# Patient Record
Sex: FEMALE | Race: WHITE | NOT HISPANIC OR LATINO | Employment: OTHER | URBAN - METROPOLITAN AREA
[De-identification: names, ages, dates, MRNs, and addresses within clinical notes are randomized per-mention and may not be internally consistent; named-entity substitution may affect disease eponyms.]

---

## 2017-04-03 ENCOUNTER — TRANSFERRED RECORDS (OUTPATIENT)
Dept: HEALTH INFORMATION MANAGEMENT | Facility: CLINIC | Age: 57
End: 2017-04-03

## 2017-04-04 ENCOUNTER — TRANSFERRED RECORDS (OUTPATIENT)
Dept: HEALTH INFORMATION MANAGEMENT | Facility: CLINIC | Age: 57
End: 2017-04-04

## 2017-04-21 ENCOUNTER — TRANSFERRED RECORDS (OUTPATIENT)
Dept: HEALTH INFORMATION MANAGEMENT | Facility: CLINIC | Age: 57
End: 2017-04-21

## 2017-04-24 ENCOUNTER — TRANSFERRED RECORDS (OUTPATIENT)
Dept: HEALTH INFORMATION MANAGEMENT | Facility: CLINIC | Age: 57
End: 2017-04-24

## 2017-05-02 ENCOUNTER — TRANSFERRED RECORDS (OUTPATIENT)
Dept: HEALTH INFORMATION MANAGEMENT | Facility: CLINIC | Age: 57
End: 2017-05-02

## 2017-06-28 ENCOUNTER — ALLIED HEALTH/NURSE VISIT (OUTPATIENT)
Dept: CARDIOLOGY | Facility: CLINIC | Age: 57
End: 2017-06-28
Attending: INTERNAL MEDICINE
Payer: COMMERCIAL

## 2017-06-28 ENCOUNTER — HOSPITAL ENCOUNTER (OUTPATIENT)
Facility: CLINIC | Age: 57
Setting detail: OBSERVATION
Discharge: HOME | End: 2017-06-29
Attending: EMERGENCY MEDICINE | Admitting: INTERNAL MEDICINE
Payer: COMMERCIAL

## 2017-06-28 ENCOUNTER — APPOINTMENT (OUTPATIENT)
Dept: CT IMAGING | Facility: CLINIC | Age: 57
End: 2017-06-28
Attending: EMERGENCY MEDICINE
Payer: COMMERCIAL

## 2017-06-28 ENCOUNTER — APPOINTMENT (OUTPATIENT)
Dept: GENERAL RADIOLOGY | Facility: CLINIC | Age: 57
End: 2017-06-28
Attending: EMERGENCY MEDICINE
Payer: COMMERCIAL

## 2017-06-28 DIAGNOSIS — Z95.0 PACEMAKER: ICD-10-CM

## 2017-06-28 DIAGNOSIS — R07.9 CHEST PAIN, UNSPECIFIED TYPE: ICD-10-CM

## 2017-06-28 DIAGNOSIS — Z95.0 CARDIAC PACEMAKER IN SITU: ICD-10-CM

## 2017-06-28 DIAGNOSIS — I44.30 INCOMPLETE ATRIOVENTRICULAR BLOCK: ICD-10-CM

## 2017-06-28 DIAGNOSIS — I44.2 COMPLETE ATRIOVENTRICULAR BLOCK (H): Primary | ICD-10-CM

## 2017-06-28 LAB
ANION GAP SERPL CALCULATED.3IONS-SCNC: 5 MMOL/L (ref 3–14)
BASOPHILS # BLD AUTO: 0 10E9/L (ref 0–0.2)
BASOPHILS NFR BLD AUTO: 0.5 %
BUN SERPL-MCNC: 13 MG/DL (ref 7–30)
CALCIUM SERPL-MCNC: 9.9 MG/DL (ref 8.5–10.1)
CHLORIDE SERPL-SCNC: 109 MMOL/L (ref 94–109)
CO2 SERPL-SCNC: 24 MMOL/L (ref 20–32)
CREAT SERPL-MCNC: 0.67 MG/DL (ref 0.52–1.04)
D DIMER PPP FEU-MCNC: 0.6 UG/ML FEU (ref 0–0.5)
DIFFERENTIAL METHOD BLD: NORMAL
EOSINOPHIL # BLD AUTO: 0.1 10E9/L (ref 0–0.7)
EOSINOPHIL NFR BLD AUTO: 1.7 %
ERYTHROCYTE [DISTWIDTH] IN BLOOD BY AUTOMATED COUNT: 12.6 % (ref 10–15)
GFR SERPL CREATININE-BSD FRML MDRD: NORMAL ML/MIN/1.7M2
GLUCOSE BLDC GLUCOMTR-MCNC: 101 MG/DL (ref 70–99)
GLUCOSE SERPL-MCNC: 82 MG/DL (ref 70–99)
HCT VFR BLD AUTO: 42.8 % (ref 35–47)
HGB BLD-MCNC: 14.2 G/DL (ref 11.7–15.7)
IMM GRANULOCYTES # BLD: 0 10E9/L (ref 0–0.4)
IMM GRANULOCYTES NFR BLD: 0.2 %
INR PPP: 0.95 (ref 0.86–1.14)
LIPASE SERPL-CCNC: 133 U/L (ref 73–393)
LYMPHOCYTES # BLD AUTO: 2.3 10E9/L (ref 0.8–5.3)
LYMPHOCYTES NFR BLD AUTO: 37.5 %
MCH RBC QN AUTO: 31.7 PG (ref 26.5–33)
MCHC RBC AUTO-ENTMCNC: 33.2 G/DL (ref 31.5–36.5)
MCV RBC AUTO: 96 FL (ref 78–100)
MONOCYTES # BLD AUTO: 0.5 10E9/L (ref 0–1.3)
MONOCYTES NFR BLD AUTO: 8.3 %
NEUTROPHILS # BLD AUTO: 3.1 10E9/L (ref 1.6–8.3)
NEUTROPHILS NFR BLD AUTO: 51.8 %
NRBC # BLD AUTO: 0 10*3/UL
NRBC BLD AUTO-RTO: 0 /100
PLATELET # BLD AUTO: 195 10E9/L (ref 150–450)
POTASSIUM SERPL-SCNC: 4.2 MMOL/L (ref 3.4–5.3)
RBC # BLD AUTO: 4.48 10E12/L (ref 3.8–5.2)
SODIUM SERPL-SCNC: 138 MMOL/L (ref 133–144)
TROPONIN I SERPL-MCNC: NORMAL UG/L (ref 0–0.04)
WBC # BLD AUTO: 6 10E9/L (ref 4–11)

## 2017-06-28 PROCEDURE — 25000132 ZZH RX MED GY IP 250 OP 250 PS 637: Performed by: EMERGENCY MEDICINE

## 2017-06-28 PROCEDURE — 93296 REM INTERROG EVL PM/IDS: CPT | Mod: ZF

## 2017-06-28 PROCEDURE — 85610 PROTHROMBIN TIME: CPT | Performed by: EMERGENCY MEDICINE

## 2017-06-28 PROCEDURE — 25000128 H RX IP 250 OP 636: Performed by: RADIOLOGY

## 2017-06-28 PROCEDURE — 85379 FIBRIN DEGRADATION QUANT: CPT | Performed by: EMERGENCY MEDICINE

## 2017-06-28 PROCEDURE — 93294 REM INTERROG EVL PM/LDLS PM: CPT | Performed by: INTERNAL MEDICINE

## 2017-06-28 PROCEDURE — 93010 ELECTROCARDIOGRAM REPORT: CPT | Mod: Z6 | Performed by: EMERGENCY MEDICINE

## 2017-06-28 PROCEDURE — G0378 HOSPITAL OBSERVATION PER HR: HCPCS

## 2017-06-28 PROCEDURE — 80048 BASIC METABOLIC PNL TOTAL CA: CPT | Performed by: EMERGENCY MEDICINE

## 2017-06-28 PROCEDURE — 85025 COMPLETE CBC W/AUTO DIFF WBC: CPT | Performed by: EMERGENCY MEDICINE

## 2017-06-28 PROCEDURE — 84484 ASSAY OF TROPONIN QUANT: CPT | Performed by: EMERGENCY MEDICINE

## 2017-06-28 PROCEDURE — 83690 ASSAY OF LIPASE: CPT | Performed by: EMERGENCY MEDICINE

## 2017-06-28 PROCEDURE — 96361 HYDRATE IV INFUSION ADD-ON: CPT | Performed by: EMERGENCY MEDICINE

## 2017-06-28 PROCEDURE — 25000128 H RX IP 250 OP 636: Performed by: EMERGENCY MEDICINE

## 2017-06-28 PROCEDURE — 99291 CRITICAL CARE FIRST HOUR: CPT | Mod: 25 | Performed by: EMERGENCY MEDICINE

## 2017-06-28 PROCEDURE — 71020 XR CHEST 2 VW: CPT

## 2017-06-28 PROCEDURE — 99285 EMERGENCY DEPT VISIT HI MDM: CPT | Mod: 25 | Performed by: EMERGENCY MEDICINE

## 2017-06-28 PROCEDURE — 25000125 ZZHC RX 250: Performed by: RADIOLOGY

## 2017-06-28 PROCEDURE — 96374 THER/PROPH/DIAG INJ IV PUSH: CPT | Mod: 59 | Performed by: EMERGENCY MEDICINE

## 2017-06-28 PROCEDURE — 93005 ELECTROCARDIOGRAM TRACING: CPT | Performed by: EMERGENCY MEDICINE

## 2017-06-28 PROCEDURE — 71260 CT THORAX DX C+: CPT

## 2017-06-28 PROCEDURE — 99218 ZZC INITIAL OBSERVATION CARE,LEVL I: CPT | Mod: GC | Performed by: INTERNAL MEDICINE

## 2017-06-28 PROCEDURE — 40000065 ZZH STATISTIC EKG NON-CHARGEABLE: Performed by: EMERGENCY MEDICINE

## 2017-06-28 PROCEDURE — 00000146 ZZHCL STATISTIC GLUCOSE BY METER IP

## 2017-06-28 PROCEDURE — 99292 CRITICAL CARE ADDL 30 MIN: CPT | Mod: 25 | Performed by: EMERGENCY MEDICINE

## 2017-06-28 RX ORDER — DIPHENHYDRAMINE HCL 25 MG
25-50 CAPSULE ORAL
Status: DISCONTINUED | OUTPATIENT
Start: 2017-06-28 | End: 2017-06-29 | Stop reason: HOSPADM

## 2017-06-28 RX ORDER — FERROUS SULFATE 325(65) MG
325 TABLET ORAL DAILY
Status: DISCONTINUED | OUTPATIENT
Start: 2017-06-29 | End: 2017-06-29 | Stop reason: HOSPADM

## 2017-06-28 RX ORDER — ALBUTEROL SULFATE 90 UG/1
2 AEROSOL, METERED RESPIRATORY (INHALATION) PRN
COMMUNITY

## 2017-06-28 RX ORDER — IOPAMIDOL 755 MG/ML
58 INJECTION, SOLUTION INTRAVASCULAR ONCE
Status: COMPLETED | OUTPATIENT
Start: 2017-06-28 | End: 2017-06-28

## 2017-06-28 RX ORDER — ACETAMINOPHEN 325 MG/1
650 TABLET ORAL EVERY 4 HOURS PRN
Status: DISCONTINUED | OUTPATIENT
Start: 2017-06-28 | End: 2017-06-29 | Stop reason: HOSPADM

## 2017-06-28 RX ORDER — ALUMINA, MAGNESIA, AND SIMETHICONE 2400; 2400; 240 MG/30ML; MG/30ML; MG/30ML
15-30 SUSPENSION ORAL EVERY 4 HOURS PRN
Status: DISCONTINUED | OUTPATIENT
Start: 2017-06-28 | End: 2017-06-29 | Stop reason: HOSPADM

## 2017-06-28 RX ORDER — FLUTICASONE PROPIONATE 110 UG/1
2 AEROSOL, METERED RESPIRATORY (INHALATION) 2 TIMES DAILY
COMMUNITY
End: 2018-08-30

## 2017-06-28 RX ORDER — PROMETHAZINE HYDROCHLORIDE 50 MG/ML
12.5 INJECTION, SOLUTION INTRAMUSCULAR; INTRAVENOUS ONCE
Status: COMPLETED | OUTPATIENT
Start: 2017-06-28 | End: 2017-06-28

## 2017-06-28 RX ORDER — FLUTICASONE PROPIONATE 110 UG/1
2 AEROSOL, METERED RESPIRATORY (INHALATION) 2 TIMES DAILY
Status: DISCONTINUED | OUTPATIENT
Start: 2017-06-28 | End: 2017-06-28 | Stop reason: CLARIF

## 2017-06-28 RX ORDER — LIDOCAINE 40 MG/G
CREAM TOPICAL
Status: DISCONTINUED | OUTPATIENT
Start: 2017-06-28 | End: 2017-06-29 | Stop reason: HOSPADM

## 2017-06-28 RX ORDER — IBUPROFEN 800 MG/1
800 TABLET, FILM COATED ORAL 4 TIMES DAILY PRN
Status: DISCONTINUED | OUTPATIENT
Start: 2017-06-28 | End: 2017-06-28

## 2017-06-28 RX ORDER — ACETAMINOPHEN 325 MG/1
650 TABLET ORAL ONCE
Status: COMPLETED | OUTPATIENT
Start: 2017-06-28 | End: 2017-06-28

## 2017-06-28 RX ORDER — ALBUTEROL SULFATE 90 UG/1
2 AEROSOL, METERED RESPIRATORY (INHALATION) EVERY 4 HOURS PRN
Status: DISCONTINUED | OUTPATIENT
Start: 2017-06-28 | End: 2017-06-29 | Stop reason: HOSPADM

## 2017-06-28 RX ORDER — LIDOCAINE HYDROCHLORIDE 10 MG/ML
INJECTION, SOLUTION EPIDURAL; INFILTRATION; INTRACAUDAL; PERINEURAL
Status: DISCONTINUED
Start: 2017-06-28 | End: 2017-06-28 | Stop reason: HOSPADM

## 2017-06-28 RX ADMIN — SODIUM CHLORIDE, PRESERVATIVE FREE 88 ML: 5 INJECTION INTRAVENOUS at 19:43

## 2017-06-28 RX ADMIN — PROMETHAZINE HYDROCHLORIDE 12.5 MG: 50 INJECTION INTRAMUSCULAR at 18:52

## 2017-06-28 RX ADMIN — ACETAMINOPHEN 650 MG: 325 TABLET, FILM COATED ORAL at 19:12

## 2017-06-28 RX ADMIN — IOPAMIDOL 58 ML: 755 INJECTION, SOLUTION INTRAVENOUS at 19:43

## 2017-06-28 RX ADMIN — SODIUM CHLORIDE 1000 ML: 9 INJECTION, SOLUTION INTRAVENOUS at 19:12

## 2017-06-28 ASSESSMENT — ENCOUNTER SYMPTOMS
DIZZINESS: 1
NAUSEA: 1
DIAPHORESIS: 0
SHORTNESS OF BREATH: 1

## 2017-06-28 NOTE — IP AVS SNAPSHOT
MRN:[id number]                      After Visit Summary   6/28/2017    Joyce Marroquin    MRN: [id number]           Thank you!     Thank you for choosing [facility] for your care. Our goal is always to provide you with excellent care. Hearing back from our patients is one way we can continue to improve our services. Please take a few minutes to complete the written survey that you may receive in the mail after you visit with us. Thank you!        Patient Information     Date Of Birth          1960        Designated Caregiver       Most Recent Value    Caregiver    Will someone help with your care after discharge? no      About your hospital stay     You were admitted on:  June 28, 2017 You last received care in the:  [address] [facility]    You were discharged on:  June 29, 2017        Reason for your hospital stay       Atypical chest pain.                  Who to Call     For medical emergencies, please call 911.  For non-urgent questions about your medical care, please call your primary care provider or clinic, [phone]          Attending Provider     Provider Specialty    Julio C Neff MD Internal Medicine    Guerrier, Annia Welsh MD Emergency Medicine       Primary Care Provider Office Phone # Fax #    Hai Renteria [phone]      After Care Instructions     Activity       Your activity upon discharge: activity as tolerated            Diet       Follow this diet upon discharge: Orders Placed This Encounter      Regular Diet Adult            Discharge Instructions       1. Use tylenol as needed for localized chest pain.  2. Stay well hydrated, drink at least 40-50oz of water, you may supplement with pedialyte.  3. Call for new or changed chest pain that is worse with activity and improves with rest or nitro.  4. Continue with daily activities as tolerated daily.  4. Follow up as instructed above.                  Follow-up Appointments     Adult [facility] Follow-up  "and recommended labs and tests       1. Follow up with primary care provider, Hai Renteria, within 7 days for hospital follow- up.  No follow up labs or test are needed.      2. Follow up with Dr. Funes as previously scheduled.    Appointments on [place] and/or [facility] (with [facility] [[email or web address]] or [facility] provider or service). Call [phone] if you haven't heard regarding these appointments within 7 days of discharge.                  Pending Results     Date and Time Order Name Status Description    2017 1812 EKG 12 lead Preliminary             Statement of Approval     Ordered          17 1142  I have reviewed and agree with all the recommendations and orders detailed in this document.  EFFECTIVE NOW     Approved and electronically signed by:  Tessie Julien MD             Admission Information     Date & Time Department Dept. Phone    2017 [address] [facility] [place] [phone]      Your Vitals Were     Blood Pressure Pulse Temperature Respirations Height Weight    116/73 (BP Location: Right arm) 77 98.2  F (36.8  C) (Oral) 20 1.651 m (5' 5\") 72.6 kg (160 lb)    Last Period Pulse Oximetry BMI (Body Mass Index)             2007 98% 26.63 kg/m2         MyChart Information     [employer] lets you send messages to your doctor, view your test results, renew your prescriptions, schedule appointments and more. To sign up, go to [email or web address] . Click on \"Log in\" on the left side of the screen, which will take you to the Welcome page. Then click on \"Sign up Now\" on the right side of the page.     You will be asked to enter the access code listed below, as well as some personal information. Please follow the directions to create your username and password.     Your access code is: [email or web address]  Expires: 2017 12:14 PM     Your access code will  in 90 days. If you need help or a new code, please call your [place] clinic or [phone].        Care EveryWhere ID     This is your " Care EveryWhere ID. This could be used by other organizations to access your [place] medical records  [id number]        Equal Access to Services     PAVITHRA SALCEDO : Jcarlos Morgan, eugene silva, katherine carrascomaunique juan, micah martinoleonbibiana delcid. So [facility] [phone].    ATENCIÓN: Si habla español, tiene a hankins disposición servicios gratuitos de asistencia lingüística. Llame al [phone].    We comply with applicable federal civil rights laws and [place] laws. We do not discriminate on the basis of race, color, national origin, age, disability sex, sexual orientation or gender identity.               Review of your medicines      CONTINUE these medicines which have NOT CHANGED        Dose / Directions    albuterol 108 (90 BASE) MCG/ACT Inhaler   Commonly known as:  PROAIR HFA/PROVENTIL HFA/VENTOLIN HFA        Dose:  2 puff   Inhale 2 puffs into the lungs every 4 hours as needed for shortness of breath / dyspnea or wheezing   Refills:  0       BENADRYL PO        Dose:  25-50 mg   Take 25-50 mg by mouth nightly as needed   Refills:  0       DAILY MULTIVITAMIN PO        Dose:  1 tablet   Take 1 tablet by mouth daily   Refills:  0       fluticasone 110 MCG/ACT Inhaler   Commonly known as:  FLOVENT HFA        Dose:  2 puff   Inhale 2 puffs into the lungs 2 times daily   Refills:  0       IRON SUPPLEMENT PO        Dose:  325 mg   Take 325 mg by mouth daily   Refills:  0       LEVOTHYROXINE SODIUM PO        Dose:  137 mcg   Take 137 mcg by mouth daily   Refills:  0       MELATONIN PO        Dose:  1 mg   Take 1 mg by mouth nightly as needed   Refills:  0       PROBIOTIC DAILY PO        Dose:  1 capsule   Take 1 capsule by mouth daily   Refills:  0       TYLENOL PO        Dose:  500-1000 mg   Take 500-1,000 mg by mouth daily as needed   Refills:  0       VITAMIN D3 PO        Dose:  5000 Units   Take 5,000 Units by mouth daily   Refills:  0                Protect others around you:  Learn how to safely use, store and throw away your medicines at www.disposemymeds.org.             Medication List: This is a list of all your medications and when to take them. Check marks below indicate your daily home schedule. Keep this list as a reference.      Medications           Morning Afternoon Evening Bedtime As Needed    albuterol 108 (90 BASE) MCG/ACT Inhaler   Commonly known as:  PROAIR HFA/PROVENTIL HFA/VENTOLIN HFA   Inhale 2 puffs into the lungs every 4 hours as needed for shortness of breath / dyspnea or wheezing                                BENADRYL PO   Take 25-50 mg by mouth nightly as needed                                DAILY MULTIVITAMIN PO   Take 1 tablet by mouth daily                                fluticasone 110 MCG/ACT Inhaler   Commonly known as:  FLOVENT HFA   Inhale 2 puffs into the lungs 2 times daily                                IRON SUPPLEMENT PO   Take 325 mg by mouth daily   Last time this was given:  325 mg on 6/29/2017 10:49 AM                                LEVOTHYROXINE SODIUM PO   Take 137 mcg by mouth daily   Last time this was given:  137 mcg on 6/29/2017  8:14 AM                                MELATONIN PO   Take 1 mg by mouth nightly as needed                                PROBIOTIC DAILY PO   Take 1 capsule by mouth daily                                TYLENOL PO   Take 500-1,000 mg by mouth daily as needed   Last time this was given:  650 mg on 6/29/2017 10:49 AM                                VITAMIN D3 PO   Take 5,000 Units by mouth daily   Last time this was given:  5,000 Units on 6/29/2017  8:14 AM

## 2017-06-28 NOTE — MR AVS SNAPSHOT
"              After Visit Summary   2017    Joyce Marroquin    MRN: [id number]           Patient Information     Date Of Birth          1960        Visit Information        Provider Department      2017 6:00 AM  ICD REMOTE [facility]        Today's Diagnoses     Complete atrioventricular block (H)    -  1       Follow-ups after your visit        Future tests that were ordered for you today     Open Standing Orders        Priority Remaining Interval Expires Ordered    EKG 12-lead, tracing only STAT  CONDITIONAL X 1  2017            Who to contact     If you have questions or need follow up information about today's clinic visit or your schedule please contact [facility] directly at [phone].  Normal or non-critical lab and imaging results will be communicated to you by MyChart, letter or phone within 4 business days after the clinic has received the results. If you do not hear from us within 7 days, please contact the clinic through [employer] or phone. If you have a critical or abnormal lab result, we will notify you by phone as soon as possible.  Submit refill requests through [employer] or call your pharmacy and they will forward the refill request to us. Please allow 3 business days for your refill to be completed.          Additional Information About Your Visit        MyChart Information     [employer] lets you send messages to your doctor, view your test results, renew your prescriptions, schedule appointments and more. To sign up, go to [email or web address] . Click on \"Log in\" on the left side of the screen, which will take you to the Welcome page. Then click on \"Sign up Now\" on the right side of the page.     You will be asked to enter the access code listed below, as well as some personal information. Please follow the directions to create your username and password.     Your access code is: [email or web address]  Expires: 2017 12:14 PM     Your access code will  in " 90 days. If you need help or a new code, please call your [place] clinic or [phone].        Care EveryWhere ID     This is your Care EveryWhere ID. This could be used by other organizations to access your [place] medical records  [id number]        Your Vitals Were     Last Period                   08/21/2007            Blood Pressure from Last 3 Encounters:   06/29/17 116/73   08/27/15 122/74   09/07/07 98/68    Weight from Last 3 Encounters:   06/28/17 72.6 kg (160 lb)   08/27/15 61.8 kg (136 lb 4.8 oz)   09/07/07 63.5 kg (140 lb)              We Performed the Following     INTERROGATION DEVICE EVAL REMOTE, PACER/ICD        Primary Care Provider Office Phone # Fax #    Hai Renteria [phone]       [facility] [address]   [place] [address]        Equal Access to Services     JELANI SALCEDO : Hadii aad ku hadasho Soomaali, waaxda luqadaha, qaybta kaalmada adeegyada, waxay idiin hayaan ellen kharabibiana cho . So [facility] [phone].    ATENCIÓN: Si masoudla español, tiene a hankins disposición servicios gratuitos de asistencia lingüística. Llame al [phone].    We comply with applicable federal civil rights laws and [place] laws. We do not discriminate on the basis of race, color, national origin, age, disability sex, sexual orientation or gender identity.            Thank you!     Thank you for choosing [facility]  for your care. Our goal is always to provide you with excellent care. Hearing back from our patients is one way we can continue to improve our services. Please take a few minutes to complete the written survey that you may receive in the mail after your visit with us. Thank you!             Your Updated Medication List - Protect others around you: Learn how to safely use, store and throw away your medicines at www.disposemymeds.org.          This list is accurate as of: 6/28/17  4:42 PM.  Always use your most recent med list.                   Brand Name  Dispense Instructions for use Diagnosis    albuterol 108 (90 BASE) MCG/ACT Inhaler    PROAIR HFA/PROVENTIL HFA/VENTOLIN HFA     Inhale 2 puffs into the lungs every 4 hours as needed for shortness of breath / dyspnea or wheezing        BENADRYL PO      Take 25-50 mg by mouth nightly as needed        DAILY MULTIVITAMIN PO      Take 1 tablet by mouth daily        fluticasone 110 MCG/ACT Inhaler    FLOVENT HFA     Inhale 2 puffs into the lungs 2 times daily        IRON SUPPLEMENT PO      Take 325 mg by mouth daily        LEVOTHYROXINE SODIUM PO      Take 137 mcg by mouth daily        MELATONIN PO      Take 1 mg by mouth nightly as needed        PROBIOTIC DAILY PO      Take 1 capsule by mouth daily        TYLENOL PO      Take 500-1,000 mg by mouth daily as needed        VITAMIN D3 PO      Take 5,000 Units by mouth daily

## 2017-06-28 NOTE — ED PROVIDER NOTES
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 Patient reports she had an episode of mid-sternal, non-radiating, intermittent, \"gripping\" chest pain that woke her from sleep at 4 AM and persisted until around 9 AM. She reports associated shortness of breath and nausea with her chest pain this morning, but denies any vomiting or diaphoresis. Her chest pain was significantly worse while straining to pass a bowel movement. She notes she attempted to drive herself to the [facility] ED this morning, however, became too dizzy, so went home and called an ambulance.      She did present to the [facility] Emergency Department this morning around 8 AM by EMS for further evaluation of her chest pain. She reports she received nitroglycerin as well as a full-strength aspirin while in the ED at [facility] with improvement in her chest pain. Patient states she left [facility] this morning after reportedly having a poor interaction with a \"dariane Cardiologist\" and felt as though \"they were missing me\" and \"not taking care of me\". She states she called the Cardiology Clinic here at the [facility], and set up future care with Dr. Funes, but then presented here to the Emergency Department this afternoon after having another episode of chest pain prior to arrival. Of note, patient admits some of her chest pain may be stress-induced. Currently, patient states her chest pain has resolved. She denies leg pain, but has mild bilateral lower extremity swelling. She notes she did take a six hour long car trip last week, returned Friday. History of smoking, " "quit years ago. No history of hypertension. She does have a family history of cardiac disease in both parents. No history of DVT or PE. She had been prescribed colchicine following her pericarditis, however, discontinued this a couple of days ago. Her last cardiac stress test was in April prior to her ablation procedure at [facility] which was normal.      Per Care Everywhere, patient's EKG and troponin were unremarkable. Cardiologist at [facility] recommended CTA to rule out coronary disease and to look at the aorta, which patient initially refused multiple times but ultimately agreed to. However, prior to starting this imaging patient became upset with her treatment in the [place] ED and ultimately ended up removing her cardiac monitoring as well as pulling out her own IV, and subsequently eloped.     On multiple occasions she has told me that she is \"being missed\" and that her care providers at [facility] \"are not taking care of me\".  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    I have reviewed the Medications, Allergies, Past Medical and Surgical History, and Social History in the [employer] system.  Past Medical History:   Diagnosis Date     Chronic depressive personality disorder      Esophageal reflux        Past Surgical History:   Procedure Laterality Date     HC CORRECT BUNION,SIMPLE         No family history on file.    Social History   Substance Use Topics     Smoking status: Current Every Day Smoker     Smokeless tobacco: Not on file     Alcohol use Yes      Comment: rarely     Current Facility-Administered Medications   Medication     lidocaine 1 % 1 mL     lidocaine (LMX4) kit     sodium chloride (PF) 0.9% PF flush 3 mL     sodium chloride (PF) 0.9% PF flush 3 mL     medication instruction     alum & mag hydroxide-simethicone (MYLANTA ES/MAALOX  ES) suspension 15-30 mL     acetaminophen (TYLENOL) tablet 650 mg     sodium chloride 0.9 % for CT scan flush dose 88 mL     albuterol (PROAIR " "HFA/PROVENTIL HFA/VENTOLIN HFA) Inhaler 2 puff     cholecalciferol (vitamin D3) capsule CAPS 5,000 Units     diphenhydrAMINE (BENADRYL) capsule 25-50 mg     ferrous sulfate (IRON) tablet 325 mg     levothyroxine (SYNTHROID/LEVOTHROID) tablet 137 mcg     melatonin tablet 1 mg        Allergies   Allergen Reactions     Bactrim [Sulfamethoxazole W/Trimethoprim]      Flagyl [Metronidazole]      Levaquin [Levofloxacin]      Tetracycline      Zofran [Ondansetron]      Prolonged QT at baseline per patient       Review of Systems   Constitutional: Negative for diaphoresis.   Respiratory: Positive for shortness of breath (resolved).    Cardiovascular: Positive for chest pain (resolved) and leg swelling (mild, bilateral).   Gastrointestinal: Positive for nausea.   Neurological: Positive for dizziness (intermittent).   All other systems reviewed and are negative.      Physical Exam   BP: 151/72  Pulse: (!) 46  Heart Rate: 99  Temp: 97.9  F (36.6  C)  Resp: (!) 98  Height: 165.1 cm (5' 5\")  Weight: 72.6 kg (160 lb)  SpO2: 99 %  Physical Exam   Constitutional: No distress.   Alert, highly anxious, cooperative. Quite somatically preoccupied   HENT:   Head: Atraumatic.   Mouth/Throat: Oropharynx is clear and moist. No oropharyngeal exudate.   Eyes: Pupils are equal, round, and reactive to light. No scleral icterus.   Cardiovascular: Normal heart sounds and intact distal pulses.    No murmur heard.  Irregular rhythm, no murmurs   Pulmonary/Chest: Effort normal and breath sounds normal. No respiratory distress. She has no wheezes. She has no rales.   Abdominal: Soft. Bowel sounds are normal. There is no tenderness.   Musculoskeletal: She exhibits no edema or tenderness.   Skin: Skin is warm. No rash noted. She is not diaphoretic.   Psychiatric:   anxious   Nursing note and vitals reviewed.      ED Course     ED Course     Procedures       4:56 PM  The patient was seen and examined by Dr. Guerrier in Room 2.          EKG " Interpretation:      Interpreted by Annia Guerrier  Time reviewed: 1700  Symptoms at time of EKG: palpitations   Rhythm: paced  Rate: Normal  Axis: Normal  Ectopy: none  Conduction: nonspecific interventricular conduction block  ST Segments/ T Waves: No acute ischemic changes  Q Waves: none  Comparison to prior: No EKGs since 2006, pacer is new    Clinical Impression: paced rhythm                Critical Care time:  was 90 minutes for this patient excluding procedures.               Results for orders placed or performed during the hospital encounter of 06/28/17 (from the past 24 hour(s))   CBC with platelets differential   Result Value Ref Range    WBC 6.0 4.0 - 11.0 10e9/L    RBC Count 4.48 3.8 - 5.2 10e12/L    Hemoglobin 14.2 11.7 - 15.7 g/dL    Hematocrit 42.8 35.0 - 47.0 %    MCV 96 78 - 100 fl    MCH 31.7 26.5 - 33.0 pg    MCHC 33.2 31.5 - 36.5 g/dL    RDW 12.6 10.0 - 15.0 %    Platelet Count 195 150 - 450 10e9/L    Diff Method Automated Method     % Neutrophils 51.8 %    % Lymphocytes 37.5 %    % Monocytes 8.3 %    % Eosinophils 1.7 %    % Basophils 0.5 %    % Immature Granulocytes 0.2 %    Nucleated RBCs 0 0 /100    Absolute Neutrophil 3.1 1.6 - 8.3 10e9/L    Absolute Lymphocytes 2.3 0.8 - 5.3 10e9/L    Absolute Monocytes 0.5 0.0 - 1.3 10e9/L    Absolute Eosinophils 0.1 0.0 - 0.7 10e9/L    Absolute Basophils 0.0 0.0 - 0.2 10e9/L    Abs Immature Granulocytes 0.0 0 - 0.4 10e9/L    Absolute Nucleated RBC 0.0    Troponin I   Result Value Ref Range    Troponin I ES  0.000 - 0.045 ug/L     <0.015  The 99th percentile for upper reference range is 0.045 ug/L.  Troponin values in   the range of 0.045 - 0.120 ug/L may be associated with risks of adverse   clinical events.     Basic metabolic panel   Result Value Ref Range    Sodium 138 133 - 144 mmol/L    Potassium 4.2 3.4 - 5.3 mmol/L    Chloride 109 94 - 109 mmol/L    Carbon Dioxide 24 20 - 32 mmol/L    Anion Gap 5 3 - 14 mmol/L    Glucose 82 70 - 99 mg/dL     Urea Nitrogen 13 7 - 30 mg/dL    Creatinine 0.67 0.52 - 1.04 mg/dL    GFR Estimate >90  Non  GFR Calc   >60 mL/min/1.7m2    GFR Estimate If Black >90   GFR Calc   >60 mL/min/1.7m2    Calcium 9.9 8.5 - 10.1 mg/dL   INR   Result Value Ref Range    INR 0.95 0.86 - 1.14   D dimer quantitative   Result Value Ref Range    D Dimer 0.6 (H) 0.0 - 0.50 ug/ml FEU   Lipase   Result Value Ref Range    Lipase 133 73 - 393 U/L   Chest XR,  PA & LAT    Narrative    EXAM: XR CHEST 2 VW  6/28/2017 5:43 PM     HISTORY:  CP       COMPARISON:  CT 11/5/2009    FINDINGS: PA and lateral radiographs of the chest. Left chest wall  implantable cardiac defibrillator generator and leads are intact. The  mediastinal border, cardiac silhouette, and pulmonary vasculature are  within normal limits. No focal airspace opacities. No pneumothorax. No  pleural effusions.      Impression    IMPRESSION: No focal airspace disease.     I have personally reviewed the examination and initial interpretation  and I agree with the findings.    FERNANDEZ KITCHEN MD   EKG 12 lead   Result Value Ref Range    Interpretation ECG Click View Image link to view waveform and result    Glucose by meter   Result Value Ref Range    Glucose 101 (H) 70 - 99 mg/dL   Chest CT, IV contrast only - PE protocol    Narrative    EXAMINATION: CT CHEST PULMONARY EMBOLISM W CONTRAST, 6/28/2017 8:05 PM    CLINICAL HISTORY: elevated d dimer, chest pain, SOB, evaluate for PE    COMPARISON: Radiograph 6/28/2017. CT 11/5/2009    TECHNIQUE: CT imaging obtained through the chest with contrast.  Coronal and axial MIP reformatted images obtained. Three-dimensional  (3D) post-processed angiographic images were reconstructed, archived  to PACS and used in interpretation of this study.     CONTRAST: 58 ml isovue 370 IV    FINDINGS:    Lines and tubes: Left chest wall pacemaker generator and leads are  present.    Vessels: No central filling defect consistent with a  pulmonary  embolism.  No aortic aneurysm.     Mediastinum: No thyroid nodules. Central tracheobronchial tree is  patent. Heart size is normal. No pericardial effusion.  Normal  thoracic vasculature. No thoracic lymphadenopathy.     Lungs: There are new regions of nodularity with surrounding  groundglass opacities in the right middle lobe (series 5, image 57)  and lingula (series 5, image 53). Improving tree-in-bud opacities in  the lateral aspect of the right upper lobe. Stable sub-6 mm pulmonary  nodules compared with the exam dated 2009. This includes a 5 mm  subpleural nodule in the right lower lobe (series 5, image 37), and a  4 mm subpleural nodule in the posterior left lower lobe (series 5,  image 32).    Bones and soft tissues: No suspicious bone findings.     Upper abdomen: Limited.      Impression    IMPRESSION:   1. No central filling defect consistent with a pulmonary embolism.   2. New regions of consolidative nodules surrounded by groundglass  opacities concerning for infection.  3. Improving tree-in-bud opacities along the lateral aspect of the  right lobe since 2009, favoring a chronic inflammatory/infectious  process.    I have personally reviewed the examination and initial interpretation  and I agree with the findings.    FERNANDEZ KITCHEN MD              Assessments & Plan (with Medical Decision Making)   This is a 56-year-old female who presents to the Emergency Department today for evaluation of chest pain.  Of note, patient has a relatively recent complex cardiac history, she has been seen at multiple other facilities including [facility] and [facility].  She was in the ED at [facility] this morning for her chest pain and felt that she was not being managed appropriately and repeatedly told me that she was  missed  and so left there and came here.  The patient has significant medical knowledge as she works as a CRNA.  The patient reports that she woke up at 4:00 this morning with chest  pain, this persisted for 5 hours and she is now here because she does not think that [facility] is appropriately caring for her.  She did receive nitro at [facility] and this did alleviate her symptoms.  She also received a full strength aspirin there.    Differential diagnosis could include ACS, it appears that she has had a nuclear medicine stress test back in March through [facility] which was negative.  She did have issues of pericarditis after what sounds like a complicated ablation which required permanent pacemaker placement.  ACS may potentially be responsible for her symptoms, though I think it may be a little bit less likely, she evidently saw a Cardiologist at [facility] this morning who recommended a cardiac CT scan.  She did not stay there for this, because she felt that she was not being appropriately.    PE is at least a possibility, as the patient does report that she had a long car ride recently.  She s never had any history of thromboembolic disease in the past.  CBC was done and this shows a normal white count of 6.0, hemoglobin of 14.2 , platelet count is normal. INR was normal and troponin was negative. Lipase was normal and the BMP was normal. D-dimer was minimally elevated at 0.6.  Chest x-ray was WNL.  EKG today shows an atrial-sensed ventricular paced rhythm, I do not see any obvious evidence of ischemia, the most recent EKG we have available in our system is 11 years old so this is much different.  Anxiety is definitely a possibility, the patient seems very frustrated with her care thus far through other facilities and has repeatedly told me  they re missing me .  Evidently she did call the Cardiology clinic today and was attempting to get scheduled possibly with Dr. Funes.    The device nurse did come to interrogate the patient s device and there was concern that the device may be malfunctioning. It appears to potentially be oversensing and overpacing. This may explain some of her symptoms.    The  patient has already received a full strength aspirin at [facility] prior to arrival. She continued to have episodes of nausea for which we did give her Phenergan. She also received IV fluids. Repeat EKG again shows atrial sensed ventricular paced rhythm with some occasional supraventricular complexes with rates of 78.    She continues to be really very symptomatic with dizziness and palpitations, despite nonischemic EKG and negative trop.  I discussed this with Dr. Neff who is graciously willing to admit the patient for further evaluation of chest pain and possible angiogram in the morning.  They can also formally consult EP if there is concern about the pacer function.      Please note that we spent quite a long time gathering info from other hospitals via Care Everywhere, as well as formal faxed records from [facility] as there was an issue obtaining records thru Care Everywhere from [facility] where her ablation was done and pacer was placed.  I spoke to the device nurse, two different cardiology fellows, and Dr. Neff about this patient.  In excess of 90 minutes were spend in this patient's care.    I have reviewed the nursing notes.    I have reviewed the findings, diagnosis, plan and need for follow up with the patient.    Current Discharge Medication List          Final diagnoses:   Chest pain, unspecified type   Pacemaker   I, Janie Cortes, am serving as a trained medical scribe to document services personally performed by Annia Guerrier MD, based on the provider's statements to me.   I, Annia Guerrier MD, was physically present and have reviewed and verified the accuracy of this note documented by Janie Cortes.      6/28/2017   [facility], [place], EMERGENCY DEPARTMENT     Annia Guerrier MD  06/29/17 0058

## 2017-06-28 NOTE — IP AVS SNAPSHOT
[address] [address]    Phone:  [phone]                                       After Visit Summary   6/28/2017    Joyce Marroquin    MRN: [id number]           After Visit Summary Signature Page     I have received my discharge instructions, and my questions have been answered. I have discussed any challenges I see with this plan with the nurse or doctor.    ..........................................................................................................................................  Patient/Patient Representative Signature      ..........................................................................................................................................  Patient Representative Print Name and Relationship to Patient    ..................................................               ................................................  Date                                            Time    ..........................................................................................................................................  Reviewed by Signature/Title    ...................................................              ..............................................  Date                                                            Time

## 2017-06-28 NOTE — ED NOTES
Alert orientated ambulatory patient presents to ER triage with c/o:    1.) Chest Pain - onset 4 am.    Hx:  Left [facility] ED this afternoon.  Patient concerned about treatment at [facility]. Patient given nitro before leaving ED.  Patient endorses pacemaker, ablation on 4/21/17.        Airway WDLs  Breathing WDLs  Circulation - Chest pain

## 2017-06-29 ENCOUNTER — CARE COORDINATION (OUTPATIENT)
Dept: CARE COORDINATION | Facility: CLINIC | Age: 57
End: 2017-06-29

## 2017-06-29 VITALS
RESPIRATION RATE: 20 BRPM | HEIGHT: 65 IN | SYSTOLIC BLOOD PRESSURE: 116 MMHG | HEART RATE: 77 BPM | BODY MASS INDEX: 26.66 KG/M2 | OXYGEN SATURATION: 98 % | WEIGHT: 160 LBS | DIASTOLIC BLOOD PRESSURE: 73 MMHG | TEMPERATURE: 98.2 F

## 2017-06-29 PROBLEM — I44.2 COMPLETE ATRIOVENTRICULAR BLOCK (H): Status: ACTIVE | Noted: 2017-06-29

## 2017-06-29 LAB — INTERPRETATION ECG - MUSE: NORMAL

## 2017-06-29 PROCEDURE — 99217 ZZC OBSERVATION CARE DISCHARGE: CPT | Mod: GC | Performed by: INTERNAL MEDICINE

## 2017-06-29 PROCEDURE — 25000132 ZZH RX MED GY IP 250 OP 250 PS 637: Performed by: STUDENT IN AN ORGANIZED HEALTH CARE EDUCATION/TRAINING PROGRAM

## 2017-06-29 PROCEDURE — G0378 HOSPITAL OBSERVATION PER HR: HCPCS

## 2017-06-29 RX ADMIN — LEVOTHYROXINE SODIUM 137 MCG: 25 TABLET ORAL at 08:14

## 2017-06-29 RX ADMIN — CHOLECALCIFEROL CAP 125 MCG (5000 UNIT) 5000 UNITS: 125 CAP at 08:14

## 2017-06-29 RX ADMIN — FERROUS SULFATE TAB 325 MG (65 MG ELEMENTAL FE) 325 MG: 325 (65 FE) TAB at 10:49

## 2017-06-29 RX ADMIN — ACETAMINOPHEN 650 MG: 325 TABLET, FILM COATED ORAL at 10:49

## 2017-06-29 NOTE — PHARMACY-ADMISSION MEDICATION HISTORY
"Admission medication history interview status for the 6/28/2017 admission is complete. See Epic admission navigator for allergy information, pharmacy, prior to admission medications and immunization status.     Medication history interview sources:  Patient, [facility] ([phone])    Changes made to PTA medication list (reason)  Added:   - Albuterol inhaler: Added per patient. Patient stated only takes when has cold. Last took in Jan 2017.  - Benadryl: Added per patient. Patient states she takes OTC benadryl 1-2 tablets at night as needed. She states for the past few weeks she has been taking every night.  - Fluticasone inhaler: Added per patient. [facility] confirms recent fill on 3/18.  Deleted: None  Changed:   - Acetaminophen: Changed \"take 325 mg by mouth\" to \" take 500-1000 mg by mouth daily as needed\" per patient. Patient explained she has been taking 500 mg and occasionally 1000 mg during the day and then another 1000 mg at night for the past few weeks. Patient states she never goes over 2,000 mg per day.   - Ferrous sulfate: Changed four times daily three times a week to once daily per patient.  - Levothyroxine: Changed 150 mcg daily to 137 mcg daily per patient. [facility] confirmed dose.  - Melatonin: Changed 2.5 mg (no frequency or route) to 1 mg by mouth at bedtime as needed per patient. Patient stated she has needed to take every night for the past few weeks.   - Multivitamin: Added dose, route, and frequency per patient. The patient states she is taking the multivitamin Catalyn produced by [employer].  - Probiotic: Added dose, route, and frequency per patient.     Additional medication history information (including reliability of information, actions taken by pharmacist):  Patient was an excellent historian. She knew all her medications and doses. [facility] confirmed dosing of levothyroxine, Flovent, and Ventolin. Patient denied taking any other herbal or OTC products. Patient " mentioned she received 2 sublingual nitrogen tablets at [facility] ED this morning.     Prior to Admission medications    Medication Sig Last Dose Taking? Auth Provider   Ferrous Sulfate (IRON SUPPLEMENT PO) Take 325 mg by mouth daily 6/27/2017 at Unknown time Yes Unknown, Entered By History   LEVOTHYROXINE SODIUM PO Take 137 mcg by mouth daily 6/28/2017 at Unknown time Yes Unknown, Entered By History   MELATONIN PO Take 1 mg by mouth nightly as needed 6/27/2017 at Unknown time Yes Unknown, Entered By History   fluticasone (FLOVENT HFA) 110 MCG/ACT Inhaler Inhale 2 puffs into the lungs 2 times daily 6/28/2017 at AM Yes Unknown, Entered By History   DiphenhydrAMINE HCl (BENADRYL PO) Take 25-50 mg by mouth nightly as needed 6/27/2017 at PM Yes Unknown, Entered By History   Cholecalciferol (VITAMIN D3 PO) Take 5,000 Units by mouth daily 6/27/2017 at Unknown time Yes Reported, Patient   Probiotic Product (PROBIOTIC DAILY PO) Take 1 capsule by mouth daily  6/27/2017 at Unknown time Yes Reported, Patient   Acetaminophen (TYLENOL PO) Take 500-1,000 mg by mouth daily as needed  6/28/2017 at unknown Yes Reported, Patient   Multiple Vitamin (DAILY MULTIVITAMIN PO) Take 1 tablet by mouth daily  6/27/2017 at Unknown time Yes Reported, Patient   albuterol (PROAIR HFA/PROVENTIL HFA/VENTOLIN HFA) 108 (90 BASE) MCG/ACT Inhaler Inhale 2 puffs into the lungs every 4 hours as needed for shortness of breath / dyspnea or wheezing More than a month at Unknown time  Unknown, Entered By History         Medication history completed by: Sharon Zimmer

## 2017-06-29 NOTE — PROGRESS NOTES
-Pt seen by the [facility] one team and orders were recieved and carried out to discharge her to home.  -Pt reveiwd and understood her discharge instructions, orders, follow-up aopointments and prescriptions.  -Pt will be transported home by her ,

## 2017-06-29 NOTE — PROGRESS NOTES
"A carelink express was done in the ED for evaluation of the patients pacemaker per MD order.  She presented to ED with chest pain.  Pt is very tearful and she came over here today after leaving [facility] AMA.  She states that she did not feel as though she was being taken care of there.  She states that she had an ablation that \"went bad\" and ended up in complete heart block and getting a dual chamber pacemaker.  She states that her ablation was for PVC's but she states that she also has a lot of PAC's.  Upon reviewing her pacemaker check, it was difficult to tell for sure whether she was having trigeminal conducted PAC's or whether her atrial lead was picking up her T wave and RV pacing her due to oversensing.  Her heart rate histograms are interesting and look like a \"double camel hump\" Not a bell curve like they should.  I interrogated the device with the [facility]  after reviewing the transmission to see if decreasing the sensitivity or changing the blanking and or PVARP would \"fix\" the potential oversensing problem.  Her P waves are 3.6 mV and her sensitivity was programmed to 0.3 mV.  I went to 2.10 mV on the sensitivity and this did not cover the T wave, also when when she is programmed VVI 50 bpm her PAC's march through.  She states that she called and got an appt with Dr Funes.  Clearly this is problematic for her, CARDS 1 will come review the transmission.  Her pacemaker check otherwise looks fine, sensing and thresholds are good, and her battery estimates 8 years left.  Implant date 4/21/17 at [facility].    Remote pacemaker  "

## 2017-06-29 NOTE — PLAN OF CARE
Problem: Goal Outcome Summary  Goal: Goal Outcome Summary  Outcome: No Change  Received pt from ED, VSS, No CO pain chest or otherwise. Pt states she is very tired and would like to go straight to sleep. Informed pt we will monitor via telemetry machine and frequent rounding. Will continue to monitor and address as needed.

## 2017-06-29 NOTE — PLAN OF CARE
Problem: Goal Outcome Summary  Goal: Goal Outcome Summary  Outcome: No Change  Pt sleeping well overnight. No chest pain noted, no CO pain. Will continue to monitor and address as needed.

## 2017-06-29 NOTE — PROGRESS NOTES
"[facility]  \"Hello, my name is Rosetta Casas , and I am calling from the [facility].  I want to check in and see how you are doing, after leaving the hospital.  You may also receive a call from your Care Coordinator (care team), but I want to make sure you don t have any urgent needs.  I have a couple questions to review with you:     Post-Discharge Outreach                                                    Joyce Marroquin is a 56 year old female     Follow-up Appointments           Adult [facility] Follow-up and recommended labs and tests         1. Follow up with primary care provider, Hai Renteria, within 7 days for hospital follow- up.  No follow up labs or test are needed.       2. Follow up with Dr. Funes as previously scheduled.                Care Team:    Patient Care Team       Relationship Specialty Notifications Start End    Hai Renteria PCP - General Family Practice  6/28/17     Phone: [phone] Fax: [phone]         [facility] [address]  [place] [address]    Yuliet Mclean MD   5/11/11     Phone: [phone] Fax: [phone]         [facility] [address] [facility] [place] [address]            Transition of Care Review                                                      Did you have a surgery or procedure during your hospital visit? No   If yes, do you have any of the following:     Signs of infection: No    Pain:  Yes     Pain Scale (0-10) 1/10     Location: chest pain    Wound/incision concerns? NO    Do you have all of your medications/refills?  Yes    Are you having any side effects or questions about your medication(s)? No    Do you have any new or worsening symptoms?  No    Do you have any future appointments scheduled?   No             Plan                                                      Thanks for your time.  Your Care Coordinator may follow-up within the next couple days.  In the meantime if you have " questions, concerns or problems call your care team.        Rosetta Casas

## 2017-06-29 NOTE — DISCHARGE SUMMARY
"Medicine Discharge Summary  Joyce Marroquin MRN: [id number]  1960  Primary care provider: Hai Renteria  ___________________________________          Date of Admission:  6/28/2017  Date of Discharge:  6/29/2017   Admitting Physician:  Julio C Neff MD  Discharge Physician:  Dr. Funes  Discharging Service:  Cards I          Reason for Admission:   \" Ms. 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    # Dizziness:  Ms. Marroquin has had reports of dizziness which she describes as the feeling that she may pass out over the past several months. States she has had exensive evaluation for this in the past including inner ear workup. She does not have any current symptoms during hospitalization. Unclear etiology, it is possible that this may be again symptomatic PACs vs. Other cause. She already has follow up with Dr. Funes in clinic for further evaluation, given she is very stable she was felt okay to discharge with close follow up in clinic.  - Follow up with Dr. Funes as previously scheduled.    # History of RVOT RFCA c/w CHB s/p PPM:  # Atypical chest pain:  Patient had recent negative robi-scan in the past couple of months. Additionally, she had negative troponins x3 (two at OSH, one at [facility]), EKG without ischemic changes. Suspect her chest pain is " "residual irritation from recent pericarditis versus symptomatic PACs (noted on her admission EKG). No concern for ACS at this juncture. CTA negative for PE. She was also concerned with her pacemaker which was interrogated by device RN and cardiology fellow this admission with no indication of device malfunction. Patient was given reassurance and instructions to follow up with her primary care provider.  - Follow up with Dr. Ashley through [facility]     Physical Exam on day of Discharge:  Blood pressure 116/73, pulse 77, temperature 98.2  F (36.8  C), temperature source Oral, resp. rate 20, height 1.651 m (5' 5\"), weight 72.6 kg (160 lb), last menstrual period 08/21/2007, SpO2 98 %.  CONSTITUTIONAL: Ms. Marroquin is sitting up in bed in no apparent distress on exam.  HEENT:  NC/AT.  OP Clear.  MMM. PERRLA.  EOMI.   NECK: Supple, no cervical LAD, no JVD.   LUNGS: CTAB w/ no wheezes or crackles appreciated.   CARDIOVASCULAR: RRR w/ no M/R/G.   ABDOMEN: Soft, ND, NT, BS +ve.   MUSCULOSKELETAL:  Moves all four extremities without difficulty.  DP pulses intact.  No lower extremity edema.   NEURO: A&Ox3, CN II-XII grossly intact, non-focal.   PSYCHIATRIC:  Normal affect.  SKIN: Warm, Dry, No rashes.     Lines/Tubes:  None         Pending Results:     Unresulted Labs Ordered in the Past 30 Days of this Admission     No orders found for last 61 day(s).               Discharge Medications:     Discharge Medication List as of 6/29/2017 12:14 PM      CONTINUE these medications which have NOT CHANGED    Details   Ferrous Sulfate (IRON SUPPLEMENT PO) Take 325 mg by mouth daily, Historical      LEVOTHYROXINE SODIUM PO Take 137 mcg by mouth daily, Historical      MELATONIN PO Take 1 mg by mouth nightly as needed, Historical      fluticasone (FLOVENT HFA) 110 MCG/ACT Inhaler Inhale 2 puffs into the lungs 2 times daily, Historical      DiphenhydrAMINE HCl (BENADRYL PO) Take 25-50 mg by mouth nightly as needed, Historical    "   Cholecalciferol (VITAMIN D3 PO) Take 5,000 Units by mouth daily, Historical      Probiotic Product (PROBIOTIC DAILY PO) Take 1 capsule by mouth daily , Historical      Acetaminophen (TYLENOL PO) Take 500-1,000 mg by mouth daily as needed , Historical      Multiple Vitamin (DAILY MULTIVITAMIN PO) Take 1 tablet by mouth daily , Historical      albuterol (PROAIR HFA/PROVENTIL HFA/VENTOLIN HFA) 108 (90 BASE) MCG/ACT Inhaler Inhale 2 puffs into the lungs every 4 hours as needed for shortness of breath / dyspnea or wheezing, Historical                  Discharge Instructions and Follow-Up:     Discharge Procedure Orders  Reason for your hospital stay   Order Comments: Atypical chest pain.     Adult [facility] Follow-up and recommended labs and tests   Order Comments: 1. Follow up with primary care provider, Hai Renteria, within 7 days for hospital follow- up.  No follow up labs or test are needed.      2. Follow up with Dr. Funes as previously scheduled.    Appointments on [place] and/or [facility] (with [facility] or [facility] provider or service). Call [phone] if you haven't heard regarding these appointments within 7 days of discharge.     Activity   Order Comments: Your activity upon discharge: activity as tolerated   Order Specific Question Answer Comments   Is discharge order? Yes      Discharge Instructions   Order Comments: 1. Use tylenol as needed for localized chest pain.  2. Stay well hydrated, drink at least 40-50oz of water, you may supplement with pedialyte.  3. Call for new or changed chest pain that is worse with activity and improves with rest or nitro.  4. Continue with daily activities as tolerated daily.  4. Follow up as instructed above.     Full Code     Diet   Order Comments: Follow this diet upon discharge: Orders Placed This Encounter     Regular Diet Adult   Order Specific Question Answer Comments   Is discharge order? Yes              Discharge Disposition:   Home         Condition on  Discharge:   Discharge condition: Stable   Code status on discharge: Full Code      Date of service: 6/29/2017    The patient was discussed with Dr. Funes.    Tessie Julien  Internal Medicine PGY2  Pager [phone]    I very much appreciated the opportunity to see and assess Mrs Joyce Marroquin in the hospital with CV Fellow Dr Falcon  and resident Dr Julien. Mrs Marroquin presents with a complex recent history of RFA , pacemaker implanrt and pericarditis at [facility] and [facility], with sharp CP today. She also has frequent PACS as well as dizziness. I spent approx 25 min reviewing her history and discussing treatment strategies    I agree with the note above which summarizes my findings and current recommendations.  Please do not hesitate to contact my office if you have any questions or concerns.      Lino Funes MD  Cardiac Arrhythmia Service  [facility]  [phone]

## 2017-06-29 NOTE — H&P
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    She presents with long standing chest pain and dizziness that happen without a clear culprit. Chest pain is mostly sharp and central but varies in quality, severity, and location. No GERD. No recent trauma. No clear aggravating or alleviating factors. Seems to always be dizzy to varying degrees, non vertigo, both with laying down as well as standing up.     ROS without fevers, rigors, dyspnea, CP currently, n/v/c/d. Has faint increased LE edema, has been drinking more fluid since pacemaker placement. Also some increased vaginal discharge but not to a concerning degree from her standpoint as it is not fowl smelling.     Presented with this earlier at [facility] with negative trop's and EKGs. Cardiology suggested CTA but she declined and presented to [facility].    Past Medical History:   Diagnosis Date     Chronic depressive personality disorder      Esophageal reflux      Past Surgical History:   Procedure Laterality Date     HC CORRECT BUNION,SIMPLE       FH unrelated to current illness     Allergies   Allergen Reactions     Bactrim [Sulfamethoxazole W/Trimethoprim]      Flagyl [Metronidazole]      Levaquin [Levofloxacin]      Tetracycline      Zofran [Ondansetron]      Prolonged QT at baseline per patient     Current Facility-Administered Medications   Medication     lidocaine 1 % 1 mL     lidocaine (LMX4) kit     sodium chloride (PF) 0.9% PF flush 3 mL     sodium chloride (PF) 0.9% PF flush 3 mL     medication instruction     alum & mag hydroxide-simethicone (MYLANTA ES/MAALOX  ES) suspension 15-30 mL     acetaminophen (TYLENOL) tablet 650 mg     ibuprofen (ADVIL/MOTRIN) tablet 800 mg     sodium chloride 0.9 % for CT scan flush dose 88 mL     Current Outpatient Prescriptions  "  Medication     levothyroxine (SYNTHROID) 150 MCG tablet     IRON CR PO     Cholecalciferol (VITAMIN D3 PO)     Probiotic Product (PROBIOTIC DAILY PO)     Acetaminophen (TYLENOL PO)     Melatonin 2.5 MG CAPS     Multiple Vitamin (DAILY MULTIVITAMIN PO)     Social History     Social History     Marital status:      Spouse name: N/A     Number of children: N/A     Years of education: N/A     Occupational History     Not on file.     Social History Main Topics     Smoking status: Current Every Day Smoker     Smokeless tobacco: Not on file     Alcohol use Yes      Comment: rarely     Drug use: No     Sexual activity: Yes     Partners: Male      Comment: vasectomy     Other Topics Concern     Not on file     Social History Narrative     ==========================    Blood pressure (!) 127/98, pulse 80, temperature 97.9  F (36.6  C), temperature source Oral, resp. rate 12, height 1.651 m (5' 5\"), weight 72.6 kg (160 lb), last menstrual period 08/21/2007, SpO2 98 %.  gen- elderly woman, appears tired   neuro- tired affect, aox3, cn's intact, strength 5/5 in arms and legs  pulm- cta, no wheezing or crackles  cv- rrr, no m/r/g  abdmn- soft, nt, nd  extrm- warm, no edema     Labs reviewed, notable for   Normal BMP  Trop negative  Normal CBC, INR 0.95    Imaging reviewed  CXR negative  CT PE- 1. No central filling defect consistent with a pulmonary embolism. 2. New irregular nodules with surrounding groundglass opacities in the right middle lobe; appearance favors infection. 3. Improving tree-in-bud opacities in the lateral aspect of the right lung, likely resolving infection or inflammation.    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 "     #Dizziness- etiology unclear, EKG seems to show oversensing. Non focal.  -orthostatics given Hx of POTS  -EP consult in AM    #CP- non cardiac. EKG and trop without ischemic changes.     #Mild Obstructive Lung Disease- cont home albuterol    #Hypothyroidism- cont home synthroid    Diet- regular  DVT Ppx- SCDs  Code- Full  Dispo- home pending above workup     To be staffed in AM  Rob Varma PGY2      I very much appreciated the opportunity to see and assess Mrs Joyce Marroquin in the hospital with CV Fellow Dr Falcon and Cards 1  Resident Dr Julien. Today I spent approx 25 min reviewing her complex recent history. We discussed that her sharp localized CP is most likely residua from her Pericarditis. Her dizziness seems fluid responsive although may in part be due to frequent PACs.  She had many concerns that i Endeavored to address. She has follow-up appt already. Should be able to go home today.    I agree with the note above which summarizes my findings and current recommendations.  Please do not hesitate to contact my office if you have any questions or concerns.      Lino Funes MD  Cardiac Arrhythmia Service  [facility]  [phone]

## 2017-06-29 NOTE — ED NOTES
"ED to Floor Handoff      S:  Joyce Marroquin is a 56 year old female who speaks English and lives with a spouse,  in a home  They arrived in the ED by car from emergency room with a complaint of Chest Pain    Initial vitals were:   BP: 151/72  Pulse: 46  Heart Rate: 99  Temp: 97.9  F (36.6  C)  Resp: 18  Height: 165.1 cm (5' 5\")  Weight: 72.6 kg (160 lb)  SpO2: 99 %  Allergies:   Allergies   Allergen Reactions     Bactrim [Sulfamethoxazole W/Trimethoprim]      Flagyl [Metronidazole]      Levaquin [Levofloxacin]      Tetracycline      Zofran [Ondansetron]      Prolonged QT at baseline per patient   .  The meds given in the ED and their home medications are:   Current Facility-Administered Medications   Medication     lidocaine 1 % 1 mL     lidocaine (LMX4) kit     sodium chloride (PF) 0.9% PF flush 3 mL     sodium chloride (PF) 0.9% PF flush 3 mL     medication instruction     alum & mag hydroxide-simethicone (MYLANTA ES/MAALOX  ES) suspension 15-30 mL     acetaminophen (TYLENOL) tablet 650 mg     ibuprofen (ADVIL/MOTRIN) tablet 800 mg     sodium chloride 0.9 % for CT scan flush dose 88 mL     Current Outpatient Prescriptions   Medication     levothyroxine (SYNTHROID) 150 MCG tablet     IRON CR PO     Cholecalciferol (VITAMIN D3 PO)     Probiotic Product (PROBIOTIC DAILY PO)     Acetaminophen (TYLENOL PO)     Melatonin 2.5 MG CAPS     Multiple Vitamin (DAILY MULTIVITAMIN PO)     Social demographics are   Social History     Social History     Marital status:      Spouse name: N/A     Number of children: N/A     Years of education: N/A     Social History Main Topics     Smoking status: Current Every Day Smoker     Smokeless tobacco: Not on file     Alcohol use Yes      Comment: rarely     Drug use: No     Sexual activity: Yes     Partners: Male      Comment: vasectomy     Other Topics Concern     Not on file     Social History Narrative       B:   The patient has been ill for 1 day(s) and during this time the " symptoms have increased.  In the ED was diagnosed with   Final diagnoses:   None    Infection/sepsis suspected:No Isolation type; No active isolations   A:   In the ED these meds were given:   Medications   lidocaine 1 % 1 mL (not administered)   lidocaine (LMX4) kit (not administered)   sodium chloride (PF) 0.9% PF flush 3 mL (not administered)   sodium chloride (PF) 0.9% PF flush 3 mL ( Intracatheter Canceled Entry 6/28/17 1912)   medication instruction (not administered)   alum & mag hydroxide-simethicone (MYLANTA ES/MAALOX  ES) suspension 15-30 mL (not administered)   acetaminophen (TYLENOL) tablet 650 mg (not administered)   ibuprofen (ADVIL/MOTRIN) tablet 800 mg (not administered)   sodium chloride 0.9 % for CT scan flush dose 88 mL (88 mLs As instructed Given 6/28/17 1943)   promethazine (PHENERGAN) IV injection 12.5 mg (12.5 mg Intravenous Given 6/28/17 1852)   0.9% sodium chloride BOLUS (1,000 mLs Intravenous New Bag 6/28/17 1912)   acetaminophen (TYLENOL) tablet 650 mg (650 mg Oral Given 6/28/17 1912)   iopamidol (ISOVUE-370) solution 58 mL (58 mLs Intravenous Given 6/28/17 1943)     Drips running?  Yes  Labs results   Labs Ordered and Resulted from Time of ED Arrival Up to the Time of Departure from the ED   D DIMER QUANTITATIVE - Abnormal; Notable for the following:        Result Value    D Dimer 0.6 (*)     All other components within normal limits   GLUCOSE BY METER - Abnormal; Notable for the following:     Glucose 101 (*)     All other components within normal limits   CBC WITH PLATELETS DIFFERENTIAL   TROPONIN I   BASIC METABOLIC PANEL   INR   LIPASE   GLUCOSE MONITOR NURSING POCT   DAILY WEIGHTS   INTAKE AND OUTPUT   OBTAIN MEDICAL RECORDS   DOCUMENT   PATIENT EDUCATION   TELEMETRY MONITORING CARDIOLOGY ADULT   VITAL SIGNS AND PAIN ASSESSMENT   PULSE OXIMETRY NURSING   PERIPHERAL IV CATHETER   ACTIVITY   NOTIFY PROVIDER   NONE OF THE ABOVE CORE MEASURE DIAGNOSES   APPLY PNEUMATIC COMPRESSION DEVICE  "(PCD)   IP ASSIGN PROVIDER TEAM TO TREATMENT TEAM     Imaging Studies:   Recent Results (from the past 24 hour(s))   Chest XR,  PA & LAT    Narrative    Preliminary     Impression    Impression:  Clear chest.    Full report to follow.   Chest CT, IV contrast only - PE protocol    Narrative    Preliminary     Impression    Impression:  1. No central filling defect consistent with a pulmonary embolism.   2. New irregular nodules with surrounding groundglass opacities in the  right middle lobe; appearance favors infection.  3. Improving tree-in-bud opacities in the lateral aspect of the right  lung, likely resolving infection or inflammation..     Recent vital signs BP (!) 127/98  Pulse 80  Temp 97.9  F (36.6  C) (Oral)  Resp 12  Ht 1.651 m (5' 5\")  Wt 72.6 kg (160 lb)  LMP 08/21/2007  SpO2 98%  BMI 26.63 kg/m2  Cardiac Rhythm: ,      Abnormal labs/tests/findings requiring intervention:---  Pain control: good  Nausea control: fair  R:   Transfer assistance needed: Stand with Assist  Family present during ED course? Yes   Family currently present? Yes  Pt needs tele? Yes  Code Status: Full Code  Tasks needing to be completed:---    Karlene fleming-- [phone] [address] ED  [phone] [address] ED        "

## 2017-06-30 ENCOUNTER — CARE COORDINATION (OUTPATIENT)
Dept: CARDIOLOGY | Facility: CLINIC | Age: 57
End: 2017-06-30

## 2017-06-30 NOTE — PROGRESS NOTES
Phone Number: [phone]                       Previous Messages       ----- Message -----      From: Gege Diego      Sent: 6/30/2017   2:50 PM        To: [facility]   Subject: Appt confusion- Dr Funes                       Type of call: Other     Other:   Notes: Pt called to confirm her appt with Dr Funes in July but when I look at her chart I do not see that any appts have been made. I see a few encounters in the pts chart from yesterday but pt is upset that there is no appt booked.   Patient call back number: Please call back pt at [phone] to discuss. Thanks     MB     Please DO NOT send this message and/or reply back to sender.  Call Center Representatives DO NOT respond to messages.                Rec'd this message.     Dr Funes did consult while pt was in ED 6/28/2017    No appts had been made to see Dr Funes.   Next available appt with Dr Funes is in September.   Will offer pt a sooner appt with EP NP if she prefers    msg routed to [facility] to contact pt

## 2017-07-10 ENCOUNTER — PRE VISIT (OUTPATIENT)
Dept: CARDIOLOGY | Facility: CLINIC | Age: 57
End: 2017-07-10

## 2017-07-10 ENCOUNTER — OFFICE VISIT (OUTPATIENT)
Dept: CARDIOLOGY | Facility: CLINIC | Age: 57
End: 2017-07-10
Attending: NURSE PRACTITIONER
Payer: COMMERCIAL

## 2017-07-10 ENCOUNTER — ALLIED HEALTH/NURSE VISIT (OUTPATIENT)
Dept: CARDIOLOGY | Facility: CLINIC | Age: 57
End: 2017-07-10
Attending: INTERNAL MEDICINE
Payer: COMMERCIAL

## 2017-07-10 VITALS — HEIGHT: 65 IN | BODY MASS INDEX: 26.29 KG/M2 | WEIGHT: 157.8 LBS | OXYGEN SATURATION: 98 %

## 2017-07-10 DIAGNOSIS — R42 DIZZINESS: ICD-10-CM

## 2017-07-10 DIAGNOSIS — I44.2 ATRIOVENTRICULAR BLOCK, COMPLETE (H): ICD-10-CM

## 2017-07-10 DIAGNOSIS — I44.2 COMPLETE ATRIOVENTRICULAR BLOCK (H): Primary | ICD-10-CM

## 2017-07-10 DIAGNOSIS — Z95.0 CARDIAC PACEMAKER IN SITU: Primary | ICD-10-CM

## 2017-07-10 PROCEDURE — 93280 PM DEVICE PROGR EVAL DUAL: CPT | Mod: 26 | Performed by: INTERNAL MEDICINE

## 2017-07-10 PROCEDURE — 93280 PM DEVICE PROGR EVAL DUAL: CPT | Mod: ZF

## 2017-07-10 PROCEDURE — 99212 OFFICE O/P EST SF 10 MIN: CPT

## 2017-07-10 PROCEDURE — 99214 OFFICE O/P EST MOD 30 MIN: CPT | Mod: 25 | Performed by: NURSE PRACTITIONER

## 2017-07-10 ASSESSMENT — PAIN SCALES - GENERAL: PAINLEVEL: NO PAIN (0)

## 2017-07-10 NOTE — PROGRESS NOTES
"Patient seen in clinic for evaluation and iterative programming of her Medtronic dual lead pacemaker per MD orders.  Patient is scheduled to see Carmen Fowler NP today.  Normal pacemaker function.  No arrythmias recorded.  Intrinsic rhythm = NSR with CHB.  AP = 0.6%.   = 99.9%.  Estimated battery longevity to KONRAD = 7.5 years.  Rate response programmed on.  Patient reports \"that she is not feeling the best\".  Patient reports \"that she is not able to do much activity without feeling fatigued, winded and dizzy\".  Plan for patient to return to clinic in September for her next pacemaker check and appt with Dr. Funes.      Dual lead pacemaker        "

## 2017-07-10 NOTE — PATIENT INSTRUCTIONS
It was a pleasure to see you in clinic today.  Please do not hesitate to call with any questions or concerns.  We look forward to seeing you in clinic at your next device check in 3 months.    Jeanna Nixon, RN, MS, CCRN  Electrophysiology Nurse Clinician  [facility]    During Business Hours Please Call:  [phone]  After Hours Please Call:  [phone] - select option #4 and ask for job code [phone]

## 2017-07-10 NOTE — PATIENT INSTRUCTIONS
Cardiology Provider you saw in clinic today: SHAYNA Ware, CNP    Medication Changes:    Drink 48-64 oz of fluids daily.  Please consume 1/2 of this fluid as pedialyte or propel.   Try drinking before rehab.    Consume low carbohydrate meals  Please purchase bike shorts/spanx.   Recombinate bike please go for time and not resistance.    Standing exercise form given    Please call Fiorella Wright RN ([phone])  by Friday if you don't hear from her regarding work release and transferring care    Labs/Tests needed:       Follow-up Visit:  Sept 7 2017.      Further Instructions:      You will receive all normal lab and testing results via [employer] or mail if not reviewed in clinic today. Please contact our office if you need assistance with setting up MyChart.    If you need a medication refill please contact your pharmacy. Please allow 3 business days for your refill to be completed.     As always, thank you for trusting us with your health care needs!    If you have any questions regarding your visit please contact your care team:   Cardiology  Telephone Number    EP RN  Electrophysiology Nurse Coordinator [phone]     Call for EP procedure scheduling concerns  ALEJANDRA Lopez  [phone]           Device Clinic (Pacemakers, ICDs, Loop)   RN's : Adelaida Yuen Connie, Dawn During business hours: [phone]    After business hours:   [phone]- select option 4 and ask for job code [phone].

## 2017-07-10 NOTE — LETTER
"7/10/2017      RE: Joyce Marroquin  [id number] [facility] [id number]       Dear Colleague,    Thank you for the opportunity to participate in the care of your patient, Joyce Marroquin, at the [facility] at [facility]. Please see a copy of my visit note below.    Clinical Cardiac Electrophysiology    Chief Complaint:  Follow up from hospitalization    HPI: Joyce Marroquin is a 56 year old female who presents for follow up. She was recently seen in the hospital by Dr. Funes.   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  According to OS note \"CHB due to probable injury of the AV stew artery during ablation and subsequent ECHO LVEF decreased from 60% to 45-50% after the ablation\".   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      7/10/2017 She presents today stating she continues to have chest pain severe times a day unsure of how long the episodes last; describe the pain as burning which she considers her pericarditis.   Does not take NSAIDS due to colitis and asthma/cough.  The chest pain as all occurred since PPM placement.  Her history also includes a lifetime of irregular heart beat thought to be PACs related to caffeine.  She also states she has had dizziness and near syncope episodes throughout her life.  Denies syncope.   Her last event occurred March 17 2017 of which a holter monitor revealed a 10% PVCs burden and 12% SVT burden.  Saw Dr. Ashley at [facility] but couldn't get an ablation until a much later date which promoted her to go to [facility] where she received her ablation on 4/21/2017.      Today she states her biggest problem is fatigue and dizziness.  She states her dizziness has improved since hospitalization.  Her dizziness was worse prior to ablation and now is occurs with activity.  She is currently going to cardiac rehab for 30 " "minutes 3 times per week and is able to walk her dog 2 miles 3-4x/week and do household activities.  At discharge she was told to consume 40+ oz of fluid which has been improved her dizziness.  She consumes a high salt diet.  She also complains of abnormal breathing with rest.      Today's device check per device RN \"Normal pacemaker function.  No arrythmias recorded.  Intrinsic rhythm = NSR with CHB.  AP = 0.6%.   = 99.9%.  Estimated battery longevity to KONRAD = 7.5 years.  Rate response programmed on.  Patient reports \"that she is not feeling the best\".  Patient reports \"that she is not able to do much activity without feeling fatigued, winded and dizzy\".      Current Outpatient Prescriptions   Medication Sig Dispense Refill     Ferrous Sulfate (IRON SUPPLEMENT PO) Take 325 mg by mouth daily       LEVOTHYROXINE SODIUM PO Take 137 mcg by mouth daily       MELATONIN PO Take 1 mg by mouth nightly as needed       albuterol (PROAIR HFA/PROVENTIL HFA/VENTOLIN HFA) 108 (90 BASE) MCG/ACT Inhaler Inhale 2 puffs into the lungs every 4 hours as needed for shortness of breath / dyspnea or wheezing       fluticasone (FLOVENT HFA) 110 MCG/ACT Inhaler Inhale 2 puffs into the lungs 2 times daily       DiphenhydrAMINE HCl (BENADRYL PO) Take 25-50 mg by mouth nightly as needed       Cholecalciferol (VITAMIN D3 PO) Take 5,000 Units by mouth daily       Probiotic Product (PROBIOTIC DAILY PO) Take 1 capsule by mouth daily        Acetaminophen (TYLENOL PO) Take 500-1,000 mg by mouth daily as needed        Multiple Vitamin (DAILY MULTIVITAMIN PO) Take 1 tablet by mouth daily          Past Medical History:   Diagnosis Date     Chronic depressive personality disorder      Esophageal reflux        Past Surgical History:   Procedure Laterality Date     EP ABLATION / EP STUDIES  04/21/2017     HC CORRECT BUNION,SIMPLE       PPM INSERT OF NEW OR REPL W/VENT LEAD  04/21/2017       No family history on file.    Social History   Substance Use " Topics     Smoking status: Current Every Day Smoker     Smokeless tobacco: Not on file     Alcohol use Yes      Comment: rarely       Allergies   Allergen Reactions     Bactrim [Sulfamethoxazole W/Trimethoprim]      Flagyl [Metronidazole]      Levaquin [Levofloxacin]      Tetracycline      Zofran [Ondansetron]      Prolonged QT at baseline per patient         ROS:   CONSTITUTIONAL:No report of fever, chills,  or change in weight  RESPIRATORY: No cough, wheezing, SOB, or hemoptysis  CARDIOVASCULAR: see HPI  MUSCULO-SKELETAL: No joint pain/swelling, no muslce pain  NEURO: No paresthesias, syncope, pre-syncope, light headness, dizziness or vertigo  ENDOCRINE: No temperature intolerance, no skin/hair changes  PSYCHIATRIC: No change in mood, feeling down/anxious, no change in sleep or appetite  GI: no melena or hematochezia, no change in bowel habits  : no hematuria or dysurea, no hesitancy, dribbling or incontinence  HEME: no easy bruising or bleeding, no history of anemia, no history of blood clots  SKIN: no rashes or sores, no unusual itching        Physical Examination:  Vitals: [facility] 08/21/2007  BMI= There is no height or weight on file to calculate BMI.    Orthostatic Vitals   BP Pulse Position Site Cuff Size Time Date   129/86 57 Standing Left Arm Regular 11:25 AM 7/10/2017   142/84 46 Sitting Left Arm Regular 11:20 AM 7/10/2017   143/84 57 Supine Left Arm Regular 11:20 AM 7/10/2017     No repeat blood pressure data filed.  No peak flow data filed.  No pain information filed.    GENERAL APPEARANCE: healthy, alert and no distress  HEENT: no icterus, no xanthelasmas, normal pupil size and reaction, normal palate, mucosa moist, no cyanosis.  NECK: no adenopathy, no asymmetry, masses, or scars, thyroid normal to palpation, carotid upstrokes are normal bilaterally, no cervical bruits, JVP is not visible  CHEST: lungs clear to auscultation - no rales, rhonchi or wheezes, no use of accessory muscles, no retractions,  respirations are unlabored, normal respiratory rate, no kyphosis, no scoliosis  CARDIOVASCULAR: regular rhythm, normal S1 with physiologic split S2, no S3 or S4 and no murmur, click or rub, precordium quiet with normal PMI.  ABDOMEN: soft, non tender, without hepatosplenomegaly, no masses palpable, bowel sounds normal, aorta not enlarged by palpation, no abdominal bruits  EXTREMITIES: no clubbing, cyanosis or edema  NEURO: alert and oriented to person/place/time, normal speech, gait and affect  VASC: Radial, femoral, dorsalis pedis and posterior tibialis pulses are normal in volumes and symmetric bilaterally. No vascular bruits heard.  SKIN: no ecchymoses, no rashes      Laboratory:  Component      Latest Ref Rng & Units 8/27/2015 6/28/2017 6/28/2017            4:53 PM  7:00 PM   WBC      4.0 - 11.0 10e9/L  6.0    RBC Count      3.8 - 5.2 10e12/L  4.48    Hemoglobin      11.7 - 15.7 g/dL  14.2    Hematocrit      35.0 - 47.0 %  42.8    MCV      78 - 100 fl  96    MCH      26.5 - 33.0 pg  31.7    MCHC      31.5 - 36.5 g/dL  33.2    RDW      10.0 - 15.0 %  12.6    Platelet Count      150 - 450 10e9/L  195    Diff Method        Automated Method    % Neutrophils      %  51.8    % Lymphocytes      %  37.5    % Monocytes      %  8.3    % Eosinophils      %  1.7    % Basophils      %  0.5    % Immature Granulocytes      %  0.2    Nucleated RBCs      0 /100  0    Absolute Neutrophil      1.6 - 8.3 10e9/L  3.1    Absolute Lymphocytes      0.8 - 5.3 10e9/L  2.3    Absolute Monocytes      0.0 - 1.3 10e9/L  0.5    Absolute Eosinophils      0.0 - 0.7 10e9/L  0.1    Absolute Basophils      0.0 - 0.2 10e9/L  0.0    Abs Immature Granulocytes      0 - 0.4 10e9/L  0.0    Absolute Nucleated RBC        0.0    Sodium      133 - 144 mmol/L  138    Potassium      3.4 - 5.3 mmol/L  4.2    Chloride      94 - 109 mmol/L  109    Carbon Dioxide      20 - 32 mmol/L  24    Anion Gap      3 - 14 mmol/L  5    Glucose      70 - 99 mg/dL  82 101 (H)    Urea Nitrogen      7 - 30 mg/dL  13    Creatinine      0.52 - 1.04 mg/dL  0.67    GFR Estimate      >60 mL/min/1.7m2  >90 . . .    GFR Estimate If Black      >60 mL/min/1.7m2  >90 . . .    Calcium      8.5 - 10.1 mg/dL 9.7 9.9    Parathyroid Hormone Intact      12 - 72 pg/mL 94 (H)       HOLTER MONITOR 3/20/2017  IMPRESSION:  Twenty-four hours of recordings were produced.    Rhythm was sinus throughout the recording with average heart rate 78 beats per minute.  Minimum heart rate was 50 and maximal heart rate was 121 beats per minute.    Ventricular ectopic activity consisted out of 10,064 beats (10.3% of the total).  Twenty beats were in couplets, the rest were single VEs.  There was no ventricular tachycardia.    Supraventricular ectopic activity consisted out of 12,500 beats (12.8% of total), of which three were in one run (three beats at 111 beats per minute), 38 were in couplets and the other were all single PACs.  There was no other significant arrhythmia.    During symptoms of lightheadedness, hot flash, sweating and headache, the rhythm was sinus with single VEs and PACs.  During patient events, the rhythm was sinus with single VEs and PACs.      ECHO 5/17/2017  Summary:   1. LV function is low normal. The visually estimated ejection fraction is 50%. LVEF difficult to assess due to conduction delay and trigeminal rhythm.   2. Septal motion consistent with conduction delay/paced rhythm.   3. No evidence of pulmonary hypertension.   4. No hemodynamically significant valvular disease.   5. No pericardial effusion.   6. Compared to prior study (3/28/2017); there's significant change: pacer is new; conduction delay vs paced rhythm with septal wall motion abnormality due to this, also new.    _____________________________________________________________________  PHYSICIAN INTERPRETATION: Left Ventricle: The left ventricular size is normal. LV function is low normal. The visually estimated ejection fraction is  50%. LV septal wall thickness is normal. LV posterior wall thickness is normal. Paradoxical septal motion, consistent with conduction delay. LV diastolic function is indeterminant due to rhythm.  Left Atrium: The left atrium is normal in size.  Right Ventricle: The right ventricular size is normal. Global RV systolic function is normal.  Right Atrium: The right atrium is normal in size.  Mitral Valve: Nonspecifically thickened and calcified mitral valve leaflets. Mitral leaflet mobility is normal. Trace mitral valve regurgitation.  Aortic Valve: The aortic valve was not well visualized. There is no evidence of significant aortic stenosis. The peak and mean gradients are 6.0 mmHg and 3.8 mmHg, respectively. No evidence of significant aortic valve regurgitation.  Tricuspid Valve: The tricuspid valve structure is nonspecifically thickened. Trace tricuspid regurgitation. There is no evidence of pulmonary hypertension with an estimated right ventricular systolic pressure (RVSP) of 18.68.  Pulmonic Valve: The pulmonic valve was not well visualized. No significant pulmonic valve regurgitation.  Pericardium: No pericardial effusion.  Aorta: The aorta was not well visualized.  The ascending aorta was not well visualized.  Venous: The inferior vena cava measures 1.70 cm and collapses normally with respiration.  Additional Comments: Cardiac rhythm is indeterminant. Image quality for this study is adequate. A pacer wire is visualized in the right atrium and right ventricle.  In comparison to the previous echocardiogram(s): Prior examinations are available and were reviewed for comparison purposes. A prior study was performed on 3/28/2017. Compared to prior study (3/28/2017); there's significant change: pacer is new; conduction delay vs paced rhythm with septal wall motion abnormality due to this, also new.  2D AND M-MODE MEASUREMENTS:  Left Ventricle:         Normal    Left Atrium:                Normal  IVSd (2D):      0.78 cm  (0.7-1.1) LA Major diam,s, A2c 3.9 cm  LVPWd (2D):     0.94 cm (0.7-1.1)  LVIDd (2D):     4.37 cm (3.4-5.7) LA Volume A/L:                 LA Major:  LVIDs (2D):     3.47 cm           LA Vol A4C A/L      21.1 ml    A4C,s,:  LV FS (2D):     20.6 %  (>25%)    LA Vol A2C A/L      28.1 ml    A2C,s,:3.9 cm                                    LA Vol BP A/L       27.4 ml                                    LA Vol BP A/L index 15.7 ml/m                                       Right Ventricle:                                    Right Atrium:                                    RA,s major, A4C  2.9 cm                                    RA,s minor, A4C  3.1 cm    LV SYSTOLIC FUNCTION BY 2D PLANIMETRY (MOD):  EF-A4C View: 49.8 % EF-A2C View: 50.1 % EF-Biplane: 52.1 %    Aortic Valve: AoV Max Adithya: 1.23 m/s AoV Peak P.0 mmHg AoV Mean PG: 3.8 mmHg    LVOT Vmax:   1.15 m/s LVOT VTI:       0.169 m  LVOT Diam: 2.10 cm  LVOT PK grad 5 mmHg   LVOT mean grad: 3.0 mmHg    AoV Area, Vmax: 3.22 cm  AoV Area, VTI: 2.99 cm  AoV Area, Vmn: 2.97 cm     Tricuspid Valve and PA/RV Systolic Pressure: TR Max Velocity: 1.98 m/s  RA Pressure:         RVSP/PASP:  TR PK Grad       15.7 mmHg IVC diameter 1.70 cm RVOT diam, s    Assessment and recommendations:    CHB with PPM  - normal device function  - rate response turned on today    Dizziness  - Drink 48-64 oz of fluids daily.  Please consume 1/2 of this fluid as pedialyte or propel.   Try drinking before rehab.    - Consume low carbohydrate meals  - Please purchase bike shorts/spanx.   - Recombinate bike please go for time and not resistance.    - Standing exercise form given      Return to clinic in Sept with Dr. Funes.      I appreciate the chance to help with Ms. Lopez fuentes. Please let me know if you have any questions or concerns.      Carmen Fowler, APRN, CNP

## 2017-07-10 NOTE — PROGRESS NOTES
"      Clinical Cardiac Electrophysiology    Chief Complaint:  Follow up from hospitalization    HPI: Joyce Marroquin is a 56 year old female who presents for follow up. She was recently seen in the hospital by Dr. Funes.   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  According to OSH note \"CHB due to probable injury of the AV stew artery during ablation and subsequent ECHO LVEF decreased from 60% to 45-50% after the ablation\".   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      7/10/2017 She presents today stating she continues to have chest pain severe times a day unsure of how long the episodes last; describe the pain as burning which she considers her pericarditis.   Does not take NSAIDS due to colitis and asthma/cough.  The chest pain as all occurred since PPM placement.  Her history also includes a lifetime of irregular heart beat thought to be PACs related to caffeine.  She also states she has had dizziness and near syncope episodes throughout her life.  Denies syncope.   Her last event occurred March 17 2017 of which a holter monitor revealed a 10% PVCs burden and 12% SVT burden.  Saw Dr. Ashley at [facility] but couldn't get an ablation until a much later date which promoted her to go to [facility] where she received her ablation on 4/21/2017.      Today she states her biggest problem is fatigue and dizziness.  She states her dizziness has improved since hospitalization.  Her dizziness was worse prior to ablation and now is occurs with activity.  She is currently going to cardiac rehab for 30 minutes 3 times per week and is able to walk her dog 2 miles 3-4x/week and do household activities.  At discharge she was told to consume 40+ oz of fluid which has been improved her dizziness.  She consumes a high salt diet.  She also complains of abnormal breathing with rest.      Today's device check " "per device RN \"Normal pacemaker function.  No arrythmias recorded.  Intrinsic rhythm = NSR with CHB.  AP = 0.6%.   = 99.9%.  Estimated battery longevity to KONRAD = 7.5 years.  Rate response programmed on.  Patient reports \"that she is not feeling the best\".  Patient reports \"that she is not able to do much activity without feeling fatigued, winded and dizzy\".      Current Outpatient Prescriptions   Medication Sig Dispense Refill     Ferrous Sulfate (IRON SUPPLEMENT PO) Take 325 mg by mouth daily       LEVOTHYROXINE SODIUM PO Take 137 mcg by mouth daily       MELATONIN PO Take 1 mg by mouth nightly as needed       albuterol (PROAIR HFA/PROVENTIL HFA/VENTOLIN HFA) 108 (90 BASE) MCG/ACT Inhaler Inhale 2 puffs into the lungs every 4 hours as needed for shortness of breath / dyspnea or wheezing       fluticasone (FLOVENT HFA) 110 MCG/ACT Inhaler Inhale 2 puffs into the lungs 2 times daily       DiphenhydrAMINE HCl (BENADRYL PO) Take 25-50 mg by mouth nightly as needed       Cholecalciferol (VITAMIN D3 PO) Take 5,000 Units by mouth daily       Probiotic Product (PROBIOTIC DAILY PO) Take 1 capsule by mouth daily        Acetaminophen (TYLENOL PO) Take 500-1,000 mg by mouth daily as needed        Multiple Vitamin (DAILY MULTIVITAMIN PO) Take 1 tablet by mouth daily          Past Medical History:   Diagnosis Date     Chronic depressive personality disorder      Esophageal reflux        Past Surgical History:   Procedure Laterality Date     EP ABLATION / EP STUDIES  04/21/2017     HC CORRECT BUNION,SIMPLE       PPM INSERT OF NEW OR REPL W/VENT LEAD  04/21/2017       No family history on file.    Social History   Substance Use Topics     Smoking status: Current Every Day Smoker     Smokeless tobacco: Not on file     Alcohol use Yes      Comment: rarely       Allergies   Allergen Reactions     Bactrim [Sulfamethoxazole W/Trimethoprim]      Flagyl [Metronidazole]      Levaquin [Levofloxacin]      Tetracycline      Zofran " [Ondansetron]      Prolonged QT at baseline per patient         ROS:   CONSTITUTIONAL:No report of fever, chills,  or change in weight  RESPIRATORY: No cough, wheezing, SOB, or hemoptysis  CARDIOVASCULAR: see HPI  MUSCULO-SKELETAL: No joint pain/swelling, no muslce pain  NEURO: No paresthesias, syncope, pre-syncope, light headness, dizziness or vertigo  ENDOCRINE: No temperature intolerance, no skin/hair changes  PSYCHIATRIC: No change in mood, feeling down/anxious, no change in sleep or appetite  GI: no melena or hematochezia, no change in bowel habits  : no hematuria or dysurea, no hesitancy, dribbling or incontinence  HEME: no easy bruising or bleeding, no history of anemia, no history of blood clots  SKIN: no rashes or sores, no unusual itching        Physical Examination:  Vitals: LMP 08/21/2007  BMI= There is no height or weight on file to calculate BMI.    Orthostatic Vitals   BP Pulse Position Site Cuff Size Time Date   129/86 57 Standing Left Arm Regular 11:25 AM 7/10/2017   142/84 46 Sitting Left Arm Regular 11:20 AM 7/10/2017   143/84 57 Supine Left Arm Regular 11:20 AM 7/10/2017     No repeat blood pressure data filed.  No peak flow data filed.  No pain information filed.    GENERAL APPEARANCE: healthy, alert and no distress  HEENT: no icterus, no xanthelasmas, normal pupil size and reaction, normal palate, mucosa moist, no cyanosis.  NECK: no adenopathy, no asymmetry, masses, or scars, thyroid normal to palpation, carotid upstrokes are normal bilaterally, no cervical bruits, JVP is not visible  CHEST: lungs clear to auscultation - no rales, rhonchi or wheezes, no use of accessory muscles, no retractions, respirations are unlabored, normal respiratory rate, no kyphosis, no scoliosis  CARDIOVASCULAR: regular rhythm, normal S1 with physiologic split S2, no S3 or S4 and no murmur, click or rub, precordium quiet with normal PMI.  ABDOMEN: soft, non tender, without hepatosplenomegaly, no masses palpable,  bowel sounds normal, aorta not enlarged by palpation, no abdominal bruits  EXTREMITIES: no clubbing, cyanosis or edema  NEURO: alert and oriented to person/place/time, normal speech, gait and affect  VASC: Radial, femoral, dorsalis pedis and posterior tibialis pulses are normal in volumes and symmetric bilaterally. No vascular bruits heard.  SKIN: no ecchymoses, no rashes      Laboratory:  Component      Latest Ref Rng & Units 8/27/2015 6/28/2017 6/28/2017            4:53 PM  7:00 PM   WBC      4.0 - 11.0 10e9/L  6.0    RBC Count      3.8 - 5.2 10e12/L  4.48    Hemoglobin      11.7 - 15.7 g/dL  14.2    Hematocrit      35.0 - 47.0 %  42.8    MCV      78 - 100 fl  96    MCH      26.5 - 33.0 pg  31.7    MCHC      31.5 - 36.5 g/dL  33.2    RDW      10.0 - 15.0 %  12.6    Platelet Count      150 - 450 10e9/L  195    Diff Method        Automated Method    % Neutrophils      %  51.8    % Lymphocytes      %  37.5    % Monocytes      %  8.3    % Eosinophils      %  1.7    % Basophils      %  0.5    % Immature Granulocytes      %  0.2    Nucleated RBCs      0 /100  0    Absolute Neutrophil      1.6 - 8.3 10e9/L  3.1    Absolute Lymphocytes      0.8 - 5.3 10e9/L  2.3    Absolute Monocytes      0.0 - 1.3 10e9/L  0.5    Absolute Eosinophils      0.0 - 0.7 10e9/L  0.1    Absolute Basophils      0.0 - 0.2 10e9/L  0.0    Abs Immature Granulocytes      0 - 0.4 10e9/L  0.0    Absolute Nucleated RBC        0.0    Sodium      133 - 144 mmol/L  138    Potassium      3.4 - 5.3 mmol/L  4.2    Chloride      94 - 109 mmol/L  109    Carbon Dioxide      20 - 32 mmol/L  24    Anion Gap      3 - 14 mmol/L  5    Glucose      70 - 99 mg/dL  82 101 (H)   Urea Nitrogen      7 - 30 mg/dL  13    Creatinine      0.52 - 1.04 mg/dL  0.67    GFR Estimate      >60 mL/min/1.7m2  >90 . . .    GFR Estimate If Black      >60 mL/min/1.7m2  >90 . . .    Calcium      8.5 - 10.1 mg/dL 9.7 9.9    Parathyroid Hormone Intact      12 - 72 pg/mL 94 (H)       HOLTER  MONITOR 3/20/2017  IMPRESSION:  Twenty-four hours of recordings were produced.    Rhythm was sinus throughout the recording with average heart rate 78 beats per minute.  Minimum heart rate was 50 and maximal heart rate was 121 beats per minute.    Ventricular ectopic activity consisted out of 10,064 beats (10.3% of the total).  Twenty beats were in couplets, the rest were single VEs.  There was no ventricular tachycardia.    Supraventricular ectopic activity consisted out of 12,500 beats (12.8% of total), of which three were in one run (three beats at 111 beats per minute), 38 were in couplets and the other were all single PACs.  There was no other significant arrhythmia.    During symptoms of lightheadedness, hot flash, sweating and headache, the rhythm was sinus with single VEs and PACs.  During patient events, the rhythm was sinus with single VEs and PACs.      ECHO 5/17/2017  Summary:   1. LV function is low normal. The visually estimated ejection fraction is 50%. LVEF difficult to assess due to conduction delay and trigeminal rhythm.   2. Septal motion consistent with conduction delay/paced rhythm.   3. No evidence of pulmonary hypertension.   4. No hemodynamically significant valvular disease.   5. No pericardial effusion.   6. Compared to prior study (3/28/2017); there's significant change: pacer is new; conduction delay vs paced rhythm with septal wall motion abnormality due to this, also new.    _____________________________________________________________________  PHYSICIAN INTERPRETATION: Left Ventricle: The left ventricular size is normal. LV function is low normal. The visually estimated ejection fraction is 50%. LV septal wall thickness is normal. LV posterior wall thickness is normal. Paradoxical septal motion, consistent with conduction delay. LV diastolic function is indeterminant due to rhythm.  Left Atrium: The left atrium is normal in size.  Right Ventricle: The right ventricular size is normal.  Global RV systolic function is normal.  Right Atrium: The right atrium is normal in size.  Mitral Valve: Nonspecifically thickened and calcified mitral valve leaflets. Mitral leaflet mobility is normal. Trace mitral valve regurgitation.  Aortic Valve: The aortic valve was not well visualized. There is no evidence of significant aortic stenosis. The peak and mean gradients are 6.0 mmHg and 3.8 mmHg, respectively. No evidence of significant aortic valve regurgitation.  Tricuspid Valve: The tricuspid valve structure is nonspecifically thickened. Trace tricuspid regurgitation. There is no evidence of pulmonary hypertension with an estimated right ventricular systolic pressure (RVSP) of 18.68.  Pulmonic Valve: The pulmonic valve was not well visualized. No significant pulmonic valve regurgitation.  Pericardium: No pericardial effusion.  Aorta: The aorta was not well visualized.  The ascending aorta was not well visualized.  Venous: The inferior vena cava measures 1.70 cm and collapses normally with respiration.  Additional Comments: Cardiac rhythm is indeterminant. Image quality for this study is adequate. A pacer wire is visualized in the right atrium and right ventricle.  In comparison to the previous echocardiogram(s): Prior examinations are available and were reviewed for comparison purposes. A prior study was performed on 3/28/2017. Compared to prior study (3/28/2017); there's significant change: pacer is new; conduction delay vs paced rhythm with septal wall motion abnormality due to this, also new.  2D AND M-MODE MEASUREMENTS:  Left Ventricle:         Normal    Left Atrium:                Normal  IVSd (2D):      0.78 cm (0.7-1.1) LA Major diam,s, A2c 3.9 cm  LVPWd (2D):     0.94 cm (0.7-1.1)  LVIDd (2D):     4.37 cm (3.4-5.7) LA Volume A/L:                 LA Major:  LVIDs (2D):     3.47 cm           LA Vol A4C A/L      21.1 ml    A4C,s,:  LV FS (2D):     20.6 %  (>25%)    LA Vol A2C A/L      28.1 ml    A2C,s,:3.9  cm                                    LA Vol BP A/L       27.4 ml                                    LA Vol BP A/L index 15.7 ml/m                                       Right Ventricle:                                    Right Atrium:                                    RA,s major, A4C  2.9 cm                                    RA,s minor, A4C  3.1 cm    LV SYSTOLIC FUNCTION BY 2D PLANIMETRY (MOD):  EF-A4C View: 49.8 % EF-A2C View: 50.1 % EF-Biplane: 52.1 %    Aortic Valve: AoV Max Adithya: 1.23 m/s AoV Peak P.0 mmHg AoV Mean PG: 3.8 mmHg    LVOT Vmax:   1.15 m/s LVOT VTI:       0.169 m  LVOT Diam: 2.10 cm  LVOT PK grad 5 mmHg   LVOT mean grad: 3.0 mmHg    AoV Area, Vmax: 3.22 cm  AoV Area, VTI: 2.99 cm  AoV Area, Vmn: 2.97 cm     Tricuspid Valve and PA/RV Systolic Pressure: TR Max Velocity: 1.98 m/s  RA Pressure:         RVSP/PASP:  TR PK Grad       15.7 mmHg IVC diameter 1.70 cm RVOT diam, s    Assessment and recommendations:    CHB with PPM  - normal device function  - rate response turned on today    Dizziness  - Drink 48-64 oz of fluids daily.  Please consume 1/2 of this fluid as pedialyte or propel.   Try drinking before rehab.    - Consume low carbohydrate meals  - Please purchase bike shorts/spanx.   - Recombinate bike please go for time and not resistance.    - Standing exercise form given      Return to clinic in Sept with Dr. Funes.      I appreciate the chance to help with Ms. Lopez fuentes. Please let me know if you have any questions or concerns.      Carmen Fowler, APRN, CNP

## 2017-07-10 NOTE — MR AVS SNAPSHOT
After Visit Summary   7/10/2017    Joyce Marroquin    MRN: [id number]           Patient Information     Date Of Birth          1960        Visit Information        Provider Department      7/10/2017 11:30 AM Carmen Fowler APRN CNP [facility]        Care Instructions    Cardiology Provider you saw in clinic today: SHAYNA Ware, CNP    Medication Changes:    Drink 48-64 oz of fluids daily.  Please consume 1/2 of this fluid as pedialyte or propel.   Try drinking before rehab.    Consume low carbohydrate meals  Please purchase bike shorts/spanx.   Recombinate bike please go for time and not resistance.    Standing exercise form given    Please call Fiorella Wright RN ([phone])  by Friday if you don't hear from her regarding work release and transferring care    Labs/Tests needed:       Follow-up Visit:  Sept 7 2017.      Further Instructions:      You will receive all normal lab and testing results via [employer] or mail if not reviewed in clinic today. Please contact our office if you need assistance with setting up [employer].    If you need a medication refill please contact your pharmacy. Please allow 3 business days for your refill to be completed.     As always, thank you for trusting us with your health care needs!    If you have any questions regarding your visit please contact your care team:   Cardiology  Telephone Number    EP RN  Electrophysiology Nurse Coordinator [phone]     Call for EP procedure scheduling concerns  ALEJANDRA Lopez  [phone]           Device Clinic (Pacemakers, ICDs, Loop)   RN's : Adelaida Yuen Connie, Dawn During business hours: [phone]    After business hours:   [phone]- select option 4 and ask for job code [id number].                  Follow-ups after your visit        Your next 10 appointments already scheduled     Sep 07, 2017  2:30 PM CDT   (Arrive by 2:15 PM)   Pacemaker Check with Uc Cv Device 1   [facility] ([facility])    [address]  [address] [place] [id number] [phone]            Sep 07, 2017  3:00 PM CDT   (Arrive by 2:45 PM)   RETURN ARRHYTHMIA with Lino Funes MD   [facility] ([facility])    [address] [id number] [phone]              Who to contact     If you have questions or need follow up information about today's clinic visit or your schedule please contact [facility] directly at [phone].  Normal or non-critical lab and imaging results will be communicated to you by [facility] [employer], letter or phone within 4 business days after the clinic has received the results. If you do not hear from us within 7 days, please contact the clinic through [facility] or phone. If you have a critical or abnormal lab result, we will notify you by phone as soon as possible.  Submit refill requests through [employer] or call your pharmacy and they will forward the refill request to us. Please allow 3 business days for your refill to be completed.          Additional Information About Your Visit        [facility] Information     [employer] lets you send messages to your doctor, view your test results, renew your prescriptions, schedule appointments and more. To sign up, go to [email or web address] . Click on \"Log in\" on the left side of the screen, which will take you to the Welcome page. Then click on \"Sign up Now\" on the right side of the page.     You will be asked to enter the access code listed below, as well as some personal information. Please follow the directions to create your username and password.     Your access code is: [email or web address]  Expires: 2017 12:14 PM     Your access code will  in 90 days. If you need help or a new code, please call your [facility] clinic or [phone].        Care EveryWhere ID     This is your Care EveryWhere ID. This could be used by other " "organizations to access your [place] medical records  [id number]        Your Vitals Were     Height Last Period Pulse Oximetry BMI (Body Mass Index)          1.651 m (5' 5\") 08/21/2007 98% 26.26 kg/m2         Blood Pressure from Last 3 Encounters:   06/29/17 116/73   08/27/15 122/74   09/07/07 98/68    Weight from Last 3 Encounters:   07/10/17 71.6 kg (157 lb 12.8 oz)   06/28/17 72.6 kg (160 lb)   08/27/15 61.8 kg (136 lb 4.8 oz)              Today, you had the following     No orders found for display       Primary Care Provider Office Phone # Fax #    Hai Lancasterdremerly [phone]       [facility] [address]   [place] [address]        Equal Access to Services     [facility]: Hadii aad ku hadasho Soomaali, waaxda luqadaha, qaybta kaalmada adeegyada, micah pagein haysukh cho . So [facility] [phone].    ATENCIÓN: Si habla español, tiene a hankins disposición servicios gratuitos de asistencia lingüística. Jp al [phone].    We comply with applicable federal civil rights laws and [place] laws. We do not discriminate on the basis of race, color, national origin, age, disability sex, sexual orientation or gender identity.            Thank you!     Thank you for choosing [facility]  for your care. Our goal is always to provide you with excellent care. Hearing back from our patients is one way we can continue to improve our services. Please take a few minutes to complete the written survey that you may receive in the mail after your visit with us. Thank you!             Your Updated Medication List - Protect others around you: Learn how to safely use, store and throw away your medicines at www.disposemymeds.org.          This list is accurate as of: 7/10/17 12:24 PM.  Always use your most recent med list.                   Brand Name Dispense Instructions for use Diagnosis    albuterol 108 (90 BASE) MCG/ACT Inhaler    PROAIR HFA/PROVENTIL HFA/VENTOLIN HFA    "  Inhale 2 puffs into the lungs every 4 hours as needed for shortness of breath / dyspnea or wheezing        BENADRYL PO      Take 25-50 mg by mouth nightly as needed        DAILY MULTIVITAMIN PO      Take 1 tablet by mouth daily        fluticasone 110 MCG/ACT Inhaler    FLOVENT HFA     Inhale 2 puffs into the lungs 2 times daily        IRON SUPPLEMENT PO      Take 325 mg by mouth daily        LEVOTHYROXINE SODIUM PO      Take 137 mcg by mouth daily        MELATONIN PO      Take 1 mg by mouth nightly as needed        phytonadione suspension    MEPHYTON/ VIT K          PROBIOTIC DAILY PO      Take 1 capsule by mouth daily        TYLENOL PO      Take 500-1,000 mg by mouth daily as needed        VITAMIN D3 PO      Take 5,000 Units by mouth daily

## 2017-07-10 NOTE — MR AVS SNAPSHOT
After Visit Summary   7/10/2017    Joyce Marroquin    MRN: [id number]           Patient Information     Date Of Birth          1960        Visit Information        Provider Department      7/10/2017 11:00 AM 1, Uc Cv Device [facility]        Today's Diagnoses     Complete atrioventricular block (H)    -  1      Care Instructions    It was a pleasure to see you in clinic today.  Please do not hesitate to call with any questions or concerns.  We look forward to seeing you in clinic at your next device check in 3 months.    Jeanna Nixon, RN, MS, CCRN  Electrophysiology Nurse Clinician  [facility]    During Business Hours Please Call:  [phone]  After Hours Please Call:  [phone] - select option #4 and ask for job code [phone]                            Follow-ups after your visit        Follow-up notes from your care team     Return in about 3 months (around 10/10/2017) for Pacemaker Check.      Your next 10 appointments already scheduled     Jul 10, 2017 11:30 AM CDT   (Arrive by 11:15 AM)   RETURN ARRHYTHMIA with SHAYNA Alvares CNP   [facility])    [address] [place] [id number] [phone]            Sep 07, 2017  2:30 PM CDT   (Arrive by 2:15 PM)   Pacemaker Check with Uc Cv Device 1   [facility])    [address] [place] [address] [phone]            Sep 07, 2017  3:00 PM CDT   (Arrive by 2:45 PM)   RETURN ARRHYTHMIA with Lino Funes MD   [facility])    [address] [place] [address] [phone]              Who to contact     If you have questions or need follow up information about today's clinic visit or your schedule please contact [facility] directly at [phone].  Normal or non-critical  "lab and imaging results will be communicated to you by MyChart, letter or phone within 4 business days after the clinic has received the results. If you do not hear from us within 7 days, please contact the clinic through [employer] or phone. If you have a critical or abnormal lab result, we will notify you by phone as soon as possible.  Submit refill requests through Linkua or call your pharmacy and they will forward the refill request to us. Please allow 3 business days for your refill to be completed.          Additional Information About Your Visit        [employer] Information     Linkua lets you send messages to your doctor, view your test results, renew your prescriptions, schedule appointments and more. To sign up, go to [email or web address] . Click on \"Log in\" on the left side of the screen, which will take you to the Welcome page. Then click on \"Sign up Now\" on the right side of the page.     You will be asked to enter the access code listed below, as well as some personal information. Please follow the directions to create your username and password.     Your access code is: [email or web address]  Expires: 2017 12:14 PM     Your access code will  in 90 days. If you need help or a new code, please call your [facility] clinic or [phone].        Care EveryWhere ID     This is your Care EveryWhere ID. This could be used by other organizations to access your [facility] medical records  [id number]        Your Vitals Were     Last Period                   2007            Blood Pressure from Last 3 Encounters:   17 116/73   08/27/15 122/74   07 98/68    Weight from Last 3 Encounters:   17 72.6 kg (160 lb)   08/27/15 61.8 kg (136 lb 4.8 oz)   07 63.5 kg (140 lb)              We Performed the Following     PM DEVICE PROGRAMMING EVAL, DUAL LEAD PACER        Primary Care Provider Office Phone # Fax #    Hai PARR Myra [phone]       [facility] [address]   [facility] [address]        Equal Access to Services     PAVITHRA SALCEDO : Hadii freda zuniga hadana Segoviaali, waaxda luqgenevieveha, qaybta joannehemiahunique hughesjodieunique, micah martinoleonbibiana cho . So [facility] [phone].    ATENCIÓN: Si habla español, tiene a hankins disposición servicios gratuitos de asistencia lingüística. Llame al [phone].    We comply with applicable federal civil rights laws and [place] laws. We do not discriminate on the basis of race, color, national origin, age, disability sex, sexual orientation or gender identity.            Thank you!     Thank you for choosing [facility]  for your care. Our goal is always to provide you with excellent care. Hearing back from our patients is one way we can continue to improve our services. Please take a few minutes to complete the written survey that you may receive in the mail after your visit with us. Thank you!             Your Updated Medication List - Protect others around you: Learn how to safely use, store and throw away your medicines at www.disposemymeds.org.          This list is accurate as of: 7/10/17 11:07 AM.  Always use your most recent med list.                   Brand Name Dispense Instructions for use Diagnosis    albuterol 108 (90 BASE) MCG/ACT Inhaler    PROAIR HFA/PROVENTIL HFA/VENTOLIN HFA     Inhale 2 puffs into the lungs every 4 hours as needed for shortness of breath / dyspnea or wheezing        BENADRYL PO      Take 25-50 mg by mouth nightly as needed        DAILY MULTIVITAMIN PO      Take 1 tablet by mouth daily        fluticasone 110 MCG/ACT Inhaler    FLOVENT HFA     Inhale 2 puffs into the lungs 2 times daily        IRON SUPPLEMENT PO      Take 325 mg by mouth daily        LEVOTHYROXINE SODIUM PO      Take 137 mcg by mouth daily        MELATONIN PO      Take 1 mg by mouth nightly as needed        PROBIOTIC DAILY PO      Take 1 capsule by mouth daily        TYLENOL PO      Take 500-1,000 mg by mouth daily as needed         VITAMIN D3 PO      Take 5,000 Units by mouth daily

## 2017-07-10 NOTE — NURSING NOTE
Chief Complaint   Patient presents with     Follow Up For     device prior; orthostatic BP      Vitals were taken and medications were reconciled.     Wolf Tripp MA  11:29 AM

## 2017-07-15 ENCOUNTER — HEALTH MAINTENANCE LETTER (OUTPATIENT)
Age: 57
End: 2017-07-15

## 2017-08-03 ENCOUNTER — CARE COORDINATION (OUTPATIENT)
Dept: CARDIOLOGY | Facility: CLINIC | Age: 57
End: 2017-08-03

## 2017-08-03 NOTE — PROGRESS NOTES
Follow up discussed with pt on Tuesday 8/1/17)  Pt stated she will folllow up once more with Dr Ashley the end of august to complete her disability. 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  We discussed transferring remote device checks prior to follow up schd with Dr Funes in September- but ultimately agreed to wait until pt completed last visit with Dr Ashley.   Contact information given. Pt verbalized understanding

## 2017-08-04 ENCOUNTER — TRANSFERRED RECORDS (OUTPATIENT)
Dept: HEALTH INFORMATION MANAGEMENT | Facility: CLINIC | Age: 57
End: 2017-08-04

## 2017-08-04 LAB — PAP-ABSTRACT: NORMAL

## 2017-09-07 ENCOUNTER — ALLIED HEALTH/NURSE VISIT (OUTPATIENT)
Dept: CARDIOLOGY | Facility: CLINIC | Age: 57
End: 2017-09-07
Attending: INTERNAL MEDICINE
Payer: COMMERCIAL

## 2017-09-07 ENCOUNTER — PRE VISIT (OUTPATIENT)
Dept: CARDIOLOGY | Facility: CLINIC | Age: 57
End: 2017-09-07

## 2017-09-07 VITALS
WEIGHT: 162.2 LBS | OXYGEN SATURATION: 97 % | DIASTOLIC BLOOD PRESSURE: 73 MMHG | HEIGHT: 65 IN | SYSTOLIC BLOOD PRESSURE: 120 MMHG | HEART RATE: 53 BPM | BODY MASS INDEX: 27.02 KG/M2

## 2017-09-07 DIAGNOSIS — Z86.79 H/O PERICARDITIS: Primary | ICD-10-CM

## 2017-09-07 DIAGNOSIS — I44.2 COMPLETE ATRIOVENTRICULAR BLOCK (H): Primary | ICD-10-CM

## 2017-09-07 DIAGNOSIS — Z86.79 H/O PERICARDITIS: ICD-10-CM

## 2017-09-07 LAB — ERYTHROCYTE [SEDIMENTATION RATE] IN BLOOD BY WESTERGREN METHOD: 19 MM/H (ref 0–30)

## 2017-09-07 PROCEDURE — 93280 PM DEVICE PROGR EVAL DUAL: CPT | Mod: 26 | Performed by: INTERNAL MEDICINE

## 2017-09-07 PROCEDURE — 99213 OFFICE O/P EST LOW 20 MIN: CPT | Mod: ZP | Performed by: INTERNAL MEDICINE

## 2017-09-07 PROCEDURE — 85652 RBC SED RATE AUTOMATED: CPT | Performed by: INTERNAL MEDICINE

## 2017-09-07 PROCEDURE — 36415 COLL VENOUS BLD VENIPUNCTURE: CPT | Performed by: INTERNAL MEDICINE

## 2017-09-07 PROCEDURE — 86141 C-REACTIVE PROTEIN HS: CPT | Performed by: INTERNAL MEDICINE

## 2017-09-07 PROCEDURE — 93280 PM DEVICE PROGR EVAL DUAL: CPT | Mod: ZF

## 2017-09-07 PROCEDURE — 99212 OFFICE O/P EST SF 10 MIN: CPT | Mod: 25,ZF

## 2017-09-07 ASSESSMENT — PAIN SCALES - GENERAL: PAINLEVEL: NO PAIN (0)

## 2017-09-07 NOTE — LETTER
"9/7/2017      RE: Joyce Marroquin  [address] [facility] [address]       Dear Colleague,    Thank you for the opportunity to participate in the care of your patient, Joyce Marroquin, at the [facility]. Please see a copy of my visit note below.    HPI:   Joyce Marroquin is a 56 year old female who presents for follow up. 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  According to OS note \"CHB due to probable injury of the AV stew artery during ablation and subsequent ECHO LVEF decreased from 60% to 45-50% after the ablation\".   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      Since last seen she has returned work. Still occas CP but infrequent. Does C/O of low energy that varties from daty to day    No fever chills. NO HF symptoms, but some Lower extrem edema.    Has gained wt.    Many concerns re past EP care at [facility] and considering legal action re CHB and Pericarditis and being unhappy with PPM position in Left shoulder.      PAST MEDICAL HISTORY:  Past Medical History:   Diagnosis Date     Chronic depressive personality disorder      Esophageal reflux        CURRENT MEDICATIONS:  Current Outpatient Prescriptions   Medication Sig Dispense Refill     Menaquinone-7 (VITAMIN K2 PO)        nebulizer nebulization as needed       Ferrous Sulfate (IRON SUPPLEMENT PO) Take 325 mg by mouth daily       LEVOTHYROXINE SODIUM PO Take 137 mcg by mouth daily       MELATONIN PO Take 1 mg by mouth nightly as needed       albuterol (PROAIR HFA/PROVENTIL HFA/VENTOLIN HFA) 108 (90 BASE) MCG/ACT Inhaler Inhale 2 puffs into the lungs every 4 hours as needed for shortness of breath / dyspnea or wheezing       DiphenhydrAMINE HCl (BENADRYL PO) Take 25-50 mg by mouth nightly as needed       Cholecalciferol (VITAMIN D3 PO) " Take 5,000 Units by mouth daily       Probiotic Product (PROBIOTIC DAILY PO) Take 1 capsule by mouth daily        Acetaminophen (TYLENOL PO) Take 500-1,000 mg by mouth daily as needed        Multiple Vitamin (DAILY MULTIVITAMIN PO) Take 1 tablet by mouth daily        phytonadione (MEPHYTON/ VIT K) suspension        fluticasone (FLOVENT HFA) 110 MCG/ACT Inhaler Inhale 2 puffs into the lungs 2 times daily         PAST SURGICAL HISTORY:  Past Surgical History:   Procedure Laterality Date     EP ABLATION / EP STUDIES  04/21/2017     HC CORRECT BUNION,SIMPLE       PPM INSERT OF NEW OR REPL W/VENT LEAD  04/21/2017       ALLERGIES:     Allergies   Allergen Reactions     Flagyl [Metronidazole] Rash     Atenolol Other (See Comments)     Had extreme fatigue, dizziness, and overall felt unwell.      Bactrim [Sulfamethoxazole W/Trimethoprim]      Corn Oil      Lorazepam      Oxytetracycline      Sulfamethoxazole-Trimethoprim      Other reaction(s): Other  Passed out     Tetracycline      Zofran [Ondansetron]      Prolonged QT  Prolonged QT at baseline per patient     Benzodiazepines Other (See Comments)     Other reaction(s): Other  Extreme heat intolerance  Extreme heat intolerance     Cyclobenzaprine Other (See Comments)     Extreme heat intolerance     Levaquin [Levofloxacin]      Other reaction(s): Other  Tendon rupture     Nsaids Other (See Comments)     Exacerbated asthma       FAMILY HISTORY:          SOCIAL HISTORY:  Social History   Substance Use Topics     Smoking status: Former Smoker     Smokeless tobacco: Not on file     Alcohol use Yes      Comment: rarely       ROS:   Constitutional: No fever, chills, or sweats. Weight stable.   ENT: No visual disturbance, ear ache, epistaxis, sore throat.   Cardiovascular: As per HPI.   Respiratory: No cough, hemoptysis.    GI: No nausea, vomiting, hematemesis, melena, or hematochezia.   : No hematuria.   Integument: Negative.   Psychiatric: Negative.   Hematologic:  Easy  "bruising, no easy bleeding.  Neuro: Negative.   Endocrinology: No significant heat or cold intolerance   Musculoskeletal: No myalgia.    Exam:  /73 (BP Location: Left arm, Patient Position: Chair, Cuff Size: Adult Regular)  Pulse 53  Ht 1.651 m (5' 5\")  Wt 73.6 kg (162 lb 3.2 oz)  LMP 08/21/2007  SpO2 97%  BMI 26.99 kg/m2  GENERAL APPEARANCE: healthy, alert and no distress  HEENT: no icterus, no xanthelasmas, normal pupil size and reaction, normal palate, mucosa moist, no central cyanosis  NECK: no adenopathy, no asymmetry, masses, or scars, thyroid normal to palpation and no bruits, JVP not elevated  RESPIRATORY: lungs clear to auscultation - no rales, rhonchi or wheezes, no use of accessory muscles, no retractions, respirations are unlabored, normal respiratory rate  CARDIOVASCULAR: regular rhythm, normal S1 with physiologic split S2, no S3 or S4 and no murmur, click or rub, precordium quiet with normal PMI.  ABDOMEN: soft, non tender, without hepatosplenomegaly, no masses palpable, bowel sounds normal, aorta not enlarged by palpation, no abdominal bruits  EXTREMITIES: peripheral pulses normal, no edema, no bruits  NEURO: alert and oriented to person/place/time, normal speech, gait and affect  VASC: Radial, femoral, dorsalis pedis and posterior tibialis pulses are normal in volumes and symmetric bilaterally. No bruits are heard.  SKIN: no ecchymoses, no rashes    Labs:  CBC RESULTS:   Lab Results   Component Value Date    WBC 6.0 06/28/2017    RBC 4.48 06/28/2017    HGB 14.2 06/28/2017    HCT 42.8 06/28/2017    MCV 96 06/28/2017    MCH 31.7 06/28/2017    MCHC 33.2 06/28/2017    RDW 12.6 06/28/2017     06/28/2017       BMP RESULTS:  Lab Results   Component Value Date     06/28/2017    POTASSIUM 4.2 06/28/2017    CHLORIDE 109 06/28/2017    CO2 24 06/28/2017    ANIONGAP 5 06/28/2017    GLC 82 06/28/2017    BUN 13 06/28/2017    CR 0.67 06/28/2017    GFRESTIMATED >90  Non  GFR " Calc   06/28/2017    GFRESTBLACK >90   GFR Calc   06/28/2017    MANJU 9.9 06/28/2017        INR RESULTS:  Lab Results   Component Value Date    INR 0.95 06/28/2017       Procedures:  HOLTER MONITOR 3/20/2017  IMPRESSION:  Twenty-four hours of recordings were produced.    Rhythm was sinus throughout the recording with average heart rate 78 beats per minute.  Minimum heart rate was 50 and maximal heart rate was 121 beats per minute.    Ventricular ectopic activity consisted out of 10,064 beats (10.3% of the total).  Twenty beats were in couplets, the rest were single VEs.  There was no ventricular tachycardia.    Supraventricular ectopic activity consisted out of 12,500 beats (12.8% of total), of which three were in one run (three beats at 111 beats per minute), 38 were in couplets and the other were all single PACs.  There was no other significant arrhythmia.    During symptoms of lightheadedness, hot flash, sweating and headache, the rhythm was sinus with single VEs and PACs.  During patient events, the rhythm was sinus with single VEs and PACs.      ECHO 5/17/2017  Summary:   1. LV function is low normal. The visually estimated ejection fraction is 50%. LVEF difficult to assess due to conduction delay and trigeminal rhythm.   2. Septal motion consistent with conduction delay/paced rhythm.   3. No evidence of pulmonary hypertension.   4. No hemodynamically significant valvular disease.   5. No pericardial effusion.   6. Compared to prior study (3/28/2017); there's significant change: pacer is new; conduction delay vs paced rhythm with septal wall motion abnormality due to this, also new.    _____________________________________________________________________  PHYSICIAN INTERPRETATION: Left Ventricle: The left ventricular size is normal. LV function is low normal. The visually estimated ejection fraction is 50%. LV septal wall thickness is normal. LV posterior wall thickness is normal. Paradoxical  septal motion, consistent with conduction delay. LV diastolic function is indeterminant due to rhythm.  Left Atrium: The left atrium is normal in size.  Right Ventricle: The right ventricular size is normal. Global RV systolic function is normal.  Right Atrium: The right atrium is normal in size.  Mitral Valve: Nonspecifically thickened and calcified mitral valve leaflets. Mitral leaflet mobility is normal. Trace mitral valve regurgitation.  Aortic Valve: The aortic valve was not well visualized. There is no evidence of significant aortic stenosis. The peak and mean gradients are 6.0 mmHg and 3.8 mmHg, respectively. No evidence of significant aortic valve regurgitation.  Tricuspid Valve: The tricuspid valve structure is nonspecifically thickened. Trace tricuspid regurgitation. There is no evidence of pulmonary hypertension with an estimated right ventricular systolic pressure (RVSP) of 18.68.  Pulmonic Valve: The pulmonic valve was not well visualized. No significant pulmonic valve regurgitation.  Pericardium: No pericardial effusion.  Aorta: The aorta was not well visualized.  The ascending aorta was not well visualized.  Venous: The inferior vena cava measures 1.70 cm and collapses normally with respiration.  Additional Comments: Cardiac rhythm is indeterminant. Image quality for this study is adequate. A pacer wire is visualized in the right atrium and right ventricle.  In comparison to the previous echocardiogram(s): Prior examinations are available and were reviewed for comparison purposes. A prior study was performed on 3/28/2017. Compared to prior study (3/28/2017); there's significant change: pacer is new; conduction delay vs paced rhythm with septal wall motion abnormality due to this, also new.  2D AND M-MODE MEASUREMENTS:  Left Ventricle:         Normal    Left Atrium:                Normal  IVSd (2D):      0.78 cm (0.7-1.1) LA Major diam,s, A2c 3.9 cm  LVPWd (2D):     0.94 cm (0.7-1.1)  LVIDd (2D):      4.37 cm (3.4-5.7) LA Volume A/L:                 LA Major:  LVIDs (2D):     3.47 cm           LA Vol A4C A/L      21.1 ml    A4C,s,:  LV FS (2D):     20.6 %  (>25%)    LA Vol A2C A/L      28.1 ml    A2C,s,:3.9 cm                                    LA Vol BP A/L       27.4 ml                                    LA Vol BP A/L index 15.7 ml/m                                       Right Ventricle:                                    Right Atrium:                                    RA,s major, A4C  2.9 cm                                    RA,s minor, A4C  3.1 cm    LV SYSTOLIC FUNCTION BY 2D PLANIMETRY (MOD):  EF-A4C View: 49.8 % EF-A2C View: 50.1 % EF-Biplane: 52.1 %    Aortic Valve: AoV Max Adithya: 1.23 m/s AoV Peak P.0 mmHg AoV Mean PG: 3.8 mmHg    LVOT Vmax:   1.15 m/s LVOT VTI:       0.169 m  LVOT Diam: 2.10 cm  LVOT PK grad 5 mmHg   LVOT mean grad: 3.0 mmHg    AoV Area, Vmax: 3.22 cm  AoV Area, VTI: 2.99 cm  AoV Area, Vmn: 2.97 cm     Tricuspid Valve and PA/RV Systolic Pressure: TR Max Velocity: 1.98 m/s  RA Pressure:         RVSP/PASP:  TR PK Grad       15.7 mmHg IVC diameter 1.70 cm RVOT diam, s        Assessment and Plan:   1. Normal PPM fubnction. Essentially 100% V paced    2. Fatigue / low energy of unclear etiol    3,  Resolving Pericarditis    PLAN    1. Remote PM follow-up'  2. Check ESR , CRP  3. RTC 1 year ior earlier as needed    I very much appreciated the opportunity to see and assess Mrs Joyce Marroquin in the clinic today . Please do not hesitate to contact my office if you have any questions or concerns.      Lino Funes MD  Cardiac Arrhythmia Service  [facility]  [phone]

## 2017-09-07 NOTE — PATIENT INSTRUCTIONS
It was a pleasure to see you in clinic today. Please do not hesitate to call with any questions or concerns. You are scheduled for a remote pacemaker transmission on 12/11/17. We look forward to seeing you in clinic at your next device check in 6 months.    Alpa Danielson, RN  Electrophysiology Nurse Clinician  [facility]  During business hours call:  [phone]  After business hours please call: [phone]- select option #4 and ask for job code [phone].

## 2017-09-07 NOTE — TELEPHONE ENCOUNTER
"HPI:   Joyce Marroquin is a 56 year old female who presents for follow up. 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  According to OSH note \"CHB due to probable injury of the AV stew artery during ablation and subsequent ECHO LVEF decreased from 60% to 45-50% after the ablation\".   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                PAST MEDICAL HISTORY:  Past Medical History:   Diagnosis Date     Chronic depressive personality disorder      Esophageal reflux        CURRENT MEDICATIONS:  Current Outpatient Prescriptions   Medication Sig Dispense Refill     phytonadione (MEPHYTON/ VIT K) suspension        Ferrous Sulfate (IRON SUPPLEMENT PO) Take 325 mg by mouth daily       LEVOTHYROXINE SODIUM PO Take 137 mcg by mouth daily       MELATONIN PO Take 1 mg by mouth nightly as needed       albuterol (PROAIR HFA/PROVENTIL HFA/VENTOLIN HFA) 108 (90 BASE) MCG/ACT Inhaler Inhale 2 puffs into the lungs every 4 hours as needed for shortness of breath / dyspnea or wheezing       fluticasone (FLOVENT HFA) 110 MCG/ACT Inhaler Inhale 2 puffs into the lungs 2 times daily       DiphenhydrAMINE HCl (BENADRYL PO) Take 25-50 mg by mouth nightly as needed       Cholecalciferol (VITAMIN D3 PO) Take 5,000 Units by mouth daily       Probiotic Product (PROBIOTIC DAILY PO) Take 1 capsule by mouth daily        Acetaminophen (TYLENOL PO) Take 500-1,000 mg by mouth daily as needed        Multiple Vitamin (DAILY MULTIVITAMIN PO) Take 1 tablet by mouth daily          PAST SURGICAL HISTORY:  Past Surgical History:   Procedure Laterality Date     EP ABLATION / EP STUDIES  04/21/2017     HC CORRECT BUNION,SIMPLE       PPM INSERT OF NEW OR REPL W/VENT LEAD  04/21/2017       ALLERGIES:     Allergies   Allergen Reactions     Flagyl [Metronidazole] Rash     Atenolol Other (See Comments)     " Had extreme fatigue, dizziness, and overall felt unwell.      Bactrim [Sulfamethoxazole W/Trimethoprim]      Corn Oil      Lorazepam      Oxytetracycline      Sulfamethoxazole-Trimethoprim      Other reaction(s): Other  Passed out     Tetracycline      Zofran [Ondansetron]      Prolonged QT  Prolonged QT at baseline per patient     Benzodiazepines Other (See Comments)     Other reaction(s): Other  Extreme heat intolerance  Extreme heat intolerance     Cyclobenzaprine Other (See Comments)     Extreme heat intolerance     Levaquin [Levofloxacin]      Other reaction(s): Other  Tendon rupture     Nsaids Other (See Comments)     Exacerbated asthma       FAMILY HISTORY:          SOCIAL HISTORY:  Social History   Substance Use Topics     Smoking status: Former Smoker     Smokeless tobacco: Not on file     Alcohol use Yes      Comment: rarely       ROS:   Constitutional: No fever, chills, or sweats. Weight stable.   ENT: No visual disturbance, ear ache, epistaxis, sore throat.   Cardiovascular: As per HPI.   Respiratory: No cough, hemoptysis.    GI: No nausea, vomiting, hematemesis, melena, or hematochezia.   : No hematuria.   Integument: Negative.   Psychiatric: Negative.   Hematologic:  Easy bruising, no easy bleeding.  Neuro: Negative.   Endocrinology: No significant heat or cold intolerance   Musculoskeletal: No myalgia.    Exam:  [facility] 08/21/2007  GENERAL APPEARANCE: healthy, alert and no distress  HEENT: no icterus, no xanthelasmas, normal pupil size and reaction, normal palate, mucosa moist, no central cyanosis  NECK: no adenopathy, no asymmetry, masses, or scars, thyroid normal to palpation and no bruits, JVP not elevated  RESPIRATORY: lungs clear to auscultation - no rales, rhonchi or wheezes, no use of accessory muscles, no retractions, respirations are unlabored, normal respiratory rate  CARDIOVASCULAR: regular rhythm, normal S1 with physiologic split S2, no S3 or S4 and no murmur, click or rub, precordium quiet  with normal PMI.  ABDOMEN: soft, non tender, without hepatosplenomegaly, no masses palpable, bowel sounds normal, aorta not enlarged by palpation, no abdominal bruits  EXTREMITIES: peripheral pulses normal, no edema, no bruits  NEURO: alert and oriented to person/place/time, normal speech, gait and affect  VASC: Radial, femoral, dorsalis pedis and posterior tibialis pulses are normal in volumes and symmetric bilaterally. No bruits are heard.  SKIN: no ecchymoses, no rashes    Labs:  CBC RESULTS:   Lab Results   Component Value Date    WBC 6.0 06/28/2017    RBC 4.48 06/28/2017    HGB 14.2 06/28/2017    HCT 42.8 06/28/2017    MCV 96 06/28/2017    MCH 31.7 06/28/2017    MCHC 33.2 06/28/2017    RDW 12.6 06/28/2017     06/28/2017       BMP RESULTS:  Lab Results   Component Value Date     06/28/2017    POTASSIUM 4.2 06/28/2017    CHLORIDE 109 06/28/2017    CO2 24 06/28/2017    ANIONGAP 5 06/28/2017    GLC 82 06/28/2017    BUN 13 06/28/2017    CR 0.67 06/28/2017    GFRESTIMATED >90  Non  GFR Calc   06/28/2017    GFRESTBLACK >90   GFR Calc   06/28/2017    MANJU 9.9 06/28/2017        INR RESULTS:  Lab Results   Component Value Date    INR 0.95 06/28/2017       Procedures:  HOLTER MONITOR 3/20/2017  IMPRESSION:  Twenty-four hours of recordings were produced.    Rhythm was sinus throughout the recording with average heart rate 78 beats per minute.  Minimum heart rate was 50 and maximal heart rate was 121 beats per minute.    Ventricular ectopic activity consisted out of 10,064 beats (10.3% of the total).  Twenty beats were in couplets, the rest were single VEs.  There was no ventricular tachycardia.    Supraventricular ectopic activity consisted out of 12,500 beats (12.8% of total), of which three were in one run (three beats at 111 beats per minute), 38 were in couplets and the other were all single PACs.  There was no other significant arrhythmia.    During symptoms of  lightheadedness, hot flash, sweating and headache, the rhythm was sinus with single VEs and PACs.  During patient events, the rhythm was sinus with single VEs and PACs.      ECHO 5/17/2017  Summary:   1. LV function is low normal. The visually estimated ejection fraction is 50%. LVEF difficult to assess due to conduction delay and trigeminal rhythm.   2. Septal motion consistent with conduction delay/paced rhythm.   3. No evidence of pulmonary hypertension.   4. No hemodynamically significant valvular disease.   5. No pericardial effusion.   6. Compared to prior study (3/28/2017); there's significant change: pacer is new; conduction delay vs paced rhythm with septal wall motion abnormality due to this, also new.    _____________________________________________________________________  PHYSICIAN INTERPRETATION: Left Ventricle: The left ventricular size is normal. LV function is low normal. The visually estimated ejection fraction is 50%. LV septal wall thickness is normal. LV posterior wall thickness is normal. Paradoxical septal motion, consistent with conduction delay. LV diastolic function is indeterminant due to rhythm.  Left Atrium: The left atrium is normal in size.  Right Ventricle: The right ventricular size is normal. Global RV systolic function is normal.  Right Atrium: The right atrium is normal in size.  Mitral Valve: Nonspecifically thickened and calcified mitral valve leaflets. Mitral leaflet mobility is normal. Trace mitral valve regurgitation.  Aortic Valve: The aortic valve was not well visualized. There is no evidence of significant aortic stenosis. The peak and mean gradients are 6.0 mmHg and 3.8 mmHg, respectively. No evidence of significant aortic valve regurgitation.  Tricuspid Valve: The tricuspid valve structure is nonspecifically thickened. Trace tricuspid regurgitation. There is no evidence of pulmonary hypertension with an estimated right ventricular systolic pressure (RVSP) of  18.68.  Pulmonic Valve: The pulmonic valve was not well visualized. No significant pulmonic valve regurgitation.  Pericardium: No pericardial effusion.  Aorta: The aorta was not well visualized.  The ascending aorta was not well visualized.  Venous: The inferior vena cava measures 1.70 cm and collapses normally with respiration.  Additional Comments: Cardiac rhythm is indeterminant. Image quality for this study is adequate. A pacer wire is visualized in the right atrium and right ventricle.  In comparison to the previous echocardiogram(s): Prior examinations are available and were reviewed for comparison purposes. A prior study was performed on 3/28/2017. Compared to prior study (3/28/2017); there's significant change: pacer is new; conduction delay vs paced rhythm with septal wall motion abnormality due to this, also new.  2D AND M-MODE MEASUREMENTS:  Left Ventricle:         Normal    Left Atrium:                Normal  IVSd (2D):      0.78 cm (0.7-1.1) LA Major diam,s, A2c 3.9 cm  LVPWd (2D):     0.94 cm (0.7-1.1)  LVIDd (2D):     4.37 cm (3.4-5.7) LA Volume A/L:                 LA Major:  LVIDs (2D):     3.47 cm           LA Vol A4C A/L      21.1 ml    A4C,s,:  LV FS (2D):     20.6 %  (>25%)    LA Vol A2C A/L      28.1 ml    A2C,s,:3.9 cm                                    LA Vol BP A/L       27.4 ml                                    LA Vol BP A/L index 15.7 ml/m                                       Right Ventricle:                                    Right Atrium:                                    RA,s major, A4C  2.9 cm                                    RA,s minor, A4C  3.1 cm    LV SYSTOLIC FUNCTION BY 2D PLANIMETRY (MOD):  EF-A4C View: 49.8 % EF-A2C View: 50.1 % EF-Biplane: 52.1 %    Aortic Valve: AoV Max Adithya: 1.23 m/s AoV Peak P.0 mmHg AoV Mean PG: 3.8 mmHg    LVOT Vmax:   1.15 m/s LVOT VTI:       0.169 m  LVOT Diam: 2.10 cm  LVOT PK grad 5 mmHg   LVOT mean grad: 3.0 mmHg    AoV Area, Vmax: 3.22 cm   AoV Area, VTI: 2.99 cm  AoV Area, Vmn: 2.97 cm     Tricuspid Valve and PA/RV Systolic Pressure: TR Max Velocity: 1.98 m/s  RA Pressure:         RVSP/PASP:  TR PK Grad       15.7 mmHg IVC diameter 1.70 cm RVOT diam, s        Assessment and Plan:       CC

## 2017-09-07 NOTE — PROGRESS NOTES
Pt seen in the [facility] for evaluation and iterative programming of a Medtronic, dual lead pacemaker, per MD orders. She also has an appointment with Dr. Funes. Today her intrinsic rhythm is Sinus 75 with CHB- ventricular rate @ 30 bpm. She also has frequent PACs. Normal pacemaker function. No arrhythmias have been recorded. No short v-v intervals recorded. Lead trends appear stable. AP= 5% and = 100%. Battery estimates 8.5 years to KONRAD. Plan for pt to send a remote transmission on 12/11/17 and RTC in 6 months.  Dual lead pacemaker

## 2017-09-07 NOTE — PATIENT INSTRUCTIONS
You will be scheduled for a follow up visit: 1 yr with Dr Funes and device    Medication changes: none    New Medications: none    Testing Scheduled: lab: CRP, Sed rate    We encourage you to use My Chart as your primary form of communication if possible. If you need assistance in setting this up, please contact our office or ask at your follow up visit.     If you need a medication refill please contact your pharmacy. Please allow at least 3 business days for your refill to be completed.       Cardiology  Telephone Number    Fiorella Wright [phone]   Or send a message to your provider via my chart.   For scheduling procedures:    [facility]    Clinic appointments       [phone]   For the Device Clinic (Pacemakers and ICD's)   RN's :   Alpa Porras  During business hours: [phone]    After business hours:   [phone]- select option 4 and ask for job code [id number].          As always, Thank you for trusting us with your health care needs!

## 2017-09-07 NOTE — MR AVS SNAPSHOT
After Visit Summary   9/7/2017    Joyce Marroquin    MRN: [id number]           Patient Information     Date Of Birth          1960        Visit Information        Provider Department      9/7/2017 2:30 PM 1,  Cv Device [facility]        Today's Diagnoses     Complete atrioventricular block (H)    -  1      Care Instructions    It was a pleasure to see you in clinic today. Please do not hesitate to call with any questions or concerns. You are scheduled for a remote pacemaker transmission on 12/11/17. We look forward to seeing you in clinic at your next device check in 6 months.    Alpa Danielson, RN  Electrophysiology Nurse Clinician  [facility]  During business hours call:  [phone]  After business hours please call: [phone]- select option #4 and ask for job code [id number].            Follow-ups after your visit        Follow-up notes from your care team     Return in about 6 months (around 3/7/2018) for Medtronic, Pacemaker check.      Who to contact     If you have questions or need follow up information about today's clinic visit or your schedule please contact [facility] directly at [phone].  Normal or non-critical lab and imaging results will be communicated to you by [employer] [place], letter or phone within 4 business days after the clinic has received the results. If you do not hear from us within 7 days, please contact the clinic through Detectentt or phone. If you have a critical or abnormal lab result, we will notify you by phone as soon as possible.  Submit refill requests through [employer]Spot or call your pharmacy and they will forward the refill request to us. Please allow 3 business days for your refill to be completed.          Additional Information About Your Visit        [employer] [place] Information     [employer] gives you secure access to your electronic health record. If you see a primary care provider, you can also send messages to your care team  and make appointments. If you have questions, please call your primary care clinic.  If you do not have a primary care provider, please call [phone] and they will assist you.        Care EveryWhere ID     This is your Care EveryWhere ID. This could be used by other organizations to access your [place] medical records  [id number]        Your Vitals Were     Last Period                   08/21/2007            Blood Pressure from Last 3 Encounters:   09/07/17 120/73   06/29/17 116/73   08/27/15 122/74    Weight from Last 3 Encounters:   09/07/17 73.6 kg (162 lb 3.2 oz)   07/10/17 71.6 kg (157 lb 12.8 oz)   06/28/17 72.6 kg (160 lb)              We Performed the Following     PM DEVICE PROGRAMMING EVAL, DUAL LEAD PACER        Primary Care Provider Office Phone # Fax #    Hai Renteria [phone]       [facility] [address]   [place] [address]        Equal Access to Services     [facility] : Hadii aad ku hadasho Soomaali, waaxda luqadaha, qaybta kaalmada adeegyada, waxay idiin hayaan adeeg kharash la'sukh . So [facility] [phone].    ATENCIÓN: Si habla español, tiene a hankins disposición servicios gratuitos de asistencia lingüística. [facility] [phone].    We comply with applicable federal civil rights laws and [place] laws. We do not discriminate on the basis of race, color, national origin, age, disability sex, sexual orientation or gender identity.            Thank you!     Thank you for choosing [facility]  for your care. Our goal is always to provide you with excellent care. Hearing back from our patients is one way we can continue to improve our services. Please take a few minutes to complete the written survey that you may receive in the mail after your visit with us. Thank you!             Your Updated Medication List - Protect others around you: Learn how to safely use, store and throw away your medicines at www.disposemymeds.org.          This list is  accurate as of: 9/7/17  5:28 PM.  Always use your most recent med list.                   Brand Name Dispense Instructions for use Diagnosis    albuterol 108 (90 BASE) MCG/ACT Inhaler    PROAIR HFA/PROVENTIL HFA/VENTOLIN HFA     Inhale 2 puffs into the lungs every 4 hours as needed for shortness of breath / dyspnea or wheezing        BENADRYL PO      Take 25-50 mg by mouth nightly as needed        DAILY MULTIVITAMIN PO      Take 1 tablet by mouth daily        fluticasone 110 MCG/ACT Inhaler    FLOVENT HFA     Inhale 2 puffs into the lungs 2 times daily        IRON SUPPLEMENT PO      Take 325 mg by mouth daily        LEVOTHYROXINE SODIUM PO      Take 137 mcg by mouth daily        MELATONIN PO      Take 1 mg by mouth nightly as needed        nebulizer nebulization      as needed    H/O pericarditis       phytonadione suspension    MEPHYTON/ VIT K          PROBIOTIC DAILY PO      Take 1 capsule by mouth daily        TYLENOL PO      Take 500-1,000 mg by mouth daily as needed        VITAMIN D3 PO      Take 5,000 Units by mouth daily        VITAMIN K2 PO       H/O pericarditis

## 2017-09-07 NOTE — MR AVS SNAPSHOT
After Visit Summary   9/7/2017    Joyce Marroquin    MRN: [id number]           Patient Information     Date Of Birth          1960        Visit Information        Provider Department      9/7/2017 3:00 PM Lino Funes MD [facility]        Today's Diagnoses     H/O pericarditis    -  1      Care Instructions    You will be scheduled for a follow up visit: 1 yr with Dr Funes and device    Medication changes: none    New Medications: none    Testing Scheduled: lab: CRP, Sed rate    We encourage you to use My Chart as your primary form of communication if possible. If you need assistance in setting this up, please contact our office or ask at your follow up visit.     If you need a medication refill please contact your pharmacy. Please allow at least 3 business days for your refill to be completed.       Cardiology  Telephone Number    Fiorella Wright [phone]   Or send a message to your provider via my chart.   For scheduling procedures:    [facility]    Clinic appointments       [phone]   For the Device Clinic (Pacemakers and ICD's)   RN's :   Alpa Porras  During business hours: [phone]    After business hours:   [phone]- select option 4 and ask for job code [id number].          As always, Thank you for trusting us with your health care needs!          Follow-ups after your visit        Follow-up notes from your care team     Return in about 1 year (around 9/7/2018) for PPM and benditt, Lab today.      Your next 10 appointments already scheduled     Sep 07, 2017  4:30 PM CDT   Lab with  LAB    Health Lab ([facility])    [address] [place] [id number] [phone]              Future tests that were ordered for you today     Open Future Orders        Priority Expected Expires Ordered    Erythrocyte sedimentation rate auto Routine 9/7/2017 9/29/2017 9/7/2017    CRP cardiac risk  "Routine 9/7/2017 9/29/2017 9/7/2017            Who to contact     If you have questions or need follow up information about today's clinic visit or your schedule please contact [facility] directly at [phone].  Normal or non-critical lab and imaging results will be communicated to you by MyChart, letter or phone within 4 business days after the clinic has received the results. If you do not hear from us within 7 days, please contact the clinic through Unomyhart or phone. If you have a critical or abnormal lab result, we will notify you by phone as soon as possible.  Submit refill requests through [employer] or call your pharmacy and they will forward the refill request to us. Please allow 3 business days for your refill to be completed.          Additional Information About Your Visit        [employer] Information     [employer] gives you secure access to your electronic health record. If you see a primary care provider, you can also send messages to your care team and make appointments. If you have questions, please call your primary care clinic.  If you do not have a primary care provider, please call [phone] and they will assist you.        Care EveryWhere ID     This is your Care EveryWhere ID. This could be used by other organizations to access your [place] medical records  [id number]        Your Vitals Were     Pulse Height Last Period Pulse Oximetry BMI (Body Mass Index)       53 1.651 m (5' 5\") 08/21/2007 97% 26.99 kg/m2        Blood Pressure from Last 3 Encounters:   09/07/17 120/73   06/29/17 116/73   08/27/15 122/74    Weight from Last 3 Encounters:   09/07/17 73.6 kg (162 lb 3.2 oz)   07/10/17 71.6 kg (157 lb 12.8 oz)   06/28/17 72.6 kg (160 lb)               Primary Care Provider Office Phone # Fax #    Hai Renteria [phone]       [facility] [address]   [place] [address]        Equal Access to Services     PAVITHRA SALCEDO AH: Jcarlos Morgan, " wamakennada osmanygenevieveha, maryanybta kamarci juan, micah carvajalaamerly ah. So [facility] [phone].    ATENCIÓN: Si shayy appiah, tiene a hankins disposición servicios gratuitos de asistencia lingüística. Jp al [phone].    We comply with applicable federal civil rights laws and [place] laws. We do not discriminate on the basis of race, color, national origin, age, disability sex, sexual orientation or gender identity.            Thank you!     Thank you for choosing [facility]  for your care. Our goal is always to provide you with excellent care. Hearing back from our patients is one way we can continue to improve our services. Please take a few minutes to complete the written survey that you may receive in the mail after your visit with us. Thank you!             Your Updated Medication List - Protect others around you: Learn how to safely use, store and throw away your medicines at www.disposemymeds.org.          This list is accurate as of: 9/7/17  4:14 PM.  Always use your most recent med list.                   Brand Name Dispense Instructions for use Diagnosis    albuterol 108 (90 BASE) MCG/ACT Inhaler    PROAIR HFA/PROVENTIL HFA/VENTOLIN HFA     Inhale 2 puffs into the lungs every 4 hours as needed for shortness of breath / dyspnea or wheezing        BENADRYL PO      Take 25-50 mg by mouth nightly as needed        DAILY MULTIVITAMIN PO      Take 1 tablet by mouth daily        fluticasone 110 MCG/ACT Inhaler    FLOVENT HFA     Inhale 2 puffs into the lungs 2 times daily        IRON SUPPLEMENT PO      Take 325 mg by mouth daily        LEVOTHYROXINE SODIUM PO      Take 137 mcg by mouth daily        MELATONIN PO      Take 1 mg by mouth nightly as needed        nebulizer nebulization      as needed    H/O pericarditis       phytonadione suspension    MEPHYTON/ VIT K          PROBIOTIC DAILY PO      Take 1 capsule by mouth daily        TYLENOL PO      Take 500-1,000 mg by mouth daily as  needed        VITAMIN D3 PO      Take 5,000 Units by mouth daily        VITAMIN K2 PO       H/O pericarditis

## 2017-09-07 NOTE — PROGRESS NOTES
"HPI:   Joyce Marroquin is a 56 year old female who presents for follow up. 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  According to OSH note \"CHB due to probable injury of the AV stew artery during ablation and subsequent ECHO LVEF decreased from 60% to 45-50% after the ablation\".   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      Since last seen she has returned work. Still occas CP but infrequent. Does C/O of low energy that varties from daty to day    No fever chills. NO HF symptoms, but some Lower extrem edema.    Has gained wt.    Many concerns re past EP care at [facility] and considering legal action re CHB and Pericarditis and being unhappy with PPM position in Left shoulder.      PAST MEDICAL HISTORY:  Past Medical History:   Diagnosis Date     Chronic depressive personality disorder      Esophageal reflux        CURRENT MEDICATIONS:  Current Outpatient Prescriptions   Medication Sig Dispense Refill     Menaquinone-7 (VITAMIN K2 PO)        nebulizer nebulization as needed       Ferrous Sulfate (IRON SUPPLEMENT PO) Take 325 mg by mouth daily       LEVOTHYROXINE SODIUM PO Take 137 mcg by mouth daily       MELATONIN PO Take 1 mg by mouth nightly as needed       albuterol (PROAIR HFA/PROVENTIL HFA/VENTOLIN HFA) 108 (90 BASE) MCG/ACT Inhaler Inhale 2 puffs into the lungs every 4 hours as needed for shortness of breath / dyspnea or wheezing       DiphenhydrAMINE HCl (BENADRYL PO) Take 25-50 mg by mouth nightly as needed       Cholecalciferol (VITAMIN D3 PO) Take 5,000 Units by mouth daily       Probiotic Product (PROBIOTIC DAILY PO) Take 1 capsule by mouth daily        Acetaminophen (TYLENOL PO) Take 500-1,000 mg by mouth daily as needed        Multiple Vitamin (DAILY MULTIVITAMIN PO) Take 1 tablet by mouth daily        phytonadione (MEPHYTON/ VIT K) suspension    " "    fluticasone (FLOVENT HFA) 110 MCG/ACT Inhaler Inhale 2 puffs into the lungs 2 times daily         PAST SURGICAL HISTORY:  Past Surgical History:   Procedure Laterality Date     EP ABLATION / EP STUDIES  04/21/2017     HC CORRECT BUNION,SIMPLE       PPM INSERT OF NEW OR REPL W/VENT LEAD  04/21/2017       ALLERGIES:     Allergies   Allergen Reactions     Flagyl [Metronidazole] Rash     Atenolol Other (See Comments)     Had extreme fatigue, dizziness, and overall felt unwell.      Bactrim [Sulfamethoxazole W/Trimethoprim]      Corn Oil      Lorazepam      Oxytetracycline      Sulfamethoxazole-Trimethoprim      Other reaction(s): Other  Passed out     Tetracycline      Zofran [Ondansetron]      Prolonged QT  Prolonged QT at baseline per patient     Benzodiazepines Other (See Comments)     Other reaction(s): Other  Extreme heat intolerance  Extreme heat intolerance     Cyclobenzaprine Other (See Comments)     Extreme heat intolerance     Levaquin [Levofloxacin]      Other reaction(s): Other  Tendon rupture     Nsaids Other (See Comments)     Exacerbated asthma       FAMILY HISTORY:          SOCIAL HISTORY:  Social History   Substance Use Topics     Smoking status: Former Smoker     Smokeless tobacco: Not on file     Alcohol use Yes      Comment: rarely       ROS:   Constitutional: No fever, chills, or sweats. Weight stable.   ENT: No visual disturbance, ear ache, epistaxis, sore throat.   Cardiovascular: As per HPI.   Respiratory: No cough, hemoptysis.    GI: No nausea, vomiting, hematemesis, melena, or hematochezia.   : No hematuria.   Integument: Negative.   Psychiatric: Negative.   Hematologic:  Easy bruising, no easy bleeding.  Neuro: Negative.   Endocrinology: No significant heat or cold intolerance   Musculoskeletal: No myalgia.    Exam:  /73 (BP Location: Left arm, Patient Position: Chair, Cuff Size: Adult Regular)  Pulse 53  Ht 1.651 m (5' 5\")  Wt 73.6 kg (162 lb 3.2 oz)  LMP 08/21/2007  SpO2 97% "  BMI 26.99 kg/m2  GENERAL APPEARANCE: healthy, alert and no distress  HEENT: no icterus, no xanthelasmas, normal pupil size and reaction, normal palate, mucosa moist, no central cyanosis  NECK: no adenopathy, no asymmetry, masses, or scars, thyroid normal to palpation and no bruits, JVP not elevated  RESPIRATORY: lungs clear to auscultation - no rales, rhonchi or wheezes, no use of accessory muscles, no retractions, respirations are unlabored, normal respiratory rate  CARDIOVASCULAR: regular rhythm, normal S1 with physiologic split S2, no S3 or S4 and no murmur, click or rub, precordium quiet with normal PMI.  ABDOMEN: soft, non tender, without hepatosplenomegaly, no masses palpable, bowel sounds normal, aorta not enlarged by palpation, no abdominal bruits  EXTREMITIES: peripheral pulses normal, no edema, no bruits  NEURO: alert and oriented to person/place/time, normal speech, gait and affect  VASC: Radial, femoral, dorsalis pedis and posterior tibialis pulses are normal in volumes and symmetric bilaterally. No bruits are heard.  SKIN: no ecchymoses, no rashes    Labs:  CBC RESULTS:   Lab Results   Component Value Date    WBC 6.0 06/28/2017    RBC 4.48 06/28/2017    HGB 14.2 06/28/2017    HCT 42.8 06/28/2017    MCV 96 06/28/2017    MCH 31.7 06/28/2017    MCHC 33.2 06/28/2017    RDW 12.6 06/28/2017     06/28/2017       BMP RESULTS:  Lab Results   Component Value Date     06/28/2017    POTASSIUM 4.2 06/28/2017    CHLORIDE 109 06/28/2017    CO2 24 06/28/2017    ANIONGAP 5 06/28/2017    GLC 82 06/28/2017    BUN 13 06/28/2017    CR 0.67 06/28/2017    GFRESTIMATED >90  Non  GFR Calc   06/28/2017    GFRESTBLACK >90   GFR Calc   06/28/2017    MANJU 9.9 06/28/2017        INR RESULTS:  Lab Results   Component Value Date    INR 0.95 06/28/2017       Procedures:  HOLTER MONITOR 3/20/2017  IMPRESSION:  Twenty-four hours of recordings were produced.    Rhythm was sinus throughout the  recording with average heart rate 78 beats per minute.  Minimum heart rate was 50 and maximal heart rate was 121 beats per minute.    Ventricular ectopic activity consisted out of 10,064 beats (10.3% of the total).  Twenty beats were in couplets, the rest were single VEs.  There was no ventricular tachycardia.    Supraventricular ectopic activity consisted out of 12,500 beats (12.8% of total), of which three were in one run (three beats at 111 beats per minute), 38 were in couplets and the other were all single PACs.  There was no other significant arrhythmia.    During symptoms of lightheadedness, hot flash, sweating and headache, the rhythm was sinus with single VEs and PACs.  During patient events, the rhythm was sinus with single VEs and PACs.      ECHO 5/17/2017  Summary:   1. LV function is low normal. The visually estimated ejection fraction is 50%. LVEF difficult to assess due to conduction delay and trigeminal rhythm.   2. Septal motion consistent with conduction delay/paced rhythm.   3. No evidence of pulmonary hypertension.   4. No hemodynamically significant valvular disease.   5. No pericardial effusion.   6. Compared to prior study (3/28/2017); there's significant change: pacer is new; conduction delay vs paced rhythm with septal wall motion abnormality due to this, also new.    _____________________________________________________________________  PHYSICIAN INTERPRETATION: Left Ventricle: The left ventricular size is normal. LV function is low normal. The visually estimated ejection fraction is 50%. LV septal wall thickness is normal. LV posterior wall thickness is normal. Paradoxical septal motion, consistent with conduction delay. LV diastolic function is indeterminant due to rhythm.  Left Atrium: The left atrium is normal in size.  Right Ventricle: The right ventricular size is normal. Global RV systolic function is normal.  Right Atrium: The right atrium is normal in size.  Mitral Valve:  Nonspecifically thickened and calcified mitral valve leaflets. Mitral leaflet mobility is normal. Trace mitral valve regurgitation.  Aortic Valve: The aortic valve was not well visualized. There is no evidence of significant aortic stenosis. The peak and mean gradients are 6.0 mmHg and 3.8 mmHg, respectively. No evidence of significant aortic valve regurgitation.  Tricuspid Valve: The tricuspid valve structure is nonspecifically thickened. Trace tricuspid regurgitation. There is no evidence of pulmonary hypertension with an estimated right ventricular systolic pressure (RVSP) of 18.68.  Pulmonic Valve: The pulmonic valve was not well visualized. No significant pulmonic valve regurgitation.  Pericardium: No pericardial effusion.  Aorta: The aorta was not well visualized.  The ascending aorta was not well visualized.  Venous: The inferior vena cava measures 1.70 cm and collapses normally with respiration.  Additional Comments: Cardiac rhythm is indeterminant. Image quality for this study is adequate. A pacer wire is visualized in the right atrium and right ventricle.  In comparison to the previous echocardiogram(s): Prior examinations are available and were reviewed for comparison purposes. A prior study was performed on 3/28/2017. Compared to prior study (3/28/2017); there's significant change: pacer is new; conduction delay vs paced rhythm with septal wall motion abnormality due to this, also new.  2D AND M-MODE MEASUREMENTS:  Left Ventricle:         Normal    Left Atrium:                Normal  IVSd (2D):      0.78 cm (0.7-1.1) LA Major diam,s, A2c 3.9 cm  LVPWd (2D):     0.94 cm (0.7-1.1)  LVIDd (2D):     4.37 cm (3.4-5.7) LA Volume A/L:                 LA Major:  LVIDs (2D):     3.47 cm           LA Vol A4C A/L      21.1 ml    A4C,s,:  LV FS (2D):     20.6 %  (>25%)    LA Vol A2C A/L      28.1 ml    A2C,s,:3.9 cm                                    LA Vol BP A/L       27.4 ml                                    LA  Vol BP A/L index 15.7 ml/m                                       Right Ventricle:                                    Right Atrium:                                    RA,s major, A4C  2.9 cm                                    RA,s minor, A4C  3.1 cm    LV SYSTOLIC FUNCTION BY 2D PLANIMETRY (MOD):  EF-A4C View: 49.8 % EF-A2C View: 50.1 % EF-Biplane: 52.1 %    Aortic Valve: AoV Max Adithya: 1.23 m/s AoV Peak P.0 mmHg AoV Mean PG: 3.8 mmHg    LVOT Vmax:   1.15 m/s LVOT VTI:       0.169 m  LVOT Diam: 2.10 cm  LVOT PK grad 5 mmHg   LVOT mean grad: 3.0 mmHg    AoV Area, Vmax: 3.22 cm  AoV Area, VTI: 2.99 cm  AoV Area, Vmn: 2.97 cm     Tricuspid Valve and PA/RV Systolic Pressure: TR Max Velocity: 1.98 m/s  RA Pressure:         RVSP/PASP:  TR PK Grad       15.7 mmHg IVC diameter 1.70 cm RVOT diam, s        Assessment and Plan:   1. Normal PPM fubnction. Essentially 100% V paced    2. Fatigue / low energy of unclear etiol    3,  Resolving Pericarditis    PLAN    1. Remote PM follow-up'  2. Check ESR , CRP  3. RTC 1 year ior earlier as needed    I very much appreciated the opportunity to see and assess Mrs Joyce Marroquin in the clinic today . Please do not hesitate to contact my office if you have any questions or concerns.      Lino Funes MD  Cardiac Arrhythmia Service  [facility]  [phone]        CC

## 2017-09-08 LAB — CRP SERPL HS-MCNC: 2.9 MG/L

## 2017-09-18 ENCOUNTER — TELEPHONE (OUTPATIENT)
Dept: CARDIOLOGY | Facility: CLINIC | Age: 57
End: 2017-09-18

## 2017-09-18 NOTE — TELEPHONE ENCOUNTER
Pt to ask a few questions about her pacemaker.  She did see Dr Funes on 9/7/17 and she states that she forgot to ask Dr Funes about the position of her pacemaker.  She states that the pacemaker is sitting very close to her collar bone and it does limit her range of motion, especially at work.  She will send pictures of her site to me and I will review them with Dr Funes and call her back with his recommendation.

## 2017-10-05 ENCOUNTER — TELEPHONE (OUTPATIENT)
Dept: CARDIOLOGY | Facility: CLINIC | Age: 57
End: 2017-10-05

## 2017-10-05 NOTE — TELEPHONE ENCOUNTER
Patient calling reporting that since her ablation she has no motivation and she used to have a lot of motivation.  She is back to work 2 days a week.  She also has had a regular heart beat today and also her BP is 127/68.  She is not tolerating exercise like she used to either.  About 15 minutes into walking on her treadmill she starts to sweat.  She also has had her teeth cleaned twice since her procedure and is now wondering if she should have been pre medicated.  Please call to discuss.  [phone].

## 2017-10-06 ENCOUNTER — CARE COORDINATION (OUTPATIENT)
Dept: CARDIOLOGY | Facility: CLINIC | Age: 57
End: 2017-10-06

## 2017-10-06 DIAGNOSIS — I44.2 ATRIOVENTRICULAR BLOCK, COMPLETE (H): Primary | ICD-10-CM

## 2017-10-06 DIAGNOSIS — R06.02 SOB (SHORTNESS OF BREATH): ICD-10-CM

## 2017-10-06 NOTE — PROGRESS NOTES
msg received from triage:   Patient calling reporting that since her ablation she has no motivation and she used to have a lot of motivation.  She is back to work 2 days a week.  She also has had a regular heart beat today and also her BP is 127/68.  She is not tolerating exercise like she used to either.  About 15 minutes into walking on her treadmill she starts to sweat.  She also has had her teeth cleaned twice since her procedure and is now wondering if she should have been pre medicated.  Please call to discuss.  [phone].                 My Chart msg sent to pt.    Discussed settings with device nurse:     As of visit on 9/7/17: Pts atrial rate is between  approx 40% of the time.     VPaced 99.7%, pt c/o SOB    Date: 10/10/2017    Time of Call: 11:04 AM     Diagnosis:  Complete heart block, sob     [ TORB ] Ordering provider: Dr Funes  Order: echocardiogram     Order received by: mikaela marlow rn      Follow-up/additional notes: order placed, will contact pt to andria

## 2017-10-11 ENCOUNTER — TRANSFERRED RECORDS (OUTPATIENT)
Dept: HEALTH INFORMATION MANAGEMENT | Facility: CLINIC | Age: 57
End: 2017-10-11

## 2017-10-20 ENCOUNTER — RADIANT APPOINTMENT (OUTPATIENT)
Dept: CARDIOLOGY | Facility: CLINIC | Age: 57
End: 2017-10-20

## 2017-10-20 DIAGNOSIS — I44.2 ATRIOVENTRICULAR BLOCK, COMPLETE (H): ICD-10-CM

## 2017-10-20 DIAGNOSIS — R06.02 SOB (SHORTNESS OF BREATH): ICD-10-CM

## 2017-10-20 RX ADMIN — Medication 3 ML: at 09:45

## 2017-10-23 ENCOUNTER — TELEPHONE (OUTPATIENT)
Dept: CARDIOLOGY | Facility: CLINIC | Age: 57
End: 2017-10-23

## 2017-10-23 NOTE — TELEPHONE ENCOUNTER
Patient called for results of her echo but results no in epic.  Please vilma her when results are in and also mail her a copy of the entire report.  Do not mychart it to her.  She states she never gets all the details.

## 2017-10-25 ENCOUNTER — CARE COORDINATION (OUTPATIENT)
Dept: CARDIOLOGY | Facility: CLINIC | Age: 57
End: 2017-10-25

## 2017-10-25 NOTE — PROGRESS NOTES
Echo results released to pt. My chart message sent.   Call to cell: left vm with brief results. Would like pt to have device check and see Dr Funes 11/9 if possible.   Will schd 230 PPM, 3:00 Dr Funes.   Contact information given.     Letter sent with complete report.

## 2017-10-25 NOTE — LETTER
2017      TO: Joyce Marroquin  [address] [address] [id number]         Dear Joyce,    [facility]  [facility]-3rd Floor  [address] [place] [address]     Name: JOYCE MARROQUIN  MRN: [id number]  : 1960  Study Date: 10/20/2017 09:02 AM  Age: 56 yrs  Gender: Female  [place] [facility] [place]  Reason For Study: Atrioventricular block, complete, Shortness of breath  Referring Physician: STUART COLMENARES  Performed By: Luis Felipe Blackburn RDCS     BSA: 1.8 m2  Height: 65 in  Weight: 162 lb  _____________________________________________________________________________  __        Procedure  Echocardiogram with two-dimensional, color and spectral Doppler performed.  Contrast Optison. Optison (NDC #6259-9606-19) given intravenously. Patient was  given 3 ml mixture of 3 ml Optison and 6 ml saline. 6 ml wasted.  _____________________________________________________________________________  __        Interpretation Summary  Mildly (EF 40-45%) reduced left ventricular function is present.  Global right ventricular function is normal.  Right ventricular systolic pressure is 25mmHg above the right atrial pressure.  A pacemaker lead is noted in the right ventricle.  No pericardial effusion is present.  The inferior vena cava was normal in size with preserved respiratory  variability.  Estimated mean right atrial pressure is 3 mmHg.  Previous study not available for comparison.  _____________________________________________________________________________  __        Left Ventricle  Left ventricular wall thickness is normal. Left ventricular size is normal.  The Ejection Fraction was calculated using Bi-plane contrast. Mildly (EF 40-  45%) reduced left ventricular function is present. Normal left ventricular  filling for age. Biplane traced at 42%. Mild diffuse hypokinesis is present.  Abnormal septal motion consistent with pacemaker is  present.     Right Ventricle  The right ventricle is normal size. Global right ventricular function is  normal. A pacemaker lead is noted in the right ventricle.     Atria  Both atria appear normal. The atrial septum is intact as assessed by color  Doppler .        Mitral Valve  Trace mitral insufficiency is present.     Aortic Valve  Aortic valve is normal in structure and function. The aortic valve is  tricuspid.     Tricuspid Valve  The tricuspid valve is normal. Trace tricuspid insufficiency is present. Right  ventricular systolic pressure is 25mmHg above the right atrial pressure.     Pulmonic Valve  The pulmonic valve is normal.     Vessels  The aorta root is normal. The pulmonary artery is normal. The inferior vena  cava was normal in size with preserved respiratory variability. Estimated mean  right atrial pressure is 3 mmHg.        Pericardium  No pericardial effusion is present.     Compared to Previous Study  Previous study not available for comparison.     Attestation  I have personally viewed the imaging and agree with the interpretation and  report as documented by the fellow, Reuben Holley, and/or edited by me.  _____________________________________________________________________________  __     MMode/2D Measurements & Calculations  IVSd: 0.98 cm  LVIDd: 4.8 cm  LVIDs: 3.7 cm  LVPWd: 0.69 cm  FS: 23.1 %  EDV(Teich): 105.7 ml  ESV(Teich): 56.8 ml  LV mass(C)d: 131.8 grams  LV mass(C)dI: 72.9 grams/m2  Ao root diam: 2.8 cm  asc Aorta Diam: 2.9 cm  LVOT diam: 2.1 cm  LVOT area: 3.6 cm2  LA Volume (BP): 49.6 ml     LA Volume Index (BP): 27.4 ml/m2        Doppler Measurements & Calculations  MV E max veronique: 67.1 cm/sec  MV A max veronique: 97.8 cm/sec  MV E/A: 0.69  MV dec time: 0.14 sec  TR max veronique: 249.8 cm/sec  TR max P.0 mmHg  Lateral E/e': 13.0  Med E to E': 10.5  Medial E/e': 10.5

## 2017-11-08 ENCOUNTER — PRE VISIT (OUTPATIENT)
Dept: CARDIOLOGY | Facility: CLINIC | Age: 57
End: 2017-11-08

## 2017-11-08 NOTE — TELEPHONE ENCOUNTER
"HPI:   Joyce Marroquin is a 57 year old female who presents for follow up. 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  According to OSH note \"CHB due to probable injury of the AV stew artery during ablation and subsequent ECHO LVEF decreased from 60% to 45-50% after the ablation\".   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      Since last seen she has returned work. Still occas CP but infrequent. Does C/O of low energy that varties from daty to day    No fever chills. NO HF symptoms, but some Lower extrem edema.    Has gained wt.    Many concerns re past EP care at [facility] and considering legal action re CHB and Pericarditis and being unhappy with PPM position in Left shoulder.      PAST MEDICAL HISTORY:  Past Medical History:   Diagnosis Date     Chronic depressive personality disorder      Esophageal reflux        CURRENT MEDICATIONS:  Current Outpatient Prescriptions   Medication Sig Dispense Refill     Menaquinone-7 (VITAMIN K2 PO)        nebulizer nebulization as needed       phytonadione (MEPHYTON/ VIT K) suspension        Ferrous Sulfate (IRON SUPPLEMENT PO) Take 325 mg by mouth daily       LEVOTHYROXINE SODIUM PO Take 137 mcg by mouth daily       MELATONIN PO Take 1 mg by mouth nightly as needed       albuterol (PROAIR HFA/PROVENTIL HFA/VENTOLIN HFA) 108 (90 BASE) MCG/ACT Inhaler Inhale 2 puffs into the lungs every 4 hours as needed for shortness of breath / dyspnea or wheezing       fluticasone (FLOVENT HFA) 110 MCG/ACT Inhaler Inhale 2 puffs into the lungs 2 times daily       DiphenhydrAMINE HCl (BENADRYL PO) Take 25-50 mg by mouth nightly as needed       Cholecalciferol (VITAMIN D3 PO) Take 5,000 Units by mouth daily       Probiotic Product (PROBIOTIC DAILY PO) Take 1 capsule by mouth daily        Acetaminophen (TYLENOL PO) Take 500-1,000 mg by mouth " daily as needed        Multiple Vitamin (DAILY MULTIVITAMIN PO) Take 1 tablet by mouth daily          PAST SURGICAL HISTORY:  Past Surgical History:   Procedure Laterality Date     EP ABLATION / EP STUDIES  04/21/2017     HC CORRECT BUNION,SIMPLE       PPM INSERT OF NEW OR REPL W/VENT LEAD  04/21/2017       ALLERGIES:     Allergies   Allergen Reactions     Flagyl [Metronidazole] Rash     Atenolol Other (See Comments)     Had extreme fatigue, dizziness, and overall felt unwell.      Bactrim [Sulfamethoxazole W/Trimethoprim]      Corn Oil      Lorazepam      Oxytetracycline      Sulfamethoxazole-Trimethoprim      Other reaction(s): Other  Passed out     Tetracycline      Zofran [Ondansetron]      Prolonged QT  Prolonged QT at baseline per patient     Benzodiazepines Other (See Comments)     Other reaction(s): Other  Extreme heat intolerance  Extreme heat intolerance     Cyclobenzaprine Other (See Comments)     Extreme heat intolerance     Levaquin [Levofloxacin]      Other reaction(s): Other  Tendon rupture     Nsaids Other (See Comments)     Exacerbated asthma       FAMILY HISTORY:          SOCIAL HISTORY:  Social History   Substance Use Topics     Smoking status: Former Smoker     Smokeless tobacco: Not on file     Alcohol use Yes      Comment: rarely       ROS:   Constitutional: No fever, chills, or sweats. Weight stable.   ENT: No visual disturbance, ear ache, epistaxis, sore throat.   Cardiovascular: As per HPI.   Respiratory: No cough, hemoptysis.    GI: No nausea, vomiting, hematemesis, melena, or hematochezia.   : No hematuria.   Integument: Negative.   Psychiatric: Negative.   Hematologic:  Easy bruising, no easy bleeding.  Neuro: Negative.   Endocrinology: No significant heat or cold intolerance   Musculoskeletal: No myalgia.    Exam:  [facility] 08/21/2007  GENERAL APPEARANCE: healthy, alert and no distress  HEENT: no icterus, no xanthelasmas, normal pupil size and reaction, normal palate, mucosa moist, no  central cyanosis  NECK: no adenopathy, no asymmetry, masses, or scars, thyroid normal to palpation and no bruits, JVP not elevated  RESPIRATORY: lungs clear to auscultation - no rales, rhonchi or wheezes, no use of accessory muscles, no retractions, respirations are unlabored, normal respiratory rate  CARDIOVASCULAR: regular rhythm, normal S1 with physiologic split S2, no S3 or S4 and no murmur, click or rub, precordium quiet with normal PMI.  ABDOMEN: soft, non tender, without hepatosplenomegaly, no masses palpable, bowel sounds normal, aorta not enlarged by palpation, no abdominal bruits  EXTREMITIES: peripheral pulses normal, no edema, no bruits  NEURO: alert and oriented to person/place/time, normal speech, gait and affect  VASC: Radial, femoral, dorsalis pedis and posterior tibialis pulses are normal in volumes and symmetric bilaterally. No bruits are heard.  SKIN: no ecchymoses, no rashes    Labs:  CBC RESULTS:   Lab Results   Component Value Date    WBC 6.0 06/28/2017    RBC 4.48 06/28/2017    HGB 14.2 06/28/2017    HCT 42.8 06/28/2017    MCV 96 06/28/2017    MCH 31.7 06/28/2017    MCHC 33.2 06/28/2017    RDW 12.6 06/28/2017     06/28/2017       BMP RESULTS:  Lab Results   Component Value Date     06/28/2017    POTASSIUM 4.2 06/28/2017    CHLORIDE 109 06/28/2017    CO2 24 06/28/2017    ANIONGAP 5 06/28/2017    GLC 82 06/28/2017    BUN 13 06/28/2017    CR 0.67 06/28/2017    GFRESTIMATED >90  Non  GFR Calc   06/28/2017    GFRESTBLACK >90   GFR Calc   06/28/2017    MANJU 9.9 06/28/2017        INR RESULTS:  Lab Results   Component Value Date    INR 0.95 06/28/2017       Procedures:    Echocardiogram: 10/20/2017:     Interpretation Summary  Mildly (EF 40-45%) reduced left ventricular function is present.  Global right ventricular function is normal.  Right ventricular systolic pressure is 25mmHg above the right atrial pressure.  A pacemaker lead is noted in the right  ventricle.  No pericardial effusion is present.  The inferior vena cava was normal in size with preserved respiratory  variability.  Estimated mean right atrial pressure is 3 mmHg.  Previous study not available for comparison.  _____________________________________________________________________________  __        Left Ventricle  Left ventricular wall thickness is normal. Left ventricular size is normal.  The Ejection Fraction was calculated using Bi-plane contrast. Mildly (EF 40-  45%) reduced left ventricular function is present. Normal left ventricular  filling for age. Biplane traced at 42%. Mild diffuse hypokinesis is present.  Abnormal septal motion consistent with pacemaker is present.     Right Ventricle  The right ventricle is normal size. Global right ventricular function is  normal. A pacemaker lead is noted in the right ventricle.     Atria  Both atria appear normal. The atrial septum is intact as assessed by color  Doppler .        Mitral Valve  Trace mitral insufficiency is present.     Aortic Valve  Aortic valve is normal in structure and function. The aortic valve is  tricuspid.     Tricuspid Valve  The tricuspid valve is normal. Trace tricuspid insufficiency is present. Right  ventricular systolic pressure is 25mmHg above the right atrial pressure.     Pulmonic Valve  The pulmonic valve is normal.     Vessels  The aorta root is normal. The pulmonary artery is normal. The inferior vena  cava was normal in size with preserved respiratory variability. Estimated mean  right atrial pressure is 3 mmHg.        Pericardium  No pericardial effusion is present.     Compared to Previous Study  Previous study not available for comparison.     Attestation  I have personally viewed the imaging and agree with the interpretation and  report as documented by the fellow, Reuben Holley, and/or edited by me.  _____________________________________________________________________________  __     MMode/2D Measurements  & Calculations  IVSd: 0.98 cm  LVIDd: 4.8 cm  LVIDs: 3.7 cm  LVPWd: 0.69 cm  FS: 23.1 %  EDV(Teich): 105.7 ml  ESV(Teich): 56.8 ml  LV mass(C)d: 131.8 grams  LV mass(C)dI: 72.9 grams/m2  Ao root diam: 2.8 cm  asc Aorta Diam: 2.9 cm  LVOT diam: 2.1 cm  LVOT area: 3.6 cm2  LA Volume (BP): 49.6 ml     LA Volume Index (BP): 27.4 ml/m2        Doppler Measurements & Calculations  MV E max veronique: 67.1 cm/sec  MV A max veronique: 97.8 cm/sec  MV E/A: 0.69  MV dec time: 0.14 sec  TR max veronique: 249.8 cm/sec  TR max P.0 mmHg  Lateral E/e': 13.0  Med E to E': 10.5  Medial E/e': 10.5      ECHO 2017  Summary:   1. LV function is low normal. The visually estimated ejection fraction is 50%. LVEF difficult to assess due to conduction delay and trigeminal rhythm.   2. Septal motion consistent with conduction delay/paced rhythm.   3. No evidence of pulmonary hypertension.   4. No hemodynamically significant valvular disease.   5. No pericardial effusion.   6. Compared to prior study (3/28/2017); there's significant change: pacer is new; conduction delay vs paced rhythm with septal wall motion abnormality due to this, also new.    _____________________________________________________________________  PHYSICIAN INTERPRETATION: Left Ventricle: The left ventricular size is normal. LV function is low normal. The visually estimated ejection fraction is 50%. LV septal wall thickness is normal. LV posterior wall thickness is normal. Paradoxical septal motion, consistent with conduction delay. LV diastolic function is indeterminant due to rhythm.  Left Atrium: The left atrium is normal in size.  Right Ventricle: The right ventricular size is normal. Global RV systolic function is normal.  Right Atrium: The right atrium is normal in size.  Mitral Valve: Nonspecifically thickened and calcified mitral valve leaflets. Mitral leaflet mobility is normal. Trace mitral valve regurgitation.  Aortic Valve: The aortic valve was not well visualized. There  is no evidence of significant aortic stenosis. The peak and mean gradients are 6.0 mmHg and 3.8 mmHg, respectively. No evidence of significant aortic valve regurgitation.  Tricuspid Valve: The tricuspid valve structure is nonspecifically thickened. Trace tricuspid regurgitation. There is no evidence of pulmonary hypertension with an estimated right ventricular systolic pressure (RVSP) of 18.68.  Pulmonic Valve: The pulmonic valve was not well visualized. No significant pulmonic valve regurgitation.  Pericardium: No pericardial effusion.  Aorta: The aorta was not well visualized.  The ascending aorta was not well visualized.  Venous: The inferior vena cava measures 1.70 cm and collapses normally with respiration.  Additional Comments: Cardiac rhythm is indeterminant. Image quality for this study is adequate. A pacer wire is visualized in the right atrium and right ventricle.  In comparison to the previous echocardiogram(s): Prior examinations are available and were reviewed for comparison purposes. A prior study was performed on 3/28/2017. Compared to prior study (3/28/2017); there's significant change: pacer is new; conduction delay vs paced rhythm with septal wall motion abnormality due to this, also new.  2D AND M-MODE MEASUREMENTS:  Left Ventricle:         Normal    Left Atrium:                Normal  IVSd (2D):      0.78 cm (0.7-1.1) LA Major diam,s, A2c 3.9 cm  LVPWd (2D):     0.94 cm (0.7-1.1)  LVIDd (2D):     4.37 cm (3.4-5.7) LA Volume A/L:                 LA Major:  LVIDs (2D):     3.47 cm           LA Vol A4C A/L      21.1 ml    A4C,s,:  LV FS (2D):     20.6 %  (>25%)    LA Vol A2C A/L      28.1 ml    A2C,s,:3.9 cm                                    LA Vol BP A/L       27.4 ml                                    LA Vol BP A/L index 15.7 ml/m                                       Right Ventricle:                                    Right Atrium:                                    RA,s major, A4C  2.9  cm                                    RA,s minor, A4C  3.1 cm    LV SYSTOLIC FUNCTION BY 2D PLANIMETRY (MOD):  EF-A4C View: 49.8 % EF-A2C View: 50.1 % EF-Biplane: 52.1 %    Aortic Valve: AoV Max Adithya: 1.23 m/s AoV Peak P.0 mmHg AoV Mean PG: 3.8 mmHg    LVOT Vmax:   1.15 m/s LVOT VTI:       0.169 m  LVOT Diam: 2.10 cm  LVOT PK grad 5 mmHg   LVOT mean grad: 3.0 mmHg    AoV Area, Vmax: 3.22 cm  AoV Area, VTI: 2.99 cm  AoV Area, Vmn: 2.97 cm     Tricuspid Valve and PA/RV Systolic Pressure: TR Max Velocity: 1.98 m/s  RA Pressure:         RVSP/PASP:  TR PK Grad       15.7 mmHg IVC diameter 1.70 cm RVOT diam, s        HOLTER MONITOR 3/20/2017  IMPRESSION:  Twenty-four hours of recordings were produced.    Rhythm was sinus throughout the recording with average heart rate 78 beats per minute.  Minimum heart rate was 50 and maximal heart rate was 121 beats per minute.    Ventricular ectopic activity consisted out of 10,064 beats (10.3% of the total).  Twenty beats were in couplets, the rest were single VEs.  There was no ventricular tachycardia.    Supraventricular ectopic activity consisted out of 12,500 beats (12.8% of total), of which three were in one run (three beats at 111 beats per minute), 38 were in couplets and the other were all single PACs.  There was no other significant arrhythmia.    During symptoms of lightheadedness, hot flash, sweating and headache, the rhythm was sinus with single VEs and PACs.  During patient events, the rhythm was sinus with single VEs and PACs.          Assessment and Plan:   1. Normal PPM function. Essentially 100% V paced    2. Fatigue / low energy of unclear etiol    3,  Resolving Pericarditis    PLAN          I very much appreciated the opportunity to see and assess Mrs Joyce Marroquin in the clinic today . Please do not hesitate to contact my office if you have any questions or concerns.      Lino Funes MD  Cardiac Arrhythmia Service  [facility]  [phone]        CC

## 2017-11-09 ENCOUNTER — OFFICE VISIT (OUTPATIENT)
Dept: CARDIOLOGY | Facility: CLINIC | Age: 57
End: 2017-11-09
Attending: INTERNAL MEDICINE
Payer: COMMERCIAL

## 2017-11-09 VITALS
OXYGEN SATURATION: 95 % | WEIGHT: 164.4 LBS | BODY MASS INDEX: 27.39 KG/M2 | HEIGHT: 65 IN | HEART RATE: 64 BPM | DIASTOLIC BLOOD PRESSURE: 77 MMHG | SYSTOLIC BLOOD PRESSURE: 128 MMHG

## 2017-11-09 DIAGNOSIS — I44.2 ATRIOVENTRICULAR BLOCK, COMPLETE (H): ICD-10-CM

## 2017-11-09 DIAGNOSIS — R53.83 FATIGUE: ICD-10-CM

## 2017-11-09 DIAGNOSIS — I44.2 COMPLETE ATRIOVENTRICULAR BLOCK (H): Primary | ICD-10-CM

## 2017-11-09 DIAGNOSIS — R53.83 FATIGUE, UNSPECIFIED TYPE: Primary | ICD-10-CM

## 2017-11-09 DIAGNOSIS — Z95.0 CARDIAC PACEMAKER IN SITU: ICD-10-CM

## 2017-11-09 LAB
ALBUMIN SERPL-MCNC: 3.7 G/DL (ref 3.4–5)
ALP SERPL-CCNC: 101 U/L (ref 40–150)
ALT SERPL W P-5'-P-CCNC: 19 U/L (ref 0–50)
ANION GAP SERPL CALCULATED.3IONS-SCNC: 5 MMOL/L (ref 3–14)
AST SERPL W P-5'-P-CCNC: 12 U/L (ref 0–45)
BASOPHILS # BLD AUTO: 0 10E9/L (ref 0–0.2)
BASOPHILS NFR BLD AUTO: 0.7 %
BILIRUB SERPL-MCNC: 0.6 MG/DL (ref 0.2–1.3)
BUN SERPL-MCNC: 14 MG/DL (ref 7–30)
CALCIUM SERPL-MCNC: 9.5 MG/DL (ref 8.5–10.1)
CHLORIDE SERPL-SCNC: 106 MMOL/L (ref 94–109)
CO2 SERPL-SCNC: 25 MMOL/L (ref 20–32)
CREAT SERPL-MCNC: 0.68 MG/DL (ref 0.52–1.04)
CRP SERPL-MCNC: 7 MG/L (ref 0–8)
DIFFERENTIAL METHOD BLD: NORMAL
EOSINOPHIL # BLD AUTO: 0.1 10E9/L (ref 0–0.7)
EOSINOPHIL NFR BLD AUTO: 1.5 %
ERYTHROCYTE [DISTWIDTH] IN BLOOD BY AUTOMATED COUNT: 12.4 % (ref 10–15)
ERYTHROCYTE [SEDIMENTATION RATE] IN BLOOD BY WESTERGREN METHOD: 22 MM/H (ref 0–30)
GFR SERPL CREATININE-BSD FRML MDRD: 88 ML/MIN/1.7M2
GLUCOSE SERPL-MCNC: 81 MG/DL (ref 70–99)
HCT VFR BLD AUTO: 40.4 % (ref 35–47)
HGB BLD-MCNC: 13.4 G/DL (ref 11.7–15.7)
IMM GRANULOCYTES # BLD: 0 10E9/L (ref 0–0.4)
IMM GRANULOCYTES NFR BLD: 0.2 %
LYMPHOCYTES # BLD AUTO: 1.8 10E9/L (ref 0.8–5.3)
LYMPHOCYTES NFR BLD AUTO: 30.5 %
MCH RBC QN AUTO: 31.5 PG (ref 26.5–33)
MCHC RBC AUTO-ENTMCNC: 33.2 G/DL (ref 31.5–36.5)
MCV RBC AUTO: 95 FL (ref 78–100)
MONOCYTES # BLD AUTO: 0.5 10E9/L (ref 0–1.3)
MONOCYTES NFR BLD AUTO: 8.6 %
NEUTROPHILS # BLD AUTO: 3.4 10E9/L (ref 1.6–8.3)
NEUTROPHILS NFR BLD AUTO: 58.5 %
NRBC # BLD AUTO: 0 10*3/UL
NRBC BLD AUTO-RTO: 0 /100
PLATELET # BLD AUTO: 185 10E9/L (ref 150–450)
POTASSIUM SERPL-SCNC: 4.1 MMOL/L (ref 3.4–5.3)
PROT SERPL-MCNC: 7 G/DL (ref 6.8–8.8)
PTH-INTACT SERPL-MCNC: 131 PG/ML (ref 12–72)
RBC # BLD AUTO: 4.26 10E12/L (ref 3.8–5.2)
SODIUM SERPL-SCNC: 136 MMOL/L (ref 133–144)
T3FREE SERPL-MCNC: 2.7 PG/ML (ref 2.3–4.2)
T4 FREE SERPL-MCNC: 1.24 NG/DL (ref 0.76–1.46)
TSH SERPL DL<=0.005 MIU/L-ACNC: 1.89 MU/L (ref 0.4–4)
WBC # BLD AUTO: 5.8 10E9/L (ref 4–11)

## 2017-11-09 PROCEDURE — 99212 OFFICE O/P EST SF 10 MIN: CPT | Mod: 25,ZF

## 2017-11-09 PROCEDURE — 36415 COLL VENOUS BLD VENIPUNCTURE: CPT | Performed by: INTERNAL MEDICINE

## 2017-11-09 PROCEDURE — 99214 OFFICE O/P EST MOD 30 MIN: CPT | Mod: 25 | Performed by: INTERNAL MEDICINE

## 2017-11-09 PROCEDURE — 84439 ASSAY OF FREE THYROXINE: CPT | Performed by: INTERNAL MEDICINE

## 2017-11-09 PROCEDURE — 86140 C-REACTIVE PROTEIN: CPT | Performed by: INTERNAL MEDICINE

## 2017-11-09 PROCEDURE — 84443 ASSAY THYROID STIM HORMONE: CPT | Performed by: INTERNAL MEDICINE

## 2017-11-09 PROCEDURE — 84481 FREE ASSAY (FT-3): CPT | Performed by: INTERNAL MEDICINE

## 2017-11-09 PROCEDURE — 85652 RBC SED RATE AUTOMATED: CPT | Performed by: INTERNAL MEDICINE

## 2017-11-09 PROCEDURE — 83970 ASSAY OF PARATHORMONE: CPT | Performed by: INTERNAL MEDICINE

## 2017-11-09 PROCEDURE — 80053 COMPREHEN METABOLIC PANEL: CPT | Performed by: INTERNAL MEDICINE

## 2017-11-09 PROCEDURE — 93280 PM DEVICE PROGR EVAL DUAL: CPT | Mod: ZF

## 2017-11-09 PROCEDURE — 85025 COMPLETE CBC W/AUTO DIFF WBC: CPT | Performed by: INTERNAL MEDICINE

## 2017-11-09 PROCEDURE — 93280 PM DEVICE PROGR EVAL DUAL: CPT | Mod: 26 | Performed by: INTERNAL MEDICINE

## 2017-11-09 ASSESSMENT — PAIN SCALES - GENERAL: PAINLEVEL: MODERATE PAIN (4)

## 2017-11-09 NOTE — MR AVS SNAPSHOT
After Visit Summary   11/9/2017    Joyce Marroquin    MRN: [id number]           Patient Information     Date Of Birth          1960        Visit Information        Provider Department      11/9/2017 3:00 PM Lino Funes MD [facility]        Today's Diagnoses     Fatigue    -  1      Care Instructions    You will be scheduled for a follow up visit: 6 months    We encourage you to use My Chart as your primary form of communication if possible. If you need assistance in setting this up, please contact our office or ask at your follow up visit.     If you need a medication refill please contact your pharmacy. Please allow at least 3 business days for your refill to be completed.       Cardiology  Telephone Number    Fiorella Wright [phone]   Or send a message to your provider via my chart.   For scheduling procedures:    [facility]    Clinic appointments       [phone]   For the Device Clinic (Pacemakers and ICD's)   RN's :   Alpa Porras  During business hours: [phone]    After business hours:   [phone]- select option 4 and ask for job code [id number].          As always, Thank you for trusting us with your health care needs!          Follow-ups after your visit        Follow-up notes from your care team     Return in about 6 months (around 5/9/2018) for lab today. alicia and PPM or Shantel and PPM .      Your next 10 appointments already scheduled     Nov 09, 2017  4:30 PM CST   Lab with  LAB    Health Lab ([facility])    [address]  1st Floor  [place] [address] [phone]            May 10, 2018 11:30 AM CDT   (Arrive by 11:15 AM)   Pacemaker Check with  Cv Device 1   [facility] ([facility])    [address]  [address] [place] [address] [phone]            May 10, 2018 12:00 PM CDT   (Arrive by 11:45 AM)   RETURN ARRHYTHMIA  with Lino Funes MD   [facility] ([facility])    [address]  3rd Floor  [facility] [address] [phone]              Future tests that were ordered for you today     Open Future Orders        Priority Expected Expires Ordered    Parathyroid Hormone Intact Routine 11/9/2017 11/9/2018 11/9/2017    T4 free Routine 11/9/2017 11/9/2018 11/9/2017    TSH with free T4 reflex Routine 11/9/2017 12/29/2017 11/9/2017    Comprehensive metabolic panel Routine 11/9/2017 12/29/2017 11/9/2017    CRP inflammation Routine 11/9/2017 12/29/2017 11/9/2017    Erythrocyte sedimentation rate auto Routine 11/9/2017 12/29/2017 11/9/2017    T3 Free Routine 11/9/2017 12/29/2017 11/9/2017    CBC with platelets differential Routine 11/9/2017 12/29/2017 11/9/2017            Who to contact     If you have questions or need follow up information about today's clinic visit or your schedule please contact [facility] directly at [phone].  Normal or non-critical lab and imaging results will be communicated to you by [employer], letter or phone within 4 business days after the clinic has received the results. If you do not hear from us within 7 days, please contact the clinic through [employer] or phone. If you have a critical or abnormal lab result, we will notify you by phone as soon as possible.  Submit refill requests through [employer] or call your pharmacy and they will forward the refill request to us. Please allow 3 business days for your refill to be completed.          Additional Information About Your Visit        [employer] Information     [employer] gives you secure access to your electronic health record. If you see a primary care provider, you can also send messages to your care team and make appointments. If you have questions, please call your primary care clinic.  If you do not have a primary care provider, please call [phone] and they will assist you.        Care  "EveryWhere ID     This is your Care EveryWhere ID. This could be used by other organizations to access your [place] medical records  [id number]        Your Vitals Were     Pulse Height Last Period Pulse Oximetry BMI (Body Mass Index)       64 1.651 m (5' 5\") 08/21/2007 95% 27.36 kg/m2        Blood Pressure from Last 3 Encounters:   11/09/17 128/77   09/07/17 120/73   06/29/17 116/73    Weight from Last 3 Encounters:   11/09/17 74.6 kg (164 lb 6.4 oz)   09/07/17 73.6 kg (162 lb 3.2 oz)   07/10/17 71.6 kg (157 lb 12.8 oz)               Primary Care Provider Office Phone # Fax #    Hai Renteria [phone]       [facility] [address]   [place] [address]        Equal Access to Services     [facility] : Hadii aad ku hadasho Soomaali, waaxda luqadaha, qaybta kaalmada adeegyada, micah cho . So [facility] [phone].    ATENCIÓN: Si habla español, tiene a hankins disposición servicios gratuitos de asistencia lingüística. [facility] [phone].    We comply with applicable federal civil rights laws and [place] laws. We do not discriminate on the basis of race, color, national origin, age, disability, sex, sexual orientation, or gender identity.            Thank you!     Thank you for choosing [facility]  for your care. Our goal is always to provide you with excellent care. Hearing back from our patients is one way we can continue to improve our services. Please take a few minutes to complete the written survey that you may receive in the mail after your visit with us. Thank you!             Your Updated Medication List - Protect others around you: Learn how to safely use, store and throw away your medicines at www.disposemymeds.org.          This list is accurate as of: 11/9/17  4:16 PM.  Always use your most recent med list.                   Brand Name Dispense Instructions for use Diagnosis    albuterol 108 (90 BASE) MCG/ACT Inhaler    PROAIR " HFA/PROVENTIL HFA/VENTOLIN HFA     Inhale 2 puffs into the lungs every 4 hours as needed for shortness of breath / dyspnea or wheezing        BENADRYL PO      Take 25-50 mg by mouth nightly as needed        DAILY MULTIVITAMIN PO      Take 1 tablet by mouth daily        fluticasone 110 MCG/ACT Inhaler    FLOVENT HFA     Inhale 2 puffs into the lungs 2 times daily        IRON SUPPLEMENT PO      Take 325 mg by mouth daily        LEVOTHYROXINE SODIUM PO      Take 137 mcg by mouth daily        MAGNESIUM LACTATE PO      Take 180 mg by mouth daily    Fatigue       MELATONIN PO      Take 1 mg by mouth nightly as needed        nebulizer nebulization      as needed    H/O pericarditis       phytonadione 0.5 MG/ML suspension    MEPHYTON/ VIT K          PROBIOTIC DAILY PO      Take 1 capsule by mouth 2 times daily        TURMERIC PO      Take by mouth daily    Fatigue       TYLENOL PO      Take 500-1,000 mg by mouth daily as needed        VITAMIN D3 PO      Take 5,000 Units by mouth daily        VITAMIN K2 PO       H/O pericarditis

## 2017-11-09 NOTE — PATIENT INSTRUCTIONS
You will be scheduled for a follow up visit: 6 months    We encourage you to use My Chart as your primary form of communication if possible. If you need assistance in setting this up, please contact our office or ask at your follow up visit.     If you need a medication refill please contact your pharmacy. Please allow at least 3 business days for your refill to be completed.       Cardiology  Telephone Number    Fiorella Wright -[phone]   Or send a message to your provider via my chart.   For scheduling procedures:    [facility]    Clinic appointments       [phone]   For the Device Clinic (Pacemakers and ICD's)   RN's :   Alpa Porras  During business hours: [phone]    After business hours:   [phone]- select option 4 and ask for job code [id number].          As always, Thank you for trusting us with your health care needs!

## 2017-11-09 NOTE — NURSING NOTE
Chief Complaint   Patient presents with     Follow Up For     PPM check, FU decrease in EF, c/o EASTON, SOB, decreased exercise tolerance. Pt VPaced 99%     Vitals were taken and medications were reconciled.   Shonna Noguera MA    2:52 PM

## 2017-11-09 NOTE — LETTER
11/9/2017      RE: Joyce Marroquin  [address] [facility] [id number]       Dear Colleague,    Thank you for the opportunity to participate in the care of your patient, Joyce Marroquin, at the [facility] HEART [facility]. Please see a copy of my visit note below.    HPI:   Ms. Marroquin is a 57 year old female with a PMHx of POTS, GERD, RVOT RFA c/b CHB s/p PPM, pericarditis who presents for follow up.     Patient started noticing fatigue over the past few months. Patient states this is different than POTS or PVC symptoms. These symptoms can occur with rest or exertion. She has noted that her exercise tolerance has gone down (30 minutes on the treadmill, compared to 40 minutes before). Patient denies chest pain when she works out but notices that she is dizzy. Denies orthopnea or PND. Patient does note lower extremity swelling to the ankle.     Even though she does not have chest pain with exertion, she does note intermittent chest pain throughout the day. The chest pain is atypical in nature and not worse with inspiration or coughing. She states she is have chest pain and rates it at a 5. However, she does not appear in distress.     Patient did report one episode of LH/dizziness when shopping. However, she did not have a passing out episode.     Because of these symptoms, she had an ECHO which showed EF of 40-45%. She had an ECHO in 05/2017 which showed EF around 50%. Another ECHO (right after PPM implantation in 04/2017) showed EF around 40-45%. However, MRI in early 04/2017 showed normal EF 60-65%. There was an area of basal inferior hypokinesis with possible LGE. However, stress test in 03/2017 showed no areas on inducible ischemia.      Patient had sleep study and started on CPAP machine. Her symptoms have possibly gotten better since starting; however, it is difficult to tell.     She has some other issues with her pacemaker device. She first reports that is too close  to her collarbone. Second, she is worried about stretching and performing yoga maneuvers with the pacemaker in place.   _____________________________________________________________________________  PAST MEDICAL HISTORY:  Past Medical History:   Diagnosis Date     Chronic depressive personality disorder      Esophageal reflux        CURRENT MEDICATIONS:  Current Outpatient Prescriptions   Medication Sig Dispense Refill     MAGNESIUM LACTATE PO Take 180 mg by mouth daily       TURMERIC PO Take by mouth daily       Menaquinone-7 (VITAMIN K2 PO)        nebulizer nebulization as needed       Ferrous Sulfate (IRON SUPPLEMENT PO) Take 325 mg by mouth daily       LEVOTHYROXINE SODIUM PO Take 137 mcg by mouth daily       MELATONIN PO Take 1 mg by mouth nightly as needed       albuterol (PROAIR HFA/PROVENTIL HFA/VENTOLIN HFA) 108 (90 BASE) MCG/ACT Inhaler Inhale 2 puffs into the lungs every 4 hours as needed for shortness of breath / dyspnea or wheezing       DiphenhydrAMINE HCl (BENADRYL PO) Take 25-50 mg by mouth nightly as needed       Cholecalciferol (VITAMIN D3 PO) Take 5,000 Units by mouth daily       Acetaminophen (TYLENOL PO) Take 500-1,000 mg by mouth daily as needed        Multiple Vitamin (DAILY MULTIVITAMIN PO) Take 1 tablet by mouth daily        phytonadione (MEPHYTON/ VIT K) suspension        fluticasone (FLOVENT HFA) 110 MCG/ACT Inhaler Inhale 2 puffs into the lungs 2 times daily       Probiotic Product (PROBIOTIC DAILY PO) Take 1 capsule by mouth 2 times daily          PAST SURGICAL HISTORY:  Past Surgical History:   Procedure Laterality Date     EP ABLATION / EP STUDIES  04/21/2017     HC CORRECT BUNION,SIMPLE       PPM INSERT OF NEW OR REPL W/VENT LEAD  04/21/2017       ALLERGIES:     Allergies   Allergen Reactions     Flagyl [Metronidazole] Rash     Atenolol Other (See Comments)     Had extreme fatigue, dizziness, and overall felt unwell.      Bactrim [Sulfamethoxazole W/Trimethoprim]      Corn Oil       "Lorazepam      Oxytetracycline      Sulfamethoxazole-Trimethoprim      Other reaction(s): Other  Passed out     Tetracycline      Zofran [Ondansetron]      Prolonged QT  Prolonged QT at baseline per patient     Benzodiazepines Other (See Comments)     Other reaction(s): Other  Extreme heat intolerance  Extreme heat intolerance     Cyclobenzaprine Other (See Comments)     Extreme heat intolerance     Levaquin [Levofloxacin]      Other reaction(s): Other  Tendon rupture     Nsaids Other (See Comments)     Exacerbated asthma       FAMILY HISTORY:          SOCIAL HISTORY:  Social History   Substance Use Topics     Smoking status: Former Smoker     Smokeless tobacco: Never Used     Alcohol use Yes      Comment: rarely       ROS:   Constitutional: No fever, chills, or sweats. Weight stable.   ENT: No visual disturbance, ear ache, epistaxis, sore throat.   Cardiovascular: As per HPI.   Respiratory: No cough, hemoptysis.    GI: No nausea, vomiting, hematemesis, melena, or hematochezia.   : No hematuria.   Integument: Negative.   Psychiatric: Negative.   Hematologic:  Easy bruising, no easy bleeding.  Neuro: Negative.   Endocrinology: No significant heat or cold intolerance   Musculoskeletal: No myalgia.    Exam:  /77 (BP Location: Right arm, Patient Position: Chair, Cuff Size: Adult Regular)  Pulse 64  Ht 1.651 m (5' 5\")  Wt 74.6 kg (164 lb 6.4 oz)  LMP 08/21/2007  SpO2 95%  BMI 27.36 kg/m2  General: No acute distress   HEENT: NC/AT   CV: RRR, +S1/S2, No murmurs, rubs, gallops. JVP is not elevated.    Pulm: Unlabored breathing, CTAB   GI: s/nt/nd   Ext: No edema   Neuro: No focal defects   Psych: Normal Affect    Labs:  CBC RESULTS:   Lab Results   Component Value Date    WBC 6.0 06/28/2017    RBC 4.48 06/28/2017    HGB 14.2 06/28/2017    HCT 42.8 06/28/2017    MCV 96 06/28/2017    MCH 31.7 06/28/2017    MCHC 33.2 06/28/2017    RDW 12.6 06/28/2017     06/28/2017       BMP RESULTS:  Lab Results "   Component Value Date     06/28/2017    POTASSIUM 4.2 06/28/2017    CHLORIDE 109 06/28/2017    CO2 24 06/28/2017    ANIONGAP 5 06/28/2017    GLC 82 06/28/2017    BUN 13 06/28/2017    CR 0.67 06/28/2017    GFRESTIMATED >90  Non  GFR Calc   06/28/2017    GFRESTBLACK >90   GFR Calc   06/28/2017    MANJU 9.9 06/28/2017        INR RESULTS:  Lab Results   Component Value Date    INR 0.95 06/28/2017       Procedures:    Echocardiogram: 10/20/2017:     Interpretation Summary  Mildly (EF 40-45%) reduced left ventricular function is present.  Global right ventricular function is normal.  Right ventricular systolic pressure is 25mmHg above the right atrial pressure.  A pacemaker lead is noted in the right ventricle.  No pericardial effusion is present.  The inferior vena cava was normal in size with preserved respiratory  variability.  Estimated mean right atrial pressure is 3 mmHg.  Previous study not available for comparison.  _____________________________________________________________________________  __        Left Ventricle  Left ventricular wall thickness is normal. Left ventricular size is normal.  The Ejection Fraction was calculated using Bi-plane contrast. Mildly (EF 40-  45%) reduced left ventricular function is present. Normal left ventricular  filling for age. Biplane traced at 42%. Mild diffuse hypokinesis is present.  Abnormal septal motion consistent with pacemaker is present.     Right Ventricle  The right ventricle is normal size. Global right ventricular function is  normal. A pacemaker lead is noted in the right ventricle.     Atria  Both atria appear normal. The atrial septum is intact as assessed by color  Doppler .        Mitral Valve  Trace mitral insufficiency is present.     Aortic Valve  Aortic valve is normal in structure and function. The aortic valve is  tricuspid.     Tricuspid Valve  The tricuspid valve is normal. Trace tricuspid insufficiency is present.  Right  ventricular systolic pressure is 25mmHg above the right atrial pressure.     Pulmonic Valve  The pulmonic valve is normal.     Vessels  The aorta root is normal. The pulmonary artery is normal. The inferior vena  cava was normal in size with preserved respiratory variability. Estimated mean  right atrial pressure is 3 mmHg.        Pericardium  No pericardial effusion is present.     Compared to Previous Study  Previous study not available for comparison.     Attestation  I have personally viewed the imaging and agree with the interpretation and  report as documented by the fellow, Reuben Holley, and/or edited by me.  _____________________________________________________________________________  __     MMode/2D Measurements & Calculations  IVSd: 0.98 cm  LVIDd: 4.8 cm  LVIDs: 3.7 cm  LVPWd: 0.69 cm  FS: 23.1 %  EDV(Teich): 105.7 ml  ESV(Teich): 56.8 ml  LV mass(C)d: 131.8 grams  LV mass(C)dI: 72.9 grams/m2  Ao root diam: 2.8 cm  asc Aorta Diam: 2.9 cm  LVOT diam: 2.1 cm  LVOT area: 3.6 cm2  LA Volume (BP): 49.6 ml     LA Volume Index (BP): 27.4 ml/m2        Doppler Measurements & Calculations  MV E max veronique: 67.1 cm/sec  MV A max veronique: 97.8 cm/sec  MV E/A: 0.69  MV dec time: 0.14 sec  TR max veronique: 249.8 cm/sec  TR max P.0 mmHg  Lateral E/e': 13.0  Med E to E': 10.5  Medial E/e': 10.5      ECHO 2017  Summary:   1. LV function is low normal. The visually estimated ejection fraction is 50%. LVEF difficult to assess due to conduction delay and trigeminal rhythm.   2. Septal motion consistent with conduction delay/paced rhythm.   3. No evidence of pulmonary hypertension.   4. No hemodynamically significant valvular disease.   5. No pericardial effusion.   6. Compared to prior study (3/28/2017); there's significant change: pacer is new; conduction delay vs paced rhythm with septal wall motion abnormality due to this, also  new.    _____________________________________________________________________  PHYSICIAN INTERPRETATION: Left Ventricle: The left ventricular size is normal. LV function is low normal. The visually estimated ejection fraction is 50%. LV septal wall thickness is normal. LV posterior wall thickness is normal. Paradoxical septal motion, consistent with conduction delay. LV diastolic function is indeterminant due to rhythm.  Left Atrium: The left atrium is normal in size.  Right Ventricle: The right ventricular size is normal. Global RV systolic function is normal.  Right Atrium: The right atrium is normal in size.  Mitral Valve: Nonspecifically thickened and calcified mitral valve leaflets. Mitral leaflet mobility is normal. Trace mitral valve regurgitation.  Aortic Valve: The aortic valve was not well visualized. There is no evidence of significant aortic stenosis. The peak and mean gradients are 6.0 mmHg and 3.8 mmHg, respectively. No evidence of significant aortic valve regurgitation.  Tricuspid Valve: The tricuspid valve structure is nonspecifically thickened. Trace tricuspid regurgitation. There is no evidence of pulmonary hypertension with an estimated right ventricular systolic pressure (RVSP) of 18.68.  Pulmonic Valve: The pulmonic valve was not well visualized. No significant pulmonic valve regurgitation.  Pericardium: No pericardial effusion.  Aorta: The aorta was not well visualized.  The ascending aorta was not well visualized.  Venous: The inferior vena cava measures 1.70 cm and collapses normally with respiration.  Additional Comments: Cardiac rhythm is indeterminant. Image quality for this study is adequate. A pacer wire is visualized in the right atrium and right ventricle.  In comparison to the previous echocardiogram(s): Prior examinations are available and were reviewed for comparison purposes. A prior study was performed on 3/28/2017. Compared to prior study (3/28/2017); there's significant change:  pacer is new; conduction delay vs paced rhythm with septal wall motion abnormality due to this, also new.  2D AND M-MODE MEASUREMENTS:  Left Ventricle:         Normal    Left Atrium:                Normal  IVSd (2D):      0.78 cm (0.7-1.1) LA Major diam,s, A2c 3.9 cm  LVPWd (2D):     0.94 cm (0.7-1.1)  LVIDd (2D):     4.37 cm (3.4-5.7) LA Volume A/L:                 LA Major:  LVIDs (2D):     3.47 cm           LA Vol A4C A/L      21.1 ml    A4C,s,:  LV FS (2D):     20.6 %  (>25%)    LA Vol A2C A/L      28.1 ml    A2C,s,:3.9 cm                                    LA Vol BP A/L       27.4 ml                                    LA Vol BP A/L index 15.7 ml/m                                       Right Ventricle:                                    Right Atrium:                                    RA,s major, A4C  2.9 cm                                    RA,s minor, A4C  3.1 cm    LV SYSTOLIC FUNCTION BY 2D PLANIMETRY (MOD):  EF-A4C View: 49.8 % EF-A2C View: 50.1 % EF-Biplane: 52.1 %    Aortic Valve: AoV Max Adithya: 1.23 m/s AoV Peak P.0 mmHg AoV Mean PG: 3.8 mmHg    LVOT Vmax:   1.15 m/s LVOT VTI:       0.169 m  LVOT Diam: 2.10 cm  LVOT PK grad 5 mmHg   LVOT mean grad: 3.0 mmHg    AoV Area, Vmax: 3.22 cm  AoV Area, VTI: 2.99 cm  AoV Area, Vmn: 2.97 cm     Tricuspid Valve and PA/RV Systolic Pressure: TR Max Velocity: 1.98 m/s  RA Pressure:         RVSP/PASP:  TR PK Grad       15.7 mmHg IVC diameter 1.70 cm RVOT diam, s        HOLTER MONITOR 3/20/2017  IMPRESSION:  Twenty-four hours of recordings were produced.    Rhythm was sinus throughout the recording with average heart rate 78 beats per minute.  Minimum heart rate was 50 and maximal heart rate was 121 beats per minute.    Ventricular ectopic activity consisted out of 10,064 beats (10.3% of the total).  Twenty beats were in couplets, the rest were single VEs.  There was no ventricular tachycardia.    Supraventricular ectopic activity consisted out of 12,500 beats (12.8%  of total), of which three were in one run (three beats at 111 beats per minute), 38 were in couplets and the other were all single PACs.  There was no other significant arrhythmia.    During symptoms of lightheadedness, hot flash, sweating and headache, the rhythm was sinus with single VEs and PACs.  During patient events, the rhythm was sinus with single VEs and PACs.          Assessment and Plan:   Ms. Marroquin is a 57 year old female with a PMHx of POTS, GERD, RVOT RFA c/b CHB s/p PPM, pericarditis who presents for follow up.     Patient has been having symptoms of fatigue over the past few months with reduced exercise tolerance. Her ECHO showed EF around 45-50%. However, looking at previous ECHO's dating back from April 2017, this appears to be relatively unchanged since her PPM implantation. Pacemaker was interrogated and showed no abnormalities with normal heart rates. Therefore, it is less likely that her symptoms are related to a cardiac etiology. However, given the symptoms, will work up medical causes for her symptoms of fatigue and reduced exercise tolerance.     Plan:  1) Will order CBC, CMP, TSH, T3, PTH (patient reports a previous parathyroid problem), ESR/CRP (history of pericarditis).   2) Will repeat ECHO in 1 year or sooner if symptoms are not improving.  3) Continue CPAP as patient has newly diagnosed sleep apnea   4) Regarding exercise limitations with pacemaker, she should be able to do any maneuver that does not put excessive pressure on her left shoulder.       Ki Chopra PGY-4   Cardiology Fellow   Pager: [phone]    I very much appreciated the opportunity to see and assess Mrs Joyce Marroquin in the clinic with CV Fellow Dr Chopra . I spent 30 min with Ms Marroquin of which more than 50% dealt with diagnosis,  treatment and management issues I agree with the note above which summarizes my findings and current recs,    Please do not hesitate to contact my office if you have any questions or concerns.       Lino Funes MD  Cardiac Arrhythmia Service  [facility]  [phone]

## 2017-11-09 NOTE — PROGRESS NOTES
HPI:   Ms. Marroquin is a 57 year old female with a PMHx of POTS, GERD, RVOT RFA c/b CHB s/p PPM, pericarditis who presents for follow up.     Patient started noticing fatigue over the past few months. Patient states this is different than POTS or PVC symptoms. These symptoms can occur with rest or exertion. She has noted that her exercise tolerance has gone down (30 minutes on the treadmill, compared to 40 minutes before). Patient denies chest pain when she works out but notices that she is dizzy. Denies orthopnea or PND. Patient does note lower extremity swelling to the ankle.     Even though she does not have chest pain with exertion, she does note intermittent chest pain throughout the day. The chest pain is atypical in nature and not worse with inspiration or coughing. She states she is have chest pain and rates it at a 5. However, she does not appear in distress.     Patient did report one episode of LH/dizziness when shopping. However, she did not have a passing out episode.     Because of these symptoms, she had an ECHO which showed EF of 40-45%. She had an ECHO in 05/2017 which showed EF around 50%. Another ECHO (right after PPM implantation in 04/2017) showed EF around 40-45%. However, MRI in early 04/2017 showed normal EF 60-65%. There was an area of basal inferior hypokinesis with possible LGE. However, stress test in 03/2017 showed no areas on inducible ischemia.      Patient had sleep study and started on CPAP machine. Her symptoms have possibly gotten better since starting; however, it is difficult to tell.     She has some other issues with her pacemaker device. She first reports that is too close to her collarbone. Second, she is worried about stretching and performing yoga maneuvers with the pacemaker in place.   _____________________________________________________________________________  PAST MEDICAL HISTORY:  Past Medical History:   Diagnosis Date     Chronic depressive personality disorder       Esophageal reflux        CURRENT MEDICATIONS:  Current Outpatient Prescriptions   Medication Sig Dispense Refill     MAGNESIUM LACTATE PO Take 180 mg by mouth daily       TURMERIC PO Take by mouth daily       Menaquinone-7 (VITAMIN K2 PO)        nebulizer nebulization as needed       Ferrous Sulfate (IRON SUPPLEMENT PO) Take 325 mg by mouth daily       LEVOTHYROXINE SODIUM PO Take 137 mcg by mouth daily       MELATONIN PO Take 1 mg by mouth nightly as needed       albuterol (PROAIR HFA/PROVENTIL HFA/VENTOLIN HFA) 108 (90 BASE) MCG/ACT Inhaler Inhale 2 puffs into the lungs every 4 hours as needed for shortness of breath / dyspnea or wheezing       DiphenhydrAMINE HCl (BENADRYL PO) Take 25-50 mg by mouth nightly as needed       Cholecalciferol (VITAMIN D3 PO) Take 5,000 Units by mouth daily       Acetaminophen (TYLENOL PO) Take 500-1,000 mg by mouth daily as needed        Multiple Vitamin (DAILY MULTIVITAMIN PO) Take 1 tablet by mouth daily        phytonadione (MEPHYTON/ VIT K) suspension        fluticasone (FLOVENT HFA) 110 MCG/ACT Inhaler Inhale 2 puffs into the lungs 2 times daily       Probiotic Product (PROBIOTIC DAILY PO) Take 1 capsule by mouth 2 times daily          PAST SURGICAL HISTORY:  Past Surgical History:   Procedure Laterality Date     EP ABLATION / EP STUDIES  04/21/2017     HC CORRECT BUNION,SIMPLE       PPM INSERT OF NEW OR REPL W/VENT LEAD  04/21/2017       ALLERGIES:     Allergies   Allergen Reactions     Flagyl [Metronidazole] Rash     Atenolol Other (See Comments)     Had extreme fatigue, dizziness, and overall felt unwell.      Bactrim [Sulfamethoxazole W/Trimethoprim]      Corn Oil      Lorazepam      Oxytetracycline      Sulfamethoxazole-Trimethoprim      Other reaction(s): Other  Passed out     Tetracycline      Zofran [Ondansetron]      Prolonged QT  Prolonged QT at baseline per patient     Benzodiazepines Other (See Comments)     Other reaction(s): Other  Extreme heat intolerance  Extreme  "heat intolerance     Cyclobenzaprine Other (See Comments)     Extreme heat intolerance     Levaquin [Levofloxacin]      Other reaction(s): Other  Tendon rupture     Nsaids Other (See Comments)     Exacerbated asthma       FAMILY HISTORY:          SOCIAL HISTORY:  Social History   Substance Use Topics     Smoking status: Former Smoker     Smokeless tobacco: Never Used     Alcohol use Yes      Comment: rarely       ROS:   Constitutional: No fever, chills, or sweats. Weight stable.   ENT: No visual disturbance, ear ache, epistaxis, sore throat.   Cardiovascular: As per HPI.   Respiratory: No cough, hemoptysis.    GI: No nausea, vomiting, hematemesis, melena, or hematochezia.   : No hematuria.   Integument: Negative.   Psychiatric: Negative.   Hematologic:  Easy bruising, no easy bleeding.  Neuro: Negative.   Endocrinology: No significant heat or cold intolerance   Musculoskeletal: No myalgia.    Exam:  /77 (BP Location: Right arm, Patient Position: Chair, Cuff Size: Adult Regular)  Pulse 64  Ht 1.651 m (5' 5\")  Wt 74.6 kg (164 lb 6.4 oz)  LMP 08/21/2007  SpO2 95%  BMI 27.36 kg/m2  General: No acute distress   HEENT: NC/AT   CV: RRR, +S1/S2, No murmurs, rubs, gallops. JVP is not elevated.    Pulm: Unlabored breathing, CTAB   GI: s/nt/nd   Ext: No edema   Neuro: No focal defects   Psych: Normal Affect    Labs:  CBC RESULTS:   Lab Results   Component Value Date    WBC 6.0 06/28/2017    RBC 4.48 06/28/2017    HGB 14.2 06/28/2017    HCT 42.8 06/28/2017    MCV 96 06/28/2017    MCH 31.7 06/28/2017    MCHC 33.2 06/28/2017    RDW 12.6 06/28/2017     06/28/2017       BMP RESULTS:  Lab Results   Component Value Date     06/28/2017    POTASSIUM 4.2 06/28/2017    CHLORIDE 109 06/28/2017    CO2 24 06/28/2017    ANIONGAP 5 06/28/2017    GLC 82 06/28/2017    BUN 13 06/28/2017    CR 0.67 06/28/2017    GFRESTIMATED >90  Non  GFR Calc   06/28/2017    GFRESTBLACK >90   GFR Calc   " 06/28/2017    MANJU 9.9 06/28/2017        INR RESULTS:  Lab Results   Component Value Date    INR 0.95 06/28/2017       Procedures:    Echocardiogram: 10/20/2017:     Interpretation Summary  Mildly (EF 40-45%) reduced left ventricular function is present.  Global right ventricular function is normal.  Right ventricular systolic pressure is 25mmHg above the right atrial pressure.  A pacemaker lead is noted in the right ventricle.  No pericardial effusion is present.  The inferior vena cava was normal in size with preserved respiratory  variability.  Estimated mean right atrial pressure is 3 mmHg.  Previous study not available for comparison.  _____________________________________________________________________________  __        Left Ventricle  Left ventricular wall thickness is normal. Left ventricular size is normal.  The Ejection Fraction was calculated using Bi-plane contrast. Mildly (EF 40-  45%) reduced left ventricular function is present. Normal left ventricular  filling for age. Biplane traced at 42%. Mild diffuse hypokinesis is present.  Abnormal septal motion consistent with pacemaker is present.     Right Ventricle  The right ventricle is normal size. Global right ventricular function is  normal. A pacemaker lead is noted in the right ventricle.     Atria  Both atria appear normal. The atrial septum is intact as assessed by color  Doppler .        Mitral Valve  Trace mitral insufficiency is present.     Aortic Valve  Aortic valve is normal in structure and function. The aortic valve is  tricuspid.     Tricuspid Valve  The tricuspid valve is normal. Trace tricuspid insufficiency is present. Right  ventricular systolic pressure is 25mmHg above the right atrial pressure.     Pulmonic Valve  The pulmonic valve is normal.     Vessels  The aorta root is normal. The pulmonary artery is normal. The inferior vena  cava was normal in size with preserved respiratory variability. Estimated mean  right atrial pressure  is 3 mmHg.        Pericardium  No pericardial effusion is present.     Compared to Previous Study  Previous study not available for comparison.     Attestation  I have personally viewed the imaging and agree with the interpretation and  report as documented by the fellow, Reuben Holley, and/or edited by me.  _____________________________________________________________________________  __     MMode/2D Measurements & Calculations  IVSd: 0.98 cm  LVIDd: 4.8 cm  LVIDs: 3.7 cm  LVPWd: 0.69 cm  FS: 23.1 %  EDV(Teich): 105.7 ml  ESV(Teich): 56.8 ml  LV mass(C)d: 131.8 grams  LV mass(C)dI: 72.9 grams/m2  Ao root diam: 2.8 cm  asc Aorta Diam: 2.9 cm  LVOT diam: 2.1 cm  LVOT area: 3.6 cm2  LA Volume (BP): 49.6 ml     LA Volume Index (BP): 27.4 ml/m2        Doppler Measurements & Calculations  MV E max veronique: 67.1 cm/sec  MV A max veronique: 97.8 cm/sec  MV E/A: 0.69  MV dec time: 0.14 sec  TR max veronique: 249.8 cm/sec  TR max P.0 mmHg  Lateral E/e': 13.0  Med E to E': 10.5  Medial E/e': 10.5      ECHO 2017  Summary:   1. LV function is low normal. The visually estimated ejection fraction is 50%. LVEF difficult to assess due to conduction delay and trigeminal rhythm.   2. Septal motion consistent with conduction delay/paced rhythm.   3. No evidence of pulmonary hypertension.   4. No hemodynamically significant valvular disease.   5. No pericardial effusion.   6. Compared to prior study (3/28/2017); there's significant change: pacer is new; conduction delay vs paced rhythm with septal wall motion abnormality due to this, also new.    _____________________________________________________________________  PHYSICIAN INTERPRETATION: Left Ventricle: The left ventricular size is normal. LV function is low normal. The visually estimated ejection fraction is 50%. LV septal wall thickness is normal. LV posterior wall thickness is normal. Paradoxical septal motion, consistent with conduction delay. LV diastolic function is indeterminant  due to rhythm.  Left Atrium: The left atrium is normal in size.  Right Ventricle: The right ventricular size is normal. Global RV systolic function is normal.  Right Atrium: The right atrium is normal in size.  Mitral Valve: Nonspecifically thickened and calcified mitral valve leaflets. Mitral leaflet mobility is normal. Trace mitral valve regurgitation.  Aortic Valve: The aortic valve was not well visualized. There is no evidence of significant aortic stenosis. The peak and mean gradients are 6.0 mmHg and 3.8 mmHg, respectively. No evidence of significant aortic valve regurgitation.  Tricuspid Valve: The tricuspid valve structure is nonspecifically thickened. Trace tricuspid regurgitation. There is no evidence of pulmonary hypertension with an estimated right ventricular systolic pressure (RVSP) of 18.68.  Pulmonic Valve: The pulmonic valve was not well visualized. No significant pulmonic valve regurgitation.  Pericardium: No pericardial effusion.  Aorta: The aorta was not well visualized.  The ascending aorta was not well visualized.  Venous: The inferior vena cava measures 1.70 cm and collapses normally with respiration.  Additional Comments: Cardiac rhythm is indeterminant. Image quality for this study is adequate. A pacer wire is visualized in the right atrium and right ventricle.  In comparison to the previous echocardiogram(s): Prior examinations are available and were reviewed for comparison purposes. A prior study was performed on 3/28/2017. Compared to prior study (3/28/2017); there's significant change: pacer is new; conduction delay vs paced rhythm with septal wall motion abnormality due to this, also new.  2D AND M-MODE MEASUREMENTS:  Left Ventricle:         Normal    Left Atrium:                Normal  IVSd (2D):      0.78 cm (0.7-1.1) LA Major diam,s, A2c 3.9 cm  LVPWd (2D):     0.94 cm (0.7-1.1)  LVIDd (2D):     4.37 cm (3.4-5.7) LA Volume A/L:                 LA Major:  LVIDs (2D):     3.47  cm           LA Vol A4C A/L      21.1 ml    A4C,s,:  LV FS (2D):     20.6 %  (>25%)    LA Vol A2C A/L      28.1 ml    A2C,s,:3.9 cm                                    LA Vol BP A/L       27.4 ml                                    LA Vol BP A/L index 15.7 ml/m                                       Right Ventricle:                                    Right Atrium:                                    RA,s major, A4C  2.9 cm                                    RA,s minor, A4C  3.1 cm    LV SYSTOLIC FUNCTION BY 2D PLANIMETRY (MOD):  EF-A4C View: 49.8 % EF-A2C View: 50.1 % EF-Biplane: 52.1 %    Aortic Valve: AoV Max Adithya: 1.23 m/s AoV Peak P.0 mmHg AoV Mean PG: 3.8 mmHg    LVOT Vmax:   1.15 m/s LVOT VTI:       0.169 m  LVOT Diam: 2.10 cm  LVOT PK grad 5 mmHg   LVOT mean grad: 3.0 mmHg    AoV Area, Vmax: 3.22 cm  AoV Area, VTI: 2.99 cm  AoV Area, Vmn: 2.97 cm     Tricuspid Valve and PA/RV Systolic Pressure: TR Max Velocity: 1.98 m/s  RA Pressure:         RVSP/PASP:  TR PK Grad       15.7 mmHg IVC diameter 1.70 cm RVOT diam, s        HOLTER MONITOR 3/20/2017  IMPRESSION:  Twenty-four hours of recordings were produced.    Rhythm was sinus throughout the recording with average heart rate 78 beats per minute.  Minimum heart rate was 50 and maximal heart rate was 121 beats per minute.    Ventricular ectopic activity consisted out of 10,064 beats (10.3% of the total).  Twenty beats were in couplets, the rest were single VEs.  There was no ventricular tachycardia.    Supraventricular ectopic activity consisted out of 12,500 beats (12.8% of total), of which three were in one run (three beats at 111 beats per minute), 38 were in couplets and the other were all single PACs.  There was no other significant arrhythmia.    During symptoms of lightheadedness, hot flash, sweating and headache, the rhythm was sinus with single VEs and PACs.  During patient events, the rhythm was sinus with single VEs and PACs.          Assessment and  Plan:   Ms. Marroquin is a 57 year old female with a PMHx of POTS, GERD, RVOT RFA c/b CHB s/p PPM, pericarditis who presents for follow up.     Patient has been having symptoms of fatigue over the past few months with reduced exercise tolerance. Her ECHO showed EF around 45-50%. However, looking at previous ECHO's dating back from April 2017, this appears to be relatively unchanged since her PPM implantation. Pacemaker was interrogated and showed no abnormalities with normal heart rates. Therefore, it is less likely that her symptoms are related to a cardiac etiology. However, given the symptoms, will work up medical causes for her symptoms of fatigue and reduced exercise tolerance.     Plan:  1) Will order CBC, CMP, TSH, T3, PTH (patient reports a previous parathyroid problem), ESR/CRP (history of pericarditis).   2) Will repeat ECHO in 1 year or sooner if symptoms are not improving.  3) Continue CPAP as patient has newly diagnosed sleep apnea   4) Regarding exercise limitations with pacemaker, she should be able to do any maneuver that does not put excessive pressure on her left shoulder.       Ki Chopra PGY-4   Cardiology Fellow   Pager: [phone]    I very much appreciated the opportunity to see and assess Mrs Joyce Marroquin in the clinic with CV Fellow Dr Chopra . I spent 30 min with Ms Marroquin of which more than 50% dealt with diagnosis,  treatment and management issues I agree with the note above which summarizes my findings and current recs,    Please do not hesitate to contact my office if you have any questions or concerns.      Lino Funes MD  Cardiac Arrhythmia Service  [facility]  [phone]        CC

## 2017-11-09 NOTE — MR AVS SNAPSHOT
After Visit Summary   11/9/2017    Joyce Marroquin    MRN: [id number]           Patient Information     Date Of Birth          1960        Visit Information        Provider Department      11/9/2017 2:30 PM 1, Uc Cv Device [facility]        Today's Diagnoses     Complete atrioventricular block (H)    -  1       Follow-ups after your visit        Your next 10 appointments already scheduled     Nov 09, 2017  4:30 PM CST   Lab with UC LAB   [facility] ([facility])    [address] [place] [address]  [address] [facility] [id number] [phone]            May 10, 2018 11:30 AM CDT   (Arrive by 11:15 AM)   Pacemaker Check with Uc Cv Device 1   [facility] ([facility])    [address] [place] [address]  [address] [facility] [id number] [phone]            May 10, 2018 12:00 PM CDT   (Arrive by 11:45 AM)   RETURN ARRHYTHMIA with Lino Funes MD   [facility] ([facility])    [address] [place] [address]  [address] [facility] [id number] [phone]              Future tests that were ordered for you today     Open Future Orders        Priority Expected Expires Ordered    Parathyroid Hormone Intact Routine 11/9/2017 11/9/2018 11/9/2017    T4 free Routine 11/9/2017 11/9/2018 11/9/2017    TSH with free T4 reflex Routine 11/9/2017 12/29/2017 11/9/2017    Comprehensive metabolic panel Routine 11/9/2017 12/29/2017 11/9/2017    CRP inflammation Routine 11/9/2017 12/29/2017 11/9/2017    Erythrocyte sedimentation rate auto Routine 11/9/2017 12/29/2017 11/9/2017    T3 Free Routine 11/9/2017 12/29/2017 11/9/2017    CBC with platelets differential Routine 11/9/2017 12/29/2017 11/9/2017            Who to contact     If you have questions or need follow up information about today's clinic visit or your schedule please contact [facility] directly at [phone].  Normal or non-critical lab and imaging results  will be communicated to you by [employer], letter or phone within 4 business days after the clinic has received the results. If you do not hear from us within 7 days, please contact the clinic through [employer] or phone. If you have a critical or abnormal lab result, we will notify you by phone as soon as possible.  Submit refill requests through [employer] or call your pharmacy and they will forward the refill request to us. Please allow 3 business days for your refill to be completed.          Additional Information About Your Visit        [employer] Information     [employer] gives you secure access to your electronic health record. If you see a primary care provider, you can also send messages to your care team and make appointments. If you have questions, please call your primary care clinic.  If you do not have a primary care provider, please call [phone] and they will assist you.        Care EveryWhere ID     This is your Care EveryWhere ID. This could be used by other organizations to access your [facility] medical records  [id number]        Your Vitals Were     Last Period                   08/21/2007            Blood Pressure from Last 3 Encounters:   11/09/17 128/77   09/07/17 120/73   06/29/17 116/73    Weight from Last 3 Encounters:   11/09/17 74.6 kg (164 lb 6.4 oz)   09/07/17 73.6 kg (162 lb 3.2 oz)   07/10/17 71.6 kg (157 lb 12.8 oz)              Today, you had the following     No orders found for display       Primary Care Provider Office Phone # Fax #    Hai Santosmerly [phone]       [facility] [address]   [place] [address]        Equal Access to Services     [facility]: Hadii aad ku hadasho Soomaali, waaxda luqadaha, qaybta kaalmada adeegyada, micah delcid. So [facility] [phone].    ATENCIÓN: Si habla español, tiene a hankins disposición servicios gratuitos de asistencia lingüística. Llame al [phone].    We comply with applicable  federal civil rights laws and [place] laws. We do not discriminate on the basis of race, color, national origin, age, disability, sex, sexual orientation, or gender identity.            Thank you!     Thank you for choosing [facility]  for your care. Our goal is always to provide you with excellent care. Hearing back from our patients is one way we can continue to improve our services. Please take a few minutes to complete the written survey that you may receive in the mail after your visit with us. Thank you!             Your Updated Medication List - Protect others around you: Learn how to safely use, store and throw away your medicines at www.disposemymeds.org.          This list is accurate as of: 11/9/17  4:16 PM.  Always use your most recent med list.                   Brand Name Dispense Instructions for use Diagnosis    albuterol 108 (90 BASE) MCG/ACT Inhaler    PROAIR HFA/PROVENTIL HFA/VENTOLIN HFA     Inhale 2 puffs into the lungs every 4 hours as needed for shortness of breath / dyspnea or wheezing        BENADRYL PO      Take 25-50 mg by mouth nightly as needed        DAILY MULTIVITAMIN PO      Take 1 tablet by mouth daily        fluticasone 110 MCG/ACT Inhaler    FLOVENT HFA     Inhale 2 puffs into the lungs 2 times daily        IRON SUPPLEMENT PO      Take 325 mg by mouth daily        LEVOTHYROXINE SODIUM PO      Take 137 mcg by mouth daily        MAGNESIUM LACTATE PO      Take 180 mg by mouth daily    Fatigue       MELATONIN PO      Take 1 mg by mouth nightly as needed        nebulizer nebulization      as needed    H/O pericarditis       phytonadione 0.5 MG/ML suspension    MEPHYTON/ VIT K          PROBIOTIC DAILY PO      Take 1 capsule by mouth 2 times daily        TURMERIC PO      Take by mouth daily    Fatigue       TYLENOL PO      Take 500-1,000 mg by mouth daily as needed        VITAMIN D3 PO      Take 5,000 Units by mouth daily        VITAMIN K2 PO       H/O pericarditis

## 2017-11-11 NOTE — PROGRESS NOTES
Patient seen in clinic for evaluation and iterative programming of her Medtronic dual lead pacemaker per MD orders.  Patient is scheduled to see Dr. Funes today.  Normal pacemaker function.  No episodes recorded.  Intrinsic rhythm = NSR with CHB.  AP = 5.1%.   = 99.9%.  Estimated battery longevity to KONRAD = 8 years.  Patient reports that she is feeling not feeling the best today.  Patient reports that she has some mild chest pressure, SOB, dizzy and nausea which started today.  Patient also c/o increase in fatigue and decrease in exercise tolerance.  Dr. Funes aware of patient symptoms.  LRL increased from 50 to 60 bpm.  Plan for patient to send a remote transmission in 3 months and return to clinic in 6 months.    Dual lead pacemaker

## 2017-11-11 NOTE — PATIENT INSTRUCTIONS
It was a pleasure to see you in clinic today.  Please do not hesitate to call with any questions or concerns.  You are scheduled for a remote transmission on 2/13/18.  We look forward to seeing you in clinic at your next device check in 6 months.    Jeanna Nixon, RN, MS, CCRN  Electrophysiology Nurse Clinician  [facility]    During Business Hours Please Call:  [phone]  After Hours Please Call:  [phone] - select option #4 and ask for job code [phone]

## 2017-11-14 ENCOUNTER — TELEPHONE (OUTPATIENT)
Dept: ENDOCRINOLOGY | Facility: CLINIC | Age: 57
End: 2017-11-14

## 2017-11-14 NOTE — TELEPHONE ENCOUNTER
----- Message from Pallavi Duarte MD sent at 11/14/2017  9:56 AM CST -----  Regarding: RE: abnormal PTHI   Her calcium is normal, so I do not think this PTH is a problem.  May have vitamin D deficiency. She can see her PCP for this  ----- Message -----     From: Jeanna Parker RN     Sent: 11/13/2017   2:19 PM       To: Pallavi Duarte MD  Subject: abnormal PTHI                                    Last seen 2 years ago calling with  Elevated PTH . Labs done  11/9  Emotional, wanting ASAP response  . My chart message was sent to you today. She was instructed of this  but has called back several times  now attempting to schedule  with first available provider this week.

## 2017-11-14 NOTE — TELEPHONE ENCOUNTER
I left a message for Joyce  that Dr Duarte viewed her lab results.  The PTH  is high but her Calcium is not so it  can be managed by her PCP for possible Vitamin D deficiency . She should schedule f/u wit her PCP  to discuss  .

## 2017-11-16 ENCOUNTER — CARE COORDINATION (OUTPATIENT)
Dept: CARDIOLOGY | Facility: CLINIC | Age: 57
End: 2017-11-16

## 2017-11-16 NOTE — PROGRESS NOTES
See my chart messages, reviewed with Dr Funes.     Date: 11/16/2017    Time of Call: 3:45 PM     Diagnosis:  Pain, discomfort in pacemaker pocket     [ TORB ] Ordering provider: Dr Funes   Order:   PPM pocket revision     Order received by: Fiorella Wright rn      Follow-up/additional notes:  My chart message sent to pt.   Will await response, orders placed.

## 2017-11-17 ENCOUNTER — OFFICE VISIT (OUTPATIENT)
Dept: ENDOCRINOLOGY | Facility: CLINIC | Age: 57
End: 2017-11-17

## 2017-11-17 VITALS
DIASTOLIC BLOOD PRESSURE: 71 MMHG | HEART RATE: 96 BPM | SYSTOLIC BLOOD PRESSURE: 108 MMHG | BODY MASS INDEX: 27.71 KG/M2 | WEIGHT: 166.3 LBS | HEIGHT: 65 IN

## 2017-11-17 DIAGNOSIS — E21.3 HYPERPARATHYROIDISM (H): Primary | ICD-10-CM

## 2017-11-17 ASSESSMENT — PAIN SCALES - GENERAL: PAINLEVEL: NO PAIN (0)

## 2017-11-17 NOTE — PROGRESS NOTES
Endocrinology Note         Joyce is a 57 year old female presents today for fatigue    HPI  Joyce Marroquin is 57 years old with hx of depression, hypothyroidism secondary to Graves's disease, POTS, GERD, RVOT RFA, pericarditis   Past Medical History  Past Medical History:   Diagnosis Date     Chronic depressive personality disorder      Esophageal reflux        Allergies  Allergies   Allergen Reactions     Flagyl [Metronidazole] Rash     Atenolol Other (See Comments)     Had extreme fatigue, dizziness, and overall felt unwell.      Bactrim [Sulfamethoxazole W/Trimethoprim]      Corn Oil      Lorazepam      Oxytetracycline      Sulfamethoxazole-Trimethoprim      Other reaction(s): Other  Passed out     Tetracycline      Zofran [Ondansetron]      Prolonged QT  Prolonged QT at baseline per patient     Benzodiazepines Other (See Comments)     Other reaction(s): Other  Extreme heat intolerance  Extreme heat intolerance     Cyclobenzaprine Other (See Comments)     Extreme heat intolerance     Levaquin [Levofloxacin]      Other reaction(s): Other  Tendon rupture     Nsaids Other (See Comments)     Exacerbated asthma     Medications  Current Outpatient Prescriptions   Medication Sig Dispense Refill     MAGNESIUM LACTATE PO Take 180 mg by mouth daily       TURMERIC PO Take by mouth daily       Menaquinone-7 (VITAMIN K2 PO)        nebulizer nebulization as needed       phytonadione (MEPHYTON/ VIT K) suspension        Ferrous Sulfate (IRON SUPPLEMENT PO) Take 325 mg by mouth daily       LEVOTHYROXINE SODIUM PO Take 137 mcg by mouth daily       MELATONIN PO Take 1 mg by mouth nightly as needed       albuterol (PROAIR HFA/PROVENTIL HFA/VENTOLIN HFA) 108 (90 BASE) MCG/ACT Inhaler Inhale 2 puffs into the lungs every 4 hours as needed for shortness of breath / dyspnea or wheezing       fluticasone (FLOVENT HFA) 110 MCG/ACT Inhaler Inhale 2 puffs into the lungs 2 times daily       DiphenhydrAMINE HCl (BENADRYL PO) Take 25-50 mg by  "mouth nightly as needed       Cholecalciferol (VITAMIN D3 PO) Take 5,000 Units by mouth daily       Probiotic Product (PROBIOTIC DAILY PO) Take 1 capsule by mouth 2 times daily        Acetaminophen (TYLENOL PO) Take 500-1,000 mg by mouth daily as needed        Multiple Vitamin (DAILY MULTIVITAMIN PO) Take 1 tablet by mouth daily        Family History  family history is not on file.  Social History  Social History   Substance Use Topics     Smoking status: Former Smoker     Smokeless tobacco: Never Used     Alcohol use Yes      Comment: rarely       ROS  Constitutional: no weight change, good energy  Eyes: no vision change, diplopia or red eyes   Neck: no difficulty swallowing, no choking, no neck pain, no neck swelling  Cardiovascular: no chest pain, palpitations  Respiratory: no dyspnea, cough, shortness of breath or wheezing   GI: no nausea, vomiting, diarrhea or constipation, no abdominal pain   : no change in urine, no dysuria or hematuria  Musculoskeletal: no joint or muscle pain or swelling   Integumentary: no concerning lesions or moles   Neuro: no loss of strength or sensation, no numbness or tingling, no tremor, no dizziness, no headache   Endo: no polyuria or polydipsia, no temperature intolerance   Heme/Lymph: no concerning bumps, no bleeding problems   Allergy: no environmental allergies   Psych: no depression or anxiety, no sleep problems.    Physical Exam  /71  Pulse 96  Ht 1.651 m (5' 5\")  Wt 75.4 kg (166 lb 4.8 oz)  LMP 08/21/2007  BMI 27.67 kg/m2  Body mass index is 27.67 kg/(m^2).  Constitutional: no distress, comfortable, pleasant   Eyes: anicteric, normal extra-ocular movements, no lid lag or retraction  Neck: no thyromegaly, no discrete nodule  Cardiovascular: regular rate and rhythm, normal S1 and S2, no murmurs  Respiratory: clear to auscultation, no wheezes or crackles, normal breath sounds   Gastrointestinal:  nontender, no hepatosplenomegaly, no masses   Musculoskeletal: no " edema   Skin: no concerning lesions, no jaundice   Neurological: cranial nerves intact, normal strength and sensation, reflexes at patella and biceps normal, normal gait, no tremor on outstretched hands bilaterally  Psychological: appropriate mood   Lymphatic: no cervical  lymphadenopathy.      RESULTS     ENDO CALCIUM LABS-UMP Latest Ref Rng & Units 11/9/2017   CALCIUM 8.5 - 10.1 mg/dL 9.5   PHOSPHOROUS 2.5 - 4.5 mg/dL    MAGNESIUM 1.6 - 2.3 mg/dL    ALBUMIN 3.4 - 5.0 g/dL 3.7   BUN 7 - 30 mg/dL 14   CREATININE 0.52 - 1.04 mg/dL 0.68   PARATHYROID HORMONE INTACT 12 - 72 pg/mL 131 (H)   ALKPHOS 40 - 150 U/L 101     ENDO CALCIUM LABS-UMP Latest Ref Rng & Units 8/27/2015   25 OH VIT D2 ug/L <5   25 OH VIT D3 ug/L 69   25 OH VIT D TOTAL 20 - 75 ug/L <74 . . .     ASSESSMENT:    ***    PLAN:   ***    Garland Patel MD     Division of Diabetes and Endocrinology  Department of Medicine  [phone]

## 2017-11-17 NOTE — PATIENT INSTRUCTIONS
- lab in 3 months   - bone density today or at your convenience  - keep yourself hydrated    If you have any questions, please do not hesitate to call [phone] and ask for Endocrinology clinic.      After clinic hours, please contact [phone] and ask for Endocrinologist-on call    Sincerely,    Garland Patel MD  Endocrinology

## 2017-11-17 NOTE — MR AVS SNAPSHOT
After Visit Summary   11/17/2017    Joyce Marroquin    MRN: [id number]           Patient Information     Date Of Birth          1960        Visit Information        Provider Department      11/17/2017 3:15 PM Garland Patel MD [facility] Endocrinology        Today's Diagnoses     Hyperparathyroidism (H)    -  1      Care Instructions    - lab in 3 months   - bone density today or at your convenience  - keep yourself hydrated    If you have any questions, please do not hesitate to call [phone] and ask for Endocrinology clinic.      After clinic hours, please contact [phone] and ask for Endocrinologist-on call    Sincerely,    Garland Patel MD  Endocrinology            Follow-ups after your visit        Your next 10 appointments already scheduled     May 10, 2018 11:30 AM CDT   (Arrive by 11:15 AM)   Pacemaker Check with Uc Cv Device 1   [facility] ([facility])    [address] [place] [id number] [phone]            May 10, 2018 12:00 PM CDT   (Arrive by 11:45 AM)   RETURN ARRHYTHMIA with Lino Funes MD   [facility] ([facility])    [address] [place] [address] [phone]              Who to contact     Please call your clinic at [phone] to:    Ask questions about your health    Make or cancel appointments    Discuss your medicines    Learn about your test results    Speak to your doctor   If you have compliments or concerns about an experience at your clinic, or if you wish to file a complaint, please contact [facility] Patient Relations at [phone] or email us at [email or web address]         Additional Information About Your Visit        MyChart Information     [employer] gives you secure access to your electronic health record. If you see a primary care provider, you can also send messages  "to your care team and make appointments. If you have questions, please call your primary care clinic.  If you do not have a primary care provider, please call [phone] and they will assist you.      [employer] is an electronic gateway that provides easy, online access to your medical records. With [employer], you can request a clinic appointment, read your test results, renew a prescription or communicate with your care team.     To access your existing account, please contact your [facility] or call [phone] for assistance.        Care EveryWhere ID     This is your Care EveryWhere ID. This could be used by other organizations to access your [facility] medical records  [id number]        Your Vitals Were     Pulse Height Last Period BMI (Body Mass Index)          96 1.651 m (5' 5\") 08/21/2007 27.67 kg/m2         Blood Pressure from Last 3 Encounters:   11/17/17 108/71   11/09/17 128/77   09/07/17 120/73    Weight from Last 3 Encounters:   11/17/17 75.4 kg (166 lb 4.8 oz)   11/09/17 74.6 kg (164 lb 6.4 oz)   09/07/17 73.6 kg (162 lb 3.2 oz)               Primary Care Provider Office Phone # Fax #    Hai Renteria [phone]       [facility] [place] [address]   [place] [address]        Equal Access to Services     [facility] AH: Hadii aad ku hadasho Soomaali, waaxda luqadaha, qaybta kaalmada adeegyada, micah idiin haysukh cho . So [facility] [phone].    ATENCIÓN: Si habla español, tiene a hankins disposición servicios gratuitos de asistencia lingüística. Llame al [phone].    We comply with applicable federal civil rights laws and [place] laws. We do not discriminate on the basis of race, color, national origin, age, disability, sex, sexual orientation, or gender identity.            Thank you!     Thank you for choosing [facility]  for your care. Our goal is always to provide you with excellent care. Hearing back from our " patients is one way we can continue to improve our services. Please take a few minutes to complete the written survey that you may receive in the mail after your visit with us. Thank you!             Your Updated Medication List - Protect others around you: Learn how to safely use, store and throw away your medicines at www.disposemymeds.org.          This list is accurate as of: 11/17/17 11:59 PM.  Always use your most recent med list.                   Brand Name Dispense Instructions for use Diagnosis    albuterol 108 (90 BASE) MCG/ACT Inhaler    PROAIR HFA/PROVENTIL HFA/VENTOLIN HFA     Inhale 2 puffs into the lungs every 4 hours as needed for shortness of breath / dyspnea or wheezing        BENADRYL PO      Take 25-50 mg by mouth nightly as needed        DAILY MULTIVITAMIN PO      Take 1 tablet by mouth daily        fluticasone 110 MCG/ACT Inhaler    FLOVENT HFA     Inhale 2 puffs into the lungs 2 times daily        IRON SUPPLEMENT PO      Take 325 mg by mouth daily        LEVOTHYROXINE SODIUM PO      Take 137 mcg by mouth daily        MAGNESIUM LACTATE PO      Take 180 mg by mouth daily    Fatigue, unspecified type       MELATONIN PO      Take 1 mg by mouth nightly as needed        nebulizer nebulization      as needed    H/O pericarditis       phytonadione 0.5 MG/ML suspension    MEPHYTON/ VIT K          PROBIOTIC DAILY PO      Take 1 capsule by mouth 2 times daily        TURMERIC PO      Take by mouth daily    Fatigue, unspecified type       TYLENOL PO      Take 500-1,000 mg by mouth daily as needed        VITAMIN D3 PO      Take 5,000 Units by mouth daily        VITAMIN K2 PO       H/O pericarditis

## 2017-11-17 NOTE — LETTER
11/17/2017       RE: Joyce Marroquin  [id number] [facility] [address]     Dear Colleague,    Thank you for referring your patient, Joyce Marroquin, to the [facility] ENDOCRINOLOGY at [facility]. Please see a copy of my visit note below.         Endocrinology Note         Joyce is a 57 year old female presents today for fatigue    HPI  Joyce Marroquin is 57 years old with hx of depression, hypothyroidism secondary to I131 treatment for Graves's disease, POTS, GERD, RVOT RFA, pericarditis, collageneous colitis who is here for multiple symptoms and concerning about parathyroid. She was seen by  in 2015.     She complaints of multiple symptoms for 2 months of increasing fatigue, intermittent muscle weakness. She stated that she found if difficulty to climb the stair. She also reports diffuse lower abdominal pain. She feels depressed, has mood swing and crying easily for unclear reason. She stated that she does not feel good. She notices fullness in her neck. She does not drink milk or take calcium but takes 5000 IU daily of vitamin D. She has some yogurt and cheese. Her calcium was 10.0 and vitamin D was 38 on 11/13/2017.  PTH was 131 on 11/9/2017.     She walks about 30 minutes per day. She did have stress fracture in her leg when she was 20s. She has not had DXA scan. She reports her sister has had parathyroid problem.     She stated that her PCP has already ordered for parathyroid scan.     Past Medical History  Past Medical History:   Diagnosis Date     Chronic depressive personality disorder      Esophageal reflux        Allergies  Allergies   Allergen Reactions     Flagyl [Metronidazole] Rash     Atenolol Other (See Comments)     Had extreme fatigue, dizziness, and overall felt unwell.      Bactrim [Sulfamethoxazole W/Trimethoprim]      Corn Oil      Lorazepam      Oxytetracycline      Sulfamethoxazole-Trimethoprim      Other reaction(s): Other  Passed out      Tetracycline      Zofran [Ondansetron]      Prolonged QT  Prolonged QT at baseline per patient     Benzodiazepines Other (See Comments)     Other reaction(s): Other  Extreme heat intolerance  Extreme heat intolerance     Cyclobenzaprine Other (See Comments)     Extreme heat intolerance     Levaquin [Levofloxacin]      Other reaction(s): Other  Tendon rupture     Nsaids Other (See Comments)     Exacerbated asthma     Medications  Current Outpatient Prescriptions   Medication Sig Dispense Refill     MAGNESIUM LACTATE PO Take 180 mg by mouth daily       TURMERIC PO Take by mouth daily       Menaquinone-7 (VITAMIN K2 PO)        nebulizer nebulization as needed       phytonadione (MEPHYTON/ VIT K) suspension        Ferrous Sulfate (IRON SUPPLEMENT PO) Take 325 mg by mouth daily       LEVOTHYROXINE SODIUM PO Take 137 mcg by mouth daily       MELATONIN PO Take 1 mg by mouth nightly as needed       albuterol (PROAIR HFA/PROVENTIL HFA/VENTOLIN HFA) 108 (90 BASE) MCG/ACT Inhaler Inhale 2 puffs into the lungs every 4 hours as needed for shortness of breath / dyspnea or wheezing       fluticasone (FLOVENT HFA) 110 MCG/ACT Inhaler Inhale 2 puffs into the lungs 2 times daily       DiphenhydrAMINE HCl (BENADRYL PO) Take 25-50 mg by mouth nightly as needed       Cholecalciferol (VITAMIN D3 PO) Take 5,000 Units by mouth daily       Probiotic Product (PROBIOTIC DAILY PO) Take 1 capsule by mouth 2 times daily        Acetaminophen (TYLENOL PO) Take 500-1,000 mg by mouth daily as needed        Multiple Vitamin (DAILY MULTIVITAMIN PO) Take 1 tablet by mouth daily        Family History  family history is not on file.  Social History  Social History   Substance Use Topics     Smoking status: Former Smoker     Smokeless tobacco: Never Used     Alcohol use Yes      Comment: rarely       ROS  Constitutional: reports low energy  Eyes: no vision change, diplopia or red eyes   Neck: no difficulty swallowing, no choking, no neck pain, +fullness  "in her neck  Cardiovascular: no chest pain, palpitations  Respiratory: no dyspnea, cough, shortness of breath or wheezing   GI: no nausea, vomiting, diarrhea or constipation, +diffuse lower abdominal pain   : no change in urine, no dysuria or hematuria  Musculoskeletal: no joint or muscle pain or swelling. +muscle weakness  Integumentary: no concerning lesions   Neuro: no loss of strength or sensation, no numbness or tingling, no tremor, no dizziness, no headache   Endo: no polyuria or polydipsia, no temperature intolerance   Heme/Lymph: no concerning bumps, no bleeding problems   Allergy: no environmental allergies   Psych: +depression and anxiety, +mood swing no sleep problems.    Physical Exam  /71  Pulse 96  Ht 1.651 m (5' 5\")  Wt 75.4 kg (166 lb 4.8 oz)  LMP 08/21/2007  BMI 27.67 kg/m2  Body mass index is 27.67 kg/(m^2).  Constitutional: no distress, comfortable, pleasant   Eyes: anicteric, normal extra-ocular movements, no lid lag or retraction  Neck: no thyromegaly, no discrete nodule  Cardiovascular: regular rate and rhythm, normal S1 and S2, no murmurs  Respiratory: clear to auscultation, no wheezes or crackles, normal breath sounds   Gastrointestinal:  nontender, no hepatomegaly, no masses   Musculoskeletal: no edema   Skin: no concerning lesions, no jaundice   Neurological: cranial nerves intact, 2+ reflexes at patella, normal gait, no tremor on outstretched hands bilaterally  Psychological: appropriate mood   Lymphatic: no cervical  lymphadenopathy.      RESULTS     ENDO CALCIUM LABS-UMP Latest Ref Rng & Units 11/9/2017   CALCIUM 8.5 - 10.1 mg/dL 9.5   PHOSPHOROUS 2.5 - 4.5 mg/dL    MAGNESIUM 1.6 - 2.3 mg/dL    ALBUMIN 3.4 - 5.0 g/dL 3.7   BUN 7 - 30 mg/dL 14   CREATININE 0.52 - 1.04 mg/dL 0.68   PARATHYROID HORMONE INTACT 12 - 72 pg/mL 131 (H)   ALKPHOS 40 - 150 U/L 101     ENDO CALCIUM LABS-UMP Latest Ref Rng & Units 8/27/2015   25 OH VIT D2 ug/L <5   25 OH VIT D3 ug/L 69   25 OH VIT D " TOTAL 20 - 75 ug/L <74 . . .     ASSESSMENT:    Joyce Marroquin is 57 years old with hx of depression, hypothyroidism secondary to I131 treatment for Graves's disease, POTS, GERD, RVOT RFA, pericarditis, collageneous colitis who is here for multiple symptoms and concerning about parathyroid    1) mild hyperparathyroidism with normal calcium: she could have primary hyperparathyroidism but it is unlikely to explain her overall symptoms. Her calcium has never been high. Vitamin D is in acceptable range. Discussed diagnosis and criteria for surgery. I don't think she is a candidate for surgery at this time. She stated that she has already made appointment for parathyroid scan and ENT consultation. I told her that we can hold off for that.   - will check DXA scan to rule out osteoporosis.    PLAN:   - check vitamin D today  - DXA today  - will contact patient with result    Garland Patel MD     Division of Diabetes and Endocrinology  Department of Medicine  [phone]

## 2017-11-18 NOTE — PROGRESS NOTES
Endocrinology Note         Joyce is a 57 year old female presents today for fatigue    HPI  Joyce Marroquin is 57 years old with hx of depression, hypothyroidism secondary to I131 treatment for Graves's disease, POTS, GERD, RVOT RFA, pericarditis, collageneous colitis who is here for multiple symptoms and concerning about parathyroid. She was seen by  in 2015.     She complaints of multiple symptoms for 2 months of increasing fatigue, intermittent muscle weakness. She stated that she found if difficulty to climb the stair. She also reports diffuse lower abdominal pain. She feels depressed, has mood swing and crying easily for unclear reason. She stated that she does not feel good. She notices fullness in her neck. She does not drink milk or take calcium but takes 5000 IU daily of vitamin D. She has some yogurt and cheese. Her calcium was 10.0 and vitamin D was 38 on 11/13/2017.  PTH was 131 on 11/9/2017.     She walks about 30 minutes per day. She did have stress fracture in her leg when she was 20s. She has not had DXA scan. She reports her sister has had parathyroid problem.     She stated that her PCP has already ordered for parathyroid scan.     Past Medical History  Past Medical History:   Diagnosis Date     Chronic depressive personality disorder      Esophageal reflux        Allergies  Allergies   Allergen Reactions     Flagyl [Metronidazole] Rash     Atenolol Other (See Comments)     Had extreme fatigue, dizziness, and overall felt unwell.      Bactrim [Sulfamethoxazole W/Trimethoprim]      Corn Oil      Lorazepam      Oxytetracycline      Sulfamethoxazole-Trimethoprim      Other reaction(s): Other  Passed out     Tetracycline      Zofran [Ondansetron]      Prolonged QT  Prolonged QT at baseline per patient     Benzodiazepines Other (See Comments)     Other reaction(s): Other  Extreme heat intolerance  Extreme heat intolerance     Cyclobenzaprine Other (See Comments)     Extreme heat intolerance      Levaquin [Levofloxacin]      Other reaction(s): Other  Tendon rupture     Nsaids Other (See Comments)     Exacerbated asthma     Medications  Current Outpatient Prescriptions   Medication Sig Dispense Refill     MAGNESIUM LACTATE PO Take 180 mg by mouth daily       TURMERIC PO Take by mouth daily       Menaquinone-7 (VITAMIN K2 PO)        nebulizer nebulization as needed       phytonadione (MEPHYTON/ VIT K) suspension        Ferrous Sulfate (IRON SUPPLEMENT PO) Take 325 mg by mouth daily       LEVOTHYROXINE SODIUM PO Take 137 mcg by mouth daily       MELATONIN PO Take 1 mg by mouth nightly as needed       albuterol (PROAIR HFA/PROVENTIL HFA/VENTOLIN HFA) 108 (90 BASE) MCG/ACT Inhaler Inhale 2 puffs into the lungs every 4 hours as needed for shortness of breath / dyspnea or wheezing       fluticasone (FLOVENT HFA) 110 MCG/ACT Inhaler Inhale 2 puffs into the lungs 2 times daily       DiphenhydrAMINE HCl (BENADRYL PO) Take 25-50 mg by mouth nightly as needed       Cholecalciferol (VITAMIN D3 PO) Take 5,000 Units by mouth daily       Probiotic Product (PROBIOTIC DAILY PO) Take 1 capsule by mouth 2 times daily        Acetaminophen (TYLENOL PO) Take 500-1,000 mg by mouth daily as needed        Multiple Vitamin (DAILY MULTIVITAMIN PO) Take 1 tablet by mouth daily        Family History  family history is not on file.  Social History  Social History   Substance Use Topics     Smoking status: Former Smoker     Smokeless tobacco: Never Used     Alcohol use Yes      Comment: rarely       ROS  Constitutional: reports low energy  Eyes: no vision change, diplopia or red eyes   Neck: no difficulty swallowing, no choking, no neck pain, +fullness in her neck  Cardiovascular: no chest pain, palpitations  Respiratory: no dyspnea, cough, shortness of breath or wheezing   GI: no nausea, vomiting, diarrhea or constipation, +diffuse lower abdominal pain   : no change in urine, no dysuria or hematuria  Musculoskeletal: no joint or  "muscle pain or swelling. +muscle weakness  Integumentary: no concerning lesions   Neuro: no loss of strength or sensation, no numbness or tingling, no tremor, no dizziness, no headache   Endo: no polyuria or polydipsia, no temperature intolerance   Heme/Lymph: no concerning bumps, no bleeding problems   Allergy: no environmental allergies   Psych: +depression and anxiety, +mood swing no sleep problems.    Physical Exam  /71  Pulse 96  Ht 1.651 m (5' 5\")  Wt 75.4 kg (166 lb 4.8 oz)  LMP 08/21/2007  BMI 27.67 kg/m2  Body mass index is 27.67 kg/(m^2).  Constitutional: no distress, comfortable, pleasant   Eyes: anicteric, normal extra-ocular movements, no lid lag or retraction  Neck: no thyromegaly, no discrete nodule  Cardiovascular: regular rate and rhythm, normal S1 and S2, no murmurs  Respiratory: clear to auscultation, no wheezes or crackles, normal breath sounds   Gastrointestinal:  nontender, no hepatomegaly, no masses   Musculoskeletal: no edema   Skin: no concerning lesions, no jaundice   Neurological: cranial nerves intact, 2+ reflexes at patella, normal gait, no tremor on outstretched hands bilaterally  Psychological: appropriate mood   Lymphatic: no cervical  lymphadenopathy.      RESULTS     ENDO CALCIUM LABS-UMP Latest Ref Rng & Units 11/9/2017   CALCIUM 8.5 - 10.1 mg/dL 9.5   PHOSPHOROUS 2.5 - 4.5 mg/dL    MAGNESIUM 1.6 - 2.3 mg/dL    ALBUMIN 3.4 - 5.0 g/dL 3.7   BUN 7 - 30 mg/dL 14   CREATININE 0.52 - 1.04 mg/dL 0.68   PARATHYROID HORMONE INTACT 12 - 72 pg/mL 131 (H)   ALKPHOS 40 - 150 U/L 101     ENDO CALCIUM LABS-UMP Latest Ref Rng & Units 8/27/2015   25 OH VIT D2 ug/L <5   25 OH VIT D3 ug/L 69   25 OH VIT D TOTAL 20 - 75 ug/L <74 . . .     ASSESSMENT:    Joyce Marroquin is 57 years old with hx of depression, hypothyroidism secondary to I131 treatment for Graves's disease, POTS, GERD, RVOT RFA, pericarditis, collageneous colitis who is here for multiple symptoms and concerning about " parathyroid    1) mild hyperparathyroidism with normal calcium: she could have primary hyperparathyroidism but it is unlikely to explain her overall symptoms. Her calcium has never been high. Vitamin D is in acceptable range. Discussed diagnosis and criteria for surgery. I don't think she is a candidate for surgery at this time. She stated that she has already made appointment for parathyroid scan and ENT consultation. I told her that we can hold off for that.   - will check DXA scan to rule out osteoporosis.    PLAN:   - check vitamin D today  - DXA today  - will contact patient with result    Garland Patel MD     Division of Diabetes and Endocrinology  Department of Medicine  [phone]

## 2017-11-22 ENCOUNTER — HOSPITAL ENCOUNTER (EMERGENCY)
Facility: CLINIC | Age: 57
Discharge: HOME | End: 2017-11-22
Attending: EMERGENCY MEDICINE | Admitting: EMERGENCY MEDICINE
Payer: COMMERCIAL

## 2017-11-22 ENCOUNTER — ALLIED HEALTH/NURSE VISIT (OUTPATIENT)
Dept: CARDIOLOGY | Facility: CLINIC | Age: 57
End: 2017-11-22
Attending: INTERNAL MEDICINE
Payer: COMMERCIAL

## 2017-11-22 ENCOUNTER — CARE COORDINATION (OUTPATIENT)
Dept: CARDIOLOGY | Facility: CLINIC | Age: 57
End: 2017-11-22

## 2017-11-22 VITALS
DIASTOLIC BLOOD PRESSURE: 72 MMHG | HEART RATE: 66 BPM | OXYGEN SATURATION: 97 % | TEMPERATURE: 97.7 F | BODY MASS INDEX: 27.66 KG/M2 | RESPIRATION RATE: 18 BRPM | SYSTOLIC BLOOD PRESSURE: 131 MMHG | WEIGHT: 166 LBS | HEIGHT: 65 IN

## 2017-11-22 DIAGNOSIS — I44.2 COMPLETE ATRIOVENTRICULAR BLOCK (H): Primary | ICD-10-CM

## 2017-11-22 DIAGNOSIS — R07.89 ATYPICAL CHEST PAIN: ICD-10-CM

## 2017-11-22 LAB
ANION GAP SERPL CALCULATED.3IONS-SCNC: 8 MMOL/L (ref 3–14)
BASOPHILS # BLD AUTO: 0 10E9/L (ref 0–0.2)
BASOPHILS NFR BLD AUTO: 0.5 %
BUN SERPL-MCNC: 8 MG/DL (ref 7–30)
CALCIUM SERPL-MCNC: 9.5 MG/DL (ref 8.5–10.1)
CHLORIDE SERPL-SCNC: 105 MMOL/L (ref 94–109)
CO2 SERPL-SCNC: 26 MMOL/L (ref 20–32)
CREAT SERPL-MCNC: 0.64 MG/DL (ref 0.52–1.04)
D DIMER PPP FEU-MCNC: 0.7 UG/ML FEU (ref 0–0.5)
DIFFERENTIAL METHOD BLD: NORMAL
EOSINOPHIL # BLD AUTO: 0.1 10E9/L (ref 0–0.7)
EOSINOPHIL NFR BLD AUTO: 0.9 %
ERYTHROCYTE [DISTWIDTH] IN BLOOD BY AUTOMATED COUNT: 12.9 % (ref 10–15)
GFR SERPL CREATININE-BSD FRML MDRD: >90 ML/MIN/1.7M2
GLUCOSE SERPL-MCNC: 77 MG/DL (ref 70–99)
HCT VFR BLD AUTO: 40.6 % (ref 35–47)
HGB BLD-MCNC: 13.2 G/DL (ref 11.7–15.7)
IMM GRANULOCYTES # BLD: 0 10E9/L (ref 0–0.4)
IMM GRANULOCYTES NFR BLD: 0.2 %
INTERPRETATION ECG - MUSE: NORMAL
LYMPHOCYTES # BLD AUTO: 2 10E9/L (ref 0.8–5.3)
LYMPHOCYTES NFR BLD AUTO: 31.2 %
MCH RBC QN AUTO: 31.6 PG (ref 26.5–33)
MCHC RBC AUTO-ENTMCNC: 32.5 G/DL (ref 31.5–36.5)
MCV RBC AUTO: 97 FL (ref 78–100)
MONOCYTES # BLD AUTO: 0.6 10E9/L (ref 0–1.3)
MONOCYTES NFR BLD AUTO: 8.8 %
NEUTROPHILS # BLD AUTO: 3.8 10E9/L (ref 1.6–8.3)
NEUTROPHILS NFR BLD AUTO: 58.4 %
NRBC # BLD AUTO: 0 10*3/UL
NRBC BLD AUTO-RTO: 0 /100
NT-PROBNP SERPL-MCNC: 315 PG/ML (ref 0–900)
PLATELET # BLD AUTO: 179 10E9/L (ref 150–450)
POTASSIUM SERPL-SCNC: 4 MMOL/L (ref 3.4–5.3)
RBC # BLD AUTO: 4.18 10E12/L (ref 3.8–5.2)
SODIUM SERPL-SCNC: 138 MMOL/L (ref 133–144)
TROPONIN I SERPL-MCNC: <0.015 UG/L (ref 0–0.04)
WBC # BLD AUTO: 6.5 10E9/L (ref 4–11)

## 2017-11-22 PROCEDURE — 80048 BASIC METABOLIC PNL TOTAL CA: CPT | Performed by: EMERGENCY MEDICINE

## 2017-11-22 PROCEDURE — 84484 ASSAY OF TROPONIN QUANT: CPT | Performed by: EMERGENCY MEDICINE

## 2017-11-22 PROCEDURE — 93010 ELECTROCARDIOGRAM REPORT: CPT | Mod: Z6 | Performed by: EMERGENCY MEDICINE

## 2017-11-22 PROCEDURE — 83880 ASSAY OF NATRIURETIC PEPTIDE: CPT | Performed by: EMERGENCY MEDICINE

## 2017-11-22 PROCEDURE — 93294 REM INTERROG EVL PM/LDLS PM: CPT | Performed by: INTERNAL MEDICINE

## 2017-11-22 PROCEDURE — 99284 EMERGENCY DEPT VISIT MOD MDM: CPT | Mod: 25 | Performed by: EMERGENCY MEDICINE

## 2017-11-22 PROCEDURE — 85025 COMPLETE CBC W/AUTO DIFF WBC: CPT | Performed by: EMERGENCY MEDICINE

## 2017-11-22 PROCEDURE — 85379 FIBRIN DEGRADATION QUANT: CPT | Performed by: EMERGENCY MEDICINE

## 2017-11-22 PROCEDURE — 99284 EMERGENCY DEPT VISIT MOD MDM: CPT | Performed by: EMERGENCY MEDICINE

## 2017-11-22 PROCEDURE — 93005 ELECTROCARDIOGRAM TRACING: CPT | Performed by: EMERGENCY MEDICINE

## 2017-11-22 PROCEDURE — 93296 REM INTERROG EVL PM/IDS: CPT | Mod: ZF

## 2017-11-22 RX ORDER — IOPAMIDOL 755 MG/ML
59 INJECTION, SOLUTION INTRAVASCULAR ONCE
Status: DISCONTINUED | OUTPATIENT
Start: 2017-11-22 | End: 2017-11-22

## 2017-11-22 ASSESSMENT — ENCOUNTER SYMPTOMS
COLOR CHANGE: 0
DIFFICULTY URINATING: 0
NECK STIFFNESS: 0
ARTHRALGIAS: 0
FEVER: 0
CHILLS: 0
HEADACHES: 0
FATIGUE: 1
EYE REDNESS: 0
ABDOMINAL PAIN: 0
CONFUSION: 0
SHORTNESS OF BREATH: 1
LIGHT-HEADEDNESS: 1
DIAPHORESIS: 0

## 2017-11-22 NOTE — PROGRESS NOTES
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 [place] she was unable to work, c/o chest pressure, increase in SOB and \"something is not right\" .     Pt stated she considered going to ED in [facility], but thought it would go away.   Pt sent remote transmission to device clinic this am.   Reviewed briefly- did not show any obvious arrythmias- pt is paced 99%- CHB- s/p ablation end of April 2017.     Pt stated she has not had stress test since prior to ablation. Reviewed cardiac MRI 4/4/17- scanned in EMR.     Reviewed recent labs, pt is being treated by endocrine for parathyroid- scan schd 12/1- pt felt she will probably have surgery scheduled.     Pt stated symptoms this am are different from c/o pocket pain. (Dr Funes did recommend pocket revision- to reposition device)  And has a \"feeling of doom\"    Instructed pt to go to ED. Pt agreed verbalized understanding    Call to Carmen Fowler NP-EP. Updated on plan       "

## 2017-11-22 NOTE — ED AVS SNAPSHOT
[facility], [place], Emergency Department    [address] [place] [id number]    Phone:  [phone]                                       Jocye Marroquin   MRN: [id number]    Department:  [facility], Emergency Department   Date of Visit:  11/22/2017           After Visit Summary Signature Page     I have received my discharge instructions, and my questions have been answered. I have discussed any challenges I see with this plan with the nurse or doctor.    ..........................................................................................................................................  Patient/Patient Representative Signature      ..........................................................................................................................................  Patient Representative Print Name and Relationship to Patient    ..................................................               ................................................  Date                                            Time    ..........................................................................................................................................  Reviewed by Signature/Title    ...................................................              ..............................................  Date                                                            Time

## 2017-11-22 NOTE — ED PROVIDER NOTES
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 The patient reports she woke up this morning around 3:00am (7.5 hours ago) with substernal chest pressure with radiates to her neck and shortness of breath with associated nausea. She says she was in [place] at the time and considered going to an ED there, but decided to return to the [facility] to be seen at the . She says she contacted cardiology and interrogated her pacemaker, which is functioning normally. In the ED, she states her chest pressure is currently less heavy than this morning and her shortness of breath is intermittent. She states she is still nauseous and lightheaded. Of note, the patient reports she has had similar symptoms since the placement of her pacemaker in April. She states she experienced chest pain nearly every other day along with episodes of dizziness that is non-positional. She says, however, that she thinks today something is different. She also states she has been fatigued and her physical activity has been limited. She was seen by cardiology two weeks ago for these ongoing symptoms and told that her ejection fraction had decreased to 40-45%, but that she should not be concerned about her activity levels. She denies any acute fevers, chills, or sweats.    Past Medical History:   Diagnosis Date     Chronic depressive personality disorder      Esophageal reflux        Past Surgical History:   Procedure Laterality Date     EP ABLATION / EP STUDIES  04/21/2017     HC CORRECT BUNION,SIMPLE       PPM INSERT OF NEW OR REPL W/VENT LEAD  04/21/2017       No family history on file.    Social History   Substance Use Topics      Smoking status: Former Smoker     Smokeless tobacco: Never Used     Alcohol use Yes      Comment: rarely       No current facility-administered medications for this encounter.      Current Outpatient Prescriptions   Medication     MAGNESIUM LACTATE PO     TURMERIC PO     Menaquinone-7 (VITAMIN K2 PO)     nebulizer nebulization     phytonadione (MEPHYTON/ VIT K) suspension     Ferrous Sulfate (IRON SUPPLEMENT PO)     LEVOTHYROXINE SODIUM PO     MELATONIN PO     albuterol (PROAIR HFA/PROVENTIL HFA/VENTOLIN HFA) 108 (90 BASE) MCG/ACT Inhaler     fluticasone (FLOVENT HFA) 110 MCG/ACT Inhaler     DiphenhydrAMINE HCl (BENADRYL PO)     Cholecalciferol (VITAMIN D3 PO)     Probiotic Product (PROBIOTIC DAILY PO)     Acetaminophen (TYLENOL PO)     Multiple Vitamin (DAILY MULTIVITAMIN PO)        Allergies   Allergen Reactions     Flagyl [Metronidazole] Rash     Atenolol Other (See Comments)     Had extreme fatigue, dizziness, and overall felt unwell.      Bactrim [Sulfamethoxazole W/Trimethoprim]      Corn Oil      Lorazepam      Oxytetracycline      Sulfamethoxazole-Trimethoprim      Other reaction(s): Other  Passed out     Tetracycline      Zofran [Ondansetron]      Prolonged QT  Prolonged QT at baseline per patient     Benzodiazepines Other (See Comments)     Other reaction(s): Other  Extreme heat intolerance  Extreme heat intolerance     Cyclobenzaprine Other (See Comments)     Extreme heat intolerance     Levaquin [Levofloxacin]      Other reaction(s): Other  Tendon rupture     Nsaids Other (See Comments)     Exacerbated asthma         I have reviewed the Medications, Allergies, Past Medical and Surgical History, and Social History in the Epic system.    Review of Systems   Constitutional: Positive for fatigue. Negative for chills, diaphoresis and fever.   HENT: Negative for congestion.    Eyes: Negative for redness.   Respiratory: Positive for shortness of breath.    Cardiovascular: Positive for chest pain (pressure).  "  Gastrointestinal: Negative for abdominal pain.   Genitourinary: Negative for difficulty urinating.   Musculoskeletal: Negative for arthralgias and neck stiffness.   Skin: Negative for color change.   Neurological: Positive for light-headedness. Negative for headaches.   Psychiatric/Behavioral: Negative for confusion.   All other systems reviewed and are negative.      Physical Exam   BP: 145/84  Pulse: 66  Heart Rate: 65  Temp: 97.7  F (36.5  C)  Resp: 16  Height: 165.1 cm (5' 5\")  Weight: 75.3 kg (166 lb)  SpO2: 100 %      Physical Exam   Constitutional: No distress.   HENT:   Head: Atraumatic.   Mouth/Throat: Oropharynx is clear and moist. No oropharyngeal exudate.   Eyes: Pupils are equal, round, and reactive to light. No scleral icterus.   Cardiovascular: Normal heart sounds and intact distal pulses.    Pulmonary/Chest: Breath sounds normal. No respiratory distress.   Abdominal: Soft. Bowel sounds are normal. There is no tenderness.   Musculoskeletal: She exhibits no edema or tenderness.   Skin: Skin is warm. No rash noted. She is not diaphoretic.       ED Course     ED Course     Procedures             EKG Interpretation:      Interpreted by Luis Felipe Wheeler  Time reviewed: 9:40 AM  Symptoms at time of EKG: Chest tightness   Rhythm: paced  Rate: Normal  Axis: Left Axis Deviation  Ectopy: none  Conduction: Paced  ST Segments/ T Waves: No ST-T wave changes  Q Waves: none  Comparison to prior: Unchanged from 06/28/2017    Clinical Impression: AV dual paced rhythm    Results for orders placed or performed during the hospital encounter of 11/22/17 (from the past 24 hour(s))   EKG 12 lead   Result Value Ref Range    Interpretation ECG Click View Image link to view waveform and result    Basic metabolic panel   Result Value Ref Range    Sodium 138 133 - 144 mmol/L    Potassium 4.0 3.4 - 5.3 mmol/L    Chloride 105 94 - 109 mmol/L    Carbon Dioxide 26 20 - 32 mmol/L    Anion Gap 8 3 - 14 mmol/L    Glucose 77 70 - 99 " mg/dL    Urea Nitrogen 8 7 - 30 mg/dL    Creatinine 0.64 0.52 - 1.04 mg/dL    GFR Estimate >90 >60 mL/min/1.7m2    GFR Estimate If Black >90 >60 mL/min/1.7m2    Calcium 9.5 8.5 - 10.1 mg/dL   CBC with platelets differential   Result Value Ref Range    WBC 6.5 4.0 - 11.0 10e9/L    RBC Count 4.18 3.8 - 5.2 10e12/L    Hemoglobin 13.2 11.7 - 15.7 g/dL    Hematocrit 40.6 35.0 - 47.0 %    MCV 97 78 - 100 fl    MCH 31.6 26.5 - 33.0 pg    MCHC 32.5 31.5 - 36.5 g/dL    RDW 12.9 10.0 - 15.0 %    Platelet Count 179 150 - 450 10e9/L    Diff Method Automated Method     % Neutrophils 58.4 %    % Lymphocytes 31.2 %    % Monocytes 8.8 %    % Eosinophils 0.9 %    % Basophils 0.5 %    % Immature Granulocytes 0.2 %    Nucleated RBCs 0 0 /100    Absolute Neutrophil 3.8 1.6 - 8.3 10e9/L    Absolute Lymphocytes 2.0 0.8 - 5.3 10e9/L    Absolute Monocytes 0.6 0.0 - 1.3 10e9/L    Absolute Eosinophils 0.1 0.0 - 0.7 10e9/L    Absolute Basophils 0.0 0.0 - 0.2 10e9/L    Abs Immature Granulocytes 0.0 0 - 0.4 10e9/L    Absolute Nucleated RBC 0.0    Troponin I   Result Value Ref Range    Troponin I ES <0.015 0.000 - 0.045 ug/L   D dimer quantitative   Result Value Ref Range    D Dimer 0.7 (H) 0.0 - 0.50 ug/ml FEU   Nt probnp inpatient   Result Value Ref Range    N-Terminal Pro BNP Inpatient 315 0 - 900 pg/mL     Medications - No data to display                Critical Care time:  none           Labs Ordered and Resulted from Time of ED Arrival Up to the Time of Departure from the ED   D DIMER QUANTITATIVE - Abnormal; Notable for the following:        Result Value    D Dimer 0.7 (*)     All other components within normal limits   BASIC METABOLIC PANEL   CBC WITH PLATELETS DIFFERENTIAL   TROPONIN I   NT PROBNP INPATIENT            Assessments & Plan (with Medical Decision Making)   57-year-old female with history of pacemaker placement in April 2017, multiple previous episodes of atypical chest pain/pressure, shortness breath and dizziness presents  with recurrence of chest pressure with shortness breath.  Differential included pulmonary embolus, acute coronary syndrome, congestive heart failure, anxiety, other.  Exam revealed patient to have normal vital signs with exception of minimally elevated blood pressure 145/84.  EKG was unchanged revealing AV pacing.  Laboratories were obtained revealing normal CBC, basic metabolic panel and troponin as well as BNP.  D-dimer was slightly elevated.  Patient initially agreed to CT of the chest but then declined stating that she has had type II many times and had similar symptoms in June of this year with elevated d-dimer and negative CT of the chest.  Patient apparently has had functional tests of hard including Ilana scan and echocardiogram which were unrevealing.  Patient has been seen recently by cardiology and felt to have atypical chest pain.  Although d-dimer was slightly elevated, I think the likelihood of pulmonary embolus is less likely than other etiologies given multiple previous visits for similar symptoms.  Patient ultimately declined to stay for repeat troponin and stated that she would follow up with her cardiologist.  I have reviewed the nursing notes.    I have reviewed the findings, diagnosis, plan and need for follow up with the patient.    Discharge Medication List as of 11/22/2017 12:41 PM          Final diagnoses:   Atypical chest pain   IHerbert, am serving as a trained medical scribe to document services personally performed by Vineet Wheeler MD, based on the provider's statements to me.      Vineet MORALES MD, was physically present and have reviewed and verified the accuracy of this note documented by Herbert Patel.       11/22/2017   [facility], [place], EMERGENCY DEPARTMENT     Luis Felipe Wheeler MD  11/22/17 [id number]

## 2017-11-22 NOTE — DISCHARGE INSTRUCTIONS
*CHEST PAIN, UNCERTAIN CAUSE    Based on your exam today, the exact cause of your chest pain is not certain. Your condition does not seem serious at this time, and your pain does not appear to be coming from your heart. However, sometimes the signs of a serious problem take more time to appear. Therefore, watch for the warning signs listed below.  HOME CARE:  1. Rest today and avoid strenuous activity.  2. Take any prescribed medicine as directed.  FOLLOW UP with your doctor in 1-3 days.   GET PROMPT MEDICAL ATTENTION if any of the following occur:    A change in the type of pain: if it feels different, becomes more severe, lasts longer, or begins to spread into your shoulder, arm, neck, jaw or back    Shortness of breath or increased pain with breathing    Weakness, dizziness, or fainting    Cough with blood or dark colored sputum (phlegm)    Fever over 101  F (38.3  C)    Swelling, pain or redness in one leg    3368-4203 [employer]. [address] [place] [address]. All rights reserved. This information is not intended as a substitute for professional medical care. Always follow your healthcare professional's instructions.  This information has been modified by your health care provider with permission from the publisher.

## 2017-11-22 NOTE — ED NOTES
Pt states she wants her IV removed and a glass of water. Pt states she will wait up to 30 minutes for cards to see her or she will leave. Pt states she discussed this with Dr. Wheeler.

## 2017-11-22 NOTE — ED NOTES
"Joyce comes to the ED today after speaking with a cardiology clinic nurse for evaluation of chest \"heaviness\" and SOB since 0300.  Prior to arrival she interrogated her pacemaker and was told \"its fine\" and recent cardiac MRI done at [facility].  She also reports nausea.  "

## 2017-11-22 NOTE — MR AVS SNAPSHOT
After Visit Summary   11/22/2017    Joyce Marroquin    MRN: [id number]           Patient Information     Date Of Birth          1960        Visit Information        Provider Department      11/22/2017 6:00 AM UC ICD REMOTE [facility]        Today's Diagnoses     Complete atrioventricular block (H)    -  1       Follow-ups after your visit        Your next 10 appointments already scheduled     May 10, 2018 11:30 AM CDT   (Arrive by 11:15 AM)   Pacemaker Check with Uc Cv Device 1   [facility] ([facility])    [address] [place] [address] [phone]            May 10, 2018 12:00 PM CDT   (Arrive by 11:45 AM)   RETURN ARRHYTHMIA with Lino Funes MD   [facility] ([facility])    [address] [place] [address] [phone]              Who to contact     If you have questions or need follow up information about today's clinic visit or your schedule please contact [facility] directly at [phone].  Normal or non-critical lab and imaging results will be communicated to you by [employer], letter or phone within 4 business days after the clinic has received the results. If you do not hear from us within 7 days, please contact the clinic through Safer[employer] or phone. If you have a critical or abnormal lab result, we will notify you by phone as soon as possible.  Submit refill requests through [employer] or call your pharmacy and they will forward the refill request to us. Please allow 3 business days for your refill to be completed.          Additional Information About Your Visit        [employer] Information     [employer] gives you secure access to your electronic health record. If you see a primary care provider, you can also send messages to your care team and make appointments. If you have questions, please call your primary care clinic.  If you do not have a  primary care provider, please call [phone] and they will assist you.        Care EveryWhere ID     This is your Care EveryWhere ID. This could be used by other organizations to access your [facility] medical records  [id number]        Your Vitals Were     Last Period                   08/21/2007            Blood Pressure from Last 3 Encounters:   11/22/17 137/84   11/17/17 108/71   11/09/17 128/77    Weight from Last 3 Encounters:   11/22/17 75.3 kg (166 lb)   11/17/17 75.4 kg (166 lb 4.8 oz)   11/09/17 74.6 kg (164 lb 6.4 oz)              We Performed the Following     INTERROGATION DEVICE EVAL REMOTE, PACER/ICD        Primary Care Provider Office Phone # Fax #    Hai Renteria [phone]       [facility] [address]   [place] [address]        Equal Access to Services     [facility] : Hadii aad ku hadasho Soomaali, waaxda luqadaha, qaybta kaalmada adeegyada, waxay idiin hayaan adeeg kharash la'aan . So [facility] [phone].    ATENCIÓN: Si habla español, tiene a hankins disposición servicios gratuitos de asistencia lingüística. Jp al [phone].    We comply with applicable federal civil rights laws and [place] laws. We do not discriminate on the basis of race, color, national origin, age, disability, sex, sexual orientation, or gender identity.            Thank you!     Thank you for choosing [facility]  for your care. Our goal is always to provide you with excellent care. Hearing back from our patients is one way we can continue to improve our services. Please take a few minutes to complete the written survey that you may receive in the mail after your visit with us. Thank you!             Your Updated Medication List - Protect others around you: Learn how to safely use, store and throw away your medicines at www.disposemymeds.org.          This list is accurate as of: 11/22/17 10:05 AM.  Always use your most recent med list.                   Brand Name  Dispense Instructions for use Diagnosis    albuterol 108 (90 BASE) MCG/ACT Inhaler    PROAIR HFA/PROVENTIL HFA/VENTOLIN HFA     Inhale 2 puffs into the lungs every 4 hours as needed for shortness of breath / dyspnea or wheezing        BENADRYL PO      Take 25-50 mg by mouth nightly as needed        DAILY MULTIVITAMIN PO      Take 1 tablet by mouth daily        fluticasone 110 MCG/ACT Inhaler    FLOVENT HFA     Inhale 2 puffs into the lungs 2 times daily        IRON SUPPLEMENT PO      Take 325 mg by mouth daily        LEVOTHYROXINE SODIUM PO      Take 137 mcg by mouth daily        MAGNESIUM LACTATE PO      Take 180 mg by mouth daily    Fatigue, unspecified type       MELATONIN PO      Take 1 mg by mouth nightly as needed        nebulizer nebulization      as needed    H/O pericarditis       phytonadione 0.5 MG/ML suspension    MEPHYTON/ VIT K          PROBIOTIC DAILY PO      Take 1 capsule by mouth 2 times daily        TURMERIC PO      Take by mouth daily    Fatigue, unspecified type       TYLENOL PO      Take 500-1,000 mg by mouth daily as needed        VITAMIN D3 PO      Take 5,000 Units by mouth daily        VITAMIN K2 PO       H/O pericarditis

## 2017-11-22 NOTE — ED AVS SNAPSHOT
[facility], Emergency Department    [address], [place] [address]    Phone:  [phone]                                       Joyce Marroquin   MRN: [id number]    Department:  [facility], Emergency Department   Date of Visit:  11/22/2017           Patient Information     Date Of Birth          1960        Your diagnoses for this visit were:     Atypical chest pain        You were seen by Luis Felipe Wheeler MD.      Follow-up Information     Schedule an appointment as soon as possible for a visit with Lino Funes MD.    Specialty:  Cardiology    Contact information:    [address]  [facility] [address] [phone]          Discharge Instructions          *CHEST PAIN, UNCERTAIN CAUSE    Based on your exam today, the exact cause of your chest pain is not certain. Your condition does not seem serious at this time, and your pain does not appear to be coming from your heart. However, sometimes the signs of a serious problem take more time to appear. Therefore, watch for the warning signs listed below.  HOME CARE:  1. Rest today and avoid strenuous activity.  2. Take any prescribed medicine as directed.  FOLLOW UP with your doctor in 1-3 days.   GET PROMPT MEDICAL ATTENTION if any of the following occur:    A change in the type of pain: if it feels different, becomes more severe, lasts longer, or begins to spread into your shoulder, arm, neck, jaw or back    Shortness of breath or increased pain with breathing    Weakness, dizziness, or fainting    Cough with blood or dark colored sputum (phlegm)    Fever over 101  F (38.3  C)    Swelling, pain or redness in one leg    1849-7830 [employer]. [address] [place] [address]. All rights reserved. This information is not intended as a substitute for professional medical care. Always follow your healthcare professional's instructions.  This information has been modified by your health care provider with  permission from the publisher.      Future Appointments        Provider Department Dept Phone Center    5/10/2018 11:30 AM UC CV DEVICE 1              se [facility] [phone] [facility]    5/10/2018 12:00 PM Lino Funes MD [facility] [phone] [facility]      24 Hour Appointment Hotline       To make an appointment at any [facility], call [phone] ([phone]). If you don't have a family doctor or clinic, we will help you find one. [facility] are conveniently located to serve the needs of you and your family.             Review of your medicines      Our records show that you are taking the medicines listed below. If these are incorrect, please call your family doctor or clinic.        Dose / Directions Last dose taken    albuterol 108 (90 BASE) MCG/ACT Inhaler   Commonly known as:  PROAIR HFA/PROVENTIL HFA/VENTOLIN HFA   Dose:  2 puff        Inhale 2 puffs into the lungs every 4 hours as needed for shortness of breath / dyspnea or wheezing   Refills:  0        BENADRYL PO   Dose:  25-50 mg        Take 25-50 mg by mouth nightly as needed   Refills:  0        DAILY MULTIVITAMIN PO   Dose:  1 tablet        Take 1 tablet by mouth daily   Refills:  0        fluticasone 110 MCG/ACT Inhaler   Commonly known as:  FLOVENT HFA   Dose:  2 puff        Inhale 2 puffs into the lungs 2 times daily   Refills:  0        IRON SUPPLEMENT PO   Dose:  325 mg        Take 325 mg by mouth daily   Refills:  0        LEVOTHYROXINE SODIUM PO   Dose:  137 mcg        Take 137 mcg by mouth daily   Refills:  0        MAGNESIUM LACTATE PO   Dose:  180 mg        Take 180 mg by mouth daily   Refills:  0        MELATONIN PO   Dose:  1 mg        Take 1 mg by mouth nightly as needed   Refills:  0        nebulizer nebulization        as needed   Refills:  0        phytonadione 0.5 MG/ML suspension   Commonly known as:  MEPHYTON/ VIT K        Refills:  0        PROBIOTIC DAILY PO   Dose:  1 capsule         Take 1 capsule by mouth 2 times daily   Refills:  0        TURMERIC PO        Take by mouth daily   Refills:  0        TYLENOL PO   Dose:  500-1000 mg        Take 500-1,000 mg by mouth daily as needed   Refills:  0        VITAMIN D3 PO   Dose:  5000 Units        Take 5,000 Units by mouth daily   Refills:  0        VITAMIN K2 PO        Refills:  0                Procedures and tests performed during your visit     Basic metabolic panel    CBC with platelets differential    D dimer quantitative    EKG 12 lead    Nt probnp inpatient    Troponin I      Orders Needing Specimen Collection     None      Pending Results     Date and Time Order Name Status Description    11/22/2017 0935 EKG 12 lead Preliminary             Pending Culture Results     No orders found from 11/20/2017 to 11/23/2017.            Pending Results Instructions     If you had any lab results that were not finalized at the time of your Discharge, you can call the ED Lab Result RN at [phone]. You will be contacted by this team for any positive Lab results or changes in treatment. The nurses are available 7 days a week from 10A to 6:30P.  You can leave a message 24 hours per day and they will return your call.        Thank you for choosing [facility]       Thank you for choosing [facility] for your care. Our goal is always to provide you with excellent care. Hearing back from our patients is one way we can continue to improve our services. Please take a few minutes to complete the written survey that you may receive in the mail after you visit with us. Thank you!        [employer] Information     "[employer] gives you secure access to your electronic health record. If you see a primary care provider, you can also send messages to your care team and make appointments. If you have questions, please call your primary care clinic.  If you do not have a primary care provider, please call [phone] and they will assist you.        Care EveryWhere ID     This is  your Care EveryWhere ID. This could be used by other organizations to access your [facility] medical records  [id number]        Equal Access to Services     PAVITHRA SALCEDO : Jcarlos Morgan, eugene silva, katherine juan, micah delcid. So [facility] [phone].    ATENCIÓN: Si habla español, tiene a hankins disposición servicios gratuitos de asistencia lingüística. Llame al [phone].    We comply with applicable federal civil rights laws and [place] laws. We do not discriminate on the basis of race, color, national origin, age, disability, sex, sexual orientation, or gender identity.            After Visit Summary       This is your record. Keep this with you and show to your community pharmacist(s) and doctor(s) at your next visit.

## 2017-11-22 NOTE — PROGRESS NOTES
Pt called Fiorella Wright RN for Dr Funes this morning to report shortness of breath and chest pressure.  this is not new for her, she was complaining of the same symptoms at her last visit on 11/9/17.  Her activity sensor was adjusted at that visit, she was very vague with her symptoms when Fiorella asked her if her shortness of breath was worse and she was all upset and crying.  Her pacemaker check does not show any episodes of atrial or ventricular arrhythmias, she is RV pacing 99.7% of the time, her heart rate histograms show significant improvement since her last visit, however, that does not mean that she is tolerating the change to her sensor and we cannot rule out the fact that it may be have worsened her Shortness of breath.  Her pacemaker battery estimates 8 years left and her pacemaker is functioning normally.  Fiorella is going to recommend that she go to the ED for her shortness of breath.      dual pacemaker

## 2017-11-28 ENCOUNTER — CARE COORDINATION (OUTPATIENT)
Dept: CARDIOLOGY | Facility: CLINIC | Age: 57
End: 2017-11-28

## 2017-11-28 ENCOUNTER — TELEPHONE (OUTPATIENT)
Dept: CARDIOLOGY | Facility: CLINIC | Age: 57
End: 2017-11-28

## 2017-11-28 DIAGNOSIS — T82.847A PAIN IN PACEMAKER POCKET DUE TO DEVICE: ICD-10-CM

## 2017-11-28 DIAGNOSIS — I44.2 ATRIOVENTRICULAR BLOCK, COMPLETE (H): Primary | ICD-10-CM

## 2017-11-28 RX ORDER — LIDOCAINE 40 MG/G
CREAM TOPICAL
Status: CANCELLED | OUTPATIENT
Start: 2017-11-28

## 2017-11-28 NOTE — PROGRESS NOTES
Date: 11/28/2017    Time of Call: 8:50 AM    Pt in ED for chest pain, feeling okay- would like to proceed with pocket revision     Diagnosis:  CHB, continued pain in pacemaker pocket.   Pt c/o rubbing on colar bone     [ TORB ] Ordering provider: Dr Funes  Order:   Schedule pocket revision     Order received by: mikaela marlow rn      Follow-up/additional notes: discussed with pt, verbalized understanding. Orders placed, schding to contact pt

## 2017-12-01 ENCOUNTER — TRANSFERRED RECORDS (OUTPATIENT)
Dept: HEALTH INFORMATION MANAGEMENT | Facility: CLINIC | Age: 57
End: 2017-12-01

## 2017-12-04 ENCOUNTER — APPOINTMENT (OUTPATIENT)
Dept: MEDSURG UNIT | Facility: CLINIC | Age: 57
End: 2017-12-04
Attending: INTERNAL MEDICINE
Payer: COMMERCIAL

## 2017-12-04 ENCOUNTER — APPOINTMENT (OUTPATIENT)
Dept: CARDIOLOGY | Facility: CLINIC | Age: 57
End: 2017-12-04
Attending: INTERNAL MEDICINE
Payer: COMMERCIAL

## 2017-12-04 ENCOUNTER — HOSPITAL ENCOUNTER (OUTPATIENT)
Facility: CLINIC | Age: 57
Discharge: HOME | End: 2017-12-04
Attending: INTERNAL MEDICINE | Admitting: INTERNAL MEDICINE
Payer: COMMERCIAL

## 2017-12-04 VITALS
HEIGHT: 65 IN | OXYGEN SATURATION: 97 % | SYSTOLIC BLOOD PRESSURE: 109 MMHG | TEMPERATURE: 97.6 F | DIASTOLIC BLOOD PRESSURE: 70 MMHG | WEIGHT: 165 LBS | BODY MASS INDEX: 27.49 KG/M2 | RESPIRATION RATE: 16 BRPM | HEART RATE: 57 BPM

## 2017-12-04 DIAGNOSIS — I44.2 ATRIOVENTRICULAR BLOCK, COMPLETE (H): ICD-10-CM

## 2017-12-04 DIAGNOSIS — T82.847A PAIN IN PACEMAKER POCKET DUE TO DEVICE: ICD-10-CM

## 2017-12-04 PROBLEM — Z95.0 S/P CARDIAC PACEMAKER PROCEDURE: Status: ACTIVE | Noted: 2017-12-04

## 2017-12-04 LAB
ANION GAP SERPL CALCULATED.3IONS-SCNC: 6 MMOL/L (ref 3–14)
BUN SERPL-MCNC: 12 MG/DL (ref 7–30)
CALCIUM SERPL-MCNC: 9.6 MG/DL (ref 8.5–10.1)
CHLORIDE SERPL-SCNC: 109 MMOL/L (ref 94–109)
CO2 SERPL-SCNC: 26 MMOL/L (ref 20–32)
CREAT SERPL-MCNC: 0.64 MG/DL (ref 0.52–1.04)
ERYTHROCYTE [DISTWIDTH] IN BLOOD BY AUTOMATED COUNT: 13.1 % (ref 10–15)
GFR SERPL CREATININE-BSD FRML MDRD: >90 ML/MIN/1.7M2
GLUCOSE SERPL-MCNC: 83 MG/DL (ref 70–99)
HCT VFR BLD AUTO: 42.2 % (ref 35–47)
HGB BLD-MCNC: 13.6 G/DL (ref 11.7–15.7)
MCH RBC QN AUTO: 31.3 PG (ref 26.5–33)
MCHC RBC AUTO-ENTMCNC: 32.2 G/DL (ref 31.5–36.5)
MCV RBC AUTO: 97 FL (ref 78–100)
PLATELET # BLD AUTO: 163 10E9/L (ref 150–450)
POTASSIUM SERPL-SCNC: 4.2 MMOL/L (ref 3.4–5.3)
RBC # BLD AUTO: 4.35 10E12/L (ref 3.8–5.2)
SODIUM SERPL-SCNC: 141 MMOL/L (ref 133–144)
WBC # BLD AUTO: 5.9 10E9/L (ref 4–11)

## 2017-12-04 PROCEDURE — 27210784 ZZH KIT PACEMAKER CR8

## 2017-12-04 PROCEDURE — 40000172 ZZH STATISTIC PROCEDURE PREP ONLY

## 2017-12-04 PROCEDURE — 99153 MOD SED SAME PHYS/QHP EA: CPT

## 2017-12-04 PROCEDURE — 93010 ELECTROCARDIOGRAM REPORT: CPT | Mod: 76 | Performed by: INTERNAL MEDICINE

## 2017-12-04 PROCEDURE — 33999 UNLISTED PX CARDIAC SURGERY: CPT | Performed by: INTERNAL MEDICINE

## 2017-12-04 PROCEDURE — 99152 MOD SED SAME PHYS/QHP 5/>YRS: CPT | Performed by: INTERNAL MEDICINE

## 2017-12-04 PROCEDURE — 99152 MOD SED SAME PHYS/QHP 5/>YRS: CPT

## 2017-12-04 PROCEDURE — 93005 ELECTROCARDIOGRAM TRACING: CPT

## 2017-12-04 PROCEDURE — 25000128 H RX IP 250 OP 636: Performed by: INTERNAL MEDICINE

## 2017-12-04 PROCEDURE — 27210957 ZZH ACCESS EP PROC CR5

## 2017-12-04 PROCEDURE — 33222 RELOCATION POCKET PACEMAKER: CPT

## 2017-12-04 PROCEDURE — C1781 MESH (IMPLANTABLE): HCPCS

## 2017-12-04 PROCEDURE — 85027 COMPLETE CBC AUTOMATED: CPT | Performed by: INTERNAL MEDICINE

## 2017-12-04 PROCEDURE — 40000065 ZZH STATISTIC EKG NON-CHARGEABLE

## 2017-12-04 PROCEDURE — 27210795 ZZH PAD DEFIB QUICK CR4

## 2017-12-04 PROCEDURE — 40000852 ZZH STATISTIC HEART CATH LAB OR EP LAB

## 2017-12-04 PROCEDURE — 80048 BASIC METABOLIC PNL TOTAL CA: CPT | Performed by: INTERNAL MEDICINE

## 2017-12-04 RX ORDER — FUROSEMIDE 10 MG/ML
20-100 INJECTION INTRAMUSCULAR; INTRAVENOUS
Status: DISCONTINUED | OUTPATIENT
Start: 2017-12-04 | End: 2017-12-04 | Stop reason: HOSPADM

## 2017-12-04 RX ORDER — DOBUTAMINE HYDROCHLORIDE 200 MG/100ML
5-40 INJECTION INTRAVENOUS CONTINUOUS PRN
Status: DISCONTINUED | OUTPATIENT
Start: 2017-12-04 | End: 2017-12-04 | Stop reason: HOSPADM

## 2017-12-04 RX ORDER — FENTANYL CITRATE 50 UG/ML
25-50 INJECTION, SOLUTION INTRAMUSCULAR; INTRAVENOUS
Status: DISCONTINUED | OUTPATIENT
Start: 2017-12-04 | End: 2017-12-04 | Stop reason: HOSPADM

## 2017-12-04 RX ORDER — NALOXONE HYDROCHLORIDE 0.4 MG/ML
.1-.4 INJECTION, SOLUTION INTRAMUSCULAR; INTRAVENOUS; SUBCUTANEOUS
Status: DISCONTINUED | OUTPATIENT
Start: 2017-12-04 | End: 2017-12-04 | Stop reason: HOSPADM

## 2017-12-04 RX ORDER — LIDOCAINE 40 MG/G
CREAM TOPICAL
Status: DISCONTINUED | OUTPATIENT
Start: 2017-12-04 | End: 2017-12-04 | Stop reason: HOSPADM

## 2017-12-04 RX ORDER — ADENOSINE 3 MG/ML
6-12 INJECTION, SOLUTION INTRAVENOUS EVERY 5 MIN PRN
Status: DISCONTINUED | OUTPATIENT
Start: 2017-12-04 | End: 2017-12-04 | Stop reason: HOSPADM

## 2017-12-04 RX ORDER — LORAZEPAM 2 MG/ML
.5-2 INJECTION INTRAMUSCULAR EVERY 10 MIN PRN
Status: DISCONTINUED | OUTPATIENT
Start: 2017-12-04 | End: 2017-12-04 | Stop reason: HOSPADM

## 2017-12-04 RX ORDER — ACETAMINOPHEN AND CODEINE PHOSPHATE 300; 30 MG/1; MG/1
1-2 TABLET ORAL EVERY 4 HOURS PRN
Qty: 30 TABLET | Refills: 0 | Status: SHIPPED | OUTPATIENT
Start: 2017-12-04 | End: 2017-12-04

## 2017-12-04 RX ORDER — HEPARIN SODIUM 1000 [USP'U]/ML
1000-10000 INJECTION, SOLUTION INTRAVENOUS; SUBCUTANEOUS EVERY 5 MIN PRN
Status: DISCONTINUED | OUTPATIENT
Start: 2017-12-04 | End: 2017-12-04 | Stop reason: HOSPADM

## 2017-12-04 RX ORDER — FLUMAZENIL 0.1 MG/ML
0.2 INJECTION, SOLUTION INTRAVENOUS
Status: DISCONTINUED | OUTPATIENT
Start: 2017-12-04 | End: 2017-12-04 | Stop reason: HOSPADM

## 2017-12-04 RX ORDER — ATROPINE SULFATE 0.1 MG/ML
.5-1 INJECTION INTRAVENOUS
Status: DISCONTINUED | OUTPATIENT
Start: 2017-12-04 | End: 2017-12-04 | Stop reason: HOSPADM

## 2017-12-04 RX ORDER — KETOROLAC TROMETHAMINE 30 MG/ML
15-30 INJECTION, SOLUTION INTRAMUSCULAR; INTRAVENOUS
Status: DISCONTINUED | OUTPATIENT
Start: 2017-12-04 | End: 2017-12-04 | Stop reason: HOSPADM

## 2017-12-04 RX ORDER — CEPHALEXIN 500 MG/1
500 TABLET ORAL 3 TIMES DAILY
Qty: 15 TABLET | Refills: 0 | Status: SHIPPED | OUTPATIENT
Start: 2017-12-04 | End: 2018-05-03

## 2017-12-04 RX ORDER — CEFAZOLIN SODIUM 2 G/100ML
2 INJECTION, SOLUTION INTRAVENOUS
Status: COMPLETED | OUTPATIENT
Start: 2017-12-04 | End: 2017-12-04

## 2017-12-04 RX ORDER — PROMETHAZINE HYDROCHLORIDE 25 MG/ML
6.25-25 INJECTION, SOLUTION INTRAMUSCULAR; INTRAVENOUS EVERY 4 HOURS PRN
Status: DISCONTINUED | OUTPATIENT
Start: 2017-12-04 | End: 2017-12-04 | Stop reason: HOSPADM

## 2017-12-04 RX ORDER — PROTAMINE SULFATE 10 MG/ML
1-5 INJECTION, SOLUTION INTRAVENOUS
Status: DISCONTINUED | OUTPATIENT
Start: 2017-12-04 | End: 2017-12-04 | Stop reason: HOSPADM

## 2017-12-04 RX ORDER — PROTAMINE SULFATE 10 MG/ML
5-40 INJECTION, SOLUTION INTRAVENOUS EVERY 10 MIN PRN
Status: DISCONTINUED | OUTPATIENT
Start: 2017-12-04 | End: 2017-12-04 | Stop reason: HOSPADM

## 2017-12-04 RX ORDER — DIPHENHYDRAMINE HYDROCHLORIDE 50 MG/ML
25-50 INJECTION INTRAMUSCULAR; INTRAVENOUS
Status: DISCONTINUED | OUTPATIENT
Start: 2017-12-04 | End: 2017-12-04 | Stop reason: HOSPADM

## 2017-12-04 RX ORDER — ONDANSETRON 2 MG/ML
4 INJECTION INTRAMUSCULAR; INTRAVENOUS EVERY 4 HOURS PRN
Status: DISCONTINUED | OUTPATIENT
Start: 2017-12-04 | End: 2017-12-04 | Stop reason: HOSPADM

## 2017-12-04 RX ORDER — CEPHALEXIN 500 MG/1
500 TABLET ORAL 3 TIMES DAILY
Qty: 15 TABLET | Refills: 0 | Status: SHIPPED | OUTPATIENT
Start: 2017-12-04 | End: 2017-12-04

## 2017-12-04 RX ORDER — ACETAMINOPHEN AND CODEINE PHOSPHATE 300; 30 MG/1; MG/1
1-2 TABLET ORAL EVERY 4 HOURS PRN
Qty: 30 TABLET | Refills: 0 | Status: SHIPPED | OUTPATIENT
Start: 2017-12-04 | End: 2018-05-03

## 2017-12-04 RX ORDER — NALOXONE HYDROCHLORIDE 0.4 MG/ML
0.4 INJECTION, SOLUTION INTRAMUSCULAR; INTRAVENOUS; SUBCUTANEOUS EVERY 5 MIN PRN
Status: DISCONTINUED | OUTPATIENT
Start: 2017-12-04 | End: 2017-12-04 | Stop reason: HOSPADM

## 2017-12-04 RX ADMIN — FENTANYL CITRATE 25 MCG: 50 INJECTION, SOLUTION INTRAMUSCULAR; INTRAVENOUS at 12:14

## 2017-12-04 RX ADMIN — CEFAZOLIN SODIUM 2 G: 2 INJECTION, SOLUTION INTRAVENOUS at 11:53

## 2017-12-04 RX ADMIN — FENTANYL CITRATE 25 MCG: 50 INJECTION, SOLUTION INTRAMUSCULAR; INTRAVENOUS at 12:06

## 2017-12-04 RX ADMIN — MIDAZOLAM HYDROCHLORIDE 0.5 MG: 1 INJECTION, SOLUTION INTRAMUSCULAR; INTRAVENOUS at 12:30

## 2017-12-04 RX ADMIN — MIDAZOLAM HYDROCHLORIDE 0.5 MG: 1 INJECTION, SOLUTION INTRAMUSCULAR; INTRAVENOUS at 12:14

## 2017-12-04 RX ADMIN — FENTANYL CITRATE 25 MCG: 50 INJECTION, SOLUTION INTRAMUSCULAR; INTRAVENOUS at 12:30

## 2017-12-04 RX ADMIN — MIDAZOLAM HYDROCHLORIDE 0.5 MG: 1 INJECTION, SOLUTION INTRAMUSCULAR; INTRAVENOUS at 12:42

## 2017-12-04 RX ADMIN — FENTANYL CITRATE 25 MCG: 50 INJECTION, SOLUTION INTRAMUSCULAR; INTRAVENOUS at 12:42

## 2017-12-04 RX ADMIN — MIDAZOLAM HYDROCHLORIDE 0.5 MG: 1 INJECTION, SOLUTION INTRAMUSCULAR; INTRAVENOUS at 12:06

## 2017-12-04 NOTE — IP AVS SNAPSHOT
[address] Observation [address] [address]    Phone:  [phone]    Fax:  [phone]                                       After Visit Summary   12/4/2017    Joyce Marroquin    MRN: [id number]           After Visit Summary Signature Page     I have received my discharge instructions, and my questions have been answered. I have discussed any challenges I see with this plan with the nurse or doctor.    ..........................................................................................................................................  Patient/Patient Representative Signature      ..........................................................................................................................................  Patient Representative Print Name and Relationship to Patient    ..................................................               ................................................  Date                                            Time    ..........................................................................................................................................  Reviewed by Signature/Title    ...................................................              ..............................................  Date                                                            Time

## 2017-12-04 NOTE — DISCHARGE INSTRUCTIONS
Home Care after a Pacemaker Implant    Wound care:  Keep your incision (surgery wound) dry for 3 days.  After 3 days, you may remove the outer bandage.  Keep the strips of tape on.  They will be removed at your clinic visit.  Check for signs of infection each day.  These include increased redness, swelling, drainage, bleeding or a fever over 101 F (38.3 C).  Call us immediately if you see any of these signs.  If there are no signs of infection, you may shower in 3 days.  Do not submerge the incision (in a bath tub, hot tub, or swimming pool) until fully healed.    Pain:   You may have mild to moderate pain for 3 to 5 days.  Take acetaminophen (Tylenol) or ibuprofen (Advil) for the pain.  Call us if the pain is severe or lasts more than 5 days.    Activity:  After 24 hours, slowly return to your normal activities.   Healing will take 4 to 6 weeks.  No driving for 3 days  Avoid climbing a ladder alone.  It is best to stay within 4 feet of the ground.  Avoid anything that may cause rough contact or a hard hit to your chest.  This includes football, hockey, and other contact sports.  For at least 4 weeks:  Do not raise your affected arm above your shoulder.  Do not use your affected arm to push, pull, or lift anything over 10 pounds.  Avoid repetitive upper body activities for 6 weeks (ie: golf, swimming, and weight lifting)    Follow Up Visits:  Return to the clinic in 7 to 10 days to have your device and wound checked.      Telling others about your device:  Before you have any medical tests or treatments, tell the doctors, dentists, and other care providers about your device.  There are a few tests and treatments that may interfere with your device.  (These include MRI, radiation therapy, electrocautery, and others.)  Your care team may need to take special steps to keep you safe.  Before you leave the hospital, you will receive a temporary ID card.  A permanent card will be mailed to you about 6 to 8 weeks later.   Always carry the ID card with you.  It has important details about your device.  You should also get a MedicAlert ID.  Please ask us for a MedicAlert brochure, or go to [email or web address].    Safety near electrical equipment:  All of these are safe to use when in good repair:    Microwaves    Radios    Cordless phone    Remote controls    Small electrical tools  Cell phones: Keep cell phones at least 6 inches from your device.  Do not carry the phone in a pocket near your device.  Security winters: It is okay to walk through security winters at the airports and department stores.  Tell airport security that you have a pacemaker.  They should keep the screening wand at least 6 inches from your device.  Full-body scanners are safe.  Avoid the following:    MRI tests in the hospital unless you have a MRI safe pacemaker.           Arc welding, chain saws and high-powered industrial or commercial tools.    Power lines, power plants and large power generators.    Electric body fat scales.    Magnetic mattress pads or pillow.    Questions?  Please call [facility].    Device Nurse:          Business Hours:  [phone]                       After Hours:  [phone]   Choose option 4, then ask for the                                                  on-call device nurse at job code [id number].    Your next device clinic appointment is scheduled on:    Tuesday, December 12th, 2017 at 11:00 AM.            [facility]  [facility] [address]  [address] [place] [address]  [id number]

## 2017-12-04 NOTE — IP AVS SNAPSHOT
MRN:[id number]                      After Visit Summary   12/4/2017    Joyce Marroquin    MRN: [id number]           Thank you!     Thank you for choosing [facility] for your care. Our goal is always to provide you with excellent care. Hearing back from our patients is one way we can continue to improve our services. Please take a few minutes to complete the written survey that you may receive in the mail after you visit with us. Thank you!        Patient Information     Date Of Birth          1960        About your hospital stay     You were admitted on:  December 4, 2017 You last received care in the:  Unit 6D Observation [facility]    You were discharged on:  December 4, 2017       Who to Call     For medical emergencies, please call 911.  For non-urgent questions about your medical care, please call your primary care provider or clinic, [phone]          Attending Provider     Provider Lino Young MD Cardiology       Primary Care Provider Office Phone # Fax #    Hai Renteria [phone]      After Care Instructions     Discharge Instructions - Follow up with Device Check RN        Follow up with Device Check RN in 7-10 days.            Discharge Instructions - Keep incision dry for 72 hours       Keep incision dry for 72 hours (unless Derma Turner was applied)                  Your next 10 appointments already scheduled     May 10, 2018 11:30 AM CDT   (Arrive by 11:15 AM)   Pacemaker Check with Uc Cv Device 1   [facility])    [address] [place] [address] [id number] [phone]            May 10, 2018 12:00 PM CDT   (Arrive by 11:45 AM)   RETURN ARRHYTHMIA with Lino Funes MD   [facility]    [address] [place] [address] [id number] [phone]              Further instructions from your care team         Home  Care after a Pacemaker Implant    Wound care:  Keep your incision (surgery wound) dry for 3 days.  After 3 days, you may remove the outer bandage.  Keep the strips of tape on.  They will be removed at your clinic visit.  Check for signs of infection each day.  These include increased redness, swelling, drainage, bleeding or a fever over 101 F (38.3 C).  Call us immediately if you see any of these signs.  If there are no signs of infection, you may shower in 3 days.  Do not submerge the incision (in a bath tub, hot tub, or swimming pool) until fully healed.    Pain:   You may have mild to moderate pain for 3 to 5 days.  Take acetaminophen (Tylenol) or ibuprofen (Advil) for the pain.  Call us if the pain is severe or lasts more than 5 days.    Activity:  After 24 hours, slowly return to your normal activities.   Healing will take 4 to 6 weeks.  No driving for 3 days  Avoid climbing a ladder alone.  It is best to stay within 4 feet of the ground.  Avoid anything that may cause rough contact or a hard hit to your chest.  This includes football, hockey, and other contact sports.  For at least 4 weeks:  Do not raise your affected arm above your shoulder.  Do not use your affected arm to push, pull, or lift anything over 10 pounds.  Avoid repetitive upper body activities for 6 weeks (ie: golf, swimming, and weight lifting)    Follow Up Visits:  Return to the clinic in 7 to 10 days to have your device and wound checked.      Telling others about your device:  Before you have any medical tests or treatments, tell the doctors, dentists, and other care providers about your device.  There are a few tests and treatments that may interfere with your device.  (These include MRI, radiation therapy, electrocautery, and others.)  Your care team may need to take special steps to keep you safe.  Before you leave the hospital, you will receive a temporary ID card.  A permanent card will be mailed to you about 6 to 8 weeks later.  Always  carry the ID card with you.  It has important details about your device.  You should also get a MedicAlert ID.  Please ask us for a MedicAlert brochure, or go to [email or web address].    Safety near electrical equipment:  All of these are safe to use when in good repair:    Microwaves    Radios    Cordless phone    Remote controls    Small electrical tools  Cell phones: Keep cell phones at least 6 inches from your device.  Do not carry the phone in a pocket near your device.  Security winters: It is okay to walk through security winters at the airports and department stores.  Tell airport security that you have a pacemaker.  They should keep the screening wand at least 6 inches from your device.  Full-body scanners are safe.  Avoid the following:    MRI tests in the hospital unless you have a MRI safe pacemaker.           Arc welding, chain saws and high-powered industrial or commercial tools.    Power lines, power plants and large power generators.    Electric body fat scales.    Magnetic mattress pads or pillow.    Questions?  Please call [facility].    Device Nurse:          Business Hours:  [phone]                       After Hours:  [phone]   Choose option 4, then ask for the                                                  on-call device nurse at job code [id number].    Your next device clinic appointment is scheduled on:    Tuesday, December 12th, 2017 at 11:00 AM.            [facility]  [facility] - Clinic [address]  [address]      Pending Results     Date and Time Order Name Status Description    12/4/2017 1305 EKG 12-lead, tracing only Preliminary     12/4/2017 0911 EKG 12-lead, tracing only Preliminary             Admission Information     Date & Time Provider Department Dept. Phone    12/4/2017 Lino Funes MD Unit 6D Observation [facility] [place] [phone]      Your Vitals Were     Blood Pressure Pulse  "Temperature Respirations Height Weight    124/79 57 97.6  F (36.4  C) (Oral) 16 1.651 m (5' 5\") 74.8 kg (165 lb)    Last Period Pulse Oximetry BMI (Body Mass Index)             08/21/2007 99% 27.46 kg/m2         [employer] Information     [employer] gives you secure access to your electronic health record. If you see a primary care provider, you can also send messages to your care team and make appointments. If you have questions, please call your primary care clinic.  If you do not have a primary care provider, please call [phone] and they will assist you.        Care EveryWhere ID     This is your Care EveryWhere ID. This could be used by other organizations to access your [facility] medical records  [id number]        Equal Access to Services     PAVITHRA SALCEDO : Jcarlos Morgan, eugene silva, katherine juan, micah delcid. So [facility] [phone].    ATENCIÓN: Si habla español, tiene a hankins disposición servicios gratuitos de asistencia lingüística. Llame al [phone].    We comply with applicable federal civil rights laws and [place] laws. We do not discriminate on the basis of race, color, national origin, age, disability, sex, sexual orientation, or gender identity.               Review of your medicines      START taking        Dose / Directions    acetaminophen-codeine 300-30 MG per tablet   Commonly known as:  TYLENOL w/CODEINE No. 3   Used for:  Pain in pacemaker pocket due to device        Dose:  1-2 tablet   Take 1-2 tablets by mouth every 4 hours as needed for mild pain (mild or moderate pain)   Quantity:  30 tablet   Refills:  0       cephalexin 500 MG tablet   Used for:  Pain in pacemaker pocket due to device        Dose:  500 mg   Take 500 mg by mouth 3 times daily   Quantity:  15 tablet   Refills:  0         CONTINUE these medicines which have NOT CHANGED        Dose / Directions    albuterol 108 (90 BASE) MCG/ACT Inhaler   Commonly known as:  " PROAIR HFA/PROVENTIL HFA/VENTOLIN HFA        Dose:  2 puff   Inhale 2 puffs into the lungs every 4 hours as needed for shortness of breath / dyspnea or wheezing   Refills:  0       BENADRYL PO        Dose:  25-50 mg   Take 25-50 mg by mouth nightly as needed   Refills:  0       DAILY MULTIVITAMIN PO        Dose:  1 tablet   Take 1 tablet by mouth daily   Refills:  0       fluticasone 110 MCG/ACT Inhaler   Commonly known as:  FLOVENT HFA        Dose:  2 puff   Inhale 2 puffs into the lungs 2 times daily   Refills:  0       IRON SUPPLEMENT PO        Dose:  325 mg   Take 325 mg by mouth daily   Refills:  0       LEVOTHYROXINE SODIUM PO        Dose:  137 mcg   Take 137 mcg by mouth daily   Refills:  0       MAGNESIUM LACTATE PO   Used for:  Fatigue, unspecified type        Dose:  180 mg   Take 180 mg by mouth daily   Refills:  0       MELATONIN PO        Dose:  1 mg   Take 1 mg by mouth nightly as needed   Refills:  0       nebulizer nebulization   Used for:  H/O pericarditis        as needed   Refills:  0       phytonadione 0.5 MG/ML suspension   Commonly known as:  MEPHYTON/ VIT K        Refills:  0       PROBIOTIC DAILY PO        Dose:  1 capsule   Take 1 capsule by mouth 2 times daily   Refills:  0       TURMERIC PO   Used for:  Fatigue, unspecified type        Take by mouth daily   Refills:  0       TYLENOL PO        Dose:  500-1000 mg   Take 500-1,000 mg by mouth daily as needed   Refills:  0       VITAMIN D3 PO        Dose:  5000 Units   Take 5,000 Units by mouth daily   Refills:  0       VITAMIN K2 PO   Used for:  H/O pericarditis        Refills:  0            Where to get your medicines      Some of these will need a paper prescription and others can be bought over the counter. Ask your nurse if you have questions.     Bring a paper prescription for each of these medications     acetaminophen-codeine 300-30 MG per tablet    cephalexin 500 MG tablet               ANTIBIOTIC INSTRUCTION     You've Been Prescribed  an Antibiotic - Now What?  Your healthcare team thinks that you or your loved one might have an infection. Some infections can be treated with antibiotics, which are powerful, life-saving drugs. Like all medications, antibiotics have side effects and should only be used when necessary. There are some important things you should know about your antibiotic treatment.      Your healthcare team may run tests before you start taking an antibiotic.    Your team may take samples (e.g., from your blood, urine or other areas) to run tests to look for bacteria. These test can be important to determine if you need an antibiotic at all and, if you do, which antibiotic will work best.      Within a few days, your healthcare team might change or even stop your antibiotic.    Your team may start you on an antibiotic while they are working to find out what is making you sick.    Your team might change your antibiotic because test results show that a different antibiotic would be better to treat your infection.    In some cases, once your team has more information, they learn that you do not need an antibiotic at all. They may find out that you don't have an infection, or that the antibiotic you're taking won't work against your infection. For example, an infection caused by a virus can't be treated with antibiotics. Staying on an antibiotic when you don't need it is more likely to be harmful than helpful.      You may experience side effects from your antibiotic.    Like all medications, antibiotics have side effects. Some of these can be serious.    Let you healthcare team know if you have any known allergies when you are admitted to the hospital.    One significant side effect of nearly all antibiotics is the risk of severe and sometimes deadly diarrhea caused by Clostridium difficile (C. Difficile). This occurs when a person takes antibiotics because some good germs are destroyed. Antibiotic use allows C. diificile to take over,  putting patients at high risk for this serious infection.    As a patient or caregiver, it is important to understand your or your loved one's antibiotic treatment. It is especially important for caregivers to speak up when patients can't speak for themselves. Here are some important questions to ask your healthcare team.    What infection is this antibiotic treating and how do you know I have that infection?    What side effects might occur from this antibiotic?    How long will I need to take this antibiotic?    Is it safe to take this antibiotic with other medications or supplements (e.g., vitamins) that I am taking?     Are there any special directions I need to know about taking this antibiotic? For example, should I take it with food?    How will I be monitored to know whether my infection is responding to the antibiotic?    What tests may help to make sure the right antibiotic is prescribed for me?      Information provided by:  www.cdc.gov/getsmart  U.S. Department of Health and Human Services  Centers for disease Control and Prevention  National Center for Emerging and Zoonotic Infectious Diseases  Division of Healthcare Quality Promotion         Protect others around you: Learn how to safely use, store and throw away your medicines at www.disposemymeds.org.             Medication List: This is a list of all your medications and when to take them. Check marks below indicate your daily home schedule. Keep this list as a reference.      Medications           Morning Afternoon Evening Bedtime As Needed    acetaminophen-codeine 300-30 MG per tablet   Commonly known as:  TYLENOL w/CODEINE No. 3   Take 1-2 tablets by mouth every 4 hours as needed for mild pain (mild or moderate pain)                                albuterol 108 (90 BASE) MCG/ACT Inhaler   Commonly known as:  PROAIR HFA/PROVENTIL HFA/VENTOLIN HFA   Inhale 2 puffs into the lungs every 4 hours as needed for shortness of breath / dyspnea or wheezing                                 BENADRYL PO   Take 25-50 mg by mouth nightly as needed                                cephalexin 500 MG tablet   Take 500 mg by mouth 3 times daily                                DAILY MULTIVITAMIN PO   Take 1 tablet by mouth daily                                fluticasone 110 MCG/ACT Inhaler   Commonly known as:  FLOVENT HFA   Inhale 2 puffs into the lungs 2 times daily                                IRON SUPPLEMENT PO   Take 325 mg by mouth daily                                LEVOTHYROXINE SODIUM PO   Take 137 mcg by mouth daily                                MAGNESIUM LACTATE PO   Take 180 mg by mouth daily                                MELATONIN PO   Take 1 mg by mouth nightly as needed                                nebulizer nebulization   as needed                                phytonadione 0.5 MG/ML suspension   Commonly known as:  MEPHYTON/ VIT K                                PROBIOTIC DAILY PO   Take 1 capsule by mouth 2 times daily                                TURMERIC PO   Take by mouth daily                                TYLENOL PO   Take 500-1,000 mg by mouth daily as needed                                VITAMIN D3 PO   Take 5,000 Units by mouth daily                                VITAMIN K2 PO

## 2017-12-04 NOTE — PROGRESS NOTES
"/79  Pulse 57  Temp 97.6  F (36.4  C) (Oral)  Resp 16  Ht 1.651 m (5' 5\")  Wt 74.8 kg (165 lb)  LMP 08/21/2007  SpO2 99%  BMI 27.46 kg/m2  Patient a/o x 4, VSS. Tolerating regular diet. Patient complains of some discomfort in her left chest/shoulder from device reposition. Dressing is CDI. CMS intact. Patient due to void, ambulate and then discharge later today. Will continue monitoring.   "

## 2017-12-04 NOTE — PROGRESS NOTES
PIV removed.  Pt eating walking and voiding at baseline.  Dressing to L chest cdi.  No bleeding no hematoma.  Cms intact.  No ectopy on tele.  DC instructions including meds, f/u care, activity restrictions and incision care reviewed with pt and pt SO.  Pt left with all belongings.

## 2017-12-04 NOTE — OP NOTE
EP PROCEDURE NOTE    Procedures:  1. PPM generator change.    Attending: Lino Funes MD  Procedure Date: 12/4/2017    Pre-operative Diagnosis:  Pacemaker pocket pain.  Post-operative diagnosis:   Successful PPM pocket revision.  Complications: None.     Fluoroscopy time/dose: none.      Clinical Profile:  Ms. Marroquin is a 57 year old female who has a past medical history significant for of POTS, GERD, RVOT RFA c/b CHB s/p PPM, and pericarditis. She recently saw Dr. Funes in EP clinic reporting pain at pacemaker site. Plan was made to perform PPM pocket revision for which she presents today.     PROCEDURE  The risks and benefits of the procedure were explained to the patient in full.  The risks of the procedure include, but are not limited to: pain, bleeding, blood transfusion reactions, infection, pneumothorax, damage to existing pacemaker leads and device, possible lead revision, perforation of vessels or heart, pericardial effusion, and death. Informed Consent was obtained. The patient was brought to the EP lab in a fasting and hemodynamically stable condition. The patient was prepped and draped in a sterile fashion. The chest wall was anesthetized with 2% Lidocaine.   Sedation: This procedure was performed under moderate sedation under the supervision of the the Staff Physician. The patient received 2 mg Versed and 100 mcg Fentanyl for a total procedural sedation time of 40 minutes. Heart rate, blood pressure, oxygen saturation, and patient responses were monitored throughout the procedure with the assistance of the RN.   An approximately 5-cm incision was made over the old incision line. Electrocautery was used to access the pocket, with adequate hemostasis being provided throughout. After reaching the capsule, the previous suture material was ligated and removed. The pocket was then revised by opening the apex of the fibrous capsule and using blunt dissection expanding the pocket medially and caudally.      The generator hdd not been disconnected. It was placed back in the expanded pocket covered by a Tyrex pouch and secured by 2-0 Ethibond thru the suture port at the apex of the prior pocket to prevent cranial migration. In addition, the apex of the prior pocket capsule was then closed in order to secure device from moving within pocket. The original and expanded pocket had also been flushed with antibiotic solution. A Jet envelope was used due this being a revision procedure.    The pocket was then closed in 3 layers using 2-0 Vicryl for the deep layers and  Dermabond was applied. The patient was then transported to a 6D monitored bed.      I discussed findings with patient and spouse.      EQUIPMENT  1. The generator is  a Medtronic A2DR01, Serial [id number], implanted 4/21/17.   3. The chronic RA Lead is a Medtronic 5076, Serial [id number], implanted 4/21/17.   4. The chronic RV Lead is a Medtronic 5076, Serial [id number], implanted 4/21/17.       PROGRAMMING  Tony Settings:  -Pacing mode: DDDR.  -Lower Rate Limit: 60 ppm.  -Upper Rate Limit: 140 ppm.    PLAN  1. Wound care.  2. Antibiotics: Keflex X 5 days.      I appreciated the opportunity to see and assess Mrs Marroquin and direct the procedure described above . The above note summarizes my findings and current recommendations. Please do not hesitate to contact me if you have any questions or concerns.    Lino Funes MD [facility]   Pager [phone]  Office [phone]

## 2017-12-05 LAB
INTERPRETATION ECG - MUSE: NORMAL

## 2017-12-06 ENCOUNTER — TELEPHONE (OUTPATIENT)
Dept: CARDIOLOGY | Facility: CLINIC | Age: 57
End: 2017-12-06

## 2017-12-06 NOTE — TELEPHONE ENCOUNTER
While scheduling the procedure the patient hung up on the writer and called in a message that she had to work and would finish scheduling at a later time. Multiple calls went out to patient to finish scheduling the follow ups after her procedure and messages were left with the reason for the calls and the phone number to call and finish scheduling the follow ups.     Merced Heller  [facility]   [phone]

## 2017-12-06 NOTE — TELEPHONE ENCOUNTER
Per Dr. Maldonado's NP Adelaida Slater, the patient wants to call in and schedule her follow up appointments and nothing was scheduled at discharge.     Merced Heller  [facility] Electrophys[facility]   [phone]

## 2017-12-11 ENCOUNTER — ALLIED HEALTH/NURSE VISIT (OUTPATIENT)
Dept: CARDIOLOGY | Facility: CLINIC | Age: 57
End: 2017-12-11
Attending: INTERNAL MEDICINE
Payer: COMMERCIAL

## 2017-12-11 DIAGNOSIS — I44.2 COMPLETE ATRIOVENTRICULAR BLOCK (H): Primary | ICD-10-CM

## 2017-12-11 PROCEDURE — 93280 PM DEVICE PROGR EVAL DUAL: CPT | Mod: 26 | Performed by: INTERNAL MEDICINE

## 2017-12-11 NOTE — PATIENT INSTRUCTIONS
It was a pleasure to see you in clinic today.  Please do not hesitate to call us if you have any questions or concerns.  Please return to clinic in 5 mos for a pacemaker check.    Next remote 3/12/17    Adelaida Soto R.N.  Electrophysiology nurse specialist  [facility]  [address] [place] [id number]     Ce Gupta  [phone] business hours    After business hours please call [phone], option #4, and ask for Job code [id number].

## 2017-12-11 NOTE — MR AVS SNAPSHOT
After Visit Summary   12/11/2017    Joyce Marroquin    MRN: [id number]           Patient Information     Date Of Birth          1960        Visit Information        Provider Department      12/11/2017 10:00 AM 1, Uc Cv Device [facility]        Today's Diagnoses     Complete atrioventricular block (H)    -  1      Care Instructions    It was a pleasure to see you in clinic today.  Please do not hesitate to call us if you have any questions or concerns.  Please return to clinic in 5 mos for a pacemaker check.    Next remote 3/12/17    Adelaida Soto R.N.  Electrophysiology nurse specialist  [facility]  [address] [place] [id number]     Ce Gupta  [phone] business hours    After business hours please call [phone], option #4, and ask for Job code [id number].                  Follow-ups after your visit        Follow-up notes from your care team     Discussed this visit Return in about 5 months (around 5/10/2018) for Pacemaker check.      Your next 10 appointments already scheduled     May 10, 2018 11:30 AM CDT   (Arrive by 11:15 AM)   Pacemaker Check with Uc Cv Device 1   [facility] ([facility])    [address] [id number] [phone]            May 10, 2018 12:00 PM CDT   (Arrive by 11:45 AM)   RETURN ARRHYTHMIA with Lino Funes MD   [facility] ([facility])    [address] [place] [id number] [phone]              Future tests that were ordered for you today     Open Future Orders        Priority Expected Expires Ordered    PM DEVICE PROGRAMMING EVAL, DUAL LEAD PACER Routine 12/18/2017 3/18/2018 12/11/2017            Who to contact     If you have questions or need follow up information about today's clinic visit or your schedule please contact [facility] directly at  [phone].  Normal or non-critical lab and imaging results will be communicated to you by MyChart, letter or phone within 4 business days after the clinic has received the results. If you do not hear from us within 7 days, please contact the clinic through docTrackrhart or phone. If you have a critical or abnormal lab result, we will notify you by phone as soon as possible.  Submit refill requests through [employer] or call your pharmacy and they will forward the refill request to us. Please allow 3 business days for your refill to be completed.          Additional Information About Your Visit        docTrackrhart Information     [employer] gives you secure access to your electronic health record. If you see a primary care provider, you can also send messages to your care team and make appointments. If you have questions, please call your primary care clinic.  If you do not have a primary care provider, please call [phone] and they will assist you.        Care EveryWhere ID     This is your Care EveryWhere ID. This could be used by other organizations to access your [place] medical records  [id number]        Your Vitals Were     Last Period                   08/21/2007            Blood Pressure from Last 3 Encounters:   12/04/17 109/70   11/22/17 131/72   11/17/17 108/71    Weight from Last 3 Encounters:   12/04/17 74.8 kg (165 lb)   11/22/17 75.3 kg (166 lb)   11/17/17 75.4 kg (166 lb 4.8 oz)               Primary Care Provider Office Phone # Fax #    Hai PARR Myra [phone]       [facility] [address]   [place] [address]        Equal Access to Services     [facility]: Hadii aad ku hadasho Soomaali, waaxda luqadaha, qaybta kaalmada adeegyada, micah delcid. So [facility] [phone].    ATENCIÓN: Si habla español, tiene a hankins disposición servicios gratuitos de asistencia lingüística. Llame al [phone].    We comply with applicable federal civil rights laws  and [place] laws. We do not discriminate on the basis of race, color, national origin, age, disability, sex, sexual orientation, or gender identity.            Thank you!     Thank you for choosing [facility]  for your care. Our goal is always to provide you with excellent care. Hearing back from our patients is one way we can continue to improve our services. Please take a few minutes to complete the written survey that you may receive in the mail after your visit with us. Thank you!             Your Updated Medication List - Protect others around you: Learn how to safely use, store and throw away your medicines at www.disposemymeds.org.          This list is accurate as of: 12/11/17 11:00 AM.  Always use your most recent med list.                   Brand Name Dispense Instructions for use Diagnosis    acetaminophen-codeine 300-30 MG per tablet    TYLENOL w/CODEINE No. 3    30 tablet    Take 1-2 tablets by mouth every 4 hours as needed for mild pain (mild or moderate pain)    Pain in pacemaker pocket due to device       albuterol 108 (90 BASE) MCG/ACT Inhaler    PROAIR HFA/PROVENTIL HFA/VENTOLIN HFA     Inhale 2 puffs into the lungs every 4 hours as needed for shortness of breath / dyspnea or wheezing        BENADRYL PO      Take 25-50 mg by mouth nightly as needed        cephalexin 500 MG tablet     15 tablet    Take 500 mg by mouth 3 times daily    Pain in pacemaker pocket due to device       DAILY MULTIVITAMIN PO      Take 1 tablet by mouth daily        fluticasone 110 MCG/ACT Inhaler    FLOVENT HFA     Inhale 2 puffs into the lungs 2 times daily        IRON SUPPLEMENT PO      Take 325 mg by mouth daily        LEVOTHYROXINE SODIUM PO      Take 137 mcg by mouth daily        MAGNESIUM LACTATE PO      Take 180 mg by mouth daily    Fatigue, unspecified type       MELATONIN PO      Take 1 mg by mouth nightly as needed        nebulizer nebulization      as needed    H/O pericarditis       phytonadione 0.5  MG/ML suspension    MEPHYTON/ VIT K          PROBIOTIC DAILY PO      Take 1 capsule by mouth 2 times daily        TURMERIC PO      Take by mouth daily    Fatigue, unspecified type       TYLENOL PO      Take 500-1,000 mg by mouth daily as needed        VITAMIN D3 PO      Take 5,000 Units by mouth daily        VITAMIN K2 PO       H/O pericarditis

## 2017-12-20 ENCOUNTER — TELEPHONE (OUTPATIENT)
Dept: ENDOCRINOLOGY | Facility: CLINIC | Age: 57
End: 2017-12-20

## 2017-12-20 NOTE — TELEPHONE ENCOUNTER
Called pt to discuss about parathyroid scan result.    Will also send [employer] message.    Gar[place] Patel MD    Division of Diabetes and Endocrinology  Department of Medicine  [phone]

## 2017-12-21 ENCOUNTER — TELEPHONE (OUTPATIENT)
Dept: ENDOCRINOLOGY | Facility: CLINIC | Age: 57
End: 2017-12-21

## 2017-12-21 NOTE — TELEPHONE ENCOUNTER
Left message for pt to call back. REQQI message sent.    Garland Patel MD    Division of Diabetes and Endocrinology  Department of Medicine  [phone]

## 2017-12-22 ENCOUNTER — MYC MEDICAL ADVICE (OUTPATIENT)
Dept: ENDOCRINOLOGY | Facility: CLINIC | Age: 57
End: 2017-12-22

## 2017-12-22 ENCOUNTER — TELEPHONE (OUTPATIENT)
Dept: ENDOCRINOLOGY | Facility: CLINIC | Age: 57
End: 2017-12-22

## 2017-12-22 DIAGNOSIS — E21.3 HYPERPARATHYROIDISM (H): Primary | ICD-10-CM

## 2017-12-22 NOTE — TELEPHONE ENCOUNTER
Called and discussed with patient regarding parathyroid scan result.  Plan for ultrasound parathyroid    Pt agrees with plan    Garland Patel MD    Division of Diabetes and Endocrinology  Department of Medicine  [phone]

## 2018-01-04 ENCOUNTER — RADIANT APPOINTMENT (OUTPATIENT)
Dept: ULTRASOUND IMAGING | Facility: CLINIC | Age: 58
End: 2018-01-04
Attending: INTERNAL MEDICINE
Payer: COMMERCIAL

## 2018-01-04 ENCOUNTER — TELEPHONE (OUTPATIENT)
Dept: ENDOCRINOLOGY | Facility: CLINIC | Age: 58
End: 2018-01-04

## 2018-01-04 DIAGNOSIS — E21.3 HYPERPARATHYROIDISM (H): ICD-10-CM

## 2018-01-04 PROCEDURE — 76536 US EXAM OF HEAD AND NECK: CPT

## 2018-01-04 NOTE — TELEPHONE ENCOUNTER
I spoke with Megan on the imaging order that clearly states US parathyroid. Apparently whomever  Joyce spoke to in scheduling  the U/S  changed it to soft neck tissue . She will do the Parathyroid U/ S as ordered.

## 2018-01-04 NOTE — TELEPHONE ENCOUNTER
"Megan with [facility], called to request clarification of US. Megan states she received \"generic US' with notes indicating  neck soft tissue. Megan is questioning if this is US of Thyroid/paratyroid.    Pt is scheduled for 1pm appt at [facility].    Please callback Megan at [phone].  "

## 2018-02-03 ENCOUNTER — ALLIED HEALTH/NURSE VISIT (OUTPATIENT)
Dept: CARDIOLOGY | Facility: CLINIC | Age: 58
End: 2018-02-03
Attending: INTERNAL MEDICINE
Payer: COMMERCIAL

## 2018-02-03 ENCOUNTER — TELEPHONE (OUTPATIENT)
Dept: CARDIOLOGY | Facility: CLINIC | Age: 58
End: 2018-02-03

## 2018-02-03 DIAGNOSIS — I44.2 COMPLETE ATRIOVENTRICULAR BLOCK (H): Primary | ICD-10-CM

## 2018-02-03 PROCEDURE — 93294 REM INTERROG EVL PM/LDLS PM: CPT | Performed by: INTERNAL MEDICINE

## 2018-02-03 PROCEDURE — 93296 REM INTERROG EVL PM/IDS: CPT | Mod: ZF

## 2018-02-03 NOTE — TELEPHONE ENCOUNTER
"[employer] remote pacemaker transmission received and reviewed. Device transmission sent per MD orders. Pt called stating she was reaching into the recycling bin and fell into it landing on her pacemaker. She states she is having an anxiety attack, but her HR and BP \"are good\". Her presenting rhythm is AS/ @ 96 bpm. 1 AT/AF episode recorded lasting <1 minute. Normal device function. AP= 17% and = 100%. No short V-V intervals recorded. Lead trends appear stable. Battery estimates 8 years to KONRAD. Pt notified of transmission results. Relaxation techniques reviewed with pt. She states she will rest and relax.  Remote pacemaker transmission      "

## 2018-02-03 NOTE — MR AVS SNAPSHOT
After Visit Summary   2/3/2018    Joyce Marroquin    MRN: [id number]           Patient Information     Date Of Birth          1960        Visit Information        Provider Department      2/3/2018 6:00 AM UC ICD REMOTE [facility]        Today's Diagnoses     Complete atrioventricular block (H)    -  1       Follow-ups after your visit        Your next 10 appointments already scheduled     May 10, 2018 11:30 AM CDT   (Arrive by 11:15 AM)   Pacemaker Check with Uc Cv Device 1   [facility] ([facility])    [address] [place] [address]  [address] [place] [address] [phone]            May 10, 2018 12:00 PM CDT   (Arrive by 11:45 AM)   RETURN ARRHYTHMIA with Lino Funes MD   [facility] ([facility])    [address] [place] [address]  [address] [place] [address] [phone]              Who to contact     If you have questions or need follow up information about today's clinic visit or your schedule please contact [facility] directly at [phone].  Normal or non-critical lab and imaging results will be communicated to you by Vid[employer]hart, letter or phone within 4 business days after the clinic has received the results. If you do not hear from us within 7 days, please contact the clinic through [employer] or phone. If you have a critical or abnormal lab result, we will notify you by phone as soon as possible.  Submit refill requests through [employer] or call your pharmacy and they will forward the refill request to us. Please allow 3 business days for your refill to be completed.          Additional Information About Your Visit        Vidcasterhart Information     [employer] gives you secure access to your electronic health record. If you see a primary care provider, you can also send messages to your care team and make appointments. If you have questions, please call your primary care clinic.  If you do not have a primary  care provider, please call [phone] and they will assist you.        Care EveryWhere ID     This is your Care EveryWhere ID. This could be used by other organizations to access your [place] medical records  [id number]        Your Vitals Were     Last Period                   08/21/2007            Blood Pressure from Last 3 Encounters:   12/04/17 109/70   11/22/17 131/72   11/17/17 108/71    Weight from Last 3 Encounters:   12/04/17 74.8 kg (165 lb)   11/22/17 75.3 kg (166 lb)   11/17/17 75.4 kg (166 lb 4.8 oz)              We Performed the Following     (06173) INTERROGATION DEVICE EVAL REMOTE, PACER/ICD        Primary Care Provider Office Phone # Fax #    Hai Renteria [phone]       [facility] [address]   [place] [address]        Equal Access to Services     [facility] : Hadii aad ku hadasho Soomaali, waaxda luqadaha, qaybta kaalmada adeegyada, waxay idiin hayaan adeeg kharash la'aan . So [facility] [phone].    ATENCIÓN: Si habla español, tiene a hankins disposición servicios gratuitos de asistencia lingüística. Jp al [phone].    We comply with applicable federal civil rights laws and [place] laws. We do not discriminate on the basis of race, color, national origin, age, disability, sex, sexual orientation, or gender identity.            Thank you!     Thank you for choosing [facility]  for your care. Our goal is always to provide you with excellent care. Hearing back from our patients is one way we can continue to improve our services. Please take a few minutes to complete the written survey that you may receive in the mail after your visit with us. Thank you!             Your Updated Medication List - Protect others around you: Learn how to safely use, store and throw away your medicines at www.disposemymeds.org.          This list is accurate as of 2/3/18 11:59 PM.  Always use your most recent med list.                   Brand Name Dispense  Instructions for use Diagnosis    acetaminophen-codeine 300-30 MG per tablet    TYLENOL w/CODEINE No. 3    30 tablet    Take 1-2 tablets by mouth every 4 hours as needed for mild pain (mild or moderate pain)    Pain in pacemaker pocket due to device       albuterol 108 (90 BASE) MCG/ACT Inhaler    PROAIR HFA/PROVENTIL HFA/VENTOLIN HFA     Inhale 2 puffs into the lungs every 4 hours as needed for shortness of breath / dyspnea or wheezing        BENADRYL PO      Take 25-50 mg by mouth nightly as needed        cephalexin 500 MG tablet     15 tablet    Take 500 mg by mouth 3 times daily    Pain in pacemaker pocket due to device       DAILY MULTIVITAMIN PO      Take 1 tablet by mouth daily        fluticasone 110 MCG/ACT Inhaler    FLOVENT HFA     Inhale 2 puffs into the lungs 2 times daily        IRON SUPPLEMENT PO      Take 325 mg by mouth daily        LEVOTHYROXINE SODIUM PO      Take 137 mcg by mouth daily        MAGNESIUM LACTATE PO      Take 180 mg by mouth daily    Fatigue, unspecified type       MELATONIN PO      Take 1 mg by mouth nightly as needed        nebulizer nebulization      as needed    H/O pericarditis       phytonadione 0.5 MG/ML suspension    MEPHYTON/ VIT K          PROBIOTIC DAILY PO      Take 1 capsule by mouth 2 times daily        TURMERIC PO      Take by mouth daily    Fatigue, unspecified type       TYLENOL PO      Take 500-1,000 mg by mouth daily as needed        VITAMIN D3 PO      Take 5,000 Units by mouth daily        VITAMIN K2 PO       H/O pericarditis

## 2018-04-09 ENCOUNTER — CARE COORDINATION (OUTPATIENT)
Dept: CARDIOLOGY | Facility: CLINIC | Age: 58
End: 2018-04-09

## 2018-04-09 NOTE — PROGRESS NOTES
Spoke to patient and discussed indications for antibiotic use before dental procedures. Patient does not have a history of these per chart (and she confirms verbally she does not) and therefore does not require antibiotics from cardiology standpoint. She states dentist is requiring antibiotics. Antibiotic recommendation regarding bone graft should come from performing surgeon.  She just wanted to ensure that having a pacemaker didn't require specific antibiotics before this type of procedure.  Patient verbalized understanding and is in agreement to the plan.         Reviewed patient chart and indications for SBE prophylaxis surrounding dental procedures per AHA guidelines below.           PAtient Question  Received: Today       Anna Huddleston Maria, RN                   Patient is having an oral procedure she is having a tooth removed and a bone graft.  She would like to know what antibiotic she needs.  She was very upset to when she called triage and was informed I was not a nurse.  I told her I could get her a nurse to talk to you but requested you.  She said it is time sensitive and she would like a call back by 10 am.

## 2018-04-18 ENCOUNTER — CARE COORDINATION (OUTPATIENT)
Dept: CARDIOLOGY | Facility: CLINIC | Age: 58
End: 2018-04-18

## 2018-04-18 NOTE — PROGRESS NOTES
"Pt called triage. Stated she was concerned over recent ED visit. Stated she was at work, became lightheaded, some chest pain and went to ED.  Stated \"everything was normal\"   Normal interrogation PPM, labs normal, d dimer slightly elevated, so CT was done and r/o PE. Neg troponin.    Pt saw PCP today, felt it was vagal response.   Pt stated she is also struggling with dental infection, is on abx and is \"very sensitive to medication\"  Symptoms maybe related to this situation.     Discussed appt andria 5/3- pt stated she felt comfortable going back to work and will follow up as scheduled.    Device clinic updated  "

## 2018-04-23 ENCOUNTER — EXTERNAL ORDER RESULTS (OUTPATIENT)
Dept: CARE COORDINATION | Facility: CLINIC | Age: 58
End: 2018-04-23

## 2018-04-25 ENCOUNTER — PRE VISIT (OUTPATIENT)
Dept: CARDIOLOGY | Facility: CLINIC | Age: 58
End: 2018-04-25

## 2018-04-25 NOTE — TELEPHONE ENCOUNTER
HPI:   Ms. Marroquin is a 57 year old female with a PMHx of POTS, GERD, RVOT RFA c/b CHB s/p PPM, pericarditis who presents for follow up.     Patient started noticing fatigue over the past few months. Patient states this is different than POTS or PVC symptoms. These symptoms can occur with rest or exertion. She has noted that her exercise tolerance has gone down (30 minutes on the treadmill, compared to 40 minutes before). Patient denies chest pain when she works out but notices that she is dizzy. Denies orthopnea or PND. Patient does note lower extremity swelling to the ankle.     Even though she does not have chest pain with exertion, she does note intermittent chest pain throughout the day. The chest pain is atypical in nature and not worse with inspiration or coughing. She states she is have chest pain and rates it at a 5. However, she does not appear in distress.     Patient did report one episode of LH/dizziness when shopping. However, she did not have a passing out episode.     Because of these symptoms, she had an ECHO which showed EF of 40-45%. She had an ECHO in 05/2017 which showed EF around 50%. Another ECHO (right after PPM implantation in 04/2017) showed EF around 40-45%. However, MRI in early 04/2017 showed normal EF 60-65%. There was an area of basal inferior hypokinesis with possible LGE. However, stress test in 03/2017 showed no areas on inducible ischemia.      Patient had sleep study and started on CPAP machine. Her symptoms have possibly gotten better since starting; however, it is difficult to tell.     She has some other issues with her pacemaker device. She first reports that is too close to her collarbone. Second, she is worried about stretching and performing yoga maneuvers with the pacemaker in place.   _____________________________________________________________________________  PAST MEDICAL HISTORY:  Past Medical History:   Diagnosis Date     Chronic depressive personality disorder       Esophageal reflux        CURRENT MEDICATIONS:  Current Outpatient Prescriptions   Medication Sig Dispense Refill     Acetaminophen (TYLENOL PO) Take 500-1,000 mg by mouth daily as needed        acetaminophen-codeine (TYLENOL W/CODEINE NO. 3) 300-30 MG per tablet Take 1-2 tablets by mouth every 4 hours as needed for mild pain (mild or moderate pain) 30 tablet 0     albuterol (PROAIR HFA/PROVENTIL HFA/VENTOLIN HFA) 108 (90 BASE) MCG/ACT Inhaler Inhale 2 puffs into the lungs every 4 hours as needed for shortness of breath / dyspnea or wheezing       cephalexin 500 MG tablet Take 500 mg by mouth 3 times daily 15 tablet 0     Cholecalciferol (VITAMIN D3 PO) Take 5,000 Units by mouth daily       DiphenhydrAMINE HCl (BENADRYL PO) Take 25-50 mg by mouth nightly as needed       Ferrous Sulfate (IRON SUPPLEMENT PO) Take 325 mg by mouth daily       fluticasone (FLOVENT HFA) 110 MCG/ACT Inhaler Inhale 2 puffs into the lungs 2 times daily       LEVOTHYROXINE SODIUM PO Take 137 mcg by mouth daily       MAGNESIUM LACTATE PO Take 180 mg by mouth daily       MELATONIN PO Take 1 mg by mouth nightly as needed       Menaquinone-7 (VITAMIN K2 PO)        Multiple Vitamin (DAILY MULTIVITAMIN PO) Take 1 tablet by mouth daily        nebulizer nebulization as needed       phytonadione (MEPHYTON/ VIT K) suspension        Probiotic Product (PROBIOTIC DAILY PO) Take 1 capsule by mouth 2 times daily        TURMERIC PO Take by mouth daily         PAST SURGICAL HISTORY:  Past Surgical History:   Procedure Laterality Date     EP ABLATION / EP STUDIES  04/21/2017     HC CORRECT BUNION,SIMPLE       PPM INSERT OF NEW OR REPL W/VENT LEAD  04/21/2017       ALLERGIES:     Allergies   Allergen Reactions     Flagyl [Metronidazole] Rash     Atenolol Other (See Comments)     Had extreme fatigue, dizziness, and overall felt unwell.      Bactrim [Sulfamethoxazole W/Trimethoprim]      Corn Oil      Lorazepam      Oxytetracycline       Sulfamethoxazole-Trimethoprim      Other reaction(s): Other  Passed out     Tetracycline      Zofran [Ondansetron]      Prolonged QT  Prolonged QT at baseline per patient     Benzodiazepines Other (See Comments)     Other reaction(s): Other  Extreme heat intolerance  Extreme heat intolerance     Cyclobenzaprine Other (See Comments)     Extreme heat intolerance     Levaquin [Levofloxacin]      Other reaction(s): Other  Tendon rupture     Nsaids Other (See Comments)     Exacerbated asthma       FAMILY HISTORY:          SOCIAL HISTORY:  Social History   Substance Use Topics     Smoking status: Former Smoker     Smokeless tobacco: Never Used     Alcohol use Yes      Comment: rarely       ROS:   Constitutional: No fever, chills, or sweats. Weight stable.   ENT: No visual disturbance, ear ache, epistaxis, sore throat.   Cardiovascular: As per HPI.   Respiratory: No cough, hemoptysis.    GI: No nausea, vomiting, hematemesis, melena, or hematochezia.   : No hematuria.   Integument: Negative.   Psychiatric: Negative.   Hematologic:  Easy bruising, no easy bleeding.  Neuro: Negative.   Endocrinology: No significant heat or cold intolerance   Musculoskeletal: No myalgia.    Exam:  LMP 08/21/2007  General: No acute distress   HEENT: NC/AT   CV: RRR, +S1/S2, No murmurs, rubs, gallops. JVP is not elevated.    Pulm: Unlabored breathing, CTAB   GI: s/nt/nd   Ext: No edema   Neuro: No focal defects   Psych: Normal Affect    Labs:  CBC RESULTS:   Lab Results   Component Value Date    WBC 5.9 12/04/2017    RBC 4.35 12/04/2017    HGB 13.6 12/04/2017    HCT 42.2 12/04/2017    MCV 97 12/04/2017    MCH 31.3 12/04/2017    MCHC 32.2 12/04/2017    RDW 13.1 12/04/2017     12/04/2017       BMP RESULTS:  Lab Results   Component Value Date     12/04/2017    POTASSIUM 4.2 12/04/2017    CHLORIDE 109 12/04/2017    CO2 26 12/04/2017    ANIONGAP 6 12/04/2017    GLC 83 12/04/2017    BUN 12 12/04/2017    CR 0.64 12/04/2017     GFRESTIMATED >90 12/04/2017    GFRESTBLACK >90 12/04/2017    MANJU 9.6 12/04/2017        INR RESULTS:  Lab Results   Component Value Date    INR 0.95 06/28/2017       Procedures:    Echocardiogram: 10/20/2017:     Interpretation Summary  Mildly (EF 40-45%) reduced left ventricular function is present.  Global right ventricular function is normal.  Right ventricular systolic pressure is 25mmHg above the right atrial pressure.  A pacemaker lead is noted in the right ventricle.  No pericardial effusion is present.  The inferior vena cava was normal in size with preserved respiratory  variability.  Estimated mean right atrial pressure is 3 mmHg.  Previous study not available for comparison.  _____________________________________________________________________________  __        Left Ventricle  Left ventricular wall thickness is normal. Left ventricular size is normal.  The Ejection Fraction was calculated using Bi-plane contrast. Mildly (EF 40-  45%) reduced left ventricular function is present. Normal left ventricular  filling for age. Biplane traced at 42%. Mild diffuse hypokinesis is present.  Abnormal septal motion consistent with pacemaker is present.     Right Ventricle  The right ventricle is normal size. Global right ventricular function is  normal. A pacemaker lead is noted in the right ventricle.     Atria  Both atria appear normal. The atrial septum is intact as assessed by color  Doppler .        Mitral Valve  Trace mitral insufficiency is present.     Aortic Valve  Aortic valve is normal in structure and function. The aortic valve is  tricuspid.     Tricuspid Valve  The tricuspid valve is normal. Trace tricuspid insufficiency is present. Right  ventricular systolic pressure is 25mmHg above the right atrial pressure.     Pulmonic Valve  The pulmonic valve is normal.     Vessels  The aorta root is normal. The pulmonary artery is normal. The inferior vena  cava was normal in size with preserved respiratory  variability. Estimated mean  right atrial pressure is 3 mmHg.        Pericardium  No pericardial effusion is present.     Compared to Previous Study  Previous study not available for comparison.     Attestation  I have personally viewed the imaging and agree with the interpretation and  report as documented by the fellow, Reuben Holley, and/or edited by me.  _____________________________________________________________________________  __     MMode/2D Measurements & Calculations  IVSd: 0.98 cm  LVIDd: 4.8 cm  LVIDs: 3.7 cm  LVPWd: 0.69 cm  FS: 23.1 %  EDV(Teich): 105.7 ml  ESV(Teich): 56.8 ml  LV mass(C)d: 131.8 grams  LV mass(C)dI: 72.9 grams/m2  Ao root diam: 2.8 cm  asc Aorta Diam: 2.9 cm  LVOT diam: 2.1 cm  LVOT area: 3.6 cm2  LA Volume (BP): 49.6 ml     LA Volume Index (BP): 27.4 ml/m2        Doppler Measurements & Calculations  MV E max veronique: 67.1 cm/sec  MV A max veronique: 97.8 cm/sec  MV E/A: 0.69  MV dec time: 0.14 sec  TR max veronique: 249.8 cm/sec  TR max P.0 mmHg  Lateral E/e': 13.0  Med E to E': 10.5  Medial E/e': 10.5      ECHO 2017  Summary:   1. LV function is low normal. The visually estimated ejection fraction is 50%. LVEF difficult to assess due to conduction delay and trigeminal rhythm.   2. Septal motion consistent with conduction delay/paced rhythm.   3. No evidence of pulmonary hypertension.   4. No hemodynamically significant valvular disease.   5. No pericardial effusion.   6. Compared to prior study (3/28/2017); there's significant change: pacer is new; conduction delay vs paced rhythm with septal wall motion abnormality due to this, also new.    _____________________________________________________________________  PHYSICIAN INTERPRETATION: Left Ventricle: The left ventricular size is normal. LV function is low normal. The visually estimated ejection fraction is 50%. LV septal wall thickness is normal. LV posterior wall thickness is normal. Paradoxical septal motion, consistent with  conduction delay. LV diastolic function is indeterminant due to rhythm.  Left Atrium: The left atrium is normal in size.  Right Ventricle: The right ventricular size is normal. Global RV systolic function is normal.  Right Atrium: The right atrium is normal in size.  Mitral Valve: Nonspecifically thickened and calcified mitral valve leaflets. Mitral leaflet mobility is normal. Trace mitral valve regurgitation.  Aortic Valve: The aortic valve was not well visualized. There is no evidence of significant aortic stenosis. The peak and mean gradients are 6.0 mmHg and 3.8 mmHg, respectively. No evidence of significant aortic valve regurgitation.  Tricuspid Valve: The tricuspid valve structure is nonspecifically thickened. Trace tricuspid regurgitation. There is no evidence of pulmonary hypertension with an estimated right ventricular systolic pressure (RVSP) of 18.68.  Pulmonic Valve: The pulmonic valve was not well visualized. No significant pulmonic valve regurgitation.  Pericardium: No pericardial effusion.  Aorta: The aorta was not well visualized.  The ascending aorta was not well visualized.  Venous: The inferior vena cava measures 1.70 cm and collapses normally with respiration.  Additional Comments: Cardiac rhythm is indeterminant. Image quality for this study is adequate. A pacer wire is visualized in the right atrium and right ventricle.  In comparison to the previous echocardiogram(s): Prior examinations are available and were reviewed for comparison purposes. A prior study was performed on 3/28/2017. Compared to prior study (3/28/2017); there's significant change: pacer is new; conduction delay vs paced rhythm with septal wall motion abnormality due to this, also new.  2D AND M-MODE MEASUREMENTS:  Left Ventricle:         Normal    Left Atrium:                Normal  IVSd (2D):      0.78 cm (0.7-1.1) LA Major diam,s, A2c 3.9 cm  LVPWd (2D):     0.94 cm (0.7-1.1)  LVIDd (2D):     4.37 cm (3.4-5.7) LA Volume  A/L:                 LA Major:  LVIDs (2D):     3.47 cm           LA Vol A4C A/L      21.1 ml    A4C,s,:  LV FS (2D):     20.6 %  (>25%)    LA Vol A2C A/L      28.1 ml    A2C,s,:3.9 cm                                    LA Vol BP A/L       27.4 ml                                    LA Vol BP A/L index 15.7 ml/m                                       Right Ventricle:                                    Right Atrium:                                    RA,s major, A4C  2.9 cm                                    RA,s minor, A4C  3.1 cm    LV SYSTOLIC FUNCTION BY 2D PLANIMETRY (MOD):  EF-A4C View: 49.8 % EF-A2C View: 50.1 % EF-Biplane: 52.1 %    Aortic Valve: AoV Max Adithya: 1.23 m/s AoV Peak P.0 mmHg AoV Mean PG: 3.8 mmHg    LVOT Vmax:   1.15 m/s LVOT VTI:       0.169 m  LVOT Diam: 2.10 cm  LVOT PK grad 5 mmHg   LVOT mean grad: 3.0 mmHg    AoV Area, Vmax: 3.22 cm  AoV Area, VTI: 2.99 cm  AoV Area, Vmn: 2.97 cm     Tricuspid Valve and PA/RV Systolic Pressure: TR Max Velocity: 1.98 m/s  RA Pressure:         RVSP/PASP:  TR PK Grad       15.7 mmHg IVC diameter 1.70 cm RVOT diam, s        HOLTER MONITOR 3/20/2017  IMPRESSION:  Twenty-four hours of recordings were produced.    Rhythm was sinus throughout the recording with average heart rate 78 beats per minute.  Minimum heart rate was 50 and maximal heart rate was 121 beats per minute.    Ventricular ectopic activity consisted out of 10,064 beats (10.3% of the total).  Twenty beats were in couplets, the rest were single VEs.  There was no ventricular tachycardia.    Supraventricular ectopic activity consisted out of 12,500 beats (12.8% of total), of which three were in one run (three beats at 111 beats per minute), 38 were in couplets and the other were all single PACs.  There was no other significant arrhythmia.    During symptoms of lightheadedness, hot flash, sweating and headache, the rhythm was sinus with single VEs and PACs.  During patient events, the rhythm was sinus  with single VEs and PACs.          Assessment and Plan:   Ms. Marroquin is a 57 year old female with a PMHx of POTS, GERD, RVOT RFA c/b CHB s/p PPM, pericarditis who presents for follow up.     Patient has been having symptoms of fatigue over the past few months with reduced exercise tolerance. Her ECHO showed EF around 45-50%. However, looking at previous ECHO's dating back from April 2017, this appears to be relatively unchanged since her PPM implantation. Pacemaker was interrogated and showed no abnormalities with normal heart rates. Therefore, it is less likely that her symptoms are related to a cardiac etiology. However, given the symptoms, will work up medical causes for her symptoms of fatigue and reduced exercise tolerance.     Plan:  1) Will order CBC, CMP, TSH, T3, PTH (patient reports a previous parathyroid problem), ESR/CRP (history of pericarditis).   2) Will repeat ECHO in 1 year or sooner if symptoms are not improving.  3) Continue CPAP as patient has newly diagnosed sleep apnea   4) Regarding exercise limitations with pacemaker, she should be able to do any maneuver that does not put excessive pressure on her left shoulder.         I very much appreciated the opportunity to see and assess Mrs Joyce Marroquin in the clinic with CV Fellow.       I spent 30 min with Ms Marroquin of which more than 50% dealt with diagnosis,  treatment and management issues I agree with the note above which summarizes my findings and current recs,    Please do not hesitate to contact my office if you have any questions or concerns.      Lino Funes MD  Cardiac Arrhythmia Service  [facility]  [phone]        CC

## 2018-05-03 ENCOUNTER — ALLIED HEALTH/NURSE VISIT (OUTPATIENT)
Dept: CARDIOLOGY | Facility: CLINIC | Age: 58
End: 2018-05-03
Attending: INTERNAL MEDICINE
Payer: COMMERCIAL

## 2018-05-03 VITALS
SYSTOLIC BLOOD PRESSURE: 121 MMHG | BODY MASS INDEX: 26.64 KG/M2 | OXYGEN SATURATION: 98 % | HEIGHT: 65 IN | WEIGHT: 159.9 LBS | DIASTOLIC BLOOD PRESSURE: 84 MMHG | HEART RATE: 83 BPM

## 2018-05-03 DIAGNOSIS — I44.2 COMPLETE ATRIOVENTRICULAR BLOCK (H): Primary | ICD-10-CM

## 2018-05-03 PROBLEM — Z95.0 S/P CARDIAC PACEMAKER PROCEDURE: Status: RESOLVED | Noted: 2017-12-04 | Resolved: 2018-05-03

## 2018-05-03 PROBLEM — Z95.0 CARDIAC PACEMAKER IN SITU: Status: ACTIVE | Noted: 2018-05-03

## 2018-05-03 PROCEDURE — 99214 OFFICE O/P EST MOD 30 MIN: CPT | Mod: ZP | Performed by: INTERNAL MEDICINE

## 2018-05-03 PROCEDURE — G0463 HOSPITAL OUTPT CLINIC VISIT: HCPCS | Mod: 25,ZF

## 2018-05-03 PROCEDURE — 93280 PM DEVICE PROGR EVAL DUAL: CPT | Mod: 26 | Performed by: INTERNAL MEDICINE

## 2018-05-03 PROCEDURE — 93280 PM DEVICE PROGR EVAL DUAL: CPT | Mod: ZF

## 2018-05-03 ASSESSMENT — PAIN SCALES - GENERAL: PAINLEVEL: NO PAIN (0)

## 2018-05-03 NOTE — LETTER
5/3/2018      RE: Joyce Marroquin  [address] [facility] [address]       Dear Colleague,    Thank you for the opportunity to participate in the care of your patient, Joyce Marroquin, at the [facility] at [facility]. Please see a copy of my visit note below.    HPI:   Ms. Marroquin is a 57 year old female with a PMHx of POTS, GERD, RVOT RFA c/b CHB s/p PPM, pericarditis who presents for follow up.     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 She has CHB and  had PPM placed at [facility] but was unhappy with discomfort.     She has had some other issues with her pacemaker device. She first reports that is too close to her collarbone. Second, she is worried about stretching and performing yoga maneuvers with the pacemaker in place. Consequently we repositioned device and she is happier.    Now mnain concern is a sense of feeling heart beat when lying on left side. The stimulus is relatively low amplitude so it unlikely to be stim effect.  Could be Phrenic, but does not seem typical    She also notes decreased ex tolerance when pushing beds in hospital..  May benefit from ADL adjustment    We discussed options and will adjust ppm programming. Plan follow2-up in 3 mos    _____________________________________________________________________________  PAST MEDICAL HISTORY:  Past Medical History:   Diagnosis Date     Chronic depressive personality disorder      Esophageal reflux        CURRENT MEDICATIONS:  Current Outpatient Prescriptions   Medication Sig Dispense Refill     Acetaminophen (TYLENOL PO) Take 500-1,000 mg by mouth daily as needed        albuterol (PROAIR HFA/PROVENTIL HFA/VENTOLIN HFA) 108 (90 BASE) MCG/ACT Inhaler Inhale 2 puffs into the lungs every 4 hours as needed for shortness of breath / dyspnea or wheezing       Cholecalciferol (VITAMIN D3 PO) Take 7,000 Units by mouth daily        DiphenhydrAMINE HCl (BENADRYL PO) Take 25-50 mg by mouth nightly  as needed       Ferrous Sulfate (IRON SUPPLEMENT PO) Take 325 mg by mouth daily       LEVOTHYROXINE SODIUM PO Take 137 mcg by mouth daily       MAGNESIUM LACTATE PO Take 180 mg by mouth daily       MELATONIN PO Take 1 mg by mouth nightly as needed       Menaquinone-7 (VITAMIN K2 PO)        Multiple Vitamin (DAILY MULTIVITAMIN PO) Take 1 tablet by mouth daily        nebulizer nebulization as needed       Probiotic Product (PROBIOTIC DAILY PO) Take 1 capsule by mouth 2 times daily        TURMERIC PO Take by mouth daily       acetaminophen-codeine (TYLENOL W/CODEINE NO. 3) 300-30 MG per tablet Take 1-2 tablets by mouth every 4 hours as needed for mild pain (mild or moderate pain) (Patient not taking: Reported on 5/3/2018) 30 tablet 0     cephalexin 500 MG tablet Take 500 mg by mouth 3 times daily (Patient not taking: Reported on 5/3/2018) 15 tablet 0     fluticasone (FLOVENT HFA) 110 MCG/ACT Inhaler Inhale 2 puffs into the lungs 2 times daily       phytonadione (MEPHYTON/ VIT K) suspension          PAST SURGICAL HISTORY:  Past Surgical History:   Procedure Laterality Date     EP ABLATION / EP STUDIES  04/21/2017     HC CORRECT BUNION,SIMPLE       PPM INSERT OF NEW OR REPL W/VENT LEAD  04/21/2017       ALLERGIES:     Allergies   Allergen Reactions     Flagyl [Metronidazole] Rash     Atenolol Other (See Comments)     Had extreme fatigue, dizziness, and overall felt unwell.      Bactrim [Sulfamethoxazole W/Trimethoprim]      Corn Oil      Lorazepam      Oxytetracycline      Sulfamethoxazole-Trimethoprim      Other reaction(s): Other  Passed out     Tetracycline      Zofran [Ondansetron]      Prolonged QT  Prolonged QT at baseline per patient     Benzodiazepines Other (See Comments)     Other reaction(s): Other  Extreme heat intolerance  Extreme heat intolerance     Cyclobenzaprine Other (See Comments)     Extreme heat intolerance     Levaquin [Levofloxacin]      Other reaction(s): Other  Tendon rupture     Nsaids Other  "(See Comments)     Exacerbated asthma       FAMILY HISTORY:          SOCIAL HISTORY:  Social History   Substance Use Topics     Smoking status: Former Smoker     Smokeless tobacco: Never Used     Alcohol use Yes      Comment: rarely       ROS:   Constitutional: No fever, chills, or sweats. Weight stable.   ENT: No visual disturbance, ear ache, epistaxis, sore throat.   Cardiovascular: As per HPI.   Respiratory: No cough, hemoptysis.    GI: No nausea, vomiting, hematemesis, melena, or hematochezia.   : No hematuria.   Integument: Negative.   Psychiatric: Negative.   Hematologic:  Easy bruising, no easy bleeding.  Neuro: Negative.   Endocrinology: No significant heat or cold intolerance   Musculoskeletal: No myalgia.    Exam:  /84 (BP Location: Left arm, Patient Position: Chair, Cuff Size: Adult Regular)  Pulse 83  Ht 1.651 m (5' 5\")  Wt 72.5 kg (159 lb 14.4 oz)  LMP 08/21/2007  SpO2 98%  BMI 26.61 kg/m2  General: No acute distress   HEENT: NC/AT   CV: RRR, +S1/S2, No murmurs, rubs, gallops. JVP is not elevated.    Pulm: Unlabored breathing, CTAB   GI: s/nt/nd   Ext: No edema   Neuro: No focal defects   Psych: Normal Affect  SKIN: PPM site well healed. One suture sleeve palpable but in safe position    Labs:  CBC RESULTS:   Lab Results   Component Value Date    WBC 5.9 12/04/2017    RBC 4.35 12/04/2017    HGB 13.6 12/04/2017    HCT 42.2 12/04/2017    MCV 97 12/04/2017    MCH 31.3 12/04/2017    MCHC 32.2 12/04/2017    RDW 13.1 12/04/2017     12/04/2017       BMP RESULTS:  Lab Results   Component Value Date     12/04/2017    POTASSIUM 4.2 12/04/2017    CHLORIDE 109 12/04/2017    CO2 26 12/04/2017    ANIONGAP 6 12/04/2017    GLC 83 12/04/2017    BUN 12 12/04/2017    CR 0.64 12/04/2017    GFRESTIMATED >90 12/04/2017    GFRESTBLACK >90 12/04/2017    MANJU 9.6 12/04/2017        INR RESULTS:  Lab Results   Component Value Date    INR 0.95 06/28/2017       Procedures:    Echocardiogram: " 10/20/2017:     Interpretation Summary  Mildly (EF 40-45%) reduced left ventricular function is present.  Global right ventricular function is normal.  Right ventricular systolic pressure is 25mmHg above the right atrial pressure.  A pacemaker lead is noted in the right ventricle.  No pericardial effusion is present.  The inferior vena cava was normal in size with preserved respiratory  variability.  Estimated mean right atrial pressure is 3 mmHg.  Previous study not available for comparison.  _____________________________________________________________________________  __        Left Ventricle  Left ventricular wall thickness is normal. Left ventricular size is normal.  The Ejection Fraction was calculated using Bi-plane contrast. Mildly (EF 40-  45%) reduced left ventricular function is present. Normal left ventricular  filling for age. Biplane traced at 42%. Mild diffuse hypokinesis is present.  Abnormal septal motion consistent with pacemaker is present.     Right Ventricle  The right ventricle is normal size. Global right ventricular function is  normal. A pacemaker lead is noted in the right ventricle.     Atria  Both atria appear normal. The atrial septum is intact as assessed by color  Doppler .        Mitral Valve  Trace mitral insufficiency is present.     Aortic Valve  Aortic valve is normal in structure and function. The aortic valve is  tricuspid.     Tricuspid Valve  The tricuspid valve is normal. Trace tricuspid insufficiency is present. Right  ventricular systolic pressure is 25mmHg above the right atrial pressure.     Pulmonic Valve  The pulmonic valve is normal.     Vessels  The aorta root is normal. The pulmonary artery is normal. The inferior vena  cava was normal in size with preserved respiratory variability. Estimated mean  right atrial pressure is 3 mmHg.        Pericardium  No pericardial effusion is present.     Compared to Previous Study  Previous study not available for  comparison.     Attestation  I have personally viewed the imaging and agree with the interpretation and  report as documented by the fellow, Reuben Holley, and/or edited by me.  _____________________________________________________________________________  __     MMode/2D Measurements & Calculations  IVSd: 0.98 cm  LVIDd: 4.8 cm  LVIDs: 3.7 cm  LVPWd: 0.69 cm  FS: 23.1 %  EDV(Teich): 105.7 ml  ESV(Teich): 56.8 ml  LV mass(C)d: 131.8 grams  LV mass(C)dI: 72.9 grams/m2  Ao root diam: 2.8 cm  asc Aorta Diam: 2.9 cm  LVOT diam: 2.1 cm  LVOT area: 3.6 cm2  LA Volume (BP): 49.6 ml     LA Volume Index (BP): 27.4 ml/m2        Doppler Measurements & Calculations  MV E max veronique: 67.1 cm/sec  MV A max veronique: 97.8 cm/sec  MV E/A: 0.69  MV dec time: 0.14 sec  TR max veronique: 249.8 cm/sec  TR max P.0 mmHg  Lateral E/e': 13.0  Med E to E': 10.5  Medial E/e': 10.5      ECHO 2017  Summary:   1. LV function is low normal. The visually estimated ejection fraction is 50%. LVEF difficult to assess due to conduction delay and trigeminal rhythm.   2. Septal motion consistent with conduction delay/paced rhythm.   3. No evidence of pulmonary hypertension.   4. No hemodynamically significant valvular disease.   5. No pericardial effusion.   6. Compared to prior study (3/28/2017); there's significant change: pacer is new; conduction delay vs paced rhythm with septal wall motion abnormality due to this, also new.    _____________________________________________________________________  PHYSICIAN INTERPRETATION: Left Ventricle: The left ventricular size is normal. LV function is low normal. The visually estimated ejection fraction is 50%. LV septal wall thickness is normal. LV posterior wall thickness is normal. Paradoxical septal motion, consistent with conduction delay. LV diastolic function is indeterminant due to rhythm.  Left Atrium: The left atrium is normal in size.  Right Ventricle: The right ventricular size is normal. Global RV  systolic function is normal.  Right Atrium: The right atrium is normal in size.  Mitral Valve: Nonspecifically thickened and calcified mitral valve leaflets. Mitral leaflet mobility is normal. Trace mitral valve regurgitation.  Aortic Valve: The aortic valve was not well visualized. There is no evidence of significant aortic stenosis. The peak and mean gradients are 6.0 mmHg and 3.8 mmHg, respectively. No evidence of significant aortic valve regurgitation.  Tricuspid Valve: The tricuspid valve structure is nonspecifically thickened. Trace tricuspid regurgitation. There is no evidence of pulmonary hypertension with an estimated right ventricular systolic pressure (RVSP) of 18.68.  Pulmonic Valve: The pulmonic valve was not well visualized. No significant pulmonic valve regurgitation.  Pericardium: No pericardial effusion.  Aorta: The aorta was not well visualized.  The ascending aorta was not well visualized.  Venous: The inferior vena cava measures 1.70 cm and collapses normally with respiration.  Additional Comments: Cardiac rhythm is indeterminant. Image quality for this study is adequate. A pacer wire is visualized in the right atrium and right ventricle.  In comparison to the previous echocardiogram(s): Prior examinations are available and were reviewed for comparison purposes. A prior study was performed on 3/28/2017. Compared to prior study (3/28/2017); there's significant change: pacer is new; conduction delay vs paced rhythm with septal wall motion abnormality due to this, also new.  2D AND M-MODE MEASUREMENTS:  Left Ventricle:         Normal    Left Atrium:                Normal  IVSd (2D):      0.78 cm (0.7-1.1) LA Major diam,s, A2c 3.9 cm  LVPWd (2D):     0.94 cm (0.7-1.1)  LVIDd (2D):     4.37 cm (3.4-5.7) LA Volume A/L:                 LA Major:  LVIDs (2D):     3.47 cm           LA Vol A4C A/L      21.1 ml    A4C,s,:  LV FS (2D):     20.6 %  (>25%)    LA Vol A2C A/L      28.1 ml    A2C,s,:3.9  cm                                    LA Vol BP A/L       27.4 ml                                    LA Vol BP A/L index 15.7 ml/m                                       Right Ventricle:                                    Right Atrium:                                    RA,s major, A4C  2.9 cm                                    RA,s minor, A4C  3.1 cm    LV SYSTOLIC FUNCTION BY 2D PLANIMETRY (MOD):  EF-A4C View: 49.8 % EF-A2C View: 50.1 % EF-Biplane: 52.1 %    Aortic Valve: AoV Max Adithya: 1.23 m/s AoV Peak P.0 mmHg AoV Mean PG: 3.8 mmHg    LVOT Vmax:   1.15 m/s LVOT VTI:       0.169 m  LVOT Diam: 2.10 cm  LVOT PK grad 5 mmHg   LVOT mean grad: 3.0 mmHg    AoV Area, Vmax: 3.22 cm  AoV Area, VTI: 2.99 cm  AoV Area, Vmn: 2.97 cm     Tricuspid Valve and PA/RV Systolic Pressure: TR Max Velocity: 1.98 m/s  RA Pressure:         RVSP/PASP:  TR PK Grad       15.7 mmHg IVC diameter 1.70 cm RVOT diam, s        HOLTER MONITOR 3/20/2017  IMPRESSION:  Twenty-four hours of recordings were produced.    Rhythm was sinus throughout the recording with average heart rate 78 beats per minute.  Minimum heart rate was 50 and maximal heart rate was 121 beats per minute.    Ventricular ectopic activity consisted out of 10,064 beats (10.3% of the total).  Twenty beats were in couplets, the rest were single VEs.  There was no ventricular tachycardia.    Supraventricular ectopic activity consisted out of 12,500 beats (12.8% of total), of which three were in one run (three beats at 111 beats per minute), 38 were in couplets and the other were all single PACs.  There was no other significant arrhythmia.    During symptoms of lightheadedness, hot flash, sweating and headache, the rhythm was sinus with single VEs and PACs.  During patient events, the rhythm was sinus with single VEs and PACs.          Assessment and Plan:   Ms. Marroquin is a 57 year old female with CHB and PPM    Patient has been having symptoms of exert intolerance  Her ECHO showed  EF around 45-50%.     Plan    1, Repeat ECHO  2. Adjust PPM program    RTC 3 mos    I very much appreciated the opportunity to see and assess Mrs Joyce Marroquin in the clinic today     I spent 30 min with Ms Marroquin and her spouse of which more than 50% dealt with diagnosis,  treatment and management issues I agree with the note above which summarizes my findings and current recs,    Please do not hesitate to contact my office if you have any questions or concerns.      Lino Funes MD  Cardiac Arrhythmia Service  [facility]  [phone]

## 2018-05-03 NOTE — PATIENT INSTRUCTIONS
You were seen today in the Cardiovascular Clinic at the [facility].     Cardiology Providers you saw during your visit: Dr Lino Funes    Diagnosis: History of complete AV block    Results: Dr Funes reviewed the results of your device check in clinic today.     Recommendations:   1.  We will schedule you for an echo    Follow-up: Follow up with Dr Funes in 3 months with device check.      For emergencies call 911.    For any scheduling needs or nursing related questions, please call [phone].    Thank you for your visit today! If you have questions or concerns about today's visit, please call me.      Magdiel Valadez, RN  RN Care Coordinator  [facility]  [phone]    Press option 1 for the [place] and then 3 for nursing related questions

## 2018-05-03 NOTE — PROGRESS NOTES
"Preliminary Device Interrogation Results.  Final physician signed paceart report to be scanned and attached.    Patient seen in clinic for evaluation and iterative programming of her Medtronic dual lead pacemaker per MD orders.  Patient is scheduled to see Dr. Funes today.  Normal pacemaker function.  No arrythmias recorded.  Intrinsic rhythm = NSR with  @ 30 bpm.  AP = 29%.   = 99.9%.  Estimated battery longevity to KONRAD = 8 years.  Patient reports that she has been intermittently feeling \"electrical currents at her pacemaker site when she lays on her left side with her arm under her\".  Patient states that the \"electrical current\" wakes her up during the night.  Unable to reproduce sensation with RV pacing while seated in the chair.  Dr. Funes notified of interrogation results.  Rate response activity threshold programmed from medium low to low.  ADL rate programmed from 95 to 105 bpm.  Programming changes made per Dr. Funes's orders.  Plan for patient to send a remote transmission in 3 months and return to clinic in 6 months.    Dual lead pacemaker        "

## 2018-05-03 NOTE — MR AVS SNAPSHOT
After Visit Summary   5/3/2018    Joyce Marroquin    MRN: [id number]           Patient Information     Date Of Birth          1960        Visit Information        Provider Department      5/3/2018 4:00 PM Lino Funes MD [facility]        Today's Diagnoses     Complete atrioventricular block (H)    -  1      Care Instructions    You were seen today in the Cardiovascular Clinic at the [facility].     Cardiology Providers you saw during your visit: Dr Lino Funes    Diagnosis: History of complete AV block    Results: Dr Funes reviewed the results of your device check in clinic today.     Recommendations:   1.  We will schedule you for an echo    Follow-up: Follow up with Dr Funes in 3 months with device check.      For emergencies call 911.    For any scheduling needs or nursing related questions, please call [phone].    Thank you for your visit today! If you have questions or concerns about today's visit, please call me.      Magdiel Valadez RN  RN Care Coordinator  [facility]  [phone]    Press option 1 for the [employer] and then 3 for nursing related questions                  Follow-ups after your visit        Additional Services     Follow-Up with Electrophysiologist - 3 Months       With device check                  Your next 10 appointments already scheduled     May 17, 2018 10:30 AM CDT   Ech Complete with [id number]   [facility] ([facility])    [address] [place] [address] [phone]           1. Please bring or wear a comfortable two-piece outfit. 2. You may eat, drink and take your normal medicines. 3. For any questions that cannot be answered, please contact the ordering physician            Aug 23, 2018  1:30 PM CDT   (Arrive by 1:15 PM)   Pacemaker Check with  Cv Device 1   [facility] ([facility])    [address] [place] [address]  [address]  [address]  [facility] [address] [phone]            Aug 23, 2018  2:00 PM CDT   (Arrive by 1:45 PM)   RETURN ARRHYTHMIA with Lino Funes MD   [facility] ([facility])    [address]  [address]  [facility] [address] [phone]              Future tests that were ordered for you today     Open Future Orders        Priority Expected Expires Ordered    Echocardiogram Routine 5/3/2018 5/2/2020 5/3/2018    Follow-Up with Electrophysiologist - 3 Months Routine 8/1/2018 10/30/2018 5/3/2018    Follow-Up with Device Clinic-6 months Routine 10/30/2018 1/28/2019 5/3/2018            Who to contact     If you have questions or need follow up information about today's clinic visit or your schedule please contact [facility] directly at [phone].  Normal or non-critical lab and imaging results will be communicated to you by MyChart, letter or phone within 4 business days after the clinic has received the results. If you do not hear from us within 7 days, please contact the clinic through [employer] or phone. If you have a critical or abnormal lab result, we will notify you by phone as soon as possible.  Submit refill requests through [employer] or call your pharmacy and they will forward the refill request to us. Please allow 3 business days for your refill to be completed.          Additional Information About Your Visit        [employer] Information     [employer] gives you secure access to your electronic health record. If you see a primary care provider, you can also send messages to your care team and make appointments. If you have questions, please call your primary care clinic.  If you do not have a primary care provider, please call [phone] and they will assist you.        Care EveryWhere ID     This is your Care EveryWhere ID. This could be used by other organizations to access your [facility] medical records  [id number]        Your Vitals Were   "   Pulse Height Last Period Pulse Oximetry BMI (Body Mass Index)       83 1.651 m (5' 5\") 08/21/2007 98% 26.61 kg/m2        Blood Pressure from Last 3 Encounters:   05/03/18 121/84   12/04/17 109/70   11/22/17 131/72    Weight from Last 3 Encounters:   05/03/18 72.5 kg (159 lb 14.4 oz)   12/04/17 74.8 kg (165 lb)   11/22/17 75.3 kg (166 lb)               Primary Care Provider Office Phone # Fax #    Hai Renteria [phone]       [facility] [address]   [place] [address]        Equal Access to Services     JELANI SALCEDO : Hadii freda masono Soyolanda, waaxda luqadaha, qaybta kaalmada adeegyada, waxeileen pagein william cho . [facility] [phone].    ATENCIÓN: Si habla español, tiene a hankins disposición servicios gratuitos de asistencia lingüística. [facility] [phone].    We comply with applicable federal civil rights laws and [place] laws. We do not discriminate on the basis of race, color, national origin, age, disability, sex, sexual orientation, or gender identity.            Thank you!     Thank you for choosing [facility]  for your care. Our goal is always to provide you with excellent care. Hearing back from our patients is one way we can continue to improve our services. Please take a few minutes to complete the written survey that you may receive in the mail after your visit with us. Thank you!             Your Updated Medication List - Protect others around you: Learn how to safely use, store and throw away your medicines at www.disposemymeds.org.          This list is accurate as of 5/3/18  5:14 PM.  Always use your most recent med list.                   Brand Name Dispense Instructions for use Diagnosis    albuterol 108 (90 Base) MCG/ACT Inhaler    PROAIR HFA/PROVENTIL HFA/VENTOLIN HFA     Inhale 2 puffs into the lungs every 4 hours as needed for shortness of breath / dyspnea or wheezing        BENADRYL PO      Take 25-50 mg by mouth nightly as " needed        DAILY MULTIVITAMIN PO      Take 1 tablet by mouth daily        fluticasone 110 MCG/ACT Inhaler    FLOVENT HFA     Inhale 2 puffs into the lungs 2 times daily        IRON SUPPLEMENT PO      Take 325 mg by mouth daily        LEVOTHYROXINE SODIUM PO      Take 137 mcg by mouth daily        MAGNESIUM LACTATE PO      Take 180 mg by mouth daily    Fatigue, unspecified type       MELATONIN PO      Take 1 mg by mouth nightly as needed        nebulizer nebulization      as needed    H/O pericarditis       phytonadione 0.5 MG/ML suspension    MEPHYTON/ VIT K          PROBIOTIC DAILY PO      Take 1 capsule by mouth 2 times daily        TURMERIC PO      Take by mouth daily    Fatigue, unspecified type       TYLENOL PO      Take 500-1,000 mg by mouth daily as needed        VITAMIN D3 PO      Take 7,000 Units by mouth daily        VITAMIN K2 PO       H/O pericarditis

## 2018-05-03 NOTE — MR AVS SNAPSHOT
After Visit Summary   5/3/2018    Joyce Marroquin    MRN: [id number]           Patient Information     Date Of Birth          1960        Visit Information        Provider Department      5/3/2018 3:30 PM 1, Uc Cv Device [facility]        Today's Diagnoses     Complete atrioventricular block (H)    -  1      Care Instructions    It was a pleasure to see you in clinic today. Please do not hesitate to call with any questions or concerns. You are scheduled for a remote pacemaker transmission on 8/6/18. We will call in 1-2 business days to discuss the results with you. We look forward to seeing you in clinic at your next device check in 6 months.    Alpa Danielson, RN  Electrophysiology Nurse Clinician  [facility]  During business hours call:  [phone]  After business hours please call: [phone]- select option #4 and ask for job code [phone].            Follow-ups after your visit        Additional Services     Follow-Up with Device Clinic-6 months                 Your next 10 appointments already scheduled     May 17, 2018 10:30 AM CDT   Ech Complete with [email or web address]   Formerly [facility] [place])    [address] [place] [id number] [phone]           1. Please bring or wear a comfortable two-piece outfit. 2. You may eat, drink and take your normal medicines. 3. For any questions that cannot be answered, please contact the ordering physician            Aug 23, 2018  1:30 PM CDT   (Arrive by 1:15 PM)   Pacemaker Check with Uc Cv Device 1   [facility] ([facility])    [address] [place] [address]  [address] [place] [address] [phone]            Aug 23, 2018  2:00 PM CDT   (Arrive by 1:45 PM)   RETURN ARRHYTHMIA with Lino Funes MD   [facility])    [address] [place] [address]  [address] [place] [id number]    [phone]              Future tests that were ordered for you today     Open Future Orders        Priority Expected Expires Ordered    Echocardiogram Routine 5/3/2018 5/2/2020 5/3/2018    Follow-Up with Electrophysiologist - 3 Months Routine 8/1/2018 10/30/2018 5/3/2018    Follow-Up with Device Clinic-6 months Routine 10/30/2018 1/28/2019 5/3/2018            Who to contact     If you have questions or need follow up information about today's clinic visit or your schedule please contact [facility] directly at [phone].  Normal or non-critical lab and imaging results will be communicated to you by Capicalhart, letter or phone within 4 business days after the clinic has received the results. If you do not hear from us within 7 days, please contact the clinic through Joostt or phone. If you have a critical or abnormal lab result, we will notify you by phone as soon as possible.  Submit refill requests through [employer] or call your pharmacy and they will forward the refill request to us. Please allow 3 business days for your refill to be completed.          Additional Information About Your Visit        MyChart Information     [employer] gives you secure access to your electronic health record. If you see a primary care provider, you can also send messages to your care team and make appointments. If you have questions, please call your primary care clinic.  If you do not have a primary care provider, please call [phone] and they will assist you.        Care EveryWhere ID     This is your Care EveryWhere ID. This could be used by other organizations to access your [place] medical records  [id number]        Your Vitals Were     Last Period                   08/21/2007            Blood Pressure from Last 3 Encounters:   05/03/18 121/84   12/04/17 109/70   11/22/17 131/72    Weight from Last 3 Encounters:   05/03/18 72.5 kg (159 lb 14.4 oz)   12/04/17 74.8 kg (165 lb)   11/22/17 75.3 kg (166 lb)               We Performed the Following     (18723) PM DEVICE PROGRAMMING EVAL, DUAL LEAD PACER        Primary Care Provider Office Phone # Fax #    Hai Renteria [phone]       [facility] [place], operated by [facility] [address]   [place] [address]        Equal Access to Services     [place] campus[facility]Y : Hadii aad ku hadasho Soomaali, waaxda luqadaha, qaybta kaalmada adeegyada, waxay idiin hayaan adeeg kharash la'aan . So [facility] [phone].    ATENCIÓN: Si habla español, tiene a hankins disposición servicios gratuitos de asistencia lingüística. Ronnydemarcus al [phone].    We comply with applicable federal civil rights laws and [place] laws. We do not discriminate on the basis of race, color, national origin, age, disability, sex, sexual orientation, or gender identity.            Thank you!     Thank you for choosing [facility]  for your care. Our goal is always to provide you with excellent care. Hearing back from our patients is one way we can continue to improve our services. Please take a few minutes to complete the written survey that you may receive in the mail after your visit with us. Thank you!             Your Updated Medication List - Protect others around you: Learn how to safely use, store and throw away your medicines at www.disposemymeds.org.          This list is accurate as of 5/3/18  5:14 PM.  Always use your most recent med list.                   Brand Name Dispense Instructions for use Diagnosis    albuterol 108 (90 Base) MCG/ACT Inhaler    PROAIR HFA/PROVENTIL HFA/VENTOLIN HFA     Inhale 2 puffs into the lungs every 4 hours as needed for shortness of breath / dyspnea or wheezing        BENADRYL PO      Take 25-50 mg by mouth nightly as needed        DAILY MULTIVITAMIN PO      Take 1 tablet by mouth daily        fluticasone 110 MCG/ACT Inhaler    FLOVENT HFA     Inhale 2 puffs into the lungs 2 times daily        IRON SUPPLEMENT PO      Take 325 mg by mouth daily        LEVOTHYROXINE SODIUM PO       Take 137 mcg by mouth daily        MAGNESIUM LACTATE PO      Take 180 mg by mouth daily    Fatigue, unspecified type       MELATONIN PO      Take 1 mg by mouth nightly as needed        nebulizer nebulization      as needed    H/O pericarditis       phytonadione 0.5 MG/ML suspension    MEPHYTON/ VIT K          PROBIOTIC DAILY PO      Take 1 capsule by mouth 2 times daily        TURMERIC PO      Take by mouth daily    Fatigue, unspecified type       TYLENOL PO      Take 500-1,000 mg by mouth daily as needed        VITAMIN D3 PO      Take 7,000 Units by mouth daily        VITAMIN K2 PO       H/O pericarditis

## 2018-05-03 NOTE — NURSING NOTE
Cardiac Testing: Patient given instructions regarding  echocardiogram . Discussed purpose, preparation, procedure and when to expect results reported back to the patient. Patient demonstrated understanding of this information and agreed to call with further questions or concerns.    Med Reconcile: Reviewed and verified all current medications with the patient. The updated medication list was printed and given to the patient.    Return Appointment: Follow up with Dr Funes in 3 months with an echo.  Patient given instructions regarding scheduling next clinic visit. Patient demonstrated understanding of this information and agreed to call with further questions or concerns.    Patient stated she understood all health information given and agreed to call with further questions or concerns.    Magdiel Valadez, RN  RN Care Coordinator  [facility]  [phone]

## 2018-05-03 NOTE — PATIENT INSTRUCTIONS
It was a pleasure to see you in clinic today. Please do not hesitate to call with any questions or concerns. You are scheduled for a remote pacemaker transmission on 8/6/18. We will call in 1-2 business days to discuss the results with you. We look forward to seeing you in clinic at your next device check in 6 months.    Alpa Danielson, RN  Electrophysiology Nurse Clinician  [facility]  During business hours call:  [phone]  After business hours please call: [phone]- select option #4 and ask for job code [phone].

## 2018-05-03 NOTE — NURSING NOTE
Chief Complaint   Patient presents with     Follow Up For     PPM check- CHB- s/p RVOT ablation at outside facilility.  6 MFU (11.9.17)     Vitals were taken and medications were reconciled.     Shonna Noguera MA    4:12 PM

## 2018-05-03 NOTE — PROGRESS NOTES
HPI:   Ms. Marroquin is a 57 year old female with a PMHx of POTS, GERD, RVOT RFA c/b CHB s/p PPM, pericarditis who presents for follow up.     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 She has CHB and  had PPM placed at [facility] but was unhappy with discomfort.     She has had some other issues with her pacemaker device. She first reports that is too close to her collarbone. Second, she is worried about stretching and performing yoga maneuvers with the pacemaker in place. Consequently we repositioned device and she is happier.    Now mnain concern is a sense of feeling heart beat when lying on left side. The stimulus is relatively low amplitude so it unlikely to be stim effect.  Could be Phrenic, but does not seem typical    She also notes decreased ex tolerance when pushing beds in hospital..  May benefit from ADL adjustment    We discussed options and will adjust ppm programming. Plan follow2-up in 3 mos    _____________________________________________________________________________  PAST MEDICAL HISTORY:  Past Medical History:   Diagnosis Date     Chronic depressive personality disorder      Esophageal reflux        CURRENT MEDICATIONS:  Current Outpatient Prescriptions   Medication Sig Dispense Refill     Acetaminophen (TYLENOL PO) Take 500-1,000 mg by mouth daily as needed        albuterol (PROAIR HFA/PROVENTIL HFA/VENTOLIN HFA) 108 (90 BASE) MCG/ACT Inhaler Inhale 2 puffs into the lungs every 4 hours as needed for shortness of breath / dyspnea or wheezing       Cholecalciferol (VITAMIN D3 PO) Take 7,000 Units by mouth daily        DiphenhydrAMINE HCl (BENADRYL PO) Take 25-50 mg by mouth nightly as needed       Ferrous Sulfate (IRON SUPPLEMENT PO) Take 325 mg by mouth daily       LEVOTHYROXINE SODIUM PO Take 137 mcg by mouth daily       MAGNESIUM LACTATE PO Take 180 mg by mouth daily       MELATONIN PO Take 1 mg by mouth nightly as needed       Menaquinone-7 (VITAMIN K2 PO)        Multiple Vitamin  (DAILY MULTIVITAMIN PO) Take 1 tablet by mouth daily        nebulizer nebulization as needed       Probiotic Product (PROBIOTIC DAILY PO) Take 1 capsule by mouth 2 times daily        TURMERIC PO Take by mouth daily       acetaminophen-codeine (TYLENOL W/CODEINE NO. 3) 300-30 MG per tablet Take 1-2 tablets by mouth every 4 hours as needed for mild pain (mild or moderate pain) (Patient not taking: Reported on 5/3/2018) 30 tablet 0     cephalexin 500 MG tablet Take 500 mg by mouth 3 times daily (Patient not taking: Reported on 5/3/2018) 15 tablet 0     fluticasone (FLOVENT HFA) 110 MCG/ACT Inhaler Inhale 2 puffs into the lungs 2 times daily       phytonadione (MEPHYTON/ VIT K) suspension          PAST SURGICAL HISTORY:  Past Surgical History:   Procedure Laterality Date     EP ABLATION / EP STUDIES  04/21/2017     HC CORRECT BUNION,SIMPLE       PPM INSERT OF NEW OR REPL W/VENT LEAD  04/21/2017       ALLERGIES:     Allergies   Allergen Reactions     Flagyl [Metronidazole] Rash     Atenolol Other (See Comments)     Had extreme fatigue, dizziness, and overall felt unwell.      Bactrim [Sulfamethoxazole W/Trimethoprim]      Corn Oil      Lorazepam      Oxytetracycline      Sulfamethoxazole-Trimethoprim      Other reaction(s): Other  Passed out     Tetracycline      Zofran [Ondansetron]      Prolonged QT  Prolonged QT at baseline per patient     Benzodiazepines Other (See Comments)     Other reaction(s): Other  Extreme heat intolerance  Extreme heat intolerance     Cyclobenzaprine Other (See Comments)     Extreme heat intolerance     Levaquin [Levofloxacin]      Other reaction(s): Other  Tendon rupture     Nsaids Other (See Comments)     Exacerbated asthma       FAMILY HISTORY:          SOCIAL HISTORY:  Social History   Substance Use Topics     Smoking status: Former Smoker     Smokeless tobacco: Never Used     Alcohol use Yes      Comment: rarely       ROS:   Constitutional: No fever, chills, or sweats. Weight stable.  "  ENT: No visual disturbance, ear ache, epistaxis, sore throat.   Cardiovascular: As per HPI.   Respiratory: No cough, hemoptysis.    GI: No nausea, vomiting, hematemesis, melena, or hematochezia.   : No hematuria.   Integument: Negative.   Psychiatric: Negative.   Hematologic:  Easy bruising, no easy bleeding.  Neuro: Negative.   Endocrinology: No significant heat or cold intolerance   Musculoskeletal: No myalgia.    Exam:  /84 (BP Location: Left arm, Patient Position: Chair, Cuff Size: Adult Regular)  Pulse 83  Ht 1.651 m (5' 5\")  Wt 72.5 kg (159 lb 14.4 oz)  LMP 08/21/2007  SpO2 98%  BMI 26.61 kg/m2  General: No acute distress   HEENT: NC/AT   CV: RRR, +S1/S2, No murmurs, rubs, gallops. JVP is not elevated.    Pulm: Unlabored breathing, CTAB   GI: s/nt/nd   Ext: No edema   Neuro: No focal defects   Psych: Normal Affect  SKIN: PPM site well healed. One suture sleeve palpable but in safe position    Labs:  CBC RESULTS:   Lab Results   Component Value Date    WBC 5.9 12/04/2017    RBC 4.35 12/04/2017    HGB 13.6 12/04/2017    HCT 42.2 12/04/2017    MCV 97 12/04/2017    MCH 31.3 12/04/2017    MCHC 32.2 12/04/2017    RDW 13.1 12/04/2017     12/04/2017       BMP RESULTS:  Lab Results   Component Value Date     12/04/2017    POTASSIUM 4.2 12/04/2017    CHLORIDE 109 12/04/2017    CO2 26 12/04/2017    ANIONGAP 6 12/04/2017    GLC 83 12/04/2017    BUN 12 12/04/2017    CR 0.64 12/04/2017    GFRESTIMATED >90 12/04/2017    GFRESTBLACK >90 12/04/2017    MANJU 9.6 12/04/2017        INR RESULTS:  Lab Results   Component Value Date    INR 0.95 06/28/2017       Procedures:    Echocardiogram: 10/20/2017:     Interpretation Summary  Mildly (EF 40-45%) reduced left ventricular function is present.  Global right ventricular function is normal.  Right ventricular systolic pressure is 25mmHg above the right atrial pressure.  A pacemaker lead is noted in the right ventricle.  No pericardial effusion is " present.  The inferior vena cava was normal in size with preserved respiratory  variability.  Estimated mean right atrial pressure is 3 mmHg.  Previous study not available for comparison.  _____________________________________________________________________________  __        Left Ventricle  Left ventricular wall thickness is normal. Left ventricular size is normal.  The Ejection Fraction was calculated using Bi-plane contrast. Mildly (EF 40-  45%) reduced left ventricular function is present. Normal left ventricular  filling for age. Biplane traced at 42%. Mild diffuse hypokinesis is present.  Abnormal septal motion consistent with pacemaker is present.     Right Ventricle  The right ventricle is normal size. Global right ventricular function is  normal. A pacemaker lead is noted in the right ventricle.     Atria  Both atria appear normal. The atrial septum is intact as assessed by color  Doppler .        Mitral Valve  Trace mitral insufficiency is present.     Aortic Valve  Aortic valve is normal in structure and function. The aortic valve is  tricuspid.     Tricuspid Valve  The tricuspid valve is normal. Trace tricuspid insufficiency is present. Right  ventricular systolic pressure is 25mmHg above the right atrial pressure.     Pulmonic Valve  The pulmonic valve is normal.     Vessels  The aorta root is normal. The pulmonary artery is normal. The inferior vena  cava was normal in size with preserved respiratory variability. Estimated mean  right atrial pressure is 3 mmHg.        Pericardium  No pericardial effusion is present.     Compared to Previous Study  Previous study not available for comparison.     Attestation  I have personally viewed the imaging and agree with the interpretation and  report as documented by the fellow, Reuben Holley, and/or edited by me.  _____________________________________________________________________________  __     MMode/2D Measurements & Calculations  IVSd: 0.98 cm  LVIDd:  4.8 cm  LVIDs: 3.7 cm  LVPWd: 0.69 cm  FS: 23.1 %  EDV(Teich): 105.7 ml  ESV(Teich): 56.8 ml  LV mass(C)d: 131.8 grams  LV mass(C)dI: 72.9 grams/m2  Ao root diam: 2.8 cm  asc Aorta Diam: 2.9 cm  LVOT diam: 2.1 cm  LVOT area: 3.6 cm2  LA Volume (BP): 49.6 ml     LA Volume Index (BP): 27.4 ml/m2        Doppler Measurements & Calculations  MV E max veronique: 67.1 cm/sec  MV A max veronique: 97.8 cm/sec  MV E/A: 0.69  MV dec time: 0.14 sec  TR max veronique: 249.8 cm/sec  TR max P.0 mmHg  Lateral E/e': 13.0  Med E to E': 10.5  Medial E/e': 10.5      ECHO 2017  Summary:   1. LV function is low normal. The visually estimated ejection fraction is 50%. LVEF difficult to assess due to conduction delay and trigeminal rhythm.   2. Septal motion consistent with conduction delay/paced rhythm.   3. No evidence of pulmonary hypertension.   4. No hemodynamically significant valvular disease.   5. No pericardial effusion.   6. Compared to prior study (3/28/2017); there's significant change: pacer is new; conduction delay vs paced rhythm with septal wall motion abnormality due to this, also new.    _____________________________________________________________________  PHYSICIAN INTERPRETATION: Left Ventricle: The left ventricular size is normal. LV function is low normal. The visually estimated ejection fraction is 50%. LV septal wall thickness is normal. LV posterior wall thickness is normal. Paradoxical septal motion, consistent with conduction delay. LV diastolic function is indeterminant due to rhythm.  Left Atrium: The left atrium is normal in size.  Right Ventricle: The right ventricular size is normal. Global RV systolic function is normal.  Right Atrium: The right atrium is normal in size.  Mitral Valve: Nonspecifically thickened and calcified mitral valve leaflets. Mitral leaflet mobility is normal. Trace mitral valve regurgitation.  Aortic Valve: The aortic valve was not well visualized. There is no evidence of significant aortic  stenosis. The peak and mean gradients are 6.0 mmHg and 3.8 mmHg, respectively. No evidence of significant aortic valve regurgitation.  Tricuspid Valve: The tricuspid valve structure is nonspecifically thickened. Trace tricuspid regurgitation. There is no evidence of pulmonary hypertension with an estimated right ventricular systolic pressure (RVSP) of 18.68.  Pulmonic Valve: The pulmonic valve was not well visualized. No significant pulmonic valve regurgitation.  Pericardium: No pericardial effusion.  Aorta: The aorta was not well visualized.  The ascending aorta was not well visualized.  Venous: The inferior vena cava measures 1.70 cm and collapses normally with respiration.  Additional Comments: Cardiac rhythm is indeterminant. Image quality for this study is adequate. A pacer wire is visualized in the right atrium and right ventricle.  In comparison to the previous echocardiogram(s): Prior examinations are available and were reviewed for comparison purposes. A prior study was performed on 3/28/2017. Compared to prior study (3/28/2017); there's significant change: pacer is new; conduction delay vs paced rhythm with septal wall motion abnormality due to this, also new.  2D AND M-MODE MEASUREMENTS:  Left Ventricle:         Normal    Left Atrium:                Normal  IVSd (2D):      0.78 cm (0.7-1.1) LA Major diam,s, A2c 3.9 cm  LVPWd (2D):     0.94 cm (0.7-1.1)  LVIDd (2D):     4.37 cm (3.4-5.7) LA Volume A/L:                 LA Major:  LVIDs (2D):     3.47 cm           LA Vol A4C A/L      21.1 ml    A4C,s,:  LV FS (2D):     20.6 %  (>25%)    LA Vol A2C A/L      28.1 ml    A2C,s,:3.9 cm                                    LA Vol BP A/L       27.4 ml                                    LA Vol BP A/L index 15.7 ml/m                                       Right Ventricle:                                    Right Atrium:                                    RA,s major, A4C  2.9 cm                                    RA,s  minor, A4C  3.1 cm    LV SYSTOLIC FUNCTION BY 2D PLANIMETRY (MOD):  EF-A4C View: 49.8 % EF-A2C View: 50.1 % EF-Biplane: 52.1 %    Aortic Valve: AoV Max Adithya: 1.23 m/s AoV Peak P.0 mmHg AoV Mean PG: 3.8 mmHg    LVOT Vmax:   1.15 m/s LVOT VTI:       0.169 m  LVOT Diam: 2.10 cm  LVOT PK grad 5 mmHg   LVOT mean grad: 3.0 mmHg    AoV Area, Vmax: 3.22 cm  AoV Area, VTI: 2.99 cm  AoV Area, Vmn: 2.97 cm     Tricuspid Valve and PA/RV Systolic Pressure: TR Max Velocity: 1.98 m/s  RA Pressure:         RVSP/PASP:  TR PK Grad       15.7 mmHg IVC diameter 1.70 cm RVOT diam, s        HOLTER MONITOR 3/20/2017  IMPRESSION:  Twenty-four hours of recordings were produced.    Rhythm was sinus throughout the recording with average heart rate 78 beats per minute.  Minimum heart rate was 50 and maximal heart rate was 121 beats per minute.    Ventricular ectopic activity consisted out of 10,064 beats (10.3% of the total).  Twenty beats were in couplets, the rest were single VEs.  There was no ventricular tachycardia.    Supraventricular ectopic activity consisted out of 12,500 beats (12.8% of total), of which three were in one run (three beats at 111 beats per minute), 38 were in couplets and the other were all single PACs.  There was no other significant arrhythmia.    During symptoms of lightheadedness, hot flash, sweating and headache, the rhythm was sinus with single VEs and PACs.  During patient events, the rhythm was sinus with single VEs and PACs.          Assessment and Plan:   Ms. Marroquin is a 57 year old female with CHB and PPM    Patient has been having symptoms of exert intolerance  Her ECHO showed EF around 45-50%.     Plan    1, Repeat ECHO  2. Adjust PPM program    RTC 3 mos    I very much appreciated the opportunity to see and assess Mrs Joyce Marroquin in the clinic today     I spent 30 min with Ms Marroquin and her spouse of which more than 50% dealt with diagnosis,  treatment and management issues I agree with the note above  which summarizes my findings and current recs,    Please do not hesitate to contact my office if you have any questions or concerns.      Lino Funes MD  Cardiac Arrhythmia Service  [facility]  [phone]        CC

## 2018-05-17 ENCOUNTER — RADIANT APPOINTMENT (OUTPATIENT)
Dept: CARDIOLOGY | Facility: CLINIC | Age: 58
End: 2018-05-17
Payer: COMMERCIAL

## 2018-05-17 DIAGNOSIS — I44.2 COMPLETE ATRIOVENTRICULAR BLOCK (H): ICD-10-CM

## 2018-05-17 RX ADMIN — Medication 6 ML: at 11:30

## 2018-06-29 ENCOUNTER — OFFICE VISIT (OUTPATIENT)
Dept: ENDOCRINOLOGY | Facility: CLINIC | Age: 58
End: 2018-06-29
Payer: COMMERCIAL

## 2018-06-29 VITALS
HEIGHT: 65 IN | WEIGHT: 163 LBS | DIASTOLIC BLOOD PRESSURE: 76 MMHG | SYSTOLIC BLOOD PRESSURE: 120 MMHG | HEART RATE: 92 BPM | BODY MASS INDEX: 27.16 KG/M2

## 2018-06-29 DIAGNOSIS — E89.0 POSTABLATIVE HYPOTHYROIDISM: Primary | ICD-10-CM

## 2018-06-29 DIAGNOSIS — E21.3 HYPERPARATHYROIDISM (H): ICD-10-CM

## 2018-06-29 RX ORDER — LEVOTHYROXINE SODIUM 150 MCG
150 TABLET ORAL DAILY
Qty: 60 TABLET | Refills: 3 | Status: SHIPPED | OUTPATIENT
Start: 2018-06-29 | End: 2018-12-10

## 2018-06-29 ASSESSMENT — PAIN SCALES - GENERAL: PAINLEVEL: NO PAIN (0)

## 2018-06-29 NOTE — PROGRESS NOTES
Endocrinology Note         Joyce is a 57 year old female presents today for hypothyroidism and continued fatigue    HPI  Joyce Marroquin is 57 years old with hx of depression, hypothyroidism secondary to I131 treatment for Graves's disease, POTS, GERD, RVOT RFA, pericarditis, collageneous colitis who is here today for postablative hypothyroidism.    I saw her in November 2017 for fatigue and concerning for hyperparathyroidism.    1) Postablative hypothyroidism: she did have Graves'disease and received I131 for treatment. She stated that she was previously on Synthroid 150 mcg daily for long time but the dose was reduced in 2016 to 137 mcg tablet. Recent lab showed TSH 3.27, FT4 1.4 and total T3 108. She reports increased fatigue and sluggish. She would like to have TSH close to 2.     2) Hyperparathyroidism with intermittent mild hypercalcemia: she did previously have work-up for hyperparathyroidism around Nov-Dec 2017 due to ongoing fatigue and depressed mood. Parathyroid scan showed asymmetric activity near the upper left thyroid lobe, raising suspicion for parathyroid adenoma. Ultrasound neck did NOT show parathyroid adenoma. DXA 11/2017 showed osteoporosis with T-score -2.7 at radius and spine. Calcium has always been normal to mildly elevated.  The decision at that time was to continue monitoring blood calcium level. She does not want to be treatment for osteoporosis either.    She takes 5000 IU daily of vitamin D. She has some yogurt and cheese. She walks about 30 minutes per day. She did have stress fracture in her leg when she was 20s. She reports her sister has had parathyroid problem.     Past Medical History  Past Medical History:   Diagnosis Date     Chronic depressive personality disorder      Esophageal reflux        Allergies  Allergies   Allergen Reactions     Flagyl [Metronidazole] Rash     Atenolol Other (See Comments)     Had extreme fatigue, dizziness, and overall felt unwell.      Bactrim  [Sulfamethoxazole W/Trimethoprim]      Corn Oil      Lorazepam      Oxytetracycline      Sulfamethoxazole-Trimethoprim      Other reaction(s): Other  Passed out     Tetracycline      Zofran [Ondansetron]      Prolonged QT  Prolonged QT at baseline per patient     Benzodiazepines Other (See Comments)     Other reaction(s): Other  Extreme heat intolerance  Extreme heat intolerance     Cyclobenzaprine Other (See Comments)     Extreme heat intolerance     Levaquin [Levofloxacin]      Other reaction(s): Other  Tendon rupture     Nsaids Other (See Comments)     Exacerbated asthma     Medications  Current Outpatient Prescriptions   Medication Sig Dispense Refill     Acetaminophen (TYLENOL PO) Take 500-1,000 mg by mouth daily as needed        albuterol (PROAIR HFA/PROVENTIL HFA/VENTOLIN HFA) 108 (90 BASE) MCG/ACT Inhaler Inhale 2 puffs into the lungs every 4 hours as needed for shortness of breath / dyspnea or wheezing       Cholecalciferol (VITAMIN D3 PO) Take 7,000 Units by mouth daily        DiphenhydrAMINE HCl (BENADRYL PO) Take 25-50 mg by mouth nightly as needed       Ferrous Sulfate (IRON SUPPLEMENT PO) Take 325 mg by mouth daily       fluticasone (FLOVENT HFA) 110 MCG/ACT Inhaler Inhale 2 puffs into the lungs 2 times daily       LEVOTHYROXINE SODIUM PO Take 137 mcg by mouth daily       MAGNESIUM LACTATE PO Take 180 mg by mouth daily       MELATONIN PO Take 1 mg by mouth nightly as needed       Menaquinone-7 (VITAMIN K2 PO)        Multiple Vitamin (DAILY MULTIVITAMIN PO) Take 1 tablet by mouth daily        nebulizer nebulization as needed       phytonadione (MEPHYTON/ VIT K) suspension        Probiotic Product (PROBIOTIC DAILY PO) Take 1 capsule by mouth 2 times daily        TURMERIC PO Take by mouth daily       Family History  family history is not on file.  Social History  Social History   Substance Use Topics     Smoking status: Former Smoker     Smokeless tobacco: Never Used     Alcohol use Yes      Comment:  "rarely       ROS  Constitutional: reports low energy, +fatigue  Eyes: no vision change, diplopia or red eyes   Neck: no difficulty swallowing, no choking, no neck pain, +fullness in her neck  Cardiovascular: no chest pain, palpitations  Respiratory: no dyspnea, cough, shortness of breath or wheezing   GI: no nausea, vomiting, diarrhea or constipation  : no change in urine, no dysuria or hematuria  Musculoskeletal: no joint or muscle pain or swelling. No muscle weakness  Integumentary: no concerning lesions   Neuro: no loss of strength or sensation, no numbness or tingling, no tremor, no dizziness, no headache   Endo: no polyuria or polydipsia, no temperature intolerance   Heme/Lymph: no concerning bumps, no bleeding problems   Allergy: no environmental allergies   Psych: +depression and anxiety, +mood swing, no sleep problems.    Physical Exam  /76  Pulse 92  Ht 1.651 m (5' 5\")  Wt 73.9 kg (163 lb)  LMP 08/21/2007  BMI 27.12 kg/m2  Body mass index is 27.12 kg/(m^2).  Constitutional: no distress, comfortable, pleasant   Eyes: anicteric, normal extra-ocular movements, no lid lag or retraction  Musculoskeletal: no edema   Skin: no jaundice   Neurological: cranial nerves intact, normal gait  Psychological: appropriate mood       RESULTS        Sestamibi scan 12/1/2017      US neck 1/4/18  COMPARISON:  12/1/2017 - Nuclear medicine parathyroid scan.  6/28/2017 - CT chest.     FINDINGS: A normal thyroid gland is not visualized. Per report, the patient has a history of prior radioiodine thyroid ablation. No  enlarged or abnormal-appearing lymph nodes in the neck. No other abnormal masses in the neck.     IMPRESSION: No visualized enlarged parathyroid gland.    DXA 11/17/17    ASSESSMENT:    Joyce Marroquin is 57 years old with hx of depression, hypothyroidism secondary to I131 treatment for Graves's disease, POTS, GERD, RVOT RFA, pericarditis, collageneous colitis who is here today for postablative " hypothyroidism.    1) Postablative hypothyroidism: secondary to Graves' disease treatment. Currently on Synthroid 137 mcg but was on 150 mcg for long time. Recent TSH 3.27 but patient reports fatigue and would like to increase thyroid hormone replacement. Will slightly increase Synthroid to 150 mcg daily. Will repeat lab in 4-6 weeks.    2) mild hyperparathyroidism with normal to mild elevated calcium: she could have primary hyperparathyroidism but it is unlikely to explain her overall symptoms. Vitamin D is in acceptable range. At the last visit, she did have extensive work-up. Parathyroid scan showed asymmetric activity near the upper left thyroid lobe, raising suspicion for parathyroid adenoma. Ultrasound neck did NOT show parathyroid adenoma. DXA 11/2017 showed osteoporosis with T-score -2.7 at radius and spine. Calcium has always been normal to mildly elevated.  The decision at that time was to continue monitoring blood calcium level. She does not want to be treatment for osteoporosis either.    3) Osteoporosis: DXA 11/2017 showed osteoporosis with T-score -2.7 at radius and spine. +stress fracture in the right tibia in her 20s. She does not want to be treatment for osteoporosis at this time.   Continue vitamin D and diary calcium.    PLAN:   - increase Synthroid to 150 mcg daily  - repeat TSH, FT4, calcium 25OH vitamin D, PTH in the next 4-6 week  - will contact patient with result    Garland Patel MD     Division of Diabetes and Endocrinology  Department of Medicine  [phone]

## 2018-06-29 NOTE — PATIENT INSTRUCTIONS
- get lab test done in [facility] about 4-6 weeks    If you have any questions, please do not hesitate to call [phone] and ask for Endocrinology clinic.      After clinic hours, please contact [phone] and ask for Endocrinologist-on call    Sincerely,    Garland Patel MD  Endocrinology

## 2018-06-29 NOTE — MR AVS SNAPSHOT
After Visit Summary   6/29/2018    Joyce Marroquin    MRN: [id number]           Patient Information     Date Of Birth          1960        Visit Information        Provider Department      6/29/2018 11:20 AM Garland Patel MD [facility] Endocrinology        Care Instructions    - get lab test done in  [place] about 4-6 weeks    If you have any questions, please do not hesitate to call [phone] and ask for Endocrinology clinic.      After clinic hours, please contact [phone] and ask for Endocrinologist-on call    Sincerely,    Garland Patel MD  Endocrinology            Follow-ups after your visit        Your next 10 appointments already scheduled     Aug 23, 2018  1:30 PM CDT   (Arrive by 1:15 PM)   Pacemaker Check with Uc Cv Device 1   [facility] ([facility])    [address] [place] [address]  [address] [place] [id number] [phone]            Aug 23, 2018  2:00 PM CDT   (Arrive by 1:45 PM)   RETURN ARRHYTHMIA with Lino Funes MD   [facility] ([facility])    [address] [place] [address]  [address] [place] [address] [phone]              Who to contact     Please call your clinic at [phone] to:    Ask questions about your health    Make or cancel appointments    Discuss your medicines    Learn about your test results    Speak to your doctor            Additional Information About Your Visit        [employer] Information     [employer] gives you secure access to your electronic health record. If you see a primary care provider, you can also send messages to your care team and make appointments. If you have questions, please call your primary care clinic.  If you do not have a primary care provider, please call [phone] and they will assist you.      [employer] is an electronic gateway that provides easy, online access to your medical records. With [employer], you can request a clinic  "appointment, read your test results, renew a prescription or communicate with your care team.     To access your existing account, please contact your [facility] or call [phone] for assistance.        Care EveryWhere ID     This is your Care EveryWhere ID. This could be used by other organizations to access your [facility] medical records  [id number]        Your Vitals Were     Pulse Height Last Period BMI (Body Mass Index)          92 1.651 m (5' 5\") 08/21/2007 27.12 kg/m2         Blood Pressure from Last 3 Encounters:   06/29/18 120/76   05/03/18 121/84   12/04/17 109/70    Weight from Last 3 Encounters:   06/29/18 73.9 kg (163 lb)   05/03/18 72.5 kg (159 lb 14.4 oz)   12/04/17 74.8 kg (165 lb)              Today, you had the following     No orders found for display         Today's Medication Changes          These changes are accurate as of 6/29/18 11:31 AM.  If you have any questions, ask your nurse or doctor.               Stop taking these medicines if you haven't already. Please contact your care team if you have questions.     LEVOTHYROXINE SODIUM PO                    Primary Care Provider Office Phone # Fax #    Hai KAROLYN Renteria [phone]       [facility] [address]   [place] [address]        Equal Access to Services     PAVITHRA SALCEDO AH: Hadii freda masono Soyolanda, waaxda luqadaha, qaybta kaalmamicah alvarez. So [facility] [phone].    ATENCIÓN: Si habla español, tiene a hankins disposición servicios gratuitos de asistencia lingüística. Jp al [phone].    We comply with applicable federal civil rights laws and [place] laws. We do not discriminate on the basis of race, color, national origin, age, disability, sex, sexual orientation, or gender identity.            Thank you!     Thank you for choosing [facility]  for your care. Our goal is always to provide you with excellent " care. Hearing back from our patients is one way we can continue to improve our services. Please take a few minutes to complete the written survey that you may receive in the mail after your visit with us. Thank you!             Your Updated Medication List - Protect others around you: Learn how to safely use, store and throw away your medicines at www.disposemymeds.org.          This list is accurate as of 6/29/18 11:31 AM.  Always use your most recent med list.                   Brand Name Dispense Instructions for use Diagnosis    albuterol 108 (90 Base) MCG/ACT Inhaler    PROAIR HFA/PROVENTIL HFA/VENTOLIN HFA     Inhale 2 puffs into the lungs every 4 hours as needed for shortness of breath / dyspnea or wheezing        BENADRYL PO      Take 25-50 mg by mouth nightly as needed        DAILY MULTIVITAMIN PO      Take 1 tablet by mouth daily        fluticasone 110 MCG/ACT Inhaler    FLOVENT HFA     Inhale 2 puffs into the lungs 2 times daily        IRON SUPPLEMENT PO      Take 325 mg by mouth daily        MAGNESIUM LACTATE PO      Take 180 mg by mouth daily    Fatigue, unspecified type       MELATONIN PO      Take 1 mg by mouth nightly as needed        nebulizer nebulization      as needed    H/O pericarditis       phytonadione 0.5 MG/ML suspension    MEPHYTON/ VIT K          PROBIOTIC DAILY PO      Take 1 capsule by mouth 2 times daily        TURMERIC PO      Take by mouth daily    Fatigue, unspecified type       TYLENOL PO      Take 500-1,000 mg by mouth daily as needed        VITAMIN D3 PO      Take 7,000 Units by mouth daily        VITAMIN K2 PO       H/O pericarditis

## 2018-06-29 NOTE — LETTER
6/29/2018       RE: Joyce Marroquin  [address] [address] [facility] [facility] [address]     Dear Colleague,    Thank you for referring your patient, Joyce Marroquin, to the [facility] ENDOCRINOLOGY at [facility]. Please see a copy of my visit note below.         Endocrinology Note         Joyce is a 57 year old female presents today for hypothyroidism and continued fatigue    HPI  Joyce Marroquin is 57 years old with hx of depression, hypothyroidism secondary to I131 treatment for Graves's disease, POTS, GERD, RVOT RFA, pericarditis, collageneous colitis who is here today for postablative hypothyroidism.    I saw her in November 2017 for fatigue and concerning for hyperparathyroidism.    1) Postablative hypothyroidism: she did have Graves'disease and received I131 for treatment. She stated that she was previously on Synthroid 150 mcg daily for long time but the dose was reduced in 2016 to 137 mcg tablet. Recent lab showed TSH 3.27, FT4 1.4 and total T3 108. She reports increased fatigue and sluggish. She would like to have TSH close to 2.     2) Hyperparathyroidism with intermittent mild hypercalcemia: she did previously have work-up for hyperparathyroidism around Nov-Dec 2017 due to ongoing fatigue and depressed mood. Parathyroid scan showed asymmetric activity near the upper left thyroid lobe, raising suspicion for parathyroid adenoma. Ultrasound neck did NOT show parathyroid adenoma. DXA 11/2017 showed osteoporosis with T-score -2.7 at radius and spine. Calcium has always been normal to mildly elevated.  The decision at that time was to continue monitoring blood calcium level. She does not want to be treatment for osteoporosis either.    She takes 5000 IU daily of vitamin D. She has some yogurt and cheese. She walks about 30 minutes per day. She did have stress fracture in her leg when she was 20s. She reports her sister has had parathyroid problem.     Past Medical History  Past Medical History:    Diagnosis Date     Chronic depressive personality disorder      Esophageal reflux        Allergies  Allergies   Allergen Reactions     Flagyl [Metronidazole] Rash     Atenolol Other (See Comments)     Had extreme fatigue, dizziness, and overall felt unwell.      Bactrim [Sulfamethoxazole W/Trimethoprim]      Corn Oil      Lorazepam      Oxytetracycline      Sulfamethoxazole-Trimethoprim      Other reaction(s): Other  Passed out     Tetracycline      Zofran [Ondansetron]      Prolonged QT  Prolonged QT at baseline per patient     Benzodiazepines Other (See Comments)     Other reaction(s): Other  Extreme heat intolerance  Extreme heat intolerance     Cyclobenzaprine Other (See Comments)     Extreme heat intolerance     Levaquin [Levofloxacin]      Other reaction(s): Other  Tendon rupture     Nsaids Other (See Comments)     Exacerbated asthma     Medications  Current Outpatient Prescriptions   Medication Sig Dispense Refill     Acetaminophen (TYLENOL PO) Take 500-1,000 mg by mouth daily as needed        albuterol (PROAIR HFA/PROVENTIL HFA/VENTOLIN HFA) 108 (90 BASE) MCG/ACT Inhaler Inhale 2 puffs into the lungs every 4 hours as needed for shortness of breath / dyspnea or wheezing       Cholecalciferol (VITAMIN D3 PO) Take 7,000 Units by mouth daily        DiphenhydrAMINE HCl (BENADRYL PO) Take 25-50 mg by mouth nightly as needed       Ferrous Sulfate (IRON SUPPLEMENT PO) Take 325 mg by mouth daily       fluticasone (FLOVENT HFA) 110 MCG/ACT Inhaler Inhale 2 puffs into the lungs 2 times daily       LEVOTHYROXINE SODIUM PO Take 137 mcg by mouth daily       MAGNESIUM LACTATE PO Take 180 mg by mouth daily       MELATONIN PO Take 1 mg by mouth nightly as needed       Menaquinone-7 (VITAMIN K2 PO)        Multiple Vitamin (DAILY MULTIVITAMIN PO) Take 1 tablet by mouth daily        nebulizer nebulization as needed       phytonadione (MEPHYTON/ VIT K) suspension        Probiotic Product (PROBIOTIC DAILY PO) Take 1 capsule by  "mouth 2 times daily        TURMERIC PO Take by mouth daily       Family History  family history is not on file.  Social History  Social History   Substance Use Topics     Smoking status: Former Smoker     Smokeless tobacco: Never Used     Alcohol use Yes      Comment: rarely       ROS  Constitutional: reports low energy, +fatigue  Eyes: no vision change, diplopia or red eyes   Neck: no difficulty swallowing, no choking, no neck pain, +fullness in her neck  Cardiovascular: no chest pain, palpitations  Respiratory: no dyspnea, cough, shortness of breath or wheezing   GI: no nausea, vomiting, diarrhea or constipation  : no change in urine, no dysuria or hematuria  Musculoskeletal: no joint or muscle pain or swelling. No muscle weakness  Integumentary: no concerning lesions   Neuro: no loss of strength or sensation, no numbness or tingling, no tremor, no dizziness, no headache   Endo: no polyuria or polydipsia, no temperature intolerance   Heme/Lymph: no concerning bumps, no bleeding problems   Allergy: no environmental allergies   Psych: +depression and anxiety, +mood swing, no sleep problems.    Physical Exam  /76  Pulse 92  Ht 1.651 m (5' 5\")  Wt 73.9 kg (163 lb)  LMP 08/21/2007  BMI 27.12 kg/m2  Body mass index is 27.12 kg/(m^2).  Constitutional: no distress, comfortable, pleasant   Eyes: anicteric, normal extra-ocular movements, no lid lag or retraction  Musculoskeletal: no edema   Skin: no jaundice   Neurological: cranial nerves intact, normal gait  Psychological: appropriate mood       RESULTS        Sestamibi scan 12/1/2017      US neck 1/4/18  COMPARISON:  12/1/2017 - Nuclear medicine parathyroid scan.  6/28/2017 - CT chest.     FINDINGS: A normal thyroid gland is not visualized. Per report, the patient has a history of prior radioiodine thyroid ablation. No  enlarged or abnormal-appearing lymph nodes in the neck. No other abnormal masses in the neck.     IMPRESSION: No visualized enlarged " parathyroid gland.    DXA 11/17/17    ASSESSMENT:    Joyce Marroquin is 57 years old with hx of depression, hypothyroidism secondary to I131 treatment for Graves's disease, POTS, GERD, RVOT RFA, pericarditis, collageneous colitis who is here today for postablative hypothyroidism.    1) Postablative hypothyroidism: secondary to Graves' disease treatment. Currently on Synthroid 137 mcg but was on 150 mcg for long time. Recent TSH 3.27 but patient reports fatigue and would like to increase thyroid hormone replacement. Will slightly increase Synthroid to 150 mcg daily. Will repeat lab in 4-6 weeks.    2) mild hyperparathyroidism with normal to mild elevated calcium: she could have primary hyperparathyroidism but it is unlikely to explain her overall symptoms. Vitamin D is in acceptable range. At the last visit, she did have extensive work-up. Parathyroid scan showed asymmetric activity near the upper left thyroid lobe, raising suspicion for parathyroid adenoma. Ultrasound neck did NOT show parathyroid adenoma. DXA 11/2017 showed osteoporosis with T-score -2.7 at radius and spine. Calcium has always been normal to mildly elevated.  The decision at that time was to continue monitoring blood calcium level. She does not want to be treatment for osteoporosis either.    3) Osteoporosis: DXA 11/2017 showed osteoporosis with T-score -2.7 at radius and spine. +stress fracture in the right tibia in her 20s. She does not want to be treatment for osteoporosis at this time.   Continue vitamin D and diary calcium.    PLAN:   - increase Synthroid to 150 mcg daily  - repeat TSH, FT4, calcium 25OH vitamin D, PTH in the next 4-6 week  - will contact patient with result    Garland Patel MD     Division of Diabetes and Endocrinology  Department of Medicine  [phone]

## 2018-07-15 ENCOUNTER — HOSPITAL ENCOUNTER (EMERGENCY)
Facility: CLINIC | Age: 58
Discharge: HOME | End: 2018-07-15
Attending: EMERGENCY MEDICINE | Admitting: EMERGENCY MEDICINE
Payer: COMMERCIAL

## 2018-07-15 ENCOUNTER — ALLIED HEALTH/NURSE VISIT (OUTPATIENT)
Dept: CARDIOLOGY | Facility: CLINIC | Age: 58
End: 2018-07-15
Attending: INTERNAL MEDICINE
Payer: COMMERCIAL

## 2018-07-15 ENCOUNTER — APPOINTMENT (OUTPATIENT)
Dept: GENERAL RADIOLOGY | Facility: CLINIC | Age: 58
End: 2018-07-15
Attending: EMERGENCY MEDICINE
Payer: COMMERCIAL

## 2018-07-15 ENCOUNTER — TELEPHONE (OUTPATIENT)
Dept: CARDIOLOGY | Facility: CLINIC | Age: 58
End: 2018-07-15

## 2018-07-15 VITALS
WEIGHT: 162 LBS | HEIGHT: 65 IN | HEART RATE: 82 BPM | BODY MASS INDEX: 26.99 KG/M2 | SYSTOLIC BLOOD PRESSURE: 122 MMHG | DIASTOLIC BLOOD PRESSURE: 69 MMHG | TEMPERATURE: 98 F | RESPIRATION RATE: 16 BRPM | OXYGEN SATURATION: 99 %

## 2018-07-15 DIAGNOSIS — R42 LIGHTHEADEDNESS: ICD-10-CM

## 2018-07-15 DIAGNOSIS — E86.0 DEHYDRATION: ICD-10-CM

## 2018-07-15 DIAGNOSIS — I44.2 COMPLETE ATRIOVENTRICULAR BLOCK (H): Primary | ICD-10-CM

## 2018-07-15 LAB
ALBUMIN UR-MCNC: NEGATIVE MG/DL
ANION GAP SERPL CALCULATED.3IONS-SCNC: 7 MMOL/L (ref 3–14)
APPEARANCE UR: CLEAR
BASOPHILS # BLD AUTO: 0 10E9/L (ref 0–0.2)
BASOPHILS NFR BLD AUTO: 0.4 %
BILIRUB UR QL STRIP: NEGATIVE
BUN SERPL-MCNC: 10 MG/DL (ref 7–30)
CALCIUM SERPL-MCNC: 9.7 MG/DL (ref 8.5–10.1)
CHLORIDE SERPL-SCNC: 104 MMOL/L (ref 94–109)
CO2 SERPL-SCNC: 25 MMOL/L (ref 20–32)
COLOR UR AUTO: NORMAL
CREAT SERPL-MCNC: 0.68 MG/DL (ref 0.52–1.04)
DIFFERENTIAL METHOD BLD: NORMAL
EOSINOPHIL # BLD AUTO: 0 10E9/L (ref 0–0.7)
EOSINOPHIL NFR BLD AUTO: 0.6 %
ERYTHROCYTE [DISTWIDTH] IN BLOOD BY AUTOMATED COUNT: 13 % (ref 10–15)
GFR SERPL CREATININE-BSD FRML MDRD: 88 ML/MIN/1.7M2
GLUCOSE SERPL-MCNC: 84 MG/DL (ref 70–99)
GLUCOSE UR STRIP-MCNC: NEGATIVE MG/DL
HCT VFR BLD AUTO: 40.7 % (ref 35–47)
HGB BLD-MCNC: 13.8 G/DL (ref 11.7–15.7)
HGB UR QL STRIP: NEGATIVE
IMM GRANULOCYTES # BLD: 0 10E9/L (ref 0–0.4)
IMM GRANULOCYTES NFR BLD: 0.3 %
KETONES UR STRIP-MCNC: NEGATIVE MG/DL
LEUKOCYTE ESTERASE UR QL STRIP: NEGATIVE
LYMPHOCYTES # BLD AUTO: 2.1 10E9/L (ref 0.8–5.3)
LYMPHOCYTES NFR BLD AUTO: 29.6 %
MCH RBC QN AUTO: 32.3 PG (ref 26.5–33)
MCHC RBC AUTO-ENTMCNC: 33.9 G/DL (ref 31.5–36.5)
MCV RBC AUTO: 95 FL (ref 78–100)
MONOCYTES # BLD AUTO: 0.5 10E9/L (ref 0–1.3)
MONOCYTES NFR BLD AUTO: 7.4 %
NEUTROPHILS # BLD AUTO: 4.4 10E9/L (ref 1.6–8.3)
NEUTROPHILS NFR BLD AUTO: 61.7 %
NITRATE UR QL: NEGATIVE
NRBC # BLD AUTO: 0 10*3/UL
NRBC BLD AUTO-RTO: 0 /100
PH UR STRIP: 6 PH (ref 5–7)
PLATELET # BLD AUTO: 181 10E9/L (ref 150–450)
POTASSIUM SERPL-SCNC: 3.7 MMOL/L (ref 3.4–5.3)
RBC # BLD AUTO: 4.27 10E12/L (ref 3.8–5.2)
SODIUM SERPL-SCNC: 136 MMOL/L (ref 133–144)
SOURCE: NORMAL
SP GR UR STRIP: 1 (ref 1–1.03)
TROPONIN I SERPL-MCNC: <0.015 UG/L (ref 0–0.04)
TSH SERPL DL<=0.005 MIU/L-ACNC: 1.76 MU/L (ref 0.4–4)
UROBILINOGEN UR STRIP-MCNC: NORMAL MG/DL (ref 0–2)
WBC # BLD AUTO: 7.1 10E9/L (ref 4–11)

## 2018-07-15 PROCEDURE — 96360 HYDRATION IV INFUSION INIT: CPT | Performed by: EMERGENCY MEDICINE

## 2018-07-15 PROCEDURE — 84484 ASSAY OF TROPONIN QUANT: CPT | Performed by: EMERGENCY MEDICINE

## 2018-07-15 PROCEDURE — 84443 ASSAY THYROID STIM HORMONE: CPT | Performed by: EMERGENCY MEDICINE

## 2018-07-15 PROCEDURE — 93296 REM INTERROG EVL PM/IDS: CPT | Mod: ZF

## 2018-07-15 PROCEDURE — 81003 URINALYSIS AUTO W/O SCOPE: CPT | Performed by: EMERGENCY MEDICINE

## 2018-07-15 PROCEDURE — 25000128 H RX IP 250 OP 636: Performed by: EMERGENCY MEDICINE

## 2018-07-15 PROCEDURE — 85025 COMPLETE CBC W/AUTO DIFF WBC: CPT | Performed by: EMERGENCY MEDICINE

## 2018-07-15 PROCEDURE — 93010 ELECTROCARDIOGRAM REPORT: CPT | Mod: Z6 | Performed by: EMERGENCY MEDICINE

## 2018-07-15 PROCEDURE — 93005 ELECTROCARDIOGRAM TRACING: CPT | Performed by: EMERGENCY MEDICINE

## 2018-07-15 PROCEDURE — 99284 EMERGENCY DEPT VISIT MOD MDM: CPT | Mod: 25 | Performed by: EMERGENCY MEDICINE

## 2018-07-15 PROCEDURE — 99285 EMERGENCY DEPT VISIT HI MDM: CPT | Mod: 25 | Performed by: EMERGENCY MEDICINE

## 2018-07-15 PROCEDURE — 80048 BASIC METABOLIC PNL TOTAL CA: CPT | Performed by: EMERGENCY MEDICINE

## 2018-07-15 PROCEDURE — 71046 X-RAY EXAM CHEST 2 VIEWS: CPT

## 2018-07-15 PROCEDURE — 93294 REM INTERROG EVL PM/LDLS PM: CPT | Performed by: INTERNAL MEDICINE

## 2018-07-15 RX ORDER — SODIUM CHLORIDE 9 MG/ML
INJECTION, SOLUTION INTRAVENOUS CONTINUOUS
Status: DISCONTINUED | OUTPATIENT
Start: 2018-07-15 | End: 2018-07-15 | Stop reason: HOSPADM

## 2018-07-15 RX ADMIN — SODIUM CHLORIDE 1000 ML: 9 INJECTION, SOLUTION INTRAVENOUS at 12:32

## 2018-07-15 RX ADMIN — SODIUM CHLORIDE 1000 ML: 9 INJECTION, SOLUTION INTRAVENOUS at 12:31

## 2018-07-15 ASSESSMENT — ENCOUNTER SYMPTOMS
COUGH: 0
BLOOD IN STOOL: 0
NAUSEA: 1
SHORTNESS OF BREATH: 0
LIGHT-HEADEDNESS: 1
VOMITING: 0
DIZZINESS: 1
DIARRHEA: 1

## 2018-07-15 NOTE — ED PROVIDER NOTES
History     Chief Complaint   Patient presents with     Dizziness     Loss of Consciousness     HPI  Joyce Marroquin is a 57 year old female with history notable for pacemaker, pericarditis, hyperparathyroidism, and collagenous colitis who presents to the ED for near syncopal episodes. Patients states since this morning she has had 5 near syncopal episodes with associated nausea after the episode. She states she currently feels dizzy.  She states yesterday she felt fine. She does reports she finished antibiotics 1 month ago for acute bronchitis and has been having watery stools for the past week. She states she feels dehydrated, and yesterday, she had 3 episodes of watery diarrhea despite taking imodium. She also states she has been keeping up with her fluid intake. She also reports 1 week ago she had a 6 hour long car ride, but had a dog in the vehicle and had rest stops every 2 hours. She reports she also increased Synthroid from 137 mg to 150 mg.  She otherwise currently denies any vomiting, shortness of breath, chest pain, cough, previous history of PE/DVT, melena, difficulty urinating, previous known GI bleed, or taking blood thinners.    PAST MEDICAL HISTORY  Past Medical History:   Diagnosis Date     Chronic depressive personality disorder      Esophageal reflux      PAST SURGICAL HISTORY  Past Surgical History:   Procedure Laterality Date     EP ABLATION / EP STUDIES  04/21/2017     HC CORRECT BUNION,SIMPLE       PPM INSERT OF NEW OR REPL W/VENT LEAD  04/21/2017     FAMILY HISTORY  No family history on file.  SOCIAL HISTORY  Social History   Substance Use Topics     Smoking status: Former Smoker     Smokeless tobacco: Never Used     Alcohol use Yes      Comment: rarely     MEDICATIONS  Current Facility-Administered Medications   Medication     sodium chloride 0.9% infusion     Current Outpatient Prescriptions   Medication     Acetaminophen (TYLENOL PO)     albuterol (PROAIR HFA/PROVENTIL HFA/VENTOLIN HFA)  "108 (90 BASE) MCG/ACT Inhaler     Cholecalciferol (VITAMIN D3 PO)     DiphenhydrAMINE HCl (BENADRYL PO)     Ferrous Sulfate (IRON SUPPLEMENT PO)     fluticasone (FLOVENT HFA) 110 MCG/ACT Inhaler     MAGNESIUM LACTATE PO     MELATONIN PO     Menaquinone-7 (VITAMIN K2 PO)     Multiple Vitamin (DAILY MULTIVITAMIN PO)     nebulizer nebulization     phytonadione (MEPHYTON/ VIT K) suspension     Probiotic Product (PROBIOTIC DAILY PO)     SYNTHROID 150 MCG tablet     TURMERIC PO     ALLERGIES  Allergies   Allergen Reactions     Flagyl [Metronidazole] Rash     Atenolol Other (See Comments)     Had extreme fatigue, dizziness, and overall felt unwell.      Bactrim [Sulfamethoxazole W/Trimethoprim]      Corn Oil      Lorazepam      Oxytetracycline      Sulfamethoxazole-Trimethoprim      Other reaction(s): Other  Passed out     Tetracycline      Zofran [Ondansetron]      Prolonged QT  Prolonged QT at baseline per patient     Benzodiazepines Other (See Comments)     Other reaction(s): Other  Extreme heat intolerance  Extreme heat intolerance     Cyclobenzaprine Other (See Comments)     Extreme heat intolerance     Levaquin [Levofloxacin]      Other reaction(s): Other  Tendon rupture     Nsaids Other (See Comments)     Exacerbated asthma       I have reviewed the Medications, Allergies, Past Medical and Surgical History, and Social History in the Epic system.    Review of Systems   Respiratory: Negative for cough and shortness of breath.    Cardiovascular: Negative for chest pain.   Gastrointestinal: Positive for diarrhea and nausea. Negative for blood in stool and vomiting.   Neurological: Positive for dizziness and light-headedness.   All other systems reviewed and are negative.      Physical Exam   BP: (!) 129/95  Pulse: 82  Heart Rate: 69  Temp: 98  F (36.7  C)  Resp: 16  Height: 165.1 cm (5' 5\")  Weight: 73.5 kg (162 lb)  SpO2: 100 %      Physical Exam   Constitutional: No distress.   HENT:   Head: Atraumatic. "   Mouth/Throat: Oropharynx is clear and moist. No oropharyngeal exudate.   Eyes: EOM are normal. No scleral icterus.   Neck: Neck supple.   Cardiovascular: Normal rate, regular rhythm and normal heart sounds.    Pulmonary/Chest: Breath sounds normal. No respiratory distress.   Abdominal: Soft. She exhibits no distension. There is no tenderness.   Musculoskeletal: She exhibits no edema or deformity.   Neurological: She is alert.   Skin: Skin is warm and dry. She is not diaphoretic.   Psychiatric: She has a normal mood and affect. Her behavior is normal.       ED Course     ED Course     Procedures   11:33 AM  The patient was seen and examined by Dr. Arguello in Room 21.                EKG Interpretation:      Interpreted by Lencho Arguello  Time reviewed: 1130  Symptoms at time of EKG: Presyncope  Rhythm: Ventricular paced rhythm  Rate: normal  Axis: Left  Ectopy: none  Conduction: normal  ST Segments/ T Waves: No ST-T wave changes  Q Waves: none  Comparison to prior: Unchanged    Clinical Impression: Paced rhythm    Results for orders placed or performed during the hospital encounter of 07/15/18   XR Chest 2 Views    Narrative    Exam: Chest x-ray, 2 views, 7/15/2018.    COMPARISON: Chest CT 6/28/2017.    HISTORY: Presyncope.    FINDINGS: PA and lateral views of the chest were obtained. Left-sided  automatic implantable cardiac defibrillator and its leads are in  place. Hyperinflated lungs. No acute airspace opacities. The pulmonary  vasculature is distinct. Cardiomediastinal silhouette within normal  limits. No pleural effusions. No pneumothorax.      Impression    IMPRESSION: Hyperinflated lungs. No acute airspace opacities.    VIDAL MARTINEZ MD   CBC with platelets differential   Result Value Ref Range    WBC 7.1 4.0 - 11.0 10e9/L    RBC Count 4.27 3.8 - 5.2 10e12/L    Hemoglobin 13.8 11.7 - 15.7 g/dL    Hematocrit 40.7 35.0 - 47.0 %    MCV 95 78 - 100 fl    MCH 32.3 26.5 - 33.0 pg    MCHC 33.9 31.5 -  36.5 g/dL    RDW 13.0 10.0 - 15.0 %    Platelet Count 181 150 - 450 10e9/L    Diff Method Automated Method     % Neutrophils 61.7 %    % Lymphocytes 29.6 %    % Monocytes 7.4 %    % Eosinophils 0.6 %    % Basophils 0.4 %    % Immature Granulocytes 0.3 %    Nucleated RBCs 0 0 /100    Absolute Neutrophil 4.4 1.6 - 8.3 10e9/L    Absolute Lymphocytes 2.1 0.8 - 5.3 10e9/L    Absolute Monocytes 0.5 0.0 - 1.3 10e9/L    Absolute Eosinophils 0.0 0.0 - 0.7 10e9/L    Absolute Basophils 0.0 0.0 - 0.2 10e9/L    Abs Immature Granulocytes 0.0 0 - 0.4 10e9/L    Absolute Nucleated RBC 0.0    Basic metabolic panel   Result Value Ref Range    Sodium 136 133 - 144 mmol/L    Potassium 3.7 3.4 - 5.3 mmol/L    Chloride 104 94 - 109 mmol/L    Carbon Dioxide 25 20 - 32 mmol/L    Anion Gap 7 3 - 14 mmol/L    Glucose 84 70 - 99 mg/dL    Urea Nitrogen 10 7 - 30 mg/dL    Creatinine 0.68 0.52 - 1.04 mg/dL    GFR Estimate 88 >60 mL/min/1.7m2    GFR Estimate If Black >90 >60 mL/min/1.7m2    Calcium 9.7 8.5 - 10.1 mg/dL   Troponin I   Result Value Ref Range    Troponin I ES <0.015 0.000 - 0.045 ug/L   TSH with free T4 reflex   Result Value Ref Range    TSH 1.76 0.40 - 4.00 mU/L   UA reflex to Microscopic and Culture   Result Value Ref Range    Color Urine Light Yellow     Appearance Urine Clear     Glucose Urine Negative NEG^Negative mg/dL    Bilirubin Urine Negative NEG^Negative    Ketones Urine Negative NEG^Negative mg/dL    Specific Gravity Urine 1.005 1.003 - 1.035    Blood Urine Negative NEG^Negative    pH Urine 6.0 5.0 - 7.0 pH    Protein Albumin Urine Negative NEG^Negative mg/dL    Urobilinogen mg/dL Normal 0.0 - 2.0 mg/dL    Nitrite Urine Negative NEG^Negative    Leukocyte Esterase Urine Negative NEG^Negative    Source Midstream Urine    EKG 12 lead   Result Value Ref Range    Interpretation ECG Click View Image link to view waveform and result             Labs Ordered and Resulted from Time of ED Arrival Up to the Time of Departure from  the ED   CBC WITH PLATELETS DIFFERENTIAL   BASIC METABOLIC PANEL   TROPONIN I   TSH WITH FREE T4 REFLEX   UA MACROSCOPIC WITH REFLEX TO MICRO AND CULTURE   PERIPHERAL IV CATHETER            Assessments & Plan (with Medical Decision Making)   57-year-old female presents with a chief complaint of dizziness.  She had several presyncopal episodes today which prompted her to come in.  She reports that she did spend quite a bit of time in the heat yesterday.  She states she felt like she got enough to drink in terms of water.  She has also had issues with multiple bouts of diarrhea daily for the last week or so.  Patient's EKG, troponin, hemoglobin, kidney function, EKG, chest x-ray, interrogation of her pacemaker were all unremarkable.  Patient's symptoms significantly improved with IV fluids.  She now feels better and is requesting discharge home.  I suspect that the accommodation of her diarrhea as well as her heat exposure yesterday had a greater effect on her than she had initially believed.  She will follow up with her regular doctor and return the emergency department as needed.  I have reviewed the nursing notes.    I have reviewed the findings, diagnosis, plan and need for follow up with the patient.    Discharge Medication List as of 7/15/2018  2:25 PM          Final diagnoses:   Lightheadedness   Dehydration     IRyan, am serving as a trained medical scribe to document services personally performed by  Lencho Arguello MD, based on the provider's statements to me.      Lencho MORALES MD, was physically present and have reviewed and verified the accuracy of this note documented by Ryan Winters.     7/15/2018   [facility], [place], EMERGENCY DEPARTMENT     Lencho Arguello,   07/15/18 2203

## 2018-07-15 NOTE — ED TRIAGE NOTES
Pt presents to ED after having 5 episodes of syncope (without losing consciousness) followed by nausea/vomiting. Pt states she starts to feel like she's going to pass out, gets very dizzy and her vision gets blurry. Pt states eventually then the sensation passes and then she feels extremely nauseated and begins to dry heave. Pt had a pacemaker placed in April of 2017 for complete heart block after things went wrong during a routine ablation.

## 2018-07-15 NOTE — ED AVS SNAPSHOT
[facility], Emergency Department    [address] [facility] [address] [facility] [address]    Phone:  [phone]                                       Joyce Marroquin   MRN: [id number]    Department:  [facility], Emergency Department   Date of Visit:  7/15/2018           Patient Information     Date Of Birth          1960        Your diagnoses for this visit were:     Lightheadedness     Dehydration        You were seen by Lencho Arguello DO.        Discharge Instructions         Dehydration    The human body is comprised largely of water. If you lose more fluids than you take in, you can become dehydrated. This means there are not enough fluids in your body for it to function right. Mild dehydration can cause weakness, confusion, or muscle cramps. In extreme cases, it can lead to brain damage and even death. That's why prompt treatment is crucial.  Risk factors  Anyone can become dehydrated. But infants, children, and older adults are at greatest risk. You are most likely to lose fluids with severe vomiting, diarrhea, or a fever. Exercising or working hard--especially in hot weather--can also cause excess fluid loss.  What to do  Drinking liquids is the best way to prevent dehydration. Water is best, but juice or frozen pops can also help. For adults, don't use liquids that contain caffeine or alcohol to rehydrate. Your doctor may suggest electrolyte solutions for sick infants and young children.  When to go to the emergency room (ER)  Go to an ER right away for these symptoms:  Adults    Very dark urine and little urine output    Dizziness, weakness, confusion, fainting  Children    Sunken eyes    Little or no urine output (for infants, no wet diaper in 8 hours)    Very dark urine    Skin that doesn't bounce back quickly when pinched    Crying without tears    Lethargy, decreased activity, or increased sleepiness  What to expect in the emergency room  Your blood pressure, temperature, and heart rate will be  checked. You may have blood or urine tests. The main treatment for dehydration is fluids. You may be given these to drink. Or, you may receive them through a vein in your arm. You also may be treated for diarrhea, vomiting, or a high fever.   Date Last Reviewed: 7/1/2017 2000-2017 [employer]. [address] [place] [address]. All rights reserved. This information is not intended as a substitute for professional medical care. Always follow your healthcare professional's instructions.    Follow-up with your regular doctor.  Return to the emergency department with any new or worsening symptoms.      Your next 10 appointments already scheduled     Aug 23, 2018  1:30 PM CDT   (Arrive by 1:15 PM)   Pacemaker Check with Uc Cv Device 1   [facility] ([facility])    [address] [place] [address]  [address] [place] [address] [phone]            Aug 23, 2018  2:00 PM CDT   (Arrive by 1:45 PM)   RETURN ARRHYTHMIA with iLno Funes MD   [facility])    [address] [place] [address]  [address] [place] [address] [phone]              24 Hour Appointment Hotline       To make an appointment at any [facility] (formerly [facility]), call [phone] ([phone]). If you don't have a family doctor or clinic, we will help you find one. [facility] clinics are conveniently located to serve the needs of you and your family.             Review of your medicines      Our records show that you are taking the medicines listed below. If these are incorrect, please call your family doctor or clinic.        Dose / Directions Last dose taken    albuterol 108 (90 Base) MCG/ACT Inhaler   Commonly known as:  PROAIR HFA/PROVENTIL HFA/VENTOLIN HFA   Dose:  2 puff        Inhale 2 puffs into the lungs every 4 hours as needed for shortness of breath / dyspnea or wheezing   Refills:  0        BENADRYL PO   Dose:  25-50 mg        Take 25-50 mg by  mouth nightly as needed   Refills:  0        DAILY MULTIVITAMIN PO   Dose:  1 tablet        Take 1 tablet by mouth daily   Refills:  0        fluticasone 110 MCG/ACT Inhaler   Commonly known as:  FLOVENT HFA   Dose:  2 puff        Inhale 2 puffs into the lungs 2 times daily   Refills:  0        IRON SUPPLEMENT PO   Dose:  325 mg        Take 325 mg by mouth daily   Refills:  0        MAGNESIUM LACTATE PO   Dose:  180 mg        Take 180 mg by mouth daily   Refills:  0        MELATONIN PO   Dose:  1 mg        Take 1 mg by mouth nightly as needed   Refills:  0        nebulizer nebulization        as needed   Refills:  0        phytonadione 0.5 MG/ML suspension   Commonly known as:  MEPHYTON/ VIT K        Refills:  0        PROBIOTIC DAILY PO   Dose:  1 capsule        Take 1 capsule by mouth 2 times daily   Refills:  0        SYNTHROID 150 MCG tablet   Dose:  150 mcg   Quantity:  60 tablet   Generic drug:  levothyroxine        Take 1 tablet (150 mcg) by mouth daily   Refills:  3        TURMERIC PO        Take by mouth daily   Refills:  0        TYLENOL PO   Dose:  500-1000 mg        Take 500-1,000 mg by mouth daily as needed   Refills:  0        VITAMIN D3 PO   Dose:  7000 Units        Take 7,000 Units by mouth daily   Refills:  0        VITAMIN K2 PO        Refills:  0                Procedures and tests performed during your visit     Basic metabolic panel    CBC with platelets differential    EKG 12 lead    Peripheral IV catheter    TSH with free T4 reflex    Troponin I    UA reflex to Microscopic and Culture    XR Chest 2 Views      Orders Needing Specimen Collection     None      Pending Results     Date and Time Order Name Status Description    7/15/2018 1119 EKG 12 lead Preliminary             Pending Culture Results     No orders found from 7/13/2018 to 7/16/2018.            Pending Results Instructions     If you had any lab results that were not finalized at the time of your Discharge, you can call the ED Lab  Result RN at [phone]. You will be contacted by this team for any positive Lab results or changes in treatment. The nurses are available 7 days a week from 10A to 6:30P.  You can leave a message 24 hours per day and they will return your call.        Thank you for choosing [facility]       Thank you for choosing [facility] for your care. Our goal is always to provide you with excellent care. Hearing back from our patients is one way we can continue to improve our services. Please take a few minutes to complete the written survey that you may receive in the mail after you visit with us. Thank you!        [employer] Inchart Information     [employer] gives you secure access to your electronic health record. If you see a primary care provider, you can also send messages to your care team and make appointments. If you have questions, please call your primary care clinic.  If you do not have a primary care provider, please call [phone] and they will assist you.        Care EveryWhere ID     This is your Care EveryWhere ID. This could be used by other organizations to access your [facility] medical records  [id number]        Equal Access to Services     PAVITHRA SALCEDO : Hadii freda Morgan, eugene silva, qabella juan, micah cho . So [facility] [phone].    ATENCIÓN: Si habla español, tiene a hankins disposición servicios gratuitos de asistencia lingüística. Llame al [phone].    We comply with applicable federal civil rights laws and [place] laws. We do not discriminate on the basis of race, color, national origin, age, disability, sex, sexual orientation, or gender identity.            After Visit Summary       This is your record. Keep this with you and show to your community pharmacist(s) and doctor(s) at your next visit.

## 2018-07-15 NOTE — MR AVS SNAPSHOT
After Visit Summary   7/15/2018    Joyce Marroquin    MRN: [id number]           Patient Information     Date Of Birth          1960        Visit Information        Provider Department      7/15/2018 6:00 AM UC ICD REMOTE [facility]        Today's Diagnoses     Complete atrioventricular block (H)    -  1       Follow-ups after your visit        Your next 10 appointments already scheduled     Aug 23, 2018  1:30 PM CDT   (Arrive by 1:15 PM)   Pacemaker Check with Uc Cv Device 1   [facility] ([facility])    [address] [place] [address]  [address] [place] [address] [phone]            Aug 23, 2018  2:00 PM CDT   (Arrive by 1:45 PM)   RETURN ARRHYTHMIA with Lino Funes MD   [facility] ([facility])    [address] [place] [address]  [address] [place] [address] [phone]              Who to contact     If you have questions or need follow up information about today's clinic visit or your schedule please contact [facility] directly at [phone].  Normal or non-critical lab and imaging results will be communicated to you by [email or web address], letter or phone within 4 business days after the clinic has received the results. If you do not hear from us within 7 days, please contact the clinic through [employer] or phone. If you have a critical or abnormal lab result, we will notify you by phone as soon as possible.  Submit refill requests through [employer] or call your pharmacy and they will forward the refill request to us. Please allow 3 business days for your refill to be completed.          Additional Information About Your Visit        [email or web address] Information     [employer] gives you secure access to your electronic health record. If you see a primary care provider, you can also send messages to your care team and make appointments. If you have questions, please call your primary care clinic.  If you do not have a primary  care provider, please call [phone] and they will assist you.        Care EveryWhere ID     This is your Care EveryWhere ID. This could be used by other organizations to access your [facility] medical records  [id number]        Your Vitals Were     Last Period                   08/21/2007            Blood Pressure from Last 3 Encounters:   07/15/18 122/69   06/29/18 120/76   05/03/18 121/84    Weight from Last 3 Encounters:   07/15/18 73.5 kg (162 lb)   06/29/18 73.9 kg (163 lb)   05/03/18 72.5 kg (159 lb 14.4 oz)              We Performed the Following     INTERROGATION DEVICE EVAL REMOTE, PACER/ICD        Primary Care Provider Office Phone # Fax #    Rafy Jiang [phone]       [facility] [address] [place] [address] [address]        Equal Access to Services     [facility]: Hadii aad ku hadasho Soomaali, waaxda luqadaha, qaybta kaalmada adeegyada, waxay idiin hayaan adeeg kharash laDavidaan . So [facility] [phone].    ATENCIÓN: Si habla español, tiene a hankins disposición servicios gratuitos de asistencia lingüística. Jp al [phone].    We comply with applicable federal civil rights laws and [place] laws. We do not discriminate on the basis of race, color, national origin, age, disability, sex, sexual orientation, or gender identity.            Thank you!     Thank you for choosing [facility]  for your care. Our goal is always to provide you with excellent care. Hearing back from our patients is one way we can continue to improve our services. Please take a few minutes to complete the written survey that you may receive in the mail after your visit with us. Thank you!             Your Updated Medication List - Protect others around you: Learn how to safely use, store and throw away your medicines at www.disposemymeds.org.          This list is accurate as of 7/15/18 11:59 PM.  Always use your most recent med list.                   Brand Name  Dispense Instructions for use Diagnosis    albuterol 108 (90 Base) MCG/ACT Inhaler    PROAIR HFA/PROVENTIL HFA/VENTOLIN HFA     Inhale 2 puffs into the lungs every 4 hours as needed for shortness of breath / dyspnea or wheezing        BENADRYL PO      Take 25-50 mg by mouth nightly as needed        DAILY MULTIVITAMIN PO      Take 1 tablet by mouth daily        fluticasone 110 MCG/ACT Inhaler    FLOVENT HFA     Inhale 2 puffs into the lungs 2 times daily        IRON SUPPLEMENT PO      Take 325 mg by mouth daily        MAGNESIUM LACTATE PO      Take 180 mg by mouth daily    Fatigue, unspecified type       MELATONIN PO      Take 1 mg by mouth nightly as needed        nebulizer nebulization      as needed    H/O pericarditis       phytonadione 0.5 MG/ML suspension    MEPHYTON/ VIT K          PROBIOTIC DAILY PO      Take 1 capsule by mouth 2 times daily        SYNTHROID 150 MCG tablet   Generic drug:  levothyroxine     60 tablet    Take 1 tablet (150 mcg) by mouth daily    Postablative hypothyroidism       TURMERIC PO      Take by mouth daily    Fatigue, unspecified type       TYLENOL PO      Take 500-1,000 mg by mouth daily as needed        VITAMIN D3 PO      Take 7,000 Units by mouth daily        VITAMIN K2 PO       H/O pericarditis

## 2018-07-15 NOTE — DISCHARGE INSTRUCTIONS
Dehydration    The human body is comprised largely of water. If you lose more fluids than you take in, you can become dehydrated. This means there are not enough fluids in your body for it to function right. Mild dehydration can cause weakness, confusion, or muscle cramps. In extreme cases, it can lead to brain damage and even death. That's why prompt treatment is crucial.  Risk factors  Anyone can become dehydrated. But infants, children, and older adults are at greatest risk. You are most likely to lose fluids with severe vomiting, diarrhea, or a fever. Exercising or working hard--especially in hot weather--can also cause excess fluid loss.  What to do  Drinking liquids is the best way to prevent dehydration. Water is best, but juice or frozen pops can also help. For adults, don't use liquids that contain caffeine or alcohol to rehydrate. Your doctor may suggest electrolyte solutions for sick infants and young children.  When to go to the emergency room (ER)  Go to an ER right away for these symptoms:  Adults    Very dark urine and little urine output    Dizziness, weakness, confusion, fainting  Children    Sunken eyes    Little or no urine output (for infants, no wet diaper in 8 hours)    Very dark urine    Skin that doesn't bounce back quickly when pinched    Crying without tears    Lethargy, decreased activity, or increased sleepiness  What to expect in the emergency room  Your blood pressure, temperature, and heart rate will be checked. You may have blood or urine tests. The main treatment for dehydration is fluids. You may be given these to drink. Or, you may receive them through a vein in your arm. You also may be treated for diarrhea, vomiting, or a high fever.   Date Last Reviewed: 7/1/2017 2000-2017 [employer]. [address] [place] [address]. All rights reserved. This information is not intended as a substitute for professional medical care. Always follow your healthcare  professional's instructions.    Follow-up with your regular doctor.  Return to the emergency department with any new or worsening symptoms.

## 2018-07-15 NOTE — ED AVS SNAPSHOT
[facility], [place], Emergency Department    [address] [facility] [address]    Phone:  [phone]                                       Joyce Marroquin   MRN: [id number]    Department:  [facility], Emergency Department   Date of Visit:  7/15/2018           After Visit Summary Signature Page     I have received my discharge instructions, and my questions have been answered. I have discussed any challenges I see with this plan with the nurse or doctor.    ..........................................................................................................................................  Patient/Patient Representative Signature      ..........................................................................................................................................  Patient Representative Print Name and Relationship to Patient    ..................................................               ................................................  Date                                            Time    ..........................................................................................................................................  Reviewed by Signature/Title    ...................................................              ..............................................  Date                                                            Time

## 2018-07-16 LAB — INTERPRETATION ECG - MUSE: NORMAL

## 2018-07-16 NOTE — PROGRESS NOTES
Preliminary Device Interrogation Results.  Final physician signed paceart report to be scanned and attached.     Carelink Express remote pacemaker transmission received from the ER and reviewed.  Device transmission sent per MD orders.  Patient has a Medtronic dual lead pacemaker.  Normal pacemaker function.  No ventricular arrythmias recorded.  4 AT/AF episodes recorded - 5 seconds in duration.  Presenting EGM = AS- @ 66 bpm.  AP = 23.1%.   = 99.9%.  Estimated battery longevity to KONRAD = 8 years.  ER RN notified of interrogation results.       Remote pacemaker transmission

## 2018-07-23 ENCOUNTER — ALLIED HEALTH/NURSE VISIT (OUTPATIENT)
Dept: CARDIOLOGY | Facility: CLINIC | Age: 58
End: 2018-07-23
Attending: INTERNAL MEDICINE
Payer: COMMERCIAL

## 2018-07-23 DIAGNOSIS — I44.2 COMPLETE ATRIOVENTRICULAR BLOCK (H): Primary | ICD-10-CM

## 2018-07-23 PROCEDURE — 93280 PM DEVICE PROGR EVAL DUAL: CPT | Mod: ZF

## 2018-07-23 NOTE — PROGRESS NOTES
"Preliminary Device Interrogation Results.  Final physician signed paceart report to be scanned and attached.    Patient seen in clinic for evaluation and iterative programming of her Medtronic dual lead pacemaker per MD orders.  Normal pacemaker function.  No arrythmias recorded.  Intrinsic rhythm = NSR with vent rate @ ~ 30 bpm.  AP = 24.7%.   = 99.9%.  Estimated battery longevity to KONRAD = 8 years.  Patient reports that she \"has not been feeling well due to diarrhea, dehydration and dizziness\".   In addition, patient reports that she feels fatigued and cannot do all the activities she would like to do each day.  ADL response rate programmed from 3 to 4 today.  Plan for patient to return to clinic in 1 month as scheduled.    Dual lead pacemaker        "

## 2018-07-23 NOTE — MR AVS SNAPSHOT
After Visit Summary   7/23/2018    Joyce Marroquin    MRN: [id number]           Patient Information     Date Of Birth          1960        Visit Information        Provider Department      7/23/2018 8:00 AM 1, Uc Cv Device [facility]        Today's Diagnoses     Complete atrioventricular block (H)    -  1      Care Instructions    It was a pleasure to see you in clinic today.  Please do not hesitate to call with any questions or concerns.  We look forward to seeing you in clinic at your next device check in 1 month.    Jeanna Nixon, RN, MS, CCRN  Electrophysiology Nurse Clinician  [facility]    During Business Hours Please Call:  [phone]  After Hours Please Call:  [phone] - select option #4 and ask for job code [phone]                    Follow-ups after your visit        Your next 10 appointments already scheduled     Aug 23, 2018  1:30 PM CDT   (Arrive by 1:15 PM)   Pacemaker Check with Uc Cv Device 1   [facility] ([facility])    [address] [place] [address]  [address] [place] [address] [phone]            Aug 23, 2018  2:00 PM CDT   (Arrive by 1:45 PM)   RETURN ARRHYTHMIA with Lino Funes MD   [facility] ([facility])    [address] [place] [address]  [address] [place] [address] [phone]              Who to contact     If you have questions or need follow up information about today's clinic visit or your schedule please contact [facility] directly at [phone].  Normal or non-critical lab and imaging results will be communicated to you by MyChart, letter or phone within 4 business days after the clinic has received the results. If you do not hear from us within 7 days, please contact the clinic through [employer] or phone. If you have a critical or abnormal lab result, we will notify you by phone as soon as possible.  Submit refill requests through [employer] or  call your pharmacy and they will forward the refill request to us. Please allow 3 business days for your refill to be completed.          Additional Information About Your Visit        MyChart Information     [employer] gives you secure access to your electronic health record. If you see a primary care provider, you can also send messages to your care team and make appointments. If you have questions, please call your primary care clinic.  If you do not have a primary care provider, please call [phone] and they will assist you.        Care EveryWhere ID     This is your Care EveryWhere ID. This could be used by other organizations to access your [facility] medical records  [id number]        Your Vitals Were     Last Period                   08/21/2007            Blood Pressure from Last 3 Encounters:   07/15/18 122/69   06/29/18 120/76   05/03/18 121/84    Weight from Last 3 Encounters:   07/15/18 73.5 kg (162 lb)   06/29/18 73.9 kg (163 lb)   05/03/18 72.5 kg (159 lb 14.4 oz)              We Performed the Following     (37559)PM DEVICE PROGRAMMING EVAL, DUAL LEAD PACER        Primary Care Provider Office Phone # Fax #    Rafy Jiang [phone]       [address] [place] [address] [address]        Equal Access to Services     PAVITHRA SALCEDO : Hadii aad ku hadasho Soomaali, waaxda luqadaha, qaybta kaalmada adeegyada, waxay idiin hayaan ellen khdavid cho . So [facility] [phone].    ATENCIÓN: Si habla español, tiene a hankins disposición servicios gratuitos de asistencia lingüística. Ronnyame al [phone].    We comply with applicable federal civil rights laws and [place] laws. We do not discriminate on the basis of race, color, national origin, age, disability, sex, sexual orientation, or gender identity.            Thank you!     Thank you for choosing [facility]  for your care. Our goal is always to provide you with excellent care. Hearing back from  our patients is one way we can continue to improve our services. Please take a few minutes to complete the written survey that you may receive in the mail after your visit with us. Thank you!             Your Updated Medication List - Protect others around you: Learn how to safely use, store and throw away your medicines at www.disposemymeds.org.          This list is accurate as of 7/23/18 11:59 PM.  Always use your most recent med list.                   Brand Name Dispense Instructions for use Diagnosis    albuterol 108 (90 Base) MCG/ACT Inhaler    PROAIR HFA/PROVENTIL HFA/VENTOLIN HFA     Inhale 2 puffs into the lungs every 4 hours as needed for shortness of breath / dyspnea or wheezing        BENADRYL PO      Take 25-50 mg by mouth nightly as needed        DAILY MULTIVITAMIN PO      Take 1 tablet by mouth daily        fluticasone 110 MCG/ACT Inhaler    FLOVENT HFA     Inhale 2 puffs into the lungs 2 times daily        IRON SUPPLEMENT PO      Take 325 mg by mouth daily        MAGNESIUM LACTATE PO      Take 180 mg by mouth daily    Fatigue, unspecified type       MELATONIN PO      Take 1 mg by mouth nightly as needed        nebulizer nebulization      as needed    H/O pericarditis       phytonadione 0.5 MG/ML suspension    MEPHYTON/ VIT K          PROBIOTIC DAILY PO      Take 1 capsule by mouth 2 times daily        SYNTHROID 150 MCG tablet   Generic drug:  levothyroxine     60 tablet    Take 1 tablet (150 mcg) by mouth daily    Postablative hypothyroidism       TURMERIC PO      Take by mouth daily    Fatigue, unspecified type       TYLENOL PO      Take 500-1,000 mg by mouth daily as needed        VITAMIN D3 PO      Take 7,000 Units by mouth daily        VITAMIN K2 PO       H/O pericarditis

## 2018-07-23 NOTE — PATIENT INSTRUCTIONS
It was a pleasure to see you in clinic today.  Please do not hesitate to call with any questions or concerns.  We look forward to seeing you in clinic at your next device check in 1 month.    Jeanna Nixon, RN, MS, CCRN  Electrophysiology Nurse Clinician  [facility]    During Business Hours Please Call:  [phone]  After Hours Please Call:  [phone] - select option #4 and ask for job code [phone]

## 2018-07-26 ENCOUNTER — ALLIED HEALTH/NURSE VISIT (OUTPATIENT)
Dept: CARDIOLOGY | Facility: CLINIC | Age: 58
End: 2018-07-26
Attending: INTERNAL MEDICINE
Payer: COMMERCIAL

## 2018-07-26 DIAGNOSIS — I44.2 COMPLETE ATRIOVENTRICULAR BLOCK (H): Primary | ICD-10-CM

## 2018-07-26 PROCEDURE — 93280 PM DEVICE PROGR EVAL DUAL: CPT | Mod: ZF

## 2018-07-26 NOTE — PROGRESS NOTES
Preliminary Device Interrogation Results.  Final physician signed paceart report to be scanned and attached.    Patient seen in clinic for evaluation and iterative programming of her Medtronic dual lead pacemaker per MD orders.  Patient was seen in clinic on 7/23/18 and the ADL response was programmed from 3 to 4.  Patient reports that she has been feeling her heart pound and is having difficulty sleeping since the programming change.  ADL response programmed back to 3. Normal pacemaker function.  No arrythmias recorded.  Presenting rhythm = AP- with PAC's @ 75 bpm.  AP = 36.6%.   = 100%.  Estimated battery longevity to KONRAD = 8 years.  Plan for patient to return to clinic in 1 month as scheduled for next device check and Dr. Funes appts.    Dual lead pacemaker

## 2018-07-26 NOTE — PATIENT INSTRUCTIONS
It was a pleasure to see you in clinic today.  Please do not hesitate to call with any questions or concerns.  We look forward to seeing you in clinic at your next device check in 1 month.    Jeanna Nixon, RN, MS, CCRN  Electrophysiology Nurse Clinician  [facility]    During Business Hours Please Call:  [phone]  After Hours Please Call:  [phone] - select option #4 and ask for job code [phone]

## 2018-07-26 NOTE — MR AVS SNAPSHOT
After Visit Summary   7/26/2018    Joyce Marroquin    MRN: [id number]           Patient Information     Date Of Birth          1960        Visit Information        Provider Department      7/26/2018 7:00 AM 1, Uc Cv Device [facility]        Today's Diagnoses     Complete atrioventricular block (H)    -  1      Care Instructions    It was a pleasure to see you in clinic today.  Please do not hesitate to call with any questions or concerns.  We look forward to seeing you in clinic at your next device check in 1 month.    Jeanna Nixon, RN, MS, CCRN  Electrophysiology Nurse Clinician  [facility]    During Business Hours Please Call:  [phone]  After Hours Please Call:  [phone] - select option #4 and ask for job code [phone]                    Follow-ups after your visit        Your next 10 appointments already scheduled     Aug 23, 2018  1:30 PM CDT   (Arrive by 1:15 PM)   Pacemaker Check with Uc Cv Device 1   [facility] ([facility])    [address] [place] [address]  [address] [place] [address] [phone]            Aug 23, 2018  2:00 PM CDT   (Arrive by 1:45 PM)   RETURN ARRHYTHMIA with Lino Funes MD   [facility] ([facility])    [address] [place] [address]  [address] [place] [address] [phone]              Who to contact     If you have questions or need follow up information about today's clinic visit or your schedule please contact [facility] directly at [phone].  Normal or non-critical lab and imaging results will be communicated to you by MyChart, letter or phone within 4 business days after the clinic has received the results. If you do not hear from us within 7 days, please contact the clinic through [employer] or phone. If you have a critical or abnormal lab result, we will notify you by phone as soon as possible.  Submit refill requests through [employer] or  call your pharmacy and they will forward the refill request to us. Please allow 3 business days for your refill to be completed.          Additional Information About Your Visit        [employer] Information     [employer] gives you secure access to your electronic health record. If you see a primary care provider, you can also send messages to your care team and make appointments. If you have questions, please call your primary care clinic.  If you do not have a primary care provider, please call [phone] and they will assist you.        Care EveryWhere ID     This is your Care EveryWhere ID. This could be used by other organizations to access your [facility] medical records  [id number]        Your Vitals Were     Last Period                   08/21/2007            Blood Pressure from Last 3 Encounters:   07/15/18 122/69   06/29/18 120/76   05/03/18 121/84    Weight from Last 3 Encounters:   07/15/18 73.5 kg (162 lb)   06/29/18 73.9 kg (163 lb)   05/03/18 72.5 kg (159 lb 14.4 oz)              We Performed the Following     PM DEVICE PROGRAMMING EVAL, DUAL LEAD PACER        Primary Care Provider Office Phone # Fax #    Rafy Jiang [phone]       [address] CORIE LIU [address] [place] [address]        Equal Access to Services     [facility] : Hadii aad ku hadasho Soomaali, waaxda luqadaha, qaybta kaalmada adeegyada, waxay idiin haymonetn ellen cho . So [facility] [phone].    ATENCIÓN: Si habla español, tiene a hankins disposición servicios gratuitos de asistencia lingüística. ame al [phone].    We comply with applicable federal civil rights laws and [place] laws. We do not discriminate on the basis of race, color, national origin, age, disability, sex, sexual orientation, or gender identity.            Thank you!     Thank you for choosing [facility]  for your care. Our goal is always to provide you with excellent care. Hearing back from our  patients is one way we can continue to improve our services. Please take a few minutes to complete the written survey that you may receive in the mail after your visit with us. Thank you!             Your Updated Medication List - Protect others around you: Learn how to safely use, store and throw away your medicines at www.disposemymeds.org.          This list is accurate as of 7/26/18  7:29 AM.  Always use your most recent med list.                   Brand Name Dispense Instructions for use Diagnosis    albuterol 108 (90 Base) MCG/ACT Inhaler    PROAIR HFA/PROVENTIL HFA/VENTOLIN HFA     Inhale 2 puffs into the lungs every 4 hours as needed for shortness of breath / dyspnea or wheezing        BENADRYL PO      Take 25-50 mg by mouth nightly as needed        DAILY MULTIVITAMIN PO      Take 1 tablet by mouth daily        fluticasone 110 MCG/ACT Inhaler    FLOVENT HFA     Inhale 2 puffs into the lungs 2 times daily        IRON SUPPLEMENT PO      Take 325 mg by mouth daily        MAGNESIUM LACTATE PO      Take 180 mg by mouth daily    Fatigue, unspecified type       MELATONIN PO      Take 1 mg by mouth nightly as needed        nebulizer nebulization      as needed    H/O pericarditis       phytonadione 0.5 MG/ML suspension    MEPHYTON/ VIT K          PROBIOTIC DAILY PO      Take 1 capsule by mouth 2 times daily        SYNTHROID 150 MCG tablet   Generic drug:  levothyroxine     60 tablet    Take 1 tablet (150 mcg) by mouth daily    Postablative hypothyroidism       TURMERIC PO      Take by mouth daily    Fatigue, unspecified type       TYLENOL PO      Take 500-1,000 mg by mouth daily as needed        VITAMIN D3 PO      Take 7,000 Units by mouth daily        VITAMIN K2 PO       H/O pericarditis

## 2018-08-03 ENCOUNTER — TRANSFERRED RECORDS (OUTPATIENT)
Dept: HEALTH INFORMATION MANAGEMENT | Facility: CLINIC | Age: 58
End: 2018-08-03

## 2018-08-03 LAB — MAMMOGRAM: NORMAL

## 2018-08-09 DIAGNOSIS — E89.0 POSTABLATIVE HYPOTHYROIDISM: ICD-10-CM

## 2018-08-09 DIAGNOSIS — E21.3 HYPERPARATHYROIDISM (H): ICD-10-CM

## 2018-08-09 LAB
ALBUMIN SERPL-MCNC: 3.2 G/DL (ref 3.4–5)
CALCIUM SERPL-MCNC: 9 MG/DL (ref 8.5–10.1)
PHOSPHATE SERPL-MCNC: 2.8 MG/DL (ref 2.5–4.5)
PTH-INTACT SERPL-MCNC: 145 PG/ML (ref 18–80)
T4 FREE SERPL-MCNC: 1.29 NG/DL (ref 0.76–1.46)
TSH SERPL DL<=0.005 MIU/L-ACNC: 0.82 MU/L (ref 0.4–4)

## 2018-08-09 PROCEDURE — 36415 COLL VENOUS BLD VENIPUNCTURE: CPT | Performed by: INTERNAL MEDICINE

## 2018-08-09 PROCEDURE — 84100 ASSAY OF PHOSPHORUS: CPT | Performed by: INTERNAL MEDICINE

## 2018-08-09 PROCEDURE — 82310 ASSAY OF CALCIUM: CPT | Performed by: INTERNAL MEDICINE

## 2018-08-09 PROCEDURE — 84443 ASSAY THYROID STIM HORMONE: CPT | Performed by: INTERNAL MEDICINE

## 2018-08-09 PROCEDURE — 82040 ASSAY OF SERUM ALBUMIN: CPT | Performed by: INTERNAL MEDICINE

## 2018-08-09 PROCEDURE — 84439 ASSAY OF FREE THYROXINE: CPT | Performed by: INTERNAL MEDICINE

## 2018-08-09 PROCEDURE — 83970 ASSAY OF PARATHORMONE: CPT | Performed by: INTERNAL MEDICINE

## 2018-08-09 PROCEDURE — 82306 VITAMIN D 25 HYDROXY: CPT | Performed by: INTERNAL MEDICINE

## 2018-08-10 LAB — DEPRECATED CALCIDIOL+CALCIFEROL SERPL-MC: 65 UG/L (ref 20–75)

## 2018-08-13 ENCOUNTER — TELEPHONE (OUTPATIENT)
Dept: ENDOCRINOLOGY | Facility: CLINIC | Age: 58
End: 2018-08-13

## 2018-08-13 DIAGNOSIS — E21.0 PRIMARY HYPERPARATHYROIDISM (H): Primary | ICD-10-CM

## 2018-08-13 NOTE — TELEPHONE ENCOUNTER
[facility] Call Center    Phone Message    May a detailed message be left on voicemail: yes    Reason for Call: Other: Patient states she is having hyperparathyroidism symptoms and needs to talk to  or a nurse ASAP. Patient won't say what is going on with her other than she is having symptoms and wants a nurse or a provider. Please follow up with patient.      Action Taken: Message routed to:  [facility] ([facility] Jp

## 2018-08-13 NOTE — TELEPHONE ENCOUNTER
Joyce wants a referral to [facility] for surgery for hyperparathyroid . She vented that she is  tired of watching it and wants to  explore options with surgeon. She is inches away from not being able to work. Symptoms are  muscle fatigue , bone pain, navas ( crying)  Cannot do the 24 hour urine due to diarrhea and insists  she needs the parathyroid's removed. Not happy with waiting and watching anymore.

## 2018-08-23 ENCOUNTER — OFFICE VISIT (OUTPATIENT)
Dept: CARDIOLOGY | Facility: CLINIC | Age: 58
End: 2018-08-23
Attending: INTERNAL MEDICINE
Payer: COMMERCIAL

## 2018-08-23 VITALS
OXYGEN SATURATION: 95 % | SYSTOLIC BLOOD PRESSURE: 121 MMHG | HEIGHT: 65 IN | DIASTOLIC BLOOD PRESSURE: 71 MMHG | HEART RATE: 55 BPM

## 2018-08-23 DIAGNOSIS — M62.81 MUSCLE WEAKNESS: Primary | ICD-10-CM

## 2018-08-23 DIAGNOSIS — I44.2 COMPLETE ATRIOVENTRICULAR BLOCK (H): ICD-10-CM

## 2018-08-23 DIAGNOSIS — R68.89 EXERCISE INTOLERANCE: ICD-10-CM

## 2018-08-23 PROCEDURE — 93283 PRGRMG EVAL IMPLANTABLE DFB: CPT | Mod: 26 | Performed by: INTERNAL MEDICINE

## 2018-08-23 PROCEDURE — G0463 HOSPITAL OUTPT CLINIC VISIT: HCPCS | Mod: 25,ZF

## 2018-08-23 PROCEDURE — 99214 OFFICE O/P EST MOD 30 MIN: CPT | Mod: GC | Performed by: INTERNAL MEDICINE

## 2018-08-23 PROCEDURE — 93283 PRGRMG EVAL IMPLANTABLE DFB: CPT | Mod: ZF

## 2018-08-23 RX ORDER — AZELASTINE 1 MG/ML
2 SPRAY, METERED NASAL AT BEDTIME
Refills: 11 | COMMUNITY
Start: 2018-06-22 | End: 2018-11-09

## 2018-08-23 ASSESSMENT — PAIN SCALES - GENERAL: PAINLEVEL: NO PAIN (0)

## 2018-08-23 NOTE — PROGRESS NOTES
"Preliminary Device Interrogation Results.  Final physician signed paceart report to be scanned and attached.    Pt seen in the [facility] for evaluation and iterative programming of a Medtronic, dual lead pacemaker, per MD orders. She also has an appointment with Dr. Funes. Today her intrinsic rhythm is AS/ @ 75 bpm with frequent PACs. Normal pacemaker function. No arrhythmias have been recorded. No short v-v intervals recorded. Lead trends appear stable. AP= 28% and = 100%. Pt reports she continues to \"not feel right\". Battery estimates 8 years to OKNRAD. Plan for pt to send a remote transmission on 11/26/18 and RTC in 6 months.  Dual lead pacemaker          "

## 2018-08-23 NOTE — PATIENT INSTRUCTIONS
You will be scheduled for a follow up visit: 6 months with Dr Funes and pacemaker check.    Referral to Dr Duarte.    We encourage you to use My Chart as your primary form of communication if possible. If you need assistance in setting this up, please contact our office or ask at your follow up visit.     If you need a medication refill please contact your pharmacy. Please allow at least 3 business days for your refill to be completed.       Cardiology  Telephone Number    Fiorella Wright [phone]   Or send a message to your provider via my chart.   For scheduling procedures:    [facility]    Clinic appointments       [phone]   For the Device Clinic (Pacemakers and ICD's)   RN's :   Alpa Porras  During business hours: [phone]    After business hours:   [phone]- select option 4 and ask for job code [phone].          As always, Thank you for trusting us with your health care needs!

## 2018-08-23 NOTE — PATIENT INSTRUCTIONS
It was a pleasure to see you in clinic today. Please do not hesitate to call with any questions or concerns. You are scheduled for a remote pacemaker transmission on 11/26/18. We will call in 1-2 business days to discuss the results with you. We look forward to seeing you in clinic at your next device check in 6 months.    Alpa Danielson, RN  Electrophysiology Nurse Clinician  [facility]  During business hours call:  [phone]  After business hours please call: [phone]- select option #4 and ask for job code [phone].

## 2018-08-23 NOTE — NURSING NOTE
Chief Complaint   Patient presents with     Follow Up For      Shantel 3 U (5/3/18)     Vitals were taken and medications were reconciled.  Anthony Reyes, JARED  1:59 PM

## 2018-08-23 NOTE — MR AVS SNAPSHOT
After Visit Summary   8/23/2018    Joyce Marroquin    MRN: [id number]           Patient Information     Date Of Birth          1960        Visit Information        Provider Department      8/23/2018 2:00 PM Lino Funes MD [facility]        Today's Diagnoses     Muscle weakness    -  1    Complete atrioventricular block (H)        Exercise intolerance          Care Instructions    You will be scheduled for a follow up visit: 6 months with Dr Funes and pacemaker check.    Referral to Dr Duarte.    We encourage you to use My Chart as your primary form of communication if possible. If you need assistance in setting this up, please contact our office or ask at your follow up visit.     If you need a medication refill please contact your pharmacy. Please allow at least 3 business days for your refill to be completed.       Cardiology  Telephone Number    Fiorella Wright [phone]   Or send a message to your provider via my chart.   For scheduling procedures:    [facility]    Clinic appointments       [phone]   For the Device Clinic (Pacemakers and ICD's)   RN's :   Alpa Porras  During business hours: [phone]    After business hours:   [phone]- select option 4 and ask for job code [id number].          As always, Thank you for trusting us with your health care needs!          Follow-ups after your visit        Additional Services     Endocrinology Referral       Your provider has referred you to: [facility]: Endocrinology and Diabetes Clinic - [place] [phone]   [email or web address]  [facility]: Endocrinology Clinic [facility] [phone]   [email or web address]      Please be aware that coverage of these services is subject to the terms and limitations of your health insurance plan.  Call member services at your health plan with any benefit or coverage questions.       Please bring the following to your appointment:    >>   Any x-rays, CTs or MRIs which have been performed.  Contact the facility where they were done to arrange for  prior to your scheduled appointment.    >>   List of current medications   >>   This referral request   >>   Any documents/labs given to you for this referral            Follow-Up with Electrophysiologist - 6 Months                 Your next 10 appointments already scheduled     Aug 30, 2018  1:30 PM CDT   New Visit with Kristine Venegas MD   [facility] ([facility])    [address] [place] [address]   [phone]            Feb 21, 2019  9:30 AM CST   (Arrive by 9:15 AM)   Pacemaker Check with Uc Cv Device 1   [facility] ([facility])    [address] [place] [address]  [address] [place] [address] [phone]            Feb 21, 2019 10:00 AM CST   (Arrive by 9:45 AM)   RETURN ARRHYTHMIA with Lino Funes MD   [facility])    [address] [place] [address]  [address] [place] [address] [phone]              Future tests that were ordered for you today     Open Future Orders        Priority Expected Expires Ordered    Endocrinology Referral Routine 8/24/2018 11/28/2018 8/23/2018    Follow-Up with Electrophysiologist - 6 Months Routine 2/19/2019 5/20/2019 8/23/2018    Follow-Up with Device Clinic-6 months Routine 2/19/2019 5/20/2019 8/23/2018            Who to contact     If you have questions or need follow up information about today's clinic visit or your schedule please contact [facility] directly at [phone].  Normal or non-critical lab and imaging results will be communicated to you by MyChart, letter or phone within 4 business days after the clinic has received the results. If you do not hear from us within 7 days, please contact the clinic through MyChart or phone. If you have a  "critical or abnormal lab result, we will notify you by phone as soon as possible.  Submit refill requests through Bday or call your pharmacy and they will forward the refill request to us. Please allow 3 business days for your refill to be completed.          Additional Information About Your Visit        Metaforichart Information     Bday gives you secure access to your electronic health record. If you see a primary care provider, you can also send messages to your care team and make appointments. If you have questions, please call your primary care clinic.  If you do not have a primary care provider, please call [phone] and they will assist you.        Care EveryWhere ID     This is your Care EveryWhere ID. This could be used by other organizations to access your [facility] medical records  [id number]        Your Vitals Were     Pulse Height Last Period Pulse Oximetry          55 1.651 m (5' 5\") 08/21/2007 95%         Blood Pressure from Last 3 Encounters:   08/23/18 121/71   07/15/18 122/69   06/29/18 120/76    Weight from Last 3 Encounters:   07/15/18 73.5 kg (162 lb)   06/29/18 73.9 kg (163 lb)   05/03/18 72.5 kg (159 lb 14.4 oz)              We Performed the Following     Follow-Up with Electrophysiologist - 3 Months        Primary Care Provider Office Phone # Fax #    Rafy Jiang [phone]       [facility] [id number] [place] [address] DR FONTENOT [address] [address]        Equal Access to Services     [facility]: Hadii aad ku hadasho Soomaali, waaxda luqadaha, qaybta kaalmada adeegyada, micah cho . So [facility] [phone].    ATENCIÓN: Si habla bijal, tiene a hankins disposición servicios gratuitos de asistencia lingüística. Llame al [phone].    We comply with applicable federal civil rights laws and [place] laws. We do not discriminate on the basis of race, color, national origin, age, disability, sex, sexual orientation, or gender " identity.            Thank you!     Thank you for choosing [facility]  for your care. Our goal is always to provide you with excellent care. Hearing back from our patients is one way we can continue to improve our services. Please take a few minutes to complete the written survey that you may receive in the mail after your visit with us. Thank you!             Your Updated Medication List - Protect others around you: Learn how to safely use, store and throw away your medicines at www.disposemymeds.org.          This list is accurate as of 8/23/18  4:09 PM.  Always use your most recent med list.                   Brand Name Dispense Instructions for use Diagnosis    albuterol 108 (90 Base) MCG/ACT inhaler    PROAIR HFA/PROVENTIL HFA/VENTOLIN HFA     Inhale 2 puffs into the lungs every 4 hours as needed for shortness of breath / dyspnea or wheezing        azelastine 0.1 % nasal spray    ASTELIN     Spray 2 sprays in nostril 2 times daily        BENADRYL PO      Take 25-50 mg by mouth nightly as needed        DAILY MULTIVITAMIN PO      Take 1 tablet by mouth daily        fluticasone 110 MCG/ACT Inhaler    FLOVENT HFA     Inhale 2 puffs into the lungs 2 times daily        IRON SUPPLEMENT PO      Take 325 mg by mouth daily        MAGNESIUM LACTATE PO      Take 180 mg by mouth daily    Fatigue, unspecified type       MELATONIN PO      Take 1 mg by mouth nightly as needed        nebulizer nebulization      as needed    H/O pericarditis       phytonadione 0.5 MG/ML suspension    MEPHYTON/ VIT K          PROBIOTIC DAILY PO      Take 1 capsule by mouth 2 times daily        SYNTHROID 150 MCG tablet   Generic drug:  levothyroxine     60 tablet    Take 1 tablet (150 mcg) by mouth daily    Postablative hypothyroidism       TURMERIC PO      Take 1 tablet by mouth daily    Fatigue, unspecified type       TYLENOL PO      Take 500-1,000 mg by mouth daily as needed        VITAMIN D3 PO      Take 7,000 Units by mouth daily         VITAMIN K2 PO      Take 1 tablet by mouth daily    H/O pericarditis

## 2018-08-23 NOTE — MR AVS SNAPSHOT
After Visit Summary   8/23/2018    Joyce Marroquin    MRN: [id number]           Patient Information     Date Of Birth          1960        Visit Information        Provider Department      8/23/2018 1:30 PM 1, Uc Cv Device [facility]        Today's Diagnoses     Complete atrioventricular block (H)          Care Instructions    It was a pleasure to see you in clinic today. Please do not hesitate to call with any questions or concerns. You are scheduled for a remote pacemaker transmission on 11/26/18. We will call in 1-2 business days to discuss the results with you. We look forward to seeing you in clinic at your next device check in 6 months.    Alpa Danielson, RN  Electrophysiology Nurse Clinician  [facility]  During business hours call:  [phone]  After business hours please call: [phone]- select option #4 and ask for job code [id number].            Follow-ups after your visit        Additional Services     Follow-Up with Device Clinic-6 months                 Your next 10 appointments already scheduled     Aug 30, 2018  1:30 PM CDT   New Visit with Kristine Venegas MD   [facility] ([facility])    [address] [place] [address]   [phone]            Feb 21, 2019  9:30 AM CST   (Arrive by 9:15 AM)   Pacemaker Check with Uc Cv Device 1   [facility])    [address] [place] [address]  [address] [place] [id number] [phone]            Feb 21, 2019 10:00 AM CST   (Arrive by 9:45 AM)   RETURN ARRHYTHMIA with Lino Funes MD   [facility])    [address] [place] [address]  [address] [place] [id number] [phone]              Future tests that were ordered for you today     Open Future Orders        Priority Expected Expires Ordered    Endocrinology Referral Routine 8/24/2018 11/28/2018 8/23/2018     Follow-Up with Electrophysiologist - 6 Months Routine 2/19/2019 5/20/2019 8/23/2018    Follow-Up with Device Clinic-6 months Routine 2/19/2019 5/20/2019 8/23/2018            Who to contact     If you have questions or need follow up information about today's clinic visit or your schedule please contact [facility] directly at [phone].  Normal or non-critical lab and imaging results will be communicated to you by ItsMyURLshart, letter or phone within 4 business days after the clinic has received the results. If you do not hear from us within 7 days, please contact the clinic through [email or web address] or phone. If you have a critical or abnormal lab result, we will notify you by phone as soon as possible.  Submit refill requests through PureLiFi or call your pharmacy and they will forward the refill request to us. Please allow 3 business days for your refill to be completed.          Additional Information About Your Visit        ItsMyURLshart Information     PureLiFi gives you secure access to your electronic health record. If you see a primary care provider, you can also send messages to your care team and make appointments. If you have questions, please call your primary care clinic.  If you do not have a primary care provider, please call [phone] and they will assist you.        Care EveryWhere ID     This is your Care EveryWhere ID. This could be used by other organizations to access your [place] [facility] medical records  [id number]        Your Vitals Were     Last Period                   08/21/2007            Blood Pressure from Last 3 Encounters:   08/23/18 121/71   07/15/18 122/69   06/29/18 120/76    Weight from Last 3 Encounters:   07/15/18 73.5 kg (162 lb)   06/29/18 73.9 kg (163 lb)   05/03/18 72.5 kg (159 lb 14.4 oz)              We Performed the Following     (07578) ICD DEVICE PROGRAMMING EVAL, DUAL LEAD ICD     Follow-Up with Device Clinic-6 months        Primary Care Provider Office Phone # Fax #    Rafy  KAROLYN Jiang [phone]       [facility] [place] [address] [facility] [address] FONTENOT [address] [address]        Equal Access to Services     PAVITHRA SALCEDO : Jcarlos freda zuniga flako Morgan, wamakennada luqiris, qasidneyta kanehemiahda drake, micah thomas rogerodilia marino lakenmerly delcid. So [facility] [phone].    ATENCIÓN: Si habla español, tiene a hankins disposición servicios gratuitos de asistencia lingüística. Llame al [phone].    We comply with applicable federal civil rights laws and [place] laws. We do not discriminate on the basis of race, color, national origin, age, disability, sex, sexual orientation, or gender identity.            Thank you!     Thank you for choosing [facility]  for your care. Our goal is always to provide you with excellent care. Hearing back from our patients is one way we can continue to improve our services. Please take a few minutes to complete the written survey that you may receive in the mail after your visit with us. Thank you!             Your Updated Medication List - Protect others around you: Learn how to safely use, store and throw away your medicines at www.disposemymeds.org.          This list is accurate as of 8/23/18 11:59 PM.  Always use your most recent med list.                   Brand Name Dispense Instructions for use Diagnosis    albuterol 108 (90 Base) MCG/ACT inhaler    PROAIR HFA/PROVENTIL HFA/VENTOLIN HFA     Inhale 2 puffs into the lungs every 4 hours as needed for shortness of breath / dyspnea or wheezing        azelastine 0.1 % nasal spray    ASTELIN     Spray 2 sprays in nostril 2 times daily        BENADRYL PO      Take 25-50 mg by mouth nightly as needed        DAILY MULTIVITAMIN PO      Take 1 tablet by mouth daily        fluticasone 110 MCG/ACT Inhaler    FLOVENT HFA     Inhale 2 puffs into the lungs 2 times daily        IRON SUPPLEMENT PO      Take 325 mg by mouth daily        MAGNESIUM LACTATE PO      Take 180 mg by mouth daily    Fatigue,  unspecified type       MELATONIN PO      Take 1 mg by mouth nightly as needed        nebulizer nebulization      as needed    H/O pericarditis       phytonadione 0.5 MG/ML suspension    MEPHYTON/ VIT K          PROBIOTIC DAILY PO      Take 1 capsule by mouth 2 times daily        SYNTHROID 150 MCG tablet   Generic drug:  levothyroxine     60 tablet    Take 1 tablet (150 mcg) by mouth daily    Postablative hypothyroidism       TURMERIC PO      Take 1 tablet by mouth daily    Fatigue, unspecified type       TYLENOL PO      Take 500-1,000 mg by mouth daily as needed        VITAMIN D3 PO      Take 7,000 Units by mouth daily        VITAMIN K2 PO      Take 1 tablet by mouth daily    H/O pericarditis

## 2018-08-23 NOTE — LETTER
"8/23/2018    RE: Joyce Marroquin  [address] [facility] [id number]       Dear Colleague,    Thank you for the opportunity to participate in the care of your patient, Joyce Marroquin, at the [facility]. Please see a copy of my visit note below.    HPI: 57-year-old female with a past medical history significant for GERD, RVOT RFA complicated by complete heart block status post pacemaker placement in 2017, pericarditis, and hyperthyroidism/hyperparathyroidism who presents today for follow-up.  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  Her pacemaker was implanted at [facility].  The last time the patient was seen was in May 2018. At that time at that time she was complaining of exercise intolerance when pushing beds in the hospital.  Her device was interrogated.  Rate response activity threshold was reprogrammed from medium low to low and ADL rate was reprogrammed from 95-10 5 bpm.  She states that after these changes were made, initially that weekend she actually felt much better and was relieved about the way she was feeling.  However this newfound feeling did not last and her dizziness and lightheadedness recurred soon after.  She states that she has had dizziness and lightheadedness for several years even preceding the pacemaker implantation, however she states that this is somewhat worse now compared to what it was before.  She is unsure if this is truly cardiac or not and inquires about whether her frequent PACs may relate to this.  She wonders whether her hyperparathyroidism potentially could explain the symptoms given that she feels differently hour to hour; for example right now, she feels fatigued and \"not right\".  However 2 hours ago she felt normal.    Of note she does take CPAP nightly for her known diagnosis of obstructive sleep apnea.  She is on no medications besides her levothyroxine and her iron for restless " leg syndrome.  She has normal vital signs today on examination.    PAST MEDICAL HISTORY:  Past Medical History:   Diagnosis Date     Chronic depressive personality disorder      Esophageal reflux        CURRENT MEDICATIONS:  Current Outpatient Prescriptions   Medication Sig Dispense Refill     Acetaminophen (TYLENOL PO) Take 500-1,000 mg by mouth daily as needed        albuterol (PROAIR HFA/PROVENTIL HFA/VENTOLIN HFA) 108 (90 BASE) MCG/ACT Inhaler Inhale 2 puffs into the lungs every 4 hours as needed for shortness of breath / dyspnea or wheezing       azelastine (ASTELIN) 0.1 % nasal spray Spray 2 sprays in nostril 2 times daily  11     Cholecalciferol (VITAMIN D3 PO) Take 7,000 Units by mouth daily        DiphenhydrAMINE HCl (BENADRYL PO) Take 25-50 mg by mouth nightly as needed       Ferrous Sulfate (IRON SUPPLEMENT PO) Take 325 mg by mouth daily       MAGNESIUM LACTATE PO Take 180 mg by mouth daily       MELATONIN PO Take 1 mg by mouth nightly as needed       Menaquinone-7 (VITAMIN K2 PO) Take 1 tablet by mouth daily        Multiple Vitamin (DAILY MULTIVITAMIN PO) Take 1 tablet by mouth daily        nebulizer nebulization as needed       Probiotic Product (PROBIOTIC DAILY PO) Take 1 capsule by mouth 2 times daily        SYNTHROID 150 MCG tablet Take 1 tablet (150 mcg) by mouth daily 60 tablet 3     TURMERIC PO Take 1 tablet by mouth daily        fluticasone (FLOVENT HFA) 110 MCG/ACT Inhaler Inhale 2 puffs into the lungs 2 times daily       phytonadione (MEPHYTON/ VIT K) suspension          PAST SURGICAL HISTORY:  Past Surgical History:   Procedure Laterality Date     EP ABLATION / EP STUDIES  04/21/2017     HC CORRECT BUNION,SIMPLE       PPM INSERT OF NEW OR REPL W/VENT LEAD  04/21/2017       ALLERGIES:     Allergies   Allergen Reactions     Flagyl [Metronidazole] Rash     Atenolol Other (See Comments)     Had extreme fatigue, dizziness, and overall felt unwell.      Bactrim [Sulfamethoxazole W/Trimethoprim]       "Corn Oil      Lorazepam      Oxytetracycline      Sulfamethoxazole-Trimethoprim      Other reaction(s): Other  Passed out     Tetracycline      Zofran [Ondansetron]      Prolonged QT  Prolonged QT at baseline per patient     Benzodiazepines Other (See Comments)     Other reaction(s): Other  Extreme heat intolerance  Extreme heat intolerance     Cyclobenzaprine Other (See Comments)     Extreme heat intolerance     Levaquin [Levofloxacin]      Other reaction(s): Other  Tendon rupture     Nsaids Other (See Comments)     Exacerbated asthma       FAMILY HISTORY:  No family history on file.  - Premature coronary artery disease  - Atrial fibrillation  - Sudden cardiac death     SOCIAL HISTORY:  Social History   Substance Use Topics     Smoking status: Former Smoker     Smokeless tobacco: Never Used     Alcohol use Yes      Comment: rarely       ROS:   Constitutional: No fever, chills, or sweats. Weight stable.   ENT: No visual disturbance, ear ache, epistaxis, sore throat.   Cardiovascular: As per HPI.   Respiratory: No cough, hemoptysis.    GI: No nausea, vomiting, hematemesis, melena, or hematochezia.   : No hematuria.   Integument: Negative.   Psychiatric: Negative.   Hematologic:  Easy bruising, no easy bleeding.  Neuro: Negative.   Endocrinology: No significant heat or cold intolerance   Musculoskeletal: No myalgia.    Exam:  /71 (BP Location: Right arm, Patient Position: Chair, Cuff Size: Adult Regular)  Pulse 55  Ht 1.651 m (5' 5\")  LMP 08/21/2007  SpO2 95%  GENERAL APPEARANCE: healthy, alert and no distress  HEENT: no icterus, no xanthelasmas, normal pupil size and reaction, normal palate, mucosa moist, no central cyanosis  NECK: no adenopathy, no asymmetry, masses, or scars, thyroid normal to palpation and no bruits, JVP not elevated  RESPIRATORY: lungs clear to auscultation - no rales, rhonchi or wheezes, no use of accessory muscles, no retractions, respirations are unlabored, normal respiratory " rate  CARDIOVASCULAR: regular rhythm, normal S1 with physiologic split S2, no S3 or S4 and no murmur, click or rub, precordium quiet with normal PMI.  ABDOMEN: soft, non tender, without hepatosplenomegaly, no masses palpable, bowel sounds normal, aorta not enlarged by palpation, no abdominal bruits  EXTREMITIES: peripheral pulses normal, no edema, no bruits  NEURO: alert and oriented to person/place/time, normal speech, gait and affect  VASC: Radial, femoral, dorsalis pedis and posterior tibialis pulses are normal in volumes and symmetric bilaterally. No bruits are heard.  SKIN: no ecchymoses, no rashes    Labs:  CBC RESULTS:   Lab Results   Component Value Date    WBC 7.1 07/15/2018    RBC 4.27 07/15/2018    HGB 13.8 07/15/2018    HCT 40.7 07/15/2018    MCV 95 07/15/2018    MCH 32.3 07/15/2018    MCHC 33.9 07/15/2018    RDW 13.0 07/15/2018     07/15/2018       BMP RESULTS:  Lab Results   Component Value Date     07/15/2018    POTASSIUM 3.7 07/15/2018    CHLORIDE 104 07/15/2018    CO2 25 07/15/2018    ANIONGAP 7 07/15/2018    GLC 84 07/15/2018    BUN 10 07/15/2018    CR 0.68 07/15/2018    GFRESTIMATED 88 07/15/2018    GFRESTBLACK >90 07/15/2018    MANJU 9.0 2018        INR RESULTS:  Lab Results   Component Value Date    INR 0.95 2017       Procedures:  PULMONARY FUNCTION TESTS:   No flowsheet data found.      ECHOCARDIOGRAM:   Recent Results (from the past 8760 hour(s))   ECHO COMPLETE WITH OPTISON    Narrative    [id number]  [id number]  [id number]  [id number]^DEDRA^STUART^IRINEO           [facility]  Diagnostic and Treamtent-3rd Floor  [address] [place] [address]     Name: MARCELO DAVIS  MRN: [id number]  : 1960  Study Date: 2018 10:30 AM  Age: 57 yrs  Gender: Female  Patient Location: [facility]  Reason For Study: Complete atrioventricular block  History: Complete atrioventricular block  Ordering Physician: STUART COLMENARES  Referring  Physician: STUART COLMENARES  Performed By: Annia Alcala RDCS     BSA: 1.8 m2  Height: 65 in  Weight: 159 lb  BP: 121/84 mmHg  _____________________________________________________________________________  __        Procedure  Echocardiogram with two-dimensional, color and spectral Doppler performed.  Contrast Optison. Optison (NDC #4857-6078-44) given intravenously. Patient was  given 6 ml mixture of 3 ml Optison and 6 ml saline. 3 ml wasted. IV start  location R Upper arm .  _____________________________________________________________________________  __        Interpretation Summary  Borderline (EF 50-55%) reduced left ventricular function is present. Traced at  54%. Mild diffuse hypokinesis is present.  Right ventricular function, chamber size, wall motion, and thickness are  normal.  The inferior vena cava was normal in size with preserved respiratory  variability.  No pericardial effusion is present.  Compared to prior study, LV fxn is improved.  _____________________________________________________________________________  __        Left Ventricle  Left ventricular wall thickness is normal. Left ventricular size is normal.  Left ventricular diastolic function is indeterminate. Borderline (EF 50-55%)  reduced left ventricular function is present. Abnormal septal motion  consistent with pacemaker is present. Mild diffuse hypokinesis is present.     Right Ventricle  Right ventricular function, chamber size, wall motion, and thickness are  normal. A pacemaker lead is noted in the right ventricle.     Atria  Both atria appear normal.     Mitral Valve  Trace mitral insufficiency is present.        Aortic Valve  Aortic valve is normal in structure and function.     Tricuspid Valve  The tricuspid valve is normal. The right ventricular systolic pressure is  approximated at 18.1 mmHg plus the right atrial pressure.     Pulmonic Valve  The pulmonic valve is normal.     Vessels  The thoracic aorta is normal. The  inferior vena cava was normal in size with  preserved respiratory variability.     Pericardium  No pericardial effusion is present.     _____________________________________________________________________________  __  MMode/2D Measurements & Calculations  IVSd: 0.93 cm  LVIDd: 3.6 cm  LVIDs: 2.7 cm  LVPWd: 0.99 cm  FS: 25.6 %     LV mass(C)d: 103.8 grams  LV mass(C)dI: 57.8 grams/m2  Ao root diam: 3.0 cm  LA dimension: 3.7 cm  asc Aorta Diam: 2.8 cm  LA/Ao: 1.2  LVOT diam: 1.8 cm  LVOT area: 2.7 cm2  LA Volume (BP): 34.6 ml  LA Volume Index (BP): 19.3 ml/m2  RWT: 0.54        Doppler Measurements & Calculations  MV E max adithya: 52.8 cm/sec  MV A max adithya: 60.7 cm/sec  MV E/A: 0.87     MV dec time: 0.20 sec  Ao V2 max: 122.6 cm/sec  Ao max P.0 mmHg  Ao V2 mean: 86.4 cm/sec  Ao mean PG: 3.4 mmHg  Ao V2 VTI: 21.3 cm  TOYIN(I,D): 2.2 cm2  TOYIN(V,D): 2.1 cm2  LV V1 max PG: 3.9 mmHg  LV V1 max: 98.2 cm/sec  LV V1 VTI: 17.5 cm  SV(LVOT): 46.8 ml  SI(LVOT): 26.1 ml/m2  PA acc time: 0.09 sec  TR max adithya: 212.1 cm/sec  TR max P.1 mmHg  AV Adithya Ratio (DI): 0.80  TOYIN Index (cm2/m2): 1.2  E/E' av.1  Lateral E/e': 6.5  Medial E/e': 9.7        _____________________________________________________________________________  __        Report approved by: Aki Rashid 2018 03:24 PM      ECHO COMPLETE WITH OPTISON    Narrative    [id number]  [id number]  [id number]  [id number]^DEDRA^STUART^IRINEO           [facility]  Diagnostic and Treamtent-3rd Floor  [address] [place] [id number]     Name: MARCELO DAVIS  MRN: [id number]  : 1960  Study Date: 10/20/2017 09:02 AM  Age: 56 yrs  Gender: Female  Patient Location: [facility] [place] [facility]  Reason For Study: Atrioventricular block, complete, Shortness of breath  Referring Physician: STUART COLMENARES  Performed By: Luis Felipe Blackburn RDCS     BSA: 1.8 m2  Height: 65 in  Weight: 162  lb  _____________________________________________________________________________  __        Procedure  Echocardiogram with two-dimensional, color and spectral Doppler performed.  Contrast Optison. Optison (NDC #3964-8846-68) given intravenously. Patient was  given 3 ml mixture of 3 ml Optison and 6 ml saline. 6 ml wasted.  _____________________________________________________________________________  __        Interpretation Summary  Mildly (EF 40-45%) reduced left ventricular function is present.  Global right ventricular function is normal.  Right ventricular systolic pressure is 25mmHg above the right atrial pressure.  A pacemaker lead is noted in the right ventricle.  No pericardial effusion is present.  The inferior vena cava was normal in size with preserved respiratory  variability.  Estimated mean right atrial pressure is 3 mmHg.  Previous study not available for comparison.  _____________________________________________________________________________  __        Left Ventricle  Left ventricular wall thickness is normal. Left ventricular size is normal.  The Ejection Fraction was calculated using Bi-plane contrast. Mildly (EF 40-  45%) reduced left ventricular function is present. Normal left ventricular  filling for age. Biplane traced at 42%. Mild diffuse hypokinesis is present.  Abnormal septal motion consistent with pacemaker is present.     Right Ventricle  The right ventricle is normal size. Global right ventricular function is  normal. A pacemaker lead is noted in the right ventricle.     Atria  Both atria appear normal. The atrial septum is intact as assessed by color  Doppler .        Mitral Valve  Trace mitral insufficiency is present.     Aortic Valve  Aortic valve is normal in structure and function. The aortic valve is  tricuspid.     Tricuspid Valve  The tricuspid valve is normal. Trace tricuspid insufficiency is present. Right  ventricular systolic pressure is 25mmHg above the right atrial  pressure.     Pulmonic Valve  The pulmonic valve is normal.     Vessels  The aorta root is normal. The pulmonary artery is normal. The inferior vena  cava was normal in size with preserved respiratory variability. Estimated mean  right atrial pressure is 3 mmHg.        Pericardium  No pericardial effusion is present.     Compared to Previous Study  Previous study not available for comparison.     Attestation  I have personally viewed the imaging and agree with the interpretation and  report as documented by the fellow, Reuben Holley, and/or edited by me.  _____________________________________________________________________________  __     MMode/2D Measurements & Calculations  IVSd: 0.98 cm  LVIDd: 4.8 cm  LVIDs: 3.7 cm  LVPWd: 0.69 cm  FS: 23.1 %  EDV(Teich): 105.7 ml  ESV(Teich): 56.8 ml  LV mass(C)d: 131.8 grams  LV mass(C)dI: 72.9 grams/m2  Ao root diam: 2.8 cm  asc Aorta Diam: 2.9 cm  LVOT diam: 2.1 cm  LVOT area: 3.6 cm2  LA Volume (BP): 49.6 ml     LA Volume Index (BP): 27.4 ml/m2        Doppler Measurements & Calculations  MV E max veronique: 67.1 cm/sec  MV A max veronique: 97.8 cm/sec  MV E/A: 0.69  MV dec time: 0.14 sec  TR max veronique: 249.8 cm/sec  TR max P.0 mmHg  Lateral E/e': 13.0  Med E to E': 10.5  Medial E/e': 10.5        _____________________________________________________________________________  __        Report approved by: Arcenio GOLDSTEIN 10/20/2017 10:44 AM            Assessment and Plan:     57-year-old female with a past medical history significant for GERD, RVOT RFA complicated by complete heart block status post pacemaker placement in 2017, pericarditis, and hyperthyroidism/hyperparathyroidism who presents today for follow-up.  At this point it is unlikely that the patient's symptoms can be explained by her pacemaker or heart.  Patient's symptoms likely are related to her endocrinology medical problems (her parathyroidism and hypothyroidism treated with levothyroxine).  Her PACs do not explain  the waxing and waning nature of her symptoms that are unrelated to exertion.  Although patient does have some exercise dyspnea, her last echocardiogram was essentially completely normal and recovered.  Her pacemaker interrogation has shown no evidence of any arrhythmias.  There are no abnormalities noted on her laboratory exams including her CBC and BMP.  On exam today she exhibited no signs of any overt signs of heart failure or signs of cardiovascular disease.  Her pacemaker interrogation histogram for heart rate variability was also normal.  Given all this, plan is to have her follow-up with endocrinology and endocrine surgery later this month for assessment of her hyperparathyroidism and to see if she could benefit from removal of her parathyroid glands.  There are no obvious cardiovascular interventions to undertake right now given the patient only takes levothyroxine and iron as medications, had a normal pacemaker interrogation, and had an essentially normal and recovered echocardiogram.  We answered all of her questions and concerns.    Thank you for allowing me to care for this patient. This patient was seen and discussed with Dr. Lino Funes, who agrees with the plan above. If you have any questions, please do not hesitate to contact me.    Thank you,    Lew Clemens  Cardiology Fellow, PGY-5     I very much appreciated the opportunity to see and assess Joyce Marroquin in the clinic with CV Fellow  Dr Clemens. I spent approx 20 min with patient reviewing symptoms and discussing need for additional endocrine opinion.    Please do not hesitate to contact my office if you have any questions or concerns.      Lino Funes MD  Cardiac Arrhythmia Service  [facility]  [phone]    LINO PECK

## 2018-08-23 NOTE — PROGRESS NOTES
"HPI: 57-year-old female with a past medical history significant for GERD, RVOT RFA complicated by complete heart block status post pacemaker placement in 2017, pericarditis, and hyperthyroidism/hyperparathyroidism who presents today for follow-up.  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  Her pacemaker was implanted at [facility].  The last time the patient was seen was in May 2018. At that time at that time she was complaining of exercise intolerance when pushing beds in the hospital.  Her device was interrogated.  Rate response activity threshold was reprogrammed from medium low to low and ADL rate was reprogrammed from 95-10 5 bpm.  She states that after these changes were made, initially that weekend she actually felt much better and was relieved about the way she was feeling.  However this newfound feeling did not last and her dizziness and lightheadedness recurred soon after.  She states that she has had dizziness and lightheadedness for several years even preceding the pacemaker implantation, however she states that this is somewhat worse now compared to what it was before.  She is unsure if this is truly cardiac or not and inquires about whether her frequent PACs may relate to this.  She wonders whether her hyperparathyroidism potentially could explain the symptoms given that she feels differently hour to hour; for example right now, she feels fatigued and \"not right\".  However 2 hours ago she felt normal.    Of note she does take CPAP nightly for her known diagnosis of obstructive sleep apnea.  She is on no medications besides her levothyroxine and her iron for restless leg syndrome.  She has normal vital signs today on examination.    PAST MEDICAL HISTORY:  Past Medical History:   Diagnosis Date     Chronic depressive personality disorder      Esophageal reflux        CURRENT MEDICATIONS:  Current Outpatient Prescriptions   Medication Sig Dispense Refill     Acetaminophen " (TYLENOL PO) Take 500-1,000 mg by mouth daily as needed        albuterol (PROAIR HFA/PROVENTIL HFA/VENTOLIN HFA) 108 (90 BASE) MCG/ACT Inhaler Inhale 2 puffs into the lungs every 4 hours as needed for shortness of breath / dyspnea or wheezing       azelastine (ASTELIN) 0.1 % nasal spray Spray 2 sprays in nostril 2 times daily  11     Cholecalciferol (VITAMIN D3 PO) Take 7,000 Units by mouth daily        DiphenhydrAMINE HCl (BENADRYL PO) Take 25-50 mg by mouth nightly as needed       Ferrous Sulfate (IRON SUPPLEMENT PO) Take 325 mg by mouth daily       MAGNESIUM LACTATE PO Take 180 mg by mouth daily       MELATONIN PO Take 1 mg by mouth nightly as needed       Menaquinone-7 (VITAMIN K2 PO) Take 1 tablet by mouth daily        Multiple Vitamin (DAILY MULTIVITAMIN PO) Take 1 tablet by mouth daily        nebulizer nebulization as needed       Probiotic Product (PROBIOTIC DAILY PO) Take 1 capsule by mouth 2 times daily        SYNTHROID 150 MCG tablet Take 1 tablet (150 mcg) by mouth daily 60 tablet 3     TURMERIC PO Take 1 tablet by mouth daily        fluticasone (FLOVENT HFA) 110 MCG/ACT Inhaler Inhale 2 puffs into the lungs 2 times daily       phytonadione (MEPHYTON/ VIT K) suspension          PAST SURGICAL HISTORY:  Past Surgical History:   Procedure Laterality Date     EP ABLATION / EP STUDIES  04/21/2017     HC CORRECT BUNION,SIMPLE       PPM INSERT OF NEW OR REPL W/VENT LEAD  04/21/2017       ALLERGIES:     Allergies   Allergen Reactions     Flagyl [Metronidazole] Rash     Atenolol Other (See Comments)     Had extreme fatigue, dizziness, and overall felt unwell.      Bactrim [Sulfamethoxazole W/Trimethoprim]      Corn Oil      Lorazepam      Oxytetracycline      Sulfamethoxazole-Trimethoprim      Other reaction(s): Other  Passed out     Tetracycline      Zofran [Ondansetron]      Prolonged QT  Prolonged QT at baseline per patient     Benzodiazepines Other (See Comments)     Other reaction(s): Other  Extreme heat  "intolerance  Extreme heat intolerance     Cyclobenzaprine Other (See Comments)     Extreme heat intolerance     Levaquin [Levofloxacin]      Other reaction(s): Other  Tendon rupture     Nsaids Other (See Comments)     Exacerbated asthma       FAMILY HISTORY:  No family history on file.  - Premature coronary artery disease  - Atrial fibrillation  - Sudden cardiac death     SOCIAL HISTORY:  Social History   Substance Use Topics     Smoking status: Former Smoker     Smokeless tobacco: Never Used     Alcohol use Yes      Comment: rarely       ROS:   Constitutional: No fever, chills, or sweats. Weight stable.   ENT: No visual disturbance, ear ache, epistaxis, sore throat.   Cardiovascular: As per HPI.   Respiratory: No cough, hemoptysis.    GI: No nausea, vomiting, hematemesis, melena, or hematochezia.   : No hematuria.   Integument: Negative.   Psychiatric: Negative.   Hematologic:  Easy bruising, no easy bleeding.  Neuro: Negative.   Endocrinology: No significant heat or cold intolerance   Musculoskeletal: No myalgia.    Exam:  /71 (BP Location: Right arm, Patient Position: Chair, Cuff Size: Adult Regular)  Pulse 55  Ht 1.651 m (5' 5\")  LMP 08/21/2007  SpO2 95%  GENERAL APPEARANCE: healthy, alert and no distress  HEENT: no icterus, no xanthelasmas, normal pupil size and reaction, normal palate, mucosa moist, no central cyanosis  NECK: no adenopathy, no asymmetry, masses, or scars, thyroid normal to palpation and no bruits, JVP not elevated  RESPIRATORY: lungs clear to auscultation - no rales, rhonchi or wheezes, no use of accessory muscles, no retractions, respirations are unlabored, normal respiratory rate  CARDIOVASCULAR: regular rhythm, normal S1 with physiologic split S2, no S3 or S4 and no murmur, click or rub, precordium quiet with normal PMI.  ABDOMEN: soft, non tender, without hepatosplenomegaly, no masses palpable, bowel sounds normal, aorta not enlarged by palpation, no abdominal " bruits  EXTREMITIES: peripheral pulses normal, no edema, no bruits  NEURO: alert and oriented to person/place/time, normal speech, gait and affect  VASC: Radial, femoral, dorsalis pedis and posterior tibialis pulses are normal in volumes and symmetric bilaterally. No bruits are heard.  SKIN: no ecchymoses, no rashes    Labs:  CBC RESULTS:   Lab Results   Component Value Date    WBC 7.1 07/15/2018    RBC 4.27 07/15/2018    HGB 13.8 07/15/2018    HCT 40.7 07/15/2018    MCV 95 07/15/2018    MCH 32.3 07/15/2018    MCHC 33.9 07/15/2018    RDW 13.0 07/15/2018     07/15/2018       BMP RESULTS:  Lab Results   Component Value Date     07/15/2018    POTASSIUM 3.7 07/15/2018    CHLORIDE 104 07/15/2018    CO2 25 07/15/2018    ANIONGAP 7 07/15/2018    GLC 84 07/15/2018    BUN 10 07/15/2018    CR 0.68 07/15/2018    GFRESTIMATED 88 07/15/2018    GFRESTBLACK >90 07/15/2018    MANJU 9.0 2018        INR RESULTS:  Lab Results   Component Value Date    INR 0.95 2017       Procedures:  PULMONARY FUNCTION TESTS:   No flowsheet data found.      ECHOCARDIOGRAM:   Recent Results (from the past 8760 hour(s))   ECHO COMPLETE WITH OPTISON    Narrative    [id number]  ECH73  [id number]  [id number]^DEDRA^STUART^IRINEO           [facility]  Diagnostic and Treamtent-3rd Floor  [address] [place] [id number]     Name: MARCELO DAVIS  MRN: [id number]  : 1960  Study Date: 2018 10:30 AM  Age: 57 yrs  Gender: Female  [place] [facility] [place]  Reason For Study: Complete atrioventricular block  History: Complete atrioventricular block  Ordering Physician: STUART COLMENARES  Referring Physician: STUART COLMENARES  Performed By: Annia Alcala RDCS     BSA: 1.8 m2  Height: 65 in  Weight: 159 lb  BP: 121/84 mmHg  _____________________________________________________________________________  __        Procedure  Echocardiogram with two-dimensional, color and spectral Doppler  performed.  Contrast Optison. Optison (NDC #6340-3140-99) given intravenously. Patient was  given 6 ml mixture of 3 ml Optison and 6 ml saline. 3 ml wasted. IV start  location R Upper arm .  _____________________________________________________________________________  __        Interpretation Summary  Borderline (EF 50-55%) reduced left ventricular function is present. Traced at  54%. Mild diffuse hypokinesis is present.  Right ventricular function, chamber size, wall motion, and thickness are  normal.  The inferior vena cava was normal in size with preserved respiratory  variability.  No pericardial effusion is present.  Compared to prior study, LV fxn is improved.  _____________________________________________________________________________  __        Left Ventricle  Left ventricular wall thickness is normal. Left ventricular size is normal.  Left ventricular diastolic function is indeterminate. Borderline (EF 50-55%)  reduced left ventricular function is present. Abnormal septal motion  consistent with pacemaker is present. Mild diffuse hypokinesis is present.     Right Ventricle  Right ventricular function, chamber size, wall motion, and thickness are  normal. A pacemaker lead is noted in the right ventricle.     Atria  Both atria appear normal.     Mitral Valve  Trace mitral insufficiency is present.        Aortic Valve  Aortic valve is normal in structure and function.     Tricuspid Valve  The tricuspid valve is normal. The right ventricular systolic pressure is  approximated at 18.1 mmHg plus the right atrial pressure.     Pulmonic Valve  The pulmonic valve is normal.     Vessels  The thoracic aorta is normal. The inferior vena cava was normal in size with  preserved respiratory variability.     Pericardium  No pericardial effusion is present.     _____________________________________________________________________________  __  MMode/2D Measurements & Calculations  IVSd: 0.93 cm  LVIDd: 3.6 cm  LVIDs: 2.7  cm  LVPWd: 0.99 cm  FS: 25.6 %     LV mass(C)d: 103.8 grams  LV mass(C)dI: 57.8 grams/m2  Ao root diam: 3.0 cm  LA dimension: 3.7 cm  asc Aorta Diam: 2.8 cm  LA/Ao: 1.2  LVOT diam: 1.8 cm  LVOT area: 2.7 cm2  LA Volume (BP): 34.6 ml  LA Volume Index (BP): 19.3 ml/m2  RWT: 0.54        Doppler Measurements & Calculations  MV E max adithya: 52.8 cm/sec  MV A max adithya: 60.7 cm/sec  MV E/A: 0.87     MV dec time: 0.20 sec  Ao V2 max: 122.6 cm/sec  Ao max P.0 mmHg  Ao V2 mean: 86.4 cm/sec  Ao mean PG: 3.4 mmHg  Ao V2 VTI: 21.3 cm  TOYIN(I,D): 2.2 cm2  TOYIN(V,D): 2.1 cm2  LV V1 max PG: 3.9 mmHg  LV V1 max: 98.2 cm/sec  LV V1 VTI: 17.5 cm  SV(LVOT): 46.8 ml  SI(LVOT): 26.1 ml/m2  PA acc time: 0.09 sec  TR max adithya: 212.1 cm/sec  TR max P.1 mmHg  AV Adithya Ratio (DI): 0.80  TOYIN Index (cm2/m2): 1.2  E/E' av.1  Lateral E/e': 6.5  Medial E/e': 9.7        _____________________________________________________________________________  __        Report approved by: Aki Rashid 2018 03:24 PM      ECHO COMPLETE WITH OPTISON    Narrative    [id number]  ECH73  [id number]  [id number]^DEDRA^STUART^IRINEO           [facility]  Diagnostic and Treamtent-3rd Floor  [address] [place] [address]     Name: MARCELO DAVIS  MRN: [id number]  : 1960  Study Date: 10/20/2017 09:02 AM  Age: 56 yrs  Gender: Female  [place] [facility] [place] For Study: Atrioventricular block, complete, Shortness of breath  Referring Physician: STUART CLOMENARES  Performed By: Luis Felipe Blackburn RDCS     BSA: 1.8 m2  Height: 65 in  Weight: 162 lb  _____________________________________________________________________________  __        Procedure  Echocardiogram with two-dimensional, color and spectral Doppler performed.  Contrast Optison. Optison (NDC #9342-4914-28) given intravenously. Patient was  given 3 ml mixture of 3 ml Optison and 6 ml saline. 6 ml  wasted.  _____________________________________________________________________________  __        Interpretation Summary  Mildly (EF 40-45%) reduced left ventricular function is present.  Global right ventricular function is normal.  Right ventricular systolic pressure is 25mmHg above the right atrial pressure.  A pacemaker lead is noted in the right ventricle.  No pericardial effusion is present.  The inferior vena cava was normal in size with preserved respiratory  variability.  Estimated mean right atrial pressure is 3 mmHg.  Previous study not available for comparison.  _____________________________________________________________________________  __        Left Ventricle  Left ventricular wall thickness is normal. Left ventricular size is normal.  The Ejection Fraction was calculated using Bi-plane contrast. Mildly (EF 40-  45%) reduced left ventricular function is present. Normal left ventricular  filling for age. Biplane traced at 42%. Mild diffuse hypokinesis is present.  Abnormal septal motion consistent with pacemaker is present.     Right Ventricle  The right ventricle is normal size. Global right ventricular function is  normal. A pacemaker lead is noted in the right ventricle.     Atria  Both atria appear normal. The atrial septum is intact as assessed by color  Doppler .        Mitral Valve  Trace mitral insufficiency is present.     Aortic Valve  Aortic valve is normal in structure and function. The aortic valve is  tricuspid.     Tricuspid Valve  The tricuspid valve is normal. Trace tricuspid insufficiency is present. Right  ventricular systolic pressure is 25mmHg above the right atrial pressure.     Pulmonic Valve  The pulmonic valve is normal.     Vessels  The aorta root is normal. The pulmonary artery is normal. The inferior vena  cava was normal in size with preserved respiratory variability. Estimated mean  right atrial pressure is 3 mmHg.        Pericardium  No pericardial effusion is  present.     Compared to Previous Study  Previous study not available for comparison.     Attestation  I have personally viewed the imaging and agree with the interpretation and  report as documented by the fellow, Reuben Holley, and/or edited by me.  _____________________________________________________________________________  __     MMode/2D Measurements & Calculations  IVSd: 0.98 cm  LVIDd: 4.8 cm  LVIDs: 3.7 cm  LVPWd: 0.69 cm  FS: 23.1 %  EDV(Teich): 105.7 ml  ESV(Teich): 56.8 ml  LV mass(C)d: 131.8 grams  LV mass(C)dI: 72.9 grams/m2  Ao root diam: 2.8 cm  asc Aorta Diam: 2.9 cm  LVOT diam: 2.1 cm  LVOT area: 3.6 cm2  LA Volume (BP): 49.6 ml     LA Volume Index (BP): 27.4 ml/m2        Doppler Measurements & Calculations  MV E max veronique: 67.1 cm/sec  MV A max veronique: 97.8 cm/sec  MV E/A: 0.69  MV dec time: 0.14 sec  TR max veronique: 249.8 cm/sec  TR max P.0 mmHg  Lateral E/e': 13.0  Med E to E': 10.5  Medial E/e': 10.5        _____________________________________________________________________________  __        Report approved by: Arcenio GOLDSTEIN 10/20/2017 10:44 AM            Assessment and Plan:     57-year-old female with a past medical history significant for GERD, RVOT RFA complicated by complete heart block status post pacemaker placement in 2017, pericarditis, and hyperthyroidism/hyperparathyroidism who presents today for follow-up.  At this point it is unlikely that the patient's symptoms can be explained by her pacemaker or heart.  Patient's symptoms likely are related to her endocrinology medical problems (her parathyroidism and hypothyroidism treated with levothyroxine).  Her PACs do not explain the waxing and waning nature of her symptoms that are unrelated to exertion.  Although patient does have some exercise dyspnea, her last echocardiogram was essentially completely normal and recovered.  Her pacemaker interrogation has shown no evidence of any arrhythmias.  There are no abnormalities noted on  her laboratory exams including her CBC and BMP.  On exam today she exhibited no signs of any overt signs of heart failure or signs of cardiovascular disease.  Her pacemaker interrogation histogram for heart rate variability was also normal.  Given all this, plan is to have her follow-up with endocrinology and endocrine surgery later this month for assessment of her hyperparathyroidism and to see if she could benefit from removal of her parathyroid glands.  There are no obvious cardiovascular interventions to undertake right now given the patient only takes levothyroxine and iron as medications, had a normal pacemaker interrogation, and had an essentially normal and recovered echocardiogram.  We answered all of her questions and concerns.    Thank you for allowing me to care for this patient. This patient was seen and discussed with Dr. Lino Funes, who agrees with the plan above. If you have any questions, please do not hesitate to contact me.    Thank you,    Lew Clemens  Cardiology Fellow, PGY-5     I very much appreciated the opportunity to see and assess Joyce Marroquin in the clinic with CV Fellow  Dr Clemens. I spent approx 20 min with patient reviewing symptoms and discussing need for additional endocrine opinion.    Please do not hesitate to contact my office if you have any questions or concerns.      Lino Funes MD  Cardiac Arrhythmia Service  [facility]  [phone]    LINO PECK

## 2018-08-24 ENCOUNTER — TELEPHONE (OUTPATIENT)
Dept: SURGERY | Facility: CLINIC | Age: 58
End: 2018-08-24

## 2018-08-24 NOTE — TELEPHONE ENCOUNTER
PREVISIT INFORMATION                                                    Joyce Marroquin scheduled for future visit at [facility].    Patient is scheduled to see Dr. Venegas on 8/30/18  Reason for visit: hyperparathryroidism  Referring provider Dr. Patel  Has patient seen previous specialist? Endocrinology  Medical Records:  Available in chart.  Patient was previously seen at a [facility] or [facility] facility.     REVIEW                                                      New patient packet mailed to patient: Yes  Medication reconciliation complete: No      Current Outpatient Prescriptions   Medication Sig Dispense Refill     Acetaminophen (TYLENOL PO) Take 500-1,000 mg by mouth daily as needed        albuterol (PROAIR HFA/PROVENTIL HFA/VENTOLIN HFA) 108 (90 BASE) MCG/ACT Inhaler Inhale 2 puffs into the lungs every 4 hours as needed for shortness of breath / dyspnea or wheezing       azelastine (ASTELIN) 0.1 % nasal spray Spray 2 sprays in nostril 2 times daily  11     Cholecalciferol (VITAMIN D3 PO) Take 7,000 Units by mouth daily        DiphenhydrAMINE HCl (BENADRYL PO) Take 25-50 mg by mouth nightly as needed       Ferrous Sulfate (IRON SUPPLEMENT PO) Take 325 mg by mouth daily       fluticasone (FLOVENT HFA) 110 MCG/ACT Inhaler Inhale 2 puffs into the lungs 2 times daily       MAGNESIUM LACTATE PO Take 180 mg by mouth daily       MELATONIN PO Take 1 mg by mouth nightly as needed       Menaquinone-7 (VITAMIN K2 PO) Take 1 tablet by mouth daily        Multiple Vitamin (DAILY MULTIVITAMIN PO) Take 1 tablet by mouth daily        nebulizer nebulization as needed       phytonadione (MEPHYTON/ VIT K) suspension        Probiotic Product (PROBIOTIC DAILY PO) Take 1 capsule by mouth 2 times daily        SYNTHROID 150 MCG tablet Take 1 tablet (150 mcg) by mouth daily 60 tablet 3     TURMERIC PO Take 1 tablet by mouth daily          Allergies: Flagyl  [metronidazole]; Atenolol; Bactrim [sulfamethoxazole w/trimethoprim]; Corn oil; Lorazepam; Oxytetracycline; Sulfamethoxazole-trimethoprim; Tetracycline; Zofran [ondansetron]; Benzodiazepines; Cyclobenzaprine; Levaquin [levofloxacin]; and Nsaids        PLAN/FOLLOW-UP NEEDED                                                      Previsit review complete.  Patient will see provider at future scheduled appointment.     Patient Reminders Given:  Please, make sure you bring an updated list of your medications.   If you are having a procedure, please, present 15 minutes early.  If you need to cancel or reschedule,please call [phone].    Clarisa Bell

## 2018-08-30 ENCOUNTER — OFFICE VISIT (OUTPATIENT)
Dept: SURGERY | Facility: CLINIC | Age: 58
End: 2018-08-30
Payer: COMMERCIAL

## 2018-08-30 VITALS
HEIGHT: 65 IN | DIASTOLIC BLOOD PRESSURE: 68 MMHG | BODY MASS INDEX: 26.76 KG/M2 | SYSTOLIC BLOOD PRESSURE: 132 MMHG | HEART RATE: 49 BPM | WEIGHT: 160.6 LBS

## 2018-08-30 DIAGNOSIS — E21.3 HYPERPARATHYROIDISM (H): Primary | ICD-10-CM

## 2018-08-30 PROCEDURE — 99203 OFFICE O/P NEW LOW 30 MIN: CPT | Performed by: SURGERY

## 2018-08-30 ASSESSMENT — PAIN SCALES - GENERAL: PAINLEVEL: NO PAIN (0)

## 2018-08-30 NOTE — PATIENT INSTRUCTIONS
Surgery Instructions    Always follow your surgeon s instructions. If you don t, your surgery could be cancelled. Please use the following checklist.  Your surgery is on: The surgery scheduler will contact you within 1 week of your consult with the surgeon. If you do not hear from them, please call the clinic or RN Care Coordinator for your provider.    Time: Prearrival times can vary depending on location/type of surgery.  [place] - 2 hour pre-arrival  [facility] - 2 hour pre-arrival  [place] - 1 hour pre-arrival    Note:  These times may change. A nurse will call you before surgery to confirm. If you have not received a call or if you have more questions, please call us on the working day before your surgery:  ? [place]: [phone] or [phone] (9am to 5:30pm)  ? [facility]: [phone] (8am to 6pm)  ? [place]: [phone] (9am to 5pm)  ? [facility] [phone] (7am to 4pm)  Prior to surgery  ? Have a pre-op physical exam with your Primary Doctor within 30 days of surgery  - Ask your doctor to send all of your results to the surgery center/hospital before surgery. Your doctor also may ask you to bring the results with you on the day of surgery.  - Tell your doctor if:  - You are allergic to latex or rubber (latex and rubber gloves are often used in medical care).  - You are taking any medicines (including aspirin), vitamins, or herbal products. You may need to stop taking some medicines before surgery.  - You have any medical problems (allergies, diabetes, or heart disease, for example).  - You have a pacemaker or an AICD (automatic implanted cardiac defibrillator). If you do, please bring the ID card with you on the day of surgery.  - People who smoke have a higher risk of infection after surgery. Ask your doctor how you can quit smoking.  - If you Primary Doctor is not within the [facility] system, you will need to have your pre-op physical faxed to us to be scanned into your  chart.  - [place]: [phone] or [phone]  - [facility] [phone]  - [facility] [phone]  ? Call your insurance company. Ask if you need pre-approval for your surgery. If you do not have insurance, please let us know. If you wish to speak to the , please alert the clinic staff so this can be arranged.  ? Arrange for someone to drive you home after surgery.  will need to be a responsible adult (18 years or older) that will provide transportation to and from surgery and stay in the waiting room during your surgery. You may not drive yourself or take public transportation to and from surgery.  ? Arrange for someone to stay with you for 24 hours after you go home. This person must be a responsible adult (18 years or older).  ? Call your surgeon or their nurse if there is any change in your health (cold, flu, infections, hospitalizations).  ? Do not smoke, drink alcohol, or take over-the-counter medicine for 24 hours before and after surgery.  ? If you take prescribed drugs, you may need to stop them until after the surgery.  Discuss what medications to take or not take prior to surgery with your Primary Doctor at your pre-op physical. Avoid over-the-counter blood-thinning medications such as Aspirin, Ibuprofen, vitamin E, or fish oil 7 days prior to surgery (unless otherwise directed by your Primary Doctor). Tylenol is a good alternative for mild pain relief prior to surgery.  ? Eating and drinking guidelines prior to surgery:  - Stop all solid food consumption 8 hours prior to surgery  - You may drink clear liquids up to 2 hours prior to surgery (water, fruit juices without pulp, jello, tea/coffee without creamer, sports drinks, clear-fat free broth (bouillon or consomme), popsicles (without milk, bits of fruit, or seeds/nuts)  ? Follow instructions given for showering or bathing before surgery.    - Use 8 ounces of antiseptic surgical soap,  like:  - Hibiclens, Scrub Care, or Exidine  - You can find it at your local pharmacy, clinic, or retail store. If you have trouble, ask your pharmacist to help you find the right substitute.  - Please wash with one of the above soaps twice before coming to the hospital for your surgery. This will decrease bacteria (germs) on your skin. It will also help reduce your chance of infection after surgery.  - Items you will need for showering:  - 4 newly washed washcloths  - 2 newly washed towels  - 8 ounces of one of the above soaps  - Following these instructions:  - The evening before surgery: Shower or bathe as you normally would, using your regular soap and a clean washcloth. Give special attention to places where your incision (surgical cut) or catheters will be. This includes your groin area. Rinse well. You may wash your hair with your regular shampoo. Next, wash your body with 4 ounces of the antiseptic soap. Use a clean, damp washcloth and gently clean your body (from the chin down). If your surgery involves your head, use the special soap on your head and scalp. Rinse well and dry off using a newly washed towel.  - The morning of surgery: Repeat the same process as the evening shower.  - Other suggestions:    Do not shave within 12 inches of your incision (surgical cut) area for at least 3 days before surgery. Shaving can make small cuts in the skin. This puts you at higher risk of infection.    Wear freshly washed pajamas or clothing after your evening shower.    Wear freshly washed clothes the day of surgery.    Wash and change your bed sheets the day before surgery to have clean bed sheets after your shower and when you get home from surgery.    If you have trouble washing all areas, make sure someone helps you.    Don't use any deodorant, lotion or powder after your shower.    Women who are menstruating should wear a fresh sanitary pad to the hospital.  ? Do not wear or add deodorant, cologne, lotion,  makeup, nail polish or jewelry to surgery. If you wear fake nails, please remove at least one nail before coming to surgery (an oxygen monitor needs to be placed on your finger during surgery).  ? Bring these items to the surgery center/hospital:  - Insurance card  - Money for parking and co-pays, if needed  - A list of all the medicines you take. Include vitamins, minerals, herbs, and over-the-counter drugs.  Note any drug allergies.  - A copy of your advance health care directive, if you have one. This tells us what treatment you would want--and who would make health care decisions--if you could no longer speak for yourself. You may request this form in advance or download it from [email or web address].  - A case for glasses, contact lenses, hearing aids, or dentures.  - Your inhaler or CPAP machine, if you use these at home.  ? Leave extra cash, jewelry, and other valuables at home.  When you arrive  When you get to the surgery center/hospital, you will:  ? Check in. If you are under age 18, you must be with a parent or legal guardian.  ? Sign consent forms, if you haven t already. These forms state that you know the risks and benefits of surgery. When you sign the forms, you give us permission to do the surgery. Do not sign them unless you understand what will happen during and after your surgery. If you have any questions about your surgery, ask to speak with your doctor before you sign the forms. If you don t understand the answers, ask again.  ? Receive a copy of the Patient s Bill of Rights. If you do not receive a copy, please ask for one.  ? Change into hospital clothes. Your belongings will be placed in a bag. We will return them to you after surgery.  ? Meet with the anesthesia provider. He or she will tell you what kind of anesthesia (medicine) will be used to keep you comfortable during surgery.  Remember: it s okay to remind doctors and nurses to wash their hands before touching you.  In most  cases, your surgeon will use a marker to write his or her initials on the surgery site. This ensures that the exact site is operated on.  For safety reasons, we will ask you the same questions many times. For example, we may ask your name and birth date over and over again.  Friends and family can stay with you until it s time for surgery. While you re in surgery, they will be in the waiting area. Please note that cell phones are not allowed in some patient care areas.  If you have questions about what will happen in the operating room, talk to your care team.  After surgery  We will move you to a recovery room, where we will watch you closely. If you have any pain or discomfort, tell your nurse. He or she will try to make you comfortable.  If you are staying overnight, we will move you to your hospital room after you are awake.  If you are going home, we will move you to another room. Friends and family may be able to join you. The length of time you spend in recovery depend on the type of medicine you received, your medical condition, the type of surgery you had, or your response to the anesthesia given during your procedure.  When you are discharged from the recovery room, the nurses will review instructions with you and your caregiver.  ? Please wash your hands every time you touch the wound or change bandages or dressings.  ? Do not submerge the wound in water.  You may not use a bathtub or hot tub until the wound is closed. The wait time frame is generally 2-3 weeks, but any open area can be a source of incoming bacteria, so it is better to be on the safe side and avoid water submersion until your wound is fully healed.  ? You may take a shower 24 hours after surgery. Double check with your surgeon if it is OK for water to run over the wound, whether it has been sutured, stapled, glued, or is open. You may gently wash the wound using the antiseptic soap provided for your pre-surgery showering (do not use a  washcloth). Any mild soap will work as well.  ? Many surgical wounds will have small white strips of tape on them called steri-strips.  Do not remove these. The edges will curl and fall off within 7-10 days with normal showering.  ? If you are going home with sutures (stitches) or staples, you must return to the clinic to have them taken out, usually within 1-2 weeks. Some stitches are dissolvable and do not require removal. Make sure to clarify with your surgeon or surgery nurse reviewing discharge paperwork what kind of sutures you have.  ? Signs and symptoms of infection include:  - Fever, temperature over 101.5   F  - Redness  - Swelling  - Increased pain  - Green or yellow drainage which may or may not have a foul odor  Dealing with pain  A nurse will check your comfort level often during your stay. He or she will work with you to manage your pain.  Remember:  ? All pain is real. There are many ways to control pain. We can help you decide what works best for you.  ? Ask for pain medicine when you need it. Don t try to  tough it out --this can make you feel worse. Always take your medicine as ordered.  ? Medicine doesn t work the same for everyone. If your medicine isn t working, tell your nurse. There may be other medicines or treatments we can try.  Going home  We will let you know when you re ready to leave the surgery center or hospital. Before you leave, we will tell you how to care for yourself at home and prevent infections. If you do not understand something, please say so. We will answer any questions you have. We will then help you get ready to leave.  Remember, you must have a responsible adult (18 years or older) to stay with you 24 hours after you leave the hospital.  Take it easy when you get home. You will need some time to recover--you may be more tired than you realize at first. Rest and relax for at least the first 24 hours at home. You ll feel better and heal faster if you take good care of  yourself.  Follow the discharge instructions that are given to you when you leave the surgery center or hospital  Please call the clinic if you experience any problems during regular clinic hours (Monday-Friday 8:00am-5:00pm).  If you experience problems during non-clinic hours, please call the [facility] on-call line at [phone] and ask the  to page the on-call Provider for your specialty. The on-call Provider will call you back and can triage your symptoms and further advise. If you are having an emergency, always call 911 or seek immediate evaluation at the Emergency Room.  Locations  [facility] ([facility]  [address] [place] [id number]  [phone]   [email or web address]

## 2018-08-30 NOTE — LETTER
8/30/2018         RE: Joyce Marroquin  [address] [facility]  [place] [address]        Dear Colleague,    Thank you for referring your patient, Joyce Marroquin, to the [facility]. Please see a copy of my visit note below.    Visit Date:   08/30/2018      I spent thirty minutes in the care and consultation of this patient for surgical options for hyperparathyroidism.      HISTORY OF PRESENT ILLNESS:  This is a 57-year-old female who has had a longstanding history of primary hyperparathyroidism.  Most recent parathyroid hormone level was 145, corresponding calcium 9.0, phosphorus 2.8, albumin 3.2, total vitamin D 65, all consistent with normocalcemic hyperparathyroidism.      Regarding symptoms of hyperparathyroidism includes depression, fatigue, muscle aches, DEXA consistent with osteoporosis at the radius and spine.      The patient has no previous history of thyroid carcinoma.  She does have a history of Graves' disease treated with radioactive iodine.  Currently on levothyroxine 150 mcg daily.  No head, neck radiation exposure.  There is a family history of thyroid disease in that her sister had Graves' disease and underwent surgery per her report.      PHYSICAL EXAMINATION:  Her neck is soft.  The thyroid gland is very atrophic.  There are no palpable masses in the neck.      LABORATORY DATA:  Discussed above.  Single phase sestamibi SPECT CT scan from 12/01/2017 identified asymmetric activity near the upper left lobe thyroid lobe, raising suspicion for parathyroid adenoma.      ASSESSMENT:  Hyperparathyroidism.      PLAN:  Based on the above, I do not think the patient needs further testing because she has had a previous sestamibi scan.  I would recommend a neck exploration, resection of a parathyroid adenoma or adenomas.  The surgical procedure was discussed with the patient, including but not limited to the risks of bleeding, infection, injury to the recurrent laryngeal nerve or nerves,  potential loss of airway, potential permanent hypocalcemia or missed parathyroid adenoma.  The patient is aware of this and has agreed to proceed with surgery and will schedule her accordingly.         KRISTINE JEFFREY MD             D: 2018   T: 2018   MT: SHIRLEY      Name:     MARCELO DAVIS   MRN:      [id number]        Account:      [id number]   :      1960           Visit Date:   2018      Document: [id number]       Again, thank you for allowing me to participate in the care of your patient.        Sincerely,        Kristine Jeffrey MD

## 2018-08-30 NOTE — MR AVS SNAPSHOT
After Visit Summary   8/30/2018    Joyce Marroquin    MRN: [id number]           Patient Information     Date Of Birth          1960        Visit Information        Provider Department      8/30/2018 1:30 PM Kristine Venegas MD [facility]        Care Instructions    Surgery Instructions    Always follow your surgeon s instructions. If you don t, your surgery could be cancelled. Please use the following checklist.  Your surgery is on: The surgery scheduler will contact you within 1 week of your consult with the surgeon. If you do not hear from them, please call the clinic or RN Care Coordinator for your provider.    Time: Prearrival times can vary depending on location/type of surgery.  [place] - 2 hour pre-arrival  [facility] - 2 hour pre-arrival  [place] - 1 hour pre-arrival    Note:  These times may change. A nurse will call you before surgery to confirm. If you have not received a call or if you have more questions, please call us on the working day before your surgery:  ? [place]: [phone] or [phone] (9am to 5:30pm)  ? [facility]: [phone] (8am to 6pm)  ? [place]: [phone] (9am to 5pm)  ? [facility] [phone] (7am to 4pm)  Prior to surgery  ? Have a pre-op physical exam with your Primary Doctor within 30 days of surgery  - Ask your doctor to send all of your results to the surgery center/hospital before surgery. Your doctor also may ask you to bring the results with you on the day of surgery.  - Tell your doctor if:  - You are allergic to latex or rubber (latex and rubber gloves are often used in medical care).  - You are taking any medicines (including aspirin), vitamins, or herbal products. You may need to stop taking some medicines before surgery.  - You have any medical problems (allergies, diabetes, or heart disease, for example).  - You have a pacemaker or an AICD (automatic implanted cardiac defibrillator). If you do, please  bring the ID card with you on the day of surgery.  - People who smoke have a higher risk of infection after surgery. Ask your doctor how you can quit smoking.  - If you Primary Doctor is not within the [employer] system, you will need to have your pre-op physical faxed to us to be scanned into your chart.  - [place]: [phone] or [phone]  - [facility]): [phone]  - [facility] ([facility] [place]): [phone]  ? Call your insurance company. Ask if you need pre-approval for your surgery. If you do not have insurance, please let us know. If you wish to speak to the , please alert the clinic staff so this can be arranged.  ? Arrange for someone to drive you home after surgery.  will need to be a responsible adult (18 years or older) that will provide transportation to and from surgery and stay in the waiting room during your surgery. You may not drive yourself or take public transportation to and from surgery.  ? Arrange for someone to stay with you for 24 hours after you go home. This person must be a responsible adult (18 years or older).  ? Call your surgeon or their nurse if there is any change in your health (cold, flu, infections, hospitalizations).  ? Do not smoke, drink alcohol, or take over-the-counter medicine for 24 hours before and after surgery.  ? If you take prescribed drugs, you may need to stop them until after the surgery.  Discuss what medications to take or not take prior to surgery with your Primary Doctor at your pre-op physical. Avoid over-the-counter blood-thinning medications such as Aspirin, Ibuprofen, vitamin E, or fish oil 7 days prior to surgery (unless otherwise directed by your Primary Doctor). Tylenol is a good alternative for mild pain relief prior to surgery.  ? Eating and drinking guidelines prior to surgery:  - Stop all solid food consumption 8 hours prior to surgery  - You may drink clear liquids up to 2 hours prior to surgery  (water, fruit juices without pulp, jello, tea/coffee without creamer, sports drinks, clear-fat free broth (bouillon or consomme), popsicles (without milk, bits of fruit, or seeds/nuts)  ? Follow instructions given for showering or bathing before surgery.    - Use 8 ounces of antiseptic surgical soap, like:  - Hibiclens, Scrub Care, or Exidine  - You can find it at your local pharmacy, clinic, or retail store. If you have trouble, ask your pharmacist to help you find the right substitute.  - Please wash with one of the above soaps twice before coming to the hospital for your surgery. This will decrease bacteria (germs) on your skin. It will also help reduce your chance of infection after surgery.  - Items you will need for showering:  - 4 newly washed washcloths  - 2 newly washed towels  - 8 ounces of one of the above soaps  - Following these instructions:  - The evening before surgery: Shower or bathe as you normally would, using your regular soap and a clean washcloth. Give special attention to places where your incision (surgical cut) or catheters will be. This includes your groin area. Rinse well. You may wash your hair with your regular shampoo. Next, wash your body with 4 ounces of the antiseptic soap. Use a clean, damp washcloth and gently clean your body (from the chin down). If your surgery involves your head, use the special soap on your head and scalp. Rinse well and dry off using a newly washed towel.  - The morning of surgery: Repeat the same process as the evening shower.  - Other suggestions:    Do not shave within 12 inches of your incision (surgical cut) area for at least 3 days before surgery. Shaving can make small cuts in the skin. This puts you at higher risk of infection.    Wear freshly washed pajamas or clothing after your evening shower.    Wear freshly washed clothes the day of surgery.    Wash and change your bed sheets the day before surgery to have clean bed sheets after your shower and  when you get home from surgery.    If you have trouble washing all areas, make sure someone helps you.    Don't use any deodorant, lotion or powder after your shower.    Women who are menstruating should wear a fresh sanitary pad to the hospital.  ? Do not wear or add deodorant, cologne, lotion, makeup, nail polish or jewelry to surgery. If you wear fake nails, please remove at least one nail before coming to surgery (an oxygen monitor needs to be placed on your finger during surgery).  ? Bring these items to the surgery center/hospital:  - Insurance card  - Money for parking and co-pays, if needed  - A list of all the medicines you take. Include vitamins, minerals, herbs, and over-the-counter drugs.  Note any drug allergies.  - A copy of your advance health care directive, if you have one. This tells us what treatment you would want--and who would make health care decisions--if you could no longer speak for yourself. You may request this form in advance or download it from [email or web address].  - A case for glasses, contact lenses, hearing aids, or dentures.  - Your inhaler or CPAP machine, if you use these at home.  ? Leave extra cash, jewelry, and other valuables at home.  When you arrive  When you get to the surgery center/hospital, you will:  ? Check in. If you are under age 18, you must be with a parent or legal guardian.  ? Sign consent forms, if you haven t already. These forms state that you know the risks and benefits of surgery. When you sign the forms, you give us permission to do the surgery. Do not sign them unless you understand what will happen during and after your surgery. If you have any questions about your surgery, ask to speak with your doctor before you sign the forms. If you don t understand the answers, ask again.  ? Receive a copy of the Patient s Bill of Rights. If you do not receive a copy, please ask for one.  ? Change into hospital clothes. Your belongings will be placed in a bag.  We will return them to you after surgery.  ? Meet with the anesthesia provider. He or she will tell you what kind of anesthesia (medicine) will be used to keep you comfortable during surgery.  Remember: it s okay to remind doctors and nurses to wash their hands before touching you.  In most cases, your surgeon will use a marker to write his or her initials on the surgery site. This ensures that the exact site is operated on.  For safety reasons, we will ask you the same questions many times. For example, we may ask your name and birth date over and over again.  Friends and family can stay with you until it s time for surgery. While you re in surgery, they will be in the waiting area. Please note that cell phones are not allowed in some patient care areas.  If you have questions about what will happen in the operating room, talk to your care team.  After surgery  We will move you to a recovery room, where we will watch you closely. If you have any pain or discomfort, tell your nurse. He or she will try to make you comfortable.  If you are staying overnight, we will move you to your hospital room after you are awake.  If you are going home, we will move you to another room. Friends and family may be able to join you. The length of time you spend in recovery depend on the type of medicine you received, your medical condition, the type of surgery you had, or your response to the anesthesia given during your procedure.  When you are discharged from the recovery room, the nurses will review instructions with you and your caregiver.  ? Please wash your hands every time you touch the wound or change bandages or dressings.  ? Do not submerge the wound in water.  You may not use a bathtub or hot tub until the wound is closed. The wait time frame is generally 2-3 weeks, but any open area can be a source of incoming bacteria, so it is better to be on the safe side and avoid water submersion until your wound is fully  healed.  ? You may take a shower 24 hours after surgery. Double check with your surgeon if it is OK for water to run over the wound, whether it has been sutured, stapled, glued, or is open. You may gently wash the wound using the antiseptic soap provided for your pre-surgery showering (do not use a washcloth). Any mild soap will work as well.  ? Many surgical wounds will have small white strips of tape on them called steri-strips.  Do not remove these. The edges will curl and fall off within 7-10 days with normal showering.  ? If you are going home with sutures (stitches) or staples, you must return to the clinic to have them taken out, usually within 1-2 weeks. Some stitches are dissolvable and do not require removal. Make sure to clarify with your surgeon or surgery nurse reviewing discharge paperwork what kind of sutures you have.  ? Signs and symptoms of infection include:  - Fever, temperature over 101.5   F  - Redness  - Swelling  - Increased pain  - Green or yellow drainage which may or may not have a foul odor  Dealing with pain  A nurse will check your comfort level often during your stay. He or she will work with you to manage your pain.  Remember:  ? All pain is real. There are many ways to control pain. We can help you decide what works best for you.  ? Ask for pain medicine when you need it. Don t try to  tough it out --this can make you feel worse. Always take your medicine as ordered.  ? Medicine doesn t work the same for everyone. If your medicine isn t working, tell your nurse. There may be other medicines or treatments we can try.  Going home  We will let you know when you re ready to leave the surgery center or hospital. Before you leave, we will tell you how to care for yourself at home and prevent infections. If you do not understand something, please say so. We will answer any questions you have. We will then help you get ready to leave.  Remember, you must have a responsible adult (18 years or  older) to stay with you 24 hours after you leave the hospital.  Take it easy when you get home. You will need some time to recover--you may be more tired than you realize at first. Rest and relax for at least the first 24 hours at home. You ll feel better and heal faster if you take good care of yourself.  Follow the discharge instructions that are given to you when you leave the surgery center or hospital  Please call the clinic if you experience any problems during regular clinic hours (Monday-Friday 8:00am-5:00pm).  If you experience problems during non-clinic hours, please call the [facility] on-call line at [phone] and ask the  to page the on-call Provider for your specialty. The on-call Provider will call you back and can triage your symptoms and further advise. If you are having an emergency, always call 911 or seek immediate evaluation at the Emergency Room.  Locations  [facility] ([facility])  [address] [place] [id number]  [phone]   [email or web address]                  Follow-ups after your visit        Your next 10 appointments already scheduled     Sep 25, 2018  3:00 PM CDT   (Arrive by 2:45 PM)   RETURN ENDOCRINE with Pallavi Duarte MD   [facility] ([facility])    [address] [place] [id number] [phone]            Feb 21, 2019  9:30 AM CST   (Arrive by 9:15 AM)   Pacemaker Check with Uc Cv Device 1   [facility] ([facility])    [address] [place] [address]  [address] [place] [address] [phone]            Feb 21, 2019 10:00 AM CST   (Arrive by 9:45 AM)   RETURN ARRHYTHMIA with Lino Funes MD   [facility])    [address] [place] [address]  [address] [place] [address] [phone]              Who  "to contact     If you have questions or need follow up information about today's clinic visit or your schedule please contact [facility] directly at [phone].  Normal or non-critical lab and imaging results will be communicated to you by [employer], letter or phone within 4 business days after the clinic has received the results. If you do not hear from us within 7 days, please contact the clinic through [employer] or phone. If you have a critical or abnormal lab result, we will notify you by phone as soon as possible.  Submit refill requests through [employer] or call your pharmacy and they will forward the refill request to us. Please allow 3 business days for your refill to be completed.          Additional Information About Your Visit        [employer] Information     [employer] gives you secure access to your electronic health record. If you see a primary care provider, you can also send messages to your care team and make appointments. If you have questions, please call your primary care clinic.  If you do not have a primary care provider, please call [phone] and they will assist you.      [employer] is an electronic gateway that provides easy, online access to your medical records. With [employer], you can request a clinic appointment, read your test results, renew a prescription or communicate with your care team.     To access your existing account, please contact your [facility] or call [phone] for assistance.        Care EveryWhere ID     This is your Care EveryWhere ID. This could be used by other organizations to access your [facility] medical records  [id number]        Your Vitals Were     Pulse Height Last Period BMI (Body Mass Index)          49 1.651 m (5' 5\") 08/21/2007 26.73 kg/m2         Blood Pressure from Last 3 Encounters:   08/30/18 132/68   08/23/18 121/71   07/15/18 122/69    Weight from Last 3 Encounters:   08/30/18 72.8 kg (160 lb 9.6 oz) "   07/15/18 73.5 kg (162 lb)   06/29/18 73.9 kg (163 lb)              Today, you had the following     No orders found for display       Primary Care Provider Office Phone # Fax #    Rafy Jiang [phone]       [facility] [address]   [facility] [address]        Equal Access to Services     [facility]: Hadii aad ku hadasho Soomaali, waaxda luqadaha, qaybta kaalmada adeegyada, waxay idiin hayaan adeeg kharash la'aan . So [facility] [phone].    ATENCIÓN: Si habla español, tiene a hankins disposición servicios gratuitos de asistencia lingüística. Jp al [phone].    We comply with applicable federal civil rights laws and [place] laws. We do not discriminate on the basis of race, color, national origin, age, disability, sex, sexual orientation, or gender identity.            Thank you!     Thank you for choosing [facility]  for your care. Our goal is always to provide you with excellent care. Hearing back from our patients is one way we can continue to improve our services. Please take a few minutes to complete the written survey that you may receive in the mail after your visit with us. Thank you!             Your Updated Medication List - Protect others around you: Learn how to safely use, store and throw away your medicines at www.disposemymeds.org.          This list is accurate as of 8/30/18  2:13 PM.  Always use your most recent med list.                   Brand Name Dispense Instructions for use Diagnosis    albuterol 108 (90 Base) MCG/ACT inhaler    PROAIR HFA/PROVENTIL HFA/VENTOLIN HFA     Inhale 2 puffs into the lungs every 4 hours as needed for shortness of breath / dyspnea or wheezing        azelastine 0.1 % nasal spray    ASTELIN     Spray 2 sprays in nostril 2 times daily        BENADRYL PO      Take 25-50 mg by mouth nightly as needed        DAILY MULTIVITAMIN PO      Take 1 tablet by mouth daily        IRON SUPPLEMENT PO       Take 65 mg by mouth daily        MAGNESIUM LACTATE PO      Take 180 mg by mouth daily    Fatigue, unspecified type       MELATONIN PO      Take 1 mg by mouth nightly as needed        nebulizer nebulization      as needed    H/O pericarditis       PROBIOTIC DAILY PO      Take 1 capsule by mouth 2 times daily        SYNTHROID 150 MCG tablet   Generic drug:  levothyroxine     60 tablet    Take 1 tablet (150 mcg) by mouth daily    Postablative hypothyroidism       TURMERIC PO      Take 1 tablet by mouth daily    Fatigue, unspecified type       TYLENOL PO      Take 500-1,000 mg by mouth daily as needed        VITAMIN D3 PO      Take 7,000 Units by mouth daily        VITAMIN K2 PO      Take 1 tablet by mouth daily    H/O pericarditis

## 2018-09-04 ENCOUNTER — TELEPHONE (OUTPATIENT)
Dept: OTOLARYNGOLOGY | Facility: CLINIC | Age: 58
End: 2018-09-04

## 2018-09-04 NOTE — TELEPHONE ENCOUNTER
Left msg for patient to call Leslie in surgery scheduling for Dr Venegas.    Leslie Sommer   ENT Tran-Op Coordinator  [phone]

## 2018-09-05 NOTE — PROGRESS NOTES
Visit Date:   08/30/2018      I spent thirty minutes in the care and consultation of this patient for surgical options for hyperparathyroidism.      HISTORY OF PRESENT ILLNESS:  This is a 57-year-old female who has had a longstanding history of primary hyperparathyroidism.  Most recent parathyroid hormone level was 145, corresponding calcium 9.0, phosphorus 2.8, albumin 3.2, total vitamin D 65, all consistent with normocalcemic hyperparathyroidism.      Regarding symptoms of hyperparathyroidism includes depression, fatigue, muscle aches, DEXA consistent with osteoporosis at the radius and spine.      The patient has no previous history of thyroid carcinoma.  She does have a history of Graves' disease treated with radioactive iodine.  Currently on levothyroxine 150 mcg daily.  No head, neck radiation exposure.  There is a family history of thyroid disease in that her sister had Graves' disease and underwent surgery per her report.      PHYSICAL EXAMINATION:  Her neck is soft.  The thyroid gland is very atrophic.  There are no palpable masses in the neck.      LABORATORY DATA:  Discussed above.  Single phase sestamibi SPECT CT scan from 12/01/2017 identified asymmetric activity near the upper left lobe thyroid lobe, raising suspicion for parathyroid adenoma.      ASSESSMENT:  Hyperparathyroidism.      PLAN:  Based on the above, I do not think the patient needs further testing because she has had a previous sestamibi scan.  I would recommend a neck exploration, resection of a parathyroid adenoma or adenomas.  The surgical procedure was discussed with the patient, including but not limited to the risks of bleeding, infection, injury to the recurrent laryngeal nerve or nerves, potential loss of airway, potential permanent hypocalcemia or missed parathyroid adenoma.  The patient is aware of this and has agreed to proceed with surgery and will schedule her accordingly.         JOHN JEFFREY MD             D:  2018   T: 2018   MT: SHIRLEY      Name:     MARCELO DAVIS   MRN:      [id number]        Account:      [id number]   :      1960           Visit Date:   2018      Document: [id number]

## 2018-09-06 ENCOUNTER — TRANSFERRED RECORDS (OUTPATIENT)
Dept: HEALTH INFORMATION MANAGEMENT | Facility: CLINIC | Age: 58
End: 2018-09-06

## 2018-09-06 DIAGNOSIS — E21.3 HYPERPARATHYROIDISM (H): Primary | ICD-10-CM

## 2018-09-10 ENCOUNTER — MYC MEDICAL ADVICE (OUTPATIENT)
Dept: SURGERY | Facility: CLINIC | Age: 58
End: 2018-09-10

## 2018-09-10 DIAGNOSIS — E21.3 HYPERPARATHYROIDISM (H): Primary | ICD-10-CM

## 2018-09-11 ENCOUNTER — OFFICE VISIT (OUTPATIENT)
Dept: SURGERY | Facility: CLINIC | Age: 58
End: 2018-09-11
Payer: COMMERCIAL

## 2018-09-11 ENCOUNTER — MYC MEDICAL ADVICE (OUTPATIENT)
Dept: SURGERY | Facility: CLINIC | Age: 58
End: 2018-09-11

## 2018-09-11 ENCOUNTER — ANESTHESIA EVENT (OUTPATIENT)
Dept: SURGERY | Facility: CLINIC | Age: 58
End: 2018-09-11
Payer: COMMERCIAL

## 2018-09-11 VITALS
HEIGHT: 65 IN | SYSTOLIC BLOOD PRESSURE: 116 MMHG | OXYGEN SATURATION: 97 % | WEIGHT: 161.7 LBS | TEMPERATURE: 98 F | BODY MASS INDEX: 26.94 KG/M2 | HEART RATE: 89 BPM | RESPIRATION RATE: 16 BRPM | DIASTOLIC BLOOD PRESSURE: 80 MMHG

## 2018-09-11 DIAGNOSIS — E21.3 HYPERPARATHYROIDISM (H): ICD-10-CM

## 2018-09-11 DIAGNOSIS — D35.1 PARATHYROID ADENOMA: Primary | ICD-10-CM

## 2018-09-11 LAB — PTH-INTACT SERPL-MCNC: 89 PG/ML (ref 18–80)

## 2018-09-11 ASSESSMENT — COPD QUESTIONNAIRES
CAT_SEVERITY: MILD
COPD: 1

## 2018-09-11 ASSESSMENT — LIFESTYLE VARIABLES: TOBACCO_USE: 1

## 2018-09-11 ASSESSMENT — ENCOUNTER SYMPTOMS: DYSRHYTHMIAS: 1

## 2018-09-11 NOTE — PATIENT INSTRUCTIONS
Preparing for Your Surgery      Name:  Joyce Marroquin   MRN:  [id number]   :  1960   Today's Date:  2018     Arriving for surgery: Parathyroidectomy  Surgery date:  2018  Arrival time:  11:15 am  Please come to:       [facility] [address]  [address] [place]  [address]    -   parking is available in front of the hospital from 5:15 am to 8:00 pm    -  Stop at the Information Desk in the lobby    -   Inform the information person that you are here for surgery. An escort to 3c will be provided. If you would not like an escort, please proceed to 3C on the 3rd floor. [phone]     What can I eat or drink?  -  You may have solid food or milk products until 8 hours prior to your surgery. ( 5 am)  -  You may have water, apple juice or 7up/Sprite until 2 hours prior to your surgery.( until 11:15 am arrival time)    Which medicines can I take?        Stop Aspirin, vitamins and supplements one week prior to surgery.      Hold Ibuprofen and Naproxen for 24 hours prior to surgery.     -  Please take these medications the day of surgery:      Synthroid       Inhalers as usual and bring to hospital       How do I prepare myself?  -  Take two showers: one the night before surgery; and one the morning of surgery.         Use Scrubcare or Hibiclens to wash from neck down.  You may use your own shampoo and conditioner. No other hair products.   -  Do NOT use lotion, powder, deodorant, or antiperspirant the day of your surgery.  -  Do NOT wear any makeup, fingernail polish or jewelry.  - Do not bring your own medications to the hospital, except for inhalers and eye drops.  -  Bring your ID and insurance card.    Questions or Concerns:  -If you have questions or concerns regarding the day of surgery, please call [phone].     -For questions after surgery please call your surgeons office.

## 2018-09-11 NOTE — ANESTHESIA PREPROCEDURE EVALUATION
Anesthesia Evaluation     . Pt has had prior anesthetic.     History of anesthetic complications    severe reactive airway: no experience with GA      ROS/MED HX    ENT/Pulmonary:     (+)sleep apnea, allergic rhinitis, tobacco use, Current use quit years ago packs/day  Intermittent asthma Last exacerbation: 5/30/18 requiring neb every 2 hours and steroid burst and antibiotics,Treatment: Inhaler prn,  mild COPD, uses CPAP , recent URI resolved bronchitis possiblly related to reflux: . .    Neurologic:  - neg neurologic ROS     Cardiovascular: Comment: Symptomatic PACs and PVCs with couplets S/P ablation  During ablation, she had an MI with destruction of AV node    (+) ----. : . . . :ICD Reason placed:complete AV node related to cardiac ablation  type;implanted 4/2017 (no AV node) Settings  - Patient is dependent on ICD . dysrhythmias PVCs, . Previous cardiac testing Echodate:5/17/18results:Stress Testdate: results: date: results: date: results:          METS/Exercise Tolerance: Comment: Significant decrease in activity since ablation >4 METS   Hematologic:     (+) Anemia, -      Musculoskeletal:  - neg musculoskeletal ROS       GI/Hepatic:     (+) GERD Symptomatic,       Renal/Genitourinary:  - ROS Renal section negative       Endo:     (+) thyroid problem  hyperthyroidism Thyroid disease - Other 2004 radioactive  ablation, .      Psychiatric:     (+) psychiatric history Psychiatric Hx List: PTSD.      Infectious Disease:  - neg infectious disease ROS       Malignancy:      - no malignancy   Other:    (+) No chance of pregnancy H/O Chronic Pain,no other significant disability                    Physical Exam      Airway   Mallampati: I  TM distance: >3 FB  Neck ROM: full    Dental     Cardiovascular   Rhythm and rate: irregular and normal    PE comment: Distant heart sounds with intermittent premature beats    Pulmonary    breath sounds clear to auscultation    Other findings: Left:ABS:  Lab Results       Component                Value               Date                      WBC                      7.1                 07/15/2018            Lab Results      Component                Value               Date                      RBC                      4.27                07/15/2018            Lab Results      Component                Value               Date                      HGB                      13.8                07/15/2018            Lab Results      Component                Value               Date                      HCT                      40.7                07/15/2018            Lab Results      Component                Value               Date                      MCV                      95                  07/15/2018            Lab Results      Component                Value               Date                      MCH                      32.3                07/15/2018            Lab Results      Component                Value               Date                      MCHC                     33.9                07/15/2018            Lab Results      Component                Value               Date                      RDW                      13.0                07/15/2018            Lab Results      Component                Value               Date                      PLT                      181                 07/15/2018            Last Comprehensive Metabolic Panel:  Sodium       Date                     Value               Ref Range           Status                07/15/2018               136                 133 - 144 mmol*     Final            ----------  Potassium       Date                     Value               Ref Range           Status                07/15/2018               3.7                 3.4 - 5.3 mmol*     Final            ----------  Chloride       Date                     Value               Ref Range           Status                07/15/2018               104                  94 - 109 mmol/L     Final            ----------  Carbon Dioxide       Date                     Value               Ref Range           Status                07/15/2018               25                  20 - 32 mmol/L      Final            ----------  Anion Gap       Date                     Value               Ref Range           Status                07/15/2018               7                   3 - 14 mmol/L       Final            ----------  Glucose       Date                     Value               Ref Range           Status                07/15/2018               84                  70 - 99 mg/dL       Final            ----------  Urea Nitrogen       Date                     Value               Ref Range           Status                07/15/2018               10                  7 - 30 mg/dL        Final            ----------  Creatinine       Date                     Value               Ref Range           Status                07/15/2018               0.68                0.52 - 1.04 mg*     Final            ----------  GFR Estimate       Date                     Value               Ref Range           Status                07/15/2018               88                  >60 mL/min/1.7*     Final              Comment:    Non  GFR Calc  ----------  Calcium       Date                     Value               Ref Range           Status                08/09/2018               9.0                 8.5 - 10.1 mg/*     Final            ----------           PAC Discussion and Assessment    ASA Classification: 2  Case is suitable for:   Anesthetic techniques and relevant risks discussed: GA  Invasive monitoring and risk discussed: No  Types:   Possibility and Risk of blood transfusion discussed: No  NPO instructions given:   Additional anesthetic preparation and risks discussed:   Needs early admission to pre-op area:   Other:     PAC Resident/NP Anesthesia Assessment:  57 year old female for neck  exploration, resection parathyroid adenoma, parathyroidectomy, with Dr. Venegas, on 9/19/18. She had radioactive thyroid ablation for hyperthyroidism and is on synthroid supplementation. PAC referral for risk assessment and optimization.  Pre-operative considerations include:  1.) CV: Functional status independent/exercise tolerance > 4 METS. S/P radiofrequency ablation for PVCs and complicated by 'MI', (EF down to 42%), compete heart block requiring pacemaker in 2017. Also complicated by pericarditis. Pacemaker is DDDR  Reprogrammed in May due to dizziness, which persists along with fatigue, for which she was evaluated (Dr. Funes 8/23/18 note in EMR)-she had a normal pacemaker interrogation and essentiqally normal and recovered echo (EF 54%, mild diffuse hypokinesis with normal RV function.  Notify pacemaker nurse-use magnet as directed.   2.) Pulmonary: [id number]-kekpov to tell me amount or quit date. LARRY on CPAP. Emphysema.  Reactive airway ds- intermittent albuterol inhaler and nebs prn. Last exacerbation 5/2018 for bronchitis with prolonged urgent visit requiring nebs every 2 hours, prednisone and antibiotics- no admission.Recovered and back to baseline.   -Bring CPAP mask to DOS  3.) Heme: Chronic iron deficiency anemia -etiology unclear on oral iron supplement-last HGB 13.8 in July. No hx for clots or bleeds.  4.) GI: Severe GERD untreated as PPI and H2 blockers cause SE of diarrhea. She elevates HOB and diet control.  PONV score 1 ( 2 or > antiemetic prophylaxis indicated).    Anesthesia: Never had general anesthesia. Multiple allergies, LARRY, significant episode of reactive airway in May, PTSD from above heart block experience. Exam reveals clear lungs, MALL IV / TMD 3 FB, FROM neck and good translocation.  Pt seen by Dr. Pruett. Please see her recommendations below.    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 Chart reviewed, patient seen and evaluated; agree with above assessment. Patient has cardiac history, with prior ablation for frequent PVCs, had AV node dysfunction, now with permanent pacemaker, followed here with Dr. Funes. Echo shows EF of 54%.     Patient has what she describes as severe reactive airway disease, not particularly asthma or emphysema. Recent exacerbation in May this year requiring frequent nebs to manage her wheezing. Her reactive airway issues are exacerbated by GERD and sinus drainage. Patient is very anxious about having severe bronchospasm with intubation - we discussed nebs pre-induction, she will be on short burst of steroids, higher dose of propofol, lidocaine spray perhaps smaller tube. I checked - one paper that states that lidocaine endotracheal does NOT interfere with RLN monitoring.     Her constellation of symptoms (GI distress, GERD, POTs, numerous allergies, perhaps even reactive airwars) bring to mind MCAS. She has not been tested, but does say that she feels so much better when she has taken benadryl for allergies.    For this GA, I would terat her as though she does have MCAS, and use H1 and H2 blockers, typically 50 of benadryl prior to induction, and then 25 mg q 2 hours. H2 may be less helpful. She does frequently use short burst steroids for her reactive airways, and I agree that might be a good choice here. Team can decide DOS whether there would be any benefit from steroids - she has mild PONV, so could use decadron for that which would also cover any steroid need.     Her cardiac status is stable at this point, major issue is the reactive airway issue. If she does develop any sort of allergic reaction,even significant wheezing, would draw a tryptase  level.    Patient is appropriate for the planned procedure without further workup or medical management change. The final anesthesia plan will be determined by the physician anesthesiologist caring for the patient on the day of surgery.      Reviewed and Signed by PAC Anesthesiologist  Anesthesiologist: iris  Date: 9/11/2018  Time:   Pass/Fail: Pass  Disposition:     PAC Pharmacist Assessment:        Pharmacist:   Date:   Time:      Anesthesia Plan      History & Physical Review  History and physical reviewed and following examination; no interval change.    ASA Status:  2 .    NPO Status:  > 8 hours    Plan for General and ETT with Intravenous induction. Maintenance will be Balanced.    PONV prophylaxis:  Dexamethasone or Solumedrol  Additional equipment: Videolaryngoscope and 2nd IV -Will give preop neb treatment  -Preop benadryl  -Patient ok with preop benzodiazepines given level of anxiety      Postoperative Care  Postoperative pain management:  IV analgesics.      Consents  Anesthetic plan, risks, benefits and alternatives discussed with:  Patient..          I have seen and evaluated the patient myself and agree with the above assessment and plan.  I have explained the risks/benefits of anesthesia to the patient who understands and agrees to proceed.    Anna Costa MD  Staff Anesthesiologist  Pager [phone]                  .

## 2018-09-11 NOTE — MR AVS SNAPSHOT
After Visit Summary   2018    Joyce Marroquin    MRN: [id number]           Patient Information     Date Of Birth          1960        Visit Information        Provider Department      2018 7:30 AM Idania Fishman PA M [facility]        Care Instructions    Preparing for Your Surgery      Name:  Joyce Marroquin   MRN:  [id number]   :  1960   Today's Date:  2018     Arriving for surgery: Parathyroidectomy  Surgery date:  2018  Arrival time:  11:15 am  Please come to:       [facility] [address]  [address] [id number]    -   parking is available in front of the hospital from 5:15 am to 8:00 pm    -  Stop at the Information Desk in the lobby    -   Inform the information person that you are here for surgery. An escort to 3c will be provided. If you would not like an escort, please proceed to 3C on the 3rd floor. [phone]     What can I eat or drink?  -  You may have solid food or milk products until 8 hours prior to your surgery. ( 5 am)  -  You may have water, apple juice or 7up/Sprite until 2 hours prior to your surgery.( until 11:15 am arrival time)    Which medicines can I take?        Stop Aspirin, vitamins and supplements one week prior to surgery.      Hold Ibuprofen and Naproxen for 24 hours prior to surgery.     -  Please take these medications the day of surgery:      Synthroid       Inhalers as usual and bring to hospital       How do I prepare myself?  -  Take two showers: one the night before surgery; and one the morning of surgery.         Use Scrubcare or Hibiclens to wash from neck down.  You may use your own shampoo and conditioner. No other hair products.   -  Do NOT use lotion, powder, deodorant, or antiperspirant the day of your surgery.  -  Do NOT wear any makeup, fingernail polish or jewelry.  - Do not bring your own medications to the hospital, except  for inhalers and eye drops.  -  Bring your ID and insurance card.    Questions or Concerns:  -If you have questions or concerns regarding the day of surgery, please call [phone].     -For questions after surgery please call your surgeons office.                     Follow-ups after your visit        Your next 10 appointments already scheduled     Sep 19, 2018   Procedure with Kristine Venegas MD   [facility], [place], Same Day Surgery (--)    [address] [id number] [phone]            Sep 25, 2018  3:00 PM CDT   (Arrive by 2:45 PM)   RETURN ENDOCRINE with Pallavi Duarte MD   [facility] End[facility]ology ([facility])    [address]  [address] [facility] [address] [phone]            Oct 05, 2018  8:30 AM CDT   Return Visit with Kristine Venegas MD   [facility]    [address] [place] [id number] [phone]            Feb 21, 2019  9:30 AM CST   (Arrive by 9:15 AM)   Pacemaker Check with Uc Cv Device 1   [facility] ([facility])    [address]  [address] [place] [address] [phone]            Feb 21, 2019 10:00 AM CST   (Arrive by 9:45 AM)   RETURN ARRHYTHMIA with Lino Funes MD   [facility] ([facility])    [address] [place] [address]  [address] [place] [address] [phone]              Who to contact     Please call your clinic at [phone] to:    Ask questions about your health    Make or cancel appointments    Discuss your medicines    Learn about your test results    Speak to your doctor            Additional Information About Your Visit        [employer] Information     [employer] gives you secure access to your electronic health record. If you see a primary care provider, you can also send messages to your care team and make appointments. If you have questions, please call  "your primary care clinic.  If you do not have a primary care provider, please call [phone] and they will assist you.      [employer] is an electronic gateway that provides easy, online access to your medical records. With [employer], you can request a clinic appointment, read your test results, renew a prescription or communicate with your care team.     To access your existing account, please contact your [facility] or call [phone] for assistance.        Care EveryWhere ID     This is your Care EveryWhere ID. This could be used by other organizations to access your [place] medical records  [id number]        Your Vitals Were     Pulse Temperature Respirations Height Last Period Pulse Oximetry    89 98  F (36.7  C) (Oral) 16 1.651 m (5' 5\") 08/21/2007 97%    BMI (Body Mass Index)                   26.91 kg/m2            Blood Pressure from Last 3 Encounters:   09/11/18 116/80   08/30/18 132/68   08/23/18 121/71    Weight from Last 3 Encounters:   09/11/18 73.3 kg (161 lb 11.2 oz)   08/30/18 72.8 kg (160 lb 9.6 oz)   07/15/18 73.5 kg (162 lb)              Today, you had the following     No orders found for display         Today's Medication Changes          These changes are accurate as of 9/11/18  8:01 AM.  If you have any questions, ask your nurse or doctor.               These medicines have changed or have updated prescriptions.        Dose/Directions    SYNTHROID 150 MCG tablet   This may have changed:  when to take this   Used for:  Postablative hypothyroidism   Generic drug:  levothyroxine        Dose:  150 mcg   Take 1 tablet (150 mcg) by mouth daily   Quantity:  60 tablet   Refills:  3                Primary Care Provider Office Phone # Fax #    Rafy Jiang [phone]       [facility] [place] [address] COR[address] LUI [address] [place] [address]        Equal Access to Services     PAVITHRA SALCEDO AH: eugene Cavazos, " katherine anderson rogermicah fuchs camilosheila carvajalaamerly ah. So [facility] [phone].    ATENCIÓN: Si shayy appiah, tiene a hankins disposición servicios gratuitos de asistencia lingüística. Jp al [phone].    We comply with applicable federal civil rights laws and [place] laws. We do not discriminate on the basis of race, color, national origin, age, disability, sex, sexual orientation, or gender identity.            Thank you!     Thank you for choosing [facility]  for your care. Our goal is always to provide you with excellent care. Hearing back from our patients is one way we can continue to improve our services. Please take a few minutes to complete the written survey that you may receive in the mail after your visit with us. Thank you!             Your Updated Medication List - Protect others around you: Learn how to safely use, store and throw away your medicines at www.disposemymeds.org.          This list is accurate as of 9/11/18  8:01 AM.  Always use your most recent med list.                   Brand Name Dispense Instructions for use Diagnosis    albuterol 108 (90 Base) MCG/ACT inhaler    PROAIR HFA/PROVENTIL HFA/VENTOLIN HFA     Inhale 2 puffs into the lungs as needed for shortness of breath / dyspnea or wheezing        azelastine 0.1 % nasal spray    ASTELIN     Spray 2 sprays in nostril At Bedtime        BENADRYL PO      Take 25-50 mg by mouth nightly as needed        DAILY MULTIVITAMIN PO      Take 1 tablet by mouth every morning        IRON SUPPLEMENT PO      Take 65 mg by mouth daily        MAGNESIUM LACTATE PO      Take 180 mg by mouth At Bedtime    Fatigue, unspecified type       MELATONIN PO      Take 3 mg by mouth At Bedtime        nebulizer nebulization      as needed    H/O pericarditis       PROBIOTIC DAILY PO      Take 1 capsule by mouth 2 times daily        SYNTHROID 150 MCG tablet   Generic drug:  levothyroxine     60 tablet    Take 1 tablet (150  mcg) by mouth daily    Postablative hypothyroidism       TURMERIC PO      Take 1 tablet by mouth every morning    Fatigue, unspecified type       TYLENOL PO      Take 500-1,000 mg by mouth daily as needed        VITAMIN D3 PO      Take 7,000 Units by mouth every morning        VITAMIN K2 PO      Take 1 tablet by mouth every morning    H/O pericarditis

## 2018-09-11 NOTE — TELEPHONE ENCOUNTER
"Received voicemail from patient who was requesting a return call to [phone]. Per patient from voicemail, patient is scheduled for a parathyroidectomy on 9/19/18 and is thinking that this can be cancelled as her PTH level was 89.     Called and spoke to patient who is aware that we received the message. Patient stated, \"I am assuming the surgery will be cancelled because the numbers aren't high enough and I would like to get back to work.\" Informed patient that a message will be sent to Dr. Venegas to review and give recommendations and that the patient will be contacted with recommendations. Patient verbalized understanding and was comfortable with plan.    Ce Winston RN, BSN        "

## 2018-09-12 ENCOUNTER — TELEPHONE (OUTPATIENT)
Dept: ENDOCRINOLOGY | Facility: CLINIC | Age: 58
End: 2018-09-12

## 2018-09-12 NOTE — TELEPHONE ENCOUNTER
Called patient and discussed recent lab result. She is concerning that her PTH is dropping. Calcium level continues to be normal. She stated that the reason she wants to get it out is her overall symptoms. I think it should be fine to have parathyroidectomy given symptoms, and osteoporosis, +parathyroid scan. Discussed risk of postoperative hypocalcemia and the need to increase dose.    Patient verbalized understanding.    Garland Patel MD    Division of Diabetes and Endocrinology  Department of Medicine  [phone]

## 2018-09-12 NOTE — TELEPHONE ENCOUNTER
"Nurse updated patient with Dr. Venegas's response. Pt would like to know how she will determine with glands are \"hot\" in surgery, if her PTH is normal? Nurse will ask Dr. Venegas to respond to question.    Clarisa Bell RN, BSN, PHN  M [facility] [[email or web address]]  GI/Gen Surg/Hepatology Care Coordinator    "

## 2018-09-14 ENCOUNTER — TELEPHONE (OUTPATIENT)
Dept: SURGERY | Facility: CLINIC | Age: 58
End: 2018-09-14

## 2018-09-14 NOTE — TELEPHONE ENCOUNTER
Call to patient to reschedule 10/5/18 post op appointment with Dr. Venegas.  Patient is not availble 9/27/18 or 10/5/18 and next available after that is not until 10/25/18.  Please call patient to advise and/or give her the number to call the [place] to schedule there.      Carol Westholter

## 2018-09-14 NOTE — TELEPHONE ENCOUNTER
M [facility] Call Center    Phone Message    May a detailed message be left on voicemail: yes    Reason for Call: Other: Pt called the [facility] call center to schedule post op-however, call center reps do not schedule these. Another representative attempted to explain this to pt, but this made pt very upset and she requested a supervisor. Writer reached out to pt, pt explained she did not want to speak, and wanted a nurse to call her. Please reach out to pt.     Action Taken: Message routed to:  Adult Clinics: Surgery, General p [phone]

## 2018-09-17 NOTE — TELEPHONE ENCOUNTER
[facility] and was able to get her scheduled for Monday 9/24 at 1:30 pm.    Clarisa Bell RN, BSN, PHN  [facility]  GI/Gen Surg/Hepatology Care Coordinator

## 2018-09-19 ENCOUNTER — HOSPITAL ENCOUNTER (OUTPATIENT)
Facility: CLINIC | Age: 58
Discharge: HOME | End: 2018-09-19
Attending: SURGERY | Admitting: SURGERY
Payer: COMMERCIAL

## 2018-09-19 ENCOUNTER — ANESTHESIA (OUTPATIENT)
Dept: SURGERY | Facility: CLINIC | Age: 58
End: 2018-09-19
Payer: COMMERCIAL

## 2018-09-19 VITALS
OXYGEN SATURATION: 100 % | DIASTOLIC BLOOD PRESSURE: 81 MMHG | TEMPERATURE: 97.7 F | BODY MASS INDEX: 26.89 KG/M2 | HEART RATE: 85 BPM | WEIGHT: 161.38 LBS | HEIGHT: 65 IN | SYSTOLIC BLOOD PRESSURE: 144 MMHG | RESPIRATION RATE: 16 BRPM

## 2018-09-19 DIAGNOSIS — G89.18 POSTOPERATIVE PAIN: Primary | ICD-10-CM

## 2018-09-19 LAB
GLUCOSE BLDC GLUCOMTR-MCNC: 96 MG/DL (ref 70–99)
PTH-INTACT SERPL-MCNC: 166 PG/ML (ref 18–80)
PTH-INTACT SERPL-MCNC: 17 PG/ML (ref 18–80)
PTH-INTACT SERPL-MCNC: 60 PG/ML (ref 18–80)

## 2018-09-19 PROCEDURE — 71000014 ZZH RECOVERY PHASE 1 LEVEL 2 FIRST HR: Performed by: SURGERY

## 2018-09-19 PROCEDURE — 37000008 ZZH ANESTHESIA TECHNICAL FEE, 1ST 30 MIN: Performed by: SURGERY

## 2018-09-19 PROCEDURE — 25000128 H RX IP 250 OP 636: Performed by: ANESTHESIOLOGY

## 2018-09-19 PROCEDURE — 71000027 ZZH RECOVERY PHASE 2 EACH 15 MINS: Performed by: SURGERY

## 2018-09-19 PROCEDURE — 82962 GLUCOSE BLOOD TEST: CPT

## 2018-09-19 PROCEDURE — 88331 PATH CONSLTJ SURG 1 BLK 1SPC: CPT | Performed by: SURGERY

## 2018-09-19 PROCEDURE — 25000128 H RX IP 250 OP 636: Performed by: NURSE ANESTHETIST, CERTIFIED REGISTERED

## 2018-09-19 PROCEDURE — 25000125 ZZHC RX 250: Performed by: NURSE ANESTHETIST, CERTIFIED REGISTERED

## 2018-09-19 PROCEDURE — 83970 ASSAY OF PARATHORMONE: CPT | Performed by: SURGERY

## 2018-09-19 PROCEDURE — 40000170 ZZH STATISTIC PRE-PROCEDURE ASSESSMENT II: Performed by: SURGERY

## 2018-09-19 PROCEDURE — 37000009 ZZH ANESTHESIA TECHNICAL FEE, EACH ADDTL 15 MIN: Performed by: SURGERY

## 2018-09-19 PROCEDURE — 27210794 ZZH OR GENERAL SUPPLY STERILE: Performed by: SURGERY

## 2018-09-19 PROCEDURE — 25000132 ZZH RX MED GY IP 250 OP 250 PS 637: Performed by: NURSE ANESTHETIST, CERTIFIED REGISTERED

## 2018-09-19 PROCEDURE — 71000015 ZZH RECOVERY PHASE 1 LEVEL 2 EA ADDTL HR: Performed by: SURGERY

## 2018-09-19 PROCEDURE — 88305 TISSUE EXAM BY PATHOLOGIST: CPT | Performed by: SURGERY

## 2018-09-19 PROCEDURE — 86850 RBC ANTIBODY SCREEN: CPT | Performed by: ANESTHESIOLOGY

## 2018-09-19 PROCEDURE — 25000566 ZZH SEVOFLURANE, EA 15 MIN: Performed by: SURGERY

## 2018-09-19 PROCEDURE — 25000125 ZZHC RX 250: Performed by: ANESTHESIOLOGY

## 2018-09-19 PROCEDURE — 36000064 ZZH SURGERY LEVEL 4 EA 15 ADDTL MIN - UMMC: Performed by: SURGERY

## 2018-09-19 PROCEDURE — 36000062 ZZH SURGERY LEVEL 4 1ST 30 MIN - UMMC: Performed by: SURGERY

## 2018-09-19 PROCEDURE — 25000125 ZZHC RX 250: Performed by: SURGERY

## 2018-09-19 RX ORDER — EPHEDRINE SULFATE 50 MG/ML
INJECTION, SOLUTION INTRAMUSCULAR; INTRAVENOUS; SUBCUTANEOUS PRN
Status: DISCONTINUED | OUTPATIENT
Start: 2018-09-19 | End: 2018-09-19

## 2018-09-19 RX ORDER — FENTANYL CITRATE 50 UG/ML
25-50 INJECTION, SOLUTION INTRAMUSCULAR; INTRAVENOUS
Status: DISCONTINUED | OUTPATIENT
Start: 2018-09-19 | End: 2018-09-19 | Stop reason: HOSPADM

## 2018-09-19 RX ORDER — PROPOFOL 10 MG/ML
INJECTION, EMULSION INTRAVENOUS CONTINUOUS PRN
Status: DISCONTINUED | OUTPATIENT
Start: 2018-09-19 | End: 2018-09-19

## 2018-09-19 RX ORDER — PROPOFOL 10 MG/ML
INJECTION, EMULSION INTRAVENOUS PRN
Status: DISCONTINUED | OUTPATIENT
Start: 2018-09-19 | End: 2018-09-19

## 2018-09-19 RX ORDER — MAGNESIUM HYDROXIDE 1200 MG/15ML
LIQUID ORAL PRN
Status: DISCONTINUED | OUTPATIENT
Start: 2018-09-19 | End: 2018-09-19 | Stop reason: HOSPADM

## 2018-09-19 RX ORDER — LIDOCAINE HYDROCHLORIDE 40 MG/ML
SOLUTION TOPICAL PRN
Status: DISCONTINUED | OUTPATIENT
Start: 2018-09-19 | End: 2018-09-19

## 2018-09-19 RX ORDER — HYDROMORPHONE HYDROCHLORIDE 1 MG/ML
.3-.5 INJECTION, SOLUTION INTRAMUSCULAR; INTRAVENOUS; SUBCUTANEOUS EVERY 5 MIN PRN
Status: DISCONTINUED | OUTPATIENT
Start: 2018-09-19 | End: 2018-09-19 | Stop reason: HOSPADM

## 2018-09-19 RX ORDER — OXYCODONE HYDROCHLORIDE 5 MG/1
5-10 TABLET ORAL EVERY 4 HOURS PRN
Qty: 10 TABLET | Refills: 0 | Status: SHIPPED | OUTPATIENT
Start: 2018-09-19 | End: 2018-10-04

## 2018-09-19 RX ORDER — IPRATROPIUM BROMIDE 42 UG/1
2 SPRAY, METERED NASAL 2 TIMES DAILY
COMMUNITY
End: 2019-01-25

## 2018-09-19 RX ORDER — DEXAMETHASONE SODIUM PHOSPHATE 4 MG/ML
INJECTION, SOLUTION INTRA-ARTICULAR; INTRALESIONAL; INTRAMUSCULAR; INTRAVENOUS; SOFT TISSUE PRN
Status: DISCONTINUED | OUTPATIENT
Start: 2018-09-19 | End: 2018-09-19

## 2018-09-19 RX ORDER — DIPHENHYDRAMINE HYDROCHLORIDE 50 MG/ML
INJECTION INTRAMUSCULAR; INTRAVENOUS PRN
Status: DISCONTINUED | OUTPATIENT
Start: 2018-09-19 | End: 2018-09-19

## 2018-09-19 RX ORDER — NALOXONE HYDROCHLORIDE 0.4 MG/ML
.1-.4 INJECTION, SOLUTION INTRAMUSCULAR; INTRAVENOUS; SUBCUTANEOUS
Status: DISCONTINUED | OUTPATIENT
Start: 2018-09-19 | End: 2018-09-19 | Stop reason: HOSPADM

## 2018-09-19 RX ORDER — SODIUM CHLORIDE, SODIUM LACTATE, POTASSIUM CHLORIDE, CALCIUM CHLORIDE 600; 310; 30; 20 MG/100ML; MG/100ML; MG/100ML; MG/100ML
INJECTION, SOLUTION INTRAVENOUS CONTINUOUS
Status: DISCONTINUED | OUTPATIENT
Start: 2018-09-19 | End: 2018-09-19 | Stop reason: HOSPADM

## 2018-09-19 RX ORDER — LABETALOL HYDROCHLORIDE 5 MG/ML
10 INJECTION, SOLUTION INTRAVENOUS
Status: DISCONTINUED | OUTPATIENT
Start: 2018-09-19 | End: 2018-09-19 | Stop reason: HOSPADM

## 2018-09-19 RX ORDER — LIDOCAINE 40 MG/G
CREAM TOPICAL
Status: DISCONTINUED | OUTPATIENT
Start: 2018-09-19 | End: 2018-09-19 | Stop reason: HOSPADM

## 2018-09-19 RX ORDER — LIDOCAINE HYDROCHLORIDE 20 MG/ML
INJECTION, SOLUTION INFILTRATION; PERINEURAL PRN
Status: DISCONTINUED | OUTPATIENT
Start: 2018-09-19 | End: 2018-09-19

## 2018-09-19 RX ORDER — IPRATROPIUM BROMIDE AND ALBUTEROL SULFATE 2.5; .5 MG/3ML; MG/3ML
3 SOLUTION RESPIRATORY (INHALATION) ONCE
Status: COMPLETED | OUTPATIENT
Start: 2018-09-19 | End: 2018-09-19

## 2018-09-19 RX ORDER — FENTANYL CITRATE 50 UG/ML
INJECTION, SOLUTION INTRAMUSCULAR; INTRAVENOUS PRN
Status: DISCONTINUED | OUTPATIENT
Start: 2018-09-19 | End: 2018-09-19

## 2018-09-19 RX ADMIN — IPRATROPIUM BROMIDE AND ALBUTEROL SULFATE 3 ML: .5; 3 SOLUTION RESPIRATORY (INHALATION) at 10:32

## 2018-09-19 RX ADMIN — LIDOCAINE HYDROCHLORIDE 4 ML: 40 SOLUTION TOPICAL at 11:39

## 2018-09-19 RX ADMIN — FENTANYL CITRATE 50 MCG: 50 INJECTION, SOLUTION INTRAMUSCULAR; INTRAVENOUS at 12:27

## 2018-09-19 RX ADMIN — Medication 80 MG: at 11:38

## 2018-09-19 RX ADMIN — DIPHENHYDRAMINE HYDROCHLORIDE 50 MG: 50 INJECTION, SOLUTION INTRAMUSCULAR; INTRAVENOUS at 11:34

## 2018-09-19 RX ADMIN — PROPOFOL 50 MCG/KG/MIN: 10 INJECTION, EMULSION INTRAVENOUS at 11:47

## 2018-09-19 RX ADMIN — MIDAZOLAM 2 MG: 1 INJECTION INTRAMUSCULAR; INTRAVENOUS at 11:21

## 2018-09-19 RX ADMIN — FENTANYL CITRATE 50 MCG: 50 INJECTION, SOLUTION INTRAMUSCULAR; INTRAVENOUS at 11:36

## 2018-09-19 RX ADMIN — POLYETHYLENE GLYCOL 400 AND PROPYLENE GLYCOL 2 DROP: 4; 3 SOLUTION/ DROPS OPHTHALMIC at 11:43

## 2018-09-19 RX ADMIN — FAMOTIDINE 20 MG: 10 INJECTION, SOLUTION INTRAVENOUS at 11:38

## 2018-09-19 RX ADMIN — Medication 5 MG: at 12:06

## 2018-09-19 RX ADMIN — DEXAMETHASONE SODIUM PHOSPHATE 10 MG: 4 INJECTION, SOLUTION INTRA-ARTICULAR; INTRALESIONAL; INTRAMUSCULAR; INTRAVENOUS; SOFT TISSUE at 11:49

## 2018-09-19 RX ADMIN — SODIUM CHLORIDE, POTASSIUM CHLORIDE, SODIUM LACTATE AND CALCIUM CHLORIDE: 600; 310; 30; 20 INJECTION, SOLUTION INTRAVENOUS at 11:21

## 2018-09-19 RX ADMIN — MIDAZOLAM 1 MG: 1 INJECTION INTRAMUSCULAR; INTRAVENOUS at 11:27

## 2018-09-19 RX ADMIN — MIDAZOLAM 1 MG: 1 INJECTION INTRAMUSCULAR; INTRAVENOUS at 11:24

## 2018-09-19 RX ADMIN — LIDOCAINE HYDROCHLORIDE 60 MG: 20 INJECTION, SOLUTION INFILTRATION; PERINEURAL at 11:36

## 2018-09-19 RX ADMIN — FENTANYL CITRATE 25 MCG: 50 INJECTION, SOLUTION INTRAMUSCULAR; INTRAVENOUS at 13:00

## 2018-09-19 RX ADMIN — FENTANYL CITRATE 25 MCG: 50 INJECTION, SOLUTION INTRAMUSCULAR; INTRAVENOUS at 12:51

## 2018-09-19 RX ADMIN — MIDAZOLAM HYDROCHLORIDE 2 MG: 1 INJECTION, SOLUTION INTRAMUSCULAR; INTRAVENOUS at 13:18

## 2018-09-19 RX ADMIN — Medication 5 MG: at 11:55

## 2018-09-19 RX ADMIN — PROPOFOL 120 MG: 10 INJECTION, EMULSION INTRAVENOUS at 11:36

## 2018-09-19 RX ADMIN — FENTANYL CITRATE 25 MCG: 50 INJECTION, SOLUTION INTRAMUSCULAR; INTRAVENOUS at 13:13

## 2018-09-19 RX ADMIN — PHENYLEPHRINE HYDROCHLORIDE 100 MCG: 10 INJECTION, SOLUTION INTRAMUSCULAR; INTRAVENOUS; SUBCUTANEOUS at 11:57

## 2018-09-19 RX ADMIN — PHENYLEPHRINE HYDROCHLORIDE 100 MCG: 10 INJECTION, SOLUTION INTRAMUSCULAR; INTRAVENOUS; SUBCUTANEOUS at 12:03

## 2018-09-19 RX ADMIN — REMIFENTANIL HYDROCHLORIDE 0.1 MCG/KG/MIN: 1 INJECTION, POWDER, LYOPHILIZED, FOR SOLUTION INTRAVENOUS at 11:40

## 2018-09-19 ASSESSMENT — PAIN DESCRIPTION - DESCRIPTORS
DESCRIPTORS: SORE

## 2018-09-19 NOTE — OR NURSING
"1310: Pt complaining of \"feel like I have a lump in my throat and can't breath.  Last time I had this everything went to shit.  All my surgeries have gone wrong.  I feel like I'm having PTSD from my past surgeries\"  Lungs clear to ascultation, sats % on 2L-NC.  Dr. Costa notified.   1315: MDA at bedside to assess pt.  Order for 2mg of IV versed, see MAR.  "

## 2018-09-19 NOTE — OR NURSING
Okay for pt to have ice to incision per Dr. Venegas.      Spoke with Dr. Costa and MIKE to enter signout.  Also, no interventions with pacemaker post-op necessary.  Okay for pt to start sips and chips.  Monitor closely for signs of aspiration d/t pt have LTA kit.

## 2018-09-19 NOTE — ANESTHESIA POSTPROCEDURE EVALUATION
Patient: Joyce Marroquin    Procedure(s):  Neck Exploration Resection of Left superior Parathyroid gland - Wound Class: I-Clean   - Wound Class: I-Clean    Diagnosis:Hyperparathyroidism  Diagnosis Additional Information: No value filed.    Anesthesia Type:  General, ETT    Note:  Anesthesia Post Evaluation    Patient location during evaluation: PACU  Patient participation: Able to fully participate in evaluation  Level of consciousness: awake  Pain management: adequate  Airway patency: patent  Cardiovascular status: acceptable  Respiratory status: acceptable  Hydration status: acceptable  PONV: none     Anesthetic complications: None          Last vitals:  Vitals:    09/19/18 1345 09/19/18 1400 09/19/18 1410   BP: 130/73 124/73 138/73   Resp: 12 16 16   Temp:   36.9  C (98.5  F)   SpO2: 95% 95% 96%         Electronically Signed By: Anna Costa MD  September 19, 2018  2:16 PM

## 2018-09-19 NOTE — OR NURSING
Dr. Venegas at bedside to speak with pt regarding procedure.  Pt doesn't need to wait for PTH to be resulted before discharge.

## 2018-09-19 NOTE — IP AVS SNAPSHOT
Post Anesthesia Care Unit [address] [address]    Phone:  [phone]                                       After Visit Summary   9/19/2018    Joyce Marroquin    MRN: [id number]           After Visit Summary Signature Page     I have received my discharge instructions, and my questions have been answered. I have discussed any challenges I see with this plan with the nurse or doctor.    ..........................................................................................................................................  Patient/Patient Representative Signature      ..........................................................................................................................................  Patient Representative Print Name and Relationship to Patient    ..................................................               ................................................  Date                                   Time    ..........................................................................................................................................  Reviewed by Signature/Title    ...................................................              ..............................................  Date                                               Time          22EPIC Rev 08/18

## 2018-09-19 NOTE — BRIEF OP NOTE
[facility], [place]    Brief Operative Note    Pre-operative diagnosis: Hyperparathyroidism  Post-operative diagnosis same  Procedure: Procedure(s):  Neck Exploration Resection of Left superior Parathyroid gland - Wound Class: I-Clean   - Wound Class: I-Clean  Surgeon: Surgeon(s) and Role:     * Kristine Venegas MD - Primary     * Elba Wilson MD - Resident - Assisting  Anesthesia: General   Estimated blood loss: minimal  Drains: none  Specimens:   ID Type Source Tests Collected by Time Destination   A : Left superior parathyroid  Tissue Parathyroid SURGICAL PATHOLOGY EXAM Kristine Venegas MD 9/19/2018 12:02 PM      Findings: parathyroid adenoma identified    Complications: none  Implants: none

## 2018-09-19 NOTE — DISCHARGE INSTRUCTIONS
[facility]  Same-Day Surgery   Adult Discharge Orders & Instructions     For 24 hours after surgery    1. Get plenty of rest.  A responsible adult must stay with you for at least 24 hours after you leave the hospital.   2. Do not drive or use heavy equipment.  If you have weakness or tingling, don't drive or use heavy equipment until this feeling goes away.  3. Do not drink alcohol.  4. Avoid strenuous or risky activities.  Ask for help when climbing stairs.   5. You may feel lightheaded.  IF so, sit for a few minutes before standing.  Have someone help you get up.   6. If you have nausea (feel sick to your stomach): Drink only clear liquids such as apple juice, ginger ale, broth or 7-Up.  Rest may also help.  Be sure to drink enough fluids.  Move to a regular diet as you feel able.  7. You may have a slight fever. Call the doctor if your fever is over 100 F (37.7 C) (taken under the tongue) or lasts longer than 24 hours.  8. You may have a dry mouth, a sore throat, muscle aches or trouble sleeping.  These should go away after 24 hours.  9. Do not make important or legal decisions.   Call your doctor for any of the followin.  Signs of infection (fever, growing tenderness at the surgery site, a large amount of drainage or bleeding, severe pain, foul-smelling drainage, redness, swelling).    2. It has been over 8 to 10 hours since surgery and you are still not able to urinate (pass water).    3.  Headache for over 24 hours.    To contact a doctor, call Dr Venegas's office at [phone] or:        [phone] and ask for the resident on call for   General Surgery (answered 24 hours a day)      Emergency Department:    [facility]: [phone]       (TTY for hearing impaired: [phone])

## 2018-09-19 NOTE — ANESTHESIA CARE TRANSFER NOTE
Patient: Joyce Marroquin    Procedure(s):  Neck Exploration Resection of Left superior Parathyroid gland - Wound Class: I-Clean   - Wound Class: I-Clean    Diagnosis: Hyperparathyroidism  Diagnosis Additional Information: No value filed.    Anesthesia Type:   General, ETT     Note:  Airway :Face Mask  Patient transferred to:PACU  Handoff Report: Identifed the Patient, Identified the Reponsible Provider, Reviewed the pertinent medical history, Discussed the surgical course, Reviewed Intra-OP anesthesia mangement and issues during anesthesia, Set expectations for post-procedure period and Allowed opportunity for questions and acknowledgement of understanding      Vitals: (Last set prior to Anesthesia Care Transfer)    CRNA VITALS  9/19/2018 1157 - 9/19/2018 1241      9/19/2018             Resp Rate (observed): (!)  1                Electronically Signed By: Charity Valadez CRNA, SHAYNA WAKEFIELD  September 19, 2018  12:41 PM

## 2018-09-19 NOTE — PROGRESS NOTES
"SPIRITUAL HEALTH SERVICES  [facility] ([place]) 3C   PRE-SURGERY VISIT    Had pre-surgery visit with pt.  Joyce was joined by her , Vineet.  Vineet identified himself as having a somewhat flat exterior because he is a  \"and you can't show people when you're scared.\"  She said that her  from [facility] is praying for her as well.  Provided spiritual support, prayer. Ongoing  support desired.    Mike Sevilla  Staff   Pager [phone]      "

## 2018-09-19 NOTE — IP AVS SNAPSHOT
MRN:[id number]                      After Visit Summary   9/19/2018    Joyce Marroquin    MRN: [id number]           Thank you!     Thank you for choosing [facility] for your care. Our goal is always to provide you with excellent care. Hearing back from our patients is one way we can continue to improve our services. Please take a few minutes to complete the written survey that you may receive in the mail after you visit with us. Thank you!        Patient Information     Date Of Birth          1960        About your hospital stay     You were admitted on:  September 19, 2018 You last received care in the:  Post Anesthesia Care Unit [facility]    You were discharged on:  September 19, 2018       Who to Call     For medical emergencies, please call [phone].  For non-urgent questions about your medical care, please call your primary care provider or clinic, [phone]  For questions related to your surgery, please call your surgery clinic        Attending Provider     Provider Specialty    Kristine Venegas MD General Surgery       Primary Care Provider Office Phone # Fax #    Rafy Jiang [phone]      After Care Instructions     Discharge Instructions       May start regular diet immediately.    No lifting >10 pounds for 6 weeks.  No rigorous physical activity.    May shower now. No bathing for two weeks.    Call [phone] to schedule or with routine questions during the work week.      Call [phone] and ask to speak with surgery resident if you are having troubles in the evenings, at night, or on weekends. Please call if you experience increasing abdominal pain, nausea, vomiting, increasing drainage from your wounds, chills, or fever >101.5    Take stool softener while taking narcotic pain medication.    No driving for at least 12 hours after taking narcotic pain medication.                  Your next 10 appointments already scheduled     Sep 24, 2018   1:30 PM CDT   (Arrive by 1:15 PM)   Return Visit with Kristine Venegas MD   [facility] ([facility])    [address]  4th Floor  [place] [address]   [phone]            2019  9:30 AM CST   (Arrive by 9:15 AM)   Pacemaker Check with Uc Cv Device 1   [facility] ([facility])    [address]  3rd Floor  [address]               2019 10:00 AM CST   (Arrive by 9:45 AM)   RETURN ARRHYTHMIA with Lino Funes MD   [facility] ([facility])    [address]  [address]  [place] [address] [phone]              Further instructions from your care team       [facility]  Same-Day Surgery   Adult Discharge Orders & Instructions     For 24 hours after surgery    1. Get plenty of rest.  A responsible adult must stay with you for at least 24 hours after you leave the hospital.   2. Do not drive or use heavy equipment.  If you have weakness or tingling, don't drive or use heavy equipment until this feeling goes away.  3. Do not drink alcohol.  4. Avoid strenuous or risky activities.  Ask for help when climbing stairs.   5. You may feel lightheaded.  IF so, sit for a few minutes before standing.  Have someone help you get up.   6. If you have nausea (feel sick to your stomach): Drink only clear liquids such as apple juice, ginger ale, broth or 7-Up.  Rest may also help.  Be sure to drink enough fluids.  Move to a regular diet as you feel able.  7. You may have a slight fever. Call the doctor if your fever is over 100 F (37.7 C) (taken under the tongue) or lasts longer than 24 hours.  8. You may have a dry mouth, a sore throat, muscle aches or trouble sleeping.  These should go away after 24 hours.  9. Do not make important or legal decisions.   Call your doctor for any of the followin.  Signs of infection  "(fever, growing tenderness at the surgery site, a large amount of drainage or bleeding, severe pain, foul-smelling drainage, redness, swelling).    2. It has been over 8 to 10 hours since surgery and you are still not able to urinate (pass water).    3.  Headache for over 24 hours.    To contact a doctor, call Dr Venegas's office at [phone] or:        [phone] and ask for the resident on call for   General Surgery (answered 24 hours a day)      Emergency Department:    [facility]: [phone]       (TTY for hearing impaired: [phone])              Pending Results     Date and Time Order Name Status Description    9/19/2018 1203 Surgical pathology exam Preliminary             Admission Information     Date & Time Provider Department Dept. Phone    9/19/2018 Kristine Venegas MD Post Anesthesia Care Unit [employer] [phone]      Your Vitals Were     Blood Pressure Temperature Respirations Height Weight Last Period    140/73 98.4  F (36.9  C) (Oral) 12 1.651 m (5' 5\") 73.2 kg (161 lb 6 oz) 08/21/2007    Pulse Oximetry BMI (Body Mass Index)                100% 26.85 kg/m2          [facility] Information     [facility] gives you secure access to your electronic health record. If you see a primary care provider, you can also send messages to your care team and make appointments. If you have questions, please call your primary care clinic.  If you do not have a primary care provider, please call [phone] and they will assist you.        Care EveryWhere ID     This is your Care EveryWhere ID. This could be used by other organizations to access your [facility] medical records  [id number]        Equal Access to Services     PAVITHRA SALCEDO : Jcarlos Morgan, eugene silva, micah waggoner. So [facility] [phone].    ATENCIÓN: Si habla español, tiene a hankins disposición servicios gratuitos de asistencia lingüística. Llame al " [phone].    We comply with applicable federal civil rights laws and [place] laws. We do not discriminate on the basis of race, color, national origin, age, disability, sex, sexual orientation, or gender identity.               Review of your medicines      START taking        Dose / Directions    oxyCODONE IR 5 MG tablet   Commonly known as:  ROXICODONE   Used for:  Postoperative pain        Dose:  5-10 mg   Take 1-2 tablets (5-10 mg) by mouth every 4 hours as needed for pain (Moderate to Severe)   Quantity:  10 tablet   Refills:  0         CONTINUE these medicines which may have CHANGED, or have new prescriptions. If we are uncertain of the size of tablets/capsules you have at home, strength may be listed as something that might have changed.        Dose / Directions    SYNTHROID 150 MCG tablet   This may have changed:  when to take this   Used for:  Postablative hypothyroidism   Generic drug:  levothyroxine        Dose:  150 mcg   Take 1 tablet (150 mcg) by mouth daily   Quantity:  60 tablet   Refills:  3         CONTINUE these medicines which have NOT CHANGED        Dose / Directions    albuterol 108 (90 Base) MCG/ACT inhaler   Commonly known as:  PROAIR HFA/PROVENTIL HFA/VENTOLIN HFA        Dose:  2 puff   Inhale 2 puffs into the lungs as needed for shortness of breath / dyspnea or wheezing   Refills:  0       azelastine 0.1 % nasal spray   Commonly known as:  ASTELIN        Dose:  2 spray   Spray 2 sprays in nostril At Bedtime   Refills:  11       BENADRYL PO        Dose:  25-50 mg   Take 25-50 mg by mouth nightly as needed   Refills:  0       DAILY MULTIVITAMIN PO        Dose:  1 tablet   Take 1 tablet by mouth every morning   Refills:  0       ipratropium 0.06 % spray   Commonly known as:  ATROVENT        Dose:  2 spray   Spray 2 sprays into both nostrils 2 times daily   Refills:  0       IRON SUPPLEMENT PO        Dose:  65 mg   Take 65 mg by mouth daily   Refills:  0       MAGNESIUM LACTATE PO   Used  for:  Fatigue, unspecified type        Dose:  180 mg   Take 180 mg by mouth At Bedtime   Refills:  0       MELATONIN PO        Dose:  3 mg   Take 3 mg by mouth At Bedtime   Refills:  0       METHYLPREDNISOLONE PO   Indication:  Acute Asthmatic Bronchitis        Dose:  40 mg   Take 40 mg by mouth daily   Refills:  0       nebulizer nebulization   Used for:  H/O pericarditis        as needed   Refills:  0       PROBIOTIC DAILY PO        Dose:  1 capsule   Take 1 capsule by mouth 2 times daily   Refills:  0       TURMERIC PO   Used for:  Fatigue, unspecified type        Dose:  1 tablet   Take 1 tablet by mouth every morning   Refills:  0       TYLENOL PO        Dose:  500-1000 mg   Take 500-1,000 mg by mouth daily as needed   Refills:  0       VITAMIN D3 PO        Dose:  7000 Units   Take 7,000 Units by mouth every morning   Refills:  0       VITAMIN K2 PO   Used for:  H/O pericarditis        Dose:  1 tablet   Take 1 tablet by mouth every morning   Refills:  0            Where to get your medicines      Some of these will need a paper prescription and others can be bought over the counter. Ask your nurse if you have questions.     Bring a paper prescription for each of these medications     oxyCODONE IR 5 MG tablet                Protect others around you: Learn how to safely use, store and throw away your medicines at www.disposemymeds.org.        Information about OPIOIDS     PRESCRIPTION OPIOIDS: WHAT YOU NEED TO KNOW   We gave you an opioid (narcotic) pain medicine. It is important to manage your pain, but opioids are not always the best choice. You should first try all the other options your care team gave you. Take this medicine for as short a time (and as few doses) as possible.    Some activities can increase your pain, such as bandage changes or therapy sessions. It may help to take your pain medicine 30 to 60 minutes before these activities. Reduce your stress by getting enough sleep, working on hobbies you  enjoy and practicing relaxation or meditation. Talk to your care team about ways to manage your pain beyond prescription opioids.    These medicines have risks:    DO NOT drive when on new or higher doses of pain medicine. These medicines can affect your alertness and reaction times, and you could be arrested for driving under the influence (DUI). If you need to use opioids long-term, talk to your care team about driving.    DO NOT operate heavy machinery    DO NOT do any other dangerous activities while taking these medicines.    DO NOT drink any alcohol while taking these medicines.     If the opioid prescribed includes acetaminophen, DO NOT take with any other medicines that contain acetaminophen. Read all labels carefully. Look for the word  acetaminophen  or  Tylenol.  Ask your pharmacist if you have questions or are unsure.    You can get addicted to pain medicines, especially if you have a history of addiction (chemical, alcohol or substance dependence). Talk to your care team about ways to reduce this risk.    All opioids tend to cause constipation. Drink plenty of water and eat foods that have a lot of fiber, such as fruits, vegetables, prune juice, apple juice and high-fiber cereal. Take a laxative (Miralax, milk of magnesia, Colace, Senna) if you don t move your bowels at least every other day. Other side effects include upset stomach, sleepiness, dizziness, throwing up, tolerance (needing more of the medicine to have the same effect), physical dependence and slowed breathing.    Store your pills in a secure place, locked if possible. We will not replace any lost or stolen medicine. If you don t finish your medicine, please throw away (dispose) as directed by your pharmacist. The [place] Pollution Control Agency has more information about safe disposal: https://www.pca.state.[place]/living-green/managing-unwanted-medications             Medication List: This is a list of all your medications and when to  take them. Check marks below indicate your daily home schedule. Keep this list as a reference.      Medications           Morning Afternoon Evening Bedtime As Needed    albuterol 108 (90 Base) MCG/ACT inhaler   Commonly known as:  PROAIR HFA/PROVENTIL HFA/VENTOLIN HFA   Inhale 2 puffs into the lungs as needed for shortness of breath / dyspnea or wheezing                                azelastine 0.1 % nasal spray   Commonly known as:  ASTELIN   Spray 2 sprays in nostril At Bedtime                                BENADRYL PO   Take 25-50 mg by mouth nightly as needed                                DAILY MULTIVITAMIN PO   Take 1 tablet by mouth every morning                                ipratropium 0.06 % spray   Commonly known as:  ATROVENT   Spray 2 sprays into both nostrils 2 times daily                                IRON SUPPLEMENT PO   Take 65 mg by mouth daily                                MAGNESIUM LACTATE PO   Take 180 mg by mouth At Bedtime                                MELATONIN PO   Take 3 mg by mouth At Bedtime                                METHYLPREDNISOLONE PO   Take 40 mg by mouth daily                                nebulizer nebulization   as needed                                oxyCODONE IR 5 MG tablet   Commonly known as:  ROXICODONE   Take 1-2 tablets (5-10 mg) by mouth every 4 hours as needed for pain (Moderate to Severe)                                PROBIOTIC DAILY PO   Take 1 capsule by mouth 2 times daily                                SYNTHROID 150 MCG tablet   Take 1 tablet (150 mcg) by mouth daily   Generic drug:  levothyroxine                                TURMERIC PO   Take 1 tablet by mouth every morning                                TYLENOL PO   Take 500-1,000 mg by mouth daily as needed                                VITAMIN D3 PO   Take 7,000 Units by mouth every morning                                VITAMIN K2 PO   Take 1 tablet by mouth every morning

## 2018-09-20 ENCOUNTER — PATIENT OUTREACH (OUTPATIENT)
Dept: CARE COORDINATION | Facility: CLINIC | Age: 58
End: 2018-09-20

## 2018-09-21 ENCOUNTER — TELEPHONE (OUTPATIENT)
Dept: SURGERY | Facility: CLINIC | Age: 58
End: 2018-09-21

## 2018-09-21 LAB — COPATH REPORT: NORMAL

## 2018-09-21 NOTE — TELEPHONE ENCOUNTER
"Received message on clinic voicemail from patient who reports not feeling well, dizziness, and tingly. Patient had a parathyroidectomy on 9/19/18 with Dr. Venegas and is wondering what to do. Patient requested a return call to [phone].    Called and spoke to patient who reports dizziness, \"feeling warm\", and tingly hands, feet, and lips. Per patient, the tingling is slightly better and stated, \"My lips just feel funny now.\" patient reports headache and mild nausea, is eating and drinking okay. Per patient, she is drinking water and Pedialyte. Patient reports temperature of 98, BP of 102/69, and pulse of 77. Per patient, pain from surgery is okay and she has been able to go without a pain pill today. Patient stated, \"I started out feeling well and then it came on all of a sudden. I do have some anxiety, too so I have been sniffing lavender. I have a pacemaker and I have a call out to the device nurse to see if they can interrogate it.\" Per patient, she has taken 8 tabsl of tums today and is unsure of the amount of tums recommended. Informed patient that Dr. Venegas is in the office today and these symptoms will be discussed with her. Patient aware that she will be contacted with recommendations. Encouraged patient to move slowly due to the dizziness and to rest.     Ce Winston RN, BSN    "

## 2018-09-21 NOTE — TELEPHONE ENCOUNTER
Discussed note below with Dr. Venegas who is in clinic today. Per Dr. Venegas, patient should take 2 tums (500 or 750mg tablets) every 6 hours for 3 more days. Per Dr. Venegas, symptoms could be related to being post op and the patient is doing the right things. Patient should keep hydrating and if any further concerns, contact primary doctor.     Called and spoke to patient who is aware of the above recommendations. Patient verbalized understanding and was comfortable with plan.    Ce Winston RN, BSN

## 2018-09-24 ENCOUNTER — OFFICE VISIT (OUTPATIENT)
Dept: OTOLARYNGOLOGY | Facility: CLINIC | Age: 58
End: 2018-09-24
Payer: COMMERCIAL

## 2018-09-24 ENCOUNTER — APPOINTMENT (OUTPATIENT)
Dept: LAB | Facility: CLINIC | Age: 58
End: 2018-09-24
Payer: COMMERCIAL

## 2018-09-24 VITALS — WEIGHT: 160 LBS | BODY MASS INDEX: 26.63 KG/M2

## 2018-09-24 DIAGNOSIS — Z98.890 S/P PARATHYROIDECTOMY: Primary | ICD-10-CM

## 2018-09-24 DIAGNOSIS — Z90.89 S/P PARATHYROIDECTOMY: Primary | ICD-10-CM

## 2018-09-24 LAB
CALCIUM SERPL-MCNC: 8.9 MG/DL (ref 8.5–10.1)
PTH-INTACT SERPL-MCNC: 42 PG/ML (ref 18–80)

## 2018-09-24 ASSESSMENT — PAIN SCALES - GENERAL: PAINLEVEL: NO PAIN (1)

## 2018-09-24 NOTE — LETTER
2018       RE: Joyce Marroquin  [address] [place] [address]     Dear Colleague,    Thank you for referring your patient, Joyce Marroquin, to the [facility] at [facility]. Please see a copy of my visit note below.    Service Date: 2018      HISTORY OF PRESENT ILLNESS:  This is a 57-year-old female who underwent a neck exploration, resection of parathyroid adenoma on 2018.  Her preincision parathyroid hormone level was 166 and her 20-minute post-excision parathyroid hormone level 17.      Since the surgery, the patient denies any problems with voice quality, inspiration or swallowing.  She has had no symptoms of hypocalcemia.  She did have multiple other complaints that were unrelated to anesthesia or parathyroidectomy.  She felt as if these were complaints that she had prior to her surgery and likely are not related as well.      PHYSICAL EXAMINATION:  Her wound is healing well.  The Dermabond is still in place and the sutures were clipped at the skin edges.  There is no evidence of hematoma, seroma or infection.      Pathology and laboratory data were discussed with the patient.      ASSESSMENT:  Follow up status post parathyroid resection.      PLAN:  I will check a calcium, vitamin D and PTH today.  She will follow up with me on an as-needed basis.  Follow up with her primary care physician hereafter.         JOHN JEFFREY MD             D: 2018   T: 2018   MT: leydi      Name:     JOYCE MARROQUIN   MRN:      [id number]        Account:      [id number]   :      1960           Service Date: 2018      Document: [id number]

## 2018-09-24 NOTE — MR AVS SNAPSHOT
After Visit Summary   9/24/2018    Joyce Marroquin    MRN: [id number]           Patient Information     Date Of Birth          1960        Visit Information        Provider Department      9/24/2018 1:30 PM Kristine Venegas MD [facility] Ear Nose [facility] Th[facility]        Today's Diagnoses     S/P parathyroidectomy (H)    -  1       Follow-ups after your visit        Your next 10 appointments already scheduled     Feb 21, 2019  9:30 AM CST   (Arrive by 9:15 AM)   Pacemaker Check with Uc Cv Device 1   [facility])    [address] [place] [address]  3rd Floor  [id number]               Feb 21, 2019 10:00 AM CST   (Arrive by 9:45 AM)   RETURN ARRHYTHMIA with Lino Funes MD   [facility])    [address] [place] [address]  [address] [place] [id number] [phone]              Who to contact     Please call your clinic at [phone] to:    Ask questions about your health    Make or cancel appointments    Discuss your medicines    Learn about your test results    Speak to your doctor            Additional Information About Your Visit        finalsitehart Information     [employer] gives you secure access to your electronic health record. If you see a primary care provider, you can also send messages to your care team and make appointments. If you have questions, please call your primary care clinic.  If you do not have a primary care provider, please call [phone] and they will assist you.      [employer] is an electronic gateway that provides easy, online access to your medical records. With [employer], you can request a clinic appointment, read your test results, renew a prescription or communicate with your care team.     To access your existing account, please contact your [facility] or call [phone] for assistance.        Care EveryWhere ID     This is your Care EveryWhere ID. This could be  used by other organizations to access your [facility] medical records  [id number]        Your Vitals Were     Last Period BMI (Body Mass Index)                08/21/2007 26.63 kg/m2           Blood Pressure from Last 3 Encounters:   09/19/18 144/81   09/11/18 116/80   08/30/18 132/68    Weight from Last 3 Encounters:   09/24/18 72.6 kg (160 lb)   09/19/18 73.2 kg (161 lb 6 oz)   09/11/18 73.3 kg (161 lb 11.2 oz)              We Performed the Following     Calcium     Parathyroid Hormone Intact     Vitamin D Deficiency          Today's Medication Changes          These changes are accurate as of 9/24/18 11:59 PM.  If you have any questions, ask your nurse or doctor.               These medicines have changed or have updated prescriptions.        Dose/Directions    SYNTHROID 150 MCG tablet   This may have changed:  when to take this   Used for:  Postablative hypothyroidism   Generic drug:  levothyroxine        Dose:  150 mcg   Take 1 tablet (150 mcg) by mouth daily   Quantity:  60 tablet   Refills:  3                Primary Care Provider Office Phone # Fax #    Rafy Jiang [phone]       [facility] [address] [place] [address] [address]        Equal Access to Services     JLEANI SALCEDO AH: Jcarlos masono Soyolanda, waaxda lujuanitaadaha, qaybta kaalmada adeodiliayada, micah delcid. So [facility] [phone].    ATENCIÓN: Si habla español, tiene a hankins disposición servicios gratEloquiios de asistencia lingüística. Llame al [phone].    We comply with applicable federal civil rights laws and [place] laws. We do not discriminate on the basis of race, color, national origin, age, disability, sex, sexual orientation, or gender identity.            Thank you!     Thank you for choosing [facility] EAR [facility]SE [facility] TH[facility]AT  for your care. Our goal is always to provide you with excellent care. Hearing back from our patients is one way we can continue to improve our  services. Please take a few minutes to complete the written survey that you may receive in the mail after your visit with us. Thank you!             Your Updated Medication List - Protect others around you: Learn how to safely use, store and throw away your medicines at www.disposemymeds.org.          This list is accurate as of 9/24/18 11:59 PM.  Always use your most recent med list.                   Brand Name Dispense Instructions for use Diagnosis    albuterol 108 (90 Base) MCG/ACT inhaler    PROAIR HFA/PROVENTIL HFA/VENTOLIN HFA     Inhale 2 puffs into the lungs as needed for shortness of breath / dyspnea or wheezing        azelastine 0.1 % nasal spray    ASTELIN     Spray 2 sprays in nostril At Bedtime        BENADRYL PO      Take 25-50 mg by mouth nightly as needed        DAILY MULTIVITAMIN PO      Take 1 tablet by mouth every morning        ipratropium 0.06 % spray    ATROVENT     Spray 2 sprays into both nostrils 2 times daily        IRON SUPPLEMENT PO      Take 65 mg by mouth daily        MAGNESIUM LACTATE PO      Take 180 mg by mouth At Bedtime    Fatigue, unspecified type       MELATONIN PO      Take 3 mg by mouth At Bedtime        METHYLPREDNISOLONE PO      Take 40 mg by mouth daily        nebulizer nebulization      as needed    H/O pericarditis       oxyCODONE IR 5 MG tablet    ROXICODONE    10 tablet    Take 1-2 tablets (5-10 mg) by mouth every 4 hours as needed for pain (Moderate to Severe)    Postoperative pain       PROBIOTIC DAILY PO      Take 1 capsule by mouth 2 times daily        SYNTHROID 150 MCG tablet   Generic drug:  levothyroxine     60 tablet    Take 1 tablet (150 mcg) by mouth daily    Postablative hypothyroidism       TURMERIC PO      Take 1 tablet by mouth every morning    Fatigue, unspecified type       TYLENOL PO      Take 500-1,000 mg by mouth daily as needed        VITAMIN D3 PO      Take 7,000 Units by mouth every morning        VITAMIN K2 PO      Take 1 tablet by mouth every  morning    H/O pericarditis

## 2018-09-25 ENCOUNTER — TELEPHONE (OUTPATIENT)
Dept: OTOLARYNGOLOGY | Facility: CLINIC | Age: 58
End: 2018-09-25

## 2018-09-25 LAB — DEPRECATED CALCIDIOL+CALCIFEROL SERPL-MC: 78 UG/L (ref 20–75)

## 2018-09-25 NOTE — TELEPHONE ENCOUNTER
Spoke with pt regarding recent labs with Dr. Venegas. Relayed that parathyroid and calcium are normal. No need to take any extra calcium and symptoms do not appear to be related to her parathyroid surgery. Vitamin D lab is still in process and we will be contacting her when this is finalized. Pt states understanding and denies any questions at this time. Sally CERVANTES RNCC

## 2018-09-27 ENCOUNTER — TELEPHONE (OUTPATIENT)
Dept: SURGERY | Facility: CLINIC | Age: 58
End: 2018-09-27

## 2018-09-27 ENCOUNTER — TELEPHONE (OUTPATIENT)
Dept: OTOLARYNGOLOGY | Facility: CLINIC | Age: 58
End: 2018-09-27

## 2018-09-27 NOTE — TELEPHONE ENCOUNTER
"Received call from patient who stated, \"I am still feeling poorly and I just want to make sure its not from coming down from the parathyroidectomy. I was really close to calling 911 last night because I felt so bad. I am a little better this morning and just a little hot flashes and a little dizzy.\" Informed patient that a message will be sent to Dr. Venegas to review and comment and that patient will be contacted with additional information. Patient verbalized understanding and was comfortable with plan.     Ce Winston RN, BSN    "

## 2018-09-27 NOTE — PROGRESS NOTES
Service Date: 2018      HISTORY OF PRESENT ILLNESS:  This is a 57-year-old female who underwent a neck exploration, resection of parathyroid adenoma on 2018.  Her preincision parathyroid hormone level was 166 and her 20-minute post-excision parathyroid hormone level 17.      Since the surgery, the patient denies any problems with voice quality, inspiration or swallowing.  She has had no symptoms of hypocalcemia.  She did have multiple other complaints that were unrelated to anesthesia or parathyroidectomy.  She felt as if these were complaints that she had prior to her surgery and likely are not related as well.      PHYSICAL EXAMINATION:  Her wound is healing well.  The Dermabond is still in place and the sutures were clipped at the skin edges.  There is no evidence of hematoma, seroma or infection.      Pathology and laboratory data were discussed with the patient.      ASSESSMENT:  Follow up status post parathyroid resection.      PLAN:  I will check a calcium, vitamin D and PTH today.  She will follow up with me on an as-needed basis.  Follow up with her primary care physician hereafter.         JOHN JEFFREY MD             D: 2018   T: 2018   MT: leydi      Name:     MARCELO DAVIS   MRN:      [id number]        Account:      [id number]   :      1960           Service Date: 2018      Document: [id number]

## 2018-09-27 NOTE — TELEPHONE ENCOUNTER
Discussed note below with Dr. Venegas who is in clinic today. Per Dr. Venegas, it is confirmed that the symptoms are not related to coming down from parathyroidectomy surgery.     Called and spoke to patient who is aware of the above information. Patient verbalized understanding and will call with any questions in the future.    Ce Winston RN, BSN

## 2018-09-27 NOTE — TELEPHONE ENCOUNTER
"Contacted patient regarding her symptoms of feeling \"awful\". Reiterated to her, as we discussed in our clinic visit Monday 9/24 that what she is feeling is not related to her surgery. Her calcium and PTH are WNL. Recommend all further questions and concerns be referred to her PCP.     Patient is very comfortable with this plan.  "

## 2018-10-01 NOTE — OP NOTE
Procedure Date: 09/19/2018      PREOPERATIVE DIAGNOSIS:  Primary hyperparathyroidism.      POSTOPERATIVE DIAGNOSIS:  Primary hyperparathyroidism.      SURGICAL PROCEDURE PERFORMED:  Neck exploration, resection of left superior parathyroid gland with 20 minutes of intraoperative recurrent laryngeal nerve monitoring.      SURGEON:  Kristine Venegas MD      ASSISTANT:  Elba Baez MD      ANESTHESIA:  General endotracheal with nerve monitoring endotracheal tube.      COMPLICATIONS:  None.      ESTIMATED BLOOD LOSS:  Less than 5 mL.        Preincision parathyroid hormone level 166, 10-minute post-excision parathyroid hormone level 60, 20-minute post-excision parathyroid hormone level 17.      CLINICAL INDICATIONS FOR THE PROCEDURE:  This is a 57-year-old female with known hypercalcemia.  Workup ensued that confirmed primary hyperparathyroidism.  An outside localizing scan from 12/01/2017 localized this to the left neck.  Based on this, it was recommended the patient undergo a neck exploration, resection of parathyroid adenoma or adenomas.  The surgical procedure was discussed with the patient, including but not limited to the risks of bleeding, infection, injury to the recurrent laryngeal nerve or nerves, potential permanent hypocalcemia, missed parathyroid adenoma or loss of airway.  The patient is aware of this and agreed to proceed with surgery, consent was obtained and the site was marked.      DETAILS OF PROCEDURE:  The patient was brought to the operating room in stable condition, placed on the operating table in supine position.  After appropriate general anesthesia was obtained, the patient was prepped, prepped and draped in sterile fashion.  The nerve monitoring system was set up and a timeout was then performed.        A 3-cm incision was made over the anterior neck following a natural skin crease.  The platysma was then divided and subplatysmal planes were then created.  The strap muscles were  divided at the midline and tracked laterally.  Based on the localizing study, we concentrated on the left neck first.  The left lobe of the thyroid gland was grasped and retracted medially.  Almost immediately in the tracheoesophageal groove, we were able to identify a large left parathyroid gland in the superior position.  However, prior to dissecting this, we did identify the left recurrent laryngeal nerve.  We followed this from an inferior to superior manner and with this in view, we then dissected the large parathyroid gland, identified its blood supply, clipped this and then used the bipolar cautery for hemostasis.  The specimen was sent for frozen section.  As we were waiting for the frozen section and the 10-minute post-excision PTH, we then inspected the left inferior position parathyroid gland.  The left inferior parathyroid gland was of normal size and normal position.  We also evaluated the right inferior parathyroid gland.  This was noted to be of normal size and normal position.  At this point, the 10-minute post-excision parathyroid hormone level came back and was greater than 50% drop.  Pathology confirmed that we indeed resected a large left superior hypercellular parathyroid gland.  The area was copiously irrigated.  No bleeding was identified.  Fibrillar was placed in the operative bed.  The strap muscles were then approximated at the midline using a 3-0 chromic interrupted suture.  The platysma was approximated using 3-0 chromic interrupted suture.  The skin incision was approximated using 5-0 Monocryl running subcuticular suture.  The wound was dressed with Dermabond.  The patient was then extubated and returned to the recovery room in stable condition.  I was present for the entire surgical procedure.         JOHN JEFFREY MD             D: 10/01/2018   T: 10/01/2018   MT: NUBIA      Name:     MARCELO DAVIS   MRN:      [id number]        Account:        [id number]   :      1960            Procedure Date: 09/19/2018      Document: [id number]

## 2018-10-04 ENCOUNTER — OFFICE VISIT (OUTPATIENT)
Dept: FAMILY MEDICINE | Facility: CLINIC | Age: 58
End: 2018-10-04
Payer: COMMERCIAL

## 2018-10-04 VITALS
OXYGEN SATURATION: 97 % | BODY MASS INDEX: 27.79 KG/M2 | WEIGHT: 167 LBS | DIASTOLIC BLOOD PRESSURE: 72 MMHG | SYSTOLIC BLOOD PRESSURE: 107 MMHG | TEMPERATURE: 97.4 F | HEART RATE: 100 BPM

## 2018-10-04 DIAGNOSIS — G47.33 OSA ON CPAP: ICD-10-CM

## 2018-10-04 DIAGNOSIS — Z87.891 EX-SMOKER: ICD-10-CM

## 2018-10-04 DIAGNOSIS — Z95.0 S/P CARDIAC PACEMAKER PROCEDURE: ICD-10-CM

## 2018-10-04 DIAGNOSIS — R91.8 PULMONARY NODULES: ICD-10-CM

## 2018-10-04 DIAGNOSIS — G25.81 RESTLESS LEG SYNDROME: Primary | ICD-10-CM

## 2018-10-04 DIAGNOSIS — E03.9 HYPOTHYROIDISM, UNSPECIFIED TYPE: ICD-10-CM

## 2018-10-04 DIAGNOSIS — E21.3 HYPERPARATHYROIDISM (H): ICD-10-CM

## 2018-10-04 PROCEDURE — 99214 OFFICE O/P EST MOD 30 MIN: CPT | Performed by: FAMILY MEDICINE

## 2018-10-04 RX ORDER — PRAMIPEXOLE DIHYDROCHLORIDE 0.12 MG/1
0.12 TABLET ORAL AT BEDTIME
Qty: 90 TABLET | Refills: 0 | COMMUNITY
Start: 2018-10-04 | End: 2018-11-09

## 2018-10-04 NOTE — PROGRESS NOTES
"  SUBJECTIVE:   Joyce Marroquin is a 57 year old female who presents to clinic today for the following health issues:      New Patient/Transfer of Care  Previously seen through [facility]   States that she did not \"get along\" with her previous PCP.     Reports a history of hyperparathyroidism- States that recently underwent a parathyroid resection on 9/19/2018.  Healing well.  Currently following with ENT.    HYPOTHYROIDISM - Patient has a longstanding history of chronic Hypothyroidism. Patient has been doing well, noting no tremor, insomnia, hair loss or changes in skin texture. Continues to take medications as directed, without adverse reactions or side effects. Last TSH   Lab Results   Component Value Date    TSH 0.82 08/09/2018                                                                                                                     History of a complete heart block s/p pacemaker placement in 2017- follows with Cardiology.     History of LARRY on CPAP nightly.     History of Restless leg syndrome, states that the symptoms are currently controlled on Mirapex    Problem list and histories reviewed & adjusted, as indicated.  Additional history: as documented    Patient Active Problem List   Diagnosis     Benign neoplasm of vulva     Esophageal reflux     Chronic depressive personality disorder     Chest pain     Complete atrioventricular block (H)     Cardiac pacemaker in situ- Medtronic, dual lead- DEPENDENT     Hypothyroidism     Ex-smoker     Hyperparathyroidism (H)     Pulmonary nodules     Cardiomyopathy (H)     S/P cardiac pacemaker procedure     Past Surgical History:   Procedure Laterality Date     BUNIONECTOMY Bilateral      EP ABLATION / EP STUDIES  04/21/2017     EXPLORE NECK N/A 9/19/2018    Procedure: EXPLORE NECK;  Neck Exploration Resection of Left superior Parathyroid gland;  Surgeon: Kristine Venegas MD;  Location:  OR      CORRECT BUNION,SIMPLE       PARATHYROIDECTOMY N/A 9/19/2018    " Procedure: PARATHYROIDECTOMY;;  Surgeon: Kristine Venegas MD;  Location: [facility] OR     PPM INSERT OF NEW OR REPL W/VENT LEAD  04/21/2017     TONSILLECTOMY & ADENOIDECTOMY         Social History   Substance Use Topics     Smoking status: Former Smoker     Smokeless tobacco: Never Used     Alcohol use Yes      Comment: seldom     Family History   Problem Relation Age of Onset     Hypothyroidism Mother      Hypertension Mother      Osteoarthritis Mother      Coronary Artery Disease Father      nonfatal MI in his 70s     Asthma Father      Diabetes Sister      Hypothyroidism Sister      Breast Cancer Maternal Aunt          Current Outpatient Prescriptions   Medication Sig Dispense Refill     Acetaminophen (TYLENOL PO) Take 500-1,000 mg by mouth daily as needed        albuterol (PROAIR HFA/PROVENTIL HFA/VENTOLIN HFA) 108 (90 BASE) MCG/ACT Inhaler Inhale 2 puffs into the lungs as needed for shortness of breath / dyspnea or wheezing        azelastine (ASTELIN) 0.1 % nasal spray Spray 2 sprays in nostril At Bedtime   11     Cholecalciferol (VITAMIN D3 PO) Take 5,000 Units by mouth every morning        DiphenhydrAMINE HCl (BENADRYL PO) Take 25-50 mg by mouth nightly as needed       Ferrous Sulfate (IRON SUPPLEMENT PO) Take 65 mg by mouth daily        ipratropium (ATROVENT) 0.06 % spray Spray 2 sprays into both nostrils 2 times daily       MAGNESIUM LACTATE PO Take 180 mg by mouth At Bedtime        MELATONIN PO Take 3 mg by mouth At Bedtime        Menaquinone-7 (VITAMIN K2 PO) Take 1 tablet by mouth every morning        Multiple Vitamin (DAILY MULTIVITAMIN PO) Take 1 tablet by mouth every morning        nebulizer nebulization as needed       pramipexole (MIRAPEX) 0.125 MG tablet Take 1 tablet (0.125 mg) by mouth At Bedtime 90 tablet 0     Probiotic Product (PROBIOTIC DAILY PO) Take 1 capsule by mouth 2 times daily        SYNTHROID 150 MCG tablet Take 1 tablet (150 mcg) by mouth daily (Patient taking differently: Take 150 mcg  by mouth every morning ) 60 tablet 3     TURMERIC PO Take 1 tablet by mouth every morning        METHYLPREDNISOLONE PO Take 40 mg by mouth as needed        Allergies   Allergen Reactions     Flagyl [Metronidazole] Rash     Corn Oil Other (See Comments)     Headache       Lorazepam Other (See Comments)     Heat intolerance       Oxytetracycline Swelling     Seasonal Allergies Difficulty breathing     Sulfamethoxazole-Trimethoprim      Other reaction(s): Other  Passed out     Tetracycline Swelling     Zofran [Ondansetron]      Prolonged QT  Prolonged QT at baseline per patient     Benzodiazepines Other (See Comments)     Other reaction(s): Other  Extreme heat intolerance  Extreme heat intolerance     Cyclobenzaprine Other (See Comments)     Extreme heat intolerance     Levaquin [Levofloxacin]      Other reaction(s): Other  Tendon rupture     Nsaids Other (See Comments)     Exacerbated asthma       Reviewed and updated as needed this visit by clinical staff  Tobacco  Meds  Med Hx  Fam Hx  Soc Hx      Reviewed and updated as needed this visit by Provider  Fam Hx         ROS:  All others are negative except as above.      OBJECTIVE:     /72  Pulse 100  Temp 97.4  F (36.3  C) (Tympanic)  Wt 167 lb (75.8 kg)  LMP 08/21/2007  SpO2 97%  Breastfeeding? No  BMI 27.79 kg/m2  Body mass index is 27.79 kg/(m^2).  GENERAL: healthy, alert and no distress  RESP: lungs clear to auscultation - no rales, rhonchi or wheezes  CV: regular rate and rhythm, normal S1 S2, no S3 or S4, no murmur, click or rub, no peripheral edema and peripheral pulses strong  PSYCH: mentation appears normal, affect normal/bright    Diagnostic Test Results:  None    ASSESSMENT/PLAN:   Joyce was seen today for establish care.    Diagnoses and all orders for this visit:    Restless leg syndrome, controlled  Continue current management- Mirapex    LARRY on CPAP  Continue current management.    History of Complete heart block S/P cardiac pacemaker  procedure  Following with Cardiology    Hypothyroidism, unspecified type, compensated on Levothyroxine  Continue current management.  Following with Endocrinology    Hyperparathyroidism (H) s/p parathyroidectomy  Doing well.   Following with ENT and Endocrinology    Ex-smoker with Pulmonary nodules  Consider repeat CT chest this year.    Schedule a Physical Exam at earliest convenience.       Safia Silveira MD  [facility]

## 2018-10-04 NOTE — MR AVS SNAPSHOT
After Visit Summary   10/4/2018    Joyce Marroquin    MRN: [id number]           Patient Information     Date Of Birth          1960        Visit Information        Provider Department      10/4/2018 11:00 AM Safia Silveira MD [facility]        Today's Diagnoses     Restless leg syndrome    -  1    LARRY on CPAP        Hypothyroidism, unspecified type        Ex-smoker        Hyperparathyroidism (H)        Pulmonary nodules        S/P cardiac pacemaker procedure           Follow-ups after your visit        Follow-up notes from your care team     Return for Physical Exam at earliest convenience.      Your next 10 appointments already scheduled     Feb 21, 2019  9:30 AM CST   (Arrive by 9:15 AM)   Pacemaker Check with Uc Cv Device 1   [facility] ([facility])    [address] [place] [address]  3rd Floor  [id number]               Feb 21, 2019 10:00 AM CST   (Arrive by 9:45 AM)   RETURN ARRHYTHMIA with Lino Funes MD   [facility] ([facility])    [address] [place] [address]  [address] [place] [id number] [phone]              Who to contact     Normal or non-critical lab and imaging results will be communicated to you by SuperTruperhart, letter or phone within 4 business days after the clinic has received the results. If you do not hear from us within 7 days, please contact the clinic through SuperTruperhart or phone. If you have a critical or abnormal lab result, we will notify you by phone as soon as possible.  Submit refill requests through Boosted Boards or call your pharmacy and they will forward the refill request to us. Please allow 3 business days for your refill to be completed.          If you need to speak with a  for additional information , please call: [phone]             Additional Information About Your Visit        SuperTruperhart Information     [employer] Boards gives you secure access to your electronic health record. If  you see a primary care provider, you can also send messages to your care team and make appointments. If you have questions, please call your primary care clinic.  If you do not have a primary care provider, please call [phone] and they will assist you.        Care EveryWhere ID     This is your Care EveryWhere ID. This could be used by other organizations to access your [place] medical records  [id number]        Your Vitals Were     Pulse Temperature Last Period Pulse Oximetry Breastfeeding? BMI (Body Mass Index)    100 97.4  F (36.3  C) (Tympanic) 08/21/2007 97% No 27.79 kg/m2       Blood Pressure from Last 3 Encounters:   10/04/18 107/72   09/19/18 144/81   09/11/18 116/80    Weight from Last 3 Encounters:   10/04/18 167 lb (75.8 kg)   09/24/18 160 lb (72.6 kg)   09/19/18 161 lb 6 oz (73.2 kg)              Today, you had the following     No orders found for display         Today's Medication Changes          These changes are accurate as of 10/4/18 11:34 AM.  If you have any questions, ask your nurse or doctor.               These medicines have changed or have updated prescriptions.        Dose/Directions    SYNTHROID 150 MCG tablet   This may have changed:  when to take this   Used for:  Postablative hypothyroidism   Generic drug:  levothyroxine        Dose:  150 mcg   Take 1 tablet (150 mcg) by mouth daily   Quantity:  60 tablet   Refills:  3                Primary Care Provider Office Phone # Fax #    Rafy Jiang [phone]       [facility] [id number] [place] [address] DR [address] [address]        Equal Access to Services     [facility] AH: Hadii freda Morgan, waaxda luqadaha, qaybta kaalmicah walker. So [facility] [phone].    ATENCIÓN: Si habla español, tiene a hankins disposición servicios gratuitos de asistencia lingüística. Llame al [phone].    We comply with applicable federal civil rights laws and  [place] laws. We do not discriminate on the basis of race, color, national origin, age, disability, sex, sexual orientation, or gender identity.            Thank you!     Thank you for choosing [facility]  for your care. Our goal is always to provide you with excellent care. Hearing back from our patients is one way we can continue to improve our services. Please take a few minutes to complete the written survey that you may receive in the mail after your visit with us. Thank you!             Your Updated Medication List - Protect others around you: Learn how to safely use, store and throw away your medicines at www.disposemymeds.org.          This list is accurate as of 10/4/18 11:34 AM.  Always use your most recent med list.                   Brand Name Dispense Instructions for use Diagnosis    albuterol 108 (90 Base) MCG/ACT inhaler    PROAIR HFA/PROVENTIL HFA/VENTOLIN HFA     Inhale 2 puffs into the lungs as needed for shortness of breath / dyspnea or wheezing        azelastine 0.1 % nasal spray    ASTELIN     Spray 2 sprays in nostril At Bedtime        BENADRYL PO      Take 25-50 mg by mouth nightly as needed        DAILY MULTIVITAMIN PO      Take 1 tablet by mouth every morning        ipratropium 0.06 % spray    ATROVENT     Spray 2 sprays into both nostrils 2 times daily        IRON SUPPLEMENT PO      Take 65 mg by mouth daily        MAGNESIUM LACTATE PO      Take 180 mg by mouth At Bedtime    Fatigue, unspecified type       MELATONIN PO      Take 3 mg by mouth At Bedtime        METHYLPREDNISOLONE PO      Take 40 mg by mouth as needed        MIRAPEX 0.125 MG tablet   Generic drug:  pramipexole     90 tablet    Take 1 tablet (0.125 mg) by mouth At Bedtime    Restless leg syndrome       nebulizer nebulization      as needed    H/O pericarditis       PROBIOTIC DAILY PO      Take 1 capsule by mouth 2 times daily        SYNTHROID 150 MCG tablet   Generic drug:  levothyroxine     60 tablet    Take 1  tablet (150 mcg) by mouth daily    Postablative hypothyroidism       TURMERIC PO      Take 1 tablet by mouth every morning    Fatigue, unspecified type       TYLENOL PO      Take 500-1,000 mg by mouth daily as needed        VITAMIN D3 PO      Take 5,000 Units by mouth every morning        VITAMIN K2 PO      Take 1 tablet by mouth every morning    H/O pericarditis

## 2018-10-05 ASSESSMENT — ASTHMA QUESTIONNAIRES: ACT_TOTALSCORE: 23

## 2018-11-09 ENCOUNTER — OFFICE VISIT (OUTPATIENT)
Dept: FAMILY MEDICINE | Facility: CLINIC | Age: 58
End: 2018-11-09
Payer: COMMERCIAL

## 2018-11-09 VITALS
DIASTOLIC BLOOD PRESSURE: 85 MMHG | TEMPERATURE: 98.1 F | SYSTOLIC BLOOD PRESSURE: 125 MMHG | HEART RATE: 100 BPM | WEIGHT: 164 LBS | BODY MASS INDEX: 27.29 KG/M2 | RESPIRATION RATE: 16 BRPM | OXYGEN SATURATION: 98 %

## 2018-11-09 DIAGNOSIS — G47.00 INSOMNIA, UNSPECIFIED TYPE: ICD-10-CM

## 2018-11-09 DIAGNOSIS — Z13.220 LIPID SCREENING: ICD-10-CM

## 2018-11-09 DIAGNOSIS — E21.3 HYPERPARATHYROIDISM (H): ICD-10-CM

## 2018-11-09 DIAGNOSIS — F43.9 STRESS AT HOME: Primary | ICD-10-CM

## 2018-11-09 DIAGNOSIS — Z95.0 S/P CARDIAC PACEMAKER PROCEDURE: ICD-10-CM

## 2018-11-09 DIAGNOSIS — I44.2 COMPLETE ATRIOVENTRICULAR BLOCK (H): ICD-10-CM

## 2018-11-09 DIAGNOSIS — F41.8 SITUATIONAL ANXIETY: ICD-10-CM

## 2018-11-09 LAB
CALCIUM SERPL-MCNC: 9.7 MG/DL (ref 8.5–10.1)
IRON SATN MFR SERPL: 19 % (ref 15–46)
IRON SERPL-MCNC: 58 UG/DL (ref 35–180)
PTH-INTACT SERPL-MCNC: 55 PG/ML (ref 18–80)
TIBC SERPL-MCNC: 303 UG/DL (ref 240–430)

## 2018-11-09 PROCEDURE — 82306 VITAMIN D 25 HYDROXY: CPT | Performed by: NURSE PRACTITIONER

## 2018-11-09 PROCEDURE — 83550 IRON BINDING TEST: CPT | Performed by: NURSE PRACTITIONER

## 2018-11-09 PROCEDURE — 82310 ASSAY OF CALCIUM: CPT | Performed by: NURSE PRACTITIONER

## 2018-11-09 PROCEDURE — 83540 ASSAY OF IRON: CPT | Performed by: NURSE PRACTITIONER

## 2018-11-09 PROCEDURE — 36415 COLL VENOUS BLD VENIPUNCTURE: CPT | Performed by: NURSE PRACTITIONER

## 2018-11-09 PROCEDURE — 99214 OFFICE O/P EST MOD 30 MIN: CPT | Performed by: NURSE PRACTITIONER

## 2018-11-09 PROCEDURE — 83970 ASSAY OF PARATHORMONE: CPT | Performed by: NURSE PRACTITIONER

## 2018-11-09 RX ORDER — LIFITEGRAST 50 MG/ML
SOLUTION/ DROPS OPHTHALMIC
Refills: 11 | COMMUNITY
Start: 2018-10-25 | End: 2018-12-04

## 2018-11-09 RX ORDER — CLONAZEPAM 0.5 MG/1
0.25-0.5 TABLET ORAL 2 TIMES DAILY PRN
Qty: 60 TABLET | Refills: 1 | Status: SHIPPED | OUTPATIENT
Start: 2018-11-09 | End: 2019-01-25

## 2018-11-09 RX ORDER — ROPINIROLE 0.25 MG/1
TABLET, FILM COATED ORAL
COMMUNITY
Start: 2018-11-08 | End: 2020-04-27

## 2018-11-09 ASSESSMENT — PATIENT HEALTH QUESTIONNAIRE - PHQ9
5. POOR APPETITE OR OVEREATING: MORE THAN HALF THE DAYS
SUM OF ALL RESPONSES TO PHQ QUESTIONS 1-9: 10

## 2018-11-09 ASSESSMENT — ANXIETY QUESTIONNAIRES
7. FEELING AFRAID AS IF SOMETHING AWFUL MIGHT HAPPEN: NOT AT ALL
1. FEELING NERVOUS, ANXIOUS, OR ON EDGE: MORE THAN HALF THE DAYS
IF YOU CHECKED OFF ANY PROBLEMS ON THIS QUESTIONNAIRE, HOW DIFFICULT HAVE THESE PROBLEMS MADE IT FOR YOU TO DO YOUR WORK, TAKE CARE OF THINGS AT HOME, OR GET ALONG WITH OTHER PEOPLE: VERY DIFFICULT
3. WORRYING TOO MUCH ABOUT DIFFERENT THINGS: NOT AT ALL
GAD7 TOTAL SCORE: 6
2. NOT BEING ABLE TO STOP OR CONTROL WORRYING: NOT AT ALL
5. BEING SO RESTLESS THAT IT IS HARD TO SIT STILL: NOT AT ALL
6. BECOMING EASILY ANNOYED OR IRRITABLE: MORE THAN HALF THE DAYS

## 2018-11-09 NOTE — PATIENT INSTRUCTIONS
"Mental Health Crisis Numbers  [facility]  If you have a mental health or substance abuse crisis on a weekend or after hours, please use any of the resources below.  General numbers  911 emergency services  211 First Call for Help  [facility] [place] [facility]  [address] [place] [id number] [phone]  Crisis Connection Hotline  210.408.1832 or 1-347.733.4695  National Suicide Prevention Lifeline  8-066-485-TALK (3937)  CKM9GSUR  Text crisis line for teens. Text \"LIFE\" to 168545  [facility] mobile crisis services  These services will come to you. Call the county where the child is physically located.    [place] (adult only): [phone]    [place] (child and adult): [phone]    [place] (child and adult): [phone]    [place] (child): [phone]    [place] (adult): [phone]    [place] (child): [phone]    [place] (Adult): [phone]    [place] (child and adult): [phone]    [place] (child and adult): [phone] or [phone]  Other crisis resources (not mobile)  [facility]  9-332-671-9557  [place] Crisis Hotline  364.131.4870 or 1-713.347.7728  [facility] Services (formerly known as the Crisis Intervention Center)  Offers walk-in and telephone crisis intervention services for adults.  [facility]  [address] [place], [place] [id number] [phone] or 248-047-3582 (suicide hotline)  Short-term residential crisis resources  [facility] (ages 10 to 17)  [address] [place], [place] [id number] [phone]  Suggested inpatient hospitalization   [facility], [address] [address] [phone]  For informational purposes only. Not to replace the advice of your health care provider.  Copyright   2014 [facility]. " All rights reserved. [employer] [id number]   REV 09/15.    Anxiety Reaction  Anxiety is the feeling we all get when we think something bad might happen. It is a normal response to stress and usually causes only a mild reaction. When anxiety becomes more severe, it can interfere with daily life. In some cases, you may not even be aware of what it is you re anxious about. There may also be a genetic link or it may be a learned behavior in the home.  Both psychological and physical triggers cause stress reaction. It's often a response to fear or emotional stress, real or imagined. This stress may come from home, family, work, or social relationships.  During an anxiety reaction, you may feel:    Helpless    Nervous    Depressed    Irritable  Your body may show signs of anxiety in many ways. You may experience:    Dry mouth    Shakiness    Dizziness    Weakness    Trouble breathing    Breathing fast (hyperventilating)    Chest pressure    Sweating    Headache    Nausea    Diarrhea    Tiredness    Inability to sleep    Sexual problems  Home care    Try to locate the sources of stress in your life. They may not be obvious. These may include:  ? Daily hassles of life (such as traffic jams, missed appointments, or car troubles)  ? Major life changes, both good (new baby or job promotion) and bad (loss of job or loss of loved one)  ? Overload: feeling that you have too many responsibilities and can't take care of all of them at once  ? Feeling helpless or feeling that your problems are beyond what you re able to solve    Notice how your body reacts to stress. Learn to listen to your body signals. This will help you take action before the stress becomes severe.    When you can, do something about the source of your stress. (Avoid hassles, limit the amount of change that happens in your life at one time and take a break when you feel overloaded).    Unfortunately, many stressful situations can't be avoided. It is necessary to  learn how to better manage stress. There are many proven methods that will reduce your anxiety. These include simple things like exercise, good nutrition, and adequate rest. Also, there are certain techniques that are helpful:  ? Relaxation  ? Breathing exercises  ? Visualization  ? Biofeedback  ? Meditation  For more information about this, consult your healthcare provider or go to a local bookstore and review the many books and tapes available on this subject.  Follow-up care  If you feel that your anxiety is not responding to self-help measures, contact your healthcare provider or make an appointment with a counselor. You may need short-term psychological counseling and temporary medicine to help you manage stress.  Call 911  Call 911 if any of these happen:    Trouble breathing    Confusion    Drowsiness or trouble wakening    Fainting or loss of consciousness    Rapid heart rate    Seizure    New chest pain that becomes more severe, lasts longer, or spreads into your shoulder, arm, neck, jaw, or back  When to seek medical advice  Call your healthcare provider right away if any of these happen:    Your symptoms get worse    Severe headache not relieved by rest and mild pain reliever  Date Last Reviewed: 10/1/2017    8240-6286 [employer]. [address] [place] [address], [place] [address]. All rights reserved. This information is not intended as a substitute for professional medical care. Always follow your healthcare professional's instructions.

## 2018-11-09 NOTE — MR AVS SNAPSHOT
"              After Visit Summary   11/9/2018    Joyce Marroquin    MRN: [id number]           Patient Information     Date Of Birth          1960        Visit Information        Provider Department      11/9/2018 2:40 PM Ce Crowe NP [facility]        Today's Diagnoses     Lipid screening    -  1    Situational anxiety        Insomnia, unspecified type        Hyperparathyroidism (H)        Complete atrioventricular block (H)        S/P cardiac pacemaker procedure          Care Instructions    Mental Health Crisis Numbers  [facility]  If you have a mental health or substance abuse crisis on a weekend or after hours, please use any of the resources below.  General numbers  911 emergency services  211 First Call for Help  [facility]  [address] [place] [address]  [phone]  Crisis Connection Hotline  927.115.1161 or 1-634.507.4871  National Suicide Prevention Lifeline  6-979-605-TALK (6706)  VLZ7FWZC  Text crisis line for teens. Text \"LIFE\" to 783670  [facility] mobile crisis services  These services will come to you. Call the [facility] where the child is physically located.    Isabel (adult only): [phone]    Richard/Travis (child and adult): [phone]    [place] (child and adult): [phone]    [place] (child): [phone]    [place] (adult): [phone]    [place] (child): [phone]    [place] (Adult): [phone]    [place] (child and adult): [phone]    [place]/Lala/Chelsie/Hawk (child and adult): [phone] or [phone]  Other crisis resources (not mobile)  [facility]  9-743-595-3956  Native Youth Crisis Hotline  421.815.3320 or 1-232.493.2162  Acute Psychiatric Services (formerly known as the Crisis Intervention Center)  Offers walk-in and telephone crisis intervention services for adults.  [facility]  [address] [place] [address]  [phone] or [phone] (suicide hotline)  Short-term residential crisis resources  [facility] (ages 10 to 17)  [address] [place] [address] [phone]  Suggested inpatient hospitalization   [facility]  [address] [place] [address]  [phone]  For informational purposes only. Not to replace the advice of your health care provider.  Copyright   2014 [employer] [facility]. All rights reserved. [employer] 307165 - REV 09/15.    Anxiety Reaction  Anxiety is the feeling we all get when we think something bad might happen. It is a normal response to stress and usually causes only a mild reaction. When anxiety becomes more severe, it can interfere with daily life. In some cases, you may not even be aware of what it is you re anxious about. There may also be a genetic link or it may be a learned behavior in the home.  Both psychological and physical triggers cause stress reaction. It's often a response to fear or emotional stress, real or imagined. This stress may come from home, family, work, or social relationships.  During an anxiety reaction, you may feel:    Helpless    Nervous    Depressed    Irritable  Your body may show signs of anxiety in many ways. You may experience:    Dry mouth    Shakiness    Dizziness    Weakness    Trouble breathing    Breathing fast (hyperventilating)    Chest pressure    Sweating    Headache    Nausea    Diarrhea    Tiredness    Inability to sleep    Sexual problems  Home care    Try to locate the sources of stress in your life. They may not be obvious. These may include:  ? Daily hassles of life (such as traffic jams, missed appointments, or car troubles)  ? Major life changes, both good (new baby or job promotion) and bad (loss of job or loss of loved one)  ? Overload: feeling that you have too many responsibilities and can't take care of  all of them at once  ? Feeling helpless or feeling that your problems are beyond what you re able to solve    Notice how your body reacts to stress. Learn to listen to your body signals. This will help you take action before the stress becomes severe.    When you can, do something about the source of your stress. (Avoid hassles, limit the amount of change that happens in your life at one time and take a break when you feel overloaded).    Unfortunately, many stressful situations can't be avoided. It is necessary to learn how to better manage stress. There are many proven methods that will reduce your anxiety. These include simple things like exercise, good nutrition, and adequate rest. Also, there are certain techniques that are helpful:  ? Relaxation  ? Breathing exercises  ? Visualization  ? Biofeedback  ? Meditation  For more information about this, consult your healthcare provider or go to a local bookstore and review the many books and tapes available on this subject.  Follow-up care  If you feel that your anxiety is not responding to self-help measures, contact your healthcare provider or make an appointment with a counselor. You may need short-term psychological counseling and temporary medicine to help you manage stress.  Call 911  Call 911 if any of these happen:    Trouble breathing    Confusion    Drowsiness or trouble wakening    Fainting or loss of consciousness    Rapid heart rate    Seizure    New chest pain that becomes more severe, lasts longer, or spreads into your shoulder, arm, neck, jaw, or back  When to seek medical advice  Call your healthcare provider right away if any of these happen:    Your symptoms get worse    Severe headache not relieved by rest and mild pain reliever  Date Last Reviewed: 10/1/2017    8499-7956 The "[employer]. [address] [place] [address], [place] [address]. All rights reserved. This information is not intended as a substitute for professional medical care. Always  follow your healthcare professional's instructions.                Follow-ups after your visit        Follow-up notes from your care team     Return if symptoms worsen or fail to improve.      Your next 10 appointments already scheduled     Nov 23, 2018  8:30 AM CST   (Arrive by 8:15 AM)   New Patient Visit with SHAYNA Michael CNP   [facility] ([facility] [place])    [address]  4th Floor  [facility] [address] [phone]            Feb 21, 2019  9:30 AM CST   (Arrive by 9:15 AM)   Pacemaker Check with Uc Cv Device 1   [facility] ([facility])    [address]  3rd Floor  [address]               Feb 21, 2019 10:00 AM CST   (Arrive by 9:45 AM)   RETURN ARRHYTHMIA with Lino Funes MD   [facility] ([facility])    [address]  [address]  [facility] [address] [phone]              Who to contact     Normal or non-critical lab and imaging results will be communicated to you by [employer], letter or phone within 4 business days after the clinic has received the results. If you do not hear from us within 7 days, please contact the clinic through [employer] or phone. If you have a critical or abnormal lab result, we will notify you by phone as soon as possible.  Submit refill requests through [employer] or call your pharmacy and they will forward the refill request to us. Please allow 3 business days for your refill to be completed.          If you need to speak with a  for additional information , please call: [phone]             Additional Information About Your Visit        [employer] Information     [employer] gives you secure access to your electronic health record. If you see a primary care provider, you can also send messages to your care team and make appointments. If you have questions, please call your primary care clinic.  If  you do not have a primary care provider, please call [phone] and they will assist you.        Care EveryWhere ID     This is your Care EveryWhere ID. This could be used by other organizations to access your [facility] medical records  [id number]        Your Vitals Were     Pulse Temperature Respirations Last Period Pulse Oximetry BMI (Body Mass Index)    100 98.1  F (36.7  C) (Oral) 16 08/21/2007 98% 27.29 kg/m2       Blood Pressure from Last 3 Encounters:   11/09/18 125/85   10/04/18 107/72   09/19/18 144/81    Weight from Last 3 Encounters:   11/09/18 164 lb (74.4 kg)   10/04/18 167 lb (75.8 kg)   09/24/18 160 lb (72.6 kg)              We Performed the Following     Calcium     DEPRESSION ACTION PLAN (DAP)     Iron and iron binding capacity     Lipid panel reflex to direct LDL Non-fasting     Parathyroid Hormone Intact     Vitamin D Deficiency          Today's Medication Changes          These changes are accurate as of 11/9/18  2:49 PM.  If you have any questions, ask your nurse or doctor.               Start taking these medicines.        Dose/Directions    clonazePAM 0.5 MG tablet   Commonly known as:  klonoPIN   Used for:  Situational anxiety, Insomnia, unspecified type   Started by:  Ce Crowe NP        Dose:  0.25-0.5 mg   Take 0.5-1 tablets (0.25-0.5 mg) by mouth 2 times daily as needed for anxiety   Quantity:  60 tablet   Refills:  1         These medicines have changed or have updated prescriptions.        Dose/Directions    SYNTHROID 150 MCG tablet   This may have changed:  when to take this   Used for:  Postablative hypothyroidism   Generic drug:  levothyroxine        Dose:  150 mcg   Take 1 tablet (150 mcg) by mouth daily   Quantity:  60 tablet   Refills:  3            Where to get your medicines      Some of these will need a paper prescription and others can be bought over the counter.  Ask your nurse if you have questions.     Bring a paper prescription for each of these medications      clonazePAM 0.5 MG tablet                Primary Care Provider Office Phone # Fax #    Safia Silveira [phone]       [address]  [place] [address]        Equal Access to Services     PAVITHRA SALCEDO : Hadii aad ku hadguanakoo Soomaali, waaxda luqadaha, qaybta kaalmada adestevieda, micah marquesn ellen najeramerly . So [facility] [phone].    ATENCIÓN: Si habla español, tiene a hankins disposición servicios gratuitos de asistencia lingüística. [facility] [phone].    We comply with applicable federal civil rights laws and [place] laws. We do not discriminate on the basis of race, color, national origin, age, disability, sex, sexual orientation, or gender identity.            Thank you!     Thank you for choosing [facility]  for your care. Our goal is always to provide you with excellent care. Hearing back from our patients is one way we can continue to improve our services. Please take a few minutes to complete the written survey that you may receive in the mail after your visit with us. Thank you!             Your Updated Medication List - Protect others around you: Learn how to safely use, store and throw away your medicines at www.disposemymeds.org.          This list is accurate as of 11/9/18  2:49 PM.  Always use your most recent med list.                   Brand Name Dispense Instructions for use Diagnosis    albuterol 108 (90 Base) MCG/ACT inhaler    PROAIR HFA/PROVENTIL HFA/VENTOLIN HFA     Inhale 2 puffs into the lungs as needed for shortness of breath / dyspnea or wheezing        BENADRYL PO      Take 25-50 mg by mouth nightly as needed        clonazePAM 0.5 MG tablet    klonoPIN    60 tablet    Take 0.5-1 tablets (0.25-0.5 mg) by mouth 2 times daily as needed for anxiety    Situational anxiety, Insomnia, unspecified type       DAILY MULTIVITAMIN PO      Take 1 tablet by mouth every morning        ipratropium 0.06 % spray    ATROVENT     Spray 2 sprays into both  nostrils 2 times daily        IRON SUPPLEMENT PO      Take 65 mg by mouth daily        MAGNESIUM LACTATE PO      Take 180 mg by mouth At Bedtime    Fatigue, unspecified type       MELATONIN PO      Take 3 mg by mouth At Bedtime        METHYLPREDNISOLONE PO      Take 40 mg by mouth as needed        nebulizer nebulization      as needed    H/O pericarditis       PROBIOTIC DAILY PO      Take 1 capsule by mouth 2 times daily        rOPINIRole 0.25 MG tablet    REQUIP          SYNTHROID 150 MCG tablet   Generic drug:  levothyroxine     60 tablet    Take 1 tablet (150 mcg) by mouth daily    Postablative hypothyroidism       TURMERIC PO      Take 1 tablet by mouth every morning    Fatigue, unspecified type       TYLENOL PO      Take 500-1,000 mg by mouth daily as needed        VITAMIN D3 PO      Take 5,000 Units by mouth every morning        VITAMIN K2 PO      Take 1 tablet by mouth every morning    H/O pericarditis       XIIDRA 5 % opthalmic solution   Generic drug:  lifitegrast      INT 1 GTT INTO OU Q 12 H

## 2018-11-09 NOTE — NURSING NOTE
"Chief Complaint   Patient presents with     Ent Problem       Initial /85  Pulse 100  Temp 98.1  F (36.7  C) (Oral)  Resp 16  Wt 164 lb (74.4 kg)  LMP 08/21/2007  SpO2 98%  BMI 27.29 kg/m2 Estimated body mass index is 27.29 kg/(m^2) as calculated from the following:    Height as of 9/19/18: 5' 5\" (1.651 m).    Weight as of this encounter: 164 lb (74.4 kg).  Medication Reconciliation: complete     Serena Guidry MA  "

## 2018-11-09 NOTE — PROGRESS NOTES
SUBJECTIVE:   Joyce Marroquin is a 58 year old female who presents to clinic today for the following health issues:      Patient is here for severe stress. x2 months.  Would like an as needed medication for anxiety/ insomnia due to worry.  Discussed non-habit forming vs habit forming medications.  Patient would like to trial clonazepam.   Husbands sheldon issues- 2 MIs  Not working due to severe stress; hx complete heart block due to failed ablation requiring pacemaker; just had sleep study due to restless legs and LARRY needing CPAP. Has not been sleeping well, which makes her anxiety worse. Ongoing vestibular issues.   Thyroid removed- due to hyperparathyroid  Mother health issues- dying  Father is declining.       Problem list and histories reviewed & adjusted, as indicated.  Additional history: as documented    Patient Active Problem List   Diagnosis     Benign neoplasm of vulva     Esophageal reflux     Chronic depressive personality disorder     Chest pain     Complete atrioventricular block (H)     Cardiac pacemaker in situ- Medtronic, dual lead- DEPENDENT     Hypothyroidism     Ex-smoker     Hyperparathyroidism (H)     Pulmonary nodules     Cardiomyopathy (H)     S/P cardiac pacemaker procedure     Past Surgical History:   Procedure Laterality Date     BUNIONECTOMY Bilateral      EP ABLATION / EP STUDIES  04/21/2017     EXPLORE NECK N/A 9/19/2018    Procedure: EXPLORE NECK;  Neck Exploration Resection of Left superior Parathyroid gland;  Surgeon: Kristine Venegas MD;  Location: [facility] OR      CORRECT BUNION,SIMPLE       PARATHYROIDECTOMY N/A 9/19/2018    Procedure: PARATHYROIDECTOMY;;  Surgeon: Kristine Venegas MD;  Location: [facility] OR     PPM INSERT OF NEW OR REPL W/VENT LEAD  04/21/2017     TONSILLECTOMY & ADENOIDECTOMY         Social History   Substance Use Topics     Smoking status: Former Smoker     Smokeless tobacco: Never Used     Alcohol use Yes      Comment: seldom     Family History   Problem  Relation Age of Onset     Hypothyroidism Mother      Hypertension Mother      Osteoarthritis Mother      Coronary Artery Disease Father      nonfatal MI in his 70s     Asthma Father      Diabetes Sister      Hypothyroidism Sister      Breast Cancer Maternal Aunt          Current Outpatient Prescriptions   Medication Sig Dispense Refill     Acetaminophen (TYLENOL PO) Take 500-1,000 mg by mouth daily as needed        albuterol (PROAIR HFA/PROVENTIL HFA/VENTOLIN HFA) 108 (90 BASE) MCG/ACT Inhaler Inhale 2 puffs into the lungs as needed for shortness of breath / dyspnea or wheezing        Cholecalciferol (VITAMIN D3 PO) Take 5,000 Units by mouth every morning        clonazePAM (KLONOPIN) 0.5 MG tablet Take 0.5-1 tablets (0.25-0.5 mg) by mouth 2 times daily as needed for anxiety 60 tablet 1     DiphenhydrAMINE HCl (BENADRYL PO) Take 25-50 mg by mouth nightly as needed       Ferrous Sulfate (IRON SUPPLEMENT PO) Take 65 mg by mouth daily        ipratropium (ATROVENT) 0.06 % spray Spray 2 sprays into both nostrils 2 times daily       MAGNESIUM LACTATE PO Take 180 mg by mouth At Bedtime        MELATONIN PO Take 3 mg by mouth At Bedtime        Menaquinone-7 (VITAMIN K2 PO) Take 1 tablet by mouth every morning        METHYLPREDNISOLONE PO Take 40 mg by mouth as needed        Multiple Vitamin (DAILY MULTIVITAMIN PO) Take 1 tablet by mouth every morning        nebulizer nebulization as needed       Probiotic Product (PROBIOTIC DAILY PO) Take 1 capsule by mouth 2 times daily        rOPINIRole (REQUIP) 0.25 MG tablet        SYNTHROID 150 MCG tablet Take 1 tablet (150 mcg) by mouth daily (Patient taking differently: Take 150 mcg by mouth every morning ) 60 tablet 3     TURMERIC PO Take 1 tablet by mouth every morning        XIIDRA 5 % opthalmic solution INT 1 GTT INTO OU Q 12 H  11     Allergies   Allergen Reactions     Flagyl [Metronidazole] Rash     Corn Oil Other (See Comments)     Headache       Lorazepam Other (See Comments)      Heat intolerance       Seasonal Allergies Difficulty breathing     Sulfamethoxazole-Trimethoprim      Other reaction(s): Other  Passed out     Tetracycline Swelling     Zofran [Ondansetron]      Prolonged QT  Prolonged QT at baseline per patient     Benzodiazepines Other (See Comments)     Other reaction(s): Other  Extreme heat intolerance  Extreme heat intolerance     Cyclobenzaprine Other (See Comments)     Extreme heat intolerance     Levaquin [Levofloxacin]      Other reaction(s): Other  Tendon rupture     Nsaids Other (See Comments)     Exacerbated asthma     Recent Labs   Lab Test  08/09/18   0934  07/15/18   1132  12/04/17   0950   11/09/17   1644   ALT   --    --    --    --   19   CR   --   0.68  0.64   < >  0.68   GFRESTIMATED   --   88  >90   < >  88   GFRESTBLACK   --   >90  >90   < >  >90   POTASSIUM   --   3.7  4.2   < >  4.1   TSH  0.82  1.76   --    --   1.89    < > = values in this interval not displayed.      BP Readings from Last 3 Encounters:   11/09/18 125/85   10/04/18 107/72   09/19/18 144/81    Wt Readings from Last 3 Encounters:   11/09/18 164 lb (74.4 kg)   10/04/18 167 lb (75.8 kg)   09/24/18 160 lb (72.6 kg)              Labs reviewed in EPIC    Reviewed and updated as needed this visit by clinical staff  Tobacco  Allergies  Meds  Med Hx  Surg Hx  Fam Hx  Soc Hx      Reviewed and updated as needed this visit by Provider         ROS:  Constitutional, HEENT, cardiovascular, pulmonary, GI, , musculoskeletal, neuro, skin, endocrine and psych systems are negative, except as otherwise noted.    OBJECTIVE:     /85  Pulse 100  Temp 98.1  F (36.7  C) (Oral)  Resp 16  Wt 164 lb (74.4 kg)  LMP 08/21/2007  SpO2 98%  BMI 27.29 kg/m2  Body mass index is 27.29 kg/(m^2).  GENERAL: healthy, alert and no distress  NECK: no adenopathy, no asymmetry, masses, POSITIVE for thyroid removal   RESP: lungs clear to auscultation - no rales, rhonchi or wheezes  CV: regular rate and rhythm,  normal S1 S2, no S3 or S4, no murmur, click or rub, no peripheral edema and peripheral pulses strong  PSYCH: mentation appears normal, affect normal/bright    Diagnostic Test Results:  See orders- pt requesting  Ca, parathyroid, D3 and Iron labs.     ASSESSMENT/PLAN:         ICD-10-CM    1. Stress at home F43.9    2. Lipid screening Z13.220 CANCELED: Lipid panel reflex to direct LDL Non-fasting   3. Situational anxiety F41.8 clonazePAM (KLONOPIN) 0.5 MG tablet   4. Insomnia, unspecified type G47.00 clonazePAM (KLONOPIN) 0.5 MG tablet   5. Hyperparathyroidism (H) E21.3 Parathyroid Hormone Intact   6. Complete atrioventricular block (H) I44.2 Calcium     Vitamin D Deficiency     Iron and iron binding capacity   7. S/P cardiac pacemaker procedure Z95.0 Calcium     Vitamin D Deficiency     Iron and iron binding capacity       See Patient Instructions: discussed- let me know how medication works for you. Use sparingly- do not schedule twice daily. Follow up for worsening symptoms or other concerns. Mental health crisis numbers given.     Ce Crowe, [facility] [place]

## 2018-11-10 ASSESSMENT — ANXIETY QUESTIONNAIRES: GAD7 TOTAL SCORE: 6

## 2018-11-12 PROBLEM — F41.8 SITUATIONAL ANXIETY: Status: ACTIVE | Noted: 2018-11-12

## 2018-11-12 LAB — DEPRECATED CALCIDIOL+CALCIFEROL SERPL-MC: 87 UG/L (ref 20–75)

## 2018-11-13 ENCOUNTER — TELEPHONE (OUTPATIENT)
Dept: FAMILY MEDICINE | Facility: CLINIC | Age: 58
End: 2018-11-13

## 2018-11-13 NOTE — TELEPHONE ENCOUNTER
Please review and advise, all labs final, (per labs, Vitamin D is not viewable via my chart)  Left message on voice mail for patient that message sent to provider.  Libertad Akers RN

## 2018-11-13 NOTE — PROGRESS NOTES
Jose Luis Cook,    Thank you for your recent office visit.    Here are your recent results.  Your vitamin D level is high, other labs are normal.     Feel free to contact me via [employer] or call the clinic at [phone].    Sincerely,    SHAYNA Simon, FNP-BC

## 2018-11-13 NOTE — TELEPHONE ENCOUNTER
Patient calling received recent lab test results but is missing one. Not posted on my chart yet, please post results or call to advise ok to leave a detailed message on phone.

## 2018-11-16 NOTE — TELEPHONE ENCOUNTER
FUTURE VISIT INFORMATION      FUTURE VISIT INFORMATION:    Date: 11/23/2018    Time: 8:30 am    Location: [facility]  REFERRAL INFORMATION:    Referring provider:  Dr. Pearl    Referring providers clinic:  [facility]     Reason for visit/diagnosis  Anal Skin Tag    RECORDS REQUESTED FROM:       Clinic name Comments Records Status Imaging Status                                           Does the Patient have any outside Records?      - Called PT, spoke With patient. Advised PT why I was calling to see if patient had any outside records. Patient sd that she does not understand why I need those records, I advised PT, we just want to ensure we have all of her records, to give her the best visit when she arrives. I ask PT per Protocol if she had a colonoscopy done, PT sd yes, it was done at [facility] years ago. I then ask patient regarding the ref provider. PT then sd that she would like to cancel appointment. No further action taken.

## 2018-11-23 ENCOUNTER — MYC MEDICAL ADVICE (OUTPATIENT)
Dept: FAMILY MEDICINE | Facility: CLINIC | Age: 58
End: 2018-11-23

## 2018-11-23 ENCOUNTER — PRE VISIT (OUTPATIENT)
Dept: SURGERY | Facility: CLINIC | Age: 58
End: 2018-11-23

## 2018-11-23 DIAGNOSIS — F43.22 ADJUSTMENT DISORDER WITH ANXIOUS MOOD: Primary | ICD-10-CM

## 2018-11-27 DIAGNOSIS — F43.22 ADJUSTMENT DISORDER WITH ANXIOUS MOOD: ICD-10-CM

## 2018-11-27 NOTE — TELEPHONE ENCOUNTER
"Requested Prescriptions   Pending Prescriptions Disp Refills     sertraline (ZOLOFT) 50 MG tablet [Pharmacy Med Name: SERTRALINE 50MG TABLETS] 90 tablet 1    Last Written Prescription Date:  11-27-18  Last Fill Quantity: 90,  # refills: 1   Last office visit: 11/9/2018 with prescribing provider:  11-9-18   Future Office Visit:   Sig: TAKE ONE-HALF TABLET FOR 1 TO 2 WEEKS, THEN INCREASE TO 1 TABLET DAILY    SSRIs Protocol Passed    11/27/2018 10:21 AM       Passed - Recent (12 mo) or future (30 days) visit within the authorizing provider's specialty    Patient had office visit in the last 12 months or has a visit in the next 30 days with authorizing provider or within the authorizing provider's specialty.  See \"Patient Info\" tab in inbasket, or \"Choose Columns\" in Meds & Orders section of the refill encounter.             Passed - Patient is age 18 or older       Passed - No active pregnancy on record       Passed - No positive pregnancy test in last 12 months        "

## 2018-11-28 NOTE — TELEPHONE ENCOUNTER
Request for 90 day supply, new medication and can not be given because will need a follow up. Message sent to pharmacy.   Joyce,     Zoloft works well for both anxiety and depression.  I have sent this in for you.  Lets follow up in a month for medication adjustment.  If you feel worse at anytime, be re-evaluated.       Sincerely,     SHAYNA Simon, FNP-BC

## 2018-12-01 ENCOUNTER — ALLIED HEALTH/NURSE VISIT (OUTPATIENT)
Dept: CARDIOLOGY | Facility: CLINIC | Age: 58
End: 2018-12-01
Attending: INTERNAL MEDICINE
Payer: COMMERCIAL

## 2018-12-01 DIAGNOSIS — I44.2 COMPLETE ATRIOVENTRICULAR BLOCK (H): Primary | ICD-10-CM

## 2018-12-01 PROCEDURE — 93296 REM INTERROG EVL PM/IDS: CPT | Mod: ZF

## 2018-12-01 NOTE — MR AVS SNAPSHOT
After Visit Summary   12/1/2018    Joyce Marroquin    MRN: [id number]           Patient Information     Date Of Birth          1960        Visit Information        Provider Department      12/1/2018 6:00 AM  ICD REMOTE [facility]        Today's Diagnoses     Complete atrioventricular block (H)    -  1       Follow-ups after your visit        Your next 10 appointments already scheduled     Feb 21, 2019  9:30 AM CST   (Arrive by 9:15 AM)   CARDIAC DEVICE CHECK - IN CLINIC with  CV DEVICE 1   [facility] ([facility])    [address] [place] [address]  3rd Floor  [facility] [address]   [phone]            Feb 21, 2019 10:00 AM CST   (Arrive by 9:45 AM)   RETURN ARRHYTHMIA with Lino Funes MD   [facility] ([facility])    [address] [place] [address]  [address] [place] [id number] [phone]            Mar 04, 2019 12:00 AM CST   CARDIAC DEVICE CHECK - REMOTE with  ICD REMOTE   [facility] ([facility])    [address] [place] [address]  [address] [place] [id number]   [phone]              Future tests that were ordered for you today     Open Future Orders        Priority Expected Expires Ordered    Cardiac Device Check - Remote Routine  12/3/2020 12/3/2018            Who to contact     If you have questions or need follow up information about today's clinic visit or your schedule please contact [facility] directly at [phone].  Normal or non-critical lab and imaging results will be communicated to you by MyChart, letter or phone within 4 business days after the clinic has received the results. If you do not hear from us within 7 days, please contact the clinic through MyChart or phone. If you have a critical or abnormal lab result, we will notify you by phone as soon as possible.  Submit refill requests through [employer] or call your pharmacy and they will forward  the refill request to us. Please allow 3 business days for your refill to be completed.          Additional Information About Your Visit        [employer] Information     [employer] gives you secure access to your electronic health record. If you see a primary care provider, you can also send messages to your care team and make appointments. If you have questions, please call your primary care clinic.  If you do not have a primary care provider, please call [phone] and they will assist you.        Care EveryWhere ID     This is your Care EveryWhere ID. This could be used by other organizations to access your [facility] medical records  [id number]        Your Vitals Were     Last Period                   08/21/2007            Blood Pressure from Last 3 Encounters:   11/09/18 125/85   10/04/18 107/72   09/19/18 144/81    Weight from Last 3 Encounters:   11/09/18 74.4 kg (164 lb)   10/04/18 75.8 kg (167 lb)   09/24/18 72.6 kg (160 lb)              We Performed the Following     (88470)INTERROGATION DEVICE EVAL REMOTE, PACER/ICD          Today's Medication Changes          These changes are accurate as of 12/1/18 11:59 PM.  If you have any questions, ask your nurse or doctor.               These medicines have changed or have updated prescriptions.        Dose/Directions    SYNTHROID 150 MCG tablet   This may have changed:  when to take this   Used for:  Postablative hypothyroidism   Generic drug:  levothyroxine        Dose:  150 mcg   Take 1 tablet (150 mcg) by mouth daily   Quantity:  60 tablet   Refills:  3                Primary Care Provider Office Phone # Fax #    Safia Silveira [phone] [address] [place] [address]        Equal Access to Services     [facility]: Hadhilario Morgan, eugene silva, micah waggoner. So [facility] [phone].    ATENCIÓN: Si habla español, tiene a hankins disposición servicios gratuitos  de asistencia lingüística. Jp victor [phone].    We comply with applicable federal civil rights laws and [place] laws. We do not discriminate on the basis of race, color, national origin, age, disability, sex, sexual orientation, or gender identity.            Thank you!     Thank you for choosing [facility]  for your care. Our goal is always to provide you with excellent care. Hearing back from our patients is one way we can continue to improve our services. Please take a few minutes to complete the written survey that you may receive in the mail after your visit with us. Thank you!             Your Updated Medication List - Protect others around you: Learn how to safely use, store and throw away your medicines at www.disposemymeds.org.          This list is accurate as of 12/1/18 11:59 PM.  Always use your most recent med list.                   Brand Name Dispense Instructions for use Diagnosis    albuterol 108 (90 Base) MCG/ACT inhaler    PROAIR HFA/PROVENTIL HFA/VENTOLIN HFA     Inhale 2 puffs into the lungs as needed for shortness of breath / dyspnea or wheezing        BENADRYL PO      Take 25-50 mg by mouth nightly as needed        clonazePAM 0.5 MG tablet    klonoPIN    60 tablet    Take 0.5-1 tablets (0.25-0.5 mg) by mouth 2 times daily as needed for anxiety    Situational anxiety, Insomnia, unspecified type       DAILY MULTIVITAMIN PO      Take 1 tablet by mouth every morning        ipratropium 0.06 % nasal spray    ATROVENT     Spray 2 sprays into both nostrils 2 times daily        IRON SUPPLEMENT PO      Take 65 mg by mouth daily        MAGNESIUM LACTATE PO      Take 180 mg by mouth At Bedtime    Fatigue, unspecified type       MELATONIN PO      Take 3 mg by mouth At Bedtime        METHYLPREDNISOLONE PO      Take 40 mg by mouth as needed        nebulizer nebulization      as needed    H/O pericarditis       PROBIOTIC DAILY PO      Take 1 capsule by mouth 2 times daily        rOPINIRole  0.25 MG tablet    REQUIP          sertraline 50 MG tablet    ZOLOFT    30 tablet    Take 1/2 tablet (25 mg) for 1-2 weeks, then increase to 1 tablet orally daily    Adjustment disorder with anxious mood       SYNTHROID 150 MCG tablet   Generic drug:  levothyroxine     60 tablet    Take 1 tablet (150 mcg) by mouth daily    Postablative hypothyroidism       TURMERIC PO      Take 1 tablet by mouth every morning    Fatigue, unspecified type       TYLENOL PO      Take 500-1,000 mg by mouth daily as needed        VITAMIN D3 PO      Take 5,000 Units by mouth every morning        VITAMIN K2 PO      Take 1 tablet by mouth every morning    H/O pericarditis       XIIDRA 5 % opthalmic solution   Generic drug:  lifitegrast      INT 1 GTT INTO OU Q 12 H

## 2018-12-03 ENCOUNTER — MYC REFILL (OUTPATIENT)
Dept: ENDOCRINOLOGY | Facility: CLINIC | Age: 58
End: 2018-12-03

## 2018-12-03 ENCOUNTER — TELEPHONE (OUTPATIENT)
Dept: CARDIOLOGY | Facility: CLINIC | Age: 58
End: 2018-12-03

## 2018-12-03 DIAGNOSIS — E89.0 POSTABLATIVE HYPOTHYROIDISM: ICD-10-CM

## 2018-12-03 RX ORDER — LEVOTHYROXINE SODIUM 150 MCG
150 TABLET ORAL DAILY
Qty: 60 TABLET | Refills: 3 | Status: CANCELLED | OUTPATIENT
Start: 2018-12-03

## 2018-12-03 NOTE — TELEPHONE ENCOUNTER
Message from [facility]:  Jeanna Parker RN Mon Dec 3, 2018 11:07 AM        ----- Message -----   From: Joyce Marroquin   Sent: 12/3/2018 8:51 AM   To: [facility] Triage-  Subject: Medication Renewal Request     Original authorizing provider: MD Joyce Elmore would like a refill of the following medications:  SYNTHROID 150 MCG tablet [Garland Patel MD]    Preferred pharmacy: [facility] [address] [place] [address], [place] - [address] [facility] [address]    Comment:

## 2018-12-03 NOTE — PROGRESS NOTES
Preliminary Device Interrogation Results.  Final physician signed paceart report to be scanned and attached.    Remote pacemaker transmission received and reviewed.  Device transmission sent per MD orders.  Patient has a Medtronic dual lead pacemaker.  Normal pacemaker function.  No episodes recorded.  Presenting EGM = AP/ @ 65 bpm.  AP = 16.5%.   = 99.9%.  Estimated battery longevity to KONRAD = 8 years.  Patient notified of interrogation results.  Patient reports that she is feeling overwhelmed. Her mom is dying and her  is sick. She has questions regarding her antidepressant. Patient stated she has a message sent to Fiorella Wright. Plan for patient to return to clinic 2/21/2019 as scheduled.    Remote pacemaker transmission

## 2018-12-03 NOTE — TELEPHONE ENCOUNTER
[facility] Call Center    Phone Message    May a detailed message be left on voicemail: no    Reason for Call: Other: Pt would like a call back to discuss fluctuating blood pressure and pulse values ever since getting her pacemaker, and to discuss her medications in relation to other issues going on. Please call Pt back to discuss.     Action Taken: Message routed to:  [facility]

## 2018-12-04 ENCOUNTER — MYC MEDICAL ADVICE (OUTPATIENT)
Dept: FAMILY MEDICINE | Facility: CLINIC | Age: 58
End: 2018-12-04

## 2018-12-04 ENCOUNTER — OFFICE VISIT (OUTPATIENT)
Dept: FAMILY MEDICINE | Facility: CLINIC | Age: 58
End: 2018-12-04
Payer: COMMERCIAL

## 2018-12-04 ENCOUNTER — TELEPHONE (OUTPATIENT)
Dept: FAMILY MEDICINE | Facility: CLINIC | Age: 58
End: 2018-12-04

## 2018-12-04 VITALS
BODY MASS INDEX: 26.79 KG/M2 | TEMPERATURE: 97 F | OXYGEN SATURATION: 98 % | SYSTOLIC BLOOD PRESSURE: 116 MMHG | WEIGHT: 161 LBS | RESPIRATION RATE: 16 BRPM | HEART RATE: 102 BPM | DIASTOLIC BLOOD PRESSURE: 74 MMHG

## 2018-12-04 DIAGNOSIS — H83.2X9 VESTIBULAR DISEQUILIBRIUM, UNSPECIFIED LATERALITY: ICD-10-CM

## 2018-12-04 DIAGNOSIS — K52.831 COLLAGENOUS COLITIS: ICD-10-CM

## 2018-12-04 DIAGNOSIS — G25.81 RESTLESS LEGS SYNDROME (RLS): ICD-10-CM

## 2018-12-04 DIAGNOSIS — H04.123 DRY EYES: ICD-10-CM

## 2018-12-04 DIAGNOSIS — Z95.0 CARDIAC PACEMAKER IN SITU: ICD-10-CM

## 2018-12-04 DIAGNOSIS — F41.8 SITUATIONAL ANXIETY: ICD-10-CM

## 2018-12-04 DIAGNOSIS — G47.33 OSA (OBSTRUCTIVE SLEEP APNEA): ICD-10-CM

## 2018-12-04 DIAGNOSIS — I44.2 COMPLETE ATRIOVENTRICULAR BLOCK (H): ICD-10-CM

## 2018-12-04 DIAGNOSIS — E55.9 VITAMIN D DEFICIENCY: ICD-10-CM

## 2018-12-04 DIAGNOSIS — H92.01 RIGHT EAR PAIN: Primary | ICD-10-CM

## 2018-12-04 DIAGNOSIS — I42.9 CARDIOMYOPATHY, UNSPECIFIED TYPE (H): ICD-10-CM

## 2018-12-04 PROCEDURE — 99214 OFFICE O/P EST MOD 30 MIN: CPT | Performed by: INTERNAL MEDICINE

## 2018-12-04 RX ORDER — BUSPIRONE HYDROCHLORIDE 5 MG/1
TABLET ORAL
Qty: 150 TABLET | Refills: 0 | Status: CANCELLED | OUTPATIENT
Start: 2018-12-04

## 2018-12-04 RX ORDER — CYCLOSPORINE 0.5 MG/ML
1 EMULSION OPHTHALMIC EVERY 12 HOURS
Qty: 2 BOX | Refills: 1
Start: 2018-12-04 | End: 2019-05-16

## 2018-12-04 RX ORDER — LOPERAMIDE HYDROCHLORIDE 2 MG/1
TABLET ORAL
Qty: 1 TABLET | Refills: 0
Start: 2018-12-04 | End: 2022-03-15

## 2018-12-04 RX ORDER — CHOLECALCIFEROL (VITAMIN D3) 50 MCG
2000 TABLET ORAL DAILY
Qty: 1 TABLET | Refills: 0
Start: 2018-12-04

## 2018-12-04 NOTE — Clinical Note
Please see office visit notes - patient is really anxious and I want to prescribe busPIRone (BUSPAR)  But she wasn't comfortable  Without your input !!

## 2018-12-04 NOTE — Clinical Note
I just called patient and reviewed the discussion about busPIRone (BUSPAR)  And sertraline [ Zoloft ] . She's NOT recommended to take sertraline [ Zoloft ] but busPIRone (BUSPAR)  Is ok. Please  1. Discontinue sertraline [ Zoloft ] from list and fax in the busPIRone (BUSPAR)  As soon as possible to her pharmacy

## 2018-12-04 NOTE — PROGRESS NOTES
SUBJECTIVE:   Joyce Marroquin is a 58 year old female who presents to clinic today for the following health issues:       Right ear pain  Complete atrioventricular block (H)  Cardiac pacemaker in situ  Situational anxiety  Vitamin D deficiency  Dry eyes  Collagenous colitis  Vestibular disequilibrium, unspecified laterality  Restless legs syndrome (RLS)  LARRY (obstructive sleep apnea)  Cardiomyopathy, unspecified type (H)     New patient to the Internal Medicine department . Initially was a problem focused visit but during our discussion this office visit morphed into a discussion of really , a lot of different matters and diagnoses !    Right ear pain  Patient was seen at [facility] in October and had a water calorimetry test performed [ due to some dizziness symptoms / vertiginous symptoms that got better ]. She experienced pain during the procedure but was told that pain is normal so she figured pain would resolve, however it did not. Pain is only present in right ear, left ear is fine. Pain is constant throughout the day. She denies any unusual hearing loss. She describes her ear discomfort as an ear ache. She takes tylenol at night. She denies any sinus drainage, sinus pressure, or sinus pain. It's not that the pain is getting worse and worse but it's hit a plateau and she's frustrated with symptoms that are bad enough to keep her up at night and hurt over and over .    Notes that she has frequent ear infections as a child, nothing recently. She has a history of scar tissue noted with tympanic membranes. She was considering a follow up with ENT but she is leaving either today or tomorrow to help with hospice care for her mother out of state.     Anxiety  She was recently started on klonopin prn on 11/9. She was previously using lavender and chamomile tea for management however with her current stress levels these measures have not been working. She questions what other medications are available for  "her due to her hx of prolonged QT interval. She is interested in buspar and zoloft if they are safe to take.  She has a history of a cardiac ablation procedure \" gone bad\" that led to cardiac issues including a mild cardiomyopathy which patient was not so familiar with why this diagnosis was on her problem list leading to us reviewing her echocardiogram and other various discussions about healthcare in general and for her own specifics    Past/recent records reviewed and discussed for:  -Reviewed and updated medications and medical hx  -Notes she is on requip for RSL  -She has LARRY and is using a CPAP  -She stopped xidra and is starting restasis soon for dry eyes   -Takes imodium prn due to collagenous colitis.   -Patient wishes to follow up with an EP cardiologist to review her cardiac hx which includes a failed ablations and in situ pacemaker.   Multiple additions were added to her problem list as well as changes and additions to the medication list     Problem list and histories reviewed & adjusted, as indicated.  Additional history: as documented    Patient Active Problem List   Diagnosis     Benign neoplasm of vulva     Esophageal reflux     Chronic depressive personality disorder     Chest pain     Complete atrioventricular block (H)     Cardiac pacemaker in situ- Medtronic, dual lead- DEPENDENT     Hypothyroidism     Ex-smoker     Hyperparathyroidism (H)     Pulmonary nodules     Cardiomyopathy (H)     S/P cardiac pacemaker procedure     Situational anxiety     LARRY (obstructive sleep apnea)     Restless legs syndrome (RLS)     Vestibular disequilibrium, unspecified laterality     Collagenous colitis     Dry eyes     Past Surgical History:   Procedure Laterality Date     BUNIONECTOMY Bilateral      EP ABLATION / EP STUDIES  04/21/2017     EXPLORE NECK N/A 9/19/2018    Procedure: EXPLORE NECK;  Neck Exploration Resection of Left superior Parathyroid gland;  Surgeon: Kristine Venegas MD;  Location:  OR     " HC CORRECT BUNION,SIMPLE       PARATHYROIDECTOMY N/A 9/19/2018    Procedure: PARATHYROIDECTOMY;;  Surgeon: Kristine Venegas MD;  Location: [facility] OR     PPM INSERT OF NEW OR REPL W/VENT LEAD  04/21/2017     TONSILLECTOMY & ADENOIDECTOMY         Social History   Substance Use Topics     Smoking status: Former Smoker     Smokeless tobacco: Never Used     Alcohol use Yes      Comment: seldom     Family History   Problem Relation Age of Onset     Hypothyroidism Mother      Hypertension Mother      Osteoarthritis Mother      Coronary Artery Disease Father      nonfatal MI in his 70s     Asthma Father      Diabetes Sister      Hypothyroidism Sister      Breast Cancer Maternal Aunt          Current Outpatient Prescriptions   Medication Sig Dispense Refill     Acetaminophen (TYLENOL PO) Take 500-1,000 mg by mouth daily as needed        albuterol (PROAIR HFA/PROVENTIL HFA/VENTOLIN HFA) 108 (90 BASE) MCG/ACT Inhaler Inhale 2 puffs into the lungs as needed for shortness of breath / dyspnea or wheezing        Cholecalciferol (VITAMIN D3 PO) Take 5,000 Units by mouth every morning        clonazePAM (KLONOPIN) 0.5 MG tablet Take 0.5-1 tablets (0.25-0.5 mg) by mouth 2 times daily as needed for anxiety 60 tablet 1     DiphenhydrAMINE HCl (BENADRYL PO) Take 25-50 mg by mouth nightly as needed       Ferrous Sulfate (IRON SUPPLEMENT PO) Take 65 mg by mouth daily        ipratropium (ATROVENT) 0.06 % spray Spray 2 sprays into both nostrils 2 times daily       MAGNESIUM LACTATE PO Take 180 mg by mouth At Bedtime        MELATONIN PO Take 3 mg by mouth At Bedtime        Menaquinone-7 (VITAMIN K2 PO) Take 1 tablet by mouth every morning        METHYLPREDNISOLONE PO Take 40 mg by mouth as needed        Multiple Vitamin (DAILY MULTIVITAMIN PO) Take 1 tablet by mouth every morning        nebulizer nebulization as needed       Probiotic Product (PROBIOTIC DAILY PO) Take 1 capsule by mouth 2 times daily        rOPINIRole (REQUIP) 0.25 MG tablet         sertraline (ZOLOFT) 50 MG tablet Take 1/2 tablet (25 mg) for 1-2 weeks, then increase to 1 tablet orally daily 30 tablet 1     SYNTHROID 150 MCG tablet Take 1 tablet (150 mcg) by mouth daily (Patient taking differently: Take 150 mcg by mouth every morning ) 60 tablet 3     TURMERIC PO Take 1 tablet by mouth every morning        Labs reviewed in EPIC    Reviewed and updated as needed this visit by clinical staff  Tobacco  Allergies  Meds       Reviewed and updated as needed this visit by Provider         ROS:  Constitutional, HEENT, cardiovascular, pulmonary, GI, , musculoskeletal, neuro, skin, endocrine and psych systems are negative, except as otherwise noted.    This document serves as a record of the services and decisions personally performed and made by Crescencio Sánchez MD. It was created on his behalf by Charley Ortega, a trained medical scribe. The creation of this document is based on the provider's statements to the medical scribe.  Charley Ortega December 4, 2018 10:56 AM    OBJECTIVE:     /74  Pulse 102  Temp 97  F (36.1  C) (Oral)  Resp 16  Wt 73 kg (161 lb)  LMP 08/21/2007  SpO2 98%  BMI 26.79 kg/m2  Body mass index is 26.79 kg/(m^2).     GENERAL: healthy, alert and no distress  HENT: ear canals and TM's normal. Significant scar tissue bilaterally. No effusion. No redness. Some retraction. No real cause of pain is determined . No cervical lymphadenopathy  Or other findings of note  MS: no gross musculoskeletal defects noted, no edema  SKIN: no suspicious lesions or rashes  NEURO: Normal strength and tone, mentation intact and speech normal  PSYCH: mentation appears normal, affect normal/bright    Diagnostic Test Results:  none     ASSESSMENT/PLAN:   (H92.01) Right ear pain  (primary encounter diagnosis)  Comment: Unclear etiology. No signs of infection. Recommend patient follow up with ENT.   Plan: OTOLARYNGOLOGY REFERRAL          (I44.2) Complete atrioventricular block  (H)  Comment: Patient is interested in consulting with an EP cardiologist but did not specifically ask if we would refer her today     (Z95.0) Cardiac pacemaker in situ- Medtronic, dual lead- DEPENDENT  Comment: noted as a point of historical importance   Plan: as above      (I42.9) Cardiomyopathy, unspecified type (H)  Comment: as detailed above   Plan: as above     (F41.8) Situational anxiety  Comment: Stress levels are high due to her mother's current health. I will reach out to Pharm D to inquire about zoloft and buspar  , will follow up with patient regarding medication therapy management pharmacist feedback    (E55.9) Vitamin D deficiency  Comment: Continue supplements   Plan: vitamin D3 (CHOLECALCIFEROL) 2000 units tablet          (H04.123) Dry eyes  Comment: Patient is no longer on xidra and will start restasis soon  Plan: cycloSPORINE (RESTASIS) 0.05 % ophthalmic         emulsion          (K52.831) Collagenous colitis  Comment: Currently taking prn  Plan: loperamide (IMODIUM A-D) 2 MG tablet          (H83.2X9) Vestibular disequilibrium, unspecified laterality  Comment: Ongoing.     (G25.81) Restless legs syndrome (RLS)  Comment: Continue requip    (G47.33) LARRY (obstructive sleep apnea)  Comment: Patient is using CPAP    Follow up if symptoms worsen or do not improve    The information in this document, created by the medical scribe for me, accurately reflects the services I personally performed and the decisions made by me. I have reviewed and approved this document for accuracy prior to leaving the patient care area.  December 4, 2018 11:19 AM    Crescencio Sánchez MD  [facility]

## 2018-12-04 NOTE — TELEPHONE ENCOUNTER
Called pt last evening- she was getting her hair done and asked me to call back.  Was unable to last evening.    rec'd msg that pt called again today, see my chart msg.    Also spoke to pt. Her phone kept cutting out. Will attempt to obtain requested information and call her back

## 2018-12-04 NOTE — TELEPHONE ENCOUNTER
Called and spoke with patient regarding questions.   Patient is requesting answers regarding medications and information from MTM/Pharm D.  Patient is getting frustrated and states that no one is returning her phone calls.   Patient was seen today by Dr. Sánchez who advised he would consult with MTM.  Pt does agree with Buspar more than Zoloft.  Pt is concerned because she has a pacemaker and long QT and some medications affect this and make it worse.   Did try to see ENT today but left and has an appt for tomorrow (12/5/18)  Pt is hoping to leave to see mother tomorrow around noon if this can be addressed.    Pt is requesting that she be called back soon as possible.    Basia Vincent RN

## 2018-12-04 NOTE — MR AVS SNAPSHOT
After Visit Summary   12/4/2018    Joyce Marroquin    MRN: [id number]           Patient Information     Date Of Birth          1960        Visit Information        Provider Department      12/4/2018 10:50 AM Crescencio Sánchez MD [facility]        Today's Diagnoses     Right ear pain    -  1    Complete atrioventricular block (H)        Cardiac pacemaker in situ- Medtronic, dual lead- DEPENDENT        Situational anxiety        Vitamin D deficiency        Dry eyes        Collagenous colitis        Vestibular disequilibrium, unspecified laterality        Restless legs syndrome (RLS)        LARRY (obstructive sleep apnea)        Cardiomyopathy, unspecified type (H)          Care Instructions    [facility]    If you have any questions regarding to your visit please contact your care team:     Team Pink:   Clinic Hours Telephone Number   Internal Medicine:  Dr. Darline Melton NP 7am-7pm  Monday - Thursday   7am-5pm  Fridays  [phone]  (Appointment scheduling available 24/7)   Urgent Care - [place] - 11am-9pm Monday-Friday Saturday-Sunday- 9am-5pm   [place] - 5pm-9pm Monday-Friday Saturday-Sunday- 9am-5pm  [phone] - [place]  [phone] - [place]       What options do I have for a visit other than an office visit? We offer electronic visits (e-visits) and telephone visits, when medically appropriate.  Please check with your medical insurance to see if these types of visits are covered, as you will be responsible for any charges that are not paid by your insurance.      You can use GoGo Tech (secure electronic communication) to access to your chart, send your primary care provider a message, or make an appointment. Ask a team member how to get started.     For a price quote for your services, please call our Consumer Price Line at [phone] or our Imaging Cost estimation line at [phone] (for  imaging tests).  Myra BLAIR CMA ([facility])            Follow-ups after your visit        Additional Services     OTOLARYNGOLOGY REFERRAL       Your provider has referred you to: ALEJANDRINA: Finn Farmer [phone]   [email or web address]    Please be aware that coverage of these services is subject to the terms and limitations of your health insurance plan.  Call member services at your health plan with any benefit or coverage questions.      Please bring the following with you to your appointment:    (1) Any X-Rays, CTs or MRIs which have been performed.  Contact the facility where they were done to arrange for  prior to your scheduled appointment.   (2) List of current medications  (3) This referral request   (4) Any documents/labs given to you for this referral                  Your next 10 appointments already scheduled     Feb 21, 2019  9:30 AM CST   (Arrive by 9:15 AM)   CARDIAC DEVICE CHECK - IN CLINIC with  CV DEVICE 1   [facility] ([facility])    [address] [place] [address]  3rd Floor  [facility] [address]   [phone]            Feb 21, 2019 10:00 AM CST   (Arrive by 9:45 AM)   RETURN ARRHYTHMIA with Lino Funes MD   [facility])    [address] [place] [address]  [address] [id number]   [phone]            Mar 04, 2019 12:00 AM CST   CARDIAC DEVICE CHECK - REMOTE with UC ICD REMOTE   [facility] ([facility])    [address] [place] [address]  [address] [address]   [phone]              Future tests that were ordered for you today     Open Future Orders        Priority Expected Expires Ordered    Cardiac Device Check - Remote Routine  12/3/2020 12/3/2018            Who to contact     If you have questions or need follow up information about today's clinic visit or your schedule please contact [place] MADELYN FARMER  directly at [phone].  Normal or non-critical lab and imaging results will be communicated to you by TrustIDhart, letter or phone within 4 business days after the clinic has received the results. If you do not hear from us within 7 days, please contact the clinic through TrustIDhart or phone. If you have a critical or abnormal lab result, we will notify you by phone as soon as possible.  Submit refill requests through Gene[employer] or call your pharmacy and they will forward the refill request to us. Please allow 3 business days for your refill to be completed.          Additional Information About Your Visit        TrustIDhart Information     Gene[employer] gives you secure access to your electronic health record. If you see a primary care provider, you can also send messages to your care team and make appointments. If you have questions, please call your primary care clinic.  If you do not have a primary care provider, please call [phone] and they will assist you.        Care EveryWhere ID     This is your Care EveryWhere ID. This could be used by other organizations to access your [place] medical records  [id number]        Your Vitals Were     Pulse Temperature Respirations Last Period Pulse Oximetry BMI (Body Mass Index)    102 97  F (36.1  C) (Oral) 16 08/21/2007 98% 26.79 kg/m2       Blood Pressure from Last 3 Encounters:   12/04/18 116/74   11/09/18 125/85   10/04/18 107/72    Weight from Last 3 Encounters:   12/04/18 161 lb (73 kg)   11/09/18 164 lb (74.4 kg)   10/04/18 167 lb (75.8 kg)              We Performed the Following     OTOLARYNGOLOGY REFERRAL          Today's Medication Changes          These changes are accurate as of 12/4/18 11:19 AM.  If you have any questions, ask your nurse or doctor.               Start taking these medicines.        Dose/Directions    cycloSPORINE 0.05 % ophthalmic emulsion   Commonly known as:  RESTASIS   Used for:  Dry eyes   Started by:  Crescencio Sánchez MD        Dose:  1 drop   Apply  1 drop to eye every 12 hours   Quantity:  2 Box   Refills:  1       loperamide 2 MG tablet   Commonly known as:  IMODIUM A-D   Used for:  Collagenous colitis   Started by:  Crescencio Sánchez MD        Take 2 tabs (4 mg) after first loose stool, and then take one tab (2 mg) after each diarrheal stool.  Max of 8 tabs (16 mg) per day.   Quantity:  1 tablet   Refills:  0         These medicines have changed or have updated prescriptions.        Dose/Directions    SYNTHROID 150 MCG tablet   This may have changed:  when to take this   Used for:  Postablative hypothyroidism   Generic drug:  levothyroxine        Dose:  150 mcg   Take 1 tablet (150 mcg) by mouth daily   Quantity:  60 tablet   Refills:  3       * vitamin D3 2000 units tablet   Commonly known as:  CHOLECALCIFEROL   This may have changed:  You were already taking a medication with the same name, and this prescription was added. Make sure you understand how and when to take each.   Used for:  Vitamin D deficiency   Changed by:  Crescencio Sánchez MD        Dose:  2000 Units   Take 1 tablet by mouth daily   Quantity:  1 tablet   Refills:  0       * VITAMIN D3 PO   This may have changed:  Another medication with the same name was added. Make sure you understand how and when to take each.   Changed by:  Crescencio Sánchez MD        Dose:  5000 Units   Take 5,000 Units by mouth every morning   Refills:  0       * Notice:  This list has 2 medication(s) that are the same as other medications prescribed for you. Read the directions carefully, and ask your doctor or other care provider to review them with you.      Stop taking these medicines if you haven't already. Please contact your care team if you have questions.     XIIDRA 5 % opthalmic solution   Generic drug:  lifitegrast   Stopped by:  Crescencio Sánchez MD                Where to get your medicines      Some of these will need a paper prescription and others can be bought over the counter.  Ask your nurse if you have  questions.     You don't need a prescription for these medications     cycloSPORINE 0.05 % ophthalmic emulsion    loperamide 2 MG tablet    vitamin D3 2000 units tablet                Primary Care Provider Office Phone # Fax #    Safia Silveira [phone] [address] [place] [id number]        Equal Access to Services     [facility]: Hadii aad ku hadasho Soomaali, waaxda luqadaha, qaybta kaalmada adeegyada, waxay camiloin hayaan adeeg khleonbibiana lakenn . So [facility] [phone].    ATENCIÓN: Si habla español, tiene a hankins disposición servicios gratuitos de asistencia lingüística. [facility] [phone].    We comply with applicable federal civil rights laws and [place] laws. We do not discriminate on the basis of race, color, national origin, age, disability, sex, sexual orientation, or gender identity.            Thank you!     Thank you for choosing [facility]  for your care. Our goal is always to provide you with excellent care. Hearing back from our patients is one way we can continue to improve our services. Please take a few minutes to complete the written survey that you may receive in the mail after your visit with us. Thank you!             Your Updated Medication List - Protect others around you: Learn how to safely use, store and throw away your medicines at www.disposemymeds.org.          This list is accurate as of 12/4/18 11:19 AM.  Always use your most recent med list.                   Brand Name Dispense Instructions for use Diagnosis    albuterol 108 (90 Base) MCG/ACT inhaler    PROAIR HFA/PROVENTIL HFA/VENTOLIN HFA     Inhale 2 puffs into the lungs as needed for shortness of breath / dyspnea or wheezing        BENADRYL PO      Take 25-50 mg by mouth nightly as needed        clonazePAM 0.5 MG tablet    klonoPIN    60 tablet    Take 0.5-1 tablets (0.25-0.5 mg) by mouth 2 times daily as needed for anxiety    Situational anxiety, Insomnia, unspecified type        cycloSPORINE 0.05 % ophthalmic emulsion    RESTASIS    2 Box    Apply 1 drop to eye every 12 hours    Dry eyes       DAILY MULTIVITAMIN PO      Take 1 tablet by mouth every morning        ipratropium 0.06 % nasal spray    ATROVENT     Spray 2 sprays into both nostrils 2 times daily        IRON SUPPLEMENT PO      Take 65 mg by mouth daily        loperamide 2 MG tablet    IMODIUM A-D    1 tablet    Take 2 tabs (4 mg) after first loose stool, and then take one tab (2 mg) after each diarrheal stool.  Max of 8 tabs (16 mg) per day.    Collagenous colitis       MAGNESIUM LACTATE PO      Take 180 mg by mouth At Bedtime    Fatigue, unspecified type       MELATONIN PO      Take 3 mg by mouth At Bedtime        METHYLPREDNISOLONE PO      Take 40 mg by mouth as needed        nebulizer nebulization      as needed    H/O pericarditis       PROBIOTIC DAILY PO      Take 1 capsule by mouth 2 times daily        rOPINIRole 0.25 MG tablet    REQUIP          sertraline 50 MG tablet    ZOLOFT    30 tablet    Take 1/2 tablet (25 mg) for 1-2 weeks, then increase to 1 tablet orally daily    Adjustment disorder with anxious mood       SYNTHROID 150 MCG tablet   Generic drug:  levothyroxine     60 tablet    Take 1 tablet (150 mcg) by mouth daily    Postablative hypothyroidism       TURMERIC PO      Take 1 tablet by mouth every morning    Fatigue, unspecified type       TYLENOL PO      Take 500-1,000 mg by mouth daily as needed        * vitamin D3 2000 units tablet    CHOLECALCIFEROL    1 tablet    Take 1 tablet by mouth daily    Vitamin D deficiency       * VITAMIN D3 PO      Take 5,000 Units by mouth every morning        VITAMIN K2 PO      Take 1 tablet by mouth every morning    H/O pericarditis       * Notice:  This list has 2 medication(s) that are the same as other medications prescribed for you. Read the directions carefully, and ask your doctor or other care provider to review them with you.

## 2018-12-04 NOTE — TELEPHONE ENCOUNTER
Reason for Call:  Other call back    Detailed comments: Patient saw Dr. Sánchez regarding MTM; She wants answers as soon as possible, of answers between Pharm and Cardio and Dr. Sánchez. She states she may need to go to ER for help if she doesn't hear back by 5.    Phone Number Patient can be reached at: Cell number on file:    Telephone Information:   Mobile [phone]       Best Time: ASAP    Can we leave a detailed message on this number? YES    Call taken on 12/4/2018 at 4:12 PM by Pallavi Kyle

## 2018-12-04 NOTE — PATIENT INSTRUCTIONS
[facility]    If you have any questions regarding to your visit please contact your care team:     Team Pink:   Clinic Hours Telephone Number   Internal Medicine:  Dr. Darline Melton NP 7am-7pm  Monday - Thursday   7am-5pm  Fridays  [phone]  (Appointment scheduling available 24/7)   Urgent Care - [place] and [facility] - 11am-9pm Monday-Friday Saturday-Sunday- 9am-5pm   [place] - 5pm-9pm Monday-Friday Saturday-Sunday- 9am-5pm  [phone] - [place]  [phone] - [place]       What options do I have for a visit other than an office visit? We offer electronic visits (e-visits) and telephone visits, when medically appropriate.  Please check with your medical insurance to see if these types of visits are covered, as you will be responsible for any charges that are not paid by your insurance.      You can use Zerto (secure electronic communication) to access to your chart, send your primary care provider a message, or make an appointment. Ask a team member how to get started.     For a price quote for your services, please call our Consumer Price Line at [phone] or our Imaging Cost estimation line at [phone] (for imaging tests).  Myra BLAIR CMA ([facility])

## 2018-12-05 ENCOUNTER — TELEPHONE (OUTPATIENT)
Dept: INTERNAL MEDICINE | Facility: CLINIC | Age: 58
End: 2018-12-05

## 2018-12-05 ENCOUNTER — OFFICE VISIT (OUTPATIENT)
Dept: AUDIOLOGY | Facility: CLINIC | Age: 58
End: 2018-12-05
Payer: COMMERCIAL

## 2018-12-05 ENCOUNTER — OFFICE VISIT (OUTPATIENT)
Dept: OTOLARYNGOLOGY | Facility: CLINIC | Age: 58
End: 2018-12-05
Payer: COMMERCIAL

## 2018-12-05 VITALS
DIASTOLIC BLOOD PRESSURE: 80 MMHG | RESPIRATION RATE: 12 BRPM | WEIGHT: 161 LBS | BODY MASS INDEX: 26.79 KG/M2 | SYSTOLIC BLOOD PRESSURE: 122 MMHG | HEART RATE: 68 BPM | OXYGEN SATURATION: 96 %

## 2018-12-05 DIAGNOSIS — H60.8X1 CHRONIC ECZEMATOUS OTITIS EXTERNA OF RIGHT EAR: ICD-10-CM

## 2018-12-05 DIAGNOSIS — H69.91 DYSFUNCTION OF RIGHT EUSTACHIAN TUBE: ICD-10-CM

## 2018-12-05 DIAGNOSIS — H90.3 SENSORINEURAL HEARING LOSS, BILATERAL: Primary | ICD-10-CM

## 2018-12-05 DIAGNOSIS — H90.3 SNHL (SENSORY-NEURAL HEARING LOSS), ASYMMETRICAL: Primary | ICD-10-CM

## 2018-12-05 PROCEDURE — 92557 COMPREHENSIVE HEARING TEST: CPT | Performed by: AUDIOLOGIST

## 2018-12-05 PROCEDURE — 99213 OFFICE O/P EST LOW 20 MIN: CPT | Performed by: OTOLARYNGOLOGY

## 2018-12-05 PROCEDURE — 99207 ZZC NO CHARGE LOS: CPT | Performed by: AUDIOLOGIST

## 2018-12-05 PROCEDURE — 92567 TYMPANOMETRY: CPT | Performed by: AUDIOLOGIST

## 2018-12-05 RX ORDER — BUSPIRONE HYDROCHLORIDE 5 MG/1
TABLET ORAL
Qty: 150 TABLET | Refills: 1 | Status: SHIPPED | OUTPATIENT
Start: 2018-12-05 | End: 2019-05-16

## 2018-12-05 RX ORDER — FLUOCINOLONE ACETONIDE 0.11 MG/ML
OIL AURICULAR (OTIC)
Qty: 20 ML | Refills: 1 | Status: ON HOLD | OUTPATIENT
Start: 2018-12-05 | End: 2019-09-26

## 2018-12-05 NOTE — TELEPHONE ENCOUNTER
I had advised that buspirone should not increase risk of QT prolongation (sertraline could).    Loreto Malhotra, PharmD, [facility]  Medication Therapy Management Provider  Pager: [phone]

## 2018-12-05 NOTE — PROGRESS NOTES
AUDIOLOGY REPORT:    Patient was referred to Audiology from ENT by Helio Haile MD for a hearing examination.  Patient complains of otalgia on right ear.    Testing:    Otoscopy:   Otoscopic exam indicates ears are clear of cerumen bilaterally     Tympanograms:    RIGHT: normal eardrum mobility     LEFT:   negative pressure     Thresholds:   Pure Tone Thresholds assessed using conventional audiometry with good  reliability from 250-8000 Hz bilaterally using insert earphones     RIGHT: normal hearing from 250-3000 Hz sloping to moderate high frequency sensorineural hearing loss above 4000 Hz    LEFT:   normal hearing from 250-3000 Hz sloping to severe high frequency sensorineural hearing loss above 4000 Hz    Speech Reception Threshold:    RIGHT: 15 dB HL    LEFT:   15 dB HL    Word Recognition Score:     RIGHT: 100% at 55 dB HL using NU-6 recorded word list.    LEFT:   100% at 55 dB HL using NU-6 recorded word list.    Discussed results with the patient.     Patient was returned to ENT for follow up.     Vineet Silveira MA, CCC-A  [place] Licensed Audiologist #[id number]  12/5/2018

## 2018-12-05 NOTE — PROGRESS NOTES
Chief Complaint - right ear pain    History of Present Illness - Joyce Marroquin is a 58 year old female who presents with right ear pain. Started 2 months ago after she had calorics at [facility]. Constant pain, not worse at night, but she has to take Tylenol at night for this. Left ear is normal. Has some right ear pressure, sharp pain. No hearing changes. Had some dizziness. She tried PT twice a week, and this has helped. They have never had a history of ear disease in the past, no previous ear surgery or chronic ear infection as an adult. Has a lot of stress with dying mother. No otorrhea.     Past Medical History -   Patient Active Problem List   Diagnosis     Benign neoplasm of vulva     Esophageal reflux     Chronic depressive personality disorder     Chest pain     Complete atrioventricular block (H)     Cardiac pacemaker in situ- Medtronic, dual lead- DEPENDENT     Hypothyroidism     Ex-smoker     Hyperparathyroidism (H)     Pulmonary nodules     Cardiomyopathy (H)     S/P cardiac pacemaker procedure     Situational anxiety     LARRY (obstructive sleep apnea)     Restless legs syndrome (RLS)     Vestibular disequilibrium, unspecified laterality     Collagenous colitis     Dry eyes       Current Medications -   Current Outpatient Prescriptions:      Acetaminophen (TYLENOL PO), Take 500-1,000 mg by mouth daily as needed , Disp: , Rfl:      albuterol (PROAIR HFA/PROVENTIL HFA/VENTOLIN HFA) 108 (90 BASE) MCG/ACT Inhaler, Inhale 2 puffs into the lungs as needed for shortness of breath / dyspnea or wheezing , Disp: , Rfl:      Cholecalciferol (VITAMIN D3 PO), Take 5,000 Units by mouth every morning , Disp: , Rfl:      clonazePAM (KLONOPIN) 0.5 MG tablet, Take 0.5-1 tablets (0.25-0.5 mg) by mouth 2 times daily as needed for anxiety, Disp: 60 tablet, Rfl: 1     cycloSPORINE (RESTASIS) 0.05 % ophthalmic emulsion, Apply 1 drop to eye every 12 hours, Disp: 2 Box, Rfl: 1     DiphenhydrAMINE HCl (BENADRYL PO), Take 25-50 mg by  mouth nightly as needed, Disp: , Rfl:      Ferrous Sulfate (IRON SUPPLEMENT PO), Take 65 mg by mouth daily , Disp: , Rfl:      ipratropium (ATROVENT) 0.06 % spray, Spray 2 sprays into both nostrils 2 times daily, Disp: , Rfl:      loperamide (IMODIUM A-D) 2 MG tablet, Take 2 tabs (4 mg) after first loose stool, and then take one tab (2 mg) after each diarrheal stool.  Max of 8 tabs (16 mg) per day., Disp: 1 tablet, Rfl: 0     MAGNESIUM LACTATE PO, Take 180 mg by mouth At Bedtime , Disp: , Rfl:      MELATONIN PO, Take 3 mg by mouth At Bedtime , Disp: , Rfl:      Menaquinone-7 (VITAMIN K2 PO), Take 1 tablet by mouth every morning , Disp: , Rfl:      METHYLPREDNISOLONE PO, Take 40 mg by mouth as needed , Disp: , Rfl:      Multiple Vitamin (DAILY MULTIVITAMIN PO), Take 1 tablet by mouth every morning , Disp: , Rfl:      nebulizer nebulization, as needed, Disp: , Rfl:      Probiotic Product (PROBIOTIC DAILY PO), Take 1 capsule by mouth 2 times daily , Disp: , Rfl:      rOPINIRole (REQUIP) 0.25 MG tablet, , Disp: , Rfl:      sertraline (ZOLOFT) 50 MG tablet, Take 1/2 tablet (25 mg) for 1-2 weeks, then increase to 1 tablet orally daily, Disp: 30 tablet, Rfl: 1     SYNTHROID 150 MCG tablet, Take 1 tablet (150 mcg) by mouth daily (Patient taking differently: Take 150 mcg by mouth every morning ), Disp: 60 tablet, Rfl: 3     TURMERIC PO, Take 1 tablet by mouth every morning , Disp: , Rfl:      vitamin D3 (CHOLECALCIFEROL) 2000 units tablet, Take 1 tablet by mouth daily, Disp: 1 tablet, Rfl: 0    Allergies -   Allergies   Allergen Reactions     Flagyl [Metronidazole] Rash     Corn Oil Other (See Comments)     Headache       Lorazepam Other (See Comments)     Heat intolerance       Seasonal Allergies Difficulty breathing     Sulfamethoxazole-Trimethoprim      Other reaction(s): Other  Passed out     Tetracycline Swelling     Zofran [Ondansetron]      Prolonged QT  Prolonged QT at baseline per patient     Benzodiazepines Other  (See Comments)     Other reaction(s): Other  Extreme heat intolerance  Extreme heat intolerance     Cyclobenzaprine Other (See Comments)     Extreme heat intolerance     Levaquin [Levofloxacin]      Other reaction(s): Other  Tendon rupture     Nsaids Other (See Comments)     Exacerbated asthma       Social History -   Social History     Social History     Marital status:      Spouse name: N/A     Number of children: N/A     Years of education: N/A     Social History Main Topics     Smoking status: Former Smoker     Smokeless tobacco: Never Used     Alcohol use Yes      Comment: seldom     Drug use: No     Sexual activity: Yes     Partners: Male     Birth control/ protection: None      Comment: vasectomy     Other Topics Concern     None     Social History Narrative       Family History -   Family History   Problem Relation Age of Onset     Hypothyroidism Mother      Hypertension Mother      Osteoarthritis Mother      Coronary Artery Disease Father      nonfatal MI in his 70s     Asthma Father      Diabetes Sister      Hypothyroidism Sister      Breast Cancer Maternal Aunt        Review of Systems - As per HPI and PMHx, stress from mother dying, some congestion, otherwise 7 system review of the head and neck negative.    Physical Exam  /80  Pulse 68  Resp 12  Wt 73 kg (161 lb)  LMP 08/21/2007  SpO2 96%  BMI 26.79 kg/m2  General - The patient is in no distress.  Alert and oriented to person and place, answers questions and cooperates with examination appropriately.   Neurologic - CN II-XII are grossly intact, no focal neurologic deficits.   Voice and Breathing - The patient was breathing comfortably without the use of accessory muscles. There was no wheezing, stridor, or stertor.  The patients voice was clear and strong.  Eyes - Extraocular movements intact. Sclera were not icteric or injected, conjunctiva were pink and moist.  Ears - The tympanic membranes are normal in appearance, bony landmarks  are intact.  No retraction, perforation, or masses. No fluid or purulence was seen in the external canal or the middle ear. No evidence of infection of the middle ear or external canal, cerumen was normal in appearance. She can insufflate ears.   Mouth - no lesion, tongue midline.   Throat - The walls of the oropharynx were smooth, symmetric, and had no lesions or ulcerations. The uvula was midline on elevation.    Neck - Palpation of the occipital, submental, submandibular, internal jugular chain, and supraclavicular nodes did not demonstrate any abnormal lymph nodes or masses. Palpation of the thyroid was soft and smooth, with no nodules or goiter appreciated.  The trachea was mobile and midline.  Neurological - Cranial nerves 2 through 12 were grossly intact. No focal neurologic deficits.    Audiogram - type A tymp right and type C left. Down sloping sensorineural hearing loss.    A/P - Joyce Marroquin is a 58 year old female who presents with right otalgia. Try valsalva for ETD and dermotic for chronic eczematous otitis externa and ear dryness.     If this fails she will return and we can consider NP scope for pharyngeal lesions and referred otalgia as she is a past smoker. Also can consider imaging if this fails.       Helio Haile MD  Otolaryngology  [facility]

## 2018-12-05 NOTE — MR AVS SNAPSHOT
After Visit Summary   12/5/2018    Joyce Marroquin    MRN: [id number]           Patient Information     Date Of Birth          1960        Visit Information        Provider Department      12/5/2018 7:30 AM Luis Felipe Silveira AuD [facility]        Today's Diagnoses     Sensorineural hearing loss, bilateral    -  1       Follow-ups after your visit        Your next 10 appointments already scheduled     Feb 21, 2019  9:30 AM CST   (Arrive by 9:15 AM)   CARDIAC DEVICE CHECK - IN CLINIC with  CV DEVICE 1   [facility] ([facility])    [address] [place] [address]  3rd Floor  [facility] [address]   [phone]            Feb 21, 2019 10:00 AM CST   (Arrive by 9:45 AM)   RETURN ARRHYTHMIA with Lnio Funes MD   [facility])    [address] [place] [address]  [address] [place] [id number]   [phone]            Mar 04, 2019 12:00 AM CST   CARDIAC DEVICE CHECK - REMOTE with  ICD REMOTE   [facility] ([facility])    [address] [place] [address]  [address] [place] [address]   [phone]              Who to contact     If you have questions or need follow up information about today's clinic visit or your schedule please contact [facility] directly at [phone].  Normal or non-critical lab and imaging results will be communicated to you by MyChart, letter or phone within 4 business days after the clinic has received the results. If you do not hear from us within 7 days, please contact the clinic through MyChart or phone. If you have a critical or abnormal lab result, we will notify you by phone as soon as possible.  Submit refill requests through [employer] or call your pharmacy and they will forward the refill request to us. Please allow 3 business days for your refill to be completed.          Additional Information About Your Visit        MyChart Information      [employer] gives you secure access to your electronic health record. If you see a primary care provider, you can also send messages to your care team and make appointments. If you have questions, please call your primary care clinic.  If you do not have a primary care provider, please call [phone] and they will assist you.        Care EveryWhere ID     This is your Care EveryWhere ID. This could be used by other organizations to access your [facility] medical records  [id number]        Your Vitals Were     Last Period                   08/21/2007            Blood Pressure from Last 3 Encounters:   12/05/18 122/80   12/04/18 116/74   11/09/18 125/85    Weight from Last 3 Encounters:   12/05/18 161 lb (73 kg)   12/04/18 161 lb (73 kg)   11/09/18 164 lb (74.4 kg)              We Performed the Following     AUDIOGRAM/TYMPANOGRAM - INTERFACE     COMPREHENSIVE HEARING TEST     TYMPANOMETRY          Today's Medication Changes          These changes are accurate as of 12/5/18  9:43 AM.  If you have any questions, ask your nurse or doctor.               These medicines have changed or have updated prescriptions.        Dose/Directions    SYNTHROID 150 MCG tablet   This may have changed:  when to take this   Used for:  Postablative hypothyroidism   Generic drug:  levothyroxine        Dose:  150 mcg   Take 1 tablet (150 mcg) by mouth daily   Quantity:  60 tablet   Refills:  3                Primary Care Provider Office Phone # Fax #    Safia Silveira [phone]       [address] [facility] [address] [facility] [address]        Equal Access to Services     [facility] : Hadii freda zuniga hadasho Soyolanda, waaxda luqadaha, qaybta kaalmada micah juan . So [facility] [phone].    ATENCIÓN: Si habla español, tiene a hankins disposición servicios gratuitos de asistencia lingüística. Llame al [phone].    We comply with applicable federal civil rights laws and [place] laws. We do not  discriminate on the basis of race, color, national origin, age, disability, sex, sexual orientation, or gender identity.            Thank you!     Thank you for choosing [facility]  for your care. Our goal is always to provide you with excellent care. Hearing back from our patients is one way we can continue to improve our services. Please take a few minutes to complete the written survey that you may receive in the mail after your visit with us. Thank you!             Your Updated Medication List - Protect others around you: Learn how to safely use, store and throw away your medicines at www.disposemymeds.org.          This list is accurate as of 12/5/18  9:43 AM.  Always use your most recent med list.                   Brand Name Dispense Instructions for use Diagnosis    albuterol 108 (90 Base) MCG/ACT inhaler    PROAIR HFA/PROVENTIL HFA/VENTOLIN HFA     Inhale 2 puffs into the lungs as needed for shortness of breath / dyspnea or wheezing        BENADRYL PO      Take 25-50 mg by mouth nightly as needed        clonazePAM 0.5 MG tablet    klonoPIN    60 tablet    Take 0.5-1 tablets (0.25-0.5 mg) by mouth 2 times daily as needed for anxiety    Situational anxiety, Insomnia, unspecified type       cycloSPORINE 0.05 % ophthalmic emulsion    RESTASIS    2 Box    Apply 1 drop to eye every 12 hours    Dry eyes       DAILY MULTIVITAMIN PO      Take 1 tablet by mouth every morning        ipratropium 0.06 % nasal spray    ATROVENT     Spray 2 sprays into both nostrils 2 times daily        IRON SUPPLEMENT PO      Take 65 mg by mouth daily        loperamide 2 MG tablet    IMODIUM A-D    1 tablet    Take 2 tabs (4 mg) after first loose stool, and then take one tab (2 mg) after each diarrheal stool.  Max of 8 tabs (16 mg) per day.    Collagenous colitis       MAGNESIUM LACTATE PO      Take 180 mg by mouth At Bedtime    Fatigue, unspecified type       MELATONIN PO      Take 3 mg by mouth At Bedtime         METHYLPREDNISOLONE PO      Take 40 mg by mouth as needed        nebulizer nebulization      as needed    H/O pericarditis       PROBIOTIC DAILY PO      Take 1 capsule by mouth 2 times daily        rOPINIRole 0.25 MG tablet    REQUIP          sertraline 50 MG tablet    ZOLOFT    30 tablet    Take 1/2 tablet (25 mg) for 1-2 weeks, then increase to 1 tablet orally daily    Adjustment disorder with anxious mood       SYNTHROID 150 MCG tablet   Generic drug:  levothyroxine     60 tablet    Take 1 tablet (150 mcg) by mouth daily    Postablative hypothyroidism       TURMERIC PO      Take 1 tablet by mouth every morning    Fatigue, unspecified type       TYLENOL PO      Take 500-1,000 mg by mouth daily as needed        * vitamin D3 2000 units tablet    CHOLECALCIFEROL    1 tablet    Take 1 tablet by mouth daily    Vitamin D deficiency       * VITAMIN D3 PO      Take 5,000 Units by mouth every morning        VITAMIN K2 PO      Take 1 tablet by mouth every morning    H/O pericarditis       * Notice:  This list has 2 medication(s) that are the same as other medications prescribed for you. Read the directions carefully, and ask your doctor or other care provider to review them with you.

## 2018-12-05 NOTE — LETTER
12/5/2018         RE: Joyce Marroquin  [address] [address] [address] [facility] [address]        Dear Colleague,    Thank you for referring your patient, Joyce Marroquin, to the [facility]. Please see a copy of my visit note below.    Chief Complaint - right ear pain    History of Present Illness - Joyce Marroquin is a 58 year old female who presents with right ear pain. Started 2 months ago after she had calorics at [facility]. Constant pain, not worse at night, but she has to take Tylenol at night for this. Left ear is normal. Has some right ear pressure, sharp pain. No hearing changes. Had some dizziness. She tried PT twice a week, and this has helped. They have never had a history of ear disease in the past, no previous ear surgery or chronic ear infection as an adult. Has a lot of stress with dying mother. No otorrhea.     Past Medical History -   Patient Active Problem List   Diagnosis     Benign neoplasm of vulva     Esophageal reflux     Chronic depressive personality disorder     Chest pain     Complete atrioventricular block (H)     Cardiac pacemaker in situ- EmbedStoretronic, dual lead- DEPENDENT     Hypothyroidism     Ex-smoker     Hyperparathyroidism (H)     Pulmonary nodules     Cardiomyopathy (H)     S/P cardiac pacemaker procedure     Situational anxiety     LARRY (obstructive sleep apnea)     Restless legs syndrome (RLS)     Vestibular disequilibrium, unspecified laterality     Collagenous colitis     Dry eyes       Current Medications -   Current Outpatient Prescriptions:      Acetaminophen (TYLENOL PO), Take 500-1,000 mg by mouth daily as needed , Disp: , Rfl:      albuterol (PROAIR HFA/PROVENTIL HFA/VENTOLIN HFA) 108 (90 BASE) MCG/ACT Inhaler, Inhale 2 puffs into the lungs as needed for shortness of breath / dyspnea or wheezing , Disp: , Rfl:      Cholecalciferol (VITAMIN D3 PO), Take 5,000 Units by mouth every morning , Disp: , Rfl:      clonazePAM (KLONOPIN) 0.5 MG tablet, Take 0.5-1 tablets (0.25-0.5 mg) by  mouth 2 times daily as needed for anxiety, Disp: 60 tablet, Rfl: 1     cycloSPORINE (RESTASIS) 0.05 % ophthalmic emulsion, Apply 1 drop to eye every 12 hours, Disp: 2 Box, Rfl: 1     DiphenhydrAMINE HCl (BENADRYL PO), Take 25-50 mg by mouth nightly as needed, Disp: , Rfl:      Ferrous Sulfate (IRON SUPPLEMENT PO), Take 65 mg by mouth daily , Disp: , Rfl:      ipratropium (ATROVENT) 0.06 % spray, Spray 2 sprays into both nostrils 2 times daily, Disp: , Rfl:      loperamide (IMODIUM A-D) 2 MG tablet, Take 2 tabs (4 mg) after first loose stool, and then take one tab (2 mg) after each diarrheal stool.  Max of 8 tabs (16 mg) per day., Disp: 1 tablet, Rfl: 0     MAGNESIUM LACTATE PO, Take 180 mg by mouth At Bedtime , Disp: , Rfl:      MELATONIN PO, Take 3 mg by mouth At Bedtime , Disp: , Rfl:      Menaquinone-7 (VITAMIN K2 PO), Take 1 tablet by mouth every morning , Disp: , Rfl:      METHYLPREDNISOLONE PO, Take 40 mg by mouth as needed , Disp: , Rfl:      Multiple Vitamin (DAILY MULTIVITAMIN PO), Take 1 tablet by mouth every morning , Disp: , Rfl:      nebulizer nebulization, as needed, Disp: , Rfl:      Probiotic Product (PROBIOTIC DAILY PO), Take 1 capsule by mouth 2 times daily , Disp: , Rfl:      rOPINIRole (REQUIP) 0.25 MG tablet, , Disp: , Rfl:      sertraline (ZOLOFT) 50 MG tablet, Take 1/2 tablet (25 mg) for 1-2 weeks, then increase to 1 tablet orally daily, Disp: 30 tablet, Rfl: 1     SYNTHROID 150 MCG tablet, Take 1 tablet (150 mcg) by mouth daily (Patient taking differently: Take 150 mcg by mouth every morning ), Disp: 60 tablet, Rfl: 3     TURMERIC PO, Take 1 tablet by mouth every morning , Disp: , Rfl:      vitamin D3 (CHOLECALCIFEROL) 2000 units tablet, Take 1 tablet by mouth daily, Disp: 1 tablet, Rfl: 0    Allergies -   Allergies   Allergen Reactions     Flagyl [Metronidazole] Rash     Corn Oil Other (See Comments)     Headache       Lorazepam Other (See Comments)     Heat intolerance       Seasonal  Allergies Difficulty breathing     Sulfamethoxazole-Trimethoprim      Other reaction(s): Other  Passed out     Tetracycline Swelling     Zofran [Ondansetron]      Prolonged QT  Prolonged QT at baseline per patient     Benzodiazepines Other (See Comments)     Other reaction(s): Other  Extreme heat intolerance  Extreme heat intolerance     Cyclobenzaprine Other (See Comments)     Extreme heat intolerance     Levaquin [Levofloxacin]      Other reaction(s): Other  Tendon rupture     Nsaids Other (See Comments)     Exacerbated asthma       Social History -   Social History     Social History     Marital status:      Spouse name: N/A     Number of children: N/A     Years of education: N/A     Social History Main Topics     Smoking status: Former Smoker     Smokeless tobacco: Never Used     Alcohol use Yes      Comment: seldom     Drug use: No     Sexual activity: Yes     Partners: Male     Birth control/ protection: None      Comment: vasectomy     Other Topics Concern     None     Social History Narrative       Family History -   Family History   Problem Relation Age of Onset     Hypothyroidism Mother      Hypertension Mother      Osteoarthritis Mother      Coronary Artery Disease Father      nonfatal MI in his 70s     Asthma Father      Diabetes Sister      Hypothyroidism Sister      Breast Cancer Maternal Aunt        Review of Systems - As per HPI and PMHx, stress from mother dying, some congestion, otherwise 7 system review of the head and neck negative.    Physical Exam  /80  Pulse 68  Resp 12  Wt 73 kg (161 lb)  LMP 08/21/2007  SpO2 96%  BMI 26.79 kg/m2  General - The patient is in no distress.  Alert and oriented to person and place, answers questions and cooperates with examination appropriately.   Neurologic - CN II-XII are grossly intact, no focal neurologic deficits.   Voice and Breathing - The patient was breathing comfortably without the use of accessory muscles. There was no wheezing,  stridor, or stertor.  The patients voice was clear and strong.  Eyes - Extraocular movements intact. Sclera were not icteric or injected, conjunctiva were pink and moist.  Ears - The tympanic membranes are normal in appearance, bony landmarks are intact.  No retraction, perforation, or masses. No fluid or purulence was seen in the external canal or the middle ear. No evidence of infection of the middle ear or external canal, cerumen was normal in appearance. She can insufflate ears.   Mouth - no lesion, tongue midline.   Throat - The walls of the oropharynx were smooth, symmetric, and had no lesions or ulcerations. The uvula was midline on elevation.    Neck - Palpation of the occipital, submental, submandibular, internal jugular chain, and supraclavicular nodes did not demonstrate any abnormal lymph nodes or masses. Palpation of the thyroid was soft and smooth, with no nodules or goiter appreciated.  The trachea was mobile and midline.  Neurological - Cranial nerves 2 through 12 were grossly intact. No focal neurologic deficits.    Audiogram - type A tymp right and type C left. Down sloping sensorineural hearing loss.    A/P - Joyce Marroquin is a 58 year old female who presents with right otalgia. Try valsalva for ETD and dermotic for chronic eczematous otitis externa and ear dryness.     If this fails she will return and we can consider NP scope for pharyngeal lesions and referred otalgia as she is a past smoker. Also can consider imaging if this fails.       Helio Haile MD  Otolaryngology  [facility]      Again, thank you for allowing me to participate in the care of your patient.        Sincerely,        Helio Haile MD

## 2018-12-05 NOTE — PATIENT INSTRUCTIONS
General Scheduling Information  To schedule your CT/MRI scan, please contact [place] [facility] at [phone]   [address] [place] [address]    To schedule your Surgery, please contact our Specialty Schedulers at [phone]    ENT Clinic Locations Clinic Hours Telephone Number     [facility]  [address] [place] [address]   Tuesday:       8:00am -- 4:00pm    Wednesday:  8:00am - 4:00pm   To schedule an appointment with   Dr. Haile,   please contact our   Specialty Scheduling Department at:     [phone]       [facility] Wang  [address] [place] [address]   Friday:          8:00am - 4:00pm         Urgent Care Locations Clinic Hours Telephone Numbers     [facility] Liu  [address] [place] [address]     Monday-Friday:     11:00pm - 9:00pm    Saturday-Sunday:  9:00am - 5:00pm   [phone]     [facility] Wang  [address] [place] [address]     Monday-Friday:      5:00pm - 9:00pm     Saturday-Sunday:  9:00am - 5:00pm   [phone]

## 2018-12-05 NOTE — MR AVS SNAPSHOT
After Visit Summary   12/5/2018    Joyce Marroquin    MRN: [id number]           Patient Information     Date Of Birth          1960        Visit Information        Provider Department      12/5/2018 9:15 AM Helio Haile MD [facility]        Today's Diagnoses     SNHL (sensory-neural hearing loss), asymmetrical    -  1    Chronic eczematous otitis externa of right ear        Dysfunction of right eustachian tube          Care Instructions    General Scheduling Information  To schedule your CT/MRI scan, please contact [place] [facility] at [phone] [address] [place] [address]    To schedule your Surgery, please contact our Specialty Schedulers at [phone]    ENT Clinic Locations Clinic Hours Telephone Number     [facility]  [address]  [place] [address]   Tuesday:       8:00am -- 4:00pm    Wednesday:  8:00am - 4:00pm   To schedule an appointment with   Dr. Haile,   please contact our   Specialty Scheduling Department at:     [phone]       [facility]  [address] [address]   Friday:          8:00am - 4:00pm         Urgent Care Locations Clinic Hours Telephone Numbers     [facility] [place]  [address] [place] [address]     Monday-Friday:     11:00pm - 9:00pm    Saturday-Sunday:  9:00am - 5:00pm   [phone]     [facility]  [address] [place] [address]     Monday-Friday:      5:00pm - 9:00pm     Saturday-Sunday:  9:00am - 5:00pm   [phone]               Follow-ups after your visit        Additional Services     AUDIOLOGY ADULT REFERRAL       Your provider has referred you to: [facility]: [facility] [phone]   [email or web address]    Treatment:  Evaluation & Treatment  Specialty Testing:  Audiogram w/Tymps and Reflexes    Please be aware that coverage of these services is subject to the terms and limitations of your health insurance  plan.  Call member services at your health plan with any benefit or coverage questions.      Please bring the following to your appointment:  >>   Any x-rays, CTs or MRIs which have been performed.  Contact the facility where they were done to arrange for  prior to your scheduled appointment.   >>   List of current medications  >>   This referral request   >>   Any documents/labs given to you for this referral                  Your next 10 appointments already scheduled     Feb 21, 2019  9:30 AM CST   (Arrive by 9:15 AM)   CARDIAC DEVICE CHECK - IN CLINIC with  CV DEVICE 1   [facility] ([facility])    [address] [place] [address]  3rd Floor  [facility] [address]   [phone]            Feb 21, 2019 10:00 AM CST   (Arrive by 9:45 AM)   RETURN ARRHYTHMIA with Lino Funes MD   [facility] ([facility])    [address] [place] [address]  [address] [place] [address]   [phone]            Mar 04, 2019 12:00 AM CST   CARDIAC DEVICE CHECK - REMOTE with  ICD REMOTE   [facility] ([facility])    [address] [place] [address]  [address] [place] [address]   [phone]              Who to contact     If you have questions or need follow up information about today's clinic visit or your schedule please contact [facility] directly at [phone].  Normal or non-critical lab and imaging results will be communicated to you by MyChart, letter or phone within 4 business days after the clinic has received the results. If you do not hear from us within 7 days, please contact the clinic through MyChart or phone. If you have a critical or abnormal lab result, we will notify you by phone as soon as possible.  Submit refill requests through [employer] or call your pharmacy and they will forward the refill request to us. Please allow 3 business days for your refill to be completed.          Additional  Information About Your Visit        [employer] Information     [employer] gives you secure access to your electronic health record. If you see a primary care provider, you can also send messages to your care team and make appointments. If you have questions, please call your primary care clinic.  If you do not have a primary care provider, please call [phone] and they will assist you.        Care EveryWhere ID     This is your Care EveryWhere ID. This could be used by other organizations to access your [place] medical records  [id number]        Your Vitals Were     Pulse Respirations Last Period Pulse Oximetry BMI (Body Mass Index)       68 12 08/21/2007 96% 26.79 kg/m2        Blood Pressure from Last 3 Encounters:   12/05/18 122/80   12/04/18 116/74   11/09/18 125/85    Weight from Last 3 Encounters:   12/05/18 73 kg (161 lb)   12/04/18 73 kg (161 lb)   11/09/18 74.4 kg (164 lb)              We Performed the Following     AUDIOLOGY ADULT REFERRAL          Today's Medication Changes          These changes are accurate as of 12/5/18  4:51 PM.  If you have any questions, ask your nurse or doctor.               Start taking these medicines.        Dose/Directions    fluocinolone acetonide 0.01 % Oil   Used for:  Chronic eczematous otitis externa of right ear   Started by:  Helio Haile MD        Place 4-5 drops in right ear 1-2 times daily for 5-7 days.   Quantity:  20 mL   Refills:  1         These medicines have changed or have updated prescriptions.        Dose/Directions    SYNTHROID 150 MCG tablet   This may have changed:  when to take this   Used for:  Postablative hypothyroidism   Generic drug:  levothyroxine        Dose:  150 mcg   Take 1 tablet (150 mcg) by mouth daily   Quantity:  60 tablet   Refills:  3         Stop taking these medicines if you haven't already. Please contact your care team if you have questions.     sertraline 50 MG tablet   Commonly known as:  ZOLOFT   Stopped by:  Crescencio Sánchez  MD                Where to get your medicines      These medications were sent to [facility] - [place], [place] - [address]  [address] [address]     Phone:  [phone]     fluocinolone acetonide 0.01 % Oil                Primary Care Provider Office Phone # Fax #    Safia Silveira [phone] [address]  [place] [address]        Equal Access to Services     [facility]: Hadii aad ku hadasho Soomaali, waaxda luqadaha, qaybta kaalmada adeegyada, waxay idiin hayaan adeeg kharabibiana latomer . So [facility] [phone].    ATENCIÓN: Si habla español, tiene a hankins disposición servicios gratuitos de asistencia lingüística. [facility] [phone].    We comply with applicable federal civil rights laws and [place] laws. We do not discriminate on the basis of race, color, national origin, age, disability, sex, sexual orientation, or gender identity.            Thank you!     Thank you for choosing [facility]  for your care. Our goal is always to provide you with excellent care. Hearing back from our patients is one way we can continue to improve our services. Please take a few minutes to complete the written survey that you may receive in the mail after your visit with us. Thank you!             Your Updated Medication List - Protect others around you: Learn how to safely use, store and throw away your medicines at www.disposemymeds.org.          This list is accurate as of 12/5/18  4:51 PM.  Always use your most recent med list.                   Brand Name Dispense Instructions for use Diagnosis    albuterol 108 (90 Base) MCG/ACT inhaler    PROAIR HFA/PROVENTIL HFA/VENTOLIN HFA     Inhale 2 puffs into the lungs as needed for shortness of breath / dyspnea or wheezing        BENADRYL PO      Take 25-50 mg by mouth nightly as needed        busPIRone 5 MG tablet    BUSPAR    150 tablet    Start at 5 mg twice daily  for 3 days, then 7.5 mg (1.5 tabs) twice daily for 3 days, then 10 mg (2 tabs) twice daily for 3 days, then 12.5 mg (2.5 tabs) twice daily for 3 days, then 15 mg (3 tabs) twice daily and stay at that dose    Situational anxiety       clonazePAM 0.5 MG tablet    klonoPIN    60 tablet    Take 0.5-1 tablets (0.25-0.5 mg) by mouth 2 times daily as needed for anxiety    Situational anxiety, Insomnia, unspecified type       cycloSPORINE 0.05 % ophthalmic emulsion    RESTASIS    2 Box    Apply 1 drop to eye every 12 hours    Dry eyes       DAILY MULTIVITAMIN PO      Take 1 tablet by mouth every morning        fluocinolone acetonide 0.01 % Oil     20 mL    Place 4-5 drops in right ear 1-2 times daily for 5-7 days.    Chronic eczematous otitis externa of right ear       ipratropium 0.06 % nasal spray    ATROVENT     Spray 2 sprays into both nostrils 2 times daily        IRON SUPPLEMENT PO      Take 65 mg by mouth daily        loperamide 2 MG tablet    IMODIUM A-D    1 tablet    Take 2 tabs (4 mg) after first loose stool, and then take one tab (2 mg) after each diarrheal stool.  Max of 8 tabs (16 mg) per day.    Collagenous colitis       MAGNESIUM LACTATE PO      Take 180 mg by mouth At Bedtime    Fatigue, unspecified type       MELATONIN PO      Take 3 mg by mouth At Bedtime        METHYLPREDNISOLONE PO      Take 40 mg by mouth as needed        nebulizer nebulization      as needed    H/O pericarditis       PROBIOTIC DAILY PO      Take 1 capsule by mouth 2 times daily        rOPINIRole 0.25 MG tablet    REQUIP          SYNTHROID 150 MCG tablet   Generic drug:  levothyroxine     60 tablet    Take 1 tablet (150 mcg) by mouth daily    Postablative hypothyroidism       TURMERIC PO      Take 1 tablet by mouth every morning    Fatigue, unspecified type       TYLENOL PO      Take 500-1,000 mg by mouth daily as needed        * vitamin D3 2000 units tablet    CHOLECALCIFEROL    1 tablet    Take 1 tablet by mouth daily    Vitamin D  deficiency       * VITAMIN D3 PO      Take 5,000 Units by mouth every morning        VITAMIN K2 PO      Take 1 tablet by mouth every morning    H/O pericarditis       * Notice:  This list has 2 medication(s) that are the same as other medications prescribed for you. Read the directions carefully, and ask your doctor or other care provider to review them with you.

## 2018-12-05 NOTE — TELEPHONE ENCOUNTER
Crescencio Sánchez MD  P Fz Rn Triage Pool                     Please call patient with Loreto's input regarding busPIRone (BUSPAR)  . If patient is agreeable we can send in the prescription, standard gradual dose increase / titration with this medication     Crescencio Sánchez MD      Huddled with provider okay for titration dose of Buspar, order pending.  Unable to locate Loreto's recommendation.  Will route to provider and Loreto for clarification.   Ele Escalona RN

## 2018-12-05 NOTE — TELEPHONE ENCOUNTER
Message  Received: Today       Crescencio Sánchez MD P Fz Rn Triage Pool; KACI [facility] [employer]                   I just called patient and reviewed the discussion about busPIRone (BUSPAR)  And sertraline [ Zoloft ] . She's NOT recommended to take sertraline [ Zoloft ] but busPIRone (BUSPAR)  Is ok. Please  1. Discontinue sertraline [ Zoloft ] from list and fax in the busPIRone (BUSPAR)  As soon as possible to her pharmacy       Med list updated  - please fax to pharmacy once Buspar is signed    Dev Winters RN

## 2018-12-06 ENCOUNTER — TELEPHONE (OUTPATIENT)
Dept: INTERNAL MEDICINE | Facility: CLINIC | Age: 58
End: 2018-12-06

## 2018-12-06 NOTE — TELEPHONE ENCOUNTER
Reason for Call:  Other prescription    Detailed comments:  Patient calling.  A rx of buspar was not called to the pharmacy yesterday. Call her to discuss. No other info would be given.     Phone Number Patient can be reached at: Home number on file [phone] (home)    Best Time:  Any     Can we leave a detailed message on this number? YES    Call taken on 12/6/2018 at 10:31 AM by Fiorella Carranza

## 2018-12-06 NOTE — TELEPHONE ENCOUNTER
See 12-5-18, telephone encounter.  Prescription was just faxed.  Patient called and informed. Prescription had to be signed by Dr. Sánchez as the sig was too long to e-prescribe.  Dr. Sánchez was not in the office yesterday.  Marleny Ovalles,

## 2018-12-06 NOTE — TELEPHONE ENCOUNTER
Buspar prescription faxed to [facility] pharmacy at [phone].  Marleny Ovalles,

## 2018-12-12 ENCOUNTER — OFFICE VISIT (OUTPATIENT)
Dept: INTERNAL MEDICINE | Facility: CLINIC | Age: 58
End: 2018-12-12
Payer: COMMERCIAL

## 2018-12-12 VITALS
TEMPERATURE: 98.7 F | RESPIRATION RATE: 16 BRPM | HEART RATE: 99 BPM | BODY MASS INDEX: 27.12 KG/M2 | DIASTOLIC BLOOD PRESSURE: 70 MMHG | WEIGHT: 163 LBS | SYSTOLIC BLOOD PRESSURE: 108 MMHG

## 2018-12-12 DIAGNOSIS — G25.81 RESTLESS LEGS SYNDROME (RLS): ICD-10-CM

## 2018-12-12 DIAGNOSIS — H83.2X9 VESTIBULAR DISEQUILIBRIUM, UNSPECIFIED LATERALITY: Primary | ICD-10-CM

## 2018-12-12 DIAGNOSIS — F43.29 ADJUSTMENT DISORDER WITH OTHER SYMPTOM: ICD-10-CM

## 2018-12-12 PROCEDURE — 99214 OFFICE O/P EST MOD 30 MIN: CPT | Performed by: INTERNAL MEDICINE

## 2018-12-12 NOTE — PROGRESS NOTES
SUBJECTIVE:   Joyce Marroquin is a 58 year old female who presents to clinic today for the following health issues:      Numbness in legs      Duration: unknown    Description (location/character/radiation): patient had episode 2 nights ago of lower outer leg numbness    Unsteady on feet rest of night    Intensity:  severe    Accompanying signs and symptoms: weakness    History (similar episodes/previous evaluation): None    Precipitating or alleviating factors: started 2 new medications recently-Buspar and Klonopin    Therapies tried and outcome: went to bed did feel fine the next morning     Transient episode while lying down -- had a similar episode 2 years ago that resolved without incident or intervention.    Anxiety medications are newer -- she's unsure if related to her symptoms.  She has multiple psychosocial stressors related to family health issues, especially parents on hospice.    Social History     Social History Narrative    CRNA on disability for vestibular symptoms      She met with a [employer]  recently who informed her that bilateral lower leg paresthesia could be related to vestibular symptoms, noting to her specifically by her report, that this is uncommon.    1. Vestibular disequilibrium, unspecified laterality    2. Restless legs syndrome (RLS)    3. Adjustment disorder with other symptom        PMH: Updated and/or reviewed in chart.    PSH: Updated and/or reviewed in chart.    Family History: Updated and/or reviewed in chart.     ROS:  Constitutional, neuro, EMT, endocrine, pulmonary, cardiac, gastrointestinal, genitourinary, musculoskeletal, integument and psychiatric systems are otherwise negative.    OBJECTIVE:                                                    /70   Pulse 99   Temp 98.7  F (37.1  C) (Tympanic)   Resp 16   Wt 73.9 kg (163 lb)   LMP 08/21/2007   BMI 27.12 kg/m      GEN: No acute distress  EYES: No conjunctival injection or icterus, EOMI grossly  intact  RESP: Unlabored, regular  NEURO: Normal gait, MAEx4, light touch sensation grossly intact  PSYCH: Normal/stressed mood and constricted but pleasant affect       ASSESSMENT/PLAN:                                                    1. Vestibular disequilibrium, unspecified laterality  2. Restless legs syndrome (RLS)  3. Adjustment disorder with other symptom  I'm unaware of bilateral lower extremity paresthesia being related to vestibular symptoms, so I will defer to her prior provider with caution, awaiting review of outside hospital/facility records.  Patient agreed to sign NICOLE.  We discussed that psychosocial stressors and psychotropic medications may also be playing a significant role.  I do not have a clear explanation of this episode nor her previous episode otherwise.  We discussed that a transient symmetrical paresthesia work-up may involve central imaging to rule-out tumor or vascular issues as well as laboratory evaluation for inflammatory issues, infection, electrolyte derangement, blood disorder.  We agreed to defer immediate work-up given lower clinical risk until records review and 4-6 weeks of symptom observation (and allowing of acute stressors to develop or pass).  Patient agreed with plan and demonstrated understanding to contact us for help if not improving or sooner if worsening or if other questions or concerns arise.      Han Blair MD

## 2018-12-26 ENCOUNTER — TRANSFERRED RECORDS (OUTPATIENT)
Dept: HEALTH INFORMATION MANAGEMENT | Facility: CLINIC | Age: 58
End: 2018-12-26

## 2019-01-23 DIAGNOSIS — G47.61 PERIODIC LIMB MOVEMENT DISORDER: Primary | ICD-10-CM

## 2019-01-24 NOTE — PROGRESS NOTES
SUBJECTIVE:   Joyce Marroquin is a 58 year old female who presents to clinic today for the following health issues:      1. Get iron levels checked-patient brought list of labs she would like done   Ferritin, iron, iron binding, iron saturation, b12, b6, d3, magnesium, calcium  2. Would like refill of klonopin  3. Right ear pain    Problem list and histories reviewed & adjusted, as indicated.  Additional history: as documented    Patient Active Problem List   Diagnosis     Benign neoplasm of vulva     Esophageal reflux     Chronic depressive personality disorder     Chest pain     Complete atrioventricular block (H)     Cardiac pacemaker in situ- [employer]tronic, dual lead- DEPENDENT     Hypothyroidism     Ex-smoker     Hyperparathyroidism (H)     Pulmonary nodules     Cardiomyopathy (H)     S/P cardiac pacemaker procedure     Situational anxiety     LARRY (obstructive sleep apnea)     Restless legs syndrome (RLS)     Vestibular disequilibrium, unspecified laterality     Collagenous colitis     Dry eyes     Past Surgical History:   Procedure Laterality Date     BUNIONECTOMY Bilateral      EP ABLATION / EP STUDIES  04/21/2017     EXPLORE NECK N/A 9/19/2018    Procedure: EXPLORE NECK;  Neck Exploration Resection of Left superior Parathyroid gland;  Surgeon: Kristine Venegas MD;  Location: [facility] OR     HC CORRECT BUNION,SIMPLE       PARATHYROIDECTOMY N/A 9/19/2018    Procedure: PARATHYROIDECTOMY;;  Surgeon: Kristine Venegas MD;  Location: [facility] OR     PPM INSERT OF NEW OR REPL W/VENT LEAD  04/21/2017     TONSILLECTOMY & ADENOIDECTOMY         Social History     Tobacco Use     Smoking status: Former Smoker     Smokeless tobacco: Never Used   Substance Use Topics     Alcohol use: Yes     Comment: seldom     Family History   Problem Relation Age of Onset     Hypothyroidism Mother      Hypertension Mother      Osteoarthritis Mother      Coronary Artery Disease Father         nonfatal MI in his 70s     Asthma Father       Diabetes Sister      Hypothyroidism Sister      Breast Cancer Maternal Aunt          Current Outpatient Medications   Medication Sig Dispense Refill     Acetaminophen (TYLENOL PO) Take 500-1,000 mg by mouth daily as needed        albuterol (PROAIR HFA/PROVENTIL HFA/VENTOLIN HFA) 108 (90 BASE) MCG/ACT Inhaler Inhale 2 puffs into the lungs as needed for shortness of breath / dyspnea or wheezing        clonazePAM (KLONOPIN) 0.5 MG tablet Take 0.5-1 tablets (0.25-0.5 mg) by mouth 2 times daily as needed for anxiety 60 tablet 1     cycloSPORINE (RESTASIS) 0.05 % ophthalmic emulsion Apply 1 drop to eye every 12 hours 2 Box 1     DiphenhydrAMINE HCl (BENADRYL PO) Take 25-50 mg by mouth nightly as needed       Ferrous Sulfate (IRON SUPPLEMENT PO) Take 65 mg by mouth 2 times daily (with meals)        fluocinolone acetonide 0.01 % OIL Place 4-5 drops in right ear 1-2 times daily for 5-7 days. 20 mL 1     loperamide (IMODIUM A-D) 2 MG tablet Take 2 tabs (4 mg) after first loose stool, and then take one tab (2 mg) after each diarrheal stool.  Max of 8 tabs (16 mg) per day. 1 tablet 0     MAGNESIUM LACTATE PO Take 180 mg by mouth At Bedtime        MELATONIN PO Take 3 mg by mouth At Bedtime        Menaquinone-7 (VITAMIN K2 PO) Take 1 tablet by mouth every morning        METHYLPREDNISOLONE PO Take 40 mg by mouth as needed        Multiple Vitamin (DAILY MULTIVITAMIN PO) Take 1 tablet by mouth every morning        nebulizer nebulization as needed       Probiotic Product (PROBIOTIC DAILY PO) Take 1 capsule by mouth 2 times daily        rOPINIRole (REQUIP) 0.25 MG tablet        SYNTHROID 150 MCG tablet Take 1 tablet (150 mcg) by mouth daily 90 tablet 3     TURMERIC PO Take 1 tablet by mouth every morning        vitamin D3 (CHOLECALCIFEROL) 2000 units tablet Take 1 tablet by mouth daily 1 tablet 0     busPIRone (BUSPAR) 5 MG tablet Start at 5 mg twice daily for 3 days, then 7.5 mg (1.5 tabs) twice daily for 3 days, then 10 mg (2 tabs)  twice daily for 3 days, then 12.5 mg (2.5 tabs) twice daily for 3 days, then 15 mg (3 tabs) twice daily and stay at that dose (Patient not taking: Reported on 1/25/2019) 150 tablet 1     Allergies   Allergen Reactions     Flagyl [Metronidazole] Rash     Corn Oil Other (See Comments)     Headache       Lorazepam Other (See Comments)     Heat intolerance       Seasonal Allergies Difficulty breathing     Sulfamethoxazole-Trimethoprim      Other reaction(s): Other  Passed out     Tetracycline Swelling     Zofran [Ondansetron]      Prolonged QT  Prolonged QT at baseline per patient     Benzodiazepines Other (See Comments)     Other reaction(s): Other  Extreme heat intolerance  Extreme heat intolerance     Cyclobenzaprine Other (See Comments)     Extreme heat intolerance     Levaquin [Levofloxacin]      Other reaction(s): Other  Tendon rupture     Nsaids Other (See Comments)     Exacerbated asthma     Recent Labs   Lab Test 08/09/18  0934 07/15/18  1132 12/04/17  0950  11/09/17  1644   ALT  --   --   --   --  19   CR  --  0.68 0.64   < > 0.68   GFRESTIMATED  --  88 >90   < > 88   GFRESTBLACK  --  >90 >90   < > >90   POTASSIUM  --  3.7 4.2   < > 4.1   TSH 0.82 1.76  --   --  1.89    < > = values in this interval not displayed.      BP Readings from Last 3 Encounters:   01/25/19 104/71   12/12/18 108/70   12/05/18 122/80    Wt Readings from Last 3 Encounters:   01/25/19 72 kg (158 lb 12.8 oz)   12/12/18 73.9 kg (163 lb)   12/05/18 73 kg (161 lb)                  Labs reviewed in EPIC    Reviewed and updated as needed this visit by clinical staff  Tobacco  Allergies  Meds  Med Hx  Surg Hx  Fam Hx  Soc Hx      Reviewed and updated as needed this visit by Provider         ROS:  Constitutional, HEENT, cardiovascular, pulmonary, GI, , musculoskeletal, neuro, skin, endocrine and psych systems are negative, except as otherwise noted.    OBJECTIVE:     /71   Pulse 95   Temp 98.2  F (36.8  C) (Oral)   Resp 16   Ht  "1.645 m (5' 4.76\")   Wt 72 kg (158 lb 12.8 oz)   LMP 08/21/2007   SpO2 97%   BMI 26.62 kg/m    Body mass index is 26.62 kg/m .  GENERAL: healthy, alert and no distress  HENT: ear canals and TM's normal, nose and mouth without ulcers or lesions  NECK: no adenopathy, no asymmetry, masses, or scars and thyroid normal to palpation  RESP: lungs clear to auscultation - no rales, rhonchi or wheezes  CV: regular rate and rhythm, normal S1 S2, no S3 or S4, no murmur, click or rub, no peripheral edema and peripheral pulses strong  PSYCH: mentation appears normal, affect normal/bright    Diagnostic Test Results:  See orders    ASSESSMENT/PLAN:         ICD-10-CM    1. Vestibular disequilibrium, unspecified laterality H83.2X9    2. Situational anxiety F41.8 clonazePAM (KLONOPIN) 0.5 MG tablet   3. Insomnia, unspecified type G47.00 clonazePAM (KLONOPIN) 0.5 MG tablet   4. Vitamin deficiency E56.9 Iron and iron binding capacity     Comprehensive metabolic panel (BMP + Alb, Alk Phos, ALT, AST, Total. Bili, TP)     Vitamin D Deficiency     Vitamin B12     Ferritin     Magnesium     Vitamin B6     CANCELED: Calcium       See Patient Instructions: We will let you know your lab results. Please follow up as needed.      Ce Crowe, KACI  [facility]  "

## 2019-01-25 ENCOUNTER — OFFICE VISIT (OUTPATIENT)
Dept: FAMILY MEDICINE | Facility: CLINIC | Age: 59
End: 2019-01-25
Payer: COMMERCIAL

## 2019-01-25 VITALS
OXYGEN SATURATION: 97 % | RESPIRATION RATE: 16 BRPM | BODY MASS INDEX: 26.46 KG/M2 | TEMPERATURE: 98.2 F | WEIGHT: 158.8 LBS | HEART RATE: 95 BPM | DIASTOLIC BLOOD PRESSURE: 71 MMHG | SYSTOLIC BLOOD PRESSURE: 104 MMHG | HEIGHT: 65 IN

## 2019-01-25 DIAGNOSIS — F41.8 SITUATIONAL ANXIETY: ICD-10-CM

## 2019-01-25 DIAGNOSIS — H83.2X9 VESTIBULAR DISEQUILIBRIUM, UNSPECIFIED LATERALITY: Primary | ICD-10-CM

## 2019-01-25 DIAGNOSIS — E56.9 VITAMIN DEFICIENCY: ICD-10-CM

## 2019-01-25 DIAGNOSIS — G47.00 INSOMNIA, UNSPECIFIED TYPE: ICD-10-CM

## 2019-01-25 LAB
ALBUMIN SERPL-MCNC: 3.8 G/DL (ref 3.4–5)
ALP SERPL-CCNC: 92 U/L (ref 40–150)
ALT SERPL W P-5'-P-CCNC: 26 U/L (ref 0–50)
ANION GAP SERPL CALCULATED.3IONS-SCNC: 5 MMOL/L (ref 3–14)
AST SERPL W P-5'-P-CCNC: 17 U/L (ref 0–45)
BILIRUB SERPL-MCNC: 0.4 MG/DL (ref 0.2–1.3)
BUN SERPL-MCNC: 14 MG/DL (ref 7–30)
CALCIUM SERPL-MCNC: 9.2 MG/DL (ref 8.5–10.1)
CHLORIDE SERPL-SCNC: 104 MMOL/L (ref 94–109)
CO2 SERPL-SCNC: 27 MMOL/L (ref 20–32)
CREAT SERPL-MCNC: 0.64 MG/DL (ref 0.52–1.04)
DEPRECATED CALCIDIOL+CALCIFEROL SERPL-MC: 75 UG/L (ref 20–75)
FERRITIN SERPL-MCNC: 247 NG/ML (ref 8–252)
GFR SERPL CREATININE-BSD FRML MDRD: >90 ML/MIN/{1.73_M2}
GLUCOSE SERPL-MCNC: 85 MG/DL (ref 70–99)
IRON SATN MFR SERPL: 32 % (ref 15–46)
IRON SERPL-MCNC: 88 UG/DL (ref 35–180)
MAGNESIUM SERPL-MCNC: 2.2 MG/DL (ref 1.6–2.3)
POTASSIUM SERPL-SCNC: 4.5 MMOL/L (ref 3.4–5.3)
PROT SERPL-MCNC: 7.5 G/DL (ref 6.8–8.8)
SODIUM SERPL-SCNC: 136 MMOL/L (ref 133–144)
TIBC SERPL-MCNC: 277 UG/DL (ref 240–430)
VIT B12 SERPL-MCNC: 352 PG/ML (ref 193–986)

## 2019-01-25 PROCEDURE — 99214 OFFICE O/P EST MOD 30 MIN: CPT | Performed by: NURSE PRACTITIONER

## 2019-01-25 PROCEDURE — 80053 COMPREHEN METABOLIC PANEL: CPT | Performed by: NURSE PRACTITIONER

## 2019-01-25 PROCEDURE — 82306 VITAMIN D 25 HYDROXY: CPT | Performed by: NURSE PRACTITIONER

## 2019-01-25 PROCEDURE — 82728 ASSAY OF FERRITIN: CPT | Performed by: NURSE PRACTITIONER

## 2019-01-25 PROCEDURE — 83735 ASSAY OF MAGNESIUM: CPT | Performed by: NURSE PRACTITIONER

## 2019-01-25 PROCEDURE — 83550 IRON BINDING TEST: CPT | Performed by: NURSE PRACTITIONER

## 2019-01-25 PROCEDURE — 83540 ASSAY OF IRON: CPT | Performed by: NURSE PRACTITIONER

## 2019-01-25 PROCEDURE — 99000 SPECIMEN HANDLING OFFICE-LAB: CPT | Performed by: NURSE PRACTITIONER

## 2019-01-25 PROCEDURE — 36415 COLL VENOUS BLD VENIPUNCTURE: CPT | Performed by: NURSE PRACTITIONER

## 2019-01-25 PROCEDURE — 84207 ASSAY OF VITAMIN B-6: CPT | Mod: 90 | Performed by: NURSE PRACTITIONER

## 2019-01-25 PROCEDURE — 82607 VITAMIN B-12: CPT | Performed by: NURSE PRACTITIONER

## 2019-01-25 RX ORDER — CLONAZEPAM 0.5 MG/1
0.25-0.5 TABLET ORAL 2 TIMES DAILY PRN
Qty: 60 TABLET | Refills: 1 | Status: SHIPPED | OUTPATIENT
Start: 2019-01-25 | End: 2019-05-16

## 2019-01-25 ASSESSMENT — PATIENT HEALTH QUESTIONNAIRE - PHQ9
5. POOR APPETITE OR OVEREATING: NOT AT ALL
SUM OF ALL RESPONSES TO PHQ QUESTIONS 1-9: 4

## 2019-01-25 ASSESSMENT — ANXIETY QUESTIONNAIRES
2. NOT BEING ABLE TO STOP OR CONTROL WORRYING: NOT AT ALL
GAD7 TOTAL SCORE: 4
7. FEELING AFRAID AS IF SOMETHING AWFUL MIGHT HAPPEN: NOT AT ALL
1. FEELING NERVOUS, ANXIOUS, OR ON EDGE: NOT AT ALL
5. BEING SO RESTLESS THAT IT IS HARD TO SIT STILL: NOT AT ALL
IF YOU CHECKED OFF ANY PROBLEMS ON THIS QUESTIONNAIRE, HOW DIFFICULT HAVE THESE PROBLEMS MADE IT FOR YOU TO DO YOUR WORK, TAKE CARE OF THINGS AT HOME, OR GET ALONG WITH OTHER PEOPLE: NOT DIFFICULT AT ALL
3. WORRYING TOO MUCH ABOUT DIFFERENT THINGS: NEARLY EVERY DAY
6. BECOMING EASILY ANNOYED OR IRRITABLE: SEVERAL DAYS

## 2019-01-25 ASSESSMENT — MIFFLIN-ST. JEOR: SCORE: 1287.44

## 2019-01-25 NOTE — PATIENT INSTRUCTIONS
We will let you know your lab results. Please follow up as needed.        Patient Education     Managing Balance Problems: Vestibular Rehabilitation Therapy    An inner ear problem can knock out part of the balance system. Vestibular rehabilitation therapy helps you learn how to rely on specific parts of your balance system.  Checking eye movement  The therapist will check for nystagmus. This is an automatic, jumpy eye movement. Nystagmus in certain positions can mean an inner ear problem. It can even show which semicircular canal is affected. The therapist will watch your eyes while you move into different positions.  Treating your condition  Treatment can include:    Canalith repositioning. This is a series of guided head and body movements. It helps move crystals, easing symptoms of benign positional vertigo (BPV).    Habituation exercises to retrain your balance system. The therapist will teach you how to do these exercises at home.    Gaze stabilization exercises. These are to retrain the eyes to stay in focus while your head moves. This helps ease dysequilibrium.    Gait and balance training. This includes standing and walking on different surfaces. The therapist can teach you how to keep your balance and prevent falls.  Doing habituation exercises  The therapist will teach exercises suited to your condition. Habituation exercises will make you dizzy at first. Just remind yourself that symptoms probably will last for only a minute. If you keep doing the exercises, they will help lessen your dizziness. Then when you do a similar movement in daily life, it is less likely to bring on symptoms.  Getting back into action  Dizziness doesn't have to keep you from exercising. Start with activities that don't make you dizzy. Then ease into more challenging activities. Try these tips:    Always exercise with a partner.    Stop if you have nausea or faintness.    Walk on a treadmill, holding on to the handles for  support.    Use a ball machine for sports like tennis until you're ready for a live game. This way you know where to expect the ball.    Don't give up. With time, most people can return to activities and sports.  Date Last Reviewed: 11/1/2016 2000-2018 [employer]. [address] [place] [address], [place] [address]. All rights reserved. This information is not intended as a substitute for professional medical care. Always follow your healthcare professional's instructions.           Patient Education     Meniere s Disease    Meniere s disease is a chronic recurring condition. It is due to a problem with the inner ear. This is the part of the ear responsible for balance as well as hearing. Symptoms include:    Sudden attacks of vertigo. Vertigo is a spinning or whirling feeling that causes balance problems. These attacks often include nausea, vomiting, and sweating. During an attack of vertigo, head movement and body position changes will worsen symptoms.    Hearing problems. Hearing is often partly or completely lost during the vertigo attack, then comes back. Over time, though, hearing can be affected.    Tinnitus. This is ringing, buzzing, whistling, or roaring noises in the ear. It may come and go or always be present.     Feeling of pressure. You may also have a feeling of pressure or fullness in the ear.  Attacks may occur weeks, months, or even years apart. Each episode of vertigo may last 20 minutes to several hours. The symptoms are due to changes in fluid in the inner ear canals. The exact cause is not known.   Treatment for Meniere s disease includes lifestyle changes, medicines, and medical procedures. Surgery may be advised in severe cases.  Home care  Medicines  You may be prescribed medicine to take regularly to help prevent attacks. You may also be prescribed medicine to take only during attacks. Take these as directed.  Lifestyle changes  These lifestyle changes can help make attacks less  frequent or less severe.     Reduce your salt intake. Eating too much salt can make this condition worse. A common recommendation is to limit sodium to no more than 2,300 mg a day. Talk to your healthcare provider about ways to limit sodium in your diet.    Quit smoking.    Limit alcohol and caffeine.    Try to limit stress.  During Attacks  If an attack is about to start or has started, take any medicine you have been prescribed for an attack. Lie down on a firm surface in a darkened room. Stay as still as possible. Stay away from bright light. Do not try to read or watch TV. Don't get up until the spinning passes.  Follow-up care  Follow up with your healthcare provider for further assessment and treatment. If you have been referred to a specialist, make an appointment right away.  When to seek medical advice  Call your healthcare provider right away if you have:    Symptoms that get worse    Weakness that gets worse    Fainting    Headache or unusual drowsiness    Trouble with speech or movement    Fever of 100.4 F (38 C) or higher, or as directed by your healthcare provider  Date Last Reviewed: 10/1/2017    5689-6031 [employer]. [address] [place] [address], [place] [address]. All rights reserved. This information is not intended as a substitute for professional medical care. Always follow your healthcare professional's instructions.

## 2019-01-26 ASSESSMENT — ANXIETY QUESTIONNAIRES: GAD7 TOTAL SCORE: 4

## 2019-01-29 LAB — VIT B6 SERPL-MCNC: 62.9 NMOL/L (ref 20–125)

## 2019-01-29 NOTE — RESULT ENCOUNTER NOTE
Jose Luis Cook,    Thank you for your recent office visit.    Here are your recent results.  All of your labs came back normal.  If you have any questions/ concerns, please let me know.     Feel free to contact me via [employer] or call the clinic at [phone].    Sincerely,    SHAYNA Simon, FNP-BC

## 2019-02-04 ENCOUNTER — TRANSFERRED RECORDS (OUTPATIENT)
Dept: HEALTH INFORMATION MANAGEMENT | Facility: CLINIC | Age: 59
End: 2019-02-04

## 2019-02-21 ENCOUNTER — OFFICE VISIT (OUTPATIENT)
Dept: CARDIOLOGY | Facility: CLINIC | Age: 59
End: 2019-02-21
Attending: INTERNAL MEDICINE
Payer: COMMERCIAL

## 2019-02-21 VITALS
DIASTOLIC BLOOD PRESSURE: 79 MMHG | BODY MASS INDEX: 27.27 KG/M2 | HEART RATE: 66 BPM | HEIGHT: 65 IN | OXYGEN SATURATION: 97 % | WEIGHT: 163.7 LBS | SYSTOLIC BLOOD PRESSURE: 115 MMHG

## 2019-02-21 DIAGNOSIS — I44.2 AV BLOCK, COMPLETE (H): ICD-10-CM

## 2019-02-21 DIAGNOSIS — I44.2 COMPLETE ATRIOVENTRICULAR BLOCK (H): ICD-10-CM

## 2019-02-21 PROCEDURE — 99214 OFFICE O/P EST MOD 30 MIN: CPT | Mod: ZP | Performed by: INTERNAL MEDICINE

## 2019-02-21 PROCEDURE — G0463 HOSPITAL OUTPT CLINIC VISIT: HCPCS

## 2019-02-21 ASSESSMENT — MIFFLIN-ST. JEOR: SCORE: 1313.42

## 2019-02-21 ASSESSMENT — PAIN SCALES - GENERAL: PAINLEVEL: NO PAIN (0)

## 2019-02-21 NOTE — PATIENT INSTRUCTIONS
You will be scheduled for a follow up visit: 1 year with Dr Funes and device check    We encourage you to use My Chart as your primary form of communication if possible. If you need assistance in setting this up, please contact our office or ask at your follow up visit.     If you need a medication refill please contact your pharmacy. Please allow at least 3 business days for your refill to be completed.       Cardiology  Telephone Number    Fiorella Wright [phone]   Or send a message to your provider via my chart.   For scheduling procedures:    [facility]    Clinic appointments       [phone]   For the Device Clinic (Pacemakers and ICD's)   RN's :   Alpa Porras  During business hours: [phone]    After business hours:   [phone]- select option 4 and ask for job code [id number].          As always, Thank you for trusting us with your health care needs!

## 2019-02-21 NOTE — NURSING NOTE
.Vitals completed successfully and medication reconciled.     Jessica Way, LETICIA  9:55 AM  Chief Complaint   Patient presents with     Follow Up     Destiny FunesU (5/3/18)

## 2019-02-21 NOTE — LETTER
"2/21/2019      RE: Joyce Marroquin  [address] [facility] [id number]       Dear Colleague,    Thank you for the opportunity to participate in the care of your patient, Joyce Marroquin, at the [facility] HEART [facility]. Please see a copy of my visit note below.    HPI: 58-year-old female with a past medical history significant for GERD, RVOT RFA complicated by complete heart block status post pacemaker placement in 2017, pericarditis, and hyperthyroidism/hyperparathyroidism who presents today for follow-up.  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  Her pacemaker was implanted at [facility].      Device check today; 2/21/19: Patient seen in Clinic for evaluation and iterative programming of a Medtronic Dual lead pacemaker per MD orders. Patient is scheduled to see Dr. Funes today. Normal pacemaker function. No episodes/arrhythmias recorded. Intrinsic rhythm = SR 85 with CHB Ventricular rate @ 34 bpm. AP = 23%.  = 99.9%. Estimated battery longevity to KONRAD = 7 years. Battery voltage = 3.01 V. No short v-v intervals recorded. Lead trends appear stable. Patient reports that she is feeling \"ok\". Plan for patient to send a remote transmission in 3 months and return to clinic in 6 months.Dual lead pacemakerI have reviewed and interpreted the device interrogation, settings, programming and nurse's summary.  The device is functioning within normal device parameters.     Joyce is pacemaker status was discussed.  I indicated that it appeared to be functioning well and there was still 7 years of longevity remaining    Recently she has had evaluation for vestibular disorder and that appears to have been helpful with regard to her lightheadedness and dizziness.  However, she still has issues with exertional tolerance.  Joyce exercises on a treadmill and finds that there is considerable variability from day-to-day and her exercise tolerance.  She is " also noted some increased leg swelling.  She has not had any change in her overall cardiac status.  Joyce is not complaining of symptoms suggestive of heart failure and I suspect that the swelling is not of cardiovascular origin.  I reassured her in this regard.  I encouraged her to continue her regular exercise..    She has had recent parathyroid surgery      PAST MEDICAL HISTORY:  Past Medical History:   Diagnosis Date     Cardiomyopathy (H)     ff Cardiology     Chronic depressive personality disorder      Esophageal reflux      Ex-smoker     quit 2006; 1 PPD x 30     Hyperparathyroidism (H)     s/p parathyroidectomy     Hypothyroidism      LARRY on CPAP     ff Sleep medicine     Pulmonary nodules     ff Pulmonologist     S/P cardiac pacemaker procedure     [facility]        CURRENT MEDICATIONS:  Current Outpatient Medications   Medication Sig Dispense Refill     Acetaminophen (TYLENOL PO) Take 500-1,000 mg by mouth daily as needed        albuterol (PROAIR HFA/PROVENTIL HFA/VENTOLIN HFA) 108 (90 BASE) MCG/ACT Inhaler Inhale 2 puffs into the lungs as needed for shortness of breath / dyspnea or wheezing        busPIRone (BUSPAR) 5 MG tablet Start at 5 mg twice daily for 3 days, then 7.5 mg (1.5 tabs) twice daily for 3 days, then 10 mg (2 tabs) twice daily for 3 days, then 12.5 mg (2.5 tabs) twice daily for 3 days, then 15 mg (3 tabs) twice daily and stay at that dose 150 tablet 1     clonazePAM (KLONOPIN) 0.5 MG tablet Take 0.5-1 tablets (0.25-0.5 mg) by mouth 2 times daily as needed for anxiety 60 tablet 1     cycloSPORINE (RESTASIS) 0.05 % ophthalmic emulsion Apply 1 drop to eye every 12 hours 2 Box 1     DiphenhydrAMINE HCl (BENADRYL PO) Take 25-50 mg by mouth nightly as needed       Ferrous Sulfate (IRON SUPPLEMENT PO) Take 65 mg by mouth 2 times daily (with meals)        fluocinolone acetonide 0.01 % OIL Place 4-5 drops in right ear 1-2 times daily for 5-7 days. 20 mL 1     loperamide  (IMODIUM A-D) 2 MG tablet Take 2 tabs (4 mg) after first loose stool, and then take one tab (2 mg) after each diarrheal stool.  Max of 8 tabs (16 mg) per day. 1 tablet 0     MAGNESIUM LACTATE PO Take 180 mg by mouth At Bedtime        MELATONIN PO Take 3 mg by mouth At Bedtime        Menaquinone-7 (VITAMIN K2 PO) Take 1 tablet by mouth every morning        METHYLPREDNISOLONE PO Take 40 mg by mouth as needed        Multiple Vitamin (DAILY MULTIVITAMIN PO) Take 1 tablet by mouth every morning        nebulizer nebulization as needed       Probiotic Product (PROBIOTIC DAILY PO) Take 1 capsule by mouth 2 times daily        rOPINIRole (REQUIP) 0.25 MG tablet        SYNTHROID 150 MCG tablet Take 1 tablet (150 mcg) by mouth daily 90 tablet 3     TURMERIC PO Take 1 tablet by mouth every morning        vitamin D3 (CHOLECALCIFEROL) 2000 units tablet Take 1 tablet by mouth daily 1 tablet 0       PAST SURGICAL HISTORY:  Past Surgical History:   Procedure Laterality Date     BUNIONECTOMY Bilateral      EP ABLATION / EP STUDIES  04/21/2017     EXPLORE NECK N/A 9/19/2018    Procedure: EXPLORE NECK;  Neck Exploration Resection of Left superior Parathyroid gland;  Surgeon: Kristine Venegas MD;  Location: [facility] OR      CORRECT BUNION,SIMPLE       PARATHYROIDECTOMY N/A 9/19/2018    Procedure: PARATHYROIDECTOMY;;  Surgeon: Kristine Venegas MD;  Location: [facility] OR     PPM INSERT OF NEW OR REPL W/VENT LEAD  04/21/2017     TONSILLECTOMY & ADENOIDECTOMY         ALLERGIES:     Allergies   Allergen Reactions     Flagyl [Metronidazole] Rash     Corn Oil Other (See Comments)     Headache       Lorazepam Other (See Comments)     Heat intolerance       Seasonal Allergies Difficulty breathing     Sulfamethoxazole-Trimethoprim      Other reaction(s): Other  Passed out     Tetracycline Swelling     Zofran [Ondansetron]      Prolonged QT  Prolonged QT at baseline per patient     Benzodiazepines Other (See Comments)     Other reaction(s):  "Other  Extreme heat intolerance  Extreme heat intolerance     Cyclobenzaprine Other (See Comments)     Extreme heat intolerance     Levaquin [Levofloxacin]      Other reaction(s): Other  Tendon rupture     Nsaids Other (See Comments)     Exacerbated asthma       FAMILY HISTORY:  Family History   Problem Relation Age of Onset     Hypothyroidism Mother      Hypertension Mother      Osteoarthritis Mother      Coronary Artery Disease Father         nonfatal MI in his 70s     Asthma Father      Diabetes Sister      Hypothyroidism Sister      Breast Cancer Maternal Aunt      - Premature coronary artery disease  - Atrial fibrillation  - Sudden cardiac death     SOCIAL HISTORY:  Social History     Tobacco Use     Smoking status: Former Smoker     Smokeless tobacco: Never Used   Substance Use Topics     Alcohol use: Yes     Comment: seldom     Drug use: No       ROS:   Constitutional: No fever, chills, or sweats. Weight stable.   ENT: No visual disturbance, ear ache, epistaxis, sore throat.   Cardiovascular: As per HPI.   Respiratory: No cough, hemoptysis.    GI: No nausea, vomiting, hematemesis, melena, or hematochezia.   : No hematuria.   Integument: Negative.   Psychiatric: Negative.   Hematologic:  Easy bruising, no easy bleeding.  Neuro: Negative.   Endocrinology: No significant heat or cold intolerance   Musculoskeletal: No myalgia.    Exam:  /79 (BP Location: Right arm, Patient Position: Chair, Cuff Size: Adult Large)   Pulse 66   Ht 1.651 m (5' 5\")   Wt 74.3 kg (163 lb 11.2 oz)   LMP 08/21/2007   SpO2 97%   BMI 27.24 kg/m     GENERAL APPEARANCE: healthy, alert and no distress  HEENT: no icterus, no xanthelasmas, normal pupil size and reaction, normal palate, mucosa moist, no central cyanosis  NECK: no adenopathy, no asymmetry, masses, or scars, thyroid normal to palpation and no bruits, JVP not elevated  RESPIRATORY: lungs clear to auscultation - no rales, rhonchi or wheezes, no use of accessory " muscles, no retractions, respirations are unlabored, normal respiratory rate  CARDIOVASCULAR: regular rhythm, normal S1 with physiologic split S2, no S3 or S4 and no murmur, click or rub, precordium quiet with normal PMI.  ABDOMEN: soft, non tender, without hepatosplenomegaly, no masses palpable, bowel sounds normal, aorta not enlarged by palpation, no abdominal bruits  EXTREMITIES: peripheral pulses normal, no edema, no bruits  NEURO: alert and oriented to person/place/time, normal speech, gait and affect  VASC: Radial, femoral, dorsalis pedis and posterior tibialis pulses are normal in volumes and symmetric bilaterally. No bruits are heard.  SKIN: no ecchymoses, no rashes    Labs:  CBC RESULTS:   Lab Results   Component Value Date    WBC 7.1 07/15/2018    RBC 4.27 07/15/2018    HGB 13.8 07/15/2018    HCT 40.7 07/15/2018    MCV 95 07/15/2018    MCH 32.3 07/15/2018    MCHC 33.9 07/15/2018    RDW 13.0 07/15/2018     07/15/2018       BMP RESULTS:  Lab Results   Component Value Date     2019    POTASSIUM 4.5 2019    CHLORIDE 104 2019    CO2 27 2019    ANIONGAP 5 2019    GLC 85 2019    BUN 14 2019    CR 0.64 2019    GFRESTIMATED >90 2019    GFRESTBLACK >90 2019    MANJU 9.2 2019        INR RESULTS:  Lab Results   Component Value Date    INR 0.95 2017       Procedures:    ECHOCARDIOGRAM:   Recent Results (from the past 8760 hour(s))   ECHO COMPLETE WITH OPTISON    Narrative    [id number]  [id number]  [id number]  [id number]^EDDRA^STUART^IRINEO           [facility]  Diagnostic and Treamtent-3rd Floor  [address] [place] [address]     Name: MARCELO DAVIS  MRN: [id number]  : 1960  Study Date: 2018 10:30 AM  Age: 57 yrs  Gender: Female  [place] [facility] [place]  Reason For Study: Complete atrioventricular block  History: Complete atrioventricular block  Ordering Physician: DEDRA  STUART  Referring Physician: STUART COLMENARES  Performed By: Annia Alcala RDCS     BSA: 1.8 m2  Height: 65 in  Weight: 159 lb  BP: 121/84 mmHg  _____________________________________________________________________________  __        Procedure  Echocardiogram with two-dimensional, color and spectral Doppler performed.  Contrast Optison. Optison (NDC #6975-9287-00) given intravenously. Patient was  given 6 ml mixture of 3 ml Optison and 6 ml saline. 3 ml wasted. IV start  location R Upper arm .  _____________________________________________________________________________  __        Interpretation Summary  Borderline (EF 50-55%) reduced left ventricular function is present. Traced at  54%. Mild diffuse hypokinesis is present.  Right ventricular function, chamber size, wall motion, and thickness are  normal.  The inferior vena cava was normal in size with preserved respiratory  variability.  No pericardial effusion is present.  Compared to prior study, LV fxn is improved.  _____________________________________________________________________________  __        Left Ventricle  Left ventricular wall thickness is normal. Left ventricular size is normal.  Left ventricular diastolic function is indeterminate. Borderline (EF 50-55%)  reduced left ventricular function is present. Abnormal septal motion  consistent with pacemaker is present. Mild diffuse hypokinesis is present.     Right Ventricle  Right ventricular function, chamber size, wall motion, and thickness are  normal. A pacemaker lead is noted in the right ventricle.     Atria  Both atria appear normal.     Mitral Valve  Trace mitral insufficiency is present.        Aortic Valve  Aortic valve is normal in structure and function.     Tricuspid Valve  The tricuspid valve is normal. The right ventricular systolic pressure is  approximated at 18.1 mmHg plus the right atrial pressure.     Pulmonic Valve  The pulmonic valve is normal.     Vessels  The thoracic aorta is  normal. The inferior vena cava was normal in size with  preserved respiratory variability.     Pericardium  No pericardial effusion is present.     _____________________________________________________________________________  __  MMode/2D Measurements & Calculations  IVSd: 0.93 cm  LVIDd: 3.6 cm  LVIDs: 2.7 cm  LVPWd: 0.99 cm  FS: 25.6 %     LV mass(C)d: 103.8 grams  LV mass(C)dI: 57.8 grams/m2  Ao root diam: 3.0 cm  LA dimension: 3.7 cm  asc Aorta Diam: 2.8 cm  LA/Ao: 1.2  LVOT diam: 1.8 cm  LVOT area: 2.7 cm2  LA Volume (BP): 34.6 ml  LA Volume Index (BP): 19.3 ml/m2  RWT: 0.54        Doppler Measurements & Calculations  MV E max adithya: 52.8 cm/sec  MV A max adithya: 60.7 cm/sec  MV E/A: 0.87     MV dec time: 0.20 sec  Ao V2 max: 122.6 cm/sec  Ao max P.0 mmHg  Ao V2 mean: 86.4 cm/sec  Ao mean PG: 3.4 mmHg  Ao V2 VTI: 21.3 cm  TOYIN(I,D): 2.2 cm2  TOYIN(V,D): 2.1 cm2  LV V1 max PG: 3.9 mmHg  LV V1 max: 98.2 cm/sec  LV V1 VTI: 17.5 cm  SV(LVOT): 46.8 ml  SI(LVOT): 26.1 ml/m2  PA acc time: 0.09 sec  TR max adithya: 212.1 cm/sec  TR max P.1 mmHg  AV Adithya Ratio (DI): 0.80  TOYIN Index (cm2/m2): 1.2  E/E' av.1  Lateral E/e': 6.5  Medial E/e': 9.7        _____________________________________________________________________________  __        Report approved by: Aki Rashid 2018 03:24 PM            Assessment and Plan:     58-year-old female with a past medical history significant for GERD, RVOT RFA complicated by complete heart block status post pacemaker placement in 2017, pericarditis, and hyperthyroidism/hyperparathyroidism who presents today for follow-up.   I very much appreciated the opportunity to see and assess Joyce Marroquin in the clinic today.    1. PPM operating normally, continue usual followup    2. Variable exercise tolerance of uncertain origin but can do 15-30 min on treadmill    3. Recent parathyroid surgery    RTC 1 year or earlier if needed      TSUART Raya MD  IRINEO

## 2019-02-21 NOTE — PATIENT INSTRUCTIONS
It was a pleasure to see you in clinic today. Please do not hesitate to call with any questions or concerns. You are scheduled for a remote Pacemaker transmission on 5/21/19.  We look forward to seeing you in clinic at your next device check in 6 months.    Lucia Menard, RN  Electrophysiology Nurse Clinician  [facility]  During business hours call:  [phone]  After business hours please call: [phone]- select option #4 and ask for job code [id number].

## 2019-02-21 NOTE — PROGRESS NOTES
"HPI: 58-year-old female with a past medical history significant for GERD, RVOT RFA complicated by complete heart block status post pacemaker placement in 2017, pericarditis, and hyperthyroidism/hyperparathyroidism who presents today for follow-up.  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  Her pacemaker was implanted at [facility].      Device check today; 2/21/19: Patient seen in Clinic for evaluation and iterative programming of a Medtronic Dual lead pacemaker per MD orders. Patient is scheduled to see Dr. Funes today. Normal pacemaker function. No episodes/arrhythmias recorded. Intrinsic rhythm = SR 85 with CHB Ventricular rate @ 34 bpm. AP = 23%.  = 99.9%. Estimated battery longevity to KONRAD = 7 years. Battery voltage = 3.01 V. No short v-v intervals recorded. Lead trends appear stable. Patient reports that she is feeling \"ok\". Plan for patient to send a remote transmission in 3 months and return to clinic in 6 months.Dual lead pacemakerI have reviewed and interpreted the device interrogation, settings, programming and nurse's summary.  The device is functioning within normal device parameters.     Joyce segura pacemaker status was discussed.  I indicated that it appeared to be functioning well and there was still 7 years of longevity remaining    Recently she has had evaluation for vestibular disorder and that appears to have been helpful with regard to her lightheadedness and dizziness.  However, she still has issues with exertional tolerance.  Joyce exercises on a treadmill and finds that there is considerable variability from day-to-day and her exercise tolerance.  She is also noted some increased leg swelling.  She has not had any change in her overall cardiac status.  Joyce is not complaining of symptoms suggestive of heart failure and I suspect that the swelling is not of cardiovascular origin.  I reassured her in this regard.  I encouraged her to continue her regular " exercise..    She has had recent parathyroid surgery      PAST MEDICAL HISTORY:  Past Medical History:   Diagnosis Date     Cardiomyopathy (H)     ff Cardiology     Chronic depressive personality disorder      Esophageal reflux      Ex-smoker     quit 2006; 1 PPD x 30     Hyperparathyroidism (H)     s/p parathyroidectomy     Hypothyroidism      LARRY on CPAP     ff Sleep medicine     Pulmonary nodules     ff Pulmonologist     S/P cardiac pacemaker procedure     checked every 6 months at the [facility]       CURRENT MEDICATIONS:  Current Outpatient Medications   Medication Sig Dispense Refill     Acetaminophen (TYLENOL PO) Take 500-1,000 mg by mouth daily as needed        albuterol (PROAIR HFA/PROVENTIL HFA/VENTOLIN HFA) 108 (90 BASE) MCG/ACT Inhaler Inhale 2 puffs into the lungs as needed for shortness of breath / dyspnea or wheezing        busPIRone (BUSPAR) 5 MG tablet Start at 5 mg twice daily for 3 days, then 7.5 mg (1.5 tabs) twice daily for 3 days, then 10 mg (2 tabs) twice daily for 3 days, then 12.5 mg (2.5 tabs) twice daily for 3 days, then 15 mg (3 tabs) twice daily and stay at that dose 150 tablet 1     clonazePAM (KLONOPIN) 0.5 MG tablet Take 0.5-1 tablets (0.25-0.5 mg) by mouth 2 times daily as needed for anxiety 60 tablet 1     cycloSPORINE (RESTASIS) 0.05 % ophthalmic emulsion Apply 1 drop to eye every 12 hours 2 Box 1     DiphenhydrAMINE HCl (BENADRYL PO) Take 25-50 mg by mouth nightly as needed       Ferrous Sulfate (IRON SUPPLEMENT PO) Take 65 mg by mouth 2 times daily (with meals)        fluocinolone acetonide 0.01 % OIL Place 4-5 drops in right ear 1-2 times daily for 5-7 days. 20 mL 1     loperamide (IMODIUM A-D) 2 MG tablet Take 2 tabs (4 mg) after first loose stool, and then take one tab (2 mg) after each diarrheal stool.  Max of 8 tabs (16 mg) per day. 1 tablet 0     MAGNESIUM LACTATE PO Take 180 mg by mouth At Bedtime        MELATONIN PO Take 3 mg by mouth At Bedtime        Menaquinone-7 (VITAMIN  K2 PO) Take 1 tablet by mouth every morning        METHYLPREDNISOLONE PO Take 40 mg by mouth as needed        Multiple Vitamin (DAILY MULTIVITAMIN PO) Take 1 tablet by mouth every morning        nebulizer nebulization as needed       Probiotic Product (PROBIOTIC DAILY PO) Take 1 capsule by mouth 2 times daily        rOPINIRole (REQUIP) 0.25 MG tablet        SYNTHROID 150 MCG tablet Take 1 tablet (150 mcg) by mouth daily 90 tablet 3     TURMERIC PO Take 1 tablet by mouth every morning        vitamin D3 (CHOLECALCIFEROL) 2000 units tablet Take 1 tablet by mouth daily 1 tablet 0       PAST SURGICAL HISTORY:  Past Surgical History:   Procedure Laterality Date     BUNIONECTOMY Bilateral      EP ABLATION / EP STUDIES  04/21/2017     EXPLORE NECK N/A 9/19/2018    Procedure: EXPLORE NECK;  Neck Exploration Resection of Left superior Parathyroid gland;  Surgeon: Kristine Venegas MD;  Location: [facility] OR     HC CORRECT BUNION,SIMPLE       PARATHYROIDECTOMY N/A 9/19/2018    Procedure: PARATHYROIDECTOMY;;  Surgeon: Kristine Venegas MD;  Location: [facility] OR     PPM INSERT OF NEW OR REPL W/VENT LEAD  04/21/2017     TONSILLECTOMY & ADENOIDECTOMY         ALLERGIES:     Allergies   Allergen Reactions     Flagyl [Metronidazole] Rash     Corn Oil Other (See Comments)     Headache       Lorazepam Other (See Comments)     Heat intolerance       Seasonal Allergies Difficulty breathing     Sulfamethoxazole-Trimethoprim      Other reaction(s): Other  Passed out     Tetracycline Swelling     Zofran [Ondansetron]      Prolonged QT  Prolonged QT at baseline per patient     Benzodiazepines Other (See Comments)     Other reaction(s): Other  Extreme heat intolerance  Extreme heat intolerance     Cyclobenzaprine Other (See Comments)     Extreme heat intolerance     Levaquin [Levofloxacin]      Other reaction(s): Other  Tendon rupture     Nsaids Other (See Comments)     Exacerbated asthma       FAMILY HISTORY:  Family History   Problem Relation  "Age of Onset     Hypothyroidism Mother      Hypertension Mother      Osteoarthritis Mother      Coronary Artery Disease Father         nonfatal MI in his 70s     Asthma Father      Diabetes Sister      Hypothyroidism Sister      Breast Cancer Maternal Aunt      - Premature coronary artery disease  - Atrial fibrillation  - Sudden cardiac death     SOCIAL HISTORY:  Social History     Tobacco Use     Smoking status: Former Smoker     Smokeless tobacco: Never Used   Substance Use Topics     Alcohol use: Yes     Comment: seldom     Drug use: No       ROS:   Constitutional: No fever, chills, or sweats. Weight stable.   ENT: No visual disturbance, ear ache, epistaxis, sore throat.   Cardiovascular: As per HPI.   Respiratory: No cough, hemoptysis.    GI: No nausea, vomiting, hematemesis, melena, or hematochezia.   : No hematuria.   Integument: Negative.   Psychiatric: Negative.   Hematologic:  Easy bruising, no easy bleeding.  Neuro: Negative.   Endocrinology: No significant heat or cold intolerance   Musculoskeletal: No myalgia.    Exam:  /79 (BP Location: Right arm, Patient Position: Chair, Cuff Size: Adult Large)   Pulse 66   Ht 1.651 m (5' 5\")   Wt 74.3 kg (163 lb 11.2 oz)   LMP 08/21/2007   SpO2 97%   BMI 27.24 kg/m    GENERAL APPEARANCE: healthy, alert and no distress  HEENT: no icterus, no xanthelasmas, normal pupil size and reaction, normal palate, mucosa moist, no central cyanosis  NECK: no adenopathy, no asymmetry, masses, or scars, thyroid normal to palpation and no bruits, JVP not elevated  RESPIRATORY: lungs clear to auscultation - no rales, rhonchi or wheezes, no use of accessory muscles, no retractions, respirations are unlabored, normal respiratory rate  CARDIOVASCULAR: regular rhythm, normal S1 with physiologic split S2, no S3 or S4 and no murmur, click or rub, precordium quiet with normal PMI.  ABDOMEN: soft, non tender, without hepatosplenomegaly, no masses palpable, bowel sounds normal, " aorta not enlarged by palpation, no abdominal bruits  EXTREMITIES: peripheral pulses normal, no edema, no bruits  NEURO: alert and oriented to person/place/time, normal speech, gait and affect  VASC: Radial, femoral, dorsalis pedis and posterior tibialis pulses are normal in volumes and symmetric bilaterally. No bruits are heard.  SKIN: no ecchymoses, no rashes    Labs:  CBC RESULTS:   Lab Results   Component Value Date    WBC 7.1 07/15/2018    RBC 4.27 07/15/2018    HGB 13.8 07/15/2018    HCT 40.7 07/15/2018    MCV 95 07/15/2018    MCH 32.3 07/15/2018    MCHC 33.9 07/15/2018    RDW 13.0 07/15/2018     07/15/2018       BMP RESULTS:  Lab Results   Component Value Date     2019    POTASSIUM 4.5 2019    CHLORIDE 104 2019    CO2 27 2019    ANIONGAP 5 2019    GLC 85 2019    BUN 14 2019    CR 0.64 2019    GFRESTIMATED >90 2019    GFRESTBLACK >90 2019    MANJU 9.2 2019        INR RESULTS:  Lab Results   Component Value Date    INR 0.95 2017       Procedures:    ECHOCARDIOGRAM:   Recent Results (from the past 8760 hour(s))   ECHO COMPLETE WITH OPTISON    Narrative    [id number]  [id number]  [id number]  [id number]^DEDRA^STUART^IRINEO           [facility]  Diagnostic and Treamtent-3rd Floor  [address] [place] [id number]     Name: MARCELO DAVIS  MRN: [id number]  : 1960  Study Date: 2018 10:30 AM  Age: 57 yrs  Gender: Female  [place] [facility] [place] Study: Complete atrioventricular block  History: Complete atrioventricular block  Ordering Physician: STUART COLMENARES  Referring Physician: STUART COLMENARES  Performed By: Annia Alcala RDCS     BSA: 1.8 m2  Height: 65 in  Weight: 159 lb  BP: 121/84 mmHg  _____________________________________________________________________________  __        Procedure  Echocardiogram with two-dimensional, color and spectral Doppler  performed.  Contrast Optison. Optison (NDC #1576-6010-30) given intravenously. Patient was  given 6 ml mixture of 3 ml Optison and 6 ml saline. 3 ml wasted. IV start  location R Upper arm .  _____________________________________________________________________________  __        Interpretation Summary  Borderline (EF 50-55%) reduced left ventricular function is present. Traced at  54%. Mild diffuse hypokinesis is present.  Right ventricular function, chamber size, wall motion, and thickness are  normal.  The inferior vena cava was normal in size with preserved respiratory  variability.  No pericardial effusion is present.  Compared to prior study, LV fxn is improved.  _____________________________________________________________________________  __        Left Ventricle  Left ventricular wall thickness is normal. Left ventricular size is normal.  Left ventricular diastolic function is indeterminate. Borderline (EF 50-55%)  reduced left ventricular function is present. Abnormal septal motion  consistent with pacemaker is present. Mild diffuse hypokinesis is present.     Right Ventricle  Right ventricular function, chamber size, wall motion, and thickness are  normal. A pacemaker lead is noted in the right ventricle.     Atria  Both atria appear normal.     Mitral Valve  Trace mitral insufficiency is present.        Aortic Valve  Aortic valve is normal in structure and function.     Tricuspid Valve  The tricuspid valve is normal. The right ventricular systolic pressure is  approximated at 18.1 mmHg plus the right atrial pressure.     Pulmonic Valve  The pulmonic valve is normal.     Vessels  The thoracic aorta is normal. The inferior vena cava was normal in size with  preserved respiratory variability.     Pericardium  No pericardial effusion is present.     _____________________________________________________________________________  __  MMode/2D Measurements & Calculations  IVSd: 0.93 cm  LVIDd: 3.6 cm  LVIDs: 2.7  cm  LVPWd: 0.99 cm  FS: 25.6 %     LV mass(C)d: 103.8 grams  LV mass(C)dI: 57.8 grams/m2  Ao root diam: 3.0 cm  LA dimension: 3.7 cm  asc Aorta Diam: 2.8 cm  LA/Ao: 1.2  LVOT diam: 1.8 cm  LVOT area: 2.7 cm2  LA Volume (BP): 34.6 ml  LA Volume Index (BP): 19.3 ml/m2  RWT: 0.54        Doppler Measurements & Calculations  MV E max adithya: 52.8 cm/sec  MV A max adithya: 60.7 cm/sec  MV E/A: 0.87     MV dec time: 0.20 sec  Ao V2 max: 122.6 cm/sec  Ao max P.0 mmHg  Ao V2 mean: 86.4 cm/sec  Ao mean PG: 3.4 mmHg  Ao V2 VTI: 21.3 cm  TOYIN(I,D): 2.2 cm2  TOYIN(V,D): 2.1 cm2  LV V1 max PG: 3.9 mmHg  LV V1 max: 98.2 cm/sec  LV V1 VTI: 17.5 cm  SV(LVOT): 46.8 ml  SI(LVOT): 26.1 ml/m2  PA acc time: 0.09 sec  TR max adithya: 212.1 cm/sec  TR max P.1 mmHg  AV Adithya Ratio (DI): 0.80  TOYIN Index (cm2/m2): 1.2  E/E' av.1  Lateral E/e': 6.5  Medial E/e': 9.7        _____________________________________________________________________________  __        Report approved by: Aki Rashid 2018 03:24 PM            Assessment and Plan:     58-year-old female with a past medical history significant for GERD, RVOT RFA complicated by complete heart block status post pacemaker placement in 2017, pericarditis, and hyperthyroidism/hyperparathyroidism who presents today for follow-up.   I very much appreciated the opportunity to see and assess Joyce Marroquin in the clinic today.    1. PPM operating normally, continue usual followup    2. Variable exercise tolerance of uncertain origin but can do 15-30 min on treadmill    3. Recent parathyroid surgery    RTC 1 year or earlier if needed      Please do not hesitate to contact my office if you have any questions or concerns.      Stuart Funes MD  Cardiac Arrhythmia Service  [facility]  [phone]    CC  STUART FUNES

## 2019-03-17 ENCOUNTER — APPOINTMENT (OUTPATIENT)
Dept: CT IMAGING | Facility: CLINIC | Age: 59
End: 2019-03-17
Attending: EMERGENCY MEDICINE
Payer: COMMERCIAL

## 2019-03-17 ENCOUNTER — HOSPITAL ENCOUNTER (EMERGENCY)
Facility: CLINIC | Age: 59
Discharge: HOME | End: 2019-03-17
Attending: EMERGENCY MEDICINE | Admitting: EMERGENCY MEDICINE
Payer: COMMERCIAL

## 2019-03-17 VITALS
DIASTOLIC BLOOD PRESSURE: 88 MMHG | OXYGEN SATURATION: 99 % | HEART RATE: 74 BPM | RESPIRATION RATE: 16 BRPM | TEMPERATURE: 97.6 F | SYSTOLIC BLOOD PRESSURE: 137 MMHG

## 2019-03-17 DIAGNOSIS — S16.1XXA STRAIN OF NECK MUSCLE, INITIAL ENCOUNTER: ICD-10-CM
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
## 2019-03-17 DIAGNOSIS — S09.90XA CLOSED HEAD INJURY, INITIAL ENCOUNTER: ICD-10-CM

## 2019-03-17 PROCEDURE — 70450 CT HEAD/BRAIN W/O DYE: CPT

## 2019-03-17 PROCEDURE — 99283 EMERGENCY DEPT VISIT LOW MDM: CPT | Mod: Z6 | Performed by: EMERGENCY MEDICINE

## 2019-03-17 PROCEDURE — 99284 EMERGENCY DEPT VISIT MOD MDM: CPT | Mod: 25 | Performed by: EMERGENCY MEDICINE

## 2019-03-17 PROCEDURE — 72125 CT NECK SPINE W/O DYE: CPT

## 2019-03-17 ASSESSMENT — ENCOUNTER SYMPTOMS
VOMITING: 0
NECK PAIN: 1
NUMBNESS: 1
BRUISES/BLEEDS EASILY: 0
ABDOMINAL PAIN: 0
ARTHRALGIAS: 0
NAUSEA: 1
CHILLS: 0
DIFFICULTY URINATING: 0
CONFUSION: 0
BACK PAIN: 0
SPEECH DIFFICULTY: 0
ADENOPATHY: 0
FEVER: 0
SHORTNESS OF BREATH: 0
HEADACHES: 0
COLOR CHANGE: 0
EYE REDNESS: 0
NECK STIFFNESS: 0
WEAKNESS: 0
JOINT SWELLING: 0
LIGHT-HEADEDNESS: 0

## 2019-03-17 NOTE — DISCHARGE INSTRUCTIONS
Please make an appointment to follow up with Your Primary Care Provider in 4-5 days unless symptoms completely resolve.  If your symptoms significantly worsen please come back to the emergency department.        *HEAD INJURY [no wake-up, Adult]     You have had a head injury. It does not appear serious at this time. Sometimes symptoms of a more serious problem (concussion, bruising or bleeding in the brain) may appear later. Therefore, watch for the WARNING SIGNS listed below.  HOME CARE:  During the next 24 hours someone must stay with you to check for the signs below. It is not necessary to stay awake or be awakened during the night.  If you have swelling of the face or scalp, apply an ice pack (ice cubes in a plastic bag, wrapped in a towel) for 20 minutes. Do this every 1-2 hours until the swelling starts to go down.  You may use acetaminophen (Tylenol) 650-1000 mg every 6 hours or ibuprofen (Motrin, Advil) 600 mg every 6-8 hours with food to control pain, if you are able to take these medicines. [NOTE: If you have chronic liver or kidney disease or ever had a stomach ulcer or GI bleeding, talk with your doctor before using these medicines.] Do not take aspirin after a head injury.  For the next 24 hours:  Do not take alcohol, sedatives or medicines that make you sleepy.  Do not drive or operate machinery.  Avoid strenuous activities. No lifting or straining.  If you have had any symptoms of a concussion today (nausea, vomiting, dizziness, confusion, headache, memory loss or if you were knocked out), do not return to sports or any activity that could result in another head injury until 2 full weeks after all symptoms are gone and you have been cleared by your doctor. A second head injury before fully recovering from the first one can lead to serious brain injury.  FOLLOW UP with your doctor if symptoms are not improving after 24 hours, or as directed.  GET PROMPT MEDICAL ATTENTION if any of the following  occur:  Repeated vomiting  Severe or worsening headache or dizziness  Unusual drowsiness, or unable to awaken as usual  Confusion or change in behavior or speech, memory loss, blurred vision  Convulsion (seizure)  Increasing scalp or face swelling  Redness, warmth or pus from the swollen area  Fluid drainage or bleeding from the nose or ears    [id number] [employer], [address] [place] [address], [place] [address]. All rights reserved. This information is not intended as a substitute for professional medical care. Always follow your healthcare professional's instructions.

## 2019-03-17 NOTE — ED AVS SNAPSHOT
[facility], [place], Emergency Department  [address] [address]  Phone:  [phone]                                    Joyce Marroquin   MRN: [id number]    Department:  [facility], Emergency Department   Date of Visit:  3/17/2019           After Visit Summary Signature Page    I have received my discharge instructions, and my questions have been answered. I have discussed any challenges I see with this plan with the nurse or doctor.    ..........................................................................................................................................  Patient/Patient Representative Signature      ..........................................................................................................................................  Patient Representative Print Name and Relationship to Patient    ..................................................               ................................................  Date                                   Time    ..........................................................................................................................................  Reviewed by Signature/Title    ...................................................              ..............................................  Date                                               Time          22EPIC Rev 08/18

## 2019-03-17 NOTE — ED TRIAGE NOTES
Pt presents to ED for a complaint of neck pain after a slip and fall on ice. Pt did hit the back of her head and has some neck pain but did not lose consciousness. Pt went to urgent care where they directed her to come to ED for a CT scan. Pt drove self here and en route developed numbness in her R hand. Other neuros intact. C-collar placed in triage.

## 2019-03-17 NOTE — ED PROVIDER NOTES
[place] EMERGENCY DEPARTMENT ([facility])  3/17/19   History     Chief Complaint   Patient presents with     Fall     Neck Pain     The history is provided by the patient.     Joyce Marroquin is a 58 year old female with a medical history significant for complete atrioventricular block s/p cardiac pacemaker who presents to the Emergency Department for evaluation of neck pain after a mechanical slip and fall that occurred this morning.  Patient reports that she was leaving [place] this morning when she slipped on ice and fell backwards.  Patient states that she struck the back of her head against the ground and she felt a pop in the back of her neck.  The patient then presented to urgent care through [facility]; she did not have any imaging done there, but did have a physical exam done.  She was advised to come here to the Emergency Department for further evaluation.  The patient here notes that she has pain in the back of her head where she struck it against the ground, but denies any headache.  She also has constant, achy, nonradiating, mild pain in right, lower side of her neck.  Movement makes pain worse, rest improves it.  She notes some mild nausea, but denies any episodes of vomiting.  She denies any loss of consciousness during the fall.  She denies being on any anticoagulation medications.  She notes some blurry vision, but states that she is not currently wearing her glasses.  She denies any back pain, but states that it feels tight.  She denies any hip pain.  She does note some paresthesias in the tips of the fourth and fifth digits of her right hand.    I have reviewed the Medications, Allergies, Past Medical and Surgical History, and Social History in the [employer] system.    Past Medical History:   Diagnosis Date     Cardiomyopathy (H)     ff Cardiology     Chronic depressive personality disorder      Esophageal reflux      Ex-smoker     quit 2006; 1 PPD x 30     Hyperparathyroidism (H)     s/p  parathyroidectomy     Hypothyroidism      LARRY on CPAP     ff Sleep medicine     Pulmonary nodules     ff Pulmonologist     S/P cardiac pacemaker procedure     checked every 6 months at [facility]       Past Surgical History:   Procedure Laterality Date     BUNIONECTOMY Bilateral      EP ABLATION / EP STUDIES  04/21/2017     EXPLORE NECK N/A 9/19/2018    Procedure: EXPLORE NECK;  Neck Exploration Resection of Left superior Parathyroid gland;  Surgeon: Kristine Venegas MD;  Location: [facility] OR     HC CORRECT BUNION,SIMPLE       PARATHYROIDECTOMY N/A 9/19/2018    Procedure: PARATHYROIDECTOMY;;  Surgeon: Kristine Venegas MD;  Location: [facility] OR     PPM INSERT OF NEW OR REPL W/VENT LEAD  04/21/2017     TONSILLECTOMY & ADENOIDECTOMY         Family History   Problem Relation Age of Onset     Hypothyroidism Mother      Hypertension Mother      Osteoarthritis Mother      Coronary Artery Disease Father         nonfatal MI in his 70s     Asthma Father      Diabetes Sister      Hypothyroidism Sister      Breast Cancer Maternal Aunt        Social History     Tobacco Use     Smoking status: Former Smoker     Smokeless tobacco: Never Used   Substance Use Topics     Alcohol use: Yes     Comment: seldom       No current facility-administered medications for this encounter.      Current Outpatient Medications   Medication     Acetaminophen (TYLENOL PO)     albuterol (PROAIR HFA/PROVENTIL HFA/VENTOLIN HFA) 108 (90 BASE) MCG/ACT Inhaler     busPIRone (BUSPAR) 5 MG tablet     clonazePAM (KLONOPIN) 0.5 MG tablet     cycloSPORINE (RESTASIS) 0.05 % ophthalmic emulsion     DiphenhydrAMINE HCl (BENADRYL PO)     Ferrous Sulfate (IRON SUPPLEMENT PO)     fluocinolone acetonide 0.01 % OIL     loperamide (IMODIUM A-D) 2 MG tablet     MAGNESIUM LACTATE PO     MELATONIN PO     Menaquinone-7 (VITAMIN K2 PO)     METHYLPREDNISOLONE PO     Multiple Vitamin (DAILY MULTIVITAMIN PO)     nebulizer nebulization     Probiotic Product (PROBIOTIC DAILY PO)      rOPINIRole (REQUIP) 0.25 MG tablet     SYNTHROID 150 MCG tablet     TURMERIC PO     vitamin D3 (CHOLECALCIFEROL) 2000 units tablet        Allergies   Allergen Reactions     Flagyl [Metronidazole] Rash     Corn Oil Other (See Comments)     Headache       Lorazepam Other (See Comments)     Heat intolerance       Seasonal Allergies Difficulty breathing     Sulfamethoxazole-Trimethoprim      Other reaction(s): Other  Passed out     Tetracycline Swelling     Zofran [Ondansetron]      Prolonged QT  Prolonged QT at baseline per patient     Benzodiazepines Other (See Comments)     Other reaction(s): Other  Extreme heat intolerance  Extreme heat intolerance     Cyclobenzaprine Other (See Comments)     Extreme heat intolerance     Levaquin [Levofloxacin]      Other reaction(s): Other  Tendon rupture     Nsaids Other (See Comments)     Exacerbated asthma         Review of Systems   Constitutional: Negative for chills and fever.   HENT: Negative for congestion.    Eyes: Negative for redness.   Respiratory: Negative for shortness of breath.    Cardiovascular: Negative for chest pain.   Gastrointestinal: Positive for nausea. Negative for abdominal pain and vomiting.   Genitourinary: Negative for difficulty urinating.   Musculoskeletal: Positive for neck pain. Negative for arthralgias, back pain, gait problem, joint swelling and neck stiffness.   Skin: Negative for color change.   Neurological: Positive for numbness ( left 4th and 5th fingers). Negative for speech difficulty, weakness, light-headedness and headaches.   Hematological: Negative for adenopathy. Does not bruise/bleed easily.   Psychiatric/Behavioral: Negative for confusion.   All other systems reviewed and are negative.      Physical Exam   BP: 137/88  Heart Rate: 75  Temp: 97.6  F (36.4  C)  Resp: 16  SpO2: 100 %      Physical Exam   Constitutional: She is oriented to person, place, and time. She appears well-developed and well-nourished. No distress.   HENT:    Head: Normocephalic and atraumatic.   Mouth/Throat: Oropharynx is clear and moist.   Eyes: EOM are normal. Pupils are equal, round, and reactive to light.   Neck: Normal range of motion and full passive range of motion without pain. Muscular tenderness ( Over right, lower, lateral neck musculature) present. No spinous process tenderness present. No neck rigidity. No tracheal deviation, no edema, no erythema and normal range of motion present.   Cardiovascular: Normal rate, regular rhythm and normal heart sounds.   Pulmonary/Chest: Effort normal and breath sounds normal. No respiratory distress. She exhibits no tenderness.   Abdominal: Soft. Bowel sounds are normal. There is no tenderness.   Musculoskeletal: Normal range of motion. She exhibits no edema or tenderness.        Cervical back: She exhibits no tenderness.        Thoracic back: She exhibits no tenderness.        Lumbar back: She exhibits no tenderness.   There is no midline cervical, thoracic, or lumbar spinous process tenderness.  Pelvis is stable.  No tenderness over hips.  Full range of motion, active and passive, in hips, knees, and ankles without pain.   Lymphadenopathy:     She has no cervical adenopathy.   Neurological: She is alert and oriented to person, place, and time. She has normal strength. She displays normal reflexes. No cranial nerve deficit or sensory deficit. She exhibits normal muscle tone. Coordination and gait normal. GCS eye subscore is 4. GCS verbal subscore is 5. GCS motor subscore is 6.   Skin: Skin is warm. Capillary refill takes less than 2 seconds. No abrasion and no laceration noted. She is not diaphoretic. No erythema.   Psychiatric: She has a normal mood and affect. Her behavior is normal. Judgment and thought content normal.   Nursing note and vitals reviewed.      ED Course   9:51 AM  The patient was seen and examined by Zeferino Hammond MD in Room ED08.        Procedures             Critical Care time:  none             Labs  Ordered and Resulted from Time of ED Arrival Up to the Time of Departure from the ED - No data to display         Assessments & Plan (with Medical Decision Making)   This is a 58 year old female presenting with headache and neck pain after fall earlier today Differential diagnosis: concussion, skull fracture, intracranial hemorrhage, cervical spine fracture, neck sprain.     After thorough history and physical examination, patient appears to be in no acute distress. Will obtain CT of the head and cervical spine for further diagnostic evaluation. Cervical collar was placed on patient arriving to the ED. She has no neurologic deficits. She agrees with the plan.     I reviewed the patient's CT of the head and I read the radiology report; the is no evidence of intracranial hemorrhage or skull fracture. I also reviewed her CT cervical spine and I read the radiology reports; no evidence of cervical spine fracture. At this time patient is stable for discharge with primary care follow-up in several days if symptoms do not improve and return to the ED if her symptoms worsen. She agrees with the plan. Gait is normal at discharge.    This part of the medical record was transcribed by Rachana Garvey, Medical Scribe, from a dictation done by Dr. Hammond.     I have reviewed the nursing notes.    I have reviewed the findings, diagnosis, plan and need for follow up with the patient.       Medication List      There are no discharge medications for this visit.         Final diagnoses:   Strain of neck muscle, initial encounter   Closed head injury, initial encounter   Fall, initial encounter     Juan Diego MORALES, am serving as a trained medical scribe to document services personally performed by Zeferino Hammond MD, based on the provider's statements to me.   Zeferino MORALES MD, was physically present and have reviewed and verified the accuracy of this note documented by Juan Diego Pepe.    3/17/2019   [employer], [place], [facility]  DEPARTMENT     VuljaShola shields MD  03/17/19 [id number]

## 2019-03-21 LAB
MDC_IDC_LEAD_IMPLANT_DT: NORMAL
MDC_IDC_LEAD_IMPLANT_DT: NORMAL
MDC_IDC_LEAD_LOCATION: NORMAL
MDC_IDC_LEAD_LOCATION: NORMAL
MDC_IDC_LEAD_LOCATION_DETAIL_1: NORMAL
MDC_IDC_LEAD_LOCATION_DETAIL_1: NORMAL
MDC_IDC_LEAD_MFG: NORMAL
MDC_IDC_LEAD_MFG: NORMAL
MDC_IDC_LEAD_MODEL: NORMAL
MDC_IDC_LEAD_MODEL: NORMAL
MDC_IDC_LEAD_POLARITY_TYPE: NORMAL
MDC_IDC_LEAD_POLARITY_TYPE: NORMAL
MDC_IDC_LEAD_SERIAL: NORMAL
MDC_IDC_LEAD_SERIAL: NORMAL
MDC_IDC_MSMT_BATTERY_DTM: NORMAL
MDC_IDC_MSMT_BATTERY_REMAINING_LONGEVITY: 86 MO
MDC_IDC_MSMT_BATTERY_RRT_TRIGGER: 2.83
MDC_IDC_MSMT_BATTERY_STATUS: NORMAL
MDC_IDC_MSMT_BATTERY_VOLTAGE: 3.01 V
MDC_IDC_MSMT_LEADCHNL_RA_IMPEDANCE_VALUE: 380 OHM
MDC_IDC_MSMT_LEADCHNL_RA_IMPEDANCE_VALUE: 475 OHM
MDC_IDC_MSMT_LEADCHNL_RA_PACING_THRESHOLD_AMPLITUDE: 0.5 V
MDC_IDC_MSMT_LEADCHNL_RA_PACING_THRESHOLD_AMPLITUDE: 0.62 V
MDC_IDC_MSMT_LEADCHNL_RA_PACING_THRESHOLD_PULSEWIDTH: 0.4 MS
MDC_IDC_MSMT_LEADCHNL_RA_PACING_THRESHOLD_PULSEWIDTH: 0.4 MS
MDC_IDC_MSMT_LEADCHNL_RA_SENSING_INTR_AMPL: 3.12 MV
MDC_IDC_MSMT_LEADCHNL_RA_SENSING_INTR_AMPL: 3.62 MV
MDC_IDC_MSMT_LEADCHNL_RV_IMPEDANCE_VALUE: 418 OHM
MDC_IDC_MSMT_LEADCHNL_RV_IMPEDANCE_VALUE: 513 OHM
MDC_IDC_MSMT_LEADCHNL_RV_PACING_THRESHOLD_AMPLITUDE: 0.62 V
MDC_IDC_MSMT_LEADCHNL_RV_PACING_THRESHOLD_AMPLITUDE: 0.75 V
MDC_IDC_MSMT_LEADCHNL_RV_PACING_THRESHOLD_PULSEWIDTH: 0.4 MS
MDC_IDC_MSMT_LEADCHNL_RV_PACING_THRESHOLD_PULSEWIDTH: 0.4 MS
MDC_IDC_MSMT_LEADCHNL_RV_SENSING_INTR_AMPL: 12.62 MV
MDC_IDC_MSMT_LEADCHNL_RV_SENSING_INTR_AMPL: 8.5 MV
MDC_IDC_PG_IMPLANT_DTM: NORMAL
MDC_IDC_PG_MFG: NORMAL
MDC_IDC_PG_MODEL: NORMAL
MDC_IDC_PG_SERIAL: NORMAL
MDC_IDC_PG_TYPE: NORMAL
MDC_IDC_SESS_CLINIC_NAME: NORMAL
MDC_IDC_SESS_DTM: NORMAL
MDC_IDC_SESS_TYPE: NORMAL
MDC_IDC_SET_BRADY_AT_MODE_SWITCH_RATE: 171 {BEATS}/MIN
MDC_IDC_SET_BRADY_HYSTRATE: NORMAL
MDC_IDC_SET_BRADY_LOWRATE: 60 {BEATS}/MIN
MDC_IDC_SET_BRADY_MAX_SENSOR_RATE: 140 {BEATS}/MIN
MDC_IDC_SET_BRADY_MAX_TRACKING_RATE: 140 {BEATS}/MIN
MDC_IDC_SET_BRADY_MODE: NORMAL
MDC_IDC_SET_BRADY_PAV_DELAY_HIGH: 140 MS
MDC_IDC_SET_BRADY_PAV_DELAY_LOW: 180 MS
MDC_IDC_SET_BRADY_SAV_DELAY_HIGH: 110 MS
MDC_IDC_SET_BRADY_SAV_DELAY_LOW: 150 MS
MDC_IDC_SET_LEADCHNL_RA_PACING_AMPLITUDE: 1.5 V
MDC_IDC_SET_LEADCHNL_RA_PACING_ANODE_ELECTRODE_1: NORMAL
MDC_IDC_SET_LEADCHNL_RA_PACING_ANODE_LOCATION_1: NORMAL
MDC_IDC_SET_LEADCHNL_RA_PACING_CAPTURE_MODE: NORMAL
MDC_IDC_SET_LEADCHNL_RA_PACING_CATHODE_ELECTRODE_1: NORMAL
MDC_IDC_SET_LEADCHNL_RA_PACING_CATHODE_LOCATION_1: NORMAL
MDC_IDC_SET_LEADCHNL_RA_PACING_POLARITY: NORMAL
MDC_IDC_SET_LEADCHNL_RA_PACING_PULSEWIDTH: 0.4 MS
MDC_IDC_SET_LEADCHNL_RA_SENSING_ANODE_ELECTRODE_1: NORMAL
MDC_IDC_SET_LEADCHNL_RA_SENSING_ANODE_LOCATION_1: NORMAL
MDC_IDC_SET_LEADCHNL_RA_SENSING_CATHODE_ELECTRODE_1: NORMAL
MDC_IDC_SET_LEADCHNL_RA_SENSING_CATHODE_LOCATION_1: NORMAL
MDC_IDC_SET_LEADCHNL_RA_SENSING_POLARITY: NORMAL
MDC_IDC_SET_LEADCHNL_RA_SENSING_SENSITIVITY: 0.3 MV
MDC_IDC_SET_LEADCHNL_RV_PACING_AMPLITUDE: 1.5 V
MDC_IDC_SET_LEADCHNL_RV_PACING_ANODE_ELECTRODE_1: NORMAL
MDC_IDC_SET_LEADCHNL_RV_PACING_ANODE_LOCATION_1: NORMAL
MDC_IDC_SET_LEADCHNL_RV_PACING_CAPTURE_MODE: NORMAL
MDC_IDC_SET_LEADCHNL_RV_PACING_CATHODE_ELECTRODE_1: NORMAL
MDC_IDC_SET_LEADCHNL_RV_PACING_CATHODE_LOCATION_1: NORMAL
MDC_IDC_SET_LEADCHNL_RV_PACING_POLARITY: NORMAL
MDC_IDC_SET_LEADCHNL_RV_PACING_PULSEWIDTH: 0.4 MS
MDC_IDC_SET_LEADCHNL_RV_SENSING_ANODE_ELECTRODE_1: NORMAL
MDC_IDC_SET_LEADCHNL_RV_SENSING_ANODE_LOCATION_1: NORMAL
MDC_IDC_SET_LEADCHNL_RV_SENSING_CATHODE_ELECTRODE_1: NORMAL
MDC_IDC_SET_LEADCHNL_RV_SENSING_CATHODE_LOCATION_1: NORMAL
MDC_IDC_SET_LEADCHNL_RV_SENSING_POLARITY: NORMAL
MDC_IDC_SET_LEADCHNL_RV_SENSING_SENSITIVITY: 0.9 MV
MDC_IDC_SET_ZONE_DETECTION_INTERVAL: 350 MS
MDC_IDC_SET_ZONE_DETECTION_INTERVAL: 400 MS
MDC_IDC_SET_ZONE_TYPE: NORMAL
MDC_IDC_STAT_AT_BURDEN_PERCENT: 0 %
MDC_IDC_STAT_AT_DTM_END: NORMAL
MDC_IDC_STAT_AT_DTM_START: NORMAL
MDC_IDC_STAT_BRADY_AP_VP_PERCENT: 22.88 %
MDC_IDC_STAT_BRADY_AP_VS_PERCENT: 0.01 %
MDC_IDC_STAT_BRADY_AS_VP_PERCENT: 77.05 %
MDC_IDC_STAT_BRADY_AS_VS_PERCENT: 0.06 %
MDC_IDC_STAT_BRADY_DTM_END: NORMAL
MDC_IDC_STAT_BRADY_DTM_START: NORMAL
MDC_IDC_STAT_BRADY_RA_PERCENT_PACED: 22.7 %
MDC_IDC_STAT_BRADY_RV_PERCENT_PACED: 99.85 %
MDC_IDC_STAT_EPISODE_RECENT_COUNT: 0
MDC_IDC_STAT_EPISODE_RECENT_COUNT_DTM_END: NORMAL
MDC_IDC_STAT_EPISODE_RECENT_COUNT_DTM_START: NORMAL
MDC_IDC_STAT_EPISODE_TOTAL_COUNT: 0
MDC_IDC_STAT_EPISODE_TOTAL_COUNT: 1
MDC_IDC_STAT_EPISODE_TOTAL_COUNT_DTM_END: NORMAL
MDC_IDC_STAT_EPISODE_TOTAL_COUNT_DTM_START: NORMAL
MDC_IDC_STAT_EPISODE_TYPE: NORMAL

## 2019-04-01 ENCOUNTER — ALLIED HEALTH/NURSE VISIT (OUTPATIENT)
Dept: NURSING | Facility: CLINIC | Age: 59
End: 2019-04-01
Payer: COMMERCIAL

## 2019-04-01 DIAGNOSIS — Z11.1 SCREENING EXAMINATION FOR PULMONARY TUBERCULOSIS: Primary | ICD-10-CM

## 2019-04-01 PROCEDURE — 99207 ZZC NO CHARGE NURSE ONLY: CPT

## 2019-04-01 PROCEDURE — 86580 TB INTRADERMAL TEST: CPT

## 2019-04-04 ENCOUNTER — ALLIED HEALTH/NURSE VISIT (OUTPATIENT)
Dept: NURSING | Facility: CLINIC | Age: 59
End: 2019-04-04
Payer: COMMERCIAL

## 2019-04-04 DIAGNOSIS — Z11.1 SCREENING EXAMINATION FOR PULMONARY TUBERCULOSIS: Primary | ICD-10-CM

## 2019-04-04 LAB
PPDINDURATION: 0 MM (ref 0–5)
PPDREDNESS: 0 MM

## 2019-04-04 PROCEDURE — 99207 ZZC NO CHARGE NURSE ONLY: CPT

## 2019-05-13 ENCOUNTER — HOSPITAL ENCOUNTER (EMERGENCY)
Facility: CLINIC | Age: 59
Discharge: HOME | End: 2019-05-13
Attending: EMERGENCY MEDICINE | Admitting: EMERGENCY MEDICINE
Payer: COMMERCIAL

## 2019-05-13 ENCOUNTER — APPOINTMENT (OUTPATIENT)
Dept: GENERAL RADIOLOGY | Facility: CLINIC | Age: 59
End: 2019-05-13
Attending: EMERGENCY MEDICINE
Payer: COMMERCIAL

## 2019-05-13 VITALS
TEMPERATURE: 97.5 F | SYSTOLIC BLOOD PRESSURE: 116 MMHG | HEART RATE: 100 BPM | OXYGEN SATURATION: 94 % | RESPIRATION RATE: 16 BRPM | WEIGHT: 158 LBS | BODY MASS INDEX: 26.29 KG/M2 | DIASTOLIC BLOOD PRESSURE: 68 MMHG

## 2019-05-13 DIAGNOSIS — J44.1 COPD EXACERBATION (H): ICD-10-CM

## 2019-05-13 LAB
INTERPRETATION ECG - MUSE: NORMAL
NT-PROBNP SERPL-MCNC: 190 PG/ML (ref 0–900)

## 2019-05-13 PROCEDURE — 93005 ELECTROCARDIOGRAM TRACING: CPT | Performed by: EMERGENCY MEDICINE

## 2019-05-13 PROCEDURE — 94640 AIRWAY INHALATION TREATMENT: CPT | Performed by: EMERGENCY MEDICINE

## 2019-05-13 PROCEDURE — 99285 EMERGENCY DEPT VISIT HI MDM: CPT | Mod: 25 | Performed by: EMERGENCY MEDICINE

## 2019-05-13 PROCEDURE — 83880 ASSAY OF NATRIURETIC PEPTIDE: CPT | Performed by: EMERGENCY MEDICINE

## 2019-05-13 PROCEDURE — 25000125 ZZHC RX 250: Performed by: EMERGENCY MEDICINE

## 2019-05-13 PROCEDURE — 71046 X-RAY EXAM CHEST 2 VIEWS: CPT

## 2019-05-13 PROCEDURE — 93010 ELECTROCARDIOGRAM REPORT: CPT | Mod: Z6 | Performed by: EMERGENCY MEDICINE

## 2019-05-13 RX ORDER — IPRATROPIUM BROMIDE AND ALBUTEROL SULFATE 2.5; .5 MG/3ML; MG/3ML
3 SOLUTION RESPIRATORY (INHALATION) ONCE
Status: COMPLETED | OUTPATIENT
Start: 2019-05-13 | End: 2019-05-13

## 2019-05-13 RX ADMIN — IPRATROPIUM BROMIDE AND ALBUTEROL SULFATE 3 ML: .5; 3 SOLUTION RESPIRATORY (INHALATION) at 13:26

## 2019-05-13 RX ADMIN — IPRATROPIUM BROMIDE AND ALBUTEROL SULFATE 3 ML: .5; 3 SOLUTION RESPIRATORY (INHALATION) at 12:13

## 2019-05-13 ASSESSMENT — ENCOUNTER SYMPTOMS
FEVER: 0
SHORTNESS OF BREATH: 1
ACTIVITY CHANGE: 1
WHEEZING: 1
NEUROLOGICAL NEGATIVE: 1
COUGH: 1
CARDIOVASCULAR NEGATIVE: 1
FATIGUE: 1

## 2019-05-13 ASSESSMENT — PULMONARY FUNCTION TESTS
PRE_BRONCH_PEFR_L/MIN: 230
POST_BRONCH_PEFR_L/MIN: 250

## 2019-05-13 NOTE — ED AVS SNAPSHOT
[facility], [place], Emergency Department  [address] [address]  Phone:  [phone]                                    Joyce Marroquin   MRN: [id number]    Department:  [facility], Emergency Department   Date of Visit:  5/13/2019           After Visit Summary Signature Page    I have received my discharge instructions, and my questions have been answered. I have discussed any challenges I see with this plan with the nurse or doctor.    ..........................................................................................................................................  Patient/Patient Representative Signature      ..........................................................................................................................................  Patient Representative Print Name and Relationship to Patient    ..................................................               ................................................  Date                                   Time    ..........................................................................................................................................  Reviewed by Signature/Title    ...................................................              ..............................................  Date                                               Time          22EPIC Rev 08/18

## 2019-05-13 NOTE — ED PROVIDER NOTES
[place] EMERGENCY DEPARTMENT ([facility])  [address]    History     Chief Complaint   Patient presents with     Shortness of Breath     The history is provided by the patient and medical records.     Joyce Marroquin is a 58 year old female with history of asthma, emphysema, COPD, failed ablation status post pacemaker placement who presents with worsening cough and shortness of breath over the past few days. Patient was recently diagnosed with sore throat in [facility] practice clinic 4 days ago, had negative strep swabs with this. She continued to feel ill and started taking methylprednisolone for this around 19.  She continued to have symptoms, and was then diagnosed with bronchitis in urgent care clinic 2 days ago.  She had been started on Z-Dennis and steroids last week for this but symptoms have been worsening. She had O2 sats down to 88%, and this improved to the high 90s after nebulizing at home this morning. She nebbed every 3-4 hours last night and every 2 hours today but continues to feel ill.  Patient could use a neb now.  Patient notes that she has multiple stressors presently, and her father's  is coming up in 4 days. She has been spending nights in the hospital at her father's bedside, has had contact with sick persons through this. Patient had quit smoking for years but smoked again last week. Her father  recently and her mother had  shortly before this.     I have reviewed the Medications, Allergies, Past Medical and Surgical History, and Social History in the [employer] system.  PAST MEDICAL HISTORY:   Past Medical History:   Diagnosis Date     Cardiomyopathy (H)     ff Cardiology     Chronic depressive personality disorder      Esophageal reflux      Ex-smoker     quit ; 1 PPD x 30     Hyperparathyroidism (H)     s/p parathyroidectomy     Hypothyroidism      LARRY on CPAP     ff Sleep medicine     Pulmonary nodules     ff Pulmonologist     S/P cardiac pacemaker procedure      checked every 6 months at [facility]       PAST SURGICAL HISTORY:   Past Surgical History:   Procedure Laterality Date     BUNIONECTOMY Bilateral      EP ABLATION / EP STUDIES  04/21/2017     EXPLORE NECK N/A 9/19/2018    Procedure: EXPLORE NECK;  Neck Exploration Resection of Left superior Parathyroid gland;  Surgeon: Kristine Venegas MD;  Location: [facility] OR     HC CORRECT BUNION,SIMPLE       PARATHYROIDECTOMY N/A 9/19/2018    Procedure: PARATHYROIDECTOMY;;  Surgeon: Kristine Venegas MD;  Location: [facility] OR     PPM INSERT OF NEW OR REPL W/VENT LEAD  04/21/2017     TONSILLECTOMY & ADENOIDECTOMY         FAMILY HISTORY:   Family History   Problem Relation Age of Onset     Hypothyroidism Mother      Hypertension Mother      Osteoarthritis Mother      Coronary Artery Disease Father         nonfatal MI in his 70s     Asthma Father      Diabetes Sister      Hypothyroidism Sister      Breast Cancer Maternal Aunt        SOCIAL HISTORY:   Social History     Tobacco Use     Smoking status: Former Smoker     Smokeless tobacco: Never Used   Substance Use Topics     Alcohol use: Yes     Comment: seldom       Discharge Medication List as of 5/13/2019  4:18 PM      CONTINUE these medications which have NOT CHANGED    Details   SYNTHROID 150 MCG tablet Take 1 tablet (150 mcg) by mouth daily, Disp-90 tablet, R-3, GERALDO, E-Prescribe      TURMERIC PO Take 1 tablet by mouth every morning , Historical      Acetaminophen (TYLENOL PO) Take 500-1,000 mg by mouth daily as needed , Historical      albuterol (PROAIR HFA/PROVENTIL HFA/VENTOLIN HFA) 108 (90 BASE) MCG/ACT Inhaler Inhale 2 puffs into the lungs as needed for shortness of breath / dyspnea or wheezing , Historical      busPIRone (BUSPAR) 5 MG tablet Start at 5 mg twice daily for 3 days, then 7.5 mg (1.5 tabs) twice daily for 3 days, then 10 mg (2 tabs) twice daily for 3 days, then 12.5 mg (2.5 tabs) twice daily for 3 days, then 15 mg (3 tabs) twice daily and stay at that dose,  Disp-150 tablet, R-1,  Local Print      clonazePAM (KLONOPIN) 0.5 MG tablet Take 0.5-1 tablets (0.25-0.5 mg) by mouth 2 times daily as needed for anxiety, Disp-60 tablet, R-1, Local Print      cycloSPORINE (RESTASIS) 0.05 % ophthalmic emulsion Apply 1 drop to eye every 12 hours, Disp-2 Box, R-1, No Print Out      DiphenhydrAMINE HCl (BENADRYL PO) Take 25-50 mg by mouth nightly as needed, Historical      Ferrous Sulfate (IRON SUPPLEMENT PO) Take 65 mg by mouth 2 times daily (with meals) , Historical      fluocinolone acetonide 0.01 % OIL Place 4-5 drops in right ear 1-2 times daily for 5-7 days., Disp-20 mL, R-1, E-Prescribe      loperamide (IMODIUM A-D) 2 MG tablet Take 2 tabs (4 mg) after first loose stool, and then take one tab (2 mg) after each diarrheal stool.  Max of 8 tabs (16 mg) per day., Disp-1 tablet, R-0, No Print Out      MAGNESIUM LACTATE PO Take 180 mg by mouth At Bedtime , Historical      MELATONIN PO Take 3 mg by mouth At Bedtime , Historical      Menaquinone-7 (VITAMIN K2 PO) Take 1 tablet by mouth every morning , Historical      METHYLPREDNISOLONE PO Take 40 mg by mouth as needed , Historical      Multiple Vitamin (DAILY MULTIVITAMIN PO) Take 1 tablet by mouth every morning , Historical      nebulizer nebulization as needed, Historical      Probiotic Product (PROBIOTIC DAILY PO) Take 1 capsule by mouth 2 times daily , Historical      rOPINIRole (REQUIP) 0.25 MG tablet Historical      vitamin D3 (CHOLECALCIFEROL) 2000 units tablet Take 1 tablet by mouth daily, Disp-1 tablet, R-0, No Print Out                Allergies   Allergen Reactions     Flagyl [Metronidazole] Rash     Corn Oil Other (See Comments)     Headache       Lorazepam Other (See Comments)     Heat intolerance       Seasonal Allergies Difficulty breathing     Sulfamethoxazole-Trimethoprim      Other reaction(s): Other  Passed out     Tetracycline Swelling     Zofran [Ondansetron]      Prolonged QT  Prolonged QT at baseline per patient      Benzodiazepines Other (See Comments)     Other reaction(s): Other  Extreme heat intolerance  Extreme heat intolerance     Cyclobenzaprine Other (See Comments)     Extreme heat intolerance     Levaquin [Levofloxacin]      Other reaction(s): Other  Tendon rupture     Nsaids Other (See Comments)     Exacerbated asthma      Review of Systems   Constitutional: Positive for activity change and fatigue. Negative for fever.   Respiratory: Positive for cough, shortness of breath and wheezing.    Cardiovascular: Negative.    Neurological: Negative.    All other systems reviewed and are negative.      Physical Exam   BP: 127/75  Pulse: 83  Heart Rate: 95  Temp: 97.5  F (36.4  C)  Resp: 16  Weight: 71.7 kg (158 lb)  SpO2: 96 %      Physical Exam   Constitutional: She is oriented to person, place, and time. She appears well-developed and well-nourished. No distress.   Cardiovascular: Normal rate, regular rhythm and normal heart sounds.   Pulmonary/Chest: Effort normal. No respiratory distress. She has wheezes.   Neurological: She is alert and oriented to person, place, and time.   Psychiatric: Her mood appears anxious. She exhibits a depressed mood.   Nursing note reviewed.      ED Course        Procedures  Results for orders placed or performed during the hospital encounter of 05/13/19 (from the past 24 hour(s))   EKG 12 lead   Result Value Ref Range    Interpretation ECG Click View Image link to view waveform and result    XR Chest 2 Views    Narrative    XR CHEST 2 VW  5/13/2019 2:31 PM      HISTORY: SOA, history of asthma/COPD    COMPARISON: 7/15/2018, chest CT 6/28/2017.    FINDINGS: PA and lateral views of the chest. Implantable cardiac  device leads in appropriate position. Trachea is midline. Heart size  normal. Bilateral emphysematous changes of the hyperinflated lungs. No  focal airspace opacity. No pneumothorax or pleural effusion. No acute  osseous abnormality, degenerative changes of the spine with  osteopenic  appearance of the bones. Visualized abdomen is unremarkable.      Impression    IMPRESSION:  1. Hyperinflated lungs without acute cardiopulmonary abnormality.  2. Emphysematous changes of lungs.    I have personally reviewed the examination and initial interpretation  and I agree with the findings.    LUDIVINA GARCIA MD   NT proBNP inpatient and ED   Result Value Ref Range    N-Terminal Pro BNP Inpatient 190 0 - 900 pg/mL     Medications   ipratropium - albuterol 0.5 mg/2.5 mg/3 mL (DUONEB) neb solution 3 mL (3 mLs Nebulization Given 5/13/19 1213)   ipratropium - albuterol 0.5 mg/2.5 mg/3 mL (DUONEB) neb solution 3 mL (3 mLs Nebulization Given 5/13/19 1326)              EKG Interpretation:      Interpreted by Fausto Morel MD  Time reviewed: 11:03  Symptoms at time of EKG: shortness of breath    Rhythm: atrial sensed ventricular paced rhythm  Rate: 83  Axis: normal  Ectopy: none  Conduction: normal  ST Segments/ T Waves: No ST-T wave changes  Q Waves: none  Comparison to prior: Unchanged from 7/15/18    Clinical Impression: normal EKG  \  Results for orders placed or performed during the hospital encounter of 05/13/19   XR Chest 2 Views    Narrative    XR CHEST 2 VW  5/13/2019 2:31 PM      HISTORY: SOA, history of asthma/COPD    COMPARISON: 7/15/2018, chest CT 6/28/2017.    FINDINGS: PA and lateral views of the chest. Implantable cardiac  device leads in appropriate position. Trachea is midline. Heart size  normal. Bilateral emphysematous changes of the hyperinflated lungs. No  focal airspace opacity. No pneumothorax or pleural effusion. No acute  osseous abnormality, degenerative changes of the spine with osteopenic  appearance of the bones. Visualized abdomen is unremarkable.      Impression    IMPRESSION:  1. Hyperinflated lungs without acute cardiopulmonary abnormality.  2. Emphysematous changes of lungs.                Assessments & Plan (with Medical Decision Making)  Joyce Marroquin is a 74-year-old  female with history of COPD and asthma who has been on Solu-Medrol at home that she manages by herself. She is also on azithromycin currently and home albuterol nebs.  She said her pulmonologist is out of the country and unavailable for consultation. She reports that despite the above she is still feeling short of breath.  Here we did chest x-ray shows no acute abnormality. She has some concerns about heart failure. Her EKG shows a paced rhythm which is chronic. We did a BNP which is essentially negative.  She does have bronchospasm; her peak flow was in the high 200s.  She was given 2 DuoNebs and her oxygen saturations on room air are about 93%.  Patient was frustrated that we were not able to do more for her. She is already on steroids, antibiotics, and nebs and she is not hypoxic. I don t see an indication for hospitalization. She is having a lot of stress now as apparently both parents have  in the last month.  She did recently start smoking again because of the stress which may be contributing to her current symptoms.     This part of the document was transcribed by Sara Winters Medical Scribe.      I have reviewed the nursing notes.    I have reviewed the findings, diagnosis, plan and need for follow up with the patient.       Medication List      There are no discharge medications for this visit.         Final diagnoses:   COPD exacerbation (H)     I, Sara Winters, am serving as a trained medical scribe to document services personally performed by Saran Morel MD based on the provider's statements to me on May 13, 2019.  This document has been checked and approved by the attending provider.    I, Saran Morel MD, was physically present and have reviewed and verified the accuracy of this note documented by Sara Winters medical scribe.     2019   [facility], [place], EMERGENCY DEPARTMENT     Saran Morel MD  [id number]

## 2019-05-13 NOTE — DISCHARGE INSTRUCTIONS
Your chest x-ray does not show pneumonia  The blood test for heart failure is negative  Your EKG is unchanged from previous  Finish the antibiotics you were given, you do not need more  Finish the steroids you were given  Do your nebulizations as recommended  Follow-up with your pulmonologist or primary care provider

## 2019-05-13 NOTE — ED TRIAGE NOTES
"Pt comes in with worsening sob and c/o her lungs \"filling up\". Hx asthma, copd. Says she was started on antibiotics and steroids last week for suspected bronchitis. Says symptoms began to worsen last night. Per pt, 02 sats 88% but moved to high 90s after a neb this morning. Alert and oriented. Vss.     Pt teary in triage--multiple stressors in her life at the moment. Father's  is Friday, needs to make it to [place] by then.   "

## 2019-05-14 ENCOUNTER — TRANSFERRED RECORDS (OUTPATIENT)
Dept: HEALTH INFORMATION MANAGEMENT | Facility: CLINIC | Age: 59
End: 2019-05-14

## 2019-05-16 ENCOUNTER — OFFICE VISIT (OUTPATIENT)
Dept: FAMILY MEDICINE | Facility: CLINIC | Age: 59
End: 2019-05-16
Payer: COMMERCIAL

## 2019-05-16 VITALS
TEMPERATURE: 99.7 F | OXYGEN SATURATION: 92 % | RESPIRATION RATE: 16 BRPM | HEART RATE: 93 BPM | SYSTOLIC BLOOD PRESSURE: 129 MMHG | BODY MASS INDEX: 26.33 KG/M2 | DIASTOLIC BLOOD PRESSURE: 81 MMHG | WEIGHT: 158.2 LBS

## 2019-05-16 DIAGNOSIS — J44.1 COPD EXACERBATION (H): ICD-10-CM

## 2019-05-16 DIAGNOSIS — J45.901 EXACERBATION OF ASTHMA, UNSPECIFIED ASTHMA SEVERITY, UNSPECIFIED WHETHER PERSISTENT: ICD-10-CM

## 2019-05-16 DIAGNOSIS — G47.00 INSOMNIA, UNSPECIFIED TYPE: ICD-10-CM

## 2019-05-16 DIAGNOSIS — I42.9 CARDIOMYOPATHY, UNSPECIFIED TYPE (H): ICD-10-CM

## 2019-05-16 DIAGNOSIS — Z09 HOSPITAL DISCHARGE FOLLOW-UP: Primary | ICD-10-CM

## 2019-05-16 DIAGNOSIS — Z63.4 DEATH OF PARENT: ICD-10-CM

## 2019-05-16 DIAGNOSIS — F41.8 SITUATIONAL ANXIETY: ICD-10-CM

## 2019-05-16 DIAGNOSIS — F41.0 PANIC ATTACK: ICD-10-CM

## 2019-05-16 PROCEDURE — 99214 OFFICE O/P EST MOD 30 MIN: CPT | Performed by: NURSE PRACTITIONER

## 2019-05-16 RX ORDER — CLONAZEPAM 0.5 MG/1
0.5 TABLET ORAL EVERY 6 HOURS PRN
Qty: 60 TABLET | Refills: 1 | Status: SHIPPED | OUTPATIENT
Start: 2019-05-16 | End: 2020-02-20

## 2019-05-16 RX ORDER — BUSPIRONE HYDROCHLORIDE 5 MG/1
TABLET ORAL
Qty: 150 TABLET | Refills: 1 | Status: SHIPPED | OUTPATIENT
Start: 2019-05-16 | End: 2019-05-30 | Stop reason: SINTOL

## 2019-05-16 SDOH — SOCIAL STABILITY - SOCIAL INSECURITY: DISSAPEARANCE AND DEATH OF FAMILY MEMBER: Z63.4

## 2019-05-16 NOTE — PROGRESS NOTES
"  SUBJECTIVE:   Joyce aMrroquin is a 58 year old female who presents to clinic today for the following   health issues:    Has had bad reflux and wasn't taking her medications as she was helping to care for her dad, so she developed a ST- slowly resolving. Increased stress.  Went to urgent care and was given a zpack which didn't help her anyways.  2 days ago she went to the ER to r/o pneumonia, HF, was told she has COPD and to take steroids. Her gf who works in allergy told her she cannot continue these steroids so she is reducing.   ED/UC Followup: She reports that her mother  on the , and her dad  of a broken heart on the .     Facility:  [facility]  Date of visit: 5/15/19  Reason for visit: asthma exasperation  Current Status: Improving- saw her pulmonologist who told her she was having an asthma exacerbation and anxiety, and to use her clonazepam and ride it out. She usually only takes this for sleep.  Her goal is to get through the  in 2 days.  Has not been taking buspar as they keep giving her different brands and she always reacts to off brands of medication.      Depression and Anxiety Follow-Up    Status since last visit: Worsened     Other associated symptoms:having exasperation due to asthma, father passed away, mother passed away, sibling issues    Complicating factors:     Significant life event: Yes-  Deaths in the family     Current substance abuse: None    States she cannot complete PHQ9/GAD7 at this time as \"life is hell.\"  Denies thoughts of self harm,  at anointment as well.         PHQ 2018   PHQ-9 Total Score 10 4   Q9: Thoughts of better off dead/self-harm past 2 weeks Not at all Not at all     JESUS-7 SCORE 2018   Total Score 6 4       PHQ-9  English  PHQ-9   Any Language  JESUS-7  Suicide Assessment Five-step Evaluation and Treatment (SAFE-T)      Additional history: as documented    Reviewed  and updated as needed this visit by clinical " staff  Tobacco  Allergies  Meds  Problems  Med Hx  Surg Hx  Fam Hx  Soc Hx          Reviewed and updated as needed this visit by Provider  Tobacco  Allergies  Meds  Problems  Med Hx  Surg Hx  Fam Hx         Patient Active Problem List   Diagnosis     Benign neoplasm of vulva     Esophageal reflux     Chronic depressive personality disorder     Complete atrioventricular block (H)     Cardiac pacemaker in situ- Medtronic, dual lead- DEPENDENT     Hypothyroidism     Ex-smoker     Hyperparathyroidism (H)     Pulmonary nodules     Cardiomyopathy (H)     S/P cardiac pacemaker procedure     Situational anxiety     LARRY (obstructive sleep apnea)     Restless legs syndrome (RLS)     Vestibular disequilibrium, unspecified laterality     Collagenous colitis     COPD exacerbation (H)     Death of parent     Panic attack     Insomnia, unspecified type     Exacerbation of asthma, unspecified asthma severity, unspecified whether persistent     Past Surgical History:   Procedure Laterality Date     BUNIONECTOMY Bilateral      EP ABLATION / EP STUDIES  04/21/2017     EXPLORE NECK N/A 9/19/2018    Procedure: EXPLORE NECK;  Neck Exploration Resection of Left superior Parathyroid gland;  Surgeon: Kristine Venegas MD;  Location: [facility] OR     HC CORRECT BUNION,SIMPLE       PARATHYROIDECTOMY N/A 9/19/2018    Procedure: PARATHYROIDECTOMY;;  Surgeon: Kristine Venegas MD;  Location: [facility] OR     PPM INSERT OF NEW OR REPL W/VENT LEAD  04/21/2017     TONSILLECTOMY & ADENOIDECTOMY         Social History     Tobacco Use     Smoking status: Former Smoker     Smokeless tobacco: Never Used   Substance Use Topics     Alcohol use: Yes     Comment: seldom     Family History   Problem Relation Age of Onset     Hypothyroidism Mother      Hypertension Mother      Osteoarthritis Mother      Coronary Artery Disease Father         nonfatal MI in his 70s     Asthma Father      Diabetes Sister      Hypothyroidism Sister      Breast Cancer  Maternal Aunt          Current Outpatient Medications   Medication Sig Dispense Refill     albuterol (PROAIR HFA/PROVENTIL HFA/VENTOLIN HFA) 108 (90 BASE) MCG/ACT Inhaler Inhale 2 puffs into the lungs as needed for shortness of breath / dyspnea or wheezing        busPIRone (BUSPAR) 5 MG tablet Start at 5 mg twice daily for 3 days, then 7.5 mg (1.5 tabs) twice daily for 3 days, then 10 mg (2 tabs) twice daily for 3 days, then 12.5 mg (2.5 tabs) twice daily for 3 days, then 15 mg (3 tabs) twice daily and stay at that dose 150 tablet 1     Cefuroxime Axetil (CEFTIN PO)        clonazePAM (KLONOPIN) 0.5 MG tablet Take 1 tablet (0.5 mg) by mouth every 6 hours as needed for anxiety (panic attack, asthma attack- use only when needed- habit forming medication) 60 tablet 1     DiphenhydrAMINE HCl (BENADRYL PO) Take 25-50 mg by mouth nightly as needed       Ferrous Sulfate (IRON SUPPLEMENT PO) Take 65 mg by mouth 2 times daily (with meals)        fluocinolone acetonide 0.01 % OIL Place 4-5 drops in right ear 1-2 times daily for 5-7 days. 20 mL 1     loperamide (IMODIUM A-D) 2 MG tablet Take 2 tabs (4 mg) after first loose stool, and then take one tab (2 mg) after each diarrheal stool.  Max of 8 tabs (16 mg) per day. 1 tablet 0     MAGNESIUM LACTATE PO Take 180 mg by mouth At Bedtime        MELATONIN PO Take 3 mg by mouth At Bedtime        Menaquinone-7 (VITAMIN K2 PO) Take 1 tablet by mouth every morning        METHYLPREDNISOLONE PO Take 40 mg by mouth as needed        Multiple Vitamin (DAILY MULTIVITAMIN PO) Take 1 tablet by mouth every morning        nebulizer nebulization as needed       Probiotic Product (PROBIOTIC DAILY PO) Take 1 capsule by mouth 2 times daily        rOPINIRole (REQUIP) 0.25 MG tablet        SYNTHROID 150 MCG tablet Take 1 tablet (150 mcg) by mouth daily 90 tablet 3     TURMERIC PO Take 1 tablet by mouth every morning        vitamin D3 (CHOLECALCIFEROL) 2000 units tablet Take 1 tablet by mouth daily 1  tablet 0     Allergies   Allergen Reactions     Flagyl [Metronidazole] Rash     Corn Oil Other (See Comments)     Headache       Lorazepam Other (See Comments)     Heat intolerance       Seasonal Allergies Difficulty breathing     Sulfamethoxazole-Trimethoprim      Other reaction(s): Other  Passed out     Tetracycline Swelling     Zofran [Ondansetron]      Prolonged QT  Prolonged QT at baseline per patient     Benzodiazepines Other (See Comments)     Other reaction(s): Other  Extreme heat intolerance  Extreme heat intolerance     Cyclobenzaprine Other (See Comments)     Extreme heat intolerance     Levaquin [Levofloxacin]      Other reaction(s): Other  Tendon rupture     Nsaids Other (See Comments)     Exacerbated asthma     Recent Labs   Lab Test 01/25/19  0753 08/09/18  0934 07/15/18  1132  11/09/17  1644   ALT 26  --   --   --  19   CR 0.64  --  0.68   < > 0.68   GFRESTIMATED >90  --  88   < > 88   GFRESTBLACK >90  --  >90   < > >90   POTASSIUM 4.5  --  3.7   < > 4.1   TSH  --  0.82 1.76  --  1.89    < > = values in this interval not displayed.      BP Readings from Last 3 Encounters:   05/16/19 129/81   05/13/19 116/68   03/17/19 137/88    Wt Readings from Last 3 Encounters:   05/16/19 71.8 kg (158 lb 3.2 oz)   05/13/19 71.7 kg (158 lb)   02/21/19 74.3 kg (163 lb 11.2 oz)                  Labs reviewed in EPIC    ROS:  Constitutional, HEENT, cardiovascular, pulmonary, GI, , musculoskeletal, neuro, skin, endocrine and psych systems are negative, except as otherwise noted.    OBJECTIVE:     /81   Pulse 93   Temp 99.7  F (37.6  C) (Tympanic)   Resp 16   Wt 71.8 kg (158 lb 3.2 oz)   LMP 08/21/2007   SpO2 92%   BMI 26.33 kg/m    Body mass index is 26.33 kg/m .  GENERAL: healthy, alert and no distress  HENT: ear canals and TM's normal, nose and mouth without ulcers or lesions  NECK: no adenopathy, no asymmetry, masses, or scars and thyroid normal to palpation  RESP: lungs clear to auscultation - no  rales, rhonchi or wheezes  CV: regular rate and rhythm, normal S1 S2, no S3 or S4, no murmur, click or rub, no peripheral edema and peripheral pulses strong  MS: no gross musculoskeletal defects noted, no edema  SKIN: no suspicious rashes  PSYCH: mentation appears normal, affect flat    Diagnostic Test Results:  See orders    ASSESSMENT/PLAN:           ICD-10-CM    1. Hospital discharge follow-up Z09    2. COPD exacerbation (H) J44.1 COPD ACTION PLAN   3. Situational anxiety F41.8 COPD ACTION PLAN     clonazePAM (KLONOPIN) 0.5 MG tablet     busPIRone (BUSPAR) 5 MG tablet   4. Insomnia, unspecified type G47.00 clonazePAM (KLONOPIN) 0.5 MG tablet   5. Panic attack F41.0 COPD ACTION PLAN   6. Cardiomyopathy, unspecified type (H) I42.9 HEART FAILURE ACTION PLAN   7. Death of parent Z63.4     Both Mother and Father 4-5/2019   8. Exacerbation of asthma, unspecified asthma severity, unspecified whether persistent J45.901        See Patient Instructions    Ce Crowe, [facility]

## 2019-05-16 NOTE — PATIENT INSTRUCTIONS
Reminders: If you are signed up for [facility] please be aware your results and communications will be sent within your mychart. Please remember to arrive 5-10 minutes early for your appointments. If you are late you may need to reschedule your appointment.        Patient Education     Anxiety Reaction  Anxiety is the feeling we all get when we think something bad might happen. It is a normal response to stress and usually causes only a mild reaction. When anxiety becomes more severe, it can interfere with daily life. In some cases, you may not even be aware of what it is you re anxious about. There may also be a genetic link or it may be a learned behavior in the home.  Both psychological and physical triggers cause stress reaction. It's often a response to fear or emotional stress, real or imagined. This stress may come from home, family, work, or social relationships.  During an anxiety reaction, you may feel:    Helpless    Nervous    Depressed    Irritable  Your body may show signs of anxiety in many ways. You may experience:    Dry mouth    Shakiness    Dizziness    Weakness    Trouble breathing    Breathing fast (hyperventilating)    Chest pressure    Sweating    Headache    Nausea    Diarrhea    Tiredness    Inability to sleep    Sexual problems  Home care    Try to locate the sources of stress in your life. They may not be obvious. These may include:  ? Daily hassles of life (such as traffic jams, missed appointments, or car troubles)  ? Major life changes, both good (new baby or job promotion) and bad (loss of job or loss of loved one)  ? Overload: feeling that you have too many responsibilities and can't take care of all of them at once  ? Feeling helpless or feeling that your problems are beyond what you re able to solve    Notice how your body reacts to stress. Learn to listen to your body signals. This will help you take action before the stress becomes severe.    When you can, do something about the  source of your stress. (Avoid hassles, limit the amount of change that happens in your life at one time and take a break when you feel overloaded).    Unfortunately, many stressful situations can't be avoided. It is necessary to learn how to better manage stress. There are many proven methods that will reduce your anxiety. These include simple things like exercise, good nutrition, and adequate rest. Also, there are certain techniques that are helpful:  ? Relaxation  ? Breathing exercises  ? Visualization  ? Biofeedback  ? Meditation  For more information about this, consult your healthcare provider or go to a local bookstore and review the many books and tapes available on this subject.  Follow-up care  If you feel that your anxiety is not responding to self-help measures, contact your healthcare provider or make an appointment with a counselor. You may need short-term psychological counseling and temporary medicine to help you manage stress.  Call 911  Call 911 if any of these happen:    Trouble breathing    Confusion    Drowsiness or trouble wakening    Fainting or loss of consciousness    Rapid heart rate    Seizure    New chest pain that becomes more severe, lasts longer, or spreads into your shoulder, arm, neck, jaw, or back  When to seek medical advice  Call your healthcare provider right away if any of these happen:    Your symptoms get worse    Severe headache not relieved by rest and mild pain reliever  Date Last Reviewed: 10/1/2017    1053-8687 [employer]. [address] [place] [address], [place] [address]. All rights reserved. This information is not intended as a substitute for professional medical care. Always follow your healthcare professional's instructions.           Patient Education     Controlling Your Asthma  You can do a lot to manage your asthma and improve your quality of life. You will need to work with your healthcare provider to develop a plan. But it s up to you to put this plan  into action.  Why you need to take control  You need to control the inflammation in your lungs. Take all medicine as directed, especially controller medicines, even if you feel that your asthma is under good control. You also need to relieve symptoms when you have them. These are long-term tasks. But the more you stay in control, the better you ll feel. If you don t stay in control:    Asthma symptoms may cause you to miss school, work, or activities that you enjoy.    Asthma flare-ups can be dangerous, even deadly.    Uncontrolled asthma makes it more likely that you will need emergency department and in-hospital care.    Uncontrolled asthma may cause permanent damage to your lungs.    Peak flow monitoring helps measure how open your airways are.   Taking medicine helps you control your asthma and relieve symptoms when they occur.     Using an Asthma Action Plan will help you keep track of and respond to asthma symptoms.   Avoiding triggers--the things that inflame your airways--will help prevent symptoms and flare-ups.   Your action plan  Your healthcare provider will help you prepare, and when needed, update your personal Asthma Action Plan. Your plan tells you what to do based on your current symptoms. If you don't have an Asthma Action Plan, or if yours isn't up-to-date, make sure you talk with your healthcare provider.  Date Last Reviewed: 1/1/2017 2000-2018 [employer]. [address] [place] [address], [place] [address]. All rights reserved. This information is not intended as a substitute for professional medical care. Always follow your healthcare professional's instructions.

## 2019-05-16 NOTE — LETTER
My COPD Action Plan     Name: Joyce Marroquin    YOB: 1960   Date: 5/16/2019    My doctor: Ce Crowe NP   [address] [place] [id number] [phone]  My Controller Medicine:    Dose:      My Rescue Medicine:    Dose:      My Flare Up Medicine:    Dose:      My COPD Severity:       Use of Oxygen:      Make sure you've had your pneumonia   vaccines.          GREEN ZONE       Doing well today      Usual level of activity and exercise    Usual amount of cough and mucus    No shortness of breath    Usual level of health (thinking clearly, sleeping well, feel like eating) Actions:      Take daily medicines    Use oxygen as prescribed    Follow regular exercise and diet plan    Avoid cigarette smoke and other irritants that harm the lungs           YELLOW ZONE          Having a bad day or flare up      Short of breath more than usual    A lot more sputum (mucus) than usual    Sputum looks yellow, green, tan, brown or bloody    More coughing or wheezing    Fever or chills    Less energy; trouble completing activities    Trouble thinking or focusing    Using quick relief inhaler or nebulizer more often    Poor sleep; symptoms wake me up    Do not feel like eating Actions:      Get plenty of rest    Take daily medicines    Use quick relief inhaler every __ hours    If you use oxygen, call you doctor to see if you should adjust your oxygen    Do breathing exercises or other things to help you relax    Let a loved one, friend or neighbor know you are feeling worse    Call your care team if you have 2 or more symptoms.  Start taking steroids or antibiotics if directed by your care team           RED ZONE       Need medical care now      Severe shortness of breath (feel you can't breathe)    Fever, chills    Not enough breath to do any activity    Trouble coughing up mucus, walking or talking    Blood in mucus    Frequent coughing   Rescue medicines  are not working    Not able to sleep because of breathing    Feel confused or drowsy    Chest pain    Actions:      Call your health care team.  If you cannot reach your care team, call 911 or go to the emergency room.        Annual Reminders:  Meet with Care Team, Flu Shot every Fall  Pharmacy: [facility] [address] [place], [place] [facility] [address] [address]

## 2019-05-17 ASSESSMENT — ASTHMA QUESTIONNAIRES: ACT_TOTALSCORE: 25

## 2019-05-21 ENCOUNTER — ANCILLARY PROCEDURE (OUTPATIENT)
Dept: CARDIOLOGY | Facility: CLINIC | Age: 59
End: 2019-05-21
Attending: INTERNAL MEDICINE
Payer: COMMERCIAL

## 2019-05-21 DIAGNOSIS — I44.2 AV BLOCK, COMPLETE (H): ICD-10-CM

## 2019-05-21 PROCEDURE — 93294 REM INTERROG EVL PM/LDLS PM: CPT | Performed by: INTERNAL MEDICINE

## 2019-05-21 PROCEDURE — 93296 REM INTERROG EVL PM/IDS: CPT | Mod: ZF

## 2019-05-24 ENCOUNTER — MYC MEDICAL ADVICE (OUTPATIENT)
Dept: FAMILY MEDICINE | Facility: CLINIC | Age: 59
End: 2019-05-24

## 2019-05-30 ENCOUNTER — OFFICE VISIT (OUTPATIENT)
Dept: FAMILY MEDICINE | Facility: CLINIC | Age: 59
End: 2019-05-30
Payer: COMMERCIAL

## 2019-05-30 ENCOUNTER — MYC MEDICAL ADVICE (OUTPATIENT)
Dept: FAMILY MEDICINE | Facility: CLINIC | Age: 59
End: 2019-05-30

## 2019-05-30 VITALS
TEMPERATURE: 98.8 F | SYSTOLIC BLOOD PRESSURE: 111 MMHG | WEIGHT: 154.4 LBS | RESPIRATION RATE: 16 BRPM | OXYGEN SATURATION: 98 % | DIASTOLIC BLOOD PRESSURE: 67 MMHG | HEART RATE: 91 BPM | BODY MASS INDEX: 25.69 KG/M2

## 2019-05-30 DIAGNOSIS — Z63.4 DEATH OF PARENT: ICD-10-CM
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
## 2019-05-30 DIAGNOSIS — J45.30 MILD PERSISTENT ASTHMA WITHOUT COMPLICATION: ICD-10-CM

## 2019-05-30 DIAGNOSIS — F41.8 SITUATIONAL ANXIETY: ICD-10-CM

## 2019-05-30 LAB — B BURGDOR IGG+IGM SER QL: 0.21 (ref 0–0.89)

## 2019-05-30 PROCEDURE — 86618 LYME DISEASE ANTIBODY: CPT | Performed by: NURSE PRACTITIONER

## 2019-05-30 PROCEDURE — 36415 COLL VENOUS BLD VENIPUNCTURE: CPT | Performed by: NURSE PRACTITIONER

## 2019-05-30 PROCEDURE — 99214 OFFICE O/P EST MOD 30 MIN: CPT | Performed by: NURSE PRACTITIONER

## 2019-05-30 RX ORDER — AMOXICILLIN 500 MG/1
500 CAPSULE ORAL 3 TIMES DAILY
Qty: 42 CAPSULE | Refills: 0 | Status: ON HOLD | OUTPATIENT
Start: 2019-05-30 | End: 2019-09-26

## 2019-05-30 RX ORDER — DOXYCYCLINE 100 MG/1
100 CAPSULE ORAL 2 TIMES DAILY
Qty: 28 CAPSULE | Refills: 0 | Status: SHIPPED | OUTPATIENT
Start: 2019-05-30 | End: 2019-05-30

## 2019-05-30 SDOH — SOCIAL STABILITY - SOCIAL INSECURITY: DISSAPEARANCE AND DEATH OF FAMILY MEMBER: Z63.4

## 2019-05-30 NOTE — LETTER
My Asthma Action Plan  Name: Joyce Marroquin   YOB: 1960  Date: 7/2/2019   My doctor: Ce Crowe NP   My clinic: [facility]        My Control Medicine:   My Rescue Medicine:    My Asthma Severity:   Avoid your asthma triggers:                GREEN ZONE   Good Control    I feel good    No cough or wheeze    Can work, sleep and play without asthma symptoms       Take your asthma control medicine every day.     1. If exercise triggers your asthma, take your rescue medication    15 minutes before exercise or sports, and    During exercise if you have asthma symptoms  2. Spacer to use with inhaler: If you have a spacer, make sure to use it with your inhaler             YELLOW ZONE Getting Worse  I have ANY of these:    I do not feel good    Cough or wheeze    Chest feels tight    Wake up at night   1. Keep taking your Green Zone medications  2. Start taking your rescue medicine:    every 20 minutes for up to 1 hour. Then every 4 hours for 24-48 hours.  3. If you stay in the Yellow Zone for more than 12-24 hours, contact your doctor.  4. If you do not return to the Green Zone in 12-24 hours or you get worse, start taking your oral steroid medicine if prescribed by your provider.           RED ZONE Medical Alert - Get Help  I have ANY of these:    I feel awful    Medicine is not helping    Breathing getting harder    Trouble walking or talking    Nose opens wide to breathe       1. Take your rescue medicine NOW  2. If your provider has prescribed an oral steroid medicine, start taking it NOW  3. Call your doctor NOW  4. If you are still in the Red Zone after 20 minutes and you have not reached your doctor:    Take your rescue medicine again and    Call 911 or go to the emergency room right away    See your regular doctor within 2 weeks of an Emergency Room or Urgent Care visit for follow-up treatment.          Annual Reminders:  Meet with Asthma Educator,  Flu Shot in the Fall, consider  Pneumonia Vaccination for patients with asthma (aged 19 and older).    Pharmacy: [facility] [id number] [place] [address] - [phone] [address]                      Asthma Triggers  How To Control Things That Make Your Asthma Worse    Triggers are things that make your asthma worse.  Look at the list below to help you find your triggers and what you can do about them.  You can help prevent asthma flare-ups by staying away from your triggers.      Trigger                                                          What you can do   Cigarette Smoke  Tobacco smoke can make asthma worse. Do not allow smoking in your home, car or around you.  Be sure no one smokes at a child s day care or school.  If you smoke, ask your health care provider for ways to help you quit.  Ask family members to quit too.  Ask your health care provider for a referral to Quit Plan to help you quit smoking, or call 6-157-841-PLAN.     Colds, Flu, Bronchitis  These are common triggers of asthma. Wash your hands often.  Don t touch your eyes, nose or mouth.  Get a flu shot every year.     Dust Mites  These are tiny bugs that live in cloth or carpet. They are too small to see. Wash sheets and blankets in hot water every week.   Encase pillows and mattress in dust mite proof covers.  Avoid having carpet if you can. If you have carpet, vacuum weekly.   Use a dust mask and HEPA vacuum.   Pollen and Outdoor Mold  Some people are allergic to trees, grass, or weed pollen, or molds. Try to keep your windows closed.  Limit time out doors when pollen count is high.   Ask you health care provider about taking medicine during allergy season.     Animal Dander  Some people are allergic to skin flakes, urine or saliva from pets with fur or feathers. Keep pets with fur or feathers out of your home.    If you can t keep the pet outdoors, then keep the pet out of your bedroom.  Keep the bedroom door closed.  Keep pets off cloth furniture and  away from stuffed toys.     Mice, Rats, and Cockroaches  Some people are allergic to the waste from these pests.   Cover food and garbage.  Clean up spills and food crumbs.  Store grease in the refrigerator.   Keep food out of the bedroom.   Indoor Mold  This can be a trigger if your home has high moisture. Fix leaking faucets, pipes, or other sources of water.   Clean moldy surfaces.  Dehumidify basement if it is damp and smelly.   Smoke, Strong Odors, and Sprays  These can reduce air quality. Stay away from strong odors and sprays, such as perfume, powder, hair spray, paints, smoke incense, paint, cleaning products, candles and new carpet.   Exercise or Sports  Some people with asthma have this trigger. Be active!  Ask your doctor about taking medicine before sports or exercise to prevent symptoms.    Warm up for 5-10 minutes before and after sports or exercise.     Other Triggers of Asthma  Cold air:  Cover your nose and mouth with a scarf.  Sometimes laughing or crying can be a trigger.  Some medicines and food can trigger asthma.

## 2019-05-30 NOTE — RESULT ENCOUNTER NOTE
Jose Luis Cook,    Thank you for your recent office visit.    Here are your recent results.  Negative for lymes.     Feel free to contact me via [employer] or call the clinic at [phone].    Sincerely,    SHAYNA Simon, FNP-BC

## 2019-05-30 NOTE — PROGRESS NOTES
Subjective     Joyce Marroquin is a 58 year old female who presents to clinic today for the following health issues:    HPI     Tick Bite  Date of Bite: Tuesday  Location: right thigh  Was tick removed entirely? Not sure  Symptoms since bite? none  Have you noticed a red ring? yes     Anxiety Follow-Up    How are you doing with your anxiety since your last visit? Improved - she did not like the way buspar made her feel.  Was only taking clonopin for her stress induced asthma attack (r/t her father passing)    Are you having other symptoms that might be associated with anxiety? No    Have you had a significant life event? Grief or Loss     Are you feeling depressed? No    Do you have any concerns with your use of alcohol or other drugs? No    Social History     Tobacco Use     Smoking status: Former Smoker     Smokeless tobacco: Never Used   Substance Use Topics     Alcohol use: Yes     Comment: seldom     Drug use: No     JESUS-7 SCORE 11/9/2018 1/25/2019   Total Score 6 4     PHQ 11/9/2018 1/25/2019   PHQ-9 Total Score 10 4   Q9: Thoughts of better off dead/self-harm past 2 weeks Not at all Not at all         Amount of exercise or physical activity: None    Problems taking medications regularly: No    Medication side effects: buspar -muscle weakness    Diet: regular (no restrictions)      Patient Active Problem List   Diagnosis     Benign neoplasm of vulva     Esophageal reflux     Chronic depressive personality disorder     Complete atrioventricular block (H)     Cardiac pacemaker in situ- [employer], dual lead- DEPENDENT     Hypothyroidism     Ex-smoker     Hyperparathyroidism (H)     Pulmonary nodules     Cardiomyopathy (H)     S/P cardiac pacemaker procedure     Situational anxiety     LARRY (obstructive sleep apnea)     Restless legs syndrome (RLS)     Vestibular disequilibrium, unspecified laterality     Collagenous colitis     COPD exacerbation (H)     Death of parent     Panic attack     Insomnia, unspecified type      Exacerbation of asthma, unspecified asthma severity, unspecified whether persistent     Tick bite, initial encounter     Past Surgical History:   Procedure Laterality Date     BUNIONECTOMY Bilateral      EP ABLATION / EP STUDIES  04/21/2017     EXPLORE NECK N/A 9/19/2018    Procedure: EXPLORE NECK;  Neck Exploration Resection of Left superior Parathyroid gland;  Surgeon: Kristine Venegas MD;  Location: [facility] OR     HC CORRECT BUNION,SIMPLE       PARATHYROIDECTOMY N/A 9/19/2018    Procedure: PARATHYROIDECTOMY;;  Surgeon: Kristine Venegas MD;  Location: [facility] OR     PPM INSERT OF NEW OR REPL W/VENT LEAD  04/21/2017     TONSILLECTOMY & ADENOIDECTOMY         Social History     Tobacco Use     Smoking status: Former Smoker     Smokeless tobacco: Never Used   Substance Use Topics     Alcohol use: Yes     Comment: seldom     Family History   Problem Relation Age of Onset     Hypothyroidism Mother      Hypertension Mother      Osteoarthritis Mother      Coronary Artery Disease Father         nonfatal MI in his 70s     Asthma Father      Diabetes Sister      Hypothyroidism Sister      Breast Cancer Maternal Aunt          Current Outpatient Medications   Medication Sig Dispense Refill     albuterol (PROAIR HFA/PROVENTIL HFA/VENTOLIN HFA) 108 (90 BASE) MCG/ACT Inhaler Inhale 2 puffs into the lungs as needed for shortness of breath / dyspnea or wheezing        clonazePAM (KLONOPIN) 0.5 MG tablet Take 1 tablet (0.5 mg) by mouth every 6 hours as needed for anxiety (panic attack, asthma attack- use only when needed- habit forming medication) 60 tablet 1     DiphenhydrAMINE HCl (BENADRYL PO) Take 25-50 mg by mouth nightly as needed       Ferrous Sulfate (IRON SUPPLEMENT PO) Take 65 mg by mouth 2 times daily (with meals)        fluocinolone acetonide 0.01 % OIL Place 4-5 drops in right ear 1-2 times daily for 5-7 days. 20 mL 1     loperamide (IMODIUM A-D) 2 MG tablet Take 2 tabs (4 mg) after first loose stool, and then take  one tab (2 mg) after each diarrheal stool.  Max of 8 tabs (16 mg) per day. 1 tablet 0     MAGNESIUM LACTATE PO Take 180 mg by mouth At Bedtime        MELATONIN PO Take 3 mg by mouth At Bedtime        Menaquinone-7 (VITAMIN K2 PO) Take 1 tablet by mouth every morning        Multiple Vitamin (DAILY MULTIVITAMIN PO) Take 1 tablet by mouth every morning        nebulizer nebulization as needed       Probiotic Product (PROBIOTIC DAILY PO) Take 1 capsule by mouth 2 times daily        rOPINIRole (REQUIP) 0.25 MG tablet        SYNTHROID 150 MCG tablet Take 1 tablet (150 mcg) by mouth daily 90 tablet 3     TURMERIC PO Take 1 tablet by mouth every morning        vitamin D3 (CHOLECALCIFEROL) 2000 units tablet Take 1 tablet by mouth daily 1 tablet 0     amoxicillin (AMOXIL) 500 MG capsule Take 1 capsule (500 mg) by mouth 3 times daily for 14 days 42 capsule 0     METHYLPREDNISOLONE PO Take 40 mg by mouth as needed        Allergies   Allergen Reactions     Flagyl [Metronidazole] Rash     Buspar [Buspirone]      Muscle weakness     Corn Oil Other (See Comments)     Headache       Lorazepam Other (See Comments)     Heat intolerance       Seasonal Allergies Difficulty breathing     Sulfamethoxazole-Trimethoprim      Other reaction(s): Other  Passed out     Tetracycline Swelling     Zofran [Ondansetron]      Prolonged QT  Prolonged QT at baseline per patient     Benzodiazepines Other (See Comments)     Other reaction(s): Other  Extreme heat intolerance  Extreme heat intolerance     Cyclobenzaprine Other (See Comments)     Extreme heat intolerance     Levaquin [Levofloxacin]      Other reaction(s): Other  Tendon rupture     Nsaids Other (See Comments)     Exacerbated asthma     Recent Labs   Lab Test 01/25/19  0753 08/09/18  0934 07/15/18  1132  11/09/17  1644   ALT 26  --   --   --  19   CR 0.64  --  0.68   < > 0.68   GFRESTIMATED >90  --  88   < > 88   GFRESTBLACK >90  --  >90   < > >90   POTASSIUM 4.5  --  3.7   < > 4.1   TSH  --   "0.82 1.76  --  1.89    < > = values in this interval not displayed.      BP Readings from Last 3 Encounters:   05/30/19 111/67   05/16/19 129/81   05/13/19 116/68    Wt Readings from Last 3 Encounters:   05/30/19 70 kg (154 lb 6.4 oz)   05/16/19 71.8 kg (158 lb 3.2 oz)   05/13/19 71.7 kg (158 lb)              Reviewed and updated as needed this visit by Provider  Tobacco  Allergies  Meds  Problems  Med Hx  Surg Hx  Fam Hx         Review of Systems   ROS COMP: Constitutional, HEENT, cardiovascular, pulmonary, GI, , musculoskeletal, neuro, skin, endocrine and psych systems are negative, except as otherwise noted.      Objective    /67   Pulse 91   Temp 98.8  F (37.1  C) (Oral)   Resp 16   Wt 70 kg (154 lb 6.4 oz)   LMP 08/21/2007   SpO2 98%   BMI 25.69 kg/m    Body mass index is 25.69 kg/m .     Physical Exam   GENERAL: healthy, alert and no distress  RESP: lungs clear to auscultation - no rales, rhonchi or wheezes  CV: regular rate and rhythm, normal S1 S2, no S3 or S4, no murmur, click or rub, no peripheral edema and peripheral pulses strong  MS: no gross musculoskeletal defects noted, no edema  SKIN: no suspicious rashes  PSYCH: mentation appears normal, affect normal/bright    Diagnostic Test Results:  Labs reviewed in Epic  See orders        Assessment & Plan       ICD-10-CM    1. Tick bite, initial encounter W57.XXXA Lyme Disease Yessica with reflex to WB Serum     DISCONTINUED: doxycycline monohydrate (MONODOX) 100 MG capsule   2. Situational anxiety F41.8    3. Death of parent Z63.4    4. Mild persistent asthma without complication J45.30         BMI:   Estimated body mass index is 25.69 kg/m  as calculated from the following:    Height as of 2/21/19: 1.651 m (5' 5\").    Weight as of this encounter: 70 kg (154 lb 6.4 oz).   Weight management plan: Discussed healthy diet and exercise guidelines        See Patient Instructions: discussed with patient her drug allergies and doxycycline versus " amoxicillin.  Because she notes a bull's-eye we will start her on antibiotics and finish the course unless Lyme's test come back negative.  Patient is in agreement to the plan.  She will follow-up as needed    Return in about 1 week (around 6/6/2019), or if symptoms worsen or fail to improve.    Ce Crowe, P  [facility]

## 2019-05-30 NOTE — PATIENT INSTRUCTIONS
Reminders: If you are signed up for [employer] please be aware your results and communications will be sent within your mychart. Please remember to arrive 5-10 minutes early for your appointments. If you are late you may need to reschedule your appointment.    Patient Education     Tick Bite, Antibiotic Treatment    Ticks are small arachnids that feed on the blood of rodents, rabbits, birds, deer, dogs and humans. The bite may cause a local reaction like that of a spider, with a small amount of local redness, itching and slight swelling. Sometimes there is no local reaction.  Most tick bites are harmless. But some ticks carry diseases, such as Lyme disease or [place] spotted fever. These can be passed to people at the time of the bite. Lyme disease is of greatest concern. Right now you have no symptoms of Lyme disease or other serious reaction to the bite. It is important to watch for the warning signs, which could appear weeks to months after the tick bite.  Home care  The following guidelines can help you care for your bite at home:    If itching is a problem, avoid tight clothing and anything that heats up your skin. This includes hot showers or baths and direct sunlight. This often makes the itching worse.    An ice pack will reduce local areas of redness and itching. Make your own ice pack by putting ice cubes in a zip-top plastic bag and wrapping it in a thin towel. Over-the-counter creams containing benzocaine may help with itching.    You can use an antihistamine with diphenhydramine if your doctor did not give you another antihistamine. This medicine may be used to reduce itching if large areas of the skin are involved. It is available at drugstores and grocery stores. If symptoms continue, talk with your doctor or pharmacist about other over-the counter medicines that may be helpful.    Your doctor may prescribe antibiotics to reduce your risk of getting Lyme disease. It is very important that you take  them exactly as directed until they are completely finished.  Follow-up care  Follow up with your healthcare provider, or as advised.  Call 911  Call 911 if any of these occur:    Irregular or rapid heartbeat    Numbness, tingling, or weakness in the arms or legs  When to seek medical advice  Call your healthcare provider right away if any of these occur:  Signs of local infection. Watch for these during the next few days:    Increasing redness around the bite site    Increased pain or swelling    Fever over 100.4 F (38 C), or as directed by your healthcare provider    Fluid draining from the bite area  Signs of tick-related disease. Watch for these over the next few weeks to months:    Circular, red, ring-like rash appears at the bite area within 1 to 3 weeks    Tiredness, fever or chills, nausea or vomiting    Neck pain or stiffness, headache, or confusion    Muscle or bone aches    Joint pain or swelling, especially in the knee    Weakness on one side of the face  Date Last Reviewed: 10/1/2016    6768-0021 [employer]. [address] [place] [address]. All rights reserved. This information is not intended as a substitute for professional medical care. Always follow your healthcare professional's instructions.           Patient Education     Lyme Disease  Lyme disease is caused by bacteria. The infection is most often passed during the bite of a deer tick. The tick is very small, so many people with Lyme disease don't know they have been bitten. Tests for Lyme disease are not always accurate early in the disease. If the disease is suspected, treatment may start before testing confirms the infection. A long course of antibiotics is the standard treatment.  If untreated, Lyme disease can cause symptoms in many parts of the body that may worsen.    Early symptoms limited to a small area may appear within a few days to a month after the tick bite. These symptoms may include a round, red rash  that sometimes looks like a bull's-eye target with darker outer ring and a darker center. There may fever, chills, fatigue, body aches, and headache. In time, the rash goes away, even without treatment. That doesn't mean the infection has gone away, however. In some cases, early local symptoms never develop.    Early body-wide symptoms may appear weeks to months after the bite. These can include rashes on the skin of various parts of the body, muscle aches, fatigue, fever, headache, stiff neck, weakness on one side of the face, dizziness, palpitations, passing out, and joint pain and swelling.    Late-stage symptoms can include weakness in an arm, or leg, headache, fever, and numbness and tingling in the arms or legs, joint pain and swelling, confusion, and memory loss.    Many people will have left over symptoms even after treatment and cure of the Lyme disease. These are called post-Lyme symptoms and may include fatigue, body aches, joint aches, and headaches, which generally improve with time. Repeated courses of antibiotics don't help these symptoms to resolve faster. And, having the symptoms after a course of treatment does not mean that the Lyme bacteria is still active in the body.  Testing is done to check for the bacteria. When the infection is treated early, it can be cured. In some cases, a second or third course of antibiotics may be needed. Be sure to follow your healthcare providers directions about treatment.  Home care  If antibiotic pills have been prescribed, take them exactly as directed until they are completely gone. Don't stop taking them until you have taken the full course or your healthcare provider has told you to stop.  Ask your healthcare provider about taking over-the-counter medicines to control symptoms such as aches and fever.  Follow-up care  Follow up with your healthcare provider, or as advised. Be sure to return for follow-up testing as directed to be sure the infection has been  treated.  When to seek medical advice  Call your healthcare provider right away if any of the following occur:    Current symptoms get worse    Unexplained fever, neck pain or stiffness, or headache    Arm, leg or facial weakness    Joint pain or swelling    Numbness and tingling in the arms or legs    Confusion or memory loss    Irregular or rapid heartbeat, palpitations, dizziness, or passing out  Date Last Reviewed: 3/1/2018    7378-2090 [employer]. [address] [place] [address], [place] [address]. All rights reserved. This information is not intended as a substitute for professional medical care. Always follow your healthcare professional's instructions.           Patient Education     General Allergic Reactions  An allergic reaction is a set of symptoms caused by an allergen. An allergen is something that causes a person s immune system to react. When a person comes in contact with an allergen, it causes the body to release chemicals. These include the chemical histamine. Histamine causes swelling and itching. It may affect the entire body. This is called a general allergic reaction. Often symptoms affect only 1 part of the body. This is called a local allergic reaction.  You are having an allergic reaction. Almost anything can cause one. Different people are allergic to different things. It is usually something that you ate or swallowed, came into contact with by getting or putting it on your skin or clothes, or something you breathed in the air. This can be very annoying and sometimes scary.  Most of us think of allergic reactions when we have a rash or itchy skin. Symptoms can include:    Itching of the eyes, nose, and roof of the mouth    Runny or stuffy nose    Watery eyes     Sneezing or coughing     A blocked feeling in the ear    Red, itchy rash called hives    Red and purple spots    Rash, redness, welts, blisters    Itching, burning, stinging, pain    Dry, flaky, cracking, scaly skin  Severe  symptoms include:    Swelling of the face, lips, or other parts of the body    Hoarse voice    Trouble swallowing, feeling like your throat is closing    Trouble breathing, wheezing    Nausea, vomiting, diarrhea, stomach cramps    Feeling faint or lightheaded, rapid heart rate  Sometimes the cause may be obvious. But there are so many things that can cause a reaction that you may not be able to figure out. The most important things to help find your allergen are:    Remembering when it started    What you were doing at the time or just before that    Any activities you were involved in    Any new products or contacts  Below are some common causes. But remember that almost anything can cause a reaction. You may not even be aware that you came into contact with one of these things:    Dust, mold, pollen    Plants (common ones are poison ivy and poison oak, but there are many others)     Animals    Foods such as shrimp, shellfish, peanuts, milk products, gluten, and eggs. Also food colorings, flavorings, and additives.    Insect bites or stings such as bees, mosquitos, fleas, ticks    Medicines such as penicillin, sulfa medicines, amoxicillin, aspirin, and ibuprofen. But any medicine can cause a reaction.    Jewelry such as nickel or gold. This can be new, or something you ve worn for a while, including zippers and buttons.    Latex such as in gloves, clothes, toys, balloons, or some tapes. Some people allergic to latex may also have problems with foods like bananas, avocados, kiwi, papaya, or chestnuts.    Lotions, perfumes, cosmetics, soaps, shampoos, skincare products, nail products    Chemicals or dyes in clothing, linen, , hair dyes, soaps, iodine  Many viruses and common colds can cause a rash that is not an allergic reaction. Sometimes it is hard to tell the difference between allergies, sensitivity, or an intolerance to something. This is especially true with food. Many things can cause diarrhea,  vomiting, stomach cramps, and skin irritation.  Home care    The goal of treatment is to help relieve the symptoms and get you feeling better. The rash will usually fade over several days. But it can sometimes last a couple of weeks. Over the next couple of days, there may be times when it is gets a little worse, and then better again. Here are some things to do:    If you know what you are allergic to, stay away from it. Future reactions could be worse than this one.    Avoid tight clothing and anything that heats up your skin (hot showers or baths, direct sunlight). Heat will make itching worse.    An ice pack will relieve local areas of intense itching and redness. To make an ice pack, put ice cubes in a plastic bag that seals at the top. Wrap it in a thin, clean towel. Don t put the ice directly on the skin because it can damage the skin.    Oral diphenhydramine is an over-the-counter antihistamine sold at pharmacy and grocery stores. Unless a prescription antihistamine was given, diphenhydramine may be used to reduce itching if large areas of the skin are involved. It may make you sleepy. So be careful using it in the daytime or when going to school, working, or driving. Note: Don t use diphenhydramine if you have glaucoma or if you are a man with trouble urinating due to an enlarged prostate. There are other antihistamines that won t make you so sleepy. These are good choices for daytime use. Ask your pharmacist for suggestions.    Don t use diphenhydramine cream on your skin. It can cause a further reaction in some people.    To help prevent an infection, don't scratch the affected area. Scratching may worsen the reaction and damage your skin. It can also lead to an infection. Always check the affected for signs of an infection.    Call your healthcare provider and ask what you can use to help decrease the itching.    To decrease allergic reactions, try the following:      Use heat-steam to clean your  home    Use high-efficiency particulate (HEPA) vacuums and filters    Stay away from food and pet triggers    Kill any cockroaches    Clean your house often  Follow-up care  Follow up with your healthcare provider, or as advised. If you had a severe reaction today, or if you have had several mild to medium allergic reactions in the past, ask your provider about allergy testing. This can help you find out what you are allergic to. If your reaction included dizziness, fainting, or trouble breathing or swallowing, ask your provider about carrying auto-injectable epinephrine.  Call 911  Call 911 if any of these occur:    Trouble breathing or swallowing, wheezing    Cool, moist, pale skin    Shortness of breath    Hoarse voice or trouble speaking    Confused     Very drowsy or trouble awakening    Fainting or loss of consciousness    Rapid heart rate    Feeling of dizziness or weakness or a sudden drop in blood pressure    Feeling of doom    Feeling lightheaded    Severe nausea or vomiting, or diarrhea    Seizure    Swelling in the face, eyelids, lips, mouth, throat or tongue    Drooling  When to seek medical advice  Call your healthcare provider right away if any of these occur:    Spreading areas of itching, redness or swelling    Nausea or stomach cramps or abdominal pain    Continuing or recurring symptoms    Spreading areas of redness, swelling, or itching    Signs of infection at the affected site:  ? Spreading redness  ? Increased pain or swelling  ? Fluid or colored drainage from the site  ? Fever of 100.4 F (38 C) or above lasting for 24 to 48 hours, or as directed by your provider  Date Last Reviewed: 3/1/2017    1414-4792 [employer]. [address] [place] [address], [place] [address]. All rights reserved. This information is not intended as a substitute for professional medical care. Always follow your healthcare professional's instructions.

## 2019-05-31 ENCOUNTER — MYC MEDICAL ADVICE (OUTPATIENT)
Dept: FAMILY MEDICINE | Facility: CLINIC | Age: 59
End: 2019-05-31

## 2019-06-05 LAB
MDC_IDC_LEAD_IMPLANT_DT: NORMAL
MDC_IDC_LEAD_IMPLANT_DT: NORMAL
MDC_IDC_LEAD_LOCATION: NORMAL
MDC_IDC_LEAD_LOCATION: NORMAL
MDC_IDC_LEAD_LOCATION_DETAIL_1: NORMAL
MDC_IDC_LEAD_LOCATION_DETAIL_1: NORMAL
MDC_IDC_LEAD_MFG: NORMAL
MDC_IDC_LEAD_MFG: NORMAL
MDC_IDC_LEAD_MODEL: NORMAL
MDC_IDC_LEAD_MODEL: NORMAL
MDC_IDC_LEAD_POLARITY_TYPE: NORMAL
MDC_IDC_LEAD_POLARITY_TYPE: NORMAL
MDC_IDC_LEAD_SERIAL: NORMAL
MDC_IDC_LEAD_SERIAL: NORMAL
MDC_IDC_MSMT_BATTERY_DTM: NORMAL
MDC_IDC_MSMT_BATTERY_REMAINING_LONGEVITY: 91 MO
MDC_IDC_MSMT_BATTERY_RRT_TRIGGER: 2.83
MDC_IDC_MSMT_BATTERY_STATUS: NORMAL
MDC_IDC_MSMT_BATTERY_VOLTAGE: 3.01 V
MDC_IDC_MSMT_LEADCHNL_RA_IMPEDANCE_VALUE: 380 OHM
MDC_IDC_MSMT_LEADCHNL_RA_IMPEDANCE_VALUE: 475 OHM
MDC_IDC_MSMT_LEADCHNL_RA_PACING_THRESHOLD_AMPLITUDE: 0.5 V
MDC_IDC_MSMT_LEADCHNL_RA_PACING_THRESHOLD_PULSEWIDTH: 0.4 MS
MDC_IDC_MSMT_LEADCHNL_RA_SENSING_INTR_AMPL: 4.25 MV
MDC_IDC_MSMT_LEADCHNL_RA_SENSING_INTR_AMPL: 4.25 MV
MDC_IDC_MSMT_LEADCHNL_RV_IMPEDANCE_VALUE: 437 OHM
MDC_IDC_MSMT_LEADCHNL_RV_IMPEDANCE_VALUE: 513 OHM
MDC_IDC_MSMT_LEADCHNL_RV_PACING_THRESHOLD_AMPLITUDE: 0.62 V
MDC_IDC_MSMT_LEADCHNL_RV_PACING_THRESHOLD_PULSEWIDTH: 0.4 MS
MDC_IDC_MSMT_LEADCHNL_RV_SENSING_INTR_AMPL: 13.38 MV
MDC_IDC_MSMT_LEADCHNL_RV_SENSING_INTR_AMPL: 13.38 MV
MDC_IDC_PG_IMPLANT_DTM: NORMAL
MDC_IDC_PG_MFG: NORMAL
MDC_IDC_PG_MODEL: NORMAL
MDC_IDC_PG_SERIAL: NORMAL
MDC_IDC_PG_TYPE: NORMAL
MDC_IDC_SESS_CLINIC_NAME: NORMAL
MDC_IDC_SESS_DTM: NORMAL
MDC_IDC_SESS_TYPE: NORMAL
MDC_IDC_SET_BRADY_AT_MODE_SWITCH_RATE: 171 {BEATS}/MIN
MDC_IDC_SET_BRADY_HYSTRATE: NORMAL
MDC_IDC_SET_BRADY_LOWRATE: 60 {BEATS}/MIN
MDC_IDC_SET_BRADY_MAX_SENSOR_RATE: 140 {BEATS}/MIN
MDC_IDC_SET_BRADY_MAX_TRACKING_RATE: 140 {BEATS}/MIN
MDC_IDC_SET_BRADY_MODE: NORMAL
MDC_IDC_SET_BRADY_PAV_DELAY_HIGH: 140 MS
MDC_IDC_SET_BRADY_PAV_DELAY_LOW: 180 MS
MDC_IDC_SET_BRADY_SAV_DELAY_HIGH: 110 MS
MDC_IDC_SET_BRADY_SAV_DELAY_LOW: 150 MS
MDC_IDC_SET_LEADCHNL_RA_PACING_AMPLITUDE: 1.5 V
MDC_IDC_SET_LEADCHNL_RA_PACING_ANODE_ELECTRODE_1: NORMAL
MDC_IDC_SET_LEADCHNL_RA_PACING_ANODE_LOCATION_1: NORMAL
MDC_IDC_SET_LEADCHNL_RA_PACING_CAPTURE_MODE: NORMAL
MDC_IDC_SET_LEADCHNL_RA_PACING_CATHODE_ELECTRODE_1: NORMAL
MDC_IDC_SET_LEADCHNL_RA_PACING_CATHODE_LOCATION_1: NORMAL
MDC_IDC_SET_LEADCHNL_RA_PACING_POLARITY: NORMAL
MDC_IDC_SET_LEADCHNL_RA_PACING_PULSEWIDTH: 0.4 MS
MDC_IDC_SET_LEADCHNL_RA_SENSING_ANODE_ELECTRODE_1: NORMAL
MDC_IDC_SET_LEADCHNL_RA_SENSING_ANODE_LOCATION_1: NORMAL
MDC_IDC_SET_LEADCHNL_RA_SENSING_CATHODE_ELECTRODE_1: NORMAL
MDC_IDC_SET_LEADCHNL_RA_SENSING_CATHODE_LOCATION_1: NORMAL
MDC_IDC_SET_LEADCHNL_RA_SENSING_POLARITY: NORMAL
MDC_IDC_SET_LEADCHNL_RA_SENSING_SENSITIVITY: 0.3 MV
MDC_IDC_SET_LEADCHNL_RV_PACING_AMPLITUDE: 1.5 V
MDC_IDC_SET_LEADCHNL_RV_PACING_ANODE_ELECTRODE_1: NORMAL
MDC_IDC_SET_LEADCHNL_RV_PACING_ANODE_LOCATION_1: NORMAL
MDC_IDC_SET_LEADCHNL_RV_PACING_CAPTURE_MODE: NORMAL
MDC_IDC_SET_LEADCHNL_RV_PACING_CATHODE_ELECTRODE_1: NORMAL
MDC_IDC_SET_LEADCHNL_RV_PACING_CATHODE_LOCATION_1: NORMAL
MDC_IDC_SET_LEADCHNL_RV_PACING_POLARITY: NORMAL
MDC_IDC_SET_LEADCHNL_RV_PACING_PULSEWIDTH: 0.4 MS
MDC_IDC_SET_LEADCHNL_RV_SENSING_ANODE_ELECTRODE_1: NORMAL
MDC_IDC_SET_LEADCHNL_RV_SENSING_ANODE_LOCATION_1: NORMAL
MDC_IDC_SET_LEADCHNL_RV_SENSING_CATHODE_ELECTRODE_1: NORMAL
MDC_IDC_SET_LEADCHNL_RV_SENSING_CATHODE_LOCATION_1: NORMAL
MDC_IDC_SET_LEADCHNL_RV_SENSING_POLARITY: NORMAL
MDC_IDC_SET_LEADCHNL_RV_SENSING_SENSITIVITY: 0.9 MV
MDC_IDC_SET_ZONE_DETECTION_INTERVAL: 350 MS
MDC_IDC_SET_ZONE_DETECTION_INTERVAL: 400 MS
MDC_IDC_SET_ZONE_TYPE: NORMAL
MDC_IDC_STAT_AT_BURDEN_PERCENT: 0 %
MDC_IDC_STAT_AT_DTM_END: NORMAL
MDC_IDC_STAT_AT_DTM_START: NORMAL
MDC_IDC_STAT_BRADY_AP_VP_PERCENT: 26.67 %
MDC_IDC_STAT_BRADY_AP_VS_PERCENT: 0.14 %
MDC_IDC_STAT_BRADY_AS_VP_PERCENT: 72.94 %
MDC_IDC_STAT_BRADY_AS_VS_PERCENT: 0.24 %
MDC_IDC_STAT_BRADY_DTM_END: NORMAL
MDC_IDC_STAT_BRADY_DTM_START: NORMAL
MDC_IDC_STAT_BRADY_RA_PERCENT_PACED: 26.55 %
MDC_IDC_STAT_BRADY_RV_PERCENT_PACED: 99.45 %
MDC_IDC_STAT_EPISODE_RECENT_COUNT: 0
MDC_IDC_STAT_EPISODE_RECENT_COUNT_DTM_END: NORMAL
MDC_IDC_STAT_EPISODE_RECENT_COUNT_DTM_START: NORMAL
MDC_IDC_STAT_EPISODE_TOTAL_COUNT: 0
MDC_IDC_STAT_EPISODE_TOTAL_COUNT: 1
MDC_IDC_STAT_EPISODE_TOTAL_COUNT_DTM_END: NORMAL
MDC_IDC_STAT_EPISODE_TOTAL_COUNT_DTM_START: NORMAL
MDC_IDC_STAT_EPISODE_TYPE: NORMAL

## 2019-08-21 ENCOUNTER — ANCILLARY PROCEDURE (OUTPATIENT)
Dept: CARDIOLOGY | Facility: CLINIC | Age: 59
End: 2019-08-21
Attending: INTERNAL MEDICINE
Payer: COMMERCIAL

## 2019-08-21 DIAGNOSIS — I44.2 AV BLOCK, COMPLETE (H): ICD-10-CM

## 2019-08-21 PROCEDURE — 93296 REM INTERROG EVL PM/IDS: CPT | Mod: ZF

## 2019-08-21 PROCEDURE — 93294 REM INTERROG EVL PM/LDLS PM: CPT | Mod: ZP | Performed by: INTERNAL MEDICINE

## 2019-08-22 LAB
MDC_IDC_EPISODE_DTM: NORMAL
MDC_IDC_EPISODE_DTM: NORMAL
MDC_IDC_EPISODE_DURATION: 1 S
MDC_IDC_EPISODE_DURATION: 1 S
MDC_IDC_EPISODE_ID: 2
MDC_IDC_EPISODE_ID: 3
MDC_IDC_EPISODE_TYPE: NORMAL
MDC_IDC_EPISODE_TYPE: NORMAL
MDC_IDC_LEAD_IMPLANT_DT: NORMAL
MDC_IDC_LEAD_IMPLANT_DT: NORMAL
MDC_IDC_LEAD_LOCATION: NORMAL
MDC_IDC_LEAD_LOCATION: NORMAL
MDC_IDC_LEAD_LOCATION_DETAIL_1: NORMAL
MDC_IDC_LEAD_LOCATION_DETAIL_1: NORMAL
MDC_IDC_LEAD_MFG: NORMAL
MDC_IDC_LEAD_MFG: NORMAL
MDC_IDC_LEAD_MODEL: NORMAL
MDC_IDC_LEAD_MODEL: NORMAL
MDC_IDC_LEAD_POLARITY_TYPE: NORMAL
MDC_IDC_LEAD_POLARITY_TYPE: NORMAL
MDC_IDC_LEAD_SERIAL: NORMAL
MDC_IDC_LEAD_SERIAL: NORMAL
MDC_IDC_MSMT_BATTERY_DTM: NORMAL
MDC_IDC_MSMT_BATTERY_REMAINING_LONGEVITY: 82 MO
MDC_IDC_MSMT_BATTERY_RRT_TRIGGER: 2.83
MDC_IDC_MSMT_BATTERY_STATUS: NORMAL
MDC_IDC_MSMT_BATTERY_VOLTAGE: 3.01 V
MDC_IDC_MSMT_LEADCHNL_RA_IMPEDANCE_VALUE: 361 OHM
MDC_IDC_MSMT_LEADCHNL_RA_IMPEDANCE_VALUE: 456 OHM
MDC_IDC_MSMT_LEADCHNL_RA_PACING_THRESHOLD_AMPLITUDE: 0.62 V
MDC_IDC_MSMT_LEADCHNL_RA_PACING_THRESHOLD_PULSEWIDTH: 0.4 MS
MDC_IDC_MSMT_LEADCHNL_RA_SENSING_INTR_AMPL: 3.12 MV
MDC_IDC_MSMT_LEADCHNL_RA_SENSING_INTR_AMPL: 3.12 MV
MDC_IDC_MSMT_LEADCHNL_RV_IMPEDANCE_VALUE: 418 OHM
MDC_IDC_MSMT_LEADCHNL_RV_IMPEDANCE_VALUE: 494 OHM
MDC_IDC_MSMT_LEADCHNL_RV_PACING_THRESHOLD_AMPLITUDE: 0.75 V
MDC_IDC_MSMT_LEADCHNL_RV_PACING_THRESHOLD_PULSEWIDTH: 0.4 MS
MDC_IDC_MSMT_LEADCHNL_RV_SENSING_INTR_AMPL: 12.12 MV
MDC_IDC_MSMT_LEADCHNL_RV_SENSING_INTR_AMPL: 12.12 MV
MDC_IDC_PG_IMPLANT_DTM: NORMAL
MDC_IDC_PG_MFG: NORMAL
MDC_IDC_PG_MODEL: NORMAL
MDC_IDC_PG_SERIAL: NORMAL
MDC_IDC_PG_TYPE: NORMAL
MDC_IDC_SESS_CLINIC_NAME: NORMAL
MDC_IDC_SESS_DTM: NORMAL
MDC_IDC_SESS_TYPE: NORMAL
MDC_IDC_SET_BRADY_AT_MODE_SWITCH_RATE: 171 {BEATS}/MIN
MDC_IDC_SET_BRADY_HYSTRATE: NORMAL
MDC_IDC_SET_BRADY_LOWRATE: 60 {BEATS}/MIN
MDC_IDC_SET_BRADY_MAX_SENSOR_RATE: 140 {BEATS}/MIN
MDC_IDC_SET_BRADY_MAX_TRACKING_RATE: 140 {BEATS}/MIN
MDC_IDC_SET_BRADY_MODE: NORMAL
MDC_IDC_SET_BRADY_PAV_DELAY_HIGH: 140 MS
MDC_IDC_SET_BRADY_PAV_DELAY_LOW: 180 MS
MDC_IDC_SET_BRADY_SAV_DELAY_HIGH: 110 MS
MDC_IDC_SET_BRADY_SAV_DELAY_LOW: 150 MS
MDC_IDC_SET_LEADCHNL_RA_PACING_AMPLITUDE: 1.5 V
MDC_IDC_SET_LEADCHNL_RA_PACING_ANODE_ELECTRODE_1: NORMAL
MDC_IDC_SET_LEADCHNL_RA_PACING_ANODE_LOCATION_1: NORMAL
MDC_IDC_SET_LEADCHNL_RA_PACING_CAPTURE_MODE: NORMAL
MDC_IDC_SET_LEADCHNL_RA_PACING_CATHODE_ELECTRODE_1: NORMAL
MDC_IDC_SET_LEADCHNL_RA_PACING_CATHODE_LOCATION_1: NORMAL
MDC_IDC_SET_LEADCHNL_RA_PACING_POLARITY: NORMAL
MDC_IDC_SET_LEADCHNL_RA_PACING_PULSEWIDTH: 0.4 MS
MDC_IDC_SET_LEADCHNL_RA_SENSING_ANODE_ELECTRODE_1: NORMAL
MDC_IDC_SET_LEADCHNL_RA_SENSING_ANODE_LOCATION_1: NORMAL
MDC_IDC_SET_LEADCHNL_RA_SENSING_CATHODE_ELECTRODE_1: NORMAL
MDC_IDC_SET_LEADCHNL_RA_SENSING_CATHODE_LOCATION_1: NORMAL
MDC_IDC_SET_LEADCHNL_RA_SENSING_POLARITY: NORMAL
MDC_IDC_SET_LEADCHNL_RA_SENSING_SENSITIVITY: 0.3 MV
MDC_IDC_SET_LEADCHNL_RV_PACING_AMPLITUDE: 1.5 V
MDC_IDC_SET_LEADCHNL_RV_PACING_ANODE_ELECTRODE_1: NORMAL
MDC_IDC_SET_LEADCHNL_RV_PACING_ANODE_LOCATION_1: NORMAL
MDC_IDC_SET_LEADCHNL_RV_PACING_CAPTURE_MODE: NORMAL
MDC_IDC_SET_LEADCHNL_RV_PACING_CATHODE_ELECTRODE_1: NORMAL
MDC_IDC_SET_LEADCHNL_RV_PACING_CATHODE_LOCATION_1: NORMAL
MDC_IDC_SET_LEADCHNL_RV_PACING_POLARITY: NORMAL
MDC_IDC_SET_LEADCHNL_RV_PACING_PULSEWIDTH: 0.4 MS
MDC_IDC_SET_LEADCHNL_RV_SENSING_ANODE_ELECTRODE_1: NORMAL
MDC_IDC_SET_LEADCHNL_RV_SENSING_ANODE_LOCATION_1: NORMAL
MDC_IDC_SET_LEADCHNL_RV_SENSING_CATHODE_ELECTRODE_1: NORMAL
MDC_IDC_SET_LEADCHNL_RV_SENSING_CATHODE_LOCATION_1: NORMAL
MDC_IDC_SET_LEADCHNL_RV_SENSING_POLARITY: NORMAL
MDC_IDC_SET_LEADCHNL_RV_SENSING_SENSITIVITY: 0.9 MV
MDC_IDC_SET_ZONE_DETECTION_INTERVAL: 350 MS
MDC_IDC_SET_ZONE_DETECTION_INTERVAL: 400 MS
MDC_IDC_SET_ZONE_TYPE: NORMAL
MDC_IDC_STAT_AT_BURDEN_PERCENT: 0.1 %
MDC_IDC_STAT_AT_DTM_END: NORMAL
MDC_IDC_STAT_AT_DTM_START: NORMAL
MDC_IDC_STAT_BRADY_AP_VP_PERCENT: 19.47 %
MDC_IDC_STAT_BRADY_AP_VS_PERCENT: 0.02 %
MDC_IDC_STAT_BRADY_AS_VP_PERCENT: 80.35 %
MDC_IDC_STAT_BRADY_AS_VS_PERCENT: 0.16 %
MDC_IDC_STAT_BRADY_DTM_END: NORMAL
MDC_IDC_STAT_BRADY_DTM_START: NORMAL
MDC_IDC_STAT_BRADY_RA_PERCENT_PACED: 19.24 %
MDC_IDC_STAT_BRADY_RV_PERCENT_PACED: 99.59 %
MDC_IDC_STAT_EPISODE_RECENT_COUNT: 0
MDC_IDC_STAT_EPISODE_RECENT_COUNT: 0
MDC_IDC_STAT_EPISODE_RECENT_COUNT: 2
MDC_IDC_STAT_EPISODE_RECENT_COUNT: 21
MDC_IDC_STAT_EPISODE_RECENT_COUNT_DTM_END: NORMAL
MDC_IDC_STAT_EPISODE_RECENT_COUNT_DTM_START: NORMAL
MDC_IDC_STAT_EPISODE_TOTAL_COUNT: 0
MDC_IDC_STAT_EPISODE_TOTAL_COUNT: 0
MDC_IDC_STAT_EPISODE_TOTAL_COUNT: 1
MDC_IDC_STAT_EPISODE_TOTAL_COUNT: 2
MDC_IDC_STAT_EPISODE_TOTAL_COUNT_DTM_END: NORMAL
MDC_IDC_STAT_EPISODE_TOTAL_COUNT_DTM_START: NORMAL
MDC_IDC_STAT_EPISODE_TYPE: NORMAL

## 2019-08-28 ENCOUNTER — OFFICE VISIT (OUTPATIENT)
Dept: CARDIOLOGY | Facility: CLINIC | Age: 59
End: 2019-08-28
Attending: NURSE PRACTITIONER
Payer: COMMERCIAL

## 2019-08-28 VITALS
BODY MASS INDEX: 24.32 KG/M2 | DIASTOLIC BLOOD PRESSURE: 69 MMHG | WEIGHT: 146 LBS | HEIGHT: 65 IN | SYSTOLIC BLOOD PRESSURE: 113 MMHG | OXYGEN SATURATION: 97 % | HEART RATE: 57 BPM

## 2019-08-28 DIAGNOSIS — Z95.0 CARDIAC PACEMAKER IN SITU: ICD-10-CM

## 2019-08-28 DIAGNOSIS — Z95.0 CARDIAC PACEMAKER IN SITU: Primary | ICD-10-CM

## 2019-08-28 LAB
ALBUMIN SERPL-MCNC: 3.6 G/DL (ref 3.4–5)
ALP SERPL-CCNC: 96 U/L (ref 40–150)
ALT SERPL W P-5'-P-CCNC: 20 U/L (ref 0–50)
ANION GAP SERPL CALCULATED.3IONS-SCNC: 4 MMOL/L (ref 3–14)
AST SERPL W P-5'-P-CCNC: 11 U/L (ref 0–45)
BASOPHILS # BLD AUTO: 0.1 10E9/L (ref 0–0.2)
BASOPHILS NFR BLD AUTO: 0.9 %
BILIRUB SERPL-MCNC: 0.3 MG/DL (ref 0.2–1.3)
BUN SERPL-MCNC: 12 MG/DL (ref 7–30)
CALCIUM SERPL-MCNC: 8.9 MG/DL (ref 8.5–10.1)
CHLORIDE SERPL-SCNC: 108 MMOL/L (ref 94–109)
CO2 SERPL-SCNC: 28 MMOL/L (ref 20–32)
CREAT SERPL-MCNC: 0.62 MG/DL (ref 0.52–1.04)
DIFFERENTIAL METHOD BLD: NORMAL
EOSINOPHIL # BLD AUTO: 0.1 10E9/L (ref 0–0.7)
EOSINOPHIL NFR BLD AUTO: 1.4 %
ERYTHROCYTE [DISTWIDTH] IN BLOOD BY AUTOMATED COUNT: 13 % (ref 10–15)
GFR SERPL CREATININE-BSD FRML MDRD: >90 ML/MIN/{1.73_M2}
GLUCOSE SERPL-MCNC: 81 MG/DL (ref 70–99)
HCT VFR BLD AUTO: 44.1 % (ref 35–47)
HGB BLD-MCNC: 14.3 G/DL (ref 11.7–15.7)
IMM GRANULOCYTES # BLD: 0 10E9/L (ref 0–0.4)
IMM GRANULOCYTES NFR BLD: 0.4 %
LYMPHOCYTES # BLD AUTO: 2.2 10E9/L (ref 0.8–5.3)
LYMPHOCYTES NFR BLD AUTO: 30.9 %
MAGNESIUM SERPL-MCNC: 2.2 MG/DL (ref 1.6–2.3)
MCH RBC QN AUTO: 32.1 PG (ref 26.5–33)
MCHC RBC AUTO-ENTMCNC: 32.4 G/DL (ref 31.5–36.5)
MCV RBC AUTO: 99 FL (ref 78–100)
MONOCYTES # BLD AUTO: 0.7 10E9/L (ref 0–1.3)
MONOCYTES NFR BLD AUTO: 10 %
NEUTROPHILS # BLD AUTO: 3.9 10E9/L (ref 1.6–8.3)
NEUTROPHILS NFR BLD AUTO: 56.4 %
NRBC # BLD AUTO: 0 10*3/UL
NRBC BLD AUTO-RTO: 0 /100
PLATELET # BLD AUTO: 194 10E9/L (ref 150–450)
POTASSIUM SERPL-SCNC: 4.1 MMOL/L (ref 3.4–5.3)
PROT SERPL-MCNC: 7.4 G/DL (ref 6.8–8.8)
PTH-INTACT SERPL-MCNC: 47 PG/ML (ref 18–80)
RBC # BLD AUTO: 4.45 10E12/L (ref 3.8–5.2)
SODIUM SERPL-SCNC: 139 MMOL/L (ref 133–144)
T4 FREE SERPL-MCNC: 1.3 NG/DL (ref 0.76–1.46)
TSH SERPL DL<=0.005 MIU/L-ACNC: 0.28 MU/L (ref 0.4–4)
WBC # BLD AUTO: 7 10E9/L (ref 4–11)

## 2019-08-28 PROCEDURE — 83970 ASSAY OF PARATHORMONE: CPT | Performed by: NURSE PRACTITIONER

## 2019-08-28 PROCEDURE — G0463 HOSPITAL OUTPT CLINIC VISIT: HCPCS | Mod: ZF

## 2019-08-28 PROCEDURE — 83735 ASSAY OF MAGNESIUM: CPT | Performed by: NURSE PRACTITIONER

## 2019-08-28 PROCEDURE — 36415 COLL VENOUS BLD VENIPUNCTURE: CPT | Performed by: NURSE PRACTITIONER

## 2019-08-28 PROCEDURE — 80053 COMPREHEN METABOLIC PANEL: CPT | Performed by: NURSE PRACTITIONER

## 2019-08-28 PROCEDURE — 85025 COMPLETE CBC W/AUTO DIFF WBC: CPT | Performed by: NURSE PRACTITIONER

## 2019-08-28 PROCEDURE — 84443 ASSAY THYROID STIM HORMONE: CPT | Performed by: NURSE PRACTITIONER

## 2019-08-28 PROCEDURE — 99213 OFFICE O/P EST LOW 20 MIN: CPT | Mod: ZP | Performed by: NURSE PRACTITIONER

## 2019-08-28 PROCEDURE — 84439 ASSAY OF FREE THYROXINE: CPT | Performed by: NURSE PRACTITIONER

## 2019-08-28 ASSESSMENT — MIFFLIN-ST. JEOR: SCORE: 1233.13

## 2019-08-28 ASSESSMENT — PAIN SCALES - GENERAL: PAINLEVEL: NO PAIN (0)

## 2019-08-28 NOTE — LETTER
8/28/2019      RE: Joyce Marroquin  [address] [facility] [address]       Dear Colleague,    Thank you for the opportunity to participate in the care of your patient, Joyce Marroquin, at the [facility] HEART [facility] at [facility]. Please see a copy of my visit note below.    Electrophysiology Clinic Note  HPI:   Ms. Marroquin is a 58 year old female who has a past medical history significant for GERD, PTSD, Grave's Disease with post ablative hypothyroidism, hyperparathyroidism, LARRY (uses CPAP), depression, prior tobacco use, and PVCs s/p RVOT ablation complicated by CHB s/p PPM 4/21/2017 and pericarditis. She presents today for follow up.     She reports a history of symptomatic PVCs for which she ultimately elected to pursue an ablation which she had done at [facility]. 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 She went into [facility] and required urgent PPM placed in 4/21/17. She then had lead dislodgement in the recovery area requiring lead revision. She had pericarditis and completed a course of colchicine post ablation. Initial echo post ablation showed LVEF 40-45%, then an echo a month later showed LVEF 50%. MRI in early 4/2017 showed LVEF 60-65% with area of basal inferior hypokinesis with possible LGE. Stress test from 3/2017 showed no inducible ischemia. Most recent echo from 5/2018 showed LVEF 50-55%, mild diffuse hypokinesis, normal RV size and function. She had some pain at pacemaker site and reported it was too close to her collarbone. She elected to undergo a pocket revision in 12/2017. Ever since her initial pacemaker implant she has struggled with episodes of dizziness/lightheadedness and fatigue. No episodes of syncope.     She reports feeling well today. She states she had a recent episode of lightheadedness while at work when she bent over and stood up. She has some intermittent skipped  beats, no sustained tachycardia. She states she feels fatigued overall. She denies any chest pain/pressures, dizziness, lightheadedness, worsening shortness of breath, leg/ankle swelling, PND, orthopnea, or syncopal symptoms. Recent device interrogation shows normal pacemaker function. 21 AT/AF episodes recorded - < 1 min in duration each. Total AT/AF time = 39 seconds. AT/AF [place] = <0.1%. 2 NSVT episodes recorded - 6-8 beats, 176-207 bpm. Presenting EGM = AP- @ 90 bpm. AP = 19.5%.  = 99.8%. No short v-v intervals recorded. Lead trends appear stable. No arrhythmias correspond to when she had dizzy spell. No current cardiac medications.   PAST MEDICAL HISTORY:  Past Medical History:   Diagnosis Date     Cardiomyopathy (H)     ff Cardiology     Chronic depressive personality disorder      Esophageal reflux      Ex-smoker     quit 2006; 1 PPD x 30     Hyperparathyroidism (H)     s/p parathyroidectomy     Hypothyroidism      LARRY on CPAP     ff Sleep medicine     Pulmonary nodules     ff Pulmonologist     S/P cardiac pacemaker procedure     [facility]        CURRENT MEDICATIONS:  Current Outpatient Medications   Medication Sig Dispense Refill     albuterol (PROAIR HFA/PROVENTIL HFA/VENTOLIN HFA) 108 (90 BASE) MCG/ACT Inhaler Inhale 2 puffs into the lungs as needed for shortness of breath / dyspnea or wheezing        clonazePAM (KLONOPIN) 0.5 MG tablet Take 1 tablet (0.5 mg) by mouth every 6 hours as needed for anxiety (panic attack, asthma attack- use only when needed- habit forming medication) 60 tablet 1     DiphenhydrAMINE HCl (BENADRYL PO) Take 25-50 mg by mouth nightly as needed       Ferrous Sulfate (IRON SUPPLEMENT PO) Take 65 mg by mouth 2 times daily (with meals)        fluocinolone acetonide 0.01 % OIL Place 4-5 drops in right ear 1-2 times daily for 5-7 days. 20 mL 1     loperamide (IMODIUM A-D) 2 MG tablet Take 2 tabs (4 mg) after first loose stool, and then take one tab (2 mg)  after each diarrheal stool.  Max of 8 tabs (16 mg) per day. 1 tablet 0     MAGNESIUM LACTATE PO Take 180 mg by mouth At Bedtime        MELATONIN PO Take 3 mg by mouth At Bedtime        Menaquinone-7 (VITAMIN K2 PO) Take 1 tablet by mouth every morning        METHYLPREDNISOLONE PO Take 40 mg by mouth as needed        Multiple Vitamin (DAILY MULTIVITAMIN PO) Take 1 tablet by mouth every morning        nebulizer nebulization as needed       Probiotic Product (PROBIOTIC DAILY PO) Take 1 capsule by mouth 2 times daily        rOPINIRole (REQUIP) 0.25 MG tablet        SYNTHROID 150 MCG tablet Take 1 tablet (150 mcg) by mouth daily 90 tablet 3     TURMERIC PO Take 1 tablet by mouth every morning        vitamin D3 (CHOLECALCIFEROL) 2000 units tablet Take 1 tablet by mouth daily 1 tablet 0       PAST SURGICAL HISTORY:  Past Surgical History:   Procedure Laterality Date     BUNIONECTOMY Bilateral      EP ABLATION / EP STUDIES  04/21/2017     EXPLORE NECK N/A 9/19/2018    Procedure: EXPLORE NECK;  Neck Exploration Resection of Left superior Parathyroid gland;  Surgeon: Kristine Venegas MD;  Location: [facility] OR     HC CORRECT BUNION,SIMPLE       PARATHYROIDECTOMY N/A 9/19/2018    Procedure: PARATHYROIDECTOMY;;  Surgeon: Kristine Venegas MD;  Location: [facility] OR     PPM INSERT OF NEW OR REPL W/VENT LEAD  04/21/2017     TONSILLECTOMY & ADENOIDECTOMY         ALLERGIES:     Allergies   Allergen Reactions     Flagyl [Metronidazole] Rash     Buspar [Buspirone]      Muscle weakness     Corn Oil Other (See Comments)     Headache       Lorazepam Other (See Comments)     Heat intolerance       Seasonal Allergies Difficulty breathing     Sulfamethoxazole-Trimethoprim      Other reaction(s): Other  Passed out     Tetracycline Swelling     Zofran [Ondansetron]      Prolonged QT  Prolonged QT at baseline per patient     Benzodiazepines Other (See Comments)     Other reaction(s): Other  Extreme heat intolerance  Extreme heat intolerance      "Cyclobenzaprine Other (See Comments)     Extreme heat intolerance     Levaquin [Levofloxacin]      Other reaction(s): Other  Tendon rupture     Nsaids Other (See Comments)     Exacerbated asthma     FAMILY HISTORY:  Family History   Problem Relation Age of Onset     Hypothyroidism Mother      Hypertension Mother      Osteoarthritis Mother      Coronary Artery Disease Father         nonfatal MI in his 70s     Asthma Father      Diabetes Sister      Hypothyroidism Sister      Breast Cancer Maternal Aunt      SOCIAL HISTORY:  Social History     Tobacco Use     Smoking status: Former Smoker     Smokeless tobacco: Never Used   Substance Use Topics     Alcohol use: Yes     Comment: seldom     Drug use: No     ROS:   A comprehensive 10 point review of systems negative other than as mentioned in HPI.  Exam:  /69 (BP Location: Left arm, Patient Position: Chair, Cuff Size: Adult Regular)   Pulse 57   Ht 1.651 m (5' 5\")   Wt 66.2 kg (146 lb)   LMP 08/21/2007   SpO2 97%   BMI 24.30 kg/m     GENERAL APPEARANCE: alert and no distress  HEENT: no icterus, no xanthelasmas, normal pupil size and reaction, normal palate, mucosa moist, no central cyanosis  NECK: no adenopathy, no asymmetry, masses, or scars, thyroid normal to palpation and no bruits, JVP not elevated  RESPIRATORY: lungs clear to auscultation - no rales, rhonchi or wheezes, no use of accessory muscles, no retractions, respirations are unlabored, normal respiratory rate  CARDIOVASCULAR: regular rhythm, normal S1 with physiologic split S2, no S3 or S4 and no murmur, click or rub, precordium quiet with normal PMI.  ABDOMEN: soft, non tender, bowel sounds normal, non-distended  EXTREMITIES: peripheral pulses normal, no edema  NEURO: alert and oriented to person/place/time, normal speech, gait and affect  SKIN: no ecchymoses, no rashes  PSYCH: normal affect, cooperative    Labs:  Reviewed.     Testing/Procedures:  5/2018 ECHOCARDIOGRAM:   Interpretation " Summary  Borderline (EF 50-55%) reduced left ventricular function is present. Traced at  54%. Mild diffuse hypokinesis is present.  Right ventricular function, chamber size, wall motion, and thickness are  normal.  The inferior vena cava was normal in size with preserved respiratory  variability.  No pericardial effusion is present.  Compared to prior study, LV fxn is improved.    Assessment and Plan:   Ms. Marroquin is a 58 year old female who has a past medical history significant for GERD, PTSD, Grave's Disease with post ablative hypothyroidism, hyperparathyroidism, LARRY (uses CPAP), depression, prior tobacco use, and PVCs s/p RVOT ablation complicated by [facility] s/p PPM 4/21/2017 and pericarditis. She presents today for follow up. She reports a history of symptomatic PVCs for which she ultimately elected to pursue an ablation which she had done at . 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 She went into [facility] and required urgent PPM placed in 4/21/17. She then had lead dislodgement in the recovery area requiring lead revision. She had pericarditis and completed a course of colchicine post ablation. Initial echo post ablation showed LVEF 40-45%, then an echo a month later showed LVEF 50%. MRI in early 4/2017 showed LVEF 60-65% with area of basal inferior hypokinesis with possible LGE. Stress test from 3/2017 showed no inducible ischemia. Most recent echo from 5/2018 showed LVEF 50-55%, mild diffuse hypokinesis, normal RV size and function. She had some pain at pacemaker site and reported it was too close to her collarbone. She elected to undergo a pocket revision in 12/2017. Ever since her initial pacemaker implant she has struggled with episodes of dizziness/lightheadedness and fatigue. No episodes of syncope. She reports feeling well today. She states she had a recent episode of lightheadedness while at work when she bent over  and stood up. She has some intermittent skipped beats, no sustained tachycardia. She states she feels fatigued overall. She denies any chest pain/pressures, dizziness, lightheadedness, worsening shortness of breath, leg/ankle swelling, PND, orthopnea, or syncopal symptoms. Recent device interrogation shows normal pacemaker function. 21 AT/AF episodes recorded - < 1 min in duration each. Total AT/AF time = 39 seconds. AT/AF [place] = <0.1%. 2 NSVT episodes recorded - 6-8 beats, 176-207 bpm. Presenting EGM = AP- @ 90 bpm. AP = 19.5%.  = 99.8%. No short v-v intervals recorded. Lead trends appear stable. No arrhythmias correspond to when she had dizzy spell. No current cardiac medications.     She is doing well from an EP standpoint. Pacemaker is functioning well with stable lead parameters. Adequate HR histograms. No sustained arrhythmias. Will have her get an updated echo to monitor given 100% . Her recent lightheadedness episode sounds orthostatic related. Encouraged her to stay well hydrated, OK to increase salt intake, and change positions carefully. She is requesting labs today which we will order. She is following up with endocrinology soon. She will continue to follow with device clinic per routine. Follow up with EP in 6 months.     The patient states understanding and is agreeable with plan.     SHAYNA Thomas CNP  Pager: [phone]    CC  SELF, REFERRED

## 2019-08-28 NOTE — PROGRESS NOTES
Electrophysiology Clinic Note  HPI:   Ms. Marroquin is a 58 year old female who has a past medical history significant for GERD, PTSD, Grave's Disease with post ablative hypothyroidism, hyperparathyroidism, LARRY (uses CPAP), depression, prior tobacco use, and PVCs s/p RVOT ablation complicated by CHB s/p PPM 4/21/2017 and pericarditis. She presents today for follow up.     She reports a history of symptomatic PVCs for which she ultimately elected to pursue an ablation which she had done at [facility]. 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 She went into [facility] and required urgent PPM placed in 4/21/17. She then had lead dislodgement in the recovery area requiring lead revision. She had pericarditis and completed a course of colchicine post ablation. Initial echo post ablation showed LVEF 40-45%, then an echo a month later showed LVEF 50%. MRI in early 4/2017 showed LVEF 60-65% with area of basal inferior hypokinesis with possible LGE. Stress test from 3/2017 showed no inducible ischemia. Most recent echo from 5/2018 showed LVEF 50-55%, mild diffuse hypokinesis, normal RV size and function. She had some pain at pacemaker site and reported it was too close to her collarbone. She elected to undergo a pocket revision in 12/2017. Ever since her initial pacemaker implant she has struggled with episodes of dizziness/lightheadedness and fatigue. No episodes of syncope.     She reports feeling well today. She states she had a recent episode of lightheadedness while at work when she bent over and stood up. She has some intermittent skipped beats, no sustained tachycardia. She states she feels fatigued overall. She denies any chest pain/pressures, dizziness, lightheadedness, worsening shortness of breath, leg/ankle swelling, PND, orthopnea, or syncopal symptoms. Recent device interrogation shows normal pacemaker function. 21 AT/AF episodes recorded  - < 1 min in duration each. Total AT/AF time = 39 seconds. AT/AF [place] = <0.1%. 2 NSVT episodes recorded - 6-8 beats, 176-207 bpm. Presenting EGM = AP- @ 90 bpm. AP = 19.5%.  = 99.8%. No short v-v intervals recorded. Lead trends appear stable. No arrhythmias correspond to when she had dizzy spell. No current cardiac medications.   PAST MEDICAL HISTORY:  Past Medical History:   Diagnosis Date     Cardiomyopathy (H)     ff Cardiology     Chronic depressive personality disorder      Esophageal reflux      Ex-smoker     quit 2006; 1 PPD x 30     Hyperparathyroidism (H)     s/p parathyroidectomy     Hypothyroidism      LARRY on CPAP     ff Sleep medicine     Pulmonary nodules     ff Pulmonologist     S/P cardiac pacemaker procedure     checked every 6 months at [facility]       CURRENT MEDICATIONS:  Current Outpatient Medications   Medication Sig Dispense Refill     albuterol (PROAIR HFA/PROVENTIL HFA/VENTOLIN HFA) 108 (90 BASE) MCG/ACT Inhaler Inhale 2 puffs into the lungs as needed for shortness of breath / dyspnea or wheezing        clonazePAM (KLONOPIN) 0.5 MG tablet Take 1 tablet (0.5 mg) by mouth every 6 hours as needed for anxiety (panic attack, asthma attack- use only when needed- habit forming medication) 60 tablet 1     DiphenhydrAMINE HCl (BENADRYL PO) Take 25-50 mg by mouth nightly as needed       Ferrous Sulfate (IRON SUPPLEMENT PO) Take 65 mg by mouth 2 times daily (with meals)        fluocinolone acetonide 0.01 % OIL Place 4-5 drops in right ear 1-2 times daily for 5-7 days. 20 mL 1     loperamide (IMODIUM A-D) 2 MG tablet Take 2 tabs (4 mg) after first loose stool, and then take one tab (2 mg) after each diarrheal stool.  Max of 8 tabs (16 mg) per day. 1 tablet 0     MAGNESIUM LACTATE PO Take 180 mg by mouth At Bedtime        MELATONIN PO Take 3 mg by mouth At Bedtime        Menaquinone-7 (VITAMIN K2 PO) Take 1 tablet by mouth every morning        METHYLPREDNISOLONE PO Take 40 mg by mouth as needed         Multiple Vitamin (DAILY MULTIVITAMIN PO) Take 1 tablet by mouth every morning        nebulizer nebulization as needed       Probiotic Product (PROBIOTIC DAILY PO) Take 1 capsule by mouth 2 times daily        rOPINIRole (REQUIP) 0.25 MG tablet        SYNTHROID 150 MCG tablet Take 1 tablet (150 mcg) by mouth daily 90 tablet 3     TURMERIC PO Take 1 tablet by mouth every morning        vitamin D3 (CHOLECALCIFEROL) 2000 units tablet Take 1 tablet by mouth daily 1 tablet 0       PAST SURGICAL HISTORY:  Past Surgical History:   Procedure Laterality Date     BUNIONECTOMY Bilateral      EP ABLATION / EP STUDIES  04/21/2017     EXPLORE NECK N/A 9/19/2018    Procedure: EXPLORE NECK;  Neck Exploration Resection of Left superior Parathyroid gland;  Surgeon: Kristine Venegas MD;  Location: [facility] OR     HC CORRECT BUNION,SIMPLE       PARATHYROIDECTOMY N/A 9/19/2018    Procedure: PARATHYROIDECTOMY;;  Surgeon: Kristine Venegas MD;  Location: [facility] OR     PPM INSERT OF NEW OR REPL W/VENT LEAD  04/21/2017     TONSILLECTOMY & ADENOIDECTOMY         ALLERGIES:     Allergies   Allergen Reactions     Flagyl [Metronidazole] Rash     Buspar [Buspirone]      Muscle weakness     Corn Oil Other (See Comments)     Headache       Lorazepam Other (See Comments)     Heat intolerance       Seasonal Allergies Difficulty breathing     Sulfamethoxazole-Trimethoprim      Other reaction(s): Other  Passed out     Tetracycline Swelling     Zofran [Ondansetron]      Prolonged QT  Prolonged QT at baseline per patient     Benzodiazepines Other (See Comments)     Other reaction(s): Other  Extreme heat intolerance  Extreme heat intolerance     Cyclobenzaprine Other (See Comments)     Extreme heat intolerance     Levaquin [Levofloxacin]      Other reaction(s): Other  Tendon rupture     Nsaids Other (See Comments)     Exacerbated asthma       FAMILY HISTORY:  Family History   Problem Relation Age of Onset     Hypothyroidism Mother      Hypertension  "Mother      Osteoarthritis Mother      Coronary Artery Disease Father         nonfatal MI in his 70s     Asthma Father      Diabetes Sister      Hypothyroidism Sister      Breast Cancer Maternal Aunt        SOCIAL HISTORY:  Social History     Tobacco Use     Smoking status: Former Smoker     Smokeless tobacco: Never Used   Substance Use Topics     Alcohol use: Yes     Comment: seldom     Drug use: No       ROS:   A comprehensive 10 point review of systems negative other than as mentioned in HPI.  Exam:  /69 (BP Location: Left arm, Patient Position: Chair, Cuff Size: Adult Regular)   Pulse 57   Ht 1.651 m (5' 5\")   Wt 66.2 kg (146 lb)   LMP 08/21/2007   SpO2 97%   BMI 24.30 kg/m    GENERAL APPEARANCE: alert and no distress  HEENT: no icterus, no xanthelasmas, normal pupil size and reaction, normal palate, mucosa moist, no central cyanosis  NECK: no adenopathy, no asymmetry, masses, or scars, thyroid normal to palpation and no bruits, JVP not elevated  RESPIRATORY: lungs clear to auscultation - no rales, rhonchi or wheezes, no use of accessory muscles, no retractions, respirations are unlabored, normal respiratory rate  CARDIOVASCULAR: regular rhythm, normal S1 with physiologic split S2, no S3 or S4 and no murmur, click or rub, precordium quiet with normal PMI.  ABDOMEN: soft, non tender, bowel sounds normal, non-distended  EXTREMITIES: peripheral pulses normal, no edema  NEURO: alert and oriented to person/place/time, normal speech, gait and affect  SKIN: no ecchymoses, no rashes  PSYCH: normal affect, cooperative    Labs:  Reviewed.     Testing/Procedures:  5/2018 ECHOCARDIOGRAM:   Interpretation Summary  Borderline (EF 50-55%) reduced left ventricular function is present. Traced at  54%. Mild diffuse hypokinesis is present.  Right ventricular function, chamber size, wall motion, and thickness are  normal.  The inferior vena cava was normal in size with preserved respiratory  variability.  No pericardial " effusion is present.  Compared to prior study, LV fxn is improved.      Assessment and Plan:   Ms. Marroquin is a 58 year old female who has a past medical history significant for GERD, PTSD, Grave's Disease with post ablative hypothyroidism, hyperparathyroidism, LARRY (uses CPAP), depression, prior tobacco use, and PVCs s/p RVOT ablation complicated by [facility] s/p PPM 4/21/2017 and pericarditis. She presents today for follow up. She reports a history of symptomatic PVCs for which she ultimately elected to pursue an ablation which she had done at [facility]. 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 She went into [facility] and required urgent PPM placed in 4/21/17. She then had lead dislodgement in the recovery area requiring lead revision. She had pericarditis and completed a course of colchicine post ablation. Initial echo post ablation showed LVEF 40-45%, then an echo a month later showed LVEF 50%. MRI in early 4/2017 showed LVEF 60-65% with area of basal inferior hypokinesis with possible LGE. Stress test from 3/2017 showed no inducible ischemia. Most recent echo from 5/2018 showed LVEF 50-55%, mild diffuse hypokinesis, normal RV size and function. She had some pain at pacemaker site and reported it was too close to her collarbone. She elected to undergo a pocket revision in 12/2017. Ever since her initial pacemaker implant she has struggled with episodes of dizziness/lightheadedness and fatigue. No episodes of syncope. She reports feeling well today. She states she had a recent episode of lightheadedness while at work when she bent over and stood up. She has some intermittent skipped beats, no sustained tachycardia. She states she feels fatigued overall. She denies any chest pain/pressures, dizziness, lightheadedness, worsening shortness of breath, leg/ankle swelling, PND, orthopnea, or syncopal symptoms. Recent device interrogation shows  normal pacemaker function. 21 AT/AF episodes recorded - < 1 min in duration each. Total AT/AF time = 39 seconds. AT/AF [place] = <0.1%. 2 NSVT episodes recorded - 6-8 beats, 176-207 bpm. Presenting EGM = AP- @ 90 bpm. AP = 19.5%.  = 99.8%. No short v-v intervals recorded. Lead trends appear stable. No arrhythmias correspond to when she had dizzy spell. No current cardiac medications.     She is doing well from an EP standpoint. Pacemaker is functioning well with stable lead parameters. Adequate HR histograms. No sustained arrhythmias. Will have her get an updated echo to monitor given 100% . Her recent lightheadedness episode sounds orthostatic related. Encouraged her to stay well hydrated, OK to increase salt intake, and change positions carefully. She is requesting labs today which we will order. She is following up with endocrinology soon. She will continue to follow with device clinic per routine. Follow up with EP in 6 months.     The patient states understanding and is agreeable with plan.   SHAYNA Thomas CNP  Pager: [phone]  CC  SELF, REFERRED

## 2019-08-28 NOTE — NURSING NOTE
Chief Complaint   Patient presents with     Follow Up     Follow Up Appointment-- scheduled per Jeanna     Vitals were taken and medications were reconciled.     Addis Cloud CMA    7:30 AM

## 2019-08-30 ENCOUNTER — TELEPHONE (OUTPATIENT)
Dept: ENDOCRINOLOGY | Facility: CLINIC | Age: 59
End: 2019-08-30

## 2019-08-30 ENCOUNTER — OFFICE VISIT (OUTPATIENT)
Dept: ENDOCRINOLOGY | Facility: CLINIC | Age: 59
End: 2019-08-30
Payer: COMMERCIAL

## 2019-08-30 VITALS
DIASTOLIC BLOOD PRESSURE: 69 MMHG | SYSTOLIC BLOOD PRESSURE: 110 MMHG | BODY MASS INDEX: 24.3 KG/M2 | WEIGHT: 146 LBS | HEART RATE: 93 BPM

## 2019-08-30 DIAGNOSIS — E89.0 POSTABLATIVE HYPOTHYROIDISM: Primary | ICD-10-CM

## 2019-08-30 RX ORDER — IPRATROPIUM BROMIDE 42 UG/1
2 SPRAY, METERED NASAL 4 TIMES DAILY PRN
COMMUNITY
End: 2022-03-15

## 2019-08-30 RX ORDER — AZELASTINE 1 MG/ML
1 SPRAY, METERED NASAL 2 TIMES DAILY PRN
COMMUNITY
End: 2022-09-13

## 2019-08-30 ASSESSMENT — PAIN SCALES - GENERAL: PAINLEVEL: NO PAIN (0)

## 2019-08-30 NOTE — PATIENT INSTRUCTIONS
- take Synthroid 1 pill x6 days a week, 1/2 pill 1 day a week  - increase calcium and continue vitamin D daily  - lab for thyroid in 4-6 weeks    Thanks for choosing [facility] Department of Endocrinology    If you have any questions, please do not hesitate to call clinic line at [phone] and ask for Endocrinology clinic.  If you need to fax, please fax to clinic fax number at [phone]    After clinic hours or weekends, please contact [phone] and ask for Endocrinologist-on call      Sincerely,    Garland Patel MD  Endocrinology

## 2019-08-30 NOTE — LETTER
8/30/2019       RE: Joyce Marroquin  [address] [facility] [address]     Dear Colleague,    Thank you for referring your patient, Joyce Marroquin, to the [facility] ENDOCRINOLOGY at [facility]. Please see a copy of my visit note below.         Endocrinology Note         Joyce is a 58 year old female presents today for hypothyroidism and continued fatigue    HPI  Joyce Marroquin is 58 years old with hx of depression, hypothyroidism secondary to I131 treatment for Graves's disease, POTS, GERD, RVOT RFA, pericarditis, collageneous colitis who is here today for postablative hypothyroidism.    I saw her in in 2018 for primary hyperparathyroidism. I received a message from her yesterday to request a follow up visit due to her abnormal TFT and not feeling well.    1) Postablative hypothyroidism: she did have Graves'disease and received I131 for treatment. She has been on Synthroid 150 mcg daily for long time  She stated that she has not been feeling well for a while. She reports a lot of stressors due to loss of her parents and dogs. She usually feels fatigue and has heat intolerance. She feels more nervous and notices extra heart beat. She saw EP 2 days ago pacemaker interrogation was normal. TFT was then performed on 8/28/19 showed TSH 3.27, FT4 1.4 and total T3 108.    2) Hyperparathyroidism with intermittent mild hypercalcemia: she did previously have work-up for hyperparathyroidism around Nov-Dec 2017 due to ongoing fatigue and depressed mood. Parathyroid scan showed asymmetric activity near the upper left thyroid lobe, raising suspicion for parathyroid adenoma. Ultrasound neck did NOT show parathyroid adenoma. DXA 11/2017 showed osteoporosis with T-score -2.7 at radius and spine. Calcium was normal to mildly elevated.  She underwent left superior parathyroidectomy which was consistent with parathyroid adenoma.    She takes 2000 IU daily of vitamin D and intermittent calcium tablet.  She has some yogurt and cheese. She walks about 30 minutes per day. She did have stress fracture in her leg when she was 20s. She reports her sister has had parathyroid problem.     3) Osteoporosis: DXA 11/2017 showed osteoporosis with T-score -2.7 at radius and spine. +stress fracture in the right tibia in her 20s. She does not want to be treatment for osteoporosis at this time. She is afraid of side effects.    Past Medical History  Past Medical History:   Diagnosis Date     Cardiomyopathy (H)     ff Cardiology     Chronic depressive personality disorder      Esophageal reflux      Ex-smoker     quit 2006; 1 PPD x 30     Hyperparathyroidism (H)     s/p parathyroidectomy     Hypothyroidism      LARRY on CPAP     ff Sleep medicine     Pulmonary nodules     ff Pulmonologist     S/P cardiac pacemaker procedure     checked every 6 months at [facility]       Allergies  Allergies   Allergen Reactions     Flagyl [Metronidazole] Rash     Buspar [Buspirone]      Muscle weakness     Corn Oil Other (See Comments)     Headache       Lorazepam Other (See Comments)     Heat intolerance       Seasonal Allergies Difficulty breathing     Sulfamethoxazole-Trimethoprim      Other reaction(s): Other  Passed out     Tetracycline Swelling     Zofran [Ondansetron]      Prolonged QT  Prolonged QT at baseline per patient     Benzodiazepines Other (See Comments)     Other reaction(s): Other  Extreme heat intolerance  Extreme heat intolerance     Cyclobenzaprine Other (See Comments)     Extreme heat intolerance     Levaquin [Levofloxacin]      Other reaction(s): Other  Tendon rupture     Nsaids Other (See Comments)     Exacerbated asthma     Medications  Current Outpatient Medications   Medication Sig Dispense Refill     albuterol (PROAIR HFA/PROVENTIL HFA/VENTOLIN HFA) 108 (90 BASE) MCG/ACT Inhaler Inhale 2 puffs into the lungs as needed for shortness of breath / dyspnea or wheezing        azelastine (ASTELIN) 0.1 % nasal spray Spray 1 spray  into both nostrils 2 times daily       clonazePAM (KLONOPIN) 0.5 MG tablet Take 1 tablet (0.5 mg) by mouth every 6 hours as needed for anxiety (panic attack, asthma attack- use only when needed- habit forming medication) 60 tablet 1     DiphenhydrAMINE HCl (BENADRYL PO) Take 25-50 mg by mouth nightly as needed       Ferrous Sulfate (IRON SUPPLEMENT PO) Take 65 mg by mouth 2 times daily (with meals)        fluocinolone acetonide 0.01 % OIL Place 4-5 drops in right ear 1-2 times daily for 5-7 days. 20 mL 1     fluticasone-salmeterol (ADVAIR) 250-50 MCG/DOSE inhaler Inhale 1 puff into the lungs every 12 hours       ipratropium (ATROVENT) 0.06 % nasal spray Spray 2 sprays into both nostrils 4 times daily       loperamide (IMODIUM A-D) 2 MG tablet Take 2 tabs (4 mg) after first loose stool, and then take one tab (2 mg) after each diarrheal stool.  Max of 8 tabs (16 mg) per day. 1 tablet 0     MAGNESIUM LACTATE PO Take 180 mg by mouth At Bedtime        MELATONIN PO Take 3 mg by mouth At Bedtime        Menaquinone-7 (VITAMIN K2 PO) Take 1 tablet by mouth every morning        METHYLPREDNISOLONE PO Take 40 mg by mouth as needed        Multiple Vitamin (DAILY MULTIVITAMIN PO) Take 1 tablet by mouth every morning        nebulizer nebulization as needed       Probiotic Product (PROBIOTIC DAILY PO) Take 1 capsule by mouth 2 times daily        Pseudoephedrine-guaiFENesin (MUCINEX D PO)        rOPINIRole (REQUIP) 0.25 MG tablet        SYNTHROID 150 MCG tablet Take 1 tablet (150 mcg) by mouth daily 90 tablet 3     TURMERIC PO Take 1 tablet by mouth every morning        vitamin D3 (CHOLECALCIFEROL) 2000 units tablet Take 1 tablet by mouth daily 1 tablet 0     Social History  Social History     Tobacco Use     Smoking status: Former Smoker     Smokeless tobacco: Never Used   Substance Use Topics     Alcohol use: Yes     Comment: seldom     Drug use: No   working part time    ROS  Constitutional: reports low energy, +fatigue  Eyes: no  vision change, diplopia or red eyes   Neck: no difficulty swallowing, no choking, no neck pain, +fullness in her neck  Cardiovascular: no chest pain, palpitations  Respiratory: no dyspnea, cough, shortness of breath or wheezing   GI: no nausea, vomiting, diarrhea or constipation  : no change in urine, no dysuria or hematuria  Musculoskeletal: no joint or muscle pain or swelling. No muscle weakness  Integumentary: no concerning lesions   Neuro: no loss of strength or sensation, no numbness or tingling, no tremor, no dizziness, no headache   Endo: no polyuria or polydipsia, +heat intolerance   Heme/Lymph: no concerning bumps, no bleeding problems   Allergy: no environmental allergies   Psych: +depression and anxiety, no sleep problems.    Physical Exam  /69   Pulse 93   Wt 66.2 kg (146 lb)   LMP 08/21/2007   BMI 24.30 kg/m     Body mass index is 24.3 kg/m .  Constitutional: no distress, comfortable, pleasant   Eyes: anicteric, normal extra-ocular movements, no lid lag or retraction  Musculoskeletal: no edema   Skin: no jaundice   Neurological: cranial nerves intact, normal gait  Psychological: appropriate mood       RESULTS        Sestamibi scan 12/1/2017      US neck 1/4/18  COMPARISON:  12/1/2017 - Nuclear medicine parathyroid scan.  6/28/2017 - CT chest.     FINDINGS: A normal thyroid gland is not visualized. Per report, the patient has a history of prior radioiodine thyroid ablation. No  enlarged or abnormal-appearing lymph nodes in the neck. No other abnormal masses in the neck.     IMPRESSION: No visualized enlarged parathyroid gland.    DXA 11/17/17    ASSESSMENT:    Joyce Marroquin is 58 years old with hx of depression, hypothyroidism secondary to I131 treatment for Graves's disease, POTS, GERD, RVOT RFA, pericarditis, collageneous colitis who is here today for postablative hypothyroidism.    1) Postablative hypothyroidism: secondary to Graves' disease treatment. Patient reports increased nervousness  and heat intolerance. However, she also said that there have been multiple stressors over the past year. She is currently on Synthroid 150 mcg daily. Lab on 8/28/19 showed TSH 3.27, FT4 1.4 and total T3 108.  - reduce Synthroid to 150 mcg x6 days and 75 mcg x1 day per week  - Will repeat lab in 4-6 weeks.      2) primary hyperparathyroidism: Parathyroid scan showed asymmetric activity near the upper left thyroid lobe, raising suspicion for parathyroid adenoma. Ultrasound neck did NOT show parathyroid adenoma. DXA 11/2017 showed osteoporosis with T-score -2.7 at radius and spine. Calcium has always been normal to mildly elevated.  She underwent left superior parathyroidectomy which was consistent with parathyroid adenoma. Calcium and PTH were normalized afterward.    3) Osteoporosis: DXA 11/2017 showed osteoporosis with T-score -2.7 at radius and spine. +stress fracture in the right tibia in her 20s. She does not want to be treatment for osteoporosis at this time. Continue vitamin D and diary calcium. Fall precaution    PLAN:   - reduce Synthroid to 150 mcg daily  - repeat TSH, FT4 in the next 4-6 week  - will contact patient with result    Garlnad Patel MD     Division of Diabetes and Endocrinology  Department of Medicine  [phone]

## 2019-08-30 NOTE — PROGRESS NOTES
Endocrinology Note         Joyce is a 58 year old female presents today for hypothyroidism and continued fatigue    HPI  Joyce Marroquin is 58 years old with hx of depression, hypothyroidism secondary to I131 treatment for Graves's disease, POTS, GERD, RVOT RFA, pericarditis, collageneous colitis who is here today for postablative hypothyroidism.    I saw her in in 2018 for primary hyperparathyroidism. I received a message from her yesterday to request a follow up visit due to her abnormal TFT and not feeling well.    1) Postablative hypothyroidism: she did have Graves'disease and received I131 for treatment. She has been on Synthroid 150 mcg daily for long time  She stated that she has not been feeling well for a while. She reports a lot of stressors due to loss of her parents and dogs. She usually feels fatigue and has heat intolerance. She feels more nervous and notices extra heart beat. She saw EP 2 days ago pacemaker interrogation was normal. TFT was then performed on 8/28/19 showed TSH 3.27, FT4 1.4 and total T3 108.    2) Hyperparathyroidism with intermittent mild hypercalcemia: she did previously have work-up for hyperparathyroidism around Nov-Dec 2017 due to ongoing fatigue and depressed mood. Parathyroid scan showed asymmetric activity near the upper left thyroid lobe, raising suspicion for parathyroid adenoma. Ultrasound neck did NOT show parathyroid adenoma. DXA 11/2017 showed osteoporosis with T-score -2.7 at radius and spine. Calcium was normal to mildly elevated.  She underwent left superior parathyroidectomy which was consistent with parathyroid adenoma.    She takes 2000 IU daily of vitamin D and intermittent calcium tablet. She has some yogurt and cheese. She walks about 30 minutes per day. She did have stress fracture in her leg when she was 20s. She reports her sister has had parathyroid problem.     3) Osteoporosis: DXA 11/2017 showed osteoporosis with T-score -2.7 at radius and spine. +stress  fracture in the right tibia in her 20s. She does not want to be treatment for osteoporosis at this time. She is afraid of side effects.    Past Medical History  Past Medical History:   Diagnosis Date     Cardiomyopathy (H)     ff Cardiology     Chronic depressive personality disorder      Esophageal reflux      Ex-smoker     quit 2006; 1 PPD x 30     Hyperparathyroidism (H)     s/p parathyroidectomy     Hypothyroidism      LARRY on CPAP     ff Sleep medicine     Pulmonary nodules     ff Pulmonologist     S/P cardiac pacemaker procedure     checked every 6 months at [facility]       Allergies  Allergies   Allergen Reactions     Flagyl [Metronidazole] Rash     Buspar [Buspirone]      Muscle weakness     Corn Oil Other (See Comments)     Headache       Lorazepam Other (See Comments)     Heat intolerance       Seasonal Allergies Difficulty breathing     Sulfamethoxazole-Trimethoprim      Other reaction(s): Other  Passed out     Tetracycline Swelling     Zofran [Ondansetron]      Prolonged QT  Prolonged QT at baseline per patient     Benzodiazepines Other (See Comments)     Other reaction(s): Other  Extreme heat intolerance  Extreme heat intolerance     Cyclobenzaprine Other (See Comments)     Extreme heat intolerance     Levaquin [Levofloxacin]      Other reaction(s): Other  Tendon rupture     Nsaids Other (See Comments)     Exacerbated asthma     Medications  Current Outpatient Medications   Medication Sig Dispense Refill     albuterol (PROAIR HFA/PROVENTIL HFA/VENTOLIN HFA) 108 (90 BASE) MCG/ACT Inhaler Inhale 2 puffs into the lungs as needed for shortness of breath / dyspnea or wheezing        azelastine (ASTELIN) 0.1 % nasal spray Spray 1 spray into both nostrils 2 times daily       clonazePAM (KLONOPIN) 0.5 MG tablet Take 1 tablet (0.5 mg) by mouth every 6 hours as needed for anxiety (panic attack, asthma attack- use only when needed- habit forming medication) 60 tablet 1     DiphenhydrAMINE HCl (BENADRYL PO) Take  25-50 mg by mouth nightly as needed       Ferrous Sulfate (IRON SUPPLEMENT PO) Take 65 mg by mouth 2 times daily (with meals)        fluocinolone acetonide 0.01 % OIL Place 4-5 drops in right ear 1-2 times daily for 5-7 days. 20 mL 1     fluticasone-salmeterol (ADVAIR) 250-50 MCG/DOSE inhaler Inhale 1 puff into the lungs every 12 hours       ipratropium (ATROVENT) 0.06 % nasal spray Spray 2 sprays into both nostrils 4 times daily       loperamide (IMODIUM A-D) 2 MG tablet Take 2 tabs (4 mg) after first loose stool, and then take one tab (2 mg) after each diarrheal stool.  Max of 8 tabs (16 mg) per day. 1 tablet 0     MAGNESIUM LACTATE PO Take 180 mg by mouth At Bedtime        MELATONIN PO Take 3 mg by mouth At Bedtime        Menaquinone-7 (VITAMIN K2 PO) Take 1 tablet by mouth every morning        METHYLPREDNISOLONE PO Take 40 mg by mouth as needed        Multiple Vitamin (DAILY MULTIVITAMIN PO) Take 1 tablet by mouth every morning        nebulizer nebulization as needed       Probiotic Product (PROBIOTIC DAILY PO) Take 1 capsule by mouth 2 times daily        Pseudoephedrine-guaiFENesin (MUCINEX D PO)        rOPINIRole (REQUIP) 0.25 MG tablet        SYNTHROID 150 MCG tablet Take 1 tablet (150 mcg) by mouth daily 90 tablet 3     TURMERIC PO Take 1 tablet by mouth every morning        vitamin D3 (CHOLECALCIFEROL) 2000 units tablet Take 1 tablet by mouth daily 1 tablet 0     Social History  Social History     Tobacco Use     Smoking status: Former Smoker     Smokeless tobacco: Never Used   Substance Use Topics     Alcohol use: Yes     Comment: seldom     Drug use: No   working part time    ROS  Constitutional: reports low energy, +fatigue  Eyes: no vision change, diplopia or red eyes   Neck: no difficulty swallowing, no choking, no neck pain, +fullness in her neck  Cardiovascular: no chest pain, palpitations  Respiratory: no dyspnea, cough, shortness of breath or wheezing   GI: no nausea, vomiting, diarrhea or  constipation  : no change in urine, no dysuria or hematuria  Musculoskeletal: no joint or muscle pain or swelling. No muscle weakness  Integumentary: no concerning lesions   Neuro: no loss of strength or sensation, no numbness or tingling, no tremor, no dizziness, no headache   Endo: no polyuria or polydipsia, +heat intolerance   Heme/Lymph: no concerning bumps, no bleeding problems   Allergy: no environmental allergies   Psych: +depression and anxiety, no sleep problems.    Physical Exam  /69   Pulse 93   Wt 66.2 kg (146 lb)   LMP 08/21/2007   BMI 24.30 kg/m    Body mass index is 24.3 kg/m .  Constitutional: no distress, comfortable, pleasant   Eyes: anicteric, normal extra-ocular movements, no lid lag or retraction  Musculoskeletal: no edema   Skin: no jaundice   Neurological: cranial nerves intact, normal gait  Psychological: appropriate mood       RESULTS        Sestamibi scan 12/1/2017      US neck 1/4/18  COMPARISON:  12/1/2017 - Nuclear medicine parathyroid scan.  6/28/2017 - CT chest.     FINDINGS: A normal thyroid gland is not visualized. Per report, the patient has a history of prior radioiodine thyroid ablation. No  enlarged or abnormal-appearing lymph nodes in the neck. No other abnormal masses in the neck.     IMPRESSION: No visualized enlarged parathyroid gland.    DXA 11/17/17    ASSESSMENT:    Joyce Marroquin is 58 years old with hx of depression, hypothyroidism secondary to I131 treatment for Graves's disease, POTS, GERD, RVOT RFA, pericarditis, collageneous colitis who is here today for postablative hypothyroidism.    1) Postablative hypothyroidism: secondary to Graves' disease treatment. Patient reports increased nervousness and heat intolerance. However, she also said that there have been multiple stressors over the past year. She is currently on Synthroid 150 mcg daily. Lab on 8/28/19 showed TSH 3.27, FT4 1.4 and total T3 108.  - reduce Synthroid to 150 mcg x6 days and 75 mcg x1 day per  week  - Will repeat lab in 4-6 weeks.      2) primary hyperparathyroidism: Parathyroid scan showed asymmetric activity near the upper left thyroid lobe, raising suspicion for parathyroid adenoma. Ultrasound neck did NOT show parathyroid adenoma. DXA 11/2017 showed osteoporosis with T-score -2.7 at radius and spine. Calcium has always been normal to mildly elevated.  She underwent left superior parathyroidectomy which was consistent with parathyroid adenoma. Calcium and PTH were normalized afterward.    3) Osteoporosis: DXA 11/2017 showed osteoporosis with T-score -2.7 at radius and spine. +stress fracture in the right tibia in her 20s. She does not want to be treatment for osteoporosis at this time. Continue vitamin D and diary calcium. Fall precaution    PLAN:   - reduce Synthroid to 150 mcg daily  - repeat TSH, FT4 in the next 4-6 week  - will contact patient with result    Garland Patel MD     Division of Diabetes and Endocrinology  Department of Medicine  [phone]

## 2019-08-30 NOTE — TELEPHONE ENCOUNTER
[facility] Call Center    Phone Message    May a detailed message be left on voicemail: yes    Reason for Call: Other: Pt has been in communication with Dr Haque. Garland sent a mychart msg to pt and there must've been a delay because pt didn't receive it until later on. Pt states that she would much rather have a telephone appt with doctor than having to drive down here. Please call pt back asap this morning because pt's appt is today at 1135a.     Action Taken: Message routed to:  [facility] garrett

## 2019-09-01 ENCOUNTER — DOCUMENTATION ONLY (OUTPATIENT)
Dept: CARDIOLOGY | Facility: CLINIC | Age: 59
End: 2019-09-01

## 2019-09-01 RX ORDER — GUAIFENESIN 600 MG/1
600 TABLET, EXTENDED RELEASE ORAL 2 TIMES DAILY
COMMUNITY
End: 2020-04-27

## 2019-09-05 ENCOUNTER — ANCILLARY PROCEDURE (OUTPATIENT)
Dept: CARDIOLOGY | Facility: CLINIC | Age: 59
End: 2019-09-05
Payer: COMMERCIAL

## 2019-09-05 DIAGNOSIS — Z95.0 CARDIAC PACEMAKER IN SITU: ICD-10-CM

## 2019-09-16 ENCOUNTER — TELEPHONE (OUTPATIENT)
Dept: CARDIOLOGY | Facility: CLINIC | Age: 59
End: 2019-09-16

## 2019-09-16 DIAGNOSIS — I42.9 CARDIOMYOPATHY, UNSPECIFIED TYPE (H): Primary | ICD-10-CM

## 2019-09-16 NOTE — TELEPHONE ENCOUNTER
[facility] Call Center    Phone Message    May a detailed message be left on voicemail: yes    Reason for Call: Other: Per call from PT she spoke with a nurse in the clinic a few minutes ago and requesting to speak with the nurse again. Attempted to contact nurse via Sergio. Advised PT that I will send a telephone encounter to have a nurse call her back but declined and the call dropped. Please reach out to the PT.     Action Taken: Message routed to:  [facility] ([facility]): Cardiology

## 2019-09-16 NOTE — TELEPHONE ENCOUNTER
[facility] Call Center    Phone Message    May a detailed message be left on voicemail: yes    Reason for Call: Other: Pt called in because she received a call from Adelaida De La Torre about her echo. She said the message seemed urgent and has followed the instructions to call and page Adelaida blanca Lozoya. Advised she is out of the office until Wednesday. She would like to discuss this with someone asap. Please have a cardiologist review and call pt to discuss or advise if they need to wait for Adelaida to be back in the office.  She is requesting for a detailed VM and Mychart message.     Action Taken: Message routed to:  [facility] ([facility]):  Cardiology

## 2019-09-19 DIAGNOSIS — I42.9 CARDIOMYOPATHY, UNSPECIFIED TYPE (H): Primary | ICD-10-CM

## 2019-09-19 RX ORDER — POTASSIUM CHLORIDE 1500 MG/1
40 TABLET, EXTENDED RELEASE ORAL
Status: CANCELLED | OUTPATIENT
Start: 2019-09-19

## 2019-09-19 RX ORDER — LIDOCAINE 40 MG/G
CREAM TOPICAL
Status: CANCELLED | OUTPATIENT
Start: 2019-09-19

## 2019-09-19 RX ORDER — POTASSIUM CHLORIDE 1500 MG/1
20 TABLET, EXTENDED RELEASE ORAL
Status: CANCELLED | OUTPATIENT
Start: 2019-09-19

## 2019-09-19 RX ORDER — SODIUM CHLORIDE 9 MG/ML
INJECTION, SOLUTION INTRAVENOUS CONTINUOUS
Status: CANCELLED | OUTPATIENT
Start: 2019-09-19

## 2019-09-20 DIAGNOSIS — R06.02 SHORTNESS OF BREATH: Primary | ICD-10-CM

## 2019-09-20 DIAGNOSIS — E61.1 IRON DEFICIENCY: Primary | ICD-10-CM

## 2019-09-20 RX ORDER — FUROSEMIDE 20 MG
10 TABLET ORAL DAILY
Qty: 30 TABLET | Refills: 0 | Status: SHIPPED | OUTPATIENT
Start: 2019-09-20 | End: 2020-04-27

## 2019-09-26 ENCOUNTER — APPOINTMENT (OUTPATIENT)
Dept: LAB | Facility: CLINIC | Age: 59
End: 2019-09-26
Attending: INTERNAL MEDICINE
Payer: COMMERCIAL

## 2019-09-26 ENCOUNTER — HOSPITAL ENCOUNTER (OUTPATIENT)
Facility: CLINIC | Age: 59
Discharge: HOME | End: 2019-09-26
Attending: INTERNAL MEDICINE | Admitting: INTERNAL MEDICINE
Payer: COMMERCIAL

## 2019-09-26 ENCOUNTER — APPOINTMENT (OUTPATIENT)
Dept: MEDSURG UNIT | Facility: CLINIC | Age: 59
End: 2019-09-26
Attending: INTERNAL MEDICINE
Payer: COMMERCIAL

## 2019-09-26 VITALS
HEART RATE: 76 BPM | HEIGHT: 65 IN | RESPIRATION RATE: 16 BRPM | DIASTOLIC BLOOD PRESSURE: 53 MMHG | WEIGHT: 146 LBS | SYSTOLIC BLOOD PRESSURE: 101 MMHG | OXYGEN SATURATION: 97 % | TEMPERATURE: 97.4 F | BODY MASS INDEX: 24.32 KG/M2

## 2019-09-26 DIAGNOSIS — I25.10 CORONARY ARTERY DISEASE INVOLVING NATIVE CORONARY ARTERY OF NATIVE HEART WITHOUT ANGINA PECTORIS: Primary | ICD-10-CM

## 2019-09-26 DIAGNOSIS — I42.9 CARDIOMYOPATHY, UNSPECIFIED TYPE (H): ICD-10-CM

## 2019-09-26 DIAGNOSIS — Z98.890 STATUS POST CORONARY ANGIOGRAM: ICD-10-CM

## 2019-09-26 LAB
ANION GAP SERPL CALCULATED.3IONS-SCNC: 8 MMOL/L (ref 3–14)
BUN SERPL-MCNC: 16 MG/DL (ref 7–30)
CALCIUM SERPL-MCNC: 9.1 MG/DL (ref 8.5–10.1)
CHLORIDE SERPL-SCNC: 107 MMOL/L (ref 94–109)
CO2 SERPL-SCNC: 26 MMOL/L (ref 20–32)
CREAT SERPL-MCNC: 0.63 MG/DL (ref 0.52–1.04)
ERYTHROCYTE [DISTWIDTH] IN BLOOD BY AUTOMATED COUNT: 12.4 % (ref 10–15)
GFR SERPL CREATININE-BSD FRML MDRD: >90 ML/MIN/{1.73_M2}
GLUCOSE SERPL-MCNC: 83 MG/DL (ref 70–99)
HCT VFR BLD AUTO: 43.6 % (ref 35–47)
HGB BLD-MCNC: 14.2 G/DL (ref 11.7–15.7)
MCH RBC QN AUTO: 32.3 PG (ref 26.5–33)
MCHC RBC AUTO-ENTMCNC: 32.6 G/DL (ref 31.5–36.5)
MCV RBC AUTO: 99 FL (ref 78–100)
PLATELET # BLD AUTO: 181 10E9/L (ref 150–450)
POTASSIUM SERPL-SCNC: 4.3 MMOL/L (ref 3.4–5.3)
RBC # BLD AUTO: 4.4 10E12/L (ref 3.8–5.2)
SODIUM SERPL-SCNC: 141 MMOL/L (ref 133–144)
WBC # BLD AUTO: 6.6 10E9/L (ref 4–11)

## 2019-09-26 PROCEDURE — 27210762 ZZH DEVICE SUTURELESS SECUREMENT EA CR2: Performed by: INTERNAL MEDICINE

## 2019-09-26 PROCEDURE — 99153 MOD SED SAME PHYS/QHP EA: CPT | Performed by: INTERNAL MEDICINE

## 2019-09-26 PROCEDURE — C1887 CATHETER, GUIDING: HCPCS | Performed by: INTERNAL MEDICINE

## 2019-09-26 PROCEDURE — 99152 MOD SED SAME PHYS/QHP 5/>YRS: CPT | Performed by: INTERNAL MEDICINE

## 2019-09-26 PROCEDURE — 80048 BASIC METABOLIC PNL TOTAL CA: CPT | Performed by: NURSE PRACTITIONER

## 2019-09-26 PROCEDURE — C1894 INTRO/SHEATH, NON-LASER: HCPCS | Performed by: INTERNAL MEDICINE

## 2019-09-26 PROCEDURE — 40000172 ZZH STATISTIC PROCEDURE PREP ONLY

## 2019-09-26 PROCEDURE — 85027 COMPLETE CBC AUTOMATED: CPT | Performed by: NURSE PRACTITIONER

## 2019-09-26 PROCEDURE — 25000132 ZZH RX MED GY IP 250 OP 250 PS 637: Performed by: NURSE PRACTITIONER

## 2019-09-26 PROCEDURE — 25000128 H RX IP 250 OP 636: Performed by: INTERNAL MEDICINE

## 2019-09-26 PROCEDURE — 27210794 ZZH OR GENERAL SUPPLY STERILE: Performed by: INTERNAL MEDICINE

## 2019-09-26 PROCEDURE — 93454 CORONARY ARTERY ANGIO S&I: CPT | Mod: 26 | Performed by: INTERNAL MEDICINE

## 2019-09-26 PROCEDURE — 25000125 ZZHC RX 250: Performed by: INTERNAL MEDICINE

## 2019-09-26 PROCEDURE — 93010 ELECTROCARDIOGRAM REPORT: CPT | Mod: 59 | Performed by: INTERNAL MEDICINE

## 2019-09-26 PROCEDURE — 40000065 ZZH STATISTIC EKG NON-CHARGEABLE

## 2019-09-26 PROCEDURE — 93454 CORONARY ARTERY ANGIO S&I: CPT | Performed by: INTERNAL MEDICINE

## 2019-09-26 PROCEDURE — 36415 COLL VENOUS BLD VENIPUNCTURE: CPT | Performed by: NURSE PRACTITIONER

## 2019-09-26 RX ORDER — VERAPAMIL HYDROCHLORIDE 2.5 MG/ML
INJECTION, SOLUTION INTRAVENOUS
Status: DISCONTINUED | OUTPATIENT
Start: 2019-09-26 | End: 2019-09-26 | Stop reason: HOSPADM

## 2019-09-26 RX ORDER — EPTIFIBATIDE 2 MG/ML
2 INJECTION, SOLUTION INTRAVENOUS CONTINUOUS PRN
Status: DISCONTINUED | OUTPATIENT
Start: 2019-09-26 | End: 2019-09-26 | Stop reason: HOSPADM

## 2019-09-26 RX ORDER — ATORVASTATIN CALCIUM 40 MG/1
40 TABLET, FILM COATED ORAL DAILY
Qty: 90 TABLET | Refills: 3 | Status: SHIPPED | OUTPATIENT
Start: 2019-09-26 | End: 2020-02-20

## 2019-09-26 RX ORDER — NITROGLYCERIN 5 MG/ML
VIAL (ML) INTRAVENOUS
Status: DISCONTINUED | OUTPATIENT
Start: 2019-09-26 | End: 2019-09-26 | Stop reason: HOSPADM

## 2019-09-26 RX ORDER — LIDOCAINE 40 MG/G
CREAM TOPICAL
Status: DISCONTINUED | OUTPATIENT
Start: 2019-09-26 | End: 2019-09-26 | Stop reason: HOSPADM

## 2019-09-26 RX ORDER — ATROPINE SULFATE 0.1 MG/ML
0.5 INJECTION INTRAVENOUS EVERY 5 MIN PRN
Status: DISCONTINUED | OUTPATIENT
Start: 2019-09-26 | End: 2019-09-26 | Stop reason: HOSPADM

## 2019-09-26 RX ORDER — SODIUM CHLORIDE 9 MG/ML
INJECTION, SOLUTION INTRAVENOUS CONTINUOUS
Status: DISCONTINUED | OUTPATIENT
Start: 2019-09-26 | End: 2019-09-26 | Stop reason: HOSPADM

## 2019-09-26 RX ORDER — POTASSIUM CHLORIDE 750 MG/1
20 TABLET, EXTENDED RELEASE ORAL
Status: DISCONTINUED | OUTPATIENT
Start: 2019-09-26 | End: 2019-09-26 | Stop reason: HOSPADM

## 2019-09-26 RX ORDER — POTASSIUM CHLORIDE 750 MG/1
40 TABLET, EXTENDED RELEASE ORAL
Status: DISCONTINUED | OUTPATIENT
Start: 2019-09-26 | End: 2019-09-26 | Stop reason: HOSPADM

## 2019-09-26 RX ORDER — FLUMAZENIL 0.1 MG/ML
0.2 INJECTION, SOLUTION INTRAVENOUS
Status: DISCONTINUED | OUTPATIENT
Start: 2019-09-26 | End: 2019-09-26 | Stop reason: HOSPADM

## 2019-09-26 RX ORDER — NALOXONE HYDROCHLORIDE 0.4 MG/ML
.2-.4 INJECTION, SOLUTION INTRAMUSCULAR; INTRAVENOUS; SUBCUTANEOUS
Status: DISCONTINUED | OUTPATIENT
Start: 2019-09-26 | End: 2019-09-26 | Stop reason: HOSPADM

## 2019-09-26 RX ORDER — ARGATROBAN 1 MG/ML
150 INJECTION, SOLUTION INTRAVENOUS
Status: DISCONTINUED | OUTPATIENT
Start: 2019-09-26 | End: 2019-09-26 | Stop reason: HOSPADM

## 2019-09-26 RX ORDER — NALOXONE HYDROCHLORIDE 0.4 MG/ML
.1-.4 INJECTION, SOLUTION INTRAMUSCULAR; INTRAVENOUS; SUBCUTANEOUS
Status: DISCONTINUED | OUTPATIENT
Start: 2019-09-26 | End: 2019-09-26 | Stop reason: HOSPADM

## 2019-09-26 RX ORDER — FENTANYL CITRATE 50 UG/ML
25-50 INJECTION, SOLUTION INTRAMUSCULAR; INTRAVENOUS
Status: DISCONTINUED | OUTPATIENT
Start: 2019-09-26 | End: 2019-09-26 | Stop reason: HOSPADM

## 2019-09-26 RX ORDER — ARGATROBAN 1 MG/ML
350 INJECTION, SOLUTION INTRAVENOUS
Status: DISCONTINUED | OUTPATIENT
Start: 2019-09-26 | End: 2019-09-26 | Stop reason: HOSPADM

## 2019-09-26 RX ORDER — DOBUTAMINE HYDROCHLORIDE 200 MG/100ML
2-20 INJECTION INTRAVENOUS CONTINUOUS PRN
Status: DISCONTINUED | OUTPATIENT
Start: 2019-09-26 | End: 2019-09-26 | Stop reason: HOSPADM

## 2019-09-26 RX ORDER — EPTIFIBATIDE 2 MG/ML
180 INJECTION, SOLUTION INTRAVENOUS EVERY 10 MIN PRN
Status: DISCONTINUED | OUTPATIENT
Start: 2019-09-26 | End: 2019-09-26 | Stop reason: HOSPADM

## 2019-09-26 RX ORDER — NOREPINEPHRINE BITARTRATE 0.06 MG/ML
.03-.4 INJECTION, SOLUTION INTRAVENOUS CONTINUOUS PRN
Status: DISCONTINUED | OUTPATIENT
Start: 2019-09-26 | End: 2019-09-26 | Stop reason: HOSPADM

## 2019-09-26 RX ORDER — FENTANYL CITRATE 50 UG/ML
INJECTION, SOLUTION INTRAMUSCULAR; INTRAVENOUS
Status: DISCONTINUED | OUTPATIENT
Start: 2019-09-26 | End: 2019-09-26 | Stop reason: HOSPADM

## 2019-09-26 RX ORDER — HEPARIN SODIUM 1000 [USP'U]/ML
INJECTION, SOLUTION INTRAVENOUS; SUBCUTANEOUS
Status: DISCONTINUED | OUTPATIENT
Start: 2019-09-26 | End: 2019-09-26 | Stop reason: HOSPADM

## 2019-09-26 RX ORDER — DOPAMINE HYDROCHLORIDE 160 MG/100ML
2-20 INJECTION, SOLUTION INTRAVENOUS CONTINUOUS PRN
Status: DISCONTINUED | OUTPATIENT
Start: 2019-09-26 | End: 2019-09-26 | Stop reason: HOSPADM

## 2019-09-26 RX ORDER — DIPHENHYDRAMINE HYDROCHLORIDE 50 MG/ML
25 INJECTION INTRAMUSCULAR; INTRAVENOUS EVERY 6 HOURS PRN
Status: DISCONTINUED | OUTPATIENT
Start: 2019-09-26 | End: 2019-09-26 | Stop reason: HOSPADM

## 2019-09-26 RX ORDER — PHENYLEPHRINE HCL IN 0.9% NACL 50MG/250ML
.5-6 PLASTIC BAG, INJECTION (ML) INTRAVENOUS CONTINUOUS PRN
Status: DISCONTINUED | OUTPATIENT
Start: 2019-09-26 | End: 2019-09-26 | Stop reason: HOSPADM

## 2019-09-26 RX ORDER — NITROGLYCERIN 20 MG/100ML
.07-2 INJECTION INTRAVENOUS CONTINUOUS PRN
Status: DISCONTINUED | OUTPATIENT
Start: 2019-09-26 | End: 2019-09-26 | Stop reason: HOSPADM

## 2019-09-26 RX ADMIN — ASPIRIN 325 MG ORAL TABLET 325 MG: 325 PILL ORAL at 13:11

## 2019-09-26 ASSESSMENT — PAIN DESCRIPTION - DESCRIPTORS: DESCRIPTORS: ACHING;STABBING

## 2019-09-26 ASSESSMENT — MIFFLIN-ST. JEOR: SCORE: 1233.13

## 2019-09-26 NOTE — PROVIDER NOTIFICATION
Text paged CCL MARIA G re- Pt reports nausea. Would like Benadryl. Does not use Zofran, Compazine and Reglan.  Drea [phone]

## 2019-09-26 NOTE — Clinical Note
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

## 2019-09-26 NOTE — PROVIDER NOTIFICATION
Paged Adelaida Slater re-Pt would like you come see her before she discharges today.  Drea [phone]

## 2019-09-26 NOTE — PRE-PROCEDURE
GENERAL PRE-PROCEDURE:   Procedure:  Coronary angiogram with possible stent placement  Date/Time:  9/26/2019 1:09 PM    Verbal consent obtained?: Yes    Written consent obtained?: Yes    Risks and benefits: Risks, benefits and alternatives were discussed    Consent given by:  Patient  Patient states understanding of procedure being performed: Yes    Patient's understanding of procedure matches consent: Yes    Procedure consent matches procedure scheduled: Yes    Expected level of sedation:  Minimal  Appropriately NPO:  Yes  ASA Class:  Class 2- mild systemic disease, no acute problems, no functional limitations  Mallampati  :  Grade 2- soft palate, base of uvula, tonsillar pillars, and portion of posterior pharyngeal wall visible  Lungs:  Lungs clear with good breath sounds bilaterally  Heart:  Normal heart sounds and rate  History & Physical reviewed:  History and physical reviewed and no updates needed  Statement of review:  I have reviewed the lab findings, diagnostic data, medications, and the plan for sedation    Patient presents for coronary angiogram to evaluate chronic chest pain and newly reduced EF of 35%. We discussed the risks and benefits of the procedure. She verbalizes understanding and wishes to proceed. She does report a history of intolerance to NSAIDS (Ibuprofen) due to worsening cough and asthma exacerbation. She does not think this reaction was related to use of aspirin. Plan to administer aspirin 325 mg now and monitor closely for side effects.    SHAYNA Neville, CNP  Cardiology Nurse Practitioner  [facility]  Clinic: [phone]  [facility]: [phone]

## 2019-09-26 NOTE — DISCHARGE INSTRUCTIONS
Your angiogram shows mild-moderate nonobstructive coronary artery disease that did not require stenting. We do recommend that you start aspirin 81 mg daily and atorvastatin 40 mg daily to prevent progression of your coronary artery disease and to reduce your risk of a heart attack or stroke.     1. Start aspirin 81 mg daily  2. Start atorvastatin 40 mg daily  3. See Adelaida Slater in 2-4 weeks for follow-up      Discharge Instructions for Cardiac Catheterization  Cardiac catheterization is a procedure to look for blocked areas in the blood vessels that send blood to the heart. A thin, flexible tube (catheter) is put in a blood vessel in your groin or arm. The healthcare provider injects contrast fluid into your blood, which then flows to your heart. X-rays pictures are taken of your heart. Your provider will review the results with you. Be sure to ask any questions you have before you leave. This sheet will help you take care of yourself at home.  Home care    Don't drive or make any important decisions for at least 24 hours after receiving any type of sedation or anesthesia.     Arrange to have a responsible adult drive you home after your procedure.    Only do light and easy activities for the next 2 to 3 days. Ask for help with chores and errands while you recover. Have someone drive you to your appointments.    Don't lift anything heavy for a while. Your healthcare team will tell you when it's safe to lift again.    Ask your healthcare team when you can expect to return to work. Unless your job involves lifting, you may be able to return to your normal activities within a couple of days.    Take your medicines as directed. Don't skip doses.    Drink 6 to 8 glasses of water a day. This is to help flush the contrast dye out of your body. Call your healthcare team if your urine has any change in color.    Take your temperature each day for 7 days. If you feel cold and clammy or start sweating, take your temperature  right away and call your healthcare team.    Check your incisions every day for signs of infection. These include redness, swelling, and drainage. It is normal to have a small bruise or bump where the catheter was inserted. A bruise that is getting larger is not normal and should be reported to your healthcare team. If you see blood forming in the incision, call your healthcare team. Go to the emergency department if you have uncontrolled bleeding from the artery site. This is especially true if you take medicines that make it hard for your blood to clot. Examples are aspirin, clopidogrel, and warfarin.    Eat a healthy diet. Make sure it is low in fat, salt, and cholesterol. Ask your healthcare team for diet information.    Stop smoking. Enroll in a stop-smoking program or ask your healthcare team for help. Stop-smoking programs can be life saving.    Exercise as your healthcare team tells you to. Your healthcare team may recommend you start a cardiac rehabilitation program. Cardiac rehab is an exercise program in which trained healthcare staff watch your progress and stress on your heart while you exercise. Ask your team how to enroll.    Don't swim or take baths until your healthcare team says it s OK. You can shower the day after the procedure. Keep the site clean and dry. This keeps the incision from getting wet and infected until the skin and artery can heal.    Be sure to follow all after-care instructions.   Follow-up care    Make a follow-up appointment as advised by our staff. It's common to have a follow-up appointment 2 to 4 weeks after an angioplasty or coronary stent procedure.    Make a yearly appointment, too. This is to make sure you are still doing well and not having any new symptoms.    Don't wait for a follow-up appointment if your medicines aren't working or you are having heart-related symptoms.  When to seek medical care  Call your healthcare provider right away if you have any of the  following:    Chest pain    Constant or increasing pain or numbness in your leg    Fever of 100.4 F (38.0 C) or higher, or as directed by your healthcare provider    Symptoms of infection. These include redness, swelling, drainage, or warmth at the incision site.    Shortness of breath    A leg that feels cold or appears blue    Bleeding, bruising, or a lot of swelling where the catheter was inserted    Blood in your urine    Black or tarry stools    Any unusual bleeding   Date Last Reviewed: 10/1/2016    6399-0835 [employer]. [address] [place] [address]. All rights reserved. This information is not intended as a substitute for professional medical care. Always follow your healthcare professional's instructions.

## 2019-09-26 NOTE — PROGRESS NOTES
Prepped for cors procedure.  Has had constant, chronic midsternal pain at 3/10 since an ablation procedure in 2017.  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  Family with her.  H & P current.  Labs complete.  Consent done.  MARIA G verifying whether ASA will be given or not as patient states Aspirin exacerbates her asthma & makes her cough.

## 2019-09-26 NOTE — PROGRESS NOTES
Discharge patient after   1-patient is tolerating liquids and foods-yes  2- ambulating-yes  3- urinating-yes  4- puncture sites are stable ( no bleeding and no hematoma)-yes  5- and patient has a -yes  6-IF Vital Signs are within the patient's normal limits or systolic blood pressure greater than 90 mmHg and less than 160 mmHg-yes  7-Patient is alert and oriented-yes  8-If diabetic, blood glucose is in patient's usual normal range-NA    DC instructions given to pt and spouse, verbalized understanding.  All belongings with pt, IV DC'd and documented. Pt discharged.    no

## 2019-09-26 NOTE — Clinical Note
dry, intact, no bleeding and no hematoma. 6 Fr sheath removed from the RRA. TR band placed until hemostasis is obtained. 12cc of air in the band.

## 2019-09-27 DIAGNOSIS — I42.8 NICM (NONISCHEMIC CARDIOMYOPATHY) (H): Primary | ICD-10-CM

## 2019-09-27 LAB — INTERPRETATION ECG - MUSE: NORMAL

## 2019-09-27 RX ORDER — METOPROLOL SUCCINATE 25 MG/1
25 TABLET, EXTENDED RELEASE ORAL DAILY
Qty: 30 TABLET | Refills: 1 | Status: SHIPPED | OUTPATIENT
Start: 2019-09-27 | End: 2019-11-23

## 2019-09-30 ENCOUNTER — MYC MEDICAL ADVICE (OUTPATIENT)
Dept: CARDIOLOGY | Facility: CLINIC | Age: 59
End: 2019-09-30

## 2019-10-02 ENCOUNTER — HEALTH MAINTENANCE LETTER (OUTPATIENT)
Age: 59
End: 2019-10-02

## 2019-10-03 NOTE — TELEPHONE ENCOUNTER
[facility] Call Center    Phone Message    May a detailed message be left on voicemail: yes    Reason for Call: Other: Pt called to speak with Dr. Funes or Fiorella regarding her recent echocardiogram and get a second opinion on if the cardiac MRI she has scheduled for 10/10 is needed. Please give her a call back.     Action Taken: Message routed to:  [facility] ([facility]): Cardiology

## 2019-10-04 NOTE — TELEPHONE ENCOUNTER
[facility] Call Center    Phone Message    May a detailed message be left on voicemail: yes    Reason for Call: Other: Joyce calling back. She states she would like to speak with someone for a second opinion on her Echo. Please call her back to discuss as soon as possible.      Action Taken: Message routed to:  [facility] ([facility] kandis cardio

## 2019-10-06 ENCOUNTER — APPOINTMENT (OUTPATIENT)
Dept: GENERAL RADIOLOGY | Facility: CLINIC | Age: 59
End: 2019-10-06
Attending: EMERGENCY MEDICINE
Payer: COMMERCIAL

## 2019-10-06 ENCOUNTER — ANCILLARY PROCEDURE (OUTPATIENT)
Dept: CARDIOLOGY | Facility: CLINIC | Age: 59
End: 2019-10-06
Attending: INTERNAL MEDICINE
Payer: COMMERCIAL

## 2019-10-06 ENCOUNTER — HOSPITAL ENCOUNTER (EMERGENCY)
Facility: CLINIC | Age: 59
Discharge: HOME | End: 2019-10-06
Attending: EMERGENCY MEDICINE | Admitting: EMERGENCY MEDICINE
Payer: COMMERCIAL

## 2019-10-06 VITALS
RESPIRATION RATE: 12 BRPM | DIASTOLIC BLOOD PRESSURE: 64 MMHG | HEART RATE: 63 BPM | SYSTOLIC BLOOD PRESSURE: 130 MMHG | TEMPERATURE: 98.3 F | OXYGEN SATURATION: 100 % | BODY MASS INDEX: 24.32 KG/M2 | HEIGHT: 65 IN | WEIGHT: 146 LBS

## 2019-10-06 DIAGNOSIS — I44.2 AV BLOCK, COMPLETE (H): ICD-10-CM

## 2019-10-06 DIAGNOSIS — R07.89 NON-CARDIAC CHEST PAIN: ICD-10-CM

## 2019-10-06 DIAGNOSIS — I44.2 AV BLOCK, COMPLETE (H): Primary | ICD-10-CM

## 2019-10-06 LAB
ALBUMIN SERPL-MCNC: 3.6 G/DL (ref 3.4–5)
ALP SERPL-CCNC: 89 U/L (ref 40–150)
ALT SERPL W P-5'-P-CCNC: 26 U/L (ref 0–50)
ANION GAP SERPL CALCULATED.3IONS-SCNC: 4 MMOL/L (ref 3–14)
AST SERPL W P-5'-P-CCNC: 13 U/L (ref 0–45)
BASOPHILS # BLD AUTO: 0.1 10E9/L (ref 0–0.2)
BASOPHILS NFR BLD AUTO: 0.9 %
BILIRUB SERPL-MCNC: 0.3 MG/DL (ref 0.2–1.3)
BUN SERPL-MCNC: 15 MG/DL (ref 7–30)
CALCIUM SERPL-MCNC: 8.8 MG/DL (ref 8.5–10.1)
CHLORIDE SERPL-SCNC: 109 MMOL/L (ref 94–109)
CO2 SERPL-SCNC: 27 MMOL/L (ref 20–32)
CREAT SERPL-MCNC: 0.68 MG/DL (ref 0.52–1.04)
DIFFERENTIAL METHOD BLD: NORMAL
EOSINOPHIL # BLD AUTO: 0.1 10E9/L (ref 0–0.7)
EOSINOPHIL NFR BLD AUTO: 1.8 %
ERYTHROCYTE [DISTWIDTH] IN BLOOD BY AUTOMATED COUNT: 12.1 % (ref 10–15)
GFR SERPL CREATININE-BSD FRML MDRD: >90 ML/MIN/{1.73_M2}
GLUCOSE SERPL-MCNC: 68 MG/DL (ref 70–99)
HCT VFR BLD AUTO: 42 % (ref 35–47)
HGB BLD-MCNC: 13.5 G/DL (ref 11.7–15.7)
IMM GRANULOCYTES # BLD: 0 10E9/L (ref 0–0.4)
IMM GRANULOCYTES NFR BLD: 0.3 %
INR PPP: 1.02 (ref 0.86–1.14)
INTERPRETATION ECG - MUSE: NORMAL
LYMPHOCYTES # BLD AUTO: 2.4 10E9/L (ref 0.8–5.3)
LYMPHOCYTES NFR BLD AUTO: 34.4 %
MCH RBC QN AUTO: 32.2 PG (ref 26.5–33)
MCHC RBC AUTO-ENTMCNC: 32.1 G/DL (ref 31.5–36.5)
MCV RBC AUTO: 100 FL (ref 78–100)
MONOCYTES # BLD AUTO: 0.6 10E9/L (ref 0–1.3)
MONOCYTES NFR BLD AUTO: 9.2 %
NEUTROPHILS # BLD AUTO: 3.7 10E9/L (ref 1.6–8.3)
NEUTROPHILS NFR BLD AUTO: 53.4 %
NRBC # BLD AUTO: 0 10*3/UL
NRBC BLD AUTO-RTO: 0 /100
NT-PROBNP SERPL-MCNC: 194 PG/ML (ref 0–900)
PLATELET # BLD AUTO: 195 10E9/L (ref 150–450)
POTASSIUM SERPL-SCNC: 4 MMOL/L (ref 3.4–5.3)
PROT SERPL-MCNC: 7.2 G/DL (ref 6.8–8.8)
RBC # BLD AUTO: 4.19 10E12/L (ref 3.8–5.2)
SODIUM SERPL-SCNC: 139 MMOL/L (ref 133–144)
TROPONIN I SERPL-MCNC: <0.015 UG/L (ref 0–0.04)
WBC # BLD AUTO: 6.8 10E9/L (ref 4–11)

## 2019-10-06 PROCEDURE — 85025 COMPLETE CBC W/AUTO DIFF WBC: CPT | Performed by: EMERGENCY MEDICINE

## 2019-10-06 PROCEDURE — 99285 EMERGENCY DEPT VISIT HI MDM: CPT | Mod: 25 | Performed by: EMERGENCY MEDICINE

## 2019-10-06 PROCEDURE — 80053 COMPREHEN METABOLIC PANEL: CPT | Performed by: EMERGENCY MEDICINE

## 2019-10-06 PROCEDURE — 83880 ASSAY OF NATRIURETIC PEPTIDE: CPT | Performed by: EMERGENCY MEDICINE

## 2019-10-06 PROCEDURE — 99207 CARDIAC DEVICE CHECK - REMOTE: CPT | Mod: ZP | Performed by: INTERNAL MEDICINE

## 2019-10-06 PROCEDURE — 84484 ASSAY OF TROPONIN QUANT: CPT | Performed by: EMERGENCY MEDICINE

## 2019-10-06 PROCEDURE — 85610 PROTHROMBIN TIME: CPT | Performed by: EMERGENCY MEDICINE

## 2019-10-06 PROCEDURE — 93296 REM INTERROG EVL PM/IDS: CPT | Mod: ZF

## 2019-10-06 PROCEDURE — 99284 EMERGENCY DEPT VISIT MOD MDM: CPT | Mod: Z6 | Performed by: EMERGENCY MEDICINE

## 2019-10-06 PROCEDURE — 71045 X-RAY EXAM CHEST 1 VIEW: CPT

## 2019-10-06 PROCEDURE — 93005 ELECTROCARDIOGRAM TRACING: CPT | Mod: 59 | Performed by: EMERGENCY MEDICINE

## 2019-10-06 ASSESSMENT — ENCOUNTER SYMPTOMS
ANAL BLEEDING: 0
FEVER: 0
SHORTNESS OF BREATH: 1
VOMITING: 0
NAUSEA: 0
COUGH: 0
BLOOD IN STOOL: 0

## 2019-10-06 ASSESSMENT — MIFFLIN-ST. JEOR: SCORE: 1233.13

## 2019-10-06 NOTE — ED AVS SNAPSHOT
[facility], [place], Emergency Department  [address] [address]  Phone:  [phone]                                    Joyce Marroquin   MRN: [id number]    Department:  [facility], Emergency Department   Date of Visit:  10/6/2019           After Visit Summary Signature Page    I have received my discharge instructions, and my questions have been answered. I have discussed any challenges I see with this plan with the nurse or doctor.    ..........................................................................................................................................  Patient/Patient Representative Signature      ..........................................................................................................................................  Patient Representative Print Name and Relationship to Patient    ..................................................               ................................................  Date                                   Time    ..........................................................................................................................................  Reviewed by Signature/Title    ...................................................              ..............................................  Date                                               Time          22EPIC Rev 08/18

## 2019-10-06 NOTE — DISCHARGE INSTRUCTIONS
Please make an appointment to follow up with Your Primary Care Provider and Cardiology Clinic (phone: [phone]) in the next month for recheck.    Return to the ER for any worsening or problems

## 2019-10-06 NOTE — ED TRIAGE NOTES
"Triage Assessment & Note:    /64   Pulse 56   Temp 98.3  F (36.8  C) (Oral)   Resp 12   Ht 1.651 m (5' 5\")   Wt 66.2 kg (146 lb)   LMP 08/21/2007   SpO2 97%   BMI 24.30 kg/m        Patient presents with: Pt with cardiac hx/ pacemaker comes to ER with  and started to feel ill/ dizzy. No reports of fever, cough, or SOB.    Home Treatments/Remedies: Home medications    Febrile / Afebrile: afebrile    Duration of C/o: < 5 hours    Tamie Reece RN  October 6, 2019        "

## 2019-10-06 NOTE — ED PROVIDER NOTES
[place] EMERGENCY DEPARTMENT ([facility])  10/06/19 ED 4    History     Chief Complaint   Patient presents with     Chest Pain     Shortness of Breath     The history is provided by the patient and medical records.     Joyce Marroquin is a 58 year old female who presents the ER with complaints of chest pressure that she has had intermittently over the past week.  Patient states that she does have associated shortness of breath with it and has an underlying cardiomyopathy that is either ischemic or stress-induced per her history.  Patient states that she had the onset of some chest pressure this morning but when she came to the ER she brought her  who is having some chest pressure and has a significant heart history at the time when I was seeing both of them in the same room and primarily seeing her  she said that she was fine.  Once her  was admitted to the hospital the patient then reported that she was having chest pressure as well.  Patient denies any fevers, denies any coughing, and states that the chest pressure remains anterior without radiation.  Patient recently underwent coronary angiogram 2 weeks ago which revealed a mid LAD 40% stenosis.  Patient also underwent her last echocardiogram approximately 1 month ago which revealed an ejection fraction of 30 to 35% with a pattern suggestive of stress cardiomyopathy.  Patient denies any nausea, vomiting, diarrhea, melena or bright blood per rectum and denies any focal numbness or weakness.    This part of the document was transcribed by Sara Winters Medical Scribe.      I have reviewed the Medications, Allergies, Past Medical and Surgical History, and Social History in the [employer] system.    PAST MEDICAL HISTORY:   Past Medical History:   Diagnosis Date     Cardiomyopathy (H)     ff Cardiology     Chronic depressive personality disorder      Esophageal reflux      Ex-smoker     quit 2006; 1 PPD x 30     Hyperparathyroidism (H)     s/p  parathyroidectomy     Hypothyroidism      LARRY on CPAP     ff Sleep medicine     Pulmonary nodules     ff Pulmonologist     S/P cardiac pacemaker procedure     checked every 6 months at the [facility]       PAST SURGICAL HISTORY:   Past Surgical History:   Procedure Laterality Date     BUNIONECTOMY Bilateral      CV CORONARY ANGIOGRAM N/A 9/26/2019    Procedure: CV CORONARY ANGIOGRAM;  Surgeon: Erickson Trejo MD;  Location:  [facility]     EP ABLATION / EP STUDIES  04/21/2017     EXPLORE NECK N/A 9/19/2018    Procedure: EXPLORE NECK;  Neck Exploration Resection of Left superior Parathyroid gland;  Surgeon: Kristine Venegas MD;  Location: [facility] OR      CORRECT BUNION,SIMPLE       PARATHYROIDECTOMY N/A 9/19/2018    Procedure: PARATHYROIDECTOMY;;  Surgeon: Kristine Venegas MD;  Location: [facility] OR     PPM INSERT OF NEW OR REPL W/VENT LEAD  04/21/2017     TONSILLECTOMY & ADENOIDECTOMY         FAMILY HISTORY:   Family History   Problem Relation Age of Onset     Hypothyroidism Mother      Hypertension Mother      Osteoarthritis Mother      Coronary Artery Disease Father         nonfatal MI in his 70s     Asthma Father      Diabetes Sister      Hypothyroidism Sister      Breast Cancer Maternal Aunt        SOCIAL HISTORY:   Social History     Tobacco Use     Smoking status: Former Smoker     Smokeless tobacco: Never Used   Substance Use Topics     Alcohol use: Yes     Comment: seldom       Patient's Medications   New Prescriptions    No medications on file   Previous Medications    ALBUTEROL (PROAIR HFA/PROVENTIL HFA/VENTOLIN HFA) 108 (90 BASE) MCG/ACT INHALER    Inhale 2 puffs into the lungs as needed for shortness of breath / dyspnea or wheezing     ASPIRIN (ASA) 81 MG EC TABLET    Take 1 tablet (81 mg) by mouth daily    ATORVASTATIN (LIPITOR) 40 MG TABLET    Take 1 tablet (40 mg) by mouth daily    AZELASTINE (ASTELIN) 0.1 % NASAL SPRAY    Spray 1 spray into both nostrils 2 times daily    CLONAZEPAM  (KLONOPIN) 0.5 MG TABLET    Take 1 tablet (0.5 mg) by mouth every 6 hours as needed for anxiety (panic attack, asthma attack- use only when needed- habit forming medication)    DIPHENHYDRAMINE HCL (BENADRYL PO)    Take 25-50 mg by mouth nightly as needed    FERROUS SULFATE (IRON SUPPLEMENT PO)    Take 65 mg by mouth 2 times daily (with meals)     FLUTICASONE-SALMETEROL (ADVAIR) 250-50 MCG/DOSE INHALER    Inhale 1 puff into the lungs every 12 hours    FUROSEMIDE (LASIX) 20 MG TABLET    Take 0.5 tablets (10 mg) by mouth daily    GUAIFENESIN (MUCINEX) 600 MG 12 HR TABLET    Take 600 mg by mouth 2 times daily    IPRATROPIUM (ATROVENT) 0.06 % NASAL SPRAY    Spray 2 sprays into both nostrils 4 times daily    LOPERAMIDE (IMODIUM A-D) 2 MG TABLET    Take 2 tabs (4 mg) after first loose stool, and then take one tab (2 mg) after each diarrheal stool.  Max of 8 tabs (16 mg) per day.    MAGNESIUM LACTATE PO    Take 180 mg by mouth At Bedtime     MELATONIN PO    Take 3 mg by mouth At Bedtime     MENAQUINONE-7 (VITAMIN K2 PO)    Take 1 tablet by mouth every morning     METHYLPREDNISOLONE PO    Take 40 mg by mouth as needed     METOPROLOL SUCCINATE ER (TOPROL-XL) 25 MG 24 HR TABLET    Take 1 tablet (25 mg) by mouth daily    MULTIPLE VITAMIN (DAILY MULTIVITAMIN PO)    Take 1 tablet by mouth every morning     NEBULIZER NEBULIZATION    as needed    PROBIOTIC PRODUCT (PROBIOTIC DAILY PO)    Take 1 capsule by mouth 2 times daily     ROPINIROLE (REQUIP) 0.25 MG TABLET        SYNTHROID 150 MCG TABLET    Take 1 tablet (150 mcg) by mouth daily    TURMERIC PO    Take 1 tablet by mouth every morning     VITAMIN D3 (CHOLECALCIFEROL) 2000 UNITS TABLET    Take 1 tablet by mouth daily   Modified Medications    No medications on file   Discontinued Medications    No medications on file          Allergies   Allergen Reactions     Flagyl [Metronidazole] Rash     Buspar [Buspirone]      Muscle weakness     Corn Oil Other (See Comments)      "Headache       Lorazepam Other (See Comments)     Heat intolerance       Seasonal Allergies Difficulty breathing     Sulfamethoxazole-Trimethoprim      Other reaction(s): Other  Passed out     Tetracycline Swelling     Zofran [Ondansetron]      Prolonged QT  Prolonged QT at baseline per patient     Benzodiazepines Other (See Comments)     Other reaction(s): Other  Extreme heat intolerance  Extreme heat intolerance     Cyclobenzaprine Other (See Comments)     Extreme heat intolerance     Levaquin [Levofloxacin]      Other reaction(s): Other  Tendon rupture     Nsaids Other (See Comments)     Exacerbated asthma        Review of Systems   Constitutional: Negative for fever.   Respiratory: Positive for shortness of breath. Negative for cough.    Cardiovascular: Positive for chest pain.   Gastrointestinal: Negative for anal bleeding, blood in stool, nausea and vomiting.   All other systems reviewed and are negative.      Physical Exam   BP: 126/64  Pulse: 56  Temp: 98.3  F (36.8  C)  Resp: 12  Height: 165.1 cm (5' 5\")  Weight: 66.2 kg (146 lb)  SpO2: 97 %      Physical Exam  Vitals signs and nursing note reviewed.   Constitutional:       Appearance: She is well-developed.   HENT:      Head: Atraumatic.   Eyes:      Extraocular Movements: Extraocular movements intact.      Pupils: Pupils are equal, round, and reactive to light.   Neck:      Musculoskeletal: Neck supple.   Cardiovascular:      Rate and Rhythm: Normal rate and regular rhythm.      Heart sounds: Normal heart sounds.   Pulmonary:      Effort: Pulmonary effort is normal.      Breath sounds: Normal breath sounds.   Chest:      Chest wall: No tenderness.   Abdominal:      Palpations: Abdomen is soft.      Tenderness: There is no tenderness.   Musculoskeletal:      Right lower leg: She exhibits no tenderness. No edema.      Left lower leg: She exhibits no tenderness. No edema.   Neurological:      General: No focal deficit present.      Mental Status: She is " alert.      Comments: Is intact and symmetric         ED Course        Procedures          EKG was performed and revealed a paced rhythm at a rate of 69 which is consistent with her previous EKGs.    Results for orders placed or performed during the hospital encounter of 10/06/19 (from the past 24 hour(s))   CBC with platelets differential   Result Value Ref Range    WBC 6.8 4.0 - 11.0 10e9/L    RBC Count 4.19 3.8 - 5.2 10e12/L    Hemoglobin 13.5 11.7 - 15.7 g/dL    Hematocrit 42.0 35.0 - 47.0 %     78 - 100 fl    MCH 32.2 26.5 - 33.0 pg    MCHC 32.1 31.5 - 36.5 g/dL    RDW 12.1 10.0 - 15.0 %    Platelet Count 195 150 - 450 10e9/L    Diff Method Automated Method     % Neutrophils 53.4 %    % Lymphocytes 34.4 %    % Monocytes 9.2 %    % Eosinophils 1.8 %    % Basophils 0.9 %    % Immature Granulocytes 0.3 %    Nucleated RBCs 0 0 /100    Absolute Neutrophil 3.7 1.6 - 8.3 10e9/L    Absolute Lymphocytes 2.4 0.8 - 5.3 10e9/L    Absolute Monocytes 0.6 0.0 - 1.3 10e9/L    Absolute Eosinophils 0.1 0.0 - 0.7 10e9/L    Absolute Basophils 0.1 0.0 - 0.2 10e9/L    Abs Immature Granulocytes 0.0 0 - 0.4 10e9/L    Absolute Nucleated RBC 0.0    Troponin I   Result Value Ref Range    Troponin I ES <0.015 0.000 - 0.045 ug/L   INR   Result Value Ref Range    INR 1.02 0.86 - 1.14   Nt probnp inpatient (BNP)   Result Value Ref Range    N-Terminal Pro BNP Inpatient 194 0 - 900 pg/mL   Comprehensive metabolic panel   Result Value Ref Range    Sodium 139 133 - 144 mmol/L    Potassium 4.0 3.4 - 5.3 mmol/L    Chloride 109 94 - 109 mmol/L    Carbon Dioxide 27 20 - 32 mmol/L    Anion Gap 4 3 - 14 mmol/L    Glucose 68 (L) 70 - 99 mg/dL    Urea Nitrogen 15 7 - 30 mg/dL    Creatinine 0.68 0.52 - 1.04 mg/dL    GFR Estimate >90 >60 mL/min/[1.73_m2]    GFR Estimate If Black >90 >60 mL/min/[1.73_m2]    Calcium 8.8 8.5 - 10.1 mg/dL    Bilirubin Total 0.3 0.2 - 1.3 mg/dL    Albumin 3.6 3.4 - 5.0 g/dL    Protein Total 7.2 6.8 - 8.8 g/dL     Alkaline Phosphatase 89 40 - 150 U/L    ALT 26 0 - 50 U/L    AST 13 0 - 45 U/L   EKG 12 lead   Result Value Ref Range    Interpretation ECG Click View Image link to view waveform and result    XR Chest Port 1 View    Narrative    Exam: XR CHEST PORT 1 VW, 10/6/2019 1:21 PM    Indication: chest pain    Comparison: 5/13/2019    Findings:   Dual-lead pacemaker in place. Normal cardiac size. No focal  consolidation, pleural effusion, or pneumothorax.      Impression    Impression: No acute cardiopulmonary abnormality.    KATIE LUZ MD     Patient's pacer was interrogated by nursing personnel and revealed a episode of nonsustained V. tach of 18 beats in September.    Assessments & Plan (with Medical Decision Making)     I have reviewed the nursing notes.    I asked cardiology to see the patient as I did not feel the patient needed to be admitted.  They evaluated the patient, answered her questions and agreed she could go home because of the patient's recent angiogram results.  Patient feels comfortable with this plan and believes most of her chest discomfort was related to her anxiety with her  in the ER being admitted.    I have reviewed the findings, diagnosis, plan and need for follow up with the patient.    New Prescriptions    No medications on file       Final diagnoses:   Non-cardiac chest pain     Please make an appointment to follow up with Your Primary Care Provider and Cardiology Clinic (phone: [phone]) in the next month for recheck.    Return to the ER for any worsening or problems    Qasim Washington MD, MD    10/6/2019   [facility] EMERGENCY DEPARTMENT     Qasim Washington MD  10/06/19 [id number]

## 2019-10-07 LAB
MDC_IDC_EPISODE_DTM: NORMAL
MDC_IDC_EPISODE_DTM: NORMAL
MDC_IDC_EPISODE_DURATION: 0 S
MDC_IDC_EPISODE_DURATION: 1 S
MDC_IDC_EPISODE_ID: 4
MDC_IDC_EPISODE_ID: 5
MDC_IDC_EPISODE_TYPE: NORMAL
MDC_IDC_EPISODE_TYPE: NORMAL
MDC_IDC_LEAD_IMPLANT_DT: NORMAL
MDC_IDC_LEAD_IMPLANT_DT: NORMAL
MDC_IDC_LEAD_LOCATION: NORMAL
MDC_IDC_LEAD_LOCATION: NORMAL
MDC_IDC_LEAD_LOCATION_DETAIL_1: NORMAL
MDC_IDC_LEAD_LOCATION_DETAIL_1: NORMAL
MDC_IDC_LEAD_MFG: NORMAL
MDC_IDC_LEAD_MFG: NORMAL
MDC_IDC_LEAD_MODEL: NORMAL
MDC_IDC_LEAD_MODEL: NORMAL
MDC_IDC_LEAD_POLARITY_TYPE: NORMAL
MDC_IDC_LEAD_POLARITY_TYPE: NORMAL
MDC_IDC_LEAD_SERIAL: NORMAL
MDC_IDC_LEAD_SERIAL: NORMAL
MDC_IDC_MSMT_BATTERY_DTM: NORMAL
MDC_IDC_MSMT_BATTERY_REMAINING_LONGEVITY: 81 MO
MDC_IDC_MSMT_BATTERY_RRT_TRIGGER: 2.83
MDC_IDC_MSMT_BATTERY_STATUS: NORMAL
MDC_IDC_MSMT_BATTERY_VOLTAGE: 3.01 V
MDC_IDC_MSMT_LEADCHNL_RA_IMPEDANCE_VALUE: 361 OHM
MDC_IDC_MSMT_LEADCHNL_RA_IMPEDANCE_VALUE: 437 OHM
MDC_IDC_MSMT_LEADCHNL_RA_PACING_THRESHOLD_AMPLITUDE: 0.62 V
MDC_IDC_MSMT_LEADCHNL_RA_PACING_THRESHOLD_PULSEWIDTH: 0.4 MS
MDC_IDC_MSMT_LEADCHNL_RA_SENSING_INTR_AMPL: 3.12 MV
MDC_IDC_MSMT_LEADCHNL_RA_SENSING_INTR_AMPL: 3.12 MV
MDC_IDC_MSMT_LEADCHNL_RV_IMPEDANCE_VALUE: 399 OHM
MDC_IDC_MSMT_LEADCHNL_RV_IMPEDANCE_VALUE: 456 OHM
MDC_IDC_MSMT_LEADCHNL_RV_PACING_THRESHOLD_AMPLITUDE: 0.62 V
MDC_IDC_MSMT_LEADCHNL_RV_PACING_THRESHOLD_PULSEWIDTH: 0.4 MS
MDC_IDC_MSMT_LEADCHNL_RV_SENSING_INTR_AMPL: 6.5 MV
MDC_IDC_MSMT_LEADCHNL_RV_SENSING_INTR_AMPL: 6.5 MV
MDC_IDC_PG_IMPLANT_DTM: NORMAL
MDC_IDC_PG_MFG: NORMAL
MDC_IDC_PG_MODEL: NORMAL
MDC_IDC_PG_SERIAL: NORMAL
MDC_IDC_PG_TYPE: NORMAL
MDC_IDC_SESS_CLINIC_NAME: NORMAL
MDC_IDC_SESS_DTM: NORMAL
MDC_IDC_SESS_TYPE: NORMAL
MDC_IDC_SET_BRADY_AT_MODE_SWITCH_RATE: 171 {BEATS}/MIN
MDC_IDC_SET_BRADY_HYSTRATE: NORMAL
MDC_IDC_SET_BRADY_LOWRATE: 60 {BEATS}/MIN
MDC_IDC_SET_BRADY_MAX_SENSOR_RATE: 140 {BEATS}/MIN
MDC_IDC_SET_BRADY_MAX_TRACKING_RATE: 140 {BEATS}/MIN
MDC_IDC_SET_BRADY_MODE: NORMAL
MDC_IDC_SET_BRADY_PAV_DELAY_HIGH: 140 MS
MDC_IDC_SET_BRADY_PAV_DELAY_LOW: 180 MS
MDC_IDC_SET_BRADY_SAV_DELAY_HIGH: 110 MS
MDC_IDC_SET_BRADY_SAV_DELAY_LOW: 150 MS
MDC_IDC_SET_LEADCHNL_RA_PACING_AMPLITUDE: 1.5 V
MDC_IDC_SET_LEADCHNL_RA_PACING_ANODE_ELECTRODE_1: NORMAL
MDC_IDC_SET_LEADCHNL_RA_PACING_ANODE_LOCATION_1: NORMAL
MDC_IDC_SET_LEADCHNL_RA_PACING_CAPTURE_MODE: NORMAL
MDC_IDC_SET_LEADCHNL_RA_PACING_CATHODE_ELECTRODE_1: NORMAL
MDC_IDC_SET_LEADCHNL_RA_PACING_CATHODE_LOCATION_1: NORMAL
MDC_IDC_SET_LEADCHNL_RA_PACING_POLARITY: NORMAL
MDC_IDC_SET_LEADCHNL_RA_PACING_PULSEWIDTH: 0.4 MS
MDC_IDC_SET_LEADCHNL_RA_SENSING_ANODE_ELECTRODE_1: NORMAL
MDC_IDC_SET_LEADCHNL_RA_SENSING_ANODE_LOCATION_1: NORMAL
MDC_IDC_SET_LEADCHNL_RA_SENSING_CATHODE_ELECTRODE_1: NORMAL
MDC_IDC_SET_LEADCHNL_RA_SENSING_CATHODE_LOCATION_1: NORMAL
MDC_IDC_SET_LEADCHNL_RA_SENSING_POLARITY: NORMAL
MDC_IDC_SET_LEADCHNL_RA_SENSING_SENSITIVITY: 0.3 MV
MDC_IDC_SET_LEADCHNL_RV_PACING_AMPLITUDE: 1.5 V
MDC_IDC_SET_LEADCHNL_RV_PACING_ANODE_ELECTRODE_1: NORMAL
MDC_IDC_SET_LEADCHNL_RV_PACING_ANODE_LOCATION_1: NORMAL
MDC_IDC_SET_LEADCHNL_RV_PACING_CAPTURE_MODE: NORMAL
MDC_IDC_SET_LEADCHNL_RV_PACING_CATHODE_ELECTRODE_1: NORMAL
MDC_IDC_SET_LEADCHNL_RV_PACING_CATHODE_LOCATION_1: NORMAL
MDC_IDC_SET_LEADCHNL_RV_PACING_POLARITY: NORMAL
MDC_IDC_SET_LEADCHNL_RV_PACING_PULSEWIDTH: 0.4 MS
MDC_IDC_SET_LEADCHNL_RV_SENSING_ANODE_ELECTRODE_1: NORMAL
MDC_IDC_SET_LEADCHNL_RV_SENSING_ANODE_LOCATION_1: NORMAL
MDC_IDC_SET_LEADCHNL_RV_SENSING_CATHODE_ELECTRODE_1: NORMAL
MDC_IDC_SET_LEADCHNL_RV_SENSING_CATHODE_LOCATION_1: NORMAL
MDC_IDC_SET_LEADCHNL_RV_SENSING_POLARITY: NORMAL
MDC_IDC_SET_LEADCHNL_RV_SENSING_SENSITIVITY: 0.9 MV
MDC_IDC_SET_ZONE_DETECTION_INTERVAL: 350 MS
MDC_IDC_SET_ZONE_DETECTION_INTERVAL: 400 MS
MDC_IDC_SET_ZONE_TYPE: NORMAL
MDC_IDC_STAT_AT_BURDEN_PERCENT: 0 %
MDC_IDC_STAT_AT_DTM_END: NORMAL
MDC_IDC_STAT_AT_DTM_START: NORMAL
MDC_IDC_STAT_BRADY_AP_VP_PERCENT: 23.46 %
MDC_IDC_STAT_BRADY_AP_VS_PERCENT: 0.1 %
MDC_IDC_STAT_BRADY_AS_VP_PERCENT: 76.22 %
MDC_IDC_STAT_BRADY_AS_VS_PERCENT: 0.22 %
MDC_IDC_STAT_BRADY_DTM_END: NORMAL
MDC_IDC_STAT_BRADY_DTM_START: NORMAL
MDC_IDC_STAT_BRADY_RA_PERCENT_PACED: 23.28 %
MDC_IDC_STAT_BRADY_RV_PERCENT_PACED: 99.43 %
MDC_IDC_STAT_EPISODE_RECENT_COUNT: 0
MDC_IDC_STAT_EPISODE_RECENT_COUNT: 4
MDC_IDC_STAT_EPISODE_RECENT_COUNT_DTM_END: NORMAL
MDC_IDC_STAT_EPISODE_RECENT_COUNT_DTM_START: NORMAL
MDC_IDC_STAT_EPISODE_TOTAL_COUNT: 0
MDC_IDC_STAT_EPISODE_TOTAL_COUNT: 0
MDC_IDC_STAT_EPISODE_TOTAL_COUNT: 1
MDC_IDC_STAT_EPISODE_TOTAL_COUNT: 4
MDC_IDC_STAT_EPISODE_TOTAL_COUNT_DTM_END: NORMAL
MDC_IDC_STAT_EPISODE_TOTAL_COUNT_DTM_START: NORMAL
MDC_IDC_STAT_EPISODE_TYPE: NORMAL

## 2019-10-25 DIAGNOSIS — E89.0 POSTABLATIVE HYPOTHYROIDISM: ICD-10-CM

## 2019-10-25 DIAGNOSIS — E61.1 IRON DEFICIENCY: ICD-10-CM

## 2019-10-25 LAB
FERRITIN SERPL-MCNC: 228 NG/ML (ref 8–252)
IRON SATN MFR SERPL: 28 % (ref 15–46)
IRON SERPL-MCNC: 78 UG/DL (ref 35–180)
T4 FREE SERPL-MCNC: 1.26 NG/DL (ref 0.76–1.46)
TIBC SERPL-MCNC: 279 UG/DL (ref 240–430)
TSH SERPL DL<=0.005 MIU/L-ACNC: 1.1 MU/L (ref 0.4–4)

## 2019-10-25 PROCEDURE — 82728 ASSAY OF FERRITIN: CPT | Performed by: INTERNAL MEDICINE

## 2019-10-25 PROCEDURE — 36415 COLL VENOUS BLD VENIPUNCTURE: CPT | Performed by: INTERNAL MEDICINE

## 2019-10-25 PROCEDURE — 83540 ASSAY OF IRON: CPT | Performed by: INTERNAL MEDICINE

## 2019-10-25 PROCEDURE — 84439 ASSAY OF FREE THYROXINE: CPT | Performed by: INTERNAL MEDICINE

## 2019-10-25 PROCEDURE — 83550 IRON BINDING TEST: CPT | Performed by: INTERNAL MEDICINE

## 2019-10-25 PROCEDURE — 84443 ASSAY THYROID STIM HORMONE: CPT | Performed by: INTERNAL MEDICINE

## 2019-10-31 ENCOUNTER — TELEPHONE (OUTPATIENT)
Dept: ENDOCRINOLOGY | Facility: CLINIC | Age: 59
End: 2019-10-31

## 2019-10-31 ENCOUNTER — MYC MEDICAL ADVICE (OUTPATIENT)
Dept: CARDIOLOGY | Facility: CLINIC | Age: 59
End: 2019-10-31

## 2019-10-31 NOTE — TELEPHONE ENCOUNTER
[facility]O THYROID [facility] Latest Ref Rng & Units 10/25/2019   TSH 0.40 - 4.00 mU/L 1.10   T4 FREE 0.76 - 1.46 ng/dL 1.26     Patient to continue Synthroid 150 mcg everyday except Sunday, take 75 mcg   My chart message sent.    Garland Patel MD  Division of Diabetes and Endocrinology  Department of Medicine

## 2019-11-06 ENCOUNTER — OFFICE VISIT (OUTPATIENT)
Dept: CARDIOLOGY | Facility: CLINIC | Age: 59
End: 2019-11-06
Attending: NURSE PRACTITIONER
Payer: COMMERCIAL

## 2019-11-06 VITALS
HEART RATE: 61 BPM | HEIGHT: 65 IN | WEIGHT: 153.1 LBS | SYSTOLIC BLOOD PRESSURE: 123 MMHG | OXYGEN SATURATION: 97 % | BODY MASS INDEX: 25.51 KG/M2 | DIASTOLIC BLOOD PRESSURE: 88 MMHG

## 2019-11-06 DIAGNOSIS — I44.2 CHB (COMPLETE HEART BLOCK) (H): ICD-10-CM

## 2019-11-06 DIAGNOSIS — Z95.0 CARDIAC PACEMAKER IN SITU: ICD-10-CM

## 2019-11-06 DIAGNOSIS — I42.8 NICM (NONISCHEMIC CARDIOMYOPATHY) (H): Primary | ICD-10-CM

## 2019-11-06 PROCEDURE — 93010 ELECTROCARDIOGRAM REPORT: CPT | Mod: ZP | Performed by: INTERNAL MEDICINE

## 2019-11-06 PROCEDURE — 99214 OFFICE O/P EST MOD 30 MIN: CPT | Mod: ZP | Performed by: NURSE PRACTITIONER

## 2019-11-06 PROCEDURE — 93005 ELECTROCARDIOGRAM TRACING: CPT | Mod: ZF

## 2019-11-06 PROCEDURE — G0463 HOSPITAL OUTPT CLINIC VISIT: HCPCS | Mod: 25,ZF

## 2019-11-06 RX ORDER — CALCIUM CARBONATE 500(1250)
1 TABLET ORAL 2 TIMES DAILY
COMMUNITY
End: 2021-03-15

## 2019-11-06 RX ORDER — LISINOPRIL 5 MG/1
5 TABLET ORAL DAILY
Qty: 30 TABLET | Refills: 3 | Status: SHIPPED | OUTPATIENT
Start: 2019-11-06 | End: 2019-12-19

## 2019-11-06 ASSESSMENT — MIFFLIN-ST. JEOR: SCORE: 1260.34

## 2019-11-06 ASSESSMENT — PAIN SCALES - GENERAL: PAINLEVEL: MODERATE PAIN (4)

## 2019-11-06 NOTE — PROGRESS NOTES
Electrophysiology Clinic Note  HPI:   Ms. Marroquin is a 59 year old female who has a past medical history significant for GERD, PTSD, Grave's Disease with post ablative hypothyroidism, hyperparathyroidism, LARRY (uses CPAP), depression, prior tobacco use, and PVCs s/p RVOT ablation complicated by CHB s/p PPM 4/21/2017 and pericarditis. She presents today for follow up.     She reports a history of symptomatic PVCs for which she ultimately elected to pursue an ablation which she had done at [facility]. 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 She went into [facility] and required urgent PPM placed in 4/21/17. She then had lead dislodgement in the recovery area requiring lead revision. She had pericarditis and completed a course of colchicine post ablation. Initial echo post ablation showed LVEF 40-45%, then an echo a month later showed LVEF 50%. MRI in early 4/2017 showed LVEF 60-65% with area of basal inferior hypokinesis with possible LGE. Stress test from 3/2017 showed no inducible ischemia. Most recent echo from 5/2018 showed LVEF 50-55%, mild diffuse hypokinesis, normal RV size and function. She had some pain at pacemaker site and reported it was too close to her collarbone. She elected to undergo a pocket revision in 12/2017. Ever since her initial pacemaker implant she has struggled with episodes of dizziness/lightheadedness and fatigue. No episodes of syncope.     Ep Visit 8/22/19: She reports feeling well today. She states she had a recent episode of lightheadedness while at work when she bent over and stood up. She has some intermittent skipped beats, no sustained tachycardia. She states she feels fatigued overall. She denies any chest pain/pressures, dizziness, lightheadedness, worsening shortness of breath, leg/ankle swelling, PND, orthopnea, or syncopal symptoms. Recent device interrogation shows normal pacemaker function. 21 AT/AF  episodes recorded - < 1 min in duration each. Total AT/AF time = 39 seconds. AT/AF Harlan = <0.1%. 2 NSVT episodes recorded - 6-8 beats, 176-207 bpm. Presenting EGM = AP- @ 90 bpm. AP = 19.5%.  = 99.8%. No short v-v intervals recorded. Lead trends appear stable. No arrhythmias correspond to when she had dizzy spell. No current cardiac medications. Had her get updated echo at this visit.    She presents today for follow up. She reports feeling OK. She states she does generalized fatigue and intermittent EASTON. She states she can walk on treadmill for about 12 minutes and going up stairs she notices more SOB. She also reports feeling anxious about her heart. She denies any chest pain/pressures, dizziness, lightheadedness, PND, orthopnea, palpitations, or syncopal symptoms.  Recent device transmission shows normal pacemaker function. Since last remote transmission on 8/21/19, there has been 2 NSVT episodes recorded.  Episode #4 occurred on 9/11/19 and lasted 8 beats @218 bpm.  Episode #5 occurred on 9/26/19 and last 17+ beats (unable to view start of episode) with an average v-rate of 182 bpm.  No atrial arrhythmias recorded. Presenting EGM = AV paced  @ 65  bpm with trigeminal PACs.   AP =23.2%.  =99.4%. No short v-v intervals recorded. Lead trends appear stable. Her recent echo showed LVEF decreased to 30-35% with all segments severely hypokinetic to akinetic, suggestive of stress cardiomyopathy but could not rule out ischemic cardiomyopathy. She underwent coronary angiogram that showed mild/moderate non-obstructive CAD involving LAD, LCx, and RCA with no significant CAD to explain new cardiomyopathy. Recommended her to get CMRI; however, she wanted to defer. Therefore, treated her for stress cardiomyopathy. Started her on Toprol XL, she has only tolerated small doses in the past. Discussed addition of lisinopril but she deferred this as well, but willing to start now. Current cardiac medication: Metoprolol.        PAST MEDICAL HISTORY:  Past Medical History:   Diagnosis Date     Cardiomyopathy (H)     ff Cardiology     Chronic depressive personality disorder      Esophageal reflux      Ex-smoker     quit 2006; 1 PPD x 30     Hyperparathyroidism (H)     s/p parathyroidectomy     Hypothyroidism      LARRY on CPAP     ff Sleep medicine     Pulmonary nodules     ff Pulmonologist     S/P cardiac pacemaker procedure     checked every 6 months at the [facility]       CURRENT MEDICATIONS:  Current Outpatient Medications   Medication Sig Dispense Refill     albuterol (PROAIR HFA/PROVENTIL HFA/VENTOLIN HFA) 108 (90 BASE) MCG/ACT Inhaler Inhale 2 puffs into the lungs as needed for shortness of breath / dyspnea or wheezing        aspirin (ASA) 81 MG EC tablet Take 1 tablet (81 mg) by mouth daily 90 tablet 3     atorvastatin (LIPITOR) 40 MG tablet Take 1 tablet (40 mg) by mouth daily 90 tablet 3     azelastine (ASTELIN) 0.1 % nasal spray Spray 1 spray into both nostrils 2 times daily       clonazePAM (KLONOPIN) 0.5 MG tablet Take 1 tablet (0.5 mg) by mouth every 6 hours as needed for anxiety (panic attack, asthma attack- use only when needed- habit forming medication) 60 tablet 1     DiphenhydrAMINE HCl (BENADRYL PO) Take 25-50 mg by mouth nightly as needed       Ferrous Sulfate (IRON SUPPLEMENT PO) Take 65 mg by mouth 2 times daily (with meals)        fluticasone-salmeterol (ADVAIR) 250-50 MCG/DOSE inhaler Inhale 1 puff into the lungs every 12 hours       furosemide (LASIX) 20 MG tablet Take 0.5 tablets (10 mg) by mouth daily 30 tablet 0     guaiFENesin (MUCINEX) 600 MG 12 hr tablet Take 600 mg by mouth 2 times daily       ipratropium (ATROVENT) 0.06 % nasal spray Spray 2 sprays into both nostrils 4 times daily       loperamide (IMODIUM A-D) 2 MG tablet Take 2 tabs (4 mg) after first loose stool, and then take one tab (2 mg) after each diarrheal stool.  Max of 8 tabs (16 mg) per day. 1 tablet 0     MAGNESIUM LACTATE PO Take 180 mg by  mouth At Bedtime        MELATONIN PO Take 3 mg by mouth At Bedtime        Menaquinone-7 (VITAMIN K2 PO) Take 1 tablet by mouth every morning        METHYLPREDNISOLONE PO Take 40 mg by mouth as needed        metoprolol succinate ER (TOPROL-XL) 25 MG 24 hr tablet Take 1 tablet (25 mg) by mouth daily 30 tablet 1     Multiple Vitamin (DAILY MULTIVITAMIN PO) Take 1 tablet by mouth every morning        nebulizer nebulization as needed       Probiotic Product (PROBIOTIC DAILY PO) Take 1 capsule by mouth 2 times daily        rOPINIRole (REQUIP) 0.25 MG tablet        SYNTHROID 150 MCG tablet Take 1 tablet (150 mcg) by mouth daily 90 tablet 3     TURMERIC PO Take 1 tablet by mouth every morning        vitamin D3 (CHOLECALCIFEROL) 2000 units tablet Take 1 tablet by mouth daily 1 tablet 0       PAST SURGICAL HISTORY:  Past Surgical History:   Procedure Laterality Date     BUNIONECTOMY Bilateral      CV CORONARY ANGIOGRAM N/A 9/26/2019    Procedure: CV CORONARY ANGIOGRAM;  Surgeon: Erickson Trejo MD;  Location:  [facility]     EP ABLATION / EP STUDIES  04/21/2017     EXPLORE NECK N/A 9/19/2018    Procedure: EXPLORE NECK;  Neck Exploration Resection of Left superior Parathyroid gland;  Surgeon: Kristine Venegas MD;  Location: [facility] OR      CORRECT BUNION,SIMPLE       PARATHYROIDECTOMY N/A 9/19/2018    Procedure: PARATHYROIDECTOMY;;  Surgeon: Kristine Venegas MD;  Location: [facility] OR     PPM INSERT OF NEW OR REPL W/VENT LEAD  04/21/2017     TONSILLECTOMY & ADENOIDECTOMY         ALLERGIES:     Allergies   Allergen Reactions     Flagyl [Metronidazole] Rash     Buspar [Buspirone]      Muscle weakness     Corn Oil Other (See Comments)     Headache       Lorazepam Other (See Comments)     Heat intolerance       Seasonal Allergies Difficulty breathing     Sulfamethoxazole-Trimethoprim      Other reaction(s): Other  Passed out     Tetracycline Swelling     Zofran [Ondansetron]      Prolonged QT  Prolonged QT at  "baseline per patient     Benzodiazepines Other (See Comments)     Other reaction(s): Other  Extreme heat intolerance  Extreme heat intolerance     Cyclobenzaprine Other (See Comments)     Extreme heat intolerance     Levaquin [Levofloxacin]      Other reaction(s): Other  Tendon rupture     Nsaids Other (See Comments)     Exacerbated asthma       FAMILY HISTORY:  Family History   Problem Relation Age of Onset     Hypothyroidism Mother      Hypertension Mother      Osteoarthritis Mother      Coronary Artery Disease Father         nonfatal MI in his 70s     Asthma Father      Diabetes Sister      Hypothyroidism Sister      Breast Cancer Maternal Aunt        SOCIAL HISTORY:  Social History     Tobacco Use     Smoking status: Former Smoker     Smokeless tobacco: Never Used   Substance Use Topics     Alcohol use: Yes     Comment: seldom     Drug use: No       ROS:   A comprehensive 10 point review of systems negative other than as mentioned in HPI.  Exam:  /88 (BP Location: Left arm, Patient Position: Chair, Cuff Size: Adult Regular)   Pulse 61   Ht 1.651 m (5' 5\")   Wt 69.4 kg (153 lb 1.6 oz)   LMP 08/21/2007   SpO2 97%   BMI 25.48 kg/m    GENERAL APPEARANCE: alert and no distress  HEENT: no icterus, no xanthelasmas, normal pupil size and reaction, normal palate, mucosa moist, no central cyanosis  NECK: no adenopathy, no asymmetry, masses, or scars, thyroid normal to palpation and no bruits, JVP not elevated  RESPIRATORY: lungs clear to auscultation - no rales, rhonchi or wheezes, no use of accessory muscles, no retractions, respirations are unlabored, normal respiratory rate  CARDIOVASCULAR: regular rhythm, normal S1 with physiologic split S2, no S3 or S4 and no murmur, click or rub, precordium quiet with normal PMI.  ABDOMEN: soft, non tender, bowel sounds normal, non-distended  EXTREMITIES: peripheral pulses normal, no edema  NEURO: alert and oriented to person/place/time, normal speech, gait and " affect  SKIN: no ecchymoses, no rashes  PSYCH: normal affect, cooperative    Labs:  Reviewed.     Testing/Procedures:  5/2018 ECHOCARDIOGRAM:   Interpretation Summary  Borderline (EF 50-55%) reduced left ventricular function is present. Traced at  54%. Mild diffuse hypokinesis is present.  Right ventricular function, chamber size, wall motion, and thickness are  normal.  The inferior vena cava was normal in size with preserved respiratory  variability.  No pericardial effusion is present.  Compared to prior study, LV fxn is improved.    9/2019 ECHOCARDIOGRAM:  Interpretation Summary  Moderately (EF 30-35%) reduced left ventricular function is present. All  segments from mid to apical left ventricle are severely hypokinetic to  akinetic. Basal segments are hypercontractile. Overall pattern is suggestive  of stress cardiomyopathy, but cannot rule out ischemic cardiomyopathy.  Right ventricular function, chamber size, wall motion, and thickness are  normal.  No significant valve abnormalities.  Mild pulmonary hypertension with increased RA pressure.     Compared to 5/17/2019, LV function has significantly declined with new wall  motion abnormalities as described above.    9/2019 CORONARY ANGIOGRAM:   Mild-moderate non-obstructive coronary disease involving LAD, LCx, and RCA.  No significant coronary disease to explain new cardiomyopathy      Assessment and Plan:   Ms. Marroquin is a 59 year old female who has a past medical history significant for GERD, PTSD, Grave's Disease with post ablative hypothyroidism, hyperparathyroidism, LARRY (uses CPAP), depression, prior tobacco use, and PVCs s/p RVOT ablation complicated by CHB s/p PPM 4/21/2017 and pericarditis. She presents today for follow up. She reports a history of symptomatic PVCs for which she ultimately elected to pursue an ablation which she had done at [facility]. Per report, she was found to have RVOT PVC and multiple ablation lesions were applied to the site of  earliest activation. She went into [facility] and required urgent PPM placed in 4/21/17. She then had lead dislodgement in the recovery area requiring lead revision. She had pericarditis and completed a course of colchicine post ablation. Initial echo post ablation showed LVEF 40-45%, then an echo a month later showed LVEF 50%. MRI in early 4/2017 showed LVEF 60-65% with area of basal inferior hypokinesis with possible LGE. Stress test from 3/2017 showed no inducible ischemia. Echo from 5/2018 showed LVEF 50-55%, mild diffuse hypokinesis, normal RV size and function. She had some pain at pacemaker site and reported it was too close to her collarbone. She elected to undergo a pocket revision in 12/2017. Ever since her initial pacemaker implant she has struggled with episodes of dizziness/lightheadedness and fatigue. No episodes of syncope. he presents today for follow up. She reports feeling OK. She states she does generalized fatigue and intermittent EASTON. She states she can walk on treadmill for about 12 minutes and going up stairs she notices more SOB. She also reports feeling anxious about her heart. She denies any chest pain/pressures, dizziness, lightheadedness, PND, orthopnea, palpitations, or syncopal symptoms.  Recent device transmission shows normal pacemaker function. Since last remote transmission on 8/21/19, there has been 2 NSVT episodes recorded.  Episode #4 occurred on 9/11/19 and lasted 8 beats @218 bpm.  Episode #5 occurred on 9/26/19 and last 17+ beats (unable to view start of episode) with an average v-rate of 182 bpm.  No atrial arrhythmias recorded. Presenting EGM = AV paced  @ 65  bpm with trigeminal PACs.   AP =23.2%.  =99.4%. No short v-v intervals recorded. Lead trends appear stable. Her recent echo showed LVEF decreased to 30-35% with all segments severely hypokinetic to akinetic, suggestive of stress cardiomyopathy but could not rule out ischemic cardiomyopathy. She underwent coronary angiogram  that showed mild/moderate non-obstructive CAD involving LAD, LCx, and RCA with no significant CAD to explain new cardiomyopathy. Recommended her to get CMRI; however, she wanted to defer. Therefore, treated her for stress cardiomyopathy. Started her on Toprol XL, she has only tolerated small doses in the past. Discussed addition of lisinopril but she deferred this as well, but willing to start now. Current cardiac medication: Metoprolol.     NICM LVEF 30-35%, NYHA II:  Etiology likely stress induced CM; however, cannot rule out pacing induced or primary NICM.   1. ACEi/ARB: start Lisinopril 5 mg daily with BMP in 1 week.  2. BB: Continue Toprol XL    3. Aldosterone antagonist: deferred during medication titration.  4. SCD prophylaxis: needs to be on GDMT and will then reassess LVEF. Currently has PPM only.   5. Fluid status: euvolemic on exam, can use Lasix PRN for weight gain >2lbs/day or 5lbs/week.  6. Will have her get an updated echo in 12/2019 after further medication titration. Discussed getting CMRI, but she is nervous about the use of gadolinium dye. She will consider MRI and let us know if she is agreeable.       CHB s/p PPM 4/2017:   1. Pacemaker is functioning well with stable lead parameters. Adequate HR histograms. No sustained arrhythmias.  2. She will continue to follow with device clinic per routine.     She already has follow up with Dr. Funes scheduled in 2/2020.     The patient states understanding and is agreeable with plan.   Adelaida Slater, SHAYNA CNP  Pager: [phone]  CC  SELF, REFERRED

## 2019-11-06 NOTE — LETTER
11/6/2019      RE: Joyce Marroquin  [address] [facility] [id number]       Dear Colleague,    Thank you for the opportunity to participate in the care of your patient, Joyce Marroquin, at the [facility] at [facility]. Please see a copy of my visit note below.    Electrophysiology Clinic Note  HPI:   Ms. Marroquin is a 59 year old female who has a past medical history significant for GERD, PTSD, Grave's Disease with post ablative hypothyroidism, hyperparathyroidism, LARRY (uses CPAP), depression, prior tobacco use, and PVCs s/p RVOT ablation complicated by [facility] s/p PPM 4/21/2017 and pericarditis. She presents today for follow up.     She reports a history of symptomatic PVCs for which she ultimately elected to pursue an ablation which she had done at [facility]. 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 She went into [facility] and required urgent PPM placed in 4/21/17. She then had lead dislodgement in the recovery area requiring lead revision. She had pericarditis and completed a course of colchicine post ablation. Initial echo post ablation showed LVEF 40-45%, then an echo a month later showed LVEF 50%. MRI in early 4/2017 showed LVEF 60-65% with area of basal inferior hypokinesis with possible LGE. Stress test from 3/2017 showed no inducible ischemia. Most recent echo from 5/2018 showed LVEF 50-55%, mild diffuse hypokinesis, normal RV size and function. She had some pain at pacemaker site and reported it was too close to her collarbone. She elected to undergo a pocket revision in 12/2017. Ever since her initial pacemaker implant she has struggled with episodes of dizziness/lightheadedness and fatigue. No episodes of syncope.     Ep Visit 8/22/19: She reports feeling well today. She states she had a recent episode of lightheadedness while at work when she bent over and stood up. She has some  intermittent skipped beats, no sustained tachycardia. She states she feels fatigued overall. She denies any chest pain/pressures, dizziness, lightheadedness, worsening shortness of breath, leg/ankle swelling, PND, orthopnea, or syncopal symptoms. Recent device interrogation shows normal pacemaker function. 21 AT/AF episodes recorded - < 1 min in duration each. Total AT/AF time = 39 seconds. AT/AF [place] = <0.1%. 2 NSVT episodes recorded - 6-8 beats, 176-207 bpm. Presenting EGM = AP- @ 90 bpm. AP = 19.5%.  = 99.8%. No short v-v intervals recorded. Lead trends appear stable. No arrhythmias correspond to when she had dizzy spell. No current cardiac medications. Had her get updated echo at this visit.    She presents today for follow up. She reports feeling OK. She states she does generalized fatigue and intermittent EASTON. She states she can walk on treadmill for about 12 minutes and going up stairs she notices more SOB. She also reports feeling anxious about her heart. She denies any chest pain/pressures, dizziness, lightheadedness, PND, orthopnea, palpitations, or syncopal symptoms.  Recent device transmission shows normal pacemaker function. Since last remote transmission on 8/21/19, there has been 2 NSVT episodes recorded.  Episode #4 occurred on 9/11/19 and lasted 8 beats @218 bpm.  Episode #5 occurred on 9/26/19 and last 17+ beats (unable to view start of episode) with an average v-rate of 182 bpm.  No atrial arrhythmias recorded. Presenting EGM = AV paced  @ 65  bpm with trigeminal PACs.   AP =23.2%.  =99.4%. No short v-v intervals recorded. Lead trends appear stable. Her recent echo showed LVEF decreased to 30-35% with all segments severely hypokinetic to akinetic, suggestive of stress cardiomyopathy but could not rule out ischemic cardiomyopathy. She underwent coronary angiogram that showed mild/moderate non-obstructive CAD involving LAD, LCx, and RCA with no significant CAD to explain new cardiomyopathy.  Recommended her to get CMRI; however, she wanted to defer. Therefore, treated her for stress cardiomyopathy. Started her on Toprol XL, she has only tolerated small doses in the past. Discussed addition of lisinopril but she deferred this as well, but willing to start now. Current cardiac medication: Metoprolol.       PAST MEDICAL HISTORY:  Past Medical History:   Diagnosis Date     Cardiomyopathy (H)     ff Cardiology     Chronic depressive personality disorder      Esophageal reflux      Ex-smoker     quit 2006; 1 PPD x 30     Hyperparathyroidism (H)     s/p parathyroidectomy     Hypothyroidism      LARRY on CPAP     ff Sleep medicine     Pulmonary nodules     ff Pulmonologist     S/P cardiac pacemaker procedure     [facility]        CURRENT MEDICATIONS:  Current Outpatient Medications   Medication Sig Dispense Refill     albuterol (PROAIR HFA/PROVENTIL HFA/VENTOLIN HFA) 108 (90 BASE) MCG/ACT Inhaler Inhale 2 puffs into the lungs as needed for shortness of breath / dyspnea or wheezing        aspirin (ASA) 81 MG EC tablet Take 1 tablet (81 mg) by mouth daily 90 tablet 3     atorvastatin (LIPITOR) 40 MG tablet Take 1 tablet (40 mg) by mouth daily 90 tablet 3     azelastine (ASTELIN) 0.1 % nasal spray Spray 1 spray into both nostrils 2 times daily       clonazePAM (KLONOPIN) 0.5 MG tablet Take 1 tablet (0.5 mg) by mouth every 6 hours as needed for anxiety (panic attack, asthma attack- use only when needed- habit forming medication) 60 tablet 1     DiphenhydrAMINE HCl (BENADRYL PO) Take 25-50 mg by mouth nightly as needed       Ferrous Sulfate (IRON SUPPLEMENT PO) Take 65 mg by mouth 2 times daily (with meals)        fluticasone-salmeterol (ADVAIR) 250-50 MCG/DOSE inhaler Inhale 1 puff into the lungs every 12 hours       furosemide (LASIX) 20 MG tablet Take 0.5 tablets (10 mg) by mouth daily 30 tablet 0     guaiFENesin (MUCINEX) 600 MG 12 hr tablet Take 600 mg by mouth 2 times daily        ipratropium (ATROVENT) 0.06 % nasal spray Spray 2 sprays into both nostrils 4 times daily       loperamide (IMODIUM A-D) 2 MG tablet Take 2 tabs (4 mg) after first loose stool, and then take one tab (2 mg) after each diarrheal stool.  Max of 8 tabs (16 mg) per day. 1 tablet 0     MAGNESIUM LACTATE PO Take 180 mg by mouth At Bedtime        MELATONIN PO Take 3 mg by mouth At Bedtime        Menaquinone-7 (VITAMIN K2 PO) Take 1 tablet by mouth every morning        METHYLPREDNISOLONE PO Take 40 mg by mouth as needed        metoprolol succinate ER (TOPROL-XL) 25 MG 24 hr tablet Take 1 tablet (25 mg) by mouth daily 30 tablet 1     Multiple Vitamin (DAILY MULTIVITAMIN PO) Take 1 tablet by mouth every morning        nebulizer nebulization as needed       Probiotic Product (PROBIOTIC DAILY PO) Take 1 capsule by mouth 2 times daily        rOPINIRole (REQUIP) 0.25 MG tablet        SYNTHROID 150 MCG tablet Take 1 tablet (150 mcg) by mouth daily 90 tablet 3     TURMERIC PO Take 1 tablet by mouth every morning        vitamin D3 (CHOLECALCIFEROL) 2000 units tablet Take 1 tablet by mouth daily 1 tablet 0       PAST SURGICAL HISTORY:  Past Surgical History:   Procedure Laterality Date     BUNIONECTOMY Bilateral      CV CORONARY ANGIOGRAM N/A 9/26/2019    Procedure: CV CORONARY ANGIOGRAM;  Surgeon: Erickson Trejo MD;  Location:  [facility]     EP ABLATION / EP STUDIES  04/21/2017     EXPLORE NECK N/A 9/19/2018    Procedure: EXPLORE NECK;  Neck Exploration Resection of Left superior Parathyroid gland;  Surgeon: Kristine Venegas MD;  Location:  OR      CORRECT BUNION,SIMPLE       PARATHYROIDECTOMY N/A 9/19/2018    Procedure: PARATHYROIDECTOMY;;  Surgeon: Kristine Venegas MD;  Location: [facility] OR     PPM INSERT OF NEW OR REPL W/VENT LEAD  04/21/2017     TONSILLECTOMY & ADENOIDECTOMY         ALLERGIES:     Allergies   Allergen Reactions     Flagyl [Metronidazole] Rash     Buspar [Buspirone]      Muscle  "weakness     Corn Oil Other (See Comments)     Headache       Lorazepam Other (See Comments)     Heat intolerance       Seasonal Allergies Difficulty breathing     Sulfamethoxazole-Trimethoprim      Other reaction(s): Other  Passed out     Tetracycline Swelling     Zofran [Ondansetron]      Prolonged QT  Prolonged QT at baseline per patient     Benzodiazepines Other (See Comments)     Other reaction(s): Other  Extreme heat intolerance  Extreme heat intolerance     Cyclobenzaprine Other (See Comments)     Extreme heat intolerance     Levaquin [Levofloxacin]      Other reaction(s): Other  Tendon rupture     Nsaids Other (See Comments)     Exacerbated asthma       FAMILY HISTORY:  Family History   Problem Relation Age of Onset     Hypothyroidism Mother      Hypertension Mother      Osteoarthritis Mother      Coronary Artery Disease Father         nonfatal MI in his 70s     Asthma Father      Diabetes Sister      Hypothyroidism Sister      Breast Cancer Maternal Aunt        SOCIAL HISTORY:  Social History     Tobacco Use     Smoking status: Former Smoker     Smokeless tobacco: Never Used   Substance Use Topics     Alcohol use: Yes     Comment: seldom     Drug use: No       ROS:   A comprehensive 10 point review of systems negative other than as mentioned in HPI.  Exam:  /88 (BP Location: Left arm, Patient Position: Chair, Cuff Size: Adult Regular)   Pulse 61   Ht 1.651 m (5' 5\")   Wt 69.4 kg (153 lb 1.6 oz)   LMP 08/21/2007   SpO2 97%   BMI 25.48 kg/m     GENERAL APPEARANCE: alert and no distress  HEENT: no icterus, no xanthelasmas, normal pupil size and reaction, normal palate, mucosa moist, no central cyanosis  NECK: no adenopathy, no asymmetry, masses, or scars, thyroid normal to palpation and no bruits, JVP not elevated  RESPIRATORY: lungs clear to auscultation - no rales, rhonchi or wheezes, no use of accessory muscles, no retractions, respirations are unlabored, normal respiratory rate  CARDIOVASCULAR: " regular rhythm, normal S1 with physiologic split S2, no S3 or S4 and no murmur, click or rub, precordium quiet with normal PMI.  ABDOMEN: soft, non tender, bowel sounds normal, non-distended  EXTREMITIES: peripheral pulses normal, no edema  NEURO: alert and oriented to person/place/time, normal speech, gait and affect  SKIN: no ecchymoses, no rashes  PSYCH: normal affect, cooperative    Labs:  Reviewed.     Testing/Procedures:  5/2018 ECHOCARDIOGRAM:   Interpretation Summary  Borderline (EF 50-55%) reduced left ventricular function is present. Traced at  54%. Mild diffuse hypokinesis is present.  Right ventricular function, chamber size, wall motion, and thickness are  normal.  The inferior vena cava was normal in size with preserved respiratory  variability.  No pericardial effusion is present.  Compared to prior study, LV fxn is improved.    9/2019 ECHOCARDIOGRAM:  Interpretation Summary  Moderately (EF 30-35%) reduced left ventricular function is present. All  segments from mid to apical left ventricle are severely hypokinetic to  akinetic. Basal segments are hypercontractile. Overall pattern is suggestive  of stress cardiomyopathy, but cannot rule out ischemic cardiomyopathy.  Right ventricular function, chamber size, wall motion, and thickness are  normal.  No significant valve abnormalities.  Mild pulmonary hypertension with increased RA pressure.     Compared to 5/17/2019, LV function has significantly declined with new wall  motion abnormalities as described above.    9/2019 CORONARY ANGIOGRAM:   Mild-moderate non-obstructive coronary disease involving LAD, LCx, and RCA.  No significant coronary disease to explain new cardiomyopathy      Assessment and Plan:   Ms. Marroquin is a 59 year old female who has a past medical history significant for GERD, PTSD, Grave's Disease with post ablative hypothyroidism, hyperparathyroidism, LARRY (uses CPAP), depression, prior tobacco use, and PVCs s/p RVOT ablation complicated  by [facility] s/p PPM 4/21/2017 and pericarditis. She presents today for follow up. She reports a history of symptomatic PVCs for which she ultimately elected to pursue an ablation which she had done at [facility]. 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 She went into [facility] and required urgent PPM placed in 4/21/17. She then had lead dislodgement in the recovery area requiring lead revision. She had pericarditis and completed a course of colchicine post ablation. Initial echo post ablation showed LVEF 40-45%, then an echo a month later showed LVEF 50%. MRI in early 4/2017 showed LVEF 60-65% with area of basal inferior hypokinesis with possible LGE. Stress test from 3/2017 showed no inducible ischemia. Echo from 5/2018 showed LVEF 50-55%, mild diffuse hypokinesis, normal RV size and function. She had some pain at pacemaker site and reported it was too close to her collarbone. She elected to undergo a pocket revision in 12/2017. Ever since her initial pacemaker implant she has struggled with episodes of dizziness/lightheadedness and fatigue. No episodes of syncope. he presents today for follow up. She reports feeling OK. She states she does generalized fatigue and intermittent EASTON. She states she can walk on treadmill for about 12 minutes and going up stairs she notices more SOB. She also reports feeling anxious about her heart. She denies any chest pain/pressures, dizziness, lightheadedness, PND, orthopnea, palpitations, or syncopal symptoms.  Recent device transmission shows normal pacemaker function. Since last remote transmission on 8/21/19, there has been 2 NSVT episodes recorded.  Episode #4 occurred on 9/11/19 and lasted 8 beats @218 bpm.  Episode #5 occurred on 9/26/19 and last 17+ beats (unable to view start of episode) with an average v-rate of 182 bpm.  No atrial arrhythmias recorded. Presenting EGM = AV paced  @ 65  bpm with  trigeminal PACs.   AP =23.2%.  =99.4%. No short v-v intervals recorded. Lead trends appear stable. Her recent echo showed LVEF decreased to 30-35% with all segments severely hypokinetic to akinetic, suggestive of stress cardiomyopathy but could not rule out ischemic cardiomyopathy. She underwent coronary angiogram that showed mild/moderate non-obstructive CAD involving LAD, LCx, and RCA with no significant CAD to explain new cardiomyopathy. Recommended her to get CMRI; however, she wanted to defer. Therefore, treated her for stress cardiomyopathy. Started her on Toprol XL, she has only tolerated small doses in the past. Discussed addition of lisinopril but she deferred this as well, but willing to start now. Current cardiac medication: Metoprolol.     NICM LVEF 30-35%, NYHA II:  Etiology likely stress induced CM; however, cannot rule out pacing induced or primary NICM.   1. ACEi/ARB: start Lisinopril 5 mg daily with BMP in 1 week.  2. BB: Continue Toprol XL    3. Aldosterone antagonist: deferred during medication titration.  4. SCD prophylaxis: needs to be on GDMT and will then reassess LVEF. Currently has PPM only.   5. Fluid status: euvolemic on exam, can use Lasix PRN for weight gain >2lbs/day or 5lbs/week.  6. Will have her get an updated echo in 12/2019 after further medication titration. Discussed getting CMRI, but she is nervous about the use of gadolinium dye. She will consider MRI and let us know if she is agreeable.       [facility] s/p PPM 4/2017:   1. Pacemaker is functioning well with stable lead parameters. Adequate HR histograms. No sustained arrhythmias.  2. She will continue to follow with device clinic per routine.     She already has follow up with Dr. Funes scheduled in 2/2020.     The patient states understanding and is agreeable with plan.   Adelaida Slater, SHAYNA CNP  Pager: [phone]

## 2019-11-06 NOTE — PATIENT INSTRUCTIONS
Plan:      Start Lisinopril 5 mg daily with lab draw in 1 week    Echo in December 2019    Follow up in 3 months    Cardiovascular Clinic:   [address] [place] [address]. [place] [address]  Your Care Team:  EP Cardiology   Telephone Number     Adelaida Maldonado [phone]   Kristine Lemons RN  Cheyenne Thomson RN  [phone]     For scheduling appts or procedures:    Merced Heller   [phone]   For the Device Clinic (Pacemakers and ICD's)   RN's :   Alpa Meeks  During business hours: [phone]     After business hours:   [phone]- select option 4 and ask for job code [phone].       As always, Thank you for trusting us with your health care needs!

## 2019-11-07 LAB — INTERPRETATION ECG - MUSE: NORMAL

## 2019-11-13 DIAGNOSIS — I42.8 NICM (NONISCHEMIC CARDIOMYOPATHY) (H): ICD-10-CM

## 2019-11-13 LAB
ANION GAP SERPL CALCULATED.3IONS-SCNC: 5 MMOL/L (ref 3–14)
BUN SERPL-MCNC: 20 MG/DL (ref 7–30)
CALCIUM SERPL-MCNC: 9.2 MG/DL (ref 8.5–10.1)
CHLORIDE SERPL-SCNC: 103 MMOL/L (ref 94–109)
CO2 SERPL-SCNC: 27 MMOL/L (ref 20–32)
CREAT SERPL-MCNC: 0.64 MG/DL (ref 0.52–1.04)
GFR SERPL CREATININE-BSD FRML MDRD: >90 ML/MIN/{1.73_M2}
GLUCOSE SERPL-MCNC: 147 MG/DL (ref 70–99)
POTASSIUM SERPL-SCNC: 4.3 MMOL/L (ref 3.4–5.3)
SODIUM SERPL-SCNC: 135 MMOL/L (ref 133–144)

## 2019-11-13 PROCEDURE — 36415 COLL VENOUS BLD VENIPUNCTURE: CPT | Performed by: NURSE PRACTITIONER

## 2019-11-13 PROCEDURE — 80048 BASIC METABOLIC PNL TOTAL CA: CPT | Performed by: NURSE PRACTITIONER

## 2019-11-18 ENCOUNTER — TELEPHONE (OUTPATIENT)
Dept: CARDIOLOGY | Facility: CLINIC | Age: 59
End: 2019-11-18

## 2019-11-18 NOTE — TELEPHONE ENCOUNTER
[facility] Call Center    Phone Message    May a detailed message be left on voicemail: yes    Reason for Call: Other: Pt has infected tooth and they are going to pull her tooth - Pt needs to know if she needs antibiotics and what type and if you have any recommendations on that or if you leave that up to dentist - Pt has pace maker and stress induced cardiac myopathy - please return her call - Thanks      Pt also has parodontal disease and wants to knwo if she needs antibiotics for that - they are doing gum work and that is a separate appt    She needs call back to know how this works for dental work with her pace maker and heart issues - please call her back either way - Thanks!    Pt also requests this sent in My Chart so she has it in writing to refer to - requesting call back and My Chart both    Action Taken: Message routed to:  [facility] ([facility] Cardiology

## 2019-11-19 NOTE — TELEPHONE ENCOUNTER
Left voicemail for patient with below information. Will also send in [employer] msg.     Per AHA guidelines, patient doesn't require prophylactic antibiotics. If dentist feels need for antibiotics for infected tooth, antibiotic choice is deferred to dentist.

## 2019-11-23 DIAGNOSIS — I42.8 NICM (NONISCHEMIC CARDIOMYOPATHY) (H): ICD-10-CM

## 2019-11-25 RX ORDER — METOPROLOL SUCCINATE 25 MG/1
TABLET, EXTENDED RELEASE ORAL
Qty: 90 TABLET | Refills: 1 | Status: SHIPPED | OUTPATIENT
Start: 2019-11-25 | End: 2019-12-10

## 2019-12-03 ENCOUNTER — ANCILLARY PROCEDURE (OUTPATIENT)
Dept: CARDIOLOGY | Facility: CLINIC | Age: 59
End: 2019-12-03
Attending: NURSE PRACTITIONER
Payer: COMMERCIAL

## 2019-12-03 DIAGNOSIS — I42.8 NICM (NONISCHEMIC CARDIOMYOPATHY) (H): ICD-10-CM

## 2019-12-04 ENCOUNTER — OFFICE VISIT (OUTPATIENT)
Dept: FAMILY MEDICINE | Facility: CLINIC | Age: 59
End: 2019-12-04
Payer: COMMERCIAL

## 2019-12-04 ENCOUNTER — ANCILLARY PROCEDURE (OUTPATIENT)
Dept: GENERAL RADIOLOGY | Facility: CLINIC | Age: 59
End: 2019-12-04
Attending: FAMILY MEDICINE
Payer: COMMERCIAL

## 2019-12-04 VITALS
TEMPERATURE: 97.7 F | WEIGHT: 160 LBS | SYSTOLIC BLOOD PRESSURE: 98 MMHG | HEART RATE: 94 BPM | OXYGEN SATURATION: 97 % | BODY MASS INDEX: 26.63 KG/M2 | DIASTOLIC BLOOD PRESSURE: 67 MMHG

## 2019-12-04 DIAGNOSIS — R68.89 FLU-LIKE SYMPTOMS: ICD-10-CM

## 2019-12-04 DIAGNOSIS — I42.9 CARDIOMYOPATHY, UNSPECIFIED TYPE (H): ICD-10-CM

## 2019-12-04 DIAGNOSIS — Z95.0 CARDIAC PACEMAKER IN SITU: Primary | ICD-10-CM

## 2019-12-04 DIAGNOSIS — R50.9 FEVER, UNSPECIFIED FEVER CAUSE: Primary | ICD-10-CM

## 2019-12-04 DIAGNOSIS — J01.90 ACUTE SINUSITIS WITH SYMPTOMS > 10 DAYS: ICD-10-CM

## 2019-12-04 LAB
FLUAV+FLUBV AG SPEC QL: NEGATIVE
FLUAV+FLUBV AG SPEC QL: NEGATIVE
SPECIMEN SOURCE: NORMAL

## 2019-12-04 PROCEDURE — 71046 X-RAY EXAM CHEST 2 VIEWS: CPT

## 2019-12-04 PROCEDURE — 87804 INFLUENZA ASSAY W/OPTIC: CPT | Performed by: FAMILY MEDICINE

## 2019-12-04 PROCEDURE — 99214 OFFICE O/P EST MOD 30 MIN: CPT | Performed by: FAMILY MEDICINE

## 2019-12-04 RX ORDER — CEFUROXIME AXETIL 500 MG/1
500 TABLET ORAL 2 TIMES DAILY
Qty: 7 TABLET | Refills: 0 | Status: SHIPPED | OUTPATIENT
Start: 2019-12-04 | End: 2020-02-20

## 2019-12-04 RX ORDER — CETIRIZINE HYDROCHLORIDE 10 MG/1
10 TABLET ORAL DAILY
COMMUNITY
End: 2020-02-20

## 2019-12-04 RX ORDER — OSELTAMIVIR PHOSPHATE 75 MG/1
75 CAPSULE ORAL 2 TIMES DAILY
Qty: 10 CAPSULE | Refills: 0 | Status: SHIPPED | OUTPATIENT
Start: 2019-12-04 | End: 2020-04-27

## 2019-12-04 NOTE — PROGRESS NOTES
Chief complaint: fever     Was on pcn for a dental procedure last dose was a week ago   Fever started this morning   tmax 100.4  Slight cough  Have shortness of breath with exertion at baseline  Patient has cardiomyopathy with a pacemaker  Patient also has chronic allergic rhinitis  Sinus congestion/sinus pain Yes  Wheezing: No  Exposure to pertussis or pertussis like symptoms: No  Orthopnea, worsening edema, pnd: NO  Rash: NO  Tried OTC medications without relief  No hemoptysis.  Worsening symptoms hence patient came in to be seen     Problem list and histories reviewed & adjusted, as indicated.  Additional history: as documented    Problem list, Medication list, Allergies, and Medical/Social/Surgical histories reviewed in [facility] and updated as appropriate.    ROS:  Constitutional, HEENT, cardiovascular, pulmonary, gi and gu systems are negative, except as otherwise noted.    OBJECTIVE:                                                    BP 98/67   Pulse 94   Temp 97.7  F (36.5  C) (Tympanic)   Wt 72.6 kg (160 lb)   LMP 08/21/2007   SpO2 97%   Breastfeeding No   BMI 26.63 kg/m    Body mass index is 26.63 kg/m . - patient declined repeat BP   GENERAL: healthy, alert and no distress  EYES: pink palpebral conjunctiva, anicteric sclera  ENT: midline nasal septum normal ear exam. congested sinuses.   Mouth: moist buccal mucosa nonhyperemic posterior pharyngeal wall. No tonsillar enlargement or cellulitis  NECK: no adenopathy, no asymmetry, masses, or scars and thyroid normal to palpation  RESP: lungs clear to auscultation - No  rales, rhonchi or wheezes    CV: regular rate and rhythm, normal S1 S2, no S3 or S4,  No murmurs, click or rub  SKIN: no visible rashes noted  Pscyh: Appropriate mood and affect  MS: no gross musculoskeletal defects noted    Diagnostic Test Results:  Results for orders placed or performed in visit on 12/04/19 (from the past 24 hour(s))   Influenza A/B antigen   Result Value Ref Range     Influenza A/B Agn Specimen Nasal     Influenza A Negative NEG^Negative    Influenza B Negative NEG^Negative   XR Chest 2 Views    Narrative    CHEST TWO VIEWS December 4, 2019 12:36 PM     HISTORY: Fever, unspecified fever cause. Cardiomyopathy, unspecified  type (H). Flu-like symptoms.    COMPARISON: October 6, 2019.       Impression    IMPRESSION: There are no acute infiltrates. The cardiac silhouette is  not enlarged. Pulmonary vasculature is unremarkable. Stable cardiac  device.    PAVAN CARBAJAL MD        ASSESSMENT/PLAN:                                                        ICD-10-CM    1. Fever, unspecified fever cause R50.9 Influenza A/B antigen     XR Chest 2 Views   2. Cardiomyopathy, unspecified type (H) I42.9 XR Chest 2 Views   3. Flu-like symptoms R68.89 oseltamivir (TAMIFLU) 75 MG capsule     XR Chest 2 Views   4. Acute sinusitis with symptoms > 10 days J01.90 cefuroxime (CEFTIN) 500 MG tablet     Patient would like empiric treatment  For influenza given the high false negative rate of rapid flu testing  Prescribed with tamiflu  Alarm signs or symptoms discussed, if present recommend go to ER   Follow up if fever persist  Follow up with cardiology for cardiomyopathy  If with any symptoms of chest pain or shortness of breath, lightheadedness, palpitations, feeling like passing out or change and worsening in the quality of your symptoms, please proceed to ER. Recommend follow up with PCP in a few days for re-evaluation.   Patient would like antibiotic if symptoms persist - she does have chronic sinusitis  Signs or symptoms of acute on chronic sinusitis reviewed - if develop she will start ceftin - paper prescription given  Adverse reactions of medications discussed.  Over the counter medications discussed.   Aware to come back in if with worsening symptoms or if no relief despite treatment plan  Patient voiced understanding and had no further questions.     MD Sussy Pitt  MD Vinay  [facility]

## 2019-12-05 ENCOUNTER — ANCILLARY PROCEDURE (OUTPATIENT)
Dept: CARDIOLOGY | Facility: CLINIC | Age: 59
End: 2019-12-05
Attending: INTERNAL MEDICINE
Payer: COMMERCIAL

## 2019-12-05 VITALS
WEIGHT: 161.5 LBS | HEIGHT: 65 IN | HEART RATE: 64 BPM | OXYGEN SATURATION: 96 % | BODY MASS INDEX: 26.91 KG/M2 | DIASTOLIC BLOOD PRESSURE: 69 MMHG | SYSTOLIC BLOOD PRESSURE: 116 MMHG

## 2019-12-05 DIAGNOSIS — Z95.0 CARDIAC PACEMAKER IN SITU: Primary | ICD-10-CM

## 2019-12-05 DIAGNOSIS — I44.30 HEART BLOCK ATRIOVENTRICULAR: ICD-10-CM

## 2019-12-05 DIAGNOSIS — Z95.0 CARDIAC PACEMAKER IN SITU: ICD-10-CM

## 2019-12-05 PROCEDURE — 99214 OFFICE O/P EST MOD 30 MIN: CPT | Mod: GC | Performed by: INTERNAL MEDICINE

## 2019-12-05 PROCEDURE — G0463 HOSPITAL OUTPT CLINIC VISIT: HCPCS | Mod: ZF

## 2019-12-05 ASSESSMENT — PAIN SCALES - GENERAL: PAINLEVEL: NO PAIN (0)

## 2019-12-05 ASSESSMENT — MIFFLIN-ST. JEOR: SCORE: 1298.44

## 2019-12-05 NOTE — LETTER
"12/5/2019      RE: Joyce Marroquin  [address] [facility] [id number]       Dear Colleague,    Thank you for the opportunity to participate in the care of your patient, Joyce Marroquin, at the [facility] HEART [facility]. Please see a copy of my visit note below.    HPI: 58-year-old female with a past medical history of RFA at [facility] complicated by complete heart block status post pacemaker placement in 2017, pericarditis, and hyperthyroidism/hyperparathyroidism.     Her pacemaker was implanted at [facility] but she prefers to have it followed here.  At today's visit she has the flu and has been feeling unwell.  Nevertheless her device was checked and the findings are summarized below:    Device interrogation 12/5/2019  Patient seen in the [facility] for evaluation and iterative programming of her Medtronic dual lead pacemaker per MD orders. Patient is scheduled to see Dr. Funes today. Normal pacemaker function.  No arrhythmias recorded since last remote transmission on 10/6/19.   Intrinsic rhythm = SR w/ a rate in the 80s, frequent PACs, and underlying AV block, V-rate of 38 bpm.   AP =47.2%.  =99.7%.  Estimated battery longevity to KONRAD =6.5years.  Battery voltage =3.01V.  No short v-v intervals recorded. Lead trends appear stable. Patient reports that she currently has the \"flu\" but feels better from a heart perspective as she has learned her ejection fraction has improved.     Joyce's primary concern recently has been diminished left ventricular function noted on a prior echocardiogram.  The basis for the diminished function was most likely a cardiac pacing.  Fortunately a more recent echocardiogram shows some improvement.  Whether the difference between the 2 echocardiograms is a measurement variation or represents a clear-cut improvement in function is on certain at the present time.  I will make arrangements for of a follow-up echo at her next visit with us in 6 " months.  In the meantime I did review her current medications.  Joyce is concerned that she tends to run a low blood pressure and consequently is taking only very low doses of ACE inhibitor and beta-blocker.  I recommended the following change in her order of medication with small change in the metoprolol dose dose.:  She will take her lisinopril at bedtime and half a dose of beta-blocker (12.5 mg) in the morning.  Joyce is a very knowledgeable about her medications and the effects on her blood pressure.  She voiced agreement and understanding    Joyce does not mention any new cardiovascular complaints.  I reassured her that we would be carefully watching her left ventricular function by echo and make adjustments in her medications as appropriate.      PAST MEDICAL HISTORY:  Past Medical History:   Diagnosis Date     Cardiomyopathy (H)     ff Cardiology     Chronic depressive personality disorder      Esophageal reflux      Ex-smoker     quit 2006; 1 PPD x 30     Hyperparathyroidism (H)     s/p parathyroidectomy     Hypothyroidism      LARRY on CPAP     ff Sleep medicine     Pulmonary nodules     ff Pulmonologist     S/P cardiac pacemaker procedure     checked every 6 months at [facility]       CURRENT MEDICATIONS:  Current Outpatient Medications   Medication Sig Dispense Refill     albuterol (PROAIR HFA/PROVENTIL HFA/VENTOLIN HFA) 108 (90 BASE) MCG/ACT Inhaler Inhale 2 puffs into the lungs as needed for shortness of breath / dyspnea or wheezing        aspirin (ASA) 81 MG EC tablet Take 1 tablet (81 mg) by mouth daily 90 tablet 3     azelastine (ASTELIN) 0.1 % nasal spray Spray 1 spray into both nostrils 2 times daily       calcium carbonate (OS-MANJU) 500 MG tablet Take 1 tablet by mouth 2 times daily       cefuroxime (CEFTIN) 500 MG tablet Take 1 tablet (500 mg) by mouth 2 times daily 7 tablet 0     cetirizine (ZYRTEC) 10 MG tablet Take 10 mg by mouth daily       DiphenhydrAMINE HCl (BENADRYL PO) Take 25-50 mg by mouth  nightly as needed       Ferrous Sulfate (IRON SUPPLEMENT PO) Take 65 mg by mouth 2 times daily (with meals)        fluticasone-salmeterol (ADVAIR) 250-50 MCG/DOSE inhaler Inhale 1 puff into the lungs every 12 hours       furosemide (LASIX) 20 MG tablet Take 0.5 tablets (10 mg) by mouth daily 30 tablet 0     guaiFENesin (MUCINEX) 600 MG 12 hr tablet Take 600 mg by mouth 2 times daily       ipratropium (ATROVENT) 0.06 % nasal spray Spray 2 sprays into both nostrils 4 times daily       lisinopril (PRINIVIL/ZESTRIL) 5 MG tablet Take 1 tablet (5 mg) by mouth daily 30 tablet 3     loperamide (IMODIUM A-D) 2 MG tablet Take 2 tabs (4 mg) after first loose stool, and then take one tab (2 mg) after each diarrheal stool.  Max of 8 tabs (16 mg) per day. 1 tablet 0     MAGNESIUM LACTATE PO Take 180 mg by mouth At Bedtime        MELATONIN PO Take 3 mg by mouth At Bedtime        Menaquinone-7 (VITAMIN K2 PO) Take 1 tablet by mouth every morning        METHYLPREDNISOLONE PO Take 40 mg by mouth as needed        metoprolol succinate ER (TOPROL-XL) 25 MG 24 hr tablet TAKE 1 TABLET(25 MG) BY MOUTH DAILY 90 tablet 1     Multiple Vitamin (DAILY MULTIVITAMIN PO) Take 1 tablet by mouth every morning        nebulizer nebulization as needed       oseltamivir (TAMIFLU) 75 MG capsule Take 1 capsule (75 mg) by mouth 2 times daily for 5 days 10 capsule 0     Probiotic Product (PROBIOTIC DAILY PO) Take 1 capsule by mouth 2 times daily        rOPINIRole (REQUIP) 0.25 MG tablet        SYNTHROID 150 MCG tablet Take 1 tablet (150 mcg) by mouth daily 90 tablet 3     TURMERIC PO Take 1 tablet by mouth every morning        vitamin D3 (CHOLECALCIFEROL) 2000 units tablet Take 1 tablet by mouth daily 1 tablet 0     atorvastatin (LIPITOR) 40 MG tablet Take 1 tablet (40 mg) by mouth daily (Patient not taking: Reported on 12/5/2019) 90 tablet 3     clonazePAM (KLONOPIN) 0.5 MG tablet Take 1 tablet (0.5 mg) by mouth every 6 hours as needed for anxiety (panic  attack, asthma attack- use only when needed- habit forming medication) (Patient not taking: Reported on 12/5/2019) 60 tablet 1       PAST SURGICAL HISTORY:  Past Surgical History:   Procedure Laterality Date     BUNIONECTOMY Bilateral      CV CORONARY ANGIOGRAM N/A 9/26/2019    Procedure: CV CORONARY ANGIOGRAM;  Surgeon: Erickson Trejo MD;  Location:  HEART CARDIAC CATH LAB     EP ABLATION / EP STUDIES  04/21/2017     EXPLORE NECK N/A 9/19/2018    Procedure: EXPLORE NECK;  Neck Exploration Resection of Left superior Parathyroid gland;  Surgeon: Kristine Venegas MD;  Location: [facility] OR      CORRECT BUNION,SIMPLE       PARATHYROIDECTOMY N/A 9/19/2018    Procedure: PARATHYROIDECTOMY;;  Surgeon: Kristine Venegas MD;  Location: [facility] OR     PPM INSERT OF NEW OR REPL W/VENT LEAD  04/21/2017     TONSILLECTOMY & ADENOIDECTOMY         ALLERGIES:     Allergies   Allergen Reactions     Flagyl [Metronidazole] Rash     Buspar [Buspirone]      Muscle weakness     Corn Oil Other (See Comments)     Headache       Lorazepam Other (See Comments)     Heat intolerance       Seasonal Allergies Difficulty breathing     Sulfamethoxazole-Trimethoprim      Other reaction(s): Other  Passed out     Tetracycline Swelling     Zofran [Ondansetron]      Prolonged QT  Prolonged QT at baseline per patient     Benzodiazepines Other (See Comments)     Other reaction(s): Other  Extreme heat intolerance  Extreme heat intolerance     Cyclobenzaprine Other (See Comments)     Extreme heat intolerance     Levaquin [Levofloxacin]      Other reaction(s): Other  Tendon rupture     Nsaids Other (See Comments)     Exacerbated asthma       FAMILY HISTORY:  Family History   Problem Relation Age of Onset     Hypothyroidism Mother      Hypertension Mother      Osteoarthritis Mother      Coronary Artery Disease Father         nonfatal MI in his 70s     Asthma Father      Diabetes Sister      Hypothyroidism Sister      Breast Cancer Maternal Aunt      -  "Premature coronary artery disease  - Atrial fibrillation  - Sudden cardiac death     SOCIAL HISTORY:  Social History     Tobacco Use     Smoking status: Former Smoker     Smokeless tobacco: Never Used   Substance Use Topics     Alcohol use: Yes     Comment: seldom     Drug use: No       ROS:   Constitutional: No fever, chills, or sweats. Weight stable.   ENT: No visual disturbance, ear ache, epistaxis, sore throat.   Cardiovascular: As per HPI.   Respiratory: No cough, hemoptysis.    GI: No nausea, vomiting, hematemesis, melena, or hematochezia.   : No hematuria.   Integument: Negative.   Psychiatric: Negative.   Hematologic:  Easy bruising, no easy bleeding.  Neuro: Negative.   Endocrinology: No significant heat or cold intolerance   Musculoskeletal: No myalgia.    Exam:  /69 (BP Location: Right arm, Patient Position: Chair, Cuff Size: Adult Regular)   Pulse 64   Ht 1.651 m (5' 5\")   Wt 73.3 kg (161 lb 8 oz)   LMP 08/21/2007   SpO2 96%   BMI 26.88 kg/m     GENERAL APPEARANCE: healthy, alert and no distress  HEENT: no icterus, no xanthelasmas, normal pupil size and reaction, normal palate, mucosa moist, no central cyanosis  NECK: no adenopathy, no asymmetry, masses, or scars, thyroid normal to palpation and no bruits, JVP not elevated  RESPIRATORY: lungs clear to auscultation - no rales, rhonchi or wheezes, no use of accessory muscles, no retractions, respirations are unlabored, normal respiratory rate  CARDIOVASCULAR: regular rhythm, normal S1 with physiologic split S2, no S3 or S4 and no murmur, click or rub, precordium quiet with normal PMI.  ABDOMEN: soft, non tender, without hepatosplenomegaly, no masses palpable, bowel sounds normal, aorta not enlarged by palpation, no abdominal bruits  EXTREMITIES: peripheral pulses normal, no edema, no bruits  NEURO: alert and oriented to person/place/time, normal speech, gait and affect  VASC: Radial, femoral, dorsalis pedis and posterior tibialis pulses are " normal in volumes and symmetric bilaterally. No bruits are heard.  SKIN: no ecchymoses, no rashes    Labs:  CBC RESULTS:   Lab Results   Component Value Date    WBC 6.8 10/06/2019    RBC 4.19 10/06/2019    HGB 13.5 10/06/2019    HCT 42.0 10/06/2019     10/06/2019    MCH 32.2 10/06/2019    MCHC 32.1 10/06/2019    RDW 12.1 10/06/2019     10/06/2019       BMP RESULTS:  Lab Results   Component Value Date     11/13/2019    POTASSIUM 4.3 11/13/2019    CHLORIDE 103 11/13/2019    CO2 27 11/13/2019    ANIONGAP 5 11/13/2019     (H) 11/13/2019    BUN 20 11/13/2019    CR 0.64 11/13/2019    GFRESTIMATED >90 11/13/2019    GFRESTBLACK >90 11/13/2019    MANJU 9.2 11/13/2019        INR RESULTS:  Lab Results   Component Value Date    INR 1.02 10/06/2019    INR 0.95 06/28/2017       Procedures:    ECHOCARDIOGRAM:   No results found for this or any previous visit (from the past 8760 hour(s)).      Assessment and Plan:     58-year-old female with a past medical history significant for GERD, RVOT RFA complicated by complete heart block status post pacemaker placement in 2017, pericarditis, and hyperthyroidism/hyperparathyroidism who presents today for follow-up.     1. PPM operating normally, continue usual followup    2.  Tendency to hypotension which precludes more aggressive drug therapy for left ventricular dysfunction.  Joyce will provide blood pressure measurements through my chart so that we can reassess    3.  Clinic follow-up in 6 months time with echocardiogram to reassess left ventricular function and then      RTC 6 months or earlier if needed    I very much appreciated the opportunity to see and assess Joyce Marroquin in the clinic with CV Fellow and resident. Please do not hesitate to contact my office if you have any questions or concerns.      Lino Funes MD  Cardiac Arrhythmia Service  [facility]  [phone]    LINO PECK

## 2019-12-05 NOTE — PROGRESS NOTES
"HPI: 58-year-old female with a past medical history of RFA at [facility] complicated by complete heart block status post pacemaker placement in 2017, pericarditis, and hyperthyroidism/hyperparathyroidism.     Her pacemaker was implanted at [facility] but she prefers to have it followed here.  At today's visit she has the flu and has been feeling unwell.  Nevertheless her device was checked and the findings are summarized below:    Device interrogation 12/5/2019  [place] [facility] [place] and iterative programming of her Medtronic dual lead pacemaker per MD orders. Patient is scheduled to see Dr. Funes today. Normal pacemaker function.  No arrhythmias recorded since last remote transmission on 10/6/19.   Intrinsic rhythm = SR w/ a rate in the 80s, frequent PACs, and underlying AV block, V-rate of 38 bpm.   AP =47.2%.  =99.7%.  Estimated battery longevity to KONRAD =6.5years.  Battery voltage =3.01V.  No short v-v intervals recorded. Lead trends appear stable. Patient reports that she currently has the \"flu\" but feels better from a heart perspective as she has learned her ejection fraction has improved.     Joyce's primary concern recently has been diminished left ventricular function noted on a prior echocardiogram.  The basis for the diminished function was most likely a cardiac pacing.  Fortunately a more recent echocardiogram shows some improvement.  Whether the difference between the 2 echocardiograms is a measurement variation or represents a clear-cut improvement in function is on certain at the present time.  I will make arrangements for of a follow-up echo at her next visit with us in 6 months.  In the meantime I did review her current medications.  Joyce is concerned that she tends to run a low blood pressure and consequently is taking only very low doses of ACE inhibitor and beta-blocker.  I recommended the following change in her order of medication with small change in the metoprolol dose " dose.:  She will take her lisinopril at bedtime and half a dose of beta-blocker (12.5 mg) in the morning.  Joyce is a very knowledgeable about her medications and the effects on her blood pressure.  She voiced agreement and understanding    Joyce does not mention any new cardiovascular complaints.  I reassured her that we would be carefully watching her left ventricular function by echo and make adjustments in her medications as appropriate.      PAST MEDICAL HISTORY:  Past Medical History:   Diagnosis Date     Cardiomyopathy (H)     ff Cardiology     Chronic depressive personality disorder      Esophageal reflux      Ex-smoker     quit 2006; 1 PPD x 30     Hyperparathyroidism (H)     s/p parathyroidectomy     Hypothyroidism      LARRY on CPAP     ff Sleep medicine     Pulmonary nodules     ff Pulmonologist     S/P cardiac pacemaker procedure     checked every 6 months at [facility]       CURRENT MEDICATIONS:  Current Outpatient Medications   Medication Sig Dispense Refill     albuterol (PROAIR HFA/PROVENTIL HFA/VENTOLIN HFA) 108 (90 BASE) MCG/ACT Inhaler Inhale 2 puffs into the lungs as needed for shortness of breath / dyspnea or wheezing        aspirin (ASA) 81 MG EC tablet Take 1 tablet (81 mg) by mouth daily 90 tablet 3     azelastine (ASTELIN) 0.1 % nasal spray Spray 1 spray into both nostrils 2 times daily       calcium carbonate (OS-MANJU) 500 MG tablet Take 1 tablet by mouth 2 times daily       cefuroxime (CEFTIN) 500 MG tablet Take 1 tablet (500 mg) by mouth 2 times daily 7 tablet 0     cetirizine (ZYRTEC) 10 MG tablet Take 10 mg by mouth daily       DiphenhydrAMINE HCl (BENADRYL PO) Take 25-50 mg by mouth nightly as needed       Ferrous Sulfate (IRON SUPPLEMENT PO) Take 65 mg by mouth 2 times daily (with meals)        fluticasone-salmeterol (ADVAIR) 250-50 MCG/DOSE inhaler Inhale 1 puff into the lungs every 12 hours       furosemide (LASIX) 20 MG tablet Take 0.5 tablets (10 mg) by mouth daily 30 tablet 0      guaiFENesin (MUCINEX) 600 MG 12 hr tablet Take 600 mg by mouth 2 times daily       ipratropium (ATROVENT) 0.06 % nasal spray Spray 2 sprays into both nostrils 4 times daily       lisinopril (PRINIVIL/ZESTRIL) 5 MG tablet Take 1 tablet (5 mg) by mouth daily 30 tablet 3     loperamide (IMODIUM A-D) 2 MG tablet Take 2 tabs (4 mg) after first loose stool, and then take one tab (2 mg) after each diarrheal stool.  Max of 8 tabs (16 mg) per day. 1 tablet 0     MAGNESIUM LACTATE PO Take 180 mg by mouth At Bedtime        MELATONIN PO Take 3 mg by mouth At Bedtime        Menaquinone-7 (VITAMIN K2 PO) Take 1 tablet by mouth every morning        METHYLPREDNISOLONE PO Take 40 mg by mouth as needed        metoprolol succinate ER (TOPROL-XL) 25 MG 24 hr tablet TAKE 1 TABLET(25 MG) BY MOUTH DAILY 90 tablet 1     Multiple Vitamin (DAILY MULTIVITAMIN PO) Take 1 tablet by mouth every morning        nebulizer nebulization as needed       oseltamivir (TAMIFLU) 75 MG capsule Take 1 capsule (75 mg) by mouth 2 times daily for 5 days 10 capsule 0     Probiotic Product (PROBIOTIC DAILY PO) Take 1 capsule by mouth 2 times daily        rOPINIRole (REQUIP) 0.25 MG tablet        SYNTHROID 150 MCG tablet Take 1 tablet (150 mcg) by mouth daily 90 tablet 3     TURMERIC PO Take 1 tablet by mouth every morning        vitamin D3 (CHOLECALCIFEROL) 2000 units tablet Take 1 tablet by mouth daily 1 tablet 0     atorvastatin (LIPITOR) 40 MG tablet Take 1 tablet (40 mg) by mouth daily (Patient not taking: Reported on 12/5/2019) 90 tablet 3     clonazePAM (KLONOPIN) 0.5 MG tablet Take 1 tablet (0.5 mg) by mouth every 6 hours as needed for anxiety (panic attack, asthma attack- use only when needed- habit forming medication) (Patient not taking: Reported on 12/5/2019) 60 tablet 1       PAST SURGICAL HISTORY:  Past Surgical History:   Procedure Laterality Date     BUNIONECTOMY Bilateral      CV CORONARY ANGIOGRAM N/A 9/26/2019    Procedure: CV CORONARY  ANGIOGRAM;  Surgeon: Erickson Trejo MD;  Location:  HEART CARDIAC CATH LAB     EP ABLATION / EP STUDIES  04/21/2017     EXPLORE NECK N/A 9/19/2018    Procedure: EXPLORE NECK;  Neck Exploration Resection of Left superior Parathyroid gland;  Surgeon: Kristine Venegas MD;  Location: [facility] OR      CORRECT BUNION,SIMPLE       PARATHYROIDECTOMY N/A 9/19/2018    Procedure: PARATHYROIDECTOMY;;  Surgeon: Kristine Venegas MD;  Location: [facility] OR     [facility] INSERT OF NEW OR REPL W/VENT LEAD  04/21/2017     TONSILLECTOMY & ADENOIDECTOMY         ALLERGIES:     Allergies   Allergen Reactions     Flagyl [Metronidazole] Rash     Buspar [Buspirone]      Muscle weakness     Corn Oil Other (See Comments)     Headache       Lorazepam Other (See Comments)     Heat intolerance       Seasonal Allergies Difficulty breathing     Sulfamethoxazole-Trimethoprim      Other reaction(s): Other  Passed out     Tetracycline Swelling     Zofran [Ondansetron]      Prolonged QT  Prolonged QT at baseline per patient     Benzodiazepines Other (See Comments)     Other reaction(s): Other  Extreme heat intolerance  Extreme heat intolerance     Cyclobenzaprine Other (See Comments)     Extreme heat intolerance     Levaquin [Levofloxacin]      Other reaction(s): Other  Tendon rupture     Nsaids Other (See Comments)     Exacerbated asthma       FAMILY HISTORY:  Family History   Problem Relation Age of Onset     Hypothyroidism Mother      Hypertension Mother      Osteoarthritis Mother      Coronary Artery Disease Father         nonfatal MI in his 70s     Asthma Father      Diabetes Sister      Hypothyroidism Sister      Breast Cancer Maternal Aunt      - Premature coronary artery disease  - Atrial fibrillation  - Sudden cardiac death     SOCIAL HISTORY:  Social History     Tobacco Use     Smoking status: Former Smoker     Smokeless tobacco: Never Used   Substance Use Topics     Alcohol use: Yes     Comment: seldom     Drug use: No       ROS:  "  Constitutional: No fever, chills, or sweats. Weight stable.   ENT: No visual disturbance, ear ache, epistaxis, sore throat.   Cardiovascular: As per HPI.   Respiratory: No cough, hemoptysis.    GI: No nausea, vomiting, hematemesis, melena, or hematochezia.   : No hematuria.   Integument: Negative.   Psychiatric: Negative.   Hematologic:  Easy bruising, no easy bleeding.  Neuro: Negative.   Endocrinology: No significant heat or cold intolerance   Musculoskeletal: No myalgia.    Exam:  /69 (BP Location: Right arm, Patient Position: Chair, Cuff Size: Adult Regular)   Pulse 64   Ht 1.651 m (5' 5\")   Wt 73.3 kg (161 lb 8 oz)   LMP 08/21/2007   SpO2 96%   BMI 26.88 kg/m    GENERAL APPEARANCE: healthy, alert and no distress  HEENT: no icterus, no xanthelasmas, normal pupil size and reaction, normal palate, mucosa moist, no central cyanosis  NECK: no adenopathy, no asymmetry, masses, or scars, thyroid normal to palpation and no bruits, JVP not elevated  RESPIRATORY: lungs clear to auscultation - no rales, rhonchi or wheezes, no use of accessory muscles, no retractions, respirations are unlabored, normal respiratory rate  CARDIOVASCULAR: regular rhythm, normal S1 with physiologic split S2, no S3 or S4 and no murmur, click or rub, precordium quiet with normal PMI.  ABDOMEN: soft, non tender, without hepatosplenomegaly, no masses palpable, bowel sounds normal, aorta not enlarged by palpation, no abdominal bruits  EXTREMITIES: peripheral pulses normal, no edema, no bruits  NEURO: alert and oriented to person/place/time, normal speech, gait and affect  VASC: Radial, femoral, dorsalis pedis and posterior tibialis pulses are normal in volumes and symmetric bilaterally. No bruits are heard.  SKIN: no ecchymoses, no rashes    Labs:  CBC RESULTS:   Lab Results   Component Value Date    WBC 6.8 10/06/2019    RBC 4.19 10/06/2019    HGB 13.5 10/06/2019    HCT 42.0 10/06/2019     10/06/2019    MCH 32.2 10/06/2019 "    MCHC 32.1 10/06/2019    RDW 12.1 10/06/2019     10/06/2019       BMP RESULTS:  Lab Results   Component Value Date     11/13/2019    POTASSIUM 4.3 11/13/2019    CHLORIDE 103 11/13/2019    CO2 27 11/13/2019    ANIONGAP 5 11/13/2019     (H) 11/13/2019    BUN 20 11/13/2019    CR 0.64 11/13/2019    GFRESTIMATED >90 11/13/2019    GFRESTBLACK >90 11/13/2019    MANJU 9.2 11/13/2019        INR RESULTS:  Lab Results   Component Value Date    INR 1.02 10/06/2019    INR 0.95 06/28/2017       Procedures:    ECHOCARDIOGRAM:   No results found for this or any previous visit (from the past 8760 hour(s)).      Assessment and Plan:     58-year-old female with a past medical history significant for GERD, RVOT RFA complicated by complete heart block status post pacemaker placement in 2017, pericarditis, and hyperthyroidism/hyperparathyroidism who presents today for follow-up.     1. PPM operating normally, continue usual followup    2.  Tendency to hypotension which precludes more aggressive drug therapy for left ventricular dysfunction.  Joyce will provide blood pressure measurements through my chart so that we can reassess    3.  Clinic follow-up in 6 months time with echocardiogram to reassess left ventricular function and then      RTC 6 months or earlier if needed    I very much appreciated the opportunity to see and assess Joyce Marroquin in the clinic with CV Fellow and resident. Please do not hesitate to contact my office if you have any questions or concerns.      Lino Funes MD  Cardiac Arrhythmia Service  [facility]  [phone]    LINO PECK

## 2019-12-05 NOTE — PATIENT INSTRUCTIONS
You were seen in the Electrophysiology Clinic today by: Dr. Lino Funes MD    Plan:     Follow up visit:    Follow-up with Dr. Funes in June    Your Care Team:  EP Cardiology   Telephone Number     Cheyenne Thomson RN [phone]     For scheduling appts or procedures:    Merced Heller   [phone]   For the Device Clinic (Pacemakers, ICDs, Loop Recorders)    During business hours: [phone]  After business hours:   [phone]- select option 4 and ask for job code [phone].       Cardiovascular Clinic:   [address] [place] [address]. [place] [address]      As always, Thank you for trusting us with your health care needs!

## 2019-12-05 NOTE — NURSING NOTE
Chief Complaint   Patient presents with     Follow Up     59yoF w/ hx RVOT/ablation w/ CHB s/p PPM 2017 presents for f/up to discuss recent echo.     Vitals were taken and medications were reconciled.     Addis Cloud CMA    5:16 PM

## 2019-12-05 NOTE — PATIENT INSTRUCTIONS
It was a pleasure to see you in clinic today.  Please do not hesitate to call with any questions or concerns.  We look forward to seeing you in clinic at your next device check on 2/20/20.    Adelaida Nuñez, RN, BSN  Electrophysiology Nurse Clinician  [facility]    During Business Hours Please Call:  [phone]  After Hours Please Call:  [phone] - select option #4 and ask for job code [phone]

## 2019-12-09 DIAGNOSIS — E89.0 POSTABLATIVE HYPOTHYROIDISM: ICD-10-CM

## 2019-12-09 LAB
MDC_IDC_LEAD_IMPLANT_DT: NORMAL
MDC_IDC_LEAD_IMPLANT_DT: NORMAL
MDC_IDC_LEAD_LOCATION: NORMAL
MDC_IDC_LEAD_LOCATION: NORMAL
MDC_IDC_LEAD_LOCATION_DETAIL_1: NORMAL
MDC_IDC_LEAD_LOCATION_DETAIL_1: NORMAL
MDC_IDC_LEAD_MFG: NORMAL
MDC_IDC_LEAD_MFG: NORMAL
MDC_IDC_LEAD_MODEL: NORMAL
MDC_IDC_LEAD_MODEL: NORMAL
MDC_IDC_LEAD_POLARITY_TYPE: NORMAL
MDC_IDC_LEAD_POLARITY_TYPE: NORMAL
MDC_IDC_LEAD_SERIAL: NORMAL
MDC_IDC_LEAD_SERIAL: NORMAL
MDC_IDC_MSMT_BATTERY_DTM: NORMAL
MDC_IDC_MSMT_BATTERY_REMAINING_LONGEVITY: 79 MO
MDC_IDC_MSMT_BATTERY_RRT_TRIGGER: 2.83
MDC_IDC_MSMT_BATTERY_STATUS: NORMAL
MDC_IDC_MSMT_BATTERY_VOLTAGE: 3.01 V
MDC_IDC_MSMT_LEADCHNL_RA_IMPEDANCE_VALUE: 418 OHM
MDC_IDC_MSMT_LEADCHNL_RA_IMPEDANCE_VALUE: 494 OHM
MDC_IDC_MSMT_LEADCHNL_RA_PACING_THRESHOLD_AMPLITUDE: 0.5 V
MDC_IDC_MSMT_LEADCHNL_RA_PACING_THRESHOLD_AMPLITUDE: 0.62 V
MDC_IDC_MSMT_LEADCHNL_RA_PACING_THRESHOLD_PULSEWIDTH: 0.4 MS
MDC_IDC_MSMT_LEADCHNL_RA_PACING_THRESHOLD_PULSEWIDTH: 0.4 MS
MDC_IDC_MSMT_LEADCHNL_RA_SENSING_INTR_AMPL: 2.62 MV
MDC_IDC_MSMT_LEADCHNL_RA_SENSING_INTR_AMPL: 4.38 MV
MDC_IDC_MSMT_LEADCHNL_RV_IMPEDANCE_VALUE: 437 OHM
MDC_IDC_MSMT_LEADCHNL_RV_IMPEDANCE_VALUE: 513 OHM
MDC_IDC_MSMT_LEADCHNL_RV_PACING_THRESHOLD_AMPLITUDE: 0.62 V
MDC_IDC_MSMT_LEADCHNL_RV_PACING_THRESHOLD_AMPLITUDE: 0.75 V
MDC_IDC_MSMT_LEADCHNL_RV_PACING_THRESHOLD_PULSEWIDTH: 0.4 MS
MDC_IDC_MSMT_LEADCHNL_RV_PACING_THRESHOLD_PULSEWIDTH: 0.4 MS
MDC_IDC_MSMT_LEADCHNL_RV_SENSING_INTR_AMPL: 13.38 MV
MDC_IDC_MSMT_LEADCHNL_RV_SENSING_INTR_AMPL: 4.12 MV
MDC_IDC_PG_IMPLANT_DTM: NORMAL
MDC_IDC_PG_MFG: NORMAL
MDC_IDC_PG_MODEL: NORMAL
MDC_IDC_PG_SERIAL: NORMAL
MDC_IDC_PG_TYPE: NORMAL
MDC_IDC_SESS_CLINIC_NAME: NORMAL
MDC_IDC_SESS_DTM: NORMAL
MDC_IDC_SESS_TYPE: NORMAL
MDC_IDC_SET_BRADY_AT_MODE_SWITCH_RATE: 171 {BEATS}/MIN
MDC_IDC_SET_BRADY_HYSTRATE: NORMAL
MDC_IDC_SET_BRADY_LOWRATE: 60 {BEATS}/MIN
MDC_IDC_SET_BRADY_MAX_SENSOR_RATE: 140 {BEATS}/MIN
MDC_IDC_SET_BRADY_MAX_TRACKING_RATE: 140 {BEATS}/MIN
MDC_IDC_SET_BRADY_MODE: NORMAL
MDC_IDC_SET_BRADY_PAV_DELAY_HIGH: 140 MS
MDC_IDC_SET_BRADY_PAV_DELAY_LOW: 180 MS
MDC_IDC_SET_BRADY_SAV_DELAY_HIGH: 110 MS
MDC_IDC_SET_BRADY_SAV_DELAY_LOW: 150 MS
MDC_IDC_SET_LEADCHNL_RA_PACING_AMPLITUDE: 1.5 V
MDC_IDC_SET_LEADCHNL_RA_PACING_ANODE_ELECTRODE_1: NORMAL
MDC_IDC_SET_LEADCHNL_RA_PACING_ANODE_LOCATION_1: NORMAL
MDC_IDC_SET_LEADCHNL_RA_PACING_CAPTURE_MODE: NORMAL
MDC_IDC_SET_LEADCHNL_RA_PACING_CATHODE_ELECTRODE_1: NORMAL
MDC_IDC_SET_LEADCHNL_RA_PACING_CATHODE_LOCATION_1: NORMAL
MDC_IDC_SET_LEADCHNL_RA_PACING_POLARITY: NORMAL
MDC_IDC_SET_LEADCHNL_RA_PACING_PULSEWIDTH: 0.4 MS
MDC_IDC_SET_LEADCHNL_RA_SENSING_ANODE_ELECTRODE_1: NORMAL
MDC_IDC_SET_LEADCHNL_RA_SENSING_ANODE_LOCATION_1: NORMAL
MDC_IDC_SET_LEADCHNL_RA_SENSING_CATHODE_ELECTRODE_1: NORMAL
MDC_IDC_SET_LEADCHNL_RA_SENSING_CATHODE_LOCATION_1: NORMAL
MDC_IDC_SET_LEADCHNL_RA_SENSING_POLARITY: NORMAL
MDC_IDC_SET_LEADCHNL_RA_SENSING_SENSITIVITY: 0.3 MV
MDC_IDC_SET_LEADCHNL_RV_PACING_AMPLITUDE: 1.5 V
MDC_IDC_SET_LEADCHNL_RV_PACING_ANODE_ELECTRODE_1: NORMAL
MDC_IDC_SET_LEADCHNL_RV_PACING_ANODE_LOCATION_1: NORMAL
MDC_IDC_SET_LEADCHNL_RV_PACING_CAPTURE_MODE: NORMAL
MDC_IDC_SET_LEADCHNL_RV_PACING_CATHODE_ELECTRODE_1: NORMAL
MDC_IDC_SET_LEADCHNL_RV_PACING_CATHODE_LOCATION_1: NORMAL
MDC_IDC_SET_LEADCHNL_RV_PACING_POLARITY: NORMAL
MDC_IDC_SET_LEADCHNL_RV_PACING_PULSEWIDTH: 0.4 MS
MDC_IDC_SET_LEADCHNL_RV_SENSING_ANODE_ELECTRODE_1: NORMAL
MDC_IDC_SET_LEADCHNL_RV_SENSING_ANODE_LOCATION_1: NORMAL
MDC_IDC_SET_LEADCHNL_RV_SENSING_CATHODE_ELECTRODE_1: NORMAL
MDC_IDC_SET_LEADCHNL_RV_SENSING_CATHODE_LOCATION_1: NORMAL
MDC_IDC_SET_LEADCHNL_RV_SENSING_POLARITY: NORMAL
MDC_IDC_SET_LEADCHNL_RV_SENSING_SENSITIVITY: 0.9 MV
MDC_IDC_SET_ZONE_DETECTION_INTERVAL: 350 MS
MDC_IDC_SET_ZONE_DETECTION_INTERVAL: 400 MS
MDC_IDC_SET_ZONE_TYPE: NORMAL
MDC_IDC_STAT_AT_BURDEN_PERCENT: 0 %
MDC_IDC_STAT_AT_DTM_END: NORMAL
MDC_IDC_STAT_AT_DTM_START: NORMAL
MDC_IDC_STAT_BRADY_AP_VP_PERCENT: 47.34 %
MDC_IDC_STAT_BRADY_AP_VS_PERCENT: 0.03 %
MDC_IDC_STAT_BRADY_AS_VP_PERCENT: 52.55 %
MDC_IDC_STAT_BRADY_AS_VS_PERCENT: 0.08 %
MDC_IDC_STAT_BRADY_DTM_END: NORMAL
MDC_IDC_STAT_BRADY_DTM_START: NORMAL
MDC_IDC_STAT_BRADY_RA_PERCENT_PACED: 47.29 %
MDC_IDC_STAT_BRADY_RV_PERCENT_PACED: 99.79 %
MDC_IDC_STAT_EPISODE_RECENT_COUNT: 0
MDC_IDC_STAT_EPISODE_RECENT_COUNT_DTM_END: NORMAL
MDC_IDC_STAT_EPISODE_RECENT_COUNT_DTM_START: NORMAL
MDC_IDC_STAT_EPISODE_TOTAL_COUNT: 0
MDC_IDC_STAT_EPISODE_TOTAL_COUNT: 0
MDC_IDC_STAT_EPISODE_TOTAL_COUNT: 1
MDC_IDC_STAT_EPISODE_TOTAL_COUNT: 4
MDC_IDC_STAT_EPISODE_TOTAL_COUNT_DTM_END: NORMAL
MDC_IDC_STAT_EPISODE_TOTAL_COUNT_DTM_START: NORMAL
MDC_IDC_STAT_EPISODE_TYPE: NORMAL

## 2019-12-10 ENCOUNTER — MYC MEDICAL ADVICE (OUTPATIENT)
Dept: CARDIOLOGY | Facility: CLINIC | Age: 59
End: 2019-12-10

## 2019-12-10 RX ORDER — LEVOTHYROXINE SODIUM 150 MCG
150 TABLET ORAL DAILY
Qty: 90 TABLET | Refills: 2 | Status: SHIPPED | OUTPATIENT
Start: 2019-12-10 | End: 2020-09-17

## 2019-12-10 NOTE — TELEPHONE ENCOUNTER
SYNTHROID 150 MCG tablet  Last Written Prescription Date:  12/10/18  Last Fill Quantity: 90,   # refills: 3  Last Office Visit : 8/30/19  Future Office visit:  None

## 2019-12-19 ENCOUNTER — MYC MEDICAL ADVICE (OUTPATIENT)
Dept: CARDIOLOGY | Facility: CLINIC | Age: 59
End: 2019-12-19

## 2019-12-19 DIAGNOSIS — I42.8 NICM (NONISCHEMIC CARDIOMYOPATHY) (H): ICD-10-CM

## 2019-12-19 RX ORDER — LISINOPRIL 5 MG/1
2.5 TABLET ORAL DAILY
Qty: 30 TABLET | Refills: 3 | COMMUNITY
Start: 2019-12-19 | End: 2020-05-13

## 2020-01-17 ENCOUNTER — ALLIED HEALTH/NURSE VISIT (OUTPATIENT)
Dept: NURSING | Facility: CLINIC | Age: 60
End: 2020-01-17
Payer: COMMERCIAL

## 2020-01-17 DIAGNOSIS — Z11.1 TUBERCULIN SKIN TEST READING ENCOUNTER: Primary | ICD-10-CM

## 2020-01-17 PROCEDURE — 99207 ZZC NO CHARGE NURSE ONLY: CPT

## 2020-01-17 NOTE — NURSING NOTE
Patient here today to get her mantoux results read as had the mantoux administered at 1:20 pm at the [facility] in [place] on 1-, patient provides proper documentation on the right forearm which has a negative result today. Paper work signed, dated and clinic name and number provided.    Mantoux results: No induration.  No swelling.  No redness.  Negative result today at 2:40 pm today on 1-.    NUNO Ayala

## 2020-02-04 ENCOUNTER — TRANSFERRED RECORDS (OUTPATIENT)
Dept: HEALTH INFORMATION MANAGEMENT | Facility: CLINIC | Age: 60
End: 2020-02-04

## 2020-02-20 ENCOUNTER — ANCILLARY PROCEDURE (OUTPATIENT)
Dept: CARDIOLOGY | Facility: CLINIC | Age: 60
End: 2020-02-20
Attending: INTERNAL MEDICINE
Payer: COMMERCIAL

## 2020-02-20 VITALS
HEIGHT: 65 IN | OXYGEN SATURATION: 97 % | BODY MASS INDEX: 27.99 KG/M2 | WEIGHT: 168 LBS | DIASTOLIC BLOOD PRESSURE: 82 MMHG | SYSTOLIC BLOOD PRESSURE: 124 MMHG | HEART RATE: 52 BPM

## 2020-02-20 DIAGNOSIS — I44.2 COMPLETE ATRIOVENTRICULAR BLOCK (H): Primary | ICD-10-CM

## 2020-02-20 DIAGNOSIS — I42.8 NICM (NONISCHEMIC CARDIOMYOPATHY) (H): Primary | ICD-10-CM

## 2020-02-20 DIAGNOSIS — I44.2 COMPLETE ATRIOVENTRICULAR BLOCK (H): ICD-10-CM

## 2020-02-20 PROCEDURE — G0463 HOSPITAL OUTPT CLINIC VISIT: HCPCS | Mod: ZF

## 2020-02-20 PROCEDURE — 99214 OFFICE O/P EST MOD 30 MIN: CPT | Mod: ZP | Performed by: INTERNAL MEDICINE

## 2020-02-20 ASSESSMENT — MIFFLIN-ST. JEOR: SCORE: 1327.92

## 2020-02-20 NOTE — LETTER
"2/20/2020      RE: Joyce Marroquin  [address] [facility] [address]       Dear Colleague,    Thank you for the opportunity to participate in the care of your patient, Joyce Marroquin, at the [facility]. Please see a copy of my visit note below.    HPI: 58-year-old female with a past medical history of RFA at [facility] complicated by complete heart block status post pacemaker placement in 2017, pericarditis, and hyperthyroidism/hyperparathyroidism.     Her pacemaker was implanted at [facility] but she prefers to have it followed here.      PPM Check today:   Patient seen in the clinic for evaluation and iterative programming of  lead pacemaker per MD orders. Patient is scheduled to see Dr. Funes today.  Normal pacemaker function.  No arrhythmias recorded.  PVC counter = 5/hr.  No PAC counter available on this device.  Intrinsic rhythm = SR with frequent PACs, AV block, v-rate 32 bpm.  AP =36.1%.  =99.3%.  Estimated battery longevity to KONRAD = 6.5years.  Battery voltage =3.01V.   No short v-v intervals recorded. Lead trends appear stable. Patient reports that she has been feeling terrible and short of breath lately and is seeing Dr. Funes because  \"something is wrong with her heart\".  Patient also voiced concern that her pacemaker has \"shifted\" in the pocket and a lead has moved since a recent mammogram.   A lead does appear to be palpable superior to the device.  Patient also complained of feeling dizzy shortly after intrinsic rhythm test and stated she felt like she has going to \"go out\".  Her BP was 121/65.  Her symptoms resolved quickly    Today the Joyce indicates that over the past month or so she has noticed a decrease in her sense of wellbeing and her exercise tolerance.  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  She feels more short of breath.  She has been reluctant to use her " Lasix because of tendency to low blood pressure.  This has indeed been a problem.  Today her blood pressure is high enough but we do not have routine home measures to work with and I will ask her to send us data.    In terms of past studies Joyce has had an angiogram last year which showed only modest coronary artery disease in multiple vessels.  A Lexiscan may be helpful to ascertain whether things have changed from an ischemia perspective.    In terms of LV function we will repeat her echocardiogram.  Her ejection fractions have tended to be in the mid 40s recently with 50% to be her high values.  We will recheck since she is essentially 100% paced.  Joyce may be a candidate for His bundle pacing or CRT pacing.    I discussed the above plans with Joyce in some detail and she voiced understanding and agreement.  We will follow-up in the clinic in about 1 month's time with the data, and then decide on next steps.        PAST MEDICAL HISTORY:  Past Medical History:   Diagnosis Date     Cardiomyopathy (H)     ff Cardiology     Chronic depressive personality disorder      Esophageal reflux      Ex-smoker     quit 2006; 1 PPD x 30     Hyperparathyroidism (H)     s/p parathyroidectomy     Hypothyroidism      LARRY on CPAP     ff Sleep medicine     Pulmonary nodules     ff Pulmonologist     S/P cardiac pacemaker procedure     [facility]        CURRENT MEDICATIONS:  Current Outpatient Medications   Medication Sig Dispense Refill     albuterol (PROAIR HFA/PROVENTIL HFA/VENTOLIN HFA) 108 (90 BASE) MCG/ACT Inhaler Inhale 2 puffs into the lungs as needed for shortness of breath / dyspnea or wheezing        aspirin (ASA) 81 MG EC tablet Take 1 tablet (81 mg) by mouth daily 90 tablet 3     azelastine (ASTELIN) 0.1 % nasal spray Spray 1 spray into both nostrils 2 times daily       calcium carbonate (OS-MANJU) 500 MG tablet Take 1 tablet by mouth 2 times daily       DiphenhydrAMINE HCl (BENADRYL PO) Take 25-50 mg  by mouth nightly as needed       Ferrous Sulfate (IRON SUPPLEMENT PO) Take 65 mg by mouth 2 times daily (with meals)        fluticasone-salmeterol (ADVAIR) 250-50 MCG/DOSE inhaler Inhale 1 puff into the lungs every 12 hours       furosemide (LASIX) 20 MG tablet Take 0.5 tablets (10 mg) by mouth daily 30 tablet 0     guaiFENesin (MUCINEX) 600 MG 12 hr tablet Take 600 mg by mouth 2 times daily       ipratropium (ATROVENT) 0.06 % nasal spray Spray 2 sprays into both nostrils 4 times daily       lisinopril (PRINIVIL/ZESTRIL) 5 MG tablet Take 0.5 tablets (2.5 mg) by mouth daily 30 tablet 3     loperamide (IMODIUM A-D) 2 MG tablet Take 2 tabs (4 mg) after first loose stool, and then take one tab (2 mg) after each diarrheal stool.  Max of 8 tabs (16 mg) per day. 1 tablet 0     MAGNESIUM LACTATE PO Take 180 mg by mouth At Bedtime        MELATONIN PO Take 3 mg by mouth At Bedtime        Menaquinone-7 (VITAMIN K2 PO) Take 1 tablet by mouth every morning        METHYLPREDNISOLONE PO Take 40 mg by mouth as needed        Multiple Vitamin (DAILY MULTIVITAMIN PO) Take 1 tablet by mouth every morning        nebulizer nebulization as needed       Probiotic Product (PROBIOTIC DAILY PO) Take 1 capsule by mouth 2 times daily        SYNTHROID 150 MCG tablet Take 1 tablet (150 mcg) by mouth daily 90 tablet 2     TURMERIC PO Take 1 tablet by mouth every morning        vitamin B complex with vitamin C (VITAMIN  B COMPLEX) PO tablet Take 1 tablet by mouth as needed       vitamin D3 (CHOLECALCIFEROL) 2000 units tablet Take 1 tablet by mouth daily 1 tablet 0     rOPINIRole (REQUIP) 0.25 MG tablet          PAST SURGICAL HISTORY:  Past Surgical History:   Procedure Laterality Date     BUNIONECTOMY Bilateral      CV CORONARY ANGIOGRAM N/A 9/26/2019    Procedure: CV CORONARY ANGIOGRAM;  Surgeon: Erickson Trejo MD;  Location:  HEART CARDIAC CATH LAB     EP ABLATION / EP STUDIES  04/21/2017     EXPLORE NECK N/A 9/19/2018    Procedure:  EXPLORE NECK;  Neck Exploration Resection of Left superior Parathyroid gland;  Surgeon: Kristine Venegas MD;  Location: [facility] OR     HC CORRECT BUNION,SIMPLE       PARATHYROIDECTOMY N/A 9/19/2018    Procedure: PARATHYROIDECTOMY;;  Surgeon: Kristine Venegas MD;  Location: [facility] OR     PPM INSERT OF NEW OR REPL W/VENT LEAD  04/21/2017     TONSILLECTOMY & ADENOIDECTOMY         ALLERGIES:     Allergies   Allergen Reactions     Flagyl [Metronidazole] Rash     Buspar [Buspirone]      Muscle weakness     Corn Oil Other (See Comments)     Headache       Lorazepam Other (See Comments)     Heat intolerance       Seasonal Allergies Difficulty breathing     Sulfamethoxazole-Trimethoprim      Other reaction(s): Other  Passed out     Tetracycline Swelling     Zofran [Ondansetron]      Prolonged QT  Prolonged QT at baseline per patient     Benzodiazepines Other (See Comments)     Other reaction(s): Other  Extreme heat intolerance  Extreme heat intolerance     Cyclobenzaprine Other (See Comments)     Extreme heat intolerance     Levaquin [Levofloxacin]      Other reaction(s): Other  Tendon rupture     Nsaids Other (See Comments)     Exacerbated asthma       FAMILY HISTORY:  Family History   Problem Relation Age of Onset     Hypothyroidism Mother      Hypertension Mother      Osteoarthritis Mother      Coronary Artery Disease Father         nonfatal MI in his 70s     Asthma Father      Diabetes Sister      Hypothyroidism Sister      Breast Cancer Maternal Aunt      - Premature coronary artery disease  - Atrial fibrillation  - Sudden cardiac death     SOCIAL HISTORY:  Social History     Tobacco Use     Smoking status: Former Smoker     Smokeless tobacco: Never Used   Substance Use Topics     Alcohol use: Yes     Comment: seldom     Drug use: No       ROS:   Constitutional: No fever, chills, or sweats. Weight stable.   ENT: No visual disturbance, ear ache, epistaxis, sore throat.   Cardiovascular: As per HPI.   Respiratory: No  "cough, hemoptysis.    GI: No nausea, vomiting, hematemesis, melena, or hematochezia.   : No hematuria.   Integument: Negative.   Psychiatric: Negative.   Hematologic:  Easy bruising, no easy bleeding.  Neuro: Negative.   Endocrinology: No significant heat or cold intolerance   Musculoskeletal: No myalgia.    Exam:  /82 (BP Location: Right arm, Patient Position: Chair, Cuff Size: Adult Regular)   Pulse 52   Ht 1.651 m (5' 5\")   Wt 76.2 kg (168 lb)   LMP 08/21/2007   SpO2 97%   BMI 27.96 kg/m     GENERAL APPEARANCE: healthy, alert and no distress  HEENT:  no central cyanosis  NECK: no adenopathy, no asymmetry, JVP not overtly elevated  RESPIRATORY: no rales, rhonchi or wheezes, no use of accessory muscles, no retractions, respirations are unlabored, normal respiratory rate  CARDIOVASCULAR: regular rhythm,     ABDOMEN: soft, non tender,  EXTREMITIES: peripheral pulses normal, no edema, no bruits  NEURO: alert and oriented to person/place/time, normal speech, gait and affect  VASC: pulses are normal in volumes and symmetric bilaterally. No bruits are heard.  SKIN: no ecchymoses, no rashes    Labs:  CBC RESULTS:   Lab Results   Component Value Date    WBC 6.8 10/06/2019    RBC 4.19 10/06/2019    HGB 13.5 10/06/2019    HCT 42.0 10/06/2019     10/06/2019    MCH 32.2 10/06/2019    MCHC 32.1 10/06/2019    RDW 12.1 10/06/2019     10/06/2019       BMP RESULTS:  Lab Results   Component Value Date     11/13/2019    POTASSIUM 4.3 11/13/2019    CHLORIDE 103 11/13/2019    CO2 27 11/13/2019    ANIONGAP 5 11/13/2019     (H) 11/13/2019    BUN 20 11/13/2019    CR 0.64 11/13/2019    GFRESTIMATED >90 11/13/2019    GFRESTBLACK >90 11/13/2019    MANJU 9.2 11/13/2019        INR RESULTS:  Lab Results   Component Value Date    INR 1.02 10/06/2019    INR 0.95 06/28/2017       Procedures:    ECHOCARDIOGRAM:   No results found for this or any previous visit (from the past 8760 hour(s)).      Assessment and " Plan:     58-year-old female with a past medical history significant for GERD, RVOT RFA complicated by complete heart block status post pacemaker placement in 2017,     1. PPM operating normally, continue usual followup    2.  Tendency to hypotension which has  precluded more aggressive drug therapy but we will attempt to make a modest increase in her diuretic and ACE inhibitor therapy.    3.  Echocardiogram and Lexiscan given new symptoms.  Patient also to report blood pressures from home via my chart    Clinic follow-up in 1 months time to discuss findings and plan for possible CRT/His bundle pacing as appropriate    I very much appreciated the opportunity to see and assess Joyce Marroquin in the clinic with CV Fellow and resident. Please do not hesitate to contact my office if you have any questions or concerns.      Lino Funes MD  Cardiac Arrhythmia Service  [facility]  [phone]    CC  LINO FUNES

## 2020-02-20 NOTE — PROGRESS NOTES
"HPI: 58-year-old female with a past medical history of RFA at [facility] complicated by complete heart block status post pacemaker placement in 2017, pericarditis, and hyperthyroidism/hyperparathyroidism.     Her pacemaker was implanted at [facility] but she prefers to have it followed here.      PPM Check today:   Patient seen in the clinic for evaluation and iterative programming of  lead pacemaker per MD orders. Patient is scheduled to see Dr. Funes today.  Normal pacemaker function.  No arrhythmias recorded.  PVC counter = 5/hr.  No PAC counter available on this device.  Intrinsic rhythm = SR with frequent PACs, AV block, v-rate 32 bpm.  AP =36.1%.  =99.3%.  Estimated battery longevity to KONRAD = 6.5years.  Battery voltage =3.01V.   No short v-v intervals recorded. Lead trends appear stable. Patient reports that she has been feeling terrible and short of breath lately and is seeing Dr. Funes because  \"something is wrong with her heart\".  Patient also voiced concern that her pacemaker has \"shifted\" in the pocket and a lead has moved since a recent mammogram.   A lead does appear to be palpable superior to the device.  Patient also complained of feeling dizzy shortly after intrinsic rhythm test and stated she felt like she has going to \"go out\".  Her BP was 121/65.  Her symptoms resolved quickly    Today the Joyce indicates that over the past month or so she has noticed a decrease in her sense of wellbeing and her exercise tolerance.  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  She feels more short of breath.  She has been reluctant to use her Lasix because of tendency to low blood pressure.  This has indeed been a problem.  Today her blood pressure is high enough but we do not have routine home measures to work with and I will ask her to send us data.    In terms of past studies Joyce has had an angiogram last year which showed only modest coronary " artery disease in multiple vessels.  A Lexiscan may be helpful to ascertain whether things have changed from an ischemia perspective.    In terms of LV function we will repeat her echocardiogram.  Her ejection fractions have tended to be in the mid 40s recently with 50% to be her high values.  We will recheck since she is essentially 100% paced.  Joyce may be a candidate for His bundle pacing or CRT pacing.    I discussed the above plans with Joyce in some detail and she voiced understanding and agreement.  We will follow-up in the clinic in about 1 month's time with the data, and then decide on next steps.        PAST MEDICAL HISTORY:  Past Medical History:   Diagnosis Date     Cardiomyopathy (H)     ff Cardiology     Chronic depressive personality disorder      Esophageal reflux      Ex-smoker     quit 2006; 1 PPD x 30     Hyperparathyroidism (H)     s/p parathyroidectomy     Hypothyroidism      LARRY on CPAP     ff Sleep medicine     Pulmonary nodules     ff Pulmonologist     S/P cardiac pacemaker procedure     checked every 6 months at [facility]       CURRENT MEDICATIONS:  Current Outpatient Medications   Medication Sig Dispense Refill     albuterol (PROAIR HFA/PROVENTIL HFA/VENTOLIN HFA) 108 (90 BASE) MCG/ACT Inhaler Inhale 2 puffs into the lungs as needed for shortness of breath / dyspnea or wheezing        aspirin (ASA) 81 MG EC tablet Take 1 tablet (81 mg) by mouth daily 90 tablet 3     azelastine (ASTELIN) 0.1 % nasal spray Spray 1 spray into both nostrils 2 times daily       calcium carbonate (OS-MANJU) 500 MG tablet Take 1 tablet by mouth 2 times daily       DiphenhydrAMINE HCl (BENADRYL PO) Take 25-50 mg by mouth nightly as needed       Ferrous Sulfate (IRON SUPPLEMENT PO) Take 65 mg by mouth 2 times daily (with meals)        fluticasone-salmeterol (ADVAIR) 250-50 MCG/DOSE inhaler Inhale 1 puff into the lungs every 12 hours       furosemide (LASIX) 20 MG tablet Take 0.5 tablets (10 mg) by mouth daily 30  tablet 0     guaiFENesin (MUCINEX) 600 MG 12 hr tablet Take 600 mg by mouth 2 times daily       ipratropium (ATROVENT) 0.06 % nasal spray Spray 2 sprays into both nostrils 4 times daily       lisinopril (PRINIVIL/ZESTRIL) 5 MG tablet Take 0.5 tablets (2.5 mg) by mouth daily 30 tablet 3     loperamide (IMODIUM A-D) 2 MG tablet Take 2 tabs (4 mg) after first loose stool, and then take one tab (2 mg) after each diarrheal stool.  Max of 8 tabs (16 mg) per day. 1 tablet 0     MAGNESIUM LACTATE PO Take 180 mg by mouth At Bedtime        MELATONIN PO Take 3 mg by mouth At Bedtime        Menaquinone-7 (VITAMIN K2 PO) Take 1 tablet by mouth every morning        METHYLPREDNISOLONE PO Take 40 mg by mouth as needed        Multiple Vitamin (DAILY MULTIVITAMIN PO) Take 1 tablet by mouth every morning        nebulizer nebulization as needed       Probiotic Product (PROBIOTIC DAILY PO) Take 1 capsule by mouth 2 times daily        SYNTHROID 150 MCG tablet Take 1 tablet (150 mcg) by mouth daily 90 tablet 2     TURMERIC PO Take 1 tablet by mouth every morning        vitamin B complex with vitamin C (VITAMIN  B COMPLEX) PO tablet Take 1 tablet by mouth as needed       vitamin D3 (CHOLECALCIFEROL) 2000 units tablet Take 1 tablet by mouth daily 1 tablet 0     rOPINIRole (REQUIP) 0.25 MG tablet          PAST SURGICAL HISTORY:  Past Surgical History:   Procedure Laterality Date     BUNIONECTOMY Bilateral      CV CORONARY ANGIOGRAM N/A 9/26/2019    Procedure: CV CORONARY ANGIOGRAM;  Surgeon: Erickson Trejo MD;  Location:  HEART CARDIAC CATH LAB     EP ABLATION / EP STUDIES  04/21/2017     EXPLORE NECK N/A 9/19/2018    Procedure: EXPLORE NECK;  Neck Exploration Resection of Left superior Parathyroid gland;  Surgeon: Kristine Venegas MD;  Location:  OR      CORRECT BUNION,SIMPLE       PARATHYROIDECTOMY N/A 9/19/2018    Procedure: PARATHYROIDECTOMY;;  Surgeon: Kristine Venegas MD;  Location:  OR     [facility] INSERT OF NEW OR  REPL W/VENT LEAD  04/21/2017     TONSILLECTOMY & ADENOIDECTOMY         ALLERGIES:     Allergies   Allergen Reactions     Flagyl [Metronidazole] Rash     Buspar [Buspirone]      Muscle weakness     Corn Oil Other (See Comments)     Headache       Lorazepam Other (See Comments)     Heat intolerance       Seasonal Allergies Difficulty breathing     Sulfamethoxazole-Trimethoprim      Other reaction(s): Other  Passed out     Tetracycline Swelling     Zofran [Ondansetron]      Prolonged QT  Prolonged QT at baseline per patient     Benzodiazepines Other (See Comments)     Other reaction(s): Other  Extreme heat intolerance  Extreme heat intolerance     Cyclobenzaprine Other (See Comments)     Extreme heat intolerance     Levaquin [Levofloxacin]      Other reaction(s): Other  Tendon rupture     Nsaids Other (See Comments)     Exacerbated asthma       FAMILY HISTORY:  Family History   Problem Relation Age of Onset     Hypothyroidism Mother      Hypertension Mother      Osteoarthritis Mother      Coronary Artery Disease Father         nonfatal MI in his 70s     Asthma Father      Diabetes Sister      Hypothyroidism Sister      Breast Cancer Maternal Aunt      - Premature coronary artery disease  - Atrial fibrillation  - Sudden cardiac death     SOCIAL HISTORY:  Social History     Tobacco Use     Smoking status: Former Smoker     Smokeless tobacco: Never Used   Substance Use Topics     Alcohol use: Yes     Comment: seldom     Drug use: No       ROS:   Constitutional: No fever, chills, or sweats. Weight stable.   ENT: No visual disturbance, ear ache, epistaxis, sore throat.   Cardiovascular: As per HPI.   Respiratory: No cough, hemoptysis.    GI: No nausea, vomiting, hematemesis, melena, or hematochezia.   : No hematuria.   Integument: Negative.   Psychiatric: Negative.   Hematologic:  Easy bruising, no easy bleeding.  Neuro: Negative.   Endocrinology: No significant heat or cold intolerance   Musculoskeletal: No  "myalgia.    Exam:  /82 (BP Location: Right arm, Patient Position: Chair, Cuff Size: Adult Regular)   Pulse 52   Ht 1.651 m (5' 5\")   Wt 76.2 kg (168 lb)   LMP 08/21/2007   SpO2 97%   BMI 27.96 kg/m    GENERAL APPEARANCE: healthy, alert and no distress  HEENT:  no central cyanosis  NECK: no adenopathy, no asymmetry, JVP not overtly elevated  RESPIRATORY: no rales, rhonchi or wheezes, no use of accessory muscles, no retractions, respirations are unlabored, normal respiratory rate  CARDIOVASCULAR: regular rhythm,     ABDOMEN: soft, non tender,  EXTREMITIES: peripheral pulses normal, no edema, no bruits  NEURO: alert and oriented to person/place/time, normal speech, gait and affect  VASC: pulses are normal in volumes and symmetric bilaterally. No bruits are heard.  SKIN: no ecchymoses, no rashes    Labs:  CBC RESULTS:   Lab Results   Component Value Date    WBC 6.8 10/06/2019    RBC 4.19 10/06/2019    HGB 13.5 10/06/2019    HCT 42.0 10/06/2019     10/06/2019    MCH 32.2 10/06/2019    MCHC 32.1 10/06/2019    RDW 12.1 10/06/2019     10/06/2019       BMP RESULTS:  Lab Results   Component Value Date     11/13/2019    POTASSIUM 4.3 11/13/2019    CHLORIDE 103 11/13/2019    CO2 27 11/13/2019    ANIONGAP 5 11/13/2019     (H) 11/13/2019    BUN 20 11/13/2019    CR 0.64 11/13/2019    GFRESTIMATED >90 11/13/2019    GFRESTBLACK >90 11/13/2019    MANJU 9.2 11/13/2019        INR RESULTS:  Lab Results   Component Value Date    INR 1.02 10/06/2019    INR 0.95 06/28/2017       Procedures:    ECHOCARDIOGRAM:   No results found for this or any previous visit (from the past 8760 hour(s)).      Assessment and Plan:     58-year-old female with a past medical history significant for GERD, RVOT RFA complicated by complete heart block status post pacemaker placement in 2017,     1. PPM operating normally, continue usual followup    2.  Tendency to hypotension which has  precluded more aggressive drug therapy " but we will attempt to make a modest increase in her diuretic and ACE inhibitor therapy.    3.  Echocardiogram and Lexiscan given new symptoms.  Patient also to report blood pressures from home via my chart    Clinic follow-up in 1 months time to discuss findings and plan for possible CRT/His bundle pacing as appropriate    I very much appreciated the opportunity to see and assess Joyce Marroquin in the clinic with CV Fellow and resident. Please do not hesitate to contact my office if you have any questions or concerns.      Lino Funes MD  Cardiac Arrhythmia Service  [facility]  [phone]    CC  LINO FUNES

## 2020-02-20 NOTE — PATIENT INSTRUCTIONS
You were seen in the Electrophysiology Clinic today by: Dr. Lino Funes MD    Plan:     Medication Changes:     None    Labs/Tests Needed:    Echocardiogram    Lexiscan Stress Test    Follow up visit:    1 month with Dr. Funes    Further Instructions:    Send BP measurements in 2 weeks over [facility].      Your Care Team:  EP Cardiology   Telephone Number     Cheyenne Thomson RN [phone]     For scheduling appts or procedures:    Merced Heller   [phone]   For the Device Clinic (Pacemakers, ICDs, Loop Recorders)    During business hours: [phone]  After business hours:   [phone]- select option 4 and ask for job code [phone].       Cardiovascular Clinic:   [address] [place] [address]. [place] [address]      As always, Thank you for trusting us with your health care needs!

## 2020-02-20 NOTE — PATIENT INSTRUCTIONS
It was a pleasure to see you in clinic today.  Please do not hesitate to call with any questions or concerns.  You are scheduled for a remote transmission on 5/21/20.  We look forward to seeing you in clinic at your next device check in 6 months.    Adelaida Nuñez RN, BSN  Electrophysiology Nurse Clinician  [facility]    During Business Hours Please Call:  [phone]  After Hours Please Call:  [phone] - select option #4 and ask for job code [id number]

## 2020-02-20 NOTE — NURSING NOTE
Chief Complaint   Patient presents with     Follow Up     59yoF w/ hx RVOT/ablation w/ CHB s/p PPM 2017 presents for f/up - annual & review BPs, device prior.     Vitals were taken and medications reconciled.     Jefe Hawley CMA  10:39 AM

## 2020-02-24 ENCOUNTER — OFFICE VISIT (OUTPATIENT)
Dept: INTERNAL MEDICINE | Facility: CLINIC | Age: 60
End: 2020-02-24
Payer: COMMERCIAL

## 2020-02-24 VITALS
TEMPERATURE: 97.9 F | DIASTOLIC BLOOD PRESSURE: 73 MMHG | OXYGEN SATURATION: 97 % | HEIGHT: 65 IN | SYSTOLIC BLOOD PRESSURE: 112 MMHG | BODY MASS INDEX: 28.32 KG/M2 | WEIGHT: 170 LBS

## 2020-02-24 DIAGNOSIS — J06.9 VIRAL URI WITH COUGH: Primary | ICD-10-CM

## 2020-02-24 DIAGNOSIS — J01.01 ACUTE RECURRENT MAXILLARY SINUSITIS: ICD-10-CM

## 2020-02-24 PROCEDURE — 99213 OFFICE O/P EST LOW 20 MIN: CPT | Performed by: NURSE PRACTITIONER

## 2020-02-24 RX ORDER — CEFUROXIME AXETIL 500 MG/1
500 TABLET ORAL 2 TIMES DAILY
Qty: 14 TABLET | Refills: 0 | Status: SHIPPED | OUTPATIENT
Start: 2020-02-24 | End: 2020-04-27

## 2020-02-24 RX ORDER — OSELTAMIVIR PHOSPHATE 75 MG/1
75 CAPSULE ORAL 2 TIMES DAILY
Qty: 10 CAPSULE | Refills: 0 | Status: SHIPPED | OUTPATIENT
Start: 2020-02-24 | End: 2020-04-27

## 2020-02-24 ASSESSMENT — MIFFLIN-ST. JEOR: SCORE: 1336.99

## 2020-02-24 NOTE — LETTER
February 24, 2020      Joyce Marroquin  [address] [facility] [address]        To Whom It May Concern:    Joyce Marroquin was seen in our clinic. She may return to work with no restrictions on Thursday 2/27/2020.       Sincerely,        Lucia Serrato, CNP

## 2020-02-24 NOTE — PATIENT INSTRUCTIONS
To cover possible influenza, take tamiflu 75 mg twice daily for 5 days.  Most common side effects are headache and nausea.     If worsening sinus symptoms (facial pressure, increased nasal drainage, dental pain, headache), okay to fill prescription for ceftin (antibiotic).     Please return with any worsening symptoms or failure to improve.

## 2020-02-24 NOTE — PROGRESS NOTES
Subjective     Joyce Marroquin is a 59 year old female with a complex medical history including COPD, asthma, cardiomyopathy, status post pacemaker, LARRY, hypothyroidism, GERD, and depression with anxiety who presents to clinic today for the following health issues:    HPI   RESPIRATORY SYMPTOMS      Duration: 3 days    Description  nasal congestion, cough, fever and chills    Severity: severe    Accompanying signs and symptoms: allergies    History (predisposing factors):  Influenza in December    Precipitating or alleviating factors: None    Therapies tried and outcome:  Acetaminophen    Patient reports 3 days of illness.  In addition to above she reports temperature of 100 at home.  Nausea, diarrhea, malaise, fatigue, chills, runny nose, and cough.  She states overall she just feels crummy.  She has a history of COPD/asthma, she states this is very well controlled.  She denies any recent wheezing, she has not needed to use her rescue inhaler.  She denies any recent weight gain or worsening in lower extremity edema.  She recently saw cardiology on 2/20/2020 for follow-up visit.  They plan on checking an echocardiogram and Lexiscan due to reports of recent decreased exercise tolerance.      Patient Active Problem List   Diagnosis     Benign neoplasm of vulva     Esophageal reflux     Chronic depressive personality disorder     Complete atrioventricular block (H)     Cardiac pacemaker in situ- [employer]onic, dual lead- DEPENDENT     Hypothyroidism     Ex-smoker     Hyperparathyroidism (H)     Pulmonary nodules     Cardiomyopathy (H)     S/P cardiac pacemaker procedure     Situational anxiety     LARRY (obstructive sleep apnea)     Restless legs syndrome (RLS)     Vestibular disequilibrium, unspecified laterality     Collagenous colitis     COPD exacerbation (H)     Death of parent     Panic attack     Insomnia, unspecified type     Exacerbation of asthma, unspecified asthma severity, unspecified whether persistent     Tick  bite, initial encounter     Cardiomyopathy, unspecified type (H)     Status post coronary angiogram     Past Surgical History:   Procedure Laterality Date     BUNIONECTOMY Bilateral      CV CORONARY ANGIOGRAM N/A 9/26/2019    Procedure: CV CORONARY ANGIOGRAM;  Surgeon: Erickson Trejo MD;  Location:  HEART CARDIAC CATH LAB     EP ABLATION / EP STUDIES  04/21/2017     EXPLORE NECK N/A 9/19/2018    Procedure: EXPLORE NECK;  Neck Exploration Resection of Left superior Parathyroid gland;  Surgeon: Kristine Venegas MD;  Location: [facility] OR     HC CORRECT BUNION,SIMPLE       PARATHYROIDECTOMY N/A 9/19/2018    Procedure: PARATHYROIDECTOMY;;  Surgeon: Kristine Venegas MD;  Location: [facility] OR     PPM INSERT OF NEW OR REPL W/VENT LEAD  04/21/2017     TONSILLECTOMY & ADENOIDECTOMY         Social History     Tobacco Use     Smoking status: Former Smoker     Smokeless tobacco: Never Used   Substance Use Topics     Alcohol use: Yes     Comment: seldom     Family History   Problem Relation Age of Onset     Hypothyroidism Mother      Hypertension Mother      Osteoarthritis Mother      Coronary Artery Disease Father         nonfatal MI in his 70s     Asthma Father      Diabetes Sister      Hypothyroidism Sister      Breast Cancer Maternal Aunt          Current Outpatient Medications   Medication Sig Dispense Refill     albuterol (PROAIR HFA/PROVENTIL HFA/VENTOLIN HFA) 108 (90 BASE) MCG/ACT Inhaler Inhale 2 puffs into the lungs as needed for shortness of breath / dyspnea or wheezing        aspirin (ASA) 81 MG EC tablet Take 1 tablet (81 mg) by mouth daily 90 tablet 3     azelastine (ASTELIN) 0.1 % nasal spray Spray 1 spray into both nostrils 2 times daily       calcium carbonate (OS-MANJU) 500 MG tablet Take 1 tablet by mouth 2 times daily       cefuroxime (CEFTIN) 500 MG tablet Take 1 tablet (500 mg) by mouth 2 times daily for 7 days 14 tablet 0     DiphenhydrAMINE HCl (BENADRYL PO) Take 25-50 mg by mouth nightly as needed        Ferrous Sulfate (IRON SUPPLEMENT PO) Take 65 mg by mouth 2 times daily (with meals)        fluticasone-salmeterol (ADVAIR) 250-50 MCG/DOSE inhaler Inhale 1 puff into the lungs every 12 hours       furosemide (LASIX) 20 MG tablet Take 0.5 tablets (10 mg) by mouth daily 30 tablet 0     guaiFENesin (MUCINEX) 600 MG 12 hr tablet Take 600 mg by mouth 2 times daily       ipratropium (ATROVENT) 0.06 % nasal spray Spray 2 sprays into both nostrils 4 times daily       lisinopril (PRINIVIL/ZESTRIL) 5 MG tablet Take 0.5 tablets (2.5 mg) by mouth daily 30 tablet 3     loperamide (IMODIUM A-D) 2 MG tablet Take 2 tabs (4 mg) after first loose stool, and then take one tab (2 mg) after each diarrheal stool.  Max of 8 tabs (16 mg) per day. 1 tablet 0     MAGNESIUM LACTATE PO Take 180 mg by mouth At Bedtime        MELATONIN PO Take 3 mg by mouth At Bedtime        Menaquinone-7 (VITAMIN K2 PO) Take 1 tablet by mouth every morning        METHYLPREDNISOLONE PO Take 40 mg by mouth as needed        Multiple Vitamin (DAILY MULTIVITAMIN PO) Take 1 tablet by mouth every morning        nebulizer nebulization as needed       oseltamivir (TAMIFLU) 75 MG capsule Take 1 capsule (75 mg) by mouth 2 times daily for 5 days 10 capsule 0     Probiotic Product (PROBIOTIC DAILY PO) Take 1 capsule by mouth 2 times daily        rOPINIRole (REQUIP) 0.25 MG tablet        SYNTHROID 150 MCG tablet Take 1 tablet (150 mcg) by mouth daily 90 tablet 2     TURMERIC PO Take 1 tablet by mouth every morning        vitamin B complex with vitamin C (VITAMIN  B COMPLEX) PO tablet Take 1 tablet by mouth as needed       vitamin D3 (CHOLECALCIFEROL) 2000 units tablet Take 1 tablet by mouth daily 1 tablet 0     Allergies   Allergen Reactions     Tetracycline Swelling     Zofran [Ondansetron]      Prolonged QT  Prolonged QT at baseline per patient     Flagyl [Metronidazole] Rash     Buspar [Buspirone]      Muscle weakness     Corn Oil Other (See Comments)     Headache    "    Lorazepam Other (See Comments)     Heat intolerance       Seasonal Allergies Difficulty breathing     Sulfamethoxazole-Trimethoprim      Other reaction(s): Other  Passed out     Benzodiazepines Other (See Comments)     Other reaction(s): Other  Extreme heat intolerance  Extreme heat intolerance     Cyclobenzaprine Other (See Comments)     Extreme heat intolerance     Levaquin [Levofloxacin]      Other reaction(s): Other  Tendon rupture     Nsaids Other (See Comments)     Exacerbated asthma       Reviewed and updated as needed this visit by Provider  Tobacco  Allergies  Meds  Problems  Med Hx  Surg Hx  Fam Hx         Review of Systems   ROS COMP: Constitutional, HEENT, cardiovascular, pulmonary, GI, , musculoskeletal, neuro, skin, endocrine and psych systems are negative, except as otherwise noted.      Objective    /73   Temp 97.9  F (36.6  C)   Ht 1.651 m (5' 5\")   Wt 77.1 kg (170 lb)   LMP 08/21/2007   SpO2 97%   BMI 28.29 kg/m    Body mass index is 28.29 kg/m .  Physical Exam   GENERAL: Appears chronically ill, alert, no acute distress but appears fatigued.   EYES: Eyes grossly normal to inspection, PERRL and conjunctivae and sclerae normal  HENT: ear canals and TM's normal.  Sinuses tender to palpation, nasal mucosa inflamed with clear rhinorrhea, mouth without ulcers or lesions  NECK: no adenopathy, no asymmetry, masses, or scars and thyroid normal to palpation  RESP: lungs clear to auscultation - no rales, rhonchi or wheezes  CV: regular rate and rhythm, normal S1 S2, no S3 or S4, no murmur, click or rub, no peripheral edema and peripheral pulses strong  ABDOMEN: soft, nontender, no hepatosplenomegaly, no masses and bowel sounds normal  MS: no gross musculoskeletal defects noted, no edema  SKIN: no suspicious lesions or rashes  NEURO: Normal strength and tone, mentation intact and speech normal  PSYCH: mentation appears normal, affect normal/bright    Diagnostic Test Results:  none     " "    Assessment & Plan       (J06.9,  B97.89) Viral URI with cough  Comment: Patient is hemodynamically stable.  Lung sounds are clear, with no wheezing or crackles noted.  She denies any recent worsening of her CHF.  Do not see indication for steroids or antibiotics at this time to cover possible COPD exacerbation.  However, possible influenza-like illness based on her symptoms.  She is at high risk of complications from influenza due to her comorbidities.  Opted to treat empirically with Tamiflu.  Plan: oseltamivir (TAMIFLU) 75 MG capsule        Patient instructed on Tamiflu 75 mg twice daily x5 days.  She was instructed on medication side effects.  She was instructed to return with any worsening of symptoms.    (J01.01) Acute recurrent maxillary sinusitis  Comment: Patient requested antibiotic for sinus infection.  She is only 3 days into symptoms but she does have chronic sinusitis.   Plan: cefuroxime (CEFTIN) 500 MG tablet        Signs or symptoms of acute on chronic sinusitis reviewed - if develop she will start ceftin - paper prescription given.  She agrees to only fill if symptoms persist.        BMI:   Estimated body mass index is 28.29 kg/m  as calculated from the following:    Height as of this encounter: 1.651 m (5' 5\").    Weight as of this encounter: 77.1 kg (170 lb).   Weight management plan: Not discussed      See Patient Instructions  Patient Instructions   To cover possible influenza, take tamiflu 75 mg twice daily for 5 days.  Most common side effects are headache and nausea.     If worsening sinus symptoms (facial pressure, increased nasal drainage, dental pain, headache), okay to fill prescription for ceftin (antibiotic).     Please return with any worsening symptoms or failure to improve.         Return if symptoms worsen or fail to improve.    Lucia Serrato, [facility]        "

## 2020-02-25 LAB
MDC_IDC_LEAD_IMPLANT_DT: NORMAL
MDC_IDC_LEAD_IMPLANT_DT: NORMAL
MDC_IDC_LEAD_LOCATION: NORMAL
MDC_IDC_LEAD_LOCATION: NORMAL
MDC_IDC_LEAD_LOCATION_DETAIL_1: NORMAL
MDC_IDC_LEAD_LOCATION_DETAIL_1: NORMAL
MDC_IDC_LEAD_MFG: NORMAL
MDC_IDC_LEAD_MFG: NORMAL
MDC_IDC_LEAD_MODEL: NORMAL
MDC_IDC_LEAD_MODEL: NORMAL
MDC_IDC_LEAD_POLARITY_TYPE: NORMAL
MDC_IDC_LEAD_POLARITY_TYPE: NORMAL
MDC_IDC_LEAD_SERIAL: NORMAL
MDC_IDC_LEAD_SERIAL: NORMAL
MDC_IDC_MSMT_BATTERY_DTM: NORMAL
MDC_IDC_MSMT_BATTERY_REMAINING_LONGEVITY: 78 MO
MDC_IDC_MSMT_BATTERY_RRT_TRIGGER: 2.83
MDC_IDC_MSMT_BATTERY_STATUS: NORMAL
MDC_IDC_MSMT_BATTERY_VOLTAGE: 3.01 V
MDC_IDC_MSMT_LEADCHNL_RA_IMPEDANCE_VALUE: 361 OHM
MDC_IDC_MSMT_LEADCHNL_RA_IMPEDANCE_VALUE: 456 OHM
MDC_IDC_MSMT_LEADCHNL_RA_PACING_THRESHOLD_AMPLITUDE: 0.5 V
MDC_IDC_MSMT_LEADCHNL_RA_PACING_THRESHOLD_AMPLITUDE: 0.5 V
MDC_IDC_MSMT_LEADCHNL_RA_PACING_THRESHOLD_PULSEWIDTH: 0.4 MS
MDC_IDC_MSMT_LEADCHNL_RA_PACING_THRESHOLD_PULSEWIDTH: 0.4 MS
MDC_IDC_MSMT_LEADCHNL_RA_SENSING_INTR_AMPL: 2.62 MV
MDC_IDC_MSMT_LEADCHNL_RA_SENSING_INTR_AMPL: 2.75 MV
MDC_IDC_MSMT_LEADCHNL_RV_IMPEDANCE_VALUE: 399 OHM
MDC_IDC_MSMT_LEADCHNL_RV_IMPEDANCE_VALUE: 475 OHM
MDC_IDC_MSMT_LEADCHNL_RV_PACING_THRESHOLD_AMPLITUDE: 0.62 V
MDC_IDC_MSMT_LEADCHNL_RV_PACING_THRESHOLD_AMPLITUDE: 0.75 V
MDC_IDC_MSMT_LEADCHNL_RV_PACING_THRESHOLD_PULSEWIDTH: 0.4 MS
MDC_IDC_MSMT_LEADCHNL_RV_PACING_THRESHOLD_PULSEWIDTH: 0.4 MS
MDC_IDC_MSMT_LEADCHNL_RV_SENSING_INTR_AMPL: 12.62 MV
MDC_IDC_MSMT_LEADCHNL_RV_SENSING_INTR_AMPL: 6 MV
MDC_IDC_PG_IMPLANT_DTM: NORMAL
MDC_IDC_PG_MFG: NORMAL
MDC_IDC_PG_MODEL: NORMAL
MDC_IDC_PG_SERIAL: NORMAL
MDC_IDC_PG_TYPE: NORMAL
MDC_IDC_SESS_CLINIC_NAME: NORMAL
MDC_IDC_SESS_DTM: NORMAL
MDC_IDC_SESS_TYPE: NORMAL
MDC_IDC_SET_BRADY_AT_MODE_SWITCH_RATE: 171 {BEATS}/MIN
MDC_IDC_SET_BRADY_HYSTRATE: NORMAL
MDC_IDC_SET_BRADY_LOWRATE: 60 {BEATS}/MIN
MDC_IDC_SET_BRADY_MAX_SENSOR_RATE: 140 {BEATS}/MIN
MDC_IDC_SET_BRADY_MAX_TRACKING_RATE: 140 {BEATS}/MIN
MDC_IDC_SET_BRADY_MODE: NORMAL
MDC_IDC_SET_BRADY_PAV_DELAY_HIGH: 140 MS
MDC_IDC_SET_BRADY_PAV_DELAY_LOW: 180 MS
MDC_IDC_SET_BRADY_SAV_DELAY_HIGH: 110 MS
MDC_IDC_SET_BRADY_SAV_DELAY_LOW: 150 MS
MDC_IDC_SET_LEADCHNL_RA_PACING_AMPLITUDE: 1.5 V
MDC_IDC_SET_LEADCHNL_RA_PACING_ANODE_ELECTRODE_1: NORMAL
MDC_IDC_SET_LEADCHNL_RA_PACING_ANODE_LOCATION_1: NORMAL
MDC_IDC_SET_LEADCHNL_RA_PACING_CAPTURE_MODE: NORMAL
MDC_IDC_SET_LEADCHNL_RA_PACING_CATHODE_ELECTRODE_1: NORMAL
MDC_IDC_SET_LEADCHNL_RA_PACING_CATHODE_LOCATION_1: NORMAL
MDC_IDC_SET_LEADCHNL_RA_PACING_POLARITY: NORMAL
MDC_IDC_SET_LEADCHNL_RA_PACING_PULSEWIDTH: 0.4 MS
MDC_IDC_SET_LEADCHNL_RA_SENSING_ANODE_ELECTRODE_1: NORMAL
MDC_IDC_SET_LEADCHNL_RA_SENSING_ANODE_LOCATION_1: NORMAL
MDC_IDC_SET_LEADCHNL_RA_SENSING_CATHODE_ELECTRODE_1: NORMAL
MDC_IDC_SET_LEADCHNL_RA_SENSING_CATHODE_LOCATION_1: NORMAL
MDC_IDC_SET_LEADCHNL_RA_SENSING_POLARITY: NORMAL
MDC_IDC_SET_LEADCHNL_RA_SENSING_SENSITIVITY: 0.3 MV
MDC_IDC_SET_LEADCHNL_RV_PACING_AMPLITUDE: 1.5 V
MDC_IDC_SET_LEADCHNL_RV_PACING_ANODE_ELECTRODE_1: NORMAL
MDC_IDC_SET_LEADCHNL_RV_PACING_ANODE_LOCATION_1: NORMAL
MDC_IDC_SET_LEADCHNL_RV_PACING_CAPTURE_MODE: NORMAL
MDC_IDC_SET_LEADCHNL_RV_PACING_CATHODE_ELECTRODE_1: NORMAL
MDC_IDC_SET_LEADCHNL_RV_PACING_CATHODE_LOCATION_1: NORMAL
MDC_IDC_SET_LEADCHNL_RV_PACING_POLARITY: NORMAL
MDC_IDC_SET_LEADCHNL_RV_PACING_PULSEWIDTH: 0.4 MS
MDC_IDC_SET_LEADCHNL_RV_SENSING_ANODE_ELECTRODE_1: NORMAL
MDC_IDC_SET_LEADCHNL_RV_SENSING_ANODE_LOCATION_1: NORMAL
MDC_IDC_SET_LEADCHNL_RV_SENSING_CATHODE_ELECTRODE_1: NORMAL
MDC_IDC_SET_LEADCHNL_RV_SENSING_CATHODE_LOCATION_1: NORMAL
MDC_IDC_SET_LEADCHNL_RV_SENSING_POLARITY: NORMAL
MDC_IDC_SET_LEADCHNL_RV_SENSING_SENSITIVITY: 0.9 MV
MDC_IDC_SET_ZONE_DETECTION_INTERVAL: 350 MS
MDC_IDC_SET_ZONE_DETECTION_INTERVAL: 400 MS
MDC_IDC_SET_ZONE_TYPE: NORMAL
MDC_IDC_STAT_AT_BURDEN_PERCENT: 0 %
MDC_IDC_STAT_AT_DTM_END: NORMAL
MDC_IDC_STAT_AT_DTM_START: NORMAL
MDC_IDC_STAT_BRADY_AP_VP_PERCENT: 36.24 %
MDC_IDC_STAT_BRADY_AP_VS_PERCENT: 0.1 %
MDC_IDC_STAT_BRADY_AS_VP_PERCENT: 63.46 %
MDC_IDC_STAT_BRADY_AS_VS_PERCENT: 0.2 %
MDC_IDC_STAT_BRADY_DTM_END: NORMAL
MDC_IDC_STAT_BRADY_DTM_START: NORMAL
MDC_IDC_STAT_BRADY_RA_PERCENT_PACED: 36.15 %
MDC_IDC_STAT_BRADY_RV_PERCENT_PACED: 99.34 %
MDC_IDC_STAT_EPISODE_RECENT_COUNT: 0
MDC_IDC_STAT_EPISODE_RECENT_COUNT_DTM_END: NORMAL
MDC_IDC_STAT_EPISODE_RECENT_COUNT_DTM_START: NORMAL
MDC_IDC_STAT_EPISODE_TOTAL_COUNT: 0
MDC_IDC_STAT_EPISODE_TOTAL_COUNT: 0
MDC_IDC_STAT_EPISODE_TOTAL_COUNT: 1
MDC_IDC_STAT_EPISODE_TOTAL_COUNT: 4
MDC_IDC_STAT_EPISODE_TOTAL_COUNT_DTM_END: NORMAL
MDC_IDC_STAT_EPISODE_TOTAL_COUNT_DTM_START: NORMAL
MDC_IDC_STAT_EPISODE_TYPE: NORMAL

## 2020-03-02 ENCOUNTER — HOSPITAL ENCOUNTER (OUTPATIENT)
Dept: NUCLEAR MEDICINE | Facility: CLINIC | Age: 60
Setting detail: NUCLEAR MEDICINE
End: 2020-03-02
Attending: INTERNAL MEDICINE
Payer: COMMERCIAL

## 2020-03-02 ENCOUNTER — HOSPITAL ENCOUNTER (OUTPATIENT)
Dept: CARDIOLOGY | Facility: CLINIC | Age: 60
Discharge: HOME | End: 2020-03-02
Attending: INTERNAL MEDICINE | Admitting: INTERNAL MEDICINE
Payer: COMMERCIAL

## 2020-03-02 DIAGNOSIS — I42.8 NICM (NONISCHEMIC CARDIOMYOPATHY) (H): ICD-10-CM

## 2020-03-02 LAB
CV STRESS MAX HR HE: 108
RATE PRESSURE PRODUCT: NORMAL
STRESS ECHO BASELINE DIASTOLIC HE: 76
STRESS ECHO BASELINE HR: 82
STRESS ECHO BASELINE SYSTOLIC BP: 104
STRESS ECHO CALCULATED PERCENT HR: 68 %
STRESS ECHO LAST STRESS DIASTOLIC BP: 61
STRESS ECHO LAST STRESS SYSTOLIC BP: 95
STRESS ECHO TARGET HR: 159

## 2020-03-02 PROCEDURE — 34300033 ZZH RX 343: Performed by: INTERNAL MEDICINE

## 2020-03-02 PROCEDURE — 93017 CV STRESS TEST TRACING ONLY: CPT

## 2020-03-02 PROCEDURE — 93308 TTE F-UP OR LMTD: CPT

## 2020-03-02 PROCEDURE — 78452 HT MUSCLE IMAGE SPECT MULT: CPT | Mod: 26 | Performed by: INTERNAL MEDICINE

## 2020-03-02 PROCEDURE — 93325 DOPPLER ECHO COLOR FLOW MAPG: CPT | Mod: 26 | Performed by: INTERNAL MEDICINE

## 2020-03-02 PROCEDURE — 93321 DOPPLER ECHO F-UP/LMTD STD: CPT | Mod: 26 | Performed by: INTERNAL MEDICINE

## 2020-03-02 PROCEDURE — 93016 CV STRESS TEST SUPVJ ONLY: CPT | Performed by: INTERNAL MEDICINE

## 2020-03-02 PROCEDURE — 78452 HT MUSCLE IMAGE SPECT MULT: CPT

## 2020-03-02 PROCEDURE — 93018 CV STRESS TEST I&R ONLY: CPT | Performed by: INTERNAL MEDICINE

## 2020-03-02 PROCEDURE — 93308 TTE F-UP OR LMTD: CPT | Mod: 26 | Performed by: INTERNAL MEDICINE

## 2020-03-02 PROCEDURE — A9502 TC99M TETROFOSMIN: HCPCS | Performed by: INTERNAL MEDICINE

## 2020-03-02 PROCEDURE — 25000128 H RX IP 250 OP 636: Performed by: INTERNAL MEDICINE

## 2020-03-02 RX ORDER — REGADENOSON 0.08 MG/ML
0.4 INJECTION, SOLUTION INTRAVENOUS ONCE
Status: COMPLETED | OUTPATIENT
Start: 2020-03-02 | End: 2020-03-02

## 2020-03-02 RX ORDER — REGADENOSON 0.08 MG/ML
0.4 INJECTION, SOLUTION INTRAVENOUS ONCE
Status: DISCONTINUED | OUTPATIENT
Start: 2020-03-02 | End: 2020-03-02

## 2020-03-02 RX ORDER — AMINOPHYLLINE 25 MG/ML
50-100 INJECTION, SOLUTION INTRAVENOUS
Status: DISCONTINUED | OUTPATIENT
Start: 2020-03-02 | End: 2020-03-03 | Stop reason: HOSPADM

## 2020-03-02 RX ORDER — ACYCLOVIR 200 MG/1
0-1 CAPSULE ORAL
Status: DISCONTINUED | OUTPATIENT
Start: 2020-03-02 | End: 2020-03-03 | Stop reason: HOSPADM

## 2020-03-02 RX ORDER — ALBUTEROL SULFATE 90 UG/1
2 AEROSOL, METERED RESPIRATORY (INHALATION) EVERY 5 MIN PRN
Status: DISCONTINUED | OUTPATIENT
Start: 2020-03-02 | End: 2020-03-02

## 2020-03-02 RX ORDER — AMINOPHYLLINE 25 MG/ML
50-100 INJECTION, SOLUTION INTRAVENOUS
Status: DISCONTINUED | OUTPATIENT
Start: 2020-03-02 | End: 2020-03-02

## 2020-03-02 RX ORDER — ACYCLOVIR 200 MG/1
0-1 CAPSULE ORAL
Status: DISCONTINUED | OUTPATIENT
Start: 2020-03-02 | End: 2020-03-02

## 2020-03-02 RX ORDER — ALBUTEROL SULFATE 90 UG/1
2 AEROSOL, METERED RESPIRATORY (INHALATION) EVERY 5 MIN PRN
Status: DISCONTINUED | OUTPATIENT
Start: 2020-03-02 | End: 2020-03-03 | Stop reason: HOSPADM

## 2020-03-02 RX ADMIN — REGADENOSON 0.4 MG: 0.08 INJECTION, SOLUTION INTRAVENOUS at 09:58

## 2020-03-02 RX ADMIN — TETROFOSMIN 41 MCI.: 1.38 INJECTION, POWDER, LYOPHILIZED, FOR SOLUTION INTRAVENOUS at 10:01

## 2020-03-02 RX ADMIN — TETROFOSMIN 10.7 MCI.: 1.38 INJECTION, POWDER, LYOPHILIZED, FOR SOLUTION INTRAVENOUS at 09:00

## 2020-03-05 ENCOUNTER — OFFICE VISIT (OUTPATIENT)
Dept: CARDIOLOGY | Facility: CLINIC | Age: 60
End: 2020-03-05
Attending: INTERNAL MEDICINE
Payer: COMMERCIAL

## 2020-03-05 VITALS
HEART RATE: 54 BPM | WEIGHT: 170.9 LBS | OXYGEN SATURATION: 96 % | SYSTOLIC BLOOD PRESSURE: 128 MMHG | DIASTOLIC BLOOD PRESSURE: 86 MMHG | BODY MASS INDEX: 28.47 KG/M2 | HEIGHT: 65 IN

## 2020-03-05 DIAGNOSIS — I44.2 COMPLETE ATRIOVENTRICULAR BLOCK (H): ICD-10-CM

## 2020-03-05 DIAGNOSIS — I42.8 NICM (NONISCHEMIC CARDIOMYOPATHY) (H): Primary | ICD-10-CM

## 2020-03-05 PROCEDURE — G0463 HOSPITAL OUTPT CLINIC VISIT: HCPCS | Mod: 25,ZF

## 2020-03-05 PROCEDURE — 99214 OFFICE O/P EST MOD 30 MIN: CPT | Mod: ZP | Performed by: INTERNAL MEDICINE

## 2020-03-05 ASSESSMENT — PAIN SCALES - GENERAL: PAINLEVEL: NO PAIN (0)

## 2020-03-05 ASSESSMENT — PATIENT HEALTH QUESTIONNAIRE - PHQ9: SUM OF ALL RESPONSES TO PHQ QUESTIONS 1-9: 12

## 2020-03-05 ASSESSMENT — MIFFLIN-ST. JEOR: SCORE: 1341.08

## 2020-03-05 NOTE — LETTER
3/5/2020      RE: Joyce Marroquin  [address] [facility] [address]       Dear Colleague,    Thank you for the opportunity to participate in the care of your patient, Joyce Marroquin, at the [facility]. Please see a copy of my visit note below.    HPI: 59-year-old female with a past medical history of RFA at [facility] complicated by complete heart block requiring pacemaker placement in 2017 at [facility].    At her last visit Joyce indicated and diminished exertional tolerance.  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  We rechecked her ejection fraction by echo and it had not changed and remains in the 45% range.  A Lexiscan was similarly negative for ischemia.    At our last visit I had indicated that perhaps a CRT pacing system would be advantageous but given the stability of her ejection fraction over the last several measurements it is less likely to make a substantial change in her function.  She does not have any overt heart failure symptoms.  It may be advantageous for her to be seen by heart failure and undergo an exercise right heart catheterization.  It is possible the diastolic dysfunction may be contributing in which case CRT would not necessarily be advantageous.  I will ask Dr. Ordonez to see if he agrees undertake a right heart catheterization potentially with exercise to see if we can better explain patient's symptoms.    Since her last clinic visit she has sent a couple blood pressures which followed in modest increase in lisinopril to 5 mg daily.  Her blood pressures were in the 80s at this stage at home.  She reverted to 2.5 of lisinopril.      The possibility of a low volume state needs to be considered and the right heart catheterization could be helpful.    Joyce has had a number of social issues.  She is still upset about the fact that she needs a pacemaker at all after the ablation at  [facility].  She is concerned about her ability to continue work.  There does also appear to be some family issues going on that is adding additional stress.  During the past year she also lost parents and the dog causing additional social difficulties for her.    Joyce is concerned about her weight gain and we did discuss potential value of a nutrition consult since she does admit to using food as a escape mechanism.        PAST MEDICAL HISTORY:  Past Medical History:   Diagnosis Date     Cardiomyopathy (H)     ff Cardiology     Chronic depressive personality disorder      Esophageal reflux      Ex-smoker     quit 2006; 1 PPD x 30     Hyperparathyroidism (H)     s/p parathyroidectomy     Hypothyroidism      LARRY on CPAP     ff Sleep medicine     Pulmonary nodules     ff Pulmonologist     S/P cardiac pacemaker procedure     checked every 6 months at [facility]       CURRENT MEDICATIONS:  Current Outpatient Medications   Medication Sig Dispense Refill     albuterol (PROAIR HFA/PROVENTIL HFA/VENTOLIN HFA) 108 (90 BASE) MCG/ACT Inhaler Inhale 2 puffs into the lungs as needed for shortness of breath / dyspnea or wheezing        aspirin (ASA) 81 MG EC tablet Take 1 tablet (81 mg) by mouth daily 90 tablet 3     azelastine (ASTELIN) 0.1 % nasal spray Spray 1 spray into both nostrils 2 times daily       calcium carbonate (OS-MANJU) 500 MG tablet Take 1 tablet by mouth 2 times daily       cefuroxime (CEFTIN) 500 MG tablet Take 1 tablet (500 mg) by mouth 2 times daily for 7 days 14 tablet 0     DiphenhydrAMINE HCl (BENADRYL PO) Take 25-50 mg by mouth nightly as needed       Ferrous Sulfate (IRON SUPPLEMENT PO) Take 65 mg by mouth 2 times daily (with meals)        fluticasone-salmeterol (ADVAIR) 250-50 MCG/DOSE inhaler Inhale 1 puff into the lungs every 12 hours       furosemide (LASIX) 20 MG tablet Take 0.5 tablets (10 mg) by mouth daily 30 tablet 0     guaiFENesin (MUCINEX) 600 MG 12 hr tablet Take 600 mg by mouth 2 times  daily       ipratropium (ATROVENT) 0.06 % nasal spray Spray 2 sprays into both nostrils 4 times daily       lisinopril (PRINIVIL/ZESTRIL) 5 MG tablet Take 0.5 tablets (2.5 mg) by mouth daily 30 tablet 3     loperamide (IMODIUM A-D) 2 MG tablet Take 2 tabs (4 mg) after first loose stool, and then take one tab (2 mg) after each diarrheal stool.  Max of 8 tabs (16 mg) per day. 1 tablet 0     MAGNESIUM LACTATE PO Take 180 mg by mouth At Bedtime        MELATONIN PO Take 3 mg by mouth At Bedtime        Menaquinone-7 (VITAMIN K2 PO) Take 1 tablet by mouth every morning        METHYLPREDNISOLONE PO Take 40 mg by mouth as needed        Multiple Vitamin (DAILY MULTIVITAMIN PO) Take 1 tablet by mouth every morning        nebulizer nebulization as needed       Probiotic Product (PROBIOTIC DAILY PO) Take 1 capsule by mouth 2 times daily        rOPINIRole (REQUIP) 0.25 MG tablet        SYNTHROID 150 MCG tablet Take 1 tablet (150 mcg) by mouth daily 90 tablet 2     TURMERIC PO Take 1 tablet by mouth every morning        vitamin B complex with vitamin C (VITAMIN  B COMPLEX) PO tablet Take 1 tablet by mouth as needed       vitamin D3 (CHOLECALCIFEROL) 2000 units tablet Take 1 tablet by mouth daily 1 tablet 0       PAST SURGICAL HISTORY:  Past Surgical History:   Procedure Laterality Date     BUNIONECTOMY Bilateral      CV CORONARY ANGIOGRAM N/A 9/26/2019    Procedure: CV CORONARY ANGIOGRAM;  Surgeon: Erickson Trejo MD;  Location:  HEART CARDIAC CATH LAB     EP ABLATION / EP STUDIES  04/21/2017     EXPLORE NECK N/A 9/19/2018    Procedure: EXPLORE NECK;  Neck Exploration Resection of Left superior Parathyroid gland;  Surgeon: Kristine Venegas MD;  Location:  OR      CORRECT BUNION,SIMPLE       PARATHYROIDECTOMY N/A 9/19/2018    Procedure: PARATHYROIDECTOMY;;  Surgeon: Kristine Venegas MD;  Location:  OR     PPM INSERT OF NEW OR REPL W/VENT LEAD  04/21/2017     TONSILLECTOMY & ADENOIDECTOMY         ALLERGIES:      Allergies   Allergen Reactions     Tetracycline Swelling     Zofran [Ondansetron]      Prolonged QT  Prolonged QT at baseline per patient     Flagyl [Metronidazole] Rash     Buspar [Buspirone]      Muscle weakness     Corn Oil Other (See Comments)     Headache       Lorazepam Other (See Comments)     Heat intolerance       Seasonal Allergies Difficulty breathing     Sulfamethoxazole-Trimethoprim      Other reaction(s): Other  Passed out     Benzodiazepines Other (See Comments)     Other reaction(s): Other  Extreme heat intolerance  Extreme heat intolerance     Cyclobenzaprine Other (See Comments)     Extreme heat intolerance     Levaquin [Levofloxacin]      Other reaction(s): Other  Tendon rupture     Nsaids Other (See Comments)     Exacerbated asthma       FAMILY HISTORY:  Family History   Problem Relation Age of Onset     Hypothyroidism Mother      Hypertension Mother      Osteoarthritis Mother      Coronary Artery Disease Father         nonfatal MI in his 70s     Asthma Father      Diabetes Sister      Hypothyroidism Sister      Breast Cancer Maternal Aunt      - Premature coronary artery disease  - Atrial fibrillation  - Sudden cardiac death     SOCIAL HISTORY:  Social History     Tobacco Use     Smoking status: Former Smoker     Smokeless tobacco: Never Used   Substance Use Topics     Alcohol use: Yes     Comment: seldom     Drug use: No       ROS:   Constitutional: No fever, chills, or sweats. Weight stable.   ENT: No visual disturbance, ear ache, epistaxis, sore throat.   Cardiovascular: As per HPI.   Respiratory: No cough, hemoptysis.    GI: No nausea, vomiting, hematemesis, melena, or hematochezia.   : No hematuria.   Integument: Negative.   Psychiatric: Negative.   Hematologic:  Easy bruising, no easy bleeding.  Neuro: Negative.   Endocrinology: No significant heat or cold intolerance   Musculoskeletal: No myalgia.    Exam:  /86 (BP Location: Right arm, Patient Position: Chair, Cuff Size: Adult  "Regular)   Pulse 54   Ht 1.651 m (5' 5\")   Wt 77.5 kg (170 lb 14.4 oz)   LMP 08/21/2007   SpO2 96%   BMI 28.44 kg/m     GENERAL APPEARANCE: healthy, alert and no distress  HEENT:  no central cyanosis  NECK: no adenopathy, no asymmetry, JVP not overtly elevated  RESPIRATORY: no rales, rhonchi or wheezes, no use of accessory muscles, no retractions, respirations are unlabored, normal respiratory rate  CARDIOVASCULAR: regular rhythm,     ABDOMEN: soft, non tender,  EXTREMITIES: peripheral pulses normal, no edema, no bruits  NEURO: alert and oriented to person/place/time, normal speech, gait and affect  VASC: pulses are normal in volumes and symmetric bilaterally. No bruits are heard.  SKIN: no ecchymoses, no rashes    Labs:  CBC RESULTS:   Lab Results   Component Value Date    WBC 6.8 10/06/2019    RBC 4.19 10/06/2019    HGB 13.5 10/06/2019    HCT 42.0 10/06/2019     10/06/2019    MCH 32.2 10/06/2019    MCHC 32.1 10/06/2019    RDW 12.1 10/06/2019     10/06/2019       BMP RESULTS:  Lab Results   Component Value Date     11/13/2019    POTASSIUM 4.3 11/13/2019    CHLORIDE 103 11/13/2019    CO2 27 11/13/2019    ANIONGAP 5 11/13/2019     (H) 11/13/2019    BUN 20 11/13/2019    CR 0.64 11/13/2019    GFRESTIMATED >90 11/13/2019    GFRESTBLACK >90 11/13/2019    MANJU 9.2 11/13/2019        INR RESULTS:  Lab Results   Component Value Date    INR 1.02 10/06/2019    INR 0.95 06/28/2017       Procedures:    ECHOCARDIOGRAM:   No results found for this or any previous visit (from the past 8760 hour(s)).      Assessment and Plan:     8-year-old female with a past medical history significant for GERD,  RFA complicated by complete heart block in 2017 at [facility], status post pacemaker placement in 2017,     1. PPM operating normally, continue usual followup.  We will however increase her ADL rate and her response to reach ADL in an attempt to try to provide some exercise benefit.  Joyce is aware that " we cannot be sure that there will be any obvious improvement with this intervention alone.    2.  No evidence of ischemia on Lexiscan and persistent ejection fraction in the 45% range by echo.  The possibility of diastolic dysfunction causing exertional intolerance warrants consideration and I will ask the heart failure team to see her in perhaps undertake a right heart cath with exercise.    3. Nutrition consult    Clinic follow-up in 3 months time to discuss findings     I very much appreciated the opportunity to see and assess Joyce Marroquin in the clinic. Please do not hesitate to contact my office if you have any questions or concerns.      Lino Funes MD  Cardiac Arrhythmia Service  [facility]  [phone]    CC  LINO FUNES

## 2020-03-05 NOTE — PATIENT INSTRUCTIONS
You were seen in the Electrophysiology Clinic today by: Dr. Lino Funes MD    Plan:     Appointment with Dr. Ordonez. Referral has been placed and Han () will reach out to you to set up.    Nutrition Consult - Number on front page for scheduling.    Follow-up in 3 months with Dr. Funes.    Echo: Ejection fraction is 40-45%. There has been no change since your Echo 12/3/2019.  Lexiscan: Negative for inducible myocardial infarction or ischemia.     Your Care Team:  EP Cardiology   Telephone Number     Cheyenne Thomson RN [phone]     For scheduling appts or procedures:    Merced Heller   [phone]   For the Device Clinic (Pacemakers, ICDs, Loop Recorders)    During business hours: [phone]  After business hours:   [phone]- select option 4 and ask for job code [phone].       Cardiovascular Clinic:   [address] [place] [address]. [place] [address]      As always, Thank you for trusting us with your health care needs!

## 2020-03-05 NOTE — PROGRESS NOTES
HPI: 59-year-old female with a past medical history of RFA at [facility] complicated by complete heart block requiring pacemaker placement in 2017 at [facility].    At her last visit Joyce indicated and diminished exertional tolerance.  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  We rechecked her ejection fraction by echo and it had not changed and remains in the 45% range.  A Lexiscan was similarly negative for ischemia.    At our last visit I had indicated that perhaps a CRT pacing system would be advantageous but given the stability of her ejection fraction over the last several measurements it is less likely to make a substantial change in her function.  She does not have any overt heart failure symptoms.  It may be advantageous for her to be seen by heart failure and undergo an exercise right heart catheterization.  It is possible the diastolic dysfunction may be contributing in which case CRT would not necessarily be advantageous.  I will ask Dr. Ordonez to see if he agrees undertake a right heart catheterization potentially with exercise to see if we can better explain patient's symptoms.    Since her last clinic visit she has sent a couple blood pressures which followed in modest increase in lisinopril to 5 mg daily.  Her blood pressures were in the 80s at this stage at home.  She reverted to 2.5 of lisinopril.      The possibility of a low volume state needs to be considered and the right heart catheterization could be helpful.    Joyce has had a number of social issues.  She is still upset about the fact that she needs a pacemaker at all after the ablation at [facility].  She is concerned about her ability to continue work.  There does also appear to be some family issues going on that is adding additional stress.  During the past year she also lost parents and the dog causing additional social difficulties for her.    Joyce is concerned about her weight gain  and we did discuss potential value of a nutrition consult since she does admit to using food as a escape mechanism.        PAST MEDICAL HISTORY:  Past Medical History:   Diagnosis Date     Cardiomyopathy (H)     ff Cardiology     Chronic depressive personality disorder      Esophageal reflux      Ex-smoker     quit 2006; 1 PPD x 30     Hyperparathyroidism (H)     s/p parathyroidectomy     Hypothyroidism      LARRY on CPAP     ff Sleep medicine     Pulmonary nodules     ff Pulmonologist     S/P cardiac pacemaker procedure     checked every 6 months at [facility]       CURRENT MEDICATIONS:  Current Outpatient Medications   Medication Sig Dispense Refill     albuterol (PROAIR HFA/PROVENTIL HFA/VENTOLIN HFA) 108 (90 BASE) MCG/ACT Inhaler Inhale 2 puffs into the lungs as needed for shortness of breath / dyspnea or wheezing        aspirin (ASA) 81 MG EC tablet Take 1 tablet (81 mg) by mouth daily 90 tablet 3     azelastine (ASTELIN) 0.1 % nasal spray Spray 1 spray into both nostrils 2 times daily       calcium carbonate (OS-MANJU) 500 MG tablet Take 1 tablet by mouth 2 times daily       cefuroxime (CEFTIN) 500 MG tablet Take 1 tablet (500 mg) by mouth 2 times daily for 7 days 14 tablet 0     DiphenhydrAMINE HCl (BENADRYL PO) Take 25-50 mg by mouth nightly as needed       Ferrous Sulfate (IRON SUPPLEMENT PO) Take 65 mg by mouth 2 times daily (with meals)        fluticasone-salmeterol (ADVAIR) 250-50 MCG/DOSE inhaler Inhale 1 puff into the lungs every 12 hours       furosemide (LASIX) 20 MG tablet Take 0.5 tablets (10 mg) by mouth daily 30 tablet 0     guaiFENesin (MUCINEX) 600 MG 12 hr tablet Take 600 mg by mouth 2 times daily       ipratropium (ATROVENT) 0.06 % nasal spray Spray 2 sprays into both nostrils 4 times daily       lisinopril (PRINIVIL/ZESTRIL) 5 MG tablet Take 0.5 tablets (2.5 mg) by mouth daily 30 tablet 3     loperamide (IMODIUM A-D) 2 MG tablet Take 2 tabs (4 mg) after first loose stool, and then take one tab  (2 mg) after each diarrheal stool.  Max of 8 tabs (16 mg) per day. 1 tablet 0     MAGNESIUM LACTATE PO Take 180 mg by mouth At Bedtime        MELATONIN PO Take 3 mg by mouth At Bedtime        Menaquinone-7 (VITAMIN K2 PO) Take 1 tablet by mouth every morning        METHYLPREDNISOLONE PO Take 40 mg by mouth as needed        Multiple Vitamin (DAILY MULTIVITAMIN PO) Take 1 tablet by mouth every morning        nebulizer nebulization as needed       Probiotic Product (PROBIOTIC DAILY PO) Take 1 capsule by mouth 2 times daily        rOPINIRole (REQUIP) 0.25 MG tablet        SYNTHROID 150 MCG tablet Take 1 tablet (150 mcg) by mouth daily 90 tablet 2     TURMERIC PO Take 1 tablet by mouth every morning        vitamin B complex with vitamin C (VITAMIN  B COMPLEX) PO tablet Take 1 tablet by mouth as needed       vitamin D3 (CHOLECALCIFEROL) 2000 units tablet Take 1 tablet by mouth daily 1 tablet 0       PAST SURGICAL HISTORY:  Past Surgical History:   Procedure Laterality Date     BUNIONECTOMY Bilateral      CV CORONARY ANGIOGRAM N/A 9/26/2019    Procedure: CV CORONARY ANGIOGRAM;  Surgeon: Erickson Trejo MD;  Location:  [facility]     EP ABLATION / EP STUDIES  04/21/2017     EXPLORE NECK N/A 9/19/2018    Procedure: EXPLORE NECK;  Neck Exploration Resection of Left superior Parathyroid gland;  Surgeon: Kristine Venegas MD;  Location: [facility] OR      CORRECT BUNION,SIMPLE       PARATHYROIDECTOMY N/A 9/19/2018    Procedure: PARATHYROIDECTOMY;;  Surgeon: Kristine Venegas MD;  Location: [facility] OR     PPM INSERT OF NEW OR REPL W/VENT LEAD  04/21/2017     TONSILLECTOMY & ADENOIDECTOMY         ALLERGIES:     Allergies   Allergen Reactions     Tetracycline Swelling     Zofran [Ondansetron]      Prolonged QT  Prolonged QT at baseline per patient     Flagyl [Metronidazole] Rash     Buspar [Buspirone]      Muscle weakness     Corn Oil Other (See Comments)     Headache       Lorazepam Other (See Comments)     Heat  "intolerance       Seasonal Allergies Difficulty breathing     Sulfamethoxazole-Trimethoprim      Other reaction(s): Other  Passed out     Benzodiazepines Other (See Comments)     Other reaction(s): Other  Extreme heat intolerance  Extreme heat intolerance     Cyclobenzaprine Other (See Comments)     Extreme heat intolerance     Levaquin [Levofloxacin]      Other reaction(s): Other  Tendon rupture     Nsaids Other (See Comments)     Exacerbated asthma       FAMILY HISTORY:  Family History   Problem Relation Age of Onset     Hypothyroidism Mother      Hypertension Mother      Osteoarthritis Mother      Coronary Artery Disease Father         nonfatal MI in his 70s     Asthma Father      Diabetes Sister      Hypothyroidism Sister      Breast Cancer Maternal Aunt      - Premature coronary artery disease  - Atrial fibrillation  - Sudden cardiac death     SOCIAL HISTORY:  Social History     Tobacco Use     Smoking status: Former Smoker     Smokeless tobacco: Never Used   Substance Use Topics     Alcohol use: Yes     Comment: seldom     Drug use: No       ROS:   Constitutional: No fever, chills, or sweats. Weight stable.   ENT: No visual disturbance, ear ache, epistaxis, sore throat.   Cardiovascular: As per HPI.   Respiratory: No cough, hemoptysis.    GI: No nausea, vomiting, hematemesis, melena, or hematochezia.   : No hematuria.   Integument: Negative.   Psychiatric: Negative.   Hematologic:  Easy bruising, no easy bleeding.  Neuro: Negative.   Endocrinology: No significant heat or cold intolerance   Musculoskeletal: No myalgia.    Exam:  /86 (BP Location: Right arm, Patient Position: Chair, Cuff Size: Adult Regular)   Pulse 54   Ht 1.651 m (5' 5\")   Wt 77.5 kg (170 lb 14.4 oz)   LMP 08/21/2007   SpO2 96%   BMI 28.44 kg/m    GENERAL APPEARANCE: healthy, alert and no distress  HEENT:  no central cyanosis  NECK: no adenopathy, no asymmetry, JVP not overtly elevated  RESPIRATORY: no rales, rhonchi or wheezes, " no use of accessory muscles, no retractions, respirations are unlabored, normal respiratory rate  CARDIOVASCULAR: regular rhythm,     ABDOMEN: soft, non tender,  EXTREMITIES: peripheral pulses normal, no edema, no bruits  NEURO: alert and oriented to person/place/time, normal speech, gait and affect  VASC: pulses are normal in volumes and symmetric bilaterally. No bruits are heard.  SKIN: no ecchymoses, no rashes    Labs:  CBC RESULTS:   Lab Results   Component Value Date    WBC 6.8 10/06/2019    RBC 4.19 10/06/2019    HGB 13.5 10/06/2019    HCT 42.0 10/06/2019     10/06/2019    MCH 32.2 10/06/2019    MCHC 32.1 10/06/2019    RDW 12.1 10/06/2019     10/06/2019       BMP RESULTS:  Lab Results   Component Value Date     11/13/2019    POTASSIUM 4.3 11/13/2019    CHLORIDE 103 11/13/2019    CO2 27 11/13/2019    ANIONGAP 5 11/13/2019     (H) 11/13/2019    BUN 20 11/13/2019    CR 0.64 11/13/2019    GFRESTIMATED >90 11/13/2019    GFRESTBLACK >90 11/13/2019    MANJU 9.2 11/13/2019        INR RESULTS:  Lab Results   Component Value Date    INR 1.02 10/06/2019    INR 0.95 06/28/2017       Procedures:    ECHOCARDIOGRAM:   No results found for this or any previous visit (from the past 8760 hour(s)).      Assessment and Plan:     8-year-old female with a past medical history significant for GERD,  RFA complicated by complete heart block in 2017 at [facility], status post pacemaker placement in 2017,     1. PPM operating normally, continue usual followup.  We will however increase her ADL rate and her response to reach ADL in an attempt to try to provide some exercise benefit.  Joyce is aware that we cannot be sure that there will be any obvious improvement with this intervention alone.    2.  No evidence of ischemia on Lexiscan and persistent ejection fraction in the 45% range by echo.  The possibility of diastolic dysfunction causing exertional intolerance warrants consideration and I will ask the  heart failure team to see her in perhaps undertake a right heart cath with exercise.    3. Nutrition consult    Clinic follow-up in 3 months time to discuss findings     I very much appreciated the opportunity to see and assess Joyce Marroquin in the clinic. Please do not hesitate to contact my office if you have any questions or concerns.      Lino Funes MD  Cardiac Arrhythmia Service  [facility]  [phone]    CC  LINO FUNES

## 2020-03-05 NOTE — NURSING NOTE
Chief Complaint   Patient presents with     Follow Up     59yoF w/ hx RVOT/ablation w/ CHB s/p PPM 2017 presents for f/up - review Ilana and Echo and consult for CRT-P upgrade w/ device prior.     Vitals were taken and medications were reconciled.     Celia Cloud RMA  10:02 AM

## 2020-03-05 NOTE — PATIENT INSTRUCTIONS
It was a pleasure to see you in clinic today. Please do not hesitate to call with any questions or concerns. You are scheduled for a remote Pacemaker transmission on 6/8/20. We will call in 1-2 business days to discuss the results with you. We look forward to seeing you in clinic at your next device check in 6 months    Lucia Menard, RN  Electrophysiology Nurse Clinician  [facility]  During business hours call:  [phone]  After business hours please call: [phone]- select option #4 and ask for job code [phone].

## 2020-03-06 LAB
MDC_IDC_LEAD_IMPLANT_DT: NORMAL
MDC_IDC_LEAD_IMPLANT_DT: NORMAL
MDC_IDC_LEAD_LOCATION: NORMAL
MDC_IDC_LEAD_LOCATION: NORMAL
MDC_IDC_LEAD_LOCATION_DETAIL_1: NORMAL
MDC_IDC_LEAD_LOCATION_DETAIL_1: NORMAL
MDC_IDC_LEAD_MFG: NORMAL
MDC_IDC_LEAD_MFG: NORMAL
MDC_IDC_LEAD_MODEL: NORMAL
MDC_IDC_LEAD_MODEL: NORMAL
MDC_IDC_LEAD_POLARITY_TYPE: NORMAL
MDC_IDC_LEAD_POLARITY_TYPE: NORMAL
MDC_IDC_LEAD_SERIAL: NORMAL
MDC_IDC_LEAD_SERIAL: NORMAL
MDC_IDC_PG_IMPLANT_DTM: NORMAL
MDC_IDC_PG_MFG: NORMAL
MDC_IDC_PG_MODEL: NORMAL
MDC_IDC_PG_SERIAL: NORMAL
MDC_IDC_PG_TYPE: NORMAL
MDC_IDC_SESS_CLINIC_NAME: NORMAL
MDC_IDC_SESS_DTM: NORMAL
MDC_IDC_SESS_TYPE: NORMAL

## 2020-03-13 ENCOUNTER — MYC MEDICAL ADVICE (OUTPATIENT)
Dept: CARDIOLOGY | Facility: CLINIC | Age: 60
End: 2020-03-13

## 2020-03-13 ENCOUNTER — CARE COORDINATION (OUTPATIENT)
Dept: CARDIOLOGY | Facility: CLINIC | Age: 60
End: 2020-03-13

## 2020-03-13 NOTE — PROGRESS NOTES
Patient is referred to Dr. Ordonez from Dr. Funes for heart failure evaluation to include possible RHC with exercise for EF 45% and pt c/o fatigue. Please see his previous office note.    4/2/20 - Called and spoke with patient. Discussed need for appointment and she is aware of the referral.  Patient would like to make the appointment but requests a video or phone visit as she does not wish to come during covid 19 pandemic.  We agree and will request staff member call her sometime before appt to make sure she has correct video capability.

## 2020-03-24 ENCOUNTER — MYC MEDICAL ADVICE (OUTPATIENT)
Dept: FAMILY MEDICINE | Facility: CLINIC | Age: 60
End: 2020-03-24

## 2020-03-24 ENCOUNTER — NURSE TRIAGE (OUTPATIENT)
Dept: NURSING | Facility: CLINIC | Age: 60
End: 2020-03-24

## 2020-03-24 DIAGNOSIS — F41.9 ANXIETY: Primary | ICD-10-CM

## 2020-03-24 NOTE — TELEPHONE ENCOUNTER
Reason for call:  Patient reporting a symptom    Symptom or request: fever/shortness of breath/diarrhea    Duration (how long have symptoms been present): 2 days     Have you been treated for this before? Yes    Additional comments: patient has a lot of underlining medical issues and has been self quarantine herself now     Please call to discuss with patient     Phone Number patient can be reached at:  Home number on file [phone] (home)    Best Time:  any    Can we leave a detailed message on this number:  YES    Call taken on 3/24/2020 at 2:01 PM by Jesica Riggins

## 2020-03-24 NOTE — TELEPHONE ENCOUNTER
"Cough, mild SOB since yesterday. T 100.5 yesterday, 99.0 today. Hx asthma so she is unsure if SOB is related to URI, asthma, anxiety - watching news about Covid19 - or a combination. Able to talk in full sentences, full volume. Neb/inhaler helpful. Sent MyChart note to clinic today but did hear back. Tried to do [email or web address] visit but did not complete visit \"it's too complex\" Advised ED now per guideline.  Pt declined ED \"I don't think I'm sick enough\". She intends to use nebs/inhaler. Pt agreed to go to ED if SOB worse or not helped by inhaler.    Reason for Disposition    [1] Difficulty breathing AND [2] not severe AND [3] not from stuffy nose (e.g., not relieved by cleaning out the nose)    Additional Information    Negative: Severe difficulty breathing (e.g., struggling for each breath, speaks in single words)    Negative: Sounds like a life-threatening emergency to the triager    Negative: Runny nose is caused by pollen or other allergies    Negative: Cough is main symptom    Negative: Severe sore throat    Negative: Fever > 104 F (40 C)    Protocols used: COMMON COLD-A-AH      "

## 2020-03-25 RX ORDER — CLONAZEPAM 0.5 MG/1
0.5 TABLET ORAL 2 TIMES DAILY PRN
Qty: 60 TABLET | Refills: 1 | Status: SHIPPED | OUTPATIENT
Start: 2020-03-25 | End: 2020-04-27

## 2020-04-20 ENCOUNTER — VIRTUAL VISIT (OUTPATIENT)
Dept: BEHAVIORAL HEALTH | Facility: CLINIC | Age: 60
End: 2020-04-20
Payer: COMMERCIAL

## 2020-04-20 DIAGNOSIS — F43.23 ADJUSTMENT DISORDER WITH MIXED ANXIETY AND DEPRESSED MOOD: ICD-10-CM

## 2020-04-20 SDOH — ECONOMIC STABILITY: INCOME INSECURITY: IN THE LAST 12 MONTHS, WAS THERE A TIME WHEN YOU WERE NOT ABLE TO PAY THE MORTGAGE OR RENT ON TIME?: NO

## 2020-04-20 ASSESSMENT — ANXIETY QUESTIONNAIRES
GAD7 TOTAL SCORE: 12
7. FEELING AFRAID AS IF SOMETHING AWFUL MIGHT HAPPEN: SEVERAL DAYS
5. BEING SO RESTLESS THAT IT IS HARD TO SIT STILL: NOT AT ALL
GAD7 TOTAL SCORE: 12
2. NOT BEING ABLE TO STOP OR CONTROL WORRYING: MORE THAN HALF THE DAYS
1. FEELING NERVOUS, ANXIOUS, OR ON EDGE: NEARLY EVERY DAY
3. WORRYING TOO MUCH ABOUT DIFFERENT THINGS: NEARLY EVERY DAY
4. TROUBLE RELAXING: SEVERAL DAYS
6. BECOMING EASILY ANNOYED OR IRRITABLE: MORE THAN HALF THE DAYS
7. FEELING AFRAID AS IF SOMETHING AWFUL MIGHT HAPPEN: SEVERAL DAYS
GAD7 TOTAL SCORE: 12

## 2020-04-20 ASSESSMENT — PATIENT HEALTH QUESTIONNAIRE - PHQ9
SUM OF ALL RESPONSES TO PHQ QUESTIONS 1-9: 4
10. IF YOU CHECKED OFF ANY PROBLEMS, HOW DIFFICULT HAVE THESE PROBLEMS MADE IT FOR YOU TO DO YOUR WORK, TAKE CARE OF THINGS AT HOME, OR GET ALONG WITH OTHER PEOPLE: SOMEWHAT DIFFICULT
SUM OF ALL RESPONSES TO PHQ QUESTIONS 1-9: 4

## 2020-04-20 NOTE — PROGRESS NOTES
[facility]: Integrated Behavioral Health  Integrated Behavioral Health Services   Brief Diagnostic Assessment    PATIENT'S NAME: Joyce Marroquin  MRN:   [id number]  :   1960  DATE OF SERVICE: 2020  Telemedicine Visit: The patient's condition can be safely assessed and treated via synchronous audio and visual telemedicine encounter.      Reason for Telemedicine Visit: COVID-19    Originating Site (Patient Location): Patient's home    Distant Site (Provider Location): Provider Remote Setting    Consent:  The patient/guardian has verbally consented to: the potential risks and benefits of telemedicine (video visit) versus in person care; bill my insurance or make self-payment for services provided; and responsibility for payment of non-covered services.     Mode of Communication:  Video Conference via [employer]    As the provider I attest to compliance with applicable laws and regulations related to telemedicine.    Visit Activities: NEW Virtual Visit     Identifying Information:  Patient is a 59 year old year old, ,  female.  Patient attended the session alone via [employer].        Referral:  Patient was referred for an assessment by [facility] - due to elevated PHQ-9 score in past.     Reason for referral: Elevated PHQ-9 score/evaluate mental health symptoms related to chronic disease management.       Patient's Statement of Presenting Concern:  Patient reports the following reason(s) for seeking an assessment at this time: concerns with multiple worries and trouble with stress. The patient reports struggling with several recent psychosocial stressors within the last year, including the death of her parents and family dog around the same time. She notes her parents and dog passed away in Spring of 2019, making the time this assessment occurred a difficult time for her. In addition, she reports grappling with her history of a  "cardiac ablation that was unsuccessful and requires her to use a pacemaker. She indicates trying individual therapy in the past to help cope with this adjustment, however described her previous therapy attempts as unhelpful. When asked about prior presenting concerns and mental health history, the patient reports history of post-traumatic stress disorder (secondary to heart surgery), panic attacks,  depression, and anxiety. She states previously attending a \"high functioning depressive group\" in the past for problems with depression. To note, she largely denied problems with depression and PTSD today, noting her primary concern is with anxiety and general stress management. She denied recent problems with panic attacks as well.  Upon reviewing symptoms of PTSD, the patient did deny hallmark signs of the disorder (e.g. hypervigiliance, changed startle response, avoidance tendencies etc.). While the patient denied problems with depression at this time, she did endorse some ongoing concerns with helplessness and hopelessness now that she uses a pacemaker.     Completed screening instruments, including the PHQ-9 and JESUS-7, were 4 (minimal depression) and 12 (moderate anxiety), respectively. To note, the PHQ-9 was negative for current suicidal ideation. The patient denied history of suicidal ideation or previous suicide attempts. Completed C-SSSRS today was negative.     In addition to reported situational anxiety, the patient reports ongoing social stress and related feelings of loneliness and boredom due to COVID-19. She described previously having a bible-study group she attended, however indicates this was postponed due to the pandemic. The patient states she wished to resume psychotherapy because \"I have nothing to lose from trying again.\"  Patient stated that her symptoms have resulted in the following functional impairments: chronic disease management, home life with her , relationship(s) and " self-care.    Answers for HPI/ROS submitted by the patient on 4/20/2020   If you checked off any problems, how difficult have these problems made it for you to do your work, take care of things at home, or get along with other people?: Somewhat difficult  PHQ9 TOTAL SCORE: 4  JESUS 7 TOTAL SCORE: 12      History of Presenting Concern:  Patient reports that these problem(s) began about three years ago. Patient has attempted to resolve these concerns in the past through: counseling and medication(s) from physician / PCP. Patient reports that other professional(s) are involved in providing support / services.       Social History:  Current living situation: with  and two dogs   Socioeconomic status and needs: None reported  Patient's current significant relationships include: bible group,   Patient reported having two step-children. She has five step-grandchildren.   Patient identified few stable and meaningful social connections.   Highest education level was graduate school. She indicates having a Master's Degree from the [employer]  Patient is currently employed part-time as a nurse anthesiticist.        Mental Health History:  Patient is currently receiving the following services: medication for anxiety (Klonopin).  PHQ-9 SCORE 1/25/2019 3/5/2020 4/20/2020   PHQ-9 Total Score MyChart - - 4 (Minimal depression)   PHQ-9 Total Score 4 12 4     JESUS-7 SCORE 11/9/2018 1/25/2019 4/20/2020   Total Score - - 12 (moderate anxiety)   Total Score 6 4 12         Chemical Health History:  Patient is not currently receiving any chemical dependency treatment. Patient reports no problems as a result of their drinking / drug use.      Cage-AID score is: 0 Based on Cage-Aid score and clinical interview there  are not indications of drug or alcohol abuse.      Discussed the general effects of drugs and alcohol on health and well-being.      Significant Losses / Trauma / Abuse / Neglect Issues:  There are indications  or report of significant loss, trauma, abuse or neglect issues related to: death of her parents last year and dog around the same time and major medical problems remarkable for complex respiratory and cardiovascular problems.    Issues of possible neglect are not present.      Medical History:   Patient Active Problem List   Diagnosis     Benign neoplasm of vulva     Esophageal reflux     Chronic depressive personality disorder     Complete atrioventricular block (H)     Cardiac pacemaker in situ- [employer], dual lead- DEPENDENT     Hypothyroidism     Ex-smoker     Hyperparathyroidism (H)     Pulmonary nodules     Cardiomyopathy (H)     S/P cardiac pacemaker procedure     Situational anxiety     LARRY (obstructive sleep apnea)     Restless legs syndrome (RLS)     Vestibular disequilibrium, unspecified laterality     Collagenous colitis     COPD exacerbation (H)     Death of parent     Panic attack     Insomnia, unspecified type     Exacerbation of asthma, unspecified asthma severity, unspecified whether persistent     Tick bite, initial encounter     Cardiomyopathy, unspecified type (H)     Status post coronary angiogram       Medication Review:  Current Outpatient Medications   Medication     albuterol (PROAIR HFA/PROVENTIL HFA/VENTOLIN HFA) 108 (90 BASE) MCG/ACT Inhaler     aspirin (ASA) 81 MG EC tablet     azelastine (ASTELIN) 0.1 % nasal spray     calcium carbonate (OS-MANJU) 500 MG tablet     clonazePAM (KLONOPIN) 0.5 MG tablet     DiphenhydrAMINE HCl (BENADRYL PO)     Ferrous Sulfate (IRON SUPPLEMENT PO)     fluticasone-salmeterol (ADVAIR) 250-50 MCG/DOSE inhaler     furosemide (LASIX) 20 MG tablet     guaiFENesin (MUCINEX) 600 MG 12 hr tablet     ipratropium (ATROVENT) 0.06 % nasal spray     lisinopril (PRINIVIL/ZESTRIL) 5 MG tablet     loperamide (IMODIUM A-D) 2 MG tablet     MAGNESIUM LACTATE PO     MELATONIN PO     Menaquinone-7 (VITAMIN K2 PO)     METHYLPREDNISOLONE PO     Multiple Vitamin (DAILY MULTIVITAMIN  PO)     nebulizer nebulization     Probiotic Product (PROBIOTIC DAILY PO)     rOPINIRole (REQUIP) 0.25 MG tablet     SYNTHROID 150 MCG tablet     TURMERIC PO     vitamin B complex with vitamin C (VITAMIN  B COMPLEX) PO tablet     vitamin D3 (CHOLECALCIFEROL) 2000 units tablet     No current facility-administered medications for this visit.        Patient was provided recommendation to follow-up with physician.    Mental Status Assessment:  Appearance:   Appropriate   Eye Contact:   Good   Psychomotor Behavior: Normal   Attitude:   Cooperative   Orientation:   All  Speech   Rate / Production: Normal/ Responsive Normal    Volume:  Normal   Mood:    Anxious   Affect:    Appropriate   Thought Content:  Clear   Thought Form:  Coherent  Logical   Insight:    Good       Safety Assessment:    Patient denies a history of suicidal ideation, suicide attempts, self-injurious behavior, homicidal ideation, homicidal behavior and and other safety concerns  Patient denies current or recent suicidal ideation or behaviors.  Patient denies current or recent homicidal ideation or behaviors.  Patient denies current or recent self injurious behavior or ideation.  Patient denies other safety concerns.  Patient reports there are no firearms in the house  Protective Factors Sense of responsibility to family, Religiosity and Reality testing ability   Risk Factors Abrupt changes in clinical condition, Current high stress, Isolation and Sense of hopelessness and/or helplessness.      Plan for Safety and Risk Management:  A safety and risk management plan has not been developed at this time, however patient was encouraged to call [place] [phone] should there be a change in any of these risk factors.      Patient's Strengths and Limitations:  Patient identified the following strengths or resources that will help her succeed in counseling: Scientology / Druze, commitment to health and well being, dave / spirituality, insight, intelligence,  sense of humor and work ethic. Patient identified the following supports: Sikhism / spirituality and friends. Things that may interfere with the patient's success in counseling include:lack of family support and physical health concerns.    Diagnostic Criteria:  A. The development of emotional or behavioral symptoms in response to an identifiable stressor(s) occurring within 3 months of the onset of the stressor(s)  B. These symptoms or behaviors are clinically significant, as evidenced by one or both of the following:       - Marked distress that is out of proportion to the severity/intensity of the stressor (with consideration for external context & culture)       - Significant impairment in social, occupational, or other important areas of functioning  C. The stress-related disturbance does not meet criteria for another disorder & is not not an exacerbation of another mental disorder  D. The symptoms do not represent normal bereavement  E. Once the stressor or its consequences have terminated, the symptoms do not persist for more than an additional 6 months       * Adjustment Disorder with Mixed Anxiety and Depressed Mood: The predominant manifestation is a combination of depression and anxiety      Functional Status:  Patient's symptoms have caused reduced functional status in the following areas: Social / Relational - evidence of social conflict  Chronic disease management      DSM5 Diagnoses: (Sustained by DSM5 Criteria Listed Above)  Diagnoses: Adjustment Disorders  309.28 (F43.23) With mixed anxiety and depressed mood   R/O Post-traumatic Stress Disorder (PTSD)  R/O Generalized Anxiety Disorder (JESUS)  R/O Major Depressive Disorder, with Anxious Distress (MDD)  Psychosocial & Contextual Factors: complex medical history, low social support, recent significant lifestyle changes  WHODAS Score: 27  See Media section of EPIC medical record for completed WHODAS  CGI: 4    Preliminary Treatment Plan:    It is  expected that patient will need less than 10 psychotherapy sessions in the next 12 months, as they have received less than 10 in the previous year.    Chemical dependency recommendations: No indications of CD issues    As a preliminary treatment goal, patient will experience a reduction in anxiety, will develop more effective coping skills to manage anxiety symptoms, will develop healthy cognitive patterns and beliefs and will increase ability to function adaptively.    Collaboration with other professionals is not indicated at this time.    Referral to another professional/service is not indicated at this time..    A Release of Information is not needed at this time.    Report to child or adult protection services was NA.    David Forrester, Behavioral Health Clinician  4/20/2020

## 2020-04-21 ASSESSMENT — ANXIETY QUESTIONNAIRES: GAD7 TOTAL SCORE: 12

## 2020-04-21 ASSESSMENT — PATIENT HEALTH QUESTIONNAIRE - PHQ9: SUM OF ALL RESPONSES TO PHQ QUESTIONS 1-9: 4

## 2020-04-22 ENCOUNTER — CARE COORDINATION (OUTPATIENT)
Dept: CARDIOLOGY | Facility: CLINIC | Age: 60
End: 2020-04-22

## 2020-04-22 ENCOUNTER — VIRTUAL VISIT (OUTPATIENT)
Dept: CARDIOLOGY | Facility: CLINIC | Age: 60
End: 2020-04-22
Attending: INTERNAL MEDICINE
Payer: COMMERCIAL

## 2020-04-22 VITALS
WEIGHT: 170 LBS | BODY MASS INDEX: 28.29 KG/M2 | DIASTOLIC BLOOD PRESSURE: 95 MMHG | SYSTOLIC BLOOD PRESSURE: 130 MMHG | HEART RATE: 108 BPM | OXYGEN SATURATION: 98 %

## 2020-04-22 DIAGNOSIS — I10 BENIGN ESSENTIAL HYPERTENSION: Primary | ICD-10-CM

## 2020-04-22 DIAGNOSIS — I42.8 NICM (NONISCHEMIC CARDIOMYOPATHY) (H): Primary | ICD-10-CM

## 2020-04-22 DIAGNOSIS — I42.8 NONISCHEMIC CARDIOMYOPATHY (H): ICD-10-CM

## 2020-04-22 DIAGNOSIS — I50.20 HEART FAILURE WITH REDUCED EJECTION FRACTION, NYHA CLASS III (H): ICD-10-CM

## 2020-04-22 DIAGNOSIS — I42.9 CARDIOMYOPATHY, UNSPECIFIED TYPE (H): ICD-10-CM

## 2020-04-22 PROCEDURE — 99215 OFFICE O/P EST HI 40 MIN: CPT | Mod: 95 | Performed by: INTERNAL MEDICINE

## 2020-04-22 ASSESSMENT — PAIN SCALES - GENERAL: PAINLEVEL: NO PAIN (0)

## 2020-04-22 NOTE — PATIENT INSTRUCTIONS
-Discontinue medications at this time but reduce salt intake as we have discussed  -May use some Lasix after reducing salt intake if shortness of breath and swelling in the abdomen remains  -Continue to remain active as much as possible this would also help with shortness of breath and weight management  -We will contact you with regards to the timing of the right heart catheterization.  -Please call us anytime with questions.

## 2020-04-22 NOTE — PROGRESS NOTES
"Joyce Marroquin is a 59 year old female who is being evaluated via a billable video visit.      The patient has been notified of following:     \"This video visit will be conducted via a call between you and your physician/provider. We have found that certain health care needs can be provided without the need for an in-person physical exam.  This service lets us provide the care you need with a video conversation.  If a prescription is necessary we can send it directly to your pharmacy.  If lab work is needed we can place an order for that and you can then stop by our lab to have the test done at a later time.    Video visits are billed at different rates depending on your insurance coverage.  Please reach out to your insurance provider with any questions.    If during the course of the call the physician/provider feels a video visit is not appropriate, you will not be charged for this service.\"    Patient has given verbal consent for Video visit? Yes    How would you like to obtain your AVS? [employer]    Patient would like the video invitation sent by: Text to cell phone: [phone]    Will anyone else be joining your video visit? No      Video Start Time: 2:24 PM    Video-Visit Details    Type of service:  Video Visit    Video End Time (time video stopped): 3:07    Originating Location (pt. Location): Home    Distant Location (provider location):  [facility]     Mode of Communication:  Video Conference via [employer]    April 22, 2020     Dear Dr. Funes,  I had the pleasure of seeing Joyce Marroquin  in the [facility].  As you know she is a very pleasant lady who works as a nurse anesthetist and has a history of RF ablation which left to AV node ablation and complete heart block requiring a dual-chamber pacemaker implantation, which is a [employer] device MRI compatible.  She also has a recent history of heart failure with reduced ejection fraction with EF around 35% later improved to " 45%.  There was a history of Takotsubo cardiomyopathy.  Today during the visit she tells me that she has been feeling more short of breath is much more limited exercise capacity wise than in the past.  She was able to walk and work without any limitations however now she has some issues even walking the dog around.  She did gain about 50 pounds over the past couple of months to years which she thinks mostly food related.  She used to have normal blood pressure values before the AV node ablation however subsequently she developed episodes of hypotension to the point that she was hardly able to tolerate any neurohormonal blockade.  She never had really syncope or loss of consciousness but did have some episodes of dizziness however she also has some vestibular symptoms for a long time so difficult to discern.  Nevertheless her blood pressures dropped down to the 70s systolic at times.  She was also unable to tolerate diuretics essentially only took Lasix 20 mg infrequently.  She does regularly use salt supplement to maintain her blood pressure.  She denies any chest pain chest pressure and no other new complaints at this time.     PAST MEDICAL HISTORY:  Past Medical History:   Diagnosis Date     Cardiomyopathy (H)     ff Cardiology     Chronic depressive personality disorder      Esophageal reflux      Ex-smoker     quit 2006; 1 PPD x 30     Hyperparathyroidism (H)     s/p parathyroidectomy     Hypothyroidism      LARRY on CPAP     ff Sleep medicine     Pulmonary nodules     ff Pulmonologist     S/P cardiac pacemaker procedure     checked every 6 months at the [facility]     FAMILY HISTORY:  Family History   Problem Relation Age of Onset     Hypothyroidism Mother      Hypertension Mother      Osteoarthritis Mother      Coronary Artery Disease Father         nonfatal MI in his 70s     Asthma Father      Diabetes Sister      Hypothyroidism Sister      Breast Cancer Maternal Aunt      SOCIAL HISTORY:  Social History      Socioeconomic History     Marital status:      Spouse name: None     Number of children: None     Years of education: None     Highest education level: Master's degree (e.g., MA, MS, Florian, MEd, MSW, SONIA)   Occupational History     None   Social Needs     Financial resource strain: Not very hard     Food insecurity     Worry: Never true     Inability: Never true     Transportation needs     Medical: No     Non-medical: No   Tobacco Use     Smoking status: Former Smoker     Smokeless tobacco: Never Used   Substance and Sexual Activity     Alcohol use: Yes     Frequency: 2-3 times a week     Drinks per session: 1 or 2     Binge frequency: Never     Comment: seldom     Drug use: No     Sexual activity: Yes     Partners: Male     Birth control/protection: None     Comment: vasectomy   Lifestyle     Physical activity     Days per week: 3 days     Minutes per session: 10 min     Stress: To some extent   Relationships     Social connections     Talks on phone: More than three times a week     Gets together: Never     Attends [employer] service: More than 4 times per year     Active member of club or organization: Yes     Attends meetings of clubs or organizations: More than 4 times per year     Relationship status:      Intimate partner violence     Fear of current or ex partner: None     Emotionally abused: None     Physically abused: None     Forced sexual activity: None   Other Topics Concern     None   Social History Narrative    CRNA on disability for vestibular symptoms      CURRENT MEDICATIONS:  Current Outpatient Medications   Medication     albuterol (PROAIR HFA/PROVENTIL HFA/VENTOLIN HFA) 108 (90 BASE) MCG/ACT Inhaler     azelastine (ASTELIN) 0.1 % nasal spray     calcium carbonate (OS-MANJU) 500 MG tablet     clonazePAM (KLONOPIN) 0.5 MG tablet     DiphenhydrAMINE HCl (BENADRYL PO)     Ferrous Sulfate (IRON SUPPLEMENT PO)     fluticasone-salmeterol (ADVAIR) 250-50 MCG/DOSE inhaler     guaiFENesin  (MUCINEX) 600 MG 12 hr tablet     ipratropium (ATROVENT) 0.06 % nasal spray     lisinopril (PRINIVIL/ZESTRIL) 5 MG tablet     loperamide (IMODIUM A-D) 2 MG tablet     MAGNESIUM LACTATE PO     MELATONIN PO     Menaquinone-7 (VITAMIN K2 PO)     METHYLPREDNISOLONE PO     Multiple Vitamin (DAILY MULTIVITAMIN PO)     nebulizer nebulization     Probiotic Product (PROBIOTIC DAILY PO)     SYNTHROID 150 MCG tablet     TURMERIC PO     UNABLE TO FIND     vitamin B complex with vitamin C (VITAMIN  B COMPLEX) PO tablet     vitamin D3 (CHOLECALCIFEROL) 2000 units tablet     Zinc 15 MG CAPS     aspirin (ASA) 81 MG EC tablet     furosemide (LASIX) 20 MG tablet     rOPINIRole (REQUIP) 0.25 MG tablet     No current facility-administered medications for this visit.      ROS:   Constitutional: No fever, chills, or sweats. Weight is 170 lbs 0 oz  ENT: No visual disturbance, ear ache, epistaxis, sore throat.   Allergies/Immunologic: Negative.   Respiratory: No cough, hemoptysis.   Cardiovascular: As per HPI.   GI: No nausea, vomiting, hematemesis, melena, or hematochezia.   : No urinary frequency, dysuria, or hematuria.   Integument: Negative.   Psychiatric: Pleasant, no major depression noted  Neuro: No focal neurological deficits noted  Endocrinology: Negative.   Musculoskeletal: As per HPI.      EXAM:  BP (!) 130/95 (BP Location: Right arm, Patient Position: Chair, Cuff Size: Adult Regular)   Pulse 108   Wt 77.1 kg (170 lb)   LMP 08/21/2007   SpO2 98%   BMI 28.29 kg/m    General: appears comfortable, alert and oriented  Head: normocephalic, atraumatic  Eyes: anicteric sclera, EOMI , PERRL  Neck: no adenopathy visible over the video  Heart: Unable to auscultate.  No significant JVD appreciated the screen.  Lungs: Unable to auscultate  Abdomen: Patient notes some ongoing abdominal distention and swelling.  Extremities: No LE edema   Skin: no open lesions noted  Neuro: grossly non-focal     Labs:  Lab Results   Component Value  Date    WBC 6.8 10/06/2019    HGB 13.5 10/06/2019    HCT 42.0 10/06/2019     10/06/2019     11/13/2019    POTASSIUM 4.3 11/13/2019    CHLORIDE 103 11/13/2019    CO2 27 11/13/2019    BUN 20 11/13/2019    CR 0.64 11/13/2019     (H) 11/13/2019    SED 22 11/09/2017    DD 0.7 (H) 11/22/2017    NTBNPI 194 10/06/2019    TROPONIN <0.07 01/04/2006    TROPI <0.015 10/06/2019    AST 13 10/06/2019    ALT 26 10/06/2019    ALKPHOS 89 10/06/2019    BILITOTAL 0.3 10/06/2019    INR 1.02 10/06/2019     NM Lexiscan 3/2020:  The nuclear stress test is negative for inducible myocardial ischemia or infarction.  Fixed septal/apical defect due to Paced rhythm and increased gut photon activity.LVEDv 133ml. LV ESv 40ml. LV EF 70%.     There is no prior study for comparison    Echocardiogram reviewed 3/2020:  Mildly (EF 40-45%) reduced left ventricular function is present.  Global right ventricular function is normal.  No pericardial effusion is present. IVC diameter <2.1 cm collapsing >50% with sniff suggests a normal RA pressure  of 3 mmHg. This study was compared with the study from 12/3/19.  There has been no change.     Coronary angiogram 9/2019:  Mild-moderate non-obstructive coronary disease involving LAD, LCx, and RCA. No significant coronary disease to explain new cardiomyopathy.    TTE 9/5/2019:  Moderately (EF 30-35%) reduced left ventricular function is present. All segments from mid to apical left ventricle are severely hypokinetic to akinetic. Basal segments are hypercontractile. Overall pattern is suggestive of stress cardiomyopathy, but cannot rule out ischemic cardiomyopathy. Right ventricular function, chamber size, wall motion, and thickness are normal. No significant valve abnormalities. Mild pulmonary hypertension with increased RA pressure.    MRI cardiac 4/2017:  1. Normal left ventricular size.  Left ventricular wall thickness is  normal except a very small area in the basal inferior septum that  appears  thin in some images.  Real-time images suggest a very small area of  hypokinesis in the basal inferior septum (not well visualized).  Left  ventricular ejection fraction is estimated at about 60-65% (unable to   perform volumetric analysis given frequent ectopy; real time images used  to assess LVEF).  2. Normal right ventricular size and systolic function.  No obvious regional wall motion abnormalities noted.  3. Gadolinium imaging:  No obvious right ventricular myocardial late  gadolinium enhancement noted.  There is a very small area of probable late  gadolinium enhancement in the mildly thinned basal inferior segment.    4. Moderate left atrial dilatation.  The right atrial size is at the upper limits of normal.  5. Thickened mitral valve leaflets, without obvious stenosis or significant regurgitation. No obvious hemodynamically significant valvular dysfunction noted.   6. Normal sized aortic root and ascending aorta.  For the remainder of the thoracic aorta, see separate radiology report.  7. Normal pericardium without significant pericardial effusion.     ASSESSMENT AND PLAN:  In summary, patient is a 59 year old lady with history as stated above including complete heart block in the setting of RF ablation status post dual-chamber pacemaker in 2017 who has ongoing symptoms of heart failure weight gain and very limited exercise tolerance.  I do believe her shortness of breath and limited exercise tolerance is probably multifactorial including the significant weight gain with AV node ablation and now with a more fixed heart rate in the setting of pacemaker use.  However certainly she could have heart failure with borderline reduced ejection fraction with more preserved component.  I completely agree with Dr. Funes to proceed with right heart catheterization with exercise to further evaluate her hemodynamics and for half pack.  This will be performed after COVID pending is over, understandably patient  very nervous about the virus in this..  This is not an emergency procedure at this time.    I was considering whether repeating A cardiac MRI would be any benefit however she did have one in 2017 which showed no LGE that would be concerning for sarcoidosis for or other infiltrative cardiomyopathies. The he was in the setting of borderline reduced ejection fraction and episodes of arrhythmia as noted on the pacemaker.  She also notes that she has significant anxiety from the MRI so we will defer this at this time.  Discussed that she should probably reduce her salt intake at this point and see if her blood pressure will be able to maintain.  We discussed that taking diuretic and salt at the same time which is counterproductive and would recommend against it.  She will again try to use salt intake and monitor her blood pressures.  Also discussed to remain off of significant alcohol use that this can make cardiomyopathy worse.   No other medication changes today again the right heart catheterization will be performed in the next few weeks to month.     Follow up:   3 months after the right heart catheterization is complete.    I appreciate the opportunity to participate in the care of Joyce Marroquin . Please do not hesitate to contact me with any further questions.    Sincerely,      Alonso Ordonez MD     [facility] Division of Cardiology

## 2020-04-27 ENCOUNTER — VIRTUAL VISIT (OUTPATIENT)
Dept: FAMILY MEDICINE | Facility: CLINIC | Age: 60
End: 2020-04-27
Payer: COMMERCIAL

## 2020-04-27 VITALS
SYSTOLIC BLOOD PRESSURE: 111 MMHG | OXYGEN SATURATION: 99 % | TEMPERATURE: 97.8 F | HEART RATE: 86 BPM | WEIGHT: 170 LBS | BODY MASS INDEX: 28.29 KG/M2 | DIASTOLIC BLOOD PRESSURE: 79 MMHG

## 2020-04-27 DIAGNOSIS — F41.9 ANXIETY: ICD-10-CM

## 2020-04-27 DIAGNOSIS — J45.901 EXACERBATION OF ASTHMA, UNSPECIFIED ASTHMA SEVERITY, UNSPECIFIED WHETHER PERSISTENT: Primary | ICD-10-CM

## 2020-04-27 DIAGNOSIS — I42.9 CARDIOMYOPATHY, UNSPECIFIED TYPE (H): ICD-10-CM

## 2020-04-27 PROCEDURE — 99214 OFFICE O/P EST MOD 30 MIN: CPT | Mod: TEL | Performed by: NURSE PRACTITIONER

## 2020-04-27 RX ORDER — CLONAZEPAM 0.5 MG/1
0.5 TABLET ORAL 3 TIMES DAILY PRN
Qty: 90 TABLET | Refills: 2 | Status: SHIPPED | OUTPATIENT
Start: 2020-04-27 | End: 2020-11-10

## 2020-04-27 NOTE — PATIENT INSTRUCTIONS
Patient Education     Anxiety Reaction  Anxiety is the feeling we all get when we think something bad might happen. It is a normal response to stress and usually causes only a mild reaction. When anxiety becomes more severe, it can interfere with daily life. In some cases, you may not even be aware of what it is you re anxious about. There may also be a genetic link or it may be a learned behavior in the home.  Both psychological and physical triggers cause stress reaction. It's often a response to fear or emotional stress, real or imagined. This stress may come from home, family, work, or social relationships.  During an anxiety reaction, you may feel:    Helpless    Nervous    Depressed    Irritable  Your body may show signs of anxiety in many ways. You may experience:    Dry mouth    Shakiness    Dizziness    Weakness    Trouble breathing    Breathing fast (hyperventilating)    Chest pressure    Sweating    Headache    Nausea    Diarrhea    Tiredness    Inability to sleep    Sexual problems  Home care    Try to locate the sources of stress in your life. They may not be obvious. These may include:  ? Daily hassles of life (such as traffic jams, missed appointments, or car troubles)  ? Major life changes, both good (new baby or job promotion) and bad (loss of job or loss of loved one)  ? Overload: feeling that you have too many responsibilities and can't take care of all of them at once  ? Feeling helpless or feeling that your problems are beyond what you re able to solve    Notice how your body reacts to stress. Learn to listen to your body signals. This will help you take action before the stress becomes severe.    When you can, do something about the source of your stress. (Avoid hassles, limit the amount of change that happens in your life at one time and take a break when you feel overloaded).    Unfortunately, many stressful situations can't be avoided. It is necessary to learn how to better manage stress.  There are many proven methods that will reduce your anxiety. These include simple things like exercise, good nutrition, and adequate rest. Also, there are certain techniques that are helpful:  ? Relaxation  ? Breathing exercises  ? Visualization  ? Biofeedback  ? Meditation  For more information about this, consult your healthcare provider or go to a local bookstore and review the many books and tapes available on this subject.  Follow-up care  If you feel that your anxiety is not responding to self-help measures, contact your healthcare provider or make an appointment with a counselor. You may need short-term psychological counseling and temporary medicine to help you manage stress.  Call 911  Call 911 if any of these happen:    Trouble breathing    Confusion    Drowsiness or trouble wakening    Fainting or loss of consciousness    Rapid heart rate    Seizure    New chest pain that becomes more severe, lasts longer, or spreads into your shoulder, arm, neck, jaw, or back  When to seek medical advice  Call your healthcare provider right away if any of these happen:    Your symptoms get worse    Severe headache not relieved by rest and mild pain reliever  Date Last Reviewed: 10/1/2017    [id number] [email or web address]. [address] [place] [address], [place] [address]. All rights reserved. This information is not intended as a substitute for professional medical care. Always follow your healthcare professional's instructions.

## 2020-04-27 NOTE — PROGRESS NOTES
"Joyce Marroquin is a 59 year old female who is being evaluated via a billable telephone visit.      The patient has been notified of following:     \"This telephone visit will be conducted via a call between you and your physician/provider. We have found that certain health care needs can be provided without the need for a physical exam.  This service lets us provide the care you need with a short phone conversation.  If a prescription is necessary we can send it directly to your pharmacy.  If lab work is needed we can place an order for that and you can then stop by our lab to have the test done at a later time.    Telephone visits are billed at different rates depending on your insurance coverage. During this emergency period, for some insurers they may be billed the same as an in-person visit.  Please reach out to your insurance provider with any questions.    If during the course of the call the physician/provider feels a telephone visit is not appropriate, you will not be charged for this service.\"    Patient has given verbal consent for Telephone visit?  Yes    How would you like to obtain your AVS? Roryhart    Subjective     Joyce Marroquin is a 59 year old female who presents to clinic today for the following health issues:    HPI  Depression and Anxiety Follow-Up    How are you doing with your depression since your last visit? No change    How are you doing with your anxiety since your last visit?  Worsened - effectively managed with taking 3 tablets of clonazepam daily as needed.  Last time she needed clonazepam, only needed to use for a few months during her stressful life events and then she stopped it.  Does not think she needs a daily antidepressant, and does not think an serotonin specific reuptake inhibitor would be good for her due to her long QT    Are you having other symptoms that might be associated with depression or anxiety? Yes:  covid 19, stress, anniversary of parents death, fighting with sisters, " fighting with     Have you had a significant life event? Relationship Concerns and Grief or Loss     Do you have any concerns with your use of alcohol or other drugs? No    Social History     Tobacco Use     Smoking status: Former Smoker     Smokeless tobacco: Never Used   Substance Use Topics     Alcohol use: Yes     Frequency: 2-3 times a week     Drinks per session: 1 or 2     Binge frequency: Never     Comment: seldom     Drug use: No     PHQ 1/25/2019 3/5/2020 4/20/2020   PHQ-9 Total Score 4 12 4   Q9: Thoughts of better off dead/self-harm past 2 weeks Not at all Not at all Not at all     JESUS-7 SCORE 11/9/2018 1/25/2019 4/20/2020   Total Score - - 12 (moderate anxiety)   Total Score 6 4 12     Last PHQ-9 4/20/2020   1.  Little interest or pleasure in doing things 1   2.  Feeling down, depressed, or hopeless 1   3.  Trouble falling or staying asleep, or sleeping too much 1   4.  Feeling tired or having little energy 1   5.  Poor appetite or overeating 0   6.  Feeling bad about yourself 0   7.  Trouble concentrating 0   8.  Moving slowly or restless 0   Q9: Thoughts of better off dead/self-harm past 2 weeks 0   PHQ-9 Total Score 4   Difficulty at work, home, or with people -     JESUS-7  4/20/2020   1. Feeling nervous, anxious, or on edge 3   2. Not being able to stop or control worrying 2   3. Worrying too much about different things 3   4. Trouble relaxing 1   5. Being so restless that it is hard to sit still 0   6. Becoming easily annoyed or irritable 2   7. Feeling afraid, as if something awful might happen 1   JESUS-7 Total Score 12   If you checked any problems, how difficult have they made it for you to do your work, take care of things at home, or get along with other people? -         Suicide Assessment Five-step Evaluation and Treatment (SAFE-T)      How many servings of fruits and vegetables do you eat daily?  2-3    On average, how many sweetened beverages do you drink each day (Examples: soda,  juice, sweet tea, etc.  Do NOT count diet or artificially sweetened beverages)?   0    How many days per week do you exercise enough to make your heart beat faster? 3 or less    How many minutes a day do you exercise enough to make your heart beat faster? 9 or less    How many days per week do you miss taking your medication? 0      Patient Active Problem List   Diagnosis     Benign neoplasm of vulva     Esophageal reflux     Chronic depressive personality disorder     Complete atrioventricular block (H)     Cardiac pacemaker in situ- Medtronic, dual lead- DEPENDENT     Hypothyroidism     Ex-smoker     Hyperparathyroidism (H)     Pulmonary nodules     Cardiomyopathy (H)     S/P cardiac pacemaker procedure     Situational anxiety     LARRY (obstructive sleep apnea)     Restless legs syndrome (RLS)     Vestibular disequilibrium, unspecified laterality     Collagenous colitis     COPD exacerbation (H)     Death of parent     Panic attack     Insomnia, unspecified type     Exacerbation of asthma, unspecified asthma severity, unspecified whether persistent     Tick bite, initial encounter     Cardiomyopathy, unspecified type (H)     Status post coronary angiogram     Adjustment disorder with mixed anxiety and depressed mood     Past Surgical History:   Procedure Laterality Date     BUNIONECTOMY Bilateral      CV CORONARY ANGIOGRAM N/A 9/26/2019    Procedure: CV CORONARY ANGIOGRAM;  Surgeon: Erickson Trejo MD;  [facility]:  [facility]     EP ABLATION / EP STUDIES  04/21/2017     EXPLORE NECK N/A 9/19/2018    Procedure: EXPLORE NECK;  Neck Exploration Resection of Left superior Parathyroid gland;  Surgeon: Kristine Venegas MD;  Location:  OR      CORRECT BUNION,SIMPLE       PARATHYROIDECTOMY N/A 9/19/2018    Procedure: PARATHYROIDECTOMY;;  Surgeon: Kristine Venegas MD;  Location:  OR     PPM INSERT OF NEW OR REPL W/VENT LEAD  04/21/2017     TONSILLECTOMY & ADENOIDECTOMY         Social History      Tobacco Use     Smoking status: Former Smoker     Smokeless tobacco: Never Used   Substance Use Topics     Alcohol use: Yes     Frequency: 2-3 times a week     Drinks per session: 1 or 2     Binge frequency: Never     Comment: seldom     Family History   Problem Relation Age of Onset     Hypothyroidism Mother      Hypertension Mother      Osteoarthritis Mother      Coronary Artery Disease Father         nonfatal MI in his 70s     Asthma Father      Diabetes Sister      Hypothyroidism Sister      Breast Cancer Maternal Aunt          Current Outpatient Medications   Medication Sig Dispense Refill     albuterol (PROAIR HFA/PROVENTIL HFA/VENTOLIN HFA) 108 (90 BASE) MCG/ACT Inhaler Inhale 2 puffs into the lungs as needed for shortness of breath / dyspnea or wheezing        azelastine (ASTELIN) 0.1 % nasal spray Spray 1 spray into both nostrils 2 times daily       calcium carbonate (OS-MANJU) 500 MG tablet Take 1 tablet by mouth 2 times daily       clonazePAM (KLONOPIN) 0.5 MG tablet Take 1 tablet (0.5 mg) by mouth 3 times daily as needed for anxiety 90 tablet 2     DiphenhydrAMINE HCl (BENADRYL PO) Take 25-50 mg by mouth nightly as needed       Ferrous Sulfate (IRON SUPPLEMENT PO) Take 65 mg by mouth 2 times daily (with meals)        fluticasone-salmeterol (ADVAIR) 250-50 MCG/DOSE inhaler Inhale 1 puff into the lungs every 12 hours       ipratropium (ATROVENT) 0.06 % nasal spray Spray 2 sprays into both nostrils 4 times daily       lisinopril (PRINIVIL/ZESTRIL) 5 MG tablet Take 0.5 tablets (2.5 mg) by mouth daily 30 tablet 3     loperamide (IMODIUM A-D) 2 MG tablet Take 2 tabs (4 mg) after first loose stool, and then take one tab (2 mg) after each diarrheal stool.  Max of 8 tabs (16 mg) per day. 1 tablet 0     MAGNESIUM LACTATE PO Take 180 mg by mouth At Bedtime        MELATONIN PO Take 3 mg by mouth At Bedtime        Menaquinone-7 (VITAMIN K2 PO) Take 1 tablet by mouth every morning        METHYLPREDNISOLONE PO Take  40 mg by mouth as needed        Multiple Vitamin (DAILY MULTIVITAMIN PO) Take 1 tablet by mouth every morning        nebulizer nebulization as needed       Probiotic Product (PROBIOTIC DAILY PO) Take 1 capsule by mouth 2 times daily        SYNTHROID 150 MCG tablet Take 1 tablet (150 mcg) by mouth daily 90 tablet 2     TURMERIC PO Take 1 tablet by mouth every morning        UNABLE TO FIND 2 times daily MEDICATION NAME: Standard Process, Immune Congaplex. Pt taking 2 tablets daily       vitamin B complex with vitamin C (VITAMIN  B COMPLEX) PO tablet Take 1 tablet by mouth as needed       vitamin D3 (CHOLECALCIFEROL) 2000 units tablet Take 1 tablet by mouth daily 1 tablet 0     Zinc 15 MG CAPS Take 10 mg by mouth       Allergies   Allergen Reactions     Tetracycline Swelling     Zofran [Ondansetron]      Prolonged QT  Prolonged QT at baseline per patient     Flagyl [Metronidazole] Rash     Buspar [Buspirone]      Muscle weakness     Corn Oil Other (See Comments)     Headache       Lorazepam Other (See Comments)     Heat intolerance       Seasonal Allergies Difficulty breathing     Sulfamethoxazole-Trimethoprim      Other reaction(s): Other  Passed out     Benzodiazepines Other (See Comments)     Other reaction(s): Other  Extreme heat intolerance  Extreme heat intolerance     Cyclobenzaprine Other (See Comments)     Extreme heat intolerance     Levaquin [Levofloxacin]      Other reaction(s): Other  Tendon rupture     Nsaids Other (See Comments)     Exacerbated asthma     Recent Labs   Lab Test 11/13/19  1331 10/25/19  1100 10/06/19  1253  08/28/19  0924 01/25/19  0753   ALT  --   --  26  --  20 26   CR 0.64  --  0.68   < > 0.62 0.64   GFRESTIMATED >90  --  >90   < > >90 >90   GFRESTBLACK >90  --  >90   < > >90 >90   POTASSIUM 4.3  --  4.0   < > 4.1 4.5   TSH  --  1.10  --   --  0.28*  --     < > = values in this interval not displayed.      BP Readings from Last 3 Encounters:   04/27/20 111/79   04/22/20 (!) 130/95    03/05/20 128/86    Wt Readings from Last 3 Encounters:   04/27/20 77.1 kg (170 lb)   04/22/20 77.1 kg (170 lb)   03/05/20 77.5 kg (170 lb 14.4 oz)                    Reviewed and updated as needed this visit by Provider         Review of Systems   ROS COMP: Constitutional, HEENT, cardiovascular, pulmonary, GI, , musculoskeletal, neuro, skin, endocrine and psych systems are negative, except as otherwise noted.       Objective   Reported vitals:  /79   Pulse 86   Temp 97.8  F (36.6  C) (Temporal)   Wt 77.1 kg (170 lb)   LMP 08/21/2007   SpO2 99%   BMI 28.29 kg/m     healthy, alert and no distress  PSYCH: Alert and oriented times 3; coherent speech, normal   rate and volume, able to articulate logical thoughts, able   to abstract reason, no tangential thoughts, no hallucinations   or delusions  Her affect is normal  RESP: No cough, no audible wheezing, able to talk in full sentences  Remainder of exam unable to be completed due to telephone visits    Diagnostic Test Results:  Labs reviewed in Epic  See orders        Assessment/Plan:  1. Anxiety    - clonazePAM (KLONOPIN) 0.5 MG tablet; Take 1 tablet (0.5 mg) by mouth 3 times daily as needed for anxiety  Dispense: 90 tablet; Refill: 2    No follow-ups on file.      Phone call duration:  15 minutes    Ce Crowe NP

## 2020-04-29 DIAGNOSIS — I42.9 CARDIOMYOPATHY, UNSPECIFIED TYPE (H): Primary | ICD-10-CM

## 2020-04-29 RX ORDER — FUROSEMIDE 20 MG
20 TABLET ORAL DAILY
Qty: 90 TABLET | Refills: 1 | Status: SHIPPED | OUTPATIENT
Start: 2020-04-29 | End: 2020-10-02

## 2020-04-30 ENCOUNTER — VIRTUAL VISIT (OUTPATIENT)
Dept: BEHAVIORAL HEALTH | Facility: CLINIC | Age: 60
End: 2020-04-30
Payer: COMMERCIAL

## 2020-04-30 DIAGNOSIS — F43.23 ADJUSTMENT DISORDER WITH MIXED ANXIETY AND DEPRESSED MOOD: Primary | ICD-10-CM

## 2020-04-30 NOTE — PROGRESS NOTES
[facility]: Integrated Behavioral Health  April 30, 2020      Behavioral Health Clinician Progress Note    Patient Name: Joyce Marroquin           Service Type: Virtual Visit      Service Location:  Virtual visit via Well      Session Start Time: 1:00  Session End Time: 1:48      Session Length: 38 - 52      Attendees: Patient    Visit Activities (Refresh list every visit): [facility] Only     Telemedicine Visit: The patient's condition can be safely assessed and treated via synchronous audio and visual telemedicine encounter.      Reason for Telemedicine Visit: COVID-19    Originating Site (Patient Location): Patient's home    Distant Site (Provider Location): Provider Remote Setting    Consent:  The patient/guardian has verbally consented to: the potential risks and benefits of telemedicine (video visit) versus in person care; bill my insurance or make self-payment for services provided; and responsibility for payment of non-covered services.     Mode of Communication:  Video Conference via [employer]    As the provider I attest to compliance with applicable laws and regulations related to telemedicine.  Diagnostic Assessment Date: 4/20/20  Treatment Plan Review Date: preliminary plan 4/20/20  See Flowsheets for today's PHQ-9 and JESUS-7 results  Previous PHQ-9:   PHQ-9 SCORE 1/25/2019 3/5/2020 4/20/2020   PHQ-9 Total Score MyChart - - 4 (Minimal depression)   PHQ-9 Total Score 4 12 4     Previous JESUS-7:   JESUS-7 SCORE 11/9/2018 1/25/2019 4/20/2020   Total Score - - 12 (moderate anxiety)   Total Score 6 4 12       URMILA LEVEL:  No flowsheet data found.    DATA  Extended Session (60+ minutes): No  Interactive Complexity: No  Crisis: No    Treatment Objective(s) Addressed in This Session:  Target Behavior(s): disease management/lifestyle changes - Coping strategies for resentment/anger, stress, depressed mood    Adjustment Difficulties: will develop coping/problem-solving skills to facilitate  more adaptive adjustment    Current Stressors / Issues:  Met with Joyce today for about 48 minutes. She presents with the agenda item of learning additional coping strategies to help address conflicted emotions associated with her cardiovascular health problems and history of failed surgery. Joyce states she continues to struggle with frustration and anger toward the surgeon who preformed her heart surgery.     Helped Joyce address her agenda item by reviewing/discussing her already used coping strategies and the mechanisms by which they help her address mood problems. We explored additional coping strategies for stress and for facilitating more adaptive adjustment to stressful situations. Explored and provided instruction on deep breathing, meditation, relaxation skills, and worry-time to help. Discussed how to utilize skills proactively as opposed reactively for managing difficult experiences.       Progress on Treatment Objective(s) / Homework:  Satisfactory progress - ACTION (Actively working towards change); Intervened by reinforcing change plan / affirming steps taken    Also provided psychoeducation about behavioral health condition, symptoms, and treatment options  Instructed several behavioral coping strategies for stress     Supportive counseling also emphasized today to continue establishing rapport.    Care Plan review completed: Yes    Medication Review:  No changes to current psychiatric medication(s)    Medication Compliance:  No    Changes in Health Issues:   None reported    Chemical Use Review:   Substance Use: Chemical use reviewed, no active concerns identified      Tobacco Use: No current tobacco use.      Assessment: Current Emotional / Mental Status (status of significant symptoms):  Risk status (Self / Other harm or suicidal ideation)  Patient denies a history of suicidal ideation, suicide attempts, self-injurious behavior, homicidal ideation, homicidal behavior and and other safety  concerns  Patient denies current fears or concerns for personal safety.  Patient denies current or recent suicidal ideation or behaviors.  Patient denies current or recent homicidal ideation or behaviors.  Patient denies current or recent self injurious behavior or ideation.  Patient denies other safety concerns.  A safety and risk management plan has not been developed at this time, however patient was encouraged to call [place] [phone] should there be a change in any of these risk factors.    Appearance:   Appropriate   Eye Contact:   Good   Psychomotor Behavior: Normal   Attitude:   Cooperative  Interested Friendly  Orientation:   All  Speech   Rate / Production: Normal    Volume:  Normal   Mood:    Depressed   Affect:    Appropriate   Thought Content:  Clear   Thought Form:  Coherent  Logical   Insight:    Good     Diagnoses:  1. Adjustment disorder with mixed anxiety and depressed mood        Collateral Reports Completed:  Not Applicable    Plan: (Homework, other):  Patient scheduled to continue therapy to address adjustment-related concerns   She was also given Cognitive Behavioral Therapy skills to practice when experiencing anxiety and depression and deep Breathing Strategies to practice when experiencing anxiety and depression.  CD Recommendations: No indications of CD issues.     David Forrester LP  4/30/2020

## 2020-05-07 DIAGNOSIS — I42.8 NICM (NONISCHEMIC CARDIOMYOPATHY) (H): ICD-10-CM

## 2020-05-12 NOTE — TELEPHONE ENCOUNTER
"LISINOPRIL 5MG TABLETS  Take 0.5 tablets (2.5 mg) by mouth daily      Last Written Prescription Date:  12/19/2019  Last Fill Quantity: 30,   # refills: 3  Last Office Visit : 4/22/20  Future Office visit:  6/11/20    Routing refill request to provider for review/approval because:  Medication is reported/historical      Dr Funes  Note3/5/20\" Her blood pressures were in the 80s at this stage at home.  She reverted to 2.5 of lisinopril. \"   "

## 2020-05-13 RX ORDER — LISINOPRIL 5 MG/1
TABLET ORAL
Qty: 30 TABLET | Refills: 11 | Status: SHIPPED | OUTPATIENT
Start: 2020-05-13 | End: 2020-05-14 | Stop reason: DRUGHIGH

## 2020-05-14 ENCOUNTER — CARE COORDINATION (OUTPATIENT)
Dept: CARDIOLOGY | Facility: CLINIC | Age: 60
End: 2020-05-14

## 2020-05-14 ENCOUNTER — VIRTUAL VISIT (OUTPATIENT)
Dept: BEHAVIORAL HEALTH | Facility: CLINIC | Age: 60
End: 2020-05-14
Payer: COMMERCIAL

## 2020-05-14 DIAGNOSIS — F43.23 ADJUSTMENT DISORDER WITH MIXED ANXIETY AND DEPRESSED MOOD: Primary | ICD-10-CM

## 2020-05-14 DIAGNOSIS — I42.9 CARDIOMYOPATHY, UNSPECIFIED TYPE (H): Primary | ICD-10-CM

## 2020-05-14 RX ORDER — LISINOPRIL 2.5 MG/1
1.25 TABLET ORAL DAILY
Qty: 90 TABLET | Refills: 3 | COMMUNITY
Start: 2020-05-14 | End: 2020-12-05

## 2020-05-14 NOTE — PROGRESS NOTES
[facility]  May 14, 2020      Behavioral Health Clinician Progress Note    Patient Name: Joyce Marroquin           Service Type: Virtual Visit      Service Location:  Virtual visit via Well      Session Start Time: 10:10  Session End Time: 10:33      Session Length: 16 - 37      Attendees: Patient    Visit Activities (Refresh list every visit): [facility] Only     Telemedicine Visit: The patient's condition can be safely assessed and treated via synchronous audio and visual telemedicine encounter.      Reason for Telemedicine Visit: COVID-19    Originating Site (Patient Location): Patient's home    Distant Site (Provider Location): Provider Remote Setting    Consent:  The patient/guardian has verbally consented to: the potential risks and benefits of telemedicine (video visit) versus in person care; bill my insurance or make self-payment for services provided; and responsibility for payment of non-covered services.     Mode of Communication:  Video Conference via "[employer]    As the provider I attest to compliance with applicable laws and regulations related to telemedicine.  Diagnostic Assessment Date: 4/20/20  Treatment Plan Review Date: NA  See Flowsheets for today's PHQ-9 and JESUS-7 results  Previous PHQ-9:   PHQ-9 SCORE 1/25/2019 3/5/2020 4/20/2020   PHQ-9 Total Score MyChart - - 4 (Minimal depression)   PHQ-9 Total Score 4 12 4     Previous JESUS-7:   JESUS-7 SCORE 11/9/2018 1/25/2019 4/20/2020   Total Score - - 12 (moderate anxiety)   Total Score 6 4 12       URMILA LEVEL:  No flowsheet data found.    DATA  Extended Session (60+ minutes): No  Interactive Complexity: No  Crisis: No    Treatment Objective(s) Addressed in This Session:  Target Behavior(s): disease management/lifestyle changes - Coping strategies for resentment/anger, stress, depressed mood    Adjustment Difficulties: will develop coping/problem-solving skills to facilitate more adaptive  adjustment    Current Stressors / Issues:  Joyce states she is feeling better emotionally. She states reviewing and finding ways to better implement behavioral coping strategies (e.g. deep breathing, mindfulness, relaxation, exercise, etc.) has been helpful in reducing her anxiety and stress. She shared today that she now plans to pursue litigation toward a previous surgeon and we spent most of the session discussing this. Joyce states she is surprised how much comfort she finds in actively working toward litigation, noting she was previously worried it would add too much stress for her. Overall, Joyce states she is doing well and declined needing a follow-up with [facility] services. Encouraged Joyce to schedule a follow-up as needed if she begins to notice an increase in anxiety or depressed mood. Explained more about the nature of [facility] services in this regard.     Joyce also states matters with her  have improved recently. We didn't spend too much time exploring this however.     Joyce did not report safety issues today. She has historically denied thoughts of suicide and has never had a suicide attempt. Religiosity and her commitment to family remain her strongest protective factors.       Progress on Treatment Objective(s) / Homework:  Satisfactory progress - ACTION (Actively working towards change); Intervened by reinforcing change plan / affirming steps taken    Instructed several behavioral coping strategies for stress     Supportive counseling also emphasized today to continue establishing rapport.    Care Plan review completed: Yes    Medication Review:  No changes to current psychiatric medication(s)    Medication Compliance:  No    Changes in Health Issues:   None reported    Chemical Use Review:   Substance Use: Chemical use reviewed, no active concerns identified      Tobacco Use: No current tobacco use.      Assessment: Current Emotional / Mental Status (status of significant symptoms):  Risk status (Self /  Other harm or suicidal ideation)  Patient denies a history of suicidal ideation, suicide attempts, self-injurious behavior, homicidal ideation, homicidal behavior and and other safety concerns  Patient denies current fears or concerns for personal safety.  Patient denies current or recent suicidal ideation or behaviors.  Patient denies current or recent homicidal ideation or behaviors.  Patient denies current or recent self injurious behavior or ideation.  Patient denies other safety concerns.  A safety and risk management plan has not been developed at this time, however patient was encouraged to call [place] [phone] should there be a change in any of these risk factors.    Appearance:   Appropriate   Eye Contact:   Good   Psychomotor Behavior: Normal   Attitude:   Cooperative  Interested Friendly  Orientation:   All  Speech   Rate / Production: Normal    Volume:  Normal   Mood:    Depressed   Affect:    Appropriate   Thought Content:  Clear   Thought Form:  Coherent  Logical   Insight:    Good     Diagnoses:  1. Adjustment disorder with mixed anxiety and depressed mood        Collateral Reports Completed:  Not Applicable    Plan: (Homework, other):  Joyce declined needing a follow-up with [facility] services at this time, noting her mood and stress is now stable after implementing more behavioral skills. Encouraged Joyce to schedule a follow-up as needed. No CD issues.     David Forrester, NUBIA  5/14/2020

## 2020-06-09 ENCOUNTER — ANCILLARY PROCEDURE (OUTPATIENT)
Dept: CARDIOLOGY | Facility: CLINIC | Age: 60
End: 2020-06-09
Attending: INTERNAL MEDICINE
Payer: COMMERCIAL

## 2020-06-09 DIAGNOSIS — Z95.0 CARDIAC PACEMAKER IN SITU: Primary | ICD-10-CM

## 2020-06-09 DIAGNOSIS — I44.2 COMPLETE ATRIOVENTRICULAR BLOCK (H): ICD-10-CM

## 2020-06-09 PROCEDURE — 93294 REM INTERROG EVL PM/LDLS PM: CPT | Mod: ZP | Performed by: INTERNAL MEDICINE

## 2020-06-09 PROCEDURE — 93296 REM INTERROG EVL PM/IDS: CPT | Mod: ZF

## 2020-06-10 LAB
MDC_IDC_EPISODE_DTM: NORMAL
MDC_IDC_EPISODE_DTM: NORMAL
MDC_IDC_EPISODE_DURATION: 1 S
MDC_IDC_EPISODE_DURATION: 1 S
MDC_IDC_EPISODE_ID: 7
MDC_IDC_EPISODE_ID: 8
MDC_IDC_EPISODE_TYPE: NORMAL
MDC_IDC_EPISODE_TYPE: NORMAL
MDC_IDC_LEAD_IMPLANT_DT: NORMAL
MDC_IDC_LEAD_IMPLANT_DT: NORMAL
MDC_IDC_LEAD_LOCATION: NORMAL
MDC_IDC_LEAD_LOCATION: NORMAL
MDC_IDC_LEAD_LOCATION_DETAIL_1: NORMAL
MDC_IDC_LEAD_LOCATION_DETAIL_1: NORMAL
MDC_IDC_LEAD_MFG: NORMAL
MDC_IDC_LEAD_MFG: NORMAL
MDC_IDC_LEAD_MODEL: NORMAL
MDC_IDC_LEAD_MODEL: NORMAL
MDC_IDC_LEAD_POLARITY_TYPE: NORMAL
MDC_IDC_LEAD_POLARITY_TYPE: NORMAL
MDC_IDC_LEAD_SERIAL: NORMAL
MDC_IDC_LEAD_SERIAL: NORMAL
MDC_IDC_MSMT_BATTERY_DTM: NORMAL
MDC_IDC_MSMT_BATTERY_REMAINING_LONGEVITY: 77 MO
MDC_IDC_MSMT_BATTERY_RRT_TRIGGER: 2.83
MDC_IDC_MSMT_BATTERY_STATUS: NORMAL
MDC_IDC_MSMT_BATTERY_VOLTAGE: 3.01 V
MDC_IDC_MSMT_LEADCHNL_RA_IMPEDANCE_VALUE: 380 OHM
MDC_IDC_MSMT_LEADCHNL_RA_IMPEDANCE_VALUE: 456 OHM
MDC_IDC_MSMT_LEADCHNL_RA_PACING_THRESHOLD_AMPLITUDE: 0.62 V
MDC_IDC_MSMT_LEADCHNL_RA_PACING_THRESHOLD_PULSEWIDTH: 0.4 MS
MDC_IDC_MSMT_LEADCHNL_RA_SENSING_INTR_AMPL: 3 MV
MDC_IDC_MSMT_LEADCHNL_RV_IMPEDANCE_VALUE: 437 OHM
MDC_IDC_MSMT_LEADCHNL_RV_IMPEDANCE_VALUE: 456 OHM
MDC_IDC_MSMT_LEADCHNL_RV_PACING_THRESHOLD_AMPLITUDE: 0.75 V
MDC_IDC_MSMT_LEADCHNL_RV_PACING_THRESHOLD_PULSEWIDTH: 0.4 MS
MDC_IDC_MSMT_LEADCHNL_RV_SENSING_INTR_AMPL: 11.12 MV
MDC_IDC_PG_IMPLANT_DTM: NORMAL
MDC_IDC_PG_MFG: NORMAL
MDC_IDC_PG_MODEL: NORMAL
MDC_IDC_PG_SERIAL: NORMAL
MDC_IDC_PG_TYPE: NORMAL
MDC_IDC_SESS_CLINIC_NAME: NORMAL
MDC_IDC_SESS_DTM: NORMAL
MDC_IDC_SESS_TYPE: NORMAL
MDC_IDC_SET_BRADY_AT_MODE_SWITCH_RATE: 171 {BEATS}/MIN
MDC_IDC_SET_BRADY_HYSTRATE: NORMAL
MDC_IDC_SET_BRADY_LOWRATE: 60 {BEATS}/MIN
MDC_IDC_SET_BRADY_MAX_SENSOR_RATE: 140 {BEATS}/MIN
MDC_IDC_SET_BRADY_MAX_TRACKING_RATE: 140 {BEATS}/MIN
MDC_IDC_SET_BRADY_MODE: NORMAL
MDC_IDC_SET_BRADY_PAV_DELAY_HIGH: 140 MS
MDC_IDC_SET_BRADY_PAV_DELAY_LOW: 180 MS
MDC_IDC_SET_BRADY_SAV_DELAY_HIGH: 110 MS
MDC_IDC_SET_BRADY_SAV_DELAY_LOW: 150 MS
MDC_IDC_SET_LEADCHNL_RA_PACING_AMPLITUDE: 1.5 V
MDC_IDC_SET_LEADCHNL_RA_PACING_ANODE_ELECTRODE_1: NORMAL
MDC_IDC_SET_LEADCHNL_RA_PACING_ANODE_LOCATION_1: NORMAL
MDC_IDC_SET_LEADCHNL_RA_PACING_CAPTURE_MODE: NORMAL
MDC_IDC_SET_LEADCHNL_RA_PACING_CATHODE_ELECTRODE_1: NORMAL
MDC_IDC_SET_LEADCHNL_RA_PACING_CATHODE_LOCATION_1: NORMAL
MDC_IDC_SET_LEADCHNL_RA_PACING_POLARITY: NORMAL
MDC_IDC_SET_LEADCHNL_RA_PACING_PULSEWIDTH: 0.4 MS
MDC_IDC_SET_LEADCHNL_RA_SENSING_ANODE_ELECTRODE_1: NORMAL
MDC_IDC_SET_LEADCHNL_RA_SENSING_ANODE_LOCATION_1: NORMAL
MDC_IDC_SET_LEADCHNL_RA_SENSING_CATHODE_ELECTRODE_1: NORMAL
MDC_IDC_SET_LEADCHNL_RA_SENSING_CATHODE_LOCATION_1: NORMAL
MDC_IDC_SET_LEADCHNL_RA_SENSING_POLARITY: NORMAL
MDC_IDC_SET_LEADCHNL_RA_SENSING_SENSITIVITY: 0.3 MV
MDC_IDC_SET_LEADCHNL_RV_PACING_AMPLITUDE: 1.5 V
MDC_IDC_SET_LEADCHNL_RV_PACING_ANODE_ELECTRODE_1: NORMAL
MDC_IDC_SET_LEADCHNL_RV_PACING_ANODE_LOCATION_1: NORMAL
MDC_IDC_SET_LEADCHNL_RV_PACING_CAPTURE_MODE: NORMAL
MDC_IDC_SET_LEADCHNL_RV_PACING_CATHODE_ELECTRODE_1: NORMAL
MDC_IDC_SET_LEADCHNL_RV_PACING_CATHODE_LOCATION_1: NORMAL
MDC_IDC_SET_LEADCHNL_RV_PACING_POLARITY: NORMAL
MDC_IDC_SET_LEADCHNL_RV_PACING_PULSEWIDTH: 0.4 MS
MDC_IDC_SET_LEADCHNL_RV_SENSING_ANODE_ELECTRODE_1: NORMAL
MDC_IDC_SET_LEADCHNL_RV_SENSING_ANODE_LOCATION_1: NORMAL
MDC_IDC_SET_LEADCHNL_RV_SENSING_CATHODE_ELECTRODE_1: NORMAL
MDC_IDC_SET_LEADCHNL_RV_SENSING_CATHODE_LOCATION_1: NORMAL
MDC_IDC_SET_LEADCHNL_RV_SENSING_POLARITY: NORMAL
MDC_IDC_SET_LEADCHNL_RV_SENSING_SENSITIVITY: 0.9 MV
MDC_IDC_SET_ZONE_DETECTION_INTERVAL: 350 MS
MDC_IDC_SET_ZONE_DETECTION_INTERVAL: 400 MS
MDC_IDC_SET_ZONE_TYPE: NORMAL
MDC_IDC_STAT_AT_BURDEN_PERCENT: 0 %
MDC_IDC_STAT_AT_DTM_END: NORMAL
MDC_IDC_STAT_AT_DTM_START: NORMAL
MDC_IDC_STAT_BRADY_AP_VP_PERCENT: 36.97 %
MDC_IDC_STAT_BRADY_AP_VS_PERCENT: 0.79 %
MDC_IDC_STAT_BRADY_AS_VP_PERCENT: 61.7 %
MDC_IDC_STAT_BRADY_AS_VS_PERCENT: 0.54 %
MDC_IDC_STAT_BRADY_DTM_END: NORMAL
MDC_IDC_STAT_BRADY_DTM_START: NORMAL
MDC_IDC_STAT_BRADY_RA_PERCENT_PACED: 37.22 %
MDC_IDC_STAT_BRADY_RV_PERCENT_PACED: 97.95 %
MDC_IDC_STAT_EPISODE_RECENT_COUNT: 0
MDC_IDC_STAT_EPISODE_RECENT_COUNT: 2
MDC_IDC_STAT_EPISODE_RECENT_COUNT_DTM_END: NORMAL
MDC_IDC_STAT_EPISODE_RECENT_COUNT_DTM_START: NORMAL
MDC_IDC_STAT_EPISODE_TOTAL_COUNT: 0
MDC_IDC_STAT_EPISODE_TOTAL_COUNT: 1
MDC_IDC_STAT_EPISODE_TOTAL_COUNT: 1
MDC_IDC_STAT_EPISODE_TOTAL_COUNT: 6
MDC_IDC_STAT_EPISODE_TOTAL_COUNT_DTM_END: NORMAL
MDC_IDC_STAT_EPISODE_TOTAL_COUNT_DTM_START: NORMAL
MDC_IDC_STAT_EPISODE_TYPE: NORMAL

## 2020-06-11 ENCOUNTER — VIRTUAL VISIT (OUTPATIENT)
Dept: CARDIOLOGY | Facility: CLINIC | Age: 60
End: 2020-06-11
Attending: INTERNAL MEDICINE
Payer: COMMERCIAL

## 2020-06-11 VITALS
OXYGEN SATURATION: 98 % | BODY MASS INDEX: 26.46 KG/M2 | WEIGHT: 159 LBS | SYSTOLIC BLOOD PRESSURE: 111 MMHG | DIASTOLIC BLOOD PRESSURE: 80 MMHG | HEART RATE: 100 BPM

## 2020-06-11 DIAGNOSIS — I42.8 NICM (NONISCHEMIC CARDIOMYOPATHY) (H): Primary | ICD-10-CM

## 2020-06-11 PROCEDURE — 99215 OFFICE O/P EST HI 40 MIN: CPT | Mod: GT | Performed by: INTERNAL MEDICINE

## 2020-06-11 ASSESSMENT — PAIN SCALES - GENERAL: PAINLEVEL: NO PAIN (0)

## 2020-06-11 NOTE — LETTER
6/11/2020      RE: Joyce Marroquin  [address] [facility] [id number]       Dear Colleague,    Thank you for the opportunity to participate in the care of your patient, Joyce Marroquin, at the [facility] at [facility]. Please see a copy of my visit note below.    .Joyce Marroquin is a 59 year old female who is being evaluated via a billable video visit.        HPI: HPI: 59-year-old female with a past medical history of RFA at [facility] complicated by complete heart block requiring pacemaker placement in 2017 at [facility].  Since her moving care to the [facility] his principal cardiovascular complaint has been diminished exercise tolerance and evident heart failure unassociated with substantial diminution of left ventricular function.  Joyce's last LV ejection fraction was 45% and this has been relatively stable.     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  She has been on furlough for the last couple months due to the COVID virus crisis.  Her overall wellbeing is also been substantially affected by family stress issues and by our lack of understanding as to the nature of her exertional limitation in terms of its origin.   A Lexiscan was negative for ischemia.     At her last visit I recommended that Joyce undergo a right heart cath in view of the possibility of a restrictive cardiomyopathy or pericardial disease.  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  I reassured her that we would take great care not to have any testing done during the COVID.  And in addition that would be directed and undertaken by a well experienced interventionalists.  To that end I arrange for her to see Dr. Ordonez.  They did talk on a virtual visit and tentatively have a plan for her to undergo right heart cath in July or August.  I would recommend that the right heart cath  include an exercise component since Joyce's day-to-day activity does require physical exertion pushing the nursing anesthesia cart.    In terms of current medications Joyce is taking low-dose lisinopril (2.5 mg twice daily) along with a low-dose of Lasix.  This does seem to have provided some benefit although since she is homebound we do not have a sense of her exertional tolerance.    Today we revisited the rationale for undertaking a right heart catheterization.  I explained to Joyce that while we do not have direct effective treatments for preserved ejection fraction heart failure, the findings would help us to explain to her why she is having exertional difficulties and perhaps line her up for future therapies as they evolve for this particular problem.  Ultimately she voiced agreement with this strategy.    PAST MEDICAL HISTORY:  Past Medical History:   Diagnosis Date     Arthritis      Cardiomyopathy (H)     ff Cardiology     Chronic depressive personality disorder      Congestive heart failure (H) see dr martínez    heart failure correct term     COPD exacerbation (H)      Esophageal reflux      Ex-smoker     quit 2006; 1 PPD x 30     Hyperparathyroidism (H)     s/p parathyroidectomy     Hypothyroidism      LARRY on CPAP     ff Sleep medicine     Pulmonary nodules     ff Pulmonologist     S/P cardiac pacemaker procedure     checked every 6 months at [facility]     Uncomplicated asthma years       CURRENT MEDICATIONS:  Current Outpatient Medications   Medication Sig Dispense Refill     albuterol (PROAIR HFA/PROVENTIL HFA/VENTOLIN HFA) 108 (90 BASE) MCG/ACT Inhaler Inhale 2 puffs into the lungs as needed for shortness of breath / dyspnea or wheezing        azelastine (ASTELIN) 0.1 % nasal spray Spray 1 spray into both nostrils 2 times daily       calcium carbonate (OS-MANJU) 500 MG tablet Take 1 tablet by mouth 2 times daily       clonazePAM (KLONOPIN) 0.5 MG tablet Take 1 tablet (0.5 mg) by mouth 3 times daily as needed  for anxiety 90 tablet 2     DiphenhydrAMINE HCl (BENADRYL PO) Take 25-50 mg by mouth nightly as needed       Ferrous Sulfate (IRON SUPPLEMENT PO) Take 65 mg by mouth 2 times daily (with meals)        fluticasone-salmeterol (ADVAIR) 250-50 MCG/DOSE inhaler Inhale 1 puff into the lungs every 12 hours       furosemide (LASIX) 20 MG tablet Take 1 tablet (20 mg) by mouth daily As needed for shortness of breath and swelling in the abdomen 90 tablet 1     ipratropium (ATROVENT) 0.06 % nasal spray Spray 2 sprays into both nostrils 4 times daily       lisinopril (ZESTRIL) 2.5 MG tablet Take 1 tablet (2.5 mg) by mouth daily 90 tablet 3     loperamide (IMODIUM A-D) 2 MG tablet Take 2 tabs (4 mg) after first loose stool, and then take one tab (2 mg) after each diarrheal stool.  Max of 8 tabs (16 mg) per day. 1 tablet 0     MAGNESIUM LACTATE PO Take 180 mg by mouth At Bedtime        MELATONIN PO Take 3 mg by mouth At Bedtime        Menaquinone-7 (VITAMIN K2 PO) Take 1 tablet by mouth every morning        METHYLPREDNISOLONE PO Take 40 mg by mouth as needed        Multiple Vitamin (DAILY MULTIVITAMIN PO) Take 1 tablet by mouth every morning        nebulizer nebulization as needed       Probiotic Product (PROBIOTIC DAILY PO) Take 1 capsule by mouth 2 times daily        SYNTHROID 150 MCG tablet Take 1 tablet (150 mcg) by mouth daily 90 tablet 2     TURMERIC PO Take 1 tablet by mouth every morning        UNABLE TO FIND Take 1 tablet by mouth daily MEDICATION NAME: Cardio Plus       UNABLE TO FIND 2 times daily MEDICATION NAME: Standard Process, Immune Congaplex. Pt taking 2 tablets daily       vitamin B complex with vitamin C (VITAMIN  B COMPLEX) PO tablet Take 1 tablet by mouth as needed       vitamin D3 (CHOLECALCIFEROL) 2000 units tablet Take 1 tablet by mouth daily 1 tablet 0     Zinc 15 MG CAPS Take 10 mg by mouth         PAST SURGICAL HISTORY:  Past Surgical History:   Procedure Laterality Date     BUNIONECTOMY Bilateral       CV CORONARY ANGIOGRAM N/A 9/26/2019    Procedure: CV CORONARY ANGIOGRAM;  Surgeon: Erickson Trejo MD;  Location:  HEART CARDIAC CATH LAB     EP ABLATION / EP STUDIES  04/21/2017     EXPLORE NECK N/A 9/19/2018    Procedure: EXPLORE NECK;  Neck Exploration Resection of Left superior Parathyroid gland;  Surgeon: Kristine Venegas MD;  Location: [facility] OR      CORRECT BUNION,SIMPLE       PARATHYROIDECTOMY N/A 9/19/2018    Procedure: PARATHYROIDECTOMY;;  Surgeon: Kristine Venegas MD;  Location: [facility] OR     PPM INSERT OF NEW OR REPL W/VENT LEAD  04/21/2017     TONSILLECTOMY & ADENOIDECTOMY         ALLERGIES:     Allergies   Allergen Reactions     Tetracycline Swelling     Zofran [Ondansetron]      Prolonged QT  Prolonged QT at baseline per patient     Flagyl [Metronidazole] Rash     Buspar [Buspirone]      Muscle weakness     Corn Oil Other (See Comments)     Headache       Lorazepam Other (See Comments)     Heat intolerance       Seasonal Allergies Difficulty breathing     Sulfamethoxazole-Trimethoprim      Other reaction(s): Other  Passed out     Benzodiazepines Other (See Comments)     Other reaction(s): Other  Extreme heat intolerance  Extreme heat intolerance     Cyclobenzaprine Other (See Comments)     Extreme heat intolerance     Levaquin [Levofloxacin]      Other reaction(s): Other  Tendon rupture     Nsaids Other (See Comments)     Exacerbated asthma       FAMILY HISTORY:  Family History   Problem Relation Age of Onset     Hypothyroidism Mother      Hypertension Mother      Osteoarthritis Mother      Coronary Artery Disease Father         nonfatal MI in his 70s     Asthma Father      Diabetes Sister      Hypothyroidism Sister      Breast Cancer Maternal Aunt          SOCIAL HISTORY:  Social History     Tobacco Use     Smoking status: Former Smoker     Smokeless tobacco: Never Used   Substance Use Topics     Alcohol use: Yes     Frequency: 2-3 times a week     Drinks per session: 1 or 2     Binge  frequency: Never     Comment: seldom     Drug use: No       ROS:   Constitutional: No fever, chills, or sweats. Weight stable.   ENT: No visual disturbance, ear ache, epistaxis, sore throat.   Cardiovascular: As per HPI.   Respiratory: No cough, hemoptysis.    GI: No nausea, vomiting,.   : No hematuria.   Integument: Negative.   Psychiatric: Negative.   Hematologic:  Easy bruising, no easy bleeding.  Neuro: Negative.   Endocrinology: No significant heat or cold intolerance   Musculoskeletal: No myalgia.    Exam:  /80 (BP Location: Right arm, Patient Position: Sitting, Cuff Size: Adult Regular)   Pulse 100   Wt 72.1 kg (159 lb)   LMP 08/21/2007   SpO2 98%   BMI 26.46 kg/m    GENERAL APPEARANCE: healthy, alert and no distress    RESPIRATORY:no rales, rhonchi or wheezes, no use of accessory muscles, no retractions, respirations are unlabored, normal respiratory rate    NEURO: alert and oriented to person/place/time, normal speech, gait and affect    Labs:  CBC RESULTS:   Lab Results   Component Value Date    WBC 6.8 10/06/2019    RBC 4.19 10/06/2019    HGB 13.5 10/06/2019    HCT 42.0 10/06/2019     10/06/2019    MCH 32.2 10/06/2019    MCHC 32.1 10/06/2019    RDW 12.1 10/06/2019     10/06/2019       BMP RESULTS:  Lab Results   Component Value Date     11/13/2019    POTASSIUM 4.3 11/13/2019    CHLORIDE 103 11/13/2019    CO2 27 11/13/2019    ANIONGAP 5 11/13/2019     (H) 11/13/2019    BUN 20 11/13/2019    CR 0.64 11/13/2019    GFRESTIMATED >90 11/13/2019    GFRESTBLACK >90 11/13/2019    MANJU 9.2 11/13/2019        INR RESULTS:  Lab Results   Component Value Date    INR 1.02 10/06/2019    INR 0.95 06/28/2017       Procedures:  PULMONARY FUNCTION TESTS:   No flowsheet data found.      ECHOCARDIOGRAM:   Most recent echocardiogram results were discussed today      Assessment and Plan:   1.  Pacemaker in situ status post AV block during RF ablation.  Patient is pacemaker dependent  2.   Exertional intolerance with reasonably well-preserved left ventricular ejection fraction suggesting diastolic dysfunction or restrictive myopathy.  Patient will undergo right heart cath preferably with associated exercise after the COVID-19 restrictions are lifted.  Patient voiced preference for Dr. Ordonez to undertake the procedure personally.  3.  Tendency to hypotension which makes treatment of her heart failure somewhat more difficult.  She uses low doses of Lasix at midday when her blood pressure seems to be high enough to tolerate treatment  4.  Considerable family social and job-related stress    PLAN   1.  Notify Dr. Ordonez regarding the patient's preference for him to do the study personally and to avoid July if possible so that she would have an experienced team.  2.  Patient does have routine blood work including assessment of parathyroid status to be undertaken annually.  She will arrange for this to be done at [facility].    Video on 6: 2 3 PM, video off 6: 45 PM  Platform [employer]  Patient at home  Clinic [facility]    I very much appreciated the opportunity to see and assess Joyce Marroquin in the clinic today. Please do not hesitate to contact my office if you have any questions or concerns.      Lino Funes MD  Cardiac Arrhythmia Service  [facility]  [phone]    CC  LINO FUNES      Please do not hesitate to contact me if you have any questions/concerns.     Sincerely,     Lino Funes MD

## 2020-06-11 NOTE — PROGRESS NOTES
".Joyce Marroquin is a 59 year old female who is being evaluated via a billable video visit.      The patient has been notified of following:     \"This video visit will be conducted via a call between you and your physician/provider. We have found that certain health care needs can be provided without the need for an in-person physical exam.  This service lets us provide the care you need with a video conversation.  If a prescription is necessary we can send it directly to your pharmacy.  If lab work is needed we can place an order for that and you can then stop by our lab to have the test done at a later time.    Video visits are billed at different rates depending on your insurance coverage.  Please reach out to your insurance provider with any questions.    If during the course of the call the physician/provider feels a video visit is not appropriate, you will not be charged for this service.\"    Patient has given verbal consent for Video visit? Yes    Will anyone else be joining your video visit? No    [email or web address]    HPI: HPI: 59-year-old female with a past medical history of RFA at [facility] complicated by complete heart block requiring pacemaker placement in 2017 at [facility].  Since her moving care to the [facility] his principal cardiovascular complaint has been diminished exercise tolerance and evident heart failure unassociated with substantial diminution of left ventricular function.  Joyce's last LV ejection fraction was 45% and this has been relatively stable.     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  She has been on furlough for the last couple months due to the COVID virus crisis.  Her overall wellbeing is also been substantially affected by family stress issues and by our lack of understanding as to the nature of her exertional limitation in terms of its origin.   A Lexiscan was negative for ischemia.     At her last visit I " recommended that Joyce undergo a right heart cath in view of the possibility of a restrictive cardiomyopathy or pericardial disease.  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  I reassured her that we would take great care not to have any testing done during the COVID.  And in addition that would be directed and undertaken by a well experienced interventionalists.  To that end I arrange for her to see Dr. Ordonez.  They did talk on a virtual visit and tentatively have a plan for her to undergo right heart cath in July or August.  I would recommend that the right heart cath include an exercise component since Joyce's day-to-day activity does require physical exertion pushing the nursing anesthesia cart.    In terms of current medications Joyce is taking low-dose lisinopril (2.5 mg twice daily) along with a low-dose of Lasix.  This does seem to have provided some benefit although since she is homebound we do not have a sense of her exertional tolerance.    Today we revisited the rationale for undertaking a right heart catheterization.  I explained to Joyce that while we do not have direct effective treatments for preserved ejection fraction heart failure, the findings would help us to explain to her why she is having exertional difficulties and perhaps line her up for future therapies as they evolve for this particular problem.  Ultimately she voiced agreement with this strategy.    PAST MEDICAL HISTORY:  Past Medical History:   Diagnosis Date     Arthritis      Cardiomyopathy (H)     ff Cardiology     Chronic depressive personality disorder      Congestive heart failure (H) see dr martínez    heart failure correct term     COPD exacerbation (H)      Esophageal reflux      Ex-smoker     quit 2006; 1 PPD x 30     Hyperparathyroidism (H)     s/p parathyroidectomy     Hypothyroidism      LARRY on CPAP     ff Sleep medicine     Pulmonary nodules     ff Pulmonologist     S/P  cardiac pacemaker procedure     checked every 6 months at the [facility]     Uncomplicated asthma years       CURRENT MEDICATIONS:  Current Outpatient Medications   Medication Sig Dispense Refill     albuterol (PROAIR HFA/PROVENTIL HFA/VENTOLIN HFA) 108 (90 BASE) MCG/ACT Inhaler Inhale 2 puffs into the lungs as needed for shortness of breath / dyspnea or wheezing        azelastine (ASTELIN) 0.1 % nasal spray Spray 1 spray into both nostrils 2 times daily       calcium carbonate (OS-MANJU) 500 MG tablet Take 1 tablet by mouth 2 times daily       clonazePAM (KLONOPIN) 0.5 MG tablet Take 1 tablet (0.5 mg) by mouth 3 times daily as needed for anxiety 90 tablet 2     DiphenhydrAMINE HCl (BENADRYL PO) Take 25-50 mg by mouth nightly as needed       Ferrous Sulfate (IRON SUPPLEMENT PO) Take 65 mg by mouth 2 times daily (with meals)        fluticasone-salmeterol (ADVAIR) 250-50 MCG/DOSE inhaler Inhale 1 puff into the lungs every 12 hours       furosemide (LASIX) 20 MG tablet Take 1 tablet (20 mg) by mouth daily As needed for shortness of breath and swelling in the abdomen 90 tablet 1     ipratropium (ATROVENT) 0.06 % nasal spray Spray 2 sprays into both nostrils 4 times daily       lisinopril (ZESTRIL) 2.5 MG tablet Take 1 tablet (2.5 mg) by mouth daily 90 tablet 3     loperamide (IMODIUM A-D) 2 MG tablet Take 2 tabs (4 mg) after first loose stool, and then take one tab (2 mg) after each diarrheal stool.  Max of 8 tabs (16 mg) per day. 1 tablet 0     MAGNESIUM LACTATE PO Take 180 mg by mouth At Bedtime        MELATONIN PO Take 3 mg by mouth At Bedtime        Menaquinone-7 (VITAMIN K2 PO) Take 1 tablet by mouth every morning        METHYLPREDNISOLONE PO Take 40 mg by mouth as needed        Multiple Vitamin (DAILY MULTIVITAMIN PO) Take 1 tablet by mouth every morning        nebulizer nebulization as needed       Probiotic Product (PROBIOTIC DAILY PO) Take 1 capsule by mouth 2 times daily        SYNTHROID 150 MCG tablet Take 1 tablet  (150 mcg) by mouth daily 90 tablet 2     TURMERIC PO Take 1 tablet by mouth every morning        UNABLE TO FIND Take 1 tablet by mouth daily MEDICATION NAME: Cardio Plus       UNABLE TO FIND 2 times daily MEDICATION NAME: Standard Process, Immune Congaplex. Pt taking 2 tablets daily       vitamin B complex with vitamin C (VITAMIN  B COMPLEX) PO tablet Take 1 tablet by mouth as needed       vitamin D3 (CHOLECALCIFEROL) 2000 units tablet Take 1 tablet by mouth daily 1 tablet 0     Zinc 15 MG CAPS Take 10 mg by mouth         PAST SURGICAL HISTORY:  Past Surgical History:   Procedure Laterality Date     BUNIONECTOMY Bilateral      CV CORONARY ANGIOGRAM N/A 9/26/2019    Procedure: CV CORONARY ANGIOGRAM;  Surgeon: Erickson Trejo MD;  Location:  HEART CARDIAC CATH LAB     EP ABLATION / EP STUDIES  04/21/2017     EXPLORE NECK N/A 9/19/2018    Procedure: EXPLORE NECK;  Neck Exploration Resection of Left superior Parathyroid gland;  Surgeon: Kristine Venegas MD;  Location: [facility] OR      CORRECT BUNION,SIMPLE       PARATHYROIDECTOMY N/A 9/19/2018    Procedure: PARATHYROIDECTOMY;;  Surgeon: Kristine Venegas MD;  Location: [facility] OR     [facility] INSERT OF NEW OR REPL W/VENT LEAD  04/21/2017     TONSILLECTOMY & ADENOIDECTOMY         ALLERGIES:     Allergies   Allergen Reactions     Tetracycline Swelling     Zofran [Ondansetron]      Prolonged QT  Prolonged QT at baseline per patient     Flagyl [Metronidazole] Rash     Buspar [Buspirone]      Muscle weakness     Corn Oil Other (See Comments)     Headache       Lorazepam Other (See Comments)     Heat intolerance       Seasonal Allergies Difficulty breathing     Sulfamethoxazole-Trimethoprim      Other reaction(s): Other  Passed out     Benzodiazepines Other (See Comments)     Other reaction(s): Other  Extreme heat intolerance  Extreme heat intolerance     Cyclobenzaprine Other (See Comments)     Extreme heat intolerance     Levaquin [Levofloxacin]      Other reaction(s):  Other  Tendon rupture     Nsaids Other (See Comments)     Exacerbated asthma       FAMILY HISTORY:  Family History   Problem Relation Age of Onset     Hypothyroidism Mother      Hypertension Mother      Osteoarthritis Mother      Coronary Artery Disease Father         nonfatal MI in his 70s     Asthma Father      Diabetes Sister      Hypothyroidism Sister      Breast Cancer Maternal Aunt          SOCIAL HISTORY:  Social History     Tobacco Use     Smoking status: Former Smoker     Smokeless tobacco: Never Used   Substance Use Topics     Alcohol use: Yes     Frequency: 2-3 times a week     Drinks per session: 1 or 2     Binge frequency: Never     Comment: seldom     Drug use: No       ROS:   Constitutional: No fever, chills, or sweats. Weight stable.   ENT: No visual disturbance, ear ache, epistaxis, sore throat.   Cardiovascular: As per HPI.   Respiratory: No cough, hemoptysis.    GI: No nausea, vomiting,.   : No hematuria.   Integument: Negative.   Psychiatric: Negative.   Hematologic:  Easy bruising, no easy bleeding.  Neuro: Negative.   Endocrinology: No significant heat or cold intolerance   Musculoskeletal: No myalgia.    Exam:  /80 (BP Location: Right arm, Patient Position: Sitting, Cuff Size: Adult Regular)   Pulse 100   Wt 72.1 kg (159 lb)   LMP 08/21/2007   SpO2 98%   BMI 26.46 kg/m    GENERAL APPEARANCE: healthy, alert and no distress    RESPIRATORY:no rales, rhonchi or wheezes, no use of accessory muscles, no retractions, respirations are unlabored, normal respiratory rate    NEURO: alert and oriented to person/place/time, normal speech, gait and affect    Labs:  CBC RESULTS:   Lab Results   Component Value Date    WBC 6.8 10/06/2019    RBC 4.19 10/06/2019    HGB 13.5 10/06/2019    HCT 42.0 10/06/2019     10/06/2019    MCH 32.2 10/06/2019    MCHC 32.1 10/06/2019    RDW 12.1 10/06/2019     10/06/2019       BMP RESULTS:  Lab Results   Component Value Date     11/13/2019     POTASSIUM 4.3 11/13/2019    CHLORIDE 103 11/13/2019    CO2 27 11/13/2019    ANIONGAP 5 11/13/2019     (H) 11/13/2019    BUN 20 11/13/2019    CR 0.64 11/13/2019    GFRESTIMATED >90 11/13/2019    GFRESTBLACK >90 11/13/2019    MANJU 9.2 11/13/2019        INR RESULTS:  Lab Results   Component Value Date    INR 1.02 10/06/2019    INR 0.95 06/28/2017       Procedures:  PULMONARY FUNCTION TESTS:   No flowsheet data found.      ECHOCARDIOGRAM:   Most recent echocardiogram results were discussed today      Assessment and Plan:   1.  Pacemaker in situ status post AV block during RF ablation.  Patient is pacemaker dependent  2.  Exertional intolerance with reasonably well-preserved left ventricular ejection fraction suggesting diastolic dysfunction or restrictive myopathy.  Patient will undergo right heart cath preferably with associated exercise after the COVID-19 restrictions are lifted.  Patient voiced preference for Dr. Ordonez to undertake the procedure personally.  3.  Tendency to hypotension which makes treatment of her heart failure somewhat more difficult.  She uses low doses of Lasix at midday when her blood pressure seems to be high enough to tolerate treatment  4.  Considerable family social and job-related stress    PLAN   1.  Notify Dr. Ordonez regarding the patient's preference for him to do the study personally and to avoid July if possible so that she would have an experienced team.  2.  Patient does have routine blood work including assessment of parathyroid status to be undertaken annually.  She will arrange for this to be done at [facility].    Video on 6: 2 3 PM, video off 6: 45 PM  [email or web address]  Patient at home  Clinic [facility]    I very much appreciated the opportunity to see and assess Joyce Marroquin in the clinic today. Please do not hesitate to contact my office if you have any questions or concerns.      Lino Funes MD  Cardiac Arrhythmia Service  [facility]  [phone]      STUART COLMENARES

## 2020-07-02 ENCOUNTER — MYC MEDICAL ADVICE (OUTPATIENT)
Dept: CARDIOLOGY | Facility: CLINIC | Age: 60
End: 2020-07-02

## 2020-07-02 ENCOUNTER — VIRTUAL VISIT (OUTPATIENT)
Dept: CARDIOLOGY | Facility: CLINIC | Age: 60
End: 2020-07-02
Attending: INTERNAL MEDICINE
Payer: COMMERCIAL

## 2020-07-02 DIAGNOSIS — I47.29 PAROXYSMAL VENTRICULAR TACHYCARDIA (H): Primary | ICD-10-CM

## 2020-07-02 PROCEDURE — 99214 OFFICE O/P EST MOD 30 MIN: CPT | Mod: GT | Performed by: INTERNAL MEDICINE

## 2020-07-02 RX ORDER — CARVEDILOL 3.12 MG/1
3.12 TABLET ORAL 2 TIMES DAILY WITH MEALS
Qty: 180 TABLET | Refills: 0 | Status: SHIPPED | OUTPATIENT
Start: 2020-07-02 | End: 2020-09-27

## 2020-07-02 ASSESSMENT — PAIN SCALES - GENERAL: PAINLEVEL: NO PAIN (0)

## 2020-07-02 NOTE — PATIENT INSTRUCTIONS
You were seen in the Electrophysiology Clinic today by: Lino Funes MD    Plan:     Medication Changes:     Start carvedilol 3.125 mg BID    Follow-up in 2-3 months or sooner if needed with device check.    Your Care Team:  EP Cardiology   Telephone Number     Cheyenne Thomson RN [phone]     For scheduling appts or procedures:    Merced Heller   [phone]   For the Device Clinic (Pacemakers, ICDs, Loop Recorders)    During business hours: [phone]  After business hours:   [phone]- select option 4 and ask for job code [phone].       Cardiovascular Clinic:   [address] [place] [address]. [place] [address]      As always, Thank you for trusting us with your health care needs!

## 2020-07-02 NOTE — PROGRESS NOTES
"Joyce Marroquin is a 59 year old female who is being evaluated via a billable video visit.      The patient has been notified of following:     \"This video visit will be conducted via a call between you and your physician/provider. We have found that certain health care needs can be provided without the need for an in-person physical exam.  This service lets us provide the care you need with a video conversation.  If a prescription is necessary we can send it directly to your pharmacy.  If lab work is needed we can place an order for that and you can then stop by our lab to have the test done at a later time.    Video visits are billed at different rates depending on your insurance coverage.  Please reach out to your insurance provider with any questions.    If during the course of the call the physician/provider feels a video visit is not appropriate, you will not be charged for this service.\"    Patient has given verbal consent for Video visit? Yes  How would you like to obtain your AVS? [facility]  Patient would like the video invitation sent by: Text to cell phone: [phone]  Will anyone else be joining your video visit? No    Vitals - Patient Reported  Systolic (Patient Reported): 111  Diastolic (Patient Reported): 85  SpO2 (Patient Reported): 98  Pulse (Patient Reported): 108    HPI:  HPI: 59-year-old female with a past medical history of RFA at [facility] complicated by complete heart block requiring pacemaker placement in 2017 at [facility].  Since her moving care to the [facility] his principal cardiovascular complaint has been diminished exercise tolerance and evident heart failure unassociated with substantial diminution of left ventricular function.  Joyce's last LV ejection fraction was 45% and this has been relatively stable.    This interim visit is undertaken due to the fact that Joyce's last pacemaker interrogation did reveal some nonsustained VT.  Findings are summarized immediately " below:  .  Scheduled remote pacemaker transmission received and reviewed. Device transmission sent per MD orders. Patient has a Medtronic dual lead pacemaker. Normal pacemaker function. 2 VT-NS and 85 AT/AF episodes recorded. Review of available VT-NS EGMs shows V>A rhythm lasting 5-9 beats.  No EGMs available for AT/AF as all were < 1 minute in duration. Presenting EGM = ap/vs w/ PACs @ 70 bpm. AP = 38%.  = 98%. Estimated battery longevity to KONRAD = 6 years. Battery voltage = 3.01V. No short v-v intervals recorded. Lead impedance trends appear stable. Patient notified of interrogation results. Patient reports that she is feeling well but has noted a more irregular heart rate recently. Plan for patient to send a remote transmission in 3 months as scheduled    Today Joyce indicated that she was feeling relatively well.  She was curious about the recommendation to start carvedilol at a low dose (3.25 twice daily).  I explained that I was less concerned regarding the brief VT episodes than the atrial tachycardias which might end up causing some confusion for the pacemaker sensing circuitry.  We agreed that we would start at this low dose and make adjustments going forward as needed.    Joyce is scheduled for right heart cath with Dr. Gupta in the middle of the month.    We will schedule our follow-up as previously arranged unless there are findings on the right heart cath of concern.  Joyce indicated that she understood the plan.    PAST MEDICAL HISTORY:  Past Medical History:   Diagnosis Date     Arthritis      Cardiomyopathy (H)     ff Cardiology     Chronic depressive personality disorder      Congestive heart failure (H) see dr martínez    heart failure correct term     COPD exacerbation (H)      Esophageal reflux      Ex-smoker     quit 2006; 1 PPD x 30     Hyperparathyroidism (H)     s/p parathyroidectomy     Hypothyroidism      LARRY on CPAP     ff Sleep medicine     Pulmonary nodules     ff Pulmonologist     S/P  cardiac pacemaker procedure     checked every 6 months at the [facility]     Uncomplicated asthma years       CURRENT MEDICATIONS:  Current Outpatient Medications   Medication Sig Dispense Refill     albuterol (PROAIR HFA/PROVENTIL HFA/VENTOLIN HFA) 108 (90 BASE) MCG/ACT Inhaler Inhale 2 puffs into the lungs as needed for shortness of breath / dyspnea or wheezing        azelastine (ASTELIN) 0.1 % nasal spray Spray 1 spray into both nostrils 2 times daily       calcium carbonate (OS-MANJU) 500 MG tablet Take 1 tablet by mouth 2 times daily       clonazePAM (KLONOPIN) 0.5 MG tablet Take 1 tablet (0.5 mg) by mouth 3 times daily as needed for anxiety 90 tablet 2     DiphenhydrAMINE HCl (BENADRYL PO) Take 25-50 mg by mouth nightly as needed       Ferrous Sulfate (IRON SUPPLEMENT PO) Take 65 mg by mouth 2 times daily (with meals)        fluticasone-salmeterol (ADVAIR) 250-50 MCG/DOSE inhaler Inhale 1 puff into the lungs every 12 hours       furosemide (LASIX) 20 MG tablet Take 1 tablet (20 mg) by mouth daily As needed for shortness of breath and swelling in the abdomen 90 tablet 1     ipratropium (ATROVENT) 0.06 % nasal spray Spray 2 sprays into both nostrils 4 times daily       lisinopril (ZESTRIL) 2.5 MG tablet Take 1 tablet (2.5 mg) by mouth daily 90 tablet 3     loperamide (IMODIUM A-D) 2 MG tablet Take 2 tabs (4 mg) after first loose stool, and then take one tab (2 mg) after each diarrheal stool.  Max of 8 tabs (16 mg) per day. 1 tablet 0     MAGNESIUM LACTATE PO Take 180 mg by mouth At Bedtime        MELATONIN PO Take 3 mg by mouth At Bedtime        Menaquinone-7 (VITAMIN K2 PO) Take 1 tablet by mouth every morning        METHYLPREDNISOLONE PO Take 40 mg by mouth as needed        Multiple Vitamin (DAILY MULTIVITAMIN PO) Take 1 tablet by mouth every morning        nebulizer nebulization as needed       Probiotic Product (PROBIOTIC DAILY PO) Take 1 capsule by mouth 2 times daily        SYNTHROID 150 MCG tablet Take 1 tablet  (150 mcg) by mouth daily 90 tablet 2     TURMERIC PO Take 1 tablet by mouth every morning        UNABLE TO FIND Take 1 tablet by mouth daily MEDICATION NAME: Cardio Plus       UNABLE TO FIND 2 times daily MEDICATION NAME: Standard Process, Immune Congaplex. Pt taking 2 tablets daily       vitamin B complex with vitamin C (VITAMIN  B COMPLEX) PO tablet Take 1 tablet by mouth as needed       vitamin D3 (CHOLECALCIFEROL) 2000 units tablet Take 1 tablet by mouth daily 1 tablet 0     Zinc 15 MG CAPS Take 10 mg by mouth         PAST SURGICAL HISTORY:  Past Surgical History:   Procedure Laterality Date     BUNIONECTOMY Bilateral      CV CORONARY ANGIOGRAM N/A 9/26/2019    Procedure: CV CORONARY ANGIOGRAM;  Surgeon: Erickson Trejo MD;  Location:  HEART CARDIAC CATH LAB     EP ABLATION / EP STUDIES  04/21/2017     EXPLORE NECK N/A 9/19/2018    Procedure: EXPLORE NECK;  Neck Exploration Resection of Left superior Parathyroid gland;  Surgeon: Kristine Venegas MD;  Location: [facility] OR      CORRECT BUNION,SIMPLE       PARATHYROIDECTOMY N/A 9/19/2018    Procedure: PARATHYROIDECTOMY;;  Surgeon: Kristine Venegas MD;  Location: [facility] OR     [facility] INSERT OF NEW OR REPL W/VENT LEAD  04/21/2017     TONSILLECTOMY & ADENOIDECTOMY         ALLERGIES:     Allergies   Allergen Reactions     Tetracycline Swelling     Zofran [Ondansetron]      Prolonged QT  Prolonged QT at baseline per patient     Flagyl [Metronidazole] Rash     Buspar [Buspirone]      Muscle weakness     Corn Oil Other (See Comments)     Headache       Lorazepam Other (See Comments)     Heat intolerance       Seasonal Allergies Difficulty breathing     Sulfamethoxazole-Trimethoprim      Other reaction(s): Other  Passed out     Benzodiazepines Other (See Comments)     Other reaction(s): Other  Extreme heat intolerance  Extreme heat intolerance     Cyclobenzaprine Other (See Comments)     Extreme heat intolerance     Levaquin [Levofloxacin]      Other reaction(s):  Other  Tendon rupture     Nsaids Other (See Comments)     Exacerbated asthma       FAMILY HISTORY:  Family History   Problem Relation Age of Onset     Hypothyroidism Mother      Hypertension Mother      Osteoarthritis Mother      Coronary Artery Disease Father         nonfatal MI in his 70s     Asthma Father      Diabetes Sister      Hypothyroidism Sister      Breast Cancer Maternal Aunt          SOCIAL HISTORY:  Social History     Tobacco Use     Smoking status: Former Smoker     Smokeless tobacco: Never Used   Substance Use Topics     Alcohol use: Yes     Frequency: 2-3 times a week     Drinks per session: 1 or 2     Binge frequency: Never     Comment: seldom     Drug use: No       ROS:   Constitutional: No fever, chills, or sweats. Weight stable.   ENT: No visual disturbance, ear ache, epistaxis, sore throat.   Cardiovascular: As per HPI.   Respiratory: No cough, hemoptysis.    GI: No nausea, vomiting,  : No hematuria.   Integument: Negative.   Psychiatric: Negative.   Hematologic: no easy bleeding.  Neuro: Negative.   Endocrinology: No significant heat or cold intolerance   Musculoskeletal: No myalgia.    Exam:  LMP 08/21/2007   GENERAL APPEARANCE: healthy, alert and no distress  HEENT: no icterus, no xanthelasmas,  no central cyanosis    RESPIRATORY:  no rales, rhonchi or wheezes, no use of accessory muscles, no retractions, respirations are unlabored, normal respiratory rate    NEURO: alert and oriented to person/place/time, normal speech,and affect    Labs:  CBC RESULTS:   Lab Results   Component Value Date    WBC 6.8 10/06/2019    RBC 4.19 10/06/2019    HGB 13.5 10/06/2019    HCT 42.0 10/06/2019     10/06/2019    MCH 32.2 10/06/2019    MCHC 32.1 10/06/2019    RDW 12.1 10/06/2019     10/06/2019       BMP RESULTS:  Lab Results   Component Value Date     11/13/2019    POTASSIUM 4.3 11/13/2019    CHLORIDE 103 11/13/2019    CO2 27 11/13/2019    ANIONGAP 5 11/13/2019     (H) 11/13/2019     BUN 20 11/13/2019    CR 0.64 11/13/2019    GFRESTIMATED >90 11/13/2019    GFRESTBLACK >90 11/13/2019    MANJU 9.2 11/13/2019        INR RESULTS:  Lab Results   Component Value Date    INR 1.02 10/06/2019    INR 0.95 06/28/2017       Procedures:  PULMONARY FUNCTION TESTS:   No flowsheet data found.      ECHOCARDIOGRAM:   No results found for this or any previous visit (from the past 8760 hour(s)).      Assessment and Plan:   1.  Nonischemic cardiomyopathy with approximate 45% ejection fraction  2.  Exertional intolerance schedule for right heart cath in the middle of this month  3.  Pacemaker in situ with detection of atrial ectopy and rare ventricular run.  Plan to start carvedilol 3.25 twice daily  4.  Provide prescription via patient's pharmacy    Plan:  1.  Prescription as described above  2.  Follow-up as previously arranged.  Patient will contact if any earlier problems arise.    Video on 6: 00 p.m.; video off 6: 15 p.m.  Platform Doximity  Patient at home; clinic [facility]    I very much appreciated the opportunity to see and assess Joyce Marroquin in the clinic today. Please do not hesitate to contact my office if you have any questions or concerns.      Lino Funes MD  Cardiac Arrhythmia Service  [facility]  [phone]      CC  LINO FUNES

## 2020-07-02 NOTE — LETTER
7/2/2020      RE: Joyce Marroquin  [address] [address] [address]       Dear Colleague,    Thank you for the opportunity to participate in the care of your patient, Joyce Marroquin, at the [facility]. Please see a copy of my visit note below.    Joyce Marroquin is a 59 year old female who is being evaluated via a billable video visit.        HPI:  HPI: 59-year-old female with a past medical history of RFA at [facility] complicated by complete heart block requiring pacemaker placement in 2017 at [facility].  Since her moving care to the [facility] his principal cardiovascular complaint has been diminished exercise tolerance and evident heart failure unassociated with substantial diminution of left ventricular function.  Joyce's last LV ejection fraction was 45% and this has been relatively stable.    This interim visit is undertaken due to the fact that Joyce's last pacemaker interrogation did reveal some nonsustained VT.  Findings are summarized immediately below:  .  Scheduled remote pacemaker transmission received and reviewed. Device transmission sent per MD orders. Patient has a Medtronic dual lead pacemaker. Normal pacemaker function. 2 VT-NS and 85 AT/AF episodes recorded. Review of available VT-NS EGMs shows V>A rhythm lasting 5-9 beats.  No EGMs available for AT/AF as all were < 1 minute in duration. Presenting EGM = ap/vs w/ PACs @ 70 bpm. AP = 38%.  = 98%. Estimated battery longevity to KONRAD = 6 years. Battery voltage = 3.01V. No short v-v intervals recorded. Lead impedance trends appear stable. Patient notified of interrogation results. Patient reports that she is feeling well but has noted a more irregular heart rate recently. Plan for patient to send a remote transmission in 3 months as scheduled    Today Joyce indicated that she was feeling relatively well.  She was curious about the recommendation to start carvedilol at a  low dose (3.25 twice daily).  I explained that I was less concerned regarding the brief VT episodes than the atrial tachycardias which might end up causing some confusion for the pacemaker sensing circuitry.  We agreed that we would start at this low dose and make adjustments going forward as needed.    Joyce is scheduled for right heart cath with Dr. Gupta in the middle of the month.    We will schedule our follow-up as previously arranged unless there are findings on the right heart cath of concern.  Joyce indicated that she understood the plan.    PAST MEDICAL HISTORY:  Past Medical History:   Diagnosis Date     Arthritis      Cardiomyopathy (H)     ff Cardiology     Chronic depressive personality disorder      Congestive heart failure (H) see dr martínez    heart failure correct term     COPD exacerbation (H)      Esophageal reflux      Ex-smoker     quit 2006; 1 PPD x 30     Hyperparathyroidism (H)     s/p parathyroidectomy     Hypothyroidism      LARRY on CPAP     ff Sleep medicine     Pulmonary nodules     ff Pulmonologist     S/P cardiac pacemaker procedure     [facility]      Uncomplicated asthma years       CURRENT MEDICATIONS:  Current Outpatient Medications   Medication Sig Dispense Refill     albuterol (PROAIR HFA/PROVENTIL HFA/VENTOLIN HFA) 108 (90 BASE) MCG/ACT Inhaler Inhale 2 puffs into the lungs as needed for shortness of breath / dyspnea or wheezing        azelastine (ASTELIN) 0.1 % nasal spray Spray 1 spray into both nostrils 2 times daily       calcium carbonate (OS-MANJU) 500 MG tablet Take 1 tablet by mouth 2 times daily       clonazePAM (KLONOPIN) 0.5 MG tablet Take 1 tablet (0.5 mg) by mouth 3 times daily as needed for anxiety 90 tablet 2     DiphenhydrAMINE HCl (BENADRYL PO) Take 25-50 mg by mouth nightly as needed       Ferrous Sulfate (IRON SUPPLEMENT PO) Take 65 mg by mouth 2 times daily (with meals)        fluticasone-salmeterol (ADVAIR) 250-50 MCG/DOSE inhaler Inhale 1  puff into the lungs every 12 hours       furosemide (LASIX) 20 MG tablet Take 1 tablet (20 mg) by mouth daily As needed for shortness of breath and swelling in the abdomen 90 tablet 1     ipratropium (ATROVENT) 0.06 % nasal spray Spray 2 sprays into both nostrils 4 times daily       lisinopril (ZESTRIL) 2.5 MG tablet Take 1 tablet (2.5 mg) by mouth daily 90 tablet 3     loperamide (IMODIUM A-D) 2 MG tablet Take 2 tabs (4 mg) after first loose stool, and then take one tab (2 mg) after each diarrheal stool.  Max of 8 tabs (16 mg) per day. 1 tablet 0     MAGNESIUM LACTATE PO Take 180 mg by mouth At Bedtime        MELATONIN PO Take 3 mg by mouth At Bedtime        Menaquinone-7 (VITAMIN K2 PO) Take 1 tablet by mouth every morning        METHYLPREDNISOLONE PO Take 40 mg by mouth as needed        Multiple Vitamin (DAILY MULTIVITAMIN PO) Take 1 tablet by mouth every morning        nebulizer nebulization as needed       Probiotic Product (PROBIOTIC DAILY PO) Take 1 capsule by mouth 2 times daily        SYNTHROID 150 MCG tablet Take 1 tablet (150 mcg) by mouth daily 90 tablet 2     TURMERIC PO Take 1 tablet by mouth every morning        UNABLE TO FIND Take 1 tablet by mouth daily MEDICATION NAME: Cardio Plus       UNABLE TO FIND 2 times daily MEDICATION NAME: Standard Process, Immune Congaplex. Pt taking 2 tablets daily       vitamin B complex with vitamin C (VITAMIN  B COMPLEX) PO tablet Take 1 tablet by mouth as needed       vitamin D3 (CHOLECALCIFEROL) 2000 units tablet Take 1 tablet by mouth daily 1 tablet 0     Zinc 15 MG CAPS Take 10 mg by mouth         PAST SURGICAL HISTORY:  Past Surgical History:   Procedure Laterality Date     BUNIONECTOMY Bilateral      CV CORONARY ANGIOGRAM N/A 9/26/2019    Procedure: CV CORONARY ANGIOGRAM;  Surgeon: Erickson Trejo MD;  Location:  HEART CARDIAC CATH LAB     EP ABLATION / EP STUDIES  04/21/2017     EXPLORE NECK N/A 9/19/2018    Procedure: EXPLORE NECK;  Neck Exploration  Resection of Left superior Parathyroid gland;  Surgeon: Kristine Venegas MD;  Location: [facility] OR     HC CORRECT BUNION,SIMPLE       PARATHYROIDECTOMY N/A 9/19/2018    Procedure: PARATHYROIDECTOMY;;  Surgeon: Kristine Venegas MD;  Location: [facility] OR     [facility]M INSERT OF NEW OR REPL W/VENT LEAD  04/21/2017     TONSILLECTOMY & ADENOIDECTOMY         ALLERGIES:     Allergies   Allergen Reactions     Tetracycline Swelling     Zofran [Ondansetron]      Prolonged QT  Prolonged QT at baseline per patient     Flagyl [Metronidazole] Rash     Buspar [Buspirone]      Muscle weakness     Corn Oil Other (See Comments)     Headache       Lorazepam Other (See Comments)     Heat intolerance       Seasonal Allergies Difficulty breathing     Sulfamethoxazole-Trimethoprim      Other reaction(s): Other  Passed out     Benzodiazepines Other (See Comments)     Other reaction(s): Other  Extreme heat intolerance  Extreme heat intolerance     Cyclobenzaprine Other (See Comments)     Extreme heat intolerance     Levaquin [Levofloxacin]      Other reaction(s): Other  Tendon rupture     Nsaids Other (See Comments)     Exacerbated asthma       FAMILY HISTORY:  Family History   Problem Relation Age of Onset     Hypothyroidism Mother      Hypertension Mother      Osteoarthritis Mother      Coronary Artery Disease Father         nonfatal MI in his 70s     Asthma Father      Diabetes Sister      Hypothyroidism Sister      Breast Cancer Maternal Aunt          SOCIAL HISTORY:  Social History     Tobacco Use     Smoking status: Former Smoker     Smokeless tobacco: Never Used   Substance Use Topics     Alcohol use: Yes     Frequency: 2-3 times a week     Drinks per session: 1 or 2     Binge frequency: Never     Comment: seldom     Drug use: No       ROS:   Constitutional: No fever, chills, or sweats. Weight stable.   ENT: No visual disturbance, ear ache, epistaxis, sore throat.   Cardiovascular: As per HPI.   Respiratory: No cough, hemoptysis.    GI: No  nausea, vomiting,  : No hematuria.   Integument: Negative.   Psychiatric: Negative.   Hematologic: no easy bleeding.  Neuro: Negative.   Endocrinology: No significant heat or cold intolerance   Musculoskeletal: No myalgia.    Exam:  LMP 08/21/2007   GENERAL APPEARANCE: healthy, alert and no distress  HEENT: no icterus, no xanthelasmas,  no central cyanosis    RESPIRATORY:  no rales, rhonchi or wheezes, no use of accessory muscles, no retractions, respirations are unlabored, normal respiratory rate    NEURO: alert and oriented to person/place/time, normal speech,and affect    Labs:  CBC RESULTS:   Lab Results   Component Value Date    WBC 6.8 10/06/2019    RBC 4.19 10/06/2019    HGB 13.5 10/06/2019    HCT 42.0 10/06/2019     10/06/2019    MCH 32.2 10/06/2019    MCHC 32.1 10/06/2019    RDW 12.1 10/06/2019     10/06/2019       BMP RESULTS:  Lab Results   Component Value Date     11/13/2019    POTASSIUM 4.3 11/13/2019    CHLORIDE 103 11/13/2019    CO2 27 11/13/2019    ANIONGAP 5 11/13/2019     (H) 11/13/2019    BUN 20 11/13/2019    CR 0.64 11/13/2019    GFRESTIMATED >90 11/13/2019    GFRESTBLACK >90 11/13/2019    MANJU 9.2 11/13/2019        INR RESULTS:  Lab Results   Component Value Date    INR 1.02 10/06/2019    INR 0.95 06/28/2017       Procedures:  PULMONARY FUNCTION TESTS:   No flowsheet data found.      ECHOCARDIOGRAM:   No results found for this or any previous visit (from the past 8760 hour(s)).      Assessment and Plan:   1.  Nonischemic cardiomyopathy with approximate 45% ejection fraction  2.  Exertional intolerance schedule for right heart cath in the middle of this month  3.  Pacemaker in situ with detection of atrial ectopy and rare ventricular run.  Plan to start carvedilol 3.25 twice daily  4.  Provide prescription via patient's pharmacy    Plan:  1.  Prescription as described above  2.  Follow-up as previously arranged.  Patient will contact if any earlier problems  arise.    Video on 6: 00 p.m.; video off 6: 15 p.m.  Platform Doximity  Patient at home; clinic [facility]    I very much appreciated the opportunity to see and assess Joyce Marroquin in the clinic today. Please do not hesitate to contact my office if you have any questions or concerns.      Lino Funes MD  Cardiac Arrhythmia Service  [facility]  [phone]        LINO FUNES      Please do not hesitate to contact me if you have any questions/concerns.     Sincerely,     Lino Funes MD

## 2020-07-09 ENCOUNTER — CARE COORDINATION (OUTPATIENT)
Dept: CARDIOLOGY | Facility: CLINIC | Age: 60
End: 2020-07-09

## 2020-07-09 DIAGNOSIS — I51.89 OTHER ILL-DEFINED HEART DISEASES: ICD-10-CM

## 2020-07-09 DIAGNOSIS — Z11.59 ENCOUNTER FOR SCREENING FOR OTHER VIRAL DISEASES: Primary | ICD-10-CM

## 2020-07-09 DIAGNOSIS — I42.9 CARDIOMYOPATHY, UNSPECIFIED TYPE (H): Primary | ICD-10-CM

## 2020-07-09 RX ORDER — LIDOCAINE 40 MG/G
CREAM TOPICAL
Status: CANCELLED | OUTPATIENT
Start: 2020-07-09

## 2020-07-09 NOTE — PROGRESS NOTES
Called and spoke with patient. Patient states she will reach out to Dr. Funes for additional lab orders, has appointment with Dr. Ordonez on7/15/20 and will discuss number of Covid patient's in hospital, and will talk to Covid team about staff testing in hospital. Procedure and prep reviewed with patient.

## 2020-07-09 NOTE — PROGRESS NOTES
It is recommended that patient proceed with right heart cath with exercise with Dr. Alonso Ordonez. Orders placed and procedure scheduled for 7/20/20, arriving at 9:00 am in the [facility] Waiting Room at the hospital. Patient was called, no answer, and voice message left with above information including preparation.

## 2020-07-14 ENCOUNTER — VIRTUAL VISIT (OUTPATIENT)
Dept: CARDIOLOGY | Facility: CLINIC | Age: 60
End: 2020-07-14
Attending: INTERNAL MEDICINE
Payer: COMMERCIAL

## 2020-07-14 VITALS
HEIGHT: 65 IN | SYSTOLIC BLOOD PRESSURE: 112 MMHG | DIASTOLIC BLOOD PRESSURE: 87 MMHG | BODY MASS INDEX: 26.99 KG/M2 | HEART RATE: 107 BPM | WEIGHT: 162 LBS

## 2020-07-14 DIAGNOSIS — I42.8 NICM (NONISCHEMIC CARDIOMYOPATHY) (H): ICD-10-CM

## 2020-07-14 DIAGNOSIS — I44.2 COMPLETE HEART BLOCK (H): ICD-10-CM

## 2020-07-14 DIAGNOSIS — I48.0 PAROXYSMAL ATRIAL FIBRILLATION (H): Primary | ICD-10-CM

## 2020-07-14 DIAGNOSIS — I42.9 CARDIOMYOPATHY, UNSPECIFIED TYPE (H): ICD-10-CM

## 2020-07-14 DIAGNOSIS — I50.20 HEART FAILURE WITH REDUCED EJECTION FRACTION, NYHA CLASS III (H): ICD-10-CM

## 2020-07-14 PROCEDURE — 99213 OFFICE O/P EST LOW 20 MIN: CPT | Mod: GT | Performed by: INTERNAL MEDICINE

## 2020-07-14 ASSESSMENT — MIFFLIN-ST. JEOR: SCORE: 1300.71

## 2020-07-14 ASSESSMENT — PAIN SCALES - GENERAL: PAINLEVEL: NO PAIN (0)

## 2020-07-14 NOTE — PROGRESS NOTES
"Joyce Marroquin is a 59 year old female who is being evaluated via a billable video visit.      The patient has been notified of following:     \"This video visit will be conducted via a call between you and your physician/provider. We have found that certain health care needs can be provided without the need for an in-person physical exam.  This service lets us provide the care you need with a video conversation.  If a prescription is necessary we can send it directly to your pharmacy.  If lab work is needed we can place an order for that and you can then stop by our lab to have the test done at a later time.    Video visits are billed at different rates depending on your insurance coverage.  Please reach out to your insurance provider with any questions.    If during the course of the call the physician/provider feels a video visit is not appropriate, you will not be charged for this service.\"    Patient has given verbal consent for Video visit? Yes  How would you like to obtain your AVS? [facility]  Patient would like the video invitation sent by: Text to cell phone: [phone]  Will anyone else be joining your video visit? No        Video-Visit Details    Type of service:  Video Visit    Video Start Time: 2:23 PM  Video End Time: 2:38 PM    Originating Location (pt. 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  She also has a recent history of heart failure with reduced ejection fraction with EF around 35% later improved to 45%.  There was a history of Takotsubo cardiomyopathy.    Patient on 7/2 had meeting with EP at which time low dose carvedilol was " started 3.125 mg PO bid due to atrial ectopy and short runs of VT. She is scheduled to have [facility] next week to evaluate for exercise intolerance and HF symptoms.     Her weight has been on 158 now 162 (she took lasix).     Today during the visit she tells me that she thinks carvedilol helped her and she did not have any side effects. Today we discussed about how RHC is going to , discussed that RHC gives us cardiac output measurements and accurate assessment of volume status and pressures.     Patient reports she keeps herself isolated due to COVID pandemic.      She was able to walk and work without any limitations however now she has some issues even walking the dog around.  She did gain about 50 pounds over the past couple of months to years which she thinks mostly food related.  She used to have normal blood pressure values before the AV node ablation however subsequently she developed episodes of hypotension to the point that she was hardly able to tolerate any neurohormonal blockade.  She never had really syncope or loss of consciousness but did have some episodes of dizziness however she also has some vestibular symptoms for a long time so difficult to discern.  Nevertheless her blood pressures dropped down to the 70s systolic at times.  She was also unable to tolerate diuretics essentially only took Lasix 20 mg infrequently.  She does regularly use salt supplement to maintain her blood pressure.  She denies any chest pain chest pressure and no other new complaints at this time.       PAST MEDICAL HISTORY:  Past Medical History        Past Medical History:   Diagnosis Date     Cardiomyopathy (H)       ff Cardiology     Chronic depressive personality disorder       Esophageal reflux       Ex-smoker       quit 2006; 1 PPD x 30     Hyperparathyroidism (H)       s/p parathyroidectomy     Hypothyroidism       LARRY on CPAP       ff Sleep medicine     Pulmonary nodules       ff Pulmonologist     S/P  cardiac pacemaker procedure       [facility]         FAMILY HISTORY:  Family History         Family History   Problem Relation Age of Onset     Hypothyroidism Mother       Hypertension Mother       Osteoarthritis Mother       Coronary Artery Disease Father           nonfatal MI in his 70s     Asthma Father       Diabetes Sister       Hypothyroidism Sister       Breast Cancer Maternal Aunt          SOCIAL HISTORY:  Social History            Socioeconomic History     Marital status:        Spouse name: None     Number of children: None     Years of education: None     Highest education level: Master's degree (e.g., MA, MS, Florian, MEd, MSW, SONIA)   Occupational History     None   Social Needs     Financial resource strain: Not very hard     Food insecurity       Worry: Never true       Inability: Never true     Transportation needs       Medical: No       Non-medical: No   Tobacco Use     Smoking status: Former Smoker     Smokeless tobacco: Never Used   Substance and Sexual Activity     Alcohol use: Yes       Frequency: 2-3 times a week       Drinks per session: 1 or 2       Binge frequency: Never       Comment: seldom     Drug use: No     Sexual activity: Yes       Partners: Male       Birth control/protection: None       Comment: vasectomy   Lifestyle     Physical activity       Days per week: 3 days       Minutes per session: 10 min     Stress: To some extent   Relationships     Social connections       Talks on phone: More than three times a week       Gets together: Never       Attends [employer] service: More than 4 times per year       Active member of club or organization: Yes       Attends meetings of clubs or organizations: More than 4 times per year       Relationship status:      Intimate partner violence       Fear of current or ex partner: None       Emotionally abused: None       Physically abused: None       Forced sexual activity: None   Other Topics Concern     None    Social History Narrative     CRNA on disability for vestibular symptoms       CURRENT MEDICATIONS:  Current Outpatient Medications   Medication     albuterol (PROAIR HFA/PROVENTIL HFA/VENTOLIN HFA) 108 (90 BASE) MCG/ACT Inhaler     azelastine (ASTELIN) 0.1 % nasal spray     calcium carbonate (OS-MANJU) 500 MG tablet     carvedilol (COREG) 3.125 MG tablet     clonazePAM (KLONOPIN) 0.5 MG tablet     DiphenhydrAMINE HCl (BENADRYL PO)     Ferrous Sulfate (IRON SUPPLEMENT PO)     fluticasone-salmeterol (ADVAIR) 250-50 MCG/DOSE inhaler     furosemide (LASIX) 20 MG tablet     ipratropium (ATROVENT) 0.06 % nasal spray     lisinopril (ZESTRIL) 2.5 MG tablet     loperamide (IMODIUM A-D) 2 MG tablet     MAGNESIUM LACTATE PO     MELATONIN PO     Menaquinone-7 (VITAMIN K2 PO)     METHYLPREDNISOLONE PO     Multiple Vitamin (DAILY MULTIVITAMIN PO)     nebulizer nebulization     Probiotic Product (PROBIOTIC DAILY PO)     SYNTHROID 150 MCG tablet     TURMERIC PO     UNABLE TO FIND     UNABLE TO FIND     vitamin B complex with vitamin C (VITAMIN  B COMPLEX) PO tablet     vitamin D3 (CHOLECALCIFEROL) 2000 units tablet     Zinc 15 MG CAPS     No current facility-administered medications for this visit.        ROS:   Constitutional: No fever, chills, or sweats. Weight is 162 lbs  ENT: No visual disturbance, ear ache, epistaxis, sore throat.   Allergies/Immunologic: Negative.   Respiratory: No cough, hemoptysis.   Cardiovascular: As per HPI.   GI: No nausea, vomiting, hematemesis, melena, or hematochezia.   : No urinary frequency, dysuria, or hematuria.   Integument: Negative.   Psychiatric: Pleasant, no major depression noted  Neuro: No focal neurological deficits noted  Endocrinology: Negative.   Musculoskeletal: As per HPI.      EXAM:    This is a video visit     General: appears comfortable, alert and oriented  Head: normocephalic, atraumatic  Eyes: anicteric sclera, EOMI , PERRL  Neck: no adenopathy visible over the video  Heart:  Unable to auscultate.  No significant JVD appreciated the screen.  Lungs: Unable to auscultate  Abdomen: Patient notes some ongoing abdominal distention and swelling.  Extremities: No LE edema   Skin: no open lesions noted  Neuro: grossly non-focal     Labs:    Lab Results   Component Value Date    WBC 6.8 10/06/2019    HGB 13.5 10/06/2019    HCT 42.0 10/06/2019     10/06/2019     11/13/2019    POTASSIUM 4.3 11/13/2019    CHLORIDE 103 11/13/2019    CO2 27 11/13/2019    BUN 20 11/13/2019    CR 0.64 11/13/2019     (H) 11/13/2019    SED 22 11/09/2017    DD 0.7 (H) 11/22/2017    NTBNPI 194 10/06/2019    TROPONIN <0.07 01/04/2006    TROPI <0.015 10/06/2019    AST 13 10/06/2019    ALT 26 10/06/2019    ALKPHOS 89 10/06/2019    BILITOTAL 0.3 10/06/2019    INR 1.02 10/06/2019       NM Lexiscan 3/2020:  The nuclear stress test is negative for inducible myocardial ischemia or infarction.  Fixed septal/apical defect due to Paced rhythm and increased gut photon activity.LVEDv 133ml. LV ESv 40ml. LV EF 70%.     There is no prior study for comparison     Echocardiogram reviewed 3/2020:  Mildly (EF 40-45%) reduced left ventricular function is present.  Global right ventricular function is normal.  No pericardial effusion is present. IVC diameter <2.1 cm collapsing >50% with sniff suggests a normal RA pressure  of 3 mmHg. This study was compared with the study from 12/3/19.  There has been no change.      Coronary angiogram 9/2019:  Mild-moderate non-obstructive coronary disease involving LAD, LCx, and RCA. No significant coronary disease to explain new cardiomyopathy.     TTE 9/5/2019:  Moderately (EF 30-35%) reduced left ventricular function is present. All segments from mid to apical left ventricle are severely hypokinetic to akinetic. Basal segments are hypercontractile. Overall pattern is suggestive of stress cardiomyopathy, but cannot rule out ischemic cardiomyopathy. Right ventricular function, chamber  size, wall motion, and thickness are normal. No significant valve abnormalities. Mild pulmonary hypertension with increased RA pressure.     MRI cardiac 4/2017:  1. Normal left ventricular size.  Left ventricular wall thickness is  normal except a very small area in the basal inferior septum that appears  thin in some images.  Real-time images suggest a very small area of  hypokinesis in the basal inferior septum (not well visualized).  Left  ventricular ejection fraction is estimated at about 60-65% (unable to   perform volumetric analysis given frequent ectopy; real time images used  to assess LVEF).  2. Normal right ventricular size and systolic function.  No obvious regional wall motion abnormalities noted.  3. Gadolinium imaging:  No obvious right ventricular myocardial late  gadolinium enhancement noted.  There is a very small area of probable late  gadolinium enhancement in the mildly thinned basal inferior segment.    4. Moderate left atrial dilatation.  The right atrial size is at the upper limits of normal.  5. Thickened mitral valve leaflets, without obvious stenosis or significant regurgitation. No obvious hemodynamically significant valvular dysfunction noted.   6. Normal sized aortic root and ascending aorta.  For the remainder of the thoracic aorta, see separate radiology report.  7. Normal pericardium without significant pericardial effusion.      ASSESSMENT AND PLAN:  In summary, patient is a 59 year old lady with history as stated above including complete heart block in the setting of RF ablation status post dual-chamber pacemaker in 2017 who has ongoing symptoms of heart failure weight gain and very limited exercise tolerance.  I do believe her shortness of breath and limited exercise tolerance is probably multifactorial including the significant weight gain with AV node ablation and now with a more fixed heart rate in the setting of pacemaker use.  However certainly she could have heart failure  with borderline reduced ejection fraction with more preserved component.  I completely agree with Dr. Funes to proceed with right heart catheterization with exercise to further evaluate her hemodynamics and for half pack.    I was considering whether repeating a cardiac MRI would be any benefit however she did have one in 2017 which showed no LGE that would be concerning for sarcoidosis for or other infiltrative cardiomyopathies. The he was in the setting of borderline reduced ejection fraction and episodes of arrhythmia as noted on the pacemaker.  She also notes that she has significant anxiety from the MRI so we will defer this at this time.    Discussed that she should probably reduce her salt intake at this point and see if her blood pressure will be able to maintain.  We discussed that taking diuretic and salt at the same time which is counterproductive and would recommend against it. She will again try to use salt intake and monitor her blood pressures.  Also discussed to remain off of significant alcohol use that this can make cardiomyopathy worse.     No other medication changes today again the right heart catheterization will be performed next week.      Follow up:   3 months after the right heart catheterization is complete.     I appreciate the opportunity to participate in the care of Joyce Marroquin . Please do not hesitate to contact me with any further questions.    I have seen and evaluated the patient and agree with the assessment and plan as above.  Alonso Ordonez MD

## 2020-07-14 NOTE — LETTER
7/14/2020      RE: Joyce Marroquin  [address] [facility] [address]       Dear Colleague,    Thank you for the opportunity to participate in the care of your patient, Joyce Marroquin, at the [facility]. Please see a copy of my visit note below.    Joyce Marroquin is a 59 year old female who is being evaluated via a billable video visit.       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  She also has a recent history of heart failure with reduced ejection fraction with EF around 35% later improved to 45%.  There was a history of Takotsubo cardiomyopathy.    Patient on 7/2 had meeting with EP at which time low dose carvedilol was started 3.125 mg PO bid due to atrial ectopy and short runs of VT. She is scheduled to have RHC next week to evaluate for exercise intolerance and HF symptoms.     Her weight has been on 158 now 162 (she took lasix).     Today during the visit she tells me that she thinks carvedilol helped her and she did not have any side effects. Today we discussed about how RHC is going to , discussed that RHC gives us cardiac output measurements and accurate assessment of volume status and pressures.     Patient reports she keeps herself isolated due to COVID pandemic.      She was able to walk and work without any limitations however now she has some issues even walking the dog around.  She did gain about 50 pounds over the past couple of months to years which she thinks mostly food related.  She used to have normal blood pressure values before the AV node ablation however subsequently she developed episodes of hypotension to the point that she was hardly able to tolerate any neurohormonal blockade.  She never had  really syncope or loss of consciousness but did have some episodes of dizziness however she also has some vestibular symptoms for a long time so difficult to discern.  Nevertheless her blood pressures dropped down to the 70s systolic at times.  She was also unable to tolerate diuretics essentially only took Lasix 20 mg infrequently.  She does regularly use salt supplement to maintain her blood pressure.  She denies any chest pain chest pressure and no other new complaints at this time.       PAST MEDICAL HISTORY:  Past Medical History        Past Medical History:   Diagnosis Date     Cardiomyopathy (H)       ff Cardiology     Chronic depressive personality disorder       Esophageal reflux       Ex-smoker       quit 2006; 1 PPD x 30     Hyperparathyroidism (H)       s/p parathyroidectomy     Hypothyroidism       LARRY on CPAP       ff Sleep medicine     Pulmonary nodules       ff Pulmonologist     S/P cardiac pacemaker procedure       checked every 6 months at the [facility]        FAMILY HISTORY:  Family History         Family History   Problem Relation Age of Onset     Hypothyroidism Mother       Hypertension Mother       Osteoarthritis Mother       Coronary Artery Disease Father           nonfatal MI in his 70s     Asthma Father       Diabetes Sister       Hypothyroidism Sister       Breast Cancer Maternal Aunt          SOCIAL HISTORY:  Social History            Socioeconomic History     Marital status:        Spouse name: None     Number of children: None     Years of education: None     Highest education level: Master's degree (e.g., MA, MS, Florian, MEd, MSW, SONIA)   Occupational History     None   Social Needs     Financial resource strain: Not very hard     Food insecurity       Worry: Never true       Inability: Never true     Transportation needs       Medical: No       Non-medical: No   Tobacco Use     Smoking status: Former Smoker     Smokeless tobacco: Never Used   Substance and Sexual Activity     Alcohol  use: Yes       Frequency: 2-3 times a week       Drinks per session: 1 or 2       Binge frequency: Never       Comment: seldom     Drug use: No     Sexual activity: Yes       Partners: Male       Birth control/protection: None       Comment: vasectomy   Lifestyle     Physical activity       Days per week: 3 days       Minutes per session: 10 min     Stress: To some extent   Relationships     Social connections       Talks on phone: More than three times a week       Gets together: Never       Attends Confucianist service: More than 4 times per year       Active member of club or organization: Yes       Attends meetings of clubs or organizations: More than 4 times per year       Relationship status:      Intimate partner violence       Fear of current or ex partner: None       Emotionally abused: None       Physically abused: None       Forced sexual activity: None   Other Topics Concern     None   Social History Narrative     CRNA on disability for vestibular symptoms       CURRENT MEDICATIONS:  Current Outpatient Medications   Medication     albuterol (PROAIR HFA/PROVENTIL HFA/VENTOLIN HFA) 108 (90 BASE) MCG/ACT Inhaler     azelastine (ASTELIN) 0.1 % nasal spray     calcium carbonate (OS-MANJU) 500 MG tablet     carvedilol (COREG) 3.125 MG tablet     clonazePAM (KLONOPIN) 0.5 MG tablet     DiphenhydrAMINE HCl (BENADRYL PO)     Ferrous Sulfate (IRON SUPPLEMENT PO)     fluticasone-salmeterol (ADVAIR) 250-50 MCG/DOSE inhaler     furosemide (LASIX) 20 MG tablet     ipratropium (ATROVENT) 0.06 % nasal spray     lisinopril (ZESTRIL) 2.5 MG tablet     loperamide (IMODIUM A-D) 2 MG tablet     MAGNESIUM LACTATE PO     MELATONIN PO     Menaquinone-7 (VITAMIN K2 PO)     METHYLPREDNISOLONE PO     Multiple Vitamin (DAILY MULTIVITAMIN PO)     nebulizer nebulization     Probiotic Product (PROBIOTIC DAILY PO)     SYNTHROID 150 MCG tablet     TURMERIC PO     UNABLE TO FIND     UNABLE TO FIND     vitamin B complex with vitamin C  (VITAMIN  B COMPLEX) PO tablet     vitamin D3 (CHOLECALCIFEROL) 2000 units tablet     Zinc 15 MG CAPS     No current facility-administered medications for this visit.        ROS:   Constitutional: No fever, chills, or sweats. Weight is 162 lbs  ENT: No visual disturbance, ear ache, epistaxis, sore throat.   Allergies/Immunologic: Negative.   Respiratory: No cough, hemoptysis.   Cardiovascular: As per HPI.   GI: No nausea, vomiting, hematemesis, melena, or hematochezia.   : No urinary frequency, dysuria, or hematuria.   Integument: Negative.   Psychiatric: Pleasant, no major depression noted  Neuro: No focal neurological deficits noted  Endocrinology: Negative.   Musculoskeletal: As per HPI.      EXAM:    This is a video visit     General: appears comfortable, alert and oriented  Head: normocephalic, atraumatic  Eyes: anicteric sclera, EOMI , PERRL  Neck: no adenopathy visible over the video  Heart: Unable to auscultate.  No significant JVD appreciated the screen.  Lungs: Unable to auscultate  Abdomen: Patient notes some ongoing abdominal distention and swelling.  Extremities: No LE edema   Skin: no open lesions noted  Neuro: grossly non-focal     Labs:    Lab Results   Component Value Date    WBC 6.8 10/06/2019    HGB 13.5 10/06/2019    HCT 42.0 10/06/2019     10/06/2019     11/13/2019    POTASSIUM 4.3 11/13/2019    CHLORIDE 103 11/13/2019    CO2 27 11/13/2019    BUN 20 11/13/2019    CR 0.64 11/13/2019     (H) 11/13/2019    SED 22 11/09/2017    DD 0.7 (H) 11/22/2017    NTBNPI 194 10/06/2019    TROPONIN <0.07 01/04/2006    TROPI <0.015 10/06/2019    AST 13 10/06/2019    ALT 26 10/06/2019    ALKPHOS 89 10/06/2019    BILITOTAL 0.3 10/06/2019    INR 1.02 10/06/2019       NM Lexiscan 3/2020:  The nuclear stress test is negative for inducible myocardial ischemia or infarction.  Fixed septal/apical defect due to Paced rhythm and increased gut photon activity.LVEDv 133ml. LV ESv 40ml. LV EF 70%.      There is no prior study for comparison     Echocardiogram reviewed 3/2020:  Mildly (EF 40-45%) reduced left ventricular function is present.  Global right ventricular function is normal.  No pericardial effusion is present. IVC diameter <2.1 cm collapsing >50% with sniff suggests a normal RA pressure  of 3 mmHg. This study was compared with the study from 12/3/19.  There has been no change.      Coronary angiogram 9/2019:  Mild-moderate non-obstructive coronary disease involving LAD, LCx, and RCA. No significant coronary disease to explain new cardiomyopathy.     TTE 9/5/2019:  Moderately (EF 30-35%) reduced left ventricular function is present. All segments from mid to apical left ventricle are severely hypokinetic to akinetic. Basal segments are hypercontractile. Overall pattern is suggestive of stress cardiomyopathy, but cannot rule out ischemic cardiomyopathy. Right ventricular function, chamber size, wall motion, and thickness are normal. No significant valve abnormalities. Mild pulmonary hypertension with increased RA pressure.     MRI cardiac 4/2017:  1. Normal left ventricular size.  Left ventricular wall thickness is  normal except a very small area in the basal inferior septum that appears  thin in some images.  Real-time images suggest a very small area of  hypokinesis in the basal inferior septum (not well visualized).  Left  ventricular ejection fraction is estimated at about 60-65% (unable to   perform volumetric analysis given frequent ectopy; real time images used  to assess LVEF).  2. Normal right ventricular size and systolic function.  No obvious regional wall motion abnormalities noted.  3. Gadolinium imaging:  No obvious right ventricular myocardial late  gadolinium enhancement noted.  There is a very small area of probable late  gadolinium enhancement in the mildly thinned basal inferior segment.    4. Moderate left atrial dilatation.  The right atrial size is at the upper limits of  normal.  5. Thickened mitral valve leaflets, without obvious stenosis or significant regurgitation. No obvious hemodynamically significant valvular dysfunction noted.   6. Normal sized aortic root and ascending aorta.  For the remainder of the thoracic aorta, see separate radiology report.  7. Normal pericardium without significant pericardial effusion.      ASSESSMENT AND PLAN:  In summary, patient is a 59 year old lady with history as stated above including complete heart block in the setting of RF ablation status post dual-chamber pacemaker in 2017 who has ongoing symptoms of heart failure weight gain and very limited exercise tolerance.  I do believe her shortness of breath and limited exercise tolerance is probably multifactorial including the significant weight gain with AV node ablation and now with a more fixed heart rate in the setting of pacemaker use.  However certainly she could have heart failure with borderline reduced ejection fraction with more preserved component.  I completely agree with Dr. Funes to proceed with right heart catheterization with exercise to further evaluate her hemodynamics and for half pack.    I was considering whether repeating a cardiac MRI would be any benefit however she did have one in 2017 which showed no LGE that would be concerning for sarcoidosis for or other infiltrative cardiomyopathies. The he was in the setting of borderline reduced ejection fraction and episodes of arrhythmia as noted on the pacemaker.  She also notes that she has significant anxiety from the MRI so we will defer this at this time.    Discussed that she should probably reduce her salt intake at this point and see if her blood pressure will be able to maintain.  We discussed that taking diuretic and salt at the same time which is counterproductive and would recommend against it. She will again try to use salt intake and monitor her blood pressures.  Also discussed to remain off of significant  alcohol use that this can make cardiomyopathy worse.     No other medication changes today again the right heart catheterization will be performed next week.      Follow up:   3 months after the right heart catheterization is complete.     I appreciate the opportunity to participate in the care of Joyce Marroquin . Please do not hesitate to contact me with any further questions.    I have seen and evaluated the patient and agree with the assessment and plan as above.  Alonso Ordonez MD      Please do not hesitate to contact me if you have any questions/concerns.     Sincerely,     Alonso Ordonez MD

## 2020-07-14 NOTE — PROGRESS NOTES
"Joyce Marroquin is a 59 year old female who is being evaluated via a billable video visit.      The patient has been notified of following:     \"This video visit will be conducted via a call between you and your physician/provider. We have found that certain health care needs can be provided without the need for an in-person physical exam.  This service lets us provide the care you need with a video conversation.  If a prescription is necessary we can send it directly to your pharmacy.  If lab work is needed we can place an order for that and you can then stop by our lab to have the test done at a later time.    Video visits are billed at different rates depending on your insurance coverage.  Please reach out to your insurance provider with any questions.    If during the course of the call the physician/provider feels a video visit is not appropriate, you will not be charged for this service.\"     Patient has given verbal consent for Video visit? Yes  How would you like to obtain your AVS? Mail a copy  Patient would like the video invitation sent by: Text to cell phone: [phone]  Will anyone else be joining your video visit? No  {If patient encounters technical issues they should call [phone] :[phone]}            "

## 2020-07-15 ENCOUNTER — NURSE TRIAGE (OUTPATIENT)
Dept: NURSING | Facility: CLINIC | Age: 60
End: 2020-07-15

## 2020-07-15 ENCOUNTER — VIRTUAL VISIT (OUTPATIENT)
Dept: FAMILY MEDICINE | Facility: CLINIC | Age: 60
End: 2020-07-15
Payer: COMMERCIAL

## 2020-07-15 DIAGNOSIS — R19.5 LOOSE BOWEL MOVEMENT: Primary | ICD-10-CM

## 2020-07-15 PROCEDURE — 99213 OFFICE O/P EST LOW 20 MIN: CPT | Mod: TEL | Performed by: FAMILY MEDICINE

## 2020-07-15 NOTE — TELEPHONE ENCOUNTER
"\"This morning I have diarrhea.\" Patient reporting she is scheduled for cardiac surgery on 7/20/20. Stating she had a new onset of diarrhea this morning and is concerned this symptom is listed on COVID 19 list for symptoms.   Patient reporting she is always \"short of breath\" with heart failure. Afebrile.  Stating she is under a lot of stress.   Patient reporting she had a virtual visit with cardiology yesterday and was advised she would receive a call back regarding her questions on \"how they are cleaning\" at hospital, along with accuracy of COVID 19 testing.   Warm transferred to Central Scheduling.    COVID 19 Nurse Triage Plan/Patient Instructions    Please be aware that novel coronavirus (COVID-19) may be circulating in the community. If you develop symptoms such as fever, cough, or SOB or if you have concerns about the presence of another infection including coronavirus (COVID-19), please contact your health care provider or visit [email or web address].     Disposition/Instructions    Virtual Visit with provider recommended. Reference Visit Selection Guide.    Thank you for taking steps to prevent the spread of this virus.  o Limit your contact with others.  o Wear a simple mask to cover your cough.  o Wash your hands well and often.    Resources    [facility]: About COVID-19: [email or web address]    CDC: What to Do If You're Sick: www.cdc.gov/coronavirus/2019-ncov/about/steps-when-sick.html    CDC: Ending Home Isolation: www.cdc.gov/coronavirus/2019-ncov/hcp/disposition-in-home-patients.html     CDC: Caring for Someone: www.cdc.gov/coronavirus/2019-ncov/if-you-are-sick/care-for-someone.html     [facility]: Interim Guidance for Hospital Discharge to Home: www.health.[facility].[place]/diseases/coronavirus/hcp/hospdischarge.pdf    [facility] clinical trials (COVID-19 research studies): clinicalaffairs.[email or web address]/umn-clinical-trials     Below are the COVID-19 hotlines at the [place] Department of Health " ([facility]). Interpreters are available.   o For health questions: Call 652-282-6189 or 1-903.729.8072 (7 a.m. to 7 p.m.)  o For questions about schools and childcare: Call 487-803-1635 or 1-206.461.6317 (7 a.m. to 7 p.m.)                       Reason for Disposition    HIGH RISK patient (e.g., age > 64 years, diabetes, heart or lung disease, weak immune system)    Additional Information    Negative: SEVERE difficulty breathing (e.g., struggling for each breath, speaks in single words)    Negative: Difficult to awaken or acting confused (e.g., disoriented, slurred speech)    Negative: Bluish (or gray) lips or face now    Negative: Shock suspected (e.g., cold/pale/clammy skin, too weak to stand, low BP, rapid pulse)    Negative: Sounds like a life-threatening emergency to the triager    Negative: SEVERE or constant chest pain or pressure (Exception: mild central chest pain, present only when coughing)    Negative: MODERATE difficulty breathing (e.g., speaks in phrases, SOB even at rest, pulse 100-120)    Negative: Patient sounds very sick or weak to the triager    Negative: MILD difficulty breathing (e.g., minimal/no SOB at rest, SOB with walking, pulse <100)    Negative: Chest pain or pressure    Negative: Fever > 103 F (39.4 C)    Negative: [1] Fever > 101 F (38.3 C) AND [2] age > 60    Negative: [1] Fever > 100.0 F (37.8 C) AND [2] bedridden (e.g., nursing home patient, CVA, chronic illness, recovering from surgery)    Protocols used: CORONAVIRUS (COVID-19) DIAGNOSED OR [id number] 5.16.20

## 2020-07-15 NOTE — PROGRESS NOTES
"Joyce Marroquin is a 59 year old female who is being evaluated via a billable telephone visit.      The patient has been notified of following:     \"This telephone visit will be conducted via a call between you and your physician/provider. We have found that certain health care needs can be provided without the need for a physical exam.  This service lets us provide the care you need with a short phone conversation.  If a prescription is necessary we can send it directly to your pharmacy.  If lab work is needed we can place an order for that and you can then stop by our lab to have the test done at a later time.    Telephone visits are billed at different rates depending on your insurance coverage. During this emergency period, for some insurers they may be billed the same as an in-person visit.  Please reach out to your insurance provider with any questions.    If during the course of the call the physician/provider feels a telephone visit is not appropriate, you will not be charged for this service.\"    Patient has given verbal consent for Telephone visit?  Yes    What phone number would you like to be contacted at? [phone]     How would you like to obtain your AVS? Mail a copy    Subjective     Joyce Marroquin is a 59 year old female who presents via phone visit today for the following health issues:    HPI    Diarrhea      Duration: 12 hours    Description:       Consistency of stool: watery       Blood in stool: no        Number of loose stools past 24 hours: 1    Intensity:  mild    Accompanying signs and symptoms:       Fever: no        Nausea/vomitting: no        Abdominal pain: no        Weight loss: no     History (recent antibiotics or travel/ill contacts/med changes/testing done): na    Precipitating or alleviating factors: Hx colitis    Therapies tried and outcome: probiotics    Thinks is stress due to upcoming procedure; has ha history of collagenous colitis and this feels the same.  She had a loose " motion this morning, might have had one yesterday too.  Does not feel sick though  Denies any fever, body aches, abdominal pain  She has dark stools from iron use  Has Covid test planned in 2 days    Assessment/Plan:    1. Loose bowel movement      Differentials: Colitis, C Diff, Gastroenteritis, Covid.    Given has had one episode and has no other symptoms, but under stress likely a colitis flare up. Has Covid test planned in 2 days. If persist will consider C diff testing. Will call with any new symptoms.    No follow-ups on file.    Phone call duration: 8 minutes    Hai Lenz MD

## 2020-07-17 ENCOUNTER — TELEPHONE (OUTPATIENT)
Dept: CARDIOLOGY | Facility: CLINIC | Age: 60
End: 2020-07-17

## 2020-07-17 DIAGNOSIS — Z11.59 ENCOUNTER FOR SCREENING FOR OTHER VIRAL DISEASES: ICD-10-CM

## 2020-07-17 NOTE — LETTER
July 21, 2020        Joyce Marroquin  [address] [place] [address]    This letter provides a written record that you were tested for COVID-19 on 07/17/2020.       Your result was negative. This means that we didn t find the virus that causes COVID-19 in your sample. A test may show negative when you do actually have the virus. This can happen when the virus is in the early stages of infection, before you feel illness symptoms.    If you have symptoms   Stay home and away from others (self-isolate) until you meet ALL of the guidelines below:    You ve had no fever--and no medicine that reduces fever--for 3 full days (72 hours). And      Your other symptoms have gotten better. For example, your cough or breathing has improved. And     At least 10 days have passed since your symptoms started.    During this time:    Stay home. Don t go to work, school or anywhere else.     Stay in your own room, including for meals. Use your own bathroom if you can.    Stay away from others in your home. No hugging, kissing or shaking hands. No visitors.    Clean  high touch  surfaces often (doorknobs, counters, handles, etc.). Use a household cleaning spray or wipes. You can find a full list on the EPA website at www.epa.gov/pesticide-registration/list-n-disinfectants-use-against-sars-cov-2.    Cover your mouth and nose with a mask, tissue or washcloth to avoid spreading germs.    Wash your hands and face often with soap and water.    Going back to work  Check with your employer for any guidelines to follow for going back to work.    Employers: This document serves as formal notice that your employee tested negative for COVID-19, as of the testing date shown above.

## 2020-07-17 NOTE — TELEPHONE ENCOUNTER
Left voicemail for patient to complete Travel Screen for Cardiac Cath Lab appointment on 7/20/20 and inform patient of updated Visitor Policy.       COVID test in process.  Tamara Robles RN

## 2020-07-19 LAB
SARS-COV-2 RNA SPEC QL NAA+PROBE: NOT DETECTED
SPECIMEN SOURCE: NORMAL

## 2020-07-20 ENCOUNTER — HOSPITAL ENCOUNTER (OUTPATIENT)
Facility: CLINIC | Age: 60
Discharge: HOME | End: 2020-07-20
Attending: INTERNAL MEDICINE | Admitting: INTERNAL MEDICINE
Payer: COMMERCIAL

## 2020-07-20 ENCOUNTER — APPOINTMENT (OUTPATIENT)
Dept: MEDSURG UNIT | Facility: CLINIC | Age: 60
End: 2020-07-20
Attending: INTERNAL MEDICINE
Payer: COMMERCIAL

## 2020-07-20 ENCOUNTER — APPOINTMENT (OUTPATIENT)
Dept: LAB | Facility: CLINIC | Age: 60
End: 2020-07-20
Attending: INTERNAL MEDICINE
Payer: COMMERCIAL

## 2020-07-20 ENCOUNTER — TELEPHONE (OUTPATIENT)
Dept: FAMILY MEDICINE | Facility: CLINIC | Age: 60
End: 2020-07-20

## 2020-07-20 ENCOUNTER — TELEPHONE (OUTPATIENT)
Dept: CARDIOLOGY | Facility: CLINIC | Age: 60
End: 2020-07-20

## 2020-07-20 VITALS
HEART RATE: 109 BPM | RESPIRATION RATE: 18 BRPM | SYSTOLIC BLOOD PRESSURE: 129 MMHG | OXYGEN SATURATION: 99 % | DIASTOLIC BLOOD PRESSURE: 80 MMHG | TEMPERATURE: 98.6 F

## 2020-07-20 DIAGNOSIS — I42.9 CARDIOMYOPATHY, UNSPECIFIED TYPE (H): ICD-10-CM

## 2020-07-20 DIAGNOSIS — I42.8 NICM (NONISCHEMIC CARDIOMYOPATHY) (H): ICD-10-CM

## 2020-07-20 DIAGNOSIS — E56.9 VITAMIN DEFICIENCY: Primary | ICD-10-CM

## 2020-07-20 DIAGNOSIS — I42.9 CARDIOMYOPATHY (H): Primary | ICD-10-CM

## 2020-07-20 DIAGNOSIS — I51.89 OTHER ILL-DEFINED HEART DISEASES: ICD-10-CM

## 2020-07-20 LAB
ALBUMIN SERPL-MCNC: 3.6 G/DL (ref 3.4–5)
ALP SERPL-CCNC: 96 U/L (ref 40–150)
ALT SERPL W P-5'-P-CCNC: 21 U/L (ref 0–50)
ANION GAP SERPL CALCULATED.3IONS-SCNC: 2 MMOL/L (ref 3–14)
AST SERPL W P-5'-P-CCNC: 16 U/L (ref 0–45)
B-HCG SERPL-ACNC: 2 IU/L (ref 0–5)
BASOPHILS # BLD AUTO: 0.1 10E9/L (ref 0–0.2)
BASOPHILS NFR BLD AUTO: 0.9 %
BILIRUB SERPL-MCNC: 0.4 MG/DL (ref 0.2–1.3)
BUN SERPL-MCNC: 12 MG/DL (ref 7–30)
CALCIUM SERPL-MCNC: 9.2 MG/DL (ref 8.5–10.1)
CHLORIDE SERPL-SCNC: 107 MMOL/L (ref 94–109)
CO2 SERPL-SCNC: 28 MMOL/L (ref 20–32)
CREAT SERPL-MCNC: 0.7 MG/DL (ref 0.52–1.04)
DEPRECATED CALCIDIOL+CALCIFEROL SERPL-MC: 67 UG/L (ref 20–75)
DIFFERENTIAL METHOD BLD: NORMAL
EOSINOPHIL # BLD AUTO: 0.1 10E9/L (ref 0–0.7)
EOSINOPHIL NFR BLD AUTO: 1.9 %
ERYTHROCYTE [DISTWIDTH] IN BLOOD BY AUTOMATED COUNT: 12.6 % (ref 10–15)
FERRITIN SERPL-MCNC: 235 NG/ML (ref 8–252)
GFR SERPL CREATININE-BSD FRML MDRD: >90 ML/MIN/{1.73_M2}
GLUCOSE SERPL-MCNC: 87 MG/DL (ref 70–99)
HCT VFR BLD AUTO: 44.5 % (ref 35–47)
HGB BLD-MCNC: 14.4 G/DL (ref 11.7–15.7)
IMM GRANULOCYTES # BLD: 0 10E9/L (ref 0–0.4)
IMM GRANULOCYTES NFR BLD: 0.3 %
IRON SATN MFR SERPL: 41 % (ref 15–46)
IRON SERPL-MCNC: 111 UG/DL (ref 35–180)
LYMPHOCYTES # BLD AUTO: 1.9 10E9/L (ref 0.8–5.3)
LYMPHOCYTES NFR BLD AUTO: 29.2 %
MAGNESIUM SERPL-MCNC: 2.3 MG/DL (ref 1.6–2.3)
MCH RBC QN AUTO: 32.1 PG (ref 26.5–33)
MCHC RBC AUTO-ENTMCNC: 32.4 G/DL (ref 31.5–36.5)
MCV RBC AUTO: 99 FL (ref 78–100)
MONOCYTES # BLD AUTO: 0.6 10E9/L (ref 0–1.3)
MONOCYTES NFR BLD AUTO: 9.5 %
NEUTROPHILS # BLD AUTO: 3.7 10E9/L (ref 1.6–8.3)
NEUTROPHILS NFR BLD AUTO: 58.2 %
NRBC # BLD AUTO: 0 10*3/UL
NRBC BLD AUTO-RTO: 0 /100
NT-PROBNP SERPL-MCNC: 76 PG/ML (ref 0–125)
PHOSPHATE SERPL-MCNC: 3.4 MG/DL (ref 2.5–4.5)
PLATELET # BLD AUTO: 184 10E9/L (ref 150–450)
POTASSIUM SERPL-SCNC: 4.2 MMOL/L (ref 3.4–5.3)
PROT SERPL-MCNC: 7.5 G/DL (ref 6.8–8.8)
PTH-INTACT SERPL-MCNC: 44 PG/ML (ref 18–80)
RBC # BLD AUTO: 4.49 10E12/L (ref 3.8–5.2)
SODIUM SERPL-SCNC: 137 MMOL/L (ref 133–144)
T3FREE SERPL-MCNC: 2.9 PG/ML (ref 2.3–4.2)
T4 FREE SERPL-MCNC: 1.49 NG/DL (ref 0.76–1.46)
TIBC SERPL-MCNC: 274 UG/DL (ref 240–430)
TRANSFERRIN SERPL-MCNC: 218 MG/DL (ref 210–360)
TSH SERPL DL<=0.005 MIU/L-ACNC: 0.16 MU/L (ref 0.4–4)
WBC # BLD AUTO: 6.3 10E9/L (ref 4–11)

## 2020-07-20 PROCEDURE — 83550 IRON BINDING TEST: CPT | Performed by: INTERNAL MEDICINE

## 2020-07-20 PROCEDURE — 84466 ASSAY OF TRANSFERRIN: CPT | Performed by: INTERNAL MEDICINE

## 2020-07-20 PROCEDURE — 25000125 ZZHC RX 250: Performed by: INTERNAL MEDICINE

## 2020-07-20 PROCEDURE — 93464 EXERCISE W/HEMODYNAMIC MEAS: CPT | Performed by: INTERNAL MEDICINE

## 2020-07-20 PROCEDURE — 80048 BASIC METABOLIC PNL TOTAL CA: CPT | Performed by: INTERNAL MEDICINE

## 2020-07-20 PROCEDURE — 40000166 ZZH STATISTIC PP CARE STAGE 1

## 2020-07-20 PROCEDURE — 83880 ASSAY OF NATRIURETIC PEPTIDE: CPT | Performed by: INTERNAL MEDICINE

## 2020-07-20 PROCEDURE — 84439 ASSAY OF FREE THYROXINE: CPT | Performed by: INTERNAL MEDICINE

## 2020-07-20 PROCEDURE — 84443 ASSAY THYROID STIM HORMONE: CPT | Performed by: INTERNAL MEDICINE

## 2020-07-20 PROCEDURE — 80053 COMPREHEN METABOLIC PANEL: CPT | Performed by: INTERNAL MEDICINE

## 2020-07-20 PROCEDURE — 82728 ASSAY OF FERRITIN: CPT | Performed by: INTERNAL MEDICINE

## 2020-07-20 PROCEDURE — 27210794 ZZH OR GENERAL SUPPLY STERILE: Performed by: INTERNAL MEDICINE

## 2020-07-20 PROCEDURE — 93451 RIGHT HEART CATH: CPT | Mod: 26 | Performed by: INTERNAL MEDICINE

## 2020-07-20 PROCEDURE — 84207 ASSAY OF VITAMIN B-6: CPT | Performed by: INTERNAL MEDICINE

## 2020-07-20 PROCEDURE — 83970 ASSAY OF PARATHORMONE: CPT | Performed by: INTERNAL MEDICINE

## 2020-07-20 PROCEDURE — 85025 COMPLETE CBC W/AUTO DIFF WBC: CPT | Performed by: INTERNAL MEDICINE

## 2020-07-20 PROCEDURE — 84702 CHORIONIC GONADOTROPIN TEST: CPT | Performed by: INTERNAL MEDICINE

## 2020-07-20 PROCEDURE — 84100 ASSAY OF PHOSPHORUS: CPT | Performed by: INTERNAL MEDICINE

## 2020-07-20 PROCEDURE — 36415 COLL VENOUS BLD VENIPUNCTURE: CPT | Performed by: INTERNAL MEDICINE

## 2020-07-20 PROCEDURE — 82306 VITAMIN D 25 HYDROXY: CPT | Performed by: INTERNAL MEDICINE

## 2020-07-20 PROCEDURE — 83540 ASSAY OF IRON: CPT | Performed by: INTERNAL MEDICINE

## 2020-07-20 PROCEDURE — 93451 RIGHT HEART CATH: CPT | Performed by: INTERNAL MEDICINE

## 2020-07-20 PROCEDURE — 83735 ASSAY OF MAGNESIUM: CPT | Performed by: INTERNAL MEDICINE

## 2020-07-20 PROCEDURE — 93464 EXERCISE W/HEMODYNAMIC MEAS: CPT | Mod: 26 | Performed by: INTERNAL MEDICINE

## 2020-07-20 PROCEDURE — 84481 FREE ASSAY (FT-3): CPT | Performed by: INTERNAL MEDICINE

## 2020-07-20 RX ORDER — LIDOCAINE 40 MG/G
CREAM TOPICAL
Status: COMPLETED | OUTPATIENT
Start: 2020-07-20 | End: 2020-07-20

## 2020-07-20 RX ADMIN — LIDOCAINE: 40 CREAM TOPICAL at 10:04

## 2020-07-20 NOTE — DISCHARGE INSTRUCTIONS
[facility]                        Interventional Cardiology  Discharge Instructions   Post Right Heart Cath      AFTER YOU GO HOME:    DO drink plenty of fluids    DO resume your regular diet and medications unless otherwise instructed by your Primary Physician    Do Not scrub the procedure site vigorously    No lotion or powder to the puncture site for 3 days    CALL YOUR PRIMARY PHYSICIAN IF: You may resume all normal activity.  Monitor neck site for bleeding, swelling, or voice changes. If you notice bleeding or swelling immediately apply pressure to the site and call number below to speak with Cardiology Fellow.  If you experience any changes in your breathing you should call your doctor immediately or come to the closest Emergency Department.  Do not drive yourself.    ADDITIONAL INSTRUCTIONS: Medications: You are to resume all home medications including anticoagulation therapy unless otherwise advised by your primary cardiologist or nurse coordinator.    Follow Up: Per your primary cardiology team    If you have any questions or concerns regarding your procedure site please call [phone] at anytime and ask for Cardiology Fellow on call.  They are available 24 hours a day.  You may also contact the Cardiology Clinic after hours number at [phone].                                                       Telephone Numbers [phone] Monday-Friday 8:00 am to 4:30 pm    [phone] After 4:30 pm Monday-Friday, Weekends & Holidays  Ask for Interventional Cardiologist on call. Someone is on call 24 hours/day   [facility] toll free number [phone] Monday-Friday 8:00 am to 4:30 pm   [facility] Emergency Dept [phone]

## 2020-07-20 NOTE — IP AVS SNAPSHOT
MRN:[id number]                      After Visit Summary   7/20/2020    Joyce Marroquin    MRN: [id number]           Visit Information        Department      7/20/2020  9:03 AM [address] [facility]          Review of your medicines      UNREVIEWED medicines. Ask your doctor about these medicines       Dose / Directions   albuterol 108 (90 Base) MCG/ACT inhaler  Commonly known as:  PROAIR HFA/PROVENTIL HFA/VENTOLIN HFA      Dose:  2 puff  Inhale 2 puffs into the lungs as needed for shortness of breath / dyspnea or wheezing  Refills:  0     azelastine 0.1 % nasal spray  Commonly known as:  ASTELIN      Dose:  1 spray  Spray 1 spray into both nostrils 2 times daily  Refills:  0     BENADRYL PO      Dose:  25-50 mg  Take 25-50 mg by mouth nightly as needed  Refills:  0     calcium carbonate 500 MG tablet  Commonly known as:  OS-MANJU      Dose:  1 tablet  Take 1 tablet by mouth 2 times daily  Refills:  0     carvedilol 3.125 MG tablet  Commonly known as:  COREG  Used for:  Paroxysmal ventricular tachycardia (H)      Dose:  3.125 mg  Take 1 tablet (3.125 mg) by mouth 2 times daily (with meals)  Quantity:  180 tablet  Refills:  0     clonazePAM 0.5 MG tablet  Commonly known as:  klonoPIN  Used for:  Anxiety      Dose:  0.5 mg  Take 1 tablet (0.5 mg) by mouth 3 times daily as needed for anxiety  Quantity:  90 tablet  Refills:  2     DAILY MULTIVITAMIN PO      Dose:  1 tablet  Take 1 tablet by mouth every morning  Refills:  0     fluticasone-salmeterol 250-50 MCG/DOSE inhaler  Commonly known as:  ADVAIR      Dose:  1 puff  Inhale 1 puff into the lungs every 12 hours  Refills:  0     furosemide 20 MG tablet  Commonly known as:  LASIX  Used for:  Cardiomyopathy, unspecified type (H)      Dose:  20 mg  Take 1 tablet (20 mg) by mouth daily As needed for shortness of breath and swelling in the abdomen  Quantity:  90 tablet  Refills:  1     ipratropium 0.06 % nasal spray  Commonly known as:  ATROVENT      Dose:  2  spray  Spray 2 sprays into both nostrils 4 times daily  Refills:  0     IRON SUPPLEMENT PO      Dose:  65 mg  Take 65 mg by mouth 2 times daily (with meals)  Refills:  0     lisinopril 2.5 MG tablet  Commonly known as:  ZESTRIL  Used for:  Cardiomyopathy, unspecified type (H)      Dose:  2.5 mg  Take 1 tablet (2.5 mg) by mouth daily  Quantity:  90 tablet  Refills:  3     loperamide 2 MG tablet  Commonly known as:  IMODIUM A-D  Used for:  Collagenous colitis      Take 2 tabs (4 mg) after first loose stool, and then take one tab (2 mg) after each diarrheal stool.  Max of 8 tabs (16 mg) per day.  Quantity:  1 tablet  Refills:  0     MAGNESIUM LACTATE PO  Used for:  Fatigue, unspecified type      Dose:  180 mg  Take 180 mg by mouth At Bedtime  Refills:  0     METHYLPREDNISOLONE PO  Indication:  Acute Asthmatic Bronchitis      Dose:  40 mg  Take 40 mg by mouth as needed  Refills:  0     PROBIOTIC DAILY PO      Dose:  1 capsule  Take 1 capsule by mouth 2 times daily  Refills:  0     Synthroid 150 MCG tablet  Used for:  Postablative hypothyroidism  Generic drug:  levothyroxine      Dose:  150 mcg  Take 1 tablet (150 mcg) by mouth daily  Quantity:  90 tablet  Refills:  2     UNABLE TO FIND      2 times daily MEDICATION NAME: Standard Process, Immune Congaplex. Pt taking 2 tablets daily  Refills:  0     UNABLE TO FIND      Dose:  1 tablet  Take 1 tablet by mouth daily MEDICATION NAME: Cardio Plus  Refills:  0     vitamin B complex with vitamin C tablet      Dose:  1 tablet  Take 1 tablet by mouth as needed  Refills:  0     vitamin D3 50 mcg (2000 units) tablet  Commonly known as:  CHOLECALCIFEROL  Used for:  Vitamin D deficiency      Dose:  2,000 Units  Take 1 tablet by mouth daily  Quantity:  1 tablet  Refills:  0     VITAMIN K2 PO  Used for:  H/O pericarditis      Dose:  1 tablet  Take 1 tablet by mouth every morning  Refills:  0     Zinc 15 MG Caps      Dose:  10 mg  Take 10 mg by mouth  Refills:  0        CONTINUE these  medicines which have NOT CHANGED       Dose / Directions   MELATONIN PO      Dose:  3 mg  Take 3 mg by mouth At Bedtime  Refills:  0     nebulizer nebulization  Used for:  H/O pericarditis      as needed  Refills:  0     TURMERIC PO  Used for:  Fatigue, unspecified type      Dose:  1 tablet  Take 1 tablet by mouth every morning  Refills:  0              Protect others around you: Learn how to safely use, store and throw away your medicines at www.disposemymeds.org.       Follow-ups after your visit       Your next 10 appointments already scheduled    Jul 20, 2020  Procedure with Alonso Ordonez MD  [facility], [place],  Heart Cath Lab ([facility], [facility]) [address] [id number] [phone]   The [facility] is located on the corner of [address] and [address] on the [facility]. It is easily accessible from virtually any point in the [place] area, via [employer] and [address].   Sep 15, 2020 12:00 AM CDT  CARDIAC DEVICE CHECK - REMOTE with  ICD REMOTE  [facility] ([facility]) [address]  [address] [place] [id number] [phone]         Care Instructions       Further instructions from your care team       [facility]                        Interventional Cardiology  Discharge Instructions   Post Right Heart Cath      AFTER YOU GO HOME:    DO drink plenty of fluids    DO resume your regular diet and medications unless otherwise instructed by your Primary Physician    Do Not scrub the procedure site vigorously    No lotion or powder to the puncture site for 3 days    CALL YOUR PRIMARY PHYSICIAN IF: You may resume all normal activity.  Monitor neck site for bleeding, swelling, or voice changes. If you notice bleeding or swelling immediately apply pressure to the site and call number below to speak with Cardiology Fellow.  If you experience any  changes in your breathing you should call your doctor immediately or come to the closest Emergency Department.  Do not drive yourself.    ADDITIONAL INSTRUCTIONS: Medications: You are to resume all home medications including anticoagulation therapy unless otherwise advised by your primary cardiologist or nurse coordinator.    Follow Up: Per your primary cardiology team    If you have any questions or concerns regarding your procedure site please call [phone] at anytime and ask for Cardiology Fellow on call.  They are available 24 hours a day.  You may also contact the Cardiology Clinic after hours number at [phone].                                                       Telephone Numbers [phone] Monday-Friday 8:00 am to 4:30 pm    [phone] After 4:30 pm Monday-Friday, Weekends & Holidays  Ask for Interventional Cardiologist on call. Someone is on call 24 hours/day   [facility] toll free number [phone] Monday-Friday 8:00 am to 4:30 pm   [facility] Emergency Dept [phone]                   Additional Information About Your Visit       [facility] Information    [employer] gives you secure access to your electronic health record. If you see a primary care provider, you can also send messages to your care team and make appointments. If you have questions, please call your primary care clinic.  If you do not have a primary care provider, please call [phone] and they will assist you.       Care EveryWhere ID    This is your Care EveryWhere ID. This could be used by other organizations to access your [facility] medical records  [id number]       Your Vitals Were     Last Period   08/21/2007              Primary Care Provider Office Phone # Fax #    Ce Crowe [phone]      Equal Access to Services    [facility] : Jcarlos Morgan, eugene silva, micah waggoner. So [facility] [phone].    ATENCIÓN: Si  shayy appiah, tiene a hankins disposición servicios gratuitos de asistencia lingüística. Jp victor [phone].    We comply with applicable federal and state civil rights laws, including the [place] Human Rights Act. We do not discriminate on the basis of race, color, creed, Bahai, national origin, marital status, age, disability, sex, sexual orientation, or gender identity.       Thank you!    Thank you for choosing [facility] for your care. Our goal is always to provide you with excellent care. Hearing back from our patients is one way we can continue to improve our services. Please take a few minutes to complete the written survey that you may receive in the mail after you visit with us. Thank you!            Medication List      Medications          Morning Afternoon Evening Bedtime As Needed    MELATONIN PO  INSTRUCTIONS:  Take 3 mg by mouth At Bedtime                     nebulizer nebulization  INSTRUCTIONS:  as needed                     TURMERIC PO  INSTRUCTIONS:  Take 1 tablet by mouth every morning                       ASK your doctor about these medications          Morning Afternoon Evening Bedtime As Needed    albuterol 108 (90 Base) MCG/ACT inhaler  Also known as:  PROAIR HFA/PROVENTIL HFA/VENTOLIN HFA  INSTRUCTIONS:  Inhale 2 puffs into the lungs as needed for shortness of breath / dyspnea or wheezing                     azelastine 0.1 % nasal spray  Also known as:  ASTELIN  INSTRUCTIONS:  Spray 1 spray into both nostrils 2 times daily                     BENADRYL PO  INSTRUCTIONS:  Take 25-50 mg by mouth nightly as needed                     calcium carbonate 500 MG tablet  Also known as:  OS-MANJU  INSTRUCTIONS:  Take 1 tablet by mouth 2 times daily                     carvedilol 3.125 MG tablet  Also known as:  COREG  INSTRUCTIONS:  Take 1 tablet (3.125 mg) by mouth 2 times daily (with meals)                     clonazePAM 0.5 MG tablet  Also known as:  klonoPIN  INSTRUCTIONS:  Take 1 tablet (0.5  mg) by mouth 3 times daily as needed for anxiety                     DAILY MULTIVITAMIN PO  INSTRUCTIONS:  Take 1 tablet by mouth every morning                     fluticasone-salmeterol 250-50 MCG/DOSE inhaler  Also known as:  ADVAIR  INSTRUCTIONS:  Inhale 1 puff into the lungs every 12 hours                     furosemide 20 MG tablet  Also known as:  LASIX  INSTRUCTIONS:  Take 1 tablet (20 mg) by mouth daily As needed for shortness of breath and swelling in the abdomen                     ipratropium 0.06 % nasal spray  Also known as:  ATROVENT  INSTRUCTIONS:  Spray 2 sprays into both nostrils 4 times daily                     IRON SUPPLEMENT PO  INSTRUCTIONS:  Take 65 mg by mouth 2 times daily (with meals)                     lisinopril 2.5 MG tablet  Also known as:  ZESTRIL  INSTRUCTIONS:  Take 1 tablet (2.5 mg) by mouth daily                     loperamide 2 MG tablet  Also known as:  IMODIUM A-D  INSTRUCTIONS:  Take 2 tabs (4 mg) after first loose stool, and then take one tab (2 mg) after each diarrheal stool.  Max of 8 tabs (16 mg) per day.                     MAGNESIUM LACTATE PO  INSTRUCTIONS:  Take 180 mg by mouth At Bedtime                     METHYLPREDNISOLONE PO  INSTRUCTIONS:  Take 40 mg by mouth as needed  Reason for med:  Acute Asthmatic Bronchitis                     PROBIOTIC DAILY PO  INSTRUCTIONS:  Take 1 capsule by mouth 2 times daily                     Synthroid 150 MCG tablet  INSTRUCTIONS:  Take 1 tablet (150 mcg) by mouth daily  Generic drug:  levothyroxine                     UNABLE TO FIND  INSTRUCTIONS:  2 times daily MEDICATION NAME: Standard Process, Immune Congaplex. Pt taking 2 tablets daily                     UNABLE TO FIND  INSTRUCTIONS:  Take 1 tablet by mouth daily MEDICATION NAME: Cardio Plus                     vitamin B complex with vitamin C tablet  INSTRUCTIONS:  Take 1 tablet by mouth as needed                     vitamin D3 50 mcg (2000 units) tablet  Also known as:   CHOLECALCIFEROL  INSTRUCTIONS:  Take 1 tablet by mouth daily                     VITAMIN K2 PO  INSTRUCTIONS:  Take 1 tablet by mouth every morning                     Zinc 15 MG Caps  INSTRUCTIONS:  Take 10 mg by mouth

## 2020-07-20 NOTE — PROGRESS NOTES
Pt arrived on 2a post [facility] with exercise. VSS Ra. Dressing c/d/i. Pt declines food and drink. Discharge instructions reviewed, copy given to pt. Dr. Olivares here to speak with pt. Pt will discharge home accompanied by .

## 2020-07-20 NOTE — TELEPHONE ENCOUNTER
Potassium already drawn today.  Other orders placed.    Cheyenne Thomson, BSN, RN, PHN  Electrophysiology Nurse Coordinator

## 2020-07-20 NOTE — TELEPHONE ENCOUNTER
Patient would like lab orders entered for B3, B6, B12, and potassium. Had a heart procedure at the [facility] today, states she is needing these checked. Will make an appt with [facility] once these are entered. Katherine Meng TC/Pt Rep

## 2020-07-20 NOTE — TELEPHONE ENCOUNTER
[facility] Call Center    Phone Message    May a detailed message be left on voicemail: yes     Reason for Call: Order(s): Other:   Reason for requested: D3, B6, B12, Potassium, and all other B Vitamin Labs  Date needed: 7/20/20  Provider name: Dr. Shantel Cook says she was not clear when having her blood draw that she needed D3 and B6 done, but Joyce also wanted to request that lab orders be put in for her to have B12, potassium and all other B vitamins as well. Joyce said she is high risk, so she needs these orders put in today (7/20/20) in order to not go in for another blood draw. Joyce said Dr. Funes was the provider who originally ordered her labs. Please give Joyce a call back if there are any questions.      Action Taken: Message routed to:  [facility]    Travel Screening: Not Applicable

## 2020-07-21 DIAGNOSIS — I42.9 CARDIOMYOPATHY, UNSPECIFIED TYPE (H): Primary | ICD-10-CM

## 2020-07-21 DIAGNOSIS — I50.30 HEART FAILURE WITH PRESERVED EJECTION FRACTION, BORDERLINE, CLASS III (H): Primary | ICD-10-CM

## 2020-07-21 RX ORDER — SPIRONOLACTONE 25 MG/1
12.5 TABLET ORAL DAILY
Qty: 45 TABLET | Refills: 3 | Status: SHIPPED | OUTPATIENT
Start: 2020-07-21 | End: 2020-08-24

## 2020-07-21 RX ORDER — SPIRONOLACTONE 25 MG/1
12.5 TABLET ORAL DAILY
Qty: 45 TABLET | Refills: 3 | Status: SHIPPED | OUTPATIENT
Start: 2020-07-21 | End: 2020-10-20

## 2020-07-22 LAB — VIT B6 SERPL-MCNC: 201.4 NMOL/L (ref 20–125)

## 2020-08-16 DIAGNOSIS — I50.30 HEART FAILURE WITH PRESERVED EJECTION FRACTION, BORDERLINE, CLASS III (H): ICD-10-CM

## 2020-08-16 DIAGNOSIS — E56.9 VITAMIN DEFICIENCY: ICD-10-CM

## 2020-08-16 PROCEDURE — 36415 COLL VENOUS BLD VENIPUNCTURE: CPT | Performed by: INTERNAL MEDICINE

## 2020-08-16 PROCEDURE — 80048 BASIC METABOLIC PNL TOTAL CA: CPT | Performed by: INTERNAL MEDICINE

## 2020-08-17 LAB
ANION GAP SERPL CALCULATED.3IONS-SCNC: 6 MMOL/L (ref 3–14)
BUN SERPL-MCNC: 15 MG/DL (ref 7–30)
CALCIUM SERPL-MCNC: 8.6 MG/DL (ref 8.5–10.1)
CHLORIDE SERPL-SCNC: 108 MMOL/L (ref 94–109)
CO2 SERPL-SCNC: 25 MMOL/L (ref 20–32)
CREAT SERPL-MCNC: 0.67 MG/DL (ref 0.52–1.04)
GFR SERPL CREATININE-BSD FRML MDRD: >90 ML/MIN/{1.73_M2}
GLUCOSE SERPL-MCNC: 100 MG/DL (ref 70–99)
POTASSIUM SERPL-SCNC: 4.2 MMOL/L (ref 3.4–5.3)
SODIUM SERPL-SCNC: 139 MMOL/L (ref 133–144)

## 2020-08-18 ASSESSMENT — ENCOUNTER SYMPTOMS
FREQUENCY: 0
ABDOMINAL PAIN: 0
HEARTBURN: 1
ARTHRALGIAS: 1
SHORTNESS OF BREATH: 1
JOINT SWELLING: 1
NAUSEA: 0
MYALGIAS: 0
COUGH: 1
HEMATOCHEZIA: 0
DYSURIA: 0
NERVOUS/ANXIOUS: 1
CONSTIPATION: 0
HEMATURIA: 0
HEADACHES: 1
SORE THROAT: 0
DIARRHEA: 0
PALPITATIONS: 0
CHILLS: 1
BREAST MASS: 0
EYE PAIN: 0
DIZZINESS: 1
WEAKNESS: 0
PARESTHESIAS: 0
FEVER: 0

## 2020-08-21 NOTE — PROGRESS NOTES
SUBJECTIVE:   CC: Joyce Marroquin is an 59 year old woman who presents for preventive health visit.     Healthy Habits:     Getting at least 3 servings of Calcium per day:  NO    Bi-annual eye exam:  NO    Dental care twice a year:  Yes    Sleep apnea or symptoms of sleep apnea:  Sleep apnea    Diet:  Other    Frequency of exercise:  4-5 days/week    Duration of exercise:  Other    Taking medications regularly:  Yes    Medication side effects:  Other    PHQ-2 Total Score: 1      Patient/Parent of patient informed that anything we discuss that is not related to preventative medicine, may be billed for; patient verbalizes understanding.    Patient would still like to discuss the following concern(s):  1. Ear ache- R, has ear drops at home  2. Thyroid labs  3. labs - B12, B6      Today's PHQ-2 Score:   PHQ-2 ( 1999 Pfizer) 8/18/2020   Q1: Little interest or pleasure in doing things 1   Q2: Feeling down, depressed or hopeless 0   PHQ-2 Score 1   Q1: Little interest or pleasure in doing things Several days   Q2: Feeling down, depressed or hopeless Not at all   PHQ-2 Score 1       Abuse: Current or Past(Physical, Sexual or Emotional)- No  Do you feel safe in your environment? Yes        Social History     Tobacco Use     Smoking status: Former Smoker     Packs/day: 0.00     Years: 0.00     Pack years: 0.00     Smokeless tobacco: Never Used   Substance Use Topics     Alcohol use: Yes     Frequency: 2-3 times a week     Drinks per session: 1 or 2     Binge frequency: Never     Comment: seldom         Alcohol Use 8/18/2020   Prescreen: >3 drinks/day or >7 drinks/week? No       Reviewed orders with patient.  Reviewed health maintenance and updated orders accordingly - Yes  Lab work is in process  Labs reviewed in EPIC  BP Readings from Last 3 Encounters:   08/24/20 100/71   07/20/20 129/80   07/14/20 112/87    Wt Readings from Last 3 Encounters:   08/24/20 71.2 kg (157 lb)   07/14/20 73.5 kg (162 lb)   06/11/20 72.1 kg (159  lb)                  Patient Active Problem List   Diagnosis     Benign neoplasm of vulva     Esophageal reflux     Chronic depressive personality disorder     Complete atrioventricular block (H)     Cardiac pacemaker in situ- [employer], dual lead- DEPENDENT     Hypothyroidism     Ex-smoker     Hyperparathyroidism (H)     Pulmonary nodules     Cardiomyopathy (H)     S/P cardiac pacemaker procedure     Situational anxiety     LARRY (obstructive sleep apnea)     Restless legs syndrome (RLS)     Vestibular disequilibrium, unspecified laterality     Collagenous colitis     COPD exacerbation (H)     Death of parent     Panic attack     Insomnia, unspecified type     Exacerbation of asthma, unspecified asthma severity, unspecified whether persistent     Tick bite, initial encounter     Cardiomyopathy, unspecified type (H)     Status post coronary angiogram     Adjustment disorder with mixed anxiety and depressed mood     Other ill-defined heart diseases     Past Surgical History:   Procedure Laterality Date     BUNIONECTOMY Bilateral      CV CORONARY ANGIOGRAM N/A 9/26/2019    Procedure: CV CORONARY ANGIOGRAM;  Surgeon: Erickson Trejo MD;  Location:  [facility]     CV RIGHT HEART CATH N/A 7/20/2020    Procedure: CV RIGHT HEART CATH;  Surgeon: Alonso Ordonez MD;  Location:  [facility]     EP ABLATION / EP STUDIES  04/21/2017     EXPLORE NECK N/A 9/19/2018    Procedure: EXPLORE NECK;  Neck Exploration Resection of Left superior Parathyroid gland;  Surgeon: Kristine Venegas MD;  Location: [facility] OR      CORRECT BUNION,SIMPLE       PARATHYROIDECTOMY N/A 9/19/2018    Procedure: PARATHYROIDECTOMY;;  Surgeon: Kristine Venegas MD;  Location: [facility] OR     PPM INSERT OF NEW OR REPL W/VENT LEAD  04/21/2017     TONSILLECTOMY & ADENOIDECTOMY         Social History     Tobacco Use     Smoking status: Former Smoker     Packs/day: 0.00     Years: 0.00     Pack years: 0.00     Smokeless tobacco: Never  Used   Substance Use Topics     Alcohol use: Yes     Frequency: 2-3 times a week     Drinks per session: 1 or 2     Binge frequency: Never     Comment: seldom     Family History   Problem Relation Age of Onset     Hypothyroidism Mother      Hypertension Mother      Osteoarthritis Mother      Coronary Artery Disease Father         nonfatal MI in his 70s     Asthma Father      Diabetes Sister      Hypothyroidism Sister      Breast Cancer Maternal Aunt      Anxiety Disorder Sister          Current Outpatient Medications   Medication Sig Dispense Refill     albuterol (PROAIR HFA/PROVENTIL HFA/VENTOLIN HFA) 108 (90 BASE) MCG/ACT Inhaler Inhale 2 puffs into the lungs as needed for shortness of breath / dyspnea or wheezing        azelastine (ASTELIN) 0.1 % nasal spray Spray 1 spray into both nostrils 2 times daily       calcium carbonate (OS-MANJU) 500 MG tablet Take 1 tablet by mouth 2 times daily       carvedilol (COREG) 3.125 MG tablet Take 1 tablet (3.125 mg) by mouth 2 times daily (with meals) 180 tablet 0     clonazePAM (KLONOPIN) 0.5 MG tablet Take 1 tablet (0.5 mg) by mouth 3 times daily as needed for anxiety 90 tablet 2     DiphenhydrAMINE HCl (BENADRYL PO) Take 25-50 mg by mouth nightly as needed       Ferrous Sulfate (IRON SUPPLEMENT PO) Take 65 mg by mouth 2 times daily (with meals)        fluticasone-salmeterol (ADVAIR) 250-50 MCG/DOSE inhaler Inhale 1 puff into the lungs every 12 hours       furosemide (LASIX) 20 MG tablet Take 1 tablet (20 mg) by mouth daily As needed for shortness of breath and swelling in the abdomen 90 tablet 1     ipratropium (ATROVENT) 0.06 % nasal spray Spray 2 sprays into both nostrils 4 times daily       lisinopril (ZESTRIL) 2.5 MG tablet Take 1 tablet (2.5 mg) by mouth daily 90 tablet 3     loperamide (IMODIUM A-D) 2 MG tablet Take 2 tabs (4 mg) after first loose stool, and then take one tab (2 mg) after each diarrheal stool.  Max of 8 tabs (16 mg) per day. 1 tablet 0     MAGNESIUM  LACTATE PO Take 180 mg by mouth At Bedtime        MELATONIN PO Take 3 mg by mouth At Bedtime        Menaquinone-7 (VITAMIN K2 PO) Take 1 tablet by mouth every morning        METHYLPREDNISOLONE PO Take 40 mg by mouth as needed        Multiple Vitamin (DAILY MULTIVITAMIN PO) Take 1 tablet by mouth every morning        nebulizer nebulization as needed       Probiotic Product (PROBIOTIC DAILY PO) Take 1 capsule by mouth 2 times daily        spironolactone (ALDACTONE) 25 MG tablet Take 0.5 tablets (12.5 mg) by mouth daily 45 tablet 3     SYNTHROID 150 MCG tablet Take 1 tablet (150 mcg) by mouth daily 90 tablet 2     TURMERIC PO Take 1 tablet by mouth every morning        UNABLE TO FIND 2 times daily MEDICATION NAME: Standard Process, Immune Congaplex. Pt taking 2 tablets daily       vitamin B complex with vitamin C (VITAMIN  B COMPLEX) PO tablet Take 1 tablet by mouth as needed       vitamin D3 (CHOLECALCIFEROL) 2000 units tablet Take 1 tablet by mouth daily 1 tablet 0     Zinc 15 MG CAPS Take 10 mg by mouth       Allergies   Allergen Reactions     Tetracycline Swelling     Zofran [Ondansetron]      Prolonged QT  Prolonged QT at baseline per patient     Flagyl [Metronidazole] Rash     Buspar [Buspirone]      Muscle weakness     Corn Oil Other (See Comments)     Headache       Seasonal Allergies Difficulty breathing     Sulfamethoxazole-Trimethoprim      Other reaction(s): Other  Passed out     Cyclobenzaprine Other (See Comments)     Extreme heat intolerance     Levaquin [Levofloxacin]      Other reaction(s): Other  Tendon rupture     Nsaids Other (See Comments)     Exacerbated asthma     Recent Labs   Lab Test 08/16/20  1000 07/20/20  0926  10/25/19  1100 10/06/19  1253  08/28/19  0924   ALT  --  21  --   --  26  --  20   CR 0.67 0.70   < >  --  0.68   < > 0.62   GFRESTIMATED >90 >90   < >  --  >90   < > >90   GFRESTBLACK >90 >90   < >  --  >90   < > >90   POTASSIUM 4.2 4.2   < >  --  4.0   < > 4.1   TSH  --  0.16*  --   1.10  --   --  0.28*    < > = values in this interval not displayed.        Mammogram Screening: Patient over age 50, mutual decision to screen reflected in health maintenance.    Pertinent mammograms are reviewed under the imaging tab.  History of abnormal Pap smear: NO - age 30- 65 PAP every 3 years recommended  PAP / HPV 11/5/2009 9/7/2007   PAP NIL NIL     Reviewed and updated as needed this visit by clinical staff  Tobacco  Allergies  Meds  Med Hx  Surg Hx  Fam Hx  Soc Hx        Reviewed and updated as needed this visit by Provider        Past Medical History:   Diagnosis Date     Arthritis      Cardiomyopathy (H)     ff Cardiology     Chronic depressive personality disorder      Congestive heart failure (H) see dr martínez    heart failure correct term     COPD exacerbation (H)      Esophageal reflux      Ex-smoker     quit 2006; 1 PPD x 30     Hyperparathyroidism (H)     s/p parathyroidectomy     Hypothyroidism      LARRY on CPAP     ff Sleep medicine     Pulmonary nodules     ff Pulmonologist     S/P cardiac pacemaker procedure     [facility]      Uncomplicated asthma years      Past Surgical History:   Procedure Laterality Date     BUNIONECTOMY Bilateral      CV CORONARY ANGIOGRAM N/A 9/26/2019    Procedure: CV CORONARY ANGIOGRAM;  Surgeon: Erickson Trejo MD;  Location:  [facility]     CV RIGHT HEART CATH N/A 7/20/2020    Procedure: CV RIGHT HEART CATH;  Surgeon: Alonso Ordonez MD;  Location:  [facility]     EP ABLATION / EP STUDIES  04/21/2017     EXPLORE NECK N/A 9/19/2018    Procedure: EXPLORE NECK;  Neck Exploration Resection of Left superior Parathyroid gland;  Surgeon: Kristine Venegas MD;  Location: [facility] OR     HC CORRECT BUNION,SIMPLE       PARATHYROIDECTOMY N/A 9/19/2018    Procedure: PARATHYROIDECTOMY;;  Surgeon: Kristine Venegas MD;  Location: [facility] OR     PPM INSERT OF NEW OR REPL W/VENT LEAD  04/21/2017     TONSILLECTOMY &  "ADENOIDECTOMY       OB History    Para Term  AB Living   0 0 0 0 0 0   SAB TAB Ectopic Multiple Live Births   0 0 0 0 0       Review of Systems  CONSTITUTIONAL: NEGATIVE for fever, chills, change in weight  INTEGUMENTARY/SKIN: NEGATIVE for worrisome rashes, moles or lesions  EYES: NEGATIVE for vision changes or irritation  ENT: NEGATIVE for ear, mouth and throat problems  RESP: NEGATIVE for significant cough or SOB  BREAST: NEGATIVE for masses, tenderness or discharge  CV: NEGATIVE for chest pain, palpitations or peripheral edema  GI: NEGATIVE for nausea, abdominal pain, heartburn, or change in bowel habits  : NEGATIVE for unusual urinary or vaginal symptoms. No vaginal bleeding.  MUSCULOSKELETAL: NEGATIVE for significant arthralgias or myalgia  NEURO: NEGATIVE for weakness, dizziness or paresthesias  ENDOCRINE: NEGATIVE for temperature intolerance, skin/hair changes  HEME/ALLERGY/IMMUNE: NEGATIVE for bleeding problems  PSYCHIATRIC: NEGATIVE for changes in mood or affect      OBJECTIVE:   /71   Pulse 114   Temp 98.2  F (36.8  C) (Tympanic)   Resp 16   Ht 1.651 m (5' 5\")   Wt 71.2 kg (157 lb)   LMP 2007   SpO2 96%   BMI 26.13 kg/m    Physical Exam  GENERAL APPEARANCE: healthy, alert and no distress  EYES: Eyes grossly normal to inspection, PERRL and conjunctivae and sclerae normal  HENT: L ear canal and TM's normal, nose and mouth without ulcers or lesions, oropharynx clear and oral mucous membranes moist POSITIVE R ear canal edematous, erythematous  NECK: no adenopathy, no asymmetry, masses, or scars and thyroid normal to palpation  RESP: lungs clear to auscultation - no rales, rhonchi or wheezes  BREAST: breast exam done on L d/t pacemaker on L-normal without masses, tenderness or nipple discharge and no palpable axillary masses or adenopathy  CV: regular rate and rhythm, normal S1 S2, no S3 or S4, no murmur, click or rub, no peripheral edema and peripheral pulses " "strong  ABDOMEN: soft, nontender, no hepatosplenomegaly, no masses and bowel sounds normal, no CVA tenderness  MS: no musculoskeletal defects are noted and gait is age appropriate without ataxia  SKIN: no suspicious lesions or rashes  NEURO: Normal strength and tone, cranial nerves intact, sensory exam grossly normal, mentation intact and speech normal  PSYCH: mentation appears normal and affect normal/bright    Diagnostic Test Results:  Labs reviewed in Epic  See orders        ASSESSMENT/PLAN:       ICD-10-CM    1. Routine general medical examination at a health care facility  Z00.00    2. Encounter for screening mammogram for breast cancer  Z12.31 US Breast Left Complete 4 Quadrants     US Breast Right Complete 4 Quadrants   3. Screening for malignant neoplasm of cervix  Z12.4 Pap imaged thin layer screen with HPV - recommended age 30 - 65 years (select HPV order below)   4. Special screening examination for human papillomavirus (HPV)  Z11.51 HPV High Risk Types DNA Cervical   5. Lipid screening  Z13.220 Lipid panel reflex to direct LDL Fasting   6. Infective otitis externa, right  H60.391    7. Encounter for vitamin deficiency screening  Z13.21 Vitamin B12     Vitamin B6   8. Screening for thyroid disorder  Z13.29 TSH with free T4 reflex       COUNSELING:  Reviewed preventive health counseling, as reflected in patient instructions    Estimated body mass index is 26.13 kg/m  as calculated from the following:    Height as of this encounter: 1.651 m (5' 5\").    Weight as of this encounter: 71.2 kg (157 lb).    Weight management plan: Discussed healthy diet and exercise guidelines     reports that she has quit smoking. She smoked 0.00 packs per day for 0.00 years. She has never used smokeless tobacco.      Counseling Resources:  ATP IV Guidelines  Pooled Cohorts Equation Calculator  Breast Cancer Risk Calculator  FRAX Risk Assessment  ICSI Preventive Guidelines  Dietary Guidelines for Americans, 2010  USDA's " MyPlate  ASA Prophylaxis  Lung CA Screening    Ce Crowe, CHASIDYP  [facility] [place]

## 2020-08-21 NOTE — PATIENT INSTRUCTIONS
Reminders:     Please remember to arrive 5-10 minutes early for your appointments. If you are late you may need to reschedule your appointment.    If you have mychart please be aware your results and communications will be sent within your mychart. Unless results are critical and/or urgent value.       Preventive Health Recommendations  Female Ages 50 - 64    Yearly exam: See your health care provider every year in order to  o Review health changes.   o Discuss preventive care.    o Review your medicines if your doctor has prescribed any.      Get a Pap test every three years (unless you have an abnormal result and your provider advises testing more often).    If you get Pap tests with HPV test, you only need to test every 5 years, unless you have an abnormal result.     You do not need a Pap test if your uterus was removed (hysterectomy) and you have not had cancer.    You should be tested each year for STDs (sexually transmitted diseases) if you're at risk.     Have a mammogram every 1 to 2 years.    Have a colonoscopy at age 50, or have a yearly FIT test (stool test). These exams screen for colon cancer.      Have a cholesterol test every 5 years, or more often if advised.    Have a diabetes test (fasting glucose) every three years. If you are at risk for diabetes, you should have this test more often.     If you are at risk for osteoporosis (brittle bone disease), think about having a bone density scan (DEXA).    Shots: Get a flu shot each year. Get a tetanus shot every 10 years.    Nutrition:     Eat at least 5 servings of fruits and vegetables each day.    Eat whole-grain bread, whole-wheat pasta and brown rice instead of white grains and rice.    Get adequate Calcium and Vitamin D.     Lifestyle    Exercise at least 150 minutes a week (30 minutes a day, 5 days a week). This will help you control your weight and prevent disease.    Limit alcohol to one drink per day.    No smoking.     Wear sunscreen to  prevent skin cancer.     See your dentist every six months for an exam and cleaning.    See your eye doctor every 1 to 2 years.

## 2020-08-21 NOTE — PROGRESS NOTES
"   SUBJECTIVE:   CC: Joyce Marroquin is an 59 year old woman who presents for preventive health visit.     Healthy Habits:    Do you get at least three servings of calcium containing foods daily (dairy, green leafy vegetables, etc.)? { :328931::\"yes\"}    Amount of exercise or daily activities, outside of work: { :347253}    Problems taking medications regularly { :745008::\"No\"}    Medication side effects: { :841824::\"No\"}    Have you had an eye exam in the past two years? { :393168}    Do you see a dentist twice per year? { :734423}    Do you have sleep apnea, excessive snoring or daytime drowsiness?{ :956014}    Patient/Parent of patient informed that anything we discuss that is not related to preventative medicine, may be billed for; patient verbalizes understanding.    Patient would still like to discuss the following concern(s):  1. ***  2. ***  3.***     {additional problems to add (Optional):623163}    Today's PHQ-2 Score:   PHQ-2 ( 1999 Pfizer) 8/18/2020 3/5/2020   Q1: Little interest or pleasure in doing things 1 3   Q2: Feeling down, depressed or hopeless 0 3   PHQ-2 Score 1 6   Q1: Little interest or pleasure in doing things Several days -   Q2: Feeling down, depressed or hopeless Not at all -   PHQ-2 Score 1 -       Abuse: Current or Past(Physical, Sexual or Emotional)- {YES/NO/NA:075726}  Do you feel safe in your environment? {YES/NO/NA:641943}        Social History     Tobacco Use     Smoking status: Former Smoker     Smokeless tobacco: Never Used   Substance Use Topics     Alcohol use: Yes     Frequency: 2-3 times a week     Drinks per session: 1 or 2     Binge frequency: Never     Comment: seldom     If you drink alcohol do you typically have >3 drinks per day or >7 drinks per week? {ETOH :393489}                     Reviewed orders with patient.  Reviewed health maintenance and updated orders accordingly - Yes  {Chronicprobdata (Optional):124202}    {Mammo Decision Support " "(Optional):732357}    Pertinent mammograms are reviewed under the imaging tab.  History of abnormal Pap smear: {PAP HX:376626}  PAP / HPV 11/5/2009 9/7/2007   PAP NIL NIL     Reviewed and updated as needed this visit by clinical staff         Reviewed and updated as needed this visit by Provider        {HISTORY OPTIONS (Optional):432816}    ROS:  { :558535}    OBJECTIVE:   LMP 08/21/2007   EXAM:  {Exam Choices:093888}    {Diagnostic Test Results (Optional):387453::\"Diagnostic Test Results:\",\"Labs reviewed in Epic\"}    ASSESSMENT/PLAN:   {Diag Picklist:814968}    COUNSELING:   {FEMALE COUNSELING MESSAGES:235558::\"Reviewed preventive health counseling, as reflected in patient instructions\"}    Estimated body mass index is 26.96 kg/m  as calculated from the following:    Height as of 7/14/20: 1.651 m (5' 5\").    Weight as of 7/14/20: 73.5 kg (162 lb).    {Weight Management Plan (ACO) Complete if BMI is abnormal-  Ages 18-64  BMI >24.9.  Age 65+ with BMI <23 or >30 (Optional):886371}     reports that she has quit smoking. She has never used smokeless tobacco.  {Tobacco Cessation -- Complete if patient is a smoker (Optional):037631}    Counseling Resources:  ATP IV Guidelines  Pooled Cohorts Equation Calculator  Breast Cancer Risk Calculator  FRAX Risk Assessment  ICSI Preventive Guidelines  Dietary Guidelines for Americans, 2010  USDA's MyPlate  ASA Prophylaxis  Lung CA Screening    Ce Crowe NP  [facility] [place]  "

## 2020-08-24 ENCOUNTER — OFFICE VISIT (OUTPATIENT)
Dept: FAMILY MEDICINE | Facility: CLINIC | Age: 60
End: 2020-08-24
Payer: COMMERCIAL

## 2020-08-24 VITALS
HEART RATE: 114 BPM | DIASTOLIC BLOOD PRESSURE: 71 MMHG | WEIGHT: 157 LBS | OXYGEN SATURATION: 96 % | RESPIRATION RATE: 16 BRPM | HEIGHT: 65 IN | SYSTOLIC BLOOD PRESSURE: 100 MMHG | TEMPERATURE: 98.2 F | BODY MASS INDEX: 26.16 KG/M2

## 2020-08-24 DIAGNOSIS — Z12.31 ENCOUNTER FOR SCREENING MAMMOGRAM FOR BREAST CANCER: ICD-10-CM

## 2020-08-24 DIAGNOSIS — Z13.21 ENCOUNTER FOR VITAMIN DEFICIENCY SCREENING: ICD-10-CM

## 2020-08-24 DIAGNOSIS — Z11.51 SPECIAL SCREENING EXAMINATION FOR HUMAN PAPILLOMAVIRUS (HPV): ICD-10-CM

## 2020-08-24 DIAGNOSIS — Z13.220 LIPID SCREENING: ICD-10-CM

## 2020-08-24 DIAGNOSIS — Z00.00 ROUTINE GENERAL MEDICAL EXAMINATION AT A HEALTH CARE FACILITY: Primary | ICD-10-CM

## 2020-08-24 DIAGNOSIS — Z12.4 SCREENING FOR MALIGNANT NEOPLASM OF CERVIX: ICD-10-CM

## 2020-08-24 DIAGNOSIS — Z13.29 SCREENING FOR THYROID DISORDER: ICD-10-CM

## 2020-08-24 DIAGNOSIS — H60.391 INFECTIVE OTITIS EXTERNA, RIGHT: ICD-10-CM

## 2020-08-24 LAB
CHOLEST SERPL-MCNC: 246 MG/DL
HDLC SERPL-MCNC: 72 MG/DL
LDLC SERPL CALC-MCNC: 152 MG/DL
NONHDLC SERPL-MCNC: 174 MG/DL
T4 FREE SERPL-MCNC: 1.47 NG/DL (ref 0.76–1.46)
TRIGL SERPL-MCNC: 111 MG/DL
TSH SERPL DL<=0.005 MIU/L-ACNC: 0.27 MU/L (ref 0.4–4)
VIT B12 SERPL-MCNC: 491 PG/ML (ref 193–986)

## 2020-08-24 PROCEDURE — 36415 COLL VENOUS BLD VENIPUNCTURE: CPT | Performed by: NURSE PRACTITIONER

## 2020-08-24 PROCEDURE — 84439 ASSAY OF FREE THYROXINE: CPT | Performed by: NURSE PRACTITIONER

## 2020-08-24 PROCEDURE — 84443 ASSAY THYROID STIM HORMONE: CPT | Performed by: NURSE PRACTITIONER

## 2020-08-24 PROCEDURE — 87624 HPV HI-RISK TYP POOLED RSLT: CPT | Performed by: NURSE PRACTITIONER

## 2020-08-24 PROCEDURE — 80061 LIPID PANEL: CPT | Performed by: NURSE PRACTITIONER

## 2020-08-24 PROCEDURE — 84207 ASSAY OF VITAMIN B-6: CPT | Mod: 90 | Performed by: NURSE PRACTITIONER

## 2020-08-24 PROCEDURE — 82607 VITAMIN B-12: CPT | Performed by: NURSE PRACTITIONER

## 2020-08-24 PROCEDURE — G0145 SCR C/V CYTO,THINLAYER,RESCR: HCPCS | Performed by: NURSE PRACTITIONER

## 2020-08-24 PROCEDURE — 99396 PREV VISIT EST AGE 40-64: CPT | Performed by: NURSE PRACTITIONER

## 2020-08-24 PROCEDURE — 99000 SPECIMEN HANDLING OFFICE-LAB: CPT | Performed by: NURSE PRACTITIONER

## 2020-08-24 ASSESSMENT — MIFFLIN-ST. JEOR: SCORE: 1278.03

## 2020-08-25 ENCOUNTER — MYC MEDICAL ADVICE (OUTPATIENT)
Dept: FAMILY MEDICINE | Facility: CLINIC | Age: 60
End: 2020-08-25

## 2020-08-25 DIAGNOSIS — H60.541 DERMATITIS OF RIGHT EAR CANAL: Primary | ICD-10-CM

## 2020-08-25 RX ORDER — FLUOCINOLONE ACETONIDE 0.11 MG/ML
OIL TOPICAL 2 TIMES DAILY
Qty: 1 BOTTLE | Refills: 0 | Status: SHIPPED | OUTPATIENT
Start: 2020-08-25 | End: 2020-09-03

## 2020-08-26 LAB
COPATH REPORT: NORMAL
PAP: NORMAL

## 2020-08-27 ENCOUNTER — MYC MEDICAL ADVICE (OUTPATIENT)
Dept: FAMILY MEDICINE | Facility: CLINIC | Age: 60
End: 2020-08-27

## 2020-08-27 LAB — VIT B6 SERPL-MCNC: 114.8 NMOL/L (ref 20–125)

## 2020-08-28 LAB
FINAL DIAGNOSIS: NORMAL
HPV HR 12 DNA CVX QL NAA+PROBE: NEGATIVE
HPV16 DNA SPEC QL NAA+PROBE: NEGATIVE
HPV18 DNA SPEC QL NAA+PROBE: NEGATIVE
SPECIMEN DESCRIPTION: NORMAL
SPECIMEN SOURCE CVX/VAG CYTO: NORMAL

## 2020-08-31 NOTE — RESULT ENCOUNTER NOTE
Jose Luis Cook,    Thank you for your recent office visit.    Here are your recent results.  Your TSH has slightly increased from a month ago but is still slightly low.  Do you want to keep your levothyroxine at the current dosage?  Continue to work on diet, exercise and weight to help improve your cholesterol.     Feel free to contact me via [employer] or call the clinic at [phone].    Sincerely,    SHAYNA Simon, FNP-BC

## 2020-09-01 ENCOUNTER — TELEPHONE (OUTPATIENT)
Dept: FAMILY MEDICINE | Facility: CLINIC | Age: 60
End: 2020-09-01

## 2020-09-01 DIAGNOSIS — H60.541 DERMATITIS OF RIGHT EAR CANAL: ICD-10-CM

## 2020-09-01 NOTE — TELEPHONE ENCOUNTER
I cannot find a different order for ear drops.  Please have pharmacy send us a refill request. SHAYNA Simon, FNP-BC

## 2020-09-03 ENCOUNTER — MYC MEDICAL ADVICE (OUTPATIENT)
Dept: FAMILY MEDICINE | Facility: CLINIC | Age: 60
End: 2020-09-03

## 2020-09-03 RX ORDER — FLUOCINOLONE ACETONIDE 0.11 MG/ML
OIL TOPICAL 2 TIMES DAILY
Status: CANCELLED | OUTPATIENT
Start: 2020-09-03

## 2020-09-03 NOTE — TELEPHONE ENCOUNTER
That is the same RX I ordered last time and was told it is not the right order. Ce Crowe, SHAYNA, FNP-BC

## 2020-09-03 NOTE — TELEPHONE ENCOUNTER
I cannot find the requested ear drops as an order.  Please enter the order the patient is requesting and I will sign it. SHAYNA Simon, FNP-BC

## 2020-09-03 NOTE — TELEPHONE ENCOUNTER
Spoke with pharmacist and per him you can use same rx but needs to have put in use for ears.  Please complete instructions and resend

## 2020-09-04 RX ORDER — FLUOCINOLONE ACETONIDE 0.11 MG/ML
OIL TOPICAL
Qty: 1 BOTTLE | Refills: 0 | Status: SHIPPED | OUTPATIENT
Start: 2020-09-04 | End: 2021-07-27

## 2020-09-08 ENCOUNTER — TELEPHONE (OUTPATIENT)
Dept: FAMILY MEDICINE | Facility: CLINIC | Age: 60
End: 2020-09-08

## 2020-09-08 NOTE — TELEPHONE ENCOUNTER
Reason for Call:  Other orders      Detailed comments: patient would like the order for the ultrasound on left breast [facility]  Patient has the phone number [phone]  Thank you    Phone Number Patient can be reached at: Home number on file [phone] (home)    Best Time: any    Can we leave a detailed message on this number? YES    Call taken on 9/8/2020 at 8:31 AM by Jesica Riggins

## 2020-09-10 ENCOUNTER — MYC MEDICAL ADVICE (OUTPATIENT)
Dept: FAMILY MEDICINE | Facility: CLINIC | Age: 60
End: 2020-09-10

## 2020-09-10 ENCOUNTER — TELEPHONE (OUTPATIENT)
Dept: BEHAVIORAL HEALTH | Facility: CLINIC | Age: 60
End: 2020-09-10

## 2020-09-10 NOTE — TELEPHONE ENCOUNTER
I spoke with Joyce today. She is looking for some resources to process grief, not only the loss of her parents, but also loss due to a recent medical procedure that did not go as planned, and loss due to the current covid crisis.     She was hoping Dr. Forrester could recommend some resources, a group , counselor or even perhaps a book. She thought maybe a phone call with this information would be enough, but had concerns about connecting with both of them having busy schedules. We scheduled an appointment on September 22nd incase they had difficulty connecting on the phone.

## 2020-09-11 ENCOUNTER — TELEPHONE (OUTPATIENT)
Dept: CARDIOLOGY | Facility: CLINIC | Age: 60
End: 2020-09-11

## 2020-09-11 NOTE — TELEPHONE ENCOUNTER
[facility] Call Center    Phone Message    May a detailed message be left on voicemail: no     Reason for Call: Other: Pt reports increased activity recently, but then experienced shortness of breath after that. Today Pt reports being tired and having shortness of breath. Pt also says she has allergies and took a short walk today. Pt feels like her condition is getting worse in general. Please call Pt back.     Action Taken: Message routed to:  [facility]    Travel Screening: Not Applicable

## 2020-09-14 DIAGNOSIS — E89.0 POSTABLATIVE HYPOTHYROIDISM: ICD-10-CM

## 2020-09-14 NOTE — TELEPHONE ENCOUNTER
"Called and spoke with patient.     She feels like her \"heart is getting worse.\" She will feel great even upon exertion, then become very SOB after engaging in a low-key activity. For example, she was went for a 1.5 mile bike ride and felt good. Then went outside to wash something off with the hose and felt very SOB, dizzy, etc.     She has been intentionally trying to lose weight and is down 25-30#, but states she can gain 7# in one day. Overall, she is down and does not feel like she has put on water weight. She reports her baseline BP is low to the point she can't take lasix everyday.     Per device nurse, rate response programmed on and is set very sensitive. Not much room for further adjustments. Given this, will forward to Dr Ordonez/RNCC to review for any HF related issue and provide any recommendations. Has upcoming appointment with Dr Ordonez on 10/20/20. Patient would like a call with next steps.       Alonso Ordonez MD  You; Matthias García RN 7 days ago       Gayle Rizzo schedule a CORE visit for her asap   Thx   T          Patient is set up to see CORE on 9/29/20.  "

## 2020-09-15 ENCOUNTER — ANCILLARY PROCEDURE (OUTPATIENT)
Dept: CARDIOLOGY | Facility: CLINIC | Age: 60
End: 2020-09-15
Attending: INTERNAL MEDICINE
Payer: COMMERCIAL

## 2020-09-15 DIAGNOSIS — I44.2 COMPLETE ATRIOVENTRICULAR BLOCK (H): ICD-10-CM

## 2020-09-15 PROCEDURE — 93296 REM INTERROG EVL PM/IDS: CPT | Mod: ZF

## 2020-09-15 PROCEDURE — 93294 REM INTERROG EVL PM/LDLS PM: CPT | Mod: ZP | Performed by: INTERNAL MEDICINE

## 2020-09-16 ENCOUNTER — TRANSFERRED RECORDS (OUTPATIENT)
Dept: HEALTH INFORMATION MANAGEMENT | Facility: CLINIC | Age: 60
End: 2020-09-16

## 2020-09-17 NOTE — TELEPHONE ENCOUNTER
SYNTHROID 150 MCG tablet   Take 1 tablet (150 mcg) by mouth daily      Last Written Prescription Date:  12/10/19  Last Fill Quantity: 90,   # refills: 2  Last Office Visit : 8/30/19  Future Office visit:  none    Routing refill request to provider for review/approval because:  Overdue visit and abnormal lab - TSH     Lab Test 08/24/20  0814   TSH 0.27*     90 day pended.     Scheduling has been notified to contact the pt for appointment.

## 2020-09-21 DIAGNOSIS — E89.0 POSTABLATIVE HYPOTHYROIDISM: Primary | ICD-10-CM

## 2020-09-22 ENCOUNTER — VIRTUAL VISIT (OUTPATIENT)
Dept: BEHAVIORAL HEALTH | Facility: CLINIC | Age: 60
End: 2020-09-22
Payer: COMMERCIAL

## 2020-09-22 DIAGNOSIS — F43.23 ADJUSTMENT DISORDER WITH MIXED ANXIETY AND DEPRESSED MOOD: Primary | ICD-10-CM

## 2020-09-22 ASSESSMENT — ANXIETY QUESTIONNAIRES
7. FEELING AFRAID AS IF SOMETHING AWFUL MIGHT HAPPEN: SEVERAL DAYS
6. BECOMING EASILY ANNOYED OR IRRITABLE: MORE THAN HALF THE DAYS
GAD7 TOTAL SCORE: 6
4. TROUBLE RELAXING: SEVERAL DAYS
GAD7 TOTAL SCORE: 6
5. BEING SO RESTLESS THAT IT IS HARD TO SIT STILL: NOT AT ALL
3. WORRYING TOO MUCH ABOUT DIFFERENT THINGS: SEVERAL DAYS
1. FEELING NERVOUS, ANXIOUS, OR ON EDGE: NOT AT ALL
GAD7 TOTAL SCORE: 6
2. NOT BEING ABLE TO STOP OR CONTROL WORRYING: SEVERAL DAYS
7. FEELING AFRAID AS IF SOMETHING AWFUL MIGHT HAPPEN: SEVERAL DAYS

## 2020-09-22 ASSESSMENT — PATIENT HEALTH QUESTIONNAIRE - PHQ9
10. IF YOU CHECKED OFF ANY PROBLEMS, HOW DIFFICULT HAVE THESE PROBLEMS MADE IT FOR YOU TO DO YOUR WORK, TAKE CARE OF THINGS AT HOME, OR GET ALONG WITH OTHER PEOPLE: SOMEWHAT DIFFICULT
SUM OF ALL RESPONSES TO PHQ QUESTIONS 1-9: 6
SUM OF ALL RESPONSES TO PHQ QUESTIONS 1-9: 6

## 2020-09-22 NOTE — PROGRESS NOTES
[facility]: Integrated Behavioral Health  September 22, 2020      Behavioral Health Clinician Progress Note    Patient Name: Joyce Marroquin           Service Type: Virtual Visit      Service Location:  Virtual visit via [facility]      Session Start Time: 10:02  Session End Time: 10:30      Session Length: 16 - 37      Attendees: Patient    Visit Activities (Refresh list every visit): [facility] Only     Telemedicine Visit: The patient's condition can be safely assessed and treated via synchronous audio and visual telemedicine encounter.      Reason for Telemedicine Visit: COVID-19    Originating Site (Patient Location): Patient's home    Distant Site (Provider Location): Provider Remote Setting    Consent:  The patient/guardian has verbally consented to: the potential risks and benefits of telemedicine (video visit) versus in person care; bill my insurance or make self-payment for services provided; and responsibility for payment of non-covered services.     Mode of Communication:  Video Conference via MyTrainer    As the provider I attest to compliance with applicable laws and regulations related to telemedicine.  Diagnostic Assessment Date: 4/20/20  Treatment Plan Review Date: NA  See Flowsheets for today's PHQ-9 and JESUS-7 results  Previous PHQ-9:   PHQ-9 SCORE 3/5/2020 4/20/2020 9/22/2020   PHQ-9 Total Score MyChart - 4 (Minimal depression) 6 (Mild depression)   PHQ-9 Total Score 12 4 6     Previous JESUS-7:   JESUS-7 SCORE 1/25/2019 4/20/2020 9/22/2020   Total Score - 12 (moderate anxiety) 6 (mild anxiety)   Total Score 4 12 6       URMILA LEVEL:  No flowsheet data found.    DATA  Extended Session (60+ minutes): No  Interactive Complexity: No  Crisis: No    Treatment Objective(s) Addressed in This Session:  Target Behavior(s): disease management/lifestyle changes - Coping strategies for resentment/anger, stress, depressed mood    Adjustment Difficulties: will develop coping/problem-solving  skills to facilitate more adaptive adjustment    Current Stressors / Issues:  Joyce called our behavioral health department on 9/10 requesting book and online resources for help processing grief, loss, and dealing with physical ailments. Spent about 30 minutes on the phone with Joyce today and provided resources. Supportive counseling was also used to help Joyce with her reported decline in mood and health over the last several months.     Presented Joyce with a few books on trauma and grief that might help develop her with her report of trauma-symptoms and low mood. She indicates looking for books to help her cope and develop understanding about grief and loss.     Additionally reviewed grief support groups she could try looking into for increased support.     Joyce notes she does not wish to resume therapy at this time. She indicates that therapy has not been helpful for her in the past and does not wish to resume despite her concerns. I encouraged Joyce to schedule a follow-up as needed.     Progress on Treatment Objective(s) / Homework:  Satisfactory progress - ACTION (Actively working towards change); Intervened by reinforcing change plan / affirming steps taken    Provided resources on grief, loss, and physical changes/trauma    Supportive counseling also used.     Care Plan review completed: Yes    Medication Review:  No changes to current psychiatric medication(s)    Medication Compliance:  No    Changes in Health Issues:   None reported    Chemical Use Review:   Substance Use: Chemical use reviewed, no active concerns identified      Tobacco Use: No current tobacco use.      Assessment: Current Emotional / Mental Status (status of significant symptoms):  Risk status (Self / Other harm or suicidal ideation)  Patient denies a history of suicidal ideation, suicide attempts, self-injurious behavior, homicidal ideation, homicidal behavior and and other safety concerns  Patient denies current fears or concerns for  personal safety.  Patient denies current or recent suicidal ideation or behaviors.  Patient denies current or recent homicidal ideation or behaviors.  Patient denies current or recent self injurious behavior or ideation.  Patient denies other safety concerns.  A safety and risk management plan has not been developed at this time, however patient was encouraged to call [facility] should there be a change in any of these risk factors.    Appearance:   Appropriate   Eye Contact:   Good   Psychomotor Behavior: Normal   Attitude:   Cooperative  Interested Friendly  Orientation:   All  Speech   Rate / Production: Normal    Volume:  Normal   Mood:    Depressed   Affect:    Appropriate   Thought Content:  Clear   Thought Form:  Coherent  Logical   Insight:    Good     Diagnoses:  1. Adjustment disorder with mixed anxiety and depressed mood        Collateral Reports Completed:  Not Applicable    Plan: (Homework, other):  Joyce declined needing a follow-up with [facility] services at this time. She wished to pursue more self-help resources. Provided handout on grief/trauma resources. Encouraged Joyce to schedule a follow-up as needed. No CD issues.     David Forrester LP  9/22/2020    Answers for HPI/ROS submitted by the patient on 9/22/2020   If you checked off any problems, how difficult have these problems made it for you to do your work, take care of things at home, or get along with other people?: Somewhat difficult  PHQ9 TOTAL SCORE: 6  JESUS 7 TOTAL SCORE: 6

## 2020-09-23 ASSESSMENT — ANXIETY QUESTIONNAIRES: GAD7 TOTAL SCORE: 6

## 2020-09-23 ASSESSMENT — PATIENT HEALTH QUESTIONNAIRE - PHQ9: SUM OF ALL RESPONSES TO PHQ QUESTIONS 1-9: 6

## 2020-09-23 NOTE — PROGRESS NOTES
"Joyce Marroquin is a 59 year old female who is being evaluated via a billable video visit.      The patient has been notified of following:     \"This video visit will be conducted via a call between you and your physician/provider. We have found that certain health care needs can be provided without the need for an in-person physical exam.  This service lets us provide the care you need with a video conversation.  If a prescription is necessary we can send it directly to your pharmacy.  If lab work is needed we can place an order for that and you can then stop by our lab to have the test done at a later time.    Video visits are billed at different rates depending on your insurance coverage.  Please reach out to your insurance provider with any questions.    If during the course of the call the physician/provider feels a video visit is not appropriate, you will not be charged for this service.\"    Patient has given verbal consent for Video visit? Yes  How would you like to obtain your AVS? MyChart  If you are dropped from the video visit, the video invite should be resent to: Text to cell phone: [phone]  Will anyone else be joining your video visit? No        Video-Visit Details    Type of service:  Video Visit    Video Start Time: 7:58 AM  Video End Time: 8:09 AM    Originating Location (pt. Location): Home    Distant Location (provider location):  [facility] ENDOCRINOLOGY     Platform used for Video Visit: Penboost         Endocrinology Note         Joyce is a 59 year old female who has VDO visit for hypothyroidism     HPI  Joyce Marroquin is 59 years old with hx of depression, hypothyroidism secondary to I131 treatment for Graves's disease, POTS, GERD, RVOT RFA, pericarditis, collageneous colitis who has VDO visit for postablative hypothyroidism. Last visit 8/2019    Not currently working due to COVID pandemic    1) Postablative hypothyroidism: she did have Graves'disease and received I131 for treatment. She was supposed " to take Synthroid 150 mcg for 6 days and 75 mcg for 1 day per week. However, she forgot to do that. Recent TFT on 8/24/2020 showed TSH 0.27, FT4 1.47.    2) Osteoporosis: DXA 11/2017 showed osteoporosis with T-score -2.7 at radius and spine. +stress fracture in the right tibia in her 20s. She does not want to be treatment for osteoporosis regardless of DXA result. She is afraid of side effects.    She takes 2000 IU daily of vitamin D and intermittent calcium tablet. She has some yogurt and cheese. She walks about 30 minutes per day. She did have stress fracture in her leg when she was 20s. She reports her sister has had parathyroid problem.     3) Hyperparathyroidism with intermittent mild hypercalcemia: she did previously have work-up for hyperparathyroidism around Nov-Dec 2017 due to ongoing fatigue and depressed mood. Parathyroid scan showed asymmetric activity near the upper left thyroid lobe, raising suspicion for parathyroid adenoma. Ultrasound neck did NOT show parathyroid adenoma. DXA 11/2017 showed osteoporosis with T-score -2.7 at radius and spine. Calcium was normal to mildly elevated.  She underwent left superior parathyroidectomy which was consistent with parathyroid adenoma.    Past Medical History  Past Medical History:   Diagnosis Date     Arthritis      Cardiomyopathy (H)     ff Cardiology     Chronic depressive personality disorder      Congestive heart failure (H) see dr martínez    heart failure correct term     COPD exacerbation (H)      Esophageal reflux      Ex-smoker     quit 2006; 1 PPD x 30     Hyperparathyroidism (H)     s/p parathyroidectomy     Hypothyroidism      LARRY on CPAP     ff Sleep medicine     Pulmonary nodules     ff Pulmonologist     S/P cardiac pacemaker procedure     checked every 6 months at the [facility]     Uncomplicated asthma years       Allergies  Allergies   Allergen Reactions     Tetracycline Swelling     Zofran [Ondansetron]      Prolonged QT  Prolonged QT at baseline per  patient     Flagyl [Metronidazole] Rash     Buspar [Buspirone]      Muscle weakness     Corn Oil Other (See Comments)     Headache       Seasonal Allergies Difficulty breathing     Sulfamethoxazole-Trimethoprim      Other reaction(s): Other  Passed out     Cyclobenzaprine Other (See Comments)     Extreme heat intolerance     Levaquin [Levofloxacin]      Other reaction(s): Other  Tendon rupture     Nsaids Other (See Comments)     Exacerbated asthma     Medications  Current Outpatient Medications   Medication Sig Dispense Refill     albuterol (PROAIR HFA/PROVENTIL HFA/VENTOLIN HFA) 108 (90 BASE) MCG/ACT Inhaler Inhale 2 puffs into the lungs as needed for shortness of breath / dyspnea or wheezing        azelastine (ASTELIN) 0.1 % nasal spray Spray 1 spray into both nostrils 2 times daily       calcium carbonate (OS-MANJU) 500 MG tablet Take 1 tablet by mouth 2 times daily       carvedilol (COREG) 3.125 MG tablet Take 1 tablet (3.125 mg) by mouth 2 times daily (with meals) 180 tablet 0     clonazePAM (KLONOPIN) 0.5 MG tablet Take 1 tablet (0.5 mg) by mouth 3 times daily as needed for anxiety 90 tablet 2     DiphenhydrAMINE HCl (BENADRYL PO) Take 25-50 mg by mouth nightly as needed       Ferrous Sulfate (IRON SUPPLEMENT PO) Take 65 mg by mouth 2 times daily (with meals)        fluocinolone acetonide (DERMA SMOOTHE/FS BODY) 0.01 % external oil Use for ears.  2 times daily for 7 days 1 Bottle 0     fluticasone-salmeterol (ADVAIR) 250-50 MCG/DOSE inhaler Inhale 1 puff into the lungs every 12 hours       furosemide (LASIX) 20 MG tablet Take 1 tablet (20 mg) by mouth daily As needed for shortness of breath and swelling in the abdomen 90 tablet 1     ipratropium (ATROVENT) 0.06 % nasal spray Spray 2 sprays into both nostrils 4 times daily       lisinopril (ZESTRIL) 2.5 MG tablet Take 1 tablet (2.5 mg) by mouth daily 90 tablet 3     loperamide (IMODIUM A-D) 2 MG tablet Take 2 tabs (4 mg) after first loose stool, and then take one  tab (2 mg) after each diarrheal stool.  Max of 8 tabs (16 mg) per day. 1 tablet 0     MAGNESIUM LACTATE PO Take 180 mg by mouth At Bedtime        MELATONIN PO Take 3 mg by mouth At Bedtime        Menaquinone-7 (VITAMIN K2 PO) Take 1 tablet by mouth every morning        METHYLPREDNISOLONE PO Take 40 mg by mouth as needed        Multiple Vitamin (DAILY MULTIVITAMIN PO) Take 1 tablet by mouth every morning        nebulizer nebulization as needed       Probiotic Product (PROBIOTIC DAILY PO) Take 1 capsule by mouth 2 times daily        spironolactone (ALDACTONE) 25 MG tablet Take 0.5 tablets (12.5 mg) by mouth daily 45 tablet 3     SYNTHROID 150 MCG tablet Take 1 tablet (150 mcg) by mouth daily 90 tablet 2     TURMERIC PO Take 1 tablet by mouth every morning        UNABLE TO FIND 2 times daily MEDICATION NAME: Standard Process, Immune Congaplex. Pt taking 2 tablets daily       vitamin B complex with vitamin C (VITAMIN  B COMPLEX) PO tablet Take 1 tablet by mouth as needed       vitamin D3 (CHOLECALCIFEROL) 2000 units tablet Take 1 tablet by mouth daily 1 tablet 0     Zinc 15 MG CAPS Take 10 mg by mouth       Social History  Social History     Tobacco Use     Smoking status: Former Smoker     Packs/day: 0.00     Years: 0.00     Pack years: 0.00     Smokeless tobacco: Never Used   Substance Use Topics     Alcohol use: Yes     Frequency: 2-3 times a week     Drinks per session: 1 or 2     Binge frequency: Never     Comment: seldom     Drug use: No   working part time    ROS  10 points ROS were negative otherwise mentioned in HPI  Low energy, still dealing with cardiac issue    Physical Exam  Limited due to VDO visit  Constitutional: no distress, comfortable, pleasant   Psychological: appropriate mood       RESULTS  Sestamibi scan 12/1/2017      US neck 1/4/18 12/1/2017 - Nuclear medicine parathyroid scan.  6/28/2017 - CT chest.     FINDINGS: A normal thyroid gland is not visualized. Per report, the patient has a history of  prior radioiodine thyroid ablation. No enlarged or abnormal-appearing lymph nodes in the neck. No other abnormal masses in the neck.     IMPRESSION: No visualized enlarged parathyroid gland.    DXA 11/17/17    ENDO CALCIUM LABS-P Latest Ref Rng & Units 7/20/2020   CALCIUM 8.5 - 10.1 mg/dL 9.2   PHOSPHOROUS 2.5 - 4.5 mg/dL 3.4   MAGNESIUM 1.6 - 2.3 mg/dL 2.3   ALBUMIN 3.4 - 5.0 g/dL 3.6   BUN 7 - 30 mg/dL 12   CREATININE 0.52 - 1.04 mg/dL 0.70   PARATHYROID HORMONE INTACT 18 - 80 pg/mL 44   PARATHYROID HORMONE INTACT INTRAOPERATIVE 18 - 80 pg/mL    ALKPHOS 40 - 150 U/L 96   VITAMIN D DEFICIENCY SCREENING 20 - 75 ug/L 67       ASSESSMENT:    Joyce Marroquin is 59 years old with hx of depression, hypothyroidism secondary to I131 treatment for Graves's disease, POTS, GERD, RVOT RFA, pericarditis, collageneous colitis who has VDO visit today for postablative hypothyroidism.    1) Postablative hypothyroidism: secondary to Graves' disease treatment. She is currently on Synthroid 150 mcg daily. Lab 8/24/2020 showed TSH 0.27, FT4 1.47.  - Reduce Synthroid to 150 mcg x6 days and 75 mcg x1 day per week  - Will repeat lab in 2 months      2) Osteoporosis: DXA 11/2017 showed osteoporosis with T-score -2.7 at radius and spine. +stress fracture in the right tibia in her 20s. She does not want to be treatment for osteoporosis regardless of DXA result.  Continue vitamin D and diary calcium. Fall precaution    3) primary hyperparathyroidism: Parathyroid scan showed asymmetric activity near the upper left thyroid lobe, raising suspicion for parathyroid adenoma. Ultrasound neck did NOT show parathyroid adenoma. DXA 11/2017 showed osteoporosis with T-score -2.7 at radius and spine. Calcium has always been normal to mildly elevated.  She underwent left superior parathyroidectomy which was consistent with parathyroid adenoma. Calcium and PTH were normalized afterward.      PLAN:   - reduce Synthroid to 150 mcg x6 days and 75 mcg x1 day per  week  - repeat TSH, FT4 in 2 months    Garland Patel MD     Division of Diabetes and Endocrinology  Department of Medicine  [phone]

## 2020-09-24 ENCOUNTER — TELEPHONE (OUTPATIENT)
Dept: FAMILY MEDICINE | Facility: CLINIC | Age: 60
End: 2020-09-24

## 2020-09-24 DIAGNOSIS — I47.29 PAROXYSMAL VENTRICULAR TACHYCARDIA (H): Primary | ICD-10-CM

## 2020-09-24 LAB
MDC_IDC_EPISODE_DTM: NORMAL
MDC_IDC_EPISODE_DURATION: 2 S
MDC_IDC_EPISODE_ID: 9
MDC_IDC_EPISODE_TYPE: NORMAL
MDC_IDC_LEAD_IMPLANT_DT: NORMAL
MDC_IDC_LEAD_IMPLANT_DT: NORMAL
MDC_IDC_LEAD_LOCATION: NORMAL
MDC_IDC_LEAD_LOCATION: NORMAL
MDC_IDC_LEAD_LOCATION_DETAIL_1: NORMAL
MDC_IDC_LEAD_LOCATION_DETAIL_1: NORMAL
MDC_IDC_LEAD_MFG: NORMAL
MDC_IDC_LEAD_MFG: NORMAL
MDC_IDC_LEAD_MODEL: NORMAL
MDC_IDC_LEAD_MODEL: NORMAL
MDC_IDC_LEAD_POLARITY_TYPE: NORMAL
MDC_IDC_LEAD_POLARITY_TYPE: NORMAL
MDC_IDC_LEAD_SERIAL: NORMAL
MDC_IDC_LEAD_SERIAL: NORMAL
MDC_IDC_MSMT_BATTERY_DTM: NORMAL
MDC_IDC_MSMT_BATTERY_REMAINING_LONGEVITY: 70 MO
MDC_IDC_MSMT_BATTERY_RRT_TRIGGER: 2.83
MDC_IDC_MSMT_BATTERY_STATUS: NORMAL
MDC_IDC_MSMT_BATTERY_VOLTAGE: 3 V
MDC_IDC_MSMT_LEADCHNL_RA_IMPEDANCE_VALUE: 380 OHM
MDC_IDC_MSMT_LEADCHNL_RA_IMPEDANCE_VALUE: 437 OHM
MDC_IDC_MSMT_LEADCHNL_RA_PACING_THRESHOLD_AMPLITUDE: 0.62 V
MDC_IDC_MSMT_LEADCHNL_RA_PACING_THRESHOLD_PULSEWIDTH: 0.4 MS
MDC_IDC_MSMT_LEADCHNL_RA_SENSING_INTR_AMPL: 3.25 MV
MDC_IDC_MSMT_LEADCHNL_RA_SENSING_INTR_AMPL: 3.25 MV
MDC_IDC_MSMT_LEADCHNL_RV_IMPEDANCE_VALUE: 437 OHM
MDC_IDC_MSMT_LEADCHNL_RV_IMPEDANCE_VALUE: 475 OHM
MDC_IDC_MSMT_LEADCHNL_RV_PACING_THRESHOLD_AMPLITUDE: 0.62 V
MDC_IDC_MSMT_LEADCHNL_RV_PACING_THRESHOLD_PULSEWIDTH: 0.4 MS
MDC_IDC_MSMT_LEADCHNL_RV_SENSING_INTR_AMPL: 6.88 MV
MDC_IDC_MSMT_LEADCHNL_RV_SENSING_INTR_AMPL: 6.88 MV
MDC_IDC_PG_IMPLANT_DTM: NORMAL
MDC_IDC_PG_MFG: NORMAL
MDC_IDC_PG_MODEL: NORMAL
MDC_IDC_PG_SERIAL: NORMAL
MDC_IDC_PG_TYPE: NORMAL
MDC_IDC_SESS_CLINIC_NAME: NORMAL
MDC_IDC_SESS_DTM: NORMAL
MDC_IDC_SESS_TYPE: NORMAL
MDC_IDC_SET_BRADY_AT_MODE_SWITCH_RATE: 171 {BEATS}/MIN
MDC_IDC_SET_BRADY_HYSTRATE: NORMAL
MDC_IDC_SET_BRADY_LOWRATE: 60 {BEATS}/MIN
MDC_IDC_SET_BRADY_MAX_SENSOR_RATE: 140 {BEATS}/MIN
MDC_IDC_SET_BRADY_MAX_TRACKING_RATE: 140 {BEATS}/MIN
MDC_IDC_SET_BRADY_MODE: NORMAL
MDC_IDC_SET_BRADY_PAV_DELAY_HIGH: 140 MS
MDC_IDC_SET_BRADY_PAV_DELAY_LOW: 180 MS
MDC_IDC_SET_BRADY_SAV_DELAY_HIGH: 110 MS
MDC_IDC_SET_BRADY_SAV_DELAY_LOW: 150 MS
MDC_IDC_SET_LEADCHNL_RA_PACING_AMPLITUDE: 1.5 V
MDC_IDC_SET_LEADCHNL_RA_PACING_ANODE_ELECTRODE_1: NORMAL
MDC_IDC_SET_LEADCHNL_RA_PACING_ANODE_LOCATION_1: NORMAL
MDC_IDC_SET_LEADCHNL_RA_PACING_CAPTURE_MODE: NORMAL
MDC_IDC_SET_LEADCHNL_RA_PACING_CATHODE_ELECTRODE_1: NORMAL
MDC_IDC_SET_LEADCHNL_RA_PACING_CATHODE_LOCATION_1: NORMAL
MDC_IDC_SET_LEADCHNL_RA_PACING_POLARITY: NORMAL
MDC_IDC_SET_LEADCHNL_RA_PACING_PULSEWIDTH: 0.4 MS
MDC_IDC_SET_LEADCHNL_RA_SENSING_ANODE_ELECTRODE_1: NORMAL
MDC_IDC_SET_LEADCHNL_RA_SENSING_ANODE_LOCATION_1: NORMAL
MDC_IDC_SET_LEADCHNL_RA_SENSING_CATHODE_ELECTRODE_1: NORMAL
MDC_IDC_SET_LEADCHNL_RA_SENSING_CATHODE_LOCATION_1: NORMAL
MDC_IDC_SET_LEADCHNL_RA_SENSING_POLARITY: NORMAL
MDC_IDC_SET_LEADCHNL_RA_SENSING_SENSITIVITY: 0.3 MV
MDC_IDC_SET_LEADCHNL_RV_PACING_AMPLITUDE: 1.5 V
MDC_IDC_SET_LEADCHNL_RV_PACING_ANODE_ELECTRODE_1: NORMAL
MDC_IDC_SET_LEADCHNL_RV_PACING_ANODE_LOCATION_1: NORMAL
MDC_IDC_SET_LEADCHNL_RV_PACING_CAPTURE_MODE: NORMAL
MDC_IDC_SET_LEADCHNL_RV_PACING_CATHODE_ELECTRODE_1: NORMAL
MDC_IDC_SET_LEADCHNL_RV_PACING_CATHODE_LOCATION_1: NORMAL
MDC_IDC_SET_LEADCHNL_RV_PACING_POLARITY: NORMAL
MDC_IDC_SET_LEADCHNL_RV_PACING_PULSEWIDTH: 0.4 MS
MDC_IDC_SET_LEADCHNL_RV_SENSING_ANODE_ELECTRODE_1: NORMAL
MDC_IDC_SET_LEADCHNL_RV_SENSING_ANODE_LOCATION_1: NORMAL
MDC_IDC_SET_LEADCHNL_RV_SENSING_CATHODE_ELECTRODE_1: NORMAL
MDC_IDC_SET_LEADCHNL_RV_SENSING_CATHODE_LOCATION_1: NORMAL
MDC_IDC_SET_LEADCHNL_RV_SENSING_POLARITY: NORMAL
MDC_IDC_SET_LEADCHNL_RV_SENSING_SENSITIVITY: 0.9 MV
MDC_IDC_SET_ZONE_DETECTION_INTERVAL: 350 MS
MDC_IDC_SET_ZONE_DETECTION_INTERVAL: 400 MS
MDC_IDC_SET_ZONE_TYPE: NORMAL
MDC_IDC_STAT_AT_BURDEN_PERCENT: 0 %
MDC_IDC_STAT_AT_DTM_END: NORMAL
MDC_IDC_STAT_AT_DTM_START: NORMAL
MDC_IDC_STAT_BRADY_AP_VP_PERCENT: 48.27 %
MDC_IDC_STAT_BRADY_AP_VS_PERCENT: 0.31 %
MDC_IDC_STAT_BRADY_AS_VP_PERCENT: 51.26 %
MDC_IDC_STAT_BRADY_AS_VS_PERCENT: 0.15 %
MDC_IDC_STAT_BRADY_DTM_END: NORMAL
MDC_IDC_STAT_BRADY_DTM_START: NORMAL
MDC_IDC_STAT_BRADY_RA_PERCENT_PACED: 48.24 %
MDC_IDC_STAT_BRADY_RV_PERCENT_PACED: 99.39 %
MDC_IDC_STAT_EPISODE_RECENT_COUNT: 0
MDC_IDC_STAT_EPISODE_RECENT_COUNT: 1
MDC_IDC_STAT_EPISODE_RECENT_COUNT_DTM_END: NORMAL
MDC_IDC_STAT_EPISODE_RECENT_COUNT_DTM_START: NORMAL
MDC_IDC_STAT_EPISODE_TOTAL_COUNT: 0
MDC_IDC_STAT_EPISODE_TOTAL_COUNT: 1
MDC_IDC_STAT_EPISODE_TOTAL_COUNT: 1
MDC_IDC_STAT_EPISODE_TOTAL_COUNT: 7
MDC_IDC_STAT_EPISODE_TOTAL_COUNT_DTM_END: NORMAL
MDC_IDC_STAT_EPISODE_TOTAL_COUNT_DTM_START: NORMAL
MDC_IDC_STAT_EPISODE_TYPE: NORMAL

## 2020-09-24 NOTE — TELEPHONE ENCOUNTER
Spoke with pharmacist Soledad and did verbal order for ear drops. Soledad stated she will contact patient.

## 2020-09-24 NOTE — TELEPHONE ENCOUNTER
Patient is under the impression she should be getting ear drops instead of body oil, please verify and send a new RX with new SIG if appropriate

## 2020-09-24 NOTE — TELEPHONE ENCOUNTER
The only way she can get the ear drops, is if the pharmacy sends a refill request.  The only order option we have is the body oil, and this is not what she wants. SHAYNA Simon, FNP-BC

## 2020-09-25 ENCOUNTER — VIRTUAL VISIT (OUTPATIENT)
Dept: ENDOCRINOLOGY | Facility: CLINIC | Age: 60
End: 2020-09-25
Payer: COMMERCIAL

## 2020-09-25 DIAGNOSIS — E89.0 POSTABLATIVE HYPOTHYROIDISM: Primary | ICD-10-CM

## 2020-09-25 RX ORDER — LEVOTHYROXINE SODIUM 150 MCG
TABLET ORAL
Qty: 90 TABLET | Refills: 3 | Status: SHIPPED | OUTPATIENT
Start: 2020-09-25 | End: 2021-10-06

## 2020-09-25 NOTE — LETTER
"9/25/2020       RE: Joyce Marroquin  [address] [facility] [id number]     Dear Colleague,    Thank you for referring your patient, Joyce Marroquin, to the [facility] ENDOCRINOLOGY at [facility]. Please see a copy of my visit note below.    Joyce Marroquin is a 59 year old female who is being evaluated via a billable video visit.      The patient has been notified of following:     \"This video visit will be conducted via a call between you and your physician/provider. We have found that certain health care needs can be provided without the need for an in-person physical exam.  This service lets us provide the care you need with a video conversation.  If a prescription is necessary we can send it directly to your pharmacy.  If lab work is needed we can place an order for that and you can then stop by our lab to have the test done at a later time.    Video visits are billed at different rates depending on your insurance coverage.  Please reach out to your insurance provider with any questions.    If during the course of the call the physician/provider feels a video visit is not appropriate, you will not be charged for this service.\"    Patient has given verbal consent for Video visit? Yes  How would you like to obtain your AVS? MyChart  If you are dropped from the video visit, the video invite should be resent to: Text to cell phone: [phone]  Will anyone else be joining your video visit? No        Video-Visit Details    Type of service:  Video Visit    Video Start Time: 7:58 AM  Video End Time: 8:09 AM    Originating Location (pt. Location): Home    Distant Location (provider location):  [facility] ENDOCRINOLOGY     Platform used for Video Visit: [facility]         Endocrinology Note         Joyce is a 59 year old female who has VDO visit for hypothyroidism     HPI  Joyce Marroquin is 59 years old with hx of depression, hypothyroidism secondary to I131 treatment for Graves's disease, POTS, " GERD, RVOT RFA, pericarditis, collageneous colitis who has VDO visit for postablative hypothyroidism. Last visit 8/2019    Not currently working due to COVID pandemic    1) Postablative hypothyroidism: she did have Graves'disease and received I131 for treatment. She was supposed to take Synthroid 150 mcg for 6 days and 75 mcg for 1 day per week. However, she forgot to do that. Recent TFT on 8/24/2020 showed TSH 0.27, FT4 1.47.    2) Osteoporosis: DXA 11/2017 showed osteoporosis with T-score -2.7 at radius and spine. +stress fracture in the right tibia in her 20s. She does not want to be treatment for osteoporosis regardless of DXA result. She is afraid of side effects.    She takes 2000 IU daily of vitamin D and intermittent calcium tablet. She has some yogurt and cheese. She walks about 30 minutes per day. She did have stress fracture in her leg when she was 20s. She reports her sister has had parathyroid problem.     3) Hyperparathyroidism with intermittent mild hypercalcemia: she did previously have work-up for hyperparathyroidism around Nov-Dec 2017 due to ongoing fatigue and depressed mood. Parathyroid scan showed asymmetric activity near the upper left thyroid lobe, raising suspicion for parathyroid adenoma. Ultrasound neck did NOT show parathyroid adenoma. DXA 11/2017 showed osteoporosis with T-score -2.7 at radius and spine. Calcium was normal to mildly elevated.  She underwent left superior parathyroidectomy which was consistent with parathyroid adenoma.    Past Medical History  Past Medical History:   Diagnosis Date     Arthritis      Cardiomyopathy (H)     ff Cardiology     Chronic depressive personality disorder      Congestive heart failure (H) see dr martínez    heart failure correct term     COPD exacerbation (H)      Esophageal reflux      Ex-smoker     quit 2006; 1 PPD x 30     Hyperparathyroidism (H)     s/p parathyroidectomy     Hypothyroidism      LARRY on CPAP     ff Sleep medicine     Pulmonary  nodules     ff Pulmonologist     S/P cardiac pacemaker procedure     checked every 6 months at [facility]     Uncomplicated asthma years       Allergies  Allergies   Allergen Reactions     Tetracycline Swelling     Zofran [Ondansetron]      Prolonged QT  Prolonged QT at baseline per patient     Flagyl [Metronidazole] Rash     Buspar [Buspirone]      Muscle weakness     Corn Oil Other (See Comments)     Headache       Seasonal Allergies Difficulty breathing     Sulfamethoxazole-Trimethoprim      Other reaction(s): Other  Passed out     Cyclobenzaprine Other (See Comments)     Extreme heat intolerance     Levaquin [Levofloxacin]      Other reaction(s): Other  Tendon rupture     Nsaids Other (See Comments)     Exacerbated asthma     Medications  Current Outpatient Medications   Medication Sig Dispense Refill     albuterol (PROAIR HFA/PROVENTIL HFA/VENTOLIN HFA) 108 (90 BASE) MCG/ACT Inhaler Inhale 2 puffs into the lungs as needed for shortness of breath / dyspnea or wheezing        azelastine (ASTELIN) 0.1 % nasal spray Spray 1 spray into both nostrils 2 times daily       calcium carbonate (OS-MANJU) 500 MG tablet Take 1 tablet by mouth 2 times daily       carvedilol (COREG) 3.125 MG tablet Take 1 tablet (3.125 mg) by mouth 2 times daily (with meals) 180 tablet 0     clonazePAM (KLONOPIN) 0.5 MG tablet Take 1 tablet (0.5 mg) by mouth 3 times daily as needed for anxiety 90 tablet 2     DiphenhydrAMINE HCl (BENADRYL PO) Take 25-50 mg by mouth nightly as needed       Ferrous Sulfate (IRON SUPPLEMENT PO) Take 65 mg by mouth 2 times daily (with meals)        fluocinolone acetonide (DERMA SMOOTHE/FS BODY) 0.01 % external oil Use for ears.  2 times daily for 7 days 1 Bottle 0     fluticasone-salmeterol (ADVAIR) 250-50 MCG/DOSE inhaler Inhale 1 puff into the lungs every 12 hours       furosemide (LASIX) 20 MG tablet Take 1 tablet (20 mg) by mouth daily As needed for shortness of breath and swelling in the abdomen 90 tablet 1      ipratropium (ATROVENT) 0.06 % nasal spray Spray 2 sprays into both nostrils 4 times daily       lisinopril (ZESTRIL) 2.5 MG tablet Take 1 tablet (2.5 mg) by mouth daily 90 tablet 3     loperamide (IMODIUM A-D) 2 MG tablet Take 2 tabs (4 mg) after first loose stool, and then take one tab (2 mg) after each diarrheal stool.  Max of 8 tabs (16 mg) per day. 1 tablet 0     MAGNESIUM LACTATE PO Take 180 mg by mouth At Bedtime        MELATONIN PO Take 3 mg by mouth At Bedtime        Menaquinone-7 (VITAMIN K2 PO) Take 1 tablet by mouth every morning        METHYLPREDNISOLONE PO Take 40 mg by mouth as needed        Multiple Vitamin (DAILY MULTIVITAMIN PO) Take 1 tablet by mouth every morning        nebulizer nebulization as needed       Probiotic Product (PROBIOTIC DAILY PO) Take 1 capsule by mouth 2 times daily        spironolactone (ALDACTONE) 25 MG tablet Take 0.5 tablets (12.5 mg) by mouth daily 45 tablet 3     SYNTHROID 150 MCG tablet Take 1 tablet (150 mcg) by mouth daily 90 tablet 2     TURMERIC PO Take 1 tablet by mouth every morning        UNABLE TO FIND 2 times daily MEDICATION NAME: Standard Process, Immune Congaplex. Pt taking 2 tablets daily       vitamin B complex with vitamin C (VITAMIN  B COMPLEX) PO tablet Take 1 tablet by mouth as needed       vitamin D3 (CHOLECALCIFEROL) 2000 units tablet Take 1 tablet by mouth daily 1 tablet 0     Zinc 15 MG CAPS Take 10 mg by mouth       Social History  Social History     Tobacco Use     Smoking status: Former Smoker     Packs/day: 0.00     Years: 0.00     Pack years: 0.00     Smokeless tobacco: Never Used   Substance Use Topics     Alcohol use: Yes     Frequency: 2-3 times a week     Drinks per session: 1 or 2     Binge frequency: Never     Comment: seldom     Drug use: No   working part time    ROS  10 points ROS were negative otherwise mentioned in HPI  Low energy, still dealing with cardiac issue    Physical Exam  Limited due to VDO visit  Constitutional: no distress,  comfortable, pleasant   Psychological: appropriate mood       RESULTS  Sestamibi scan 12/1/2017      US neck 1/4/18 12/1/2017 - Nuclear medicine parathyroid scan.  6/28/2017 - CT chest.     FINDINGS: A normal thyroid gland is not visualized. Per report, the patient has a history of prior radioiodine thyroid ablation. No enlarged or abnormal-appearing lymph nodes in the neck. No other abnormal masses in the neck.     IMPRESSION: No visualized enlarged parathyroid gland.    DXA 11/17/17    [facility] Latest Ref Rng & Units 7/20/2020   CALCIUM 8.5 - 10.1 mg/dL 9.2   PHOSPHOROUS 2.5 - 4.5 mg/dL 3.4   MAGNESIUM 1.6 - 2.3 mg/dL 2.3   ALBUMIN 3.4 - 5.0 g/dL 3.6   BUN 7 - 30 mg/dL 12   CREATININE 0.52 - 1.04 mg/dL 0.70   PARATHYROID HORMONE INTACT 18 - 80 pg/mL 44   PARATHYROID HORMONE INTACT INTRAOPERATIVE 18 - 80 pg/mL    ALKPHOS 40 - 150 U/L 96   VITAMIN D DEFICIENCY SCREENING 20 - 75 ug/L 67       ASSESSMENT:    Joyce Marroquin is 59 years old with hx of depression, hypothyroidism secondary to I131 treatment for Graves's disease, POTS, GERD, RVOT RFA, pericarditis, collageneous colitis who has VDO visit today for postablative hypothyroidism.    1) Postablative hypothyroidism: secondary to Graves' disease treatment. She is currently on Synthroid 150 mcg daily. Lab 8/24/2020 showed TSH 0.27, FT4 1.47.  - Reduce Synthroid to 150 mcg x6 days and 75 mcg x1 day per week  - Will repeat lab in 2 months      2) Osteoporosis: DXA 11/2017 showed osteoporosis with T-score -2.7 at radius and spine. +stress fracture in the right tibia in her 20s. She does not want to be treatment for osteoporosis regardless of DXA result.  Continue vitamin D and diary calcium. Fall precaution    3) primary hyperparathyroidism: Parathyroid scan showed asymmetric activity near the upper left thyroid lobe, raising suspicion for parathyroid adenoma. Ultrasound neck did NOT show parathyroid adenoma. DXA 11/2017 showed osteoporosis with T-score -2.7  at radius and spine. Calcium has always been normal to mildly elevated.  She underwent left superior parathyroidectomy which was consistent with parathyroid adenoma. Calcium and PTH were normalized afterward.      PLAN:   - reduce Synthroid to 150 mcg x6 days and 75 mcg x1 day per week  - repeat TSH, FT4 in 2 months    Garland Patel MD     Division of Diabetes and Endocrinology  Department of Medicine  [phone]

## 2020-09-27 RX ORDER — CARVEDILOL 3.12 MG/1
3.12 TABLET ORAL 2 TIMES DAILY WITH MEALS
Qty: 180 TABLET | Refills: 3 | Status: SHIPPED | OUTPATIENT
Start: 2020-09-27 | End: 2020-10-20

## 2020-09-29 ENCOUNTER — VIRTUAL VISIT (OUTPATIENT)
Dept: CARDIOLOGY | Facility: CLINIC | Age: 60
End: 2020-09-29
Attending: NURSE PRACTITIONER
Payer: COMMERCIAL

## 2020-09-29 DIAGNOSIS — I42.9 CARDIOMYOPATHY, UNSPECIFIED TYPE (H): ICD-10-CM

## 2020-09-29 DIAGNOSIS — I44.2 COMPLETE ATRIOVENTRICULAR BLOCK (H): ICD-10-CM

## 2020-09-29 DIAGNOSIS — I47.29 PAROXYSMAL VENTRICULAR TACHYCARDIA (H): Primary | ICD-10-CM

## 2020-09-29 PROCEDURE — 99214 OFFICE O/P EST MOD 30 MIN: CPT | Mod: GT | Performed by: NURSE PRACTITIONER

## 2020-09-29 NOTE — LETTER
"9/29/2020      RE: Joyce Marroquin  [address] [facility] [address]       Dear Colleague,    Thank you for the opportunity to participate in the care of your patient, Joyce Marroquin, at the [facility] at [facility]. Please see a copy of my visit note below.    Joyce Marroquin is a 59 year old female who is being evaluated via a billable video visit.      The patient has been notified of following:     \"This video visit will be conducted via a call between you and your physician/provider. We have found that certain health care needs can be provided without the need for an in-person physical exam.  This service lets us provide the care you need with a video conversation.  If a prescription is necessary we can send it directly to your pharmacy.  If lab work is needed we can place an order for that and you can then stop by our lab to have the test done at a later time.    Video visits are billed at different rates depending on your insurance coverage.  Please reach out to your insurance provider with any questions.    If during the course of the call the physician/provider feels a video visit is not appropriate, you will not be charged for this service.\"    Patient has given verbal consent for Video visit? Yes  How would you like to obtain your AVS? MyChart  If you are dropped from the video visit, the video invite should be resent to: Text to cell phone: [phone]  Will anyone else be joining your video visit? No                    HPI: 59 yr old female patient seen for follow up of HFrEF. She is followed by Dr. Ordonez for HF and by Dr. Funes for dysrhythmias. She was last seen by Dr. Ordonez in July, 2020.   She underwent RF ablation in 2017 - had AV node ablation, developed complete heart block and subsequently had a dual chamber PPM implanted. She then developed systolic heart failure with initial EF at 35%, most recent EF at 45% (echo on 3/20) She also underwent " RHC on 7/20 which was normal.  Pt states that her biggest concern is fluctuating wt. She can gain 5-7 lbs/day. She has been using lasix prn for such. Most recently (about 2 weeks ago, she is not specific on timing) she had a busy day (had walked, biked and was doing gardening)  and developed severe dizziness. She denied room spinning, palpitations, stating she felt like she was going to pass out. She did not take her BP (which she does 1-2 x/day,) but did not feel dehydrated.   Her home Bp is ranging between 104-114 systolic. Her wt flucuates  Around 150# (she has intentionally lost 30#).  She denies SOB, orthopnea, PND, LE edema, palpitations. She is fatigued - rating her energy level a 5/10 scale - which is quite different than previous to her ablation and subsequent pace maker.    She believes her dizziness is related to fluid retention which exacerbates the PAC's. Her device interrogation did not show runs of SVT, but it is possible the rate is below the detection. Given her widely flucuating wt - this does seem reasonable.    She is tolerating her BB/MRA without difficulty. Her most frustrating sx is exercise intolerance and recent dizziness.        PAST MEDICAL HISTORY:  Past Medical History:   Diagnosis Date     Arthritis      Cardiomyopathy (H)     ff Cardiology     Chronic depressive personality disorder      Congestive heart failure (H) see dr martínez    heart failure correct term     COPD exacerbation (H)      Esophageal reflux      Ex-smoker     quit 2006; 1 PPD x 30     Hyperparathyroidism (H)     s/p parathyroidectomy     Hypothyroidism      LARRY on CPAP     ff Sleep medicine     Pulmonary nodules     ff Pulmonologist     S/P cardiac pacemaker procedure     checked every 6 months at the [facility]     Uncomplicated asthma years       FAMILY HISTORY:  Family History   Problem Relation Age of Onset     Hypothyroidism Mother      Hypertension Mother      Osteoarthritis Mother      Coronary Artery Disease Father          nonfatal MI in his 70s     Asthma Father      Diabetes Sister      Hypothyroidism Sister      Breast Cancer Maternal Aunt      Anxiety Disorder Sister        SOCIAL HISTORY:  Social History     Socioeconomic History     Marital status:      Spouse name: Not on file     Number of children: Not on file     Years of education: Not on file     Highest education level: Master's degree (e.g., MA, MS, Florian, MEd, MSW, SONIA)   Occupational History     Not on file   Social Needs     Financial resource strain: Not very hard     Food insecurity     Worry: Never true     Inability: Never true     Transportation needs     Medical: No     Non-medical: No   Tobacco Use     Smoking status: Former Smoker     Packs/day: 0.00     Years: 0.00     Pack years: 0.00     Smokeless tobacco: Never Used   Substance and Sexual Activity     Alcohol use: Yes     Frequency: 2-3 times a week     Drinks per session: 1 or 2     Binge frequency: Never     Comment: seldom     Drug use: No     Sexual activity: Yes     Partners: Male     Birth control/protection: None     Comment: vasectomy   Lifestyle     Physical activity     Days per week: 3 days     Minutes per session: 10 min     Stress: To some extent   Relationships     Social connections     Talks on phone: More than three times a week     Gets together: Never     Attends [employer] service: More than 4 times per year     Active member of club or organization: Yes     Attends meetings of clubs or organizations: More than 4 times per year     Relationship status:      Intimate partner violence     Fear of current or ex partner: Not on file     Emotionally abused: Not on file     Physically abused: Not on file     Forced sexual activity: Not on file   Other Topics Concern     Parent/sibling w/ CABG, MI or angioplasty before 65F 55M? No   Social History Narrative    CRNA on disability for vestibular symptoms        CURRENT MEDICATIONS:  Current Outpatient Medications   Medication  Sig Dispense Refill     albuterol (PROAIR HFA/PROVENTIL HFA/VENTOLIN HFA) 108 (90 BASE) MCG/ACT Inhaler Inhale 2 puffs into the lungs as needed for shortness of breath / dyspnea or wheezing        azelastine (ASTELIN) 0.1 % nasal spray Spray 1 spray into both nostrils 2 times daily       calcium carbonate (OS-MANJU) 500 MG tablet Take 1 tablet by mouth 2 times daily       carvedilol (COREG) 3.125 MG tablet Take 1 tablet (3.125 mg) by mouth 2 times daily (with meals) 180 tablet 3     clonazePAM (KLONOPIN) 0.5 MG tablet Take 1 tablet (0.5 mg) by mouth 3 times daily as needed for anxiety 90 tablet 2     DiphenhydrAMINE HCl (BENADRYL PO) Take 25-50 mg by mouth nightly as needed       Ferrous Sulfate (IRON SUPPLEMENT PO) Take 65 mg by mouth 2 times daily (with meals)        fluocinolone acetonide (DERMA SMOOTHE/FS BODY) 0.01 % external oil Use for ears.  2 times daily for 7 days 1 Bottle 0     fluticasone-salmeterol (ADVAIR) 250-50 MCG/DOSE inhaler Inhale 1 puff into the lungs every 12 hours       furosemide (LASIX) 20 MG tablet Take 1 tablet (20 mg) by mouth daily As needed for shortness of breath and swelling in the abdomen 90 tablet 1     ipratropium (ATROVENT) 0.06 % nasal spray Spray 2 sprays into both nostrils 4 times daily       lisinopril (ZESTRIL) 2.5 MG tablet Take 1 tablet (2.5 mg) by mouth daily 90 tablet 3     loperamide (IMODIUM A-D) 2 MG tablet Take 2 tabs (4 mg) after first loose stool, and then take one tab (2 mg) after each diarrheal stool.  Max of 8 tabs (16 mg) per day. 1 tablet 0     MAGNESIUM LACTATE PO Take 180 mg by mouth At Bedtime        MELATONIN PO Take 3 mg by mouth At Bedtime        Menaquinone-7 (VITAMIN K2 PO) Take 1 tablet by mouth every morning        METHYLPREDNISOLONE PO Take 40 mg by mouth as needed        Multiple Vitamin (DAILY MULTIVITAMIN PO) Take 1 tablet by mouth every morning        nebulizer nebulization as needed       Probiotic Product (PROBIOTIC DAILY PO) Take 1 capsule by  mouth 2 times daily        spironolactone (ALDACTONE) 25 MG tablet Take 0.5 tablets (12.5 mg) by mouth daily 45 tablet 3     SYNTHROID 150 MCG tablet 150 mcg for 6 days and 75 mcg for 1 day per week 90 tablet 3     TURMERIC PO Take 1 tablet by mouth every morning        UNABLE TO FIND 2 times daily MEDICATION NAME: Standard Process, Immune Congaplex. Pt taking 2 tablets daily       vitamin B complex with vitamin C (VITAMIN  B COMPLEX) PO tablet Take 1 tablet by mouth as needed       vitamin D3 (CHOLECALCIFEROL) 2000 units tablet Take 1 tablet by mouth daily 1 tablet 0     Zinc 15 MG CAPS Take 10 mg by mouth         ROS:   Constitutional: No fever, chills, or sweats. No weight gain/loss.   ENT: No visual disturbance, ear ache, epistaxis, sore throat.   Allergies/Immunologic: Negative.   Respiratory: No cough, hemoptysis.   Cardiovascular: As per HPI.   GI: No nausea, vomiting, hematemesis, melena, or hematochezia.   : No urinary frequency, dysuria, or hematuria.   Integument: Negative.   Psychiatric: Negative.   Neuro: Negative.   Endocrinology: Negative.   Musculoskeletal: Negative.    EXAM:  Home wt 150  Bp 114 systolic 02 sat 96%  General: appears comfortable, alert and articulate  Head: normocephalic, atraumatic  Neurological: normal speech and affect,   Labs:  CBC RESULTS:  Lab Results   Component Value Date    WBC 6.3 07/20/2020    RBC 4.49 07/20/2020    HGB 14.4 07/20/2020    HCT 44.5 07/20/2020    MCV 99 07/20/2020    MCH 32.1 07/20/2020    MCHC 32.4 07/20/2020    RDW 12.6 07/20/2020     07/20/2020       CMP RESULTS:  Lab Results   Component Value Date     08/16/2020    POTASSIUM 4.2 08/16/2020    CHLORIDE 108 08/16/2020    CO2 25 08/16/2020    ANIONGAP 6 08/16/2020     (H) 08/16/2020    BUN 15 08/16/2020    CR 0.67 08/16/2020    GFRESTIMATED >90 08/16/2020    GFRESTBLACK >90 08/16/2020    MANJU 8.6 08/16/2020    BILITOTAL 0.4 07/20/2020    ALBUMIN 3.6 07/20/2020    ALKPHOS 96 07/20/2020     ALT 21 07/20/2020    AST 16 07/20/2020        INR RESULTS:  Lab Results   Component Value Date    INR 1.02 10/06/2019       Lab Results   Component Value Date    MAG 2.3 07/20/2020     Lab Results   Component Value Date    NTBNPI 194 10/06/2019     Lab Results   Component Value Date    NTBNP 76 07/20/2020       Assessment and Plan:   1. Chronic systolic heart failure secondary to NICM - questionable history of Takosubo .    Stage B  NYHA Class III  ACEi/ARB no  BB yes  Aldosterone antagonist yes  SCD prophylaxis does not meet criteria for implant - has a dual chamber pacemaker  % BiV pacing: N/A  Fluid status hypervolemic by wt increase and pt's description of sx - Instructed pt to use lasix 20mg BID for wt gain and if BP is greater than 100 systolic.     2. SVT/VT - S/P AV node ablation and subsequent PPM    3. Hypothyroidism; S/P thyroidectomy on synthroid    4. Asthma/allergy - on steroid - this could be exacerbating wt gain/fluid retention    Follow-up She is scheduled to see Dr. Ordonez in 2 weeks. If wt stable and pt does not feel she is retaining fluid - would try to increase Coreg for PAC/PVC suppression.    CC  Patient Care Team:  Ce Crowe NP as PCP - General (Nurse Practitioner - Family)  Yuliet Mclean as Garland Velez MD as MD (Internal Medicine)  Adelaida De La Torre APRN CNP as Nurse Practitioner (Nurse Practitioner)  Cheyenne Thomson RN as Specialty Care Coordinator (Clinical Cardiac Electrophysiology)  Lino Funes MD as MD (Clinical Cardiac Electrophysiology)  Ce Crowe NP as Assigned PCP  Mame Valadez, RN as Specialty Care Coordinator (Cardiology)

## 2020-09-29 NOTE — PROGRESS NOTES
"Joyce Marroquin is a 59 year old female who is being evaluated via a billable video visit.      The patient has been notified of following:     \"This video visit will be conducted via a call between you and your physician/provider. We have found that certain health care needs can be provided without the need for an in-person physical exam.  This service lets us provide the care you need with a video conversation.  If a prescription is necessary we can send it directly to your pharmacy.  If lab work is needed we can place an order for that and you can then stop by our lab to have the test done at a later time.    Video visits are billed at different rates depending on your insurance coverage.  Please reach out to your insurance provider with any questions.    If during the course of the call the physician/provider feels a video visit is not appropriate, you will not be charged for this service.\"    Patient has given verbal consent for Video visit? Yes  How would you like to obtain your AVS? MyChart  If you are dropped from the video visit, the video invite should be resent to: Text to cell phone: [phone]  Will anyone else be joining your video visit? No                  "

## 2020-09-30 NOTE — PROGRESS NOTES
HPI: 59 yr old female patient seen for follow up of HFrEF. She is followed by Dr. Ordonez for HF and by Dr. Funes for dysrhythmias. She was last seen by Dr. Ordonez in July, 2020.   She underwent RF ablation in 2017 - had AV node ablation, developed complete heart block and subsequently had a dual chamber PPM implanted. She then developed systolic heart failure with initial EF at 35%, most recent EF at 45% (echo on 3/20) She also underwent RHC on 7/20 which was normal.  Pt states that her biggest concern is fluctuating wt. She can gain 5-7 lbs/day. She has been using lasix prn for such. Most recently (about 2 weeks ago, she is not specific on timing) she had a busy day (had walked, biked and was doing gardening)  and developed severe dizziness. She denied room spinning, palpitations, stating she felt like she was going to pass out. She did not take her BP (which she does 1-2 x/day,) but did not feel dehydrated.   Her home Bp is ranging between 104-114 systolic. Her wt flucuates  Around 150# (she has intentionally lost 30#).  She denies SOB, orthopnea, PND, LE edema, palpitations. She is fatigued - rating her energy level a 5/10 scale - which is quite different than previous to her ablation and subsequent pace maker.    She believes her dizziness is related to fluid retention which exacerbates the PAC's. Her device interrogation did not show runs of SVT, but it is possible the rate is below the detection. Given her widely flucuating wt - this does seem reasonable.    She is tolerating her BB/MRA without difficulty. Her most frustrating sx is exercise intolerance and recent dizziness.        PAST MEDICAL HISTORY:  Past Medical History:   Diagnosis Date     Arthritis      Cardiomyopathy (H)     ff Cardiology     Chronic depressive personality disorder      Congestive heart failure (H) see dr martínez    heart failure correct term     COPD exacerbation (H)      Esophageal reflux      Ex-smoker     quit 2006; 1 PPD x 30      Hyperparathyroidism (H)     s/p parathyroidectomy     Hypothyroidism      LARRY on CPAP     ff Sleep medicine     Pulmonary nodules     ff Pulmonologist     S/P cardiac pacemaker procedure     checked every 6 months at the [facility]     Uncomplicated asthma years       FAMILY HISTORY:  Family History   Problem Relation Age of Onset     Hypothyroidism Mother      Hypertension Mother      Osteoarthritis Mother      Coronary Artery Disease Father         nonfatal MI in his 70s     Asthma Father      Diabetes Sister      Hypothyroidism Sister      Breast Cancer Maternal Aunt      Anxiety Disorder Sister        SOCIAL HISTORY:  Social History     Socioeconomic History     Marital status:      Spouse name: Not on file     Number of children: Not on file     Years of education: Not on file     Highest education level: Master's degree (e.g., MA, MS, Florian, MEd, MSW, SONIA)   Occupational History     Not on file   Social Needs     Financial resource strain: Not very hard     Food insecurity     Worry: Never true     Inability: Never true     Transportation needs     Medical: No     Non-medical: No   Tobacco Use     Smoking status: Former Smoker     Packs/day: 0.00     Years: 0.00     Pack years: 0.00     Smokeless tobacco: Never Used   Substance and Sexual Activity     Alcohol use: Yes     Frequency: 2-3 times a week     Drinks per session: 1 or 2     Binge frequency: Never     Comment: seldom     Drug use: No     Sexual activity: Yes     Partners: Male     Birth control/protection: None     Comment: vasectomy   Lifestyle     Physical activity     Days per week: 3 days     Minutes per session: 10 min     Stress: To some extent   Relationships     Social connections     Talks on phone: More than three times a week     Gets together: Never     Attends Jew service: More than 4 times per year     Active member of club or organization: Yes     Attends meetings of clubs or organizations: More than 4 times per year      Relationship status:      Intimate partner violence     Fear of current or ex partner: Not on file     Emotionally abused: Not on file     Physically abused: Not on file     Forced sexual activity: Not on file   Other Topics Concern     Parent/sibling w/ CABG, MI or angioplasty before 65F 55M? No   Social History Narrative    CRNA on disability for vestibular symptoms        CURRENT MEDICATIONS:  Current Outpatient Medications   Medication Sig Dispense Refill     albuterol (PROAIR HFA/PROVENTIL HFA/VENTOLIN HFA) 108 (90 BASE) MCG/ACT Inhaler Inhale 2 puffs into the lungs as needed for shortness of breath / dyspnea or wheezing        azelastine (ASTELIN) 0.1 % nasal spray Spray 1 spray into both nostrils 2 times daily       calcium carbonate (OS-MANJU) 500 MG tablet Take 1 tablet by mouth 2 times daily       carvedilol (COREG) 3.125 MG tablet Take 1 tablet (3.125 mg) by mouth 2 times daily (with meals) 180 tablet 3     clonazePAM (KLONOPIN) 0.5 MG tablet Take 1 tablet (0.5 mg) by mouth 3 times daily as needed for anxiety 90 tablet 2     DiphenhydrAMINE HCl (BENADRYL PO) Take 25-50 mg by mouth nightly as needed       Ferrous Sulfate (IRON SUPPLEMENT PO) Take 65 mg by mouth 2 times daily (with meals)        fluocinolone acetonide (DERMA SMOOTHE/FS BODY) 0.01 % external oil Use for ears.  2 times daily for 7 days 1 Bottle 0     fluticasone-salmeterol (ADVAIR) 250-50 MCG/DOSE inhaler Inhale 1 puff into the lungs every 12 hours       furosemide (LASIX) 20 MG tablet Take 1 tablet (20 mg) by mouth daily As needed for shortness of breath and swelling in the abdomen 90 tablet 1     ipratropium (ATROVENT) 0.06 % nasal spray Spray 2 sprays into both nostrils 4 times daily       lisinopril (ZESTRIL) 2.5 MG tablet Take 1 tablet (2.5 mg) by mouth daily 90 tablet 3     loperamide (IMODIUM A-D) 2 MG tablet Take 2 tabs (4 mg) after first loose stool, and then take one tab (2 mg) after each diarrheal stool.  Max of 8 tabs (16 mg)  per day. 1 tablet 0     MAGNESIUM LACTATE PO Take 180 mg by mouth At Bedtime        MELATONIN PO Take 3 mg by mouth At Bedtime        Menaquinone-7 (VITAMIN K2 PO) Take 1 tablet by mouth every morning        METHYLPREDNISOLONE PO Take 40 mg by mouth as needed        Multiple Vitamin (DAILY MULTIVITAMIN PO) Take 1 tablet by mouth every morning        nebulizer nebulization as needed       Probiotic Product (PROBIOTIC DAILY PO) Take 1 capsule by mouth 2 times daily        spironolactone (ALDACTONE) 25 MG tablet Take 0.5 tablets (12.5 mg) by mouth daily 45 tablet 3     SYNTHROID 150 MCG tablet 150 mcg for 6 days and 75 mcg for 1 day per week 90 tablet 3     TURMERIC PO Take 1 tablet by mouth every morning        UNABLE TO FIND 2 times daily MEDICATION NAME: Standard Process, Immune Congaplex. Pt taking 2 tablets daily       vitamin B complex with vitamin C (VITAMIN  B COMPLEX) PO tablet Take 1 tablet by mouth as needed       vitamin D3 (CHOLECALCIFEROL) 2000 units tablet Take 1 tablet by mouth daily 1 tablet 0     Zinc 15 MG CAPS Take 10 mg by mouth         ROS:   Constitutional: No fever, chills, or sweats. No weight gain/loss.   ENT: No visual disturbance, ear ache, epistaxis, sore throat.   Allergies/Immunologic: Negative.   Respiratory: No cough, hemoptysis.   Cardiovascular: As per HPI.   GI: No nausea, vomiting, hematemesis, melena, or hematochezia.   : No urinary frequency, dysuria, or hematuria.   Integument: Negative.   Psychiatric: Negative.   Neuro: Negative.   Endocrinology: Negative.   Musculoskeletal: Negative.    EXAM:  Home wt 150  Bp 114 systolic 02 sat 96%  General: appears comfortable, alert and articulate  Head: normocephalic, atraumatic  Neurological: normal speech and affect,   Labs:  CBC RESULTS:  Lab Results   Component Value Date    WBC 6.3 07/20/2020    RBC 4.49 07/20/2020    HGB 14.4 07/20/2020    HCT 44.5 07/20/2020    MCV 99 07/20/2020    MCH 32.1 07/20/2020    MCHC 32.4 07/20/2020    RDW  12.6 07/20/2020     07/20/2020       CMP RESULTS:  Lab Results   Component Value Date     08/16/2020    POTASSIUM 4.2 08/16/2020    CHLORIDE 108 08/16/2020    CO2 25 08/16/2020    ANIONGAP 6 08/16/2020     (H) 08/16/2020    BUN 15 08/16/2020    CR 0.67 08/16/2020    GFRESTIMATED >90 08/16/2020    GFRESTBLACK >90 08/16/2020    MANJU 8.6 08/16/2020    BILITOTAL 0.4 07/20/2020    ALBUMIN 3.6 07/20/2020    ALKPHOS 96 07/20/2020    ALT 21 07/20/2020    AST 16 07/20/2020        INR RESULTS:  Lab Results   Component Value Date    INR 1.02 10/06/2019       Lab Results   Component Value Date    MAG 2.3 07/20/2020     Lab Results   Component Value Date    NTBNPI 194 10/06/2019     Lab Results   Component Value Date    NTBNP 76 07/20/2020       Assessment and Plan:   1. Chronic systolic heart failure secondary to NICM - questionable history of Takosubo .    Stage B  NYHA Class III  ACEi/ARB no  BB yes  Aldosterone antagonist yes  SCD prophylaxis does not meet criteria for implant - has a dual chamber pacemaker  % BiV pacing: N/A  Fluid status hypervolemic by wt increase and pt's description of sx - Instructed pt to use lasix 20mg BID for wt gain and if BP is greater than 100 systolic.     2. SVT/VT - S/P AV node ablation and subsequent PPM    3. Hypothyroidism; S/P thyroidectomy on synthroid    4. Asthma/allergy - on steroid - this could be exacerbating wt gain/fluid retention    Follow-up She is scheduled to see Dr. Ordonez in 2 weeks. If wt stable and pt does not feel she is retaining fluid - would try to increase Coreg for PAC/PVC suppression.        CC  Patient Care Team:  Ce Crowe NP as PCP - General (Nurse Practitioner - Family)  Yuliet Mclean MD, Tasma, MD as MD (Internal Medicine)  Adelaida De La Torre APRN CNP as Nurse Practitioner (Nurse Practitioner)  Cheyenne Thomson RN as Specialty Care Coordinator (Clinical Cardiac Electrophysiology)  Lino Funes MD as MD  (Clinical Cardiac Electrophysiology)  Ce Corwe, PO as Assigned PCP  Mame Valadez, RN as Specialty Care Coordinator (Cardiology)  HIREN MORTENSEN

## 2020-10-02 ENCOUNTER — PATIENT OUTREACH (OUTPATIENT)
Dept: CARDIOLOGY | Facility: CLINIC | Age: 60
End: 2020-10-02

## 2020-10-02 RX ORDER — FUROSEMIDE 20 MG
20 TABLET ORAL 2 TIMES DAILY PRN
Qty: 90 TABLET | Refills: 1 | Status: SHIPPED | OUTPATIENT
Start: 2020-10-02 | End: 2021-01-07

## 2020-10-02 NOTE — TELEPHONE ENCOUNTER
Checked in with Joyce to make sure she had no questions after her virtual visit.  She stated that she would use Lasix 20 mg as needed if she got a good enough response, not BID.   She also was concerned that she had been asked about being ok with seeing an APRN, stating she did not want to see a PA, only.  Updated contact info that APRN/NP was ok to see.

## 2020-10-20 ENCOUNTER — VIRTUAL VISIT (OUTPATIENT)
Dept: CARDIOLOGY | Facility: CLINIC | Age: 60
End: 2020-10-20
Attending: INTERNAL MEDICINE
Payer: COMMERCIAL

## 2020-10-20 DIAGNOSIS — I42.8 NONISCHEMIC CARDIOMYOPATHY (H): ICD-10-CM

## 2020-10-20 DIAGNOSIS — I50.30 HEART FAILURE WITH PRESERVED EJECTION FRACTION, BORDERLINE, CLASS III (H): ICD-10-CM

## 2020-10-20 DIAGNOSIS — I50.30 NYHA CLASS 3 HEART FAILURE WITH PRESERVED EJECTION FRACTION (H): Primary | ICD-10-CM

## 2020-10-20 DIAGNOSIS — I10 BENIGN ESSENTIAL HYPERTENSION: ICD-10-CM

## 2020-10-20 DIAGNOSIS — I47.29 PAROXYSMAL VENTRICULAR TACHYCARDIA (H): ICD-10-CM

## 2020-10-20 PROCEDURE — 99214 OFFICE O/P EST MOD 30 MIN: CPT | Mod: GT | Performed by: INTERNAL MEDICINE

## 2020-10-20 RX ORDER — SPIRONOLACTONE 25 MG/1
25 TABLET ORAL DAILY
Qty: 45 TABLET | Refills: 3 | Status: SHIPPED | OUTPATIENT
Start: 2020-10-20 | End: 2020-10-20

## 2020-10-20 RX ORDER — CARVEDILOL 3.12 MG/1
6.25 TABLET ORAL 2 TIMES DAILY WITH MEALS
Qty: 180 TABLET | Refills: 3 | Status: SHIPPED | OUTPATIENT
Start: 2020-10-20 | End: 2021-01-07

## 2020-10-20 RX ORDER — SPIRONOLACTONE 25 MG/1
25 TABLET ORAL DAILY
Qty: 90 TABLET | Refills: 3 | Status: SHIPPED | OUTPATIENT
Start: 2020-10-20 | End: 2020-12-05

## 2020-10-20 NOTE — PROGRESS NOTES
"Joyce Marroquin is a 59 year old female who is being evaluated via a billable video visit.      The patient has been notified of following:     \"This video visit will be conducted via a call between you and your physician/provider. We have found that certain health care needs can be provided without the need for an in-person physical exam.  This service lets us provide the care you need with a video conversation.  If a prescription is necessary we can send it directly to your pharmacy.  If lab work is needed we can place an order for that and you can then stop by our lab to have the test done at a later time.    Video visits are billed at different rates depending on your insurance coverage.  Please reach out to your insurance provider with any questions.    If during the course of the call the physician/provider feels a video visit is not appropriate, you will not be charged for this service.\"    Patient has given verbal consent for Video visit? Yes  How would you like to obtain your AVS? Mail a copy  If you are dropped from the video visit, the video invite should be resent to: Text to cell phone: [phone] [employer]   Will anyone else be joining your video visit? No     Video-Visit Details    Type of service:  Video Visit    Video Start Time: 1:05pm  Video End Time: 1:32pm    Originating Location (pt. 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  She also has a recent history of heart failure with reduced ejection fraction with EF around 35% later improved to 45%.  There was a " history of Takotsubo cardiomyopathy.  Patient on 7/2 had meeting with EP at which time low dose carvedilol was started 3.125 mg PO bid due to atrial ectopy and short runs of VT. she did recently complete an exercise out of catheterization with myself which showed normal resting biventricular filling pressures and normal cardiac output.  She did have limited augmentation of cardiac index with exercise and filling pressures especially pulmonary capillary wedge pressure increased significantly consistent with heart failure preserved ejection fraction.  The patient is here for follow-up.  Note that she is feeling a little bit better than on the past with it.  She continues to isolate herself given the Covid.  Her blood pressure seems to be a little bit more steady yet continues to have fatigue and shortness of breath especially when she exerts herself around the house.  She takes Lasix 20 mg once a day as per her weight and her symptoms.  She started to follow-up with [facility] clinic with appropriate medication adjustments.  No other new complaints at this time.      PAST MEDICAL HISTORY:  Past Medical History:   Diagnosis Date     Arthritis      Cardiomyopathy (H)     ff Cardiology     Chronic depressive personality disorder      Congestive heart failure (H) see dr martínez    heart failure correct term     COPD exacerbation (H)      Esophageal reflux      Ex-smoker     quit 2006; 1 PPD x 30     Hyperparathyroidism (H)     s/p parathyroidectomy     Hypothyroidism      LARRY on CPAP     ff Sleep medicine     Pulmonary nodules     ff Pulmonologist     S/P cardiac pacemaker procedure     checked every 6 months at the [facility]     Uncomplicated asthma years     FAMILY HISTORY:  Family History   Problem Relation Age of Onset     Hypothyroidism Mother      Hypertension Mother      Osteoarthritis Mother      Coronary Artery Disease Father         nonfatal MI in his 70s     Asthma Father      Diabetes Sister      Hypothyroidism Sister       Breast Cancer Maternal Aunt      Anxiety Disorder Sister      SOCIAL HISTORY:  Social History     Socioeconomic History     Marital status:      Spouse name: Not on file     Number of children: Not on file     Years of education: Not on file     Highest education level: Master's degree (e.g., MA, MS, Florian, MEd, MSW, SONIA)   Occupational History     Not on file   Social Needs     Financial resource strain: Not very hard     Food insecurity     Worry: Never true     Inability: Never true     Transportation needs     Medical: No     Non-medical: No   Tobacco Use     Smoking status: Former Smoker     Packs/day: 0.00     Years: 0.00     Pack years: 0.00     Smokeless tobacco: Never Used   Substance and Sexual Activity     Alcohol use: Yes     Frequency: 2-3 times a week     Drinks per session: 1 or 2     Binge frequency: Never     Comment: seldom     Drug use: No     Sexual activity: Yes     Partners: Male     Birth control/protection: None     Comment: vasectomy   Lifestyle     Physical activity     Days per week: 3 days     Minutes per session: 10 min     Stress: To some extent   Relationships     Social connections     Talks on phone: More than three times a week     Gets together: Never     Attends Restorationist service: More than 4 times per year     Active member of club or organization: Yes     Attends meetings of clubs or organizations: More than 4 times per year     Relationship status:      Intimate partner violence     Fear of current or ex partner: Not on file     Emotionally abused: Not on file     Physically abused: Not on file     Forced sexual activity: Not on file   Other Topics Concern     Parent/sibling w/ CABG, MI or angioplasty before 65F 55M? No   Social History Narrative    CRNA on disability for vestibular symptoms      CURRENT MEDICATIONS:  Current Outpatient Medications   Medication     albuterol (PROAIR HFA/PROVENTIL HFA/VENTOLIN HFA) 108 (90 BASE) MCG/ACT Inhaler     azelastine  (ASTELIN) 0.1 % nasal spray     calcium carbonate (OS-MANJU) 500 MG tablet     carvedilol (COREG) 3.125 MG tablet     clonazePAM (KLONOPIN) 0.5 MG tablet     DiphenhydrAMINE HCl (BENADRYL PO)     Ferrous Sulfate (IRON SUPPLEMENT PO)     fluticasone-salmeterol (ADVAIR) 250-50 MCG/DOSE inhaler     furosemide (LASIX) 20 MG tablet     ipratropium (ATROVENT) 0.06 % nasal spray     lisinopril (ZESTRIL) 2.5 MG tablet     loperamide (IMODIUM A-D) 2 MG tablet     MAGNESIUM LACTATE PO     MELATONIN PO     Menaquinone-7 (VITAMIN K2 PO)     METHYLPREDNISOLONE PO     Multiple Vitamin (DAILY MULTIVITAMIN PO)     nebulizer nebulization     Probiotic Product (PROBIOTIC DAILY PO)     spironolactone (ALDACTONE) 25 MG tablet     SYNTHROID 150 MCG tablet     TURMERIC PO     UNABLE TO FIND     vitamin B complex with vitamin C (VITAMIN  B COMPLEX) PO tablet     vitamin D3 (CHOLECALCIFEROL) 2000 units tablet     Zinc 15 MG CAPS     fluocinolone acetonide (DERMA SMOOTHE/FS BODY) 0.01 % external oil     No current facility-administered medications for this visit.       ROS:   Constitutional: No fever, chills, or sweats.   ENT: No visual disturbance, ear ache, epistaxis, sore throat.   Allergies/Immunologic: Negative.   Respiratory: No cough, hemoptysis.   Cardiovascular: As per HPI.   GI: No nausea, vomiting, hematemesis, melena, or hematochezia.   : No urinary frequency, dysuria, or hematuria.   Integument: Negative.   Psychiatric: Pleasant, no major depression noted  Neuro: No focal neurological deficits noted  Endocrinology: Negative.   Musculoskeletal: As per HPI.      EXAM:  Systolic blood pressure is 110 mmHg.  She doubts that she does have extra beats that she feels most consistent with PVCs/PACs.  General: appears comfortable, alert and oriented  Head: normocephalic, atraumatic  Eyes: anicteric sclera, EOMI , PERRL  Neck: no adenopathy  Orophyarynx: moist mucosa, no lesions noted  Heart: regular, S1/S2, no murmurs, rubs or gallop.  Estimated JVP at 5 cmH2O  Lungs: CTAB, No wheezing.   Abdomen: soft, non-tender, bowel sounds present, no hepatosplenomegaly  Extremities: No LE edema today  Skin: no open lesions noted  Neuro: grossly non-focal     Labs:  Lab Results   Component Value Date    WBC 6.3 07/20/2020    HGB 14.4 07/20/2020    HCT 44.5 07/20/2020     07/20/2020     08/16/2020    POTASSIUM 4.2 08/16/2020    CHLORIDE 108 08/16/2020    CO2 25 08/16/2020    BUN 15 08/16/2020    CR 0.67 08/16/2020     (H) 08/16/2020    SED 22 11/09/2017    DD 0.7 (H) 11/22/2017    NTBNPI 194 10/06/2019    NTBNP 76 07/20/2020    TROPONIN <0.07 01/04/2006    TROPI <0.015 10/06/2019    AST 16 07/20/2020    ALT 21 07/20/2020    ALKPHOS 96 07/20/2020    BILITOTAL 0.4 07/20/2020    INR 1.02 10/06/2019        NM Lexiscan 3/2020:  The nuclear stress test is negative for inducible myocardial ischemia or infarction.  Fixed septal/apical defect due to Paced rhythm and increased gut photon activity.LVEDv 133ml. LV ESv 40ml. LV EF 70%.     There is no prior study for comparison     Echocardiogram reviewed 3/2020:  Mildly (EF 40-45%) reduced left ventricular function is present.  Global right ventricular function is normal.  No pericardial effusion is present. IVC diameter <2.1 cm collapsing >50% with sniff suggests a normal RA pressure  of 3 mmHg. This study was compared with the study from 12/3/19.  There has been no change.      Coronary angiogram 9/2019:  Mild-moderate non-obstructive coronary disease involving LAD, LCx, and RCA. No significant coronary disease to explain new cardiomyopathy.     TTE 9/5/2019:  Moderately (EF 30-35%) reduced left ventricular function is present. All segments from mid to apical left ventricle are severely hypokinetic to akinetic. Basal segments are hypercontractile. Overall pattern is suggestive of stress cardiomyopathy, but cannot rule out ischemic cardiomyopathy. Right ventricular function, chamber size, wall motion,  and thickness are normal. No significant valve abnormalities. Mild pulmonary hypertension with increased RA pressure.     MRI cardiac 4/2017:  1. Normal left ventricular size.  Left ventricular wall thickness is  normal except a very small area in the basal inferior septum that appears  thin in some images.  Real-time images suggest a very small area of  hypokinesis in the basal inferior septum (not well visualized).  Left  ventricular ejection fraction is estimated at about 60-65% (unable to   perform volumetric analysis given frequent ectopy; real time images used  to assess LVEF).  2. Normal right ventricular size and systolic function.  No obvious regional wall motion abnormalities noted.  3. Gadolinium imaging:  No obvious right ventricular myocardial late  gadolinium enhancement noted.  There is a very small area of probable late  gadolinium enhancement in the mildly thinned basal inferior segment.    4. Moderate left atrial dilatation.  The right atrial size is at the upper limits of normal.  5. Thickened mitral valve leaflets, without obvious stenosis or significant regurgitation. No obvious hemodynamically significant valvular dysfunction noted.   6. Normal sized aortic root and ascending aorta.  For the remainder of the thoracic aorta, see separate radiology report.  7. Normal pericardium without significant pericardial effusion.      ASSESSMENT AND PLAN:  In summary, patient is a 59 year old lady with history as stated above including complete heart block in the setting of RF ablation status post dual-chamber pacemaker in 2017 who has ongoing symptoms of heart failure weight gain and very limited exercise tolerance.  I do believe her shortness of breath and limited exercise tolerance is probably multifactorial including the significant weight gain with AV node ablation and now with a more fixed heart rate in the setting of pacemaker use.  However certainly she could have heart failure with borderline  reduced ejection fraction with more preserved component.  Right heart catheterization with exercise essentially confirmed the above findings.  We again have a long discussion today with regard to heart failure with preserved ejection fraction.  I do believe this diagnosis or issue is her primary.  Unfortunately affects 55% of all heart failure patients, understands that the incidence increases with age and especially women.  Currently she has good blood pressure control.  And it seems like that she is managing her volume status very well with Lasix as needed.  At this time given its limited benefit in patients with heart failure preserved ejection fraction, we will increase spironolactone to 25 mg once a day dosing.  We will have her repeat labs in about 1 week to ensure creatinine and potassium are acceptable range.  While in general beta-blockers and not favored in heart failure preserved ejection fraction she does have significant amount of ectopy both atrial and ventricular.  In an attempt to reduce this ectopy we will increase carvedilol to 6.25 mg twice daily dosing 1 week after the change in spironolactone.  We will reevaluate how she is doing with this and if improves then we will continue this dosing on may be tried to be slightly increased in the future.  We will see her back in about 3 months.     I appreciate the opportunity to participate in the care of Joyce Marroquin . Please do not hesitate to contact me with any further questions.     I have seen and evaluated the patient and agree with the assessment and plan as above.  Alonso Ordonez MD

## 2020-10-20 NOTE — LETTER
"10/20/2020      RE: Joyce Marroquin  [address] [facility] [address]       Dear Colleague,    Thank you for the opportunity to participate in the care of your patient, Joyce Marroquin, at the [facility]. Please see a copy of my visit note below.    Joyce Marroquin is a 59 year old female who is being evaluated via a billable video visit.      The patient has been notified of following:     \"This video visit will be conducted via a call between you and your physician/provider. We have found that certain health care needs can be provided without the need for an in-person physical exam.  This service lets us provide the care you need with a video conversation.  If a prescription is necessary we can send it directly to your pharmacy.  If lab work is needed we can place an order for that and you can then stop by our lab to have the test done at a later time.    Video visits are billed at different rates depending on your insurance coverage.  Please reach out to your insurance provider with any questions.    If during the course of the call the physician/provider feels a video visit is not appropriate, you will not be charged for this service.\"    Patient has given verbal consent for Video visit? Yes  How would you like to obtain your AVS? Mail a copy  If you are dropped from the video visit, the video invite should be resent to: Text to cell phone: [phone] Doximity   Will anyone else be joining your video visit? No     Video-Visit Details    Type of service:  Video Visit    Video Start Time: 1:05pm  Video End Time: 1:32pm    Originating Location (pt. Location): Home    Distant Location (provider location):  [facility]     Platform used for Video Visit: Doximity    October 20, 2020  - labs in 1 week  - Increase coreg in 1 week  - incerase spirono now     Joyce Marroquin is a very pleasant lady who works as a nurse " anesthetist and has a history of RF ablation which left to AV node ablation and complete heart block requiring a dual-chamber pacemaker implantation, which is a Medtronic device MRI compatible.  She also has a recent history of heart failure with reduced ejection fraction with EF around 35% later improved to 45%.  There was a history of Takotsubo cardiomyopathy.  Patient on 7/2 had meeting with EP at which time low dose carvedilol was started 3.125 mg PO bid due to atrial ectopy and short runs of VT. she did recently complete an exercise out of catheterization with myself which showed normal resting biventricular filling pressures and normal cardiac output.  She did have limited augmentation of cardiac index with exercise and filling pressures especially pulmonary capillary wedge pressure increased significantly consistent with heart failure preserved ejection fraction.  The patient is here for follow-up.  Note that she is feeling a little bit better than on the past with it.  She continues to isolate herself given the Covid.  Her blood pressure seems to be a little bit more steady yet continues to have fatigue and shortness of breath especially when she exerts herself around the house.  She takes Lasix 20 mg once a day as per her weight and her symptoms.  She started to follow-up with core clinic with appropriate medication adjustments.  No other new complaints at this time.      PAST MEDICAL HISTORY:  Past Medical History:   Diagnosis Date     Arthritis      Cardiomyopathy (H)     ff Cardiology     Chronic depressive personality disorder      Congestive heart failure (H) see dr martínez    heart failure correct term     COPD exacerbation (H)      Esophageal reflux      Ex-smoker     quit 2006; 1 PPD x 30     Hyperparathyroidism (H)     s/p parathyroidectomy     Hypothyroidism      LARRY on CPAP     ff Sleep medicine     Pulmonary nodules     ff Pulmonologist     S/P cardiac pacemaker procedure     checked every 6 months  at the [facility]     Uncomplicated asthma years     FAMILY HISTORY:  Family History   Problem Relation Age of Onset     Hypothyroidism Mother      Hypertension Mother      Osteoarthritis Mother      Coronary Artery Disease Father         nonfatal MI in his 70s     Asthma Father      Diabetes Sister      Hypothyroidism Sister      Breast Cancer Maternal Aunt      Anxiety Disorder Sister      SOCIAL HISTORY:  Social History     Socioeconomic History     Marital status:      Spouse name: Not on file     Number of children: Not on file     Years of education: Not on file     Highest education level: Master's degree (e.g., MA, MS, Florian, MEd, MSW, SONIA)   Occupational History     Not on file   Social Needs     Financial resource strain: Not very hard     Food insecurity     Worry: Never true     Inability: Never true     Transportation needs     Medical: No     Non-medical: No   Tobacco Use     Smoking status: Former Smoker     Packs/day: 0.00     Years: 0.00     Pack years: 0.00     Smokeless tobacco: Never Used   Substance and Sexual Activity     Alcohol use: Yes     Frequency: 2-3 times a week     Drinks per session: 1 or 2     Binge frequency: Never     Comment: seldom     Drug use: No     Sexual activity: Yes     Partners: Male     Birth control/protection: None     Comment: vasectomy   Lifestyle     Physical activity     Days per week: 3 days     Minutes per session: 10 min     Stress: To some extent   Relationships     Social connections     Talks on phone: More than three times a week     Gets together: Never     Attends Sabianism service: More than 4 times per year     Active member of club or organization: Yes     Attends meetings of clubs or organizations: More than 4 times per year     Relationship status:      Intimate partner violence     Fear of current or ex partner: Not on file     Emotionally abused: Not on file     Physically abused: Not on file     Forced sexual activity: Not on file   Other  Topics Concern     Parent/sibling w/ CABG, MI or angioplasty before 65F 55M? No   Social History Narrative    CRNA on disability for vestibular symptoms      CURRENT MEDICATIONS:  Current Outpatient Medications   Medication     albuterol (PROAIR HFA/PROVENTIL HFA/VENTOLIN HFA) 108 (90 BASE) MCG/ACT Inhaler     azelastine (ASTELIN) 0.1 % nasal spray     calcium carbonate (OS-MANJU) 500 MG tablet     carvedilol (COREG) 3.125 MG tablet     clonazePAM (KLONOPIN) 0.5 MG tablet     DiphenhydrAMINE HCl (BENADRYL PO)     Ferrous Sulfate (IRON SUPPLEMENT PO)     fluticasone-salmeterol (ADVAIR) 250-50 MCG/DOSE inhaler     furosemide (LASIX) 20 MG tablet     ipratropium (ATROVENT) 0.06 % nasal spray     lisinopril (ZESTRIL) 2.5 MG tablet     loperamide (IMODIUM A-D) 2 MG tablet     MAGNESIUM LACTATE PO     MELATONIN PO     Menaquinone-7 (VITAMIN K2 PO)     METHYLPREDNISOLONE PO     Multiple Vitamin (DAILY MULTIVITAMIN PO)     nebulizer nebulization     Probiotic Product (PROBIOTIC DAILY PO)     spironolactone (ALDACTONE) 25 MG tablet     SYNTHROID 150 MCG tablet     TURMERIC PO     UNABLE TO FIND     vitamin B complex with vitamin C (VITAMIN  B COMPLEX) PO tablet     vitamin D3 (CHOLECALCIFEROL) 2000 units tablet     Zinc 15 MG CAPS     fluocinolone acetonide (DERMA SMOOTHE/FS BODY) 0.01 % external oil     No current facility-administered medications for this visit.       ROS:   Constitutional: No fever, chills, or sweats.   ENT: No visual disturbance, ear ache, epistaxis, sore throat.   Allergies/Immunologic: Negative.   Respiratory: No cough, hemoptysis.   Cardiovascular: As per HPI.   GI: No nausea, vomiting, hematemesis, melena, or hematochezia.   : No urinary frequency, dysuria, or hematuria.   Integument: Negative.   Psychiatric: Pleasant, no major depression noted  Neuro: No focal neurological deficits noted  Endocrinology: Negative.   Musculoskeletal: As per HPI.      EXAM:  Systolic blood pressure is 110 mmHg.  She  doubts that she does have extra beats that she feels most consistent with PVCs/PACs.  General: appears comfortable, alert and oriented  Head: normocephalic, atraumatic  Eyes: anicteric sclera, EOMI , PERRL  Neck: no adenopathy  Orophyarynx: moist mucosa, no lesions noted  Heart: regular, S1/S2, no murmurs, rubs or gallop. Estimated JVP at 5 cmH2O  Lungs: CTAB, No wheezing.   Abdomen: soft, non-tender, bowel sounds present, no hepatosplenomegaly  Extremities: No LE edema today  Skin: no open lesions noted  Neuro: grossly non-focal     Labs:  Lab Results   Component Value Date    WBC 6.3 07/20/2020    HGB 14.4 07/20/2020    HCT 44.5 07/20/2020     07/20/2020     08/16/2020    POTASSIUM 4.2 08/16/2020    CHLORIDE 108 08/16/2020    CO2 25 08/16/2020    BUN 15 08/16/2020    CR 0.67 08/16/2020     (H) 08/16/2020    SED 22 11/09/2017    DD 0.7 (H) 11/22/2017    NTBNPI 194 10/06/2019    NTBNP 76 07/20/2020    TROPONIN <0.07 01/04/2006    TROPI <0.015 10/06/2019    AST 16 07/20/2020    ALT 21 07/20/2020    ALKPHOS 96 07/20/2020    BILITOTAL 0.4 07/20/2020    INR 1.02 10/06/2019        NM Lexiscan 3/2020:  The nuclear stress test is negative for inducible myocardial ischemia or infarction.  Fixed septal/apical defect due to Paced rhythm and increased gut photon activity.LVEDv 133ml. LV ESv 40ml. LV EF 70%.     There is no prior study for comparison     Echocardiogram reviewed 3/2020:  Mildly (EF 40-45%) reduced left ventricular function is present.  Global right ventricular function is normal.  No pericardial effusion is present. IVC diameter <2.1 cm collapsing >50% with sniff suggests a normal RA pressure  of 3 mmHg. This study was compared with the study from 12/3/19.  There has been no change.      Coronary angiogram 9/2019:  Mild-moderate non-obstructive coronary disease involving LAD, LCx, and RCA. No significant coronary disease to explain new cardiomyopathy.     TTE 9/5/2019:  Moderately (EF 30-35%)  reduced left ventricular function is present. All segments from mid to apical left ventricle are severely hypokinetic to akinetic. Basal segments are hypercontractile. Overall pattern is suggestive of stress cardiomyopathy, but cannot rule out ischemic cardiomyopathy. Right ventricular function, chamber size, wall motion, and thickness are normal. No significant valve abnormalities. Mild pulmonary hypertension with increased RA pressure.     MRI cardiac 4/2017:  1. Normal left ventricular size.  Left ventricular wall thickness is  normal except a very small area in the basal inferior septum that appears  thin in some images.  Real-time images suggest a very small area of  hypokinesis in the basal inferior septum (not well visualized).  Left  ventricular ejection fraction is estimated at about 60-65% (unable to   perform volumetric analysis given frequent ectopy; real time images used  to assess LVEF).  2. Normal right ventricular size and systolic function.  No obvious regional wall motion abnormalities noted.  3. Gadolinium imaging:  No obvious right ventricular myocardial late  gadolinium enhancement noted.  There is a very small area of probable late  gadolinium enhancement in the mildly thinned basal inferior segment.    4. Moderate left atrial dilatation.  The right atrial size is at the upper limits of normal.  5. Thickened mitral valve leaflets, without obvious stenosis or significant regurgitation. No obvious hemodynamically significant valvular dysfunction noted.   6. Normal sized aortic root and ascending aorta.  For the remainder of the thoracic aorta, see separate radiology report.  7. Normal pericardium without significant pericardial effusion.      ASSESSMENT AND PLAN:  In summary, patient is a 59 year old lady with history as stated above including complete heart block in the setting of RF ablation status post dual-chamber pacemaker in 2017 who has ongoing symptoms of heart failure weight gain and  very limited exercise tolerance.  I do believe her shortness of breath and limited exercise tolerance is probably multifactorial including the significant weight gain with AV node ablation and now with a more fixed heart rate in the setting of pacemaker use.  However certainly she could have heart failure with borderline reduced ejection fraction with more preserved component.  Right heart catheterization with exercise essentially confirmed the above findings.  We again have a long discussion today with regard to heart failure with preserved ejection fraction.  I do believe this diagnosis or issue is her primary.  Unfortunately affects 55% of all heart failure patients, understands that the incidence increases with age and especially women.  Currently she has good blood pressure control.  And it seems like that she is managing her volume status very well with Lasix as needed.  At this time given its limited benefit in patients with heart failure preserved ejection fraction, we will increase spironolactone to 25 mg once a day dosing.  We will have her repeat labs in about 1 week to ensure creatinine and potassium are acceptable range.  While in general beta-blockers and not favored in heart failure preserved ejection fraction she does have significant amount of ectopy both atrial and ventricular.  In an attempt to reduce this ectopy we will increase carvedilol to 6.25 mg twice daily dosing 1 week after the change in spironolactone.  We will reevaluate how she is doing with this and if improves then we will continue this dosing on may be tried to be slightly increased in the future.  We will see her back in about 3 months.     I appreciate the opportunity to participate in the care of Joyce Marroquin . Please do not hesitate to contact me with any further questions.     I have seen and evaluated the patient and agree with the assessment and plan as above.  Alonso Ordonez MD      Please do not hesitate to contact me if you  have any questions/concerns.     Sincerely,     Alonso Ordonez MD

## 2020-10-20 NOTE — PATIENT INSTRUCTIONS
Please increase Spironolactone to  25 MG once daily.    Please increase Carvedilol to 6.25 MG twice daily.    Thank you for your visit today.  Please call me with any questions or concerns.   Matthias García RN  Cardiology Care Coordinator  [phone], press option 1 then option 4

## 2020-10-29 DIAGNOSIS — I50.30 HEART FAILURE WITH PRESERVED EJECTION FRACTION, BORDERLINE, CLASS III (H): ICD-10-CM

## 2020-10-29 LAB
ANION GAP SERPL CALCULATED.3IONS-SCNC: 4 MMOL/L (ref 3–14)
BUN SERPL-MCNC: 17 MG/DL (ref 7–30)
CALCIUM SERPL-MCNC: 9.2 MG/DL (ref 8.5–10.1)
CHLORIDE SERPL-SCNC: 102 MMOL/L (ref 94–109)
CO2 SERPL-SCNC: 27 MMOL/L (ref 20–32)
CREAT SERPL-MCNC: 0.69 MG/DL (ref 0.52–1.04)
GFR SERPL CREATININE-BSD FRML MDRD: >90 ML/MIN/{1.73_M2}
GLUCOSE SERPL-MCNC: 87 MG/DL (ref 70–99)
POTASSIUM SERPL-SCNC: 5 MMOL/L (ref 3.4–5.3)
SODIUM SERPL-SCNC: 133 MMOL/L (ref 133–144)

## 2020-10-29 PROCEDURE — 80048 BASIC METABOLIC PNL TOTAL CA: CPT | Performed by: INTERNAL MEDICINE

## 2020-11-03 ENCOUNTER — TELEPHONE (OUTPATIENT)
Dept: CARDIOLOGY | Facility: CLINIC | Age: 60
End: 2020-11-03

## 2020-11-03 DIAGNOSIS — I50.30 NYHA CLASS 3 HEART FAILURE WITH PRESERVED EJECTION FRACTION (H): Primary | ICD-10-CM

## 2020-11-03 NOTE — TELEPHONE ENCOUNTER
Patient called with concerns about low BP on increased dose of Carvediolol. She recently increased her carvediolol to 6.25mg BID. Her am BP's are in the 80's.with increased fatigue - She indicated she had more coughing as well. She otherwise feels well. She has noticed her wt has been stable and she has felt better overall.   She was instructed to cut her dose to 3.125mg in am and continue with 6.25mg in pm. She will continue to watch her BP and how she feels and let us know if she can tolerate this dose.

## 2020-11-06 ENCOUNTER — TELEPHONE (OUTPATIENT)
Dept: CARDIOLOGY | Facility: CLINIC | Age: 60
End: 2020-11-06

## 2020-11-06 DIAGNOSIS — F41.9 ANXIETY: ICD-10-CM

## 2020-11-06 NOTE — TELEPHONE ENCOUNTER
Pt called stating she is not tolerating the increased dose of Coreg - even though it was cut back from 6.25mg BID to 3.125/6.25mg BID. She states she feels depressed, her BP is in the 80's, her cognitive ability is effected.  We agreed to cut back her dose to 3.125mg BID. She was also referred to counselor as she indicates she is struggling with depression and was teary on the phone. A phone number of a counselor was given to patient. She did not agree to call, but indicated she would if needed. She denies suicide ideation.

## 2020-11-09 NOTE — TELEPHONE ENCOUNTER
Routing refill request to provider for review/approval because:  Drug not on the [facility] refill protocol     Last Written Prescription Date:  4-27-20  Last Fill Quantity: 90,  # refills: 2   Last office visit: 8/24/2020 with prescribing provider:  Ce Crowe   Future Office Visit:

## 2020-11-10 RX ORDER — CLONAZEPAM 0.5 MG/1
0.5 TABLET ORAL 3 TIMES DAILY PRN
Qty: 90 TABLET | Refills: 2 | Status: SHIPPED | OUTPATIENT
Start: 2020-11-10 | End: 2021-03-17

## 2020-11-16 ENCOUNTER — MYC MEDICAL ADVICE (OUTPATIENT)
Dept: CARDIOLOGY | Facility: CLINIC | Age: 60
End: 2020-11-16

## 2020-11-18 ENCOUNTER — TELEPHONE (OUTPATIENT)
Dept: CARDIOLOGY | Facility: CLINIC | Age: 60
End: 2020-11-18

## 2020-11-18 NOTE — PROGRESS NOTES
Pt seen in clinic for evaluation and iterative programming of her Medtronic dual chamber pacemaker per MD order. She looks well and she states that she feels well and she does not offer any complaints. Her incision has healed well and the site is still covered with dermabond.  Her pacemaker check does not show any episodes of atrial or ventricular arrhythmias, she is RV pacing 99.8% of the time, her heart rate histograms show good heart rate variation and her pacemaker battery estimates 8 years left.  We will plan to see her back in clinic in May when she returns to see Dr Funes and a remote check in 3 mos.  Normal pacemaker function.    Dual pacemaker   Mohs Rapid Report Verbiage: The area of clinically evident tumor was marked with skin marking ink and appropriately hatched.  The initial incision was made following the Mohs approach through the skin.  The specimen was taken to the lab, divided into the necessary number of pieces, chromacoded and processed according to the Mohs protocol.  This was repeated in successive stages until a tumor free defect was achieved.

## 2020-11-18 NOTE — TELEPHONE ENCOUNTER
"Pt called stating her BP is still low (70-80 systolic) and she feels \"foggy\"  and she would like to cut back her lisinopril to 1.25mg daily. (current dose is 2.5mg). I agreed with her low bp and her sx it is appropriate to cut her dose. She will call in 1 week to assess response.  "

## 2020-11-27 ENCOUNTER — TELEPHONE (OUTPATIENT)
Dept: CARDIOLOGY | Facility: CLINIC | Age: 60
End: 2020-11-27

## 2020-11-27 NOTE — TELEPHONE ENCOUNTER
PT called on 11/23/20 and said she is still feeling lightheaded, brain fog and low energy with BP's in the 70-80 systolic. She wishes to quit the lisinopril . I told her to stop the lisinopril, in one week we will call her to assess if sx are better.

## 2020-12-02 NOTE — TELEPHONE ENCOUNTER
Action    Action Taken 12-2: pt well established with cardiology dept. No outside records in last 2 years

## 2020-12-03 ENCOUNTER — TRANSFERRED RECORDS (OUTPATIENT)
Dept: HEALTH INFORMATION MANAGEMENT | Facility: CLINIC | Age: 60
End: 2020-12-03

## 2020-12-05 ENCOUNTER — TELEPHONE (OUTPATIENT)
Dept: CARDIOLOGY | Facility: CLINIC | Age: 60
End: 2020-12-05

## 2020-12-05 DIAGNOSIS — I50.30 HEART FAILURE WITH PRESERVED EJECTION FRACTION, BORDERLINE, CLASS III (H): ICD-10-CM

## 2020-12-05 RX ORDER — SPIRONOLACTONE 25 MG/1
12.5 TABLET ORAL DAILY
Qty: 90 TABLET | Refills: 3 | COMMUNITY
Start: 2020-12-05 | End: 2021-05-20

## 2020-12-05 NOTE — TELEPHONE ENCOUNTER
Pt called stating she felt better after stopping the lisinopril. She admits she has a lot of anxiety and knows that is contributing to her sx. But she has less brain fog off the lisinopril. She would now like to cut back on the spironolactone to 12.5mg daily. Her bp remains in the 80-90 range systolically.  WE discussed the need for guideline directed medical therapy to manger her heart failure, and she understands the risk of cutting back on her medications. She will cut back her spironolactone to 12.5mg daily and call in one week to let me know how she is doing

## 2020-12-06 DIAGNOSIS — I50.30 DIASTOLIC HEART FAILURE (H): Primary | ICD-10-CM

## 2020-12-06 DIAGNOSIS — E89.0 POSTABLATIVE HYPOTHYROIDISM: ICD-10-CM

## 2020-12-06 PROCEDURE — 84439 ASSAY OF FREE THYROXINE: CPT | Performed by: INTERNAL MEDICINE

## 2020-12-06 PROCEDURE — 84443 ASSAY THYROID STIM HORMONE: CPT | Performed by: INTERNAL MEDICINE

## 2020-12-06 PROCEDURE — 80048 BASIC METABOLIC PNL TOTAL CA: CPT | Performed by: NURSE PRACTITIONER

## 2020-12-07 LAB
T4 FREE SERPL-MCNC: 1.3 NG/DL (ref 0.76–1.46)
TSH SERPL DL<=0.005 MIU/L-ACNC: 1.46 MU/L (ref 0.4–4)

## 2020-12-08 DIAGNOSIS — I50.30 HEART FAILURE WITH PRESERVED EJECTION FRACTION, BORDERLINE, CLASS III (H): Primary | ICD-10-CM

## 2020-12-08 LAB
ANION GAP SERPL CALCULATED.3IONS-SCNC: 8 MMOL/L (ref 3–14)
BUN SERPL-MCNC: 16 MG/DL (ref 7–30)
CALCIUM SERPL-MCNC: 9.1 MG/DL (ref 8.5–10.1)
CHLORIDE SERPL-SCNC: 109 MMOL/L (ref 94–109)
CO2 SERPL-SCNC: 23 MMOL/L (ref 20–32)
CREAT SERPL-MCNC: 0.63 MG/DL (ref 0.52–1.04)
GFR SERPL CREATININE-BSD FRML MDRD: >90 ML/MIN/{1.73_M2}
GLUCOSE SERPL-MCNC: 86 MG/DL (ref 70–99)
POTASSIUM SERPL-SCNC: 4.1 MMOL/L (ref 3.4–5.3)
SODIUM SERPL-SCNC: 140 MMOL/L (ref 133–144)

## 2020-12-18 ENCOUNTER — ANCILLARY PROCEDURE (OUTPATIENT)
Dept: CARDIOLOGY | Facility: CLINIC | Age: 60
End: 2020-12-18
Attending: INTERNAL MEDICINE
Payer: COMMERCIAL

## 2020-12-18 DIAGNOSIS — I44.2 COMPLETE ATRIOVENTRICULAR BLOCK (H): ICD-10-CM

## 2020-12-18 LAB
MDC_IDC_EPISODE_DTM: NORMAL
MDC_IDC_EPISODE_DTM: NORMAL
MDC_IDC_EPISODE_DURATION: 1 S
MDC_IDC_EPISODE_DURATION: 2 S
MDC_IDC_EPISODE_ID: 10
MDC_IDC_EPISODE_ID: 11
MDC_IDC_EPISODE_TYPE: NORMAL
MDC_IDC_EPISODE_TYPE: NORMAL
MDC_IDC_LEAD_IMPLANT_DT: NORMAL
MDC_IDC_LEAD_IMPLANT_DT: NORMAL
MDC_IDC_LEAD_LOCATION: NORMAL
MDC_IDC_LEAD_LOCATION: NORMAL
MDC_IDC_LEAD_LOCATION_DETAIL_1: NORMAL
MDC_IDC_LEAD_LOCATION_DETAIL_1: NORMAL
MDC_IDC_LEAD_MFG: NORMAL
MDC_IDC_LEAD_MFG: NORMAL
MDC_IDC_LEAD_MODEL: NORMAL
MDC_IDC_LEAD_MODEL: NORMAL
MDC_IDC_LEAD_POLARITY_TYPE: NORMAL
MDC_IDC_LEAD_POLARITY_TYPE: NORMAL
MDC_IDC_LEAD_SERIAL: NORMAL
MDC_IDC_LEAD_SERIAL: NORMAL
MDC_IDC_MSMT_BATTERY_DTM: NORMAL
MDC_IDC_MSMT_BATTERY_REMAINING_LONGEVITY: 67 MO
MDC_IDC_MSMT_BATTERY_RRT_TRIGGER: 2.83
MDC_IDC_MSMT_BATTERY_STATUS: NORMAL
MDC_IDC_MSMT_BATTERY_VOLTAGE: 3 V
MDC_IDC_MSMT_LEADCHNL_RA_IMPEDANCE_VALUE: 399 OHM
MDC_IDC_MSMT_LEADCHNL_RA_IMPEDANCE_VALUE: 475 OHM
MDC_IDC_MSMT_LEADCHNL_RA_PACING_THRESHOLD_AMPLITUDE: 0.38 V
MDC_IDC_MSMT_LEADCHNL_RA_PACING_THRESHOLD_PULSEWIDTH: 0.4 MS
MDC_IDC_MSMT_LEADCHNL_RA_SENSING_INTR_AMPL: 2.25 MV
MDC_IDC_MSMT_LEADCHNL_RA_SENSING_INTR_AMPL: 2.25 MV
MDC_IDC_MSMT_LEADCHNL_RV_IMPEDANCE_VALUE: 437 OHM
MDC_IDC_MSMT_LEADCHNL_RV_IMPEDANCE_VALUE: 475 OHM
MDC_IDC_MSMT_LEADCHNL_RV_PACING_THRESHOLD_AMPLITUDE: 0.75 V
MDC_IDC_MSMT_LEADCHNL_RV_PACING_THRESHOLD_PULSEWIDTH: 0.4 MS
MDC_IDC_MSMT_LEADCHNL_RV_SENSING_INTR_AMPL: 9.25 MV
MDC_IDC_MSMT_LEADCHNL_RV_SENSING_INTR_AMPL: 9.25 MV
MDC_IDC_PG_IMPLANT_DTM: NORMAL
MDC_IDC_PG_MFG: NORMAL
MDC_IDC_PG_MODEL: NORMAL
MDC_IDC_PG_SERIAL: NORMAL
MDC_IDC_PG_TYPE: NORMAL
MDC_IDC_SESS_CLINIC_NAME: NORMAL
MDC_IDC_SESS_DTM: NORMAL
MDC_IDC_SESS_TYPE: NORMAL
MDC_IDC_SET_BRADY_AT_MODE_SWITCH_RATE: 171 {BEATS}/MIN
MDC_IDC_SET_BRADY_HYSTRATE: NORMAL
MDC_IDC_SET_BRADY_LOWRATE: 60 {BEATS}/MIN
MDC_IDC_SET_BRADY_MAX_SENSOR_RATE: 140 {BEATS}/MIN
MDC_IDC_SET_BRADY_MAX_TRACKING_RATE: 140 {BEATS}/MIN
MDC_IDC_SET_BRADY_MODE: NORMAL
MDC_IDC_SET_BRADY_PAV_DELAY_HIGH: 140 MS
MDC_IDC_SET_BRADY_PAV_DELAY_LOW: 180 MS
MDC_IDC_SET_BRADY_SAV_DELAY_HIGH: 110 MS
MDC_IDC_SET_BRADY_SAV_DELAY_LOW: 150 MS
MDC_IDC_SET_LEADCHNL_RA_PACING_AMPLITUDE: 1.5 V
MDC_IDC_SET_LEADCHNL_RA_PACING_ANODE_ELECTRODE_1: NORMAL
MDC_IDC_SET_LEADCHNL_RA_PACING_ANODE_LOCATION_1: NORMAL
MDC_IDC_SET_LEADCHNL_RA_PACING_CAPTURE_MODE: NORMAL
MDC_IDC_SET_LEADCHNL_RA_PACING_CATHODE_ELECTRODE_1: NORMAL
MDC_IDC_SET_LEADCHNL_RA_PACING_CATHODE_LOCATION_1: NORMAL
MDC_IDC_SET_LEADCHNL_RA_PACING_POLARITY: NORMAL
MDC_IDC_SET_LEADCHNL_RA_PACING_PULSEWIDTH: 0.4 MS
MDC_IDC_SET_LEADCHNL_RA_SENSING_ANODE_ELECTRODE_1: NORMAL
MDC_IDC_SET_LEADCHNL_RA_SENSING_ANODE_LOCATION_1: NORMAL
MDC_IDC_SET_LEADCHNL_RA_SENSING_CATHODE_ELECTRODE_1: NORMAL
MDC_IDC_SET_LEADCHNL_RA_SENSING_CATHODE_LOCATION_1: NORMAL
MDC_IDC_SET_LEADCHNL_RA_SENSING_POLARITY: NORMAL
MDC_IDC_SET_LEADCHNL_RA_SENSING_SENSITIVITY: 0.3 MV
MDC_IDC_SET_LEADCHNL_RV_PACING_AMPLITUDE: 1.5 V
MDC_IDC_SET_LEADCHNL_RV_PACING_ANODE_ELECTRODE_1: NORMAL
MDC_IDC_SET_LEADCHNL_RV_PACING_ANODE_LOCATION_1: NORMAL
MDC_IDC_SET_LEADCHNL_RV_PACING_CAPTURE_MODE: NORMAL
MDC_IDC_SET_LEADCHNL_RV_PACING_CATHODE_ELECTRODE_1: NORMAL
MDC_IDC_SET_LEADCHNL_RV_PACING_CATHODE_LOCATION_1: NORMAL
MDC_IDC_SET_LEADCHNL_RV_PACING_POLARITY: NORMAL
MDC_IDC_SET_LEADCHNL_RV_PACING_PULSEWIDTH: 0.4 MS
MDC_IDC_SET_LEADCHNL_RV_SENSING_ANODE_ELECTRODE_1: NORMAL
MDC_IDC_SET_LEADCHNL_RV_SENSING_ANODE_LOCATION_1: NORMAL
MDC_IDC_SET_LEADCHNL_RV_SENSING_CATHODE_ELECTRODE_1: NORMAL
MDC_IDC_SET_LEADCHNL_RV_SENSING_CATHODE_LOCATION_1: NORMAL
MDC_IDC_SET_LEADCHNL_RV_SENSING_POLARITY: NORMAL
MDC_IDC_SET_LEADCHNL_RV_SENSING_SENSITIVITY: 0.9 MV
MDC_IDC_SET_ZONE_DETECTION_INTERVAL: 350 MS
MDC_IDC_SET_ZONE_DETECTION_INTERVAL: 400 MS
MDC_IDC_SET_ZONE_TYPE: NORMAL
MDC_IDC_STAT_AT_BURDEN_PERCENT: 0 %
MDC_IDC_STAT_AT_DTM_END: NORMAL
MDC_IDC_STAT_AT_DTM_START: NORMAL
MDC_IDC_STAT_BRADY_AP_VP_PERCENT: 52.41 %
MDC_IDC_STAT_BRADY_AP_VS_PERCENT: 0.83 %
MDC_IDC_STAT_BRADY_AS_VP_PERCENT: 46.57 %
MDC_IDC_STAT_BRADY_AS_VS_PERCENT: 0.19 %
MDC_IDC_STAT_BRADY_DTM_END: NORMAL
MDC_IDC_STAT_BRADY_DTM_START: NORMAL
MDC_IDC_STAT_BRADY_RA_PERCENT_PACED: 52.86 %
MDC_IDC_STAT_BRADY_RV_PERCENT_PACED: 98.72 %
MDC_IDC_STAT_EPISODE_RECENT_COUNT: 0
MDC_IDC_STAT_EPISODE_RECENT_COUNT: 2
MDC_IDC_STAT_EPISODE_RECENT_COUNT_DTM_END: NORMAL
MDC_IDC_STAT_EPISODE_RECENT_COUNT_DTM_START: NORMAL
MDC_IDC_STAT_EPISODE_TOTAL_COUNT: 0
MDC_IDC_STAT_EPISODE_TOTAL_COUNT: 1
MDC_IDC_STAT_EPISODE_TOTAL_COUNT: 1
MDC_IDC_STAT_EPISODE_TOTAL_COUNT: 9
MDC_IDC_STAT_EPISODE_TOTAL_COUNT_DTM_END: NORMAL
MDC_IDC_STAT_EPISODE_TOTAL_COUNT_DTM_START: NORMAL
MDC_IDC_STAT_EPISODE_TYPE: NORMAL

## 2020-12-18 PROCEDURE — 93294 REM INTERROG EVL PM/LDLS PM: CPT | Performed by: INTERNAL MEDICINE

## 2020-12-18 PROCEDURE — 93296 REM INTERROG EVL PM/IDS: CPT

## 2020-12-20 ENCOUNTER — APPOINTMENT (OUTPATIENT)
Dept: GENERAL RADIOLOGY | Facility: CLINIC | Age: 60
End: 2020-12-20
Attending: EMERGENCY MEDICINE
Payer: COMMERCIAL

## 2020-12-20 ENCOUNTER — ANCILLARY PROCEDURE (OUTPATIENT)
Dept: CARDIOLOGY | Facility: CLINIC | Age: 60
End: 2020-12-20
Attending: EMERGENCY MEDICINE
Payer: COMMERCIAL

## 2020-12-20 ENCOUNTER — HOSPITAL ENCOUNTER (EMERGENCY)
Facility: CLINIC | Age: 60
Discharge: HOME | End: 2020-12-20
Attending: EMERGENCY MEDICINE | Admitting: EMERGENCY MEDICINE
Payer: COMMERCIAL

## 2020-12-20 VITALS
BODY MASS INDEX: 25.33 KG/M2 | TEMPERATURE: 98 F | HEIGHT: 65 IN | OXYGEN SATURATION: 99 % | WEIGHT: 152 LBS | RESPIRATION RATE: 27 BRPM | DIASTOLIC BLOOD PRESSURE: 72 MMHG | SYSTOLIC BLOOD PRESSURE: 128 MMHG | HEART RATE: 74 BPM

## 2020-12-20 DIAGNOSIS — R07.89 ATYPICAL CHEST PAIN: ICD-10-CM

## 2020-12-20 DIAGNOSIS — I44.2 COMPLETE ATRIOVENTRICULAR BLOCK (H): ICD-10-CM

## 2020-12-20 DIAGNOSIS — Z95.0 CARDIAC PACEMAKER IN SITU: ICD-10-CM

## 2020-12-20 LAB
ALBUMIN SERPL-MCNC: 2.9 G/DL (ref 3.4–5)
ALP SERPL-CCNC: 83 U/L (ref 40–150)
ALT SERPL W P-5'-P-CCNC: 25 U/L (ref 0–50)
ANION GAP SERPL CALCULATED.3IONS-SCNC: 5 MMOL/L (ref 3–14)
AST SERPL W P-5'-P-CCNC: 18 U/L (ref 0–45)
BASOPHILS # BLD AUTO: 0.1 10E9/L (ref 0–0.2)
BASOPHILS NFR BLD AUTO: 1 %
BILIRUB SERPL-MCNC: 0.3 MG/DL (ref 0.2–1.3)
BUN SERPL-MCNC: 13 MG/DL (ref 7–30)
CALCIUM SERPL-MCNC: 8 MG/DL (ref 8.5–10.1)
CHLORIDE SERPL-SCNC: 114 MMOL/L (ref 94–109)
CO2 SERPL-SCNC: 23 MMOL/L (ref 20–32)
CREAT SERPL-MCNC: 0.62 MG/DL (ref 0.52–1.04)
DIFFERENTIAL METHOD BLD: NORMAL
EOSINOPHIL # BLD AUTO: 0.1 10E9/L (ref 0–0.7)
EOSINOPHIL NFR BLD AUTO: 1.9 %
ERYTHROCYTE [DISTWIDTH] IN BLOOD BY AUTOMATED COUNT: 12.3 % (ref 10–15)
GFR SERPL CREATININE-BSD FRML MDRD: >90 ML/MIN/{1.73_M2}
GLUCOSE SERPL-MCNC: 86 MG/DL (ref 70–99)
HCT VFR BLD AUTO: 38.7 % (ref 35–47)
HGB BLD-MCNC: 12.4 G/DL (ref 11.7–15.7)
IMM GRANULOCYTES # BLD: 0 10E9/L (ref 0–0.4)
IMM GRANULOCYTES NFR BLD: 0.2 %
LYMPHOCYTES # BLD AUTO: 1.7 10E9/L (ref 0.8–5.3)
LYMPHOCYTES NFR BLD AUTO: 28 %
MCH RBC QN AUTO: 32.1 PG (ref 26.5–33)
MCHC RBC AUTO-ENTMCNC: 32 G/DL (ref 31.5–36.5)
MCV RBC AUTO: 100 FL (ref 78–100)
MONOCYTES # BLD AUTO: 0.5 10E9/L (ref 0–1.3)
MONOCYTES NFR BLD AUTO: 9.1 %
NEUTROPHILS # BLD AUTO: 3.6 10E9/L (ref 1.6–8.3)
NEUTROPHILS NFR BLD AUTO: 59.8 %
NRBC # BLD AUTO: 0 10*3/UL
NRBC BLD AUTO-RTO: 0 /100
NT-PROBNP SERPL-MCNC: 107 PG/ML (ref 0–900)
PLATELET # BLD AUTO: 181 10E9/L (ref 150–450)
POTASSIUM SERPL-SCNC: 3.4 MMOL/L (ref 3.4–5.3)
PROT SERPL-MCNC: 6 G/DL (ref 6.8–8.8)
RBC # BLD AUTO: 3.86 10E12/L (ref 3.8–5.2)
SODIUM SERPL-SCNC: 143 MMOL/L (ref 133–144)
TROPONIN I SERPL-MCNC: <0.015 UG/L (ref 0–0.04)
TROPONIN I SERPL-MCNC: <0.015 UG/L (ref 0–0.04)
WBC # BLD AUTO: 5.9 10E9/L (ref 4–11)

## 2020-12-20 PROCEDURE — 99285 EMERGENCY DEPT VISIT HI MDM: CPT | Mod: 25 | Performed by: EMERGENCY MEDICINE

## 2020-12-20 PROCEDURE — 250N000013 HC RX MED GY IP 250 OP 250 PS 637: Performed by: EMERGENCY MEDICINE

## 2020-12-20 PROCEDURE — 93010 ELECTROCARDIOGRAM REPORT: CPT | Performed by: EMERGENCY MEDICINE

## 2020-12-20 PROCEDURE — 99207 CARDIAC DEVICE CHECK - REMOTE: CPT | Performed by: INTERNAL MEDICINE

## 2020-12-20 PROCEDURE — 80053 COMPREHEN METABOLIC PANEL: CPT | Performed by: EMERGENCY MEDICINE

## 2020-12-20 PROCEDURE — 258N000003 HC RX IP 258 OP 636: Performed by: EMERGENCY MEDICINE

## 2020-12-20 PROCEDURE — 93296 REM INTERROG EVL PM/IDS: CPT

## 2020-12-20 PROCEDURE — 93005 ELECTROCARDIOGRAM TRACING: CPT | Mod: 59 | Performed by: EMERGENCY MEDICINE

## 2020-12-20 PROCEDURE — 85025 COMPLETE CBC W/AUTO DIFF WBC: CPT | Performed by: EMERGENCY MEDICINE

## 2020-12-20 PROCEDURE — 83880 ASSAY OF NATRIURETIC PEPTIDE: CPT | Performed by: EMERGENCY MEDICINE

## 2020-12-20 PROCEDURE — 250N000011 HC RX IP 250 OP 636: Performed by: EMERGENCY MEDICINE

## 2020-12-20 PROCEDURE — 84484 ASSAY OF TROPONIN QUANT: CPT | Mod: 91 | Performed by: EMERGENCY MEDICINE

## 2020-12-20 PROCEDURE — 71045 X-RAY EXAM CHEST 1 VIEW: CPT

## 2020-12-20 PROCEDURE — 84484 ASSAY OF TROPONIN QUANT: CPT | Performed by: EMERGENCY MEDICINE

## 2020-12-20 PROCEDURE — 96374 THER/PROPH/DIAG INJ IV PUSH: CPT | Performed by: EMERGENCY MEDICINE

## 2020-12-20 PROCEDURE — 96361 HYDRATE IV INFUSION ADD-ON: CPT | Performed by: EMERGENCY MEDICINE

## 2020-12-20 PROCEDURE — 71045 X-RAY EXAM CHEST 1 VIEW: CPT | Mod: 26 | Performed by: RADIOLOGY

## 2020-12-20 RX ORDER — SODIUM CHLORIDE, SODIUM LACTATE, POTASSIUM CHLORIDE, CALCIUM CHLORIDE 600; 310; 30; 20 MG/100ML; MG/100ML; MG/100ML; MG/100ML
INJECTION, SOLUTION INTRAVENOUS CONTINUOUS
Status: DISCONTINUED | OUTPATIENT
Start: 2020-12-20 | End: 2020-12-20 | Stop reason: HOSPADM

## 2020-12-20 RX ORDER — CLONAZEPAM 0.5 MG/1
0.5 TABLET ORAL ONCE
Status: COMPLETED | OUTPATIENT
Start: 2020-12-20 | End: 2020-12-20

## 2020-12-20 RX ORDER — DIPHENHYDRAMINE HYDROCHLORIDE 50 MG/ML
25 INJECTION INTRAMUSCULAR; INTRAVENOUS ONCE
Status: COMPLETED | OUTPATIENT
Start: 2020-12-20 | End: 2020-12-20

## 2020-12-20 RX ORDER — ACETAMINOPHEN 325 MG/1
975 TABLET ORAL ONCE
Status: COMPLETED | OUTPATIENT
Start: 2020-12-20 | End: 2020-12-20

## 2020-12-20 RX ADMIN — CLONAZEPAM 0.5 MG: 0.5 TABLET ORAL at 07:23

## 2020-12-20 RX ADMIN — ACETAMINOPHEN 975 MG: 325 TABLET, FILM COATED ORAL at 07:32

## 2020-12-20 RX ADMIN — SODIUM CHLORIDE, POTASSIUM CHLORIDE, SODIUM LACTATE AND CALCIUM CHLORIDE: 600; 310; 30; 20 INJECTION, SOLUTION INTRAVENOUS at 08:19

## 2020-12-20 RX ADMIN — DIPHENHYDRAMINE HYDROCHLORIDE 25 MG: 50 INJECTION, SOLUTION INTRAMUSCULAR; INTRAVENOUS at 08:22

## 2020-12-20 ASSESSMENT — ENCOUNTER SYMPTOMS
CONSTITUTIONAL NEGATIVE: 1
DIZZINESS: 1
RESPIRATORY NEGATIVE: 1
GASTROINTESTINAL NEGATIVE: 1

## 2020-12-20 ASSESSMENT — MIFFLIN-ST. JEOR: SCORE: 1250.35

## 2020-12-20 NOTE — ED TRIAGE NOTES
Pt BIBA  For chest pain.  Hx of PTSD, had anxiety attack yesterday, took klonopin which reduced anxiety and chest pain.  Described pain as squeezing.  Pain resolved after 2 doses of nitroglycerin.  Rates chest pain at a 3 currently.

## 2020-12-20 NOTE — ED NOTES
Bed: ED11  Expected date:   Expected time:   Means of arrival:   Comments:  Yan 624  61F chest pain  Paced, responds to nitroglycerin, now pain free  ETA 7 min  yellow

## 2020-12-20 NOTE — ED PROVIDER NOTES
ED Provider Note  [facility]      History     Chief Complaint   Patient presents with     Chest Pain     HPI  Joyce Marroquin is a 60 year old female who presents for evaluation of chest pain.  She reports that she had an episode last night and then it recurred again this morning.  She says her symptoms are similar to what she gets when she is having anxiety yesterday she tried her Klonopin.  She has a history of complete heart block after ablation and is 100% pacemaker dependent.  She had a recent angiogram that showed a 40% LAD lesion and an echocardiogram that showed stress cardiomyopathy.  Her symptoms were not accompanied by dyspnea or diaphoresis.  She is currently asymptomatic her EKG is dual-chamber paced.  Her symptoms occur at rest not exertional.  We will check her troponin routine labs electrolytes and interrogate her pacemaker.    Past Medical History  Past Medical History:   Diagnosis Date     Arthritis      Cardiomyopathy (H)     ff Cardiology     Chronic depressive personality disorder      Congestive heart failure (H) see dr martínez    heart failure correct term     COPD exacerbation (H)      Esophageal reflux      Ex-smoker     quit 2006; 1 PPD x 30     Hyperparathyroidism (H)     s/p parathyroidectomy     Hypothyroidism      LARRY on CPAP     ff Sleep medicine     Pulmonary nodules     ff Pulmonologist     S/P cardiac pacemaker procedure     checked every 6 months at the [facility]     Uncomplicated asthma years     Past Surgical History:   Procedure Laterality Date     BUNIONECTOMY Bilateral      CV CORONARY ANGIOGRAM N/A 9/26/2019    Procedure: CV CORONARY ANGIOGRAM;  Surgeon: Erickson Trejo MD;  Location:  HEART CARDIAC CATH LAB     CV RIGHT HEART CATH N/A 7/20/2020    Procedure: CV RIGHT HEART CATH;  Surgeon: Alonso Ordonez MD;  Location:  HEART CARDIAC CATH LAB     EP ABLATION / EP STUDIES  04/21/2017     EXPLORE NECK N/A 9/19/2018    Procedure: EXPLORE NECK;  Neck  Exploration Resection of Left superior Parathyroid gland;  Surgeon: Kristine Venegas MD;  Location: [facility] OR     HC CORRECT BUNION,SIMPLE       PARATHYROIDECTOMY N/A 9/19/2018    Procedure: PARATHYROIDECTOMY;;  Surgeon: Kristine Venegas MD;  Location: [facility] OR     PPM INSERT OF NEW OR REPL W/VENT LEAD  04/21/2017     TONSILLECTOMY & ADENOIDECTOMY            albuterol (PROAIR HFA/PROVENTIL HFA/VENTOLIN HFA) 108 (90 BASE) MCG/ACT Inhaler       azelastine (ASTELIN) 0.1 % nasal spray       calcium carbonate (OS-MANJU) 500 MG tablet       carvedilol (COREG) 3.125 MG tablet       clonazePAM (KLONOPIN) 0.5 MG tablet       DiphenhydrAMINE HCl (BENADRYL PO)       Ferrous Sulfate (IRON SUPPLEMENT PO)       fluocinolone acetonide (DERMA SMOOTHE/FS BODY) 0.01 % external oil       fluticasone-salmeterol (ADVAIR) 250-50 MCG/DOSE inhaler       furosemide (LASIX) 20 MG tablet       ipratropium (ATROVENT) 0.06 % nasal spray       loperamide (IMODIUM A-D) 2 MG tablet       MAGNESIUM LACTATE PO       MELATONIN PO       Menaquinone-7 (VITAMIN K2 PO)       METHYLPREDNISOLONE PO       Multiple Vitamin (DAILY MULTIVITAMIN PO)       nebulizer nebulization       Probiotic Product (PROBIOTIC DAILY PO)       spironolactone (ALDACTONE) 25 MG tablet       SYNTHROID 150 MCG tablet       TURMERIC PO       UNABLE TO FIND       vitamin B complex with vitamin C (VITAMIN  B COMPLEX) PO tablet       vitamin D3 (CHOLECALCIFEROL) 2000 units tablet       Zinc 15 MG CAPS      Allergies   Allergen Reactions     Tetracycline Swelling     Zofran [Ondansetron]      Prolonged QT  Prolonged QT at baseline per patient     Flagyl [Metronidazole] Rash     Buspar [Buspirone]      Muscle weakness     Corn Oil Other (See Comments)     Headache       Seasonal Allergies Difficulty breathing     Sulfamethoxazole-Trimethoprim      Other reaction(s): Other  Passed out     Cyclobenzaprine Other (See Comments)     Extreme heat intolerance     Levaquin [Levofloxacin]       "Other reaction(s): Other  Tendon rupture     Nsaids Other (See Comments)     Exacerbated asthma     Family History  Family History   Problem Relation Age of Onset     Hypothyroidism Mother      Hypertension Mother      Osteoarthritis Mother      Coronary Artery Disease Father         nonfatal MI in his 70s     Asthma Father      Diabetes Sister      Hypothyroidism Sister      Breast Cancer Maternal Aunt      Anxiety Disorder Sister      Social History   Social History     Tobacco Use     Smoking status: Former Smoker     Packs/day: 0.00     Years: 0.00     Pack years: 0.00     Smokeless tobacco: Never Used   Substance Use Topics     Alcohol use: Yes     Alcohol/week: 0.0 - 8.0 standard drinks     Frequency: 2-3 times a week     Drinks per session: 1 or 2     Binge frequency: Never     Comment: seldom     Drug use: No      Past medical history, past surgical history, medications, allergies, family history, and social history were reviewed with the patient. No additional pertinent items.       Review of Systems   Constitutional: Negative.    HENT: Negative.    Respiratory: Negative.    Cardiovascular: Positive for chest pain. Negative for leg swelling.   Gastrointestinal: Negative.    Genitourinary: Negative.    Neurological: Positive for dizziness.   All other systems reviewed and are negative.    A complete review of systems was performed with pertinent positives and negatives noted in the HPI, and all other systems negative.    Physical Exam   BP: (!) 121/91  Pulse: 74  Temp: 98  F (36.7  C)  Resp: 8  Height: 165.1 cm (5' 5\")  Weight: 68.9 kg (152 lb)  SpO2: 97 %  Physical Exam  Vitals signs and nursing note reviewed.   Constitutional:       General: She is not in acute distress.     Appearance: She is well-developed.   HENT:      Head: Normocephalic and atraumatic.      Mouth/Throat:      Mouth: Mucous membranes are moist.   Eyes:      General: No scleral icterus.     Conjunctiva/sclera: Conjunctivae normal.      " Pupils: Pupils are equal, round, and reactive to light.   Neck:      Musculoskeletal: Neck supple.   Cardiovascular:      Rate and Rhythm: Normal rate and regular rhythm.      Heart sounds: Normal heart sounds.   Pulmonary:      Effort: Pulmonary effort is normal. No respiratory distress.      Breath sounds: Normal breath sounds. No wheezing.   Abdominal:      General: Abdomen is flat.      Palpations: Abdomen is soft.   Skin:     General: Skin is warm and dry.   Neurological:      General: No focal deficit present.      Mental Status: She is alert and oriented to person, place, and time.      Cranial Nerves: No cranial nerve deficit.   Psychiatric:         Mood and Affect: Mood is anxious.         Behavior: Behavior normal.       ED Course      Procedures             EKG Interpretation:      Interpreted by Saran Morel MD  Time reviewed: 0710  Symptoms at time of EKG: CP   Rhythm: V-paced  Rate: normal  Axis: normal  Ectopy: none  Conduction: normal  ST Segments/ T Waves: No ST-T wave changes  Q Waves: none  Comparison to prior: Unchanged from 10/2019    Clinical Impression: paced, unchanged           Results for orders placed or performed during the hospital encounter of 12/20/20   XR Chest Port 1 View     Status: None    Narrative    EXAM: XR CHEST PORT 1 VW 12/20/2020 7:22 AM      HISTORY: chest pain.    COMPARISON: 12/4/2019.     TECHNIQUE: Frontal view of the chest.    FINDINGS: Left-sided pacemaker. Unremarkable soft tissues and no acute  bony abnormalities. Cardiomediastinal borders are clear. No  enlargement of the cardiac silhouette. No appreciable pneumothorax or  pleural effusions. No focal airspace opacities. Streaky atelectasis.      Impression    IMPRESSION: No acute cardiopulmonary findings.    I have personally reviewed the examination and initial interpretation  and I agree with the findings.    NELSON TEMPLE MD   CBC with platelets differential     Status: None   Result Value Ref  Range    WBC 5.9 4.0 - 11.0 10e9/L    RBC Count 3.86 3.8 - 5.2 10e12/L    Hemoglobin 12.4 11.7 - 15.7 g/dL    Hematocrit 38.7 35.0 - 47.0 %     78 - 100 fl    MCH 32.1 26.5 - 33.0 pg    MCHC 32.0 31.5 - 36.5 g/dL    RDW 12.3 10.0 - 15.0 %    Platelet Count 181 150 - 450 10e9/L    Diff Method Automated Method     % Neutrophils 59.8 %    % Lymphocytes 28.0 %    % Monocytes 9.1 %    % Eosinophils 1.9 %    % Basophils 1.0 %    % Immature Granulocytes 0.2 %    Nucleated RBCs 0 0 /100    Absolute Neutrophil 3.6 1.6 - 8.3 10e9/L    Absolute Lymphocytes 1.7 0.8 - 5.3 10e9/L    Absolute Monocytes 0.5 0.0 - 1.3 10e9/L    Absolute Eosinophils 0.1 0.0 - 0.7 10e9/L    Absolute Basophils 0.1 0.0 - 0.2 10e9/L    Abs Immature Granulocytes 0.0 0 - 0.4 10e9/L    Absolute Nucleated RBC 0.0    Comprehensive metabolic panel     Status: Abnormal   Result Value Ref Range    Sodium 143 133 - 144 mmol/L    Potassium 3.4 3.4 - 5.3 mmol/L    Chloride 114 (H) 94 - 109 mmol/L    Carbon Dioxide 23 20 - 32 mmol/L    Anion Gap 5 3 - 14 mmol/L    Glucose 86 70 - 99 mg/dL    Urea Nitrogen 13 7 - 30 mg/dL    Creatinine 0.62 0.52 - 1.04 mg/dL    GFR Estimate >90 >60 mL/min/[1.73_m2]    GFR Estimate If Black >90 >60 mL/min/[1.73_m2]    Calcium 8.0 (L) 8.5 - 10.1 mg/dL    Bilirubin Total 0.3 0.2 - 1.3 mg/dL    Albumin 2.9 (L) 3.4 - 5.0 g/dL    Protein Total 6.0 (L) 6.8 - 8.8 g/dL    Alkaline Phosphatase 83 40 - 150 U/L    ALT 25 0 - 50 U/L    AST 18 0 - 45 U/L   Troponin I     Status: None   Result Value Ref Range    Troponin I ES <0.015 0.000 - 0.045 ug/L   Nt probnp inpatient     Status: None   Result Value Ref Range    N-Terminal Pro BNP Inpatient 107 0 - 900 pg/mL   Troponin I (second draw)     Status: None   Result Value Ref Range    Troponin I ES <0.015 0.000 - 0.045 ug/L   Cardiac Device Check - Remote     Status: None (In process)   Result Value Ref Range    Date Time Interrogation Session [id number]     Implantable Pulse Generator   Medtronic     Implantable Pulse Generator Model [id number] Advisa  MRI     Implantable Pulse Generator Serial Number [id number]     Type Interrogation Session Remote      Clinic Name [facility]     Implantable Pulse Generator Type Pacemaker     Implantable Pulse Generator Implant Date 20170421     Implantable Lead  Medtronic     Implantable Lead Model 5076 CapSureFix Novus MRI SureScan     Implantable Lead Serial Number [id number]     Implantable Lead Implant Date 20170421     Implantable Lead Polarity Type Bipolar Lead     Implantable Lead Location Detail 1 UNKNOWN     Implantable Lead Location Right Atrium     Implantable Lead  Medtronic     Implantable Lead Model 5076 CapSureFix Novus MRI SureScan     Implantable Lead Serial Number [id number]     Implantable Lead Implant Date 20170421     Implantable Lead Polarity Type Bipolar Lead     Implantable Lead Location Detail 1 UNKNOWN     Implantable Lead Location Right Ventricle     Tony Setting Mode (NBG Code) DDDR     Tony Setting Lower Rate Limit 60 [beats]/min    Tony Setting Maximum Tracking Rate 140 [beats]/min    Tony Setting Maximum Sensor Rate 140 [beats]/min    Tony Setting Hysterisis Rate DISABLED     Tony Setting TDEDY Delay Low 150 ms    Tony Setting PAV Delay Low 180 ms    Tony Setting PAV Delay High 140 ms    Tony Setting TEDDY Delay High 110 ms    Tony Setting AT Mode Switch Rate 171 [beats]/min    Lead Channel Setting Sensing Polarity Bipolar     Lead Channel Setting Sensing Anode Location Right Atrium     Lead Channel Setting Sensing Anode Terminal Ring     Lead Channel Setting Sensing Cathode Location Right Atrium     Lead Channel Setting Sensing Cathode Terminal Tip     Lead Channel Setting Sensing Sensitivity 0.3 mV    Lead Channel Setting Sensing Polarity Bipolar     Lead Channel Setting Sensing Anode Location Right Ventricle     Lead Channel Setting Sensing Anode Terminal Ring      Lead Channel Setting Sensing Cathode Location Right Ventricle     Lead Channel Setting Sensing Cathode Terminal Tip     Lead Channel Setting Sensing Sensitivity 0.9 mV    Lead Channel Setting Pacing Polarity Bipolar     Lead Channel Setting Pacing Anode Location Right Atrium     Lead Channel Setting Pacing Anode Terminal Ring     Lead Channel Setting Sensing Cathode Location Right Atrium     Lead Channel Setting Sensing Cathode Terminal Tip     Lead Channel Setting Pacing Pulse Width 0.4 ms    Lead Channel Setting Pacing Amplitude 1.5 V    Lead Channel Setting Pacing Capture Mode Adaptive     Lead Channel Setting Pacing Polarity Bipolar     Lead Channel Setting Pacing Anode Location Right Ventricle     Lead Channel Setting Pacing Anode Terminal Ring     Lead Channel Setting Sensing Cathode Location Right Ventricle     Lead Channel Setting Sensing Cathode Terminal Tip     Lead Channel Setting Pacing Pulse Width 0.4 ms    Lead Channel Setting Pacing Amplitude 1.5 V    Lead Channel Setting Pacing Capture Mode Adaptive     Zone Setting Type Category VF     Zone Setting Type Category VT     Zone Setting Type Category VT     Zone Setting Type Category VT     Zone Setting Detection Interval 400 ms    Zone Setting Type Category ATRIAL_FIBRILLATION     Zone Setting Type Category AT/AF     Zone Setting Detection Interval 350 ms    Lead Channel Impedance Value 456 ohm    Lead Channel Impedance Value 380 ohm    Lead Channel Sensing Intrinsic Amplitude 2.5 mV    Lead Channel Sensing Intrinsic Amplitude 2.5 mV    Lead Channel Pacing Threshold Amplitude 0.375 V    Lead Channel Pacing Threshold Pulse Width 0.4 ms    Lead Channel Impedance Value 475 ohm    Lead Channel Impedance Value 437 ohm    Lead Channel Sensing Intrinsic Amplitude 7.125 mV    Lead Channel Sensing Intrinsic Amplitude 7.125 mV    Lead Channel Pacing Threshold Amplitude 0.625 V    Lead Channel Pacing Threshold Pulse Width 0.4 ms    Battery Date Time of  Measurements 20201220000000     Battery Status OK     Battery RRT Trigger 2.83     Battery Remaining Longevity 67 mo    Battery Voltage 3.00 V    Tony Statistic Date Time Start 20200305111107     Tony Statistic Date Time End 20201220083213     Tony Statistic RA Percent Paced 46.12 %    Tony Statistic RV Percent Paced 98.69 %    Tony Statistic AP  Percent 45.93 %    Tony Statistic AS  Percent 53.14 %    Tony Statistic AP VS Percent 0.64 %    Tony Statistic AS VS Percent 0.29 %    Atrial Tachy Statistic Date Time Start 20200305111107     Atrial Tachy Statistic Date Time End 20201220083213     Atrial Tachy Statistic AT/AF [place] Percent 0 %    Episode Statistic Recent Count 0     Episode Statistic Type Category AT/AF     Episode Statistic Recent Count 0     Episode Statistic Type Category SVT     Episode Statistic Recent Count 5     Episode Statistic Type Category VT     Episode Statistic Recent Count 0     Episode Statistic Type Category VT     Episode Statistic Recent Date Time Start 20200305111107     Episode Statistic Recent Date Time End 20201220083213     Episode Statistic Recent Date Time Start 20200305111107     Episode Statistic Recent Date Time End 20201220083213     Episode Statistic Recent Date Time Start 20200305111107     Episode Statistic Recent Date Time End 20201220083213     Episode Statistic Recent Date Time Start 20200305111107     Episode Statistic Recent Date Time End 20201220083213     Episode Statistic Total Count 1     Episode Statistic Type Category AT/AF     Episode Statistic Total Count 0     Episode Statistic Type Category SVT     Episode Statistic Total Count 9     Episode Statistic Type Category VT     Episode Statistic Total Count 1     Episode Statistic Type Category VT     Episode Statistic Total Date Time Start 25353637196748     Episode Statistic Total Date Time End 57603138722728     Episode Statistic Total Date Time Start 70219902445491     Episode Statistic Total Date  Time End 20201220083213     Episode Statistic Total Date Time Start 99900758888677     Episode Statistic Total Date Time End 37680126144355     Episode Statistic Total Date Time Start 54939839801571     Episode Statistic Total Date Time End 20201220083213     Narrative    Medtronic, dual chamber pacemaker, remote transmission received and reviewed. Device transmission sent per MD orders. Presenting EGM= AP/ with frequent PACs. 5 NSVT episodes recorded, most recent episode was 10/4/20. Normal device function. AP= 46.5% and = 98.7%. No short V-V intervals recorded. Lead trends appear stable. Battery estimates 5.5 years to KONRAD. ER RN notified of transmission results.  NUNO [place].    Remote pacemaker transmission.    I have reviewed and interpreted the device interrogation, settings, programming and nurse's summary. The device is functioning within normal device parameters. I agree with the current findings, assessment and plan.     Medications   clonazePAM (klonoPIN) tablet 0.5 mg (0.5 mg Oral Given 12/20/20 0723)   acetaminophen (TYLENOL) tablet 975 mg (975 mg Oral Given 12/20/20 0732)   diphenhydrAMINE (BENADRYL) injection 25 mg (25 mg Intravenous Given 12/20/20 0822)        Assessments & Plan (with Medical Decision Making)   60-year-old female without significant ischemic cardiac history but with complete heart block after ablation and pacemaker dependent presents with chest pain yesterday and today that she thinks could be related to her anxiety but would like a cardiac evaluation.  Here her EKG is normal and we did 2 troponins  by 3 hours both of which are 0.  I do not think she would benefit from a stress test at this time given normal EKG and negative troponins and atypical ACS symptoms there was no shortness of breath diaphoresis and the symptoms were at rest.  She is discharged home and can follow-up with her primary care provider.  She actually has an appointment tomorrow with her  electrophysiology cardiologist.  We did interrogate her pacemaker and there was no concerning rhythm abnormalities.    I have reviewed the nursing notes. I have reviewed the findings, diagnosis, plan and need for follow up with the patient.    Discharge Medication List as of 12/20/2020 11:23 AM          Final diagnoses:   Atypical chest pain       --  Saran Morel MD  [facility] EMERGENCY DEPARTMENT  12/20/2020     Saran Morel MD  12/20/20 1416       Saran Morel MD  12/20/20 1410

## 2020-12-20 NOTE — ED AVS SNAPSHOT
[facility] Emergency Department  [address] [address]  Phone: [phone]                                    Joyce Marroquin   MRN: [id number]    Department: [facility] Emergency Department   Date of Visit: 12/20/2020           After Visit Summary Signature Page    I have received my discharge instructions, and my questions have been answered. I have discussed any challenges I see with this plan with the nurse or doctor.    ..........................................................................................................................................  Patient/Patient Representative Signature      ..........................................................................................................................................  Patient Representative Print Name and Relationship to Patient    ..................................................               ................................................  Date                                   Time    ..........................................................................................................................................  Reviewed by Signature/Title    ...................................................              ..............................................  Date                                               Time          22EPIC Rev 08/18

## 2020-12-20 NOTE — DISCHARGE INSTRUCTIONS
Your EKG and 2 troponins were negative.  Follow-up with your primary care provider.  Your pacemaker interrogation was normal

## 2020-12-21 ENCOUNTER — PRE VISIT (OUTPATIENT)
Dept: CARDIOLOGY | Facility: CLINIC | Age: 60
End: 2020-12-21

## 2020-12-21 LAB — INTERPRETATION ECG - MUSE: NORMAL

## 2020-12-22 LAB
MDC_IDC_LEAD_IMPLANT_DT: NORMAL
MDC_IDC_LEAD_IMPLANT_DT: NORMAL
MDC_IDC_LEAD_LOCATION: NORMAL
MDC_IDC_LEAD_LOCATION: NORMAL
MDC_IDC_LEAD_LOCATION_DETAIL_1: NORMAL
MDC_IDC_LEAD_LOCATION_DETAIL_1: NORMAL
MDC_IDC_LEAD_MFG: NORMAL
MDC_IDC_LEAD_MFG: NORMAL
MDC_IDC_LEAD_MODEL: NORMAL
MDC_IDC_LEAD_MODEL: NORMAL
MDC_IDC_LEAD_POLARITY_TYPE: NORMAL
MDC_IDC_LEAD_POLARITY_TYPE: NORMAL
MDC_IDC_LEAD_SERIAL: NORMAL
MDC_IDC_LEAD_SERIAL: NORMAL
MDC_IDC_MSMT_BATTERY_DTM: NORMAL
MDC_IDC_MSMT_BATTERY_REMAINING_LONGEVITY: 67 MO
MDC_IDC_MSMT_BATTERY_RRT_TRIGGER: 2.83
MDC_IDC_MSMT_BATTERY_STATUS: NORMAL
MDC_IDC_MSMT_BATTERY_VOLTAGE: 3 V
MDC_IDC_MSMT_LEADCHNL_RA_IMPEDANCE_VALUE: 380 OHM
MDC_IDC_MSMT_LEADCHNL_RA_IMPEDANCE_VALUE: 456 OHM
MDC_IDC_MSMT_LEADCHNL_RA_PACING_THRESHOLD_AMPLITUDE: 0.38 V
MDC_IDC_MSMT_LEADCHNL_RA_PACING_THRESHOLD_PULSEWIDTH: 0.4 MS
MDC_IDC_MSMT_LEADCHNL_RA_SENSING_INTR_AMPL: 2.5 MV
MDC_IDC_MSMT_LEADCHNL_RA_SENSING_INTR_AMPL: 2.5 MV
MDC_IDC_MSMT_LEADCHNL_RV_IMPEDANCE_VALUE: 437 OHM
MDC_IDC_MSMT_LEADCHNL_RV_IMPEDANCE_VALUE: 475 OHM
MDC_IDC_MSMT_LEADCHNL_RV_PACING_THRESHOLD_AMPLITUDE: 0.62 V
MDC_IDC_MSMT_LEADCHNL_RV_PACING_THRESHOLD_PULSEWIDTH: 0.4 MS
MDC_IDC_MSMT_LEADCHNL_RV_SENSING_INTR_AMPL: 7.12 MV
MDC_IDC_MSMT_LEADCHNL_RV_SENSING_INTR_AMPL: 7.12 MV
MDC_IDC_PG_IMPLANT_DTM: NORMAL
MDC_IDC_PG_MFG: NORMAL
MDC_IDC_PG_MODEL: NORMAL
MDC_IDC_PG_SERIAL: NORMAL
MDC_IDC_PG_TYPE: NORMAL
MDC_IDC_SESS_CLINIC_NAME: NORMAL
MDC_IDC_SESS_DTM: NORMAL
MDC_IDC_SESS_TYPE: NORMAL
MDC_IDC_SET_BRADY_AT_MODE_SWITCH_RATE: 171 {BEATS}/MIN
MDC_IDC_SET_BRADY_HYSTRATE: NORMAL
MDC_IDC_SET_BRADY_LOWRATE: 60 {BEATS}/MIN
MDC_IDC_SET_BRADY_MAX_SENSOR_RATE: 140 {BEATS}/MIN
MDC_IDC_SET_BRADY_MAX_TRACKING_RATE: 140 {BEATS}/MIN
MDC_IDC_SET_BRADY_MODE: NORMAL
MDC_IDC_SET_BRADY_PAV_DELAY_HIGH: 140 MS
MDC_IDC_SET_BRADY_PAV_DELAY_LOW: 180 MS
MDC_IDC_SET_BRADY_SAV_DELAY_HIGH: 110 MS
MDC_IDC_SET_BRADY_SAV_DELAY_LOW: 150 MS
MDC_IDC_SET_LEADCHNL_RA_PACING_AMPLITUDE: 1.5 V
MDC_IDC_SET_LEADCHNL_RA_PACING_ANODE_ELECTRODE_1: NORMAL
MDC_IDC_SET_LEADCHNL_RA_PACING_ANODE_LOCATION_1: NORMAL
MDC_IDC_SET_LEADCHNL_RA_PACING_CAPTURE_MODE: NORMAL
MDC_IDC_SET_LEADCHNL_RA_PACING_CATHODE_ELECTRODE_1: NORMAL
MDC_IDC_SET_LEADCHNL_RA_PACING_CATHODE_LOCATION_1: NORMAL
MDC_IDC_SET_LEADCHNL_RA_PACING_POLARITY: NORMAL
MDC_IDC_SET_LEADCHNL_RA_PACING_PULSEWIDTH: 0.4 MS
MDC_IDC_SET_LEADCHNL_RA_SENSING_ANODE_ELECTRODE_1: NORMAL
MDC_IDC_SET_LEADCHNL_RA_SENSING_ANODE_LOCATION_1: NORMAL
MDC_IDC_SET_LEADCHNL_RA_SENSING_CATHODE_ELECTRODE_1: NORMAL
MDC_IDC_SET_LEADCHNL_RA_SENSING_CATHODE_LOCATION_1: NORMAL
MDC_IDC_SET_LEADCHNL_RA_SENSING_POLARITY: NORMAL
MDC_IDC_SET_LEADCHNL_RA_SENSING_SENSITIVITY: 0.3 MV
MDC_IDC_SET_LEADCHNL_RV_PACING_AMPLITUDE: 1.5 V
MDC_IDC_SET_LEADCHNL_RV_PACING_ANODE_ELECTRODE_1: NORMAL
MDC_IDC_SET_LEADCHNL_RV_PACING_ANODE_LOCATION_1: NORMAL
MDC_IDC_SET_LEADCHNL_RV_PACING_CAPTURE_MODE: NORMAL
MDC_IDC_SET_LEADCHNL_RV_PACING_CATHODE_ELECTRODE_1: NORMAL
MDC_IDC_SET_LEADCHNL_RV_PACING_CATHODE_LOCATION_1: NORMAL
MDC_IDC_SET_LEADCHNL_RV_PACING_POLARITY: NORMAL
MDC_IDC_SET_LEADCHNL_RV_PACING_PULSEWIDTH: 0.4 MS
MDC_IDC_SET_LEADCHNL_RV_SENSING_ANODE_ELECTRODE_1: NORMAL
MDC_IDC_SET_LEADCHNL_RV_SENSING_ANODE_LOCATION_1: NORMAL
MDC_IDC_SET_LEADCHNL_RV_SENSING_CATHODE_ELECTRODE_1: NORMAL
MDC_IDC_SET_LEADCHNL_RV_SENSING_CATHODE_LOCATION_1: NORMAL
MDC_IDC_SET_LEADCHNL_RV_SENSING_POLARITY: NORMAL
MDC_IDC_SET_LEADCHNL_RV_SENSING_SENSITIVITY: 0.9 MV
MDC_IDC_SET_ZONE_DETECTION_INTERVAL: 350 MS
MDC_IDC_SET_ZONE_DETECTION_INTERVAL: 400 MS
MDC_IDC_SET_ZONE_TYPE: NORMAL
MDC_IDC_STAT_AT_BURDEN_PERCENT: 0 %
MDC_IDC_STAT_AT_DTM_END: NORMAL
MDC_IDC_STAT_AT_DTM_START: NORMAL
MDC_IDC_STAT_BRADY_AP_VP_PERCENT: 45.93 %
MDC_IDC_STAT_BRADY_AP_VS_PERCENT: 0.64 %
MDC_IDC_STAT_BRADY_AS_VP_PERCENT: 53.14 %
MDC_IDC_STAT_BRADY_AS_VS_PERCENT: 0.29 %
MDC_IDC_STAT_BRADY_DTM_END: NORMAL
MDC_IDC_STAT_BRADY_DTM_START: NORMAL
MDC_IDC_STAT_BRADY_RA_PERCENT_PACED: 46.12 %
MDC_IDC_STAT_BRADY_RV_PERCENT_PACED: 98.69 %
MDC_IDC_STAT_EPISODE_RECENT_COUNT: 0
MDC_IDC_STAT_EPISODE_RECENT_COUNT: 5
MDC_IDC_STAT_EPISODE_RECENT_COUNT_DTM_END: NORMAL
MDC_IDC_STAT_EPISODE_RECENT_COUNT_DTM_START: NORMAL
MDC_IDC_STAT_EPISODE_TOTAL_COUNT: 0
MDC_IDC_STAT_EPISODE_TOTAL_COUNT: 1
MDC_IDC_STAT_EPISODE_TOTAL_COUNT: 1
MDC_IDC_STAT_EPISODE_TOTAL_COUNT: 9
MDC_IDC_STAT_EPISODE_TOTAL_COUNT_DTM_END: NORMAL
MDC_IDC_STAT_EPISODE_TOTAL_COUNT_DTM_START: NORMAL
MDC_IDC_STAT_EPISODE_TYPE: NORMAL

## 2020-12-30 DIAGNOSIS — I42.9 CARDIOMYOPATHY, UNSPECIFIED TYPE (H): Primary | ICD-10-CM

## 2021-01-06 ENCOUNTER — VIRTUAL VISIT (OUTPATIENT)
Dept: CARDIOLOGY | Facility: CLINIC | Age: 61
End: 2021-01-06
Attending: INTERNAL MEDICINE
Payer: COMMERCIAL

## 2021-01-06 DIAGNOSIS — I42.9 CARDIOMYOPATHY, UNSPECIFIED TYPE (H): Primary | ICD-10-CM

## 2021-01-06 DIAGNOSIS — I44.2 COMPLETE HEART BLOCK (H): ICD-10-CM

## 2021-01-06 DIAGNOSIS — I47.29 PAROXYSMAL VENTRICULAR TACHYCARDIA (H): ICD-10-CM

## 2021-01-06 PROCEDURE — 99213 OFFICE O/P EST LOW 20 MIN: CPT | Mod: GT | Performed by: NURSE PRACTITIONER

## 2021-01-06 NOTE — LETTER
1/6/2021      RE: Joyce Marroquin  [address] [facility]  [place] [address]       Dear Colleague,    Thank you for the opportunity to participate in the care of your patient, Joyce Marroquin, at the [facility] [place] [facility]. Please see a copy of my visit note below.    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 She has not tolerated GDM - except for low dose beta blocker and low dose of aldosterone antagonist.   She states she is feeling well for the fist time in many months. Specifically her mental status is clearer than it has been. 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     She was recently seen in the ED with chest pain. Her cardiac work up was negative. She underwent cardiac angiogram which showed minimal coronary diease with no occlusive lesions in July 2020.  NTG did relieve her chest pain. She does have reflux.     PAST MEDICAL HISTORY:  Past Medical History:   Diagnosis Date     Arthritis      Cardiomyopathy (H)     ff Cardiology     Chronic depressive personality disorder      Congestive heart failure (H) see dr martínez    heart failure correct term     COPD exacerbation (H)      Esophageal reflux      Ex-smoker     quit 2006; 1 PPD x 30     Hyperparathyroidism (H)     s/p parathyroidectomy     Hypothyroidism      LARRY on CPAP     ff Sleep medicine     Pulmonary nodules     ff Pulmonologist     S/P cardiac pacemaker procedure     checked every 6 months at the [facility]     Uncomplicated asthma years       FAMILY HISTORY:  Family History   Problem Relation Age of Onset     Hypothyroidism Mother      Hypertension Mother      Osteoarthritis Mother      Coronary Artery Disease Father         nonfatal MI in his 70s     Asthma Father      Diabetes Sister      Hypothyroidism Sister      Breast Cancer Maternal  Aunt      Anxiety Disorder Sister        SOCIAL HISTORY:  Social History     Socioeconomic History     Marital status:      Spouse name: None     Number of children: None     Years of education: None     Highest education level: Master's degree (e.g., MA, MS, Florian, MEd, MSW, SONIA)   Occupational History     None   Social Needs     Financial resource strain: Not very hard     Food insecurity     Worry: Never true     Inability: Never true     Transportation needs     Medical: No     Non-medical: No   Tobacco Use     Smoking status: Former Smoker     Packs/day: 0.00     Years: 0.00     Pack years: 0.00     Smokeless tobacco: Never Used   Substance and Sexual Activity     Alcohol use: Yes     Alcohol/week: 0.0 - 8.0 standard drinks     Frequency: 2-3 times a week     Drinks per session: 1 or 2     Binge frequency: Never     Comment: seldom     Drug use: No     Sexual activity: Yes     Partners: Male     Birth control/protection: None     Comment: vasectomy   Lifestyle     Physical activity     Days per week: 3 days     Minutes per session: 10 min     Stress: To some extent   Relationships     Social connections     Talks on phone: More than three times a week     Gets together: Never     Attends Confucianist service: More than 4 times per year     Active member of club or organization: Yes     Attends meetings of clubs or organizations: More than 4 times per year     Relationship status:      Intimate partner violence     Fear of current or ex partner: None     Emotionally abused: None     Physically abused: None     Forced sexual activity: None   Other Topics Concern     Parent/sibling w/ CABG, MI or angioplasty before 65F 55M? No   Social History Narrative    CRNA on disability for vestibular symptoms        CURRENT MEDICATIONS:  Current Outpatient Medications   Medication Sig Dispense Refill     albuterol (PROAIR HFA/PROVENTIL HFA/VENTOLIN HFA) 108 (90 BASE) MCG/ACT Inhaler Inhale 2 puffs into the lungs as  needed for shortness of breath / dyspnea or wheezing        azelastine (ASTELIN) 0.1 % nasal spray Spray 1 spray into both nostrils 2 times daily       calcium carbonate (OS-MANJU) 500 MG tablet Take 1 tablet by mouth 2 times daily       carvedilol (COREG) 3.125 MG tablet Take 2 tablets (6.25 mg) by mouth 2 times daily (with meals) 180 tablet 3     clonazePAM (KLONOPIN) 0.5 MG tablet Take 1 tablet (0.5 mg) by mouth 3 times daily as needed for anxiety 90 tablet 2     DiphenhydrAMINE HCl (BENADRYL PO) Take 25-50 mg by mouth nightly as needed       Ferrous Sulfate (IRON SUPPLEMENT PO) Take 65 mg by mouth 2 times daily (with meals)        fluticasone-salmeterol (ADVAIR) 250-50 MCG/DOSE inhaler Inhale 1 puff into the lungs every 12 hours       furosemide (LASIX) 20 MG tablet Take 1 tablet (20 mg) by mouth 2 times daily as needed (for weight gain AND SBP greater than 100) 90 tablet 1     ipratropium (ATROVENT) 0.06 % nasal spray Spray 2 sprays into both nostrils 4 times daily       loperamide (IMODIUM A-D) 2 MG tablet Take 2 tabs (4 mg) after first loose stool, and then take one tab (2 mg) after each diarrheal stool.  Max of 8 tabs (16 mg) per day. 1 tablet 0     MAGNESIUM LACTATE PO Take 180 mg by mouth At Bedtime        MELATONIN PO Take 3 mg by mouth At Bedtime        Menaquinone-7 (VITAMIN K2 PO) Take 1 tablet by mouth every morning        METHYLPREDNISOLONE PO Take 40 mg by mouth as needed        Multiple Vitamin (DAILY MULTIVITAMIN PO) Take 1 tablet by mouth every morning        nebulizer nebulization as needed       Probiotic Product (PROBIOTIC DAILY PO) Take 1 capsule by mouth 2 times daily        spironolactone (ALDACTONE) 25 MG tablet Take 0.5 tablets (12.5 mg) by mouth daily 90 tablet 3     SYNTHROID 150 MCG tablet 150 mcg for 6 days and 75 mcg for 1 day per week 90 tablet 3     TURMERIC PO Take 1 tablet by mouth every morning        UNABLE TO FIND 2 times daily MEDICATION NAME: Standard Process, Immune  Congaplex. Pt taking 2 tablets daily       vitamin B complex with vitamin C (VITAMIN  B COMPLEX) PO tablet Take 1 tablet by mouth as needed       vitamin D3 (CHOLECALCIFEROL) 2000 units tablet Take 1 tablet by mouth daily 1 tablet 0     Zinc 15 MG CAPS Take 10 mg by mouth       fluocinolone acetonide (DERMA SMOOTHE/FS BODY) 0.01 % external oil Use for ears.  2 times daily for 7 days (Patient not taking: Reported on 1/6/2021) 1 Bottle 0       ROS:   Constitutional: No fever, chills, or sweats. No weight gain/loss.   ENT: No visual disturbance, ear ache, epistaxis, sore throat.   Allergies/Immunologic: Negative.   Respiratory: No cough, hemoptysis.   Cardiovascular: As per HPI.   GI: No nausea, vomiting, hematemesis, melena, or hematochezia.   : No urinary frequency, dysuria, or hematuria.   Integument: Negative.   Psychiatric: Negative.   Neuro: Negative.   Endocrinology: Negative.   Musculoskeletal: Negative.    EXAM:  Home BP  systolic   Wt 156#     Labs:  CBC RESULTS:  Lab Results   Component Value Date    WBC 5.9 12/20/2020    RBC 3.86 12/20/2020    HGB 12.4 12/20/2020    HCT 38.7 12/20/2020     12/20/2020    MCH 32.1 12/20/2020    MCHC 32.0 12/20/2020    RDW 12.3 12/20/2020     12/20/2020       CMP RESULTS:  Lab Results   Component Value Date     12/20/2020    POTASSIUM 3.4 12/20/2020    CHLORIDE 114 (H) 12/20/2020    CO2 23 12/20/2020    ANIONGAP 5 12/20/2020    GLC 86 12/20/2020    BUN 13 12/20/2020    CR 0.62 12/20/2020    GFRESTIMATED >90 12/20/2020    GFRESTBLACK >90 12/20/2020    MANJU 8.0 (L) 12/20/2020    BILITOTAL 0.3 12/20/2020    ALBUMIN 2.9 (L) 12/20/2020    ALKPHOS 83 12/20/2020    ALT 25 12/20/2020    AST 18 12/20/2020        INR RESULTS:  Lab Results   Component Value Date    INR 1.02 10/06/2019       Lab Results   Component Value Date    MAG 2.3 07/20/2020     Lab Results   Component Value Date    NTBNPI 107 12/20/2020     Lab Results   Component Value Date    NTBNP 76  07/20/2020       Assessment and Plan:   1. 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    Stage C  NYHA Class III  ACEi/ARB yes  BB no  Aldosterone antagonist yes  SCD prophylaxis does not meet criteria  Fluid status euvolemic    2. RF ablation/ AV node ablation - with PPM     3. Chest pain - negative cardiac workup. Suspect due to esophageal spasm. Will order NTG to use prn given it helps sx. Pt advised of potential BP drop after using NTG and to be sitting, given her low baseline bp.    Follow-up 1 month    CC  Patient Care Team:  Ce Crowe NP as PCP - General (Nurse Practitioner - Family)  Yuliet Mclean as Garland Velez MD as MD (Internal Medicine)  Adelaida De La Torre APRN CNP as Nurse Practitioner (Nurse Practitioner)  Cheyenne Thomson RN as Specialty Care Coordinator (Clinical Cardiac Electrophysiology)  Lino Funes MD as MD (Clinical Cardiac Electrophysiology)  Mame Valadez, RN as Specialty Care Coordinator (Cardiology)  Garland Patel MD as Assigned Endocrinology Provider  Hiren Mortensen MD as Assigned PCP  HIREN MORTESNEN Lopez is a 60 year old female who is being evaluated via a billable video visit.      How would you like to obtain your AVS? MyChart  If the video visit is dropped, the invitation should be resent by: Text to cell phone: [phone]  Will anyone else be joining your video visit? No          Please do not hesitate to contact me if you have any questions/concerns.     Sincerely,     SHAYNA Sommers CNP

## 2021-01-06 NOTE — NURSING NOTE
Chief Complaint   Patient presents with     Follow Up     60 year old female presents with hfref, history of takotsubo CM, for symptom follow up (virtual)      Celia Cloud CMA  Called by Libby Jarvis, EMT

## 2021-01-06 NOTE — PROGRESS NOTES
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 She has not tolerated GDM - except for low dose beta blocker and low dose of aldosterone antagonist.   She states she is feeling well for the fist time in many months. Specifically her mental status is clearer than it has been. 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     She was recently seen in the ED with chest pain. Her cardiac work up was negative. She underwent cardiac angiogram which showed minimal coronary diease with no occlusive lesions in July 2020.  NTG did relieve her chest pain. She does have reflux.     PAST MEDICAL HISTORY:  Past Medical History:   Diagnosis Date     Arthritis      Cardiomyopathy (H)     ff Cardiology     Chronic depressive personality disorder      Congestive heart failure (H) see dr martínez    heart failure correct term     COPD exacerbation (H)      Esophageal reflux      Ex-smoker     quit 2006; 1 PPD x 30     Hyperparathyroidism (H)     s/p parathyroidectomy     Hypothyroidism      LARRY on CPAP     ff Sleep medicine     Pulmonary nodules     ff Pulmonologist     S/P cardiac pacemaker procedure     [facility]      Uncomplicated asthma years       FAMILY HISTORY:  Family History   Problem Relation Age of Onset     Hypothyroidism Mother      Hypertension Mother      Osteoarthritis Mother      Coronary Artery Disease Father         nonfatal MI in his 70s     Asthma Father      Diabetes Sister      Hypothyroidism Sister      Breast Cancer Maternal Aunt      Anxiety Disorder Sister        SOCIAL HISTORY:  Social History     Socioeconomic History     Marital status:      Spouse name: None     Number of children: None     Years of education: None     Highest education level: Master's degree (e.g., MA, MS, Florian, MEd, MSW, SONIA)   Occupational History     None   Social  Needs     Financial resource strain: Not very hard     Food insecurity     Worry: Never true     Inability: Never true     Transportation needs     Medical: No     Non-medical: No   Tobacco Use     Smoking status: Former Smoker     Packs/day: 0.00     Years: 0.00     Pack years: 0.00     Smokeless tobacco: Never Used   Substance and Sexual Activity     Alcohol use: Yes     Alcohol/week: 0.0 - 8.0 standard drinks     Frequency: 2-3 times a week     Drinks per session: 1 or 2     Binge frequency: Never     Comment: seldom     Drug use: No     Sexual activity: Yes     Partners: Male     Birth control/protection: None     Comment: vasectomy   Lifestyle     Physical activity     Days per week: 3 days     Minutes per session: 10 min     Stress: To some extent   Relationships     Social connections     Talks on phone: More than three times a week     Gets together: Never     Attends Jew service: More than 4 times per year     Active member of club or organization: Yes     Attends meetings of clubs or organizations: More than 4 times per year     Relationship status:      Intimate partner violence     Fear of current or ex partner: None     Emotionally abused: None     Physically abused: None     Forced sexual activity: None   Other Topics Concern     Parent/sibling w/ CABG, MI or angioplasty before 65F 55M? No   Social History Narrative    CRNA on disability for vestibular symptoms        CURRENT MEDICATIONS:  Current Outpatient Medications   Medication Sig Dispense Refill     albuterol (PROAIR HFA/PROVENTIL HFA/VENTOLIN HFA) 108 (90 BASE) MCG/ACT Inhaler Inhale 2 puffs into the lungs as needed for shortness of breath / dyspnea or wheezing        azelastine (ASTELIN) 0.1 % nasal spray Spray 1 spray into both nostrils 2 times daily       calcium carbonate (OS-MANJU) 500 MG tablet Take 1 tablet by mouth 2 times daily       carvedilol (COREG) 3.125 MG tablet Take 2 tablets (6.25 mg) by mouth 2 times daily (with  meals) 180 tablet 3     clonazePAM (KLONOPIN) 0.5 MG tablet Take 1 tablet (0.5 mg) by mouth 3 times daily as needed for anxiety 90 tablet 2     DiphenhydrAMINE HCl (BENADRYL PO) Take 25-50 mg by mouth nightly as needed       Ferrous Sulfate (IRON SUPPLEMENT PO) Take 65 mg by mouth 2 times daily (with meals)        fluticasone-salmeterol (ADVAIR) 250-50 MCG/DOSE inhaler Inhale 1 puff into the lungs every 12 hours       furosemide (LASIX) 20 MG tablet Take 1 tablet (20 mg) by mouth 2 times daily as needed (for weight gain AND SBP greater than 100) 90 tablet 1     ipratropium (ATROVENT) 0.06 % nasal spray Spray 2 sprays into both nostrils 4 times daily       loperamide (IMODIUM A-D) 2 MG tablet Take 2 tabs (4 mg) after first loose stool, and then take one tab (2 mg) after each diarrheal stool.  Max of 8 tabs (16 mg) per day. 1 tablet 0     MAGNESIUM LACTATE PO Take 180 mg by mouth At Bedtime        MELATONIN PO Take 3 mg by mouth At Bedtime        Menaquinone-7 (VITAMIN K2 PO) Take 1 tablet by mouth every morning        METHYLPREDNISOLONE PO Take 40 mg by mouth as needed        Multiple Vitamin (DAILY MULTIVITAMIN PO) Take 1 tablet by mouth every morning        nebulizer nebulization as needed       Probiotic Product (PROBIOTIC DAILY PO) Take 1 capsule by mouth 2 times daily        spironolactone (ALDACTONE) 25 MG tablet Take 0.5 tablets (12.5 mg) by mouth daily 90 tablet 3     SYNTHROID 150 MCG tablet 150 mcg for 6 days and 75 mcg for 1 day per week 90 tablet 3     TURMERIC PO Take 1 tablet by mouth every morning        UNABLE TO FIND 2 times daily MEDICATION NAME: Standard Process, Immune Congaplex. Pt taking 2 tablets daily       vitamin B complex with vitamin C (VITAMIN  B COMPLEX) PO tablet Take 1 tablet by mouth as needed       vitamin D3 (CHOLECALCIFEROL) 2000 units tablet Take 1 tablet by mouth daily 1 tablet 0     Zinc 15 MG CAPS Take 10 mg by mouth       fluocinolone acetonide (DERMA SMOOTHE/FS BODY) 0.01 %  external oil Use for ears.  2 times daily for 7 days (Patient not taking: Reported on 1/6/2021) 1 Bottle 0       ROS:   Constitutional: No fever, chills, or sweats. No weight gain/loss.   ENT: No visual disturbance, ear ache, epistaxis, sore throat.   Allergies/Immunologic: Negative.   Respiratory: No cough, hemoptysis.   Cardiovascular: As per HPI.   GI: No nausea, vomiting, hematemesis, melena, or hematochezia.   : No urinary frequency, dysuria, or hematuria.   Integument: Negative.   Psychiatric: Negative.   Neuro: Negative.   Endocrinology: Negative.   Musculoskeletal: Negative.    EXAM:  Home BP  systolic   Wt 156#     Labs:  CBC RESULTS:  Lab Results   Component Value Date    WBC 5.9 12/20/2020    RBC 3.86 12/20/2020    HGB 12.4 12/20/2020    HCT 38.7 12/20/2020     12/20/2020    MCH 32.1 12/20/2020    MCHC 32.0 12/20/2020    RDW 12.3 12/20/2020     12/20/2020       CMP RESULTS:  Lab Results   Component Value Date     12/20/2020    POTASSIUM 3.4 12/20/2020    CHLORIDE 114 (H) 12/20/2020    CO2 23 12/20/2020    ANIONGAP 5 12/20/2020    GLC 86 12/20/2020    BUN 13 12/20/2020    CR 0.62 12/20/2020    GFRESTIMATED >90 12/20/2020    GFRESTBLACK >90 12/20/2020    MANJU 8.0 (L) 12/20/2020    BILITOTAL 0.3 12/20/2020    ALBUMIN 2.9 (L) 12/20/2020    ALKPHOS 83 12/20/2020    ALT 25 12/20/2020    AST 18 12/20/2020        INR RESULTS:  Lab Results   Component Value Date    INR 1.02 10/06/2019       Lab Results   Component Value Date    MAG 2.3 07/20/2020     Lab Results   Component Value Date    NTBNPI 107 12/20/2020     Lab Results   Component Value Date    NTBNP 76 07/20/2020       Assessment and Plan:   1. 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    Stage C  NYHA Class III  ACEi/ARB yes  BB no  Aldosterone antagonist yes  SCD prophylaxis does not meet criteria  Fluid status euvolemic    2. RF ablation/ AV node ablation - with PPM     3. Chest pain - negative cardiac  workup. Suspect due to esophageal spasm. Will order NTG to use prn given it helps sx. Pt advised of potential BP drop after using NTG and to be sitting, given her low baseline bp.    Follow-up 1 month    CC  Patient Care Team:  Ce Crowe, PO as PCP - General (Nurse Practitioner - Family)  Yuliet Mclean as Garland Velez MD as MD (Internal Medicine)  Adelaida De La Torre APRN CNP as Nurse Practitioner (Nurse Practitioner)  Cheyenne Thomson RN as Specialty Care Coordinator (Clinical Cardiac Electrophysiology)  Lino Funes MD as MD (Clinical Cardiac Electrophysiology)  Mame Valadez, RN as Specialty Care Coordinator (Cardiology)  Garland Patel MD as Assigned Endocrinology Provider  Hiren Mortensen MD as Assigned PCP  HIREN MORTENSEN Lopez is a 60 year old female who is being evaluated via a billable video visit.      How would you like to obtain your AVS? MyChart  If the video visit is dropped, the invitation should be resent by: Text to cell phone: [phone]  Will anyone else be joining your video visit? No

## 2021-01-07 RX ORDER — NITROGLYCERIN 0.4 MG/1
TABLET SUBLINGUAL
Qty: 30 TABLET | Refills: 1 | Status: SHIPPED | OUTPATIENT
Start: 2021-01-07 | End: 2021-05-20

## 2021-01-07 RX ORDER — FUROSEMIDE 20 MG
20 TABLET ORAL 2 TIMES DAILY
Qty: 180 TABLET | Refills: 1 | Status: SHIPPED | OUTPATIENT
Start: 2021-01-07 | End: 2021-01-13

## 2021-01-07 RX ORDER — CARVEDILOL 3.12 MG/1
3.12 TABLET ORAL 2 TIMES DAILY WITH MEALS
Qty: 180 TABLET | Refills: 3 | Status: SHIPPED | OUTPATIENT
Start: 2021-01-07 | End: 2021-07-27

## 2021-01-07 NOTE — PATIENT INSTRUCTIONS
Take your medicines every day, as directed    Changes made today:  o Decrease coreg to 3.125mg twice per day  o Take lasix 20mg twice per day  o Prescription sent for nitroglycerin tablets as needed   Monitor Your Weight and Symptoms    Contact us if you:      Gain 2 pounds in one day or 5 pounds in one week    Feel more short of breath    Notice more leg swelling    Feel lightheadeded   Change your lifestyle    Limit Salt or Sodium:    2000 mg  Limit Fluids:    2000 mL or approximately 64 ounces  Eat a Heart Healthy Diet    Low in saturated fats  Stay Active:    Aim to move at least 150 minutes every  week         To Contact us    During Business Hours:  [phone], option # 1 (University)  Then option # 4 (medical questions)     After hours, weekends or holidays:   [phone], Option #4  Ask to speak to the On-Call Cardiologist. Inform them you are a CORE/heart failure patient at the [facility].     Use [employer] allows you to communicate directly with your heart team through secure messaging.    [employer] can be accessed any time on your phone, computer, or tablet.    If you need assistance, we'd be happy to help!         Keep your Heart Appointments:    Follow up in 1 month with labs done prior

## 2021-01-15 ENCOUNTER — HEALTH MAINTENANCE LETTER (OUTPATIENT)
Age: 61
End: 2021-01-15

## 2021-02-04 DIAGNOSIS — R82.90 FOUL SMELLING URINE: ICD-10-CM

## 2021-02-04 DIAGNOSIS — I42.9 CARDIOMYOPATHY, UNSPECIFIED TYPE (H): ICD-10-CM

## 2021-02-04 LAB
ALBUMIN UR-MCNC: NEGATIVE MG/DL
ANION GAP SERPL CALCULATED.3IONS-SCNC: 6 MMOL/L (ref 3–14)
APPEARANCE UR: CLEAR
BACTERIA #/AREA URNS HPF: ABNORMAL /HPF
BILIRUB UR QL STRIP: ABNORMAL
BUN SERPL-MCNC: 23 MG/DL (ref 7–30)
CALCIUM SERPL-MCNC: 8.7 MG/DL (ref 8.5–10.1)
CHLORIDE SERPL-SCNC: 102 MMOL/L (ref 94–109)
CO2 SERPL-SCNC: 27 MMOL/L (ref 20–32)
COLOR UR AUTO: YELLOW
CREAT SERPL-MCNC: 0.84 MG/DL (ref 0.52–1.04)
GFR SERPL CREATININE-BSD FRML MDRD: 74 ML/MIN/{1.73_M2}
GLUCOSE SERPL-MCNC: 84 MG/DL (ref 70–99)
GLUCOSE UR STRIP-MCNC: NEGATIVE MG/DL
HGB UR QL STRIP: NEGATIVE
KETONES UR STRIP-MCNC: ABNORMAL MG/DL
LEUKOCYTE ESTERASE UR QL STRIP: ABNORMAL
NITRATE UR QL: NEGATIVE
NON-SQ EPI CELLS #/AREA URNS LPF: ABNORMAL /LPF
PH UR STRIP: 7 PH (ref 5–7)
POTASSIUM SERPL-SCNC: 4.4 MMOL/L (ref 3.4–5.3)
RBC #/AREA URNS AUTO: ABNORMAL /HPF
SODIUM SERPL-SCNC: 135 MMOL/L (ref 133–144)
SOURCE: ABNORMAL
SP GR UR STRIP: 1.02 (ref 1–1.03)
UROBILINOGEN UR STRIP-ACNC: 0.2 EU/DL (ref 0.2–1)
WBC #/AREA URNS AUTO: ABNORMAL /HPF

## 2021-02-04 PROCEDURE — 36415 COLL VENOUS BLD VENIPUNCTURE: CPT | Performed by: NURSE PRACTITIONER

## 2021-02-04 PROCEDURE — 81001 URINALYSIS AUTO W/SCOPE: CPT | Performed by: NURSE PRACTITIONER

## 2021-02-04 PROCEDURE — 80048 BASIC METABOLIC PNL TOTAL CA: CPT | Performed by: NURSE PRACTITIONER

## 2021-02-17 ENCOUNTER — VIRTUAL VISIT (OUTPATIENT)
Dept: CARDIOLOGY | Facility: CLINIC | Age: 61
End: 2021-02-17
Attending: INTERNAL MEDICINE
Payer: COMMERCIAL

## 2021-02-17 DIAGNOSIS — I44.2 CHB (COMPLETE HEART BLOCK) (H): ICD-10-CM

## 2021-02-17 DIAGNOSIS — I42.9 CARDIOMYOPATHY, UNSPECIFIED TYPE (H): Primary | ICD-10-CM

## 2021-02-17 PROCEDURE — 99214 OFFICE O/P EST MOD 30 MIN: CPT | Mod: GT | Performed by: NURSE PRACTITIONER

## 2021-02-17 RX ORDER — ACETAMINOPHEN 500 MG
500-1000 TABLET ORAL EVERY 6 HOURS PRN
COMMUNITY
End: 2022-11-11

## 2021-02-17 NOTE — PROGRESS NOTES
"Joyce is a 60 year old who is being evaluated via a billable video visit.      How would you like to obtain your AVS? MyChart  If the video visit is dropped, the invitation should be resent by:  My Chart video visit  Will anyone else be joining your video visit? No    Vitals - Patient Reported  Systolic (Patient Reported): 98  Diastolic (Patient Reported): 72  Weight (Patient Reported): 70.3 kg (155 lb)  Height (Patient Reported): 165.1 cm (5' 5\")  BMI (Based on Pt Reported Ht/Wt): 25.79  SpO2 (Patient Reported): 97  Pulse (Patient Reported): 110  Pain Score: Mild Pain (3)(Has SOB with exhortion, also feels exhausted)    Vitals were taken and medication reconciled.    RICHARD Becerril  4:19 PM    "

## 2021-02-17 NOTE — LETTER
"2/17/2021      RE: Joyce Marroquin  [address] [facility] [address]       Dear Colleague,    Thank you for the opportunity to participate in the care of your patient, Joyce Mraroquin, at the [facility] [place] [facility]. Please see a copy of my visit note below.    Joyce is a 60 year old who is being evaluated via a billable video visit.      How would you like to obtain your AVS? MyChart  If the video visit is dropped, the invitation should be resent by:  My Chart video visit  Will anyone else be joining your video visit? No    Vitals - Patient Reported  Systolic (Patient Reported): 98  Diastolic (Patient Reported): 72  Weight (Patient Reported): 70.3 kg (155 lb)  Height (Patient Reported): 165.1 cm (5' 5\")  BMI (Based on Pt Reported Ht/Wt): 25.79  SpO2 (Patient Reported): 97  Pulse (Patient Reported): 110  Pain Score: Mild Pain (3)(Has SOB with exhortion, also feels exhausted)    Vitals were taken and medication reconciled.    RICHARD Becerril  4:19 PM      HPI: 60 year old female patient seen via video visit for follow up of HFrEF due to arrhthymias.. She has not tolerated GDM - except for low dose beta blocker and low dose of aldosterone antagonist.   Pt is very teary tonight and states she has not been doing well. She states she is dizzy, confused, has had bouts of diarrhea, is SOB with exertion She uses lasix prn, but did not take any today due to low BP (80's systolic). SHe has not had any further chest pain.   Her wt is stable, she denies PND, LE edema.   She is seeing a counselor on a regular basis to address her depression. She would like to be on an antidepressant but due to log QT syndrome, she has been reluctant to start - although she requests a pharm D referral to review her meds and recommend an antidepressant.       PAST MEDICAL HISTORY:  Past Medical History:   Diagnosis Date     Arthritis      Cardiomyopathy (H)     ff Cardiology "     Chronic depressive personality disorder      Congestive heart failure (H) see dr martínez    heart failure correct term     COPD exacerbation (H)      Esophageal reflux      Ex-smoker     quit 2006; 1 PPD x 30     Hyperparathyroidism (H)     s/p parathyroidectomy     Hypothyroidism      LARRY on CPAP     ff Sleep medicine     Pulmonary nodules     ff Pulmonologist     S/P cardiac pacemaker procedure     checked every 6 months at [facility]     Uncomplicated asthma years       FAMILY HISTORY:  Family History   Problem Relation Age of Onset     Hypothyroidism Mother      Hypertension Mother      Osteoarthritis Mother      Coronary Artery Disease Father         nonfatal MI in his 70s     Asthma Father      Diabetes Sister      Hypothyroidism Sister      Breast Cancer Maternal Aunt      Anxiety Disorder Sister      SOCIAL HISTORY:  Social History     Socioeconomic History     Marital status:      Spouse name: None     Number of children: None     Years of education: None     Highest education level: Master's degree (e.g., MA, MS, Florian, MEd, MSW, SONIA)   Occupational History     None   Social Needs     Financial resource strain: Not very hard     Food insecurity     Worry: Never true     Inability: Never true     Transportation needs     Medical: No     Non-medical: No   Tobacco Use     Smoking status: Former Smoker     Packs/day: 0.00     Years: 0.00     Pack years: 0.00     Smokeless tobacco: Never Used   Substance and Sexual Activity     Alcohol use: Yes     Alcohol/week: 0.0 - 8.0 standard drinks     Frequency: 2-3 times a week     Drinks per session: 1 or 2     Binge frequency: Never     Comment: seldom     Drug use: No     Sexual activity: Yes     Partners: Male     Birth control/protection: None     Comment: vasectomy   Lifestyle     Physical activity     Days per week: 3 days     Minutes per session: 10 min     Stress: To some extent   Relationships     Social connections     Talks on phone: More than three  times a week     Gets together: Never     Attends Latter-day service: More than 4 times per year     Active member of club or organization: Yes     Attends meetings of clubs or organizations: More than 4 times per year     Relationship status:      Intimate partner violence     Fear of current or ex partner: None     Emotionally abused: None     Physically abused: None     Forced sexual activity: None   Other Topics Concern     Parent/sibling w/ CABG, MI or angioplasty before 65F 55M? No   Social History Narrative    CRNA on disability for vestibular symptoms        CURRENT MEDICATIONS:  Current Outpatient Medications   Medication Sig Dispense Refill     acetaminophen (TYLENOL) 500 MG tablet Take 500-1,000 mg by mouth every 6 hours as needed for mild pain       albuterol (PROAIR HFA/PROVENTIL HFA/VENTOLIN HFA) 108 (90 BASE) MCG/ACT Inhaler Inhale 2 puffs into the lungs as needed for shortness of breath / dyspnea or wheezing        azelastine (ASTELIN) 0.1 % nasal spray Spray 1 spray into both nostrils 2 times daily       Calcium Lactate 500 MG CAPS        carvedilol (COREG) 3.125 MG tablet Take 1 tablet (3.125 mg) by mouth 2 times daily (with meals) 180 tablet 3     clonazePAM (KLONOPIN) 0.5 MG tablet Take 1 tablet (0.5 mg) by mouth 3 times daily as needed for anxiety 90 tablet 2     DiphenhydrAMINE HCl (BENADRYL PO) Take 25-50 mg by mouth nightly as needed       Ferrous Sulfate (IRON SUPPLEMENT PO) Take 65 mg by mouth 2 times daily (with meals)        fluticasone-salmeterol (ADVAIR) 250-50 MCG/DOSE inhaler Inhale 1 puff into the lungs every 12 hours       furosemide (LASIX) 20 MG tablet Take 1 tablet (20 mg) by mouth 2 times daily as needed (for fluid retention) 180 tablet 1     ipratropium (ATROVENT) 0.06 % nasal spray Spray 2 sprays into both nostrils 4 times daily       loperamide (IMODIUM A-D) 2 MG tablet Take 2 tabs (4 mg) after first loose stool, and then take one tab (2 mg) after each diarrheal stool.   Max of 8 tabs (16 mg) per day. 1 tablet 0     MAGNESIUM LACTATE PO Take 180 mg by mouth At Bedtime        MELATONIN PO Take 3 mg by mouth At Bedtime        Menaquinone-7 (VITAMIN K2 PO) Take 1 tablet by mouth every morning        METHYLPREDNISOLONE PO Take 40 mg by mouth as needed        Multiple Vitamin (DAILY MULTIVITAMIN PO) Take 1 tablet by mouth every morning        nitroGLYcerin (NITROSTAT) 0.4 MG sublingual tablet For chest pain place 1 tablet under the tongue every 5 minutes for 3 doses. If symptoms persist 5 minutes after 1st dose call 911. 30 tablet 1     Probiotic Product (PROBIOTIC DAILY PO) Take 1 capsule by mouth 2 times daily        spironolactone (ALDACTONE) 25 MG tablet Take 0.5 tablets (12.5 mg) by mouth daily 90 tablet 3     SYNTHROID 150 MCG tablet 150 mcg for 6 days and 75 mcg for 1 day per week 90 tablet 3     TURMERIC PO Take 1 tablet by mouth every morning        UNABLE TO FIND 2 times daily MEDICATION NAME: Standard Process, Immune Congaplex. Pt taking 2 tablets daily       vitamin B complex with vitamin C (VITAMIN  B COMPLEX) PO tablet Take 1 tablet by mouth as needed       vitamin D3 (CHOLECALCIFEROL) 2000 units tablet Take 1 tablet by mouth daily 1 tablet 0     calcium carbonate (OS-MANJU) 500 MG tablet Take 1 tablet by mouth 2 times daily       fluocinolone acetonide (DERMA SMOOTHE/FS BODY) 0.01 % external oil Use for ears.  2 times daily for 7 days (Patient not taking: Reported on 1/6/2021) 1 Bottle 0     nebulizer nebulization as needed       Zinc 15 MG CAPS Take 10 mg by mouth         ROS:   Constitutional: No fever, chills, or sweats. No weight gain/loss.   ENT: No visual disturbance, ear ache, epistaxis, sore throat.   Allergies/Immunologic: Negative.   Respiratory: No cough, hemoptysis.   Cardiovascular: As per HPI.   GI: she has had nausea/diarhea   : No urinary frequency, dysuria, or hematuria.   Integument: Negative.   Psychiatric: feels depressed/ is teary    Neuro: Negative.    Endocrinology: Negative.   Musculoskeletal: Negative.    EXAM:  Home BP  systolic   Wt 152- 156#     Labs:  CBC RESULTS:  Lab Results   Component Value Date    WBC 5.9 12/20/2020    RBC 3.86 12/20/2020    HGB 12.4 12/20/2020    HCT 38.7 12/20/2020     12/20/2020    MCH 32.1 12/20/2020    MCHC 32.0 12/20/2020    RDW 12.3 12/20/2020     12/20/2020       CMP RESULTS:  Lab Results   Component Value Date     02/04/2021    POTASSIUM 4.4 02/04/2021    CHLORIDE 102 02/04/2021    CO2 27 02/04/2021    ANIONGAP 6 02/04/2021    GLC 84 02/04/2021    BUN 23 02/04/2021    CR 0.84 02/04/2021    GFRESTIMATED 74 02/04/2021    GFRESTBLACK 86 02/04/2021    MANJU 8.7 02/04/2021    BILITOTAL 0.3 12/20/2020    ALBUMIN 2.9 (L) 12/20/2020    ALKPHOS 83 12/20/2020    ALT 25 12/20/2020    AST 18 12/20/2020     INR RESULTS:  Lab Results   Component Value Date    INR 1.02 10/06/2019       Lab Results   Component Value Date    MAG 2.3 07/20/2020     Lab Results   Component Value Date    NTBNPI 107 12/20/2020     Lab Results   Component Value Date    NTBNP 76 07/20/2020       Assessment and Plan:   1. 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    Stage C  NYHA Class III  ACEi/ARB yes  BB no  Aldosterone antagonist yes  SCD prophylaxis does not meet criteria  Fluid status euvolemic    2. RF ablation/ AV node ablation with complete heart block - has PPM     3. Low BP - may be using too much lasix- although wt is stable.     4. Depression: seeing a counselor - has decided to retire/collect disability.     Will make apt for cardiology to see in person to r/u any cardiac issues. Will make apt for MTM to assess medications and make referral for antidepressant given long QT syndrome.   Follow-up 1 month    CC  Patient Care Team:  Ce Crowe NP as PCP - General (Nurse Practitioner - Family)  Yuliet Mclean as Garland Velez MD as MD (Internal Medicine)  Adelaida De La Torre APRN CNP as  Nurse Practitioner (Nurse Practitioner)  Cheyenne Thomson RN as Specialty Care Coordinator (Clinical Cardiac Electrophysiology)  Lino Funes MD as MD (Clinical Cardiac Electrophysiology)  Mame Valadez, RN as Specialty Care Coordinator (Cardiology)  Garland Patel MD as Assigned Endocrinology Provider  Hiren Mortensen MD as Assigned PCP  Lino Funes MD as Assigned Heart and Vascular Provider  HIREN MORTENSEN  Joyce Marroquin is a 60 year old female who is being evaluated via a billable video visit.      How would you like to obtain your AVS? MyChart  If the video visit is dropped, the invitation should be resent by: Text to cell phone: [phone]  Will anyone else be joining your video visit? No      Please do not hesitate to contact me if you have any questions/concerns.     Sincerely,     SHAYNA Sommers CNP

## 2021-02-18 DIAGNOSIS — I42.9 CARDIOMYOPATHY, UNSPECIFIED TYPE (H): ICD-10-CM

## 2021-02-18 LAB
ANION GAP SERPL CALCULATED.3IONS-SCNC: 5 MMOL/L (ref 3–14)
BUN SERPL-MCNC: 16 MG/DL (ref 7–30)
CALCIUM SERPL-MCNC: 9.5 MG/DL (ref 8.5–10.1)
CHLORIDE SERPL-SCNC: 103 MMOL/L (ref 94–109)
CO2 SERPL-SCNC: 28 MMOL/L (ref 20–32)
CREAT SERPL-MCNC: 0.78 MG/DL (ref 0.52–1.04)
GFR SERPL CREATININE-BSD FRML MDRD: 81 ML/MIN/{1.73_M2}
GLUCOSE SERPL-MCNC: 82 MG/DL (ref 70–99)
POTASSIUM SERPL-SCNC: 4.3 MMOL/L (ref 3.4–5.3)
SODIUM SERPL-SCNC: 136 MMOL/L (ref 133–144)

## 2021-02-18 PROCEDURE — 83880 ASSAY OF NATRIURETIC PEPTIDE: CPT | Performed by: PHYSICIAN ASSISTANT

## 2021-02-18 PROCEDURE — 36415 COLL VENOUS BLD VENIPUNCTURE: CPT | Performed by: NURSE PRACTITIONER

## 2021-02-18 PROCEDURE — 80048 BASIC METABOLIC PNL TOTAL CA: CPT | Performed by: NURSE PRACTITIONER

## 2021-02-18 NOTE — PATIENT INSTRUCTIONS
Take your medicines every day, as directed    Changes made today:  o No med changes     Monitor Your Weight and Symptoms    Contact us if you:      Gain 2 pounds in one day or 5 pounds in one week    Feel more short of breath    Notice more leg swelling    Feel lightheadeded   Change your lifestyle    Limit Salt or Sodium:    2000 mg  Limit Fluids:    2000 mL or approximately 64 ounces  Eat a Heart Healthy Diet    Low in saturated fats  Stay Active:    Aim to move at least 150 minutes every  week         To Contact us    During Business Hours:  [phone], option # 1 (University)  Then option # 4 (medical questions)     After hours, weekends or holidays:   [phone], Option #4  Ask to speak to the On-Call Cardiologist. Inform them you are a CORE/heart failure patient at the [place].     Use [employer] allows you to communicate directly with your heart team through secure messaging.    [employer] can be accessed any time on your phone, computer, or tablet.    If you need assistance, we'd be happy to help!         Keep your Heart Appointments:  Referral for MTM appointment    Follow up with Caron Farmer on 2/19 at 10am    Appointment with Dr Ordonez on 2/23 at 9:30am

## 2021-02-18 NOTE — PROGRESS NOTES
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 She has not tolerated GDM - except for low dose beta blocker and low dose of aldosterone antagonist.   Pt is very teary tonight and states she has not been doing well. She states she is dizzy, confused, has had bouts of diarrhea, is SOB with exertion She uses lasix prn, but did not take any today due to low BP (80's systolic). SHe has not had any further chest pain.   Her wt is stable, she denies PND, LE edema.   She is seeing a counselor on a regular basis to address her depression. She would like to be on an antidepressant but due to log QT syndrome, she has been reluctant to start - although she requests a pharm D referral to review her meds and recommend an antidepressant.       PAST MEDICAL HISTORY:  Past Medical History:   Diagnosis Date     Arthritis      Cardiomyopathy (H)     ff Cardiology     Chronic depressive personality disorder      Congestive heart failure (H) see dr martínez    heart failure correct term     COPD exacerbation (H)      Esophageal reflux      Ex-smoker     quit 2006; 1 PPD x 30     Hyperparathyroidism (H)     s/p parathyroidectomy     Hypothyroidism      LARRY on CPAP     ff Sleep medicine     Pulmonary nodules     ff Pulmonologist     S/P cardiac pacemaker procedure     checked every 6 months at [facility]     Uncomplicated asthma years       FAMILY HISTORY:  Family History   Problem Relation Age of Onset     Hypothyroidism Mother      Hypertension Mother      Osteoarthritis Mother      Coronary Artery Disease Father         nonfatal MI in his 70s     Asthma Father      Diabetes Sister      Hypothyroidism Sister      Breast Cancer Maternal Aunt      Anxiety Disorder Sister        SOCIAL HISTORY:  Social History     Socioeconomic History     Marital status:      Spouse name: None     Number of children: None     Years of education: None     Highest education level: Master's degree (e.g.,  MA, MS, Florian, MEd, MSW, SONIA)   Occupational History     None   Social Needs     Financial resource strain: Not very hard     Food insecurity     Worry: Never true     Inability: Never true     Transportation needs     Medical: No     Non-medical: No   Tobacco Use     Smoking status: Former Smoker     Packs/day: 0.00     Years: 0.00     Pack years: 0.00     Smokeless tobacco: Never Used   Substance and Sexual Activity     Alcohol use: Yes     Alcohol/week: 0.0 - 8.0 standard drinks     Frequency: 2-3 times a week     Drinks per session: 1 or 2     Binge frequency: Never     Comment: seldom     Drug use: No     Sexual activity: Yes     Partners: Male     Birth control/protection: None     Comment: vasectomy   Lifestyle     Physical activity     Days per week: 3 days     Minutes per session: 10 min     Stress: To some extent   Relationships     Social connections     Talks on phone: More than three times a week     Gets together: Never     Attends Sikhism service: More than 4 times per year     Active member of club or organization: Yes     Attends meetings of clubs or organizations: More than 4 times per year     Relationship status:      Intimate partner violence     Fear of current or ex partner: None     Emotionally abused: None     Physically abused: None     Forced sexual activity: None   Other Topics Concern     Parent/sibling w/ CABG, MI or angioplasty before 65F 55M? No   Social History Narrative    CRNA on disability for vestibular symptoms        CURRENT MEDICATIONS:  Current Outpatient Medications   Medication Sig Dispense Refill     acetaminophen (TYLENOL) 500 MG tablet Take 500-1,000 mg by mouth every 6 hours as needed for mild pain       albuterol (PROAIR HFA/PROVENTIL HFA/VENTOLIN HFA) 108 (90 BASE) MCG/ACT Inhaler Inhale 2 puffs into the lungs as needed for shortness of breath / dyspnea or wheezing        azelastine (ASTELIN) 0.1 % nasal spray Spray 1 spray into both nostrils 2 times daily        Calcium Lactate 500 MG CAPS        carvedilol (COREG) 3.125 MG tablet Take 1 tablet (3.125 mg) by mouth 2 times daily (with meals) 180 tablet 3     clonazePAM (KLONOPIN) 0.5 MG tablet Take 1 tablet (0.5 mg) by mouth 3 times daily as needed for anxiety 90 tablet 2     DiphenhydrAMINE HCl (BENADRYL PO) Take 25-50 mg by mouth nightly as needed       Ferrous Sulfate (IRON SUPPLEMENT PO) Take 65 mg by mouth 2 times daily (with meals)        fluticasone-salmeterol (ADVAIR) 250-50 MCG/DOSE inhaler Inhale 1 puff into the lungs every 12 hours       furosemide (LASIX) 20 MG tablet Take 1 tablet (20 mg) by mouth 2 times daily as needed (for fluid retention) 180 tablet 1     ipratropium (ATROVENT) 0.06 % nasal spray Spray 2 sprays into both nostrils 4 times daily       loperamide (IMODIUM A-D) 2 MG tablet Take 2 tabs (4 mg) after first loose stool, and then take one tab (2 mg) after each diarrheal stool.  Max of 8 tabs (16 mg) per day. 1 tablet 0     MAGNESIUM LACTATE PO Take 180 mg by mouth At Bedtime        MELATONIN PO Take 3 mg by mouth At Bedtime        Menaquinone-7 (VITAMIN K2 PO) Take 1 tablet by mouth every morning        METHYLPREDNISOLONE PO Take 40 mg by mouth as needed        Multiple Vitamin (DAILY MULTIVITAMIN PO) Take 1 tablet by mouth every morning        nitroGLYcerin (NITROSTAT) 0.4 MG sublingual tablet For chest pain place 1 tablet under the tongue every 5 minutes for 3 doses. If symptoms persist 5 minutes after 1st dose call 911. 30 tablet 1     Probiotic Product (PROBIOTIC DAILY PO) Take 1 capsule by mouth 2 times daily        spironolactone (ALDACTONE) 25 MG tablet Take 0.5 tablets (12.5 mg) by mouth daily 90 tablet 3     SYNTHROID 150 MCG tablet 150 mcg for 6 days and 75 mcg for 1 day per week 90 tablet 3     TURMERIC PO Take 1 tablet by mouth every morning        UNABLE TO FIND 2 times daily MEDICATION NAME: Standard Process, Immune Congaplex. Pt taking 2 tablets daily       vitamin B complex with  vitamin C (VITAMIN  B COMPLEX) PO tablet Take 1 tablet by mouth as needed       vitamin D3 (CHOLECALCIFEROL) 2000 units tablet Take 1 tablet by mouth daily 1 tablet 0     calcium carbonate (OS-MANJU) 500 MG tablet Take 1 tablet by mouth 2 times daily       fluocinolone acetonide (DERMA SMOOTHE/FS BODY) 0.01 % external oil Use for ears.  2 times daily for 7 days (Patient not taking: Reported on 1/6/2021) 1 Bottle 0     nebulizer nebulization as needed       Zinc 15 MG CAPS Take 10 mg by mouth         ROS:   Constitutional: No fever, chills, or sweats. No weight gain/loss.   ENT: No visual disturbance, ear ache, epistaxis, sore throat.   Allergies/Immunologic: Negative.   Respiratory: No cough, hemoptysis.   Cardiovascular: As per HPI.   GI: she has had nausea/diarhea   : No urinary frequency, dysuria, or hematuria.   Integument: Negative.   Psychiatric: feels depressed/ is teary    Neuro: Negative.   Endocrinology: Negative.   Musculoskeletal: Negative.    EXAM:  Home BP  systolic   Wt 152- 156#     Labs:  CBC RESULTS:  Lab Results   Component Value Date    WBC 5.9 12/20/2020    RBC 3.86 12/20/2020    HGB 12.4 12/20/2020    HCT 38.7 12/20/2020     12/20/2020    MCH 32.1 12/20/2020    MCHC 32.0 12/20/2020    RDW 12.3 12/20/2020     12/20/2020       CMP RESULTS:  Lab Results   Component Value Date     02/04/2021    POTASSIUM 4.4 02/04/2021    CHLORIDE 102 02/04/2021    CO2 27 02/04/2021    ANIONGAP 6 02/04/2021    GLC 84 02/04/2021    BUN 23 02/04/2021    CR 0.84 02/04/2021    GFRESTIMATED 74 02/04/2021    GFRESTBLACK 86 02/04/2021    MANJU 8.7 02/04/2021    BILITOTAL 0.3 12/20/2020    ALBUMIN 2.9 (L) 12/20/2020    ALKPHOS 83 12/20/2020    ALT 25 12/20/2020    AST 18 12/20/2020        INR RESULTS:  Lab Results   Component Value Date    INR 1.02 10/06/2019       Lab Results   Component Value Date    MAG 2.3 07/20/2020     Lab Results   Component Value Date    NTBNPI 107 12/20/2020     Lab Results    Component Value Date    NTBNP 76 07/20/2020       Assessment and Plan:   1. 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    Stage C  NYHA Class III  ACEi/ARB yes  BB no  Aldosterone antagonist yes  SCD prophylaxis does not meet criteria  Fluid status euvolemic    2. RF ablation/ AV node ablation with complete heart block - has PPM     3. Low BP - may be using too much lasix- although wt is stable.     4. Depression: seeing a counselor - has decided to retire/collect disability.     Will make apt for cardiology to see in person to r/u any cardiac issues. Will make apt for MTM to assess medications and make referral for antidepressant given long QT syndrome.   Follow-up 1 month    CC  Patient Care Team:  Ce Crowe NP as PCP - General (Nurse Practitioner - Family)  Yuliet Mclean as Garland Velez MD as MD (Internal Medicine)  Adelaida De La Torre APRN CNP as Nurse Practitioner (Nurse Practitioner)  Cheyenne Thomson, RN as Specialty Care Coordinator (Clinical Cardiac Electrophysiology)  Lino Funes MD as MD (Clinical Cardiac Electrophysiology)  Mame Valadez, RN as Specialty Care Coordinator (Cardiology)  Garland Patel MD as Assigned Endocrinology Provider  Hiren Mortensen MD as Assigned PCP  Lino Funes MD as Assigned Heart and Vascular Provider  HIREN MORTENSEN  Joyce Marroquin is a 60 year old female who is being evaluated via a billable video visit.      How would you like to obtain your AVS? MyChart  If the video visit is dropped, the invitation should be resent by: Text to cell phone: [phone]  Will anyone else be joining your video visit? No

## 2021-02-19 ENCOUNTER — ANCILLARY PROCEDURE (OUTPATIENT)
Dept: CARDIOLOGY | Facility: CLINIC | Age: 61
End: 2021-02-19
Attending: PHYSICIAN ASSISTANT
Payer: COMMERCIAL

## 2021-02-19 ENCOUNTER — OFFICE VISIT (OUTPATIENT)
Dept: CARDIOLOGY | Facility: CLINIC | Age: 61
End: 2021-02-19
Payer: COMMERCIAL

## 2021-02-19 ENCOUNTER — ANCILLARY PROCEDURE (OUTPATIENT)
Dept: CARDIOLOGY | Facility: CLINIC | Age: 61
End: 2021-02-19
Attending: INTERNAL MEDICINE
Payer: COMMERCIAL

## 2021-02-19 VITALS
SYSTOLIC BLOOD PRESSURE: 121 MMHG | WEIGHT: 160 LBS | BODY MASS INDEX: 26.63 KG/M2 | HEART RATE: 96 BPM | OXYGEN SATURATION: 96 % | DIASTOLIC BLOOD PRESSURE: 81 MMHG

## 2021-02-19 DIAGNOSIS — I42.9 CARDIOMYOPATHY, UNSPECIFIED TYPE (H): ICD-10-CM

## 2021-02-19 DIAGNOSIS — R42 DIZZINESS: Primary | ICD-10-CM

## 2021-02-19 DIAGNOSIS — I44.2 COMPLETE ATRIOVENTRICULAR BLOCK (H): ICD-10-CM

## 2021-02-19 LAB — NT-PROBNP SERPL-MCNC: 133 PG/ML (ref 0–125)

## 2021-02-19 PROCEDURE — 99214 OFFICE O/P EST MOD 30 MIN: CPT | Mod: 25 | Performed by: PHYSICIAN ASSISTANT

## 2021-02-19 PROCEDURE — 99207 CARDIAC DEVICE CHECK - REMOTE: CPT | Performed by: INTERNAL MEDICINE

## 2021-02-19 PROCEDURE — 93296 REM INTERROG EVL PM/IDS: CPT

## 2021-02-19 PROCEDURE — 93306 TTE W/DOPPLER COMPLETE: CPT | Performed by: INTERNAL MEDICINE

## 2021-02-19 NOTE — NURSING NOTE
"Chief Complaint   Patient presents with     RECHECK     RTN CORE:: 60 year old female presents with hfref, history of takotsubo CM, for 1 month follow up. Pt reports increased EASTON and fatigue.        Initial /81 (BP Location: Left arm, Patient Position: Chair, Cuff Size: Adult Regular)   Pulse 96   Wt 72.6 kg (160 lb)   LMP 08/21/2007   SpO2 96%   BMI 26.63 kg/m   Estimated body mass index is 26.63 kg/m  as calculated from the following:    Height as of 12/20/20: 1.651 m (5' 5\").    Weight as of this encounter: 72.6 kg (160 lb)..  BP completed using cuff size: regular    Chiquita Martinez MA  "

## 2021-02-19 NOTE — PATIENT INSTRUCTIONS
Take your medicines every day, as directed    Changes made today:  - Today hold off on Lasix  - Only weigh yourself in the morning.  - I will have the pacemaker team interrogate your device  - If you gain 2 lbs over 24 hours please take 40 mg of lasix the next day. If you gain 5 lbs over a week, please take 40 of lasix for 1-2 days.  - If your  thinks you are confused or not yourself please go to the ER  - Send a remote device check when you get home (the order is in)     Monitor Your Weight and Symptoms    Contact us if you:      Gain 2 pounds in one day or 5 pounds in one week    Feel more short of breath    Notice more leg swelling    Feel lightheadeded   Change your lifestyle    Limit Salt or Sodium:    2000 mg  Limit Fluids:    2000 mL or approximately 64 ounces  Eat a Heart Healthy Diet    Low in saturated fats  Stay Active:    Aim to move at least 150 minutes every  week         To Contact us    During Business Hours:  [phone], option # 1 (University)  Then option # 4 (medical questions)     After hours, weekends or holidays:   [phone], Option #4  Ask to speak to the On-Call Cardiologist. Inform them you are a CORE/heart failure patient at the [place].     Use [employer] allows you to communicate directly with your heart team through secure messaging.    "[employer] can be accessed any time on your phone, computer, or tablet.    If you need assistance, we'd be happy to help!         Keep your Heart Appointments:    - RN phone call on Monday to see your you are feeling on less lasix  - Labs in 2 weeks  - ECHO today  - Dr. Ordonez next week as planned

## 2021-02-19 NOTE — LETTER
2/19/2021      RE: Joyce Marroquin  [id number] [facility] [id number]       Dear Colleague,    Thank you for the opportunity to participate in the care of your patient, Joyce Marroquin, at the [facility]. Please see a copy of my visit note below.    In person visit    HPI:   Ms. Marroquin is a 60 year old female with a past medical history including RFA ablation which lead to AV node ablation and complete heart block requiring a dual-chamber pacemaker implantation, which is a Medtronic device MRI compatible.  She also has a recent history of heart failure with reduced ejection fraction with EF around 35% later improved to 45%.  There was a history of Takotsubo cardiomyopathy. Presents to clinic for HFrEF follow-up.    She had a recent visit with Fiorella Swanson during which she was not feelign well. Opted to send her in for in person visit.    She had labs one day ago. Her electrolytes are stable as was her Cr.    Her blood pressure is 121/81. Hr HR is 96. Her weight is 160 which is up about 8 lbs from the last weight in our system.    She first started feeling poorly about 2 weeks ago. She has been up anywhere from 8-12 lbs. She has been taking lasix 20 mg BID. She has swelling in her legs as well as her abdomen. She feels it mostly in her abdomen. Not sure if her SOB right now is because of the double mask right now. She is feeling EASTON walking around the grocery strore. Going up the second flight of stairs made her feel EASTON. In the last few weeks she feels like she can do less. Having some chest pain on most days. None right now actually. She has had this from some pericarditis her ablation. This has been true for her for years (ablation was in 2017) She sometimes feels her pacing but no other palpitations. She had some lightheadness on Wednesday and yesterday. Always with position changes. She is having some dizziness. Feels all  over week.     She notes that on Wednesday when she was feeling really unwell she drank a bowl of soup and then felt better a bit latter. Yesterday she drank a bottle of pediolyte.     Cardiac Medications  Lasix 20 mg BID PRN but has been using daily for the last 2+ weeks  Spironolatone 12.5 mg daily  Coreg 3.125 mg BID      PAST MEDICAL HISTORY:  Past Medical History:   Diagnosis Date     Arthritis      Cardiomyopathy (H)     ff Cardiology     Chronic depressive personality disorder      Congestive heart failure (H) see  note    heart failure correct term     COPD exacerbation (H)      Esophageal reflux      Ex-smoker     quit 2006; 1 PPD x 30     Hyperparathyroidism (H)     s/p parathyroidectomy     Hypothyroidism      LARRY on CPAP     ff Sleep medicine     Pulmonary nodules     ff Pulmonologist     S/P cardiac pacemaker procedure     checked every 6 months at the [facility]     Uncomplicated asthma years       FAMILY HISTORY:  Family History   Problem Relation Age of Onset     Hypothyroidism Mother      Hypertension Mother      Osteoarthritis Mother      Coronary Artery Disease Father         nonfatal MI in his 70s     Asthma Father      Diabetes Sister      Hypothyroidism Sister      Breast Cancer Maternal Aunt      Anxiety Disorder Sister        SOCIAL HISTORY:  Social History     Socioeconomic History     Marital status:      Spouse name: Not on file     Number of children: Not on file     Years of education: Not on file     Highest education level: Master's degree (e.g., MA, MS, lForian, MEd, MSW, SONIA)   Occupational History     Not on file   Social Needs     Financial resource strain: Not very hard     Food insecurity     Worry: Never true     Inability: Never true     Transportation needs     Medical: No     Non-medical: No   Tobacco Use     Smoking status: Former Smoker     Packs/day: 0.00     Years: 0.00     Pack years: 0.00     Smokeless tobacco: Never Used   Substance and Sexual Activity     Alcohol  use: Yes     Alcohol/week: 0.0 - 8.0 standard drinks     Frequency: 2-3 times a week     Drinks per session: 1 or 2     Binge frequency: Never     Comment: seldom     Drug use: No     Sexual activity: Yes     Partners: Male     Birth control/protection: None     Comment: vasectomy   Lifestyle     Physical activity     Days per week: 3 days     Minutes per session: 10 min     Stress: To some extent   Relationships     Social connections     Talks on phone: More than three times a week     Gets together: Never     Attends Bahai service: More than 4 times per year     Active member of club or organization: Yes     Attends meetings of clubs or organizations: More than 4 times per year     Relationship status:      Intimate partner violence     Fear of current or ex partner: Not on file     Emotionally abused: Not on file     Physically abused: Not on file     Forced sexual activity: Not on file   Other Topics Concern     Parent/sibling w/ CABG, MI or angioplasty before 65F 55M? No   Social History Narrative    CRNA on disability for vestibular symptoms        CURRENT MEDICATIONS:  acetaminophen (TYLENOL) 500 MG tablet, Take 500-1,000 mg by mouth every 6 hours as needed for mild pain  albuterol (PROAIR HFA/PROVENTIL HFA/VENTOLIN HFA) 108 (90 BASE) MCG/ACT Inhaler, Inhale 2 puffs into the lungs as needed for shortness of breath / dyspnea or wheezing   azelastine (ASTELIN) 0.1 % nasal spray, Spray 1 spray into both nostrils 2 times daily  calcium carbonate (OS-MANJU) 500 MG tablet, Take 1 tablet by mouth 2 times daily  Calcium Lactate 500 MG CAPS,   carvedilol (COREG) 3.125 MG tablet, Take 1 tablet (3.125 mg) by mouth 2 times daily (with meals)  clonazePAM (KLONOPIN) 0.5 MG tablet, Take 1 tablet (0.5 mg) by mouth 3 times daily as needed for anxiety  DiphenhydrAMINE HCl (BENADRYL PO), Take 25-50 mg by mouth nightly as needed  Ferrous Sulfate (IRON SUPPLEMENT PO), Take 65 mg by mouth 2 times daily (with meals)    fluocinolone acetonide (DERMA SMOOTHE/FS BODY) 0.01 % external oil, Use for ears.  2 times daily for 7 days  fluticasone-salmeterol (ADVAIR) 250-50 MCG/DOSE inhaler, Inhale 1 puff into the lungs every 12 hours  furosemide (LASIX) 20 MG tablet, Take 1 tablet (20 mg) by mouth 2 times daily as needed (for fluid retention)  ipratropium (ATROVENT) 0.06 % nasal spray, Spray 2 sprays into both nostrils 4 times daily  loperamide (IMODIUM A-D) 2 MG tablet, Take 2 tabs (4 mg) after first loose stool, and then take one tab (2 mg) after each diarrheal stool.  Max of 8 tabs (16 mg) per day.  MAGNESIUM LACTATE PO, Take 180 mg by mouth At Bedtime   MELATONIN PO, Take 3 mg by mouth At Bedtime   Menaquinone-7 (VITAMIN K2 PO), Take 1 tablet by mouth every morning   METHYLPREDNISOLONE PO, Take 40 mg by mouth as needed   Multiple Vitamin (DAILY MULTIVITAMIN PO), Take 1 tablet by mouth every morning   nebulizer nebulization, as needed  nitroGLYcerin (NITROSTAT) 0.4 MG sublingual tablet, For chest pain place 1 tablet under the tongue every 5 minutes for 3 doses. If symptoms persist 5 minutes after 1st dose call 911.  Probiotic Product (PROBIOTIC DAILY PO), Take 1 capsule by mouth 2 times daily   spironolactone (ALDACTONE) 25 MG tablet, Take 0.5 tablets (12.5 mg) by mouth daily  SYNTHROID 150 MCG tablet, 150 mcg for 6 days and 75 mcg for 1 day per week  TURMERIC PO, Take 1 tablet by mouth every morning   UNABLE TO FIND, 2 times daily MEDICATION NAME: Standard Process, Immune Congaplex. Pt taking 2 tablets daily  vitamin B complex with vitamin C (VITAMIN  B COMPLEX) PO tablet, Take 1 tablet by mouth as needed  vitamin D3 (CHOLECALCIFEROL) 2000 units tablet, Take 1 tablet by mouth daily  Zinc 15 MG CAPS, Take 10 mg by mouth    No current facility-administered medications on file prior to visit.       ROS:   Refer to HPI    EXAM:  /81 (BP Location: Left arm, Patient Position: Chair, Cuff Size: Adult Regular)   Pulse 96   Wt 72.6 kg  (160 lb)   LMP 08/21/2007   SpO2 96%   BMI 26.63 kg/m    GENERAL: Appears comfortable, in no acute distress.   HEENT: Eye symmetrical, no discharge or icterus bilaterally. Mucous membranes moist and without lesions.  CV: RRR, +S1S2, mp murmur, rub, or gallop. JVP <6 (only visible laying down at 20 degrees).   RESPIRATORY: Respirations regular, even, and unlabored. Lungs CTA throughout.  GI: Soft and non distended with normoactive bowel sounds. No tenderness, rebound, guarding.   EXTREMITIES: No peripheral edema. 2+ bilateral pedal pulses. All extremities are warm and well perfused.  NEUROLOGIC: Alert and interacting appropriately. No focal deficits.   MUSCULOSKELETAL: No joint swelling or tenderness.   SKIN: No jaundice. No rashes or lesions.     Labs, reviewed with patient in clinic today:  CBC RESULTS:  Lab Results   Component Value Date    WBC 5.9 12/20/2020    RBC 3.86 12/20/2020    HGB 12.4 12/20/2020    HCT 38.7 12/20/2020     12/20/2020    MCH 32.1 12/20/2020    MCHC 32.0 12/20/2020    RDW 12.3 12/20/2020     12/20/2020       CMP RESULTS:  Lab Results   Component Value Date     02/18/2021    POTASSIUM 4.3 02/18/2021    CHLORIDE 103 02/18/2021    CO2 28 02/18/2021    ANIONGAP 5 02/18/2021    GLC 82 02/18/2021    BUN 16 02/18/2021    CR 0.78 02/18/2021    GFRESTIMATED 81 02/18/2021    GFRESTBLACK >90 02/18/2021    MANJU 9.5 02/18/2021    BILITOTAL 0.3 12/20/2020    ALBUMIN 2.9 (L) 12/20/2020    ALKPHOS 83 12/20/2020    ALT 25 12/20/2020    AST 18 12/20/2020        INR RESULTS:  Lab Results   Component Value Date    INR 1.02 10/06/2019       Lab Results   Component Value Date    MAG 2.3 07/20/2020     Lab Results   Component Value Date    NTBNPI 107 12/20/2020     Lab Results   Component Value Date    NTBNP 76 07/20/2020       Diagnostics:  7/20/2020 [facility]  Baseline hemodynamics:  - BP: 132/68/93 mmHg  - RA: 7/6/5 mmHg  - RV: 30/5 mmHg  - PA: 30/16/21 mmHg  - PCWP: 11/15/10 mmHg  - PA sat:  67.8%  - CO, CI (SHAYNA): 3.52L/min, 1.95 L/min/M2  - CO, CI (Thermo): 4.87 L/min, 2.7 L/min/M2    ECHO 3/2020  Interpretation Summary  Mildly (EF 40-45%) reduced left ventricular function is present.  Global right ventricular function is normal.  No pericardial effusion is present.  IVC diameter <2.1 cm collapsing >50% with sniff suggests a normal RA pressure  of 3 mmHg.  This study was compared with the study from 12/3/19.  There has been no change.    Assessment and Plan:   Ms. Marroquin is a 60 year old female with a past medical history including RFA ablation which lead to AV node ablation and complete heart block requiring a dual-chamber pacemaker implantation, which is a Medtronic device MRI compatible.  She also has a recent history of heart failure with reduced ejection fraction with EF around 35% later improved to 45%.  There was a history of Takotsubo cardiomyopathy. Presents to clinic for HFrEF follow-up.    She has not been feeling well the past few weeks. More fatigue, EASTON, and dizziness. She has been taking BID lasix nearly every day.  On exam today she looks dry.  I suspect that this is the cause of her current symptoms.  On echo today her IVC is normal.  Her BNP remains well.  She has no  signs of low flow on exam.    We did discuss that she should only be weighing herself in the morning.  She weighs herself in the morning in the afternoon and doses her Lasix based on weight gain throughout the day.  It is explained that there will be improved accuracy and she will avoid taking too much Lasix if she always compare his morning weights to morning weights.    She describes subjective mental fogginess for the last year.  On Wednesday she had an acute episode of confusion she seems to be back to where she has been the last year.  I did encourage her to go to the ER if she has another episode similar to Wednesday. She does not appear low flow on exam. Note that her RHC from July 2020 did have a CI of 1.97.    #  Chronic systolic heart failure/HFrEF*    Stage C. NYHA Class III.    Fluid status: hypovolemic-hold off on Lasix today, will take 40 mg of Lasix if she gains 2 pounds from morning weight compared to the next morning weight  ACEi/ARB/ARNI: no-has not tolerated due to hypotension  BB: yes coreg 3.125 mg BID-could consider changing to with metoprolol in the future if any more BP around  Aldosterone antagonist: yes aldactone 12.5 mg daily  SCD prophylaxis: does not meet criteria for implant  NSAID use: contraindicated  Sleep apnea evaluation: Not discussed today  Remote monitoring: N/A  Other:   - Repeat ECHO today    # Dizziness Has been having episodes of dizziness not associated with position changes.  (She always has positional lightheadedness).  -Remote PPM check today    # RF ablation/ AV node ablation with complete heart block   - has PPM     # Depression: seeing a counselor   - has decided to retire/collect disability  -At prior core appointment was referred to MTM to assess medications make a referral for an antidepressant      Follow up   - RN phone call on Monday to see your you are feeling on less lasix  - Labs in 2 weeks  - ECHO today  - Dr. Ordonez next week as planned     Billing  - I reviewed the results of 3 tesnt- BMP, BNP and ECHO  - I manage    Elizabeth Merritt PA-C  [facility]        Please do not hesitate to contact me if you have any questions/concerns.     Sincerely,     Elizabeth Merritt PA-C

## 2021-02-19 NOTE — PROGRESS NOTES
In person visit    HPI:   Ms. Marroquin is a 60 year old female with a past medical history including RFA ablation which lead to AV node ablation and complete heart block requiring a dual-chamber pacemaker implantation, which is a Medtronic device MRI compatible.  She also has a recent history of heart failure with reduced ejection fraction with EF around 35% later improved to 45%.  There was a history of Takotsubo cardiomyopathy. Presents to clinic for HFrEF follow-up.    She had a recent visit with Fiorella Swanson during which she was not feelign well. Opted to send her in for in person visit.    She had labs one day ago. Her electrolytes are stable as was her Cr.    Her blood pressure is 121/81. Hr HR is 96. Her weight is 160 which is up about 8 lbs from the last weight in our system.    She first started feeling poorly about 2 weeks ago. She has been up anywhere from 8-12 lbs. She has been taking lasix 20 mg BID. She has swelling in her legs as well as her abdomen. She feels it mostly in her abdomen. Not sure if her SOB right now is because of the double mask right now. She is feeling EASTON walking around the grocery strore. Going up the second flight of stairs made her feel EASTON. In the last few weeks she feels like she can do less. Having some chest pain on most days. None right now actually. She has had this from some pericarditis her ablation. This has been true for her for years (ablation was in 2017) She sometimes feels her pacing but no other palpitations. She had some lightheadness on Wednesday and yesterday. Always with position changes. She is having some dizziness. Feels all over week.     She notes that on Wednesday when she was feeling really unwell she drank a bowl of soup and then felt better a bit latter. Yesterday she drank a bottle of pediolyte.     Cardiac Medications  Lasix 20 mg BID PRN but has been using daily for the last 2+ weeks  Spironolatone 12.5 mg daily  Coreg 3.125 mg BID      PAST MEDICAL  HISTORY:  Past Medical History:   Diagnosis Date     Arthritis      Cardiomyopathy (H)     ff Cardiology     Chronic depressive personality disorder      Congestive heart failure (H) see dr martínez    heart failure correct term     COPD exacerbation (H)      Esophageal reflux      Ex-smoker     quit 2006; 1 PPD x 30     Hyperparathyroidism (H)     s/p parathyroidectomy     Hypothyroidism      LARRY on CPAP     ff Sleep medicine     Pulmonary nodules     ff Pulmonologist     S/P cardiac pacemaker procedure     checked every 6 months at the [facility]     Uncomplicated asthma years       FAMILY HISTORY:  Family History   Problem Relation Age of Onset     Hypothyroidism Mother      Hypertension Mother      Osteoarthritis Mother      Coronary Artery Disease Father         nonfatal MI in his 70s     Asthma Father      Diabetes Sister      Hypothyroidism Sister      Breast Cancer Maternal Aunt      Anxiety Disorder Sister        SOCIAL HISTORY:  Social History     Socioeconomic History     Marital status:      Spouse name: Not on file     Number of children: Not on file     Years of education: Not on file     Highest education level: Master's degree (e.g., MA, MS, Florian, MEd, MSW, SONIA)   Occupational History     Not on file   Social Needs     Financial resource strain: Not very hard     Food insecurity     Worry: Never true     Inability: Never true     Transportation needs     Medical: No     Non-medical: No   Tobacco Use     Smoking status: Former Smoker     Packs/day: 0.00     Years: 0.00     Pack years: 0.00     Smokeless tobacco: Never Used   Substance and Sexual Activity     Alcohol use: Yes     Alcohol/week: 0.0 - 8.0 standard drinks     Frequency: 2-3 times a week     Drinks per session: 1 or 2     Binge frequency: Never     Comment: seldom     Drug use: No     Sexual activity: Yes     Partners: Male     Birth control/protection: None     Comment: vasectomy   Lifestyle     Physical activity     Days per week: 3 days      Minutes per session: 10 min     Stress: To some extent   Relationships     Social connections     Talks on phone: More than three times a week     Gets together: Never     Attends Sabianist service: More than 4 times per year     Active member of club or organization: Yes     Attends meetings of clubs or organizations: More than 4 times per year     Relationship status:      Intimate partner violence     Fear of current or ex partner: Not on file     Emotionally abused: Not on file     Physically abused: Not on file     Forced sexual activity: Not on file   Other Topics Concern     Parent/sibling w/ CABG, MI or angioplasty before 65F 55M? No   Social History Narrative    CRNA on disability for vestibular symptoms        CURRENT MEDICATIONS:  acetaminophen (TYLENOL) 500 MG tablet, Take 500-1,000 mg by mouth every 6 hours as needed for mild pain  albuterol (PROAIR HFA/PROVENTIL HFA/VENTOLIN HFA) 108 (90 BASE) MCG/ACT Inhaler, Inhale 2 puffs into the lungs as needed for shortness of breath / dyspnea or wheezing   azelastine (ASTELIN) 0.1 % nasal spray, Spray 1 spray into both nostrils 2 times daily  calcium carbonate (OS-MANJU) 500 MG tablet, Take 1 tablet by mouth 2 times daily  Calcium Lactate 500 MG CAPS,   carvedilol (COREG) 3.125 MG tablet, Take 1 tablet (3.125 mg) by mouth 2 times daily (with meals)  clonazePAM (KLONOPIN) 0.5 MG tablet, Take 1 tablet (0.5 mg) by mouth 3 times daily as needed for anxiety  DiphenhydrAMINE HCl (BENADRYL PO), Take 25-50 mg by mouth nightly as needed  Ferrous Sulfate (IRON SUPPLEMENT PO), Take 65 mg by mouth 2 times daily (with meals)   fluocinolone acetonide (DERMA SMOOTHE/FS BODY) 0.01 % external oil, Use for ears.  2 times daily for 7 days  fluticasone-salmeterol (ADVAIR) 250-50 MCG/DOSE inhaler, Inhale 1 puff into the lungs every 12 hours  furosemide (LASIX) 20 MG tablet, Take 1 tablet (20 mg) by mouth 2 times daily as needed (for fluid retention)  ipratropium (ATROVENT)  0.06 % nasal spray, Spray 2 sprays into both nostrils 4 times daily  loperamide (IMODIUM A-D) 2 MG tablet, Take 2 tabs (4 mg) after first loose stool, and then take one tab (2 mg) after each diarrheal stool.  Max of 8 tabs (16 mg) per day.  MAGNESIUM LACTATE PO, Take 180 mg by mouth At Bedtime   MELATONIN PO, Take 3 mg by mouth At Bedtime   Menaquinone-7 (VITAMIN K2 PO), Take 1 tablet by mouth every morning   METHYLPREDNISOLONE PO, Take 40 mg by mouth as needed   Multiple Vitamin (DAILY MULTIVITAMIN PO), Take 1 tablet by mouth every morning   nebulizer nebulization, as needed  nitroGLYcerin (NITROSTAT) 0.4 MG sublingual tablet, For chest pain place 1 tablet under the tongue every 5 minutes for 3 doses. If symptoms persist 5 minutes after 1st dose call 911.  Probiotic Product (PROBIOTIC DAILY PO), Take 1 capsule by mouth 2 times daily   spironolactone (ALDACTONE) 25 MG tablet, Take 0.5 tablets (12.5 mg) by mouth daily  SYNTHROID 150 MCG tablet, 150 mcg for 6 days and 75 mcg for 1 day per week  TURMERIC PO, Take 1 tablet by mouth every morning   UNABLE TO FIND, 2 times daily MEDICATION NAME: Standard Process, Immune Congaplex. Pt taking 2 tablets daily  vitamin B complex with vitamin C (VITAMIN  B COMPLEX) PO tablet, Take 1 tablet by mouth as needed  vitamin D3 (CHOLECALCIFEROL) 2000 units tablet, Take 1 tablet by mouth daily  Zinc 15 MG CAPS, Take 10 mg by mouth    No current facility-administered medications on file prior to visit.       ROS:   Refer to HPI    EXAM:  /81 (BP Location: Left arm, Patient Position: Chair, Cuff Size: Adult Regular)   Pulse 96   Wt 72.6 kg (160 lb)   LMP 08/21/2007   SpO2 96%   BMI 26.63 kg/m    GENERAL: Appears comfortable, in no acute distress.   HEENT: Eye symmetrical, no discharge or icterus bilaterally. Mucous membranes moist and without lesions.  CV: RRR, +S1S2, mp murmur, rub, or gallop. JVP <6 (only visible laying down at 20 degrees).   RESPIRATORY: Respirations  regular, even, and unlabored. Lungs CTA throughout.  GI: Soft and non distended with normoactive bowel sounds. No tenderness, rebound, guarding.   EXTREMITIES: No peripheral edema. 2+ bilateral pedal pulses. All extremities are warm and well perfused.  NEUROLOGIC: Alert and interacting appropriately. No focal deficits.   MUSCULOSKELETAL: No joint swelling or tenderness.   SKIN: No jaundice. No rashes or lesions.     Labs, reviewed with patient in clinic today:  CBC RESULTS:  Lab Results   Component Value Date    WBC 5.9 12/20/2020    RBC 3.86 12/20/2020    HGB 12.4 12/20/2020    HCT 38.7 12/20/2020     12/20/2020    MCH 32.1 12/20/2020    MCHC 32.0 12/20/2020    RDW 12.3 12/20/2020     12/20/2020       CMP RESULTS:  Lab Results   Component Value Date     02/18/2021    POTASSIUM 4.3 02/18/2021    CHLORIDE 103 02/18/2021    CO2 28 02/18/2021    ANIONGAP 5 02/18/2021    GLC 82 02/18/2021    BUN 16 02/18/2021    CR 0.78 02/18/2021    GFRESTIMATED 81 02/18/2021    GFRESTBLACK >90 02/18/2021    MANJU 9.5 02/18/2021    BILITOTAL 0.3 12/20/2020    ALBUMIN 2.9 (L) 12/20/2020    ALKPHOS 83 12/20/2020    ALT 25 12/20/2020    AST 18 12/20/2020        INR RESULTS:  Lab Results   Component Value Date    INR 1.02 10/06/2019       Lab Results   Component Value Date    MAG 2.3 07/20/2020     Lab Results   Component Value Date    NTBNPI 107 12/20/2020     Lab Results   Component Value Date    NTBNP 76 07/20/2020       Diagnostics:  7/20/2020 [facility]  Baseline hemodynamics:  - BP: 132/68/93 mmHg  - RA: 7/6/5 mmHg  - RV: 30/5 mmHg  - PA: 30/16/21 mmHg  - PCWP: 11/15/10 mmHg  - PA sat: 67.8%  - CO, CI (SHAYNA): 3.52L/min, 1.95 L/min/M2  - CO, CI (Thermo): 4.87 L/min, 2.7 L/min/M2    ECHO 3/2020  Interpretation Summary  Mildly (EF 40-45%) reduced left ventricular function is present.  Global right ventricular function is normal.  No pericardial effusion is present.  IVC diameter <2.1 cm collapsing >50% with sniff suggests a  normal RA pressure  of 3 mmHg.  This study was compared with the study from 12/3/19.  There has been no change.    Assessment and Plan:   Ms. Marroquin is a 60 year old female with a past medical history including RFA ablation which lead to AV node ablation and complete heart block requiring a dual-chamber pacemaker implantation, which is a Medtronic device MRI compatible.  She also has a recent history of heart failure with reduced ejection fraction with EF around 35% later improved to 45%.  There was a history of Takotsubo cardiomyopathy. Presents to clinic for HFrEF follow-up.    She has not been feeling well the past few weeks. More fatigue, EASTON, and dizziness. She has been taking BID lasix nearly every day.  On exam today she looks dry.  I suspect that this is the cause of her current symptoms.  On echo today her IVC is normal.  Her BNP remains well.  She has no  signs of low flow on exam.    We did discuss that she should only be weighing herself in the morning.  She weighs herself in the morning in the afternoon and doses her Lasix based on weight gain throughout the day.  It is explained that there will be improved accuracy and she will avoid taking too much Lasix if she always compare his morning weights to morning weights.    She describes subjective mental fogginess for the last year.  On Wednesday she had an acute episode of confusion she seems to be back to where she has been the last year.  I did encourage her to go to the ER if she has another episode similar to Wednesday. She does not appear low flow on exam. Note that her RHC from July 2020 did have a CI of 1.97.    # Chronic systolic heart failure/HFrEF*    Stage C. NYHA Class III.    Fluid status: hypovolemic-hold off on Lasix today, will take 40 mg of Lasix if she gains 2 pounds from morning weight compared to the next morning weight  ACEi/ARB/ARNI: no-has not tolerated due to hypotension  BB: yes coreg 3.125 mg BID-could consider changing to with  metoprolol in the future if any more BP around  Aldosterone antagonist: yes aldactone 12.5 mg daily  SCD prophylaxis: does not meet criteria for implant  NSAID use: contraindicated  Sleep apnea evaluation: Not discussed today  Remote monitoring: N/A  Other:   - Repeat ECHO today    # Dizziness Has been having episodes of dizziness not associated with position changes.  (She always has positional lightheadedness).  -Remote PPM check today    # RF ablation/ AV node ablation with complete heart block   - has PPM     # Depression: seeing a counselor   - has decided to retire/collect disability  -At prior core appointment was referred to MTM to assess medications make a referral for an antidepressant      Follow up   - RN phone call on Monday to see your you are feeling on less lasix  - Labs in 2 weeks  - ECHO today  - Dr. Ordonez next week as planned     Billing  - I reviewed the results of 3 tesnt- BMP, BNP and ECHO  - I manage    Elizabeth Merritt PA-C  [facility]          CC

## 2021-02-22 ENCOUNTER — MYC MEDICAL ADVICE (OUTPATIENT)
Dept: CARDIOLOGY | Facility: CLINIC | Age: 61
End: 2021-02-22

## 2021-02-22 NOTE — TELEPHONE ENCOUNTER
Patient called and ICD interrogation results from 2/19/21 reviewed with patient. Questions answered. Patient is scheduled for her next remote transmission on 5/27/21. Encouraged patient to call device clinic with further questions or concerns.

## 2021-02-23 ENCOUNTER — OFFICE VISIT (OUTPATIENT)
Dept: CARDIOLOGY | Facility: CLINIC | Age: 61
End: 2021-02-23
Attending: INTERNAL MEDICINE
Payer: COMMERCIAL

## 2021-02-23 VITALS
HEIGHT: 65 IN | SYSTOLIC BLOOD PRESSURE: 112 MMHG | BODY MASS INDEX: 27.82 KG/M2 | DIASTOLIC BLOOD PRESSURE: 76 MMHG | WEIGHT: 167 LBS | HEART RATE: 68 BPM | OXYGEN SATURATION: 92 %

## 2021-02-23 DIAGNOSIS — I44.2 COMPLETE ATRIOVENTRICULAR BLOCK (H): ICD-10-CM

## 2021-02-23 DIAGNOSIS — I50.20 HEART FAILURE WITH REDUCED EJECTION FRACTION, NYHA CLASS III (H): Primary | ICD-10-CM

## 2021-02-23 DIAGNOSIS — I42.8 NONISCHEMIC CARDIOMYOPATHY (H): ICD-10-CM

## 2021-02-23 DIAGNOSIS — I10 BENIGN ESSENTIAL HYPERTENSION: ICD-10-CM

## 2021-02-23 LAB
MDC_IDC_LEAD_IMPLANT_DT: NORMAL
MDC_IDC_LEAD_IMPLANT_DT: NORMAL
MDC_IDC_LEAD_LOCATION: NORMAL
MDC_IDC_LEAD_LOCATION: NORMAL
MDC_IDC_LEAD_LOCATION_DETAIL_1: NORMAL
MDC_IDC_LEAD_LOCATION_DETAIL_1: NORMAL
MDC_IDC_LEAD_MFG: NORMAL
MDC_IDC_LEAD_MFG: NORMAL
MDC_IDC_LEAD_MODEL: NORMAL
MDC_IDC_LEAD_MODEL: NORMAL
MDC_IDC_LEAD_POLARITY_TYPE: NORMAL
MDC_IDC_LEAD_POLARITY_TYPE: NORMAL
MDC_IDC_LEAD_SERIAL: NORMAL
MDC_IDC_LEAD_SERIAL: NORMAL
MDC_IDC_MSMT_BATTERY_DTM: NORMAL
MDC_IDC_MSMT_BATTERY_REMAINING_LONGEVITY: 67 MO
MDC_IDC_MSMT_BATTERY_RRT_TRIGGER: 2.83
MDC_IDC_MSMT_BATTERY_STATUS: NORMAL
MDC_IDC_MSMT_BATTERY_VOLTAGE: 3 V
MDC_IDC_MSMT_LEADCHNL_RA_IMPEDANCE_VALUE: 380 OHM
MDC_IDC_MSMT_LEADCHNL_RA_IMPEDANCE_VALUE: 456 OHM
MDC_IDC_MSMT_LEADCHNL_RA_PACING_THRESHOLD_AMPLITUDE: 0.62 V
MDC_IDC_MSMT_LEADCHNL_RA_PACING_THRESHOLD_PULSEWIDTH: 0.4 MS
MDC_IDC_MSMT_LEADCHNL_RA_SENSING_INTR_AMPL: 2.5 MV
MDC_IDC_MSMT_LEADCHNL_RA_SENSING_INTR_AMPL: 2.5 MV
MDC_IDC_MSMT_LEADCHNL_RV_IMPEDANCE_VALUE: 399 OHM
MDC_IDC_MSMT_LEADCHNL_RV_IMPEDANCE_VALUE: 437 OHM
MDC_IDC_MSMT_LEADCHNL_RV_PACING_THRESHOLD_AMPLITUDE: 0.75 V
MDC_IDC_MSMT_LEADCHNL_RV_PACING_THRESHOLD_PULSEWIDTH: 0.4 MS
MDC_IDC_MSMT_LEADCHNL_RV_SENSING_INTR_AMPL: 10.12 MV
MDC_IDC_MSMT_LEADCHNL_RV_SENSING_INTR_AMPL: 10.12 MV
MDC_IDC_PG_IMPLANT_DTM: NORMAL
MDC_IDC_PG_MFG: NORMAL
MDC_IDC_PG_MODEL: NORMAL
MDC_IDC_PG_SERIAL: NORMAL
MDC_IDC_PG_TYPE: NORMAL
MDC_IDC_SESS_CLINIC_NAME: NORMAL
MDC_IDC_SESS_DTM: NORMAL
MDC_IDC_SESS_TYPE: NORMAL
MDC_IDC_SET_BRADY_AT_MODE_SWITCH_RATE: 171 {BEATS}/MIN
MDC_IDC_SET_BRADY_HYSTRATE: NORMAL
MDC_IDC_SET_BRADY_LOWRATE: 60 {BEATS}/MIN
MDC_IDC_SET_BRADY_MAX_SENSOR_RATE: 140 {BEATS}/MIN
MDC_IDC_SET_BRADY_MAX_TRACKING_RATE: 140 {BEATS}/MIN
MDC_IDC_SET_BRADY_MODE: NORMAL
MDC_IDC_SET_BRADY_PAV_DELAY_HIGH: 140 MS
MDC_IDC_SET_BRADY_PAV_DELAY_LOW: 180 MS
MDC_IDC_SET_BRADY_SAV_DELAY_HIGH: 110 MS
MDC_IDC_SET_BRADY_SAV_DELAY_LOW: 150 MS
MDC_IDC_SET_LEADCHNL_RA_PACING_AMPLITUDE: 1.5 V
MDC_IDC_SET_LEADCHNL_RA_PACING_ANODE_ELECTRODE_1: NORMAL
MDC_IDC_SET_LEADCHNL_RA_PACING_ANODE_LOCATION_1: NORMAL
MDC_IDC_SET_LEADCHNL_RA_PACING_CAPTURE_MODE: NORMAL
MDC_IDC_SET_LEADCHNL_RA_PACING_CATHODE_ELECTRODE_1: NORMAL
MDC_IDC_SET_LEADCHNL_RA_PACING_CATHODE_LOCATION_1: NORMAL
MDC_IDC_SET_LEADCHNL_RA_PACING_POLARITY: NORMAL
MDC_IDC_SET_LEADCHNL_RA_PACING_PULSEWIDTH: 0.4 MS
MDC_IDC_SET_LEADCHNL_RA_SENSING_ANODE_ELECTRODE_1: NORMAL
MDC_IDC_SET_LEADCHNL_RA_SENSING_ANODE_LOCATION_1: NORMAL
MDC_IDC_SET_LEADCHNL_RA_SENSING_CATHODE_ELECTRODE_1: NORMAL
MDC_IDC_SET_LEADCHNL_RA_SENSING_CATHODE_LOCATION_1: NORMAL
MDC_IDC_SET_LEADCHNL_RA_SENSING_POLARITY: NORMAL
MDC_IDC_SET_LEADCHNL_RA_SENSING_SENSITIVITY: 0.3 MV
MDC_IDC_SET_LEADCHNL_RV_PACING_AMPLITUDE: 1.5 V
MDC_IDC_SET_LEADCHNL_RV_PACING_ANODE_ELECTRODE_1: NORMAL
MDC_IDC_SET_LEADCHNL_RV_PACING_ANODE_LOCATION_1: NORMAL
MDC_IDC_SET_LEADCHNL_RV_PACING_CAPTURE_MODE: NORMAL
MDC_IDC_SET_LEADCHNL_RV_PACING_CATHODE_ELECTRODE_1: NORMAL
MDC_IDC_SET_LEADCHNL_RV_PACING_CATHODE_LOCATION_1: NORMAL
MDC_IDC_SET_LEADCHNL_RV_PACING_POLARITY: NORMAL
MDC_IDC_SET_LEADCHNL_RV_PACING_PULSEWIDTH: 0.4 MS
MDC_IDC_SET_LEADCHNL_RV_SENSING_ANODE_ELECTRODE_1: NORMAL
MDC_IDC_SET_LEADCHNL_RV_SENSING_ANODE_LOCATION_1: NORMAL
MDC_IDC_SET_LEADCHNL_RV_SENSING_CATHODE_ELECTRODE_1: NORMAL
MDC_IDC_SET_LEADCHNL_RV_SENSING_CATHODE_LOCATION_1: NORMAL
MDC_IDC_SET_LEADCHNL_RV_SENSING_POLARITY: NORMAL
MDC_IDC_SET_LEADCHNL_RV_SENSING_SENSITIVITY: 0.9 MV
MDC_IDC_SET_ZONE_DETECTION_INTERVAL: 350 MS
MDC_IDC_SET_ZONE_DETECTION_INTERVAL: 400 MS
MDC_IDC_SET_ZONE_TYPE: NORMAL
MDC_IDC_STAT_AT_BURDEN_PERCENT: 0 %
MDC_IDC_STAT_AT_DTM_END: NORMAL
MDC_IDC_STAT_AT_DTM_START: NORMAL
MDC_IDC_STAT_BRADY_AP_VP_PERCENT: 52.89 %
MDC_IDC_STAT_BRADY_AP_VS_PERCENT: 0.7 %
MDC_IDC_STAT_BRADY_AS_VP_PERCENT: 46.11 %
MDC_IDC_STAT_BRADY_AS_VS_PERCENT: 0.31 %
MDC_IDC_STAT_BRADY_DTM_END: NORMAL
MDC_IDC_STAT_BRADY_DTM_START: NORMAL
MDC_IDC_STAT_BRADY_RA_PERCENT_PACED: 53.07 %
MDC_IDC_STAT_BRADY_RV_PERCENT_PACED: 98.54 %
MDC_IDC_STAT_EPISODE_RECENT_COUNT: 0
MDC_IDC_STAT_EPISODE_RECENT_COUNT_DTM_END: NORMAL
MDC_IDC_STAT_EPISODE_RECENT_COUNT_DTM_START: NORMAL
MDC_IDC_STAT_EPISODE_TOTAL_COUNT: 0
MDC_IDC_STAT_EPISODE_TOTAL_COUNT: 1
MDC_IDC_STAT_EPISODE_TOTAL_COUNT: 1
MDC_IDC_STAT_EPISODE_TOTAL_COUNT: 9
MDC_IDC_STAT_EPISODE_TOTAL_COUNT_DTM_END: NORMAL
MDC_IDC_STAT_EPISODE_TOTAL_COUNT_DTM_START: NORMAL
MDC_IDC_STAT_EPISODE_TYPE: NORMAL

## 2021-02-23 PROCEDURE — G0463 HOSPITAL OUTPT CLINIC VISIT: HCPCS

## 2021-02-23 PROCEDURE — 99214 OFFICE O/P EST MOD 30 MIN: CPT | Performed by: INTERNAL MEDICINE

## 2021-02-23 ASSESSMENT — MIFFLIN-ST. JEOR: SCORE: 1318.39

## 2021-02-23 ASSESSMENT — PAIN SCALES - GENERAL: PAINLEVEL: MILD PAIN (2)

## 2021-02-23 NOTE — NURSING NOTE
Chief Complaint   Patient presents with     Follow Up     Return HF     Vitals were taken and medication reconciled.    RICHARD Becerril  9:25 AM

## 2021-02-23 NOTE — LETTER
2/23/2021      RE: Joyce Marroquin  [address] [facility] [place]  [place] [address]       Dear Colleague,    Thank you for the opportunity to participate in the care of your patient, Joyce Marroquin, at the [facility] [place] at [facility]. Please see a copy of my visit note below.    February 23, 2021     Joyce Marroquin is a very pleasant lady who works as a nurse anesthetist and has a history of RF ablation which left to AV node ablation and complete heart block requiring a dual-chamber pacemaker implantation, which is a [employer] device MRI compatible.  She also has a recent history of heart failure with reduced ejection fraction with EF around 35% later improved to 45%.  There was a history of Takotsubo cardiomyopathy.  Patient on 7/2 had meeting with EP at which time low dose carvedilol was started 3.125 mg PO bid due to atrial ectopy and short runs of VT. she did recently complete an exercise out of catheterization with myself which showed normal resting biventricular filling pressures and normal cardiac output.  She did have limited augmentation of cardiac index with exercise and filling pressures especially pulmonary capillary wedge pressure increased significantly consistent with heart failure preserved ejection fraction.    She did initially better but for the past couple of months she has not been feeling well.  She did gain some weight and increased the Lasix to 40 mg as needed.  She does not feel that these helped for her.  In fact at times she feels that she had some confusion or disorientation.  Possibly she is dry at times.  She did see in follow-up with [facility] he had some of the instructions were confusing and she appropriately notes that the discharge papers stated a lot of extra information.  She is questioning the 2 L fluid restriction.  She has been taking salt pills in the past.  No other issues today.     PAST MEDICAL HISTORY:  Past  Medical History:   Diagnosis Date     Arthritis      Cardiomyopathy (H)     ff Cardiology     Chronic depressive personality disorder      Congestive heart failure (H) see dr martínez    heart failure correct term     COPD exacerbation (H)      Esophageal reflux      Ex-smoker     quit 2006; 1 PPD x 30     Hyperparathyroidism (H)     s/p parathyroidectomy     Hypothyroidism      LARRY on CPAP     ff Sleep medicine     Pulmonary nodules     ff Pulmonologist     S/P cardiac pacemaker procedure     checked every 6 months at the [facility]     Uncomplicated asthma years     FAMILY HISTORY:  Family History   Problem Relation Age of Onset     Hypothyroidism Mother      Hypertension Mother      Osteoarthritis Mother      Coronary Artery Disease Father         nonfatal MI in his 70s     Asthma Father      Diabetes Sister      Hypothyroidism Sister      Breast Cancer Maternal Aunt      Anxiety Disorder Sister      SOCIAL HISTORY:  Social History     Socioeconomic History     Marital status:      Spouse name: None     Number of children: None     Years of education: None     Highest education level: Master's degree (e.g., MA, MS, Florian, MEd, MSW, SONIA)   Occupational History     None   Social Needs     Financial resource strain: Not very hard     Food insecurity     Worry: Never true     Inability: Never true     Transportation needs     Medical: No     Non-medical: No   Tobacco Use     Smoking status: Former Smoker     Packs/day: 0.00     Years: 0.00     Pack years: 0.00     Smokeless tobacco: Never Used   Substance and Sexual Activity     Alcohol use: Yes     Alcohol/week: 0.0 - 8.0 standard drinks     Frequency: 2-3 times a week     Drinks per session: 1 or 2     Binge frequency: Never     Comment: seldom     Drug use: No     Sexual activity: Yes     Partners: Male     Birth control/protection: None     Comment: vasectomy   Lifestyle     Physical activity     Days per week: 3 days     Minutes per session: 10 min     Stress: To  some extent   Relationships     Social connections     Talks on phone: More than three times a week     Gets together: Never     Attends Yazdanism service: More than 4 times per year     Active member of club or organization: Yes     Attends meetings of clubs or organizations: More than 4 times per year     Relationship status:      Intimate partner violence     Fear of current or ex partner: None     Emotionally abused: None     Physically abused: None     Forced sexual activity: None   Other Topics Concern     Parent/sibling w/ CABG, MI or angioplasty before 65F 55M? No   Social History Narrative    CRNA on disability for vestibular symptoms      CURRENT MEDICATIONS:  Current Outpatient Medications   Medication     acetaminophen (TYLENOL) 500 MG tablet     albuterol (PROAIR HFA/PROVENTIL HFA/VENTOLIN HFA) 108 (90 BASE) MCG/ACT Inhaler     azelastine (ASTELIN) 0.1 % nasal spray     Calcium Lactate 500 MG CAPS     carvedilol (COREG) 3.125 MG tablet     clonazePAM (KLONOPIN) 0.5 MG tablet     DiphenhydrAMINE HCl (BENADRYL PO)     Ferrous Sulfate (IRON SUPPLEMENT PO)     fluticasone-salmeterol (ADVAIR) 250-50 MCG/DOSE inhaler     furosemide (LASIX) 20 MG tablet     ipratropium (ATROVENT) 0.06 % nasal spray     loperamide (IMODIUM A-D) 2 MG tablet     MAGNESIUM LACTATE PO     MELATONIN PO     Menaquinone-7 (VITAMIN K2 PO)     METHYLPREDNISOLONE PO     Multiple Vitamin (DAILY MULTIVITAMIN PO)     nebulizer nebulization     nitroGLYcerin (NITROSTAT) 0.4 MG sublingual tablet     Probiotic Product (PROBIOTIC DAILY PO)     spironolactone (ALDACTONE) 25 MG tablet     SYNTHROID 150 MCG tablet     TURMERIC PO     UNABLE TO FIND     vitamin B complex with vitamin C (VITAMIN  B COMPLEX) PO tablet     vitamin D3 (CHOLECALCIFEROL) 2000 units tablet     Zinc 15 MG CAPS     calcium carbonate (OS-MANJU) 500 MG tablet     fluocinolone acetonide (DERMA SMOOTHE/FS BODY) 0.01 % external oil     No current facility-administered  "medications for this visit.      ROS:   Constitutional: No fever, chills, or sweats. Weight is 167 lbs 0 oz  ENT: No visual disturbance, ear ache, epistaxis, sore throat.   Allergies/Immunologic: Negative.   Respiratory: No cough, hemoptysis.   Cardiovascular: As per HPI.   GI: No nausea, vomiting, hematemesis, melena, or hematochezia.   : No urinary frequency, dysuria, or hematuria.   Integument: Negative.   Psychiatric: Pleasant, no major depression noted  Neuro: No focal neurological deficits noted  Endocrinology: Negative.   Musculoskeletal: As per HPI.      EXAM:  /76 (BP Location: Right arm, Patient Position: Chair, Cuff Size: Adult Regular)   Pulse 68   Ht 1.651 m (5' 5\")   Wt 75.8 kg (167 lb)   LMP 08/21/2007   SpO2 92%   BMI 27.79 kg/m    General: appears comfortable, alert and oriented  Head: normocephalic, atraumatic  Eyes: anicteric sclera, EOMI , PERRL  Neck: no adenopathy  Orophyarynx: moist mucosa, no lesions noted  Heart: regular, S1/S2, no murmurs, rubs or gallop. Estimated JVP at 5 cmH2O  Lungs: CTAB, No wheezing.   Abdomen: soft, non-tender, bowel sounds present, no hepatosplenomegaly  Extremities: No LE edema today  Skin: no open lesions noted  Neuro: grossly non-focal     Labs:  Lab Results   Component Value Date    WBC 5.9 12/20/2020    HGB 12.4 12/20/2020    HCT 38.7 12/20/2020     12/20/2020     02/18/2021    POTASSIUM 4.3 02/18/2021    CHLORIDE 103 02/18/2021    CO2 28 02/18/2021    BUN 16 02/18/2021    CR 0.78 02/18/2021    GLC 82 02/18/2021    SED 22 11/09/2017    DD 0.7 (H) 11/22/2017    NTBNPI 107 12/20/2020    NTBNP 133 (H) 02/18/2021    TROPONIN <0.07 01/04/2006    TROPI <0.015 12/20/2020    AST 18 12/20/2020    ALT 25 12/20/2020    ALKPHOS 83 12/20/2020    BILITOTAL 0.3 12/20/2020    INR 1.02 10/06/2019     NM Lexiscan 3/2020:  The nuclear stress test is negative for inducible myocardial ischemia or infarction.  Fixed septal/apical defect due to Paced rhythm " and increased gut photon activity.LVEDv 133ml. LV ESv 40ml. LV EF 70%.     There is no prior study for comparison     Echocardiogram 3/2020:  Mildly (EF 40-45%) reduced left ventricular function is present.  Global right ventricular function is normal.  No pericardial effusion is present. IVC diameter <2.1 cm collapsing >50% with sniff suggests a normal RA pressure  of 3 mmHg. This study was compared with the study from 12/3/19.  There has been no change.      Coronary angiogram 9/2019:  Mild-moderate non-obstructive coronary disease involving LAD, LCx, and RCA. No significant coronary disease to explain new cardiomyopathy.     TTE 9/5/2019:  Moderately (EF 30-35%) reduced left ventricular function is present. All segments from mid to apical left ventricle are severely hypokinetic to akinetic. Basal segments are hypercontractile. Overall pattern is suggestive of stress cardiomyopathy, but cannot rule out ischemic cardiomyopathy. Right ventricular function, chamber size, wall motion, and thickness are normal. No significant valve abnormalities. Mild pulmonary hypertension with increased RA pressure.    TTE 2/19/21:  Moderately (EF 35%) reduced left ventricular function is present.  Global right ventricular function is normal.  No significant valvular dysfunction. The inferior vena cava was normal in size with preserved respiratory variability. No pericardial effusion present.     MRI cardiac 4/2017:  1. Normal left ventricular size.  Left ventricular wall thickness is  normal except a very small area in the basal inferior septum that appears  thin in some images.  Real-time images suggest a very small area of  hypokinesis in the basal inferior septum (not well visualized).  Left  ventricular ejection fraction is estimated at about 60-65% (unable to   perform volumetric analysis given frequent ectopy; real time images used  to assess LVEF).  2. Normal right ventricular size and systolic function.  No obvious regional  wall motion abnormalities noted.  3. Gadolinium imaging:  No obvious right ventricular myocardial late  gadolinium enhancement noted.  There is a very small area of probable late  gadolinium enhancement in the mildly thinned basal inferior segment.    4. Moderate left atrial dilatation.  The right atrial size is at the upper limits of normal.  5. Thickened mitral valve leaflets, without obvious stenosis or significant regurgitation. No obvious hemodynamically significant valvular dysfunction noted.   6. Normal sized aortic root and ascending aorta.  For the remainder of the thoracic aorta, see separate radiology report.  7. Normal pericardium without significant pericardial effusion.      ASSESSMENT AND PLAN:  In summary, patient is a 60 year old lady with history as stated above including complete heart block in the setting of RF ablation status post dual-chamber pacemaker in 2017 who has ongoing symptoms of heart failure weight gain and very limited exercise tolerance.  I do believe her shortness of breath and limited exercise tolerance is probably multifactorial including the significant weight gain with AV node ablation and now with a more fixed heart rate in the setting of pacemaker use.  However certainly she could have heart failure with borderline reduced ejection fraction with more preserved component.  Right heart catheterization with exercise essentially confirmed the above findings.  Today unfortunately she is not feeling right she continues to feel fatigued and short of breath.  She notes that she did gain some weight and this is despite increasing Lasix to 40 mg as needed.  She also had an echocardiogram recently which was reviewed in person and I believe her ejection fraction overall remains unchanged at around 40%.  We discussed that there is multiple reasons why she might feel short of breath but most likely at this time, I feel that she is dehydrated.  I think she has been taking the Lasix more  regularly and this can explain why she feels fatigued tired and also why she felt a little confused or disoriented the other day.  She certainly has exercise limitation and class III symptoms at this time which has progressed some.  This might also be related to her underlying heart failure that we saw on the catheterization.  I think it is okay for her to liberalize some salt intake as she has been in the past and liberalize some fluid intake and take only the Lasix if she gains more than 5 pounds in a week she has significantly worse shortness of breath.  We also discussed if her symptoms do not improve over the next couple of weeks then we can proceed with repeat hemodynamic evaluation with right heart catheterization as well as performing a CPX study to objectively establish her functional capacity.  She is open to this however at first we will try to do some more hydration adjust medications and consider this if these interventions do not help.  She will see Fiorella Swanson in the clinic in about a month and I will see her back in 2 to 3 months.  Happy to see her sooner if needed.  On device interrogation she did have a few episodes of what it looks like atrial fibrillation lasting less than 1 minute each.  I have sent a message to Dr. Funes to get his opinion whether anticoagulation would be indicated in this setting.  We reviewed and discussed.     I appreciate the opportunity to participate in the care of Joyce JUAN Marroquin . Please do not hesitate to contact me with any further questions.    Alonso Ordonez MD

## 2021-02-23 NOTE — PROGRESS NOTES
February 23, 2021     Joyce Marroquin is a very pleasant lady who works as a nurse anesthetist and has a history of RF ablation which left to AV node ablation and complete heart block requiring a dual-chamber pacemaker implantation, which is a [employer] device MRI compatible.  She also has a recent history of heart failure with reduced ejection fraction with EF around 35% later improved to 45%.  There was a history of Takotsubo cardiomyopathy.  Patient on 7/2 had meeting with EP at which time low dose carvedilol was started 3.125 mg PO bid due to atrial ectopy and short runs of VT. she did recently complete an exercise out of catheterization with myself which showed normal resting biventricular filling pressures and normal cardiac output.  She did have limited augmentation of cardiac index with exercise and filling pressures especially pulmonary capillary wedge pressure increased significantly consistent with heart failure preserved ejection fraction.    She did initially better but for the past couple of months she has not been feeling well.  She did gain some weight and increased the Lasix to 40 mg as needed.  She does not feel that these helped for her.  In fact at times she feels that she had some confusion or disorientation.  Possibly she is dry at times.  She did see in follow-up with [facility] he had some of the instructions were confusing and she appropriately notes that the discharge papers stated a lot of extra information.  She is questioning the 2 L fluid restriction.  She has been taking salt pills in the past.  No other issues today.     PAST MEDICAL HISTORY:  Past Medical History:   Diagnosis Date     Arthritis      Cardiomyopathy (H)     ff Cardiology     Chronic depressive personality disorder      Congestive heart failure (H) see dr martínez    heart failure correct term     COPD exacerbation (H)      Esophageal reflux      Ex-smoker     quit 2006; 1 PPD x 30     Hyperparathyroidism (H)     s/p  parathyroidectomy     Hypothyroidism      LARRY on CPAP     ff Sleep medicine     Pulmonary nodules     ff Pulmonologist     S/P cardiac pacemaker procedure     checked every 6 months at the [facility]     Uncomplicated asthma years     FAMILY HISTORY:  Family History   Problem Relation Age of Onset     Hypothyroidism Mother      Hypertension Mother      Osteoarthritis Mother      Coronary Artery Disease Father         nonfatal MI in his 70s     Asthma Father      Diabetes Sister      Hypothyroidism Sister      Breast Cancer Maternal Aunt      Anxiety Disorder Sister      SOCIAL HISTORY:  Social History     Socioeconomic History     Marital status:      Spouse name: None     Number of children: None     Years of education: None     Highest education level: Master's degree (e.g., MA, MS, Florian, MEd, MSW, SONIA)   Occupational History     None   Social Needs     Financial resource strain: Not very hard     Food insecurity     Worry: Never true     Inability: Never true     Transportation needs     Medical: No     Non-medical: No   Tobacco Use     Smoking status: Former Smoker     Packs/day: 0.00     Years: 0.00     Pack years: 0.00     Smokeless tobacco: Never Used   Substance and Sexual Activity     Alcohol use: Yes     Alcohol/week: 0.0 - 8.0 standard drinks     Frequency: 2-3 times a week     Drinks per session: 1 or 2     Binge frequency: Never     Comment: seldom     Drug use: No     Sexual activity: Yes     Partners: Male     Birth control/protection: None     Comment: vasectomy   Lifestyle     Physical activity     Days per week: 3 days     Minutes per session: 10 min     Stress: To some extent   Relationships     Social connections     Talks on phone: More than three times a week     Gets together: Never     Attends Congregation service: More than 4 times per year     Active member of club or organization: Yes     Attends meetings of clubs or organizations: More than 4 times per year     Relationship status:       Intimate partner violence     Fear of current or ex partner: None     Emotionally abused: None     Physically abused: None     Forced sexual activity: None   Other Topics Concern     Parent/sibling w/ CABG, MI or angioplasty before 65F 55M? No   Social History Narrative    CRNA on disability for vestibular symptoms      CURRENT MEDICATIONS:  Current Outpatient Medications   Medication     acetaminophen (TYLENOL) 500 MG tablet     albuterol (PROAIR HFA/PROVENTIL HFA/VENTOLIN HFA) 108 (90 BASE) MCG/ACT Inhaler     azelastine (ASTELIN) 0.1 % nasal spray     Calcium Lactate 500 MG CAPS     carvedilol (COREG) 3.125 MG tablet     clonazePAM (KLONOPIN) 0.5 MG tablet     DiphenhydrAMINE HCl (BENADRYL PO)     Ferrous Sulfate (IRON SUPPLEMENT PO)     fluticasone-salmeterol (ADVAIR) 250-50 MCG/DOSE inhaler     furosemide (LASIX) 20 MG tablet     ipratropium (ATROVENT) 0.06 % nasal spray     loperamide (IMODIUM A-D) 2 MG tablet     MAGNESIUM LACTATE PO     MELATONIN PO     Menaquinone-7 (VITAMIN K2 PO)     METHYLPREDNISOLONE PO     Multiple Vitamin (DAILY MULTIVITAMIN PO)     nebulizer nebulization     nitroGLYcerin (NITROSTAT) 0.4 MG sublingual tablet     Probiotic Product (PROBIOTIC DAILY PO)     spironolactone (ALDACTONE) 25 MG tablet     SYNTHROID 150 MCG tablet     TURMERIC PO     UNABLE TO FIND     vitamin B complex with vitamin C (VITAMIN  B COMPLEX) PO tablet     vitamin D3 (CHOLECALCIFEROL) 2000 units tablet     Zinc 15 MG CAPS     calcium carbonate (OS-MANJU) 500 MG tablet     fluocinolone acetonide (DERMA SMOOTHE/FS BODY) 0.01 % external oil     No current facility-administered medications for this visit.      ROS:   Constitutional: No fever, chills, or sweats. Weight is 167 lbs 0 oz  ENT: No visual disturbance, ear ache, epistaxis, sore throat.   Allergies/Immunologic: Negative.   Respiratory: No cough, hemoptysis.   Cardiovascular: As per HPI.   GI: No nausea, vomiting, hematemesis, melena, or  "hematochezia.   : No urinary frequency, dysuria, or hematuria.   Integument: Negative.   Psychiatric: Pleasant, no major depression noted  Neuro: No focal neurological deficits noted  Endocrinology: Negative.   Musculoskeletal: As per HPI.      EXAM:  /76 (BP Location: Right arm, Patient Position: Chair, Cuff Size: Adult Regular)   Pulse 68   Ht 1.651 m (5' 5\")   Wt 75.8 kg (167 lb)   LMP 08/21/2007   SpO2 92%   BMI 27.79 kg/m    General: appears comfortable, alert and oriented  Head: normocephalic, atraumatic  Eyes: anicteric sclera, EOMI , PERRL  Neck: no adenopathy  Orophyarynx: moist mucosa, no lesions noted  Heart: regular, S1/S2, no murmurs, rubs or gallop. Estimated JVP at 5 cmH2O  Lungs: CTAB, No wheezing.   Abdomen: soft, non-tender, bowel sounds present, no hepatosplenomegaly  Extremities: No LE edema today  Skin: no open lesions noted  Neuro: grossly non-focal     Labs:  Lab Results   Component Value Date    WBC 5.9 12/20/2020    HGB 12.4 12/20/2020    HCT 38.7 12/20/2020     12/20/2020     02/18/2021    POTASSIUM 4.3 02/18/2021    CHLORIDE 103 02/18/2021    CO2 28 02/18/2021    BUN 16 02/18/2021    CR 0.78 02/18/2021    GLC 82 02/18/2021    SED 22 11/09/2017    DD 0.7 (H) 11/22/2017    NTBNPI 107 12/20/2020    NTBNP 133 (H) 02/18/2021    TROPONIN <0.07 01/04/2006    TROPI <0.015 12/20/2020    AST 18 12/20/2020    ALT 25 12/20/2020    ALKPHOS 83 12/20/2020    BILITOTAL 0.3 12/20/2020    INR 1.02 10/06/2019     NM Lexiscan 3/2020:  The nuclear stress test is negative for inducible myocardial ischemia or infarction.  Fixed septal/apical defect due to Paced rhythm and increased gut photon activity.LVEDv 133ml. LV ESv 40ml. LV EF 70%.     There is no prior study for comparison     Echocardiogram 3/2020:  Mildly (EF 40-45%) reduced left ventricular function is present.  Global right ventricular function is normal.  No pericardial effusion is present. IVC diameter <2.1 cm collapsing " >50% with sniff suggests a normal RA pressure  of 3 mmHg. This study was compared with the study from 12/3/19.  There has been no change.      Coronary angiogram 9/2019:  Mild-moderate non-obstructive coronary disease involving LAD, LCx, and RCA. No significant coronary disease to explain new cardiomyopathy.     TTE 9/5/2019:  Moderately (EF 30-35%) reduced left ventricular function is present. All segments from mid to apical left ventricle are severely hypokinetic to akinetic. Basal segments are hypercontractile. Overall pattern is suggestive of stress cardiomyopathy, but cannot rule out ischemic cardiomyopathy. Right ventricular function, chamber size, wall motion, and thickness are normal. No significant valve abnormalities. Mild pulmonary hypertension with increased RA pressure.    TTE 2/19/21:  Moderately (EF 35%) reduced left ventricular function is present.  Global right ventricular function is normal.  No significant valvular dysfunction. The inferior vena cava was normal in size with preserved respiratory variability. No pericardial effusion present.     MRI cardiac 4/2017:  1. Normal left ventricular size.  Left ventricular wall thickness is  normal except a very small area in the basal inferior septum that appears  thin in some images.  Real-time images suggest a very small area of  hypokinesis in the basal inferior septum (not well visualized).  Left  ventricular ejection fraction is estimated at about 60-65% (unable to   perform volumetric analysis given frequent ectopy; real time images used  to assess LVEF).  2. Normal right ventricular size and systolic function.  No obvious regional wall motion abnormalities noted.  3. Gadolinium imaging:  No obvious right ventricular myocardial late  gadolinium enhancement noted.  There is a very small area of probable late  gadolinium enhancement in the mildly thinned basal inferior segment.    4. Moderate left atrial dilatation.  The right atrial size is at the  upper limits of normal.  5. Thickened mitral valve leaflets, without obvious stenosis or significant regurgitation. No obvious hemodynamically significant valvular dysfunction noted.   6. Normal sized aortic root and ascending aorta.  For the remainder of the thoracic aorta, see separate radiology report.  7. Normal pericardium without significant pericardial effusion.      ASSESSMENT AND PLAN:  In summary, patient is a 60 year old lady with history as stated above including complete heart block in the setting of RF ablation status post dual-chamber pacemaker in 2017 who has ongoing symptoms of heart failure weight gain and very limited exercise tolerance.  I do believe her shortness of breath and limited exercise tolerance is probably multifactorial including the significant weight gain with AV node ablation and now with a more fixed heart rate in the setting of pacemaker use.  However certainly she could have heart failure with borderline reduced ejection fraction with more preserved component.  Right heart catheterization with exercise essentially confirmed the above findings.  Today unfortunately she is not feeling right she continues to feel fatigued and short of breath.  She notes that she did gain some weight and this is despite increasing Lasix to 40 mg as needed.  She also had an echocardiogram recently which was reviewed in person and I believe her ejection fraction overall remains unchanged at around 40%.  We discussed that there is multiple reasons why she might feel short of breath but most likely at this time, I feel that she is dehydrated.  I think she has been taking the Lasix more regularly and this can explain why she feels fatigued tired and also why she felt a little confused or disoriented the other day.  She certainly has exercise limitation and class III symptoms at this time which has progressed some.  This might also be related to her underlying heart failure that we saw on the  catheterization.  I think it is okay for her to liberalize some salt intake as she has been in the past and liberalize some fluid intake and take only the Lasix if she gains more than 5 pounds in a week she has significantly worse shortness of breath.  We also discussed if her symptoms do not improve over the next couple of weeks then we can proceed with repeat hemodynamic evaluation with right heart catheterization as well as performing a CPX study to objectively establish her functional capacity.  She is open to this however at first we will try to do some more hydration adjust medications and consider this if these interventions do not help.  She will see Fiorella Swanson in the clinic in about a month and I will see her back in 2 to 3 months.  Happy to see her sooner if needed.  On device interrogation she did have a few episodes of what it looks like atrial fibrillation lasting less than 1 minute each.  I have sent a message to Dr. Funes to get his opinion whether anticoagulation would be indicated in this setting.  We reviewed and discussed.     I appreciate the opportunity to participate in the care of Joyce Marroquin . Please do not hesitate to contact me with any further questions.    Alosno Ordonez MD

## 2021-02-23 NOTE — PATIENT INSTRUCTIONS
Dr. Ordonez recommends:    Increase fluid intake.    Take Lasix 20 MG once daily as needed for weight gain of 5 lbs in a week or if you notice an increase in shortness of breath.     Follow up with Fiorella leggett in one month with a lab appointment the day before.    Follow up with Dr. Ordonez in 3 months with labs.     Thank you for your visit today.  Please call me with any questions or concerns.   Matthias García RN  Cardiology Care Coordinator  [phone], press option 1 then option 4

## 2021-03-02 DIAGNOSIS — I48.0 PAROXYSMAL ATRIAL FIBRILLATION (H): Primary | ICD-10-CM

## 2021-03-02 NOTE — PROGRESS NOTES
Patients case discussed with Dr. Funes. Given episodes of a-fib, will start anticoagulation with apixaban at 5mg BID.

## 2021-03-03 ENCOUNTER — TELEPHONE (OUTPATIENT)
Dept: PHARMACY | Facility: CLINIC | Age: 61
End: 2021-03-03

## 2021-03-03 NOTE — TELEPHONE ENCOUNTER
Called to reschedule MTM appt, rescheduled for 3/15/21.    Kortney Peralta, PharmD  Medication Therapy Management Pharmacist  [phone]

## 2021-03-12 DIAGNOSIS — R06.02 SOB (SHORTNESS OF BREATH): ICD-10-CM

## 2021-03-12 DIAGNOSIS — I48.91 A-FIB (H): Primary | ICD-10-CM

## 2021-03-12 DIAGNOSIS — I50.22 CHRONIC SYSTOLIC HEART FAILURE (H): ICD-10-CM

## 2021-03-12 NOTE — PROGRESS NOTES
Date: 3/12/2021    Time of Call: 1:24 PM     Diagnosis:  HEART FAILURE     [ VORB ] Ordering provider: KRYSTA HUTTON NP  Order: chest xray. Bmp. Ekg. In person clinic visit.      Order received by: Joellen Jimenez RN     Follow-up/additional notes: orders placed. Coordinated appt for 3/17. mychart sent to patient.

## 2021-03-15 ENCOUNTER — VIRTUAL VISIT (OUTPATIENT)
Dept: PHARMACY | Facility: CLINIC | Age: 61
End: 2021-03-15
Payer: COMMERCIAL

## 2021-03-15 DIAGNOSIS — F43.23 ADJUSTMENT DISORDER WITH MIXED ANXIETY AND DEPRESSED MOOD: Primary | ICD-10-CM

## 2021-03-15 DIAGNOSIS — E89.0 POSTABLATIVE HYPOTHYROIDISM: ICD-10-CM

## 2021-03-15 DIAGNOSIS — Z95.0 S/P CARDIAC PACEMAKER PROCEDURE: ICD-10-CM

## 2021-03-15 DIAGNOSIS — J45.901 EXACERBATION OF ASTHMA, UNSPECIFIED ASTHMA SEVERITY, UNSPECIFIED WHETHER PERSISTENT: ICD-10-CM

## 2021-03-15 DIAGNOSIS — I44.2 COMPLETE ATRIOVENTRICULAR BLOCK (H): ICD-10-CM

## 2021-03-15 DIAGNOSIS — F41.8 SITUATIONAL ANXIETY: ICD-10-CM

## 2021-03-15 DIAGNOSIS — I42.9 CARDIOMYOPATHY, UNSPECIFIED TYPE (H): ICD-10-CM

## 2021-03-15 DIAGNOSIS — Z78.9 TAKES DIETARY SUPPLEMENTS: ICD-10-CM

## 2021-03-15 PROCEDURE — 99607 MTMS BY PHARM ADDL 15 MIN: CPT | Performed by: PHARMACIST

## 2021-03-15 PROCEDURE — 99605 MTMS BY PHARM NP 15 MIN: CPT | Performed by: PHARMACIST

## 2021-03-15 NOTE — Clinical Note
DAYTON SAUCEDO note, thanks!    Kortney Peralta, PharmD  Medication Therapy Management Pharmacist  [phone]

## 2021-03-15 NOTE — PROGRESS NOTES
Medication Therapy Management (MTM) Encounter    ASSESSMENT:                            Medication Adherence/Access: No issues identified    Depression/Anxiety:  Risk factors for torsades include: female, personal history of drug-induced QT interval (ondansetron), HFrEF, possible history hypocalcemia. She is on no other medications that would contribute to prolonged QT, potassium and magnesium are WNL. May benefit from retrial of selective serotonin reuptake inhibitor or SNRI with minimal impact on QT - see plan.    Cheyanne's Wort may decrease the serum concentration of Apixaban.    HFrEF/Pacemaker/Prolonged QT interval: Plan in place.     Hypothyroidism: Stable.   Hypothyroidism decreases cardiac output (CO = HR x contractility).    Thyroid hormones also produce remarkable improvements in remodeling, including beneficial changes in myocyte shape, microcirculation, and collagen => improves myocardial contractility and relaxation (important for HF management).      Last TSH is within normal limits.     Pain/Thumb: Stable.     Asthma/Allergies: Stable    Supplements: Stable.     PLAN:                            1. [employer] is a nice resource to look at for available information on medications that can prolong QT: https://www.crediblemeds.org/   -I will note there is also a lack of data in this field.    2. Two options that I think might be good for you mood-wise include:  Cymbalta (duloxetine) - has been shown not to prolong QT, may also have added pain relief benefit  Or  Retrial of sertraline (Zoloft)- very low risk of prolonging QT, especially given your electrolytes are all within normal ranges.     I would avoid citalopram as this has been shown to prolong QT - one alternative option would be escitalopram (the enantiomer of citalopram), has been shown to have less impact on QT interval than citalopram, however I would say has more of an effect on QT than sertraline does.     Here is one resource for you: APA  Resource Document  file://[email or web address]-[email or web address]-and-Psychotropic-[email or web address]    3. Cheyanne's Wort may decrease the serum concentration of Apixaban - SSRIs (selective serotonin reuptake inhibitors) or SNRIs (serotonin and norepinephrine reuptake inhibitors) do not have this interaction.     4. I couldn't find any articles specifically tying brand name Synthroid to heart failure, but I did come across this article, with levothyroxine. I would say there are several limitations to this study, including that neither TSH, T4 or T3 levels were actually measured or monitored and that the severity of disease was not clear at baseline.   Long-Term Outcome in Patients With Heart Failure Treated With Levothyroxine: An Observational Nationwide Cohort Study   [email or web address]     Follow-up: as needed    SUBJECTIVE/OBJECTIVE:                          Joyce Marroquin is a 60 year old female contacted via secure video for an initial visit. She was referred to me from Ce Crowe for review meds and make recommendation for antidepressant given long QT.      Reason for visit:  Jose Luis Jones  would like info on medication for anxiety/ depression/bitterness that does not effect my all ready long qt interval on my ekg or affect my heart failure  also had read somewhere about synthriod and heart failure --do you have any information about that and if so was the study name brand only would like a printout of that   would like info on my blood thinner and [facility]ohn[place]ort  With all my medication questions would like hard copies of resources used, micro medex ect and pharmacology info  the pdr i have access to is inefficient  I will probably think of more questions these are my main concerns  chandra Cook    Allergies/ADRs: Reviewed in chart  Tobacco: She reports that she has quit smoking. She smoked 0.00 packs per day for 0.00 years. She has never used smokeless  tobacco.  Alcohol: 7-9 beverages / week  Caffeine: no caffeine  Activity: variable, used to be extremely active, but now with heart failure it's variable.   Past Medical History: Reviewed in chart  Social: registered Nurse Anesthetist    Medication Adherence/Access: no issues reported    Depression/Anxiety:    clonazepam 0.5 mg three times daily as needed    Joyce would like information on available options other than benzodiazepines for sadness, loneliness that will not prolong her QT interval. She would prefer an alternate to clonazepam if possible, has concerns about long term use as well as when she takes a dose, she does not drive for 6 hours.   In April 2017 she had an ablation, and that's when everything changed medically, and saddness started. She had anxiety attack in 2019 when her parents passed away, at this time clonazepam was started. She's utilizing several non-pharmacological therapies as well.    Medication history:  Buspar in 2017 or 2018 - this med doesn't really help for sadness, which is what she was experiencing (unsure if muscle weakness as well - this is per allergy list).    Sertraline - not effective, 2018  Citalopram - possibly effective, was on before ablation.     She has never taken Catrina's Wort, but wonders why this is contraindicated with Eliquis.    PHQ-9 SCORE 3/5/2020 4/20/2020 9/22/2020   PHQ-9 Total Score MyChart - 4 (Minimal depression) 6 (Mild depression)   PHQ-9 Total Score 12 4 6     HFrEF/Pacemaker/Prolonged QT interval:   Eliquis 5 mg twice daily   Carvedilol 3.125 mg twice daily   Spironolactone 12.5 mg daily (she's asking cardiology about a full pill every 3rd day)  NTG SL PRN (has taken twice)  Furosemide 20 mg twice daily PRN     Follows with cardiology. Is having more fluid but has also gotten dehydrated taking too much furosemide- has upcoming appt.   Has had frequent chest pain since procedure - possible chest pain vs esophageal tightening - has NTG available for  this.  Has always had low blood pressure, limited options for HF due to hypotension.   She has never had torsades.   Reports her cardiologist prefers she avoid zofran due to concern of long QT and risk for torsades.   Patient reports QT interval > 5 msec.  4/17/18:QT/QTc: 440/555ms  Patient reports the following medication side effects: none.     ECHO:  Date 2/19/21, EF 35-40%  Per cardiology note:  ACEi/ARB/ARNI: no-has not tolerated due to hypotension  BB: yes coreg 3.125 mg BID-could consider changing to with metoprolol in the future if any more BP around  Aldosterone antagonist: yes aldactone 12.5 mg daily  NSAID use: contraindicated  BP Readings from Last 3 Encounters:   02/23/21 112/76   02/19/21 121/81   12/20/20 128/72     Potassium   Date Value Ref Range Status   02/18/2021 4.3 3.4 - 5.3 mmol/L Final     Hypothyroidism:   Synthroid 150 mcg daily.     She remembers seeing an article about brand name Synthroid possibly contributing pr causing heart failure or and would like more information on this.  TSH   Date Value Ref Range Status   12/06/2020 1.46 0.40 - 4.00 mU/L Final     Pain/Thumb:    Acetaminophen 1000 mg daily PRN  Tumeric daily     Avoids NSAIDS due to asthma and history of colagenous colitis. States this/these are effective. Denies side effects.     Asthma/Allergies:   ICS/LABA- Advair 250-50 mcg one puff(s) twice daily   Short-Acting Bronchodilator: Albuterol MDI and Nebs PRN (hasn't used this in two years)  Diphenhydramine 25 mg at bedtime PRN  Xylitol daily PRN  Medrol 40 mg daily PRN bronchitis (hasn't used in two years)  Azelastine 0.1% nasal spray twice daily PRN  Ipratropium 0.06% 2 sprays QID PRN    Triggers include: did not discuss.  Patient reports the following symptoms: none.    Supplements:   Calcium lactate 250-500 mg daily  Calcium-magensium-iodine 120/20/20 at bedtime PRN  Ferrous sulfate 65 mg daily  Imodium 2-4 mg PRN  Magnesium lactate 140-210 mg at bedtime PRN  Melatonin 1-3 mg  at bedtime PRN  Menaquinone-7 (vitamin K2) daily   Multivitamin daily   Probiotic twice daily PRN  Immune Congaplex two tablets daily  Vitamin B complex daily PRN  Vitamin D 2000 international unit(s) daily   Zinc 15 mg daily     No reported issues at this time. Uses several products from [employer] .   Vitamin D Deficiency Screening Results:  Lab Results   Component Value Date    VITDT 67 07/20/2020    VITDT 75 01/25/2019    VITDT 87 (H) 11/09/2018    VITDT 78 (H) 09/24/2018    VITDT 65 08/09/2018         Magnesium   Date Value Ref Range Status   07/20/2020 2.3 1.6 - 2.3 mg/dL Final     GFR Estimate   Date Value Ref Range Status   02/18/2021 81 >60 mL/min/[1.73_m2] Final     Comment:     Non  GFR Calc  Starting 12/18/2018, serum creatinine based estimated GFR (eGFR) will be   calculated using the Chronic Kidney Disease Epidemiology Collaboration   (CKD-EPI) equation.     02/04/2021 74 >60 mL/min/[1.73_m2] Final     Comment:     Non  GFR Calc  Starting 12/18/2018, serum creatinine based estimated GFR (eGFR) will be   calculated using the Chronic Kidney Disease Epidemiology Collaboration   (CKD-EPI) equation.     12/20/2020 >90 >60 mL/min/[1.73_m2] Final     Comment:     Non  GFR Calc  Starting 12/18/2018, serum creatinine based estimated GFR (eGFR) will be   calculated using the Chronic Kidney Disease Epidemiology Collaboration   (CKD-EPI) equation.       Today's Vitals: LMP 08/21/2007   ----------------      I spent 57 minutes with this patient today. All changes were made via collaborative practice agreement with Ce Crowe. A copy of the visit note was provided to the patient's primary care provider.    The patient was mailed a summary of these recommendations.     Kortney Prealta, PharmD  Medication Therapy Management Pharmacist  [phone]    Telemedicine Visit Details  Type of service:  Telephone visit  Start Time: 9:05 AM  End Time: 10:02  AM  Originating Location (patient location): Home  Distant Location (provider location):  [facility] MTM      Medication Therapy Recommendations  Adjustment disorder with mixed anxiety and depressed mood    Current Medication: clonazePAM (KLONOPIN) 0.5 MG tablet   Rationale: Does not understand instructions - Adherence - Adherence   Recommendation: Provide Education - education on QTc risk with alternate anti-depressants   Status: Patient Agreed - Adherence/Education         Hypothyroidism    Current Medication: SYNTHROID 150 MCG tablet   Rationale: Does not understand instructions - Adherence - Adherence   Recommendation: Provide Education - reviewed risk with heart failure, education   Status: Patient Agreed - Adherence/Education

## 2021-03-16 ENCOUNTER — TELEPHONE (OUTPATIENT)
Dept: CARDIOLOGY | Facility: CLINIC | Age: 61
End: 2021-03-16

## 2021-03-16 DIAGNOSIS — F41.9 ANXIETY: ICD-10-CM

## 2021-03-16 NOTE — TELEPHONE ENCOUNTER
Requested Prescriptions   Pending Prescriptions Disp Refills     clonazePAM (KLONOPIN) 0.5 MG tablet [Pharmacy Med Name: CLONAZEPAM 0.5MG TABLETS] 90 tablet      Sig: TAKE 1 TABLET(0.5 MG) BY MOUTH THREE TIMES DAILY AS NEEDED FOR ANXIETY       There is no refill protocol information for this order        Last Written Prescription Date:  11/10/20  Last Fill Quantity: 90,  # refills: 2   Last office visit: 8/24/2020 with prescribing provider:    Routing refill request to provider for review/approval because:  Drug not on the [facility] refill protocol   Kristine MIRN-RN  Triage Nurse  [facility]: [facility]

## 2021-03-16 NOTE — TELEPHONE ENCOUNTER
[facility] Call Center    Phone Message    May a detailed message be left on voicemail: yes     Reason for Call: Other: Pt would like a call back as  she stated she would like to keep her appt as an inperson but would  still like to talk to stacy as she is a little upset     Action Taken: Message routed to:  [facility] ([facility]): Cardio    Travel Screening: Not Applicable

## 2021-03-16 NOTE — TELEPHONE ENCOUNTER
Returned call to Joyce. Appt rescheduled to 3/24 at 430pm in person. appts for 3/17 canceled per Joyce request.

## 2021-03-17 RX ORDER — CLONAZEPAM 0.5 MG/1
TABLET ORAL
Qty: 90 TABLET | Refills: 0 | Status: SHIPPED | OUTPATIENT
Start: 2021-03-17 | End: 2021-05-13

## 2021-03-17 NOTE — PATIENT INSTRUCTIONS
Recommendations from today's MTM visit:                                                    MTM (medication therapy management) is a service provided by a clinical pharmacist designed to help you get the most of out of your medicines.   Today we reviewed what your medicines are for, how to know if they are working, that your medicines are safe and how to make your medicine regimen as easy as possible.      Jose Luis Cook, it was great meeting you. (please see additional documents, hard copies printed)    1. [employer] is a nice resource to look at for available information on medications that can prolong QT: https://www.crediblemeds.org/   -I will note there is also a lack of data in this field.    2. Two options that I think might be good for you mood-wise include:  Cymbalta (duloxetine) - has been shown not to prolong QT, may also have added pain relief benefit  Or  Retrial of sertraline (Zoloft) - very low risk of prolonging QT, especially given your electrolytes are all within normal ranges.     I would avoid citalopram as this has been shown to prolong QT - one alternative option would be escitalopram (the enantiomer of citalopram), has been shown to have less impact on QT interval than citalopram, however I would say has more of an effect on QT than sertraline does.     Here is one resource for you: APA Resource Document  file://[email or web address]-[email or web address]-Prolongation-and-Psychotropic-Med.pdf    3. Cheyanne's Wort may decrease the serum concentration of Apixaban - SSRIs (selective serotonin reuptake inhibitors) or SNRIs (serotonin and norepinephrine reuptake inhibitors) do not have this interaction.     4. I couldn't find any articles specifically tying brand name Synthroid to heart failure, but I did come across this article, with levothyroxine. I would say there are several limitations to this study, including that neither TSH, T4 or T3 levels were actually measured or monitored and that  the severity of disease was not clear at baseline.   Long-Term Outcome in Patients With Heart Failure Treated With Levothyroxine: An Observational Nationwide Cohort Study   [email or web address]     It was great to speak with you today.  I value your experience and would be very thankful for your time with providing feedback on our clinic survey. You may receive a survey via email or text message in the next few days.     Next MTM visit: as needed    To schedule another MTM appointment, please call the clinic directly or you may call the MTM scheduling line at [phone] or toll-free at [phone].     My Clinical Pharmacist's contact information:                                                      It was a pleasure talking with you today!  Please feel free to contact me with any questions or concerns you have.      Kortney Peralta, PharmD  Medication Therapy Management Pharmacist  [phone]

## 2021-03-22 ASSESSMENT — ENCOUNTER SYMPTOMS
SMELL DISTURBANCE: 0
COUGH: 1
DIARRHEA: 1
TASTE DISTURBANCE: 0
ABDOMINAL PAIN: 0
DECREASED APPETITE: 0
BLOATING: 1
LEG PAIN: 0
NAIL CHANGES: 0
ALTERED TEMPERATURE REGULATION: 0
SWOLLEN GLANDS: 0
POOR WOUND HEALING: 0
SINUS CONGESTION: 1
TROUBLE SWALLOWING: 0
INCREASED ENERGY: 1
LIGHT-HEADEDNESS: 1
SLEEP DISTURBANCES DUE TO BREATHING: 0
FATIGUE: 1
WEIGHT LOSS: 0
BLOOD IN STOOL: 0
MUSCLE CRAMPS: 0
POSTURAL DYSPNEA: 0
DIZZINESS: 1
COUGH DISTURBING SLEEP: 0
JAUNDICE: 0
EYE IRRITATION: 0
HYPERTENSION: 0
NECK MASS: 0
DYSURIA: 0
EYE WATERING: 0
SNORES LOUDLY: 1
DISTURBANCES IN COORDINATION: 0
HEMATURIA: 0
NUMBNESS: 0
EYE REDNESS: 0
FEVER: 0
SPEECH CHANGE: 0
STIFFNESS: 1
WEIGHT GAIN: 1
HEARTBURN: 1
SINUS PAIN: 1
VOMITING: 0
SYNCOPE: 0
BOWEL INCONTINENCE: 1
HYPOTENSION: 1
POLYPHAGIA: 0
TINGLING: 0
NIGHT SWEATS: 0
EYE PAIN: 0
WHEEZING: 0
EXERCISE INTOLERANCE: 1
PARALYSIS: 0
SEIZURES: 0
SHORTNESS OF BREATH: 1
MUSCLE WEAKNESS: 0
JOINT SWELLING: 1
NERVOUS/ANXIOUS: 1
PALPITATIONS: 1
DECREASED CONCENTRATION: 1
FLANK PAIN: 0
BRUISES/BLEEDS EASILY: 1
LOSS OF CONSCIOUSNESS: 0
INSOMNIA: 1
MYALGIAS: 0
HEADACHES: 1
CONSTIPATION: 1
DYSPNEA ON EXERTION: 1
WEAKNESS: 0
HOARSE VOICE: 1
HALLUCINATIONS: 0
HEMOPTYSIS: 0
SKIN CHANGES: 0
TREMORS: 0
BACK PAIN: 1
CHILLS: 1
ARTHRALGIAS: 1
NAUSEA: 1
NECK PAIN: 1
SORE THROAT: 0
RECTAL PAIN: 0
DOUBLE VISION: 0
ORTHOPNEA: 0
PANIC: 1
DIFFICULTY URINATING: 0
SPUTUM PRODUCTION: 1

## 2021-03-24 ENCOUNTER — ANCILLARY PROCEDURE (OUTPATIENT)
Dept: GENERAL RADIOLOGY | Facility: CLINIC | Age: 61
End: 2021-03-24
Attending: NURSE PRACTITIONER
Payer: COMMERCIAL

## 2021-03-24 ENCOUNTER — OFFICE VISIT (OUTPATIENT)
Dept: CARDIOLOGY | Facility: CLINIC | Age: 61
End: 2021-03-24
Attending: NURSE PRACTITIONER
Payer: COMMERCIAL

## 2021-03-24 VITALS
BODY MASS INDEX: 27.32 KG/M2 | WEIGHT: 164 LBS | HEART RATE: 98 BPM | DIASTOLIC BLOOD PRESSURE: 82 MMHG | SYSTOLIC BLOOD PRESSURE: 115 MMHG | OXYGEN SATURATION: 97 % | HEIGHT: 65 IN

## 2021-03-24 DIAGNOSIS — I51.81 TAKOTSUBO CARDIOMYOPATHY: Primary | ICD-10-CM

## 2021-03-24 DIAGNOSIS — R06.02 SOB (SHORTNESS OF BREATH): ICD-10-CM

## 2021-03-24 DIAGNOSIS — I50.22 CHRONIC SYSTOLIC HEART FAILURE (H): ICD-10-CM

## 2021-03-24 LAB
ANION GAP SERPL CALCULATED.3IONS-SCNC: 5 MMOL/L (ref 3–14)
BUN SERPL-MCNC: 14 MG/DL (ref 7–30)
CALCIUM SERPL-MCNC: 9.3 MG/DL (ref 8.5–10.1)
CHLORIDE SERPL-SCNC: 105 MMOL/L (ref 94–109)
CO2 SERPL-SCNC: 27 MMOL/L (ref 20–32)
CREAT SERPL-MCNC: 0.67 MG/DL (ref 0.52–1.04)
GFR SERPL CREATININE-BSD FRML MDRD: >90 ML/MIN/{1.73_M2}
GLUCOSE SERPL-MCNC: 62 MG/DL (ref 70–99)
POTASSIUM SERPL-SCNC: 4.3 MMOL/L (ref 3.4–5.3)
SODIUM SERPL-SCNC: 138 MMOL/L (ref 133–144)

## 2021-03-24 PROCEDURE — 71046 X-RAY EXAM CHEST 2 VIEWS: CPT | Performed by: RADIOLOGY

## 2021-03-24 PROCEDURE — 80048 BASIC METABOLIC PNL TOTAL CA: CPT | Performed by: PATHOLOGY

## 2021-03-24 PROCEDURE — 36415 COLL VENOUS BLD VENIPUNCTURE: CPT | Performed by: PATHOLOGY

## 2021-03-24 PROCEDURE — G0463 HOSPITAL OUTPT CLINIC VISIT: HCPCS | Mod: 25

## 2021-03-24 PROCEDURE — 99214 OFFICE O/P EST MOD 30 MIN: CPT | Performed by: NURSE PRACTITIONER

## 2021-03-24 PROCEDURE — 93005 ELECTROCARDIOGRAM TRACING: CPT

## 2021-03-24 ASSESSMENT — PAIN SCALES - GENERAL: PAINLEVEL: MILD PAIN (2)

## 2021-03-24 ASSESSMENT — MIFFLIN-ST. JEOR: SCORE: 1304.78

## 2021-03-24 NOTE — NURSING NOTE
Chief Complaint   Patient presents with     Follow Up     60 year old female presents with hfref, history of takotsubo CM, for follow up with labs and ekg and cxr prior      Vitals were taken and medications where reconciled. EKG was performed   EARLENE Mora  4:19 PM
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
## 2021-03-24 NOTE — PROGRESS NOTES
HPI:   Joyce Marroquin is a 60 yr old female; previously worked as a nurse anesthetist and has a history of RF ablation which left to AV node ablation and complete heart block requiring a dual-chamber pacemaker implantation, which is a [employer] device MRI compatible. She also had an MI during that event.  She also has a recent history of heart failure with reduced ejection fraction with EF around 35%.  There was a history of Takotsubo cardiomyopathy.     She has been having difficulty as of late with feeling poorly and having poor energy. She was seen by Dr. Alonso Ordonez on 2/23/20. At that time she was started on Apixaban due to Afib noted on device check.   Recent echo 2/19/21 showed EF of 35%. Which is a bit decreased from previous 40-45%.     She states she feels worse than when seen by Dr. Ordonez. . She states she is feeling more SOB, her activity is limited due to SOB and fatigue. She indicates she is afraid to push herself physically due to fear of fatigue. She has been using lasix prn - and in review of her notes, she has taken it 2-3 times/week with wt gain and SOB.   Her home wt is 159-165#. She is trying to lose wt as her previous wt was 140.   She is seeing a therapist for PTSD and anxiety (following adverse outcome from her ablation) and exacerbated by the isolation from the pandemic. She is not sure if  She is also suffering from depression. She did see a pharmacist for advice on an antidepressants that won't cause prolonged QT. Cymbalta was recommended, but she is reluctant to start as it can cause orthostatic hypotension which has been a problem in the past. In addition, Effexor, which is the same class does cause prolonged QT. She needs follow up from EP and will further discuss options with EP.    PAST MEDICAL HISTORY:  Past Medical History:   Diagnosis Date     Arthritis      Cardiomyopathy (H)     ff Cardiology     Chronic depressive personality disorder      Congestive heart failure (H) see dr note     heart failure correct term     COPD exacerbation (H)      Esophageal reflux      Ex-smoker     quit 2006; 1 PPD x 30     Hyperparathyroidism (H)     s/p parathyroidectomy     Hypothyroidism      LARRY on CPAP     ff Sleep medicine     Pulmonary nodules     ff Pulmonologist     S/P cardiac pacemaker procedure     checked every 6 months at the [facility]     Uncomplicated asthma years       FAMILY HISTORY:  Family History   Problem Relation Age of Onset     Hypothyroidism Mother      Hypertension Mother      Osteoarthritis Mother      Coronary Artery Disease Father         nonfatal MI in his 70s     Asthma Father      Diabetes Sister      Hypothyroidism Sister      Breast Cancer Maternal Aunt      Anxiety Disorder Sister        SOCIAL HISTORY:  Social History     Socioeconomic History     Marital status:      Spouse name: None     Number of children: None     Years of education: None     Highest education level: Master's degree (e.g., MA, MS, Florian, MEd, MSW, SONIA)   Occupational History     None   Social Needs     Financial resource strain: Not very hard     Food insecurity     Worry: Never true     Inability: Never true     Transportation needs     Medical: No     Non-medical: No   Tobacco Use     Smoking status: Former Smoker     Packs/day: 0.00     Years: 0.00     Pack years: 0.00     Smokeless tobacco: Never Used   Substance and Sexual Activity     Alcohol use: Yes     Alcohol/week: 0.0 - 8.0 standard drinks     Frequency: 2-3 times a week     Drinks per session: 1 or 2     Binge frequency: Never     Comment: seldom     Drug use: No     Sexual activity: Yes     Partners: Male     Birth control/protection: None     Comment: vasectomy   Lifestyle     Physical activity     Days per week: 3 days     Minutes per session: 10 min     Stress: To some extent   Relationships     Social connections     Talks on phone: More than three times a week     Gets together: Never     Attends [employer] service: More than 4 times per  year     Active member of club or organization: Yes     Attends meetings of clubs or organizations: More than 4 times per year     Relationship status:      Intimate partner violence     Fear of current or ex partner: None     Emotionally abused: None     Physically abused: None     Forced sexual activity: None   Other Topics Concern     Parent/sibling w/ CABG, MI or angioplasty before 65F 55M? No   Social History Narrative    CRNA on disability for vestibular symptoms        CURRENT MEDICATIONS:  Current Outpatient Medications   Medication Sig Dispense Refill     acetaminophen (TYLENOL) 500 MG tablet Take 500-1,000 mg by mouth every 6 hours as needed for mild pain       albuterol (PROAIR HFA/PROVENTIL HFA/VENTOLIN HFA) 108 (90 BASE) MCG/ACT Inhaler Inhale 2 puffs into the lungs as needed for shortness of breath / dyspnea or wheezing        apixaban ANTICOAGULANT (ELIQUIS) 5 MG tablet Take 1 tablet (5 mg) by mouth 2 times daily 120 tablet 3     azelastine (ASTELIN) 0.1 % nasal spray Spray 1 spray into both nostrils 2 times daily as needed        Calcium Lactate 500 MG CAPS Take 250-500 mg by mouth daily        Calcium-Magnesium-Vitamin D (CALCIUM MAGNESIUM PO) Calcium 120 mg, magenesium 20 mg, iodine 20 mg as needed at bedtime       carvedilol (COREG) 3.125 MG tablet Take 1 tablet (3.125 mg) by mouth 2 times daily (with meals) 180 tablet 3     clonazePAM (KLONOPIN) 0.5 MG tablet TAKE 1 TABLET(0.5 MG) BY MOUTH THREE TIMES DAILY AS NEEDED FOR ANXIETY 90 tablet 0     DiphenhydrAMINE HCl (BENADRYL PO) Take 25 mg by mouth nightly as needed for allergies        Ferrous Sulfate (IRON SUPPLEMENT PO) Take 65 mg by mouth daily        fluocinolone acetonide (DERMA SMOOTHE/FS BODY) 0.01 % external oil Use for ears.  2 times daily for 7 days 1 Bottle 0     fluticasone-salmeterol (ADVAIR) 250-50 MCG/DOSE inhaler Inhale 1 puff into the lungs every 12 hours       furosemide (LASIX) 20 MG tablet Take 1 tablet (20 mg) by mouth  2 times daily as needed (for fluid retention) 180 tablet 1     ipratropium (ATROVENT) 0.06 % nasal spray Spray 2 sprays into both nostrils 4 times daily as needed        loperamide (IMODIUM A-D) 2 MG tablet Take 2 tabs (4 mg) after first loose stool, and then take one tab (2 mg) after each diarrheal stool.  Max of 8 tabs (16 mg) per day. 1 tablet 0     MAGNESIUM LACTATE PO Take 140-210 mg by mouth nightly as needed        MELATONIN PO Take 1-3 mg by mouth nightly as needed        Menaquinone-7 (VITAMIN K2 PO) Take 1 tablet by mouth every morning        METHYLPREDNISOLONE PO Take 40 mg by mouth as needed        Multiple Vitamin (DAILY MULTIVITAMIN PO) Take 1 tablet by mouth every morning        nebulizer nebulization as needed       nitroGLYcerin (NITROSTAT) 0.4 MG sublingual tablet For chest pain place 1 tablet under the tongue every 5 minutes for 3 doses. If symptoms persist 5 minutes after 1st dose call 911. 30 tablet 1     Probiotic Product (PROBIOTIC DAILY PO) Take 1 capsule by mouth 2 times daily as needed        spironolactone (ALDACTONE) 25 MG tablet Take 0.5 tablets (12.5 mg) by mouth daily 90 tablet 3     SYNTHROID 150 MCG tablet 150 mcg for 6 days and 75 mcg for 1 day per week 90 tablet 3     TURMERIC PO Take 1 tablet by mouth every morning        UNABLE TO FIND MEDICATION NAME: Xylitol daily as needed for allergies       UNABLE TO FIND 2 times daily as needed MEDICATION NAME: Standard Process, Immune Congaplex. Pt taking 2 tablets daily        vitamin B complex with vitamin C (VITAMIN  B COMPLEX) PO tablet Take 1 tablet by mouth as needed       vitamin D3 (CHOLECALCIFEROL) 2000 units tablet Take 1 tablet by mouth daily 1 tablet 0     Zinc 15 MG CAPS Take 15 mg by mouth          ROS:   Constitutional: No fever, chills, or sweats. + weight gain with inactivity.   ENT: No visual disturbance, ear ache, epistaxis, sore throat.   Allergies/Immunologic: Negative.   Respiratory: No cough, hemoptysis - but SOB  "with exertion  Cardiovascular: As per HPI.   GI: No nausea, vomiting, hematemesis, melena, or hematochezia.   : No urinary frequency, dysuria, or hematuria.   Integument: Negative.   Psychiatric: Negative.   Neuro: Negative.   Endocrinology: Negative.   Musculoskeletal: Negative.    EXAM:  Ht 1.651 m (5' 5\")   Wt 74.4 kg (164 lb)   LMP 08/21/2007   BMI 27.29 kg/m    General: appears comfortable, alert and articulate  Head: normocephalic, atraumatic  Eyes: anicteric sclera, EOMI  Neck: no adenopathy  Orophyarynx: moist mucosa, no lesions, dentition intact  Heart: regular, S1/S2, no murmur, gallop, rub, estimated JVP 8cm  Lungs: clear, no rales or wheezing  Abdomen: soft, non-tender, bowel sounds present, no hepatosplenomegaly  Extremities: no clubbing, cyanosis or edema  Neurological: normal speech and affect, no gross motor deficits    Labs:  CBC RESULTS:  Lab Results   Component Value Date    WBC 5.9 12/20/2020    RBC 3.86 12/20/2020    HGB 12.4 12/20/2020    HCT 38.7 12/20/2020     12/20/2020    MCH 32.1 12/20/2020    MCHC 32.0 12/20/2020    RDW 12.3 12/20/2020     12/20/2020       CMP RESULTS:  Lab Results   Component Value Date     03/24/2021    POTASSIUM 4.3 03/24/2021    CHLORIDE 105 03/24/2021    CO2 27 03/24/2021    ANIONGAP 5 03/24/2021    GLC 62 (L) 03/24/2021    BUN 14 03/24/2021    CR 0.67 03/24/2021    GFRESTIMATED >90 03/24/2021    GFRESTBLACK >90 03/24/2021    MANJU 9.3 03/24/2021    BILITOTAL 0.3 12/20/2020    ALBUMIN 2.9 (L) 12/20/2020    ALKPHOS 83 12/20/2020    ALT 25 12/20/2020    AST 18 12/20/2020        INR RESULTS:  Lab Results   Component Value Date    INR 1.02 10/06/2019       Lab Results   Component Value Date    MAG 2.3 07/20/2020       CXR - 3/24/21  No evidence of CHF, normal lung fields    Assessment and Plan:   1. Chronic systolc  heart failure secondary to unclear etiology.    Stage C  NYHA Class III  ACEi/ARB contraindicated due to hypotension  BB yes (coreg " 3.125mg BID)  Aldosterone antagonist yes (spironolactone 12.5mg every day)   SCD prophylaxis does not meet criteria for implant - does have a dual chamber pacemaker    Fluid status euvolemic  Sleep Apnea Evaluation: uses CPAP        Pt continues to feel poorly in spite of optimal fluid status, good BP and HR control. Will discuss with Dr. Ordonez re: next steps.    Will make apt for follow up with EP    CC  Patient Care Team:  Ce Crowe NP as PCP - General (Nurse Practitioner - Family)  Yuliet Mclean as Garland Velez MD as MD (Internal Medicine)  Adelaida De La Torre APRN CNP as Nurse Practitioner (Nurse Practitioner)  Cheyenne Thomson RN as Specialty Care Coordinator (Clinical Cardiac Electrophysiology)  Lino Funes MD as MD (Clinical Cardiac Electrophysiology)  Mame Valadez, RN as Specialty Care Coordinator (Cardiology)  Lino Funes MD as Assigned Heart and Vascular Provider  Garland Patel MD as Assigned Endocrinology Provider  Alonso Ordonez MD as Assigned PCP  Karen Peralta [facility] as Pharmacist (Pharmacist Ambulatory Care)  SELF, REFERRED    Answers for HPI/ROS submitted by the patient on 3/22/2021   General Symptoms: Yes  Skin Symptoms: Yes  HENT Symptoms: Yes  EYE SYMPTOMS: Yes  HEART SYMPTOMS: Yes  LUNG SYMPTOMS: Yes  INTESTINAL SYMPTOMS: Yes  URINARY SYMPTOMS: Yes  GYNECOLOGIC SYMPTOMS: No  BREAST SYMPTOMS: No  SKELETAL SYMPTOMS: Yes  BLOOD SYMPTOMS: Yes  NERVOUS SYSTEM SYMPTOMS: Yes  MENTAL HEALTH SYMPTOMS: Yes  Fever: No  Loss of appetite: No  Weight loss: No  Weight gain: Yes  Fatigue: Yes  Night sweats: No  Chills: Yes  Increased stress: Yes  Excessive hunger: No  Feeling hot or cold when others believe the temperature is normal: No  Loss of height: No  Post-operative complications: No  Surgical site pain: No  Hallucinations: No  Change in or Loss of Energy: Yes  Hyperactivity: No  Confusion: No  Changes in hair: No  Changes in moles/birth marks:  No  Itching: No  Rashes: No  Changes in nails: No  Acne: Yes  Change in facial hair: No  Non-healing sores: No  Scarring: No  Flaking of skin: Yes  Color changes of hands/feet in cold : No  Sun sensitivity: No  Skin thickening: No  Ear pain: Yes  Ear discharge: No  Hearing loss: No  Tinnitus: No  Nosebleeds: No  Congestion: Yes  Sinus pain: Yes  Trouble swallowing: No   Voice hoarseness: Yes  Mouth sores: No  Sore throat: No  Tooth pain: Yes  Gum tenderness: Yes  Bleeding gums: No  Change in taste: No  Change in sense of smell: No  Dry mouth: No  Hearing aid used: No  Neck lump: No  Eye pain: No  Vision loss: No  Dry eyes: Yes  Watery eyes: No  Eye bulging: No  Double vision: No  Flashing of lights: No  Spots: No  Floaters: No  Redness: No  Crossed eyes: No  Tunnel Vision: No  Yellowing of eyes: No  Eye irritation: No  Cough: Yes  Sputum or phlegm: Yes  Coughing up blood: No  Difficulty breating or shortness of breath: Yes  Snoring: Yes  Wheezing: No  Difficulty breathing on exertion: Yes  Nighttime Cough: No  Difficulty breathing when lying flat: No  Chest pain or pressure: Yes  Fast or irregular heartbeat: Yes  Pain in legs with walking: No  Trouble breathing while lying down: No  Fingers or toes appear blue: No  High blood pressure: No  Low blood pressure: Yes  Fainting: No  Murmurs: No  Pacemaker: Yes  Varicose veins: No  Edema or swelling: Yes  Wake up at night with shortness of breath: No  Light-headedness: Yes  Exercise intolerance: Yes  Heart burn or indigestion: Yes  Nausea: Yes  Vomiting: No  Abdominal pain: No  Bloating: Yes  Constipation: Yes  Diarrhea: Yes  Blood in stool: No  Black stools: Yes  Rectal or Anal pain: No  Fecal incontinence: Yes  Yellowing of skin or eyes: No  Vomit with blood: No  Change in stools: No  Trouble holding urine or incontinence: No  Pain or burning: No  Trouble starting or stopping: No  Increased frequency of urination: No  Blood in urine: No  Decreased frequency of  urination: No  Frequent nighttime urination: No  Flank pain: No  Difficulty emptying bladder: No  Back pain: Yes  Muscle aches: No  Neck pain: Yes  Swollen joints: Yes  Joint pain: Yes  Bone pain: No  Muscle cramps: No  Muscle weakness: No  Joint stiffness: Yes  Bone fracture: No  Anemia: No  Swollen glands: No  Easy bleeding or bruising: Yes  Trouble with coordination: No  Dizziness or trouble with balance: Yes  Fainting or black-out spells: No  Headache: Yes  Seizures: No  Speech problems: No  Tingling: No  Tremor: No  Weakness: No  Paralysis: No  Numbness: No  Nervous or Anxious: Yes  Trouble sleeping: Yes  Trouble thinking or concentrating: Yes  Mood changes: Yes  Panic attacks: Yes

## 2021-03-25 LAB — INTERPRETATION ECG - MUSE: NORMAL

## 2021-04-06 ENCOUNTER — TELEPHONE (OUTPATIENT)
Dept: CARDIOLOGY | Facility: CLINIC | Age: 61
End: 2021-04-06

## 2021-04-06 NOTE — CONFIDENTIAL NOTE
Called patient. Coordinated appointment with Dr. Funes on 4/8 at 1600 to discuss upgrade to CRT-P.    ADEOLA DotsonN, RN, PHN  Electrophysiology Nurse Coordinator

## 2021-04-06 NOTE — TELEPHONE ENCOUNTER
[facility]    Phone Message    May a detailed message be left on voicemail: yes     Reason for Call: Other: Pt is requesting following changes be made to her pt record:    One of her AVS states she takes salt tablets, which she has never done.  The patient instructions on the AVS don't state that she got a disability  plaque.  She is also wondering if someone is going to 'get back to her' regarding if Dr. Funes will be putting leads in or if she should just wait to speak with Adelaida Slater, who she is seeing on 4/21.     Action Taken: Message routed to:  [facility] ([facility]): Cardio    Travel Screening: Not Applicable

## 2021-04-08 ENCOUNTER — VIRTUAL VISIT (OUTPATIENT)
Dept: CARDIOLOGY | Facility: CLINIC | Age: 61
End: 2021-04-08
Attending: INTERNAL MEDICINE
Payer: COMMERCIAL

## 2021-04-08 DIAGNOSIS — R68.89 EXERCISE INTOLERANCE: Primary | ICD-10-CM

## 2021-04-08 DIAGNOSIS — I51.89 LEFT VENTRICULAR DYSFUNCTION WITH REDUCED LEFT VENTRICULAR FUNCTION: ICD-10-CM

## 2021-04-08 DIAGNOSIS — I50.22 CHRONIC SYSTOLIC HEART FAILURE (H): ICD-10-CM

## 2021-04-08 PROCEDURE — 99215 OFFICE O/P EST HI 40 MIN: CPT | Mod: 95 | Performed by: INTERNAL MEDICINE

## 2021-04-08 NOTE — LETTER
4/8/2021      RE: Joyce Marroquin  [address] [facility] [address]       Dear Colleague,    Thank you for the opportunity to participate in the care of your patient, Joyce Marroquin, at the [facility] [place] at [facility]. Please see a copy of my visit note below.    Joyce is a 60 year old who is being evaluated via a billable video visit.      How would you like to obtain your AVS? MyChart  If the video visit is dropped, the invitation should be resent by: Text to cell phone: [phone]  Will anyone else be joining your video visit? No      Vitals - Patient Reported  Systolic (Patient Reported): 97  Diastolic (Patient Reported): 73  Weight (Patient Reported): 73.9 kg (163 lb)  SpO2 (Patient Reported): 98  Pulse (Patient Reported): 80  Pain Score: No Pain (0)(SOB and fatigue at baseline. Patient states this is worsening.)    Vitals were taken and medications reconciled.    Denis Barnett, EMT  3:40 PM    HPI:     60-year-old female with a past medical history of RFA at [facility] complicated by complete heart block requiring pacemaker placement in 2017.      Now Joyce has been troubled by  progressively worsening diminished exercise tolerance.  Recently she also experienced  extreme weight gain which she has not been able to deal with relatively well but still is upset about likely fluid accumulation.  During the course of her treatment for excess fluid there were times when she was experiencing low blood pressure and had adverse cognitive consequences.  These seem to have improved more recently but she is still quite distressed by the cardiovascular limitations that have accumulated.    Joyce's last LV ejection fraction was 35-40%.  Her most recent echocardiogram was in February 2021 and is partially reproduced below:    Echocardiogram from February 2021  Left Ventricle  Left ventricular size is normal. Left ventricular wall thickness  cannot  evaluate. Moderately (EF 35-40%) reduced left ventricular function is present.  Grade I or early diastolic dysfunction. Abnormal septal motion consistent with  pacemaker is present.     Right Ventricle  The right ventricle is normal size. Global right ventricular function is  normal. A pacemaker lead is noted in the right ventricle.     Atria  Both atria appear normal    Joyce segura principal concern relates to marked and often variable exercise intolerance.  She notes concers that some of the information in the medical record is incorrect.  We did not go into specifics today.    In terms of the cause of Joyce's left ventricular dysfunction and exertional intolerance, we did review the possibility that there may be underlying coronary disease or cardiomyopathy or that the associated left ventricular dysfunction may be related to her pacemaker induced myopathy.  We did not discuss undertaking further diagnostic evaluation at this time, since the focus of all this clinic visit was on potential steps for ameliorating her exercise intolerance.    At today's visit we spent 30 minutes reviewing options which include CRT pacing or CRT defibrillator pacing, left bundle branch block pacing, and placement of an epicardial electrode for CRT if endocardial access is not possible.   CRT has approx 2 in 3 chance to improve her symptoms, meaning that in about one third of patients, there may be no benefit. Nonetheless, there are important risks that we discussed. The principal risks included aggravating her already troublesome chest pains (which may derive from prior pericardial inflammation), development of hematoma at the operative site, bleeding due to perforation into pericardial space,  and infection with the need for total device extraction.  Failure of benefit was addressed as well, and the limited next steps for her condition were part of the discussion.      Joyce has had issues with previous medical procedures. No  decision was made as to  whether she would like to proceed with a CRT.  However, we did discuss the role of general anesthetic versus sedation  with no final decision made.    Patient had many queries related to the complex decision making scenario we are dealing with.  These will need to be addressed as they could not of the accommodated in today's clinic visit.  Joyce was unhappy that we could not accomplish more at this visit and indicated that the 30 min visit was inadequate. She will be talking to our nurse practitioner in the near future and I indicated to her that I would be happy to arrange a further clinic visit to cover more concerns if she wished.     PAST MEDICAL HISTORY:  Past Medical History:   Diagnosis Date     Arthritis      Cardiomyopathy (H)     ff Cardiology     Chronic depressive personality disorder      Congestive heart failure (H) see dr martínez    heart failure correct term     COPD exacerbation (H)      Esophageal reflux      Ex-smoker     quit 2006; 1 PPD x 30     Hyperparathyroidism (H)     s/p parathyroidectomy     Hypothyroidism      LARRY on CPAP     ff Sleep medicine     Pulmonary nodules     ff Pulmonologist     S/P cardiac pacemaker procedure     [facility]      Uncomplicated asthma years       CURRENT MEDICATIONS:  Current Outpatient Medications   Medication Sig Dispense Refill     acetaminophen (TYLENOL) 500 MG tablet Take 500-1,000 mg by mouth every 6 hours as needed for mild pain       albuterol (PROAIR HFA/PROVENTIL HFA/VENTOLIN HFA) 108 (90 BASE) MCG/ACT Inhaler Inhale 2 puffs into the lungs as needed for shortness of breath / dyspnea or wheezing        apixaban ANTICOAGULANT (ELIQUIS) 5 MG tablet Take 1 tablet (5 mg) by mouth 2 times daily 120 tablet 3     azelastine (ASTELIN) 0.1 % nasal spray Spray 1 spray into both nostrils 2 times daily as needed        Calcium Lactate 500 MG CAPS Take 250-500 mg by mouth daily        Calcium-Magnesium-Vitamin D  (CALCIUM MAGNESIUM PO) Calcium 120 mg, magenesium 20 mg, iodine 20 mg as needed at bedtime       carvedilol (COREG) 3.125 MG tablet Take 1 tablet (3.125 mg) by mouth 2 times daily (with meals) 180 tablet 3     clonazePAM (KLONOPIN) 0.5 MG tablet TAKE 1 TABLET(0.5 MG) BY MOUTH THREE TIMES DAILY AS NEEDED FOR ANXIETY 90 tablet 0     DiphenhydrAMINE HCl (BENADRYL PO) Take 25 mg by mouth nightly as needed for allergies        Ferrous Sulfate (IRON SUPPLEMENT PO) Take 65 mg by mouth daily        fluocinolone acetonide (DERMA SMOOTHE/FS BODY) 0.01 % external oil Use for ears.  2 times daily for 7 days 1 Bottle 0     fluticasone-salmeterol (ADVAIR) 250-50 MCG/DOSE inhaler Inhale 1 puff into the lungs every 12 hours       furosemide (LASIX) 20 MG tablet Take 1 tablet (20 mg) by mouth 2 times daily as needed (for fluid retention) 180 tablet 1     ipratropium (ATROVENT) 0.06 % nasal spray Spray 2 sprays into both nostrils 4 times daily as needed        loperamide (IMODIUM A-D) 2 MG tablet Take 2 tabs (4 mg) after first loose stool, and then take one tab (2 mg) after each diarrheal stool.  Max of 8 tabs (16 mg) per day. 1 tablet 0     MAGNESIUM LACTATE PO Take 140-210 mg by mouth nightly as needed        MELATONIN PO Take 1-3 mg by mouth nightly as needed        Menaquinone-7 (VITAMIN K2 PO) Take 1 tablet by mouth every morning        METHYLPREDNISOLONE PO Take 40 mg by mouth as needed        Multiple Vitamin (DAILY MULTIVITAMIN PO) Take 1 tablet by mouth every morning        nebulizer nebulization as needed       nitroGLYcerin (NITROSTAT) 0.4 MG sublingual tablet For chest pain place 1 tablet under the tongue every 5 minutes for 3 doses. If symptoms persist 5 minutes after 1st dose call 911. 30 tablet 1     Probiotic Product (PROBIOTIC DAILY PO) Take 1 capsule by mouth 2 times daily as needed        spironolactone (ALDACTONE) 25 MG tablet Take 0.5 tablets (12.5 mg) by mouth daily 90 tablet 3     SYNTHROID 150 MCG tablet 150  mcg for 6 days and 75 mcg for 1 day per week 90 tablet 3     TURMERIC PO Take 1 tablet by mouth every morning        UNABLE TO FIND MEDICATION NAME: Xylitol daily as needed for allergies       UNABLE TO FIND 2 times daily as needed MEDICATION NAME: Standard Process, Immune Congaplex. Pt taking 2 tablets daily        vitamin B complex with vitamin C (VITAMIN  B COMPLEX) PO tablet Take 1 tablet by mouth as needed       vitamin D3 (CHOLECALCIFEROL) 2000 units tablet Take 1 tablet by mouth daily 1 tablet 0     Zinc 15 MG CAPS Take 15 mg by mouth          PAST SURGICAL HISTORY:  Past Surgical History:   Procedure Laterality Date     BUNIONECTOMY Bilateral      CV CORONARY ANGIOGRAM N/A 9/26/2019    Procedure: CV CORONARY ANGIOGRAM;  Surgeon: Erickson Trejo MD;  Location:  HEART CARDIAC CATH LAB     CV RIGHT HEART CATH MEASUREMENTS RECORDED N/A 7/20/2020    Procedure: CV RIGHT HEART CATH;  Surgeon: Alonso Ordonez MD;  Location:  HEART CARDIAC CATH LAB     EP ABLATION / EP STUDIES  04/21/2017     EXPLORE NECK N/A 9/19/2018    Procedure: EXPLORE NECK;  Neck Exploration Resection of Left superior Parathyroid gland;  Surgeon: Kristine Venegas MD;  Location: [facility] OR      CORRECT BUNION,SIMPLE       PARATHYROIDECTOMY N/A 9/19/2018    Procedure: PARATHYROIDECTOMY;;  Surgeon: Kristine Venegas MD;  Location: [facility] OR     PPM INSERT OF NEW OR REPL W/VENT LEAD  04/21/2017     TONSILLECTOMY & ADENOIDECTOMY         ALLERGIES:     Allergies   Allergen Reactions     Tetracycline Swelling     Zofran [Ondansetron]      Prolonged QT  Prolonged QT at baseline per patient     Flagyl [Metronidazole] Rash     Buspar [Buspirone]      Muscle weakness     Corn Oil Other (See Comments)     Headache       Seasonal Allergies Difficulty breathing     Sulfamethoxazole-Trimethoprim      Other reaction(s): Other  Passed out     Cyclobenzaprine Other (See Comments)     Extreme heat intolerance     Levaquin [Levofloxacin]      Other  reaction(s): Other  Tendon rupture     Nsaids Other (See Comments)     Exacerbated asthma       FAMILY HISTORY:  Family History   Problem Relation Age of Onset     Hypothyroidism Mother      Hypertension Mother      Osteoarthritis Mother      Coronary Artery Disease Father         nonfatal MI in his 70s     Asthma Father      Diabetes Sister      Hypothyroidism Sister      Breast Cancer Maternal Aunt      Anxiety Disorder Sister        SOCIAL HISTORY:  Social History     Tobacco Use     Smoking status: Former Smoker     Packs/day: 0.00     Years: 0.00     Pack years: 0.00     Smokeless tobacco: Never Used   Substance Use Topics     Alcohol use: Yes     Alcohol/week: 0.0 - 8.0 standard drinks     Frequency: 2-3 times a week     Drinks per session: 1 or 2     Binge frequency: Never     Comment: seldom     Drug use: No       ROS:   See HPI    Exam:  LMP 08/21/2007   GENERAL APPEARANCE:  alert and no distress    Labs:  CBC RESULTS:   Lab Results   Component Value Date    WBC 5.9 12/20/2020    RBC 3.86 12/20/2020    HGB 12.4 12/20/2020    HCT 38.7 12/20/2020     12/20/2020    MCH 32.1 12/20/2020    MCHC 32.0 12/20/2020    RDW 12.3 12/20/2020     12/20/2020       BMP RESULTS:  Lab Results   Component Value Date     03/24/2021    POTASSIUM 4.3 03/24/2021    CHLORIDE 105 03/24/2021    CO2 27 03/24/2021    ANIONGAP 5 03/24/2021    GLC 62 (L) 03/24/2021    BUN 14 03/24/2021    CR 0.67 03/24/2021    GFRESTIMATED >90 03/24/2021    GFRESTBLACK >90 03/24/2021    MANJU 9.3 03/24/2021        INR RESULTS:  Lab Results   Component Value Date    INR 1.02 10/06/2019    INR 0.95 06/28/2017       Procedures:  PULMONARY FUNCTION TESTS:   No flowsheet data found.      ECHOCARDIOGRAM:   See summary of report in HPI      Assessment and Plan:   1. LV dysfunction with Exertional Intolerance  2. Pacemaker in situ    PLAN  1. Continue discussion of options at a time convenient for Joyce  2. RTC TBD    Video on 4: 35; video off  5: 05  Platform Doximity  Patient at home; clinic [facility] heart  Total elapsed time with documentation 40 minutes    I very much appreciated the opportunity to see and assess Joyce Marroquin in the clinic today. Please do not hesitate to contact my office if you have any questions or concerns.      Lino Funes MD  Cardiac Arrhythmia Service  [facility]  [phone]

## 2021-04-08 NOTE — PROGRESS NOTES
Joyce is a 60 year old who is being evaluated via a billable video visit.      How would you like to obtain your AVS? MyChart  If the video visit is dropped, the invitation should be resent by: Text to cell phone: [phone]  Will anyone else be joining your video visit? No      Vitals - Patient Reported  Systolic (Patient Reported): 97  Diastolic (Patient Reported): 73  Weight (Patient Reported): 73.9 kg (163 lb)  SpO2 (Patient Reported): 98  Pulse (Patient Reported): 80  Pain Score: No Pain (0)(SOB and fatigue at baseline. Patient states this is worsening.)    Vitals were taken and medications reconciled.    Denis Barnett, EMT  3:40 PM    HPI:     60-year-old female with a past medical history of RFA at [facility] complicated by complete heart block requiring pacemaker placement in 2017.      Now Joyce has been troubled by  progressively worsening diminished exercise tolerance.  Recently she also experienced  extreme weight gain which she has not been able to deal with relatively well but still is upset about likely fluid accumulation.  During the course of her treatment for excess fluid there were times when she was experiencing low blood pressure and had adverse cognitive consequences.  These seem to have improved more recently but she is still quite distressed by the cardiovascular limitations that have accumulated.    Joyce's last LV ejection fraction was 35-40%.  Her most recent echocardiogram was in February 2021 and is partially reproduced below:    Echocardiogram from February 2021  Left Ventricle  Left ventricular size is normal. Left ventricular wall thickness cannot  evaluate. Moderately (EF 35-40%) reduced left ventricular function is present.  Grade I or early diastolic dysfunction. Abnormal septal motion consistent with  pacemaker is present.     Right Ventricle  The right ventricle is normal size. Global right ventricular function is  normal. A pacemaker lead is noted in the right ventricle.      Atria  Both atria appear normal    Joyce is principal concern relates to marked and often variable exercise intolerance.  She notes concers that some of the information in the medical record is incorrect.  We did not go into specifics today.    In terms of the cause of Joyce's left ventricular dysfunction and exertional intolerance, we did review the possibility that there may be underlying coronary disease or cardiomyopathy or that the associated left ventricular dysfunction may be related to her pacemaker induced myopathy.  We did not discuss undertaking further diagnostic evaluation at this time, since the focus of all this clinic visit was on potential steps for ameliorating her exercise intolerance.    At today's visit we spent 30 minutes reviewing options which include CRT pacing or CRT defibrillator pacing, left bundle branch block pacing, and placement of an epicardial electrode for CRT if endocardial access is not possible.   CRT has approx 2 in 3 chance to improve her symptoms, meaning that in about one third of patients, there may be no benefit. Nonetheless, there are important risks that we discussed. The principal risks included aggravating her already troublesome chest pains (which may derive from prior pericardial inflammation), development of hematoma at the operative site, bleeding due to perforation into pericardial space,  and infection with the need for total device extraction.  Failure of benefit was addressed as well, and the limited next steps for her condition were part of the discussion.      Joyce has had issues with previous medical procedures. No decision was made as to  whether she would like to proceed with a CRT.  However, we did discuss the role of general anesthetic versus sedation  with no final decision made.    Patient had many queries related to the complex decision making scenario we are dealing with.  These will need to be addressed as they could not of the accommodated in  today's clinic visit.  Joyce was unhappy that we could not accomplish more at this visit and indicated that the 30 min visit was inadequate. She will be talking to our nurse practitioner in the near future and I indicated to her that I would be happy to arrange a further clinic visit to cover more concerns if she wished.     PAST MEDICAL HISTORY:  Past Medical History:   Diagnosis Date     Arthritis      Cardiomyopathy (H)     ff Cardiology     Chronic depressive personality disorder      Congestive heart failure (H) see dr martínez    heart failure correct term     COPD exacerbation (H)      Esophageal reflux      Ex-smoker     quit 2006; 1 PPD x 30     Hyperparathyroidism (H)     s/p parathyroidectomy     Hypothyroidism      LARRY on CPAP     ff Sleep medicine     Pulmonary nodules     ff Pulmonologist     S/P cardiac pacemaker procedure     [facility]      Uncomplicated asthma years       CURRENT MEDICATIONS:  Current Outpatient Medications   Medication Sig Dispense Refill     acetaminophen (TYLENOL) 500 MG tablet Take 500-1,000 mg by mouth every 6 hours as needed for mild pain       albuterol (PROAIR HFA/PROVENTIL HFA/VENTOLIN HFA) 108 (90 BASE) MCG/ACT Inhaler Inhale 2 puffs into the lungs as needed for shortness of breath / dyspnea or wheezing        apixaban ANTICOAGULANT (ELIQUIS) 5 MG tablet Take 1 tablet (5 mg) by mouth 2 times daily 120 tablet 3     azelastine (ASTELIN) 0.1 % nasal spray Spray 1 spray into both nostrils 2 times daily as needed        Calcium Lactate 500 MG CAPS Take 250-500 mg by mouth daily        Calcium-Magnesium-Vitamin D (CALCIUM MAGNESIUM PO) Calcium 120 mg, magenesium 20 mg, iodine 20 mg as needed at bedtime       carvedilol (COREG) 3.125 MG tablet Take 1 tablet (3.125 mg) by mouth 2 times daily (with meals) 180 tablet 3     clonazePAM (KLONOPIN) 0.5 MG tablet TAKE 1 TABLET(0.5 MG) BY MOUTH THREE TIMES DAILY AS NEEDED FOR ANXIETY 90 tablet 0     DiphenhydrAMINE  HCl (BENADRYL PO) Take 25 mg by mouth nightly as needed for allergies        Ferrous Sulfate (IRON SUPPLEMENT PO) Take 65 mg by mouth daily        fluocinolone acetonide (DERMA SMOOTHE/FS BODY) 0.01 % external oil Use for ears.  2 times daily for 7 days 1 Bottle 0     fluticasone-salmeterol (ADVAIR) 250-50 MCG/DOSE inhaler Inhale 1 puff into the lungs every 12 hours       furosemide (LASIX) 20 MG tablet Take 1 tablet (20 mg) by mouth 2 times daily as needed (for fluid retention) 180 tablet 1     ipratropium (ATROVENT) 0.06 % nasal spray Spray 2 sprays into both nostrils 4 times daily as needed        loperamide (IMODIUM A-D) 2 MG tablet Take 2 tabs (4 mg) after first loose stool, and then take one tab (2 mg) after each diarrheal stool.  Max of 8 tabs (16 mg) per day. 1 tablet 0     MAGNESIUM LACTATE PO Take 140-210 mg by mouth nightly as needed        MELATONIN PO Take 1-3 mg by mouth nightly as needed        Menaquinone-7 (VITAMIN K2 PO) Take 1 tablet by mouth every morning        METHYLPREDNISOLONE PO Take 40 mg by mouth as needed        Multiple Vitamin (DAILY MULTIVITAMIN PO) Take 1 tablet by mouth every morning        nebulizer nebulization as needed       nitroGLYcerin (NITROSTAT) 0.4 MG sublingual tablet For chest pain place 1 tablet under the tongue every 5 minutes for 3 doses. If symptoms persist 5 minutes after 1st dose call 911. 30 tablet 1     Probiotic Product (PROBIOTIC DAILY PO) Take 1 capsule by mouth 2 times daily as needed        spironolactone (ALDACTONE) 25 MG tablet Take 0.5 tablets (12.5 mg) by mouth daily 90 tablet 3     SYNTHROID 150 MCG tablet 150 mcg for 6 days and 75 mcg for 1 day per week 90 tablet 3     TURMERIC PO Take 1 tablet by mouth every morning        UNABLE TO FIND MEDICATION NAME: Xylitol daily as needed for allergies       UNABLE TO FIND 2 times daily as needed MEDICATION NAME: Standard Process, Immune Congaplex. Pt taking 2 tablets daily        vitamin B complex with vitamin C  (VITAMIN  B COMPLEX) PO tablet Take 1 tablet by mouth as needed       vitamin D3 (CHOLECALCIFEROL) 2000 units tablet Take 1 tablet by mouth daily 1 tablet 0     Zinc 15 MG CAPS Take 15 mg by mouth          PAST SURGICAL HISTORY:  Past Surgical History:   Procedure Laterality Date     BUNIONECTOMY Bilateral      CV CORONARY ANGIOGRAM N/A 9/26/2019    Procedure: CV CORONARY ANGIOGRAM;  Surgeon: Erickson Trejo MD;  Location:  HEART CARDIAC CATH LAB     CV RIGHT HEART CATH MEASUREMENTS RECORDED N/A 7/20/2020    Procedure: CV RIGHT HEART CATH;  Surgeon: Alonso Ordonez MD;  Location:  HEART CARDIAC CATH LAB     EP ABLATION / EP STUDIES  04/21/2017     EXPLORE NECK N/A 9/19/2018    Procedure: EXPLORE NECK;  Neck Exploration Resection of Left superior Parathyroid gland;  Surgeon: Kristine Venegas MD;  Location: [facility] OR      CORRECT BUNION,SIMPLE       PARATHYROIDECTOMY N/A 9/19/2018    Procedure: PARATHYROIDECTOMY;;  Surgeon: Kristine Venegas MD;  Location: [facility] OR     PPM INSERT OF NEW OR REPL W/VENT LEAD  04/21/2017     TONSILLECTOMY & ADENOIDECTOMY         ALLERGIES:     Allergies   Allergen Reactions     Tetracycline Swelling     Zofran [Ondansetron]      Prolonged QT  Prolonged QT at baseline per patient     Flagyl [Metronidazole] Rash     Buspar [Buspirone]      Muscle weakness     Corn Oil Other (See Comments)     Headache       Seasonal Allergies Difficulty breathing     Sulfamethoxazole-Trimethoprim      Other reaction(s): Other  Passed out     Cyclobenzaprine Other (See Comments)     Extreme heat intolerance     Levaquin [Levofloxacin]      Other reaction(s): Other  Tendon rupture     Nsaids Other (See Comments)     Exacerbated asthma       FAMILY HISTORY:  Family History   Problem Relation Age of Onset     Hypothyroidism Mother      Hypertension Mother      Osteoarthritis Mother      Coronary Artery Disease Father         nonfatal MI in his 70s     Asthma Father      Diabetes Sister       Hypothyroidism Sister      Breast Cancer Maternal Aunt      Anxiety Disorder Sister        SOCIAL HISTORY:  Social History     Tobacco Use     Smoking status: Former Smoker     Packs/day: 0.00     Years: 0.00     Pack years: 0.00     Smokeless tobacco: Never Used   Substance Use Topics     Alcohol use: Yes     Alcohol/week: 0.0 - 8.0 standard drinks     Frequency: 2-3 times a week     Drinks per session: 1 or 2     Binge frequency: Never     Comment: seldom     Drug use: No       ROS:   See HPI    Exam:  LMP 08/21/2007   GENERAL APPEARANCE:  alert and no distress    Labs:  CBC RESULTS:   Lab Results   Component Value Date    WBC 5.9 12/20/2020    RBC 3.86 12/20/2020    HGB 12.4 12/20/2020    HCT 38.7 12/20/2020     12/20/2020    MCH 32.1 12/20/2020    MCHC 32.0 12/20/2020    RDW 12.3 12/20/2020     12/20/2020       BMP RESULTS:  Lab Results   Component Value Date     03/24/2021    POTASSIUM 4.3 03/24/2021    CHLORIDE 105 03/24/2021    CO2 27 03/24/2021    ANIONGAP 5 03/24/2021    GLC 62 (L) 03/24/2021    BUN 14 03/24/2021    CR 0.67 03/24/2021    GFRESTIMATED >90 03/24/2021    GFRESTBLACK >90 03/24/2021    MANJU 9.3 03/24/2021        INR RESULTS:  Lab Results   Component Value Date    INR 1.02 10/06/2019    INR 0.95 06/28/2017       Procedures:  PULMONARY FUNCTION TESTS:   No flowsheet data found.      ECHOCARDIOGRAM:   See summary of report in HPI      Assessment and Plan:   1. LV dysfunction with Exertional Intolerance  2. Pacemaker in situ    PLAN  1. Continue discussion of options at a time convenient for Joyce  2. RTC TBD    Video on 4: 35; video off 5: 05  Platform Doximity  Patient at home; clinic [facility] heart  Total elapsed time with documentation 40 minutes    I very much appreciated the opportunity to see and assess Joyce Marroquin in the clinic today. Please do not hesitate to contact my office if you have any questions or concerns.      Lino Funes MD  Cardiac Arrhythmia  Service  [facility]  [phone]  CC  STUART COLMENARES

## 2021-04-14 ENCOUNTER — OFFICE VISIT (OUTPATIENT)
Dept: CARDIOLOGY | Facility: CLINIC | Age: 61
End: 2021-04-14
Payer: COMMERCIAL

## 2021-04-14 VITALS
HEART RATE: 81 BPM | HEIGHT: 65 IN | WEIGHT: 171 LBS | SYSTOLIC BLOOD PRESSURE: 114 MMHG | OXYGEN SATURATION: 96 % | DIASTOLIC BLOOD PRESSURE: 77 MMHG | BODY MASS INDEX: 28.49 KG/M2

## 2021-04-14 DIAGNOSIS — I50.22 CHRONIC SYSTOLIC HEART FAILURE (H): Primary | ICD-10-CM

## 2021-04-14 PROCEDURE — 99205 OFFICE O/P NEW HI 60 MIN: CPT | Performed by: INTERNAL MEDICINE

## 2021-04-14 ASSESSMENT — MIFFLIN-ST. JEOR: SCORE: 1336.53

## 2021-04-14 NOTE — PROGRESS NOTES
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  The patient's  Vineet works at the [employer] [facility] [place] clinic with us.  She follows up at the [facility] in the heart failure and the EP clinic and is requesting a second opinion about her cardiac issues.  Patient seems to have complex set of cardiac issues and medical comorbidities that underwent a try to summarize below -    1. Patient has a history of Graves' disease and related hyperthyroidism for which they ablated her thyroid almost 2 decades ago and now is on levothyroxine supplementation.  2. She has a history of COPD asthma obstructive sleep apnea other noncardiac issues which are not significant for this visit at this time.  3. Patient was diagnosed with frequent PVCs in 2017 and an MRI at that time showed LVEF of 60 to 65% and a probable very small area of basal inferior hypokinesis that was not well visualized.  They could not exclude a small area of subendocardial myocardial infarction in that area.    4. Subsequently in April 21, 2017 at [facility] she underwent an attempt at PVC ablation which seemed to be at the septal base of the right ventricular outflow tract per reports in care everywhere.  She had complete heart block probably from radiofrequency damage to the AV node or AV stew blood supply.  5. Emergently underwent a Medtronic MRI compatible dual-chamber pacemaker implant but had acute dislodgment of the ventricular lead which had to be revised immediately.  6. Echocardiogram on the day of the procedure reports an ejection fraction of 45 to 50% on April 21, 2017.  7. Also had some pericarditis at that time per reports.  8. Moving forward it appears that she presented with what appears  to be stress-induced cardiomyopathy in September 2019 at [facility] and was noted to have LV ejection fraction of 30 to 35% with echocardiographic features suggestive of Takotsubo cardiomyopathy.   9. Coronary angiography in September 2019 revealed mild diffuse calcific disease without any obstructive lesions.  10. Since then her ejection fractions have been around 35 to 40% with progressive worsening [place] Heart Association functional class.   11. She also has a diagnosis of paroxysmal atrial fibrillation on device interrogations and recent initiation of anticoagulation in 2021 by EP at [facility].  12. Joyce has seen Dr. Funes and Dr. Ordonez at the [facility] for chronic worsening symptoms despite midrange LV dysfunction.  Due to progressive symptoms, in July 2020 patient underwent hemodynamic right heart catheterization with exercise which showed normal biventricular and pulmonary pressures and normal cardiac output.  However limited exercise capacity was noted at 4 minutes and 20 seconds with a significant rise in wedge pressure from 10 to 31 mm Hg and PA pressure mean of 21 to 38 mm Hg consistent with exercise-induced pulmonary hypertension.  She was started on spironolactone.   13. Otherwise from a heart failure standpoint she tolerates only 3.125 twice daily of carvedilol and higher doses or alternate a vasodilator such as lisinopril have caused marked hypotension.  From a diuretic standpoint she takes about 20 to 40 mg of Lasix 3 times a week and continues to report significant weight shortness of breath and volume status fluctuations.    Joyce was last seen at [facility] earlier this month and is here today for a second opinion.  She was told that she may benefit from a cardiac resynchronization therapy device upgrade.  To me Joyce reports NYHA class III heart failure symptoms.  No angina syncope presyncope unless her blood pressure is low.    PAST MEDICAL HISTORY:  Past Medical  History:   Diagnosis Date     Arthritis      Cardiomyopathy (H)     ff Cardiology     Chronic depressive personality disorder      Congestive heart failure (H) see dr martínez    heart failure correct term     COPD exacerbation (H)      Esophageal reflux      Ex-smoker     quit 2006; 1 PPD x 30     Hyperparathyroidism (H)     s/p parathyroidectomy     Hypothyroidism      LARRY on CPAP     ff Sleep medicine     Pulmonary nodules     ff Pulmonologist     S/P cardiac pacemaker procedure     checked every 6 months at the [facility]     Uncomplicated asthma years       MEDICATIONS:  Current Outpatient Medications   Medication     acetaminophen (TYLENOL) 500 MG tablet     albuterol (PROAIR HFA/PROVENTIL HFA/VENTOLIN HFA) 108 (90 BASE) MCG/ACT Inhaler     apixaban ANTICOAGULANT (ELIQUIS) 5 MG tablet     azelastine (ASTELIN) 0.1 % nasal spray     Calcium Lactate 500 MG CAPS     Calcium-Magnesium-Vitamin D (CALCIUM MAGNESIUM PO)     carvedilol (COREG) 3.125 MG tablet     clonazePAM (KLONOPIN) 0.5 MG tablet     DiphenhydrAMINE HCl (BENADRYL PO)     Ferrous Sulfate (IRON SUPPLEMENT PO)     fluocinolone acetonide (DERMA SMOOTHE/FS BODY) 0.01 % external oil     fluticasone-salmeterol (ADVAIR) 250-50 MCG/DOSE inhaler     furosemide (LASIX) 20 MG tablet     ipratropium (ATROVENT) 0.06 % nasal spray     loperamide (IMODIUM A-D) 2 MG tablet     MAGNESIUM LACTATE PO     MELATONIN PO     Menaquinone-7 (VITAMIN K2 PO)     METHYLPREDNISOLONE PO     Multiple Vitamin (DAILY MULTIVITAMIN PO)     nebulizer nebulization     nitroGLYcerin (NITROSTAT) 0.4 MG sublingual tablet     Probiotic Product (PROBIOTIC DAILY PO)     spironolactone (ALDACTONE) 25 MG tablet     SYNTHROID 150 MCG tablet     TURMERIC PO     UNABLE TO FIND     UNABLE TO FIND     vitamin B complex with vitamin C (VITAMIN  B COMPLEX) PO tablet     vitamin D3 (CHOLECALCIFEROL) 2000 units tablet     Zinc 15 MG CAPS     No current facility-administered medications for this visit.         ALLERGIES:  Allergies   Allergen Reactions     Tetracycline Swelling     Zofran [Ondansetron]      Prolonged QT  Prolonged QT at baseline per patient     Flagyl [Metronidazole] Rash     Buspar [Buspirone]      Muscle weakness     Corn Oil Other (See Comments)     Headache       Seasonal Allergies Difficulty breathing     Sulfamethoxazole-Trimethoprim      Other reaction(s): Other  Passed out     Cyclobenzaprine Other (See Comments)     Extreme heat intolerance     Levaquin [Levofloxacin]      Other reaction(s): Other  Tendon rupture     Nsaids Other (See Comments)     Exacerbated asthma       SOCIAL HISTORY:  I have reviewed this patient's social history and updated it with pertinent information if needed. Joyce Marroquin  reports that she has quit smoking. She smoked 0.00 packs per day for 0.00 years. She has never used smokeless tobacco. She reports current alcohol use. She reports that she does not use drugs.    FAMILY HISTORY:  I have reviewed this patient's family history and updated it with pertinent information if needed.   Family History   Problem Relation Age of Onset     Hypothyroidism Mother      Hypertension Mother      Osteoarthritis Mother      Coronary Artery Disease Father         nonfatal MI in his 70s     Asthma Father      Diabetes Sister      Hypothyroidism Sister      Breast Cancer Maternal Aunt      Anxiety Disorder Sister        REVIEW OF SYSTEMS:  Skin:  Negative     Eyes:  Positive for glasses  ENT:  Positive for hoarseness  Respiratory:  Positive for shortness of breath;sleep apnea;CPAP  Cardiovascular:  Negative;palpitations Positive for;lightheadedness;dizziness;fatigue;chest pain  Gastroenterology: Positive for excessive gas or bloating;reflux;diarrhea  Genitourinary:  Negative    Musculoskeletal:  Positive for    Neurologic:  Positive for headaches  Psychiatric:  Positive for excessive stress;anxiety;sleep disturbances;depression  Heme/Lymph/Imm:       Endocrine:  Positive for         PHYSICAL EXAM:      BP: 114/77 Pulse: 81     SpO2: 96 %      Vital Signs with Ranges  Pulse:  [81] 81  BP: (114)/(77) 114/77  SpO2:  [96 %] 96 %  171 lbs 0 oz    Constitutional: awake, alert, no distress  Respiratory: Clear to auscultation bilaterally  Cardiovascular: Normal first and second heart sounds.  Soft systolic murmur no rubs or gallops.  Extremities are warm.  There is no edema.  JVP appears to be about 6 to 8 cm.  GI: nondistended  Neuropsychiatric: appropriate affact    DATA:  Labs: Pertinent cardiac labs reviewed    Echocardiogram: February 2021 showing ejection fraction of 35 to 40%.  Normal RV function. No significant valve disease.  Normal IVC no pericardial effusion.    EKG: Dual AV paced rhythm    ASSESSMENT:  Nonischemic cardiomyopathy possibly pacing induced with midrange LV dysfunction  History of PVCs status post RVOT PVC ablation complicated by complete heart block necessitating dual-chamber pacemaker implant  Nonobstructive coronary disease on angiography 2019  Stress induced cardiomyopathy in 2019  Paroxysmal atrial fibrillation on anticoagulation  Exercise-induced pulmonary hypertension    RECOMMENDATIONS:  1. I had a very long conversation with Joyce and her  Vineet.  I told them that this is a tough situation to manage.  While I cannot prove causality, it seems to be an association with her symptom progression, LV dysfunction, and the timing of pacemaker implant.  However it is interesting to note that her LV function was 45 to 50% on the day after initial pacemaker implant as well which would be too soon to develop RV pacing induced issues. There is a possibility that some of that may be related to PVC burden, but having said that her MRI prior to ablation did show normal LV function.   2. In regards to management of her heart failure and NYHA class III heart failure symptoms, there is significant inability to tolerate advancing doses of heart failure therapy due to  symptomatic hypotension.  She cannot tolerate higher doses of carvedilol.  She has not tolerated ACE inhibitor in the past.  As such I recommend continuing beta-blocker and spironolactone at the dose that she is on.  I do not think she will tolerate Entresto.  3. I agree with recommendations from heart failure and EP teams at the [facility] for consideration of cardiopulmonary exercise stress test.  If a cardiac limitation to activity is identified (similar to what was seen on the exercise right heart catheterization), as mentioned by Dr. Funes, it may be reasonable to proceed with addition of an LV CRT lead based on BLOCK HF data.   4. Joyce understands the risk and benefits of these procedures and her device upgrade and the fact that a substantial portion of patients, this strategy may not work.   5. From a volume standpoint, I told Joyce to take Lasix 20 mg every day as opposed to taking 40 mg 3 times a week.  6. Avoid alcohol and get into a graded exercise program.  7. PCP and primary cardiology teams to consider statin use given her nonobstructive coronary disease and mild hyperlipidemia.  We did not go into details of that today.    I am happy to see Joyce as needed in clinic but she wishes to consolidate her cardiac care to the [facility] I told her that she is in excellent hands with heart failure and EP services there.  I have reached out to Dr. Ordonez and his team will plan on scheduling an exercise cardiopulmonary stress test for her and then coordinate with Dr. Funes or Joel (Adelaida to see patient soon) if they plan a CRT upgrade.    HAVEN Smith, [facility]  Cardiology - [facility]  April 14, 2021

## 2021-04-14 NOTE — LETTER
4/14/2021    [place]  [address]  [place] [address]    RE: Joyce Marroquin       Dear Colleague,    I had the pleasure of seeing Joyce Marroquin in the [facility].    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  The patient's  Vineet works at the [facility] [place] clinic with us.  She follows up at the [facility] in the heart failure and the EP clinic and is requesting a second opinion about her cardiac issues.  Patient seems to have complex set of cardiac issues and medical comorbidities that underwent a try to summarize below -    1. Patient has a history of Graves' disease and related hyperthyroidism for which they ablated her thyroid almost 2 decades ago and now is on levothyroxine supplementation.  2. She has a history of COPD asthma obstructive sleep apnea other noncardiac issues which are not significant for this visit at this time.  3. Patient was diagnosed with frequent PVCs in 2017 and an MRI at that time showed LVEF of 60 to 65% and a probable very small area of basal inferior hypokinesis that was not well visualized.  They could not exclude a small area of subendocardial myocardial infarction in that area.    4. Subsequently in April 21, 2017 at [facility] she underwent an attempt at PVC ablation which seemed to be at the septal base of the right ventricular outflow tract per reports in care everywhere.  She had complete heart block probably from radiofrequency damage to the AV node or AV stew blood supply.  5. Emergently underwent a Medtronic MRI compatible dual-chamber pacemaker implant but had acute dislodgment of the ventricular lead which had to be revised  immediately.  6. Echocardiogram on the day of the procedure reports an ejection fraction of 45 to 50% on April 21, 2017.  7. Also had some pericarditis at that time per reports.  8. Moving forward it appears that she presented with what appears to be stress-induced cardiomyopathy in September 2019 at [facility] and was noted to have LV ejection fraction of 30 to 35% with echocardiographic features suggestive of Takotsubo cardiomyopathy.   9. Coronary angiography in September 2019 revealed mild diffuse calcific disease without any obstructive lesions.  10. Since then her ejection fractions have been around 35 to 40% with progressive worsening [place] Heart Association functional class.   11. She also has a diagnosis of paroxysmal atrial fibrillation on device interrogations and recent initiation of anticoagulation in 2021 by EP at [facility].  12. Joyce has seen Dr. Funes and Dr. Ordonez at the [facility] for chronic worsening symptoms despite midrange LV dysfunction.  Due to progressive symptoms, in July 2020 patient underwent hemodynamic right heart catheterization with exercise which showed normal biventricular and pulmonary pressures and normal cardiac output.  However limited exercise capacity was noted at 4 minutes and 20 seconds with a significant rise in wedge pressure from 10 to 31 mm Hg and PA pressure mean of 21 to 38 mm Hg consistent with exercise-induced pulmonary hypertension.  She was started on spironolactone.   13. Otherwise from a heart failure standpoint she tolerates only 3.125 twice daily of carvedilol and higher doses or alternate a vasodilator such as lisinopril have caused marked hypotension.  From a diuretic standpoint she takes about 20 to 40 mg of Lasix 3 times a week and continues to report significant weight shortness of breath and volume status fluctuations.    Joyce was last seen at [facility] earlier this month and is here today for a second opinion.  She was  told that she may benefit from a cardiac resynchronization therapy device upgrade.  To me Joyce reports NYHA class III heart failure symptoms.  No angina syncope presyncope unless her blood pressure is low.    PAST MEDICAL HISTORY:  Past Medical History:   Diagnosis Date     Arthritis      Cardiomyopathy (H)     ff Cardiology     Chronic depressive personality disorder      Congestive heart failure (H) see dr martínez    heart failure correct term     COPD exacerbation (H)      Esophageal reflux      Ex-smoker     quit 2006; 1 PPD x 30     Hyperparathyroidism (H)     s/p parathyroidectomy     Hypothyroidism      LARRY on CPAP     ff Sleep medicine     Pulmonary nodules     ff Pulmonologist     S/P cardiac pacemaker procedure     checked every 6 months at the [facility]     Uncomplicated asthma years       MEDICATIONS:  Current Outpatient Medications   Medication     acetaminophen (TYLENOL) 500 MG tablet     albuterol (PROAIR HFA/PROVENTIL HFA/VENTOLIN HFA) 108 (90 BASE) MCG/ACT Inhaler     apixaban ANTICOAGULANT (ELIQUIS) 5 MG tablet     azelastine (ASTELIN) 0.1 % nasal spray     Calcium Lactate 500 MG CAPS     Calcium-Magnesium-Vitamin D (CALCIUM MAGNESIUM PO)     carvedilol (COREG) 3.125 MG tablet     clonazePAM (KLONOPIN) 0.5 MG tablet     DiphenhydrAMINE HCl (BENADRYL PO)     Ferrous Sulfate (IRON SUPPLEMENT PO)     fluocinolone acetonide (DERMA SMOOTHE/FS BODY) 0.01 % external oil     fluticasone-salmeterol (ADVAIR) 250-50 MCG/DOSE inhaler     furosemide (LASIX) 20 MG tablet     ipratropium (ATROVENT) 0.06 % nasal spray     loperamide (IMODIUM A-D) 2 MG tablet     MAGNESIUM LACTATE PO     MELATONIN PO     Menaquinone-7 (VITAMIN K2 PO)     METHYLPREDNISOLONE PO     Multiple Vitamin (DAILY MULTIVITAMIN PO)     nebulizer nebulization     nitroGLYcerin (NITROSTAT) 0.4 MG sublingual tablet     Probiotic Product (PROBIOTIC DAILY PO)     spironolactone (ALDACTONE) 25 MG tablet     SYNTHROID 150 MCG tablet     TURMERIC PO      UNABLE TO FIND     UNABLE TO FIND     vitamin B complex with vitamin C (VITAMIN  B COMPLEX) PO tablet     vitamin D3 (CHOLECALCIFEROL) 2000 units tablet     Zinc 15 MG CAPS     No current facility-administered medications for this visit.        ALLERGIES:  Allergies   Allergen Reactions     Tetracycline Swelling     Zofran [Ondansetron]      Prolonged QT  Prolonged QT at baseline per patient     Flagyl [Metronidazole] Rash     Buspar [Buspirone]      Muscle weakness     Corn Oil Other (See Comments)     Headache       Seasonal Allergies Difficulty breathing     Sulfamethoxazole-Trimethoprim      Other reaction(s): Other  Passed out     Cyclobenzaprine Other (See Comments)     Extreme heat intolerance     Levaquin [Levofloxacin]      Other reaction(s): Other  Tendon rupture     Nsaids Other (See Comments)     Exacerbated asthma       SOCIAL HISTORY:  I have reviewed this patient's social history and updated it with pertinent information if needed. Joyce Marroquin  reports that she has quit smoking. She smoked 0.00 packs per day for 0.00 years. She has never used smokeless tobacco. She reports current alcohol use. She reports that she does not use drugs.    FAMILY HISTORY:  I have reviewed this patient's family history and updated it with pertinent information if needed.   Family History   Problem Relation Age of Onset     Hypothyroidism Mother      Hypertension Mother      Osteoarthritis Mother      Coronary Artery Disease Father         nonfatal MI in his 70s     Asthma Father      Diabetes Sister      Hypothyroidism Sister      Breast Cancer Maternal Aunt      Anxiety Disorder Sister        REVIEW OF SYSTEMS:  Skin:  Negative     Eyes:  Positive for glasses  ENT:  Positive for hoarseness  Respiratory:  Positive for shortness of breath;sleep apnea;CPAP  Cardiovascular:  Negative;palpitations Positive for;lightheadedness;dizziness;fatigue;chest pain  Gastroenterology: Positive for excessive gas or  bloating;reflux;diarrhea  Genitourinary:  Negative    Musculoskeletal:  Positive for    Neurologic:  Positive for headaches  Psychiatric:  Positive for excessive stress;anxiety;sleep disturbances;depression  Heme/Lymph/Imm:       Endocrine:  Positive for        PHYSICAL EXAM:      BP: 114/77 Pulse: 81     SpO2: 96 %      Vital Signs with Ranges  Pulse:  [81] 81  BP: (114)/(77) 114/77  SpO2:  [96 %] 96 %  171 lbs 0 oz    Constitutional: awake, alert, no distress  Respiratory: Clear to auscultation bilaterally  Cardiovascular: Normal first and second heart sounds.  Soft systolic murmur no rubs or gallops.  Extremities are warm.  There is no edema.  JVP appears to be about 6 to 8 cm.  GI: nondistended  Neuropsychiatric: appropriate affact    DATA:  Labs: Pertinent cardiac labs reviewed    Echocardiogram: February 2021 showing ejection fraction of 35 to 40%.  Normal RV function. No significant valve disease.  Normal IVC no pericardial effusion.    EKG: Dual AV paced rhythm    ASSESSMENT:  Nonischemic cardiomyopathy possibly pacing induced with midrange LV dysfunction  History of PVCs status post RVOT PVC ablation complicated by complete heart block necessitating dual-chamber pacemaker implant  Nonobstructive coronary disease on angiography 2019  Stress induced cardiomyopathy in 2019  Paroxysmal atrial fibrillation on anticoagulation  Exercise-induced pulmonary hypertension    RECOMMENDATIONS:  1. I had a very long conversation with Joyce and her  Vineet.  I told them that this is a tough situation to manage.  While I cannot prove causality, it seems to be an association with her symptom progression, LV dysfunction, and the timing of pacemaker implant.  However it is interesting to note that her LV function was 45 to 50% on the day after initial pacemaker implant as well which would be too soon to develop RV pacing induced issues. There is a possibility that some of that may be related to PVC burden, but having said  that her MRI prior to ablation did show normal LV function.   2. In regards to management of her heart failure and NYHA class III heart failure symptoms, there is significant inability to tolerate advancing doses of heart failure therapy due to symptomatic hypotension.  She cannot tolerate higher doses of carvedilol.  She has not tolerated ACE inhibitor in the past.  As such I recommend continuing beta-blocker and spironolactone at the dose that she is on.  I do not think she will tolerate Entresto.  3. I agree with recommendations from heart failure and EP teams at the [facility] for consideration of cardiopulmonary exercise stress test.  If a cardiac limitation to activity is identified (similar to what was seen on the exercise right heart catheterization), as mentioned by Dr. Funes, it may be reasonable to proceed with addition of an LV CRT lead based on BLOCK HF data.   4. Joyce understands the risk and benefits of these procedures and her device upgrade and the fact that a substantial portion of patients, this strategy may not work.   5. From a volume standpoint, I told Joyce to take Lasix 20 mg every day as opposed to taking 40 mg 3 times a week.  6. Avoid alcohol and get into a graded exercise program.  7. PCP and primary cardiology teams to consider statin use given her nonobstructive coronary disease and mild hyperlipidemia.  We did not go into details of that today.    I am happy to see Joyce as needed in clinic but she wishes to consolidate her cardiac care to the [facility] I told her that she is in excellent hands with heart failure and EP services there.  I have reached out to Dr. rOdonez and his team will plan on scheduling an exercise cardiopulmonary stress test for her and then coordinate with Dr. Funes or Joel (Adelaida to see patient soon) if they plan a CRT upgrade.    HAVEN Smith, [facility]  Cardiology - [facility]  April 14, 2021      cc:   No referring provider defined  for this encounter.

## 2021-04-15 ENCOUNTER — MYC MEDICAL ADVICE (OUTPATIENT)
Dept: CARDIOLOGY | Facility: CLINIC | Age: 61
End: 2021-04-15

## 2021-04-15 NOTE — TELEPHONE ENCOUNTER
Should not matter, but this is a question for EP team at the .     My note says that anticoagulation was started in 2021 which I believe is correct. But yes AF could have been prior as she mentioned in 2017 and then seen on device checks.     Thanks, K

## 2021-04-15 NOTE — TELEPHONE ENCOUNTER
Forwarded patient's question to Dr. Brown, and SHERYL regarding EKG date if note needs to be amended. Adina Cross, RN on 4/15/2021 at 10:12 AM      Joyce Marroquin Kairav, MD 18 minutes ago (9:52 AM)        Dr Brown  Thank you so much for seeing me  I appreciate your input  Does it matter if one of my pace maker wires now is in a notch i believe to be the apical septum( and not the apex as i was told most wires are placed) for getting a 3rd wire?  thx again  Joyce Marroquin  on ur note first record of afib was march 2017 from an ekg  for correctness

## 2021-04-20 ENCOUNTER — ANCILLARY PROCEDURE (OUTPATIENT)
Dept: CARDIOLOGY | Facility: CLINIC | Age: 61
End: 2021-04-20
Attending: INTERNAL MEDICINE
Payer: COMMERCIAL

## 2021-04-20 DIAGNOSIS — I44.2 COMPLETE ATRIOVENTRICULAR BLOCK (H): ICD-10-CM

## 2021-04-20 PROCEDURE — 93296 REM INTERROG EVL PM/IDS: CPT

## 2021-04-20 PROCEDURE — 93294 REM INTERROG EVL PM/LDLS PM: CPT | Performed by: INTERNAL MEDICINE

## 2021-04-20 NOTE — PROGRESS NOTES
"Electrophysiology Clinic Video Virtual Visit    Joyce Marroquin is a 60 year old female who is being evaluated via a billable video visit.      The patient has been notified of following:     \"This video visit will be conducted via a call between you and your physician/provider. We have found that certain health care needs can be provided without the need for an in-person physical exam.  This service lets us provide the care you need with a video conversation.  If a prescription is necessary we can send it directly to your pharmacy.  If lab work is needed we can place an order for that and you can then stop by our lab to have the test done at a later time.    If during the course of the call the physician/provider feels a video visit is not appropriate, you will not be charged for this service.\"     Physician/provider has received verbal consent for a Video Visit from the patient? Yes      HPI:   Ms. Marroquin is a 60 year old female who has a past medical history significant for GERD, PTSD, Grave's Disease with post ablative hypothyroidism, hyperparathyroidism, LARRY (uses CPAP), NICM LVEF 35-40%, PAF (CHADSVASC 2), depression, prior tobacco use, and PVCs s/p RVOT ablation complicated by CHB s/p PPM 4/21/2017 and pericarditis. She presents today for follow up.      She reports a history of symptomatic PVCs for which she ultimately elected to pursue an ablation which she had done at [facility]. 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 She went into [facility] and required urgent PPM placed in 4/21/17. She then had lead dislodgement in the recovery area requiring lead revision. She had pericarditis and completed a course of colchicine post ablation. Initial echo post ablation showed LVEF 40-45%, then an echo a month later showed LVEF 50%. MRI in early 4/2017 showed LVEF 60-65% with area of basal inferior hypokinesis with possible LGE. Stress test " from 3/2017 showed no inducible ischemia. Echo from 5/2018 showed LVEF 50-55%, mild diffuse hypokinesis, normal RV size and function. She had some pain at pacemaker site and reported it was too close to her collarbone. She elected to undergo a pocket revision in 12/2017. Ever since her initial pacemaker implant she has struggled with episodes of dizziness/lightheadedness and fatigue. No episodes of syncope. She had an updated echo in 2019 that showed LVEF decreased to 30-35% with all segments severely hypokinetic to akinetic, suggestive of stress cardiomyopathy but could not rule out ischemic cardiomyopathy. She underwent coronary angiogram that showed mild/moderate non-obstructive CAD involving LAD, LCx, and RCA with no significant CAD to explain new cardiomyopathy. Recommended her to get CMRI; however, she wanted to defer. Therefore, treated her for stress cardiomyopathy. Started her on Toprol XL, she has only tolerated small doses in the past. This was later changed to low dose Coreg. She was also placed on low dose lisinopril. Hypotension has limited up titration of neurohormal agents. A follow up echo in 12/2019 showed LVEF 40-45%, grade I diastolic dysfunction, and mild diffuse hypokinesis. She followed up with Dr. Funes in 2/2020 and reported that since 1/2020 she noticed a decrease in her wellbeing and exercise tolerance. She found even pushing a cart had become more difficult and felt more short of breath. A follow up echo in 3/2020 showed LVEF 40-45%, normal RV size/function unchanged from 12/2019 echo. Due to progressively worsening symptoms, she underwent exercise RHC which showed normal biventricular and pulmonary pressures, normal CO, and limited exercise capacity with significant rise in PCW from 10 to 31 mmHg and PA pressure mean of 21 to 38 mmHg c/w exercise induced pulmonary HTN. She was started on spironolactone. She has been on Lasix 20-40 mg as needed for weight gain or SOB and volume status  fluctuations. A follow up echo in 2/19/21 showed LVEF 35-40%, normal RV size/function, and no significant valvular abnormalities. She has had low burden of short PAF episodes noted on device that she mentioned to her care team and was subsequently started on Eliquis. She continued to have progressively worsening exercise tolerance, SOB/EASTON, and decreased stamina. She has recently seen Dr. Funes and discussed possible CRT upgrade. She has also seen Dr. Ordonez and Dr. Brown for HF consults.     She is following up today. She reports feeling at baseline. She continues to feel frustrated with her current symptomology and wants to improve her SOB and exertional capacities so she can return to work. Currently, these symptoms have been very limiting to her. She denies any chest pain/pressures, PND, orthopnea, palpitations, or syncopal symptoms. Device interrogation showed normal device function, stable lead parameters, AP 50.8%,  97.6%, AT/AF <0.1% none over 1 minute, and 1 NSVT. She has been talking to her psychologist/PCP about starting Cymbalta. Current cardiac medications include: Eliquis, Coreg, Lasix, Spironolactone.     PAST MEDICAL HISTORY:  Past Medical History:   Diagnosis Date     Arthritis      Cardiomyopathy (H)     ff Cardiology     Chronic depressive personality disorder      Congestive heart failure (H) see dr martínez    heart failure correct term     COPD exacerbation (H)      Esophageal reflux      Ex-smoker     quit 2006; 1 PPD x 30     Hyperparathyroidism (H)     s/p parathyroidectomy     Hypothyroidism      LARRY on CPAP     ff Sleep medicine     Pulmonary nodules     ff Pulmonologist     S/P cardiac pacemaker procedure     checked every 6 months at the [facility]     Uncomplicated asthma years       CURRENT MEDICATIONS:  Current Outpatient Medications   Medication Sig Dispense Refill     acetaminophen (TYLENOL) 500 MG tablet Take 500-1,000 mg by mouth every 6 hours as needed for mild pain       albuterol  (PROAIR HFA/PROVENTIL HFA/VENTOLIN HFA) 108 (90 BASE) MCG/ACT Inhaler Inhale 2 puffs into the lungs as needed for shortness of breath / dyspnea or wheezing        apixaban ANTICOAGULANT (ELIQUIS) 5 MG tablet Take 1 tablet (5 mg) by mouth 2 times daily 120 tablet 3     azelastine (ASTELIN) 0.1 % nasal spray Spray 1 spray into both nostrils 2 times daily as needed        Calcium Lactate 500 MG CAPS Take 250-500 mg by mouth daily        Calcium-Magnesium-Vitamin D (CALCIUM MAGNESIUM PO) Calcium 120 mg, magenesium 20 mg, iodine 20 mg as needed at bedtime       carvedilol (COREG) 3.125 MG tablet Take 1 tablet (3.125 mg) by mouth 2 times daily (with meals) 180 tablet 3     clonazePAM (KLONOPIN) 0.5 MG tablet TAKE 1 TABLET(0.5 MG) BY MOUTH THREE TIMES DAILY AS NEEDED FOR ANXIETY 90 tablet 0     DiphenhydrAMINE HCl (BENADRYL PO) Take 25 mg by mouth nightly as needed for allergies        Ferrous Sulfate (IRON SUPPLEMENT PO) Take 65 mg by mouth daily        fluocinolone acetonide (DERMA SMOOTHE/FS BODY) 0.01 % external oil Use for ears.  2 times daily for 7 days 1 Bottle 0     fluticasone-salmeterol (ADVAIR) 250-50 MCG/DOSE inhaler Inhale 1 puff into the lungs every 12 hours       furosemide (LASIX) 20 MG tablet Take 1 tablet (20 mg) by mouth 2 times daily as needed (for fluid retention) 180 tablet 1     ipratropium (ATROVENT) 0.06 % nasal spray Spray 2 sprays into both nostrils 4 times daily as needed        loperamide (IMODIUM A-D) 2 MG tablet Take 2 tabs (4 mg) after first loose stool, and then take one tab (2 mg) after each diarrheal stool.  Max of 8 tabs (16 mg) per day. 1 tablet 0     MAGNESIUM LACTATE PO Take 140-210 mg by mouth nightly as needed        MELATONIN PO Take 1-3 mg by mouth nightly as needed        Menaquinone-7 (VITAMIN K2 PO) Take 1 tablet by mouth every morning        METHYLPREDNISOLONE PO Take 40 mg by mouth as needed        Multiple Vitamin (DAILY MULTIVITAMIN PO) Take 1 tablet by mouth every morning         nebulizer nebulization as needed       nitroGLYcerin (NITROSTAT) 0.4 MG sublingual tablet For chest pain place 1 tablet under the tongue every 5 minutes for 3 doses. If symptoms persist 5 minutes after 1st dose call 911. 30 tablet 1     Probiotic Product (PROBIOTIC DAILY PO) Take 1 capsule by mouth 2 times daily as needed        spironolactone (ALDACTONE) 25 MG tablet Take 0.5 tablets (12.5 mg) by mouth daily 90 tablet 3     SYNTHROID 150 MCG tablet 150 mcg for 6 days and 75 mcg for 1 day per week 90 tablet 3     TURMERIC PO Take 1 tablet by mouth every morning        UNABLE TO FIND MEDICATION NAME: Xylitol daily as needed for allergies       UNABLE TO FIND 2 times daily as needed MEDICATION NAME: Standard Process, Immune Congaplex. Pt taking 2 tablets daily        vitamin B complex with vitamin C (VITAMIN  B COMPLEX) PO tablet Take 1 tablet by mouth as needed       vitamin D3 (CHOLECALCIFEROL) 2000 units tablet Take 1 tablet by mouth daily 1 tablet 0     Zinc 15 MG CAPS Take 15 mg by mouth          PAST SURGICAL HISTORY:  Past Surgical History:   Procedure Laterality Date     BUNIONECTOMY Bilateral      CV CORONARY ANGIOGRAM N/A 9/26/2019    Procedure: CV CORONARY ANGIOGRAM;  Surgeon: Erickson Trejo MD;  Location:  [facility]     CV RIGHT HEART CATH MEASUREMENTS RECORDED N/A 7/20/2020    Procedure: CV RIGHT HEART CATH;  Surgeon: Alonso Ordonez MD;  Location:  [facility]     EP ABLATION / EP STUDIES  04/21/2017     EXPLORE NECK N/A 9/19/2018    Procedure: EXPLORE NECK;  Neck Exploration Resection of Left superior Parathyroid gland;  Surgeon: Kristine Venegas MD;  Location: [facility] OR      CORRECT BUNION,SIMPLE       PARATHYROIDECTOMY N/A 9/19/2018    Procedure: PARATHYROIDECTOMY;;  Surgeon: Kristine Venegas MD;  Location: [facility] OR     PPM INSERT OF NEW OR REPL W/VENT LEAD  04/21/2017     TONSILLECTOMY & ADENOIDECTOMY         ALLERGIES:     Allergies   Allergen Reactions      Tetracycline Swelling     Zofran [Ondansetron]      Prolonged QT  Prolonged QT at baseline per patient     Flagyl [Metronidazole] Rash     Buspar [Buspirone]      Muscle weakness     Corn Oil Other (See Comments)     Headache       Seasonal Allergies Difficulty breathing     Sulfamethoxazole-Trimethoprim      Other reaction(s): Other  Passed out     Cyclobenzaprine Other (See Comments)     Extreme heat intolerance     Levaquin [Levofloxacin]      Other reaction(s): Other  Tendon rupture     Nsaids Other (See Comments)     Exacerbated asthma       FAMILY HISTORY:  Family History   Problem Relation Age of Onset     Hypothyroidism Mother      Hypertension Mother      Osteoarthritis Mother      Coronary Artery Disease Father         nonfatal MI in his 70s     Asthma Father      Diabetes Sister      Hypothyroidism Sister      Breast Cancer Maternal Aunt      Anxiety Disorder Sister        SOCIAL HISTORY:  Social History     Tobacco Use     Smoking status: Former Smoker     Packs/day: 0.00     Years: 0.00     Pack years: 0.00     Smokeless tobacco: Never Used   Substance Use Topics     Alcohol use: Yes     Alcohol/week: 0.0 - 8.0 standard drinks     Frequency: 2-3 times a week     Drinks per session: 1 or 2     Binge frequency: Never     Comment: seldom     Drug use: No       ROS:  10 point ROS neg other than the symptoms noted above in the HPI.    Exam:  The rest of a comprehensive physical examination is deferred due to public health emergency video visit restrictions.  CONSITUTIONAL: no acute distress  HEENT: no icterus, no redness or discharge, neck supple  CV: no visible edema of visualized extremities. No JVD.   RESPIRATORY: respirations nonlabored, no cough  NEURO: AA&Ox3, speech fluent/appropriate, motor grossly nonfocal  PSYCH: cooperative, affect appropriate  DERM: no rashes on visualized face/neck/upper extremities    Labs:  Reviewed.     Testing/Procedures:    7/2020 RHC:  Conclusion      Right sided filling  pressures are normal.    Normal PA pressures.    Left sided filling pressures are normal.    Normal cardiac index at baselie with thermodilution    Limited exercise of 4 minutes and 20 seconds that propted a significant rise in patients PCWP as well as PA pressures consistent with diastolic dysfunction.       2/19/21 ECHOCARDIOGRAM:   Interpretation Summary     Moderately (EF 35%) reduced left ventricular function is present.  Global right ventricular function is normal.  No significant valvular dysfunction.  The inferior vena cava was normal in size with preserved respiratory  variability.  No pericardial effusion present.      Assessment and Plan:   Ms. Marroquin is a 60 year old female who has a past medical history significant for GERD, PTSD, Grave's Disease with post ablative hypothyroidism, hyperparathyroidism, LARRY (uses CPAP), NICM LVEF 35-40%, PAF (CHADSVASC 2), depression, prior tobacco use, and PVCs s/p RVOT ablation complicated by CHB s/p PPM 4/21/2017 and pericarditis. She is following up today. Echo in 2/19/21 showed LVEF 35-40%, normal RV size/function, and no significant valvular abnormalities. She has had low burden of short PAF episodes noted on device that she mentioned to her care team and was subsequently started on Eliquis. She continued to have progressively worsening exercise tolerance, SOB/EASTON, and decreased stamina. She has recently seen Dr. Funes and discussed possible CRT upgrade. She has also seen Dr. Ordonez and Dr. Brown for HF consults.  She reports feeling at baseline. She continues to feel frustrated with her current symptomology and wants to improve her SOB and exertional capacities so she can return to work. Currently, these symptoms have been very limiting to her. She denies any chest pain/pressures, PND, orthopnea, palpitations, or syncopal symptoms. Device interrogation showed normal device function, stable lead parameters, AP 50.8%,  97.6%, AT/AF <0.1% none over 1 minute, and 1  NSVT. She has been talking to her psychologist/PCP about starting Cymbalta. Current cardiac medications include: Eliquis, Coreg, Lasix, Spironolactone.     NICM LVEF 30-35%, NYHA II:  Unclear etiology, consider pacing induced or primary NICM.   1. ACEi/ARB: had not tolerated lisinopril d/t hypotension   2. BB: Continue Coreg  3. Aldosterone antagonist: continue spironolactone.  4. SCD prophylaxis: LVEF >35% at this time does not have indication for ICD  5. Fluid status: Uses Lasix PRN for weight gain >2lbs/day or 5lbs/week.  6. Recommended CMRI to evaluate further etiology of HF and get CPX for further HF evaluations.   7. Following with HF team/CORE clinic         [facility] s/p PPM 4/2017:   1. Pacemaker is functioning well with stable lead parameters. Adequate HR histograms. No sustained arrhythmias.  2. She will continue to follow with device clinic per routine.   3. High  % with reduced LVEF, discussed that she has indication for CRT. The risk of upgrade to CRT include: over sedation, reaction to local anesthetic, reaction to narcotics or benzodiazipines used for moderate secation, localized bleeding, internal bleeding, collapsed lung, and acute or late infections. There is the possibilty of unforseen complications as well such as device or lead failure or lead dislodgement. In regard to resynchronization therapy the additional risks included: no improvement in heart failure symptoms or inability to place the left ventricular lead ('wire') endocardially (requiring consideration for epicardial placement). Discussed about 2/3 of people have response to CRT in either symptoms and/or LVEF improvement whereas about 1/3 of people have no response. We will have her get CMRI and CPX first and then make final decision regarding up grade of device.     In regards to starting antidepressant:   We also discussed and reviewed her prior ECGs. that her QTc has been stable and we would not have preclusion to her starting Cymbalta.  Recommend having an ECG after therapeutic level of Cymbalta reached (likely about 4-6 weeks or so). For paced rhythms, we accept QTc 550 ms or less.     Follow up to be determined based on testing results.        Video-Visit Details    Type of service:  Video Visit    Video Visit Duration: 45 minutes.     Originating Location (pt. Location): Home    Distant Location (provider location):  [facility]     Mode of Communication:  Video Conference via [employer]    I have reviewed the note as documented above.  This accurately captures the substance of my virtual visit with the patient. The patient states understanding and is agreeable with the plan.     SHAYNA Thomas CNP  Electrophysiology Consult Service  Pager: [phone]        CC  SELF, REFERRED

## 2021-04-21 ENCOUNTER — VIRTUAL VISIT (OUTPATIENT)
Dept: CARDIOLOGY | Facility: CLINIC | Age: 61
End: 2021-04-21
Attending: NURSE PRACTITIONER
Payer: COMMERCIAL

## 2021-04-21 DIAGNOSIS — I42.9 CARDIOMYOPATHY, UNSPECIFIED TYPE (H): Primary | ICD-10-CM

## 2021-04-21 PROCEDURE — 99215 OFFICE O/P EST HI 40 MIN: CPT | Mod: 95 | Performed by: NURSE PRACTITIONER

## 2021-04-21 NOTE — LETTER
"4/21/2021      RE: Joyce Marroquin  [id number] [facility] [id number]       Dear Colleague,    Thank you for the opportunity to participate in the care of your patient, Joyce Marroquin, at the [facility] [place] at [facility]. Please see a copy of my visit note below.    Electrophysiology Clinic Video Virtual Visit    Joyce Marroquin is a 60 year old female who is being evaluated via a billable video visit.      The patient has been notified of following:     \"This video visit will be conducted via a call between you and your physician/provider. We have found that certain health care needs can be provided without the need for an in-person physical exam.  This service lets us provide the care you need with a video conversation.  If a prescription is necessary we can send it directly to your pharmacy.  If lab work is needed we can place an order for that and you can then stop by our lab to have the test done at a later time.    If during the course of the call the physician/provider feels a video visit is not appropriate, you will not be charged for this service.\"     Physician/provider has received verbal consent for a Video Visit from the patient? Yes      HPI:   Ms. Marroquin is a 60 year old female who has a past medical history significant for GERD, PTSD, Grave's Disease with post ablative hypothyroidism, hyperparathyroidism, LARRY (uses CPAP), NICM LVEF 35-40%, PAF (CHADSVASC 2), depression, prior tobacco use, and PVCs s/p RVOT ablation complicated by CHB s/p PPM 4/21/2017 and pericarditis. She presents today for follow up.      She reports a history of symptomatic PVCs for which she ultimately elected to pursue an ablation which she had done at [facility]. 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 She went into [facility] and required urgent PPM placed in 4/21/17. She then had " lead dislodgement in the recovery area requiring lead revision. She had pericarditis and completed a course of colchicine post ablation. Initial echo post ablation showed LVEF 40-45%, then an echo a month later showed LVEF 50%. MRI in early 4/2017 showed LVEF 60-65% with area of basal inferior hypokinesis with possible LGE. Stress test from 3/2017 showed no inducible ischemia. Echo from 5/2018 showed LVEF 50-55%, mild diffuse hypokinesis, normal RV size and function. She had some pain at pacemaker site and reported it was too close to her collarbone. She elected to undergo a pocket revision in 12/2017. Ever since her initial pacemaker implant she has struggled with episodes of dizziness/lightheadedness and fatigue. No episodes of syncope. She had an updated echo in 2019 that showed LVEF decreased to 30-35% with all segments severely hypokinetic to akinetic, suggestive of stress cardiomyopathy but could not rule out ischemic cardiomyopathy. She underwent coronary angiogram that showed mild/moderate non-obstructive CAD involving LAD, LCx, and RCA with no significant CAD to explain new cardiomyopathy. Recommended her to get CMRI; however, she wanted to defer. Therefore, treated her for stress cardiomyopathy. Started her on Toprol XL, she has only tolerated small doses in the past. This was later changed to low dose Coreg. She was also placed on low dose lisinopril. Hypotension has limited up titration of neurohormal agents. A follow up echo in 12/2019 showed LVEF 40-45%, grade I diastolic dysfunction, and mild diffuse hypokinesis. She followed up with Dr. Funes in 2/2020 and reported that since 1/2020 she noticed a decrease in her wellbeing and exercise tolerance. She found even pushing a cart had become more difficult and felt more short of breath. A follow up echo in 3/2020 showed LVEF 40-45%, normal RV size/function unchanged from 12/2019 echo. Due to progressively worsening symptoms, she underwent exercise RHC  which showed normal biventricular and pulmonary pressures, normal CO, and limited exercise capacity with significant rise in PCW from 10 to 31 mmHg and PA pressure mean of 21 to 38 mmHg c/w exercise induced pulmonary HTN. She was started on spironolactone. She has been on Lasix 20-40 mg as needed for weight gain or SOB and volume status fluctuations. A follow up echo in 2/19/21 showed LVEF 35-40%, normal RV size/function, and no significant valvular abnormalities. She has had low burden of short PAF episodes noted on device that she mentioned to her care team and was subsequently started on Eliquis. She continued to have progressively worsening exercise tolerance, SOB/EASTON, and decreased stamina. She has recently seen Dr. Funes and discussed possible CRT upgrade. She has also seen Dr. Ordonez and Dr. Brown for HF consults.     She is following up today. She reports feeling at baseline. She continues to feel frustrated with her current symptomology and wants to improve her SOB and exertional capacities so she can return to work. Currently, these symptoms have been very limiting to her. She denies any chest pain/pressures, PND, orthopnea, palpitations, or syncopal symptoms. Device interrogation showed normal device function, stable lead parameters, AP 50.8%,  97.6%, AT/AF <0.1% none over 1 minute, and 1 NSVT. She has been talking to her psychologist/PCP about starting Cymbalta. Current cardiac medications include: Eliquis, Coreg, Lasix, Spironolactone.     PAST MEDICAL HISTORY:  Past Medical History:   Diagnosis Date     Arthritis      Cardiomyopathy (H)     ff Cardiology     Chronic depressive personality disorder      Congestive heart failure (H) see dr martínez    heart failure correct term     COPD exacerbation (H)      Esophageal reflux      Ex-smoker     quit 2006; 1 PPD x 30     Hyperparathyroidism (H)     s/p parathyroidectomy     Hypothyroidism      LARRY on CPAP     ff Sleep medicine     Pulmonary nodules     ff  Pulmonologist     S/P cardiac pacemaker procedure     checked every 6 [facility] at the [facility]     Uncomplicated asthma years       CURRENT MEDICATIONS:  Current Outpatient Medications   Medication Sig Dispense Refill     acetaminophen (TYLENOL) 500 MG tablet Take 500-1,000 mg by mouth every 6 hours as needed for mild pain       albuterol (PROAIR HFA/PROVENTIL HFA/VENTOLIN HFA) 108 (90 BASE) MCG/ACT Inhaler Inhale 2 puffs into the lungs as needed for shortness of breath / dyspnea or wheezing        apixaban ANTICOAGULANT (ELIQUIS) 5 MG tablet Take 1 tablet (5 mg) by mouth 2 times daily 120 tablet 3     azelastine (ASTELIN) 0.1 % nasal spray Spray 1 spray into both nostrils 2 times daily as needed        Calcium Lactate 500 MG CAPS Take 250-500 mg by mouth daily        Calcium-Magnesium-Vitamin D (CALCIUM MAGNESIUM PO) Calcium 120 mg, magenesium 20 mg, iodine 20 mg as needed at bedtime       carvedilol (COREG) 3.125 MG tablet Take 1 tablet (3.125 mg) by mouth 2 times daily (with meals) 180 tablet 3     clonazePAM (KLONOPIN) 0.5 MG tablet TAKE 1 TABLET(0.5 MG) BY MOUTH THREE TIMES DAILY AS NEEDED FOR ANXIETY 90 tablet 0     DiphenhydrAMINE HCl (BENADRYL PO) Take 25 mg by mouth nightly as needed for allergies        Ferrous Sulfate (IRON SUPPLEMENT PO) Take 65 mg by mouth daily        fluocinolone acetonide (DERMA SMOOTHE/FS BODY) 0.01 % external oil Use for ears.  2 times daily for 7 days 1 Bottle 0     fluticasone-salmeterol (ADVAIR) 250-50 MCG/DOSE inhaler Inhale 1 puff into the lungs every 12 hours       furosemide (LASIX) 20 MG tablet Take 1 tablet (20 mg) by mouth 2 times daily as needed (for fluid retention) 180 tablet 1     ipratropium (ATROVENT) 0.06 % nasal spray Spray 2 sprays into both nostrils 4 times daily as needed        loperamide (IMODIUM A-D) 2 MG tablet Take 2 tabs (4 mg) after first loose stool, and then take one tab (2 mg) after each diarrheal stool.  Max of 8 tabs (16 mg) per day. 1 tablet 0      MAGNESIUM LACTATE PO Take 140-210 mg by mouth nightly as needed        MELATONIN PO Take 1-3 mg by mouth nightly as needed        Menaquinone-7 (VITAMIN K2 PO) Take 1 tablet by mouth every morning        METHYLPREDNISOLONE PO Take 40 mg by mouth as needed        Multiple Vitamin (DAILY MULTIVITAMIN PO) Take 1 tablet by mouth every morning        nebulizer nebulization as needed       nitroGLYcerin (NITROSTAT) 0.4 MG sublingual tablet For chest pain place 1 tablet under the tongue every 5 minutes for 3 doses. If symptoms persist 5 minutes after 1st dose call 911. 30 tablet 1     Probiotic Product (PROBIOTIC DAILY PO) Take 1 capsule by mouth 2 times daily as needed        spironolactone (ALDACTONE) 25 MG tablet Take 0.5 tablets (12.5 mg) by mouth daily 90 tablet 3     SYNTHROID 150 MCG tablet 150 mcg for 6 days and 75 mcg for 1 day per week 90 tablet 3     TURMERIC PO Take 1 tablet by mouth every morning        UNABLE TO FIND MEDICATION NAME: Xylitol daily as needed for allergies       UNABLE TO FIND 2 times daily as needed MEDICATION NAME: Standard Process, Immune Congaplex. Pt taking 2 tablets daily        vitamin B complex with vitamin C (VITAMIN  B COMPLEX) PO tablet Take 1 tablet by mouth as needed       vitamin D3 (CHOLECALCIFEROL) 2000 units tablet Take 1 tablet by mouth daily 1 tablet 0     Zinc 15 MG CAPS Take 15 mg by mouth          PAST SURGICAL HISTORY:  Past Surgical History:   Procedure Laterality Date     BUNIONECTOMY Bilateral      CV CORONARY ANGIOGRAM N/A 9/26/2019    Procedure: CV CORONARY ANGIOGRAM;  Surgeon: Erickson Trejo MD;  Location:  HEART CARDIAC CATH LAB     CV RIGHT HEART CATH MEASUREMENTS RECORDED N/A 7/20/2020    Procedure: CV RIGHT HEART CATH;  Surgeon: Alonso Ordonez MD;  Location:  HEART CARDIAC CATH LAB     EP ABLATION / EP STUDIES  04/21/2017     EXPLORE NECK N/A 9/19/2018    Procedure: EXPLORE NECK;  Neck Exploration Resection of Left superior Parathyroid gland;  Surgeon:  Kristine Venegas MD;  Location: [facility] OR      CORRECT BUNION,SIMPLE       PARATHYROIDECTOMY N/A 9/19/2018    Procedure: PARATHYROIDECTOMY;;  Surgeon: Kristine Venegas MD;  Location: [facility] OR     PPM INSERT OF NEW OR REPL W/VENT LEAD  04/21/2017     TONSILLECTOMY & ADENOIDECTOMY         ALLERGIES:     Allergies   Allergen Reactions     Tetracycline Swelling     Zofran [Ondansetron]      Prolonged QT  Prolonged QT at baseline per patient     Flagyl [Metronidazole] Rash     Buspar [Buspirone]      Muscle weakness     Corn Oil Other (See Comments)     Headache       Seasonal Allergies Difficulty breathing     Sulfamethoxazole-Trimethoprim      Other reaction(s): Other  Passed out     Cyclobenzaprine Other (See Comments)     Extreme heat intolerance     Levaquin [Levofloxacin]      Other reaction(s): Other  Tendon rupture     Nsaids Other (See Comments)     Exacerbated asthma       FAMILY HISTORY:  Family History   Problem Relation Age of Onset     Hypothyroidism Mother      Hypertension Mother      Osteoarthritis Mother      Coronary Artery Disease Father         nonfatal MI in his 70s     Asthma Father      Diabetes Sister      Hypothyroidism Sister      Breast Cancer Maternal Aunt      Anxiety Disorder Sister        SOCIAL HISTORY:  Social History     Tobacco Use     Smoking status: Former Smoker     Packs/day: 0.00     Years: 0.00     Pack years: 0.00     Smokeless tobacco: Never Used   Substance Use Topics     Alcohol use: Yes     Alcohol/week: 0.0 - 8.0 standard drinks     Frequency: 2-3 times a week     Drinks per session: 1 or 2     Binge frequency: Never     Comment: seldom     Drug use: No       ROS:  10 point ROS neg other than the symptoms noted above in the HPI.    Exam:  The rest of a comprehensive physical examination is deferred due to public health emergency video visit restrictions.  CONSITUTIONAL: no acute distress  HEENT: no icterus, no redness or discharge, neck supple  CV: no visible edema of  visualized extremities. No JVD.   RESPIRATORY: respirations nonlabored, no cough  NEURO: AA&Ox3, speech fluent/appropriate, motor grossly nonfocal  PSYCH: cooperative, affect appropriate  DERM: no rashes on visualized face/neck/upper extremities    Labs:  Reviewed.     Testing/Procedures:    7/2020 RHC:  Conclusion      Right sided filling pressures are normal.    Normal PA pressures.    Left sided filling pressures are normal.    Normal cardiac index at [facility] with thermodilution    Limited exercise of 4 minutes and 20 seconds that propted a significant rise in patients PCWP as well as PA pressures consistent with diastolic dysfunction.       2/19/21 ECHOCARDIOGRAM:   Interpretation Summary     Moderately (EF 35%) reduced left ventricular function is present.  Global right ventricular function is normal.  No significant valvular dysfunction.  The inferior vena cava was normal in size with preserved respiratory  variability.  No pericardial effusion present.      Assessment and Plan:   Ms. Marroquin is a 60 year old female who has a past medical history significant for GERD, PTSD, Grave's Disease with post ablative hypothyroidism, hyperparathyroidism, LARRY (uses CPAP), NICM LVEF 35-40%, PAF (CHADSVASC 2), depression, prior tobacco use, and PVCs s/p RVOT ablation complicated by CHB s/p PPM 4/21/2017 and pericarditis. She is following up today. Echo in 2/19/21 showed LVEF 35-40%, normal RV size/function, and no significant valvular abnormalities. She has had low burden of short PAF episodes noted on device that she mentioned to her care team and was subsequently started on Eliquis. She continued to have progressively worsening exercise tolerance, SOB/EASTON, and decreased stamina. She has recently seen Dr. Funes and discussed possible CRT upgrade. She has also seen Dr. Ordonez and Dr. Brown for HF consults.  She reports feeling at baseline. She continues to feel frustrated with her current symptomology and wants to improve  her SOB and exertional capacities so she can return to work. Currently, these symptoms have been very limiting to her. She denies any chest pain/pressures, PND, orthopnea, palpitations, or syncopal symptoms. Device interrogation showed normal device function, stable lead parameters, AP 50.8%,  97.6%, AT/AF <0.1% none over 1 minute, and 1 NSVT. She has been talking to her psychologist/PCP about starting Cymbalta. Current cardiac medications include: Eliquis, Coreg, Lasix, Spironolactone.     NICM LVEF 30-35%, NYHA II:  Unclear etiology, consider pacing induced or primary NICM.   1. ACEi/ARB: had not tolerated lisinopril d/t hypotension   2. BB: Continue Coreg  3. Aldosterone antagonist: continue spironolactone.  4. SCD prophylaxis: LVEF >35% at this time does not have indication for ICD  5. Fluid status: Uses Lasix PRN for weight gain >2lbs/day or 5lbs/week.  6. Recommended CMRI to evaluate further etiology of HF and get CPX for further HF evaluations.   7. Following with HF team/CORE clinic         [facility]/p [facility] 4/2017:   1. Pacemaker is functioning well with stable lead parameters. Adequate HR histograms. No sustained arrhythmias.  2. She will continue to follow with device clinic per routine.   3. High  % with reduced LVEF, discussed that she has indication for CRT. The risk of upgrade to CRT include: over sedation, reaction to local anesthetic, reaction to narcotics or benzodiazipines used for moderate secation, localized bleeding, internal bleeding, collapsed lung, and acute or late infections. There is the possibilty of unforseen complications as well such as device or lead failure or lead dislodgement. In regard to resynchronization therapy the additional risks included: no improvement in heart failure symptoms or inability to place the left ventricular lead ('wire') endocardially (requiring consideration for epicardial placement). Discussed about 2/3 of people have response to CRT in either symptoms and/or  LVEF improvement whereas about 1/3 of people have no response. We will have her get CMRI and CPX first and then make final decision regarding up grade of device.     In regards to starting antidepressant:   We also discussed and reviewed her prior ECGs. that her QTc has been stable and we would not have preclusion to her starting Cymbalta. Recommend having an ECG after therapeutic level of Cymbalta reached (likely about 4-6 weeks or so). For paced rhythms, we accept QTc 550 ms or less.     Follow up to be determined based on testing results.        Video-Visit Details    Type of service:  Video Visit    Video Visit Duration: 45 minutes.     Originating Location (pt. Location): Home    Distant Location (provider location):  [facility]     Mode of Communication:  Video Conference via Comeks    I have reviewed the note as documented above.  This accurately captures the substance of my virtual visit with the patient. The patient states understanding and is agreeable with the plan.     SHAYNA Thomas CNP  Electrophysiology Consult Service  Pager: [phone]        CC  SELF, REFERRED

## 2021-04-22 NOTE — PROGRESS NOTES
"    Assessment & Plan     Cardiomyopathy, unspecified type (H)    PTSD  Adjustment disorder with mixed anxiety and depressed mood    - DULoxetine (CYMBALTA) 30 MG capsule; Take 1 capsule (30 mg) by mouth daily    Laboratory examination ordered as part of a routine general medical examination    - Renal panel (Alb, BUN, Ca, Cl, CO2, Creat, Gluc, Phos, K, Na)    Vestibular disequilibrium, unspecified laterality      Review of external notes as documented elsewhere in note.  Pt has discussed starting cymbalta with Medication Management Pharmacists Visit (Kortney) and with her electrophysiology/ cardiology NP and they both advised her that she can start Cymbalta despite her pacemaker and long QT is possible with this medication. 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     25 minutes spent on the date of the encounter doing chart review, history and exam, documentation and further activities per the note       BMI:   Estimated body mass index is 28.42 kg/m  as calculated from the following:    Height as of this encounter: 1.651 m (5' 5\").    Weight as of this encounter: 77.5 kg (170 lb 12.8 oz).   Weight management plan: Discussed healthy diet and exercise guidelines    See Patient Instructions: advised follow up as needed.     Return in about 3 months (around 7/27/2021), or if symptoms worsen or fail to improve.    Ce Crowe, KACI  [facility] ROLAND Cook is a 60 year old who presents for the following health issues     HPI     Pt is wanting to start cymbalta for mood/ ptsd.  She talks with a counselor weekly through [employer]. She is not working/ applying for disability. Additionally she has been doing acupuncture for mood, which is helping some. She will let me know how medication goes for her for refills.     Patient  would like to discuss lab results- she would like recheck of albumin and chloride. Weight is up 18 lbs since December.  She reports she has a test Friday at [facility] for " "breathing/ cardiac and next week has cardiac MRI- possible 3rd wire can be added for cardiomyopathy.     Patient would like her ears checked      Review of Systems   Constitutional, HEENT, cardiovascular, pulmonary, GI, , musculoskeletal, neuro, skin, endocrine and psych systems are negative, except as otherwise noted.      Objective    /76   Pulse 112   Temp 99.2  F (37.3  C) (Tympanic)   Resp 16   Ht 1.651 m (5' 5\")   Wt 77.5 kg (170 lb 12.8 oz)   LMP 08/21/2007   SpO2 95%   BMI 28.42 kg/m    Body mass index is 28.42 kg/m .  Physical Exam   GENERAL: healthy, alert and no distress  HENT: POSITIVE R ear canal erythematous/ edematous on bottom of ear canal.  Pt reports she has ear drops at home.   RESP: lungs clear to auscultation - no rales, rhonchi or wheezes  CV: regular rate and rhythm, normal S1 S2, no S3 or S4, no murmur, click or rub, no peripheral edema and peripheral pulses strong  ABDOMEN: soft, nontender, no hepatosplenomegaly, no masses and bowel sounds normal  MS: no gross musculoskeletal defects noted, no edema, no CVA tenderness  PSYCH: mentation appears normal, affect normal/bright    See orders            "

## 2021-04-26 ENCOUNTER — MYC MEDICAL ADVICE (OUTPATIENT)
Dept: CARDIOLOGY | Facility: CLINIC | Age: 61
End: 2021-04-26

## 2021-04-27 ENCOUNTER — OFFICE VISIT (OUTPATIENT)
Dept: FAMILY MEDICINE | Facility: CLINIC | Age: 61
End: 2021-04-27
Payer: COMMERCIAL

## 2021-04-27 VITALS
WEIGHT: 170.8 LBS | BODY MASS INDEX: 28.46 KG/M2 | SYSTOLIC BLOOD PRESSURE: 119 MMHG | RESPIRATION RATE: 16 BRPM | DIASTOLIC BLOOD PRESSURE: 76 MMHG | OXYGEN SATURATION: 95 % | HEIGHT: 65 IN | TEMPERATURE: 99.2 F | HEART RATE: 112 BPM

## 2021-04-27 DIAGNOSIS — Z00.00 LABORATORY EXAMINATION ORDERED AS PART OF A ROUTINE GENERAL MEDICAL EXAMINATION: ICD-10-CM

## 2021-04-27 DIAGNOSIS — I42.9 CARDIOMYOPATHY, UNSPECIFIED TYPE (H): Primary | ICD-10-CM

## 2021-04-27 DIAGNOSIS — F43.23 ADJUSTMENT DISORDER WITH MIXED ANXIETY AND DEPRESSED MOOD: ICD-10-CM

## 2021-04-27 DIAGNOSIS — H83.2X9 VESTIBULAR DISEQUILIBRIUM, UNSPECIFIED LATERALITY: ICD-10-CM

## 2021-04-27 DIAGNOSIS — F43.10 PTSD (POST-TRAUMATIC STRESS DISORDER): ICD-10-CM

## 2021-04-27 LAB
ALBUMIN SERPL-MCNC: 3.6 G/DL (ref 3.4–5)
ANION GAP SERPL CALCULATED.3IONS-SCNC: 5 MMOL/L (ref 3–14)
BUN SERPL-MCNC: 10 MG/DL (ref 7–30)
CALCIUM SERPL-MCNC: 9.5 MG/DL (ref 8.5–10.1)
CHLORIDE SERPL-SCNC: 107 MMOL/L (ref 94–109)
CO2 SERPL-SCNC: 28 MMOL/L (ref 20–32)
CREAT SERPL-MCNC: 0.77 MG/DL (ref 0.52–1.04)
GFR SERPL CREATININE-BSD FRML MDRD: 82 ML/MIN/{1.73_M2}
GLUCOSE SERPL-MCNC: 87 MG/DL (ref 70–99)
PHOSPHATE SERPL-MCNC: 3.1 MG/DL (ref 2.5–4.5)
POTASSIUM SERPL-SCNC: 4.4 MMOL/L (ref 3.4–5.3)
SODIUM SERPL-SCNC: 140 MMOL/L (ref 133–144)

## 2021-04-27 PROCEDURE — 80069 RENAL FUNCTION PANEL: CPT | Performed by: NURSE PRACTITIONER

## 2021-04-27 PROCEDURE — 36415 COLL VENOUS BLD VENIPUNCTURE: CPT | Performed by: NURSE PRACTITIONER

## 2021-04-27 PROCEDURE — 99214 OFFICE O/P EST MOD 30 MIN: CPT | Performed by: NURSE PRACTITIONER

## 2021-04-27 RX ORDER — DULOXETIN HYDROCHLORIDE 30 MG/1
30 CAPSULE, DELAYED RELEASE ORAL DAILY
Qty: 30 CAPSULE | Refills: 1 | Status: SHIPPED | OUTPATIENT
Start: 2021-04-27 | End: 2021-07-14

## 2021-04-27 ASSESSMENT — PATIENT HEALTH QUESTIONNAIRE - PHQ9
SUM OF ALL RESPONSES TO PHQ QUESTIONS 1-9: 10
5. POOR APPETITE OR OVEREATING: NEARLY EVERY DAY

## 2021-04-27 ASSESSMENT — ANXIETY QUESTIONNAIRES
3. WORRYING TOO MUCH ABOUT DIFFERENT THINGS: SEVERAL DAYS
6. BECOMING EASILY ANNOYED OR IRRITABLE: NEARLY EVERY DAY
5. BEING SO RESTLESS THAT IT IS HARD TO SIT STILL: NOT AT ALL
GAD7 TOTAL SCORE: 15
1. FEELING NERVOUS, ANXIOUS, OR ON EDGE: NEARLY EVERY DAY
IF YOU CHECKED OFF ANY PROBLEMS ON THIS QUESTIONNAIRE, HOW DIFFICULT HAVE THESE PROBLEMS MADE IT FOR YOU TO DO YOUR WORK, TAKE CARE OF THINGS AT HOME, OR GET ALONG WITH OTHER PEOPLE: VERY DIFFICULT
2. NOT BEING ABLE TO STOP OR CONTROL WORRYING: MORE THAN HALF THE DAYS
7. FEELING AFRAID AS IF SOMETHING AWFUL MIGHT HAPPEN: NEARLY EVERY DAY

## 2021-04-27 ASSESSMENT — MIFFLIN-ST. JEOR: SCORE: 1335.62

## 2021-04-27 NOTE — PATIENT INSTRUCTIONS
Patient Education   Please make an appointment to follow up with your therapist and/or Psychiatric Clinic (phone: [phone]) within 1-3 days.     Return to the ED if you are having worsening thoughts/feelings of harming yourself or others, or any urgent/life-threatening concerns.     DISCHARGE RESOURCES:    [facility] [phone]     [facility] [phone] (detox), [phone] (outpatient & [facility])      Crisis Intervention: [phone] or [phone] (TTY: 768.912.5409).  Call anytime.    Suicide Awareness Voices of Education (SAVE) (www.save.org): 536-783-TZGF (6587)    National Suicide Prevention Line (www.mentalhealthmn.org): 981-998-CEQZ (9758)    National [place] on Mental Illness (www.[place].carissa.org): 738.733.9148 or 132-602-9098.    Irwn7cpyd: text the word LIFE to 12658 for immediate support and crisis intervention    Mental Health Consumer/Survivor Network of [place] (www.mhcsn.net): 490.678.6325 or 283-575-0753    Mental Health Association of [place] (www.mentalhealth.org): 564.490.2342 or 199-823-8490

## 2021-04-27 NOTE — RESULT ENCOUNTER NOTE
Jose Luis Cook,    Thank you for your recent office visit.    Here are your recent results.  Normal blood labs. Thanks for the corrections, I will fix those asap!  :)    Feel free to contact me via Balakam or call the clinic at [phone].    Sincerely,    SHAYNA Simon, FNP-BC

## 2021-04-28 ASSESSMENT — ANXIETY QUESTIONNAIRES: GAD7 TOTAL SCORE: 15

## 2021-04-28 ASSESSMENT — ASTHMA QUESTIONNAIRES: ACT_TOTALSCORE: 25

## 2021-04-30 ENCOUNTER — HOSPITAL ENCOUNTER (OUTPATIENT)
Dept: CARDIOLOGY | Facility: CLINIC | Age: 61
Discharge: HOME | End: 2021-04-30
Attending: NURSE PRACTITIONER | Admitting: NURSE PRACTITIONER
Payer: COMMERCIAL

## 2021-04-30 DIAGNOSIS — I42.9 CARDIOMYOPATHY, UNSPECIFIED TYPE (H): ICD-10-CM

## 2021-04-30 PROCEDURE — 94621 CARDIOPULM EXERCISE TESTING: CPT | Mod: 26 | Performed by: INTERNAL MEDICINE

## 2021-04-30 PROCEDURE — 94621 CARDIOPULM EXERCISE TESTING: CPT

## 2021-05-02 LAB
MDC_IDC_EPISODE_DTM: NORMAL
MDC_IDC_EPISODE_DURATION: 2 S
MDC_IDC_EPISODE_ID: 12
MDC_IDC_EPISODE_TYPE: NORMAL
MDC_IDC_LEAD_IMPLANT_DT: NORMAL
MDC_IDC_LEAD_IMPLANT_DT: NORMAL
MDC_IDC_LEAD_LOCATION: NORMAL
MDC_IDC_LEAD_LOCATION: NORMAL
MDC_IDC_LEAD_LOCATION_DETAIL_1: NORMAL
MDC_IDC_LEAD_LOCATION_DETAIL_1: NORMAL
MDC_IDC_LEAD_MFG: NORMAL
MDC_IDC_LEAD_MFG: NORMAL
MDC_IDC_LEAD_MODEL: NORMAL
MDC_IDC_LEAD_MODEL: NORMAL
MDC_IDC_LEAD_POLARITY_TYPE: NORMAL
MDC_IDC_LEAD_POLARITY_TYPE: NORMAL
MDC_IDC_LEAD_SERIAL: NORMAL
MDC_IDC_LEAD_SERIAL: NORMAL
MDC_IDC_MSMT_BATTERY_DTM: NORMAL
MDC_IDC_MSMT_BATTERY_REMAINING_LONGEVITY: 68 MO
MDC_IDC_MSMT_BATTERY_RRT_TRIGGER: 2.83
MDC_IDC_MSMT_BATTERY_STATUS: NORMAL
MDC_IDC_MSMT_BATTERY_VOLTAGE: 3 V
MDC_IDC_MSMT_LEADCHNL_RA_IMPEDANCE_VALUE: 399 OHM
MDC_IDC_MSMT_LEADCHNL_RA_IMPEDANCE_VALUE: 456 OHM
MDC_IDC_MSMT_LEADCHNL_RA_PACING_THRESHOLD_AMPLITUDE: 0.62 V
MDC_IDC_MSMT_LEADCHNL_RA_PACING_THRESHOLD_PULSEWIDTH: 0.4 MS
MDC_IDC_MSMT_LEADCHNL_RA_SENSING_INTR_AMPL: 2.62 MV
MDC_IDC_MSMT_LEADCHNL_RA_SENSING_INTR_AMPL: 2.62 MV
MDC_IDC_MSMT_LEADCHNL_RV_IMPEDANCE_VALUE: 418 OHM
MDC_IDC_MSMT_LEADCHNL_RV_IMPEDANCE_VALUE: 456 OHM
MDC_IDC_MSMT_LEADCHNL_RV_PACING_THRESHOLD_AMPLITUDE: 0.75 V
MDC_IDC_MSMT_LEADCHNL_RV_PACING_THRESHOLD_PULSEWIDTH: 0.4 MS
MDC_IDC_MSMT_LEADCHNL_RV_SENSING_INTR_AMPL: 4.62 MV
MDC_IDC_MSMT_LEADCHNL_RV_SENSING_INTR_AMPL: 4.62 MV
MDC_IDC_PG_IMPLANT_DTM: NORMAL
MDC_IDC_PG_MFG: NORMAL
MDC_IDC_PG_MODEL: NORMAL
MDC_IDC_PG_SERIAL: NORMAL
MDC_IDC_PG_TYPE: NORMAL
MDC_IDC_SESS_CLINIC_NAME: NORMAL
MDC_IDC_SESS_DTM: NORMAL
MDC_IDC_SESS_TYPE: NORMAL
MDC_IDC_SET_BRADY_AT_MODE_SWITCH_RATE: 171 {BEATS}/MIN
MDC_IDC_SET_BRADY_HYSTRATE: NORMAL
MDC_IDC_SET_BRADY_LOWRATE: 60 {BEATS}/MIN
MDC_IDC_SET_BRADY_MAX_SENSOR_RATE: 140 {BEATS}/MIN
MDC_IDC_SET_BRADY_MAX_TRACKING_RATE: 140 {BEATS}/MIN
MDC_IDC_SET_BRADY_MODE: NORMAL
MDC_IDC_SET_BRADY_PAV_DELAY_HIGH: 140 MS
MDC_IDC_SET_BRADY_PAV_DELAY_LOW: 180 MS
MDC_IDC_SET_BRADY_SAV_DELAY_HIGH: 110 MS
MDC_IDC_SET_BRADY_SAV_DELAY_LOW: 150 MS
MDC_IDC_SET_LEADCHNL_RA_PACING_AMPLITUDE: 1.5 V
MDC_IDC_SET_LEADCHNL_RA_PACING_ANODE_ELECTRODE_1: NORMAL
MDC_IDC_SET_LEADCHNL_RA_PACING_ANODE_LOCATION_1: NORMAL
MDC_IDC_SET_LEADCHNL_RA_PACING_CAPTURE_MODE: NORMAL
MDC_IDC_SET_LEADCHNL_RA_PACING_CATHODE_ELECTRODE_1: NORMAL
MDC_IDC_SET_LEADCHNL_RA_PACING_CATHODE_LOCATION_1: NORMAL
MDC_IDC_SET_LEADCHNL_RA_PACING_POLARITY: NORMAL
MDC_IDC_SET_LEADCHNL_RA_PACING_PULSEWIDTH: 0.4 MS
MDC_IDC_SET_LEADCHNL_RA_SENSING_ANODE_ELECTRODE_1: NORMAL
MDC_IDC_SET_LEADCHNL_RA_SENSING_ANODE_LOCATION_1: NORMAL
MDC_IDC_SET_LEADCHNL_RA_SENSING_CATHODE_ELECTRODE_1: NORMAL
MDC_IDC_SET_LEADCHNL_RA_SENSING_CATHODE_LOCATION_1: NORMAL
MDC_IDC_SET_LEADCHNL_RA_SENSING_POLARITY: NORMAL
MDC_IDC_SET_LEADCHNL_RA_SENSING_SENSITIVITY: 0.3 MV
MDC_IDC_SET_LEADCHNL_RV_PACING_AMPLITUDE: 1.5 V
MDC_IDC_SET_LEADCHNL_RV_PACING_ANODE_ELECTRODE_1: NORMAL
MDC_IDC_SET_LEADCHNL_RV_PACING_ANODE_LOCATION_1: NORMAL
MDC_IDC_SET_LEADCHNL_RV_PACING_CAPTURE_MODE: NORMAL
MDC_IDC_SET_LEADCHNL_RV_PACING_CATHODE_ELECTRODE_1: NORMAL
MDC_IDC_SET_LEADCHNL_RV_PACING_CATHODE_LOCATION_1: NORMAL
MDC_IDC_SET_LEADCHNL_RV_PACING_POLARITY: NORMAL
MDC_IDC_SET_LEADCHNL_RV_PACING_PULSEWIDTH: 0.4 MS
MDC_IDC_SET_LEADCHNL_RV_SENSING_ANODE_ELECTRODE_1: NORMAL
MDC_IDC_SET_LEADCHNL_RV_SENSING_ANODE_LOCATION_1: NORMAL
MDC_IDC_SET_LEADCHNL_RV_SENSING_CATHODE_ELECTRODE_1: NORMAL
MDC_IDC_SET_LEADCHNL_RV_SENSING_CATHODE_LOCATION_1: NORMAL
MDC_IDC_SET_LEADCHNL_RV_SENSING_POLARITY: NORMAL
MDC_IDC_SET_LEADCHNL_RV_SENSING_SENSITIVITY: 0.9 MV
MDC_IDC_SET_ZONE_DETECTION_INTERVAL: 350 MS
MDC_IDC_SET_ZONE_DETECTION_INTERVAL: 400 MS
MDC_IDC_SET_ZONE_TYPE: NORMAL
MDC_IDC_STAT_AT_BURDEN_PERCENT: 0 %
MDC_IDC_STAT_AT_DTM_END: NORMAL
MDC_IDC_STAT_AT_DTM_START: NORMAL
MDC_IDC_STAT_BRADY_AP_VP_PERCENT: 50.64 %
MDC_IDC_STAT_BRADY_AP_VS_PERCENT: 0.95 %
MDC_IDC_STAT_BRADY_AS_VP_PERCENT: 47.89 %
MDC_IDC_STAT_BRADY_AS_VS_PERCENT: 0.52 %
MDC_IDC_STAT_BRADY_DTM_END: NORMAL
MDC_IDC_STAT_BRADY_DTM_START: NORMAL
MDC_IDC_STAT_BRADY_RA_PERCENT_PACED: 50.83 %
MDC_IDC_STAT_BRADY_RV_PERCENT_PACED: 97.62 %
MDC_IDC_STAT_EPISODE_RECENT_COUNT: 0
MDC_IDC_STAT_EPISODE_RECENT_COUNT: 1
MDC_IDC_STAT_EPISODE_RECENT_COUNT_DTM_END: NORMAL
MDC_IDC_STAT_EPISODE_RECENT_COUNT_DTM_START: NORMAL
MDC_IDC_STAT_EPISODE_TOTAL_COUNT: 0
MDC_IDC_STAT_EPISODE_TOTAL_COUNT: 1
MDC_IDC_STAT_EPISODE_TOTAL_COUNT: 1
MDC_IDC_STAT_EPISODE_TOTAL_COUNT: 10
MDC_IDC_STAT_EPISODE_TOTAL_COUNT_DTM_END: NORMAL
MDC_IDC_STAT_EPISODE_TOTAL_COUNT_DTM_START: NORMAL
MDC_IDC_STAT_EPISODE_TYPE: NORMAL

## 2021-05-04 ENCOUNTER — HOSPITAL ENCOUNTER (OUTPATIENT)
Dept: MRI IMAGING | Facility: CLINIC | Age: 61
End: 2021-05-04
Attending: NURSE PRACTITIONER
Payer: COMMERCIAL

## 2021-05-04 ENCOUNTER — ANCILLARY PROCEDURE (OUTPATIENT)
Dept: CARDIOLOGY | Facility: CLINIC | Age: 61
End: 2021-05-04
Attending: INTERNAL MEDICINE
Payer: COMMERCIAL

## 2021-05-04 VITALS
OXYGEN SATURATION: 95 % | SYSTOLIC BLOOD PRESSURE: 116 MMHG | HEART RATE: 81 BPM | DIASTOLIC BLOOD PRESSURE: 54 MMHG | RESPIRATION RATE: 18 BRPM

## 2021-05-04 DIAGNOSIS — I42.9 CARDIOMYOPATHY, UNSPECIFIED TYPE (H): ICD-10-CM

## 2021-05-04 PROCEDURE — 999N000054 HC STATISTIC EKG NON-CHARGEABLE

## 2021-05-04 PROCEDURE — 75563 CARD MRI W/STRESS IMG & DYE: CPT | Mod: 26 | Performed by: INTERNAL MEDICINE

## 2021-05-04 PROCEDURE — A9585 GADOBUTROL INJECTION: HCPCS | Performed by: NURSE PRACTITIONER

## 2021-05-04 PROCEDURE — 93286 PERI-PX EVAL PM/LDLS PM IP: CPT

## 2021-05-04 PROCEDURE — 93010 ELECTROCARDIOGRAM REPORT: CPT | Mod: 76 | Performed by: INTERNAL MEDICINE

## 2021-05-04 PROCEDURE — 93005 ELECTROCARDIOGRAM TRACING: CPT

## 2021-05-04 PROCEDURE — 93016 CV STRESS TEST SUPVJ ONLY: CPT | Performed by: INTERNAL MEDICINE

## 2021-05-04 PROCEDURE — 93286 PERI-PX EVAL PM/LDLS PM IP: CPT | Mod: 26 | Performed by: INTERNAL MEDICINE

## 2021-05-04 PROCEDURE — 255N000002 HC RX 255 OP 636: Performed by: NURSE PRACTITIONER

## 2021-05-04 PROCEDURE — 250N000011 HC RX IP 250 OP 636: Performed by: INTERNAL MEDICINE

## 2021-05-04 PROCEDURE — 93018 CV STRESS TEST I&R ONLY: CPT | Performed by: INTERNAL MEDICINE

## 2021-05-04 PROCEDURE — 75563 CARD MRI W/STRESS IMG & DYE: CPT

## 2021-05-04 PROCEDURE — 93017 CV STRESS TEST TRACING ONLY: CPT

## 2021-05-04 RX ORDER — ACYCLOVIR 200 MG/1
0-1 CAPSULE ORAL
Status: DISCONTINUED | OUTPATIENT
Start: 2021-05-04 | End: 2021-05-05 | Stop reason: HOSPADM

## 2021-05-04 RX ORDER — METHYLPREDNISOLONE SODIUM SUCCINATE 125 MG/2ML
125 INJECTION, POWDER, LYOPHILIZED, FOR SOLUTION INTRAMUSCULAR; INTRAVENOUS
Status: DISCONTINUED | OUTPATIENT
Start: 2021-05-04 | End: 2021-05-05 | Stop reason: HOSPADM

## 2021-05-04 RX ORDER — ALBUTEROL SULFATE 90 UG/1
2 AEROSOL, METERED RESPIRATORY (INHALATION) EVERY 5 MIN PRN
Status: DISCONTINUED | OUTPATIENT
Start: 2021-05-04 | End: 2021-05-05 | Stop reason: HOSPADM

## 2021-05-04 RX ORDER — DIPHENHYDRAMINE HCL 25 MG
25 CAPSULE ORAL
Status: DISCONTINUED | OUTPATIENT
Start: 2021-05-04 | End: 2021-05-05 | Stop reason: HOSPADM

## 2021-05-04 RX ORDER — AMINOPHYLLINE 25 MG/ML
100 INJECTION, SOLUTION INTRAVENOUS ONCE
Status: COMPLETED | OUTPATIENT
Start: 2021-05-04 | End: 2021-05-04

## 2021-05-04 RX ORDER — GADOBUTROL 604.72 MG/ML
10 INJECTION INTRAVENOUS ONCE
Status: COMPLETED | OUTPATIENT
Start: 2021-05-04 | End: 2021-05-04

## 2021-05-04 RX ORDER — REGADENOSON 0.08 MG/ML
0.4 INJECTION, SOLUTION INTRAVENOUS ONCE
Status: COMPLETED | OUTPATIENT
Start: 2021-05-04 | End: 2021-05-04

## 2021-05-04 RX ORDER — CAFFEINE CITRATE 20 MG/ML
60 SOLUTION INTRAVENOUS
Status: DISCONTINUED | OUTPATIENT
Start: 2021-05-04 | End: 2021-05-05 | Stop reason: HOSPADM

## 2021-05-04 RX ORDER — DIPHENHYDRAMINE HYDROCHLORIDE 50 MG/ML
25-50 INJECTION INTRAMUSCULAR; INTRAVENOUS
Status: DISCONTINUED | OUTPATIENT
Start: 2021-05-04 | End: 2021-05-05 | Stop reason: HOSPADM

## 2021-05-04 RX ADMIN — GADOBUTROL 10 ML: 604.72 INJECTION INTRAVENOUS at 13:31

## 2021-05-04 RX ADMIN — REGADENOSON 0.4 MG: 0.08 INJECTION, SOLUTION INTRAVENOUS at 13:07

## 2021-05-04 RX ADMIN — AMINOPHYLLINE 100 MG: 25 INJECTION, SOLUTION INTRAVENOUS at 13:08

## 2021-05-04 NOTE — PROGRESS NOTES
"Patient presents for cardiac stress MRI and denies caffeine consumption in the past 12 hours.  Patient is educated on procedure for cardiac stress MRI including the medications that will be used and their possible side effects.  Lungs are clear to auscultation bilaterally.  Patient tolerated the Lexiscan injection reporting mild SOB, headache, and feeling like a \"panic attack\" which patient states has resolved by four minutes post injection.  Aminophylline is given per protocol and patient is monitored x 10 mn, then is left under the care of MRI staff.  "

## 2021-05-05 LAB
INTERPRETATION ECG - MUSE: NORMAL
INTERPRETATION ECG - MUSE: NORMAL

## 2021-05-06 ENCOUNTER — CARE COORDINATION (OUTPATIENT)
Dept: CARDIOLOGY | Facility: CLINIC | Age: 61
End: 2021-05-06

## 2021-05-06 ENCOUNTER — TELEPHONE (OUTPATIENT)
Dept: CARDIOLOGY | Facility: CLINIC | Age: 61
End: 2021-05-06

## 2021-05-06 NOTE — TELEPHONE ENCOUNTER
"I called the patient and booked her for May 20th appointment with Dr Funes.  (next available but the patient states that she is going to \"call everyday\"  Even though she has this appointment. She's upset that no one has called her with results from her tests.  I told her I'd put in a message with Dr. Funes.     Merced Heller  [facility] Electrophysiology   [phone]    "

## 2021-05-06 NOTE — PROGRESS NOTES
Called and spoke with patient. Patient is frustrated by her decrease in quality of life over the last year. She is anxious to return to work. Patient recently competed a cardiac MRI and a cardiopulmonary stress test and is inquiring about treatment plan going forward. Notified providers and they will reach out to patient. It was also recommended that patient have a clinic visit with Dr. Funes, first available. Message sent to schedulers to call patient and help schedule the appointment.  Patient was very upset and frustrated on phone and hung up related to her distress. Patient was told that her concerns would be sent to her providers.

## 2021-05-06 NOTE — TELEPHONE ENCOUNTER
[facility] Call Center    Phone Message    May a detailed message be left on voicemail: no     Reason for Call: Other: Other: Pt, Joyce is upset and in pain.  She Would like a call back from Care Team and will continue to call daily until she hears from Dr. Ordonez  Call: [phone] Joyce      Action Taken: Message routed to:  [facility] ([facility] Cardiology    Travel Screening: Not Applicable

## 2021-05-06 NOTE — PROGRESS NOTES
Reviewed messages from earlier today that patient had called and spoke with NUNO Rizzo. Notes indicate patient is upset that she has not received results from recent testing.  It appears she had a cardiac MRI on 5/4 and PO Son had sent message to her with the results.  In addition, she had a CPX last Friday and was just scanned into Epic on 5/4.  The patient may not have been able to view the results since it was a scanned document.  Notes also indicate the tests were done to determine plan for treatment.     It appears the  has set up a video appt for 2 weeks with Dr. Funes, but I will send him a message to let him know patient is waiting for the results and would like to discuss results/plan sooner.

## 2021-05-07 NOTE — TELEPHONE ENCOUNTER
Joyce calling to report that she is in pain and this is her daily call. She is wanting an earlier appointment with Dr. Funes. Pt states that she will call back daily to get an update. Please give her a call back to discuss. Thanks!

## 2021-05-07 NOTE — TELEPHONE ENCOUNTER
Patient has been calling several providers daily re: treatment plan.  Dr. Ordonez and Dr. Funes aware.  She has upcoming appts with both providers. We are following up with them and patient.

## 2021-05-07 NOTE — TELEPHONE ENCOUNTER
Joyce calling to report that she is in pain and this is her daily call. She is wanting an earlier appointment with Dr. Ordonez. Pt states that she will call back daily to get an update. Please give her a call back to discuss. Thanks!

## 2021-05-07 NOTE — TELEPHONE ENCOUNTER
Patient has been calling daily. She has upcoming appts with providers, Dr. Ordonez and Dr. Funes re: treatment plan. Spoke with Dr. Ordonez's nurse, Matthias RN, this morning. He has discussed issue with Dr. Ordonez. Per Matthias who talked with patient at length with patient yesterday, patient stated she is not in pain but just wants to know what the plan is now that she has had her cardiac MRI and CPX.  Her CPX was done last Friday and the cardiac MRI done on Tuesday this wee.  Her MRI results were sent to her 48 hours later by Adelaida OWENS NP via My Chart. CPX results were scanned into Epic on 5/4.      Per Matthias, Dr. Ordonez stated he would call patient today. Message was sent to Dr. Funes (not available in clinic today) last evening re: opinion on results and plan going forward.

## 2021-05-10 LAB
MDC_IDC_LEAD_IMPLANT_DT: NORMAL
MDC_IDC_LEAD_LOCATION: NORMAL
MDC_IDC_LEAD_LOCATION_DETAIL_1: NORMAL
MDC_IDC_LEAD_MFG: NORMAL
MDC_IDC_LEAD_MODEL: NORMAL
MDC_IDC_LEAD_POLARITY_TYPE: NORMAL
MDC_IDC_LEAD_SERIAL: NORMAL
MDC_IDC_MSMT_BATTERY_DTM: NORMAL
MDC_IDC_MSMT_BATTERY_DTM: NORMAL
MDC_IDC_MSMT_BATTERY_REMAINING_LONGEVITY: 68 MO
MDC_IDC_MSMT_BATTERY_REMAINING_LONGEVITY: 68 MO
MDC_IDC_MSMT_BATTERY_RRT_TRIGGER: 2.83
MDC_IDC_MSMT_BATTERY_RRT_TRIGGER: 2.83
MDC_IDC_MSMT_BATTERY_STATUS: NORMAL
MDC_IDC_MSMT_BATTERY_STATUS: NORMAL
MDC_IDC_MSMT_BATTERY_VOLTAGE: 3 V
MDC_IDC_MSMT_BATTERY_VOLTAGE: 3 V
MDC_IDC_MSMT_LEADCHNL_RA_IMPEDANCE_VALUE: 399 OHM
MDC_IDC_MSMT_LEADCHNL_RA_IMPEDANCE_VALUE: 399 OHM
MDC_IDC_MSMT_LEADCHNL_RA_IMPEDANCE_VALUE: 437 OHM
MDC_IDC_MSMT_LEADCHNL_RA_IMPEDANCE_VALUE: 475 OHM
MDC_IDC_MSMT_LEADCHNL_RA_PACING_THRESHOLD_AMPLITUDE: 0.5 V
MDC_IDC_MSMT_LEADCHNL_RA_PACING_THRESHOLD_AMPLITUDE: 0.75 V
MDC_IDC_MSMT_LEADCHNL_RA_PACING_THRESHOLD_PULSEWIDTH: 0.4 MS
MDC_IDC_MSMT_LEADCHNL_RA_PACING_THRESHOLD_PULSEWIDTH: 0.4 MS
MDC_IDC_MSMT_LEADCHNL_RA_SENSING_INTR_AMPL: 2.62 MV
MDC_IDC_MSMT_LEADCHNL_RA_SENSING_INTR_AMPL: 2.62 MV
MDC_IDC_MSMT_LEADCHNL_RA_SENSING_INTR_AMPL: 2.88 MV
MDC_IDC_MSMT_LEADCHNL_RA_SENSING_INTR_AMPL: 3.3 MV
MDC_IDC_MSMT_LEADCHNL_RV_IMPEDANCE_VALUE: 418 OHM
MDC_IDC_MSMT_LEADCHNL_RV_IMPEDANCE_VALUE: 418 OHM
MDC_IDC_MSMT_LEADCHNL_RV_IMPEDANCE_VALUE: 475 OHM
MDC_IDC_MSMT_LEADCHNL_RV_IMPEDANCE_VALUE: 532 OHM
MDC_IDC_MSMT_LEADCHNL_RV_PACING_THRESHOLD_AMPLITUDE: 0.75 V
MDC_IDC_MSMT_LEADCHNL_RV_PACING_THRESHOLD_AMPLITUDE: 1 V
MDC_IDC_MSMT_LEADCHNL_RV_PACING_THRESHOLD_PULSEWIDTH: 0.4 MS
MDC_IDC_MSMT_LEADCHNL_RV_PACING_THRESHOLD_PULSEWIDTH: 0.4 MS
MDC_IDC_PG_IMPLANT_DTM: NORMAL
MDC_IDC_PG_IMPLANT_DTM: NORMAL
MDC_IDC_PG_MFG: NORMAL
MDC_IDC_PG_MFG: NORMAL
MDC_IDC_PG_MODEL: NORMAL
MDC_IDC_PG_MODEL: NORMAL
MDC_IDC_PG_SERIAL: NORMAL
MDC_IDC_PG_SERIAL: NORMAL
MDC_IDC_PG_TYPE: NORMAL
MDC_IDC_PG_TYPE: NORMAL
MDC_IDC_SESS_CLINIC_NAME: NORMAL
MDC_IDC_SESS_CLINIC_NAME: NORMAL
MDC_IDC_SESS_DTM: NORMAL
MDC_IDC_SESS_DTM: NORMAL
MDC_IDC_SESS_TYPE: NORMAL
MDC_IDC_SESS_TYPE: NORMAL
MDC_IDC_SET_BRADY_AT_MODE_SWITCH_RATE: 171 {BEATS}/MIN
MDC_IDC_SET_BRADY_AT_MODE_SWITCH_RATE: 171 {BEATS}/MIN
MDC_IDC_SET_BRADY_HYSTRATE: NORMAL
MDC_IDC_SET_BRADY_HYSTRATE: NORMAL
MDC_IDC_SET_BRADY_LOWRATE: 60 {BEATS}/MIN
MDC_IDC_SET_BRADY_LOWRATE: 60 {BEATS}/MIN
MDC_IDC_SET_BRADY_MAX_SENSOR_RATE: 140 {BEATS}/MIN
MDC_IDC_SET_BRADY_MAX_SENSOR_RATE: 140 {BEATS}/MIN
MDC_IDC_SET_BRADY_MAX_TRACKING_RATE: 140 {BEATS}/MIN
MDC_IDC_SET_BRADY_MAX_TRACKING_RATE: 140 {BEATS}/MIN
MDC_IDC_SET_BRADY_MODE: NORMAL
MDC_IDC_SET_BRADY_MODE: NORMAL
MDC_IDC_SET_BRADY_PAV_DELAY_HIGH: 140 MS
MDC_IDC_SET_BRADY_PAV_DELAY_HIGH: 140 MS
MDC_IDC_SET_BRADY_PAV_DELAY_LOW: 180 MS
MDC_IDC_SET_BRADY_PAV_DELAY_LOW: 180 MS
MDC_IDC_SET_BRADY_SAV_DELAY_HIGH: 110 MS
MDC_IDC_SET_BRADY_SAV_DELAY_HIGH: 110 MS
MDC_IDC_SET_BRADY_SAV_DELAY_LOW: 150 MS
MDC_IDC_SET_BRADY_SAV_DELAY_LOW: 150 MS
MDC_IDC_SET_LEADCHNL_RA_PACING_AMPLITUDE: 1.5 V
MDC_IDC_SET_LEADCHNL_RA_PACING_AMPLITUDE: 1.5 V
MDC_IDC_SET_LEADCHNL_RA_PACING_ANODE_ELECTRODE_1: NORMAL
MDC_IDC_SET_LEADCHNL_RA_PACING_ANODE_ELECTRODE_1: NORMAL
MDC_IDC_SET_LEADCHNL_RA_PACING_ANODE_LOCATION_1: NORMAL
MDC_IDC_SET_LEADCHNL_RA_PACING_ANODE_LOCATION_1: NORMAL
MDC_IDC_SET_LEADCHNL_RA_PACING_CAPTURE_MODE: NORMAL
MDC_IDC_SET_LEADCHNL_RA_PACING_CAPTURE_MODE: NORMAL
MDC_IDC_SET_LEADCHNL_RA_PACING_CATHODE_ELECTRODE_1: NORMAL
MDC_IDC_SET_LEADCHNL_RA_PACING_CATHODE_ELECTRODE_1: NORMAL
MDC_IDC_SET_LEADCHNL_RA_PACING_CATHODE_LOCATION_1: NORMAL
MDC_IDC_SET_LEADCHNL_RA_PACING_CATHODE_LOCATION_1: NORMAL
MDC_IDC_SET_LEADCHNL_RA_PACING_POLARITY: NORMAL
MDC_IDC_SET_LEADCHNL_RA_PACING_POLARITY: NORMAL
MDC_IDC_SET_LEADCHNL_RA_PACING_PULSEWIDTH: 0.4 MS
MDC_IDC_SET_LEADCHNL_RA_PACING_PULSEWIDTH: 0.4 MS
MDC_IDC_SET_LEADCHNL_RA_SENSING_ANODE_ELECTRODE_1: NORMAL
MDC_IDC_SET_LEADCHNL_RA_SENSING_ANODE_ELECTRODE_1: NORMAL
MDC_IDC_SET_LEADCHNL_RA_SENSING_ANODE_LOCATION_1: NORMAL
MDC_IDC_SET_LEADCHNL_RA_SENSING_ANODE_LOCATION_1: NORMAL
MDC_IDC_SET_LEADCHNL_RA_SENSING_CATHODE_ELECTRODE_1: NORMAL
MDC_IDC_SET_LEADCHNL_RA_SENSING_CATHODE_ELECTRODE_1: NORMAL
MDC_IDC_SET_LEADCHNL_RA_SENSING_CATHODE_LOCATION_1: NORMAL
MDC_IDC_SET_LEADCHNL_RA_SENSING_CATHODE_LOCATION_1: NORMAL
MDC_IDC_SET_LEADCHNL_RA_SENSING_POLARITY: NORMAL
MDC_IDC_SET_LEADCHNL_RA_SENSING_POLARITY: NORMAL
MDC_IDC_SET_LEADCHNL_RA_SENSING_SENSITIVITY: 0.3 MV
MDC_IDC_SET_LEADCHNL_RA_SENSING_SENSITIVITY: 0.3 MV
MDC_IDC_SET_LEADCHNL_RV_PACING_AMPLITUDE: 2 V
MDC_IDC_SET_LEADCHNL_RV_PACING_AMPLITUDE: 2 V
MDC_IDC_SET_LEADCHNL_RV_PACING_ANODE_ELECTRODE_1: NORMAL
MDC_IDC_SET_LEADCHNL_RV_PACING_ANODE_ELECTRODE_1: NORMAL
MDC_IDC_SET_LEADCHNL_RV_PACING_ANODE_LOCATION_1: NORMAL
MDC_IDC_SET_LEADCHNL_RV_PACING_ANODE_LOCATION_1: NORMAL
MDC_IDC_SET_LEADCHNL_RV_PACING_CAPTURE_MODE: NORMAL
MDC_IDC_SET_LEADCHNL_RV_PACING_CAPTURE_MODE: NORMAL
MDC_IDC_SET_LEADCHNL_RV_PACING_CATHODE_ELECTRODE_1: NORMAL
MDC_IDC_SET_LEADCHNL_RV_PACING_CATHODE_ELECTRODE_1: NORMAL
MDC_IDC_SET_LEADCHNL_RV_PACING_CATHODE_LOCATION_1: NORMAL
MDC_IDC_SET_LEADCHNL_RV_PACING_CATHODE_LOCATION_1: NORMAL
MDC_IDC_SET_LEADCHNL_RV_PACING_POLARITY: NORMAL
MDC_IDC_SET_LEADCHNL_RV_PACING_POLARITY: NORMAL
MDC_IDC_SET_LEADCHNL_RV_PACING_PULSEWIDTH: 0.4 MS
MDC_IDC_SET_LEADCHNL_RV_PACING_PULSEWIDTH: 0.4 MS
MDC_IDC_SET_LEADCHNL_RV_SENSING_ANODE_ELECTRODE_1: NORMAL
MDC_IDC_SET_LEADCHNL_RV_SENSING_ANODE_ELECTRODE_1: NORMAL
MDC_IDC_SET_LEADCHNL_RV_SENSING_ANODE_LOCATION_1: NORMAL
MDC_IDC_SET_LEADCHNL_RV_SENSING_ANODE_LOCATION_1: NORMAL
MDC_IDC_SET_LEADCHNL_RV_SENSING_CATHODE_ELECTRODE_1: NORMAL
MDC_IDC_SET_LEADCHNL_RV_SENSING_CATHODE_ELECTRODE_1: NORMAL
MDC_IDC_SET_LEADCHNL_RV_SENSING_CATHODE_LOCATION_1: NORMAL
MDC_IDC_SET_LEADCHNL_RV_SENSING_CATHODE_LOCATION_1: NORMAL
MDC_IDC_SET_LEADCHNL_RV_SENSING_POLARITY: NORMAL
MDC_IDC_SET_LEADCHNL_RV_SENSING_POLARITY: NORMAL
MDC_IDC_SET_LEADCHNL_RV_SENSING_SENSITIVITY: 0.9 MV
MDC_IDC_SET_LEADCHNL_RV_SENSING_SENSITIVITY: 0.9 MV
MDC_IDC_SET_ZONE_DETECTION_INTERVAL: 350 MS
MDC_IDC_SET_ZONE_DETECTION_INTERVAL: 350 MS
MDC_IDC_SET_ZONE_DETECTION_INTERVAL: 400 MS
MDC_IDC_SET_ZONE_DETECTION_INTERVAL: 400 MS
MDC_IDC_SET_ZONE_TYPE: NORMAL
MDC_IDC_STAT_AT_BURDEN_PERCENT: 0.1 %
MDC_IDC_STAT_AT_BURDEN_PERCENT: 0.1 %
MDC_IDC_STAT_AT_DTM_END: NORMAL
MDC_IDC_STAT_AT_DTM_END: NORMAL
MDC_IDC_STAT_AT_DTM_START: NORMAL
MDC_IDC_STAT_AT_DTM_START: NORMAL
MDC_IDC_STAT_BRADY_AP_VP_PERCENT: 51.17 %
MDC_IDC_STAT_BRADY_AP_VP_PERCENT: 51.17 %
MDC_IDC_STAT_BRADY_AP_VS_PERCENT: 0.75 %
MDC_IDC_STAT_BRADY_AP_VS_PERCENT: 0.75 %
MDC_IDC_STAT_BRADY_AS_VP_PERCENT: 47.69 %
MDC_IDC_STAT_BRADY_AS_VP_PERCENT: 47.69 %
MDC_IDC_STAT_BRADY_AS_VS_PERCENT: 0.38 %
MDC_IDC_STAT_BRADY_AS_VS_PERCENT: 0.38 %
MDC_IDC_STAT_BRADY_DTM_END: NORMAL
MDC_IDC_STAT_BRADY_DTM_END: NORMAL
MDC_IDC_STAT_BRADY_DTM_START: NORMAL
MDC_IDC_STAT_BRADY_DTM_START: NORMAL
MDC_IDC_STAT_BRADY_RA_PERCENT_PACED: 51.35 %
MDC_IDC_STAT_BRADY_RA_PERCENT_PACED: 51.35 %
MDC_IDC_STAT_BRADY_RV_PERCENT_PACED: 98.24 %
MDC_IDC_STAT_BRADY_RV_PERCENT_PACED: 98.24 %
MDC_IDC_STAT_EPISODE_RECENT_COUNT: 0
MDC_IDC_STAT_EPISODE_RECENT_COUNT: 72
MDC_IDC_STAT_EPISODE_RECENT_COUNT: 72
MDC_IDC_STAT_EPISODE_RECENT_COUNT_DTM_END: NORMAL
MDC_IDC_STAT_EPISODE_RECENT_COUNT_DTM_START: NORMAL
MDC_IDC_STAT_EPISODE_TOTAL_COUNT: 0
MDC_IDC_STAT_EPISODE_TOTAL_COUNT: 0
MDC_IDC_STAT_EPISODE_TOTAL_COUNT: 1
MDC_IDC_STAT_EPISODE_TOTAL_COUNT: 10
MDC_IDC_STAT_EPISODE_TOTAL_COUNT: 10
MDC_IDC_STAT_EPISODE_TOTAL_COUNT_DTM_END: NORMAL
MDC_IDC_STAT_EPISODE_TOTAL_COUNT_DTM_START: NORMAL
MDC_IDC_STAT_EPISODE_TYPE: NORMAL

## 2021-05-10 NOTE — TELEPHONE ENCOUNTER
Pt is calling again for a sooner appt , she said she is going to call every day, please call Joyce thank you

## 2021-05-11 ENCOUNTER — TELEPHONE (OUTPATIENT)
Dept: CARDIOLOGY | Facility: CLINIC | Age: 61
End: 2021-05-11

## 2021-05-11 DIAGNOSIS — I27.20 PULMONARY HYPERTENSION (H): Primary | ICD-10-CM

## 2021-05-11 RX ORDER — SILDENAFIL CITRATE 20 MG/1
20 TABLET ORAL 3 TIMES DAILY
Qty: 60 CAPSULE | Refills: 3 | Status: SHIPPED | OUTPATIENT
Start: 2021-05-11 | End: 2021-05-25 | Stop reason: DRUGHIGH

## 2021-05-11 NOTE — TELEPHONE ENCOUNTER
Dr. Ordonez called and spoke to patient.   81-year-old female with history of COPD,  Stage III non-small cell carcinoma of the lung   s/p carboplatin and Taxol and concurrent radiation    3 treatments with immunotherapy Durvalumab/ Imfinzi  With significant anorexia fatigue  last treatment November 7, 2019    Recent endocrinology consultation reportedly unremarkable"    Now admitted with fever weakness,  Chest CT suggests  infiltrate, left lower lobe    of note is normal WBC count    This morning patient states he feels better after IV fluids and antibiotics    Plan:  Continue IV fluids, antibiotics as per pulmonary medicine/ ID, unclear if patient has a viral and or bacterial process    Anemia: check iron B12 folate, erythropoietin level    review/ compare old and new scans  Once stable is scheduled follow-up with Dr. Vides to address ongoing treatment for stage IIIa disease

## 2021-05-12 ENCOUNTER — TELEPHONE (OUTPATIENT)
Dept: CARDIOLOGY | Facility: CLINIC | Age: 61
End: 2021-05-12

## 2021-05-12 DIAGNOSIS — F41.9 ANXIETY: ICD-10-CM

## 2021-05-12 NOTE — TELEPHONE ENCOUNTER
PA is needed for patients Sildenafil       sildenafil (REVATIO) 20 MG tablet  Take 1 tablet (20 mg) by mouth 3 times daily, Disp-60 capsule, R-3, E-Prescribe   Dispense: 60 capsule   Refills: 3 ordered   Pharmacy: [id number] - [place] - [address] [address] (Ph: [phone])   Order Details  Ordered on: 05/11/21   Associated Dx: Pulmonary hypertension (H)   Authorizing provider: Adelaida De La Torre APRN CNP

## 2021-05-12 NOTE — TELEPHONE ENCOUNTER
Spoke with patient. PA was sent for Sildenafil. Advised would let her know once we hear back from insurance. Pt also was inquiring about taking Cymbalta Dr Funes was going to let pt know. Message sent to MD to advise.   Roxie Randolph RN BSN

## 2021-05-12 NOTE — TELEPHONE ENCOUNTER
[facility] Call Center    Phone Message    May a detailed message be left on voicemail: yes     Reason for Call: Other: Pt called in stating that a medication Dr. Funes Rxd for her is not being approved by insurance. She would like a call back to discuss.      Action Taken: Message routed to:  [facility] Cardio    Travel Screening: Not Applicable

## 2021-05-13 RX ORDER — CLONAZEPAM 0.5 MG/1
TABLET ORAL
Qty: 90 TABLET | Refills: 0 | Status: SHIPPED | OUTPATIENT
Start: 2021-05-13 | End: 2021-08-04

## 2021-05-13 NOTE — TELEPHONE ENCOUNTER
PA Initiation    Medication: sildenafil cirate 20MG  Insurance Company: [employer] - Phone [phone] Fax [phone]  Pharmacy Filling the Rx: [id number] - [place], [place] - [address]  Filling Pharmacy Phone: [phone]  Filling Pharmacy Fax: [phone]  Start Date: 5/13/2021

## 2021-05-13 NOTE — TELEPHONE ENCOUNTER
Routing refill request to provider for review/approval because:  Drug not on the [facility] refill protocol     Last Written Prescription Date:  3-17-21  Last Fill Quantity: 90,  # refills: 0   Last office visit: 4/27/2021 with prescribing provider:  Ce Crowe   Future Office Visit:

## 2021-05-14 ENCOUNTER — ANCILLARY PROCEDURE (OUTPATIENT)
Dept: CARDIOLOGY | Facility: CLINIC | Age: 61
End: 2021-05-14
Attending: INTERNAL MEDICINE
Payer: COMMERCIAL

## 2021-05-14 ENCOUNTER — TELEPHONE (OUTPATIENT)
Dept: CARDIOLOGY | Facility: CLINIC | Age: 61
End: 2021-05-14

## 2021-05-14 DIAGNOSIS — I44.2 COMPLETE ATRIOVENTRICULAR BLOCK (H): ICD-10-CM

## 2021-05-14 PROCEDURE — 93280 PM DEVICE PROGR EVAL DUAL: CPT | Performed by: INTERNAL MEDICINE

## 2021-05-14 NOTE — TELEPHONE ENCOUNTER
[facility] Call Center    Phone Message    May a detailed message be left on voicemail: no     Reason for Call: Other: PtJoyce calling advised this is her daily call and wants a message sent asking about the approval of prescription.  She said, she will continue to call until this is refilled.     Action Taken: Message routed to:  [facility] Cardiology    Travel Screening: Not Applicable
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
## 2021-05-14 NOTE — TELEPHONE ENCOUNTER
Called and spoke with patient. She took first dose of sildenafil and so far feels ok, offers no c/o. She feels very anxious about a treatment plan that will help her feel better and admits she doesn't trust the healthcare system easily since her initial pacemaker insertion in 2017. She also does still want to know about taking the Cymbalta as she would like to wean off the klonopin as it makes her feel wiped out, unable to drive when taking it. She does want to see how she does with the sildenafil before starting the Cymbalta so she's not taking 2 new meds at the same time. She requested the MRI and CPX results be released so she could view them.  Explained the CPX is scanned result so she won't be able to see it in Epic but we can mail it to her. She wants to keep her video appt with Dr. Funes on 5/20 so she can discuss how she is feeling on the sildenafil.

## 2021-05-14 NOTE — PATIENT INSTRUCTIONS
It was a pleasure to see you in clinic today.  Please do not hesitate to call with any questions or concerns.  You are scheduled for a remote transmission on 8/25/21.  We look forward to seeing you in clinic at your next device check in 12 months.    Matthias Garrido, RN  Electrophysiology Nurse Clinician  [facility]    During Business Hours Please Call:  [phone]  After Hours Please Call:  [phone] - select option #4 and ask for job code [phone]

## 2021-05-14 NOTE — TELEPHONE ENCOUNTER
[facility] Call Center    Phone Message    May a detailed message be left on voicemail: yes     Reason for Call: Other: Pt called in stating she took the Sildenafil and is now terrified since she is 'allergic to everything' Looking for call back to discuss.      Action Taken: Message routed to:  [facility] Cardio    Travel Screening: Not Applicable

## 2021-05-18 LAB
MDC_IDC_LEAD_IMPLANT_DT: NORMAL
MDC_IDC_LEAD_IMPLANT_DT: NORMAL
MDC_IDC_LEAD_LOCATION: NORMAL
MDC_IDC_LEAD_LOCATION: NORMAL
MDC_IDC_LEAD_LOCATION_DETAIL_1: NORMAL
MDC_IDC_LEAD_LOCATION_DETAIL_1: NORMAL
MDC_IDC_LEAD_MFG: NORMAL
MDC_IDC_LEAD_MFG: NORMAL
MDC_IDC_LEAD_MODEL: NORMAL
MDC_IDC_LEAD_MODEL: NORMAL
MDC_IDC_LEAD_POLARITY_TYPE: NORMAL
MDC_IDC_LEAD_POLARITY_TYPE: NORMAL
MDC_IDC_LEAD_SERIAL: NORMAL
MDC_IDC_LEAD_SERIAL: NORMAL
MDC_IDC_MSMT_BATTERY_DTM: NORMAL
MDC_IDC_MSMT_BATTERY_REMAINING_LONGEVITY: 66 MO
MDC_IDC_MSMT_BATTERY_RRT_TRIGGER: 2.83
MDC_IDC_MSMT_BATTERY_STATUS: NORMAL
MDC_IDC_MSMT_BATTERY_VOLTAGE: 3 V
MDC_IDC_MSMT_LEADCHNL_RA_IMPEDANCE_VALUE: 399 OHM
MDC_IDC_MSMT_LEADCHNL_RA_IMPEDANCE_VALUE: 475 OHM
MDC_IDC_MSMT_LEADCHNL_RA_PACING_THRESHOLD_AMPLITUDE: 0.75 V
MDC_IDC_MSMT_LEADCHNL_RA_PACING_THRESHOLD_PULSEWIDTH: 0.4 MS
MDC_IDC_MSMT_LEADCHNL_RA_SENSING_INTR_AMPL: 3.1 MV
MDC_IDC_MSMT_LEADCHNL_RV_IMPEDANCE_VALUE: 418 OHM
MDC_IDC_MSMT_LEADCHNL_RV_IMPEDANCE_VALUE: 513 OHM
MDC_IDC_MSMT_LEADCHNL_RV_PACING_THRESHOLD_AMPLITUDE: 0.75 V
MDC_IDC_MSMT_LEADCHNL_RV_PACING_THRESHOLD_PULSEWIDTH: 0.4 MS
MDC_IDC_MSMT_LEADCHNL_RV_SENSING_INTR_AMPL: 5.6 MV
MDC_IDC_PG_IMPLANT_DTM: NORMAL
MDC_IDC_PG_MFG: NORMAL
MDC_IDC_PG_MODEL: NORMAL
MDC_IDC_PG_SERIAL: NORMAL
MDC_IDC_PG_TYPE: NORMAL
MDC_IDC_SESS_CLINIC_NAME: NORMAL
MDC_IDC_SESS_DTM: NORMAL
MDC_IDC_SESS_TYPE: NORMAL
MDC_IDC_SET_BRADY_AT_MODE_SWITCH_RATE: 171 {BEATS}/MIN
MDC_IDC_SET_BRADY_HYSTRATE: NORMAL
MDC_IDC_SET_BRADY_LOWRATE: 60 {BEATS}/MIN
MDC_IDC_SET_BRADY_MAX_SENSOR_RATE: 140 {BEATS}/MIN
MDC_IDC_SET_BRADY_MAX_TRACKING_RATE: 140 {BEATS}/MIN
MDC_IDC_SET_BRADY_MODE: NORMAL
MDC_IDC_SET_BRADY_PAV_DELAY_HIGH: 140 MS
MDC_IDC_SET_BRADY_PAV_DELAY_LOW: 180 MS
MDC_IDC_SET_BRADY_SAV_DELAY_HIGH: 110 MS
MDC_IDC_SET_BRADY_SAV_DELAY_LOW: 150 MS
MDC_IDC_SET_LEADCHNL_RA_PACING_AMPLITUDE: 1.5 V
MDC_IDC_SET_LEADCHNL_RA_PACING_ANODE_ELECTRODE_1: NORMAL
MDC_IDC_SET_LEADCHNL_RA_PACING_ANODE_LOCATION_1: NORMAL
MDC_IDC_SET_LEADCHNL_RA_PACING_CAPTURE_MODE: NORMAL
MDC_IDC_SET_LEADCHNL_RA_PACING_CATHODE_ELECTRODE_1: NORMAL
MDC_IDC_SET_LEADCHNL_RA_PACING_CATHODE_LOCATION_1: NORMAL
MDC_IDC_SET_LEADCHNL_RA_PACING_POLARITY: NORMAL
MDC_IDC_SET_LEADCHNL_RA_PACING_PULSEWIDTH: 0.4 MS
MDC_IDC_SET_LEADCHNL_RA_SENSING_ANODE_ELECTRODE_1: NORMAL
MDC_IDC_SET_LEADCHNL_RA_SENSING_ANODE_LOCATION_1: NORMAL
MDC_IDC_SET_LEADCHNL_RA_SENSING_CATHODE_ELECTRODE_1: NORMAL
MDC_IDC_SET_LEADCHNL_RA_SENSING_CATHODE_LOCATION_1: NORMAL
MDC_IDC_SET_LEADCHNL_RA_SENSING_POLARITY: NORMAL
MDC_IDC_SET_LEADCHNL_RA_SENSING_SENSITIVITY: 0.3 MV
MDC_IDC_SET_LEADCHNL_RV_PACING_AMPLITUDE: 1.5 V
MDC_IDC_SET_LEADCHNL_RV_PACING_ANODE_ELECTRODE_1: NORMAL
MDC_IDC_SET_LEADCHNL_RV_PACING_ANODE_LOCATION_1: NORMAL
MDC_IDC_SET_LEADCHNL_RV_PACING_CAPTURE_MODE: NORMAL
MDC_IDC_SET_LEADCHNL_RV_PACING_CATHODE_ELECTRODE_1: NORMAL
MDC_IDC_SET_LEADCHNL_RV_PACING_CATHODE_LOCATION_1: NORMAL
MDC_IDC_SET_LEADCHNL_RV_PACING_POLARITY: NORMAL
MDC_IDC_SET_LEADCHNL_RV_PACING_PULSEWIDTH: 0.4 MS
MDC_IDC_SET_LEADCHNL_RV_SENSING_ANODE_ELECTRODE_1: NORMAL
MDC_IDC_SET_LEADCHNL_RV_SENSING_ANODE_LOCATION_1: NORMAL
MDC_IDC_SET_LEADCHNL_RV_SENSING_CATHODE_ELECTRODE_1: NORMAL
MDC_IDC_SET_LEADCHNL_RV_SENSING_CATHODE_LOCATION_1: NORMAL
MDC_IDC_SET_LEADCHNL_RV_SENSING_POLARITY: NORMAL
MDC_IDC_SET_LEADCHNL_RV_SENSING_SENSITIVITY: 0.9 MV
MDC_IDC_SET_ZONE_DETECTION_INTERVAL: 350 MS
MDC_IDC_SET_ZONE_DETECTION_INTERVAL: 400 MS
MDC_IDC_SET_ZONE_TYPE: NORMAL
MDC_IDC_STAT_AT_BURDEN_PERCENT: 0 %
MDC_IDC_STAT_AT_DTM_END: NORMAL
MDC_IDC_STAT_AT_DTM_START: NORMAL
MDC_IDC_STAT_BRADY_AP_VP_PERCENT: 46.21 %
MDC_IDC_STAT_BRADY_AP_VS_PERCENT: 0.04 %
MDC_IDC_STAT_BRADY_AS_VP_PERCENT: 53.71 %
MDC_IDC_STAT_BRADY_AS_VS_PERCENT: 0.04 %
MDC_IDC_STAT_BRADY_DTM_END: NORMAL
MDC_IDC_STAT_BRADY_DTM_START: NORMAL
MDC_IDC_STAT_BRADY_RA_PERCENT_PACED: 46.16 %
MDC_IDC_STAT_BRADY_RV_PERCENT_PACED: 99.86 %
MDC_IDC_STAT_EPISODE_RECENT_COUNT: 0
MDC_IDC_STAT_EPISODE_RECENT_COUNT_DTM_END: NORMAL
MDC_IDC_STAT_EPISODE_RECENT_COUNT_DTM_START: NORMAL
MDC_IDC_STAT_EPISODE_TOTAL_COUNT: 0
MDC_IDC_STAT_EPISODE_TOTAL_COUNT: 1
MDC_IDC_STAT_EPISODE_TOTAL_COUNT: 1
MDC_IDC_STAT_EPISODE_TOTAL_COUNT: 10
MDC_IDC_STAT_EPISODE_TOTAL_COUNT_DTM_END: NORMAL
MDC_IDC_STAT_EPISODE_TOTAL_COUNT_DTM_START: NORMAL
MDC_IDC_STAT_EPISODE_TYPE: NORMAL

## 2021-05-20 ENCOUNTER — VIRTUAL VISIT (OUTPATIENT)
Dept: CARDIOLOGY | Facility: CLINIC | Age: 61
End: 2021-05-20
Attending: INTERNAL MEDICINE
Payer: COMMERCIAL

## 2021-05-20 DIAGNOSIS — I50.30 HEART FAILURE WITH PRESERVED EJECTION FRACTION, BORDERLINE, CLASS III (H): ICD-10-CM

## 2021-05-20 DIAGNOSIS — I44.2 COMPLETE HEART BLOCK (H): ICD-10-CM

## 2021-05-20 DIAGNOSIS — Z95.0 CARDIAC PACEMAKER IN SITU: ICD-10-CM

## 2021-05-20 DIAGNOSIS — I42.9 CARDIOMYOPATHY, UNSPECIFIED TYPE (H): ICD-10-CM

## 2021-05-20 DIAGNOSIS — I27.20 PULMONARY HYPERTENSION (H): ICD-10-CM

## 2021-05-20 DIAGNOSIS — R68.89 EXERCISE INTOLERANCE: Primary | ICD-10-CM

## 2021-05-20 PROCEDURE — 99215 OFFICE O/P EST HI 40 MIN: CPT | Mod: 95 | Performed by: INTERNAL MEDICINE

## 2021-05-20 RX ORDER — SPIRONOLACTONE 25 MG/1
TABLET ORAL
Qty: 90 TABLET | Refills: 3 | COMMUNITY
Start: 2021-05-20 | End: 2022-02-16

## 2021-05-20 RX ORDER — FUROSEMIDE 20 MG
20 TABLET ORAL PRN
Qty: 180 TABLET | Refills: 1 | COMMUNITY
Start: 2021-05-20 | End: 2022-05-26

## 2021-05-20 NOTE — PROGRESS NOTES
"Joyce is a 60 year old who is being evaluated via a billable video visit.      How would you like to obtain your AVS? MyChart  If the video visit is dropped, the invitation should be resent by: Text to cell phone: [phone]  Will anyone else be joining your video visit? No      Vitals - Patient Reported  Systolic (Patient Reported): 97  Diastolic (Patient Reported): 70  Weight (Patient Reported): 74.4 kg (164 lb)  Height (Patient Reported): 165.1 cm (5' 5\")  BMI (Based on Pt Reported Ht/Wt): 27.29  SpO2 (Patient Reported): 97  Pulse (Patient Reported): 106  Pain Score: No Pain (0)(SOB)  Pain Loc: Chest    HPI:     60-year-old female with a past medical history of RFA at [facility] complicated by complete heart block requiring pacemaker placement in 2017.       Now Joyce has been troubled by  progressively worsening diminished exercise tolerance.     Recently we have evaluated her left ventricular function from several perspectives.  Essentially a recent MR scan was consistent with prior imaging but suggested actually an improved ejection fraction on the left side of 50% and on the right side of 65%.      Joyce does have previously documented increase in pulmonary artery pressures with exercise but not at rest.  I tried to introduce sildenafil which she took for only a few days and then had severe thigh and leg pain which required her to stop it.  She was confident that it was the drug.  I have asked her to wait a week and then try again to be sure that it was indeed related to sildenafil since we do not have very many treatment options available.     I suspect that Joyce does have some degree of diastolic dysfunction causing her exertional intolerance.  We did improve her chronotropic response on the pacemaker last week and is seems to have had some marginal benefit in that she was able to get up from the chair and walk across the room more effectively.  Additionally she did report heart rates in the low 100s " while taking her blood pressure today.  On the other hand forward cardiac output may be limited by diminished central volume since she has been taking Lasix periodically for edema.    At today's visit I did discuss the importance of having some preload volume in order to maintain an adequate cardiac output and blood pressure.  In the past lower his blood pressure typically has been in the 90s but now periodically she will have values in the 80s and feel poorly.  I have suggested that we should try to target achieving blood pressures in the 100-110 range by diminishing her diuretic regimen.  I warned her that this may cause some increasing edema and will have to deal with that later.  To this end she will cut her Lasix to no more than 20 mg on 2 days a week.  She has also been on low-dose spironolactone (12.5 mg approximately each day).  I have reduced that to alternate days.    Joyce is concerned about her QT measurement.  However it is based on 100% cardiac pacing and may not be as predictive of future arrhythmia problem as if she were in a native rhythm.  There is discussion largely relates to her desire to take low-dose Cymbalta which would be supervised by her family nurse practitioner .  I believe that it may be helpful to Joyce to take Cymbalta given her overall health concerns and I approved that staff.    Joyce will keep track of her blood pressure and I will make arrangements for follow-up in a couple months    PAST MEDICAL HISTORY:  Past Medical History:   Diagnosis Date     Arthritis      Cardiomyopathy (H)     ff Cardiology     Chronic depressive personality disorder      Congestive heart failure (H) see  note    heart failure correct term     COPD exacerbation (H)      Esophageal reflux      Ex-smoker     quit 2006; 1 PPD x 30     Hyperparathyroidism (H)     s/p parathyroidectomy     Hypothyroidism      LARRY on CPAP     ff Sleep medicine     Pulmonary nodules     ff Pulmonologist     S/P cardiac  pacemaker procedure     checked every 6 months at the  of      Uncomplicated asthma years       CURRENT MEDICATIONS:  Current Outpatient Medications   Medication Sig Dispense Refill     acetaminophen (TYLENOL) 500 MG tablet Take 500-1,000 mg by mouth every 6 hours as needed for mild pain       albuterol (PROAIR HFA/PROVENTIL HFA/VENTOLIN HFA) 108 (90 BASE) MCG/ACT Inhaler Inhale 2 puffs into the lungs as needed for shortness of breath / dyspnea or wheezing        apixaban ANTICOAGULANT (ELIQUIS) 5 MG tablet Take 1 tablet (5 mg) by mouth 2 times daily 120 tablet 3     azelastine (ASTELIN) 0.1 % nasal spray Spray 1 spray into both nostrils 2 times daily as needed        Calcium Lactate 500 MG CAPS Take 250-500 mg by mouth daily        Calcium-Magnesium-Vitamin D (CALCIUM MAGNESIUM PO) Calcium 120 mg, magenesium 20 mg, iodine 20 mg as needed at bedtime       carvedilol (COREG) 3.125 MG tablet Take 1 tablet (3.125 mg) by mouth 2 times daily (with meals) 180 tablet 3     clonazePAM (KLONOPIN) 0.5 MG tablet TAKE 1 TABLET(0.5 MG) BY MOUTH THREE TIMES DAILY AS NEEDED FOR ANXIETY 90 tablet 0     DiphenhydrAMINE HCl (BENADRYL PO) Take 25 mg by mouth nightly as needed for allergies        Ferrous Sulfate (IRON SUPPLEMENT PO) Take 65 mg by mouth daily        fluocinolone acetonide (DERMA SMOOTHE/FS BODY) 0.01 % external oil Use for ears.  2 times daily for 7 days 1 Bottle 0     fluticasone-salmeterol (ADVAIR) 250-50 MCG/DOSE inhaler Inhale 1 puff into the lungs every 12 hours       furosemide (LASIX) 20 MG tablet Take 1 tablet (20 mg) by mouth 2 times daily as needed (for fluid retention) 180 tablet 1     ipratropium (ATROVENT) 0.06 % nasal spray Spray 2 sprays into both nostrils 4 times daily as needed        loperamide (IMODIUM A-D) 2 MG tablet Take 2 tabs (4 mg) after first loose stool, and then take one tab (2 mg) after each diarrheal stool.  Max of 8 tabs (16 mg) per day. 1 tablet 0     MAGNESIUM LACTATE PO Take 140-210  mg by mouth nightly as needed        MELATONIN PO Take 1-3 mg by mouth nightly as needed        Menaquinone-7 (VITAMIN K2 PO) Take 1 tablet by mouth every morning        METHYLPREDNISOLONE PO Take 40 mg by mouth as needed        Multiple Vitamin (DAILY MULTIVITAMIN PO) Take 1 tablet by mouth every morning        nebulizer nebulization as needed       Probiotic Product (PROBIOTIC DAILY PO) Take 1 capsule by mouth 2 times daily as needed        spironolactone (ALDACTONE) 25 MG tablet Take 0.5 tablets (12.5 mg) by mouth daily 90 tablet 3     SYNTHROID 150 MCG tablet 150 mcg for 6 days and 75 mcg for 1 day per week 90 tablet 3     TURMERIC PO Take 1 tablet by mouth every morning        UNABLE TO FIND MEDICATION NAME: Xylitol daily as needed for allergies       vitamin B complex with vitamin C (VITAMIN  B COMPLEX) PO tablet Take 1 tablet by mouth as needed       vitamin D3 (CHOLECALCIFEROL) 2000 units tablet Take 1 tablet by mouth daily 1 tablet 0     Zinc 15 MG CAPS Take 15 mg by mouth        DULoxetine (CYMBALTA) 30 MG capsule Take 1 capsule (30 mg) by mouth daily (Patient not taking: Reported on 5/20/2021) 30 capsule 1     nitroGLYcerin (NITROSTAT) 0.4 MG sublingual tablet For chest pain place 1 tablet under the tongue every 5 minutes for 3 doses. If symptoms persist 5 minutes after 1st dose call 911. (Patient not taking: Reported on 5/20/2021) 30 tablet 1     sildenafil (REVATIO) 20 MG tablet Take 1 tablet (20 mg) by mouth 3 times daily (Patient not taking: Reported on 5/20/2021) 60 capsule 3     UNABLE TO FIND 2 times daily as needed MEDICATION NAME: Standard Process, Immune Congaplex. Pt taking 2 tablets daily          PAST SURGICAL HISTORY:  Past Surgical History:   Procedure Laterality Date     BUNIONECTOMY Bilateral      CV CORONARY ANGIOGRAM N/A 9/26/2019    Procedure: CV CORONARY ANGIOGRAM;  Surgeon: Erickson Trejo MD;  Location:  [facility]     CV RIGHT HEART CATH MEASUREMENTS RECORDED N/A  7/20/2020    Procedure: CV RIGHT HEART CATH;  Surgeon: Alonso Ordonez MD;  Location:  [facility]     EP ABLATION / EP STUDIES  04/21/2017     EXPLORE NECK N/A 9/19/2018    Procedure: EXPLORE NECK;  Neck Exploration Resection of Left superior Parathyroid gland;  Surgeon: Kristine Venegas MD;  Location: [facility] OR      CORRECT BUNION,SIMPLE       PARATHYROIDECTOMY N/A 9/19/2018    Procedure: PARATHYROIDECTOMY;;  Surgeon: Kristine Venegas MD;  Location: [facility] OR     [facility] INSERT OF NEW OR REPL W/VENT LEAD  04/21/2017     TONSILLECTOMY & ADENOIDECTOMY         ALLERGIES:     Allergies   Allergen Reactions     Tetracycline Swelling     Viagra [Sildenafil] Muscle Pain (Myalgia)     Zofran [Ondansetron]      Prolonged QT  Prolonged QT at baseline per patient     Flagyl [Metronidazole] Rash     Buspar [Buspirone]      Muscle weakness     Corn Oil Other (See Comments)     Headache       Seasonal Allergies Difficulty breathing     Sulfamethoxazole-Trimethoprim      Other reaction(s): Other  Passed out     Cyclobenzaprine Other (See Comments)     Extreme heat intolerance     Levaquin [Levofloxacin]      Other reaction(s): Other  Tendon rupture     Nsaids Other (See Comments)     Exacerbated asthma       FAMILY HISTORY:  Family History   Problem Relation Age of Onset     Hypothyroidism Mother      Hypertension Mother      Osteoarthritis Mother      Coronary Artery Disease Father         nonfatal MI in his 70s     Asthma Father      Diabetes Sister      Hypothyroidism Sister      Breast Cancer Maternal Aunt      Anxiety Disorder Sister          SOCIAL HISTORY:  Social History     Tobacco Use     Smoking status: Former Smoker     Packs/day: 0.00     Years: 0.00     Pack years: 0.00     Smokeless tobacco: Never Used   Substance Use Topics     Alcohol use: Yes     Alcohol/week: 0.0 - 8.0 standard drinks     Frequency: 2-3 times a week     Drinks per session: 1 or 2     Binge frequency: Never     Comment: seldom      Drug use: No       ROS:   Constitutional: No fever, chills, or sweats. Weight stable.   ENT: No visual disturbance, ear ache, epistaxis, sore throat reported.   Cardiovascular: As per HPI.   Respiratory: No cough, hemoptysis.    GI: No symptoms reported.   : No complaints  Integument: Negative.   Psychiatric: Negative.   Hematologic:   no easy bleeding.  Neuro: Negative.   Endocrinology: No significant heat or cold intolerance   Musculoskeletal: No new symptoms    Exam:  [facility] 08/21/2007   GENERAL APPEARANCE: healthy, alert and no distress  RESPIRATORY: - no rales, rhonchi or wheezes, no use of accessory muscles, no retractions, respirations are unlabored, normal respiratory rate  NEURO: alert and oriented to person/place/time, normal speech, gait and affect  SKIN: no ecchymoses, no rashes    Labs:  CBC RESULTS:   Lab Results   Component Value Date    WBC 5.9 12/20/2020    RBC 3.86 12/20/2020    HGB 12.4 12/20/2020    HCT 38.7 12/20/2020     12/20/2020    MCH 32.1 12/20/2020    MCHC 32.0 12/20/2020    RDW 12.3 12/20/2020     12/20/2020       BMP RESULTS:  Lab Results   Component Value Date     04/27/2021    POTASSIUM 4.4 04/27/2021    CHLORIDE 107 04/27/2021    CO2 28 04/27/2021    ANIONGAP 5 04/27/2021    GLC 87 04/27/2021    BUN 10 04/27/2021    CR 0.77 04/27/2021    GFRESTIMATED 82 04/27/2021    GFRESTBLACK >90 04/27/2021    MANJU 9.5 04/27/2021        INR RESULTS:  Lab Results   Component Value Date    INR 1.02 10/06/2019    INR 0.95 06/28/2017       Procedures:  PULMONARY FUNCTION TESTS:   No flowsheet data found.      ECHOCARDIOGRAM:  2/21:   Left Ventricle  Left ventricular size is normal. Left ventricular wall thickness cannot  evaluate. Moderately (EF 35-40%) reduced left ventricular function is present.  Grade I or early diastolic dysfunction. Abnormal septal motion consistent with  pacemaker is present.     Right Ventricle  The right ventricle is normal size. Global right ventricular  function is  normal. A pacemaker lead is noted in the right ventricle.     Atria  Both atria appear normal.    MR CARDIAC May 2021     1. The LV is normal in cavity size and wall thickness. The global systolic function is mildly reduced. The  LVEF is 50%. There are no regional wall motion abnormalities. There is abnormal septal motion related to  pacing.     2. The RV is normal in cavity size. The global systolic function is normal. The RVEF is 65%.      3. The left atrium is mildly dilated.     4. There is no significant valvular disease.      5. Late gadolinium enhancement imaging shows no MI, fibrosis or infiltrative disease.      6. Regadenoson stress perfusion imaging shows no ischemia.     7. There is no pericardial effusion or thickening.     8. There is no LV thrombus.     CONCLUSIONS: No myocardial ischemia or fibrosis. Mild NICM possibly related to pacing, LVEF 50% and RVEF  65%.     Assessment and Plan:  1.  Status post complete heart block with pacemaker in situ following catheter ablation procedure at [facility]  2.  Marked exertional intolerance  3.  Recent MR scan suggests normal left ventricular systolic function with no late gadolinium enhancement.  Left atrium is moderately enlarged suggesting some diastolic dysfunction  4.  Probable volume depletion related to diuretic  5.  Apparent intolerance to sildenafil due to leg pain    Plan  1.  Encourage patient to try sildenafil again to see if the side effect recurs  2.  Reduced diuretic load by diminishing Lasix to 20 mg twice per week and current spironolactone to 12.5 mg 3 times per week  3.  Target blood pressure systolic of 100-110  4.  Okay to start low-dose Cymbalta under the direction of family nurse practitioner Dr. Ce Crowe  5.  Follow-up clinic visit in 2 months    Video on 10: 00 a.m.; video off 10: 30  Platform Doximity  Patient at home; clinic [facility] heart    Total elapsed time with documentation 45 minutes    I very much  appreciated the opportunity to see and assess Joyce Marroquin in the clinic today. Please do not hesitate to contact my office if you have any questions or concerns.      Lino Funes MD  Cardiac Arrhythmia Service  [facility]  [phone]      CC  SELF, REFERRED

## 2021-05-20 NOTE — LETTER
"5/20/2021      RE: Joyce Marroquin  [address] [facility] [address]       Dear Colleague,    Thank you for the opportunity to participate in the care of your patient, Joyce Marroquin, at the [facility] [place] at [facility]. Please see a copy of my visit note below.    Joyce is a 60 year old who is being evaluated via a billable video visit.      How would you like to obtain your AVS? MyChart  If the video visit is dropped, the invitation should be resent by: Text to cell phone: [phone]  Will anyone else be joining your video visit? No      Vitals - Patient Reported  Systolic (Patient Reported): 97  Diastolic (Patient Reported): 70  Weight (Patient Reported): 74.4 kg (164 lb)  Height (Patient Reported): 165.1 cm (5' 5\")  BMI (Based on Pt Reported Ht/Wt): 27.29  SpO2 (Patient Reported): 97  Pulse (Patient Reported): 106  Pain Score: No Pain (0)(SOB)  Pain Loc: Chest    HPI:     60-year-old female with a past medical history of RFA at [facility] complicated by complete heart block requiring pacemaker placement in 2017.       Now Joyce has been troubled by  progressively worsening diminished exercise tolerance.     Recently we have evaluated her left ventricular function from several perspectives.  Essentially a recent MR scan was consistent with prior imaging but suggested actually an improved ejection fraction on the left side of 50% and on the right side of 65%.      Joyce does have previously documented increase in pulmonary artery pressures with exercise but not at rest.  I tried to introduce sildenafil which she took for only a few days and then had severe thigh and leg pain which required her to stop it.  She was confident that it was the drug.  I have asked her to wait a week and then try again to be sure that it was indeed related to sildenafil since we do not have very many treatment options available.     I suspect that Jyoce does " have some degree of diastolic dysfunction causing her exertional intolerance.  We did improve her chronotropic response on the pacemaker last week and is seems to have had some marginal benefit in that she was able to get up from the chair and walk across the room more effectively.  Additionally she did report heart rates in the low 100s while taking her blood pressure today.  On the other hand forward cardiac output may be limited by diminished central volume since she has been taking Lasix periodically for edema.    At today's visit I did discuss the importance of having some preload volume in order to maintain an adequate cardiac output and blood pressure.  In the past lower his blood pressure typically has been in the 90s but now periodically she will have values in the 80s and feel poorly.  I have suggested that we should try to target achieving blood pressures in the 100-110 range by diminishing her diuretic regimen.  I warned her that this may cause some increasing edema and will have to deal with that later.  To this end she will cut her Lasix to no more than 20 mg on 2 days a week.  She has also been on low-dose spironolactone (12.5 mg approximately each day).  I have reduced that to alternate days.    Joyce is concerned about her QT measurement.  However it is based on 100% cardiac pacing and may not be as predictive of future arrhythmia problem as if she were in a native rhythm.  There is discussion largely relates to her desire to take low-dose Cymbalta which would be supervised by her family nurse practitioner .  I believe that it may be helpful to Joyce to take Cymbalta given her overall health concerns and I approved that staff.    Joyce will keep track of her blood pressure and I will make arrangements for follow-up in a couple months    PAST MEDICAL HISTORY:  Past Medical History:   Diagnosis Date     Arthritis      Cardiomyopathy (H)     ff Cardiology     Chronic depressive personality  disorder      Congestive heart failure (H) see dr martínez    heart failure correct term     COPD exacerbation (H)      Esophageal reflux      Ex-smoker     quit 2006; 1 PPD x 30     Hyperparathyroidism (H)     s/p parathyroidectomy     Hypothyroidism      LARRY on CPAP     ff Sleep medicine     Pulmonary nodules     ff Pulmonologist     S/P cardiac pacemaker procedure     [facility]      Uncomplicated asthma years       CURRENT MEDICATIONS:  Current Outpatient Medications   Medication Sig Dispense Refill     acetaminophen (TYLENOL) 500 MG tablet Take 500-1,000 mg by mouth every 6 hours as needed for mild pain       albuterol (PROAIR HFA/PROVENTIL HFA/VENTOLIN HFA) 108 (90 BASE) MCG/ACT Inhaler Inhale 2 puffs into the lungs as needed for shortness of breath / dyspnea or wheezing        apixaban ANTICOAGULANT (ELIQUIS) 5 MG tablet Take 1 tablet (5 mg) by mouth 2 times daily 120 tablet 3     azelastine (ASTELIN) 0.1 % nasal spray Spray 1 spray into both nostrils 2 times daily as needed        Calcium Lactate 500 MG CAPS Take 250-500 mg by mouth daily        Calcium-Magnesium-Vitamin D (CALCIUM MAGNESIUM PO) Calcium 120 mg, magenesium 20 mg, iodine 20 mg as needed at bedtime       carvedilol (COREG) 3.125 MG tablet Take 1 tablet (3.125 mg) by mouth 2 times daily (with meals) 180 tablet 3     clonazePAM (KLONOPIN) 0.5 MG tablet TAKE 1 TABLET(0.5 MG) BY MOUTH THREE TIMES DAILY AS NEEDED FOR ANXIETY 90 tablet 0     DiphenhydrAMINE HCl (BENADRYL PO) Take 25 mg by mouth nightly as needed for allergies        Ferrous Sulfate (IRON SUPPLEMENT PO) Take 65 mg by mouth daily        fluocinolone acetonide (DERMA SMOOTHE/FS BODY) 0.01 % external oil Use for ears.  2 times daily for 7 days 1 Bottle 0     fluticasone-salmeterol (ADVAIR) 250-50 MCG/DOSE inhaler Inhale 1 puff into the lungs every 12 hours       furosemide (LASIX) 20 MG tablet Take 1 tablet (20 mg) by mouth 2 times daily as needed (for fluid  retention) 180 tablet 1     ipratropium (ATROVENT) 0.06 % nasal spray Spray 2 sprays into both nostrils 4 times daily as needed        loperamide (IMODIUM A-D) 2 MG tablet Take 2 tabs (4 mg) after first loose stool, and then take one tab (2 mg) after each diarrheal stool.  Max of 8 tabs (16 mg) per day. 1 tablet 0     MAGNESIUM LACTATE PO Take 140-210 mg by mouth nightly as needed        MELATONIN PO Take 1-3 mg by mouth nightly as needed        Menaquinone-7 (VITAMIN K2 PO) Take 1 tablet by mouth every morning        METHYLPREDNISOLONE PO Take 40 mg by mouth as needed        Multiple Vitamin (DAILY MULTIVITAMIN PO) Take 1 tablet by mouth every morning        nebulizer nebulization as needed       Probiotic Product (PROBIOTIC DAILY PO) Take 1 capsule by mouth 2 times daily as needed        spironolactone (ALDACTONE) 25 MG tablet Take 0.5 tablets (12.5 mg) by mouth daily 90 tablet 3     SYNTHROID 150 MCG tablet 150 mcg for 6 days and 75 mcg for 1 day per week 90 tablet 3     TURMERIC PO Take 1 tablet by mouth every morning        UNABLE TO FIND MEDICATION NAME: Xylitol daily as needed for allergies       vitamin B complex with vitamin C (VITAMIN  B COMPLEX) PO tablet Take 1 tablet by mouth as needed       vitamin D3 (CHOLECALCIFEROL) 2000 units tablet Take 1 tablet by mouth daily 1 tablet 0     Zinc 15 MG CAPS Take 15 mg by mouth        DULoxetine (CYMBALTA) 30 MG capsule Take 1 capsule (30 mg) by mouth daily (Patient not taking: Reported on 5/20/2021) 30 capsule 1     nitroGLYcerin (NITROSTAT) 0.4 MG sublingual tablet For chest pain place 1 tablet under the tongue every 5 minutes for 3 doses. If symptoms persist 5 minutes after 1st dose call 911. (Patient not taking: Reported on 5/20/2021) 30 tablet 1     sildenafil (REVATIO) 20 MG tablet Take 1 tablet (20 mg) by mouth 3 times daily (Patient not taking: Reported on 5/20/2021) 60 capsule 3     UNABLE TO FIND 2 times daily as needed MEDICATION NAME: Standard Process,  Immune Congaplex. Pt taking 2 tablets daily          PAST SURGICAL HISTORY:  Past Surgical History:   Procedure Laterality Date     BUNIONECTOMY Bilateral      CV CORONARY ANGIOGRAM N/A 9/26/2019    Procedure: CV CORONARY ANGIOGRAM;  Surgeon: Erickson Trejo MD;  Location:  HEART CARDIAC CATH LAB     CV RIGHT HEART CATH MEASUREMENTS RECORDED N/A 7/20/2020    Procedure: CV RIGHT HEART CATH;  Surgeon: Alonso Ordonez MD;  Location:  HEART CARDIAC CATH LAB     EP ABLATION / EP STUDIES  04/21/2017     EXPLORE NECK N/A 9/19/2018    Procedure: EXPLORE NECK;  Neck Exploration Resection of Left superior Parathyroid gland;  Surgeon: Kristine Venegas MD;  Location: [facility] OR     HC CORRECT BUNION,SIMPLE       PARATHYROIDECTOMY N/A 9/19/2018    Procedure: PARATHYROIDECTOMY;;  Surgeon: Kristine Venegas MD;  Location: [facility] OR     PPM INSERT OF NEW OR REPL W/VENT LEAD  04/21/2017     TONSILLECTOMY & ADENOIDECTOMY         ALLERGIES:     Allergies   Allergen Reactions     Tetracycline Swelling     Viagra [Sildenafil] Muscle Pain (Myalgia)     Zofran [Ondansetron]      Prolonged QT  Prolonged QT at baseline per patient     Flagyl [Metronidazole] Rash     Buspar [Buspirone]      Muscle weakness     Corn Oil Other (See Comments)     Headache       Seasonal Allergies Difficulty breathing     Sulfamethoxazole-Trimethoprim      Other reaction(s): Other  Passed out     Cyclobenzaprine Other (See Comments)     Extreme heat intolerance     Levaquin [Levofloxacin]      Other reaction(s): Other  Tendon rupture     Nsaids Other (See Comments)     Exacerbated asthma       FAMILY HISTORY:  Family History   Problem Relation Age of Onset     Hypothyroidism Mother      Hypertension Mother      Osteoarthritis Mother      Coronary Artery Disease Father         nonfatal MI in his 70s     Asthma Father      Diabetes Sister      Hypothyroidism Sister      Breast Cancer Maternal Aunt      Anxiety Disorder Sister          SOCIAL HISTORY:  Social  History     Tobacco Use     Smoking status: Former Smoker     Packs/day: 0.00     Years: 0.00     Pack years: 0.00     Smokeless tobacco: Never Used   Substance Use Topics     Alcohol use: Yes     Alcohol/week: 0.0 - 8.0 standard drinks     Frequency: 2-3 times a week     Drinks per session: 1 or 2     Binge frequency: Never     Comment: seldom     Drug use: No       ROS:   Constitutional: No fever, chills, or sweats. Weight stable.   ENT: No visual disturbance, ear ache, epistaxis, sore throat reported.   Cardiovascular: As per HPI.   Respiratory: No cough, hemoptysis.    GI: No symptoms reported.   : No complaints  Integument: Negative.   Psychiatric: Negative.   Hematologic:   no easy bleeding.  Neuro: Negative.   Endocrinology: No significant heat or cold intolerance   Musculoskeletal: No new symptoms    Exam:  [facility] 08/21/2007   GENERAL APPEARANCE: healthy, alert and no distress  RESPIRATORY: - no rales, rhonchi or wheezes, no use of accessory muscles, no retractions, respirations are unlabored, normal respiratory rate  NEURO: alert and oriented to person/place/time, normal speech, gait and affect  SKIN: no ecchymoses, no rashes    Labs:  CBC RESULTS:   Lab Results   Component Value Date    WBC 5.9 12/20/2020    RBC 3.86 12/20/2020    HGB 12.4 12/20/2020    HCT 38.7 12/20/2020     12/20/2020    MCH 32.1 12/20/2020    MCHC 32.0 12/20/2020    RDW 12.3 12/20/2020     12/20/2020       BMP RESULTS:  Lab Results   Component Value Date     04/27/2021    POTASSIUM 4.4 04/27/2021    CHLORIDE 107 04/27/2021    CO2 28 04/27/2021    ANIONGAP 5 04/27/2021    GLC 87 04/27/2021    BUN 10 04/27/2021    CR 0.77 04/27/2021    GFRESTIMATED 82 04/27/2021    GFRESTBLACK >90 04/27/2021    MANJU 9.5 04/27/2021        INR RESULTS:  Lab Results   Component Value Date    INR 1.02 10/06/2019    INR 0.95 06/28/2017       Procedures:  PULMONARY FUNCTION TESTS:   No flowsheet data found.      ECHOCARDIOGRAM:  2/21:   Left  Ventricle  Left ventricular size is normal. Left ventricular wall thickness cannot  evaluate. Moderately (EF 35-40%) reduced left ventricular function is present.  Grade I or early diastolic dysfunction. Abnormal septal motion consistent with  pacemaker is present.     Right Ventricle  The right ventricle is normal size. Global right ventricular function is  normal. A pacemaker lead is noted in the right ventricle.     Atria  Both atria appear normal.    MR CARDIAC May 2021     1. The LV is normal in cavity size and wall thickness. The global systolic function is mildly reduced. The  LVEF is 50%. There are no regional wall motion abnormalities. There is abnormal septal motion related to  pacing.     2. The RV is normal in cavity size. The global systolic function is normal. The RVEF is 65%.      3. The left atrium is mildly dilated.     4. There is no significant valvular disease.      5. Late gadolinium enhancement imaging shows no MI, fibrosis or infiltrative disease.      6. Regadenoson stress perfusion imaging shows no ischemia.     7. There is no pericardial effusion or thickening.     8. There is no LV thrombus.     CONCLUSIONS: No myocardial ischemia or fibrosis. Mild NICM possibly related to pacing, LVEF 50% and RVEF  65%.     Assessment and Plan:  1.  Status post complete heart block with pacemaker in situ following catheter ablation procedure at [facility]  2.  Marked exertional intolerance  3.  Recent MR scan suggests normal left ventricular systolic function with no late gadolinium enhancement.  Left atrium is moderately enlarged suggesting some diastolic dysfunction  4.  Probable volume depletion related to diuretic  5.  Apparent intolerance to sildenafil due to leg pain    Plan  1.  Encourage patient to try sildenafil again to see if the side effect recurs  2.  Reduced diuretic load by diminishing Lasix to 20 mg twice per week and current spironolactone to 12.5 mg 3 times per week  3.  Target blood  pressure systolic of 100-110  4.  Okay to start low-dose Cymbalta under the direction of family nurse practitioner Dr. Ce Crowe  5.  Follow-up clinic visit in 2 months    Video on 10: 00 a.m.; video off 10: 30  Platform Doximity  Patient at home; clinic [facility] heart    Total elapsed time with documentation 45 minutes    I very much appreciated the opportunity to see and assess Joyce Marroquin in the clinic today. Please do not hesitate to contact my office if you have any questions or concerns.      Lino Funes MD  Cardiac Arrhythmia Service  [facility]  [phone]      CC  SELF, REFERRED

## 2021-05-20 NOTE — TELEPHONE ENCOUNTER
Prior Authorization Approval    Medication: sildenafil cirate 20MG was approved on 5/15/2021  Effective: 5/12/2021 to 5/12/2022  Reference #: [id number]  Approved Dose/Quantity:   Insurance Company: [employer] - Phone [phone] Fax [phone]  Expected CoPay:    Pharmacy Filling the Rx: [id number] - [place], [place] - [address] [facility]  Pharmacy Notified: Yes  Patient Notified: Comment:  Pt picked up

## 2021-05-20 NOTE — NURSING NOTE
Plan  1.  Encourage patient to try sildenafil again to see if the side effect recurs  2.  Reduced diuretic load by diminishing Lasix to 20 mg twice per week and current spironolactone to 12.5 mg 3 times per week  3.  Target blood pressure systolic of 100-110  4.  Okay to start low-dose Cymbalta under the direction of family nurse practitioner Dr. Ce Crowe  5.  Follow-up clinic visit in 2 months  --------------------------  Updated med list with new dosing, ordered follow up appt and marked chart ready for checkout. Tonya Cloud RN on 5/20/2021 at 2:15 PM

## 2021-05-25 ENCOUNTER — OFFICE VISIT (OUTPATIENT)
Dept: CARDIOLOGY | Facility: CLINIC | Age: 61
End: 2021-05-25
Attending: INTERNAL MEDICINE
Payer: COMMERCIAL

## 2021-05-25 VITALS
HEIGHT: 65 IN | BODY MASS INDEX: 29.57 KG/M2 | SYSTOLIC BLOOD PRESSURE: 124 MMHG | WEIGHT: 177.5 LBS | DIASTOLIC BLOOD PRESSURE: 79 MMHG | HEART RATE: 67 BPM | OXYGEN SATURATION: 94 %

## 2021-05-25 DIAGNOSIS — I50.30 NYHA CLASS 3 HEART FAILURE WITH PRESERVED EJECTION FRACTION (H): ICD-10-CM

## 2021-05-25 DIAGNOSIS — I10 BENIGN ESSENTIAL HYPERTENSION: Primary | ICD-10-CM

## 2021-05-25 DIAGNOSIS — I48.0 PAROXYSMAL ATRIAL FIBRILLATION (H): ICD-10-CM

## 2021-05-25 DIAGNOSIS — I42.8 NONISCHEMIC CARDIOMYOPATHY (H): ICD-10-CM

## 2021-05-25 LAB
ALBUMIN SERPL-MCNC: 3.4 G/DL (ref 3.4–5)
ALP SERPL-CCNC: 114 U/L (ref 40–150)
ALT SERPL W P-5'-P-CCNC: 29 U/L (ref 0–50)
ANION GAP SERPL CALCULATED.3IONS-SCNC: 7 MMOL/L (ref 3–14)
AST SERPL W P-5'-P-CCNC: 17 U/L (ref 0–45)
BILIRUB SERPL-MCNC: 0.4 MG/DL (ref 0.2–1.3)
BUN SERPL-MCNC: 12 MG/DL (ref 7–30)
CALCIUM SERPL-MCNC: 8.7 MG/DL (ref 8.5–10.1)
CHLORIDE SERPL-SCNC: 107 MMOL/L (ref 94–109)
CO2 SERPL-SCNC: 24 MMOL/L (ref 20–32)
CREAT SERPL-MCNC: 0.64 MG/DL (ref 0.52–1.04)
ERYTHROCYTE [DISTWIDTH] IN BLOOD BY AUTOMATED COUNT: 11.9 % (ref 10–15)
GFR SERPL CREATININE-BSD FRML MDRD: >90 ML/MIN/{1.73_M2}
GLUCOSE SERPL-MCNC: 92 MG/DL (ref 70–99)
HCT VFR BLD AUTO: 41.2 % (ref 35–47)
HGB BLD-MCNC: 13.7 G/DL (ref 11.7–15.7)
MCH RBC QN AUTO: 31.6 PG (ref 26.5–33)
MCHC RBC AUTO-ENTMCNC: 33.3 G/DL (ref 31.5–36.5)
MCV RBC AUTO: 95 FL (ref 78–100)
NT-PROBNP SERPL-MCNC: 156 PG/ML (ref 0–125)
PLATELET # BLD AUTO: 212 10E9/L (ref 150–450)
POTASSIUM SERPL-SCNC: 4.2 MMOL/L (ref 3.4–5.3)
PROT SERPL-MCNC: 7.3 G/DL (ref 6.8–8.8)
RBC # BLD AUTO: 4.33 10E12/L (ref 3.8–5.2)
SODIUM SERPL-SCNC: 137 MMOL/L (ref 133–144)
WBC # BLD AUTO: 6.6 10E9/L (ref 4–11)

## 2021-05-25 PROCEDURE — G0463 HOSPITAL OUTPT CLINIC VISIT: HCPCS

## 2021-05-25 PROCEDURE — 80053 COMPREHEN METABOLIC PANEL: CPT | Performed by: PATHOLOGY

## 2021-05-25 PROCEDURE — 83880 ASSAY OF NATRIURETIC PEPTIDE: CPT | Performed by: PATHOLOGY

## 2021-05-25 PROCEDURE — 99214 OFFICE O/P EST MOD 30 MIN: CPT | Performed by: INTERNAL MEDICINE

## 2021-05-25 PROCEDURE — 36415 COLL VENOUS BLD VENIPUNCTURE: CPT | Performed by: PATHOLOGY

## 2021-05-25 PROCEDURE — 85027 COMPLETE CBC AUTOMATED: CPT | Performed by: PATHOLOGY

## 2021-05-25 RX ORDER — SILDENAFIL 10 MG/ML
10 POWDER, FOR SUSPENSION ORAL EVERY 8 HOURS
Qty: 112 ML | Refills: 11 | Status: SHIPPED | OUTPATIENT
Start: 2021-05-25 | End: 2021-05-25

## 2021-05-25 RX ORDER — SILDENAFIL 10 MG/ML
10 POWDER, FOR SUSPENSION ORAL EVERY 8 HOURS
Qty: 112 ML | Refills: 11 | Status: SHIPPED | OUTPATIENT
Start: 2021-05-25 | End: 2021-11-19 | Stop reason: SINTOL

## 2021-05-25 ASSESSMENT — MIFFLIN-ST. JEOR: SCORE: 1366.01

## 2021-05-25 ASSESSMENT — PAIN SCALES - GENERAL: PAINLEVEL: NO PAIN (0)

## 2021-05-25 NOTE — PATIENT INSTRUCTIONS
Dr. Ordonez recommends:    Stop taking Coreg.  Start taking Metoprolol Succinate 12.5 MG once daily.  Take Spironolactone 12.5 MG once daily.  Take Revatio 10 MG three times a day. A prescription was sent in to your pharmacy. They do not make the 10 MG tablets, however they have it in liquid form. The prescription is requiring a prior auth. A prior auth is being sent to insurance company. You will be contacted with their reply.  Follow up clinic visit with Dr. Ordonez in 3 months with labs same day.      Thank you for your visit today.  Please call me with any questions or concerns.   Matthias García RN  Cardiology Care Coordinator  [phone], press option 1 then option 4

## 2021-05-25 NOTE — NURSING NOTE
Chief Complaint   Patient presents with     Follow Up     3 month f/u     Vitals were taken and medication reconciled.    RICHARD Becerril  9:17 AM

## 2021-05-25 NOTE — LETTER
5/25/2021      RE: Joyce Marroquin  [id number] [facility] [id number]       Dear Colleague,    Thank you for the opportunity to participate in the care of your patient, Joyce Marroquin, at the [facility] [place] at [facility]. Please see a copy of my visit note below.    May 25, 2021  Ms. Marroquin is a very pleasant lady who works as a nurse anesthetist and has a history of RF ablation which left to AV node ablation and complete heart block requiring a dual-chamber pacemaker implantation, which is a [employer] device MRI compatible.  She also has a recent history of heart failure with reduced ejection fraction with EF around 35% later improved to 45%.  There was a history of Takotsubo cardiomyopathy.  Patient on 7/2 had meeting with EP at which time low dose carvedilol was started 3.125 mg PO bid due to atrial ectopy and short runs of VT. she did recently complete an exercise out of catheterization with myself which showed normal resting biventricular filling pressures and normal cardiac output.  She did have limited augmentation of cardiac index with exercise and filling pressures especially pulmonary capillary wedge pressure increased significantly consistent with heart failure preserved ejection fraction.    She did initially better but for the past couple of months she has not been feeling well.  She did gain some weight and increased the Lasix to 40 mg as needed.  She does not feel that these helped for her.  In fact at times she feels that she had some confusion or disorientation.  Possibly she is dry at times.  She did see in follow-up with core clinic he had some of the instructions were confusing and she appropriately notes that the discharge papers stated a lot of extra information.  I did see her and performed a CPX that was marginal as well as a cardiac MRI that showed a significantly improved LV function to 50% and no other significant  abnormalities. She did meet with Dr. Funes and sildenafil was initiated yet rapidly discontinued due to muscle aches. Dr. Funes also adjusted the rate response to allow for faster heart rates in an attempt to improve forward flow. In addition, she was felt to be dry and lasix has been reduced to 20mg 2 times per week. Today she is here for follow up.  Notes that she continues to feel short of breath with exertion and often she needs to stop and sit down to take a rest.  At rest she is doing okay however that her symptoms have worsened since some.  Her weight has been stable and reduce Lasix to 20 mg 3 times a week 1-2 times a week as needed.  As above she did not tolerate the sildenafil.  Notes that her brother is usually running lower at home in the 80s systolic while here in the hospital it is always in the 110-120s range.  No other complaints.    PAST MEDICAL HISTORY:  Past Medical History:   Diagnosis Date     Arthritis      Cardiomyopathy (H)     ff Cardiology     Chronic depressive personality disorder      Congestive heart failure (H) see dr martínez    heart failure correct term     COPD exacerbation (H)      Esophageal reflux      Ex-smoker     quit 2006; 1 PPD x 30     Hyperparathyroidism (H)     s/p parathyroidectomy     Hypothyroidism      LARRY on CPAP     ff Sleep medicine     Pulmonary nodules     ff Pulmonologist     S/P cardiac pacemaker procedure     checked every 6 months at the [facility]     Uncomplicated asthma years     FAMILY HISTORY:  Family History   Problem Relation Age of Onset     Hypothyroidism Mother      Hypertension Mother      Osteoarthritis Mother      Coronary Artery Disease Father         nonfatal MI in his 70s     Asthma Father      Diabetes Sister      Hypothyroidism Sister      Breast Cancer Maternal Aunt      Anxiety Disorder Sister      SOCIAL HISTORY:  Social History     Socioeconomic History     Marital status:      Spouse name: Not on file     Number of children: Not on  file     Years of education: Not on file     Highest education level: Master's degree (e.g., MA, MS, Florian, MEd, MSW, SONIA)   Occupational History     Not on file   Social Needs     Financial resource strain: Not very hard     Food insecurity     Worry: Never true     Inability: Never true     Transportation needs     Medical: No     Non-medical: No   Tobacco Use     Smoking status: Former Smoker     Packs/day: 0.00     Years: 0.00     Pack years: 0.00     Smokeless tobacco: Never Used   Substance and Sexual Activity     Alcohol use: Yes     Alcohol/week: 0.0 - 8.0 standard drinks     Frequency: 2-3 times a week     Drinks per session: 1 or 2     Binge frequency: Never     Comment: seldom     Drug use: No     Sexual activity: Yes     Partners: Male     Birth control/protection: None     Comment: vasectomy   Lifestyle     Physical activity     Days per week: 3 days     Minutes per session: 10 min     Stress: To some extent   Relationships     Social connections     Talks on phone: More than three times a week     Gets together: Never     Attends Gnosticism service: More than 4 times per year     Active member of club or organization: Yes     Attends meetings of clubs or organizations: More than 4 times per year     Relationship status:      Intimate partner violence     Fear of current or ex partner: Not on file     Emotionally abused: Not on file     Physically abused: Not on file     Forced sexual activity: Not on file   Other Topics Concern     Parent/sibling w/ CABG, MI or angioplasty before 65F 55M? No   Social History Narrative    CRNA on disability for vestibular symptoms      CURRENT MEDICATIONS:  Current Outpatient Medications   Medication     acetaminophen (TYLENOL) 500 MG tablet     albuterol (PROAIR HFA/PROVENTIL HFA/VENTOLIN HFA) 108 (90 BASE) MCG/ACT Inhaler     apixaban ANTICOAGULANT (ELIQUIS) 5 MG tablet     azelastine (ASTELIN) 0.1 % nasal spray     Calcium Lactate 500 MG CAPS      "Calcium-Magnesium-Vitamin D (CALCIUM MAGNESIUM PO)     carvedilol (COREG) 3.125 MG tablet     clonazePAM (KLONOPIN) 0.5 MG tablet     DiphenhydrAMINE HCl (BENADRYL PO)     DULoxetine (CYMBALTA) 30 MG capsule     Ferrous Sulfate (IRON SUPPLEMENT PO)     fluocinolone acetonide (DERMA SMOOTHE/FS BODY) 0.01 % external oil     fluticasone-salmeterol (ADVAIR) 250-50 MCG/DOSE inhaler     furosemide (LASIX) 20 MG tablet     ipratropium (ATROVENT) 0.06 % nasal spray     loperamide (IMODIUM A-D) 2 MG tablet     MAGNESIUM LACTATE PO     MELATONIN PO     Menaquinone-7 (VITAMIN K2 PO)     METHYLPREDNISOLONE PO     Multiple Vitamin (DAILY MULTIVITAMIN PO)     nebulizer nebulization     Probiotic Product (PROBIOTIC DAILY PO)     sildenafil (REVATIO) 20 MG tablet     spironolactone (ALDACTONE) 25 MG tablet     SYNTHROID 150 MCG tablet     TURMERIC PO     UNABLE TO FIND     UNABLE TO FIND     vitamin B complex with vitamin C (VITAMIN  B COMPLEX) PO tablet     vitamin D3 (CHOLECALCIFEROL) 2000 units tablet     Zinc 15 MG CAPS     No current facility-administered medications for this visit.      ROS:   Constitutional: No fever, chills, or sweats. Weight is 0 lbs 0 oz  ENT: No visual disturbance, ear ache, epistaxis, sore throat.   Allergies/Immunologic: Negative.   Respiratory: No cough, hemoptysis.   Cardiovascular: As per HPI.   GI: No nausea, vomiting, hematemesis, melena, or hematochezia.   : No urinary frequency, dysuria, or hematuria.   Integument: Negative.   Psychiatric: Pleasant, no major depression noted  Neuro: No focal neurological deficits noted  Endocrinology: Negative.   Musculoskeletal: As per HPI.      EXAM:  /79 (BP Location: Right arm, Patient Position: Chair, Cuff Size: Adult Regular)   Pulse 67   Ht 1.651 m (5' 5\")   Wt 80.5 kg (177 lb 8 oz)   LMP 08/21/2007   SpO2 94%   BMI 29.54 kg/m    General: appears comfortable, alert and oriented  Head: normocephalic, atraumatic  Eyes: anicteric sclera, EOMI , " PERRL  Neck: no adenopathy  Orophyarynx: moist mucosa, no lesions noted  Heart: regular, S1/S2, no murmurs, rubs or gallop. Estimated JVP at 5 cmH2O  Lungs: CTAB, No wheezing.   Abdomen: soft, non-tender, bowel sounds present, no hepatosplenomegaly  Extremities: No LE edema today  Skin: no open lesions noted  Neuro: grossly non-focal     Labs:  Lab Results   Component Value Date    WBC 5.9 12/20/2020    HGB 12.4 12/20/2020    HCT 38.7 12/20/2020     12/20/2020     04/27/2021    POTASSIUM 4.4 04/27/2021    CHLORIDE 107 04/27/2021    CO2 28 04/27/2021    BUN 10 04/27/2021    CR 0.77 04/27/2021    GLC 87 04/27/2021    SED 22 11/09/2017    DD 0.7 (H) 11/22/2017    NTBNPI 107 12/20/2020    NTBNP 133 (H) 02/18/2021    TROPONIN <0.07 01/04/2006    TROPI <0.015 12/20/2020    AST 18 12/20/2020    ALT 25 12/20/2020    ALKPHOS 83 12/20/2020    BILITOTAL 0.3 12/20/2020    INR 1.02 10/06/2019     NM Lexiscan 3/2020:  The nuclear stress test is negative for inducible myocardial ischemia or infarction.  Fixed septal/apical defect due to Paced rhythm and increased gut photon activity.LVEDv 133ml. LV ESv 40ml. LV EF 70%.     There is no prior study for comparison     Echocardiogram 3/2020:  Mildly (EF 40-45%) reduced left ventricular function is present.  Global right ventricular function is normal.  No pericardial effusion is present. IVC diameter <2.1 cm collapsing >50% with sniff suggests a normal RA pressure  of 3 mmHg. This study was compared with the study from 12/3/19.  There has been no change.      Coronary angiogram 9/2019:  Mild-moderate non-obstructive coronary disease involving LAD, LCx, and RCA. No significant coronary disease to explain new cardiomyopathy.     TTE 9/5/2019:  Moderately (EF 30-35%) reduced left ventricular function is present. All segments from mid to apical left ventricle are severely hypokinetic to akinetic. Basal segments are hypercontractile. Overall pattern is suggestive of stress  cardiomyopathy, but cannot rule out ischemic cardiomyopathy. Right ventricular function, chamber size, wall motion, and thickness are normal. No significant valve abnormalities. Mild pulmonary hypertension with increased RA pressure.     TTE 2/19/21:  Moderately (EF 35%) reduced left ventricular function is present.  Global right ventricular function is normal.  No significant valvular dysfunction. The inferior vena cava was normal in size with preserved respiratory variability. No pericardial effusion present.     MRI cardiac 4/2017:  1. Normal left ventricular size.  Left ventricular wall thickness is  normal except a very small area in the basal inferior septum that appears  thin in some images.  Real-time images suggest a very small area of  hypokinesis in the basal inferior septum (not well visualized).  Left  ventricular ejection fraction is estimated at about 60-65% (unable to   perform volumetric analysis given frequent ectopy; real time images used  to assess LVEF).  2. Normal right ventricular size and systolic function.  No obvious regional wall motion abnormalities noted.  3. Gadolinium imaging:  No obvious right ventricular myocardial late  gadolinium enhancement noted.  There is a very small area of probable late  gadolinium enhancement in the mildly thinned basal inferior segment.    4. Moderate left atrial dilatation.  The right atrial size is at the upper limits of normal.  5. Thickened mitral valve leaflets, without obvious stenosis or significant regurgitation. No obvious hemodynamically significant valvular dysfunction noted.   6. Normal sized aortic root and ascending aorta.  For the remainder of the thoracic aorta, see separate radiology report.  7. Normal pericardium without significant pericardial effusion.     Cardiac MRI 5/2021:  1. The LV is normal in cavity size and wall thickness. The global systolic function is mildly reduced. The LVEF is 50%. There are no regional wall motion  abnormalities. There is abnormal septal motion related to pacing.  2. The RV is normal in cavity size. The global systolic function is normal. The RVEF is 65%.   3. The left atrium is mildly dilated.  4. There is no significant valvular disease.   5. Late gadolinium enhancement imaging shows no MI, fibrosis or infiltrative disease.   6. Regadenoson stress perfusion imaging shows no ischemia.  7. There is no pericardial effusion or thickening.  8. There is no LV thrombus.  CONCLUSIONS: No myocardial ischemia or fibrosis. Mild NICM possibly related to pacing, LVEF 50% and RVEF 65%.      ASSESSMENT AND PLAN:  In summary, patient is a 60 year old lady with history as stated above including complete heart block in the setting of RF ablation status post dual-chamber pacemaker in 2017 who has ongoing symptoms of heart failure weight gain and very limited exercise tolerance.  I do believe her shortness of breath and limited exercise tolerance is probably multifactorial including the significant weight gain with AV node ablation and now with a more fixed heart rate in the setting of pacemaker use.  However certainly she could have heart failure with borderline reduced ejection fraction with more preserved component.  Right heart catheterization with exercise essentially confirmed the above findings.  Today again she feels relatively unchanged and short of breath with exertion.  This is very limiting to her and has to stop several times even during walking around the neighborhood walking the dog.  We again discussed that she is good heart failure preserved ejection fraction and is very difficult to treat.  In order to have a little bit higher blood pressure we will discontinue the Coreg and start metoprolol XL 12.5 mg once a day.  We will start the metoprolol given that she did have VT in the past.  In addition I agree with continuing to take lasix 20 mg 2 times a week or as needed from fluid retention.  We discussed that she  can drink about 2 L or maybe a little bit more fluid every day.  Given that spironolactone is the only medication that has shown clinical benefit in large clinical trials for HFpEF I do think it is reasonable for her to take 12.5 mg once a day on a daily basis.  If she does not tolerate it she can cut it back.  We also discussed that trying sildenafil is very reasonable may be tried at 10 mg 3 times a day dosing rather than the 20 and see if she tolerates this better.  She would like to wait a few days just because of Cymbalta but she will try again.  We also discussed that continuing exercise is very important.  There is one other thing that has been shown to improve symptoms in patients with HFpEF.  No other changes today.  She will follow-up with core clinic Dr. Funes and myself.     I appreciate the opportunity to participate in the care of Joyce Marroquin . Please do not hesitate to contact me with any further questions.    Sincerely,   Alonso Ordonez MD  [facility] Division of Cardiology

## 2021-05-25 NOTE — PROGRESS NOTES
May 25, 2021  Ms. Marroquin is a very pleasant lady who works as a nurse anesthetist and has a history of RF ablation which left to AV node ablation and complete heart block requiring a dual-chamber pacemaker implantation, which is a [employer] device MRI compatible.  She also has a recent history of heart failure with reduced ejection fraction with EF around 35% later improved to 45%.  There was a history of Takotsubo cardiomyopathy.  Patient on 7/2 had meeting with EP at which time low dose carvedilol was started 3.125 mg PO bid due to atrial ectopy and short runs of VT. she did recently complete an exercise out of catheterization with myself which showed normal resting biventricular filling pressures and normal cardiac output.  She did have limited augmentation of cardiac index with exercise and filling pressures especially pulmonary capillary wedge pressure increased significantly consistent with heart failure preserved ejection fraction.    She did initially better but for the past couple of months she has not been feeling well.  She did gain some weight and increased the Lasix to 40 mg as needed.  She does not feel that these helped for her.  In fact at times she feels that she had some confusion or disorientation.  Possibly she is dry at times.  She did see in follow-up with [facility] he had some of the instructions were confusing and she appropriately notes that the discharge papers stated a lot of extra information.  I did see her and performed a CPX that was marginal as well as a cardiac MRI that showed a significantly improved LV function to 50% and no other significant abnormalities. She did meet with Dr. Funes and sildenafil was initiated yet rapidly discontinued due to muscle aches. Dr. Funes also adjusted the rate response to allow for faster heart rates in an attempt to improve forward flow. In addition, she was felt to be dry and lasix has been reduced to 20mg 2 times per week. Today she is here  for follow up.  Notes that she continues to feel short of breath with exertion and often she needs to stop and sit down to take a rest.  At rest she is doing okay however that her symptoms have worsened since some.  Her weight has been stable and reduce Lasix to 20 mg 3 times a week 1-2 times a week as needed.  As above she did not tolerate the sildenafil.  Notes that her brother is usually running lower at home in the 80s systolic while here in the hospital it is always in the 110-120s range.  No other complaints.    PAST MEDICAL HISTORY:  Past Medical History:   Diagnosis Date     Arthritis      Cardiomyopathy (H)     ff Cardiology     Chronic depressive personality disorder      Congestive heart failure (H) see dr martínez    heart failure correct term     COPD exacerbation (H)      Esophageal reflux      Ex-smoker     quit 2006; 1 PPD x 30     Hyperparathyroidism (H)     s/p parathyroidectomy     Hypothyroidism      LARRY on CPAP     ff Sleep medicine     Pulmonary nodules     ff Pulmonologist     S/P cardiac pacemaker procedure     checked every 6 months at the [facility]     Uncomplicated asthma years     FAMILY HISTORY:  Family History   Problem Relation Age of Onset     Hypothyroidism Mother      Hypertension Mother      Osteoarthritis Mother      Coronary Artery Disease Father         nonfatal MI in his 70s     Asthma Father      Diabetes Sister      Hypothyroidism Sister      Breast Cancer Maternal Aunt      Anxiety Disorder Sister      SOCIAL HISTORY:  Social History     Socioeconomic History     Marital status:      Spouse name: Not on file     Number of children: Not on file     Years of education: Not on file     Highest education level: Master's degree (e.g., MA, MS, Florian, MEd, MSW, SONIA)   Occupational History     Not on file   Social Needs     Financial resource strain: Not very hard     Food insecurity     Worry: Never true     Inability: Never true     Transportation needs     Medical: No      Non-medical: No   Tobacco Use     Smoking status: Former Smoker     Packs/day: 0.00     Years: 0.00     Pack years: 0.00     Smokeless tobacco: Never Used   Substance and Sexual Activity     Alcohol use: Yes     Alcohol/week: 0.0 - 8.0 standard drinks     Frequency: 2-3 times a week     Drinks per session: 1 or 2     Binge frequency: Never     Comment: seldom     Drug use: No     Sexual activity: Yes     Partners: Male     Birth control/protection: None     Comment: vasectomy   Lifestyle     Physical activity     Days per week: 3 days     Minutes per session: 10 min     Stress: To some extent   Relationships     Social connections     Talks on phone: More than three times a week     Gets together: Never     Attends Synagogue service: More than 4 times per year     Active member of club or organization: Yes     Attends meetings of clubs or organizations: More than 4 times per year     Relationship status:      Intimate partner violence     Fear of current or ex partner: Not on file     Emotionally abused: Not on file     Physically abused: Not on file     Forced sexual activity: Not on file   Other Topics Concern     Parent/sibling w/ CABG, MI or angioplasty before 65F 55M? No   Social History Narrative    CRNA on disability for vestibular symptoms      CURRENT MEDICATIONS:  Current Outpatient Medications   Medication     acetaminophen (TYLENOL) 500 MG tablet     albuterol (PROAIR HFA/PROVENTIL HFA/VENTOLIN HFA) 108 (90 BASE) MCG/ACT Inhaler     apixaban ANTICOAGULANT (ELIQUIS) 5 MG tablet     azelastine (ASTELIN) 0.1 % nasal spray     Calcium Lactate 500 MG CAPS     Calcium-Magnesium-Vitamin D (CALCIUM MAGNESIUM PO)     carvedilol (COREG) 3.125 MG tablet     clonazePAM (KLONOPIN) 0.5 MG tablet     DiphenhydrAMINE HCl (BENADRYL PO)     DULoxetine (CYMBALTA) 30 MG capsule     Ferrous Sulfate (IRON SUPPLEMENT PO)     fluocinolone acetonide (DERMA SMOOTHE/FS BODY) 0.01 % external oil     fluticasone-salmeterol  "(ADVAIR) 250-50 MCG/DOSE inhaler     furosemide (LASIX) 20 MG tablet     ipratropium (ATROVENT) 0.06 % nasal spray     loperamide (IMODIUM A-D) 2 MG tablet     MAGNESIUM LACTATE PO     MELATONIN PO     Menaquinone-7 (VITAMIN K2 PO)     METHYLPREDNISOLONE PO     Multiple Vitamin (DAILY MULTIVITAMIN PO)     nebulizer nebulization     Probiotic Product (PROBIOTIC DAILY PO)     sildenafil (REVATIO) 20 MG tablet     spironolactone (ALDACTONE) 25 MG tablet     SYNTHROID 150 MCG tablet     TURMERIC PO     UNABLE TO FIND     UNABLE TO FIND     vitamin B complex with vitamin C (VITAMIN  B COMPLEX) PO tablet     vitamin D3 (CHOLECALCIFEROL) 2000 units tablet     Zinc 15 MG CAPS     No current facility-administered medications for this visit.      ROS:   Constitutional: No fever, chills, or sweats. Weight is 0 lbs 0 oz  ENT: No visual disturbance, ear ache, epistaxis, sore throat.   Allergies/Immunologic: Negative.   Respiratory: No cough, hemoptysis.   Cardiovascular: As per HPI.   GI: No nausea, vomiting, hematemesis, melena, or hematochezia.   : No urinary frequency, dysuria, or hematuria.   Integument: Negative.   Psychiatric: Pleasant, no major depression noted  Neuro: No focal neurological deficits noted  Endocrinology: Negative.   Musculoskeletal: As per HPI.      EXAM:  /79 (BP Location: Right arm, Patient Position: Chair, Cuff Size: Adult Regular)   Pulse 67   Ht 1.651 m (5' 5\")   Wt 80.5 kg (177 lb 8 oz)   LMP 08/21/2007   SpO2 94%   BMI 29.54 kg/m    General: appears comfortable, alert and oriented  Head: normocephalic, atraumatic  Eyes: anicteric sclera, EOMI , PERRL  Neck: no adenopathy  Orophyarynx: moist mucosa, no lesions noted  Heart: regular, S1/S2, no murmurs, rubs or gallop. Estimated JVP at 5 cmH2O  Lungs: CTAB, No wheezing.   Abdomen: soft, non-tender, bowel sounds present, no hepatosplenomegaly  Extremities: No LE edema today  Skin: no open lesions noted  Neuro: grossly non-focal   "   Labs:  Lab Results   Component Value Date    WBC 5.9 12/20/2020    HGB 12.4 12/20/2020    HCT 38.7 12/20/2020     12/20/2020     04/27/2021    POTASSIUM 4.4 04/27/2021    CHLORIDE 107 04/27/2021    CO2 28 04/27/2021    BUN 10 04/27/2021    CR 0.77 04/27/2021    GLC 87 04/27/2021    SED 22 11/09/2017    DD 0.7 (H) 11/22/2017    NTBNPI 107 12/20/2020    NTBNP 133 (H) 02/18/2021    TROPONIN <0.07 01/04/2006    TROPI <0.015 12/20/2020    AST 18 12/20/2020    ALT 25 12/20/2020    ALKPHOS 83 12/20/2020    BILITOTAL 0.3 12/20/2020    INR 1.02 10/06/2019     NM Lexiscan 3/2020:  The nuclear stress test is negative for inducible myocardial ischemia or infarction.  Fixed septal/apical defect due to Paced rhythm and increased gut photon activity.LVEDv 133ml. LV ESv 40ml. LV EF 70%.     There is no prior study for comparison     Echocardiogram 3/2020:  Mildly (EF 40-45%) reduced left ventricular function is present.  Global right ventricular function is normal.  No pericardial effusion is present. IVC diameter <2.1 cm collapsing >50% with sniff suggests a normal RA pressure  of 3 mmHg. This study was compared with the study from 12/3/19.  There has been no change.      Coronary angiogram 9/2019:  Mild-moderate non-obstructive coronary disease involving LAD, LCx, and RCA. No significant coronary disease to explain new cardiomyopathy.     TTE 9/5/2019:  Moderately (EF 30-35%) reduced left ventricular function is present. All segments from mid to apical left ventricle are severely hypokinetic to akinetic. Basal segments are hypercontractile. Overall pattern is suggestive of stress cardiomyopathy, but cannot rule out ischemic cardiomyopathy. Right ventricular function, chamber size, wall motion, and thickness are normal. No significant valve abnormalities. Mild pulmonary hypertension with increased RA pressure.     TTE 2/19/21:  Moderately (EF 35%) reduced left ventricular function is present.  Global right  ventricular function is normal.  No significant valvular dysfunction. The inferior vena cava was normal in size with preserved respiratory variability. No pericardial effusion present.     MRI cardiac 4/2017:  1. Normal left ventricular size.  Left ventricular wall thickness is  normal except a very small area in the basal inferior septum that appears  thin in some images.  Real-time images suggest a very small area of  hypokinesis in the basal inferior septum (not well visualized).  Left  ventricular ejection fraction is estimated at about 60-65% (unable to   perform volumetric analysis given frequent ectopy; real time images used  to assess LVEF).  2. Normal right ventricular size and systolic function.  No obvious regional wall motion abnormalities noted.  3. Gadolinium imaging:  No obvious right ventricular myocardial late  gadolinium enhancement noted.  There is a very small area of probable late  gadolinium enhancement in the mildly thinned basal inferior segment.    4. Moderate left atrial dilatation.  The right atrial size is at the upper limits of normal.  5. Thickened mitral valve leaflets, without obvious stenosis or significant regurgitation. No obvious hemodynamically significant valvular dysfunction noted.   6. Normal sized aortic root and ascending aorta.  For the remainder of the thoracic aorta, see separate radiology report.  7. Normal pericardium without significant pericardial effusion.     Cardiac MRI 5/2021:  1. The LV is normal in cavity size and wall thickness. The global systolic function is mildly reduced. The LVEF is 50%. There are no regional wall motion abnormalities. There is abnormal septal motion related to pacing.  2. The RV is normal in cavity size. The global systolic function is normal. The RVEF is 65%.   3. The left atrium is mildly dilated.  4. There is no significant valvular disease.   5. Late gadolinium enhancement imaging shows no MI, fibrosis or infiltrative disease.   6.  Regadenoson stress perfusion imaging shows no ischemia.  7. There is no pericardial effusion or thickening.  8. There is no LV thrombus.  CONCLUSIONS: No myocardial ischemia or fibrosis. Mild NICM possibly related to pacing, LVEF 50% and RVEF 65%.      ASSESSMENT AND PLAN:  In summary, patient is a 60 year old lady with history as stated above including complete heart block in the setting of RF ablation status post dual-chamber pacemaker in 2017 who has ongoing symptoms of heart failure weight gain and very limited exercise tolerance.  I do believe her shortness of breath and limited exercise tolerance is probably multifactorial including the significant weight gain with AV node ablation and now with a more fixed heart rate in the setting of pacemaker use.  However certainly she could have heart failure with borderline reduced ejection fraction with more preserved component.  Right heart catheterization with exercise essentially confirmed the above findings.  Today again she feels relatively unchanged and short of breath with exertion.  This is very limiting to her and has to stop several times even during walking around the neighborhood walking the dog.  We again discussed that she is good heart failure preserved ejection fraction and is very difficult to treat.  In order to have a little bit higher blood pressure we will discontinue the Coreg and start metoprolol XL 12.5 mg once a day.  We will start the metoprolol given that she did have VT in the past.  In addition I agree with continuing to take lasix 20 mg 2 times a week or as needed from fluid retention.  We discussed that she can drink about 2 L or maybe a little bit more fluid every day.  Given that spironolactone is the only medication that has shown clinical benefit in large clinical trials for HFpEF I do think it is reasonable for her to take 12.5 mg once a day on a daily basis.  If she does not tolerate it she can cut it back.  We also discussed that  trying sildenafil is very reasonable may be tried at 10 mg 3 times a day dosing rather than the 20 and see if she tolerates this better.  She would like to wait a few days just because of Cymbalta but she will try again.  We also discussed that continuing exercise is very important.  There is one other thing that has been shown to improve symptoms in patients with HFpEF.  No other changes today.  She will follow-up with core clinic Dr. Funes and myself.     I appreciate the opportunity to participate in the care of Joyce Marroquin . Please do not hesitate to contact me with any further questions.    Sincerely,   Alonso Ordonez MD  [facility] Division of Cardiology

## 2021-05-26 ENCOUNTER — TELEPHONE (OUTPATIENT)
Dept: CARDIOLOGY | Facility: CLINIC | Age: 61
End: 2021-05-26

## 2021-05-26 RX ORDER — METOPROLOL SUCCINATE 25 MG/1
25 TABLET, EXTENDED RELEASE ORAL DAILY
Qty: 45 TABLET | Refills: 3 | Status: SHIPPED | OUTPATIENT
Start: 2021-05-26 | End: 2022-04-12

## 2021-05-26 NOTE — PROGRESS NOTES
Assessment & Plan     Diarrhea, unspecified type    - Clostridium difficile Toxin B PCR; Future  - Enteric Bacteria and Virus Panel by EDWARD Stool; Future    Contact dermatitis due to poison ivy    - cefuroxime (CEFTIN) 500 MG tablet; Take 1 tablet (500 mg) by mouth 2 times daily for 7 days  - methylPREDNISolone (MEDROL DOSEPAK) 4 MG tablet therapy pack; Follow Package Directions    Cellulitis of right upper extremity    - cefuroxime (CEFTIN) 500 MG tablet; Take 1 tablet (500 mg) by mouth 2 times daily for 7 days    Cognitive changes      Pulmonary hypertension (H)      Cardiomyopathy, unspecified type (H)      History of cardiac radiofrequency ablation      Hx of cardiac pacemaker    20 minutes spent on the date of the encounter doing chart review, history and exam, documentation and further activities per the note     See Patient Instructions: advised to take medication as prescribed, follow up as needed. Keep specialists appointments.    Return in about 4 weeks (around 6/29/2021), or if symptoms worsen or fail to improve.    MARCELL Alcantar  [facility] ROLAND Cook is a 60 year old who presents for the following health issues       HPI     Following up on: diarrhea- hx of diarrhea when gets off on taking her probiotics.     Last visit this was discussed: ongoing     Progression of Symptoms:  improving and worsening    Accompanying Signs & Symptoms: ongoing diarrhea  Derm Issue    Duration: 1 yesterday     Description  Location: both arms    Precipitating:  New exposures:  poision ivy 1 week ago    Therapies tried and outcome: cream, benadryl, bacitracin      She has noticed memory issues- actually brought to her attention by her therapist.  She did not pass her neuro/psych test so they have her seeing a neurologist for probable brain scan .    Review of Systems   Constitutional, HEENT, cardiovascular, pulmonary, GI, , musculoskeletal, neuro, skin, endocrine and psych systems  are negative, except as otherwise noted.      Objective    /65   Pulse 109   Temp 98.2  F (36.8  C) (Tympanic)   Resp 16   Wt 78.5 kg (173 lb)   LMP 08/21/2007   SpO2 97%   BMI 28.79 kg/m    Body mass index is 28.79 kg/m .  Physical Exam   GENERAL: healthy, alert and no distress  RESP: lungs clear to auscultation - no rales, rhonchi or wheezes  CV: regular rate and rhythm, normal S1 S2, no S3 or S4, no murmur, click or rub, no peripheral edema and peripheral pulses strong  MS: no gross musculoskeletal defects noted, no edema  SKIN: POSITIVE for poison ivy to R forearm, upper arm, abdomen, left buttocks with area of cellulitis to RFA  PSYCH: mentation appears normal, affect normal/bright    See orders

## 2021-05-26 NOTE — TELEPHONE ENCOUNTER
----- Message from Katherine Foster RN sent at 5/25/2021 10:47 AM CDT -----  Regarding: please call  Hello,    Would you please call this patient and adjust scheduling time for her upcoming appt with Fiorella Swanson on 6/2/21? She currently has a video visit on 6/2 at 4:30 and I need to move her to 5:30 for the video visit.  Would you please let her know that Fiorella's template just adjusted (in the works) and changed her video and in-person appts slightly.  Let me know if this is a problem.    Thanks  Shahriar

## 2021-05-28 DIAGNOSIS — I50.22 CHRONIC SYSTOLIC HEART FAILURE (H): Primary | ICD-10-CM

## 2021-06-01 ENCOUNTER — OFFICE VISIT (OUTPATIENT)
Dept: FAMILY MEDICINE | Facility: CLINIC | Age: 61
End: 2021-06-01
Payer: COMMERCIAL

## 2021-06-01 VITALS
OXYGEN SATURATION: 97 % | WEIGHT: 173 LBS | SYSTOLIC BLOOD PRESSURE: 101 MMHG | BODY MASS INDEX: 28.79 KG/M2 | RESPIRATION RATE: 16 BRPM | DIASTOLIC BLOOD PRESSURE: 65 MMHG | HEART RATE: 109 BPM | TEMPERATURE: 98.2 F

## 2021-06-01 DIAGNOSIS — Z98.890 HISTORY OF CARDIAC RADIOFREQUENCY ABLATION: ICD-10-CM

## 2021-06-01 DIAGNOSIS — R41.89 COGNITIVE CHANGES: ICD-10-CM

## 2021-06-01 DIAGNOSIS — R19.7 DIARRHEA, UNSPECIFIED TYPE: Primary | ICD-10-CM

## 2021-06-01 DIAGNOSIS — L23.7 CONTACT DERMATITIS DUE TO POISON IVY: ICD-10-CM

## 2021-06-01 DIAGNOSIS — I42.9 CARDIOMYOPATHY, UNSPECIFIED TYPE (H): ICD-10-CM

## 2021-06-01 DIAGNOSIS — I27.20 PULMONARY HYPERTENSION (H): ICD-10-CM

## 2021-06-01 DIAGNOSIS — L03.113 CELLULITIS OF RIGHT UPPER EXTREMITY: ICD-10-CM

## 2021-06-01 DIAGNOSIS — Z95.0 HX OF CARDIAC PACEMAKER: ICD-10-CM

## 2021-06-01 PROCEDURE — 99214 OFFICE O/P EST MOD 30 MIN: CPT | Performed by: NURSE PRACTITIONER

## 2021-06-01 RX ORDER — CEFUROXIME AXETIL 500 MG/1
500 TABLET ORAL 2 TIMES DAILY
Qty: 14 TABLET | Refills: 0 | Status: SHIPPED | OUTPATIENT
Start: 2021-06-01 | End: 2021-06-08

## 2021-06-01 RX ORDER — METHYLPREDNISOLONE 4 MG
TABLET, DOSE PACK ORAL
Qty: 21 TABLET | Refills: 0 | Status: ON HOLD | OUTPATIENT
Start: 2021-06-01 | End: 2021-08-30

## 2021-06-01 ASSESSMENT — ANXIETY QUESTIONNAIRES
5. BEING SO RESTLESS THAT IT IS HARD TO SIT STILL: NOT AT ALL
6. BECOMING EASILY ANNOYED OR IRRITABLE: MORE THAN HALF THE DAYS
1. FEELING NERVOUS, ANXIOUS, OR ON EDGE: SEVERAL DAYS
IF YOU CHECKED OFF ANY PROBLEMS ON THIS QUESTIONNAIRE, HOW DIFFICULT HAVE THESE PROBLEMS MADE IT FOR YOU TO DO YOUR WORK, TAKE CARE OF THINGS AT HOME, OR GET ALONG WITH OTHER PEOPLE: VERY DIFFICULT
7. FEELING AFRAID AS IF SOMETHING AWFUL MIGHT HAPPEN: SEVERAL DAYS
3. WORRYING TOO MUCH ABOUT DIFFERENT THINGS: NOT AT ALL
2. NOT BEING ABLE TO STOP OR CONTROL WORRYING: SEVERAL DAYS
GAD7 TOTAL SCORE: 6

## 2021-06-01 ASSESSMENT — PATIENT HEALTH QUESTIONNAIRE - PHQ9
5. POOR APPETITE OR OVEREATING: SEVERAL DAYS
SUM OF ALL RESPONSES TO PHQ QUESTIONS 1-9: 14

## 2021-06-01 NOTE — PATIENT INSTRUCTIONS
Patient Education     Managing a Poison Ivy, Poison Oak, or Poison Sumac Reaction  If you come in contact with urushiol    If you think you may have come in contact with the sap oil (urushiol) contained in poison ivy, poison oak, or poison sumac plants, it's important to wash the affected part of your skin as soon as possible to remove the sap oil or help prevent further spread. Wash the affected areas with soap and cool water. Undress, and wash your clothes and gear as soon as you can. Be sure to wash any pet that was with you. Taking these steps can help prevent spreading sap oil to someone else. If you have a rash, but aren't sure if it's from one of these plants, see your healthcare provider.   To soothe the itching  Your skin may react to poison oak, poison ivy, and poison sumac within hours to a few days after contact. Listed below are some common home care treatments. If these aren't effective in relieving symptoms call your healthcare provider for additional treatments which may include prescription medicines.     Don t scratch or scrub your rash. Not even if the itching is severe. Scratching can lead to infection.    Bathe in lukewarm (not hot) water. Or take short cool showers to relieve the itching. For a more soothing bath, add oatmeal to the water.    Use antihistamines that are taken by mouth.  These include diphenhydramine. You can buy these at the grocery store or pharmacy. Talk to your healthcare provider or pharmacist for more information on oral antihistamines.    Use over-the-counter treatments on your skin.  These include cortisone creams and calamine lotion.  How your skin may react  A mild rash may become red, swollen, and itchy. The rash may form a line on your skin where you brushed against the plant. If you have a severe rash, your itching may worsen or your rash may blister and ooze. If this happens, seek medical care. The fluid from your blisters will not make your rash spread. With or  without medical care, your rash may last up to 3 weeks. In the future, try to avoid coming in contact with these plants.   When to call your healthcare provider  Call your healthcare provider or seek immediate medical attention if:     Your rash is severe    The rash spreads beyond the exposed part of your body or affects your face.    The rash does not clear up within a few weeks  Call 911  Call 911 if you have any of the following:     Trouble breathing or swallowing    Any significant swelling  [employer] last reviewed this educational content on 8/1/2019 2000-2021 [employer]. All rights reserved. This information is not intended as a substitute for professional medical care. Always follow your healthcare professional's instructions.           Patient Education     Avoiding Poison Ivy, Poison Oak, and Poison Sumac  Poison oak, poison ivy, and poison sumac are plants that can cause skin rashes. The problem is a sap oil called urushiol that is contained in these plants. If you're allergic to urushiol--and most people are--touching one of these plants may cause your skin to react. Within hours or days, you may have a red, swollen, itchy rash.         Recognizing these plants  You can help prevent a poison oak, poison ivy, or poison sumac rash. Know what these plants look like. And then stay away from them. They grow in the form of vilma, small plants, and large bushes. In most cases, poison oak and poison ivy have 3 leaves per stem. Poison sumac has from 7 leaves to 13 leaves per stem. Watch out for these plants when you go to any outdoor area, from a friend's overgrown back yard to the wilderness. Urushiol is present in these plants all year round, even when the leaves are gone. So always be on the lookout.   What causes a reaction?  Poison oak, poison ivy, and poison sumac thrive mainly in unmaintained outdoor areas. If you touch these plants, you may get a rash. You may also react if you touch  something that came in contact with urushiol such as a dog or cat, clothing, or equipment. The rash caused by these plants is not contagious.   Steps to prevention  When heading outdoors, take these preventive steps:    Don't touch any of these plants.    Wear long pants and a long-sleeved shirt.    If you're going to a heavily wooded or brushy area, also put on gloves, a hat, and boots.    If you are very sensitive, apply bentoquatam 5% topical cream to all exposed areas of your skin. This creates a layer of protection between your skin and any sap oil you may touch.    If you come in contact with these plants or the oil, wash with soap and water as soon as possible.    Wash clothing and animals that come in contact with these plants as well. Urushiol may stay on them and cause a rash when you touch them in the future.  Luis Armando last reviewed this educational content on 6/1/2020 2000-2021 [employer]. All rights reserved. This information is not intended as a substitute for professional medical care. Always follow your healthcare professional's instructions.

## 2021-06-02 ENCOUNTER — VIRTUAL VISIT (OUTPATIENT)
Dept: CARDIOLOGY | Facility: CLINIC | Age: 61
End: 2021-06-02
Attending: NURSE PRACTITIONER
Payer: COMMERCIAL

## 2021-06-02 DIAGNOSIS — I50.22 CHRONIC SYSTOLIC HEART FAILURE (H): Primary | ICD-10-CM

## 2021-06-02 PROCEDURE — 99213 OFFICE O/P EST LOW 20 MIN: CPT | Mod: 95 | Performed by: NURSE PRACTITIONER

## 2021-06-02 ASSESSMENT — ANXIETY QUESTIONNAIRES: GAD7 TOTAL SCORE: 6

## 2021-06-02 NOTE — LETTER
"6/2/2021      RE: Joyce Marroquin  [address] [facility] [id number]       Dear Colleague,    Thank you for the opportunity to participate in the care of your patient, Joyce Marroquin, at the [facility] [place] [facility]. Please see a copy of my visit note below.    Joyce is a 60 year old who is being evaluated via a billable video visit.      How would you like to obtain your AVS? MyChart  If the video visit is dropped, the invitation should be resent by: Text to cell phone: [phone] - Doximity  Will anyone else be joining your video visit? No      Vitals - Patient Reported  Weight (Patient Reported): 75.8 kg (167 lb)  Pain Score: No Pain (0)(Pt had earlier today)    Vitals were taken and medications reconciled.     Jefe Hawley CMA  5:18 PM      HPI: 60 year old female seen via video for follow up of heart failure. Pt is also followed by Dr. Ordonez, Dr. Funes and Adelaida Slater, IRASEMA. She was last seen by Dr. Ordonez on 5/25/21. At that time her Coreg was discontinued and she was started on Metoprolol. Her BP had been low, thus the reason for the change to a different BB. Her BP is slightly better on metoprolol - her readings have been 106-116/70-80 today. Her wt is 167#. She has also had her pacer settings adjusted with the hope of improving her energy level. She states she has not noticed a significant improvement in her activity ability. Other medication changes are that she has started Cymbalta 2 weeks ago. In addition she is undergoing a neuropsychological evaluation and a brain MRI - as part of her work up.   ,  She reports continued \"brain fog\" and limited functional status. Some days she is unable to make beds and meals due to such.     She reports feeling angry about not getting the CRT wire place. Her EF did improve, thus she no longer meets criteria. She states she believes it would have helped and would like her providers to know she " is angry we didn't push her to do this sooner.     Dr. Funes did prescribe a small dose of sildenafil for Pulmonary HTN. She had tried this medication in the past and did not tolerate. The current prescription is in liquid form and a smaller dose. Her pharmacy is having trouble obtaining this solution - and thus she has not started as of yet. She is a bit concerned to start this given her previous reaction.     We reviewed her medication changes and did not make any further adjustments.      PAST MEDICAL HISTORY:  Past Medical History:   Diagnosis Date     Arthritis      Cardiomyopathy (H)     ff Cardiology     Chronic depressive personality disorder      Congestive heart failure (H) see  note    heart failure correct term     COPD exacerbation (H)      Esophageal reflux      Ex-smoker     quit 2006; 1 PPD x 30     Hyperparathyroidism (H)     s/p parathyroidectomy     Hypothyroidism      LARRY on CPAP     ff Sleep medicine     Pulmonary nodules     ff Pulmonologist     S/P cardiac pacemaker procedure     checked every 6 months at the [facility]     Uncomplicated asthma years       FAMILY HISTORY:  Family History   Problem Relation Age of Onset     Hypothyroidism Mother      Hypertension Mother      Osteoarthritis Mother      Coronary Artery Disease Father         nonfatal MI in his 70s     Asthma Father      Diabetes Sister      Hypothyroidism Sister      Breast Cancer Maternal Aunt      Anxiety Disorder Sister        SOCIAL HISTORY:  Social History     Socioeconomic History     Marital status:      Spouse name: None     Number of children: None     Years of education: None     Highest education level: Master's degree (e.g., MA, MS, Florian, MEd, MSW, SONIA)   Occupational History     None   Social Needs     Financial resource strain: Not very hard     Food insecurity     Worry: Never true     Inability: Never true     Transportation needs     Medical: No     Non-medical: No   Tobacco Use     Smoking status: Former  Smoker     Packs/day: 0.00     Years: 0.00     Pack years: 0.00     Smokeless tobacco: Never Used   Substance and Sexual Activity     Alcohol use: Yes     Alcohol/week: 0.0 - 8.0 standard drinks     Frequency: 2-3 times a week     Drinks per session: 1 or 2     Binge frequency: Never     Comment: seldom     Drug use: No     Sexual activity: Yes     Partners: Male     Birth control/protection: None     Comment: vasectomy   Lifestyle     Physical activity     Days per week: 3 days     Minutes per session: 10 min     Stress: To some extent   Relationships     Social connections     Talks on phone: More than three times a week     Gets together: Never     Attends Muslim service: More than 4 times per year     Active member of club or organization: Yes     Attends meetings of clubs or organizations: More than 4 times per year     Relationship status:      Intimate partner violence     Fear of current or ex partner: None     Emotionally abused: None     Physically abused: None     Forced sexual activity: None   Other Topics Concern     Parent/sibling w/ CABG, MI or angioplasty before 65F 55M? No   Social History Narrative    CRNA on disability for vestibular symptoms        CURRENT MEDICATIONS:  Current Outpatient Medications   Medication Sig Dispense Refill     acetaminophen (TYLENOL) 500 MG tablet Take 500-1,000 mg by mouth every 6 hours as needed for mild pain       albuterol (PROAIR HFA/PROVENTIL HFA/VENTOLIN HFA) 108 (90 BASE) MCG/ACT Inhaler Inhale 2 puffs into the lungs as needed for shortness of breath / dyspnea or wheezing        apixaban ANTICOAGULANT (ELIQUIS) 5 MG tablet Take 1 tablet (5 mg) by mouth 2 times daily 120 tablet 3     azelastine (ASTELIN) 0.1 % nasal spray Spray 1 spray into both nostrils 2 times daily as needed        Calcium Lactate 500 MG CAPS Take 250-500 mg by mouth daily        clonazePAM (KLONOPIN) 0.5 MG tablet TAKE 1 TABLET(0.5 MG) BY MOUTH THREE TIMES DAILY AS NEEDED FOR  ANXIETY 90 tablet 0     DiphenhydrAMINE HCl (BENADRYL PO) Take 25 mg by mouth nightly as needed for allergies        DULoxetine (CYMBALTA) 30 MG capsule Take 1 capsule (30 mg) by mouth daily 30 capsule 1     Ferrous Sulfate (IRON SUPPLEMENT PO) Take 65 mg by mouth daily        fluocinolone acetonide (DERMA SMOOTHE/FS BODY) 0.01 % external oil Use for ears.  2 times daily for 7 days 1 Bottle 0     furosemide (LASIX) 20 MG tablet Take 1 tablet, no more than twice a week.  Call with weight gain. 180 tablet 1     ipratropium (ATROVENT) 0.06 % nasal spray Spray 2 sprays into both nostrils 4 times daily as needed        loperamide (IMODIUM A-D) 2 MG tablet Take 2 tabs (4 mg) after first loose stool, and then take one tab (2 mg) after each diarrheal stool.  Max of 8 tabs (16 mg) per day. 1 tablet 0     MAGNESIUM LACTATE PO Take 140-210 mg by mouth nightly as needed        MELATONIN PO Take 1-3 mg by mouth nightly as needed        Menaquinone-7 (VITAMIN K2 PO) Take 1 tablet by mouth every morning        methylPREDNISolone (MEDROL DOSEPAK) 4 MG tablet therapy pack Follow Package Directions 21 tablet 0     METHYLPREDNISOLONE PO Take 40 mg by mouth as needed        metoprolol succinate ER (TOPROL XL) 25 MG 24 hr tablet Take 1 tablet (25 mg) by mouth daily 45 tablet 3     Multiple Vitamin (DAILY MULTIVITAMIN PO) Take 1 tablet by mouth every morning        nebulizer nebulization as needed       Probiotic Product (PROBIOTIC DAILY PO) Take 1 capsule by mouth 2 times daily as needed        spironolactone (ALDACTONE) 25 MG tablet Take 1 tablet three days per week. 90 tablet 3     SYNTHROID 150 MCG tablet 150 mcg for 6 days and 75 mcg for 1 day per week 90 tablet 3     TURMERIC PO Take 1 tablet by mouth every morning        UNABLE TO FIND MEDICATION NAME: Xylitol daily as needed for allergies       UNABLE TO FIND 2 times daily as needed MEDICATION NAME: Standard Process, Immune Congaplex. Pt taking 2 tablets daily        vitamin B  complex with vitamin C (VITAMIN  B COMPLEX) PO tablet Take 1 tablet by mouth as needed       vitamin D3 (CHOLECALCIFEROL) 2000 units tablet Take 1 tablet by mouth daily 1 tablet 0     Zinc 15 MG CAPS Take 15 mg by mouth        Calcium-Magnesium-Vitamin D (CALCIUM MAGNESIUM PO) Calcium 120 mg, magenesium 20 mg, iodine 20 mg as needed at bedtime       carvedilol (COREG) 3.125 MG tablet Take 1 tablet (3.125 mg) by mouth 2 times daily (with meals) (Patient not taking: Reported on 6/2/2021) 180 tablet 3     fluticasone-salmeterol (ADVAIR) 250-50 MCG/DOSE inhaler Inhale 1 puff into the lungs every 12 hours       sildenafil (REVATIO) 10 MG/ML SUSR Take 1 mL (10 mg) by mouth every 8 hours (Patient not taking: Reported on 6/2/2021) 112 mL 11       ROS:   Constitutional: No fever, chills, or sweats. No weight gain/loss.   ENT: No visual disturbance, ear ache, epistaxis, sore throat.   Allergies/Immunologic: Negative.   Respiratory: No cough, hemoptysis.   Cardiovascular: As per HPI.   GI: No nausea, vomiting, hematemesis, melena, or hematochezia.   : No urinary frequency, dysuria, or hematuria.   Integument: Negative.   Psychiatric: Negative.   Neuro: Negative.   Endocrinology: Negative.   Musculoskeletal: Negative.    EXAM:  LMP 08/21/2007   No exam done as pt was seen virtually.   Labs:  CBC RESULTS:  Lab Results   Component Value Date    WBC 6.6 05/25/2021    RBC 4.33 05/25/2021    HGB 13.7 05/25/2021    HCT 41.2 05/25/2021    MCV 95 05/25/2021    MCH 31.6 05/25/2021    MCHC 33.3 05/25/2021    RDW 11.9 05/25/2021     05/25/2021       CMP RESULTS:  Lab Results   Component Value Date     05/25/2021    POTASSIUM 4.2 05/25/2021    CHLORIDE 107 05/25/2021    CO2 24 05/25/2021    ANIONGAP 7 05/25/2021    GLC 92 05/25/2021    BUN 12 05/25/2021    CR 0.64 05/25/2021    GFRESTIMATED >90 05/25/2021    GFRESTBLACK >90 05/25/2021    MANJU 8.7 05/25/2021    BILITOTAL 0.4 05/25/2021    ALBUMIN 3.4 05/25/2021    ALKPHOS 114  05/25/2021    ALT 29 05/25/2021    AST 17 05/25/2021        INR RESULTS:  Lab Results   Component Value Date    INR 1.02 10/06/2019       Lab Results   Component Value Date    MAG 2.3 07/20/2020     Lab Results   Component Value Date    NTBNPI 107 12/20/2020     Lab Results   Component Value Date    NTBNP 156 (H) 05/25/2021       Assessment and Plan:   1. Chronic systolic heart failure secondary to unclear etiology  - now with improved EF 40%- 50% per MRI 5/21.    Stage C  NYHA Class III  ACEi/ARB no she has not tolerated  BB yes - previously on Coreg, now on Metoprolol  Aldosterone antagonist - did not tolerate Spironolactone 12.5mg     2. Cardiac rhythm history:  RF ablation which left to AV node ablation and complete heart block requiring a dual-chamber pacemaker implantation, which is a Medtronic device MRI compatible      Pt reassured, no medication changes made . Will follow up in 1 month, f/u with Adelaida Slater in 1 months.     CC  Patient Care Team:  Ce Crowe NP as PCP - General (Nurse Practitioner - Family)  Yuliet Mclean as Garland Velez MD as MD (Internal Medicine)  Adelaida Slater, SHAYNA CNP as Nurse Practitioner (Nurse Practitioner)  Cheyenne Thomson RN as Specialty Care Coordinator (Clinical Cardiac Electrophysiology)  Lino Funes MD as MD (Clinical Cardiac Electrophysiology)  Mame Valadez RN as Specialty Care Coordinator (Cardiology)  Garland Patel MD as Assigned Endocrinology Provider  Karen Peralta RPH as Pharmacist (Pharmacist Ambulatory Care)  Ce Crowe NP as Assigned PCP  Matthias García RN as Specialty Care Coordinator (Cardiology)

## 2021-06-02 NOTE — PROGRESS NOTES
Joyce is a 60 year old who is being evaluated via a billable video visit.      How would you like to obtain your AVS? MyChart  If the video visit is dropped, the invitation should be resent by: Text to cell phone: [phone] - Doximity  Will anyone else be joining your video visit? No      Vitals - Patient Reported  Weight (Patient Reported): 75.8 kg (167 lb)  Pain Score: No Pain (0)(Pt had earlier today)    Vitals were taken and medications reconciled.     Jefe Hawley CMA  5:18 PM

## 2021-06-08 ENCOUNTER — HOSPITAL ENCOUNTER (OUTPATIENT)
Dept: MRI IMAGING | Facility: CLINIC | Age: 61
End: 2021-06-08
Attending: PSYCHIATRY & NEUROLOGY
Payer: COMMERCIAL

## 2021-06-08 ENCOUNTER — ANCILLARY PROCEDURE (OUTPATIENT)
Dept: CARDIOLOGY | Facility: CLINIC | Age: 61
End: 2021-06-08
Attending: PSYCHIATRY & NEUROLOGY
Payer: COMMERCIAL

## 2021-06-08 DIAGNOSIS — R41.89 COGNITIVE CHANGES: ICD-10-CM

## 2021-06-08 PROCEDURE — 93286 PERI-PX EVAL PM/LDLS PM IP: CPT

## 2021-06-08 PROCEDURE — 70551 MRI BRAIN STEM W/O DYE: CPT

## 2021-06-08 PROCEDURE — 70551 MRI BRAIN STEM W/O DYE: CPT | Mod: 26 | Performed by: RADIOLOGY

## 2021-06-08 PROCEDURE — 93286 PERI-PX EVAL PM/LDLS PM IP: CPT | Mod: 26 | Performed by: INTERNAL MEDICINE

## 2021-06-09 NOTE — PROGRESS NOTES
"HPI: 60 year old female seen via video for follow up of heart failure. Pt is also followed by Dr. Ordonez, Dr. Funes and Adelaida Slater, IRASEMA. She was last seen by Dr. Ordonez on 5/25/21. At that time her Coreg was discontinued and she was started on Metoprolol. Her BP had been low, thus the reason for the change to a different BB. Her BP is slightly better on metoprolol - her readings have been 106-116/70-80 today. Her wt is 167#. She has also had her pacer settings adjusted with the hope of improving her energy level. She states she has not noticed a significant improvement in her activity ability. Other medication changes are that she has started Cymbalta 2 weeks ago. In addition she is undergoing a neuropsychological evaluation and a brain MRI - as part of her work up.   ,  She reports continued \"brain fog\" and limited functional status. Some days she is unable to make beds and meals due to such.     She reports feeling angry about not getting the CRT wire place. Her EF did improve, thus she no longer meets criteria. She states she believes it would have helped and would like her providers to know she is angry we didn't push her to do this sooner.     Dr. Funes did prescribe a small dose of sildenafil for Pulmonary HTN. She had tried this medication in the past and did not tolerate. The current prescription is in liquid form and a smaller dose. Her pharmacy is having trouble obtaining this solution - and thus she has not started as of yet. She is a bit concerned to start this given her previous reaction.     We reviewed her medication changes and did not make any further adjustments.      PAST MEDICAL HISTORY:  Past Medical History:   Diagnosis Date     Arthritis      Cardiomyopathy (H)     ff Cardiology     Chronic depressive personality disorder      Congestive heart failure (H) see dr martínez    heart failure correct term     COPD exacerbation (H)      Esophageal reflux      Ex-smoker     quit 2006; 1 PPD x 30     " Hyperparathyroidism (H)     s/p parathyroidectomy     Hypothyroidism      LARRY on CPAP     ff Sleep medicine     Pulmonary nodules     ff Pulmonologist     S/P cardiac pacemaker procedure     checked every 6 months at [facility]     Uncomplicated asthma years       FAMILY HISTORY:  Family History   Problem Relation Age of Onset     Hypothyroidism Mother      Hypertension Mother      Osteoarthritis Mother      Coronary Artery Disease Father         nonfatal MI in his 70s     Asthma Father      Diabetes Sister      Hypothyroidism Sister      Breast Cancer Maternal Aunt      Anxiety Disorder Sister        SOCIAL HISTORY:  Social History     Socioeconomic History     Marital status:      Spouse name: None     Number of children: None     Years of education: None     Highest education level: Master's degree (e.g., MA, MS, Florian, MEd, MSW, SONIA)   Occupational History     None   Social Needs     Financial resource strain: Not very hard     Food insecurity     Worry: Never true     Inability: Never true     Transportation needs     Medical: No     Non-medical: No   Tobacco Use     Smoking status: Former Smoker     Packs/day: 0.00     Years: 0.00     Pack years: 0.00     Smokeless tobacco: Never Used   Substance and Sexual Activity     Alcohol use: Yes     Alcohol/week: 0.0 - 8.0 standard drinks     Frequency: 2-3 times a week     Drinks per session: 1 or 2     Binge frequency: Never     Comment: seldom     Drug use: No     Sexual activity: Yes     Partners: Male     Birth control/protection: None     Comment: vasectomy   Lifestyle     Physical activity     Days per week: 3 days     Minutes per session: 10 min     Stress: To some extent   Relationships     Social connections     Talks on phone: More than three times a week     Gets together: Never     Attends Taoist service: More than 4 times per year     Active member of club or organization: Yes     Attends meetings of clubs or organizations: More than 4 times per  year     Relationship status:      Intimate partner violence     Fear of current or ex partner: None     Emotionally abused: None     Physically abused: None     Forced sexual activity: None   Other Topics Concern     Parent/sibling w/ CABG, MI or angioplasty before 65F 55M? No   Social History Narrative    CRNA on disability for vestibular symptoms        CURRENT MEDICATIONS:  Current Outpatient Medications   Medication Sig Dispense Refill     acetaminophen (TYLENOL) 500 MG tablet Take 500-1,000 mg by mouth every 6 hours as needed for mild pain       albuterol (PROAIR HFA/PROVENTIL HFA/VENTOLIN HFA) 108 (90 BASE) MCG/ACT Inhaler Inhale 2 puffs into the lungs as needed for shortness of breath / dyspnea or wheezing        apixaban ANTICOAGULANT (ELIQUIS) 5 MG tablet Take 1 tablet (5 mg) by mouth 2 times daily 120 tablet 3     azelastine (ASTELIN) 0.1 % nasal spray Spray 1 spray into both nostrils 2 times daily as needed        Calcium Lactate 500 MG CAPS Take 250-500 mg by mouth daily        clonazePAM (KLONOPIN) 0.5 MG tablet TAKE 1 TABLET(0.5 MG) BY MOUTH THREE TIMES DAILY AS NEEDED FOR ANXIETY 90 tablet 0     DiphenhydrAMINE HCl (BENADRYL PO) Take 25 mg by mouth nightly as needed for allergies        DULoxetine (CYMBALTA) 30 MG capsule Take 1 capsule (30 mg) by mouth daily 30 capsule 1     Ferrous Sulfate (IRON SUPPLEMENT PO) Take 65 mg by mouth daily        fluocinolone acetonide (DERMA SMOOTHE/FS BODY) 0.01 % external oil Use for ears.  2 times daily for 7 days 1 Bottle 0     furosemide (LASIX) 20 MG tablet Take 1 tablet, no more than twice a week.  Call with weight gain. 180 tablet 1     ipratropium (ATROVENT) 0.06 % nasal spray Spray 2 sprays into both nostrils 4 times daily as needed        loperamide (IMODIUM A-D) 2 MG tablet Take 2 tabs (4 mg) after first loose stool, and then take one tab (2 mg) after each diarrheal stool.  Max of 8 tabs (16 mg) per day. 1 tablet 0     MAGNESIUM LACTATE PO Take  140-210 mg by mouth nightly as needed        MELATONIN PO Take 1-3 mg by mouth nightly as needed        Menaquinone-7 (VITAMIN K2 PO) Take 1 tablet by mouth every morning        methylPREDNISolone (MEDROL DOSEPAK) 4 MG tablet therapy pack Follow Package Directions 21 tablet 0     METHYLPREDNISOLONE PO Take 40 mg by mouth as needed        metoprolol succinate ER (TOPROL XL) 25 MG 24 hr tablet Take 1 tablet (25 mg) by mouth daily 45 tablet 3     Multiple Vitamin (DAILY MULTIVITAMIN PO) Take 1 tablet by mouth every morning        nebulizer nebulization as needed       Probiotic Product (PROBIOTIC DAILY PO) Take 1 capsule by mouth 2 times daily as needed        spironolactone (ALDACTONE) 25 MG tablet Take 1 tablet three days per week. 90 tablet 3     SYNTHROID 150 MCG tablet 150 mcg for 6 days and 75 mcg for 1 day per week 90 tablet 3     TURMERIC PO Take 1 tablet by mouth every morning        UNABLE TO FIND MEDICATION NAME: Xylitol daily as needed for allergies       UNABLE TO FIND 2 times daily as needed MEDICATION NAME: Standard Process, Immune Congaplex. Pt taking 2 tablets daily        vitamin B complex with vitamin C (VITAMIN  B COMPLEX) PO tablet Take 1 tablet by mouth as needed       vitamin D3 (CHOLECALCIFEROL) 2000 units tablet Take 1 tablet by mouth daily 1 tablet 0     Zinc 15 MG CAPS Take 15 mg by mouth        Calcium-Magnesium-Vitamin D (CALCIUM MAGNESIUM PO) Calcium 120 mg, magenesium 20 mg, iodine 20 mg as needed at bedtime       carvedilol (COREG) 3.125 MG tablet Take 1 tablet (3.125 mg) by mouth 2 times daily (with meals) (Patient not taking: Reported on 6/2/2021) 180 tablet 3     fluticasone-salmeterol (ADVAIR) 250-50 MCG/DOSE inhaler Inhale 1 puff into the lungs every 12 hours       sildenafil (REVATIO) 10 MG/ML SUSR Take 1 mL (10 mg) by mouth every 8 hours (Patient not taking: Reported on 6/2/2021) 112 mL 11       ROS:   Constitutional: No fever, chills, or sweats. No weight gain/loss.   ENT: No  visual disturbance, ear ache, epistaxis, sore throat.   Allergies/Immunologic: Negative.   Respiratory: No cough, hemoptysis.   Cardiovascular: As per HPI.   GI: No nausea, vomiting, hematemesis, melena, or hematochezia.   : No urinary frequency, dysuria, or hematuria.   Integument: Negative.   Psychiatric: Negative.   Neuro: Negative.   Endocrinology: Negative.   Musculoskeletal: Negative.    EXAM:  LMP 08/21/2007   No exam done as pt was seen virtually.   Labs:  CBC RESULTS:  Lab Results   Component Value Date    WBC 6.6 05/25/2021    RBC 4.33 05/25/2021    HGB 13.7 05/25/2021    HCT 41.2 05/25/2021    MCV 95 05/25/2021    MCH 31.6 05/25/2021    MCHC 33.3 05/25/2021    RDW 11.9 05/25/2021     05/25/2021       CMP RESULTS:  Lab Results   Component Value Date     05/25/2021    POTASSIUM 4.2 05/25/2021    CHLORIDE 107 05/25/2021    CO2 24 05/25/2021    ANIONGAP 7 05/25/2021    GLC 92 05/25/2021    BUN 12 05/25/2021    CR 0.64 05/25/2021    GFRESTIMATED >90 05/25/2021    GFRESTBLACK >90 05/25/2021    MANJU 8.7 05/25/2021    BILITOTAL 0.4 05/25/2021    ALBUMIN 3.4 05/25/2021    ALKPHOS 114 05/25/2021    ALT 29 05/25/2021    AST 17 05/25/2021        INR RESULTS:  Lab Results   Component Value Date    INR 1.02 10/06/2019       Lab Results   Component Value Date    MAG 2.3 07/20/2020     Lab Results   Component Value Date    NTBNPI 107 12/20/2020     Lab Results   Component Value Date    NTBNP 156 (H) 05/25/2021       Assessment and Plan:   1. Chronic systolic heart failure secondary to unclear etiology  - now with improved EF 40%- 50% per MRI 5/21.    Stage C  NYHA Class III  ACEi/ARB no she has not tolerated  BB yes - previously on Coreg, now on Metoprolol  Aldosterone antagonist - did not tolerate Spironolactone 12.5mg     2. Cardiac rhythm history:  RF ablation which left to AV node ablation and complete heart block requiring a dual-chamber pacemaker implantation, which is a Medtronic device MRI  compatible      Pt reassured, no medication changes made . Will follow up in 1 month, f/u with Adelaida Slater in 1 months.     CC  Patient Care Team:  Ce Crowe NP as PCP - General (Nurse Practitioner - Family)  Yuliet Mclean as Garland Velez MD as MD (Internal Medicine)  Adelaida Slater, APRN CNP as Nurse Practitioner (Nurse Practitioner)  Cheyenne Thomson RN as Specialty Care Coordinator (Clinical Cardiac Electrophysiology)  Lino Funes MD as MD (Clinical Cardiac Electrophysiology)  Mame Valadez RN as Specialty Care Coordinator (Cardiology)  Lino Funes MD as Assigned Heart and Vascular Provider  Garland Patel MD as Assigned Endocrinology Provider  Karen Peralta RPH as Pharmacist (Pharmacist Ambulatory Care)  Ce Crowe NP as Assigned PCP  Matthias García, RN as Specialty Care Coordinator (Cardiology)  KRYSTA HUTTON

## 2021-06-11 LAB
MDC_IDC_LEAD_IMPLANT_DT: NORMAL
MDC_IDC_LEAD_LOCATION: NORMAL
MDC_IDC_LEAD_LOCATION_DETAIL_1: NORMAL
MDC_IDC_LEAD_MFG: NORMAL
MDC_IDC_LEAD_MODEL: NORMAL
MDC_IDC_LEAD_POLARITY_TYPE: NORMAL
MDC_IDC_LEAD_SERIAL: NORMAL
MDC_IDC_MSMT_BATTERY_DTM: NORMAL
MDC_IDC_MSMT_BATTERY_DTM: NORMAL
MDC_IDC_MSMT_BATTERY_REMAINING_LONGEVITY: 66 MO
MDC_IDC_MSMT_BATTERY_REMAINING_LONGEVITY: 66 MO
MDC_IDC_MSMT_BATTERY_RRT_TRIGGER: 2.83
MDC_IDC_MSMT_BATTERY_RRT_TRIGGER: 2.83
MDC_IDC_MSMT_BATTERY_STATUS: NORMAL
MDC_IDC_MSMT_BATTERY_STATUS: NORMAL
MDC_IDC_MSMT_BATTERY_VOLTAGE: 3 V
MDC_IDC_MSMT_BATTERY_VOLTAGE: 3 V
MDC_IDC_MSMT_LEADCHNL_RA_IMPEDANCE_VALUE: 418 OHM
MDC_IDC_MSMT_LEADCHNL_RA_IMPEDANCE_VALUE: 418 OHM
MDC_IDC_MSMT_LEADCHNL_RA_IMPEDANCE_VALUE: 475 OHM
MDC_IDC_MSMT_LEADCHNL_RA_IMPEDANCE_VALUE: 475 OHM
MDC_IDC_MSMT_LEADCHNL_RA_PACING_THRESHOLD_AMPLITUDE: 0.5 V
MDC_IDC_MSMT_LEADCHNL_RA_PACING_THRESHOLD_AMPLITUDE: 0.75 V
MDC_IDC_MSMT_LEADCHNL_RA_PACING_THRESHOLD_PULSEWIDTH: 0.4 MS
MDC_IDC_MSMT_LEADCHNL_RA_PACING_THRESHOLD_PULSEWIDTH: 0.4 MS
MDC_IDC_MSMT_LEADCHNL_RA_SENSING_INTR_AMPL: 2.5 MV
MDC_IDC_MSMT_LEADCHNL_RA_SENSING_INTR_AMPL: 2.5 MV
MDC_IDC_MSMT_LEADCHNL_RA_SENSING_INTR_AMPL: 3.38 MV
MDC_IDC_MSMT_LEADCHNL_RA_SENSING_INTR_AMPL: 3.38 MV
MDC_IDC_MSMT_LEADCHNL_RV_IMPEDANCE_VALUE: 399 OHM
MDC_IDC_MSMT_LEADCHNL_RV_IMPEDANCE_VALUE: 399 OHM
MDC_IDC_MSMT_LEADCHNL_RV_IMPEDANCE_VALUE: 475 OHM
MDC_IDC_MSMT_LEADCHNL_RV_IMPEDANCE_VALUE: 475 OHM
MDC_IDC_MSMT_LEADCHNL_RV_PACING_THRESHOLD_AMPLITUDE: 0.75 V
MDC_IDC_MSMT_LEADCHNL_RV_PACING_THRESHOLD_AMPLITUDE: 1 V
MDC_IDC_MSMT_LEADCHNL_RV_PACING_THRESHOLD_PULSEWIDTH: 0.4 MS
MDC_IDC_MSMT_LEADCHNL_RV_PACING_THRESHOLD_PULSEWIDTH: 0.4 MS
MDC_IDC_PG_IMPLANT_DTM: NORMAL
MDC_IDC_PG_IMPLANT_DTM: NORMAL
MDC_IDC_PG_MFG: NORMAL
MDC_IDC_PG_MFG: NORMAL
MDC_IDC_PG_MODEL: NORMAL
MDC_IDC_PG_MODEL: NORMAL
MDC_IDC_PG_SERIAL: NORMAL
MDC_IDC_PG_SERIAL: NORMAL
MDC_IDC_PG_TYPE: NORMAL
MDC_IDC_PG_TYPE: NORMAL
MDC_IDC_SESS_CLINIC_NAME: NORMAL
MDC_IDC_SESS_CLINIC_NAME: NORMAL
MDC_IDC_SESS_DTM: NORMAL
MDC_IDC_SESS_DTM: NORMAL
MDC_IDC_SESS_TYPE: NORMAL
MDC_IDC_SESS_TYPE: NORMAL
MDC_IDC_SET_BRADY_AT_MODE_SWITCH_RATE: 171 {BEATS}/MIN
MDC_IDC_SET_BRADY_AT_MODE_SWITCH_RATE: 171 {BEATS}/MIN
MDC_IDC_SET_BRADY_HYSTRATE: NORMAL
MDC_IDC_SET_BRADY_HYSTRATE: NORMAL
MDC_IDC_SET_BRADY_LOWRATE: 60 {BEATS}/MIN
MDC_IDC_SET_BRADY_LOWRATE: 80 {BEATS}/MIN
MDC_IDC_SET_BRADY_MAX_SENSOR_RATE: 140 {BEATS}/MIN
MDC_IDC_SET_BRADY_MAX_SENSOR_RATE: 140 {BEATS}/MIN
MDC_IDC_SET_BRADY_MAX_TRACKING_RATE: 140 {BEATS}/MIN
MDC_IDC_SET_BRADY_MAX_TRACKING_RATE: 140 {BEATS}/MIN
MDC_IDC_SET_BRADY_MODE: NORMAL
MDC_IDC_SET_BRADY_MODE: NORMAL
MDC_IDC_SET_BRADY_PAV_DELAY_HIGH: 140 MS
MDC_IDC_SET_BRADY_PAV_DELAY_HIGH: 140 MS
MDC_IDC_SET_BRADY_PAV_DELAY_LOW: 180 MS
MDC_IDC_SET_BRADY_PAV_DELAY_LOW: 180 MS
MDC_IDC_SET_BRADY_SAV_DELAY_HIGH: 110 MS
MDC_IDC_SET_BRADY_SAV_DELAY_HIGH: 110 MS
MDC_IDC_SET_BRADY_SAV_DELAY_LOW: 150 MS
MDC_IDC_SET_BRADY_SAV_DELAY_LOW: 150 MS
MDC_IDC_SET_LEADCHNL_RA_PACING_AMPLITUDE: 1.5 V
MDC_IDC_SET_LEADCHNL_RA_PACING_AMPLITUDE: 1.5 V
MDC_IDC_SET_LEADCHNL_RA_PACING_ANODE_ELECTRODE_1: NORMAL
MDC_IDC_SET_LEADCHNL_RA_PACING_ANODE_ELECTRODE_1: NORMAL
MDC_IDC_SET_LEADCHNL_RA_PACING_ANODE_LOCATION_1: NORMAL
MDC_IDC_SET_LEADCHNL_RA_PACING_ANODE_LOCATION_1: NORMAL
MDC_IDC_SET_LEADCHNL_RA_PACING_CAPTURE_MODE: NORMAL
MDC_IDC_SET_LEADCHNL_RA_PACING_CAPTURE_MODE: NORMAL
MDC_IDC_SET_LEADCHNL_RA_PACING_CATHODE_ELECTRODE_1: NORMAL
MDC_IDC_SET_LEADCHNL_RA_PACING_CATHODE_ELECTRODE_1: NORMAL
MDC_IDC_SET_LEADCHNL_RA_PACING_CATHODE_LOCATION_1: NORMAL
MDC_IDC_SET_LEADCHNL_RA_PACING_CATHODE_LOCATION_1: NORMAL
MDC_IDC_SET_LEADCHNL_RA_PACING_POLARITY: NORMAL
MDC_IDC_SET_LEADCHNL_RA_PACING_POLARITY: NORMAL
MDC_IDC_SET_LEADCHNL_RA_PACING_PULSEWIDTH: 0.4 MS
MDC_IDC_SET_LEADCHNL_RA_PACING_PULSEWIDTH: 0.4 MS
MDC_IDC_SET_LEADCHNL_RA_SENSING_ANODE_ELECTRODE_1: NORMAL
MDC_IDC_SET_LEADCHNL_RA_SENSING_ANODE_ELECTRODE_1: NORMAL
MDC_IDC_SET_LEADCHNL_RA_SENSING_ANODE_LOCATION_1: NORMAL
MDC_IDC_SET_LEADCHNL_RA_SENSING_ANODE_LOCATION_1: NORMAL
MDC_IDC_SET_LEADCHNL_RA_SENSING_CATHODE_ELECTRODE_1: NORMAL
MDC_IDC_SET_LEADCHNL_RA_SENSING_CATHODE_ELECTRODE_1: NORMAL
MDC_IDC_SET_LEADCHNL_RA_SENSING_CATHODE_LOCATION_1: NORMAL
MDC_IDC_SET_LEADCHNL_RA_SENSING_CATHODE_LOCATION_1: NORMAL
MDC_IDC_SET_LEADCHNL_RA_SENSING_POLARITY: NORMAL
MDC_IDC_SET_LEADCHNL_RA_SENSING_POLARITY: NORMAL
MDC_IDC_SET_LEADCHNL_RA_SENSING_SENSITIVITY: 0.3 MV
MDC_IDC_SET_LEADCHNL_RA_SENSING_SENSITIVITY: 0.3 MV
MDC_IDC_SET_LEADCHNL_RV_PACING_AMPLITUDE: 1.5 V
MDC_IDC_SET_LEADCHNL_RV_PACING_AMPLITUDE: 1.5 V
MDC_IDC_SET_LEADCHNL_RV_PACING_ANODE_ELECTRODE_1: NORMAL
MDC_IDC_SET_LEADCHNL_RV_PACING_ANODE_ELECTRODE_1: NORMAL
MDC_IDC_SET_LEADCHNL_RV_PACING_ANODE_LOCATION_1: NORMAL
MDC_IDC_SET_LEADCHNL_RV_PACING_ANODE_LOCATION_1: NORMAL
MDC_IDC_SET_LEADCHNL_RV_PACING_CAPTURE_MODE: NORMAL
MDC_IDC_SET_LEADCHNL_RV_PACING_CAPTURE_MODE: NORMAL
MDC_IDC_SET_LEADCHNL_RV_PACING_CATHODE_ELECTRODE_1: NORMAL
MDC_IDC_SET_LEADCHNL_RV_PACING_CATHODE_ELECTRODE_1: NORMAL
MDC_IDC_SET_LEADCHNL_RV_PACING_CATHODE_LOCATION_1: NORMAL
MDC_IDC_SET_LEADCHNL_RV_PACING_CATHODE_LOCATION_1: NORMAL
MDC_IDC_SET_LEADCHNL_RV_PACING_POLARITY: NORMAL
MDC_IDC_SET_LEADCHNL_RV_PACING_POLARITY: NORMAL
MDC_IDC_SET_LEADCHNL_RV_PACING_PULSEWIDTH: 0.4 MS
MDC_IDC_SET_LEADCHNL_RV_PACING_PULSEWIDTH: 0.4 MS
MDC_IDC_SET_LEADCHNL_RV_SENSING_ANODE_ELECTRODE_1: NORMAL
MDC_IDC_SET_LEADCHNL_RV_SENSING_ANODE_ELECTRODE_1: NORMAL
MDC_IDC_SET_LEADCHNL_RV_SENSING_ANODE_LOCATION_1: NORMAL
MDC_IDC_SET_LEADCHNL_RV_SENSING_ANODE_LOCATION_1: NORMAL
MDC_IDC_SET_LEADCHNL_RV_SENSING_CATHODE_ELECTRODE_1: NORMAL
MDC_IDC_SET_LEADCHNL_RV_SENSING_CATHODE_ELECTRODE_1: NORMAL
MDC_IDC_SET_LEADCHNL_RV_SENSING_CATHODE_LOCATION_1: NORMAL
MDC_IDC_SET_LEADCHNL_RV_SENSING_CATHODE_LOCATION_1: NORMAL
MDC_IDC_SET_LEADCHNL_RV_SENSING_POLARITY: NORMAL
MDC_IDC_SET_LEADCHNL_RV_SENSING_POLARITY: NORMAL
MDC_IDC_SET_LEADCHNL_RV_SENSING_SENSITIVITY: 0.9 MV
MDC_IDC_SET_LEADCHNL_RV_SENSING_SENSITIVITY: 0.9 MV
MDC_IDC_SET_ZONE_DETECTION_INTERVAL: 350 MS
MDC_IDC_SET_ZONE_DETECTION_INTERVAL: 350 MS
MDC_IDC_SET_ZONE_DETECTION_INTERVAL: 400 MS
MDC_IDC_SET_ZONE_DETECTION_INTERVAL: 400 MS
MDC_IDC_SET_ZONE_TYPE: NORMAL
MDC_IDC_STAT_AT_BURDEN_PERCENT: 0 %
MDC_IDC_STAT_AT_BURDEN_PERCENT: 0 %
MDC_IDC_STAT_AT_DTM_END: NORMAL
MDC_IDC_STAT_AT_DTM_END: NORMAL
MDC_IDC_STAT_AT_DTM_START: NORMAL
MDC_IDC_STAT_AT_DTM_START: NORMAL
MDC_IDC_STAT_BRADY_AP_VP_PERCENT: 64.8 %
MDC_IDC_STAT_BRADY_AP_VP_PERCENT: 64.8 %
MDC_IDC_STAT_BRADY_AP_VS_PERCENT: 0.06 %
MDC_IDC_STAT_BRADY_AP_VS_PERCENT: 0.06 %
MDC_IDC_STAT_BRADY_AS_VP_PERCENT: 35.12 %
MDC_IDC_STAT_BRADY_AS_VP_PERCENT: 35.12 %
MDC_IDC_STAT_BRADY_AS_VS_PERCENT: 0.03 %
MDC_IDC_STAT_BRADY_AS_VS_PERCENT: 0.03 %
MDC_IDC_STAT_BRADY_DTM_END: NORMAL
MDC_IDC_STAT_BRADY_DTM_END: NORMAL
MDC_IDC_STAT_BRADY_DTM_START: NORMAL
MDC_IDC_STAT_BRADY_DTM_START: NORMAL
MDC_IDC_STAT_BRADY_RA_PERCENT_PACED: 64.62 %
MDC_IDC_STAT_BRADY_RA_PERCENT_PACED: 64.62 %
MDC_IDC_STAT_BRADY_RV_PERCENT_PACED: 99.8 %
MDC_IDC_STAT_BRADY_RV_PERCENT_PACED: 99.8 %
MDC_IDC_STAT_EPISODE_RECENT_COUNT: 0
MDC_IDC_STAT_EPISODE_RECENT_COUNT_DTM_END: NORMAL
MDC_IDC_STAT_EPISODE_RECENT_COUNT_DTM_START: NORMAL
MDC_IDC_STAT_EPISODE_TOTAL_COUNT: 0
MDC_IDC_STAT_EPISODE_TOTAL_COUNT: 0
MDC_IDC_STAT_EPISODE_TOTAL_COUNT: 1
MDC_IDC_STAT_EPISODE_TOTAL_COUNT: 10
MDC_IDC_STAT_EPISODE_TOTAL_COUNT: 10
MDC_IDC_STAT_EPISODE_TOTAL_COUNT_DTM_END: NORMAL
MDC_IDC_STAT_EPISODE_TOTAL_COUNT_DTM_START: NORMAL
MDC_IDC_STAT_EPISODE_TYPE: NORMAL

## 2021-06-21 ENCOUNTER — TELEPHONE (OUTPATIENT)
Dept: CARDIOLOGY | Facility: CLINIC | Age: 61
End: 2021-06-21

## 2021-06-21 NOTE — TELEPHONE ENCOUNTER
Prior Authorization Specialty Medication Request    Medication/Dose: sildenafil 10mg three times daily  ICD code (if different than what is on RX):    Previously Tried and Failed:  She previously tried 20mg tablets, but unable to tolerate that dose so will need liquid form to achieve 10mg dosage    Insurance Name:   Insurance ID:   Insurance Phone Number:     Pharmacy Information (if different than what is on RX)  Name:    Phone:

## 2021-06-21 NOTE — TELEPHONE ENCOUNTER
Started new prior auth for sildenafil suspension due to need for liquid dosing to achieve 10mg TID.  Returned call to Joyce to discuss. No answer. Left message letting her know that I reached out to our prior auth team and they will work on this and we will call her once we hear any updates. Left phone number in case she has other questions or concerns at this time.

## 2021-06-21 NOTE — TELEPHONE ENCOUNTER
[facility] Call Center    Phone Message    May a detailed message be left on voicemail: yes     Reason for Call: Other:         Pt forgot to add in she like the cymbalta but read the effects could be heart failure and she is concerned if she should be taking this. Please reach out to pt to discuss    Action Taken: Message routed to:  [facility] ([facility]): Cardio    Travel Screening: Not Applicable

## 2021-06-21 NOTE — TELEPHONE ENCOUNTER
[facility] Call Center    Phone Message    May a detailed message be left on voicemail: yes     Reason for Call: Other: Pt stated the Viagra needs to be approved for insurance purposes and would like a call back to discuss if any questions     Action Taken: Message routed to:  [facility] ([facility]): Cardio    Travel Screening: Not Applicable

## 2021-06-21 NOTE — TELEPHONE ENCOUNTER
PA Initiation    Medication:   Insurance Company: [employer] - Phone [phone] Fax [phone]  Pharmacy Filling the Rx: [id number] [place], [place] - [address]  Filling Pharmacy Phone: [phone]  Filling Pharmacy Fax: [phone]  Start Date: 6/21/2021

## 2021-06-22 NOTE — TELEPHONE ENCOUNTER
Returned call to Joyce. No answer. Left voicemail letting her know that PA has been submitted to insurance - awaiting their decision. Also sent [employer].

## 2021-06-23 ENCOUNTER — ANCILLARY PROCEDURE (OUTPATIENT)
Dept: CARDIOLOGY | Facility: CLINIC | Age: 61
End: 2021-06-23
Attending: INTERNAL MEDICINE
Payer: COMMERCIAL

## 2021-06-23 VITALS
HEIGHT: 65 IN | SYSTOLIC BLOOD PRESSURE: 103 MMHG | OXYGEN SATURATION: 96 % | HEART RATE: 84 BPM | WEIGHT: 171 LBS | DIASTOLIC BLOOD PRESSURE: 80 MMHG | BODY MASS INDEX: 28.49 KG/M2

## 2021-06-23 DIAGNOSIS — I44.2 COMPLETE ATRIOVENTRICULAR BLOCK (H): ICD-10-CM

## 2021-06-23 DIAGNOSIS — R68.89 EXERCISE INTOLERANCE: ICD-10-CM

## 2021-06-23 DIAGNOSIS — I51.89 LEFT VENTRICULAR DYSFUNCTION WITH REDUCED LEFT VENTRICULAR FUNCTION: ICD-10-CM

## 2021-06-23 DIAGNOSIS — I42.9 CARDIOMYOPATHY, UNSPECIFIED TYPE (H): Primary | ICD-10-CM

## 2021-06-23 PROCEDURE — 99214 OFFICE O/P EST MOD 30 MIN: CPT | Mod: 25 | Performed by: NURSE PRACTITIONER

## 2021-06-23 PROCEDURE — G0463 HOSPITAL OUTPT CLINIC VISIT: HCPCS | Mod: 25

## 2021-06-23 PROCEDURE — 93005 ELECTROCARDIOGRAM TRACING: CPT

## 2021-06-23 PROCEDURE — 93288 INTERROG EVL PM/LDLS PM IP: CPT | Performed by: INTERNAL MEDICINE

## 2021-06-23 ASSESSMENT — PAIN SCALES - GENERAL: PAINLEVEL: NO PAIN (0)

## 2021-06-23 ASSESSMENT — MIFFLIN-ST. JEOR: SCORE: 1336.53

## 2021-06-23 NOTE — LETTER
6/23/2021      RE: Joyce Marroquin  [id number] [address] [id number]       Dear Colleague,    Thank you for the opportunity to participate in the care of your patient, Joyce Marroquin, at the [facility] [place] at [facility]. Please see a copy of my visit note below.    Electrophysiology Clinic Note  HPI:   Ms. Marroquin is a 60 year old female who has a past medical history significant for GERD, PTSD, Grave's Disease with post ablative hypothyroidism, hyperparathyroidism, LARRY (uses CPAP), NICM LVEF 35-40%, PAF (CHADSVASC 2), depression, prior tobacco use, and PVCs s/p RVOT ablation complicated by CHB s/p PPM 4/21/2017 and pericarditis. She presents today for follow up.      She reports a history of symptomatic PVCs for which she ultimately elected to pursue an ablation which she had done at [facility]. 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 She went into [facility] and required urgent PPM placed in 4/21/17. She then had lead dislodgement in the recovery area requiring lead revision. She had pericarditis and completed a course of colchicine post ablation. Initial echo post ablation showed LVEF 40-45%, then an echo a month later showed LVEF 50%. MRI in early 4/2017 showed LVEF 60-65% with area of basal inferior hypokinesis with possible LGE. Stress test from 3/2017 showed no inducible ischemia. Echo from 5/2018 showed LVEF 50-55%, mild diffuse hypokinesis, normal RV size and function. She had some pain at pacemaker site and reported it was too close to her collarbone. She elected to undergo a pocket revision in 12/2017. Ever since her initial pacemaker implant she has struggled with episodes of dizziness/lightheadedness and fatigue. No episodes of syncope. She had an updated echo in 2019 that showed LVEF decreased to 30-35% with all segments severely hypokinetic to akinetic,  suggestive of stress cardiomyopathy but could not rule out ischemic cardiomyopathy. She underwent coronary angiogram that showed mild/moderate non-obstructive CAD involving LAD, LCx, and RCA with no significant CAD to explain new cardiomyopathy. Recommended her to get CMRI; however, she wanted to defer. Therefore, treated her for stress cardiomyopathy. Started her on Toprol XL, she has only tolerated small doses in the past. This was later changed to low dose Coreg. She was also placed on low dose lisinopril. Hypotension has limited up titration of neurohormal agents. A follow up echo in 12/2019 showed LVEF 40-45%, grade I diastolic dysfunction, and mild diffuse hypokinesis. She followed up with Dr. Funes in 2/2020 and reported that since 1/2020 she noticed a decrease in her wellbeing and exercise tolerance. She found even pushing a cart had become more difficult and felt more short of breath. A follow up echo in 3/2020 showed LVEF 40-45%, normal RV size/function unchanged from 12/2019 echo. Due to progressively worsening symptoms, she underwent exercise RHC which showed normal biventricular and pulmonary pressures, normal CO, and limited exercise capacity with significant rise in PCW from 10 to 31 mmHg and PA pressure mean of 21 to 38 mmHg c/w exercise induced pulmonary HTN. She was started on spironolactone. She has been on Lasix 20-40 mg as needed for weight gain or SOB and volume status fluctuations. A follow up echo in 2/19/21 showed LVEF 35-40%, normal RV size/function, and no significant valvular abnormalities. She has had low burden of short PAF episodes noted on device that she mentioned to her care team and was subsequently started on Eliquis. She continued to have progressively worsening exercise tolerance, SOB/EASTON, and decreased stamina. She has recently seen Dr. Funes and discussed possible CRT upgrade. She has also seen Dr. Ordonez and Dr. Brown for HF consults.     EP Visit 4/21/21: She is following  up today. She reports feeling at baseline. She continues to feel frustrated with her current symptomology and wants to improve her SOB and exertional capacities so she can return to work. Currently, these symptoms have been very limiting to her. She denies any chest pain/pressures, PND, orthopnea, palpitations, or syncopal symptoms. Device interrogation showed normal device function, stable lead parameters, AP 50.8%,  97.6%, AT/AF <0.1% none over 1 minute, and 1 NSVT. She has been talking to her psychologist/PCP about starting Cymbalta. Current cardiac medications include: Eliquis, Coreg, Lasix, Spironolactone.     She presents today for follow up. CMRI showed LVEF 50% with no myocardial fibrosis. Her CMRI report has wrong body weight/height and will request this is updated correctly. She has followed with Dr. Funes and Dr. Ordonez. Dr. Funes started her on sildenafil for possible exercise induced pulmonary HTN. She took the medication for a few doses but had extreme muscle pains on it. They would like her to try a lower dose but she is awaiting for approval from insurance for this. She has done acupuncture and feels this has been helpful to her. She does continue to struggle with activity intolerance 2/2 EASTON/SOB. Device clinic had ajusted her rate response in attempt to improve forward flow. Her weight has been stable. She denies any chest pain/pressures, leg/ankle swelling, PND, orthopnea, palpitations, or syncopal symptoms. Device interrogation shows normal device function, stable lead parameters, intrinsic rhythm is SR with PACs/, 100% , HR histograms flat she endorses she has not been very active recently. She has been taking Cymbalta now and feels some confusion with medication but does feel her emotions are less labile. Current cardiac medications include: Eliquis, Toprol XL, Lasix, Spironolactone.       PAST MEDICAL HISTORY:  Past Medical History:   Diagnosis Date     Arthritis      Cardiomyopathy  (H)     ff Cardiology     Chronic depressive personality disorder      Congestive heart failure (H) see dr martínez    heart failure correct term     COPD exacerbation (H)      Esophageal reflux      Ex-smoker     quit 2006; 1 PPD x 30     Hyperparathyroidism (H)     s/p parathyroidectomy     Hypothyroidism      LARRY on CPAP     ff Sleep medicine     Pulmonary nodules     ff Pulmonologist     S/P cardiac pacemaker procedure     checked every 6 months at the [facility]     Uncomplicated asthma years       CURRENT MEDICATIONS:  Current Outpatient Medications   Medication Sig Dispense Refill     acetaminophen (TYLENOL) 500 MG tablet Take 500-1,000 mg by mouth every 6 hours as needed for mild pain       albuterol (PROAIR HFA/PROVENTIL HFA/VENTOLIN HFA) 108 (90 BASE) MCG/ACT Inhaler Inhale 2 puffs into the lungs as needed for shortness of breath / dyspnea or wheezing        apixaban ANTICOAGULANT (ELIQUIS) 5 MG tablet Take 1 tablet (5 mg) by mouth 2 times daily 120 tablet 3     azelastine (ASTELIN) 0.1 % nasal spray Spray 1 spray into both nostrils 2 times daily as needed        Calcium Lactate 500 MG CAPS Take 250-500 mg by mouth daily        Calcium-Magnesium-Vitamin D (CALCIUM MAGNESIUM PO) Calcium 120 mg, magenesium 20 mg, iodine 20 mg as needed at bedtime       carvedilol (COREG) 3.125 MG tablet Take 1 tablet (3.125 mg) by mouth 2 times daily (with meals) (Patient not taking: Reported on 6/2/2021) 180 tablet 3     clonazePAM (KLONOPIN) 0.5 MG tablet TAKE 1 TABLET(0.5 MG) BY MOUTH THREE TIMES DAILY AS NEEDED FOR ANXIETY 90 tablet 0     DiphenhydrAMINE HCl (BENADRYL PO) Take 25 mg by mouth nightly as needed for allergies        DULoxetine (CYMBALTA) 30 MG capsule Take 1 capsule (30 mg) by mouth daily 30 capsule 1     Ferrous Sulfate (IRON SUPPLEMENT PO) Take 65 mg by mouth daily        fluocinolone acetonide (DERMA SMOOTHE/FS BODY) 0.01 % external oil Use for ears.  2 times daily for 7 days 1 Bottle 0      fluticasone-salmeterol (ADVAIR) 250-50 MCG/DOSE inhaler Inhale 1 puff into the lungs every 12 hours       furosemide (LASIX) 20 MG tablet Take 1 tablet, no more than twice a week.  Call with weight gain. 180 tablet 1     ipratropium (ATROVENT) 0.06 % nasal spray Spray 2 sprays into both nostrils 4 times daily as needed        loperamide (IMODIUM A-D) 2 MG tablet Take 2 tabs (4 mg) after first loose stool, and then take one tab (2 mg) after each diarrheal stool.  Max of 8 tabs (16 mg) per day. 1 tablet 0     MAGNESIUM LACTATE PO Take 140-210 mg by mouth nightly as needed        MELATONIN PO Take 1-3 mg by mouth nightly as needed        Menaquinone-7 (VITAMIN K2 PO) Take 1 tablet by mouth every morning        methylPREDNISolone (MEDROL DOSEPAK) 4 MG tablet therapy pack Follow Package Directions 21 tablet 0     METHYLPREDNISOLONE PO Take 40 mg by mouth as needed        metoprolol succinate ER (TOPROL XL) 25 MG 24 hr tablet Take 1 tablet (25 mg) by mouth daily 45 tablet 3     Multiple Vitamin (DAILY MULTIVITAMIN PO) Take 1 tablet by mouth every morning        nebulizer nebulization as needed       Probiotic Product (PROBIOTIC DAILY PO) Take 1 capsule by mouth 2 times daily as needed        sildenafil (REVATIO) 10 MG/ML SUSR Take 1 mL (10 mg) by mouth every 8 hours (Patient not taking: Reported on 6/2/2021) 112 mL 11     spironolactone (ALDACTONE) 25 MG tablet Take 1 tablet three days per week. 90 tablet 3     SYNTHROID 150 MCG tablet 150 mcg for 6 days and 75 mcg for 1 day per week 90 tablet 3     TURMERIC PO Take 1 tablet by mouth every morning        UNABLE TO FIND MEDICATION NAME: Xylitol daily as needed for allergies       UNABLE TO FIND 2 times daily as needed MEDICATION NAME: Standard Process, Immune Congaplex. Pt taking 2 tablets daily        vitamin B complex with vitamin C (VITAMIN  B COMPLEX) PO tablet Take 1 tablet by mouth as needed       vitamin D3 (CHOLECALCIFEROL) 2000 units tablet Take 1 tablet by mouth  daily 1 tablet 0     Zinc 15 MG CAPS Take 15 mg by mouth          PAST SURGICAL HISTORY:  Past Surgical History:   Procedure Laterality Date     BUNIONECTOMY Bilateral      CV CORONARY ANGIOGRAM N/A 9/26/2019    Procedure: CV CORONARY ANGIOGRAM;  Surgeon: Erickson Trejo MD;  Location:  HEART CARDIAC CATH LAB     CV RIGHT HEART CATH MEASUREMENTS RECORDED N/A 7/20/2020    Procedure: CV RIGHT HEART CATH;  Surgeon: Alonso Ordonez MD;  Location:  HEART CARDIAC CATH LAB     EP ABLATION / EP STUDIES  04/21/2017     EXPLORE NECK N/A 9/19/2018    Procedure: EXPLORE NECK;  Neck Exploration Resection of Left superior Parathyroid gland;  Surgeon: Kristine Venegas MD;  Location: [facility] OR     HC CORRECT BUNION,SIMPLE       PARATHYROIDECTOMY N/A 9/19/2018    Procedure: PARATHYROIDECTOMY;;  Surgeon: Kristine Venegas MD;  Location: [facility] OR     PPM INSERT OF NEW OR REPL W/VENT LEAD  04/21/2017     TONSILLECTOMY & ADENOIDECTOMY         ALLERGIES:     Allergies   Allergen Reactions     Tetracycline Swelling     Viagra [Sildenafil] Muscle Pain (Myalgia)     Zofran [Ondansetron]      Prolonged QT  Prolonged QT at baseline per patient     Flagyl [Metronidazole] Rash     Buspar [Buspirone]      Muscle weakness     Corn Oil Other (See Comments)     Headache       Seasonal Allergies Difficulty breathing     Sulfamethoxazole-Trimethoprim      Other reaction(s): Other  Passed out     Cyclobenzaprine Other (See Comments)     Extreme heat intolerance     Levaquin [Levofloxacin]      Other reaction(s): Other  Tendon rupture     Nsaids Other (See Comments)     Exacerbated asthma       FAMILY HISTORY:  Family History   Problem Relation Age of Onset     Hypothyroidism Mother      Hypertension Mother      Osteoarthritis Mother      Coronary Artery Disease Father         nonfatal MI in his 70s     Asthma Father      Diabetes Sister      Hypothyroidism Sister      Breast Cancer Maternal Aunt      Anxiety Disorder Sister        SOCIAL  "HISTORY:  Social History     Tobacco Use     Smoking status: Former Smoker     Packs/day: 0.00     Years: 0.00     Pack years: 0.00     Smokeless tobacco: Never Used   Substance Use Topics     Alcohol use: Yes     Alcohol/week: 0.0 - 8.0 standard drinks     Frequency: 2-3 times a week     Drinks per session: 1 or 2     Binge frequency: Never     Comment: seldom     Drug use: No       ROS:   A comprehensive 10 point review of systems negative other than as mentioned in HPI.  Exam:  /80 (BP Location: Right arm, Patient Position: Chair, Cuff Size: Adult Regular)   Pulse 84   Ht 1.651 m (5' 5\")   Wt 77.6 kg (171 lb)   LMP 08/21/2007   SpO2 96%   BMI 28.46 kg/m    GENERAL APPEARANCE: alert and no distress  HEENT: MMM. PERRLA.  NECK: supple.   RESPIRATORY:no use of accessory muscles, no retractions, respirations are unlabored, normal respiratory rate  CARDIOVASCULAR: device shows regular .   ABDOMEN: ND.  EXTREMITIES: no edema  NEURO: alert and oriented to person/place/time, normal speech, gait and affect  SKIN: no ecchymoses, no rashes  PSYCH: normal affect, cooperative    Labs:  Reviewed.     Testing/Procedures:  7/2020 RHC:  Conclusion       Right sided filling pressures are normal.    Normal PA pressures.    Left sided filling pressures are normal.    Normal cardiac index at [facility] with thermodilution    Limited exercise of 4 minutes and 20 seconds that propted a significant rise in patients PCWP as well as PA pressures consistent with diastolic dysfunction.         2/19/21 ECHOCARDIOGRAM:   Interpretation Summary     Moderately (EF 35%) reduced left ventricular function is present.  Global right ventricular function is normal.  No significant valvular dysfunction.  The inferior vena cava was normal in size with preserved respiratory  variability.  No pericardial effusion present.    5/4/21 CMRI:  1. The LV is normal in cavity size and wall thickness. The global systolic function is mildly reduced. " The  LVEF is 50%. There are no regional wall motion abnormalities. There is abnormal septal motion related to  pacing.     2. The RV is normal in cavity size. The global systolic function is normal. The RVEF is 65%.      3. The left atrium is mildly dilated.     4. There is no significant valvular disease.      5. Late gadolinium enhancement imaging shows no MI, fibrosis or infiltrative disease.      6. Regadenoson stress perfusion imaging shows no ischemia.     7. There is no pericardial effusion or thickening.     8. There is no LV thrombus.     CONCLUSIONS: No myocardial ischemia or fibrosis. Mild NICM possibly related to pacing, LVEF 50% and RVEF  65%.     Assessment and Plan:   Ms. Marroquin is a 60 year old female who has a past medical history significant for GERD, PTSD, Grave's Disease with post ablative hypothyroidism, hyperparathyroidism, LARRY (uses CPAP), NICM LVEF 35-40%, PAF (CHADSVASC 2), depression, prior tobacco use, and PVCs s/p RVOT ablation complicated by CHB s/p PPM 4/21/2017 and pericarditis. She presents today for follow up. CMRI showed LVEF 50% with no myocardial fibrosis. Her CMRI report has wrong body weight/height and will request this is updated correctly. She has followed with Dr. Funes and Dr. Ordonez. Dr. Funes started her on sildenafil for possible exercise induced pulmonary HTN. She took the medication for a few doses but had extreme muscle pains on it. They would like her to try a lower dose but she is awaiting for approval from insurance for this. She has done acupuncture and feels this has been helpful to her. She does continue to struggle with activity intolerance 2/2 EASTON/SOB. Device clinic had ajusted her rate response in attempt to improve forward flow. Her weight has been stable. She denies any chest pain/pressures, leg/ankle swelling, PND, orthopnea, palpitations, or syncopal symptoms. Device interrogation shows normal device function, stable lead parameters, intrinsic rhythm is  SR with PACs/, 100% , HR histograms flat she endorses she has not been very active recently. She has been taking Cymbalta now and feels some confusion with medication but does feel her emotions are less labile. Current cardiac medications include: Eliquis, Toprol XL, Lasix, Spironolactone.     NICM LVEF 30-35%, improved to 50% NYHA III:  Unclear etiology, consider pacing induced or primary NICM.   1. ACEi/ARB: had not tolerated lisinopril d/t hypotension   2. BB: Continue Toprol XL  3. Aldosterone antagonist: continue spironolactone.  4. SCD prophylaxis: LVEF >35% at this time does not have indication for ICD  5. Fluid status: Uses Lasix 20 mg 2X/week.  6. Following with HF team/CORE clinic         [facility] s/p PPM 4/2017:   1. Pacemaker is functioning well with stable lead parameters. No sustained arrhythmias.  2. She will continue to follow with device clinic per routine.   3. High  %, has LVEF that has waxed and waned over time, previously was considering upgrade to CRT but with current LVEF 50% holding off on this at this time. Will continue to follow with echos.       In regards to starting antidepressant:   We also discussed and reviewed her prior ECGs. that her QTc has been stable and acceptable for paced rhythm. She is on Cymbalta.     The patient states understanding and is agreeable with plan.     SHAYNA Thomas CNP  Pager: [phone]  [place]

## 2021-06-23 NOTE — PROGRESS NOTES
Electrophysiology Clinic Note  HPI:   Ms. Marroquin is a 60 year old female who has a past medical history significant for GERD, PTSD, Grave's Disease with post ablative hypothyroidism, hyperparathyroidism, LARRY (uses CPAP), NICM LVEF 35-40%, PAF (CHADSVASC 2), depression, prior tobacco use, and PVCs s/p RVOT ablation complicated by CHB s/p PPM 4/21/2017 and pericarditis. She presents today for follow up.      She reports a history of symptomatic PVCs for which she ultimately elected to pursue an ablation which she had done at [facility]. 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 She went into [facility] and required urgent PPM placed in 4/21/17. She then had lead dislodgement in the recovery area requiring lead revision. She had pericarditis and completed a course of colchicine post ablation. Initial echo post ablation showed LVEF 40-45%, then an echo a month later showed LVEF 50%. MRI in early 4/2017 showed LVEF 60-65% with area of basal inferior hypokinesis with possible LGE. Stress test from 3/2017 showed no inducible ischemia. Echo from 5/2018 showed LVEF 50-55%, mild diffuse hypokinesis, normal RV size and function. She had some pain at pacemaker site and reported it was too close to her collarbone. She elected to undergo a pocket revision in 12/2017. Ever since her initial pacemaker implant she has struggled with episodes of dizziness/lightheadedness and fatigue. No episodes of syncope. She had an updated echo in 2019 that showed LVEF decreased to 30-35% with all segments severely hypokinetic to akinetic, suggestive of stress cardiomyopathy but could not rule out ischemic cardiomyopathy. She underwent coronary angiogram that showed mild/moderate non-obstructive CAD involving LAD, LCx, and RCA with no significant CAD to explain new cardiomyopathy. Recommended her to get CMRI; however, she wanted to defer. Therefore, treated her for stress  cardiomyopathy. Started her on Toprol XL, she has only tolerated small doses in the past. This was later changed to low dose Coreg. She was also placed on low dose lisinopril. Hypotension has limited up titration of neurohormal agents. A follow up echo in 12/2019 showed LVEF 40-45%, grade I diastolic dysfunction, and mild diffuse hypokinesis. She followed up with Dr. Funes in 2/2020 and reported that since 1/2020 she noticed a decrease in her wellbeing and exercise tolerance. She found even pushing a cart had become more difficult and felt more short of breath. A follow up echo in 3/2020 showed LVEF 40-45%, normal RV size/function unchanged from 12/2019 echo. Due to progressively worsening symptoms, she underwent exercise RHC which showed normal biventricular and pulmonary pressures, normal CO, and limited exercise capacity with significant rise in PCW from 10 to 31 mmHg and PA pressure mean of 21 to 38 mmHg c/w exercise induced pulmonary HTN. She was started on spironolactone. She has been on Lasix 20-40 mg as needed for weight gain or SOB and volume status fluctuations. A follow up echo in 2/19/21 showed LVEF 35-40%, normal RV size/function, and no significant valvular abnormalities. She has had low burden of short PAF episodes noted on device that she mentioned to her care team and was subsequently started on Eliquis. She continued to have progressively worsening exercise tolerance, SOB/EASTON, and decreased stamina. She has recently seen Dr. Funes and discussed possible CRT upgrade. She has also seen Dr. Ordonez and Dr. Brown for HF consults.     EP Visit 4/21/21: She is following up today. She reports feeling at baseline. She continues to feel frustrated with her current symptomology and wants to improve her SOB and exertional capacities so she can return to work. Currently, these symptoms have been very limiting to her. She denies any chest pain/pressures, PND, orthopnea, palpitations, or syncopal symptoms.  Device interrogation showed normal device function, stable lead parameters, AP 50.8%,  97.6%, AT/AF <0.1% none over 1 minute, and 1 NSVT. She has been talking to her psychologist/PCP about starting Cymbalta. Current cardiac medications include: Eliquis, Coreg, Lasix, Spironolactone.     She presents today for follow up. CMRI showed LVEF 50% with no myocardial fibrosis. Her CMRI report has wrong body weight/height and will request this is updated correctly. She has followed with Dr. Funes and Dr. Ordonez. Dr. Funes started her on sildenafil for possible exercise induced pulmonary HTN. She took the medication for a few doses but had extreme muscle pains on it. They would like her to try a lower dose but she is awaiting for approval from insurance for this. She has done acupuncture and feels this has been helpful to her. She does continue to struggle with activity intolerance 2/2 EASTON/SOB. Device clinic had ajusted her rate response in attempt to improve forward flow. Her weight has been stable. She denies any chest pain/pressures, leg/ankle swelling, PND, orthopnea, palpitations, or syncopal symptoms. Device interrogation shows normal device function, stable lead parameters, intrinsic rhythm is SR with PACs/, 100% , HR histograms flat she endorses she has not been very active recently. She has been taking Cymbalta now and feels some confusion with medication but does feel her emotions are less labile. Current cardiac medications include: Eliquis, Toprol XL, Lasix, Spironolactone.       PAST MEDICAL HISTORY:  Past Medical History:   Diagnosis Date     Arthritis      Cardiomyopathy (H)     ff Cardiology     Chronic depressive personality disorder      Congestive heart failure (H) see  note    heart failure correct term     COPD exacerbation (H)      Esophageal reflux      Ex-smoker     quit 2006; 1 PPD x 30     Hyperparathyroidism (H)     s/p parathyroidectomy     Hypothyroidism      LARRY on CPAP     ff Sleep  medicine     Pulmonary nodules     ff Pulmonologist     S/P cardiac pacemaker procedure     checked every 6 months at [facility]     Uncomplicated asthma years       CURRENT MEDICATIONS:  Current Outpatient Medications   Medication Sig Dispense Refill     acetaminophen (TYLENOL) 500 MG tablet Take 500-1,000 mg by mouth every 6 hours as needed for mild pain       albuterol (PROAIR HFA/PROVENTIL HFA/VENTOLIN HFA) 108 (90 BASE) MCG/ACT Inhaler Inhale 2 puffs into the lungs as needed for shortness of breath / dyspnea or wheezing        apixaban ANTICOAGULANT (ELIQUIS) 5 MG tablet Take 1 tablet (5 mg) by mouth 2 times daily 120 tablet 3     azelastine (ASTELIN) 0.1 % nasal spray Spray 1 spray into both nostrils 2 times daily as needed        Calcium Lactate 500 MG CAPS Take 250-500 mg by mouth daily        Calcium-Magnesium-Vitamin D (CALCIUM MAGNESIUM PO) Calcium 120 mg, magenesium 20 mg, iodine 20 mg as needed at bedtime       carvedilol (COREG) 3.125 MG tablet Take 1 tablet (3.125 mg) by mouth 2 times daily (with meals) (Patient not taking: Reported on 6/2/2021) 180 tablet 3     clonazePAM (KLONOPIN) 0.5 MG tablet TAKE 1 TABLET(0.5 MG) BY MOUTH THREE TIMES DAILY AS NEEDED FOR ANXIETY 90 tablet 0     DiphenhydrAMINE HCl (BENADRYL PO) Take 25 mg by mouth nightly as needed for allergies        DULoxetine (CYMBALTA) 30 MG capsule Take 1 capsule (30 mg) by mouth daily 30 capsule 1     Ferrous Sulfate (IRON SUPPLEMENT PO) Take 65 mg by mouth daily        fluocinolone acetonide (DERMA SMOOTHE/FS BODY) 0.01 % external oil Use for ears.  2 times daily for 7 days 1 Bottle 0     fluticasone-salmeterol (ADVAIR) 250-50 MCG/DOSE inhaler Inhale 1 puff into the lungs every 12 hours       furosemide (LASIX) 20 MG tablet Take 1 tablet, no more than twice a week.  Call with weight gain. 180 tablet 1     ipratropium (ATROVENT) 0.06 % nasal spray Spray 2 sprays into both nostrils 4 times daily as needed        loperamide (IMODIUM A-D) 2  MG tablet Take 2 tabs (4 mg) after first loose stool, and then take one tab (2 mg) after each diarrheal stool.  Max of 8 tabs (16 mg) per day. 1 tablet 0     MAGNESIUM LACTATE PO Take 140-210 mg by mouth nightly as needed        MELATONIN PO Take 1-3 mg by mouth nightly as needed        Menaquinone-7 (VITAMIN K2 PO) Take 1 tablet by mouth every morning        methylPREDNISolone (MEDROL DOSEPAK) 4 MG tablet therapy pack Follow Package Directions 21 tablet 0     METHYLPREDNISOLONE PO Take 40 mg by mouth as needed        metoprolol succinate ER (TOPROL XL) 25 MG 24 hr tablet Take 1 tablet (25 mg) by mouth daily 45 tablet 3     Multiple Vitamin (DAILY MULTIVITAMIN PO) Take 1 tablet by mouth every morning        nebulizer nebulization as needed       Probiotic Product (PROBIOTIC DAILY PO) Take 1 capsule by mouth 2 times daily as needed        sildenafil (REVATIO) 10 MG/ML SUSR Take 1 mL (10 mg) by mouth every 8 hours (Patient not taking: Reported on 6/2/2021) 112 mL 11     spironolactone (ALDACTONE) 25 MG tablet Take 1 tablet three days per week. 90 tablet 3     SYNTHROID 150 MCG tablet 150 mcg for 6 days and 75 mcg for 1 day per week 90 tablet 3     TURMERIC PO Take 1 tablet by mouth every morning        UNABLE TO FIND MEDICATION NAME: Xylitol daily as needed for allergies       UNABLE TO FIND 2 times daily as needed MEDICATION NAME: Standard Process, Immune Congaplex. Pt taking 2 tablets daily        vitamin B complex with vitamin C (VITAMIN  B COMPLEX) PO tablet Take 1 tablet by mouth as needed       vitamin D3 (CHOLECALCIFEROL) 2000 units tablet Take 1 tablet by mouth daily 1 tablet 0     Zinc 15 MG CAPS Take 15 mg by mouth          PAST SURGICAL HISTORY:  Past Surgical History:   Procedure Laterality Date     BUNIONECTOMY Bilateral      CV CORONARY ANGIOGRAM N/A 9/26/2019    Procedure: CV CORONARY ANGIOGRAM;  Surgeon: Erickson Trejo MD;  Location:  [facility]ATH [facility]     CV RIGHT HEART CATH MEASUREMENTS  RECORDED N/A 7/20/2020    Procedure: CV RIGHT HEART CATH;  Surgeon: Alonso Ordonez MD;  Location:  [facility] CATH LAB     EP ABLATION / EP STUDIES  04/21/2017     EXPLORE NECK N/A 9/19/2018    Procedure: EXPLORE NECK;  Neck Exploration Resection of Left superior Parathyroid gland;  Surgeon: Kristine Venegas MD;  Location: [facility] OR      CORRECT BUNION,SIMPLE       PARATHYROIDECTOMY N/A 9/19/2018    Procedure: PARATHYROIDECTOMY;;  Surgeon: Kristine Venegas MD;  Location: [facility] OR     [facility] INSERT OF NEW OR REPL W/VENT LEAD  04/21/2017     TONSILLECTOMY & ADENOIDECTOMY         ALLERGIES:     Allergies   Allergen Reactions     Tetracycline Swelling     Viagra [Sildenafil] Muscle Pain (Myalgia)     Zofran [Ondansetron]      Prolonged QT  Prolonged QT at baseline per patient     Flagyl [Metronidazole] Rash     Buspar [Buspirone]      Muscle weakness     Corn Oil Other (See Comments)     Headache       Seasonal Allergies Difficulty breathing     Sulfamethoxazole-Trimethoprim      Other reaction(s): Other  Passed out     Cyclobenzaprine Other (See Comments)     Extreme heat intolerance     Levaquin [Levofloxacin]      Other reaction(s): Other  Tendon rupture     Nsaids Other (See Comments)     Exacerbated asthma       FAMILY HISTORY:  Family History   Problem Relation Age of Onset     Hypothyroidism Mother      Hypertension Mother      Osteoarthritis Mother      Coronary Artery Disease Father         nonfatal MI in his 70s     Asthma Father      Diabetes Sister      Hypothyroidism Sister      Breast Cancer Maternal Aunt      Anxiety Disorder Sister        SOCIAL HISTORY:  Social History     Tobacco Use     Smoking status: Former Smoker     Packs/day: 0.00     Years: 0.00     Pack years: 0.00     Smokeless tobacco: Never Used   Substance Use Topics     Alcohol use: Yes     Alcohol/week: 0.0 - 8.0 standard drinks     Frequency: 2-3 times a week     Drinks per session: 1 or 2     Binge frequency: Never     Comment:  "seldom     Drug use: No       ROS:   A comprehensive 10 point review of systems negative other than as mentioned in HPI.  Exam:  /80 (BP Location: Right arm, Patient Position: Chair, Cuff Size: Adult Regular)   Pulse 84   Ht 1.651 m (5' 5\")   Wt 77.6 kg (171 lb)   LMP 08/21/2007   SpO2 96%   BMI 28.46 kg/m    GENERAL APPEARANCE: alert and no distress  HEENT: MMM. PERRLA.  NECK: supple.   RESPIRATORY:no use of accessory muscles, no retractions, respirations are unlabored, normal respiratory rate  CARDIOVASCULAR: device shows regular .   ABDOMEN: ND.  EXTREMITIES: no edema  NEURO: alert and oriented to person/place/time, normal speech, gait and affect  SKIN: no ecchymoses, no rashes  PSYCH: normal affect, cooperative    Labs:  Reviewed.     Testing/Procedures:  7/2020 RHC:  Conclusion       Right sided filling pressures are normal.    Normal PA pressures.    Left sided filling pressures are normal.    Normal cardiac index at [facility] with thermodilution    Limited exercise of 4 minutes and 20 seconds that propted a significant rise in patients PCWP as well as PA pressures consistent with diastolic dysfunction.         2/19/21 ECHOCARDIOGRAM:   Interpretation Summary     Moderately (EF 35%) reduced left ventricular function is present.  Global right ventricular function is normal.  No significant valvular dysfunction.  The inferior vena cava was normal in size with preserved respiratory  variability.  No pericardial effusion present.    5/4/21 CMRI:  1. The LV is normal in cavity size and wall thickness. The global systolic function is mildly reduced. The  LVEF is 50%. There are no regional wall motion abnormalities. There is abnormal septal motion related to  pacing.     2. The RV is normal in cavity size. The global systolic function is normal. The RVEF is 65%.      3. The left atrium is mildly dilated.     4. There is no significant valvular disease.      5. Late gadolinium enhancement imaging shows no " MI, fibrosis or infiltrative disease.      6. Regadenoson stress perfusion imaging shows no ischemia.     7. There is no pericardial effusion or thickening.     8. There is no LV thrombus.     CONCLUSIONS: No myocardial ischemia or fibrosis. Mild NICM possibly related to pacing, LVEF 50% and RVEF  65%.     Assessment and Plan:   Ms. Marroquin is a 60 year old female who has a past medical history significant for GERD, PTSD, Grave's Disease with post ablative hypothyroidism, hyperparathyroidism, LARRY (uses CPAP), NICM LVEF 35-40%, PAF (CHADSVASC 2), depression, prior tobacco use, and PVCs s/p RVOT ablation complicated by CHB s/p PPM 4/21/2017 and pericarditis. She presents today for follow up. CMRI showed LVEF 50% with no myocardial fibrosis. Her CMRI report has wrong body weight/height and will request this is updated correctly. She has followed with Dr. Funes and Dr. Ordonez. Dr. Funes started her on sildenafil for possible exercise induced pulmonary HTN. She took the medication for a few doses but had extreme muscle pains on it. They would like her to try a lower dose but she is awaiting for approval from insurance for this. She has done acupuncture and feels this has been helpful to her. She does continue to struggle with activity intolerance 2/2 EASTON/SOB. Device clinic had ajusted her rate response in attempt to improve forward flow. Her weight has been stable. She denies any chest pain/pressures, leg/ankle swelling, PND, orthopnea, palpitations, or syncopal symptoms. Device interrogation shows normal device function, stable lead parameters, intrinsic rhythm is SR with PACs/, 100% , HR histograms flat she endorses she has not been very active recently. She has been taking Cymbalta now and feels some confusion with medication but does feel her emotions are less labile. Current cardiac medications include: Eliquis, Toprol XL, Lasix, Spironolactone.     NICM LVEF 30-35%, improved to 50% NYHA III:  Unclear etiology,  consider pacing induced or primary NICM.   1. ACEi/ARB: had not tolerated lisinopril d/t hypotension   2. BB: Continue Toprol XL  3. Aldosterone antagonist: continue spironolactone.  4. SCD prophylaxis: LVEF >35% at this time does not have indication for ICD  5. Fluid status: Uses Lasix 20 mg 2X/week.  6. Following with HF team/CORE clinic         [facility] s/p PPM 4/2017:   1. Pacemaker is functioning well with stable lead parameters. No sustained arrhythmias.  2. She will continue to follow with device clinic per routine.   3. High  %, has LVEF that has waxed and waned over time, previously was considering upgrade to CRT but with current LVEF 50% holding off on this at this time. Will continue to follow with echos.       In regards to starting antidepressant:   We also discussed and reviewed her prior ECGs. that her QTc has been stable and acceptable for paced rhythm. She is on Cymbalta.     The patient states understanding and is agreeable with plan.     SHAYNA Thomas CNP  Pager: [phone]  CC  STUART COLMENARES

## 2021-06-23 NOTE — PATIENT INSTRUCTIONS
It was a pleasure to see you in clinic today.  Please do not hesitate to call with any questions or concerns.  You are scheduled for remote transmissions on 8/25/21 and 11/30/21.  We look forward to seeing you in clinic at your next device check.    Jeanna Nixon, RN, MS, CCRN  Electrophysiology Nurse Clinician  [facility]    During Business Hours Please Call:  [phone]  After Hours Please Call:  [phone] - select option #4 and ask for job code [phone]

## 2021-06-23 NOTE — NURSING NOTE
Chief Complaint   Patient presents with     Follow Up     2 Month Follow-Up      Vitals were taken and medications where reconciled. EKG was performed   EARLENE Mora  7:48 AM

## 2021-06-25 LAB — INTERPRETATION ECG - MUSE: NORMAL

## 2021-06-25 NOTE — TELEPHONE ENCOUNTER
Date: 6/25/2021    Time of Call: 1:49 PM     Diagnosis:  cardiomyopathy     [ TORB ] Ordering provider: Dr. Ordonez  Order: OK to continue taking Cymbalta     Order received by: NUNO Mancini     Follow-up/additional notes: Called patient and reviewed Dr. Ordonez's recommendation that it is ok to continue taking Cymbalta.

## 2021-06-26 LAB
MDC_IDC_LEAD_IMPLANT_DT: NORMAL
MDC_IDC_LEAD_IMPLANT_DT: NORMAL
MDC_IDC_LEAD_LOCATION: NORMAL
MDC_IDC_LEAD_LOCATION: NORMAL
MDC_IDC_LEAD_LOCATION_DETAIL_1: NORMAL
MDC_IDC_LEAD_LOCATION_DETAIL_1: NORMAL
MDC_IDC_LEAD_MFG: NORMAL
MDC_IDC_LEAD_MFG: NORMAL
MDC_IDC_LEAD_MODEL: NORMAL
MDC_IDC_LEAD_MODEL: NORMAL
MDC_IDC_LEAD_POLARITY_TYPE: NORMAL
MDC_IDC_LEAD_POLARITY_TYPE: NORMAL
MDC_IDC_LEAD_SERIAL: NORMAL
MDC_IDC_LEAD_SERIAL: NORMAL
MDC_IDC_MSMT_BATTERY_DTM: NORMAL
MDC_IDC_MSMT_BATTERY_REMAINING_LONGEVITY: 64 MO
MDC_IDC_MSMT_BATTERY_RRT_TRIGGER: 2.83
MDC_IDC_MSMT_BATTERY_STATUS: NORMAL
MDC_IDC_MSMT_BATTERY_VOLTAGE: 3 V
MDC_IDC_MSMT_LEADCHNL_RA_IMPEDANCE_VALUE: 380 OHM
MDC_IDC_MSMT_LEADCHNL_RA_IMPEDANCE_VALUE: 456 OHM
MDC_IDC_MSMT_LEADCHNL_RA_PACING_THRESHOLD_AMPLITUDE: 0.62 V
MDC_IDC_MSMT_LEADCHNL_RA_PACING_THRESHOLD_PULSEWIDTH: 0.4 MS
MDC_IDC_MSMT_LEADCHNL_RA_SENSING_INTR_AMPL: 3.12 MV
MDC_IDC_MSMT_LEADCHNL_RA_SENSING_INTR_AMPL: 3.12 MV
MDC_IDC_MSMT_LEADCHNL_RV_IMPEDANCE_VALUE: 418 OHM
MDC_IDC_MSMT_LEADCHNL_RV_IMPEDANCE_VALUE: 475 OHM
MDC_IDC_MSMT_LEADCHNL_RV_PACING_THRESHOLD_AMPLITUDE: 0.75 V
MDC_IDC_MSMT_LEADCHNL_RV_PACING_THRESHOLD_PULSEWIDTH: 0.4 MS
MDC_IDC_MSMT_LEADCHNL_RV_SENSING_INTR_AMPL: 6.75 MV
MDC_IDC_MSMT_LEADCHNL_RV_SENSING_INTR_AMPL: 6.75 MV
MDC_IDC_PG_IMPLANT_DTM: NORMAL
MDC_IDC_PG_MFG: NORMAL
MDC_IDC_PG_MODEL: NORMAL
MDC_IDC_PG_SERIAL: NORMAL
MDC_IDC_PG_TYPE: NORMAL
MDC_IDC_SESS_CLINIC_NAME: NORMAL
MDC_IDC_SESS_DTM: NORMAL
MDC_IDC_SESS_TYPE: NORMAL
MDC_IDC_SET_BRADY_AT_MODE_SWITCH_RATE: 171 {BEATS}/MIN
MDC_IDC_SET_BRADY_HYSTRATE: NORMAL
MDC_IDC_SET_BRADY_LOWRATE: 60 {BEATS}/MIN
MDC_IDC_SET_BRADY_MAX_SENSOR_RATE: 140 {BEATS}/MIN
MDC_IDC_SET_BRADY_MAX_TRACKING_RATE: 140 {BEATS}/MIN
MDC_IDC_SET_BRADY_MODE: NORMAL
MDC_IDC_SET_BRADY_PAV_DELAY_HIGH: 140 MS
MDC_IDC_SET_BRADY_PAV_DELAY_LOW: 180 MS
MDC_IDC_SET_BRADY_SAV_DELAY_HIGH: 110 MS
MDC_IDC_SET_BRADY_SAV_DELAY_LOW: 150 MS
MDC_IDC_SET_LEADCHNL_RA_PACING_AMPLITUDE: 1.5 V
MDC_IDC_SET_LEADCHNL_RA_PACING_ANODE_ELECTRODE_1: NORMAL
MDC_IDC_SET_LEADCHNL_RA_PACING_ANODE_LOCATION_1: NORMAL
MDC_IDC_SET_LEADCHNL_RA_PACING_CAPTURE_MODE: NORMAL
MDC_IDC_SET_LEADCHNL_RA_PACING_CATHODE_ELECTRODE_1: NORMAL
MDC_IDC_SET_LEADCHNL_RA_PACING_CATHODE_LOCATION_1: NORMAL
MDC_IDC_SET_LEADCHNL_RA_PACING_POLARITY: NORMAL
MDC_IDC_SET_LEADCHNL_RA_PACING_PULSEWIDTH: 0.4 MS
MDC_IDC_SET_LEADCHNL_RA_SENSING_ANODE_ELECTRODE_1: NORMAL
MDC_IDC_SET_LEADCHNL_RA_SENSING_ANODE_LOCATION_1: NORMAL
MDC_IDC_SET_LEADCHNL_RA_SENSING_CATHODE_ELECTRODE_1: NORMAL
MDC_IDC_SET_LEADCHNL_RA_SENSING_CATHODE_LOCATION_1: NORMAL
MDC_IDC_SET_LEADCHNL_RA_SENSING_POLARITY: NORMAL
MDC_IDC_SET_LEADCHNL_RA_SENSING_SENSITIVITY: 0.3 MV
MDC_IDC_SET_LEADCHNL_RV_PACING_AMPLITUDE: 1.5 V
MDC_IDC_SET_LEADCHNL_RV_PACING_ANODE_ELECTRODE_1: NORMAL
MDC_IDC_SET_LEADCHNL_RV_PACING_ANODE_LOCATION_1: NORMAL
MDC_IDC_SET_LEADCHNL_RV_PACING_CAPTURE_MODE: NORMAL
MDC_IDC_SET_LEADCHNL_RV_PACING_CATHODE_ELECTRODE_1: NORMAL
MDC_IDC_SET_LEADCHNL_RV_PACING_CATHODE_LOCATION_1: NORMAL
MDC_IDC_SET_LEADCHNL_RV_PACING_POLARITY: NORMAL
MDC_IDC_SET_LEADCHNL_RV_PACING_PULSEWIDTH: 0.4 MS
MDC_IDC_SET_LEADCHNL_RV_SENSING_ANODE_ELECTRODE_1: NORMAL
MDC_IDC_SET_LEADCHNL_RV_SENSING_ANODE_LOCATION_1: NORMAL
MDC_IDC_SET_LEADCHNL_RV_SENSING_CATHODE_ELECTRODE_1: NORMAL
MDC_IDC_SET_LEADCHNL_RV_SENSING_CATHODE_LOCATION_1: NORMAL
MDC_IDC_SET_LEADCHNL_RV_SENSING_POLARITY: NORMAL
MDC_IDC_SET_LEADCHNL_RV_SENSING_SENSITIVITY: 0.9 MV
MDC_IDC_SET_ZONE_DETECTION_INTERVAL: 350 MS
MDC_IDC_SET_ZONE_DETECTION_INTERVAL: 400 MS
MDC_IDC_SET_ZONE_TYPE: NORMAL
MDC_IDC_STAT_AT_BURDEN_PERCENT: 0.1 %
MDC_IDC_STAT_AT_DTM_END: NORMAL
MDC_IDC_STAT_AT_DTM_START: NORMAL
MDC_IDC_STAT_BRADY_AP_VP_PERCENT: 61.85 %
MDC_IDC_STAT_BRADY_AP_VS_PERCENT: 0.04 %
MDC_IDC_STAT_BRADY_AS_VP_PERCENT: 38.09 %
MDC_IDC_STAT_BRADY_AS_VS_PERCENT: 0.02 %
MDC_IDC_STAT_BRADY_DTM_END: NORMAL
MDC_IDC_STAT_BRADY_DTM_START: NORMAL
MDC_IDC_STAT_BRADY_RA_PERCENT_PACED: 61.76 %
MDC_IDC_STAT_BRADY_RV_PERCENT_PACED: 99.89 %
MDC_IDC_STAT_EPISODE_RECENT_COUNT: 0
MDC_IDC_STAT_EPISODE_RECENT_COUNT: 7
MDC_IDC_STAT_EPISODE_RECENT_COUNT_DTM_END: NORMAL
MDC_IDC_STAT_EPISODE_RECENT_COUNT_DTM_START: NORMAL
MDC_IDC_STAT_EPISODE_TOTAL_COUNT: 0
MDC_IDC_STAT_EPISODE_TOTAL_COUNT: 1
MDC_IDC_STAT_EPISODE_TOTAL_COUNT: 1
MDC_IDC_STAT_EPISODE_TOTAL_COUNT: 10
MDC_IDC_STAT_EPISODE_TOTAL_COUNT_DTM_END: NORMAL
MDC_IDC_STAT_EPISODE_TOTAL_COUNT_DTM_START: NORMAL
MDC_IDC_STAT_EPISODE_TYPE: NORMAL

## 2021-06-28 NOTE — TELEPHONE ENCOUNTER
Strength was changed per ins and pharmacy can now process.  Pharmacy has paid claim and pt picked up med.

## 2021-06-30 ENCOUNTER — TRANSFERRED RECORDS (OUTPATIENT)
Dept: HEALTH INFORMATION MANAGEMENT | Facility: CLINIC | Age: 61
End: 2021-06-30

## 2021-07-12 ENCOUNTER — LAB (OUTPATIENT)
Dept: LAB | Facility: CLINIC | Age: 61
End: 2021-07-12
Payer: COMMERCIAL

## 2021-07-12 ENCOUNTER — TRANSFERRED RECORDS (OUTPATIENT)
Dept: HEALTH INFORMATION MANAGEMENT | Facility: CLINIC | Age: 61
End: 2021-07-12

## 2021-07-12 DIAGNOSIS — R19.7 DIARRHEA, UNSPECIFIED TYPE: ICD-10-CM

## 2021-07-12 LAB — C DIFF TOX B STL QL: NEGATIVE

## 2021-07-12 PROCEDURE — 87493 C DIFF AMPLIFIED PROBE: CPT | Mod: 59

## 2021-07-12 PROCEDURE — 87506 IADNA-DNA/RNA PROBE TQ 6-11: CPT

## 2021-07-13 ENCOUNTER — TELEPHONE (OUTPATIENT)
Dept: CARDIOLOGY | Facility: CLINIC | Age: 61
End: 2021-07-13

## 2021-07-13 NOTE — TELEPHONE ENCOUNTER
Called pt as she has an upcoming appointment with Dr Funes, and she had previously inquired if she needs cardiac clearance for a 'ENT scope' procedure.    Inquired about ENT procedure, come to find out it is done in clinic and it is a flexible laryngoscopy in which they will use a tiny scope passed through the nose to look at vocal cords. Pt will be awake. Local anesthesia will be used.     Advised pt she does not need cardiac clearance for this type of procedure. Also informed pt that she has upcoming appts 7/21 with Fiorella Swanson, 7/28 with Adelaida, 8/11 with Adelaida again, and 8/24 with Dr Ordonez.     Pt stated she will cancel Dr Sanderson appoinment for 7/15 but will keep the other 4. She states that once she sees Fiorella Swanson and depending how pt feels she will call within 3 days of the appointment to cancel if she feels she does not need it.     Pt states that the last year she has been forgotten about numerous times so therefore she makes a lot of appointments so she knows she won't be forgotten about.     Advised pt to please keep in mind that other patients are patiently waiting to get in to clinic as well so if she does not need them to please call and cancel them as soon as she can.     Pt verbalized understandings.     Marsha Hawthorne RN on 7/13/2021 at 1:24 PM

## 2021-07-13 NOTE — RESULT ENCOUNTER NOTE
Jose Luis Cook,    Thank you for your recent office visit.    Here are your recent results.  Negative for cdiff.     Feel free to contact me via [employer] or call the clinic at [phone].    Sincerely,    SHAYNA Simon, FNP-BC

## 2021-07-15 ENCOUNTER — TELEPHONE (OUTPATIENT)
Dept: GASTROENTEROLOGY | Facility: CLINIC | Age: 61
End: 2021-07-15

## 2021-07-15 ENCOUNTER — MYC MEDICAL ADVICE (OUTPATIENT)
Dept: FAMILY MEDICINE | Facility: CLINIC | Age: 61
End: 2021-07-15

## 2021-07-15 DIAGNOSIS — Z12.11 SPECIAL SCREENING FOR MALIGNANT NEOPLASMS, COLON: Primary | ICD-10-CM

## 2021-07-15 NOTE — TELEPHONE ENCOUNTER
Patient had called wanting to schedule Colonoscopy but has no active order/referral. Patient will contact PCP to place new order.

## 2021-07-16 NOTE — TELEPHONE ENCOUNTER
REFERRAL INFORMATION:    Referring Provider:  N/A    Referring Clinic:  N/A    Reason for Visit/Diagnosis: Severe Diarrhea, Abdominal pain, Bloating, Nausea     FUTURE VISIT INFORMATION:    Appointment Date: 10/5/2021    Appointment Time: 1:40 PM      NOTES STATUS DETAILS   OFFICE NOTE from Referring Provider N/A    OFFICE NOTE from Other Specialist Internal/ Care Everywhere / Received  6/1/2021 Office visit with Ce Crowe NP ([facility])    7/15/2020 Office visit with Dr. Hai Lenz ([facility])     7/19/18 Office visit with Dr. Saran Mccartney ([facility])     1/26/18 Office visit with Dr. Hai Renteria ([facility])    6/10/10 Office visit with Dr. Keshia Stinson ([facility])    5/20/10 Office visit with Dr. Lino Lim ([facility])    5/10/10 Office visit with LUIS MIGUEL Rodrigues ([facility])    HOSPITAL DISCHARGE SUMMARY/  ED VISITS N/A    OPERATIVE REPORT N/A    MEDICATION LIST Internal         ENDOSCOPY  Received EGD: 4/1/16, 1/29/10 ([facility])   COLONOSCOPY Received 4/1/16, 4/30/10 ([facility])    ERCP N/A    EUS N/A    STOOL TESTING Internal 7/12/2021   PERTINENT LABS Internal    PATHOLOGY REPORTS (RELATED) Received 4/1/16, 4/30/10, 1/29/10 ([facility])    IMAGING (CT, MRI, EGD, MRCP, Small Bowel Follow Through/SBT, MR/CT Enterography) Internal NM Gastric Emptying: 10/2/208, 9/3/04       7/16/2021 8:36am Fax request sent to [facility] for med recs. -Bhao   8/4/2021 9:42am Received recs from [facility]; sent to scan. -Bhbrock

## 2021-07-19 ENCOUNTER — TELEPHONE (OUTPATIENT)
Dept: GASTROENTEROLOGY | Facility: OUTPATIENT CENTER | Age: 61
End: 2021-07-19

## 2021-07-19 DIAGNOSIS — Z11.59 ENCOUNTER FOR SCREENING FOR OTHER VIRAL DISEASES: ICD-10-CM

## 2021-07-19 NOTE — TELEPHONE ENCOUNTER
Screening Questions  1. Are you active on mychart? yes    2. What insurance is in the chart? Preferred one    2.  Ordering/Referring Provider: maida burrell    3. BMI 28.46    4. Are you on daily home oxygen? no    5. Do you have a history of difficult airway? no    6. Have you had a heart, lung, or liver transplant? no    7. Are you currently on dialysis? no    8. Have you had a stroke or Transient ischemic atttack (TIA) within 6 months? no    9. In the past 6 months, have you had any heart related issues including cardiomyopathy or heart attack?         If yes, did it require cardiac stenting or other implantable device?no    10. Do you have any implantable devices in your body (pacemaker, defib, LVAD)? pacemaker    11. Do you take nitroglycerin? If yes, how often? no    12. Are you currently taking any blood thinners?yes, eloquis    13. Are you a diabetic? no    14. (Females) Are you currently pregnant? no  If yes, how many weeks?    15. Have you had a procedure in the past that was difficult to tolerate with conscious sedation? Any allergies to Fentanyl or Versed has low BP so be aware    16. Are you taking any scheduled prescription narcotics more than once daily? no    17. Do you have any chemical dependencies such as alcohol, street drugs, or methadone? No     18. Do you have any history of post-traumatic stress syndrome or mental health issues? ptsd and depression    19. Do you transfer independently? yes    20.  Do you have any issues with constipation? no    21. Preferred Pharmacy for Pre Prescription [facility]    Scheduling Details    Colonoscopy Prep Sent?: miralax  Procedure Scheduled: colonoscopy  Provider/Surgeon: tessa  Date of Procedure: 08/30/21   Location: [facility]  Caller (Please ask for phone number if not scheduled by patient): omid juarez      Sedation Type: cs  Conscious Sedation- Needs  for 6 hours after the procedure  MAC/General-Needs  for 24 hours after  procedure    Pre-op Required at [facility] [place], [place] and OR for MAC sedation:   (if yes advise patient they will need a pre-op prior to procedure)      Is patient on blood thinners? -yes (If yes- inform patient to follow up with PCP or provider for follow up instructions)     Informed patient they will need an adult  yes  Cannot take any type of public or medical transportation alone    Informed Patient of COVID Test Requirement yes    Confirmed Nurse will call to complete assessment yes    Additional comments: pt will have cardio clearance appt on 08/24/21 to get cleared for procedure.

## 2021-07-21 ENCOUNTER — VIRTUAL VISIT (OUTPATIENT)
Dept: CARDIOLOGY | Facility: CLINIC | Age: 61
End: 2021-07-21
Attending: NURSE PRACTITIONER
Payer: COMMERCIAL

## 2021-07-21 DIAGNOSIS — I51.89 LEFT VENTRICULAR DYSFUNCTION WITH REDUCED LEFT VENTRICULAR FUNCTION: Primary | ICD-10-CM

## 2021-07-21 PROCEDURE — 99213 OFFICE O/P EST LOW 20 MIN: CPT | Mod: 95 | Performed by: NURSE PRACTITIONER

## 2021-07-21 NOTE — LETTER
7/21/2021      RE: Joyce Marroquin  [id number] [facility] [id number]       Dear Colleague,    Thank you for the opportunity to participate in the care of your patient, Joyce Marroquin, at the [facility] [place] at [facility]. Please see a copy of my visit note below.    Joyce is a 60 year old who is being evaluated via a billable video visit.  She is followed by EP (abiodun Slater, last seen 6/23) , Dr. Ordonez (last seen 5.25.21) and [facility]. Her biggest concern today is her constant diarrhea. She states she has been having diarrhea for the last 2 weeks. It is watery. She has been tested for C-diff as well as a stool sample did not show any infection. She states she did have similar sx in the past, and treated her self with high dose probiotics. She recently ran out of the probiotics as she buys them at [place] and has not been able to obtain them, due to her poor health, as she states a  shopping trip to [place] is not something she can do.     As a result of the diarrhea, she has held the lasix, as she feels dehydrated. She has been using pedialyte for hydration. She expresses concern about néstor bloating. She states her girth will range from high 30's to low 40's days to day. She thinks it may be related to her heart. We discussed that if she is dehydrated, it is unlikely that she would be retaining fluid in her belly.    Her last EF measured by MRI was 50%.  (3.2.20). She previously had an EF of 35-40% She had NICM thought to be due to Takasubo cardiomyopathy.    Her past medical history significant for GERD, PTSD, Grave's Disease with post ablative hypothyroidism, hyperparathyroidism, LARRY (uses CPAP), NICM,  PAF (CHADSVASC 2), On Eliquois,  depression, prior tobacco use, and PVCs s/p RVOT ablation complicated by CHB s/p PPM 4/21/2017 and pericarditis.    Her medications were reviewed. No physical exam done as this was a video visit.    She is  scheduled for a colonoscopy in August, with a pre-op scheduled with Dr. Ordonez in advance. She needs direction on how long to stop her A/C prior to procedure.    Assess  1) : NICM with EF now at 50%; Stage C, NYHA III; on Metoprolol. She has not tolerated Ace/ARB. On low dose MRA(Spironolactone 12.5mg) . Laisx 20mg twice a week currently on hold due to diarrhea.  2) CHB: S/P PPM - managed by EP  3) New onset of diarrhea - managed by GI  4) PAF - S/P Ablation  (Complicated by CHB and pericarditis) on Eliquois for A/C    Plan:  F/U PRN     How would you like to obtain your AVS? Roryhart  If the video visit is dropped, the invitation should be resent by: Other e-mail: maco [email or web address]  Will anyone else be joining your video visit? No      Video Start Time: 6:04 pm  Video-Visit Details    Type of service:  Video Visit    Video End Time:6:30 pm    Originating Location (pt. Location): Home    Distant Location (provider location):  [facility]     Platform used for Video Visit: Maco        Please do not hesitate to contact me if you have any questions/concerns.     Sincerely,     SHAYNA Sommers CNP

## 2021-07-21 NOTE — PROGRESS NOTES
Joyce is a 60 year old who is being evaluated via a billable video visit.  She is followed by EP (abiodun Slater, last seen 6/23) , Dr. Ordonez (last seen 5.25.21) and [facility]. Her biggest concern today is her constant diarrhea. She states she has been having diarrhea for the last 2 weeks. It is watery. She has been tested for C-diff as well as a stool sample did not show any infection. She states she did have similar sx in the past, and treated her self with high dose probiotics. She recently ran out of the probiotics as she buys them at [employer] and has not been able to obtain them, due to her poor health, as she states a  shopping trip to [place] is not something she can do.     As a result of the diarrhea, she has held the lasix, as she feels dehydrated. She has been using pedialyte for hydration. She expresses concern about néstor bloating. She states her girth will range from high 30's to low 40's days to day. She thinks it may be related to her heart. We discussed that if she is dehydrated, it is unlikely that she would be retaining fluid in her belly.    Her last EF measured by MRI was 50%.  (3.2.20). She previously had an EF of 35-40% She had NICM thought to be due to Takasubo cardiomyopathy.    Her past medical history significant for GERD, PTSD, Grave's Disease with post ablative hypothyroidism, hyperparathyroidism, LARRY (uses CPAP), NICM,  PAF (CHADSVASC 2), On Eliquois,  depression, prior tobacco use, and PVCs s/p RVOT ablation complicated by CHB s/p PPM 4/21/2017 and pericarditis.    Her medications were reviewed. No physical exam done as this was a video visit.    She is scheduled for a colonoscopy in August, with a pre-op scheduled with Dr. Ordonez in advance. She needs direction on how long to stop her A/C prior to procedure.    Assess  1) : NICM with EF now at 50%; Stage C, NYHA III; on Metoprolol. She has not tolerated Ace/ARB. On low dose MRA(Spironolactone 12.5mg) . Laisx 20mg twice a week currently on  hold due to diarrhea.  2) CHB: S/P PPM - managed by EP  3) New onset of diarrhea - managed by GI  4) PAF - S/P Ablation  (Complicated by CHB and pericarditis) on Eliquois for A/C    Plan:  F/U PRN     How would you like to obtain your AVS? Roryhart  If the video visit is dropped, the invitation should be resent by: Other e-mail: maoc [email or web address]  Will anyone else be joining your video visit? No      Video Start Time: 6:04 pm  Video-Visit Details    Type of service:  Video Visit    Video End Time:6:30 pm    Originating Location (pt. Location): Home    Distant Location (provider location):  [facility]     Platform used for Video Visit: Maco

## 2021-07-21 NOTE — NURSING NOTE
"Vitals - Patient Reported 5/20/2021 6/2/2021 7/21/2021   Height (Patient Reported) 5' 5\" - -   Weight (Patient Reported) 164 lb 167 lb 171 lb   BMI (Based on Pt Reported Ht/Wt) 27.29 kg/m2 - -   Systolic (Patient Reported) 97 - 97   Diastolic (Patient Reported) 70 - 66   Pulse (Patient Reported) 106 - 106   SpO2 (Patient Reported) 97 - 96         Isabella Raman L.P.N.,Marleny WEAVER card. Dept.    "

## 2021-07-22 ENCOUNTER — PATIENT OUTREACH (OUTPATIENT)
Dept: CARDIOLOGY | Facility: CLINIC | Age: 61
End: 2021-07-22

## 2021-07-22 NOTE — TELEPHONE ENCOUNTER
Called Joyce to review eliquis hold instructions prior to colonoscopy.   Date: 7/22/2021    Time of Call: 12:27 PM     Diagnosis:  Heart failure     [ VORB ] Ordering provider: Fiorella Swanson NP    Order: hold eliquis for 3 doses (1.5 days) prior to colonoscopy     Order received by: Joellen Jimenez RN     Follow-up/additional notes: Joyce states understanding

## 2021-07-27 ENCOUNTER — OFFICE VISIT (OUTPATIENT)
Dept: FAMILY MEDICINE | Facility: CLINIC | Age: 61
End: 2021-07-27
Payer: COMMERCIAL

## 2021-07-27 VITALS
HEIGHT: 65 IN | HEART RATE: 65 BPM | TEMPERATURE: 98 F | DIASTOLIC BLOOD PRESSURE: 60 MMHG | RESPIRATION RATE: 16 BRPM | WEIGHT: 176.25 LBS | OXYGEN SATURATION: 95 % | SYSTOLIC BLOOD PRESSURE: 108 MMHG | BODY MASS INDEX: 29.37 KG/M2

## 2021-07-27 DIAGNOSIS — K52.9 CHRONIC DIARRHEA: ICD-10-CM

## 2021-07-27 DIAGNOSIS — R53.83 OTHER FATIGUE: Primary | ICD-10-CM

## 2021-07-27 LAB
ALBUMIN SERPL-MCNC: 3.8 G/DL (ref 3.4–5)
ALP SERPL-CCNC: 91 U/L (ref 40–150)
ALT SERPL W P-5'-P-CCNC: 32 U/L (ref 0–50)
ANION GAP SERPL CALCULATED.3IONS-SCNC: 6 MMOL/L (ref 3–14)
AST SERPL W P-5'-P-CCNC: 20 U/L (ref 0–45)
BASOPHILS # BLD AUTO: 0 10E3/UL (ref 0–0.2)
BASOPHILS NFR BLD AUTO: 0 %
BILIRUB SERPL-MCNC: 0.3 MG/DL (ref 0.2–1.3)
BUN SERPL-MCNC: 10 MG/DL (ref 7–30)
CALCIUM SERPL-MCNC: 8.9 MG/DL (ref 8.5–10.1)
CHLORIDE BLD-SCNC: 105 MMOL/L (ref 94–109)
CO2 SERPL-SCNC: 26 MMOL/L (ref 20–32)
CREAT SERPL-MCNC: 0.7 MG/DL (ref 0.52–1.04)
EOSINOPHIL # BLD AUTO: 0.1 10E3/UL (ref 0–0.7)
EOSINOPHIL NFR BLD AUTO: 1 %
ERYTHROCYTE [DISTWIDTH] IN BLOOD BY AUTOMATED COUNT: 12.6 % (ref 10–15)
FERRITIN SERPL-MCNC: 269 NG/ML (ref 8–252)
GFR SERPL CREATININE-BSD FRML MDRD: >90 ML/MIN/1.73M2
GLUCOSE BLD-MCNC: 80 MG/DL (ref 70–99)
HCT VFR BLD AUTO: 41.4 % (ref 35–47)
HGB BLD-MCNC: 13.3 G/DL (ref 11.7–15.7)
IRON SATN MFR SERPL: 24 % (ref 15–46)
IRON SERPL-MCNC: 64 UG/DL (ref 35–180)
LYMPHOCYTES # BLD AUTO: 1.6 10E3/UL (ref 0.8–5.3)
LYMPHOCYTES NFR BLD AUTO: 20 %
MCH RBC QN AUTO: 31.5 PG (ref 26.5–33)
MCHC RBC AUTO-ENTMCNC: 32.1 G/DL (ref 31.5–36.5)
MCV RBC AUTO: 98 FL (ref 78–100)
MONOCYTES # BLD AUTO: 0.7 10E3/UL (ref 0–1.3)
MONOCYTES NFR BLD AUTO: 9 %
NEUTROPHILS # BLD AUTO: 5.5 10E3/UL (ref 1.6–8.3)
NEUTROPHILS NFR BLD AUTO: 70 %
PLATELET # BLD AUTO: 237 10E3/UL (ref 150–450)
POTASSIUM BLD-SCNC: 4.7 MMOL/L (ref 3.4–5.3)
PROT SERPL-MCNC: 6.9 G/DL (ref 6.8–8.8)
PTH-INTACT SERPL-MCNC: 62 PG/ML (ref 18–80)
RBC # BLD AUTO: 4.22 10E6/UL (ref 3.8–5.2)
SODIUM SERPL-SCNC: 137 MMOL/L (ref 133–144)
TIBC SERPL-MCNC: 264 UG/DL (ref 240–430)
TSH SERPL DL<=0.005 MIU/L-ACNC: 1.44 MU/L (ref 0.4–4)
WBC # BLD AUTO: 7.9 10E3/UL (ref 4–11)

## 2021-07-27 PROCEDURE — 82728 ASSAY OF FERRITIN: CPT | Performed by: NURSE PRACTITIONER

## 2021-07-27 PROCEDURE — 86803 HEPATITIS C AB TEST: CPT | Performed by: NURSE PRACTITIONER

## 2021-07-27 PROCEDURE — 82306 VITAMIN D 25 HYDROXY: CPT | Performed by: NURSE PRACTITIONER

## 2021-07-27 PROCEDURE — 36415 COLL VENOUS BLD VENIPUNCTURE: CPT | Performed by: NURSE PRACTITIONER

## 2021-07-27 PROCEDURE — 86800 THYROGLOBULIN ANTIBODY: CPT | Performed by: NURSE PRACTITIONER

## 2021-07-27 PROCEDURE — 80050 GENERAL HEALTH PANEL: CPT | Performed by: NURSE PRACTITIONER

## 2021-07-27 PROCEDURE — 83550 IRON BINDING TEST: CPT | Performed by: NURSE PRACTITIONER

## 2021-07-27 PROCEDURE — 83970 ASSAY OF PARATHORMONE: CPT | Performed by: NURSE PRACTITIONER

## 2021-07-27 PROCEDURE — 87389 HIV-1 AG W/HIV-1&-2 AB AG IA: CPT | Performed by: NURSE PRACTITIONER

## 2021-07-27 PROCEDURE — 99214 OFFICE O/P EST MOD 30 MIN: CPT | Performed by: NURSE PRACTITIONER

## 2021-07-27 PROCEDURE — 86376 MICROSOMAL ANTIBODY EACH: CPT | Performed by: NURSE PRACTITIONER

## 2021-07-27 RX ORDER — LOPERAMIDE HYDROCHLORIDE 2 MG/1
2 TABLET ORAL 4 TIMES DAILY PRN
Qty: 90 TABLET | Refills: 0 | Status: SHIPPED | OUTPATIENT
Start: 2021-07-27 | End: 2021-09-28

## 2021-07-27 ASSESSMENT — ANXIETY QUESTIONNAIRES
5. BEING SO RESTLESS THAT IT IS HARD TO SIT STILL: NOT AT ALL
7. FEELING AFRAID AS IF SOMETHING AWFUL MIGHT HAPPEN: SEVERAL DAYS
6. BECOMING EASILY ANNOYED OR IRRITABLE: SEVERAL DAYS
GAD7 TOTAL SCORE: 4
GAD7 TOTAL SCORE: 4
2. NOT BEING ABLE TO STOP OR CONTROL WORRYING: SEVERAL DAYS
8. IF YOU CHECKED OFF ANY PROBLEMS, HOW DIFFICULT HAVE THESE MADE IT FOR YOU TO DO YOUR WORK, TAKE CARE OF THINGS AT HOME, OR GET ALONG WITH OTHER PEOPLE?: VERY DIFFICULT
4. TROUBLE RELAXING: NOT AT ALL
1. FEELING NERVOUS, ANXIOUS, OR ON EDGE: SEVERAL DAYS
7. FEELING AFRAID AS IF SOMETHING AWFUL MIGHT HAPPEN: SEVERAL DAYS
3. WORRYING TOO MUCH ABOUT DIFFERENT THINGS: NOT AT ALL
GAD7 TOTAL SCORE: 4

## 2021-07-27 ASSESSMENT — PATIENT HEALTH QUESTIONNAIRE - PHQ9
SUM OF ALL RESPONSES TO PHQ QUESTIONS 1-9: 9
10. IF YOU CHECKED OFF ANY PROBLEMS, HOW DIFFICULT HAVE THESE PROBLEMS MADE IT FOR YOU TO DO YOUR WORK, TAKE CARE OF THINGS AT HOME, OR GET ALONG WITH OTHER PEOPLE: SOMEWHAT DIFFICULT
SUM OF ALL RESPONSES TO PHQ QUESTIONS 1-9: 9

## 2021-07-27 ASSESSMENT — MIFFLIN-ST. JEOR: SCORE: 1360.34

## 2021-07-27 NOTE — PROGRESS NOTES
Assessment & Plan     Other fatigue    - Ferritin; Future  - Iron and iron binding capacity; Future  - CBC with platelets and differential; Future  - Comprehensive metabolic panel (BMP + Alb, Alk Phos, ALT, AST, Total. Bili, TP); Future  - Vitamin D Deficiency; Future  - Parathyroid Hormone Intact; Future  - Thyroid peroxidase antibody; Future  - TSH with free T4 reflex; Future  - Anti thyroglobulin antibody; Future  - HIV Antigen Antibody Combo; Future  - Hepatitis C antibody; Future  - Hepatitis C antibody  - HIV Antigen Antibody Combo  - Anti thyroglobulin antibody  - TSH with free T4 reflex  - Thyroid peroxidase antibody  - Parathyroid Hormone Intact  - Vitamin D Deficiency  - Comprehensive metabolic panel (BMP + Alb, Alk Phos, ALT, AST, Total. Bili, TP)  - CBC with platelets and differential  - Iron and iron binding capacity  - Ferritin  - CK total; Future    Chronic diarrhea    - CBC with platelets and differential; Future  - Comprehensive metabolic panel (BMP + Alb, Alk Phos, ALT, AST, Total. Bili, TP); Future  - Vitamin D Deficiency; Future  - Parathyroid Hormone Intact; Future  - Thyroid peroxidase antibody; Future  - TSH with free T4 reflex; Future  - Anti thyroglobulin antibody; Future  - HIV Antigen Antibody Combo; Future  - Hepatitis C antibody; Future  - loperamide (IMODIUM A-D) 2 MG tablet; Take 1 tablet (2 mg) by mouth 4 times daily as needed for diarrhea (Max 16 mg daily)  - Hepatitis C antibody  - HIV Antigen Antibody Combo  - Anti thyroglobulin antibody  - TSH with free T4 reflex  - Thyroid peroxidase antibody  - Parathyroid Hormone Intact  - Vitamin D Deficiency  - Comprehensive metabolic panel (BMP + Alb, Alk Phos, ALT, AST, Total. Bili, TP)  - CBC with platelets and differential    30 minutes spent on the date of the encounter doing chart review, history and exam, documentation and further activities per the note     See Patient Instructions: keep scheduled appointments; follow up as needed  "    Return in about 4 weeks (around 8/24/2021), or if symptoms worsen or fail to improve.    Ce Crowe, CHASIDYP  [facility] ROLAND Cook is a 60 year old who presents for the following health issues     HPI     Diarrhea- reports did have persistent diarrhea years ago and she finally kicked it with lots of probiotics.  She reports she had a neurology evaluation for her cognitive functioning.  OBGYN has been monitoring a uterine mass x 10 years, has obgyn appt Aug 3.  Uses a heating pad for abdominal cramping as needed.  Has been taking imodium ongoing for diarrhea, but did not know about the QT issues r/t imodium- discussed max 16 mg daily. She has colonoscopy end of august and an appointment with [facility] early September and [facility] end of September/October.   Onset/Duration: 1+ month  Description:       Consistency of stool: watery and mucousy       Blood in stool: no       Number of loose stools past 24 hours: 2-4  Progression of Symptoms: worsening  Accompanying signs and symptoms:       Fever: no       Nausea/Vomiting: YES- nausea       Abdominal pain: YES- low abdomen- bloating (not gassy)       Weight loss: no       Episodes of constipation: no  History   Ill contacts: no  Recent use of antibiotics: no  Recent travels: no  Recent medication-new or changes(Rx or OTC): no  Precipitating or alleviating factors: None  Therapies tried and outcome: Imodium right ear    Feels very fatigued.  Would like all labs done. She reports she was started on viagra for pulmonary hypertension- TID end of June. Often cannnot take 3rd daily dose per pt.       Review of Systems   Constitutional, HEENT, cardiovascular, pulmonary, GI, , musculoskeletal, neuro, skin, endocrine and psych systems are negative, except as otherwise noted.      Objective    /60   Pulse 65   Temp 98  F (36.7  C) (Tympanic)   Resp 16   Ht 1.651 m (5' 5\")   Wt 79.9 kg (176 lb 4 oz)   LMP 08/21/2007   SpO2 95%   BMI " 29.33 kg/m    Body mass index is 29.33 kg/m .  Physical Exam   GENERAL: Healthy, alert and no distress  EYES: Eyes grossly normal to inspection.  No discharge or erythema, or obvious scleral/conjunctival abnormalities.  RESP: No audible wheeze, cough, or visible cyanosis.  No visible retractions or increased work of breathing.    Abd: POSITIVE generalized tenderness, no rebound tenderness  SKIN: Visible skin clear.   NEURO: Cranial nerves grossly intact.  Mentation and speech appropriate for age.  PSYCH: Mentation appears normal, affect normal/flat       See orders

## 2021-07-27 NOTE — PATIENT INSTRUCTIONS
Informed patient they will need an adult  yes  Cannot take any type of public or medical transportation alone     Informed Patient of COVID Test Requirement yes     Confirmed Nurse will call to complete assessment yes     Additional comments: pt will have cardio clearance appt on 08/24/21 to get cleared for procedure.     Patient Education     Treating Diarrhea  Diarrhea happens when you have loose, watery, or frequent bowel movements. It is a common problem with many causes. Most cases of diarrhea clear up on their own. But certain cases may need treatment. Be sure to see your healthcare provider if your symptoms don't get better in a few days.   Getting relief  Treatment of diarrhea depends on its cause. Diarrhea caused by bacterial or parasite infection is often treated with antibiotics. Diarrhea caused by other factors, such as a stomach virus, often improves with simple home treatment. The tips below may also help ease your symptoms.       Drink plenty of fluids. This helps prevent too much fluid loss (dehydration). Water, clear soups, and electrolyte solutions are good choices. Don't take alcohol, coffee, tea, or milk. These can irritate your intestines and make symptoms worse.    Suck on ice chips if drinking makes you queasy.    Return to your normal diet slowly. You may want to eat bland foods at first, such as rice and toast. Also, you may need to stay away from certain foods for a while, such as dairy products. These can make symptoms worse. Ask your healthcare provider if there are any other foods you should stay away from.    If you were prescribed antibiotics, take them as directed.    Don't take anti-diarrhea medicines without asking your provider first.  Call your healthcare provider   Call your healthcare provider if you have any of the following:      A fever of 100.4  F ( 38.0 C) or higher, or as directed by your provider    Chills    Severe pain    Worsening diarrhea or diarrhea for more  than 2 days    Bloody vomit or stool    Signs of dehydration (dizziness, dry mouth and tongue, rapid pulse, dark urine)  [employer] last reviewed this educational content on 6/1/2019 2000-2021 [employer]. All rights reserved. This information is not intended as a substitute for professional medical care. Always follow your healthcare professional's instructions.           Patient Education     Diarrhea with Uncertain Cause (Adult)    Diarrhea is when stools are loose and watery. This can be caused by:    Viral infections    Bacterial infections    Food poisoning    Parasites    Irritable bowel syndrome (IBS)    Inflammatory bowel diseases such as ulcerative colitis, Crohn's disease, and celiac disease    Food intolerance, such as to lactose, the sugar found in milk and milk products    Reaction to medicines like antibiotics, laxatives, cancer drugs, and antacids  Along with diarrhea, you may also have:    Abdominal pain and cramping    Nausea and vomiting    Loss of bowel control    Fever and chills    Bloody stools  In some cases, antibiotics may help to treat diarrhea. You may have a stool sample test. This is done to see what is causing your diarrhea, and if antibiotics will help treat it. The results of a stool sample test may take up to 2 days. The healthcare provider may not give you antibiotics until he or she has the stool test results.  Diarrhea can cause dehydration. This is the loss of too much water and other fluids from the body. When this occurs, body fluid must be replaced. This can be done with oral rehydration solutions. Oral rehydration solutions are available at drugstores and grocery stores without a prescription. Sports drinks are not the best choice if you are very dehydrated. They have too much sugar and not enough electrolytes.  Home care  Follow all instructions given by your healthcare provider. Rest at home for the next 24 hours, or until you feel better. Avoid caffeine,  tobacco, and alcohol. These can make diarrhea, cramping, and pain worse.  If taking medicines:    Over-the-counter nausea and diarrhea medicines are generally OK unless you experience fever or blood stool. Check with your doctor first in those circumstances.    You may use acetaminophen or NSAID medicines like ibuprofen or naproxen to reduce pain and fever. Don t use these if you have chronic liver or kidney disease, or ever had a stomach ulcer or gastrointestinal bleeding. Don't use NSAID medicines if you are already taking one for another condition (like arthritis) or are on daily aspirin therapy (such as for heart disease or after a stroke). Talk with your healthcare provider first.    If antibiotics were prescribed, be sure you take them until they are finished. Don t stop taking them even when you feel better. Antibiotics must be taken as a full course.  To prevent the spread of illness:    Remember that washing with soap and water and using alcohol-based  is the best way to prevent the spread of infection. Dry your hands with a single use towel (like a paper towel).    Clean the toilet after each use.    Wash your hands before eating.    Wash your hands before and after preparing food. Keep in mind that people with diarrhea or vomiting should not prepare food for others.    Wash your hands after using cutting boards, countertops, and knives that have been in contact with raw foods.    Wash and then peel fruits and vegetables.    Keep uncooked meats away from cooked and ready-to-eat foods.    Use a food thermometer when cooking. Cook poultry to at least 165 F (74 C). Cook ground meat (beef, veal, pork, lamb) to at least 160 F (71 C). Cook fresh beef, veal, lamb, and pork to at least 145 F (63 C).    Don t eat raw or undercooked eggs (poached or albert side up), poultry, meat, or unpasteurized milk and juices.  Food and drinks  The main goal while treating vomiting or diarrhea is to prevent dehydration.  This is done by taking small amounts of liquids often.    Keep in mind that liquids are more important than food right now.    Drink only small amounts of liquids at a time.    Don t force yourself to eat, especially if you are having cramping, vomiting, or diarrhea. Don t eat large amounts at a time, even if you are hungry.    If you eat, avoid fatty, greasy, spicy, or fried foods.    Don t eat dairy foods or drink milk if you have diarrhea. These can make diarrhea worse.  During the first 24 hours you can try:    Oral rehydration solutions.  Sports drinks may be used if you are not too dehydrated and are otherwise healthy.    Soft drinks without caffeine    Ginger ale    Water (plain or flavored)    Decaf tea or coffee    Clear broth, consommé, or bouillon    Gelatin, popsicles, or frozen fruit juice bars  The second 24 hours, if you are feeling better, you can add:    Hot cereal, plain toast, bread, rolls, or crackers    Plain noodles, rice, mashed potatoes, chicken noodle soup, or rice soup    Unsweetened canned fruit (no pineapple)    Bananas  As you recover:    Limit fat intake to less than 15 grams per day. Don t eat margarine, butter, oils, mayonnaise, sauces, gravies, fried foods, peanut butter, meat, poultry, or fish.    Limit fiber. Don t eat raw or cooked vegetables, fresh fruits except bananas, or bran cereals.    Limit caffeine and chocolate.    Limit dairy.    Don t use spices or seasonings except salt.    Go back to your normal diet over time, as you feel better and your symptoms improve.    If the symptoms come back, go back to a simple diet or clear liquids.  Follow-up care  Follow up with your healthcare provider, or as advised. If a stool sample was taken or cultures were done, call the healthcare provider for the results as instructed.  Call 911  Call 911 if you have any of these symptoms:    Trouble breathing    Confusion    Extreme drowsiness or trouble walking    Loss of  consciousness    Rapid heart rate    Chest pain    Stiff neck    Seizure  When to seek medical advice  Call your healthcare provider right away if any of these occur:    Abdominal pain that gets worse    Constant lower right abdominal pain    Continued vomiting and inability to keep liquids down    Diarrhea more than 5 times a day    Blood in vomit or stool    Dark urine or no urine for 8 hours, dry mouth and tongue, tiredness, weakness, or dizziness    Drowsiness    New rash    You don t get better in 2 to 3 days    Fever of 100.4 F (38 C) or higher, or as directed by your healthcare provider  Luis Armando last reviewed this educational content on 6/1/2018 2000-2021 [employer]. All rights reserved. This information is not intended as a substitute for professional medical care. Always follow your healthcare professional's instructions.

## 2021-07-28 LAB
DEPRECATED CALCIDIOL+CALCIFEROL SERPL-MC: 54 UG/L (ref 20–75)
HCV AB SERPL QL IA: NONREACTIVE
HIV 1+2 AB+HIV1 P24 AG SERPL QL IA: NONREACTIVE
THYROGLOB AB SERPL IA-ACNC: <20 IU/ML
THYROPEROXIDASE AB SERPL-ACNC: 19 IU/ML

## 2021-07-28 ASSESSMENT — ANXIETY QUESTIONNAIRES: GAD7 TOTAL SCORE: 4

## 2021-07-28 ASSESSMENT — PATIENT HEALTH QUESTIONNAIRE - PHQ9: SUM OF ALL RESPONSES TO PHQ QUESTIONS 1-9: 9

## 2021-07-30 ENCOUNTER — TELEPHONE (OUTPATIENT)
Dept: CARDIOLOGY | Facility: CLINIC | Age: 61
End: 2021-07-30

## 2021-07-30 NOTE — TELEPHONE ENCOUNTER
[facility] Call Center    Phone Message    May a detailed message be left on voicemail: yes     Reason for Call: Other: Pt would like a call back to discuss her shortness of breth as she believes it is due to the air but wants to discuss     Action Taken: Message routed to:  [facility] Cardio    Travel Screening: Not Applicable

## 2021-07-30 NOTE — RESULT ENCOUNTER NOTE
Jose Luis Cook,    Thank you for your recent office visit.    Here are your recent results.  Normal lab results, except your ferritin is slightly elevated.     Feel free to contact me via [email or web address] or call the clinic at [phone].    Sincerely,    SHAYNA Simon, FNP-BC

## 2021-07-30 NOTE — TELEPHONE ENCOUNTER
Called and spoke with patient. Patient states she feels a little more short of breath today. Her weights have been stable with no increase in edema. Patient states she has a history of asthma. She will monitor for now and call back with any change in symptoms.

## 2021-08-03 ENCOUNTER — MYC MEDICAL ADVICE (OUTPATIENT)
Dept: FAMILY MEDICINE | Facility: CLINIC | Age: 61
End: 2021-08-03

## 2021-08-03 DIAGNOSIS — F41.9 ANXIETY: ICD-10-CM

## 2021-08-03 DIAGNOSIS — R10.84 ABDOMINAL PAIN, GENERALIZED: Primary | ICD-10-CM

## 2021-08-03 NOTE — TELEPHONE ENCOUNTER
Requested Prescriptions   Pending Prescriptions Disp Refills     clonazePAM (KLONOPIN) 0.5 MG tablet [Pharmacy Med Name: CLONAZEPAM 0.5MG TABLETS] 90 tablet      Sig: TAKE 1 TABLET(0.5 MG) BY MOUTH THREE TIMES DAILY AS NEEDED FOR ANXIETY       There is no refill protocol information for this order        Routing refill request to provider for review/approval because:  Drug not on the [facility] refill protocol

## 2021-08-04 RX ORDER — CLONAZEPAM 0.5 MG/1
TABLET ORAL
Qty: 90 TABLET | Refills: 1 | Status: SHIPPED | OUTPATIENT
Start: 2021-08-04 | End: 2021-11-01

## 2021-08-06 ENCOUNTER — ANCILLARY PROCEDURE (OUTPATIENT)
Dept: CT IMAGING | Facility: CLINIC | Age: 61
End: 2021-08-06
Attending: NURSE PRACTITIONER
Payer: COMMERCIAL

## 2021-08-06 DIAGNOSIS — R10.84 ABDOMINAL PAIN, GENERALIZED: ICD-10-CM

## 2021-08-06 PROCEDURE — 74177 CT ABD & PELVIS W/CONTRAST: CPT | Mod: TC | Performed by: RADIOLOGY

## 2021-08-06 RX ORDER — IOPAMIDOL 755 MG/ML
100 INJECTION, SOLUTION INTRAVASCULAR ONCE
Status: COMPLETED | OUTPATIENT
Start: 2021-08-06 | End: 2021-08-06

## 2021-08-06 RX ADMIN — IOPAMIDOL 100 ML: 755 INJECTION, SOLUTION INTRAVASCULAR at 10:05

## 2021-08-06 NOTE — RESULT ENCOUNTER NOTE
Jose Luis Cook,    Thank you for your recent office visit.    Here are your recent results.  CT ABDOMEN AND PELVIS WITH CONTRAST 8/6/2021 10:14 AM     CLINICAL HISTORY: Abdominal distention. Generalized abdominal pain.     TECHNIQUE: CT scan of the abdomen and pelvis was performed following  injection of IV contrast. Multiplanar reformats were obtained. Dose  reduction techniques were used.     CONTRAST: 100 mL Isovue-370     COMPARISON: CT of the chest, abdomen and pelvis performed 10/2/2009.     FINDINGS:   LOWER CHEST: The visualized lung bases are clear.     HEPATOBILIARY: A small cyst in the posterior segment of the right  hepatic lobe measures 1 cm, and would typically require no routine  follow-up. No other focal hepatic lesions are identified.     PANCREAS: Normal.     SPLEEN: Normal.     ADRENAL GLANDS: Mild nodular thickening of the left adrenal gland is  unchanged. The right adrenal gland is unremarkable.     KIDNEYS/BLADDER: Mild dilatation of the right urinary collecting  system is new since the previous exam. The kidneys are otherwise  unremarkable. Unremarkable urinary bladder.     BOWEL: Multiple mildly prominent fluid-filled loops of small bowel are  noted in the lower abdomen, with no definite transition point  identified. Scattered colonic diverticulosis. No convincing evidence  for colitis or diverticulitis. Unremarkable appendix.     PELVIC ORGANS: Partially calcified uterine fibroid near the fundus  measures 4 cm.     LYMPH NODES: No enlarged lymph nodes are identified in the abdomen or  pelvis.     VASCULATURE: Mild atherosclerotic aortoiliac calcification.     ADDITIONAL FINDINGS: None.     MUSCULOSKELETAL: Degenerative changes are noted in the lumbar spine  and left hip.       IMPRESSION:   1.  Multiple mildly prominent fluid-filled loops of small bowel in the  lower abdomen, with no definite transition point identified. Findings  could be related to ileus, although a developing small  bowel  obstruction cannot be excluded. Clinical correlation and close  follow-up are recommended.  2.  Colonic diverticulosis, without convincing evidence for  diverticulitis.  3.  Mild dilatation of the right urinary collecting system is new  since the previous exam. No cause for collecting system dilatation is  identified. Some degree of right ureteropelvic junction obstruction is  possible. Consider MAG3 Lasix renogram for further characterization.  4.  Uterine fibroid.    Feel free to contact me via [employer] or call the clinic at [phone].    Sincerely,    Ce Crowe, APRN, FNP-BC

## 2021-08-07 ENCOUNTER — MYC MEDICAL ADVICE (OUTPATIENT)
Dept: FAMILY MEDICINE | Facility: CLINIC | Age: 61
End: 2021-08-07

## 2021-08-07 ENCOUNTER — NURSE TRIAGE (OUTPATIENT)
Dept: NURSING | Facility: CLINIC | Age: 61
End: 2021-08-07

## 2021-08-07 NOTE — TELEPHONE ENCOUNTER
She had an abdominal CT yesterday , and   Last night she had more distension in her abdomen. She slept with a heating pad on last night  and did pass gas.  Last night it was pretty uncomfortable and it is less distended and less painful this morning.  After reading the results of her scan, and what she felt last night , she is wondering if she needs to come in to be seen or what the next step is. Her last BM was last night, but it was 3 or 4 nickel size pellets. Incontinent of watery stools.   Text page to the on call provider, Dr Borden at 8:46 am.  Call back at 8:54 have pt rest her gut and just do sips of water and ice chips, and as long as she can tolerate fluids and is passing gas she can monitor at home.  It symptoms worsen or make her uncomfortable she can always go to the ED to be evaluated.  Call back to the patient to let her know what the provider recommended.  She told this writer that she is more distended and it is making her short of breath.  I told her then the best thing to do is go in, and she said she is already getting ready to go in.    Patient verbalized understanding and agrees with plan.    Tiny Antony Nurse Triage Advisor 9:05 AM 8/7/2021    Reason for Disposition    [1] Follow-up call from patient regarding patient's clinical status AND [2] information urgent    Protocols used: PCP CALL - NO TRIAGE-A-

## 2021-08-08 ENCOUNTER — NURSE TRIAGE (OUTPATIENT)
Dept: NURSING | Facility: CLINIC | Age: 61
End: 2021-08-08

## 2021-08-08 ENCOUNTER — HOSPITAL ENCOUNTER (EMERGENCY)
Facility: CLINIC | Age: 61
Discharge: HOME | End: 2021-08-08
Attending: NURSE PRACTITIONER | Admitting: NURSE PRACTITIONER
Payer: COMMERCIAL

## 2021-08-08 ENCOUNTER — APPOINTMENT (OUTPATIENT)
Dept: CT IMAGING | Facility: CLINIC | Age: 61
End: 2021-08-08
Attending: NURSE PRACTITIONER
Payer: COMMERCIAL

## 2021-08-08 VITALS
OXYGEN SATURATION: 100 % | SYSTOLIC BLOOD PRESSURE: 138 MMHG | TEMPERATURE: 97.3 F | HEART RATE: 77 BPM | DIASTOLIC BLOOD PRESSURE: 75 MMHG | BODY MASS INDEX: 28.29 KG/M2 | WEIGHT: 170 LBS | RESPIRATION RATE: 16 BRPM

## 2021-08-08 DIAGNOSIS — Z87.19 HISTORY OF ILEUS: ICD-10-CM

## 2021-08-08 DIAGNOSIS — R10.84 ABDOMINAL PAIN, GENERALIZED: ICD-10-CM

## 2021-08-08 LAB
ALBUMIN SERPL-MCNC: 3.5 G/DL (ref 3.4–5)
ALP SERPL-CCNC: 97 U/L (ref 40–150)
ALT SERPL W P-5'-P-CCNC: 31 U/L (ref 0–50)
ANION GAP SERPL CALCULATED.3IONS-SCNC: 6 MMOL/L (ref 3–14)
AST SERPL W P-5'-P-CCNC: 18 U/L (ref 0–45)
ATRIAL RATE - MUSE: 75 BPM
BASOPHILS # BLD AUTO: 0.1 10E3/UL (ref 0–0.2)
BASOPHILS NFR BLD AUTO: 1 %
BILIRUB SERPL-MCNC: 0.5 MG/DL (ref 0.2–1.3)
BUN SERPL-MCNC: 10 MG/DL (ref 7–30)
CALCIUM SERPL-MCNC: 9 MG/DL (ref 8.5–10.1)
CHLORIDE BLD-SCNC: 103 MMOL/L (ref 94–109)
CO2 SERPL-SCNC: 25 MMOL/L (ref 20–32)
CREAT SERPL-MCNC: 0.73 MG/DL (ref 0.52–1.04)
DIASTOLIC BLOOD PRESSURE - MUSE: NORMAL MMHG
EOSINOPHIL # BLD AUTO: 0.1 10E3/UL (ref 0–0.7)
EOSINOPHIL NFR BLD AUTO: 2 %
ERYTHROCYTE [DISTWIDTH] IN BLOOD BY AUTOMATED COUNT: 12.4 % (ref 10–15)
GFR SERPL CREATININE-BSD FRML MDRD: 89 ML/MIN/1.73M2
GLUCOSE BLD-MCNC: 84 MG/DL (ref 70–99)
HCT VFR BLD AUTO: 40.6 % (ref 35–47)
HGB BLD-MCNC: 13.3 G/DL (ref 11.7–15.7)
IMM GRANULOCYTES # BLD: 0 10E3/UL
IMM GRANULOCYTES NFR BLD: 1 %
INTERPRETATION ECG - MUSE: NORMAL
LIPASE SERPL-CCNC: 109 U/L (ref 73–393)
LYMPHOCYTES # BLD AUTO: 1.4 10E3/UL (ref 0.8–5.3)
LYMPHOCYTES NFR BLD AUTO: 23 %
MCH RBC QN AUTO: 31.2 PG (ref 26.5–33)
MCHC RBC AUTO-ENTMCNC: 32.8 G/DL (ref 31.5–36.5)
MCV RBC AUTO: 95 FL (ref 78–100)
MONOCYTES # BLD AUTO: 0.6 10E3/UL (ref 0–1.3)
MONOCYTES NFR BLD AUTO: 10 %
NEUTROPHILS # BLD AUTO: 3.9 10E3/UL (ref 1.6–8.3)
NEUTROPHILS NFR BLD AUTO: 63 %
NRBC # BLD AUTO: 0 10E3/UL
NRBC BLD AUTO-RTO: 0 /100
P AXIS - MUSE: 86 DEGREES
PLATELET # BLD AUTO: 230 10E3/UL (ref 150–450)
POTASSIUM BLD-SCNC: 4.1 MMOL/L (ref 3.4–5.3)
PR INTERVAL - MUSE: 186 MS
PROT SERPL-MCNC: 7.4 G/DL (ref 6.8–8.8)
QRS DURATION - MUSE: 162 MS
QT - MUSE: 460 MS
QTC - MUSE: 513 MS
R AXIS - MUSE: -81 DEGREES
RBC # BLD AUTO: 4.26 10E6/UL (ref 3.8–5.2)
SODIUM SERPL-SCNC: 134 MMOL/L (ref 133–144)
SYSTOLIC BLOOD PRESSURE - MUSE: NORMAL MMHG
T AXIS - MUSE: 79 DEGREES
TROPONIN I SERPL-MCNC: <0.015 UG/L (ref 0–0.04)
VENTRICULAR RATE- MUSE: 75 BPM
WBC # BLD AUTO: 6.1 10E3/UL (ref 4–11)

## 2021-08-08 PROCEDURE — 99285 EMERGENCY DEPT VISIT HI MDM: CPT | Mod: 25

## 2021-08-08 PROCEDURE — 36415 COLL VENOUS BLD VENIPUNCTURE: CPT | Performed by: NURSE PRACTITIONER

## 2021-08-08 PROCEDURE — 258N000003 HC RX IP 258 OP 636: Performed by: NURSE PRACTITIONER

## 2021-08-08 PROCEDURE — 250N000011 HC RX IP 250 OP 636: Performed by: NURSE PRACTITIONER

## 2021-08-08 PROCEDURE — 85025 COMPLETE CBC W/AUTO DIFF WBC: CPT | Performed by: NURSE PRACTITIONER

## 2021-08-08 PROCEDURE — 80053 COMPREHEN METABOLIC PANEL: CPT | Performed by: NURSE PRACTITIONER

## 2021-08-08 PROCEDURE — 93005 ELECTROCARDIOGRAM TRACING: CPT

## 2021-08-08 PROCEDURE — 96360 HYDRATION IV INFUSION INIT: CPT | Mod: 59

## 2021-08-08 PROCEDURE — 74177 CT ABD & PELVIS W/CONTRAST: CPT

## 2021-08-08 PROCEDURE — 250N000009 HC RX 250: Performed by: NURSE PRACTITIONER

## 2021-08-08 PROCEDURE — 96361 HYDRATE IV INFUSION ADD-ON: CPT

## 2021-08-08 PROCEDURE — 83690 ASSAY OF LIPASE: CPT | Performed by: NURSE PRACTITIONER

## 2021-08-08 PROCEDURE — 84484 ASSAY OF TROPONIN QUANT: CPT | Performed by: NURSE PRACTITIONER

## 2021-08-08 RX ORDER — IOPAMIDOL 755 MG/ML
85 INJECTION, SOLUTION INTRAVASCULAR ONCE
Status: COMPLETED | OUTPATIENT
Start: 2021-08-08 | End: 2021-08-08

## 2021-08-08 RX ADMIN — SODIUM CHLORIDE 500 ML: 9 INJECTION, SOLUTION INTRAVENOUS at 14:22

## 2021-08-08 RX ADMIN — IOPAMIDOL 85 ML: 755 INJECTION, SOLUTION INTRAVENOUS at 15:08

## 2021-08-08 RX ADMIN — SODIUM CHLORIDE 500 ML: 9 INJECTION, SOLUTION INTRAVENOUS at 15:38

## 2021-08-08 RX ADMIN — SODIUM CHLORIDE 64 ML: 9 INJECTION, SOLUTION INTRAVENOUS at 15:08

## 2021-08-08 ASSESSMENT — ENCOUNTER SYMPTOMS
ABDOMINAL DISTENTION: 1
CONSTIPATION: 1
NAUSEA: 1
ABDOMINAL PAIN: 1
VOMITING: 1
SHORTNESS OF BREATH: 1

## 2021-08-08 NOTE — DISCHARGE INSTRUCTIONS
You have mild dilation of your right renal collecting system. If you are having right flank pain, difficulty urinating or want this followed outpatient follow up with Urology

## 2021-08-08 NOTE — ED PROVIDER NOTES
History   Chief Complaint:  Abdominal Pain       The history is provided by the patient.      Joyce Marroquin is a 60 year old female on Eliquis with history of cardiac myopathy, complete atrioventricular block, and pericarditis, S/P pacemaker placement, who presents with abdominal pain. On 7/17, the patient began to experience abdominal discomfort and severe diarrhea. Since then, she has been having increased abdominal pain and distention, constipation, and nausea. She has also been dry heaving lately. She has also been having chest pain and is easily short of breath upon exertion. Today, she had one dose of Tylenol at 1230. On 8/6, she was seen at [facility] in [place] for this issue and had a CT of her abdomen and pelvis (results below). The patient has a colonoscopy scheduled for 8/30.     CT Abdomen and Pelvis with Contrast 8/6/2021 ([facility])  1.  Multiple mildly prominent fluid-filled loops of small bowel in the  lower abdomen, with no definite transition point identified. Findings  could be related to ileus, although a developing small bowel  obstruction cannot be excluded. Clinical correlation and close  follow-up are recommended.  2.  Colonic diverticulosis, without convincing evidence for  diverticulitis.  3.  Mild dilatation of the right urinary collecting system is new  since the previous exam. No cause for collecting system dilatation is  identified. Some degree of right ureteropelvic junction obstruction is  possible. Consider MAG3 Lasix renogram for further characterization.  4.  Uterine fibroid.    Review of Systems   Respiratory: Positive for shortness of breath.    Cardiovascular: Positive for chest pain.   Gastrointestinal: Positive for abdominal distention, abdominal pain, constipation, nausea and vomiting (Dry heave).   All other systems reviewed and are negative.        Allergies:  Tetracycline  Viagra [Sildenafil]  Zofran [Ondansetron]  Flagyl  [Metronidazole]  Buspar [Buspirone]  Corn Oil  Seasonal Allergies  Sulfamethoxazole-Trimethoprim  Cyclobenzaprine  Levaquin [Levofloxacin]  Nsaids    Medications:  Albuterol inhaler  Eliquis  Clonazepam   Cymbalta  Advair inhaler   Lasix  Loperamide  Methylprednisolone   Metoprolol  Revatio   Spironolactone  Synthroid  Aspirin 81 mg    Past Medical History:    Arthritis  Cardiomyopathy  Chronic depressive personality disorder  Congestive heart failure  COPD  GERD  Hyperparathyroidism   Hypothyroidism   LARRY on CPAP  Pulmonary nodules  Asthma  PTSD  Panic attack  Insomnia  Vestibular disequilibrium  RLS  Collagenous colitis  Complete atrioventricular block   Pericarditis    Past Surgical History:    Bunionectomy   Coronary angiogram  Cardiac ablation   Right heart catheterization  Parathyroidectomy   T&A   Cardiac pacemaker implantation    Family History:    Mother: hypothyroidism, hypertension, osteoarthritis  Father: CAD, MI, asthma  Sister: diabetes, hypothyroidism, anxiety    Social History:  The patient presents alone. She used to practice as a CRNA.       Physical Exam     Patient Vitals for the past 24 hrs:   BP Temp Temp src Pulse Resp SpO2 Weight   08/08/21 1331 138/75 -- -- 77 16 100 % --   08/08/21 1329 -- 97.3  F (36.3  C) Oral -- -- -- 77.1 kg (170 lb)       Physical Exam    Physical Exam   Constitutional: Pt appears well-developed and well-nourished. Non toxic appearing.   Head: Head moves freely with normal range of motion.   ENT: Oropharynx is clear and moist.   Eyes: Conjunctivae pink. EOMs intact. No scleral icterus.   Neck: Normal range of motion.    Cardiovascular: Regular rate and rhythm. Normal heart sounds. No concerning murmur.  Pulmonary/Chest: No respiratory distress. No decreased breath sounds. No wheezes. No rhonchi. No rales.   Abdominal: Soft. Non-tender. No rebound, no guarding. No CVA tenderness. No pain over McBurney's point. Negative Suarez's sign. Patient feels that her abdomen  appears distended.   Musculoskeletal: No peripheral edema. Distal capillary refill and sensation intact.  Neurological: Oriented to person, place, and time. No focal deficits.   Skin: Skin is warm and normal in color. No rash noted.      Emergency Department Course   ECG  ECG taken at 1445, ECG read at 1447  AV dual-paced rhythm. Abnormal ECG.    Rate 75 bpm. RI interval 186 ms. QRS duration 162 ms. QT/QTc 460/513 ms. P-R-T axes 86 -81 79.     Imagin.  Interval resolution of previous dilated fluid-filled small bowel  loops. No bowel obstruction or inflammation.  2.  Mild dilatation of right renal collecting system and renal pelvis  with normal caliber ureter suspicious for ureteropelvic junction  obstruction, unchanged from recent exam but new from 2009. Nonemergent  MAG3 Lasix renogram could be considered for further evaluation.  3.  Colonic diverticulosis.  4.  Uterine fibroid  As per radiology.     Laboratory:  CBC: WBC 6.1, HGB 13.3,      CMP: AWNL (Creatinine 0.73)     Lipase: 109    Troponin (Collected 1416): <0.015      Emergency Department Course:    Reviewed:  I reviewed nursing notes, vitals, past medical history and care everywhere    Assessments:  1410 I obtained history and examined the patient as noted above.     1550 I rechecked the patient and explained findings.     Interventions:  1422  mL IV Bolus    1538  mL IV Bolus    Disposition:  The patient was discharged to home.       Impression & Plan       Medical Decision Making:  Joyce Marroquin is a 60 year old female who presents today with abdominal pain. She was seen 2 days ago and the CT or her abd/pelvis reveals dilated loops of bowel that was concerning for ileus versus early SBO. She feels her abdomen is distended but notes no emesis and able to keep fluids and crackers down. Abdominal exam with no peritoneal signs or concerns for acute surgical abdomen. She has known cardiomyopathy. No evidence of coronary artery disease  per chart review. EKG with AV dual-paced rhythm. Troponin negative. Lab work with no leukocytosis, anemia, or metabolic derangement. CT repeated with resolution of ileus. She does have a dilated right renal collecting system, she notes no flank pain or urinary symptoms. We did discuss that this could be followed outpatient with Urology for further work-up or if symptoms develop. She is amenable to plan. She will slowly reintroduce her normal diet. We discussed reasons to return here.       Diagnosis:    ICD-10-CM    1. Abdominal pain, generalized  R10.84    2. History of ileus  Z87.19        Scribe Disclosure:  I, Jessica Hinds, am serving as a scribe at 1:29 PM on 8/8/2021 to document services personally performed by Tamie Contreras APRN CNP based on my observations and the provider's statements to me.     [facility]         Tamie Contreras APRN CNP  08/08/21 [id number]

## 2021-08-08 NOTE — TELEPHONE ENCOUNTER
"Patient calling back today as she was told to call back if she was not feeling better. She read her CT report ( 8/6/21) to writer. Writer asked patient what her symptoms were that she is calling about today. Initial report from red flag transfer was the patient had severe abdominal pain. Patient stated she never said she had severe abdominal pain. Speaking to patient she reported 2-3/10 abdominal pain above the umbilicus. \"I look five months pregnant\". Passing some gas, patient wears a CPAP and usually passes a lot of gas per her report. Bloated abdomen, she is able to button her pants and her \"abdomen is less taut\". Pain started 2 weeks ago in the abdomen and the pain comes and goes. This abdominal problem started as severe watery diarrhea maybe 2 weeks ago.   Nauseated but no vomiting. Last BM was this am and was mucus and \"something else, I don't know what it was\".   3 quarter sized hard stool yesterday. Heating pad makes the pain better as does Klonopin.   Sips and chips s what she was told to do yesterday by triage, see note from 8/7/21.   She thinks her bloating maybe causing shortness of breath she has baseline of shortness of breath sometimes and that is not new. She is also reporting chest pain 2-4/10 comes and goes. \"Less than 5 minutes but no idea, I always have chest pain since 2017 when I had pericarditis and I have a lot of heart problems\".     Patient upset at the questoins from triage and declined further triage. She stated she will go into the ER and disconnected the phone.     Carmen REINA August 8, 2021      Reason for Disposition    [1] MILD-MODERATE pain AND [2] constant AND [3] present > 2 hours    Additional Information    Negative: Shock suspected (e.g., cold/pale/clammy skin, too weak to stand, low BP, rapid pulse)    Negative: Difficult to awaken or acting confused (e.g., disoriented, slurred speech)    Negative: Passed out (i.e., lost consciousness, collapsed and was not responding)    " "Negative: Sounds like a life-threatening emergency to the triager    Negative: [1] SEVERE pain (e.g., excruciating) AND [2] present > 1 hour    Negative: [1] SEVERE pain AND [2] age > 60    Negative: [1] Vomiting AND [2] contains red blood or black (\"coffee ground\") material  (Exception: few red streaks in vomit that only happened once)    Negative: Blood in bowel movements   (Exception: blood on surface of BM with constipation)    Negative: Black or tarry bowel movements  (Exception: chronic-unchanged  black-grey bowel movements AND is taking iron pills or Pepto-bismol)    Negative: Patient sounds very sick or weak to the triager    Negative: Severe difficulty breathing (e.g., struggling for each breath, speaks in single words)    Negative: Difficult to awaken or acting confused (e.g., disoriented, slurred speech)    Negative: Shock suspected (e.g., cold/pale/clammy skin, too weak to stand, low BP, rapid pulse)    Answer Assessment - Initial Assessment Questions  1. LOCATION: \"Where does it hurt?\"        Mid sternum   2. RADIATION: \"Does the pain go anywhere else?\" (e.g., into neck, jaw, arms, back)      NO  3. ONSET: \"When did the chest pain begin?\" (Minutes, hours or days)       Off and on for years since 2017 and when she had pericarditis.   4. PATTERN \"Does the pain come and go, or has it been constant since it started?\"  \"Does it get worse with exertion?\"       Comes and goes. Not worse with exertion.   5. DURATION: \"How long does it last\" (e.g., seconds, minutes, hours)      Less than 5 minutes  6. SEVERITY: \"How bad is the pain?\"  (e.g., Scale 1-10; mild, moderate, or severe)     - MILD (1-3): doesn't interfere with normal activities      - MODERATE (4-7): interferes with normal activities or awakens from sleep       7. CARDIAC RISK FACTORS: \"Do you have any history of heart problems or risk factors for heart disease?\" (e.g., prior heart attack, angina; high blood pressure, diabetes, being overweight, high " "cholesterol, smoking, or strong family history of heart disease)      Pericarditis yes to heart attack. Pulmonary HTN per patient.   8. PULMONARY RISK FACTORS: \"Do you have any history of lung disease?\"  (e.g., blood clots in lung, asthma, emphysema, birth control pills)      Asthma   9. CAUSE: \"What do you think is causing the chest pain?\"      Percarditis  10. OTHER SYMPTOMS: \"Do you have any other symptoms?\" (e.g., dizziness, nausea, vomiting, sweating, fever, difficulty breathing, cough)        Nausea and shortness of breath, 99.3 temp yesterday, dizziness is she usually has dizziness and does know if its new.   11. PREGNANCY: \"Is there any chance you are pregnant?\" \"When was your last menstrual period?\"        NA    Protocols used: ABDOMINAL PAIN - FEMALE-A-AH, CHEST PAIN-A-AH      "

## 2021-08-08 NOTE — ED TRIAGE NOTES
Abdominal pain, having issue since mid July, recently had CT, constipated 2 days. Now developed SOB and chest pain upon walking into ED.

## 2021-08-09 ENCOUNTER — TELEPHONE (OUTPATIENT)
Dept: FAMILY MEDICINE | Facility: CLINIC | Age: 61
End: 2021-08-09

## 2021-08-09 NOTE — TELEPHONE ENCOUNTER
Sunday morning small bowel movement and gas. Feels she is still constipated and wanted to call to let us know she will be doing OTC measures (sennakot and a fleets) to relieve this. Nurse advised if she has not been advised to use these to follow the instructions on the package. Also advised and ER follow up appointment soon. She will call back if needed.     Cristiane Espitia RN

## 2021-08-10 ENCOUNTER — MYC MEDICAL ADVICE (OUTPATIENT)
Dept: FAMILY MEDICINE | Facility: CLINIC | Age: 61
End: 2021-08-10

## 2021-08-10 NOTE — IP AVS SNAPSHOT
[address] [address]                                    After Visit Summary   7/20/2020    Joyce Marroquin    MRN: [id number]           After Visit Summary Signature Page    I have received my discharge instructions, and my questions have been answered. I have discussed any challenges I see with this plan with the nurse or doctor.    ..........................................................................................................................................  Patient/Patient Representative Signature      ..........................................................................................................................................  Patient Representative Print Name and Relationship to Patient    ..................................................               ................................................  Date                                   Time    ..........................................................................................................................................  Reviewed by Signature/Title    ...................................................              ..............................................  Date                                               Time          22EPIC Rev 08/18        Paula Wiley is a 49 year old female presents to [facility] for labs, MD thomas, and Nplate.     Diagnosis: Idiopathic thrombocytopenic purpura    Regimen: Nplate  Cycle/Day: C11 D22    Dr. Nash Palomares is supervising clinician today.    ECO - No physically strenuous activity, but ambulatory and able to carry out light or sedentary work.    WBC (K/mcL)   Date Value   08/10/2021 3.1 (L)     RBC (mil/mcL)   Date Value   08/10/2021 5.27 (H)     HCT (%)   Date Value   08/10/2021 42.9     HGB (g/dL)   Date Value   08/10/2021 13.7     PLT (K/mcL)   Date Value   08/10/2021 91 (L)     ANC: 1.7    Nursing Assessment:   Patient was seen today by provider.  Please reference provider notes and assessment.      Pre-Treatment: - Patient has valid pre-authorization  - VS completed  - Treatment parameters verified in patient protocol    Treatment: Refer to LDA and MAR for line assessment and medication administration  Apperance and physical integrity of drugs meets standard of drug monograph  SubQ/IM Injection: See injection record for documentation           Nplate:   Pre-Injection Information: Vital signs entered., Allergies reviewed as required for injection type., Pt states feeling well, no complaints. and Pt denies signs and symptoms of infection.  Refer to MAR (medication administration record) for type of injection and medication given.  Needle Size: 25 g. 5\"  Patient tolerated well: Stable    Post Treatment: Treatment tolerated well; no adverse reaction    Transfusion: Not needed    Integrative Medicine: No    Oral Chemotherapy: No    Education: No new instructions needed    Next appointment scheduled: 2021 for labs, MD thomas, and Nplate  Patient instructed to call the office with any questions or concerns.    Patient Discharged: patient discharged to home per self, ambulatory

## 2021-08-11 ENCOUNTER — OFFICE VISIT (OUTPATIENT)
Dept: CARDIOLOGY | Facility: CLINIC | Age: 61
End: 2021-08-11
Attending: NURSE PRACTITIONER
Payer: COMMERCIAL

## 2021-08-11 VITALS
HEIGHT: 65 IN | WEIGHT: 177 LBS | SYSTOLIC BLOOD PRESSURE: 121 MMHG | OXYGEN SATURATION: 98 % | HEART RATE: 74 BPM | DIASTOLIC BLOOD PRESSURE: 83 MMHG | BODY MASS INDEX: 29.49 KG/M2

## 2021-08-11 DIAGNOSIS — I42.9 CARDIOMYOPATHY, UNSPECIFIED TYPE (H): Primary | ICD-10-CM

## 2021-08-11 PROCEDURE — 99214 OFFICE O/P EST MOD 30 MIN: CPT | Performed by: NURSE PRACTITIONER

## 2021-08-11 PROCEDURE — G0463 HOSPITAL OUTPT CLINIC VISIT: HCPCS

## 2021-08-11 ASSESSMENT — PAIN SCALES - GENERAL: PAINLEVEL: NO PAIN (0)

## 2021-08-11 ASSESSMENT — MIFFLIN-ST. JEOR: SCORE: 1363.75

## 2021-08-11 NOTE — PROGRESS NOTES
Electrophysiology Clinic Note  HPI:   Ms. Marroquin is a 60 year old female who has a past medical history significant for GERD, PTSD, Grave's Disease with post ablative hypothyroidism, hyperparathyroidism, LARRY (uses CPAP), NICM LVEF 35-40%, PAF (CHADSVASC 2), depression, prior tobacco use, and PVCs s/p RVOT ablation complicated by [facility]B s/p PPM 4/21/2017 and pericarditis. She presents today for follow up.      She reports a history of symptomatic PVCs for which she ultimately elected to pursue an ablation which she had done at [facility]. 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 She went into [facility] and required urgent PPM placed in 4/21/17. She then had lead dislodgement in the recovery area requiring lead revision. She had pericarditis and completed a course of colchicine post ablation. Initial echo post ablation showed LVEF 40-45%, then an echo a month later showed LVEF 50%. MRI in early 4/2017 showed LVEF 60-65% with area of basal inferior hypokinesis with possible LGE. Stress test from 3/2017 showed no inducible ischemia. Echo from 5/2018 showed LVEF 50-55%, mild diffuse hypokinesis, normal RV size and function. She had some pain at pacemaker site and reported it was too close to her collarbone. She elected to undergo a pocket revision in 12/2017. Ever since her initial pacemaker implant she has struggled with episodes of dizziness/lightheadedness and fatigue. No episodes of syncope. She had an updated echo in 2019 that showed LVEF decreased to 30-35% with all segments severely hypokinetic to akinetic, suggestive of stress cardiomyopathy but could not rule out ischemic cardiomyopathy. She underwent coronary angiogram that showed mild/moderate non-obstructive CAD involving LAD, LCx, and RCA with no significant CAD to explain new cardiomyopathy. Recommended her to get CMRI; however, she wanted to defer. Therefore, treated her for stress  cardiomyopathy. Started her on Toprol XL, she has only tolerated small doses in the past. This was later changed to low dose Coreg. She was also placed on low dose lisinopril. Hypotension has limited up titration of neurohormal agents. A follow up echo in 12/2019 showed LVEF 40-45%, grade I diastolic dysfunction, and mild diffuse hypokinesis. She followed up with Dr. Funes in 2/2020 and reported that since 1/2020 she noticed a decrease in her wellbeing and exercise tolerance. She found even pushing a cart had become more difficult and felt more short of breath. A follow up echo in 3/2020 showed LVEF 40-45%, normal RV size/function unchanged from 12/2019 echo. Due to progressively worsening symptoms, she underwent exercise RHC which showed normal biventricular and pulmonary pressures, normal CO, and limited exercise capacity with significant rise in PCW from 10 to 31 mmHg and PA pressure mean of 21 to 38 mmHg c/w exercise induced pulmonary HTN. She was started on spironolactone. She has been on Lasix 20-40 mg as needed for weight gain or SOB and volume status fluctuations. A follow up echo in 2/19/21 showed LVEF 35-40%, normal RV size/function, and no significant valvular abnormalities. She has had low burden of short PAF episodes noted on device that she mentioned to her care team and was subsequently started on Eliquis. She continued to have progressively worsening exercise tolerance, SOB/EASTON, and decreased stamina. She has recently seen Dr. Funes and discussed possible CRT upgrade. She has also seen Dr. Ordonez and Dr. Brown for HF consults.     EP Visit 4/21/21: She is following up today. She reports feeling at baseline. She continues to feel frustrated with her current symptomology and wants to improve her SOB and exertional capacities so she can return to work. Currently, these symptoms have been very limiting to her. She denies any chest pain/pressures, PND, orthopnea, palpitations, or syncopal symptoms.  Device interrogation showed normal device function, stable lead parameters, AP 50.8%,  97.6%, AT/AF <0.1% none over 1 minute, and 1 NSVT. She has been talking to her psychologist/PCP about starting Cymbalta. Current cardiac medications include: Eliquis, Coreg, Lasix, Spironolactone.     EP Visit 6/23/21: She presents today for follow up. CMRI showed LVEF 50% with no myocardial fibrosis. Her CMRI report has wrong body weight/height and will request this is updated correctly. She has followed with Dr. Funes and Dr. Ordonez. Dr. Funes started her on sildenafil for possible exercise induced pulmonary HTN. She took the medication for a few doses but had extreme muscle pains on it. They would like her to try a lower dose but she is awaiting for approval from insurance for this. She has done acupuncture and feels this has been helpful to her. She does continue to struggle with activity intolerance 2/2 EASTON/SOB. Device clinic had ajusted her rate response in attempt to improve forward flow. Her weight has been stable. She denies any chest pain/pressures, leg/ankle swelling, PND, orthopnea, palpitations, or syncopal symptoms. Device interrogation shows normal device function, stable lead parameters, intrinsic rhythm is SR with PACs/, 100% , HR histograms flat she endorses she has not been very active recently. She has been taking Cymbalta now and feels some confusion with medication but does feel her emotions are less labile. Current cardiac medications include: Eliquis, Toprol XL, Lasix, Spironolactone.     She presents today for follow up. She was in ER with abdominal pain and She was seen 2 days ago and the CT or her abd/pelvis reveals dilated loops of bowel that was concerning for ileus versus early SBO. Repeat CT a couple days later showed resolution of ileus.  She is interested in increasing her Cymbalta. She does feel this has helped her mental health and she continues to follow with a counselor. She reports  feeling some liquid stools, SOB, and abdominal distention which is frustrating. She is seeing her PCP tomorrow for evaluations. She is trying to get out and see different [place] with her  and be as active as she can, but has to pace herself and sit to take breaks. She admits she forgets to take the revatio, particularly the afternoon dose at times. She denies any chest pain/pressures, dizziness, lightheadedness, leg/ankle swelling, PND, orthopnea, palpitations, or syncopal symptoms. Recent 12 lead ECG shows  Vent Rate 75 bpm,  ms,  ms, QTc 513 ms.  Current cardiac medications include: Eliquis, Toprol XL, Revatio, Lasix, Spironolactone.         PAST MEDICAL HISTORY:  Past Medical History:   Diagnosis Date     Arthritis      Cardiomyopathy (H)     ff Cardiology     Chronic depressive personality disorder      Congestive heart failure (H) see dr martínez    heart failure correct term     COPD exacerbation (H)      Esophageal reflux      Ex-smoker     quit 2006; 1 PPD x 30     Hyperparathyroidism (H)     s/p parathyroidectomy     Hypothyroidism      LARRY on CPAP     ff Sleep medicine     Pulmonary nodules     ff Pulmonologist     S/P cardiac pacemaker procedure     [facility]      Uncomplicated asthma years       CURRENT MEDICATIONS:  Current Outpatient Medications   Medication Sig Dispense Refill     acetaminophen (TYLENOL) 500 MG tablet Take 500-1,000 mg by mouth every 6 hours as needed for mild pain       albuterol (PROAIR HFA/PROVENTIL HFA/VENTOLIN HFA) 108 (90 BASE) MCG/ACT Inhaler Inhale 2 puffs into the lungs as needed for shortness of breath / dyspnea or wheezing        apixaban ANTICOAGULANT (ELIQUIS) 5 MG tablet Take 1 tablet (5 mg) by mouth 2 times daily 120 tablet 3     azelastine (ASTELIN) 0.1 % nasal spray Spray 1 spray into both nostrils 2 times daily as needed        Calcium Lactate 500 MG CAPS Take 250-500 mg by mouth daily        Calcium-Magnesium-Vitamin  D (CALCIUM MAGNESIUM PO) Calcium 120 mg, magenesium 20 mg, iodine 20 mg as needed at bedtime       clonazePAM (KLONOPIN) 0.5 MG tablet TAKE 1 TABLET(0.5 MG) BY MOUTH THREE TIMES DAILY AS NEEDED FOR ANXIETY 90 tablet 1     DiphenhydrAMINE HCl (BENADRYL PO) Take 25 mg by mouth nightly as needed for allergies        DULoxetine (CYMBALTA) 30 MG capsule TAKE 1 CAPSULE(30 MG) BY MOUTH DAILY 90 capsule 1     Ferrous Sulfate (IRON SUPPLEMENT PO) Take 65 mg by mouth daily        fluticasone-salmeterol (ADVAIR) 250-50 MCG/DOSE inhaler Inhale 1 puff into the lungs every 12 hours       furosemide (LASIX) 20 MG tablet Take 1 tablet, no more than twice a week.  Call with weight gain. 180 tablet 1     ipratropium (ATROVENT) 0.06 % nasal spray Spray 2 sprays into both nostrils 4 times daily as needed        loperamide (IMODIUM A-D) 2 MG tablet Take 1 tablet (2 mg) by mouth 4 times daily as needed for diarrhea (Max 16 mg daily) 90 tablet 0     loperamide (IMODIUM A-D) 2 MG tablet Take 2 tabs (4 mg) after first loose stool, and then take one tab (2 mg) after each diarrheal stool.  Max of 8 tabs (16 mg) per day. 1 tablet 0     MAGNESIUM LACTATE PO Take 140-210 mg by mouth nightly as needed        MELATONIN PO Take 1-3 mg by mouth nightly as needed        Menaquinone-7 (VITAMIN K2 PO) Take 1 tablet by mouth every morning        methylPREDNISolone (MEDROL DOSEPAK) 4 MG tablet therapy pack Follow Package Directions (Patient not taking: Reported on 6/23/2021) 21 tablet 0     METHYLPREDNISOLONE PO Take 40 mg by mouth as needed        metoprolol succinate ER (TOPROL XL) 25 MG 24 hr tablet Take 1 tablet (25 mg) by mouth daily 45 tablet 3     Multiple Vitamin (DAILY MULTIVITAMIN PO) Take 1 tablet by mouth every morning        nebulizer nebulization as needed       Probiotic Product (PROBIOTIC DAILY PO) Take 1 capsule by mouth 2 times daily as needed        sildenafil (REVATIO) 10 MG/ML SUSR Take 1 mL (10 mg) by mouth every 8 hours 112 mL 11      spironolactone (ALDACTONE) 25 MG tablet Take 1 tablet three days per week. 90 tablet 3     SYNTHROID 150 MCG tablet 150 mcg for 6 days and 75 mcg for 1 day per week 90 tablet 3     TURMERIC PO Take 1 tablet by mouth every morning        vitamin B complex with vitamin C (VITAMIN  B COMPLEX) PO tablet Take 1 tablet by mouth as needed       vitamin D3 (CHOLECALCIFEROL) 2000 units tablet Take 1 tablet by mouth daily 1 tablet 0     Zinc 15 MG CAPS Take 15 mg by mouth          PAST SURGICAL HISTORY:  Past Surgical History:   Procedure Laterality Date     BUNIONECTOMY Bilateral      CV CORONARY ANGIOGRAM N/A 9/26/2019    Procedure: CV CORONARY ANGIOGRAM;  Surgeon: Erickson Trejo MD;  Location:  [facility]     CV RIGHT HEART CATH MEASUREMENTS RECORDED N/A 7/20/2020    Procedure: CV RIGHT HEART CATH;  Surgeon: Alonso Ordonez MD;  Location:  [facility]     EP ABLATION / EP STUDIES  04/21/2017     EXPLORE NECK N/A 9/19/2018    Procedure: EXPLORE NECK;  Neck Exploration Resection of Left superior Parathyroid gland;  Surgeon: Kristine Venegas MD;  Location: [facility] OR      CORRECT BUNION,SIMPLE       PARATHYROIDECTOMY N/A 9/19/2018    Procedure: PARATHYROIDECTOMY;;  Surgeon: Kristine Venegas MD;  Location: [facility] OR     PPM INSERT OF NEW OR REPL W/VENT LEAD  04/21/2017     TONSILLECTOMY & ADENOIDECTOMY         ALLERGIES:     Allergies   Allergen Reactions     Tetracycline Swelling     Viagra [Sildenafil] Muscle Pain (Myalgia)     Zofran [Ondansetron]      Prolonged QT  Prolonged QT at baseline per patient     Flagyl [Metronidazole] Rash     Buspar [Buspirone]      Muscle weakness     Corn Oil Other (See Comments)     Headache       Seasonal Allergies Difficulty breathing     Sulfamethoxazole-Trimethoprim      Other reaction(s): Other  Passed out     Cyclobenzaprine Other (See Comments)     Extreme heat intolerance     Levaquin [Levofloxacin]      Other reaction(s): Other  Tendon rupture      "Nsaids Other (See Comments)     Exacerbated asthma       FAMILY HISTORY:  Family History   Problem Relation Age of Onset     Hypothyroidism Mother      Hypertension Mother      Osteoarthritis Mother      Coronary Artery Disease Father         nonfatal MI in his 70s     Asthma Father      Diabetes Sister      Hypothyroidism Sister      Breast Cancer Maternal Aunt      Anxiety Disorder Sister        SOCIAL HISTORY:  Social History     Tobacco Use     Smoking status: Former Smoker     Packs/day: 0.00     Years: 0.00     Pack years: 0.00     Smokeless tobacco: Never Used   Substance Use Topics     Alcohol use: Yes     Alcohol/week: 0.0 - 8.0 standard drinks     Comment: seldom     Drug use: No       ROS:   A comprehensive 10 point review of systems negative other than as mentioned in HPI.  Exam:  /83 (BP Location: Right arm, Patient Position: Chair, Cuff Size: Adult Regular)   Pulse 74   Ht 1.651 m (5' 5\")   Wt 80.3 kg (177 lb)   LMP 08/21/2007   SpO2 98%   BMI 29.45 kg/m    GENERAL APPEARANCE: alert and no distress  HEENT: MMM. PERRLA.  NECK: supple.   RESPIRATORY:no use of accessory muscles, no retractions, respirations are unlabored, normal respiratory rate  CARDIOVASCULAR: device shows regular .   ABDOMEN: ND.  EXTREMITIES: no edema  NEURO: alert and oriented to person/place/time, normal speech, gait and affect  SKIN: no ecchymoses, no rashes  PSYCH: normal affect, cooperative    Labs:  Reviewed.     Testing/Procedures:  7/2020 RHC:  Conclusion       Right sided filling pressures are normal.    Normal PA pressures.    Left sided filling pressures are normal.    Normal cardiac index at baselie with thermodilution    Limited exercise of 4 minutes and 20 seconds that propted a significant rise in patients PCWP as well as PA pressures consistent with diastolic dysfunction.         2/19/21 ECHOCARDIOGRAM:   Interpretation Summary     Moderately (EF 35%) reduced left ventricular function is present.  Global " right ventricular function is normal.  No significant valvular dysfunction.  The inferior vena cava was normal in size with preserved respiratory  variability.  No pericardial effusion present.    5/4/21 CMRI:  1. The LV is normal in cavity size and wall thickness. The global systolic function is mildly reduced. The  LVEF is 50%. There are no regional wall motion abnormalities. There is abnormal septal motion related to  pacing.     2. The RV is normal in cavity size. The global systolic function is normal. The RVEF is 65%.      3. The left atrium is mildly dilated.     4. There is no significant valvular disease.      5. Late gadolinium enhancement imaging shows no MI, fibrosis or infiltrative disease.      6. Regadenoson stress perfusion imaging shows no ischemia.     7. There is no pericardial effusion or thickening.     8. There is no LV thrombus.     CONCLUSIONS: No myocardial ischemia or fibrosis. Mild NICM possibly related to pacing, LVEF 50% and RVEF  65%.     Assessment and Plan:   Ms. Marroquin is a 60 year old female who has a past medical history significant for GERD, PTSD, Grave's Disease with post ablative hypothyroidism, hyperparathyroidism, LARRY (uses CPAP), NICM LVEF 35-40%, PAF (CHADSVASC 2), depression, prior tobacco use, and PVCs s/p RVOT ablation complicated by CHB s/p PPM 4/21/2017 and pericarditis. She presents today for follow up. She was in ER with abdominal pain and She was seen 2 days ago and the CT or her abd/pelvis reveals dilated loops of bowel that was concerning for ileus versus early SBO. Repeat CT a couple days later showed resolution of ileus.  She is interested in increasing her Cymbalta. She does feel this has helped her mental health and she continues to follow with a counselor. She reports feeling some liquid stools, SOB, and abdominal distention which is frustrating. She is seeing her PCP tomorrow for evaluations. She is trying to get out and see different [place] with her   and be as active as she can, but has to pace herself and sit to take breaks. She admits she forgets to take the revatio, particularly the afternoon dose at times. She denies any chest pain/pressures, dizziness, lightheadedness, leg/ankle swelling, PND, orthopnea, palpitations, or syncopal symptoms. Recent 12 lead ECG shows  Vent Rate 75 bpm,  ms,  ms, QTc 513 ms.  Current cardiac medications include: Eliquis, Toprol XL, Revatio, Lasix, Spironolactone.       NICM LVEF 30-35%, improved to 50% NYHA III:  Unclear etiology, consider pacing induced or primary NICM.   1. ACEi/ARB: had not tolerated lisinopril d/t hypotension   2. BB: Continue Toprol XL  3. Aldosterone antagonist: continue spironolactone.  4. SCD prophylaxis: LVEF >35% at this time does not have indication for ICD  5. Fluid status: Uses Lasix 20 mg 2X/week.  6. On revatio for exercise induced pulmonary HTN  7. Following with HF team/[facility] clinic         [facility] s/p [facility] 4/2017:   1. Pacemaker is functioning well with stable lead parameters. No sustained arrhythmias.  2. She will continue to follow with device clinic per routine.   3. High  %, has LVEF that has waxed and waned over time, previously was considering upgrade to CRT but with current LVEF 50% holding off on this at this time. Will continue to follow with echos.       Discussed that if she needs to increase Cymbalta dose, her QTc interval would allow for this at this time. Would recommend getting ECG 1-2 weeks after increasing dose. She will continue to work with her psychologist if this is needed.   She is seeing her PCP tomorrow for her abdominal/bowel concerns.   Follow up with EP in 6 months or sooner if need arises.     The patient states understanding and is agreeable with plan.     SHAYNA Thomas CNP  Pager: [phone]  CC  STUART COLMENARES

## 2021-08-11 NOTE — LETTER
8/11/2021      RE: Joyce Marroquin  [address] [facility] [address]       Dear Colleague,    Thank you for the opportunity to participate in the care of your patient, Joyce Marroquin, at the [facility] HEART CLINIC [place] at [facility]. Please see a copy of my visit note below.    Electrophysiology Clinic Note  HPI:   Ms. Marroquin is a 60 year old female who has a past medical history significant for GERD, PTSD, Grave's Disease with post ablative hypothyroidism, hyperparathyroidism, LARRY (uses CPAP), NICM LVEF 35-40%, PAF (CHADSVASC 2), depression, prior tobacco use, and PVCs s/p RVOT ablation complicated by CHB s/p PPM 4/21/2017 and pericarditis. She presents today for follow up.      She reports a history of symptomatic PVCs for which she ultimately elected to pursue an ablation which she had done at [facility]. 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 She went into [facility] and required urgent PPM placed in 4/21/17. She then had lead dislodgement in the recovery area requiring lead revision. She had pericarditis and completed a course of colchicine post ablation. Initial echo post ablation showed LVEF 40-45%, then an echo a month later showed LVEF 50%. MRI in early 4/2017 showed LVEF 60-65% with area of basal inferior hypokinesis with possible LGE. Stress test from 3/2017 showed no inducible ischemia. Echo from 5/2018 showed LVEF 50-55%, mild diffuse hypokinesis, normal RV size and function. She had some pain at pacemaker site and reported it was too close to her collarbone. She elected to undergo a pocket revision in 12/2017. Ever since her initial pacemaker implant she has struggled with episodes of dizziness/lightheadedness and fatigue. No episodes of syncope. She had an updated echo in 2019 that showed LVEF decreased to 30-35% with all segments severely hypokinetic to akinetic,  suggestive of stress cardiomyopathy but could not rule out ischemic cardiomyopathy. She underwent coronary angiogram that showed mild/moderate non-obstructive CAD involving LAD, LCx, and RCA with no significant CAD to explain new cardiomyopathy. Recommended her to get CMRI; however, she wanted to defer. Therefore, treated her for stress cardiomyopathy. Started her on Toprol XL, she has only tolerated small doses in the past. This was later changed to low dose Coreg. She was also placed on low dose lisinopril. Hypotension has limited up titration of neurohormal agents. A follow up echo in 12/2019 showed LVEF 40-45%, grade I diastolic dysfunction, and mild diffuse hypokinesis. She followed up with Dr. Funes in 2/2020 and reported that since 1/2020 she noticed a decrease in her wellbeing and exercise tolerance. She found even pushing a cart had become more difficult and felt more short of breath. A follow up echo in 3/2020 showed LVEF 40-45%, normal RV size/function unchanged from 12/2019 echo. Due to progressively worsening symptoms, she underwent exercise RHC which showed normal biventricular and pulmonary pressures, normal CO, and limited exercise capacity with significant rise in PCW from 10 to 31 mmHg and PA pressure mean of 21 to 38 mmHg c/w exercise induced pulmonary HTN. She was started on spironolactone. She has been on Lasix 20-40 mg as needed for weight gain or SOB and volume status fluctuations. A follow up echo in 2/19/21 showed LVEF 35-40%, normal RV size/function, and no significant valvular abnormalities. She has had low burden of short PAF episodes noted on device that she mentioned to her care team and was subsequently started on Eliquis. She continued to have progressively worsening exercise tolerance, SOB/EASTON, and decreased stamina. She has recently seen Dr. Funes and discussed possible CRT upgrade. She has also seen Dr. Ordonez and Dr. Brown for HF consults.     EP Visit 4/21/21: She is following  up today. She reports feeling at baseline. She continues to feel frustrated with her current symptomology and wants to improve her SOB and exertional capacities so she can return to work. Currently, these symptoms have been very limiting to her. She denies any chest pain/pressures, PND, orthopnea, palpitations, or syncopal symptoms. Device interrogation showed normal device function, stable lead parameters, AP 50.8%,  97.6%, AT/AF <0.1% none over 1 minute, and 1 NSVT. She has been talking to her psychologist/PCP about starting Cymbalta. Current cardiac medications include: Eliquis, Coreg, Lasix, Spironolactone.     EP Visit 6/23/21: She presents today for follow up. CMRI showed LVEF 50% with no myocardial fibrosis. Her CMRI report has wrong body weight/height and will request this is updated correctly. She has followed with Dr. Funes and Dr. Ordonez. Dr. Funes started her on sildenafil for possible exercise induced pulmonary HTN. She took the medication for a few doses but had extreme muscle pains on it. They would like her to try a lower dose but she is awaiting for approval from insurance for this. She has done acupuncture and feels this has been helpful to her. She does continue to struggle with activity intolerance 2/2 EASTON/SOB. Device clinic had ajusted her rate response in attempt to improve forward flow. Her weight has been stable. She denies any chest pain/pressures, leg/ankle swelling, PND, orthopnea, palpitations, or syncopal symptoms. Device interrogation shows normal device function, stable lead parameters, intrinsic rhythm is SR with PACs/, 100% , HR histograms flat she endorses she has not been very active recently. She has been taking Cymbalta now and feels some confusion with medication but does feel her emotions are less labile. Current cardiac medications include: Eliquis, Toprol XL, Lasix, Spironolactone.     She presents today for follow up. She was in ER with abdominal pain and She was  seen 2 days ago and the CT or her abd/pelvis reveals dilated loops of bowel that was concerning for ileus versus early SBO. Repeat CT a couple days later showed resolution of ileus.  She is interested in increasing her Cymbalta. She does feel this has helped her mental health and she continues to follow with a counselor. She reports feeling some liquid stools, SOB, and abdominal distention which is frustrating. She is seeing her PCP tomorrow for evaluations. She is trying to get out and see different [place] with her  and be as active as she can, but has to pace herself and sit to take breaks. She admits she forgets to take the revatio, particularly the afternoon dose at times. She denies any chest pain/pressures, dizziness, lightheadedness, leg/ankle swelling, PND, orthopnea, palpitations, or syncopal symptoms. Recent 12 lead ECG shows  Vent Rate 75 bpm,  ms,  ms, QTc 513 ms.  Current cardiac medications include: Eliquis, Toprol XL, Revatio, Lasix, Spironolactone.         PAST MEDICAL HISTORY:  Past Medical History:   Diagnosis Date     Arthritis      Cardiomyopathy (H)     ff Cardiology     Chronic depressive personality disorder      Congestive heart failure (H) see dr martínez    heart failure correct term     COPD exacerbation (H)      Esophageal reflux      Ex-smoker     quit 2006; 1 PPD x 30     Hyperparathyroidism (H)     s/p parathyroidectomy     Hypothyroidism      LARRY on CPAP     ff Sleep medicine     Pulmonary nodules     ff Pulmonologist     S/P cardiac pacemaker procedure     [facility]      Uncomplicated asthma years       CURRENT MEDICATIONS:  Current Outpatient Medications   Medication Sig Dispense Refill     acetaminophen (TYLENOL) 500 MG tablet Take 500-1,000 mg by mouth every 6 hours as needed for mild pain       albuterol (PROAIR HFA/PROVENTIL HFA/VENTOLIN HFA) 108 (90 BASE) MCG/ACT Inhaler Inhale 2 puffs into the lungs as needed for shortness of  breath / dyspnea or wheezing        apixaban ANTICOAGULANT (ELIQUIS) 5 MG tablet Take 1 tablet (5 mg) by mouth 2 times daily 120 tablet 3     azelastine (ASTELIN) 0.1 % nasal spray Spray 1 spray into both nostrils 2 times daily as needed        Calcium Lactate 500 MG CAPS Take 250-500 mg by mouth daily        Calcium-Magnesium-Vitamin D (CALCIUM MAGNESIUM PO) Calcium 120 mg, magenesium 20 mg, iodine 20 mg as needed at bedtime       clonazePAM (KLONOPIN) 0.5 MG tablet TAKE 1 TABLET(0.5 MG) BY MOUTH THREE TIMES DAILY AS NEEDED FOR ANXIETY 90 tablet 1     DiphenhydrAMINE HCl (BENADRYL PO) Take 25 mg by mouth nightly as needed for allergies        DULoxetine (CYMBALTA) 30 MG capsule TAKE 1 CAPSULE(30 MG) BY MOUTH DAILY 90 capsule 1     Ferrous Sulfate (IRON SUPPLEMENT PO) Take 65 mg by mouth daily        fluticasone-salmeterol (ADVAIR) 250-50 MCG/DOSE inhaler Inhale 1 puff into the lungs every 12 hours       furosemide (LASIX) 20 MG tablet Take 1 tablet, no more than twice a week.  Call with weight gain. 180 tablet 1     ipratropium (ATROVENT) 0.06 % nasal spray Spray 2 sprays into both nostrils 4 times daily as needed        loperamide (IMODIUM A-D) 2 MG tablet Take 1 tablet (2 mg) by mouth 4 times daily as needed for diarrhea (Max 16 mg daily) 90 tablet 0     loperamide (IMODIUM A-D) 2 MG tablet Take 2 tabs (4 mg) after first loose stool, and then take one tab (2 mg) after each diarrheal stool.  Max of 8 tabs (16 mg) per day. 1 tablet 0     MAGNESIUM LACTATE PO Take 140-210 mg by mouth nightly as needed        MELATONIN PO Take 1-3 mg by mouth nightly as needed        Menaquinone-7 (VITAMIN K2 PO) Take 1 tablet by mouth every morning        methylPREDNISolone (MEDROL DOSEPAK) 4 MG tablet therapy pack Follow Package Directions (Patient not taking: Reported on 6/23/2021) 21 tablet 0     METHYLPREDNISOLONE PO Take 40 mg by mouth as needed        metoprolol succinate ER (TOPROL XL) 25 MG 24 hr tablet Take 1 tablet (25 mg)  by mouth daily 45 tablet 3     Multiple Vitamin (DAILY MULTIVITAMIN PO) Take 1 tablet by mouth every morning        nebulizer nebulization as needed       Probiotic Product (PROBIOTIC DAILY PO) Take 1 capsule by mouth 2 times daily as needed        sildenafil (REVATIO) 10 MG/ML SUSR Take 1 mL (10 mg) by mouth every 8 hours 112 mL 11     spironolactone (ALDACTONE) 25 MG tablet Take 1 tablet three days per week. 90 tablet 3     SYNTHROID 150 MCG tablet 150 mcg for 6 days and 75 mcg for 1 day per week 90 tablet 3     TURMERIC PO Take 1 tablet by mouth every morning        vitamin B complex with vitamin C (VITAMIN  B COMPLEX) PO tablet Take 1 tablet by mouth as needed       vitamin D3 (CHOLECALCIFEROL) 2000 units tablet Take 1 tablet by mouth daily 1 tablet 0     Zinc 15 MG CAPS Take 15 mg by mouth          PAST SURGICAL HISTORY:  Past Surgical History:   Procedure Laterality Date     BUNIONECTOMY Bilateral      CV CORONARY ANGIOGRAM N/A 9/26/2019    Procedure: CV CORONARY ANGIOGRAM;  Surgeon: Erickson Trejo MD;  Location:  HEART CARDIAC CATH LAB     CV RIGHT HEART CATH MEASUREMENTS RECORDED N/A 7/20/2020    Procedure: CV RIGHT HEART CATH;  Surgeon: Alonso Ordonez MD;  Location:  HEART CARDIAC CATH LAB     EP ABLATION / EP STUDIES  04/21/2017     EXPLORE NECK N/A 9/19/2018    Procedure: EXPLORE NECK;  Neck Exploration Resection of Left superior Parathyroid gland;  Surgeon: Kristine Venegas MD;  Location: [facility] OR      CORRECT BUNION,SIMPLE       PARATHYROIDECTOMY N/A 9/19/2018    Procedure: PARATHYROIDECTOMY;;  Surgeon: Kristine Venegas MD;  Location: [facility] OR     PPM INSERT OF NEW OR REPL W/VENT LEAD  04/21/2017     TONSILLECTOMY & ADENOIDECTOMY         ALLERGIES:     Allergies   Allergen Reactions     Tetracycline Swelling     Viagra [Sildenafil] Muscle Pain (Myalgia)     Zofran [Ondansetron]      Prolonged QT  Prolonged QT at baseline per patient     Flagyl [Metronidazole] Rash     Buspar [Buspirone]   "    Muscle weakness     Corn Oil Other (See Comments)     Headache       Seasonal Allergies Difficulty breathing     Sulfamethoxazole-Trimethoprim      Other reaction(s): Other  Passed out     Cyclobenzaprine Other (See Comments)     Extreme heat intolerance     Levaquin [Levofloxacin]      Other reaction(s): Other  Tendon rupture     Nsaids Other (See Comments)     Exacerbated asthma       FAMILY HISTORY:  Family History   Problem Relation Age of Onset     Hypothyroidism Mother      Hypertension Mother      Osteoarthritis Mother      Coronary Artery Disease Father         nonfatal MI in his 70s     Asthma Father      Diabetes Sister      Hypothyroidism Sister      Breast Cancer Maternal Aunt      Anxiety Disorder Sister        SOCIAL HISTORY:  Social History     Tobacco Use     Smoking status: Former Smoker     Packs/day: 0.00     Years: 0.00     Pack years: 0.00     Smokeless tobacco: Never Used   Substance Use Topics     Alcohol use: Yes     Alcohol/week: 0.0 - 8.0 standard drinks     Comment: seldom     Drug use: No       ROS:   A comprehensive 10 point review of systems negative other than as mentioned in HPI.  Exam:  /83 (BP Location: Right arm, Patient Position: Chair, Cuff Size: Adult Regular)   Pulse 74   Ht 1.651 m (5' 5\")   Wt 80.3 kg (177 lb)   LMP 08/21/2007   SpO2 98%   BMI 29.45 kg/m    GENERAL APPEARANCE: alert and no distress  HEENT: MMM. PERRLA.  NECK: supple.   RESPIRATORY:no use of accessory muscles, no retractions, respirations are unlabored, normal respiratory rate  CARDIOVASCULAR: device shows regular .   ABDOMEN: ND.  EXTREMITIES: no edema  NEURO: alert and oriented to person/place/time, normal speech, gait and affect  SKIN: no ecchymoses, no rashes  PSYCH: normal affect, cooperative    Labs:  Reviewed.     Testing/Procedures:  7/2020 RHC:  Conclusion       Right sided filling pressures are normal.    Normal PA pressures.    Left sided filling pressures are normal.    Normal " cardiac index at [facility] with thermodilution    Limited exercise of 4 minutes and 20 seconds that propted a significant rise in patients PCWP as well as PA pressures consistent with diastolic dysfunction.         2/19/21 ECHOCARDIOGRAM:   Interpretation Summary     Moderately (EF 35%) reduced left ventricular function is present.  Global right ventricular function is normal.  No significant valvular dysfunction.  The inferior vena cava was normal in size with preserved respiratory  variability.  No pericardial effusion present.    5/4/21 CMRI:  1. The LV is normal in cavity size and wall thickness. The global systolic function is mildly reduced. The  LVEF is 50%. There are no regional wall motion abnormalities. There is abnormal septal motion related to  pacing.     2. The RV is normal in cavity size. The global systolic function is normal. The RVEF is 65%.      3. The left atrium is mildly dilated.     4. There is no significant valvular disease.      5. Late gadolinium enhancement imaging shows no MI, fibrosis or infiltrative disease.      6. Regadenoson stress perfusion imaging shows no ischemia.     7. There is no pericardial effusion or thickening.     8. There is no LV thrombus.     CONCLUSIONS: No myocardial ischemia or fibrosis. Mild NICM possibly related to pacing, LVEF 50% and RVEF  65%.     Assessment and Plan:   Ms. Marroquin is a 60 year old female who has a past medical history significant for GERD, PTSD, Grave's Disease with post ablative hypothyroidism, hyperparathyroidism, LARRY (uses CPAP), NICM LVEF 35-40%, PAF (CHADSVASC 2), depression, prior tobacco use, and PVCs s/p RVOT ablation complicated by CHB s/p PPM 4/21/2017 and pericarditis. She presents today for follow up. She was in ER with abdominal pain and She was seen 2 days ago and the CT or her abd/pelvis reveals dilated loops of bowel that was concerning for ileus versus early SBO. Repeat CT a couple days later showed resolution of ileus.  She is  interested in increasing her Cymbalta. She does feel this has helped her mental health and she continues to follow with a counselor. She reports feeling some liquid stools, SOB, and abdominal distention which is frustrating. She is seeing her PCP tomorrow for evaluations. She is trying to get out and see different [place] with her  and be as active as she can, but has to pace herself and sit to take breaks. She admits she forgets to take the revatio, particularly the afternoon dose at times. She denies any chest pain/pressures, dizziness, lightheadedness, leg/ankle swelling, PND, orthopnea, palpitations, or syncopal symptoms. Recent 12 lead ECG shows  Vent Rate 75 bpm,  ms,  ms, QTc 513 ms.  Current cardiac medications include: Eliquis, Toprol XL, Revatio, Lasix, Spironolactone.       NICM LVEF 30-35%, improved to 50% NYHA III:  Unclear etiology, consider pacing induced or primary NICM.   1. ACEi/ARB: had not tolerated lisinopril d/t hypotension   2. BB: Continue Toprol XL  3. Aldosterone antagonist: continue spironolactone.  4. SCD prophylaxis: LVEF >35% at this time does not have indication for ICD  5. Fluid status: Uses Lasix 20 mg 2X/week.  6. On revatio for exercise induced pulmonary HTN  7. Following with HF team/CORE clinic         [facility] s/p [facility] 4/2017:   1. Pacemaker is functioning well with stable lead parameters. No sustained arrhythmias.  2. She will continue to follow with device clinic per routine.   3. High  %, has LVEF that has waxed and waned over time, previously was considering upgrade to CRT but with current LVEF 50% holding off on this at this time. Will continue to follow with echos.       Discussed that if she needs to increase Cymbalta dose, her QTc interval would allow for this at this time. Would recommend getting ECG 1-2 weeks after increasing dose. She will continue to work with her psychologist if this is needed.   She is seeing her PCP tomorrow for her  abdominal/bowel concerns.   Follow up with EP in 6 months or sooner if need arises.     The patient states understanding and is agreeable with plan.     SHAYNA Thomas CNP  Pager: [phone]    CC  STUART COLMENARES

## 2021-08-11 NOTE — PATIENT INSTRUCTIONS
Cardiovascular Clinic:   [address] [place] [address]  Your Care Team:  EP Cardiology   Telephone Number     Kristine Hawthorne RN [phone]     For scheduling appts or procedures:    Merced Heller   [phone]   For the Device Clinic (Pacemakers and ICD's)   RN's :   Alpa Rizzo During business hours: [phone]     After business hours:   [phone]- select option 4 and ask for job code [id number].       As always, Thank you for trusting us with your health care needs!

## 2021-08-11 NOTE — NURSING NOTE
Chief Complaint   Patient presents with     Follow Up     2 month follow up Cardiomyopathy      Vitals were taken and medications where reconciled.   Everton Sanchez, EMT  8:44 AM

## 2021-08-12 ENCOUNTER — OFFICE VISIT (OUTPATIENT)
Dept: FAMILY MEDICINE | Facility: CLINIC | Age: 61
End: 2021-08-12
Payer: COMMERCIAL

## 2021-08-12 VITALS
WEIGHT: 173 LBS | BODY MASS INDEX: 28.79 KG/M2 | SYSTOLIC BLOOD PRESSURE: 120 MMHG | OXYGEN SATURATION: 96 % | DIASTOLIC BLOOD PRESSURE: 84 MMHG | RESPIRATION RATE: 20 BRPM | HEART RATE: 113 BPM | TEMPERATURE: 97.9 F

## 2021-08-12 DIAGNOSIS — F43.23 ADJUSTMENT DISORDER WITH MIXED ANXIETY AND DEPRESSED MOOD: ICD-10-CM

## 2021-08-12 DIAGNOSIS — R19.7 DIARRHEA, UNSPECIFIED TYPE: Primary | ICD-10-CM

## 2021-08-12 PROCEDURE — 99214 OFFICE O/P EST MOD 30 MIN: CPT | Performed by: FAMILY MEDICINE

## 2021-08-12 ASSESSMENT — PAIN SCALES - GENERAL: PAINLEVEL: NO PAIN (0)

## 2021-08-12 NOTE — PROGRESS NOTES
Assessment & Plan     (R19.7) Diarrhea, unspecified type  (primary encounter diagnosis)  Comment: no stools for past 24 hours  Plan: Clostridium difficile Toxin B PCR        Recheck C.Dif if liquid stool persists  Reviewed probiotic and imodium use.  Reviewed reviewed recent stool studies and ED test results    (F43.23) Adjustment disorder with mixed anxiety and depressed mood  Comment: on cymbalta 30 mg daily   Plan: would like to increase to bid dosing.  Cardiology aware and endorses the change, will need EKG in 2 weeks.  Pt will schedule with PCP for this.                  Patient Instructions     Increase cymbalta to 30 mg twice a day.    Follow-up with Ce Crowe for EKG and diarrhea recheck in 2 weeks      Return in about 2 weeks (around 8/26/2021) for fu ekg for increased cymbalta.    Tash Virgen MD  [facility] CYNDEE Cook is a 60 year old who presents for the following health issues     HPI     ED/UC Followup:    Facility:  [facility]   Date of visit: 8/8/21  Reason for visit: Abdominal pain, generalized. History of ileus   Current Status:Symptoms started 7/17/21.  Monday symptoms were bad, Tuesday did mineral oil.  Concerns about impaction.   Wednesday did magnesium citrate.  Eating minimal amounts.  Noting loose, liquid stools.  Bloating slightly improved symptoms today.  Has had difficulty with urination- concerned with dehydration.  Headache ongoing since start of symptoms         Bloating less since magnesium citrate.  Taking probiotic regularly  No bloody stool, no fever, no pain.  occas cramping with bm    Would like to increase cymbalta.        Review of Systems   Constitutional, HEENT, cardiovascular, pulmonary, gi and gu systems are negative, except as otherwise noted.      Objective    /84   Pulse 113   Temp 97.9  F (36.6  C) (Oral)   Resp 20   Wt 78.5 kg (173 lb)   LMP 08/21/2007   SpO2 96%   BMI 28.79 kg/m    Body mass index is 28.79  kg/m .  Physical Exam   GENERAL: healthy, alert and no distress  EYES: Eyes grossly normal to inspection, PERRL and conjunctivae and sclerae normal  ABDOMEN: soft, nontender, no hepatosplenomegaly, no masses and bowel sounds normal  MS: no gross musculoskeletal defects noted, no edema  SKIN: no suspicious lesions or rashes  PSYCH: mentation appears normal, affect normal/bright and anxious

## 2021-08-12 NOTE — PATIENT INSTRUCTIONS
Increase cymbalta to 30 mg twice a day.    Follow-up with Ce Crowe for EKG and diarrhea recheck in 2 weeks

## 2021-08-12 NOTE — NURSING NOTE
"Chief Complaint   Patient presents with     ER F/U       Initial /84   Pulse 113   Temp 97.9  F (36.6  C) (Oral)   Resp 20   Wt 78.5 kg (173 lb)   LMP 08/21/2007   SpO2 96%   BMI 28.79 kg/m   Estimated body mass index is 28.79 kg/m  as calculated from the following:    Height as of 8/11/21: 1.651 m (5' 5\").    Weight as of this encounter: 78.5 kg (173 lb).  Medication Reconciliation: complete  Anthony Anderson CMA    "

## 2021-08-17 ENCOUNTER — LAB (OUTPATIENT)
Dept: LAB | Facility: CLINIC | Age: 61
End: 2021-08-17
Payer: COMMERCIAL

## 2021-08-17 DIAGNOSIS — R19.7 DIARRHEA, UNSPECIFIED TYPE: ICD-10-CM

## 2021-08-17 LAB — C DIFF TOX B STL QL: NEGATIVE

## 2021-08-17 PROCEDURE — 87493 C DIFF AMPLIFIED PROBE: CPT

## 2021-08-19 ENCOUNTER — MYC MEDICAL ADVICE (OUTPATIENT)
Dept: FAMILY MEDICINE | Facility: CLINIC | Age: 61
End: 2021-08-19

## 2021-08-20 ENCOUNTER — TELEPHONE (OUTPATIENT)
Dept: FAMILY MEDICINE | Facility: CLINIC | Age: 61
End: 2021-08-20

## 2021-08-20 ENCOUNTER — TELEPHONE (OUTPATIENT)
Dept: GASTROENTEROLOGY | Facility: CLINIC | Age: 61
End: 2021-08-20

## 2021-08-20 DIAGNOSIS — R19.7 DIARRHEA, UNSPECIFIED TYPE: Primary | ICD-10-CM

## 2021-08-20 NOTE — TELEPHONE ENCOUNTER
"Per Dr. Penn: \"Please go ahead and get a repeat C. Diff on her next week.\"    Order has been placed.    Attempted to contact pt.  No answer. Left message to return call [phone] #2    Katherine Olivier RN        "

## 2021-08-20 NOTE — TELEPHONE ENCOUNTER
"I spoke to Ce Crowe, PCP.   She says she does not think this is C diff, declines to order antibiotics or additional testing.   Cannot see patient today and does not think is necessary.    Advised patient follow up with GI to see what they think.   Says patient can use immodium.    I called and spoke to patient and relayed provider's response.   Patient says GI has not seen her yet, will see them 9/1, the current GI is just for the colonoscopy procedure so she is sure they won't order tests or antibiotics.   She says Ce offered to start antibiotics previously for her diarrhea but patient declined at that time.    Patient says she \"respectfully disagrees\" with Ce's advice and wants to be seen today or Monday for this.   Says she is urinating at least a few times a day, is drinking pedialyte.    Found an opening at [facility] on Monday 8/24 with Dr. Cuevas.    Scheduled.    Ana Greer RN  [facility]              "

## 2021-08-20 NOTE — TELEPHONE ENCOUNTER
Writer reviewed pre-assessment questions with patient prior to upcoming colonoscopy on 8.30.2021.      Covid test scheduled: 8.30.2021    Arrival time: 0700    Facility location: [facility]    Sedation type: CS    Implantable devices? Pacemaker; pt states that she has a cardiac appt on 8.24.2021 for clearance.    Blood thinners/Antiplatelet medication? Eliquis, pt states she was instructed to hold x 3 days    miralax and magnesium citrate prep as pt has been having concerns with diarrhea.  Per colonoscopy in 2016 prep results were good with miralax and magnesium citrate prep.    Reviewed miralax and magnesium citrate prep instructions with patient.  Noting no nuts, seeds, or popcorn 7 days prior to procedure.     Designated  policy reviewed with patient.     Pt stated that she has concerns for C.Diff.  Pt's C. Diff lab was negative on 8.17.2021.  Pt stated she believes this is a false negative and is working with her PCP on taking abx.  Pt also requested a message be sent to GI provider Dr. Penn.  Staff message sent.    Pt also noted she does not want a resident and would prefer not to have a fellow in her room either.  Also noted in message to Dr. Penn.    Patient verbalized understanding.  No further questions or concerns.    Katherine Olivier RN

## 2021-08-20 NOTE — TELEPHONE ENCOUNTER
Reason for Call:  Other     Detailed comments: Patient would like to be seen today with any provider . Stated she continues with Diarrhea and is thinking she may have c dif. Please advise.     Phone Number Patient can be reached at: Home number on file [phone] (home)    Best Time:     Can we leave a detailed message on this number? YES    Call taken on 8/20/2021 at 9:47 AM by Michelle Villavicencio

## 2021-08-20 NOTE — TELEPHONE ENCOUNTER
Patient returned call.     Informed patient of note below.     Patient stated that if provider would like them to repeat lab than that is what they will do. Patient was wondering what the provider will do if they do have cdiff or what they will do to figure out diarrhea.     Informed patient that order is for a routine screening and not a diagnostic for diarrhea. Patient will have the opportunity to discuss bowel habits with provider prior to procedure and maybe biopsies can be performed to r/o microscopic colitis for example. This may change the procedure code to a diagnostic colonoscopy. Patient verbalized understanding.     Patient had no further questions or concerns at this time.     Trini Merino RN

## 2021-08-20 NOTE — TELEPHONE ENCOUNTER
I also see phone encounter with GI, patient has colonoscopy on 8/30.     Copied portion of that call below:    Pt stated that she has concerns for C.Diff.  Pt's C. Diff lab was negative on 8.17.2021.  Pt stated she believes this is a false negative and is working with her PCP on taking abx.  Pt also requested a message be sent to GI provider Dr. Penn.  Staff message sent.    Patient was seen 8/12/21 by Dr. Tash Virgen, cymbalta was increased, she was advised PCP visit in 2 weeks for EKG and diarrhea re-check.    I see Cheyenne Diane advised follow up visit per [employer] message yesterday.    Patient wants to be seen today per her message.    No opening in clinic, routed message to PCP, any option to add her on today?    Ana Greer RN  [facility]

## 2021-08-23 ENCOUNTER — OFFICE VISIT (OUTPATIENT)
Dept: FAMILY MEDICINE | Facility: CLINIC | Age: 61
End: 2021-08-23
Payer: COMMERCIAL

## 2021-08-23 VITALS
HEART RATE: 107 BPM | DIASTOLIC BLOOD PRESSURE: 78 MMHG | OXYGEN SATURATION: 96 % | TEMPERATURE: 97.7 F | SYSTOLIC BLOOD PRESSURE: 113 MMHG | BODY MASS INDEX: 29.29 KG/M2 | WEIGHT: 176 LBS

## 2021-08-23 DIAGNOSIS — K52.9 CHRONIC DIARRHEA: Primary | ICD-10-CM

## 2021-08-23 DIAGNOSIS — I42.9 CARDIOMYOPATHY, UNSPECIFIED TYPE (H): ICD-10-CM

## 2021-08-23 DIAGNOSIS — E21.3 HYPERPARATHYROIDISM (H): ICD-10-CM

## 2021-08-23 DIAGNOSIS — E89.0 POSTABLATIVE HYPOTHYROIDISM: ICD-10-CM

## 2021-08-23 DIAGNOSIS — R19.7 DIARRHEA, UNSPECIFIED TYPE: ICD-10-CM

## 2021-08-23 DIAGNOSIS — I44.2 COMPLETE ATRIOVENTRICULAR BLOCK (H): ICD-10-CM

## 2021-08-23 PROBLEM — J44.1 COPD EXACERBATION (H): Status: RESOLVED | Noted: 2019-05-13 | Resolved: 2021-08-23

## 2021-08-23 PROCEDURE — 99215 OFFICE O/P EST HI 40 MIN: CPT | Performed by: FAMILY MEDICINE

## 2021-08-23 NOTE — PROGRESS NOTES
"    Assessment & Plan       ICD-10-CM    1. Chronic diarrhea  K52.9 Elastase Fecal     Occult blood fecal HGB immuno     Fecal Lactoferrin     Fecal Lactoferrin     Occult blood fecal HGB immuno     Elastase Fecal   2. Diarrhea, unspecified type  R19.7 Clostridium difficile Toxin B PCR   3. Hyperparathyroidism (H)  E21.3    4. Postablative hypothyroidism  E89.0    5. Complete atrioventricular block (H)  I44.2    6. Cardiomyopathy, unspecified type (H)  I42.9    will order fecal elastase, re-test C-diff per GI recs  Suspect recurrence of collagenous colitis given history.  Patient states she was \"cleared\" of this however recurrence is still possible.  Will wait for colonoscopy/stool testing before steroid treatment.    A total of 40 minutes spent face-to-face with greater than 50% of the time spent in counseling and coordinating cares of the issues above     Review of external notes as documented elsewhere in note   Review of previous colonoscopies  Review of previous stool studies and blood work  Review of previous office visits for current issue  Review of cardiac disease history    Return in about 2 weeks (around 9/6/2021) for For Re-Assessment.    Flako Caro MD  [facility] SUNITA Cook is a 60 year old who presents for the following health issues     HPI     Diarrhea  Onset/Duration: 2 months  Description:       Consistency of stool: watery, formed and green ; forgot to take iron once and was orange       Blood in stool: no       Number of loose stools past 24 hours: 5-6  Progression of Symptoms: same  Accompanying signs and symptoms:       Fever: no       Nausea/Vomiting: YES       Abdominal pain: YES       Weight loss: no       Episodes of constipation: no  History   Ill contacts: no  Recent use of antibiotics: Took methylprednisolone and Ceftin for poison ivy    Recent travels: no  Recent medication-new or changes(Rx or OTC): YES  Precipitating or alleviating factors: " None  Therapies tried and outcome: Imodium AD    Several runny stools per day x 2 months or so  No blood in stool  Worried about taking imodium 2/2 long QT risk  Chronic GERD with vocal chord irritation per ENT, worried about taking PPI 2/2 heart issue  Stool studies for infection have come back negative  Patient has history of collagenous colitis 10 yrs ago, unsure about treatment at that time  She strongly believes she has C-Diff despite negative tests and wants to be treated for this.  Screening colonoscopy scheduled in 1 week    Wt Readings from Last 4 Encounters:   08/23/21 79.8 kg (176 lb)   08/12/21 78.5 kg (173 lb)   08/11/21 80.3 kg (177 lb)   08/08/21 77.1 kg (170 lb)           Review of Systems   Constitutional, HEENT, cardiovascular, pulmonary, gi and gu systems are negative, except as otherwise noted.      Objective    /78   Pulse 107   Temp 97.7  F (36.5  C) (Oral)   Wt 79.8 kg (176 lb)   LMP 08/21/2007   SpO2 96%   BMI 29.29 kg/m    Body mass index is 29.29 kg/m .  Physical Exam   GENERAL: healthy, alert and no distress  EYES: Eyes grossly normal to inspection, PERRL and conjunctivae and sclerae normal  NECK: no adenopathy, no asymmetry, masses, or scars and thyroid normal to palpation  SKIN: no suspicious lesions or rashes  PSYCH: mentation appears normal, affect normal/bright

## 2021-08-24 ENCOUNTER — LAB (OUTPATIENT)
Dept: LAB | Facility: CLINIC | Age: 61
End: 2021-08-24
Payer: COMMERCIAL

## 2021-08-24 ENCOUNTER — OFFICE VISIT (OUTPATIENT)
Dept: CARDIOLOGY | Facility: CLINIC | Age: 61
End: 2021-08-24
Attending: INTERNAL MEDICINE
Payer: COMMERCIAL

## 2021-08-24 VITALS
OXYGEN SATURATION: 96 % | BODY MASS INDEX: 29.37 KG/M2 | HEIGHT: 65 IN | HEART RATE: 90 BPM | WEIGHT: 176.3 LBS | SYSTOLIC BLOOD PRESSURE: 122 MMHG | DIASTOLIC BLOOD PRESSURE: 82 MMHG

## 2021-08-24 DIAGNOSIS — I10 BENIGN ESSENTIAL HYPERTENSION: Primary | ICD-10-CM

## 2021-08-24 DIAGNOSIS — I50.30 NYHA CLASS 3 HEART FAILURE WITH PRESERVED EJECTION FRACTION (H): ICD-10-CM

## 2021-08-24 DIAGNOSIS — I42.8 NONISCHEMIC CARDIOMYOPATHY (H): ICD-10-CM

## 2021-08-24 DIAGNOSIS — I48.0 PAROXYSMAL ATRIAL FIBRILLATION (H): ICD-10-CM

## 2021-08-24 LAB
ALBUMIN SERPL-MCNC: 3.6 G/DL (ref 3.4–5)
ALP SERPL-CCNC: 89 U/L (ref 40–150)
ALT SERPL W P-5'-P-CCNC: 24 U/L (ref 0–50)
ANION GAP SERPL CALCULATED.3IONS-SCNC: 3 MMOL/L (ref 3–14)
AST SERPL W P-5'-P-CCNC: 12 U/L (ref 0–45)
BILIRUB SERPL-MCNC: 0.4 MG/DL (ref 0.2–1.3)
BUN SERPL-MCNC: 16 MG/DL (ref 7–30)
CALCIUM SERPL-MCNC: 9.5 MG/DL (ref 8.5–10.1)
CHLORIDE BLD-SCNC: 107 MMOL/L (ref 94–109)
CO2 SERPL-SCNC: 28 MMOL/L (ref 20–32)
CREAT SERPL-MCNC: 0.75 MG/DL (ref 0.52–1.04)
ERYTHROCYTE [DISTWIDTH] IN BLOOD BY AUTOMATED COUNT: 12.7 % (ref 10–15)
GFR SERPL CREATININE-BSD FRML MDRD: 86 ML/MIN/1.73M2
GLUCOSE BLD-MCNC: 90 MG/DL (ref 70–99)
HCT VFR BLD AUTO: 41.7 % (ref 35–47)
HEMOCCULT STL QL IA: NEGATIVE
HGB BLD-MCNC: 13.5 G/DL (ref 11.7–15.7)
MCH RBC QN AUTO: 31.6 PG (ref 26.5–33)
MCHC RBC AUTO-ENTMCNC: 32.4 G/DL (ref 31.5–36.5)
MCV RBC AUTO: 98 FL (ref 78–100)
NT-PROBNP SERPL-MCNC: 204 PG/ML (ref 0–125)
PLATELET # BLD AUTO: 203 10E3/UL (ref 150–450)
POTASSIUM BLD-SCNC: 4.5 MMOL/L (ref 3.4–5.3)
PROT SERPL-MCNC: 7.5 G/DL (ref 6.8–8.8)
RBC # BLD AUTO: 4.27 10E6/UL (ref 3.8–5.2)
SODIUM SERPL-SCNC: 138 MMOL/L (ref 133–144)
WBC # BLD AUTO: 6 10E3/UL (ref 4–11)

## 2021-08-24 PROCEDURE — 83880 ASSAY OF NATRIURETIC PEPTIDE: CPT | Performed by: PATHOLOGY

## 2021-08-24 PROCEDURE — 99214 OFFICE O/P EST MOD 30 MIN: CPT | Performed by: INTERNAL MEDICINE

## 2021-08-24 PROCEDURE — 80053 COMPREHEN METABOLIC PANEL: CPT | Performed by: PATHOLOGY

## 2021-08-24 PROCEDURE — 85027 COMPLETE CBC AUTOMATED: CPT | Performed by: PATHOLOGY

## 2021-08-24 PROCEDURE — 36415 COLL VENOUS BLD VENIPUNCTURE: CPT | Performed by: PATHOLOGY

## 2021-08-24 PROCEDURE — G0463 HOSPITAL OUTPT CLINIC VISIT: HCPCS

## 2021-08-24 PROCEDURE — 82274 ASSAY TEST FOR BLOOD FECAL: CPT | Performed by: FAMILY MEDICINE

## 2021-08-24 ASSESSMENT — PAIN SCALES - GENERAL: PAINLEVEL: NO PAIN (0)

## 2021-08-24 ASSESSMENT — MIFFLIN-ST. JEOR: SCORE: 1360.57

## 2021-08-24 NOTE — PATIENT INSTRUCTIONS
Dr. Ordonez recommends:    Follow up clinic visit with Dr. Ordonez in 3 months with an echocardiogram and labs same day.      Thank you for your visit today.  Please call me with any questions or concerns.   Matthias García RN  Cardiology Care Coordinator  [phone], press option 1 then option 4

## 2021-08-24 NOTE — NURSING NOTE
Chief Complaint   Patient presents with     Follow Up     3 follow up      Vitals were taken and medications were reconciled.   EARLENE Mora  10:04 AM

## 2021-08-24 NOTE — LETTER
8/24/2021      RE: Joyce Marroquin  [address] [address] [id number]       Dear Colleague,    Thank you for the opportunity to participate in the care of your patient, Joyce Marroquin, at the [facility] [place] at [facility]. Please see a copy of my visit note below.    August 24, 2021   Ms. Marroquin is a very pleasant lady who works as a nurse anesthetist and has a history of RF ablation which left to AV node ablation and complete heart block requiring a dual-chamber pacemaker implantation, which is a [employer] device MRI compatible.  She also has a recent history of heart failure with reduced ejection fraction with EF around 35% later improved to 45%.  There was a history of Takotsubo cardiomyopathy.  Patient on 7/2 had meeting with EP at which time low dose carvedilol was started 3.125 mg PO bid due to atrial ectopy and short runs of VT. she did recently complete an exercise out of catheterization with myself which showed normal resting biventricular filling pressures and normal cardiac output.  She did have limited augmentation of cardiac index with exercise and filling pressures especially pulmonary capillary wedge pressure increased significantly consistent with heart failure preserved ejection fraction.    She did initially better but for the past couple of months she has not been feeling well.  She did gain some weight and increased the Lasix to 40 mg as needed.  She does not feel that these helped for her.  In fact at times she feels that she had some confusion or disorientation.  Possibly she is dry at times.  She did see in follow-up with core clinic he had some of the instructions were confusing and she appropriately notes that the discharge papers stated a lot of extra information.  I did see her and performed a CPX that was marginal as well as a cardiac MRI that showed a significantly improved LV function to 50% and no other significant  abnormalities. She did meet with Dr. Funes and sildenafil was initiated yet rapidly discontinued due to muscle aches. Dr. Funes also adjusted the rate response to allow for faster heart rates in an attempt to improve forward flow. In addition, she was felt to be dry and lasix has been reduced to 20mg 2 times per week. She was in ER with abdominal pain recently, CT was concerning for ileus versus early SBO. Repeat CT a couple days later showed resolution of these however watery diarrhea remained and saw PCP for this. She is having GI follow-up. She noted that she at times she forgets to take the PM dose of the revatio  She continues to feel about the same overall, unfortunately sounds like that she had some worsening depression and her medications were adjusted yesterday.  This was apparently some time to kick in.  She has a fear of walking around fever or feeling short of breath and overall just feels depressed about her clinical condition and situation.  The ongoing diarrhea certainly bothers her and it seems to put the significant strain on her life that already is limited due to her underlying shortness of breath and HFpEF.  Overall feels that her condition from the cardiac standpoint is the same.     PAST MEDICAL HISTORY:  Past Medical History:   Diagnosis Date     Arthritis      Cardiomyopathy (H)     ff Cardiology     Chronic depressive personality disorder      Congestive heart failure (H) see  note    heart failure correct term     COPD exacerbation (H)      Esophageal reflux      Ex-smoker     quit 2006; 1 PPD x 30     Hyperparathyroidism (H)     s/p parathyroidectomy     Hypothyroidism      LARRY on CPAP     ff Sleep medicine     Pulmonary nodules     ff Pulmonologist     S/P cardiac pacemaker procedure     checked every 6 months at the [facility]     Uncomplicated asthma years     FAMILY HISTORY:  Family History   Problem Relation Age of Onset     Hypothyroidism Mother      Hypertension Mother       Osteoarthritis Mother      Coronary Artery Disease Father         nonfatal MI in his 70s     Asthma Father      Diabetes Sister      Hypothyroidism Sister      Breast Cancer Maternal Aunt      Anxiety Disorder Sister      SOCIAL HISTORY:  Social History     Socioeconomic History     Marital status:      Spouse name: Not on file     Number of children: Not on file     Years of education: Not on file     Highest education level: Master's degree (e.g., MA, MS, Florian, MEd, MSW, SONIA)   Occupational History     Not on file   Tobacco Use     Smoking status: Former Smoker     Packs/day: 0.00     Years: 0.00     Pack years: 0.00     Smokeless tobacco: Never Used   Vaping Use     Vaping Use: Never used   Substance and Sexual Activity     Alcohol use: Yes     Alcohol/week: 0.0 - 8.0 standard drinks     Comment: seldom     Drug use: No     Sexual activity: Yes     Partners: Male     Birth control/protection: None     Comment: vasectomy   Other Topics Concern     Parent/sibling w/ CABG, MI or angioplasty before 65F 55M? No   Social History Narrative    CRNA on disability for vestibular symptoms      Social Determinants of Health     Financial Resource Strain: Low Risk      Difficulty of Paying Living Expenses: Not very hard   Food Insecurity: No Food Insecurity     Worried About Running Out of Food in the Last Year: Never true     Ran Out of Food in the Last Year: Never true   Transportation Needs: No Transportation Needs     Lack of Transportation (Medical): No     Lack of Transportation (Non-Medical): No   Physical Activity:      Days of Exercise per Week:      Minutes of Exercise per Session:    Stress:      Feeling of Stress :    Social Connections:      Frequency of Communication with Friends and Family:      Frequency of Social Gatherings with Friends and Family:      Attends Temple Services:      Active Member of Clubs or Organizations:      Attends Club or Organization Meetings:      Marital Status:    Intimate  Partner Violence: Not At Risk     Fear of Current or Ex-Partner: No     Emotionally Abused: No     Physically Abused: No     Sexually Abused: No     CURRENT MEDICATIONS:  Current Outpatient Medications   Medication     acetaminophen (TYLENOL) 500 MG tablet     albuterol (PROAIR HFA/PROVENTIL HFA/VENTOLIN HFA) 108 (90 BASE) MCG/ACT Inhaler     apixaban ANTICOAGULANT (ELIQUIS) 5 MG tablet     azelastine (ASTELIN) 0.1 % nasal spray     Calcium Lactate 500 MG CAPS     Calcium-Magnesium-Vitamin D (CALCIUM MAGNESIUM PO)     clonazePAM (KLONOPIN) 0.5 MG tablet     DiphenhydrAMINE HCl (BENADRYL PO)     DULoxetine (CYMBALTA) 30 MG capsule     Ferrous Sulfate (IRON SUPPLEMENT PO)     fluticasone-salmeterol (ADVAIR) 250-50 MCG/DOSE inhaler     furosemide (LASIX) 20 MG tablet     ipratropium (ATROVENT) 0.06 % nasal spray     loperamide (IMODIUM A-D) 2 MG tablet     loperamide (IMODIUM A-D) 2 MG tablet     MAGNESIUM LACTATE PO     MELATONIN PO     Menaquinone-7 (VITAMIN K2 PO)     methylPREDNISolone (MEDROL DOSEPAK) 4 MG tablet therapy pack     METHYLPREDNISOLONE PO     metoprolol succinate ER (TOPROL XL) 25 MG 24 hr tablet     Multiple Vitamin (DAILY MULTIVITAMIN PO)     nebulizer nebulization     Probiotic Product (PROBIOTIC DAILY PO)     sildenafil (REVATIO) 10 MG/ML SUSR     spironolactone (ALDACTONE) 25 MG tablet     SYNTHROID 150 MCG tablet     TURMERIC PO     vitamin B complex with vitamin C (VITAMIN  B COMPLEX) PO tablet     vitamin D3 (CHOLECALCIFEROL) 2000 units tablet     Zinc 15 MG CAPS     No current facility-administered medications for this visit.     ROS:   Constitutional: No fever, chills, or sweats.   ENT: No visual disturbance, ear ache, epistaxis, sore throat.   Allergies/Immunologic: Negative.   Respiratory: No cough, hemoptysis.   Cardiovascular: As per HPI.   GI: No nausea, vomiting, hematemesis, melena, or hematochezia.   : No urinary frequency, dysuria, or hematuria.   Integument: Negative.  "  Psychiatric: Pleasant, no major depression noted  Neuro: No focal neurological deficits noted  Endocrinology: Negative.   Musculoskeletal: As per HPI.      EXAM:  /82 (BP Location: Right arm, Patient Position: Chair, Cuff Size: Adult Regular)   Pulse 90   Ht 1.651 m (5' 5\")   Wt 80 kg (176 lb 4.8 oz)   LMP 08/21/2007   SpO2 96%   BMI 29.34 kg/m    General: appears comfortable, alert and oriented  Head: normocephalic, atraumatic  Eyes: anicteric sclera, EOMI , PERRL  Neck: no adenopathy  Orophyarynx: moist mucosa, no lesions noted  Heart: regular, S1/S2, no murmurs, rubs or gallop. Estimated JVP at 5 cmH2O  Lungs: CTAB, No wheezing.   Abdomen: soft, non-tender, bowel sounds present, no hepatosplenomegaly  Extremities: No LE edema today  Skin: no open lesions noted  Neuro: grossly non-focal     Labs:  Lab Results   Component Value Date    WBC 6.1 08/08/2021    HGB 13.3 08/08/2021    HCT 40.6 08/08/2021     08/08/2021     08/08/2021    POTASSIUM 4.1 08/08/2021    CHLORIDE 103 08/08/2021    CO2 25 08/08/2021    BUN 10 08/08/2021    CR 0.73 08/08/2021    GLC 84 08/08/2021    SED 22 11/09/2017    DD 0.7 (H) 11/22/2017    NTBNPI 107 12/20/2020    NTBNP 156 (H) 05/25/2021    TROPONIN <0.015 08/08/2021    TROPI <0.015 12/20/2020    AST 18 08/08/2021    ALT 31 08/08/2021    ALKPHOS 97 08/08/2021    BILITOTAL 0.5 08/08/2021    INR 1.02 10/06/2019     NM Lexiscan 3/2020:  The nuclear stress test is negative for inducible myocardial ischemia or infarction.  Fixed septal/apical defect due to Paced rhythm and increased gut photon activity.LVEDv 133ml. LV ESv 40ml. LV EF 70%.     There is no prior study for comparison     Echocardiogram 3/2020:  Mildly (EF 40-45%) reduced left ventricular function is present.  Global right ventricular function is normal.  No pericardial effusion is present. IVC diameter <2.1 cm collapsing >50% with sniff suggests a normal RA pressure  of 3 mmHg. This study was compared " with the study from 12/3/19.  There has been no change.      Coronary angiogram 9/2019:  Mild-moderate non-obstructive coronary disease involving LAD, LCx, and RCA. No significant coronary disease to explain new cardiomyopathy.     TTE 9/5/2019:  Moderately (EF 30-35%) reduced left ventricular function is present. All segments from mid to apical left ventricle are severely hypokinetic to akinetic. Basal segments are hypercontractile. Overall pattern is suggestive of stress cardiomyopathy, but cannot rule out ischemic cardiomyopathy. Right ventricular function, chamber size, wall motion, and thickness are normal. No significant valve abnormalities. Mild pulmonary hypertension with increased RA pressure.     TTE 2/19/21:  Moderately (EF 35%) reduced left ventricular function is present.  Global right ventricular function is normal.  No significant valvular dysfunction. The inferior vena cava was normal in size with preserved respiratory variability. No pericardial effusion present.     MRI cardiac 4/2017:  1. Normal left ventricular size.  Left ventricular wall thickness is  normal except a very small area in the basal inferior septum that appears  thin in some images.  Real-time images suggest a very small area of  hypokinesis in the basal inferior septum (not well visualized).  Left  ventricular ejection fraction is estimated at about 60-65% (unable to   perform volumetric analysis given frequent ectopy; real time images used  to assess LVEF).  2. Normal right ventricular size and systolic function.  No obvious regional wall motion abnormalities noted.  3. Gadolinium imaging:  No obvious right ventricular myocardial late  gadolinium enhancement noted.  There is a very small area of probable late  gadolinium enhancement in the mildly thinned basal inferior segment.    4. Moderate left atrial dilatation.  The right atrial size is at the upper limits of normal.  5. Thickened mitral valve leaflets, without obvious  stenosis or significant regurgitation. No obvious hemodynamically significant valvular dysfunction noted.   6. Normal sized aortic root and ascending aorta.  For the remainder of the thoracic aorta, see separate radiology report.  7. Normal pericardium without significant pericardial effusion.      Cardiac MRI 5/2021:  1. The LV is normal in cavity size and wall thickness. The global systolic function is mildly reduced. The LVEF is 50%. There are no regional wall motion abnormalities. There is abnormal septal motion related to pacing.  2. The RV is normal in cavity size. The global systolic function is normal. The RVEF is 65%.   3. The left atrium is mildly dilated.  4. There is no significant valvular disease.   5. Late gadolinium enhancement imaging shows no MI, fibrosis or infiltrative disease.   6. Regadenoson stress perfusion imaging shows no ischemia.  7. There is no pericardial effusion or thickening.  8. There is no LV thrombus.  CONCLUSIONS: No myocardial ischemia or fibrosis. Mild NICM possibly related to pacing, LVEF 50% and RVEF 65%.      ASSESSMENT AND PLAN:  In summary, patient is a 60 year old lady with history as stated above including complete heart block in the setting of RF ablation status post dual-chamber pacemaker in 2017 who has ongoing symptoms of heart failure weight gain and very limited exercise tolerance.  I do believe her shortness of breath and limited exercise tolerance is probably multifactorial including the significant weight gain with AV node ablation and now with a more fixed heart rate in the setting of pacemaker use.  However certainly she could have heart failure with borderline reduced ejection fraction with more preserved component.  Right heart catheterization with exercise essentially confirmed the above findings   Especially with regard to the heart failure with preserved ejection fraction.  She is on therapy for this however unfortunately our interventions remains limited  with regards to HFpEF treatment.  Today from the cardiac standpoint I do not believe further interventions or changes are needed.  I would continue the current medical regimen at this time. She is OK from the cardiac standpoint to proceed with GI evaluation. No medication adjustments are needed before the procedure from my endpoint.      I will plan to see her back in about 3 months with an echocardiogram at that time.     I appreciate the opportunity to participate in the care of Joyce Marroquin . Please do not hesitate to contact me with any further questions.     Sincerely,   Alonso Ordonez MD  [facility] Division of Cardiology       Please do not hesitate to contact me if you have any questions/concerns.     Sincerely,     Alonso Ordonez MD

## 2021-08-24 NOTE — PROGRESS NOTES
August 24, 2021   Ms. Marroquin is a very pleasant lady who works as a nurse anesthetist and has a history of RF ablation which left to AV node ablation and complete heart block requiring a dual-chamber pacemaker implantation, which is a [employer] device MRI compatible.  She also has a recent history of heart failure with reduced ejection fraction with EF around 35% later improved to 45%.  There was a history of Takotsubo cardiomyopathy.  Patient on 7/2 had meeting with EP at which time low dose carvedilol was started 3.125 mg PO bid due to atrial ectopy and short runs of VT. she did recently complete an exercise out of catheterization with myself which showed normal resting biventricular filling pressures and normal cardiac output.  She did have limited augmentation of cardiac index with exercise and filling pressures especially pulmonary capillary wedge pressure increased significantly consistent with heart failure preserved ejection fraction.    She did initially better but for the past couple of months she has not been feeling well.  She did gain some weight and increased the Lasix to 40 mg as needed.  She does not feel that these helped for her.  In fact at times she feels that she had some confusion or disorientation.  Possibly she is dry at times.  She did see in follow-up with [facility] he had some of the instructions were confusing and she appropriately notes that the discharge papers stated a lot of extra information.  I did see her and performed a CPX that was marginal as well as a cardiac MRI that showed a significantly improved LV function to 50% and no other significant abnormalities. She did meet with Dr. Funes and sildenafil was initiated yet rapidly discontinued due to muscle aches. Dr. Funes also adjusted the rate response to allow for faster heart rates in an attempt to improve forward flow. In addition, she was felt to be dry and lasix has been reduced to 20mg 2 times per week. She was in ER  with abdominal pain recently, CT was concerning for ileus versus early SBO. Repeat CT a couple days later showed resolution of these however watery diarrhea remained and saw PCP for this. She is having GI follow-up. She noted that she at times she forgets to take the PM dose of the revatio  She continues to feel about the same overall, unfortunately sounds like that she had some worsening depression and her medications were adjusted yesterday.  This was apparently some time to kick in.  She has a fear of walking around fever or feeling short of breath and overall just feels depressed about her clinical condition and situation.  The ongoing diarrhea certainly bothers her and it seems to put the significant strain on her life that already is limited due to her underlying shortness of breath and HFpEF.  Overall feels that her condition from the cardiac standpoint is the same.     PAST MEDICAL HISTORY:  Past Medical History:   Diagnosis Date     Arthritis      Cardiomyopathy (H)     ff Cardiology     Chronic depressive personality disorder      Congestive heart failure (H) see dr martínez    heart failure correct term     COPD exacerbation (H)      Esophageal reflux      Ex-smoker     quit 2006; 1 PPD x 30     Hyperparathyroidism (H)     s/p parathyroidectomy     Hypothyroidism      LARRY on CPAP     ff Sleep medicine     Pulmonary nodules     ff Pulmonologist     S/P cardiac pacemaker procedure     checked every 6 months at the [facility]     Uncomplicated asthma years     FAMILY HISTORY:  Family History   Problem Relation Age of Onset     Hypothyroidism Mother      Hypertension Mother      Osteoarthritis Mother      Coronary Artery Disease Father         nonfatal MI in his 70s     Asthma Father      Diabetes Sister      Hypothyroidism Sister      Breast Cancer Maternal Aunt      Anxiety Disorder Sister      SOCIAL HISTORY:  Social History     Socioeconomic History     Marital status:      Spouse name: Not on file      Number of children: Not on file     Years of education: Not on file     Highest education level: Master's degree (e.g., MA, MS, Florian, MEd, MSW, SONIA)   Occupational History     Not on file   Tobacco Use     Smoking status: Former Smoker     Packs/day: 0.00     Years: 0.00     Pack years: 0.00     Smokeless tobacco: Never Used   Vaping Use     Vaping Use: Never used   Substance and Sexual Activity     Alcohol use: Yes     Alcohol/week: 0.0 - 8.0 standard drinks     Comment: seldom     Drug use: No     Sexual activity: Yes     Partners: Male     Birth control/protection: None     Comment: vasectomy   Other Topics Concern     Parent/sibling w/ CABG, MI or angioplasty before 65F 55M? No   Social History Narrative    CRNA on disability for vestibular symptoms      Social Determinants of Health     Financial Resource Strain: Low Risk      Difficulty of Paying Living Expenses: Not very hard   Food Insecurity: No Food Insecurity     Worried About Running Out of Food in the Last Year: Never true     Ran Out of Food in the Last Year: Never true   Transportation Needs: No Transportation Needs     Lack of Transportation (Medical): No     Lack of Transportation (Non-Medical): No   Physical Activity:      Days of Exercise per Week:      Minutes of Exercise per Session:    Stress:      Feeling of Stress :    Social Connections:      Frequency of Communication with Friends and Family:      Frequency of Social Gatherings with Friends and Family:      Attends Druze Services:      Active Member of Clubs or Organizations:      Attends Club or Organization Meetings:      Marital Status:    Intimate Partner Violence: Not At Risk     Fear of Current or Ex-Partner: No     Emotionally Abused: No     Physically Abused: No     Sexually Abused: No     CURRENT MEDICATIONS:  Current Outpatient Medications   Medication     acetaminophen (TYLENOL) 500 MG tablet     albuterol (PROAIR HFA/PROVENTIL HFA/VENTOLIN HFA) 108 (90 BASE) MCG/ACT Inhaler  "    apixaban ANTICOAGULANT (ELIQUIS) 5 MG tablet     azelastine (ASTELIN) 0.1 % nasal spray     Calcium Lactate 500 MG CAPS     Calcium-Magnesium-Vitamin D (CALCIUM MAGNESIUM PO)     clonazePAM (KLONOPIN) 0.5 MG tablet     DiphenhydrAMINE HCl (BENADRYL PO)     DULoxetine (CYMBALTA) 30 MG capsule     Ferrous Sulfate (IRON SUPPLEMENT PO)     fluticasone-salmeterol (ADVAIR) 250-50 MCG/DOSE inhaler     furosemide (LASIX) 20 MG tablet     ipratropium (ATROVENT) 0.06 % nasal spray     loperamide (IMODIUM A-D) 2 MG tablet     loperamide (IMODIUM A-D) 2 MG tablet     MAGNESIUM LACTATE PO     MELATONIN PO     Menaquinone-7 (VITAMIN K2 PO)     methylPREDNISolone (MEDROL DOSEPAK) 4 MG tablet therapy pack     METHYLPREDNISOLONE PO     metoprolol succinate ER (TOPROL XL) 25 MG 24 hr tablet     Multiple Vitamin (DAILY MULTIVITAMIN PO)     nebulizer nebulization     Probiotic Product (PROBIOTIC DAILY PO)     sildenafil (REVATIO) 10 MG/ML SUSR     spironolactone (ALDACTONE) 25 MG tablet     SYNTHROID 150 MCG tablet     TURMERIC PO     vitamin B complex with vitamin C (VITAMIN  B COMPLEX) PO tablet     vitamin D3 (CHOLECALCIFEROL) 2000 units tablet     Zinc 15 MG CAPS     No current facility-administered medications for this visit.     ROS:   Constitutional: No fever, chills, or sweats.   ENT: No visual disturbance, ear ache, epistaxis, sore throat.   Allergies/Immunologic: Negative.   Respiratory: No cough, hemoptysis.   Cardiovascular: As per HPI.   GI: No nausea, vomiting, hematemesis, melena, or hematochezia.   : No urinary frequency, dysuria, or hematuria.   Integument: Negative.   Psychiatric: Pleasant, no major depression noted  Neuro: No focal neurological deficits noted  Endocrinology: Negative.   Musculoskeletal: As per HPI.      EXAM:  /82 (BP Location: Right arm, Patient Position: Chair, Cuff Size: Adult Regular)   Pulse 90   Ht 1.651 m (5' 5\")   Wt 80 kg (176 lb 4.8 oz)   LMP 08/21/2007   SpO2 96%   BMI " 29.34 kg/m    General: appears comfortable, alert and oriented  Head: normocephalic, atraumatic  Eyes: anicteric sclera, EOMI , PERRL  Neck: no adenopathy  Orophyarynx: moist mucosa, no lesions noted  Heart: regular, S1/S2, no murmurs, rubs or gallop. Estimated JVP at 5 cmH2O  Lungs: CTAB, No wheezing.   Abdomen: soft, non-tender, bowel sounds present, no hepatosplenomegaly  Extremities: No LE edema today  Skin: no open lesions noted  Neuro: grossly non-focal     Labs:  Lab Results   Component Value Date    WBC 6.1 08/08/2021    HGB 13.3 08/08/2021    HCT 40.6 08/08/2021     08/08/2021     08/08/2021    POTASSIUM 4.1 08/08/2021    CHLORIDE 103 08/08/2021    CO2 25 08/08/2021    BUN 10 08/08/2021    CR 0.73 08/08/2021    GLC 84 08/08/2021    SED 22 11/09/2017    DD 0.7 (H) 11/22/2017    NTBNPI 107 12/20/2020    NTBNP 156 (H) 05/25/2021    TROPONIN <0.015 08/08/2021    TROPI <0.015 12/20/2020    AST 18 08/08/2021    ALT 31 08/08/2021    ALKPHOS 97 08/08/2021    BILITOTAL 0.5 08/08/2021    INR 1.02 10/06/2019     NM Lexiscan 3/2020:  The nuclear stress test is negative for inducible myocardial ischemia or infarction.  Fixed septal/apical defect due to Paced rhythm and increased gut photon activity.LVEDv 133ml. LV ESv 40ml. LV EF 70%.     There is no prior study for comparison     Echocardiogram 3/2020:  Mildly (EF 40-45%) reduced left ventricular function is present.  Global right ventricular function is normal.  No pericardial effusion is present. IVC diameter <2.1 cm collapsing >50% with sniff suggests a normal RA pressure  of 3 mmHg. This study was compared with the study from 12/3/19.  There has been no change.      Coronary angiogram 9/2019:  Mild-moderate non-obstructive coronary disease involving LAD, LCx, and RCA. No significant coronary disease to explain new cardiomyopathy.     TTE 9/5/2019:  Moderately (EF 30-35%) reduced left ventricular function is present. All segments from mid to apical left  ventricle are severely hypokinetic to akinetic. Basal segments are hypercontractile. Overall pattern is suggestive of stress cardiomyopathy, but cannot rule out ischemic cardiomyopathy. Right ventricular function, chamber size, wall motion, and thickness are normal. No significant valve abnormalities. Mild pulmonary hypertension with increased RA pressure.     TTE 2/19/21:  Moderately (EF 35%) reduced left ventricular function is present.  Global right ventricular function is normal.  No significant valvular dysfunction. The inferior vena cava was normal in size with preserved respiratory variability. No pericardial effusion present.     MRI cardiac 4/2017:  1. Normal left ventricular size.  Left ventricular wall thickness is  normal except a very small area in the basal inferior septum that appears  thin in some images.  Real-time images suggest a very small area of  hypokinesis in the basal inferior septum (not well visualized).  Left  ventricular ejection fraction is estimated at about 60-65% (unable to   perform volumetric analysis given frequent ectopy; real time images used  to assess LVEF).  2. Normal right ventricular size and systolic function.  No obvious regional wall motion abnormalities noted.  3. Gadolinium imaging:  No obvious right ventricular myocardial late  gadolinium enhancement noted.  There is a very small area of probable late  gadolinium enhancement in the mildly thinned basal inferior segment.    4. Moderate left atrial dilatation.  The right atrial size is at the upper limits of normal.  5. Thickened mitral valve leaflets, without obvious stenosis or significant regurgitation. No obvious hemodynamically significant valvular dysfunction noted.   6. Normal sized aortic root and ascending aorta.  For the remainder of the thoracic aorta, see separate radiology report.  7. Normal pericardium without significant pericardial effusion.      Cardiac MRI 5/2021:  1. The LV is normal in cavity size  and wall thickness. The global systolic function is mildly reduced. The LVEF is 50%. There are no regional wall motion abnormalities. There is abnormal septal motion related to pacing.  2. The RV is normal in cavity size. The global systolic function is normal. The RVEF is 65%.   3. The left atrium is mildly dilated.  4. There is no significant valvular disease.   5. Late gadolinium enhancement imaging shows no MI, fibrosis or infiltrative disease.   6. Regadenoson stress perfusion imaging shows no ischemia.  7. There is no pericardial effusion or thickening.  8. There is no LV thrombus.  CONCLUSIONS: No myocardial ischemia or fibrosis. Mild NICM possibly related to pacing, LVEF 50% and RVEF 65%.      ASSESSMENT AND PLAN:  In summary, patient is a 60 year old lady with history as stated above including complete heart block in the setting of RF ablation status post dual-chamber pacemaker in 2017 who has ongoing symptoms of heart failure weight gain and very limited exercise tolerance.  I do believe her shortness of breath and limited exercise tolerance is probably multifactorial including the significant weight gain with AV node ablation and now with a more fixed heart rate in the setting of pacemaker use.  However certainly she could have heart failure with borderline reduced ejection fraction with more preserved component.  Right heart catheterization with exercise essentially confirmed the above findings   Especially with regard to the heart failure with preserved ejection fraction.  She is on therapy for this however unfortunately our interventions remains limited with regards to HFpEF treatment.  Today from the cardiac standpoint I do not believe further interventions or changes are needed.  I would continue the current medical regimen at this time. She is OK from the cardiac standpoint to proceed with GI evaluation. No medication adjustments are needed before the procedure from my endpoint.      I will plan to  see her back in about 3 months with an echocardiogram at that time.     I appreciate the opportunity to participate in the care of Joyce Marroquin . Please do not hesitate to contact me with any further questions.     Sincerely,   Alonso Ordonez MD  [facility] Division of Cardiology

## 2021-08-26 ENCOUNTER — APPOINTMENT (OUTPATIENT)
Dept: LAB | Facility: CLINIC | Age: 61
End: 2021-08-26
Payer: COMMERCIAL

## 2021-08-26 ENCOUNTER — ANCILLARY PROCEDURE (OUTPATIENT)
Dept: CARDIOLOGY | Facility: CLINIC | Age: 61
End: 2021-08-26
Attending: INTERNAL MEDICINE
Payer: COMMERCIAL

## 2021-08-26 DIAGNOSIS — I44.2 COMPLETE ATRIOVENTRICULAR BLOCK (H): ICD-10-CM

## 2021-08-26 LAB — LACTOFERRIN STL QL IA: POSITIVE

## 2021-08-26 PROCEDURE — 82656 EL-1 FECAL QUAL/SEMIQ: CPT | Performed by: FAMILY MEDICINE

## 2021-08-26 PROCEDURE — 93294 REM INTERROG EVL PM/LDLS PM: CPT | Performed by: INTERNAL MEDICINE

## 2021-08-26 PROCEDURE — 83630 LACTOFERRIN FECAL (QUAL): CPT | Performed by: FAMILY MEDICINE

## 2021-08-26 PROCEDURE — 93296 REM INTERROG EVL PM/IDS: CPT

## 2021-08-27 ENCOUNTER — LAB (OUTPATIENT)
Dept: LAB | Facility: CLINIC | Age: 61
End: 2021-08-27
Payer: COMMERCIAL

## 2021-08-27 DIAGNOSIS — Z11.59 ENCOUNTER FOR SCREENING FOR OTHER VIRAL DISEASES: ICD-10-CM

## 2021-08-27 PROCEDURE — U0003 INFECTIOUS AGENT DETECTION BY NUCLEIC ACID (DNA OR RNA); SEVERE ACUTE RESPIRATORY SYNDROME CORONAVIRUS 2 (SARS-COV-2) (CORONAVIRUS DISEASE [COVID-19]), AMPLIFIED PROBE TECHNIQUE, MAKING USE OF HIGH THROUGHPUT TECHNOLOGIES AS DESCRIBED BY CMS-2020-01-R: HCPCS

## 2021-08-27 PROCEDURE — U0005 INFEC AGEN DETEC AMPLI PROBE: HCPCS

## 2021-08-28 LAB — SARS-COV-2 RNA RESP QL NAA+PROBE: NEGATIVE

## 2021-08-30 ENCOUNTER — HOSPITAL ENCOUNTER (OUTPATIENT)
Facility: CLINIC | Age: 61
Discharge: HOME | End: 2021-08-30
Attending: INTERNAL MEDICINE | Admitting: INTERNAL MEDICINE
Payer: COMMERCIAL

## 2021-08-30 ENCOUNTER — TELEPHONE (OUTPATIENT)
Dept: GASTROENTEROLOGY | Facility: CLINIC | Age: 61
End: 2021-08-30

## 2021-08-30 VITALS
HEART RATE: 62 BPM | SYSTOLIC BLOOD PRESSURE: 137 MMHG | OXYGEN SATURATION: 98 % | HEIGHT: 65 IN | WEIGHT: 170.86 LBS | DIASTOLIC BLOOD PRESSURE: 72 MMHG | BODY MASS INDEX: 28.47 KG/M2 | RESPIRATION RATE: 18 BRPM | TEMPERATURE: 98.3 F

## 2021-08-30 LAB
COLONOSCOPY: NORMAL
ELASTASE PANC STL-MCNT: 225 UG/G
GLUCOSE BLDC GLUCOMTR-MCNC: 101 MG/DL (ref 70–99)
TTG IGA SER-ACNC: <0.2 U/ML
TTG IGG SER-ACNC: <0.6 U/ML

## 2021-08-30 PROCEDURE — 250N000011 HC RX IP 250 OP 636: Performed by: INTERNAL MEDICINE

## 2021-08-30 PROCEDURE — 83516 IMMUNOASSAY NONANTIBODY: CPT | Performed by: INTERNAL MEDICINE

## 2021-08-30 PROCEDURE — 36415 COLL VENOUS BLD VENIPUNCTURE: CPT | Performed by: INTERNAL MEDICINE

## 2021-08-30 PROCEDURE — 82962 GLUCOSE BLOOD TEST: CPT

## 2021-08-30 PROCEDURE — 45380 COLONOSCOPY AND BIOPSY: CPT | Performed by: INTERNAL MEDICINE

## 2021-08-30 PROCEDURE — 45385 COLONOSCOPY W/LESION REMOVAL: CPT | Mod: PT

## 2021-08-30 PROCEDURE — 88305 TISSUE EXAM BY PATHOLOGIST: CPT | Mod: TC | Performed by: INTERNAL MEDICINE

## 2021-08-30 PROCEDURE — 99153 MOD SED SAME PHYS/QHP EA: CPT | Performed by: INTERNAL MEDICINE

## 2021-08-30 PROCEDURE — 88305 TISSUE EXAM BY PATHOLOGIST: CPT | Mod: 26 | Performed by: PATHOLOGY

## 2021-08-30 PROCEDURE — G0500 MOD SEDAT ENDO SERVICE >5YRS: HCPCS | Performed by: INTERNAL MEDICINE

## 2021-08-30 RX ORDER — PROCHLORPERAZINE MALEATE 10 MG
10 TABLET ORAL EVERY 6 HOURS PRN
Status: DISCONTINUED | OUTPATIENT
Start: 2021-08-30 | End: 2021-08-30 | Stop reason: HOSPADM

## 2021-08-30 RX ORDER — NALOXONE HYDROCHLORIDE 0.4 MG/ML
0.2 INJECTION, SOLUTION INTRAMUSCULAR; INTRAVENOUS; SUBCUTANEOUS
Status: DISCONTINUED | OUTPATIENT
Start: 2021-08-30 | End: 2021-08-30 | Stop reason: HOSPADM

## 2021-08-30 RX ORDER — LIDOCAINE 40 MG/G
CREAM TOPICAL
Status: DISCONTINUED | OUTPATIENT
Start: 2021-08-30 | End: 2021-08-30 | Stop reason: HOSPADM

## 2021-08-30 RX ORDER — NALOXONE HYDROCHLORIDE 0.4 MG/ML
0.4 INJECTION, SOLUTION INTRAMUSCULAR; INTRAVENOUS; SUBCUTANEOUS
Status: DISCONTINUED | OUTPATIENT
Start: 2021-08-30 | End: 2021-08-30 | Stop reason: HOSPADM

## 2021-08-30 RX ORDER — ONDANSETRON 4 MG/1
4 TABLET, ORALLY DISINTEGRATING ORAL EVERY 6 HOURS PRN
Status: DISCONTINUED | OUTPATIENT
Start: 2021-08-30 | End: 2021-08-30 | Stop reason: HOSPADM

## 2021-08-30 RX ORDER — PROCHLORPERAZINE MALEATE 10 MG
10 TABLET ORAL EVERY 6 HOURS PRN
Status: CANCELLED | OUTPATIENT
Start: 2021-08-30

## 2021-08-30 RX ORDER — FLUMAZENIL 0.1 MG/ML
0.2 INJECTION, SOLUTION INTRAVENOUS
Status: CANCELLED | OUTPATIENT
Start: 2021-08-30 | End: 2021-08-30

## 2021-08-30 RX ORDER — FENTANYL CITRATE 50 UG/ML
INJECTION, SOLUTION INTRAMUSCULAR; INTRAVENOUS PRN
Status: COMPLETED | OUTPATIENT
Start: 2021-08-30 | End: 2021-08-30

## 2021-08-30 RX ORDER — FLUMAZENIL 0.1 MG/ML
0.2 INJECTION, SOLUTION INTRAVENOUS
Status: DISCONTINUED | OUTPATIENT
Start: 2021-08-30 | End: 2021-08-30 | Stop reason: HOSPADM

## 2021-08-30 RX ORDER — ONDANSETRON 2 MG/ML
4 INJECTION INTRAMUSCULAR; INTRAVENOUS EVERY 6 HOURS PRN
Status: DISCONTINUED | OUTPATIENT
Start: 2021-08-30 | End: 2021-08-30 | Stop reason: HOSPADM

## 2021-08-30 RX ADMIN — FENTANYL CITRATE 50 MCG: 50 INJECTION, SOLUTION INTRAMUSCULAR; INTRAVENOUS at 08:07

## 2021-08-30 RX ADMIN — FENTANYL CITRATE 50 MCG: 50 INJECTION, SOLUTION INTRAMUSCULAR; INTRAVENOUS at 08:22

## 2021-08-30 RX ADMIN — MIDAZOLAM 1 MG: 1 INJECTION INTRAMUSCULAR; INTRAVENOUS at 08:22

## 2021-08-30 RX ADMIN — FENTANYL CITRATE 50 MCG: 50 INJECTION, SOLUTION INTRAMUSCULAR; INTRAVENOUS at 08:34

## 2021-08-30 RX ADMIN — MIDAZOLAM 2 MG: 1 INJECTION INTRAMUSCULAR; INTRAVENOUS at 08:07

## 2021-08-30 RX ADMIN — MIDAZOLAM 1 MG: 1 INJECTION INTRAMUSCULAR; INTRAVENOUS at 08:26

## 2021-08-30 RX ADMIN — FENTANYL CITRATE 50 MCG: 50 INJECTION, SOLUTION INTRAMUSCULAR; INTRAVENOUS at 08:13

## 2021-08-30 RX ADMIN — MIDAZOLAM 1 MG: 1 INJECTION INTRAMUSCULAR; INTRAVENOUS at 08:34

## 2021-08-30 RX ADMIN — MIDAZOLAM 1 MG: 1 INJECTION INTRAMUSCULAR; INTRAVENOUS at 08:13

## 2021-08-30 RX ADMIN — FENTANYL CITRATE 50 MCG: 50 INJECTION, SOLUTION INTRAMUSCULAR; INTRAVENOUS at 08:26

## 2021-08-30 ASSESSMENT — MIFFLIN-ST. JEOR: SCORE: 1335.88

## 2021-08-30 NOTE — PROGRESS NOTES
"GI STAFF    This patient underwent a colonoscopy today - see that report.    She reports loose stools  (about 6/day) for about 6 weeks. No apparent cause was identified on colonoscopy, but we took bx for microscopic colitis. I also ordered a TTG.She reports a neg Cl. Diff test at another system recently.    She can try OTC antidiarrheal agents.    I will arrange a RTC with me. Further evaluation may include evaluation of her medications, stool studies, et al.      __________________      On further interview with the patient in PACU:    Her diarrhea started shortly after:    1. She had bad poison ivy and cellulitis that required steroids and Abx.    2. She started Duloxetine and sildenafil.      In addition, she reports that she previously had \"20 years\" of diarrhea with incontinence that was worked up at several places (including [place]) without a cause identified. She reports that eventually she started on probiotics and that this resolved. She has not been on probiotics recently, and I suggested that she re-start these.        "

## 2021-08-30 NOTE — OR NURSING
Pt underwent colonoscopy with biopsies and polypectomy under conscious sedation. Specimens sent to lab via endo tech. Pt transferred to recovery and report given to 3C RN.       Charlette Beal RN

## 2021-08-31 ENCOUNTER — TELEPHONE (OUTPATIENT)
Dept: GASTROENTEROLOGY | Facility: CLINIC | Age: 61
End: 2021-08-31

## 2021-08-31 LAB
MDC_IDC_EPISODE_DTM: NORMAL
MDC_IDC_EPISODE_DURATION: 1 S
MDC_IDC_EPISODE_ID: 13
MDC_IDC_EPISODE_TYPE: NORMAL
MDC_IDC_LEAD_IMPLANT_DT: NORMAL
MDC_IDC_LEAD_IMPLANT_DT: NORMAL
MDC_IDC_LEAD_LOCATION: NORMAL
MDC_IDC_LEAD_LOCATION: NORMAL
MDC_IDC_LEAD_LOCATION_DETAIL_1: NORMAL
MDC_IDC_LEAD_LOCATION_DETAIL_1: NORMAL
MDC_IDC_LEAD_MFG: NORMAL
MDC_IDC_LEAD_MFG: NORMAL
MDC_IDC_LEAD_MODEL: NORMAL
MDC_IDC_LEAD_MODEL: NORMAL
MDC_IDC_LEAD_POLARITY_TYPE: NORMAL
MDC_IDC_LEAD_POLARITY_TYPE: NORMAL
MDC_IDC_LEAD_SERIAL: NORMAL
MDC_IDC_LEAD_SERIAL: NORMAL
MDC_IDC_MSMT_BATTERY_DTM: NORMAL
MDC_IDC_MSMT_BATTERY_REMAINING_LONGEVITY: 58 MO
MDC_IDC_MSMT_BATTERY_RRT_TRIGGER: 2.83
MDC_IDC_MSMT_BATTERY_STATUS: NORMAL
MDC_IDC_MSMT_BATTERY_VOLTAGE: 2.99 V
MDC_IDC_MSMT_LEADCHNL_RA_IMPEDANCE_VALUE: 380 OHM
MDC_IDC_MSMT_LEADCHNL_RA_IMPEDANCE_VALUE: 456 OHM
MDC_IDC_MSMT_LEADCHNL_RA_PACING_THRESHOLD_AMPLITUDE: 0.5 V
MDC_IDC_MSMT_LEADCHNL_RA_PACING_THRESHOLD_PULSEWIDTH: 0.4 MS
MDC_IDC_MSMT_LEADCHNL_RA_SENSING_INTR_AMPL: 2.88 MV
MDC_IDC_MSMT_LEADCHNL_RA_SENSING_INTR_AMPL: 2.88 MV
MDC_IDC_MSMT_LEADCHNL_RV_IMPEDANCE_VALUE: 399 OHM
MDC_IDC_MSMT_LEADCHNL_RV_IMPEDANCE_VALUE: 494 OHM
MDC_IDC_MSMT_LEADCHNL_RV_PACING_THRESHOLD_AMPLITUDE: 0.75 V
MDC_IDC_MSMT_LEADCHNL_RV_PACING_THRESHOLD_PULSEWIDTH: 0.4 MS
MDC_IDC_MSMT_LEADCHNL_RV_SENSING_INTR_AMPL: 22.12 MV
MDC_IDC_MSMT_LEADCHNL_RV_SENSING_INTR_AMPL: 22.12 MV
MDC_IDC_PG_IMPLANT_DTM: NORMAL
MDC_IDC_PG_MFG: NORMAL
MDC_IDC_PG_MODEL: NORMAL
MDC_IDC_PG_SERIAL: NORMAL
MDC_IDC_PG_TYPE: NORMAL
MDC_IDC_SESS_CLINIC_NAME: NORMAL
MDC_IDC_SESS_DTM: NORMAL
MDC_IDC_SESS_TYPE: NORMAL
MDC_IDC_SET_BRADY_AT_MODE_SWITCH_RATE: 171 {BEATS}/MIN
MDC_IDC_SET_BRADY_HYSTRATE: NORMAL
MDC_IDC_SET_BRADY_LOWRATE: 60 {BEATS}/MIN
MDC_IDC_SET_BRADY_MAX_SENSOR_RATE: 140 {BEATS}/MIN
MDC_IDC_SET_BRADY_MAX_TRACKING_RATE: 140 {BEATS}/MIN
MDC_IDC_SET_BRADY_MODE: NORMAL
MDC_IDC_SET_BRADY_PAV_DELAY_HIGH: 140 MS
MDC_IDC_SET_BRADY_PAV_DELAY_LOW: 180 MS
MDC_IDC_SET_BRADY_SAV_DELAY_HIGH: 110 MS
MDC_IDC_SET_BRADY_SAV_DELAY_LOW: 150 MS
MDC_IDC_SET_LEADCHNL_RA_PACING_AMPLITUDE: 1.5 V
MDC_IDC_SET_LEADCHNL_RA_PACING_ANODE_ELECTRODE_1: NORMAL
MDC_IDC_SET_LEADCHNL_RA_PACING_ANODE_LOCATION_1: NORMAL
MDC_IDC_SET_LEADCHNL_RA_PACING_CAPTURE_MODE: NORMAL
MDC_IDC_SET_LEADCHNL_RA_PACING_CATHODE_ELECTRODE_1: NORMAL
MDC_IDC_SET_LEADCHNL_RA_PACING_CATHODE_LOCATION_1: NORMAL
MDC_IDC_SET_LEADCHNL_RA_PACING_POLARITY: NORMAL
MDC_IDC_SET_LEADCHNL_RA_PACING_PULSEWIDTH: 0.4 MS
MDC_IDC_SET_LEADCHNL_RA_SENSING_ANODE_ELECTRODE_1: NORMAL
MDC_IDC_SET_LEADCHNL_RA_SENSING_ANODE_LOCATION_1: NORMAL
MDC_IDC_SET_LEADCHNL_RA_SENSING_CATHODE_ELECTRODE_1: NORMAL
MDC_IDC_SET_LEADCHNL_RA_SENSING_CATHODE_LOCATION_1: NORMAL
MDC_IDC_SET_LEADCHNL_RA_SENSING_POLARITY: NORMAL
MDC_IDC_SET_LEADCHNL_RA_SENSING_SENSITIVITY: 0.3 MV
MDC_IDC_SET_LEADCHNL_RV_PACING_AMPLITUDE: 1.5 V
MDC_IDC_SET_LEADCHNL_RV_PACING_ANODE_ELECTRODE_1: NORMAL
MDC_IDC_SET_LEADCHNL_RV_PACING_ANODE_LOCATION_1: NORMAL
MDC_IDC_SET_LEADCHNL_RV_PACING_CAPTURE_MODE: NORMAL
MDC_IDC_SET_LEADCHNL_RV_PACING_CATHODE_ELECTRODE_1: NORMAL
MDC_IDC_SET_LEADCHNL_RV_PACING_CATHODE_LOCATION_1: NORMAL
MDC_IDC_SET_LEADCHNL_RV_PACING_POLARITY: NORMAL
MDC_IDC_SET_LEADCHNL_RV_PACING_PULSEWIDTH: 0.4 MS
MDC_IDC_SET_LEADCHNL_RV_SENSING_ANODE_ELECTRODE_1: NORMAL
MDC_IDC_SET_LEADCHNL_RV_SENSING_ANODE_LOCATION_1: NORMAL
MDC_IDC_SET_LEADCHNL_RV_SENSING_CATHODE_ELECTRODE_1: NORMAL
MDC_IDC_SET_LEADCHNL_RV_SENSING_CATHODE_LOCATION_1: NORMAL
MDC_IDC_SET_LEADCHNL_RV_SENSING_POLARITY: NORMAL
MDC_IDC_SET_LEADCHNL_RV_SENSING_SENSITIVITY: 0.9 MV
MDC_IDC_SET_ZONE_DETECTION_INTERVAL: 350 MS
MDC_IDC_SET_ZONE_DETECTION_INTERVAL: 400 MS
MDC_IDC_SET_ZONE_TYPE: NORMAL
MDC_IDC_STAT_AT_BURDEN_PERCENT: 0 %
MDC_IDC_STAT_AT_DTM_END: NORMAL
MDC_IDC_STAT_AT_DTM_START: NORMAL
MDC_IDC_STAT_BRADY_AP_VP_PERCENT: 51 %
MDC_IDC_STAT_BRADY_AP_VS_PERCENT: 0.13 %
MDC_IDC_STAT_BRADY_AS_VP_PERCENT: 48.73 %
MDC_IDC_STAT_BRADY_AS_VS_PERCENT: 0.14 %
MDC_IDC_STAT_BRADY_DTM_END: NORMAL
MDC_IDC_STAT_BRADY_DTM_START: NORMAL
MDC_IDC_STAT_BRADY_RA_PERCENT_PACED: 50.85 %
MDC_IDC_STAT_BRADY_RV_PERCENT_PACED: 99.37 %
MDC_IDC_STAT_EPISODE_RECENT_COUNT: 0
MDC_IDC_STAT_EPISODE_RECENT_COUNT: 1
MDC_IDC_STAT_EPISODE_RECENT_COUNT_DTM_END: NORMAL
MDC_IDC_STAT_EPISODE_RECENT_COUNT_DTM_START: NORMAL
MDC_IDC_STAT_EPISODE_TOTAL_COUNT: 0
MDC_IDC_STAT_EPISODE_TOTAL_COUNT: 1
MDC_IDC_STAT_EPISODE_TOTAL_COUNT: 1
MDC_IDC_STAT_EPISODE_TOTAL_COUNT: 11
MDC_IDC_STAT_EPISODE_TOTAL_COUNT_DTM_END: NORMAL
MDC_IDC_STAT_EPISODE_TOTAL_COUNT_DTM_START: NORMAL
MDC_IDC_STAT_EPISODE_TYPE: NORMAL

## 2021-09-02 LAB
PATH REPORT.COMMENTS IMP SPEC: NORMAL
PATH REPORT.FINAL DX SPEC: NORMAL
PATH REPORT.GROSS SPEC: NORMAL
PATH REPORT.MICROSCOPIC SPEC OTHER STN: NORMAL
PATH REPORT.RELEVANT HX SPEC: NORMAL
PHOTO IMAGE: NORMAL

## 2021-09-08 ENCOUNTER — APPOINTMENT (OUTPATIENT)
Dept: CARDIOLOGY | Facility: CLINIC | Age: 61
End: 2021-09-08
Attending: NURSE PRACTITIONER
Payer: COMMERCIAL

## 2021-09-08 ENCOUNTER — MYC MEDICAL ADVICE (OUTPATIENT)
Dept: CARDIOLOGY | Facility: CLINIC | Age: 61
End: 2021-09-08

## 2021-09-08 LAB
ATRIAL RATE - MUSE: 92 BPM
DIASTOLIC BLOOD PRESSURE - MUSE: NORMAL MMHG
INTERPRETATION ECG - MUSE: NORMAL
P AXIS - MUSE: 56 DEGREES
PR INTERVAL - MUSE: 114 MS
QRS DURATION - MUSE: 156 MS
QT - MUSE: 410 MS
QTC - MUSE: 507 MS
R AXIS - MUSE: -82 DEGREES
SYSTOLIC BLOOD PRESSURE - MUSE: NORMAL MMHG
T AXIS - MUSE: 79 DEGREES
VENTRICULAR RATE- MUSE: 92 BPM

## 2021-09-13 ENCOUNTER — MYC MEDICAL ADVICE (OUTPATIENT)
Dept: FAMILY MEDICINE | Facility: CLINIC | Age: 61
End: 2021-09-13

## 2021-09-13 DIAGNOSIS — F43.23 ADJUSTMENT DISORDER WITH MIXED ANXIETY AND DEPRESSED MOOD: ICD-10-CM

## 2021-09-14 ENCOUNTER — OFFICE VISIT (OUTPATIENT)
Dept: GASTROENTEROLOGY | Facility: CLINIC | Age: 61
End: 2021-09-14
Payer: COMMERCIAL

## 2021-09-14 VITALS
BODY MASS INDEX: 29.26 KG/M2 | HEART RATE: 56 BPM | SYSTOLIC BLOOD PRESSURE: 123 MMHG | OXYGEN SATURATION: 96 % | TEMPERATURE: 98.1 F | HEIGHT: 65 IN | WEIGHT: 175.6 LBS | DIASTOLIC BLOOD PRESSURE: 79 MMHG

## 2021-09-14 DIAGNOSIS — R19.7 DIARRHEA, UNSPECIFIED TYPE: ICD-10-CM

## 2021-09-14 DIAGNOSIS — K52.839 MICROSCOPIC COLITIS, UNSPECIFIED MICROSCOPIC COLITIS TYPE: Primary | ICD-10-CM

## 2021-09-14 PROCEDURE — 99215 OFFICE O/P EST HI 40 MIN: CPT | Performed by: INTERNAL MEDICINE

## 2021-09-14 RX ORDER — DULOXETIN HYDROCHLORIDE 30 MG/1
30 CAPSULE, DELAYED RELEASE ORAL 2 TIMES DAILY
Qty: 180 CAPSULE | Refills: 1 | Status: SHIPPED | OUTPATIENT
Start: 2021-09-14 | End: 2021-09-28

## 2021-09-14 ASSESSMENT — MIFFLIN-ST. JEOR: SCORE: 1357.4

## 2021-09-14 ASSESSMENT — PAIN SCALES - GENERAL: PAINLEVEL: NO PAIN (0)

## 2021-09-14 NOTE — NURSING NOTE
"Chief Complaint   Patient presents with     RECHECK       Vitals:    09/14/21 1130   BP: 123/79   Pulse: 56   Temp: 98.1  F (36.7  C)   TempSrc: Oral   SpO2: 96%   Weight: 79.7 kg (175 lb 9.6 oz)   Height: 1.651 m (5' 5\")       Body mass index is 29.22 kg/m .     Tamica Pritchett MA  "

## 2021-09-14 NOTE — PROGRESS NOTES
"    GASTROENTEROLOGY PROGRESS NOTE    ASSESSMENT and PLAN:    1. Acute on chronic diarrhea  2. Microscopic colitis  3. Fecal incontinence     - Patient has a history of chronic diarrhea, diagnosed with microscopic colitis about 10 years ago at [facility]. She was treated with budesonide and cholestyramine and noticed improvement for about a month but had diarrhea again. She ended up taking \"ayleen doses\" of probiotics and was symptom free for years.   - About 2 months ago she started to have multiple episodes of watery diarrhea a day with occasional incontinence. Colonoscopy on 8/30/21 with Dr. Penn showed a normal colon other than a few diverticula in the sigmoid colon. 6 sessile polyps were removed from descending to ascending colon. Random biopsies from left and right colon were taken and showed microscopic colitis.   - Lab work for diarrhea including TTG, C Diff, enteric pathogens, fecal elastase were normal. Fecal lactoferrin was positive. Recent TSH, CBC, CMP normal.   - About 2 months ago she was started on Viagra for pulmonary hypertension and Cymbalta for PTSD with recent increase in Cymbalta dose. Per UpToDate, Cymbalta can be a triggering cause for microscopic colitis.     - Discussed with Joyce that she should follow up with her mental health provider and Pharm D to discuss possible alternative medications and wean off Cymbalta to see if this would improve the diarrhea  - She was previously taking Imodium but stopped this as it can cause QT prolongation. She continues to take probiotics.   - Discussed starting Bismuth-subsalicylate 3 262mg tablets 3 times a day. If this fails, could consider Budesonide or Cholestyramine although she reported little benefit in the past.   - Would like to treat diarrhea first and see if the fecal incontinence improves. However if it does not would consider referral to Colorectal Surgery for further evaluation and workup.   - Check CRP and Sed rate     Thank you for " "involving us in this patient's care. Please do not hesitate to contact the GI service with any questions or concerns.     Patient seen and care plan discussed with Dr. Isamar Blair PA-C  GI Service  [facility]    _______________________________________________________________      HPI:   Joyce Marroquin is a 60 year old female with past medical history significant for CHF, COPD, hypothyroidism, LARRY on CPAP, pulmonary hypertension, PTSD who presents to clinic with acute on chronic diarrhea.   She states about 10 years ago she started to have 6+ episodes of watery diarrhea a day. It became so debilitating she had to quit her job. She was evaluated at [facility] and diagnosed with microscopic colitis. She was treated with Cholestyramine and Budesonide and reports 1 month of relief but then had diarrhea again. She then started to take \"ayleen doses\" of probiotics and was symptom free for years.     About 2 months ago she started to have 6+ watery stools a day. She reports that she stops counting after 6 but that it is probably closer to 10-15 episodes of diarrhea a day. She is on iron supplements so has black stools but has not noticed any bloody stools. She has not noticed any association with specific foods and notes the episodes of diarrhea occur even when she has not eaten recently. She has had several accidents and issues with incontinence.  No associated abdominal pain or cramping. No nausea or vomiting. She has a long history of acid reflux, currently not being treated. She was taking Imodium but stopped this due to potential QT prolongation. She continues to take probiotics. She notes a 50 pound weight gain in the last 5 years. She is unable to exercise secondary to her CHF. No fevers, chills, chest pain, shortness of breath.            Past Medical History:   Reviewed and edited as appropriate  Past Medical History:   Diagnosis Date     Arthritis      Cardiomyopathy (H)     ff " Cardiology     Chronic depressive personality disorder      Congestive heart failure (H) see dr martínez    heart failure correct term     COPD exacerbation (H)      Esophageal reflux      Ex-smoker     quit 2006; 1 PPD x 30     Hyperparathyroidism (H)     s/p parathyroidectomy     Hypothyroidism      LARRY on CPAP     ff Sleep medicine     Pulmonary nodules     ff Pulmonologist     S/P cardiac pacemaker procedure     [facility]      Uncomplicated asthma years            Past Surgical History:   Reviewed and edited as appropriate   Past Surgical History:   Procedure Laterality Date     BUNIONECTOMY Bilateral      COLONOSCOPY N/A 8/30/2021    Procedure: COLONOSCOPY, WITH POLYPECTOMY AND BIOPSY;  Surgeon: Ranjit Penn MD;  Location:  GI     CV CORONARY ANGIOGRAM N/A 9/26/2019    Procedure: CV CORONARY ANGIOGRAM;  Surgeon: Erickson Terjo MD;  Location:  HEART CARDIAC CATH LAB     CV RIGHT HEART CATH MEASUREMENTS RECORDED N/A 7/20/2020    Procedure: CV RIGHT HEART CATH;  Surgeon: Alonso Ordonez MD;  Location:  HEART CARDIAC CATH LAB     EP ABLATION / EP STUDIES  04/21/2017     EXPLORE NECK N/A 9/19/2018    Procedure: EXPLORE NECK;  Neck Exploration Resection of Left superior Parathyroid gland;  Surgeon: Kristine Venegas MD;  Location: [facility] OR     HC CORRECT BUNION,SIMPLE       PARATHYROIDECTOMY N/A 9/19/2018    Procedure: PARATHYROIDECTOMY;;  Surgeon: Kristine Venegas MD;  Location: [facility] OR     PPM INSERT OF NEW OR REPL W/VENT LEAD  04/21/2017     TONSILLECTOMY & ADENOIDECTOMY              Social History:   Reviewed and edited as appropriate  Social History     Socioeconomic History     Marital status:      Spouse name: Not on file     Number of children: Not on file     Years of education: Not on file     Highest education level: Master's degree (e.g., MA, MS, Florian, MEd, MSW, SONIA)   Occupational History     Not on file   Tobacco Use     Smoking status: Former Smoker      Packs/day: 0.00     Years: 0.00     Pack years: 0.00     Smokeless tobacco: Never Used   Vaping Use     Vaping Use: Never used   Substance and Sexual Activity     Alcohol use: Yes     Alcohol/week: 0.0 - 8.0 standard drinks     Comment: seldom     Drug use: No     Sexual activity: Yes     Partners: Male     Birth control/protection: None     Comment: vasectomy   Other Topics Concern     Parent/sibling w/ CABG, MI or angioplasty before 65F 55M? No   Social History Narrative    CRNA on disability for vestibular symptoms      Social Determinants of Health     Financial Resource Strain: Low Risk      Difficulty of Paying Living Expenses: Not very hard   Food Insecurity: No Food Insecurity     Worried About Running Out of Food in the Last Year: Never true     Ran Out of Food in the Last Year: Never true   Transportation Needs: No Transportation Needs     Lack of Transportation (Medical): No     Lack of Transportation (Non-Medical): No   Physical Activity:      Days of Exercise per Week:      Minutes of Exercise per Session:    Stress:      Feeling of Stress :    Social Connections:      Frequency of Communication with Friends and Family:      Frequency of Social Gatherings with Friends and Family:      Attends Islam Services:      Active Member of Clubs or Organizations:      Attends Club or Organization Meetings:      Marital Status:    Intimate Partner Violence: Not At Risk     Fear of Current or Ex-Partner: No     Emotionally Abused: No     Physically Abused: No     Sexually Abused: No            Family History:   Patient's family history is reviewed today and is non-contributory  Family History   Problem Relation Age of Onset     Hypothyroidism Mother      Hypertension Mother      Osteoarthritis Mother      Coronary Artery Disease Father         nonfatal MI in his 70s     Asthma Father      Diabetes Sister      Hypothyroidism Sister      Breast Cancer Maternal Aunt      Anxiety Disorder Sister              Allergies:   Reviewed and edited as appropriate     Allergies   Allergen Reactions     Tetracycline Swelling     Zofran [Ondansetron]      Prolonged QT  Prolonged QT at baseline per patient     Flagyl [Metronidazole] Rash     Buspar [Buspirone]      Muscle weakness     Corn Oil Other (See Comments)     Headache       Seasonal Allergies Difficulty breathing     Sulfamethoxazole-Trimethoprim      Other reaction(s): Other  Passed out     Cyclobenzaprine Other (See Comments)     Extreme heat intolerance     Levaquin [Levofloxacin]      Other reaction(s): Other  Tendon rupture     Nsaids Other (See Comments)     Exacerbated asthma     ROS:   10 pt ROS negative unless noted in subjective.     Medications:  Current Outpatient Medications   Medication Sig     acetaminophen (TYLENOL) 500 MG tablet Take 500-1,000 mg by mouth every 6 hours as needed for mild pain     albuterol (PROAIR HFA/PROVENTIL HFA/VENTOLIN HFA) 108 (90 BASE) MCG/ACT Inhaler Inhale 2 puffs into the lungs as needed for shortness of breath / dyspnea or wheezing      apixaban ANTICOAGULANT (ELIQUIS) 5 MG tablet Take 1 tablet (5 mg) by mouth 2 times daily     azelastine (ASTELIN) 0.1 % nasal spray Spray 1 spray into both nostrils 2 times daily as needed      Calcium Lactate 500 MG CAPS Take 250-500 mg by mouth daily      clonazePAM (KLONOPIN) 0.5 MG tablet TAKE 1 TABLET(0.5 MG) BY MOUTH THREE TIMES DAILY AS NEEDED FOR ANXIETY     DiphenhydrAMINE HCl (BENADRYL PO) Take 25 mg by mouth nightly as needed for allergies      DULoxetine (CYMBALTA) 30 MG capsule Take 1 capsule (30 mg) by mouth 2 times daily     Ferrous Sulfate (IRON SUPPLEMENT PO) Take 130 mg by mouth daily      fluticasone-salmeterol (ADVAIR) 250-50 MCG/DOSE inhaler Inhale 1 puff into the lungs every 12 hours     furosemide (LASIX) 20 MG tablet Take 1 tablet, no more than twice a week.  Call with weight gain.     ipratropium (ATROVENT) 0.06 % nasal spray Spray 2 sprays into both nostrils 4 times  "daily as needed      loperamide (IMODIUM A-D) 2 MG tablet Take 1 tablet (2 mg) by mouth 4 times daily as needed for diarrhea (Max 16 mg daily)     loperamide (IMODIUM A-D) 2 MG tablet Take 2 tabs (4 mg) after first loose stool, and then take one tab (2 mg) after each diarrheal stool.  Max of 8 tabs (16 mg) per day.     MAGNESIUM LACTATE PO Take 140-210 mg by mouth nightly as needed      MELATONIN PO Take 1-3 mg by mouth nightly as needed      Menaquinone-7 (VITAMIN K2 PO) Take 1 tablet by mouth every morning      METHYLPREDNISOLONE PO Take 40 mg by mouth as needed      metoprolol succinate ER (TOPROL XL) 25 MG 24 hr tablet Take 1 tablet (25 mg) by mouth daily (Patient taking differently: Take 25 mg by mouth daily Taking 1/2 tablet daily.)     Multiple Vitamin (DAILY MULTIVITAMIN PO) Take 1 tablet by mouth every morning      nebulizer nebulization as needed     Probiotic Product (PROBIOTIC DAILY PO) Take 1 capsule by mouth 2 times daily as needed      sildenafil (REVATIO) 10 MG/ML SUSR Take 1 mL (10 mg) by mouth every 8 hours     spironolactone (ALDACTONE) 25 MG tablet Take 1 tablet three days per week.     SYNTHROID 150 MCG tablet 150 mcg for 6 days and 75 mcg for 1 day per week     TURMERIC PO Take 1 tablet by mouth every morning      vitamin B complex with vitamin C (VITAMIN  B COMPLEX) PO tablet Take 1 tablet by mouth as needed     vitamin D3 (CHOLECALCIFEROL) 2000 units tablet Take 1 tablet by mouth daily     Zinc 15 MG CAPS Take 15 mg by mouth      No current facility-administered medications for this visit.       Objective:  Blood pressure 123/79, pulse 56, temperature 98.1  F (36.7  C), temperature source Oral, height 1.651 m (5' 5\"), weight 79.7 kg (175 lb 9.6 oz), last menstrual period 08/21/2007, SpO2 96 %, not currently breastfeeding.  Gen: Appears comfortable  HEENT: NCAT. Conjunctiva clear. Sclera anicteric  CV: Bradycardic  Resp: Non-labored breathing  Abd: Soft, NT, ND, no guarding or rebound  Msk: no " gross deformity  Skin: No jaundice  Neuro: grossly normal  Mental status/Psych: A&O. Asks/answers questions appropriately     PROCEDURES:  Colonoscopy on 8/30/21:  Impression:          Colonoscopy performed because of a history of                        adenomatous polyps. She reports loose stools (about                        6/day) for about 6 weeks. She says that she has had a                        negative C. Diff test recently (apparenlty in another                        system). She reports no bleeding.                        No clear cause of diarrhea was identified. Six polyps                        were removed. She has a few sigmoid diverticula.       IMAGING:  CT abdomen and pelvis on 8/8/21:                                                                   IMPRESSION:   1.  Interval resolution of previous dilated fluid-filled small bowel  loops. No bowel obstruction or inflammation.  2.  Mild dilatation of right renal collecting system and renal pelvis  with normal caliber ureter suspicious for ureteropelvic junction  obstruction, unchanged from recent exam but new from 2009. Nonemergent  MAG3 Lasix renogram could be considered for further evaluation.  3.  Colonic diverticulosis.  4.  Uterine fibroid.

## 2021-09-14 NOTE — LETTER
"    9/14/2021         RE: Joyce Marroquin  [id number] [facility] [id number]        Dear Colleague,    Thank you for referring your patient, Joyce Marroquin, to the [facility]. Please see a copy of my visit note below.        GASTROENTEROLOGY PROGRESS NOTE    ASSESSMENT and PLAN:    1. Acute on chronic diarrhea  2. Microscopic colitis  3. Fecal incontinence     - Patient has a history of chronic diarrhea, diagnosed with microscopic colitis about 10 years ago at [facility]. She was treated with budesonide and cholestyramine and noticed improvement for about a month but had diarrhea again. She ended up taking \"ayleen doses\" of probiotics and was symptom free for years.   - About 2 months ago she started to have multiple episodes of watery diarrhea a day with occasional incontinence. Colonoscopy on 8/30/21 with Dr. Penn showed a normal colon other than a few diverticula in the sigmoid colon. 6 sessile polyps were removed from descending to ascending colon. Random biopsies from left and right colon were taken and showed microscopic colitis.   - Lab work for diarrhea including TTG, C Diff, enteric pathogens, fecal elastase were normal. Fecal lactoferrin was positive. Recent TSH, CBC, CMP normal.   - About 2 months ago she was started on Viagra for pulmonary hypertension and Cymbalta for PTSD with recent increase in Cymbalta dose. Per UpToDate, Cymbalta can be a triggering cause for microscopic colitis.     - Discussed with Joyce that she should follow up with her mental health provider and Pharm D to discuss possible alternative medications and wean off Cymbalta to see if this would improve the diarrhea  - She was previously taking Imodium but stopped this as it can cause QT prolongation. She continues to take probiotics.   - Discussed starting Bismuth-subsalicylate 3 262mg tablets 3 times a day. If this fails, could consider Budesonide or Cholestyramine although she reported little " "benefit in the past.   - Would like to treat diarrhea first and see if the fecal incontinence improves. However if it does not would consider referral to Colorectal Surgery for further evaluation and workup.   - Check CRP and Sed rate     Thank you for involving us in this patient's care. Please do not hesitate to contact the GI service with any questions or concerns.     Patient seen and care plan discussed with Dr. Isamar Blair, PARoxanaC  GI Service  [facility]    _______________________________________________________________      HPI:   Joyce Marroquin is a 60 year old female with past medical history significant for CHF, COPD, hypothyroidism, LARRY on CPAP, pulmonary hypertension, PTSD who presents to clinic with acute on chronic diarrhea.   She states about 10 years ago she started to have 6+ episodes of watery diarrhea a day. It became so debilitating she had to quit her job. She was evaluated at [place] and diagnosed with microscopic colitis. She was treated with Cholestyramine and Budesonide and reports 1 month of relief but then had diarrhea again. She then started to take \"ayleen doses\" of probiotics and was symptom free for years.     About 2 months ago she started to have 6+ watery stools a day. She reports that she stops counting after 6 but that it is probably closer to 10-15 episodes of diarrhea a day. She is on iron supplements so has black stools but has not noticed any bloody stools. She has not noticed any association with specific foods and notes the episodes of diarrhea occur even when she has not eaten recently. She has had several accidents and issues with incontinence.  No associated abdominal pain or cramping. No nausea or vomiting. She has a long history of acid reflux, currently not being treated. She was taking Imodium but stopped this due to potential QT prolongation. She continues to take probiotics. She notes a 50 pound weight gain in the last 5 years. She " is unable to exercise secondary to her CHF. No fevers, chills, chest pain, shortness of breath.            Past Medical History:   Reviewed and edited as appropriate  Past Medical History:   Diagnosis Date     Arthritis      Cardiomyopathy (H)     ff Cardiology     Chronic depressive personality disorder      Congestive heart failure (H) see dr martínez    heart failure correct term     COPD exacerbation (H)      Esophageal reflux      Ex-smoker     quit 2006; 1 PPD x 30     Hyperparathyroidism (H)     s/p parathyroidectomy     Hypothyroidism      LARRY on CPAP     ff Sleep medicine     Pulmonary nodules     ff Pulmonologist     S/P cardiac pacemaker procedure     [facility]      Uncomplicated asthma years            Past Surgical History:   Reviewed and edited as appropriate   Past Surgical History:   Procedure Laterality Date     BUNIONECTOMY Bilateral      COLONOSCOPY N/A 8/30/2021    Procedure: COLONOSCOPY, WITH POLYPECTOMY AND BIOPSY;  Surgeon: Ranjit Penn MD;  Location:  GI     CV CORONARY ANGIOGRAM N/A 9/26/2019    Procedure: CV CORONARY ANGIOGRAM;  Surgeon: Erickson Trejo MD;  Location:  HEART CARDIAC CATH LAB     CV RIGHT HEART CATH MEASUREMENTS RECORDED N/A 7/20/2020    Procedure: CV RIGHT HEART CATH;  Surgeon: Alonso Ordonez MD;  Location:  HEART CARDIAC CATH LAB     EP ABLATION / EP STUDIES  04/21/2017     EXPLORE NECK N/A 9/19/2018    Procedure: EXPLORE NECK;  Neck Exploration Resection of Left superior Parathyroid gland;  Surgeon: Kristine Venegas MD;  Location: [facility] OR      CORRECT BUNION,SIMPLE       PARATHYROIDECTOMY N/A 9/19/2018    Procedure: PARATHYROIDECTOMY;;  Surgeon: Kristine Venegas MD;  Location: [facility] OR     PPM INSERT OF NEW OR REPL W/VENT LEAD  04/21/2017     TONSILLECTOMY & ADENOIDECTOMY              Social History:   Reviewed and edited as appropriate  Social History     Socioeconomic History     Marital status:      Spouse name:  Not on file     Number of children: Not on file     Years of education: Not on file     Highest education level: Master's degree (e.g., MA, MS, Florian, MEd, MSW, SONIA)   Occupational History     Not on file   Tobacco Use     Smoking status: Former Smoker     Packs/day: 0.00     Years: 0.00     Pack years: 0.00     Smokeless tobacco: Never Used   Vaping Use     Vaping Use: Never used   Substance and Sexual Activity     Alcohol use: Yes     Alcohol/week: 0.0 - 8.0 standard drinks     Comment: seldom     Drug use: No     Sexual activity: Yes     Partners: Male     Birth control/protection: None     Comment: vasectomy   Other Topics Concern     Parent/sibling w/ CABG, MI or angioplasty before 65F 55M? No   Social History Narrative    CRNA on disability for vestibular symptoms      Social Determinants of Health     Financial Resource Strain: Low Risk      Difficulty of Paying Living Expenses: Not very hard   Food Insecurity: No Food Insecurity     Worried About Running Out of Food in the Last Year: Never true     Ran Out of Food in the Last Year: Never true   Transportation Needs: No Transportation Needs     Lack of Transportation (Medical): No     Lack of Transportation (Non-Medical): No   Physical Activity:      Days of Exercise per Week:      Minutes of Exercise per Session:    Stress:      Feeling of Stress :    Social Connections:      Frequency of Communication with Friends and Family:      Frequency of Social Gatherings with Friends and Family:      Attends Jewish Services:      Active Member of Clubs or Organizations:      Attends Club or Organization Meetings:      Marital Status:    Intimate Partner Violence: Not At Risk     Fear of Current or Ex-Partner: No     Emotionally Abused: No     Physically Abused: No     Sexually Abused: No            Family History:   Patient's family history is reviewed today and is non-contributory  Family History   Problem Relation Age of Onset     Hypothyroidism Mother       Hypertension Mother      Osteoarthritis Mother      Coronary Artery Disease Father         nonfatal MI in his 70s     Asthma Father      Diabetes Sister      Hypothyroidism Sister      Breast Cancer Maternal Aunt      Anxiety Disorder Sister             Allergies:   Reviewed and edited as appropriate     Allergies   Allergen Reactions     Tetracycline Swelling     Zofran [Ondansetron]      Prolonged QT  Prolonged QT at baseline per patient     Flagyl [Metronidazole] Rash     Buspar [Buspirone]      Muscle weakness     Corn Oil Other (See Comments)     Headache       Seasonal Allergies Difficulty breathing     Sulfamethoxazole-Trimethoprim      Other reaction(s): Other  Passed out     Cyclobenzaprine Other (See Comments)     Extreme heat intolerance     Levaquin [Levofloxacin]      Other reaction(s): Other  Tendon rupture     Nsaids Other (See Comments)     Exacerbated asthma     ROS:   10 pt ROS negative unless noted in subjective.     Medications:  Current Outpatient Medications   Medication Sig     acetaminophen (TYLENOL) 500 MG tablet Take 500-1,000 mg by mouth every 6 hours as needed for mild pain     albuterol (PROAIR HFA/PROVENTIL HFA/VENTOLIN HFA) 108 (90 BASE) MCG/ACT Inhaler Inhale 2 puffs into the lungs as needed for shortness of breath / dyspnea or wheezing      apixaban ANTICOAGULANT (ELIQUIS) 5 MG tablet Take 1 tablet (5 mg) by mouth 2 times daily     azelastine (ASTELIN) 0.1 % nasal spray Spray 1 spray into both nostrils 2 times daily as needed      Calcium Lactate 500 MG CAPS Take 250-500 mg by mouth daily      clonazePAM (KLONOPIN) 0.5 MG tablet TAKE 1 TABLET(0.5 MG) BY MOUTH THREE TIMES DAILY AS NEEDED FOR ANXIETY     DiphenhydrAMINE HCl (BENADRYL PO) Take 25 mg by mouth nightly as needed for allergies      DULoxetine (CYMBALTA) 30 MG capsule Take 1 capsule (30 mg) by mouth 2 times daily     Ferrous Sulfate (IRON SUPPLEMENT PO) Take 130 mg by mouth daily      fluticasone-salmeterol (ADVAIR) 250-50  "MCG/DOSE inhaler Inhale 1 puff into the lungs every 12 hours     furosemide (LASIX) 20 MG tablet Take 1 tablet, no more than twice a week.  Call with weight gain.     ipratropium (ATROVENT) 0.06 % nasal spray Spray 2 sprays into both nostrils 4 times daily as needed      loperamide (IMODIUM A-D) 2 MG tablet Take 1 tablet (2 mg) by mouth 4 times daily as needed for diarrhea (Max 16 mg daily)     loperamide (IMODIUM A-D) 2 MG tablet Take 2 tabs (4 mg) after first loose stool, and then take one tab (2 mg) after each diarrheal stool.  Max of 8 tabs (16 mg) per day.     MAGNESIUM LACTATE PO Take 140-210 mg by mouth nightly as needed      MELATONIN PO Take 1-3 mg by mouth nightly as needed      Menaquinone-7 (VITAMIN K2 PO) Take 1 tablet by mouth every morning      METHYLPREDNISOLONE PO Take 40 mg by mouth as needed      metoprolol succinate ER (TOPROL XL) 25 MG 24 hr tablet Take 1 tablet (25 mg) by mouth daily (Patient taking differently: Take 25 mg by mouth daily Taking 1/2 tablet daily.)     Multiple Vitamin (DAILY MULTIVITAMIN PO) Take 1 tablet by mouth every morning      nebulizer nebulization as needed     Probiotic Product (PROBIOTIC DAILY PO) Take 1 capsule by mouth 2 times daily as needed      sildenafil (REVATIO) 10 MG/ML SUSR Take 1 mL (10 mg) by mouth every 8 hours     spironolactone (ALDACTONE) 25 MG tablet Take 1 tablet three days per week.     SYNTHROID 150 MCG tablet 150 mcg for 6 days and 75 mcg for 1 day per week     TURMERIC PO Take 1 tablet by mouth every morning      vitamin B complex with vitamin C (VITAMIN  B COMPLEX) PO tablet Take 1 tablet by mouth as needed     vitamin D3 (CHOLECALCIFEROL) 2000 units tablet Take 1 tablet by mouth daily     Zinc 15 MG CAPS Take 15 mg by mouth      No current facility-administered medications for this visit.       Objective:  Blood pressure 123/79, pulse 56, temperature 98.1  F (36.7  C), temperature source Oral, height 1.651 m (5' 5\"), weight 79.7 kg (175 lb 9.6 " oz), last menstrual period 08/21/2007, SpO2 96 %, not currently breastfeeding.  Gen: Appears comfortable  HEENT: NCAT. Conjunctiva clear. Sclera anicteric  CV: Bradycardic  Resp: Non-labored breathing  Abd: Soft, NT, ND, no guarding or rebound  Msk: no gross deformity  Skin: No jaundice  Neuro: grossly normal  Mental status/Psych: A&O. Asks/answers questions appropriately     PROCEDURES:  Colonoscopy on 8/30/21:  Impression:          Colonoscopy performed because of a history of                        adenomatous polyps. She reports loose stools (about                        6/day) for about 6 weeks. She says that she has had a                        negative C. Diff test recently (apparenlty in another                        system). She reports no bleeding.                        No clear cause of diarrhea was identified. Six polyps                        were removed. She has a few sigmoid diverticula.       IMAGING:  CT abdomen and pelvis on 8/8/21:                                                                   IMPRESSION:   1.  Interval resolution of previous dilated fluid-filled small bowel  loops. No bowel obstruction or inflammation.  2.  Mild dilatation of right renal collecting system and renal pelvis  with normal caliber ureter suspicious for ureteropelvic junction  obstruction, unchanged from recent exam but new from 2009. Nonemergent  MAG3 Lasix renogram could be considered for further evaluation.  3.  Colonic diverticulosis.  4.  Uterine fibroid.    GI STAFF    *Please also see the note by Krystin WILSON. I saw, examined, and discussed the patient with her, and supervised this visit.*    Ms. Marroquin recently underwent a colonoscopy because of diarrhea and biopsies revealed microscopic colitis. She reports a history of microscopic/collagenous colitis with chronic diarrhea that was evaluated at [facility] in the past (see my note from 8/30/21). Her diarrhea at that time resolved when she started taking  "high-dose probiotics, but she stopped these about  5 years ago.    She did well from a GI standpoint until the last few months, when she developed diarrhea > 6 BMs daily. She has not noted bleeding or other \"alarm\" symptoms. Of note, these symptoms started shortly after she was placed on Duloxetine, which has been associated with microscopic colitis.    We discussed issues at some length. One option would be to have her taper off the Duloxetine and see if her symptoms resolve. However, she should discuss this with her primary care, Pharm D, and mental health providers (e.g., to see if another agent would suffice).    In addition, it would be reasonable for her to try a course Pepto Bismol for one month, and then see us back in clinic.We will check a CRP today. If she has persistent symptoms, we will consider budesonide and potentially a referral to our IBD colleagues if she has refractory symptoms.    About 65 min spent with patient, reviewing results, and coordinating care.      Again, thank you for allowing me to participate in the care of your patient.        Sincerely,        Ranjit Penn MD    "

## 2021-09-14 NOTE — PROGRESS NOTES
"GI STAFF    *Please also see the note by Krystin WILSON. I saw, examined, and discussed the patient with her, and supervised this visit.*    Ms. Marroquin recently underwent a colonoscopy because of diarrhea and biopsies revealed microscopic colitis. She reports a history of microscopic/collagenous colitis with chronic diarrhea that was evaluated at [facility] in the past (see my note from 8/30/21). Her diarrhea at that time resolved when she started taking high-dose probiotics, but she stopped these about  5 years ago.    She did well from a GI standpoint until the last few months, when she developed diarrhea > 6 BMs daily. She has not noted bleeding or other \"alarm\" symptoms. Of note, these symptoms started shortly after she was placed on Duloxetine, which has been associated with microscopic colitis.    We discussed issues at some length. One option would be to have her taper off the Duloxetine and see if her symptoms resolve. However, she should discuss this with her primary care, Pharm D, and mental health providers (e.g., to see if another agent would suffice).    In addition, it would be reasonable for her to try a course Pepto Bismol for one month, and then see us back in clinic.We will check a CRP today. If she has persistent symptoms, we will consider budesonide and potentially a referral to our IBD colleagues if she has refractory symptoms.    About 65 min spent with patient, reviewing results, and coordinating care.  "

## 2021-09-14 NOTE — PATIENT INSTRUCTIONS
It was a pleasure taking care of you today. I've included a brief summary of our discussion and care plan from today's visit below.  Please review this information with your primary care provider.  _______________________________________________________________________    My recommendations are summarized as follows:    1. Blood for CRP and ESR.    2. Talk with your other providers about tapering off the Duloxetine, since this may trigger microscopic colitis.    3. Take Pepto Bismol - three 260 mg tablets three times daily for one month to see if this improves your symptoms. Stop this medication if you feel like you are having side effects.    Return to GI Clinic in about one month to review your progress.     If you need any follow-up appointments, please use the following phone numbers below.    To schedule or reschedule a follow-up GI appointment, call [phone] option 1    To schedule your endoscopy procedure, call [phone] option 2    To schedule imaging, please call [phone]     To schedule your lab appointment at [facility] 1st floor lab call [phone]. Call your [place] lab directly if it is not [facility]. If you use a non-[place] lab, please let us know where to fax your lab order (call [facility] at [phone]).      _______________________________________________________________________    Please be in touch if there are any further questions that arise following today's visit.  There are multiple ways to contact your gastroenterology care team.      During business hours, you may reach your gastroenterology RN Care Coordinator, [facility] Ruth, at [phone].      You can always send a secure message through [employer]. [employer] messages are answered by your nurse or doctor typically within 24 hours. Please allow extra time on weekends and holidays.     What is [employer]?  [employer] is a secure way for you to access all of your healthcare records from the [place] [facility]  [place].  It is a web based computer program, so you can sign on to it from any location.  It also allows you to send secure messages to your care team.  I recommend signing up for "[employer]" access if you have not already done so and are comfortable with using a computer.     For urgent/emergent questions after business hours, you may reach the on-call GI Fellow by contacting the [facility]  at [phone].     How will I get the results of any tests ordered?    You will receive all of your results.  If you have signed up for [employer], any tests ordered at your visit will be available to you after your physician reviews them.  Typically this takes 1-2 weeks.  If there are urgent results that require a change in your care plan, your physician or nurse will call you to discuss the next steps.      Thank you for choosing [facility]!       Sincerely,    Ranjit Penn MD  Professor of Medicine  [facility]  Division of Gastroenterology, Hepatology, and Nutrition

## 2021-09-23 NOTE — PROGRESS NOTES
Assessment & Plan     Cardiomyopathy, unspecified type (H)      Cardiac pacemaker in situ- [employer], dual lead- DEPENDENT      Complete atrioventricular block (H)      Chronic depressive personality disorder      Adjustment disorder with mixed anxiety and depressed mood    20 minutes spent on the date of the encounter doing chart review, history and exam, documentation and further activities per the note       See Patient Instructions: call ems, go to hospital for evaluation.     Return in about 3 days (around 10/1/2021), or if symptoms worsen or fail to improve.    Ce Crowe, MARCELL  [facility] ROLAND Cook is a 60 year old who presents for the following health issues     HPI     Medication Followup of Cymbalta- patient reports she is tapering off cymbalta. They think this could have caused her colitis.  She reports her mental health is not very good. Pharm D recommended her start zoloft, she is not ready to do that.  She reports her temp is up, chest pressure since last night. Sweating with minimal activity, SOB all the time. Hx cardiac issues with pacemaker. Pt appears clammy. Discussed EKG- pt reports it will look abnormal.  She would go to hospital in an ambulance, but has to go home to take off her dogs collar.  Writer offered to help her dogs so she can go via ambulance.  Pt reports she lives 2 miles away.  She will pull over if feeling worse.  She reports that she can make it home and will call an ambulance to take her to the hospital once home. She reports her  cannot leave work and she has no family that can get her.  Pt is leaving clinic and will update provider on what is going on/ hospital course. Pt is a CRNA/ medical professional.     Taking Medication as prescribed: yes    Side Effects:  colitis    Medication Helping Symptoms:  yes       Review of Systems   Constitutional, HEENT, cardiovascular, pulmonary, GI, , musculoskeletal, neuro, skin, endocrine and  psych systems are negative, except as otherwise noted.      Objective    /67   Pulse 75   Temp 100.3  F (37.9  C) (Tympanic)   Resp 20   Wt 80 kg (176 lb 6.4 oz)   LMP 08/21/2007   SpO2 98%   BMI 29.35 kg/m    Body mass index is 29.35 kg/m .  Physical Exam   No exam done. Pt decided to go home and have EMS bring her to hospital based on how she is feeling.     See orders

## 2021-09-24 ENCOUNTER — TELEPHONE (OUTPATIENT)
Dept: GASTROENTEROLOGY | Facility: CLINIC | Age: 61
End: 2021-09-24

## 2021-09-24 DIAGNOSIS — K52.839 MICROSCOPIC COLITIS, UNSPECIFIED MICROSCOPIC COLITIS TYPE: Primary | ICD-10-CM

## 2021-09-24 NOTE — PROGRESS NOTES
GI STAFF    The patient sent a message that she was concerned about drug-drug interactions with Pepto Bismol.    She also wondered if Viagra (for pulm HTN) might be a cause of her apparent microscopic colitis.    I have entered a MTM consult so that the GI specialist pharmacist can address these questions.

## 2021-09-28 ENCOUNTER — TELEPHONE (OUTPATIENT)
Dept: FAMILY MEDICINE | Facility: CLINIC | Age: 61
End: 2021-09-28

## 2021-09-28 ENCOUNTER — HOSPITAL ENCOUNTER (EMERGENCY)
Facility: CLINIC | Age: 61
Discharge: HOME | End: 2021-09-28
Attending: EMERGENCY MEDICINE | Admitting: EMERGENCY MEDICINE
Payer: COMMERCIAL

## 2021-09-28 ENCOUNTER — OFFICE VISIT (OUTPATIENT)
Dept: FAMILY MEDICINE | Facility: CLINIC | Age: 61
End: 2021-09-28
Payer: COMMERCIAL

## 2021-09-28 ENCOUNTER — APPOINTMENT (OUTPATIENT)
Dept: GENERAL RADIOLOGY | Facility: CLINIC | Age: 61
End: 2021-09-28
Attending: EMERGENCY MEDICINE
Payer: COMMERCIAL

## 2021-09-28 VITALS
HEIGHT: 65 IN | WEIGHT: 174 LBS | BODY MASS INDEX: 28.99 KG/M2 | TEMPERATURE: 97.6 F | HEART RATE: 82 BPM | SYSTOLIC BLOOD PRESSURE: 141 MMHG | OXYGEN SATURATION: 96 % | DIASTOLIC BLOOD PRESSURE: 89 MMHG | RESPIRATION RATE: 16 BRPM

## 2021-09-28 VITALS
BODY MASS INDEX: 29.35 KG/M2 | WEIGHT: 176.4 LBS | TEMPERATURE: 100.3 F | RESPIRATION RATE: 20 BRPM | DIASTOLIC BLOOD PRESSURE: 67 MMHG | SYSTOLIC BLOOD PRESSURE: 101 MMHG | HEART RATE: 75 BPM | OXYGEN SATURATION: 98 %

## 2021-09-28 DIAGNOSIS — F34.1 CHRONIC DEPRESSIVE PERSONALITY DISORDER: ICD-10-CM

## 2021-09-28 DIAGNOSIS — R68.89 SYMPTOM OF DRUG WITHDRAWAL: ICD-10-CM

## 2021-09-28 DIAGNOSIS — Z95.0 CARDIAC PACEMAKER IN SITU: ICD-10-CM

## 2021-09-28 DIAGNOSIS — I44.2 COMPLETE ATRIOVENTRICULAR BLOCK (H): ICD-10-CM

## 2021-09-28 DIAGNOSIS — F43.23 ADJUSTMENT DISORDER WITH MIXED ANXIETY AND DEPRESSED MOOD: ICD-10-CM

## 2021-09-28 DIAGNOSIS — R07.89 ATYPICAL CHEST PAIN: ICD-10-CM

## 2021-09-28 DIAGNOSIS — I42.9 CARDIOMYOPATHY, UNSPECIFIED TYPE (H): Primary | ICD-10-CM

## 2021-09-28 LAB
ALBUMIN SERPL-MCNC: 3.3 G/DL (ref 3.4–5)
ALP SERPL-CCNC: 91 U/L (ref 40–150)
ALT SERPL W P-5'-P-CCNC: 21 U/L (ref 0–50)
ANION GAP SERPL CALCULATED.3IONS-SCNC: 7 MMOL/L (ref 3–14)
AST SERPL W P-5'-P-CCNC: 18 U/L (ref 0–45)
ATRIAL RATE - MUSE: 72 BPM
BASOPHILS # BLD AUTO: 0.1 10E3/UL (ref 0–0.2)
BASOPHILS NFR BLD AUTO: 1 %
BILIRUB SERPL-MCNC: 0.3 MG/DL (ref 0.2–1.3)
BUN SERPL-MCNC: 18 MG/DL (ref 7–30)
CALCIUM SERPL-MCNC: 9 MG/DL (ref 8.5–10.1)
CHLORIDE BLD-SCNC: 108 MMOL/L (ref 94–109)
CO2 SERPL-SCNC: 24 MMOL/L (ref 20–32)
CREAT SERPL-MCNC: 0.62 MG/DL (ref 0.52–1.04)
CRP SERPL-MCNC: 4.2 MG/L (ref 0–8)
DIASTOLIC BLOOD PRESSURE - MUSE: NORMAL MMHG
EOSINOPHIL # BLD AUTO: 0.1 10E3/UL (ref 0–0.7)
EOSINOPHIL NFR BLD AUTO: 1 %
ERYTHROCYTE [DISTWIDTH] IN BLOOD BY AUTOMATED COUNT: 12.6 % (ref 10–15)
GFR SERPL CREATININE-BSD FRML MDRD: >90 ML/MIN/1.73M2
GLUCOSE BLD-MCNC: 82 MG/DL (ref 70–99)
HCT VFR BLD AUTO: 43.3 % (ref 35–47)
HGB BLD-MCNC: 14 G/DL (ref 11.7–15.7)
HOLD SPECIMEN: NORMAL
IMM GRANULOCYTES # BLD: 0 10E3/UL
IMM GRANULOCYTES NFR BLD: 1 %
INTERPRETATION ECG - MUSE: NORMAL
LYMPHOCYTES # BLD AUTO: 1.5 10E3/UL (ref 0.8–5.3)
LYMPHOCYTES NFR BLD AUTO: 24 %
MCH RBC QN AUTO: 32.3 PG (ref 26.5–33)
MCHC RBC AUTO-ENTMCNC: 32.3 G/DL (ref 31.5–36.5)
MCV RBC AUTO: 100 FL (ref 78–100)
MONOCYTES # BLD AUTO: 0.6 10E3/UL (ref 0–1.3)
MONOCYTES NFR BLD AUTO: 10 %
NEUTROPHILS # BLD AUTO: 3.9 10E3/UL (ref 1.6–8.3)
NEUTROPHILS NFR BLD AUTO: 63 %
NRBC # BLD AUTO: 0 10E3/UL
NRBC BLD AUTO-RTO: 0 /100
NT-PROBNP SERPL-MCNC: 142 PG/ML (ref 0–900)
P AXIS - MUSE: 72 DEGREES
PLATELET # BLD AUTO: 189 10E3/UL (ref 150–450)
POTASSIUM BLD-SCNC: 4.2 MMOL/L (ref 3.4–5.3)
PR INTERVAL - MUSE: 186 MS
PROT SERPL-MCNC: 7.4 G/DL (ref 6.8–8.8)
QRS DURATION - MUSE: 158 MS
QT - MUSE: 460 MS
QTC - MUSE: 503 MS
R AXIS - MUSE: -71 DEGREES
RBC # BLD AUTO: 4.33 10E6/UL (ref 3.8–5.2)
SARS-COV-2 RNA RESP QL NAA+PROBE: NEGATIVE
SODIUM SERPL-SCNC: 139 MMOL/L (ref 133–144)
SYSTOLIC BLOOD PRESSURE - MUSE: NORMAL MMHG
T AXIS - MUSE: 76 DEGREES
TROPONIN I SERPL-MCNC: <0.015 UG/L (ref 0–0.04)
TROPONIN I SERPL-MCNC: <0.015 UG/L (ref 0–0.04)
VENTRICULAR RATE- MUSE: 72 BPM
WBC # BLD AUTO: 6.1 10E3/UL (ref 4–11)

## 2021-09-28 PROCEDURE — 86140 C-REACTIVE PROTEIN: CPT | Performed by: EMERGENCY MEDICINE

## 2021-09-28 PROCEDURE — 93005 ELECTROCARDIOGRAM TRACING: CPT | Performed by: EMERGENCY MEDICINE

## 2021-09-28 PROCEDURE — 84484 ASSAY OF TROPONIN QUANT: CPT | Performed by: EMERGENCY MEDICINE

## 2021-09-28 PROCEDURE — 71045 X-RAY EXAM CHEST 1 VIEW: CPT | Mod: 26 | Performed by: RADIOLOGY

## 2021-09-28 PROCEDURE — 99213 OFFICE O/P EST LOW 20 MIN: CPT | Performed by: NURSE PRACTITIONER

## 2021-09-28 PROCEDURE — 36415 COLL VENOUS BLD VENIPUNCTURE: CPT | Performed by: EMERGENCY MEDICINE

## 2021-09-28 PROCEDURE — U0003 INFECTIOUS AGENT DETECTION BY NUCLEIC ACID (DNA OR RNA); SEVERE ACUTE RESPIRATORY SYNDROME CORONAVIRUS 2 (SARS-COV-2) (CORONAVIRUS DISEASE [COVID-19]), AMPLIFIED PROBE TECHNIQUE, MAKING USE OF HIGH THROUGHPUT TECHNOLOGIES AS DESCRIBED BY CMS-2020-01-R: HCPCS | Performed by: EMERGENCY MEDICINE

## 2021-09-28 PROCEDURE — 85025 COMPLETE CBC W/AUTO DIFF WBC: CPT | Performed by: EMERGENCY MEDICINE

## 2021-09-28 PROCEDURE — 93010 ELECTROCARDIOGRAM REPORT: CPT | Performed by: EMERGENCY MEDICINE

## 2021-09-28 PROCEDURE — 80053 COMPREHEN METABOLIC PANEL: CPT | Performed by: EMERGENCY MEDICINE

## 2021-09-28 PROCEDURE — 250N000013 HC RX MED GY IP 250 OP 250 PS 637: Performed by: EMERGENCY MEDICINE

## 2021-09-28 PROCEDURE — C9803 HOPD COVID-19 SPEC COLLECT: HCPCS | Performed by: EMERGENCY MEDICINE

## 2021-09-28 PROCEDURE — 83880 ASSAY OF NATRIURETIC PEPTIDE: CPT | Performed by: EMERGENCY MEDICINE

## 2021-09-28 PROCEDURE — 99285 EMERGENCY DEPT VISIT HI MDM: CPT | Mod: 25 | Performed by: EMERGENCY MEDICINE

## 2021-09-28 PROCEDURE — 71045 X-RAY EXAM CHEST 1 VIEW: CPT

## 2021-09-28 RX ORDER — ACETAMINOPHEN 325 MG/1
650 TABLET ORAL ONCE
Status: COMPLETED | OUTPATIENT
Start: 2021-09-28 | End: 2021-09-28

## 2021-09-28 RX ORDER — DULOXETIN HYDROCHLORIDE 20 MG/1
20 CAPSULE, DELAYED RELEASE ORAL DAILY
Qty: 10 CAPSULE | Refills: 0 | Status: SHIPPED | OUTPATIENT
Start: 2021-09-28 | End: 2021-11-19 | Stop reason: SINTOL

## 2021-09-28 RX ORDER — DULOXETIN HYDROCHLORIDE 30 MG/1
30 CAPSULE, DELAYED RELEASE ORAL DAILY
Status: DISCONTINUED | OUTPATIENT
Start: 2021-09-28 | End: 2021-09-28 | Stop reason: HOSPADM

## 2021-09-28 RX ADMIN — DULOXETINE HYDROCHLORIDE 30 MG: 30 CAPSULE, DELAYED RELEASE ORAL at 12:47

## 2021-09-28 RX ADMIN — ACETAMINOPHEN 650 MG: 325 TABLET, FILM COATED ORAL at 10:08

## 2021-09-28 ASSESSMENT — ENCOUNTER SYMPTOMS
FEVER: 0
FATIGUE: 1
VOMITING: 0
NAUSEA: 1
ABDOMINAL PAIN: 0
HEADACHES: 0
LIGHT-HEADEDNESS: 0

## 2021-09-28 ASSESSMENT — MIFFLIN-ST. JEOR: SCORE: 1350.14

## 2021-09-28 NOTE — ED TRIAGE NOTES
"Triage Assessment & Note:    /86   Pulse 74   Temp 97.6  F (36.4  C) (Oral)   Resp 16   Ht 1.651 m (5' 5\")   Wt 78.9 kg (174 lb)   LMP 08/21/2007   SpO2 98%   BMI 28.96 kg/m        Patient presents with: Pt with cardiac hx BIBA ([facility]) from home after clinic with reports of chest discomfort/ pressure and pain in between shoulder blades. No reports of fever, cough, SOB, or travel.     Home Treatments/Remedies: Home medications    Febrile / Afebrile: afebrile    Duration of C/o: < 24 hrs    Tamie Reece RN  September 28, 2021        "

## 2021-09-28 NOTE — ED PROVIDER NOTES
[place] EMERGENCY DEPARTMENT ([facility])  9/28/21  History     Chief Complaint   Patient presents with     Chest Pain     The history is provided by the patient and medical records.     Jocye Marroquin is a 60 year old female with a past medical history significant for complete atrioventricular block, premature ventricular and atrial contractions s/p cardiac radiofrequency ablation, cardiomyopathy s/p [employer]tr[employer] dual lead pacemaker placement, hypothyroidism, and hyperparathyroidism s/p parathyroidectomy who presents to the Emergency Department by EMS for evaluation of chest discomfort and pressure/pain between her shoulder blades. She denies fever, cough, shortness of breath, or recent travel.    Per chart review, patient was seen at [facility] prior to arrival for medication follow-up of Cymbalta.  Patient reports that she is tapering off of Cymbalta which was suspected to possibly have caused her colitis. Patient endorsed increased temperature, shortness of breath, diaphoresis, and chest pressure since last night. She was brought here to the ED by EMS.     Per further review, patient was seen at [facility] ED on 8/8/2021 for evaluation of abdominal discomfort and severe diarrhea.  She also endorsed chest pain and shortness of breath upon exertion. CT of her abd/pelvis from 8/6/21 revealed dilated loops of bowel that was concerning for ileus versus early SBO. Labs were unremarkable. Repeat CT showed resolution of ileus. Patient did have a dilated right renal collecting system, without flank pain or urinary symptoms. She was instructed to follow up with Urology outpatient.      Past Medical History:   Diagnosis Date     Arthritis      Cardiomyopathy (H)     ff Cardiology     Chronic depressive personality disorder      Congestive heart failure (H) see dr martínez    heart failure correct term     COPD exacerbation (H)      Esophageal reflux      Ex-smoker     quit  2006; 1 PPD x 30     Hyperparathyroidism (H)     s/p parathyroidectomy     Hypothyroidism      LARRY on CPAP     ff Sleep medicine     Pulmonary nodules     ff Pulmonologist     S/P cardiac pacemaker procedure     [facility]      Uncomplicated asthma years       Past Surgical History:   Procedure Laterality Date     BUNIONECTOMY Bilateral      COLONOSCOPY N/A 8/30/2021    Procedure: COLONOSCOPY, WITH POLYPECTOMY AND BIOPSY;  Surgeon: Ranjit Penn MD;  Location: [facility] GI     CV CORONARY ANGIOGRAM N/A 9/26/2019    Procedure: CV CORONARY ANGIOGRAM;  Surgeon: Erickson Trejo MD;  Location:  HEART CARDIAC CATH LAB     CV RIGHT HEART CATH MEASUREMENTS RECORDED N/A 7/20/2020    Procedure: CV RIGHT HEART CATH;  Surgeon: Alonso Ordonez MD;  Location:  HEART CARDIAC CATH LAB     EP ABLATION / EP STUDIES  04/21/2017     EXPLORE NECK N/A 9/19/2018    Procedure: EXPLORE NECK;  Neck Exploration Resection of Left superior Parathyroid gland;  Surgeon: Kristine Venegas MD;  Location: [facility] OR     HC CORRECT BUNION,SIMPLE       PARATHYROIDECTOMY N/A 9/19/2018    Procedure: PARATHYROIDECTOMY;;  Surgeon: Kristine Venegas MD;  Location: [facility] OR     PPM INSERT OF NEW OR REPL W/VENT LEAD  04/21/2017     TONSILLECTOMY & ADENOIDECTOMY         Family History   Problem Relation Age of Onset     Hypothyroidism Mother      Hypertension Mother      Osteoarthritis Mother      Coronary Artery Disease Father         nonfatal MI in his 70s     Asthma Father      Diabetes Sister      Hypothyroidism Sister      Breast Cancer Maternal Aunt      Anxiety Disorder Sister        Social History     Tobacco Use     Smoking status: Former Smoker     Packs/day: 0.00     Years: 0.00     Pack years: 0.00     Smokeless tobacco: Never Used   Substance Use Topics     Alcohol use: Yes     Alcohol/week: 0.0 - 8.0 standard drinks     Comment: seldom       No current facility-administered medications for this encounter.      Current Outpatient Medications   Medication     acetaminophen (TYLENOL) 500 MG tablet     albuterol (PROAIR HFA/PROVENTIL HFA/VENTOLIN HFA) 108 (90 BASE) MCG/ACT Inhaler     apixaban ANTICOAGULANT (ELIQUIS) 5 MG tablet     azelastine (ASTELIN) 0.1 % nasal spray     Calcium Lactate 500 MG CAPS     clonazePAM (KLONOPIN) 0.5 MG tablet     DiphenhydrAMINE HCl (BENADRYL PO)     DULoxetine (CYMBALTA) 30 MG capsule     Ferrous Sulfate (IRON SUPPLEMENT PO)     fluticasone-salmeterol (ADVAIR) 250-50 MCG/DOSE inhaler     furosemide (LASIX) 20 MG tablet     ipratropium (ATROVENT) 0.06 % nasal spray     loperamide (IMODIUM A-D) 2 MG tablet     MAGNESIUM LACTATE PO     MELATONIN PO     Menaquinone-7 (VITAMIN K2 PO)     METHYLPREDNISOLONE PO     metoprolol succinate ER (TOPROL XL) 25 MG 24 hr tablet     Multiple Vitamin (DAILY MULTIVITAMIN PO)     nebulizer nebulization     Probiotic Product (PROBIOTIC DAILY PO)     sildenafil (REVATIO) 10 MG/ML SUSR     spironolactone (ALDACTONE) 25 MG tablet     SYNTHROID 150 MCG tablet     TURMERIC PO     vitamin B complex with vitamin C (VITAMIN  B COMPLEX) PO tablet     vitamin D3 (CHOLECALCIFEROL) 2000 units tablet     Zinc 15 MG CAPS        Allergies   Allergen Reactions     Tetracycline Swelling     Zofran [Ondansetron]      Prolonged QT  Prolonged QT at baseline per patient     Flagyl [Metronidazole] Rash     Buspar [Buspirone]      Muscle weakness     Corn Oil Other (See Comments)     Headache       Seasonal Allergies Difficulty breathing     Sulfamethoxazole-Trimethoprim      Other reaction(s): Other  Passed out     Cyclobenzaprine Other (See Comments)     Extreme heat intolerance     Levaquin [Levofloxacin]      Other reaction(s): Other  Tendon rupture     Nsaids Other (See Comments)     Exacerbated asthma         I have reviewed the Medications, Allergies, Past Medical and Surgical History, and Social History in the Epic system.    Review of Systems   Constitutional: Positive for  "fatigue. Negative for fever.   Cardiovascular: Positive for chest pain.   Gastrointestinal: Positive for nausea. Negative for abdominal pain and vomiting.   Neurological: Negative for light-headedness and headaches.     A complete review of systems was performed with pertinent positives and negatives noted in the HPI, and all other systems negative.    Physical Exam   BP: 137/86  Pulse: 74  Temp: 97.6  F (36.4  C)  Resp: 16  Height: 165.1 cm (5' 5\")  Weight: 78.9 kg (174 lb)  SpO2: 98 %      Physical Exam  Constitutional:       General: She is not in acute distress.     Appearance: She is well-developed. She is not ill-appearing, toxic-appearing or diaphoretic.   HENT:      Head: Normocephalic and atraumatic.   Cardiovascular:      Rate and Rhythm: Normal rate and regular rhythm.      Heart sounds: Normal heart sounds.   Pulmonary:      Effort: Pulmonary effort is normal. No respiratory distress.      Breath sounds: Normal breath sounds. No stridor.   Abdominal:      General: There is no distension.      Palpations: Abdomen is soft.      Tenderness: There is no abdominal tenderness. There is no rebound.   Musculoskeletal:         General: No tenderness.      Cervical back: Normal range of motion.   Skin:     General: Skin is warm and dry.   Neurological:      General: No focal deficit present.      Mental Status: She is alert and oriented to person, place, and time.   Psychiatric:         Mood and Affect: Mood normal.         Behavior: Behavior normal.         Thought Content: Thought content normal.         ED Course          Procedures              EKG Interpretation:      Interpreted by Awilda Mendoza MD  Time reviewed: 857am  Symptoms at time of EKG: None   Rhythm: paced  Rate: Normal  Axis: Left Axis Deviation  Ectopy: none  Conduction: paced  ST Segments/ T Waves: No acute ischemic changes  Q Waves: II, III and aVf  Comparison to prior: Unchanged    Clinical Impression: paced rhythm      CXR reviewed; " no acute infiltrate       Results for orders placed or performed during the hospital encounter of 09/28/21   XR Chest Port 1 View     Status: None    Narrative    EXAM: XR CHEST PORT 1 VIEW  9/28/2021 10:07 AM     HISTORY:  sob, fever       COMPARISON:  3/24/2021    FINDINGS:   Portable upright AP view of the chest. Left chest wall cardiac pacer  with leads in similar position. Trachea is midline. Cardiac  mediastinal silhouette is within normal limits. Streaky left basilar  opacity. No significant pleural effusion or pneumothorax. The  visualized upper abdomen is unremarkable. No acute osseous  abnormality.      Impression    IMPRESSION:   Streaky opacity at the left base which likely represents atelectasis.  Infection is not excluded.    I have personally reviewed the examination and initial interpretation  and I agree with the findings.    LUDIVINA GARCIA MD         SYSTEM ID:  [id number]   Troponin I     Status: Normal   Result Value Ref Range    Troponin I <0.015 0.000 - 0.045 ug/L   Comprehensive metabolic panel     Status: Abnormal   Result Value Ref Range    Sodium 139 133 - 144 mmol/L    Potassium 4.2 3.4 - 5.3 mmol/L    Chloride 108 94 - 109 mmol/L    Carbon Dioxide (CO2) 24 20 - 32 mmol/L    Anion Gap 7 3 - 14 mmol/L    Urea Nitrogen 18 7 - 30 mg/dL    Creatinine 0.62 0.52 - 1.04 mg/dL    Calcium 9.0 8.5 - 10.1 mg/dL    Glucose 82 70 - 99 mg/dL    Alkaline Phosphatase 91 40 - 150 U/L    AST 18 0 - 45 U/L    ALT 21 0 - 50 U/L    Protein Total 7.4 6.8 - 8.8 g/dL    Albumin 3.3 (L) 3.4 - 5.0 g/dL    Bilirubin Total 0.3 0.2 - 1.3 mg/dL    GFR Estimate >90 >60 mL/min/1.73m2   Extra Blue Top Tube     Status: None   Result Value Ref Range    Hold Specimen JIC    Extra Red Top Tube     Status: None   Result Value Ref Range    Hold Specimen JIC    Extra Green Top (Lithium Heparin) Tube     Status: None   Result Value Ref Range    Hold Specimen JIC    Extra Purple Top Tube     Status: None   Result Value Ref Range     Hold Specimen JI    CBC with platelets and differential     Status: None   Result Value Ref Range    WBC Count 6.1 4.0 - 11.0 10e3/uL    RBC Count 4.33 3.80 - 5.20 10e6/uL    Hemoglobin 14.0 11.7 - 15.7 g/dL    Hematocrit 43.3 35.0 - 47.0 %     78 - 100 fL    MCH 32.3 26.5 - 33.0 pg    MCHC 32.3 31.5 - 36.5 g/dL    RDW 12.6 10.0 - 15.0 %    Platelet Count 189 150 - 450 10e3/uL    % Neutrophils 63 %    % Lymphocytes 24 %    % Monocytes 10 %    % Eosinophils 1 %    % Basophils 1 %    % Immature Granulocytes 1 %    NRBCs per 100 WBC 0 <1 /100    Absolute Neutrophils 3.9 1.6 - 8.3 10e3/uL    Absolute Lymphocytes 1.5 0.8 - 5.3 10e3/uL    Absolute Monocytes 0.6 0.0 - 1.3 10e3/uL    Absolute Eosinophils 0.1 0.0 - 0.7 10e3/uL    Absolute Basophils 0.1 0.0 - 0.2 10e3/uL    Absolute Immature Granulocytes 0.0 <=0.0 10e3/uL    Absolute NRBCs 0.0 10e3/uL   Symptomatic COVID-19 Virus (Coronavirus) by PCR Nasopharyngeal     Status: Normal    Specimen: Nasopharyngeal; Swab   Result Value Ref Range    SARS CoV2 PCR Negative Negative    Narrative    Testing was performed using the Xpert Xpress SARS-CoV-2 Assay on the  Cepheid Gene-Xpert Instrument Systems. Additional information about  this Emergency Use Authorization (EUA) assay can be found via the Lab  Guide. This test should be ordered for the detection of SARS-CoV-2 in  individuals who meet SARS-CoV-2 clinical and/or epidemiological  criteria. Test performance is unknown in asymptomatic patients. This  test is for in vitro diagnostic use under the FDA EUA for  laboratories certified under CLIA to perform high complexity testing.  This test has not been FDA cleared or approved. A negative result  does not rule out the presence of PCR inhibitors in the specimen or  target RNA in concentration below the limit of detection for the  assay. The possibility of a false negative should be considered if  the patient's recent exposure or clinical presentation suggests  COVID-19.  This test was validated by the [facility]. This laboratory is certified under  the Clinical Laboratory Improvement Amendments of 1988 (CLIA-88) as  qualified to perform high complexity laboratory testing.     CRP inflammation     Status: Normal   Result Value Ref Range    CRP Inflammation 4.2 0.0 - 8.0 mg/L   BNP     Status: Normal   Result Value Ref Range    N terminal Pro BNP Inpatient 142 0 - 900 pg/mL   Troponin I     Status: Normal   Result Value Ref Range    Troponin I <0.015 0.000 - 0.045 ug/L   EKG 12-lead     Status: None   Result Value Ref Range    Systolic Blood Pressure  mmHg    Diastolic Blood Pressure  mmHg    Ventricular Rate 72 BPM    Atrial Rate 72 BPM    CO Interval 186 ms    QRS Duration 158 ms     ms    QTc 503 ms    P Axis 72 degrees    R AXIS -71 degrees    T Axis 76 degrees    Interpretation ECG       Atrial-sensed ventricular-paced rhythm  Abnormal ECG  Unconfirmed report - interpretation of this ECG is computer generated - see medical record for final interpretation  Confirmed by - EMERGENCY ROOM, PHYSICIAN ([id number]),  BENJAMIN HATHAWAY ([id number]) on 9/28/2021 3:24:13 PM     [place] Draw     Status: None    Narrative    The following orders were created for panel order [place] Draw.  Procedure                               Abnormality         Status                     ---------                               -----------         ------                     Extra Blue Top Tube[[id number]]                              Final result               Extra Red Top Tube[[id number]]                               Final result               Extra Green Top (Lithium...[[id number]]                      Final result               Extra Purple Top Tube[[id number]]                            Final result                 Please view results for these tests on the individual orders.   CBC with platelets differential     Status: None    Narrative    The following  orders were created for panel order CBC with platelets differential.  Procedure                               Abnormality         Status                     ---------                               -----------         ------                     CBC with platelets and d...[[id number]]                      Final result                 Please view results for these tests on the individual orders.          The medical record was reviewed and interpreted.  Current labs reviewed and interpreted.  Previous labs reviewed and interpreted.     Results for orders placed or performed during the hospital encounter of 09/28/21 (from the past 24 hour(s))   EKG 12-lead   Result Value Ref Range    Systolic Blood Pressure  mmHg    Diastolic Blood Pressure  mmHg    Ventricular Rate 72 BPM    Atrial Rate 72 BPM    MI Interval 186 ms    QRS Duration 158 ms     ms    QTc 503 ms    P Axis 72 degrees    R AXIS -71 degrees    T Axis 76 degrees    Interpretation ECG       Atrial-sensed ventricular-paced rhythm  Abnormal ECG     [place] Draw    Narrative    The following orders were created for panel order [place] Draw.  Procedure                               Abnormality         Status                     ---------                               -----------         ------                     Extra Blue Top Tube[[id number]]                              In process                 Extra Red Top Tube[[id number]]                               In process                 Extra Green Top (Lithium...[[id number]]                      In process                 Extra Purple Top Tube[[id number]]                            In process                   Please view results for these tests on the individual orders.   CBC with platelets differential    Narrative    The following orders were created for panel order CBC with platelets differential.  Procedure                               Abnormality         Status                     ---------                                -----------         ------                     CBC with platelets and d...[[id number]]                      In process                   Please view results for these tests on the individual orders.     Medications - No data to display         Medications   DULoxetine (CYMBALTA) DR capsule 30 mg (30 mg Oral Given 9/28/21 1247)   acetaminophen (TYLENOL) tablet 650 mg (650 mg Oral Given 9/28/21 1008)         Assessments & Plan (with Medical Decision Making)   Patient is a 60-year-old female with a known history of a cardiac pacemaker who is recently been taken off of Cymbalta who presents to the ER due to some chest pain that started last night.  Patient is that she is been feeling more tired recently.  She has been sleeping more than normal.  Says that since yesterday she has been having a mild pain in her chest.  Says that she has had no new shortness of breath.  Patient says that she is just been feeling very tired in which she is attributed to the medication changes that she is going through.  Patient here had an EKG that showed a paced rhythm.  Patient initial chest x-ray shows no signs of a acute pneumonia.  Patient had initial troponin that was negative.  Patient CBC and CMP were normal.  His Covid test was negative.  We kept the patient in the ER did a 3-hour rule out troponin which is also negative.  Did review patient's medical chart and she recently had a cardiac MRI in May of this year which was negative.  Patient symptoms do not seem specific for ACS and with 2 - troponins my suspicion for acute cardiac event is low.  Patient feels that her symptoms are coming from the Cymbalta withdrawal.  She did not take the Cymbalta yesterday or today and she was previously on 30 mg.  We gave patient 1X 30 mg of Cymbalta and she is feeling better currently.  Plan will be to extend the taper of her Cymbalta out by 2 more weeks.  We will change the patient from the 30 mg to the 20 mg for her to take daily  for the next 5 days.  And from there we will have her take Cymbalta every other day to help with withdrawal symptoms.  Patient agrees this plan of care.  Patient to follow-up with her PCP for further care    I have reviewed the nursing notes.    I have reviewed the findings, diagnosis, plan and need for follow up with the patient.    New Prescriptions    No medications on file       Final diagnoses:   Atypical chest pain   Symptom of drug withdrawal       ICharlette am serving as a trained medical scribe to document services personally performed by Awilda Mendoza MD, based on the provider's statements to me.      I, Awilda Mendoza MD, was physically present and have reviewed and verified the accuracy of this note documented by Charlette Emery.     Awilda Mendoza MD  9/28/2021   [facility] EMERGENCY DEPARTMENT     Awilda Mendzoa MD  09/28/21 [id number]

## 2021-09-28 NOTE — ED NOTES
Bed: ED07  Expected date:   Expected time:   Means of arrival:   Comments:  Yan 624 with a 59 yo F reports of chest pressure since last night and covid symptoms. Triaged yellow

## 2021-09-28 NOTE — TELEPHONE ENCOUNTER
Reason for Call:  Other     Detailed comments: Patient stated that she is still in hospital and wanted provider to be aware that it was due to a Cymbalta reaction taper    Phone Number Patient can be reached at: Home number on file [phone] (home)    Best Time:     Can we leave a detailed message on this number? YES    Call taken on 9/28/2021 at 2:33 PM by Michelle Villavicencio

## 2021-09-28 NOTE — DISCHARGE INSTRUCTIONS
Your troponin x 2 is normal.     Your blood work is stable.     You do not have covid infection.     Your chest xray shows no acute process.     Your symptoms could be from change in the cymbalta medication.  I would recommend you take a 20 mg tablet Cymbalta 15mg for the next 1 week.  Afterward you should take 1/2 tablet every other day for next 5 days before stopping the mediation.      Please make an appointment to follow up with Your Primary Care Provider in 3-5 days even if entirely better.    Return to the ER if any other problems/concerns.

## 2021-09-29 ENCOUNTER — TELEPHONE (OUTPATIENT)
Dept: FAMILY MEDICINE | Facility: CLINIC | Age: 61
End: 2021-09-29

## 2021-09-29 ASSESSMENT — ANXIETY QUESTIONNAIRES
2. NOT BEING ABLE TO STOP OR CONTROL WORRYING: NOT AT ALL
IF YOU CHECKED OFF ANY PROBLEMS ON THIS QUESTIONNAIRE, HOW DIFFICULT HAVE THESE PROBLEMS MADE IT FOR YOU TO DO YOUR WORK, TAKE CARE OF THINGS AT HOME, OR GET ALONG WITH OTHER PEOPLE: VERY DIFFICULT
GAD7 TOTAL SCORE: 6
3. WORRYING TOO MUCH ABOUT DIFFERENT THINGS: NOT AT ALL
5. BEING SO RESTLESS THAT IT IS HARD TO SIT STILL: NOT AT ALL
1. FEELING NERVOUS, ANXIOUS, OR ON EDGE: MORE THAN HALF THE DAYS
7. FEELING AFRAID AS IF SOMETHING AWFUL MIGHT HAPPEN: MORE THAN HALF THE DAYS
6. BECOMING EASILY ANNOYED OR IRRITABLE: MORE THAN HALF THE DAYS

## 2021-09-29 ASSESSMENT — PATIENT HEALTH QUESTIONNAIRE - PHQ9
5. POOR APPETITE OR OVEREATING: NOT AT ALL
SUM OF ALL RESPONSES TO PHQ QUESTIONS 1-9: 14

## 2021-09-29 NOTE — TELEPHONE ENCOUNTER
There are no openings on 10/01/21, provider has a short day. Can this patient have the 09:20 on Thursday 09/30/21 (Med Sec held just in case)

## 2021-09-29 NOTE — TELEPHONE ENCOUNTER
Reason for Call:  Other appointment    Detailed comments: pt needs appointment for ER chest pain follow up on 9/28/21 and wants appointment for 10/1/21 with Sebastien no openings with PCP and pt refused to schedule with other providers. Pt would like call back from PCP team if possible to see Sebastien on 10/1/21. Please call pt back.    Phone Number Patient can be reached at: Cell number on file:    Telephone Information:   Mobile [phone]       Best Time: na    Can we leave a detailed message on this number? Not Applicable    Call taken on 9/29/2021 at 8:20 AM by Adina Landin

## 2021-09-30 ASSESSMENT — ANXIETY QUESTIONNAIRES: GAD7 TOTAL SCORE: 6

## 2021-10-01 NOTE — PROGRESS NOTES
Assessment & Plan     Encounter for screening mammogram for breast cancer    - US Breast Left Complete 4 Quadrants; Future    Cardiac pacemaker in situ    - US Breast Left Complete 4 Quadrants; Future    Hospital discharge follow-up      Withdrawal from other psychoactive substance (H)      Chronic combined systolic and diastolic congestive heart failure (H)    20 minutes spent on the date of the encounter doing chart review, history and exam, documentation and further activities per the note     See Patient Instructions: follow up as needed.   Return in about 6 months (around 4/5/2022), or if symptoms worsen or fail to improve.    MARCELL Alcantar  [facility] ROLAND Cook is a 60 year old who presents for the following health issues     HPI     ED/UC Followup:    Facility:  [facility]  Date of visit: 9/28/21  Reason for visit: chest pain, symptoms of drug withdrawal. Feeling better now.  Mental healht is not doing as well.  Did not realize how much cymbalta was helping her pain.  Does not want to get on anything at this time. Discussed genesight testing if needed.  Pt was told zoloft was next on list of meds to try.   Current Status: discharged     Patient also needs order for breast US left due to pacemaker    Review of Systems   Constitutional, HEENT, cardiovascular, pulmonary, GI, , musculoskeletal, neuro, skin, endocrine and psych systems are negative, except as otherwise noted.      Objective    /84   Pulse 120   Temp 98.2  F (36.8  C) (Tympanic)   Resp 20   Wt 81.5 kg (179 lb 9.6 oz)   LMP 08/21/2007   SpO2 96%   BMI 29.89 kg/m    Body mass index is 29.89 kg/m .  Physical Exam   GENERAL: Healthy, alert and no distress  EYES: Eyes grossly normal to inspection.  No discharge or erythema, or obvious scleral/conjunctival abnormalities.  RESP: No audible wheeze, cough, or visible cyanosis.  No visible retractions or increased work of breathing.    SKIN: Visible skin  clear. No significant rash, abnormal pigmentation or lesions.  NEURO: Cranial nerves grossly intact.  Mentation and speech appropriate for age.  PSYCH: Mentation appears normal, affect normal/bright, judgement and insight intact, normal speech and appearance well-groomed.     See orders

## 2021-10-01 NOTE — TELEPHONE ENCOUNTER
Late entry:    Left patient message to please check MyChart and provided phone number with questions.

## 2021-10-04 DIAGNOSIS — E89.0 POSTABLATIVE HYPOTHYROIDISM: ICD-10-CM

## 2021-10-05 ENCOUNTER — OFFICE VISIT (OUTPATIENT)
Dept: FAMILY MEDICINE | Facility: CLINIC | Age: 61
End: 2021-10-05
Payer: COMMERCIAL

## 2021-10-05 ENCOUNTER — PRE VISIT (OUTPATIENT)
Dept: GASTROENTEROLOGY | Facility: CLINIC | Age: 61
End: 2021-10-05

## 2021-10-05 VITALS
OXYGEN SATURATION: 96 % | BODY MASS INDEX: 29.89 KG/M2 | WEIGHT: 179.6 LBS | HEART RATE: 120 BPM | SYSTOLIC BLOOD PRESSURE: 121 MMHG | TEMPERATURE: 98.2 F | DIASTOLIC BLOOD PRESSURE: 84 MMHG | RESPIRATION RATE: 20 BRPM

## 2021-10-05 DIAGNOSIS — Z95.0 CARDIAC PACEMAKER IN SITU: ICD-10-CM

## 2021-10-05 DIAGNOSIS — Z09 HOSPITAL DISCHARGE FOLLOW-UP: Primary | ICD-10-CM

## 2021-10-05 DIAGNOSIS — I50.42 CHRONIC COMBINED SYSTOLIC AND DIASTOLIC CONGESTIVE HEART FAILURE (H): ICD-10-CM

## 2021-10-05 DIAGNOSIS — Z12.31 ENCOUNTER FOR SCREENING MAMMOGRAM FOR BREAST CANCER: ICD-10-CM

## 2021-10-05 DIAGNOSIS — F19.939 WITHDRAWAL FROM OTHER PSYCHOACTIVE SUBSTANCE (H): ICD-10-CM

## 2021-10-05 PROCEDURE — 99213 OFFICE O/P EST LOW 20 MIN: CPT | Performed by: NURSE PRACTITIONER

## 2021-10-05 ASSESSMENT — PAIN SCALES - GENERAL: PAINLEVEL: NO PAIN (0)

## 2021-10-05 NOTE — PATIENT INSTRUCTIONS
Patient Education   Please make an appointment to follow up with your therapist and/or Psychiatric Clinic (phone: [phone]) within 1-3 days.     Return to the ED if you are having worsening thoughts/feelings of harming yourself or others, or any urgent/life-threatening concerns.     DISCHARGE RESOURCES:    [facility] [phone]     [facility] [phone] (detox), [phone] (outpatient & [facility])      Crisis Intervention: [phone] or 587-980-6203 (TTY: [phone]).  Call anytime.    Suicide Awareness Voices of Education (SAVE) (www.save.org): 320-786-TGFZ (3331)    National Suicide Prevention Line (www.mentalhealthmn.org): 824-667-EKHL (4928)    National [place] on Mental Illness (www.[place].carissa.org): 828.128.2155 or 168-936-2244.    Kdjb5kfvu: text the word LIFE to 93734 for immediate support and crisis intervention    Mental Health Consumer/Survivor Network of [place] (www.mhcsn.net): 451.915.5207 or 245-661-7309    Mental Health Association of [place] (www.mentalhealth.org): 795.868.5432 or 375-947-9589         Patient Education     Managing Chronic Pain   Being in pain can be exhausting. You may find you have trouble working, sleeping, or just doing day-to-day tasks. But you can learn to manage pain, feel better, and regain control of your life.    Understanding chronic pain  Chronic pain is a serious medical problem. It's defined as pain that lasts longer than 3 months. Chronic pain includes pain that you feel regularly, even if it comes and goes. The pain may be from an ongoing injury or health problem. Or it may be because of a chronic pain syndrome, such as fibromyalgia. Sometimes pain persists when no cause can be found.    Pain should be treated  You have a right to have your pain treated. Untreated chronic pain can affect your overall health. It can lead to depression, anxiety, anger, and personality changes. It can also disrupt work, sleep,  relationships, and other aspects of normal life. It may not be possible to relieve all of your pain. But it can be reduced to a level you can cope with.    Your role in treatment  Your healthcare provider will work closely with you on a plan to manage your pain. But it s up to you to put this plan into action. Control of chronic pain is done mainly through self-management. This means that you take an active role in your care. Getting support from family and friends is important too.    Planning your treatment  Your healthcare provider will first look for a cause of your pain that can be treated. He or she will also assess your pain level. This may be done by asking you to rate your pain on a scale from 1 (low pain) to 10 (severe pain). Your provider will also ask you to describe the pain. For example, is your pain sharp or dull? Is it constant or does it come and go? You may be asked to keep a pain log. This is a diary in which you track your pain. It may help identify things that tend to make your pain worse. You and your healthcare provider can make a plan to help prevent and cope with pain on a daily basis. In some cases, you may be referred to a special pain program or clinic. Your treatment plan may include:     Medicines    Complementary therapies    Mind and body therapies    Other medical treatments    Getting physical activity   Medicines  Medicine will most likely be a part of your treatment plan. Your provider will evaluate which are the best medicines for your pain. You may use over-the-counter or prescription medicines. You may need to take more than one medicine. It may take some time to find the best medicine or combination of medicines for your pain. Take all medicines as directed. Pain medicines can be used in many ways. You may take medicines:     Every day to help   stay ahead  of the pain so that it doesn t flare up    At times when pain is worse than usual    Before activities that tend to  trigger pain    To decrease sensitivity to pain  Medicines for chronic pain include:    Nonsteroidal anti-inflammatory medicines (NSAIDs) for pain from swelling and inflammation. Your provider may prescribe a type of NSAID called a MARQUEZ inhibitor.    Acetaminophen    Anticonvulsants to treat nerve pain called neuropathy    Antidepressants to treat neuropathy    Muscle relaxants for muscle spasms    Topical medicines. These are put on the skin.    Opioids, or narcotics, to treat severe pain. These very strong medicines can help ease certain kinds of pain. They most often are used for short periods of time. Your provider will monitor your care very closely if you take opioids to reduce the risk for addiction.   Complementary therapies  These are treatments that can be used along with medical care to help relieve pain. Look for a licensed practitioner with experience treating chronic pain. Talk with your healthcare provider about using complementary therapies such as:     Massage    Physical therapy    Acupuncture and acupressure    Chiropractic    Vitamins or herbal supplements     Naturopathy    Dry needling  Mind/body therapies  The brain and the body are both part of the pain response. The brain reads the pain signals from the body. This means that your mind has some control over how pain signals are processed. Mind/body therapies may help change how your brain reads pain signals. They may be learned with the help of a trained therapist or in a class. They include:     Deep breathing    Distraction    Visualization    Meditation    Biofeedback     Yoga  Other medical treatments  If other treatments don t work for you, one of these procedures or devices may help:    Nerve blocks to numb nerves in a painful area    Trigger point injections for painful muscles    Steroid injections for joint pain     Transcutaneous electrical nerve stimulation (TENS) to block pain signals to the brain    Spinal stimulation to block  spinal pain    Implanted spinal pump that contains pain medicine    Ablation using heat, cold, or chemicals to destroy painful nerves                                                                                                                    Getting physical activity  Being physically active has many benefits. It can improve your ability to cope with pain. It may also help improve your mood, sleep, and overall health. Your healthcare provider can help you plan an exercise program that s right for your needs. This may include:     Stretching and range-of-motion exercises    Low-impact exercise such as walking, biking, swimming, and other water exercise    Strength training using light weights    Walking up the stairs instead of taking the elevator    Riding a bike instead of driving    Parking your car farther from your destination   You may need to avoid high-impact activities. These involve jumping, running, or sudden starts, stops, or changes of direction. If you haven t exercised in a long time or you have physical limitations, your healthcare provider may refer you to a physical therapist. He or she can teach you stretches and exercises that fit your condition and fitness level.    Being active and healthy  A healthier lifestyle makes it easier to cope with pain and function better. Follow these tips:     Choose a balance of healthy foods and drinks.    Limit alcohol and caffeine.    Go to bed at about the same time each day and get enough restful sleep.    Don t let pain keep you from others. Spend time with friends and family.    Keep your mind active. Read books or take classes.    If you re not working, volunteer or join a club or social group.   Getting support  A support group lets you talk with others who also have chronic pain. Chronic pain support groups can help you feel less isolated. They can also give you tips for coping with pain. To find a local support group, contact your nearest hospital  or pain clinic.    You may also want to try counseling. Counseling can help you learn coping skills and methods such as visualization. It can also help with mood problems. When choosing a counselor, look for someone who has worked with people who have chronic pain.    See your provider for regular visits and let him or her know how well treatments are working for you. Also reach out to family and friends for help and support.                               For more support and information, contact these groups:    American Academy of Pain Medicine, www.painmed.org    American Chronic Pain Association, www.theacpa.org    National Pain Foundation, www.nationalpainfoundation.org  [employer] last reviewed this educational content on 8/1/2020 2000-2021 [employer]. All rights reserved. This information is not intended as a substitute for professional medical care. Always follow your healthcare professional's instructions.           Patient Education     BNP (Blood)  Does this test have other names?  B-type natriuretic peptide, brain natriuretic peptide   What is this test?  This test looks for the hormone BNP in your blood. BNP stands for brain or B-type natriuretic peptide. It's made inside the pumping chambers of your heart when pressure builds up from heart failure. The test is an important tool for healthcare providers to diagnose heart failure quickly.   Heart failure happens when your heart is not pumping blood well. This causes cells inside your heart to release BNP. This opens up blood vessels in your body to take pressure off your heart. A BNP blood test correctly shows heart failure about 9 out of 10 times.   The BNP test can help your healthcare provider diagnose heart failure, plan treatment, see how well the treatment is working, and figure out when it's safe for you to leave the hospital. The BNP test can show how serious your heart failure is now and how severe it will be in the future. A BNP  test is quite accurate and it only takes about 15 minutes to get the results.    Why do I need this test?  You may need this test if your healthcare provider thinks you could have heart failure.   The main symptom of heart failure is trouble breathing (dyspnea). If you go to your healthcare provider's office or the emergency room with trouble breathing, your provider will want to know the cause as quickly as possible. Many conditions can cause trouble breathing, but if you also have a blood test that is positive for BNP, heart failure is likely causing your symptoms.   You may also need this test so that your healthcare provider can see how well your heart failure therapy is working.    What other tests might I have along with this test?  You may have a blood test called atrial natriuretic peptide (ANP). ANP is a hormone similar to BNP, but a different part of your heart makes it. You may also have other blood tests, a chest X-ray, an electrocardiogram, or an echocardiogram, which is an ultrasound of the heart.    What do my test results mean?  Test results may vary depending on your age, gender, health history, the method used for the test, and other things. Your test results may not mean you have a problem. Ask your healthcare provider what your test results mean for you.    BNP is measured in picograms per milliliter (pg/mL) or nanograms per liter (ng/L). In general, the more serious your heart failure, the higher your levels of BNP will be. But test results vary by age, sex, and body mass index. Normal values tend to go up with age. They also tend to be higher in women and lower in men. Both men and women who are obese tend to have lower levels.   How is this test done?  The test is done with a blood sample. A needle is used to draw blood from a vein in your arm or hand.    Does this test pose any risks?  Having a blood test with a needle carries some risks. These include bleeding, infection, bruising, and  feeling lightheaded. When the needle pricks your arm or hand, you may feel a slight sting or pain. Afterward, the site may be sore.    What might affect my test results?  Other things besides heart failure can cause your BNP to rise, including:    Kidney failure or being on dialysis    Long-term, or chronic, heart failure    Nesiritide, a synthetic form of BNP used to treat heart failure  How do I get ready for this test?  You don't need to prepare for this test. Be sure your healthcare provider knows about all medicines, herbs, vitamins, and supplements you are taking. This includes medicines that don t need a prescription and any illegal drugs you may use.   [employer] last reviewed this educational content on 3/1/2020    4263-2131 [employer]. All rights reserved. This information is not intended as a substitute for professional medical care. Always follow your healthcare professional's instructions.           Patient Education     Heart Failure and Physical Activity  If you have heart failure, you may wonder if physical activity is good for you. You may worry that putting more strain on your heart could make your heart worse. But the heart is a muscle. And like other muscles, it gets stronger with use.   Regular and moderate physical activity is helpful. This includes walking, swimming, dancing, or biking. This type of activity can:     Improve heart failure symptoms    Reduce stress    Increase your energy levels    Lower blood pressure    Improve circulation    Help you lose weight    Improve quality of life  These are all important factors in staying healthy. They are even more important when you have heart failure.   Being physically active with this condition can be hard. You may have to take some safety measures. Talk with your healthcare provider before starting a physical activity program. You may not be used to physical activity. And you may be nervous about it. Your provider can help you  create a plan that fits your needs. Ask your provider if a cardiac rehab program is right for you.     7124-9228 [employer]. All rights reserved. This information is not intended as a substitute for professional medical care. Always follow your healthcare professional's instructions.

## 2021-10-06 ENCOUNTER — TELEPHONE (OUTPATIENT)
Dept: ENDOCRINOLOGY | Facility: CLINIC | Age: 61
End: 2021-10-06

## 2021-10-06 ENCOUNTER — TELEPHONE (OUTPATIENT)
Dept: CARDIOLOGY | Facility: CLINIC | Age: 61
End: 2021-10-06

## 2021-10-06 RX ORDER — LEVOTHYROXINE SODIUM 150 MCG
TABLET ORAL
Qty: 45 TABLET | Refills: 0 | Status: SHIPPED | OUTPATIENT
Start: 2021-10-06 | End: 2021-10-08

## 2021-10-06 ASSESSMENT — ASTHMA QUESTIONNAIRES: ACT_TOTALSCORE: 21

## 2021-10-06 NOTE — TELEPHONE ENCOUNTER
Called and left voice message for patient with replies to her questions. She was encouraged to call back with any further questions.

## 2021-10-06 NOTE — TELEPHONE ENCOUNTER
[facility] Call Center    Phone Message    May a detailed message be left on voicemail: no     Reason for Call: Other: Joyce called to ask her date of diagnosis with heart failure. She also understands it is not 100%, but she would like to know the range as far as longevity with her particular type of heart failure. Do you use the Withams heart failure model? Also, could you clarify the BNP graph of heart failure lab values. Please reach out to Joyce with clarification.      Action Taken: Message routed to:  [facility] ([facility]): Cardiology    Travel Screening: Not Applicable

## 2021-10-06 NOTE — TELEPHONE ENCOUNTER
SCHEDULING NOTES    RESULT: LVM + MyC    APPT TYPE: RETURN ENDOCRINE         PROVIDER: [facility]    DATE APPT NEEDED: 1st available    ADDITIONAL NOTES: Annual appointment.

## 2021-10-06 NOTE — TELEPHONE ENCOUNTER
SYNTHROID 0.15MG (150MCG)TABLETS  Last Written Prescription Date:  9/25/2020  Last Fill Quantity: 90,   # refills: 3  Last Office Visit : 9/25/2020  Future Office visit:  None  Routing refill request to provider for review/approval because:  Office Visit due      30 day pepito sent to pharm   Clinic notified       Mindy Bailey RN  Central Triage Red Flags/Med Refills

## 2021-10-08 ENCOUNTER — VIRTUAL VISIT (OUTPATIENT)
Dept: ENDOCRINOLOGY | Facility: CLINIC | Age: 61
End: 2021-10-08
Payer: COMMERCIAL

## 2021-10-08 ENCOUNTER — VIRTUAL VISIT (OUTPATIENT)
Dept: PHARMACY | Facility: CLINIC | Age: 61
End: 2021-10-08
Attending: INTERNAL MEDICINE
Payer: COMMERCIAL

## 2021-10-08 DIAGNOSIS — E89.0 POSTABLATIVE HYPOTHYROIDISM: ICD-10-CM

## 2021-10-08 DIAGNOSIS — K52.9 COLITIS: Primary | ICD-10-CM

## 2021-10-08 DIAGNOSIS — F43.23 ADJUSTMENT DISORDER WITH MIXED ANXIETY AND DEPRESSED MOOD: ICD-10-CM

## 2021-10-08 PROCEDURE — 99213 OFFICE O/P EST LOW 20 MIN: CPT | Mod: 95 | Performed by: INTERNAL MEDICINE

## 2021-10-08 PROCEDURE — 99606 MTMS BY PHARM EST 15 MIN: CPT | Performed by: PHARMACIST

## 2021-10-08 PROCEDURE — 99607 MTMS BY PHARM ADDL 15 MIN: CPT | Performed by: PHARMACIST

## 2021-10-08 RX ORDER — LEVOTHYROXINE SODIUM 150 MCG
TABLET ORAL
Qty: 90 TABLET | Refills: 3 | Status: SHIPPED | OUTPATIENT
Start: 2021-10-08 | End: 2021-11-23

## 2021-10-08 NOTE — PATIENT INSTRUCTIONS
Recommendations from today's MTM visit:                                                       1. Sildenafil question   -- Lisa to look into literature regarding whether or not sildenafil is associated with microscopic colitis (response via [employer])    2.  Lisa to connect with Kortney Peralta PharmD   -- benzodiazepine tapering information   -- options for PTSD in setting of colitis   -- consideration for genesight testing    Update: I was able to connect with Kortney and she will likely be reaching out regarding Genesight testing.    3. Joyce to contact Lisa if diarrhea returns   -- consider cholestyramine re-trial     Follow-up: with Dr. Penn and Kortney Peralta PharmD as planned   -- follow-up with GI MTM as needed    It was great to speak with you today.  I value your experience and would be very thankful for your time with providing feedback on our clinic survey. You may receive a survey via email or text message in the next few days.     To schedule another MTM appointment, please call the clinic directly or you may call the MTM scheduling line at [phone] or toll-free at [phone].     My Clinical Pharmacist's contact information:                                                      Please feel free to contact me with any questions or concerns you have.      Lisa Medrano PharmD, BCACP  MTM Pharmacist   [facility]  Phone: [phone]

## 2021-10-08 NOTE — LETTER
10/8/2021       RE: Joyce Marroquin  [address] [facility] [address]     Dear Colleague,    Thank you for referring your patient, Joyce Marroquin, to the [facility] [place] [facility]. Please see a copy of my visit note below.             Endocrinology Note         Joyce is a 60 year old female who has VDO visit for follow up hypothyroidism     HPI  Joyce Marroquin is 60 years old with hx of depression, hypothyroidism secondary to I131 treatment for Graves's disease, POTS, GERD, complete heart block, s/p ablation and pacemaker, heart failure, pericarditis, collageneous colitis, anxiety who has VDO visit for postablative hypothyroidism. Last visit 8/2020    She stated that she has not been feeling well due to heart failure. She has limited exercise tolerance.     1) Postablative hypothyroidism: she did have Graves'disease and received I131 for treatment. She is taking Synthroid 150 mcg for 6 days and 75 mcg for 1 day per week. Recent TFT on 7/27/2021 showed TSH 1.44. she has negative TPO and AYANNA ab.    2) Osteoporosis: DXA 11/2017 showed osteoporosis with T-score -2.7 at radius and spine. +stress fracture in the right tibia in her 20s. She does not want to be treatment for osteoporosis regardless of DXA result. She is afraid of side effects.    She takes 2000 IU daily of vitamin D and calcium 600 mg daily. She has not taken cheese or yogurt regularly as she used to. She did have stress fracture in her leg when she was 20s. No recent fracture or fall. She reports her sister has had parathyroid problem.     3) Hyperparathyroidism with intermittent mild hypercalcemia: she did previously have work-up for hyperparathyroidism around Nov-Dec 2017 due to ongoing fatigue and depressed mood. Parathyroid scan showed asymmetric activity near the upper left thyroid lobe, raising suspicion for parathyroid adenoma. Ultrasound neck did NOT show  parathyroid adenoma. DXA 11/2017 showed osteoporosis with T-score -2.7 at radius and spine. Calcium was normal to mildly elevated.  She underwent left superior parathyroidectomy which was consistent with parathyroid adenoma. Calcium level normalized. Recent labs showed calcium 8.9, PTH 62, vitamin D 54. She has normal kidney function.    Past Medical History  Past Medical History:   Diagnosis Date     Arthritis      Cardiomyopathy (H)     ff Cardiology     Chronic depressive personality disorder      Congestive heart failure (H) see dr martínez    heart failure correct term     COPD exacerbation (H)      Esophageal reflux      Ex-smoker     quit 2006; 1 PPD x 30     Hyperparathyroidism (H)     s/p parathyroidectomy     Hypothyroidism      LARRY on CPAP     ff Sleep medicine     Pulmonary nodules     ff Pulmonologist     S/P cardiac pacemaker procedure     checked every 6 months at the [facility]     Uncomplicated asthma years       Allergies  Allergies   Allergen Reactions     Tetracycline Swelling     Zofran [Ondansetron]      Prolonged QT  Prolonged QT at baseline per patient     Flagyl [Metronidazole] Rash     Buspar [Buspirone]      Muscle weakness     Corn Oil Other (See Comments)     Headache       Seasonal Allergies Difficulty breathing     Sulfamethoxazole-Trimethoprim      Other reaction(s): Other  Passed out     Cyclobenzaprine Other (See Comments)     Extreme heat intolerance     Levaquin [Levofloxacin]      Other reaction(s): Other  Tendon rupture     Nsaids Other (See Comments)     Exacerbated asthma     Medications  Current Outpatient Medications   Medication Sig Dispense Refill     acetaminophen (TYLENOL) 500 MG tablet Take 500-1,000 mg by mouth every 6 hours as needed for mild pain       albuterol (PROAIR HFA/PROVENTIL HFA/VENTOLIN HFA) 108 (90 BASE) MCG/ACT Inhaler Inhale 2 puffs into the lungs as needed for shortness of breath / dyspnea or wheezing        apixaban ANTICOAGULANT (ELIQUIS) 5 MG tablet Take 1  tablet (5 mg) by mouth 2 times daily 120 tablet 3     azelastine (ASTELIN) 0.1 % nasal spray Spray 1 spray into both nostrils 2 times daily as needed        Calcium Lactate 500 MG CAPS Take 250-500 mg by mouth daily        clonazePAM (KLONOPIN) 0.5 MG tablet TAKE 1 TABLET(0.5 MG) BY MOUTH THREE TIMES DAILY AS NEEDED FOR ANXIETY 90 tablet 1     DiphenhydrAMINE HCl (BENADRYL PO) Take 25 mg by mouth nightly as needed for allergies        DULoxetine (CYMBALTA) 20 MG capsule Take 1 capsule (20 mg) by mouth daily 10 capsule 0     Ferrous Sulfate (IRON SUPPLEMENT PO) Take 130 mg by mouth daily        furosemide (LASIX) 20 MG tablet Take 1 tablet, no more than twice a week.  Call with weight gain. 180 tablet 1     ipratropium (ATROVENT) 0.06 % nasal spray Spray 2 sprays into both nostrils 4 times daily as needed        loperamide (IMODIUM A-D) 2 MG tablet Take 2 tabs (4 mg) after first loose stool, and then take one tab (2 mg) after each diarrheal stool.  Max of 8 tabs (16 mg) per day. 1 tablet 0     MAGNESIUM LACTATE PO Take  mg by mouth nightly as needed        MELATONIN PO Take 1-3 mg by mouth nightly as needed        Menaquinone-7 (VITAMIN K2 PO) Take 1 tablet by mouth every morning        METHYLPREDNISOLONE PO Take 40 mg by mouth as needed        metoprolol succinate ER (TOPROL XL) 25 MG 24 hr tablet Take 1 tablet (25 mg) by mouth daily (Patient taking differently: Take 25 mg by mouth daily Taking 1/2 tablet daily.) 45 tablet 3     Multiple Vitamin (DAILY MULTIVITAMIN PO) Take 1 tablet by mouth every morning        nebulizer nebulization as needed       Probiotic Product (PROBIOTIC DAILY PO) Take 1 capsule by mouth 2 times daily as needed        sildenafil (REVATIO) 10 MG/ML SUSR Take 1 mL (10 mg) by mouth every 8 hours 112 mL 11     spironolactone (ALDACTONE) 25 MG tablet Take 1/2 tablet daily 90 tablet 3     SYNTHROID 150 MCG tablet TAKE ONE TABLET BY MOUTH SIX DAYS A WEEK; TAKE ONE-HALF TABLET ONE DAY A WEEK  **Office visit due before next refill.  Please call [phone] to schedule appointment** 45 tablet 0     TURMERIC PO Take 1 tablet by mouth every morning        vitamin B complex with vitamin C (VITAMIN  B COMPLEX) PO tablet Take 1 tablet by mouth as needed       vitamin D3 (CHOLECALCIFEROL) 2000 units tablet Take 1 tablet by mouth daily 1 tablet 0     fluticasone-salmeterol (ADVAIR) 250-50 MCG/DOSE inhaler Inhale 1 puff into the lungs every 12 hours       Zinc 15 MG CAPS Take 15 mg by mouth        Social History  Social History     Tobacco Use     Smoking status: Former Smoker     Packs/day: 0.00     Years: 0.00     Pack years: 0.00     Smokeless tobacco: Never Used   Vaping Use     Vaping Use: Never used   Substance Use Topics     Alcohol use: Yes     Alcohol/week: 0.0 - 8.0 standard drinks     Comment: seldom     Drug use: No   working part time    ROS  10 points ROS were negative otherwise mentioned in HPI  Low energy, reduced exercise capacity due to heart failure    Physical Exam  Limited due to VDO visit  Constitutional: no distress, comfortable, pleasant   Psychological: appropriate mood         RESULTS  Sestamibi scan 12/1/2017      US neck 1/4/18 12/1/2017 - Nuclear medicine parathyroid scan.  6/28/2017 - CT chest.     FINDINGS: A normal thyroid gland is not visualized. Per report, the patient has a history of prior radioiodine thyroid ablation. No enlarged or abnormal-appearing lymph nodes in the neck. No other abnormal masses in the neck.     IMPRESSION: No visualized enlarged parathyroid gland.    DXA 11/17/17        ASSESSMENT:    Joyce Marroquin is 60 years old with hx of depression, hypothyroidism secondary to I131 treatment for Graves's disease, POTS, GERD, complete heart block, s/p ablation and pacemaker, heart failure, pericarditis, collageneous colitis, anxiety who has VDO visit for postablative hypothyroidism.     1) Postablative hypothyroidism: secondary to Graves' disease treatment.   - Recent  TFT on 7/27/2021 showed TSH 1.44. she has negative TPO and AYANNA ab.  - continue Synthroid 150 mcg x6 days and 75 mcg x1 day per week      2) Osteoporosis: DXA 11/2017 showed osteoporosis with T-score -2.7 at radius and spine. +stress fracture in the right tibia in her 20s. She does not want to be treatment for osteoporosis regardless of DXA result.  Continue vitamin D  Emphasize her to take calcium 600 mg bid and/or dairy calcium.   Fall precaution    3) primary hyperparathyroidism: Parathyroid scan showed asymmetric activity near the upper left thyroid lobe, raising suspicion for parathyroid adenoma. Ultrasound neck did NOT show parathyroid adenoma. DXA 11/2017 showed osteoporosis with T-score -2.7 at radius and spine. Calcium has always been normal to mildly elevated.  She underwent left superior parathyroidectomy which was consistent with parathyroid adenoma. Calcium and PTH were normalized afterward.  Check calcium annually during yearly physical exam    PLAN:   - continue Synthroid 150 mcg x6 days and 75 mcg x1 day per week  - she can follow up with PCP for hypothyroidism and hyperparathyroidism for yearly labs    Start: 10/08/2021 08:15 am  Stop: 10/08/2021 08:28 am  VDO duration: 13 minutes    External notes/medical records independently reviewed, labs and imaging independently reviewed, medical management and tests to be discussed/communicated to patient.    Time: I spent 27 minutes spent on the date of the encounter preparing to see patient (including chart review and preparation), obtaining and or reviewing additional medical history, performing a physical exam and evaluation, documenting clinical information in the electronic health record, independently interpreting results, communicating results to the patient and coordinating care.    Garland Patel MD     Division of Diabetes and Endocrinology  Department of Medicine  [phone]

## 2021-10-08 NOTE — PROGRESS NOTES
Joyce is a 60 year old who is being evaluated via a billable video visit.      How would you like to obtain your AVS? MyChart  If the video visit is dropped, the invitation should be resent by: Text to cell phone: [phone]  Will anyone else be joining your video visit? Rossy Olivares MA

## 2021-10-08 NOTE — PROGRESS NOTES
Medication Therapy Management (MTM) Encounter    ASSESSMENT:                            Medication Adherence/Access: No issues identified    Colitis: We reviewed that microscopic colitis can be medication induced. Medications high on the suspicion list include NSAIDs/PPIs/sertraline and other SSRIs, some statin medications amongst others. Going forward it would be important to consider the risk of colitis when adding in additional agents. She was concerned about sildenafil as a major risk which I did not see reflected strongly in the literature. We re-reviewed the plan outlined by her GI providers. We discussed the advantage of a trial of cholestyramine if her diarrhea were to return, given this is not absorbed. Therefore other than the timing of her other medications, she would have less to worry about in terms of QT/cardiac effects. She will plan to contact myself or the GI clinic if her symptoms return. In reviewing her medication list, I do not see any medications with a high likelihood of colitis, beyond the duloxetine at this time.    Depression/Anxiety: We reviewed that sertraline can be a trigger of microscopic colitis, therefore, I think it would be best to consider an alternate agent. Given her experience with duloxetine I do see value in having genesight testing done and we discussed this today. I will partner with her primary care team to see if this is something they would be agreeable to facilitating. I explained that this will help narrow down possibilities which can then be cross checked for risk of colitis/QT prolongation. I agree with the plan for eventually tapering her clonazepam, but given the number of concerns she has right now I feel it may be best to wait until she is feeling more settled.    PLAN:                            1. Sildenafil question   -- Lisa to look into literature regarding whether or not sildenafil is associated with microscopic colitis. Response in MyChart.    2.   Lisa to connect with Kortney Peralta PharmD   -- benzodiazepine tapering information   -- options for PTSD in setting of colitis   -- consideration for genesight testing     Update: Staff message sent. Connected with Kortney who will try and connect with Joyce regarding genesight testing. Other topics discussed as above.    3. Joyce to contact Lisa if diarrhea returns   -- consider cholestyramine re-trial     Follow-up: with Dr. Penn and Kortney Peralta PharmD as planned   -- follow-up with GI MTM as needed    Addendum 10/20/21:  [employer] pharmacogen[employer] testing was ordered, I used the below process:    Kortney Peralta PharmD  Medication Therapy Management Pharmacist  [phone]    SUBJECTIVE/OBJECTIVE:                          Joyce Marroquin is a 60 year old female called for a follow-up visit. She was referred to me from Dr. Penn.  Today's visit is a follow-up MTM visit from her appointment with Kortney Peralta PharmD on 3/15/21.     Reason for visit: Joyce states she is not sure why she is meeting with me today. She works with Kortney Peralta PharmD and states she does a majority of her medication review herself. 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    - she notes she was prescribed pepto bismol which her local pharmacist told her not to take   - she wanted to take loperamide but is concerned about QT prolongation   - she does not have diarrhea right now (the probiotics helped), but she is wondering what she can use going forward (she asks specifically about kaopectate)     Allergies/ADRs: Reviewed in chart  Tobacco: She reports that she has quit smoking. She smoked 0.00 packs per day for 0.00 years. She has never used smokeless tobacco.  Alcohol: not currently using    Medication Adherence/Access:   -- Takes all her medications together in the morning.   -- She notes that she has HF, prolonged QT and colitis and she needs to make sure when adding new meds that this is accounted for.  "She also mentions she has to start with baby doses of medications and coming off medications can be difficult for her. She was trying to taper duloxetine and ended up in the ER recently.     Colitis:     She was seen by Dr. Penn and Krystin WILSON who noted the following : \"She did well from a GI standpoint until the last few months, when she developed diarrhea > 6 BMs daily. She has not noted bleeding or other \"alarm\" symptoms. Of note, these symptoms started shortly after she was placed on Duloxetine, which has been associated with microscopic colitis.     We discussed issues at some length. One option would be to have her taper off the Duloxetine and see if her symptoms resolve. However, she should discuss this with her primary care, Pharm D, and mental health providers (e.g., to see if another agent would suffice).    In addition, it would be reasonable for her to try a course Pepto Bismol for one month, and then see us back in clinic.We will check a CRP today. If she has persistent symptoms, we will consider budesonide and potentially a referral to our IBD colleagues if she has refractory symptoms.\"    The plan was as follows: \"- Discussed with Joyce that she should follow up with her mental health provider and Pharm D to discuss possible alternative medications and wean off Cymbalta to see if this would improve the diarrhea  - She was previously taking Imodium but stopped this as it can cause QT prolongation. She continues to take probiotics.   - Discussed starting Bismuth-subsalicylate 3 262mg tablets 3 times a day. If this fails, could consider Budesonide or Cholestyramine although she reported little benefit in the past.   - Would like to treat diarrhea first and see if the fecal incontinence improves. However if it does not would consider referral to Colorectal Surgery for further evaluation and workup.   - Check CRP and Sed rate\"    Afraid to take imodium due to risk of QT prolongation. She would like " "to avoid aspirin/salicyclates. Feels like she had diarrhea before the duloxetine even though that is was they are suggesting now.Hasn't had diarrhea since September 20. Taking large doses of probiotics like she did previously when she saw [place].    Reports she took budesonide before and it was a lot of capsules to take. We discuss that I am not sure that's what it was given it is generally once daily dosing. Quit work in 2011, notes she wore diapers to her wedding. She states that she took cholestyramine for her wedding. She could not recall if it helped, but later thought it did help/must have helped. Took double dose probiotics previously which helped. Notes she previously smoked, but does not now. Had trouble in 2010 with this as well.     Depression/Anxiety:   Duloxetine tapering  Clonazepam 0.5 mg three times daily as needed anxiety     Joyce contacted the PharmD team after meeting with GI to taper Cymbalta. The plan was to decrease by 30 mg daily x 1 week then stop. She ended up going to the emergency room. At the ER, they recommended extending her taper \"Plan will be to extend the taper of her Cymbalta out by 2 more weeks.  We will change the patient from the 30 mg to the 20 mg for her to take daily for the next 5 days.  And from there we will have her take Cymbalta every other day to help with withdrawal symptoms.\" She is still wondering what option she should use for her PTSD given she does not feel this is adequately controlled. She and Kortney had previously discussed sertraline.     She also feels it would be beneficial to taper off of clonazepam, but not right now. She was able to do this before when she was just taking one daily. She may take 1 or 2 daily now.   ----------------  Post Discharge Medication Reconciliation Status: medication reconcilation previously completed during another office visit.   -- Joyce was interested in discussing the topics included above.     I spent 53 minutes with this " patient today. I offer these suggestions for consideration by her care team. A copy of the visit note was provided to the patient's referring provider.    The patient was sent via [employer] a summary of these recommendations.     Lisa TracyD, BCACP  MTM Pharmacist   [facility]  Phone: [phone]    Telemedicine Visit Details  Type of service:  Telephone visit  Start Time: 10:01 AM  End Time: 10:54 AM  Originating Location (patient location): Home  Distant Location (provider location):  [facility] MTM     Medication Therapy Recommendations  No medication therapy recommendations to display

## 2021-10-08 NOTE — PROGRESS NOTES
Endocrinology Note         Joyce is a 60 year old female who has VDO visit for follow up hypothyroidism     HPI  Joyce Marroquin is 60 years old with hx of depression, hypothyroidism secondary to I131 treatment for Graves's disease, POTS, GERD, complete heart block, s/p ablation and pacemaker, heart failure, pericarditis, collageneous colitis, anxiety who has VDO visit for postablative hypothyroidism. Last visit 8/2020    She stated that she has not been feeling well due to heart failure. She has limited exercise tolerance.     1) Postablative hypothyroidism: she did have Graves'disease and received I131 for treatment. She is taking Synthroid 150 mcg for 6 days and 75 mcg for 1 day per week. Recent TFT on 7/27/2021 showed TSH 1.44. she has negative TPO and AYANNA ab.    2) Osteoporosis: DXA 11/2017 showed osteoporosis with T-score -2.7 at radius and spine. +stress fracture in the right tibia in her 20s. She does not want to be treatment for osteoporosis regardless of DXA result. She is afraid of side effects.    She takes 2000 IU daily of vitamin D and calcium 600 mg daily. She has not taken cheese or yogurt regularly as she used to. She did have stress fracture in her leg when she was 20s. No recent fracture or fall. She reports her sister has had parathyroid problem.     3) Hyperparathyroidism with intermittent mild hypercalcemia: she did previously have work-up for hyperparathyroidism around Nov-Dec 2017 due to ongoing fatigue and depressed mood. Parathyroid scan showed asymmetric activity near the upper left thyroid lobe, raising suspicion for parathyroid adenoma. Ultrasound neck did NOT show parathyroid adenoma. DXA 11/2017 showed osteoporosis with T-score -2.7 at radius and spine. Calcium was normal to mildly elevated.  She underwent left superior parathyroidectomy which was consistent with parathyroid adenoma. Calcium level normalized. Recent labs showed calcium 8.9, PTH 62, vitamin D 54. She has normal kidney  function.    Past Medical History  Past Medical History:   Diagnosis Date     Arthritis      Cardiomyopathy (H)     ff Cardiology     Chronic depressive personality disorder      Congestive heart failure (H) see  note    heart failure correct term     COPD exacerbation (H)      Esophageal reflux      Ex-smoker     quit 2006; 1 PPD x 30     Hyperparathyroidism (H)     s/p parathyroidectomy     Hypothyroidism      LARRY on CPAP     ff Sleep medicine     Pulmonary nodules     ff Pulmonologist     S/P cardiac pacemaker procedure     checked every 6 months at the [facility]     Uncomplicated asthma years       Allergies  Allergies   Allergen Reactions     Tetracycline Swelling     Zofran [Ondansetron]      Prolonged QT  Prolonged QT at baseline per patient     Flagyl [Metronidazole] Rash     Buspar [Buspirone]      Muscle weakness     Corn Oil Other (See Comments)     Headache       Seasonal Allergies Difficulty breathing     Sulfamethoxazole-Trimethoprim      Other reaction(s): Other  Passed out     Cyclobenzaprine Other (See Comments)     Extreme heat intolerance     Levaquin [Levofloxacin]      Other reaction(s): Other  Tendon rupture     Nsaids Other (See Comments)     Exacerbated asthma     Medications  Current Outpatient Medications   Medication Sig Dispense Refill     acetaminophen (TYLENOL) 500 MG tablet Take 500-1,000 mg by mouth every 6 hours as needed for mild pain       albuterol (PROAIR HFA/PROVENTIL HFA/VENTOLIN HFA) 108 (90 BASE) MCG/ACT Inhaler Inhale 2 puffs into the lungs as needed for shortness of breath / dyspnea or wheezing        apixaban ANTICOAGULANT (ELIQUIS) 5 MG tablet Take 1 tablet (5 mg) by mouth 2 times daily 120 tablet 3     azelastine (ASTELIN) 0.1 % nasal spray Spray 1 spray into both nostrils 2 times daily as needed        Calcium Lactate 500 MG CAPS Take 250-500 mg by mouth daily        clonazePAM (KLONOPIN) 0.5 MG tablet TAKE 1 TABLET(0.5 MG) BY MOUTH THREE TIMES DAILY AS NEEDED FOR  ANXIETY 90 tablet 1     DiphenhydrAMINE HCl (BENADRYL PO) Take 25 mg by mouth nightly as needed for allergies        DULoxetine (CYMBALTA) 20 MG capsule Take 1 capsule (20 mg) by mouth daily 10 capsule 0     Ferrous Sulfate (IRON SUPPLEMENT PO) Take 130 mg by mouth daily        furosemide (LASIX) 20 MG tablet Take 1 tablet, no more than twice a week.  Call with weight gain. 180 tablet 1     ipratropium (ATROVENT) 0.06 % nasal spray Spray 2 sprays into both nostrils 4 times daily as needed        loperamide (IMODIUM A-D) 2 MG tablet Take 2 tabs (4 mg) after first loose stool, and then take one tab (2 mg) after each diarrheal stool.  Max of 8 tabs (16 mg) per day. 1 tablet 0     MAGNESIUM LACTATE PO Take  mg by mouth nightly as needed        MELATONIN PO Take 1-3 mg by mouth nightly as needed        Menaquinone-7 (VITAMIN K2 PO) Take 1 tablet by mouth every morning        METHYLPREDNISOLONE PO Take 40 mg by mouth as needed        metoprolol succinate ER (TOPROL XL) 25 MG 24 hr tablet Take 1 tablet (25 mg) by mouth daily (Patient taking differently: Take 25 mg by mouth daily Taking 1/2 tablet daily.) 45 tablet 3     Multiple Vitamin (DAILY MULTIVITAMIN PO) Take 1 tablet by mouth every morning        nebulizer nebulization as needed       Probiotic Product (PROBIOTIC DAILY PO) Take 1 capsule by mouth 2 times daily as needed        sildenafil (REVATIO) 10 MG/ML SUSR Take 1 mL (10 mg) by mouth every 8 hours 112 mL 11     spironolactone (ALDACTONE) 25 MG tablet Take 1/2 tablet daily 90 tablet 3     SYNTHROID 150 MCG tablet TAKE ONE TABLET BY MOUTH SIX DAYS A WEEK; TAKE ONE-HALF TABLET ONE DAY A WEEK **Office visit due before next refill.  Please call [phone] to schedule appointment** 45 tablet 0     TURMERIC PO Take 1 tablet by mouth every morning        vitamin B complex with vitamin C (VITAMIN  B COMPLEX) PO tablet Take 1 tablet by mouth as needed       vitamin D3 (CHOLECALCIFEROL) 2000 units tablet Take 1  tablet by mouth daily 1 tablet 0     fluticasone-salmeterol (ADVAIR) 250-50 MCG/DOSE inhaler Inhale 1 puff into the lungs every 12 hours       Zinc 15 MG CAPS Take 15 mg by mouth        Social History  Social History     Tobacco Use     Smoking status: Former Smoker     Packs/day: 0.00     Years: 0.00     Pack years: 0.00     Smokeless tobacco: Never Used   Vaping Use     Vaping Use: Never used   Substance Use Topics     Alcohol use: Yes     Alcohol/week: 0.0 - 8.0 standard drinks     Comment: seldom     Drug use: No   working part time    ROS  10 points ROS were negative otherwise mentioned in HPI  Low energy, reduced exercise capacity due to heart failure    Physical Exam  Limited due to VDO visit  Constitutional: no distress, comfortable, pleasant   Psychological: appropriate mood         RESULTS  Sestamibi scan 12/1/2017      US neck 1/4/18 12/1/2017 - Nuclear medicine parathyroid scan.  6/28/2017 - CT chest.     FINDINGS: A normal thyroid gland is not visualized. Per report, the patient has a history of prior radioiodine thyroid ablation. No enlarged or abnormal-appearing lymph nodes in the neck. No other abnormal masses in the neck.     IMPRESSION: No visualized enlarged parathyroid gland.    DXA 11/17/17        ASSESSMENT:    Joyce Marroquin is 60 years old with hx of depression, hypothyroidism secondary to I131 treatment for Graves's disease, POTS, GERD, complete heart block, s/p ablation and pacemaker, heart failure, pericarditis, collageneous colitis, anxiety who has VDO visit for postablative hypothyroidism.     1) Postablative hypothyroidism: secondary to Graves' disease treatment.   - Recent TFT on 7/27/2021 showed TSH 1.44. she has negative TPO and AYANNA ab.  - continue Synthroid 150 mcg x6 days and 75 mcg x1 day per week      2) Osteoporosis: DXA 11/2017 showed osteoporosis with T-score -2.7 at radius and spine. +stress fracture in the right tibia in her 20s. She does not want to be treatment for  osteoporosis regardless of DXA result.  Continue vitamin D  Emphasize her to take calcium 600 mg bid and/or dairy calcium.   Fall precaution    3) primary hyperparathyroidism: Parathyroid scan showed asymmetric activity near the upper left thyroid lobe, raising suspicion for parathyroid adenoma. Ultrasound neck did NOT show parathyroid adenoma. DXA 11/2017 showed osteoporosis with T-score -2.7 at radius and spine. Calcium has always been normal to mildly elevated.  She underwent left superior parathyroidectomy which was consistent with parathyroid adenoma. Calcium and PTH were normalized afterward.  Check calcium annually during yearly physical exam    PLAN:   - continue Synthroid 150 mcg x6 days and 75 mcg x1 day per week  - she can follow up with PCP for hypothyroidism and hyperparathyroidism for yearly labs    Start: 10/08/2021 08:15 am  Stop: 10/08/2021 08:28 am  VDO duration: 13 minutes    External notes/medical records independently reviewed, labs and imaging independently reviewed, medical management and tests to be discussed/communicated to patient.    Time: I spent 27 minutes spent on the date of the encounter preparing to see patient (including chart review and preparation), obtaining and or reviewing additional medical history, performing a physical exam and evaluation, documenting clinical information in the electronic health record, independently interpreting results, communicating results to the patient and coordinating care.    Garland Patel MD     Division of Diabetes and Endocrinology  Department of Medicine  [phone]

## 2021-10-15 NOTE — TELEPHONE ENCOUNTER
Patient has been in touch with [facility], no further follow-up is warranted at this time.

## 2021-10-19 ENCOUNTER — TELEPHONE (OUTPATIENT)
Dept: GASTROENTEROLOGY | Facility: CLINIC | Age: 61
End: 2021-10-19

## 2021-10-19 ENCOUNTER — VIRTUAL VISIT (OUTPATIENT)
Dept: GASTROENTEROLOGY | Facility: CLINIC | Age: 61
End: 2021-10-19
Payer: COMMERCIAL

## 2021-10-19 VITALS
WEIGHT: 180 LBS | DIASTOLIC BLOOD PRESSURE: 63 MMHG | SYSTOLIC BLOOD PRESSURE: 100 MMHG | HEART RATE: 60 BPM | BODY MASS INDEX: 29.99 KG/M2 | HEIGHT: 65 IN | OXYGEN SATURATION: 100 %

## 2021-10-19 DIAGNOSIS — K52.839 MICROSCOPIC COLITIS, UNSPECIFIED MICROSCOPIC COLITIS TYPE: Primary | ICD-10-CM

## 2021-10-19 PROCEDURE — 99212 OFFICE O/P EST SF 10 MIN: CPT | Mod: 95 | Performed by: INTERNAL MEDICINE

## 2021-10-19 ASSESSMENT — PAIN SCALES - GENERAL: PAINLEVEL: NO PAIN (0)

## 2021-10-19 ASSESSMENT — MIFFLIN-ST. JEOR: SCORE: 1377.35

## 2021-10-19 NOTE — PROGRESS NOTES
Joyce is a 60 year old who is being evaluated via a billable video visit.      How would you like to obtain your AVS? MyChart  If the video visit is dropped, the invitation should be resent by: Text to cell phone: [phone]   Will anyone else be joining your video visit? No      Video Start Time: 340  Video-Visit Details    Type of service:  Video Visit    Video End Time:3:45 PM    Originating Location (pt. Location): Home    Distant Location (provider location):  [facility] [place]     Platform used for Video Visit: MindQuilt

## 2021-10-19 NOTE — PROGRESS NOTES
[facility]       Gastroenterology Clinic    Date of Service: 10/19/2021       Primary Care Provider: Ce Crowe    History of Present Illness     Joyce Marroquin presents for follow-up of diarrhea and microscopic colitis. Please see her prior GI Clinic notes and colonoscopy & pathology reports.    The patient reports that her diarrhea resolved after she tapered off of Duloxetine, which can cause diarrhea and has been associated with microscopic colitis. She also started back on probiotics, which had helped her in the past with previously diagnosed microscopic colitis.          Past Medical History:  Past Medical History:   Diagnosis Date     Arthritis      Cardiomyopathy (H)     ff Cardiology     Chronic depressive personality disorder      Congestive heart failure (H) see dr martínez    heart failure correct term     COPD exacerbation (H)      Esophageal reflux      Ex-smoker     quit 2006; 1 PPD x 30     Hyperparathyroidism (H)     s/p parathyroidectomy     Hypothyroidism      LARRY on CPAP     ff Sleep medicine     Pulmonary nodules     ff Pulmonologist     S/P cardiac pacemaker procedure     checked every 6 months at the [facility]     Uncomplicated asthma years       Patient Active Problem List   Diagnosis     Benign neoplasm of vulva     Esophageal reflux     Chronic depressive personality disorder     Complete atrioventricular block (H)     Cardiac pacemaker in situ- [employer]tronic, dual lead- DEPENDENT     Hypothyroidism     Ex-smoker     Hyperparathyroidism (H)     Pulmonary nodules     Cardiomyopathy (H)     Hx of cardiac pacemaker     Situational anxiety     LARRY (obstructive sleep apnea)     Restless legs syndrome (RLS)     Vestibular disequilibrium, unspecified laterality     Collagenous colitis     Death of parent     Panic attack     Insomnia, unspecified type     Exacerbation of asthma, unspecified asthma severity, unspecified whether persistent     Status post coronary  angiogram     Adjustment disorder with mixed anxiety and depressed mood     Other ill-defined heart diseases     PTSD (post-traumatic stress disorder)     History of cardiac radiofrequency ablation       Surgical History:  Past Surgical History:   Procedure Laterality Date     BUNIONECTOMY Bilateral      COLONOSCOPY N/A 8/30/2021    Procedure: COLONOSCOPY, WITH POLYPECTOMY AND BIOPSY;  Surgeon: Ranjit Penn MD;  Location: [facility]     CV CORONARY ANGIOGRAM N/A 9/26/2019    Procedure: CV CORONARY ANGIOGRAM;  Surgeon: Erickson Trejo MD;  Location:  HEART CARDIAC CATH LAB     CV RIGHT HEART CATH MEASUREMENTS RECORDED N/A 7/20/2020    Procedure: CV RIGHT HEART CATH;  Surgeon: Alonso Ordonez MD;  Location:  HEART CARDIAC CATH LAB     EP ABLATION / EP STUDIES  04/21/2017     EXPLORE NECK N/A 9/19/2018    Procedure: EXPLORE NECK;  Neck Exploration Resection of Left superior Parathyroid gland;  Surgeon: Kristine Venegas MD;  Location: [facility] OR     HC CORRECT BUNION,SIMPLE       PARATHYROIDECTOMY N/A 9/19/2018    Procedure: PARATHYROIDECTOMY;;  Surgeon: Kristine Venegas MD;  Location: [facility] OR     PPM INSERT OF NEW OR REPL W/VENT LEAD  04/21/2017     TONSILLECTOMY & ADENOIDECTOMY         Social History:  Social History     Tobacco Use     Smoking status: Former Smoker     Packs/day: 0.00     Years: 0.00     Pack years: 0.00     Smokeless tobacco: Never Used   Vaping Use     Vaping Use: Never used   Substance Use Topics     Alcohol use: Yes     Alcohol/week: 0.0 - 8.0 standard drinks     Comment: seldom     Drug use: No       Family History:  Family History   Problem Relation Age of Onset     Hypothyroidism Mother      Hypertension Mother      Osteoarthritis Mother      Coronary Artery Disease Father         nonfatal MI in his 70s     Asthma Father      Diabetes Sister      Hypothyroidism Sister      Breast Cancer Maternal Aunt      Anxiety Disorder Sister        Medications:  Current Outpatient  "Medications   Medication     acetaminophen (TYLENOL) 500 MG tablet     albuterol (PROAIR HFA/PROVENTIL HFA/VENTOLIN HFA) 108 (90 BASE) MCG/ACT Inhaler     apixaban ANTICOAGULANT (ELIQUIS) 5 MG tablet     azelastine (ASTELIN) 0.1 % nasal spray     Calcium Lactate 500 MG CAPS     clonazePAM (KLONOPIN) 0.5 MG tablet     DiphenhydrAMINE HCl (BENADRYL PO)     Ferrous Sulfate (IRON SUPPLEMENT PO)     fluticasone-salmeterol (ADVAIR) 250-50 MCG/DOSE inhaler     furosemide (LASIX) 20 MG tablet     ipratropium (ATROVENT) 0.06 % nasal spray     loperamide (IMODIUM A-D) 2 MG tablet     MAGNESIUM LACTATE PO     MELATONIN PO     Menaquinone-7 (VITAMIN K2 PO)     METHYLPREDNISOLONE PO     metoprolol succinate ER (TOPROL XL) 25 MG 24 hr tablet     Multiple Vitamin (DAILY MULTIVITAMIN PO)     nebulizer nebulization     Probiotic Product (PROBIOTIC DAILY PO)     spironolactone (ALDACTONE) 25 MG tablet     SYNTHROID 150 MCG tablet     TURMERIC PO     vitamin B complex with vitamin C (VITAMIN  B COMPLEX) PO tablet     vitamin D3 (CHOLECALCIFEROL) 2000 units tablet     Zinc 15 MG CAPS     DULoxetine (CYMBALTA) 20 MG capsule     sildenafil (REVATIO) 10 MG/ML SUSR     No current facility-administered medications for this visit.         Objective:         Vitals:    10/19/21 1517   BP: 100/63   Pulse: 60   SpO2: 100%   Weight: 81.6 kg (180 lb)   Height: 1.651 m (5' 5\")     Body mass index is 29.95 kg/m .     Physical Exam  Constitutional: Well-developed, well-nourished, in no apparent distress.    Psychiatric: Normal mood and affect. Behavior is normal.  This was a video visit.    Labs and Diagnostic tests:  Lab Results   Component Value Date     09/28/2021     05/25/2021    POTASSIUM 4.2 09/28/2021    POTASSIUM 4.2 05/25/2021    CHLORIDE 108 09/28/2021    CHLORIDE 107 05/25/2021    CO2 24 09/28/2021    CO2 24 05/25/2021    BUN 18 09/28/2021    BUN 12 05/25/2021    CR 0.62 09/28/2021    CR 0.64 05/25/2021     Lab Results "   Component Value Date    BILITOTAL 0.3 09/28/2021    BILITOTAL 0.4 05/25/2021    ALT 21 09/28/2021    ALT 29 05/25/2021    AST 18 09/28/2021    AST 17 05/25/2021    ALKPHOS 91 09/28/2021    ALKPHOS 114 05/25/2021     Lab Results   Component Value Date    ALBUMIN 3.3 09/28/2021    ALBUMIN 3.4 05/25/2021    PROTTOTAL 7.4 09/28/2021    PROTTOTAL 7.3 05/25/2021      Lab Results   Component Value Date    WBC 6.1 09/28/2021    WBC 6.6 05/25/2021    HGB 14.0 09/28/2021    HGB 13.7 05/25/2021     09/28/2021    MCV 95 05/25/2021     09/28/2021     05/25/2021         Assessment and Plan:    1. Microscopic colitis and diarrhea. She had been previously diagnosed with microscopic colitis, which had been clinically quiescent for many years until she recently started Duloxetine. Her symptoms have now resolved off this medication.    If she has recurrent symptoms, we can consider treatment with Pepto-Bismol (if she agrees to take this), budesonide, et al.    I invited her to return to this clinic if she has recurrent or new GI symptoms.        About 13 minutes spent with patient, reviewing results, and coordinating care.        Ranjit Penn MD  Professor of Medicine  [facility]  Division of Gastroenterology, Hepatology, and Nutrition

## 2021-10-19 NOTE — PATIENT INSTRUCTIONS
It was a pleasure taking care of you today. I've included a brief summary of our discussion and care plan from today's visit below.  Please review this information with your primary care provider.  _______________________________________________________________________    My recommendations are summarized as follows:    Return to GI Clinic as needed if you have recurrent diarrhea or other GI symptoms.        To schedule or reschedule a follow-up GI appointment, call [phone] option 1        _______________________________________________________________________    Please be in touch if there are any further questions that arise following today's visit.  There are multiple ways to contact your gastroenterology care team.      During business hours, you may reach your gastroenterology RN Care Coordinator, Elba Ruth, at [phone].      You can always send a secure message through [employer]. [employer] messages are answered by your nurse or doctor typically within 24 hours. Please allow extra time on weekends and holidays.     What is [employer]?  [employer] is a secure way for you to access all of your healthcare records from the [facility].  It is a web based computer program, so you can sign on to it from any location.  It also allows you to send secure messages to your care team.  I recommend signing up for [employer] access if you have not already done so and are comfortable with using a computer.     For urgent/emergent questions after business hours, you may reach the on-call GI Fellow by contacting the [facility] at [phone].     How will I get the results of any tests ordered?    You will receive all of your results.  If you have signed up for [employer], any tests ordered at your visit will be available to you after your physician reviews them.  Typically this takes 1-2 weeks.  If there are urgent results that require a change in your care plan, your physician or nurse will call  you to discuss the next steps.      Thank you for choosing [facility]!       Sincerely,    Ranjit Penn MD  Professor of Medicine  [facility]  Division of Gastroenterology, Hepatology, and Nutrition

## 2021-10-19 NOTE — NURSING NOTE
"Chief Complaint   Patient presents with     Microscopic colitis, unspecified microscopic colitis type       Vitals:    10/19/21 1517   BP: 100/63   Pulse: 60   SpO2: 100%   Weight: 81.6 kg (180 lb)   Height: 1.651 m (5' 5\")       Body mass index is 29.95 kg/m .     Tamica Pritchett MA  "

## 2021-10-20 ENCOUNTER — TELEPHONE (OUTPATIENT)
Dept: GASTROENTEROLOGY | Facility: CLINIC | Age: 61
End: 2021-10-20

## 2021-10-20 ENCOUNTER — VIRTUAL VISIT (OUTPATIENT)
Dept: FAMILY MEDICINE | Facility: CLINIC | Age: 61
End: 2021-10-20
Payer: COMMERCIAL

## 2021-10-20 DIAGNOSIS — Z91.199 FAILURE TO ATTEND APPOINTMENT: Primary | ICD-10-CM

## 2021-10-20 NOTE — TELEPHONE ENCOUNTER
In review of 10/19 Dr. Penn visit, patient is to follow-up with him as needed, only. No further follow-up warranted at this time.

## 2021-10-26 ENCOUNTER — TRANSFERRED RECORDS (OUTPATIENT)
Dept: HEALTH INFORMATION MANAGEMENT | Facility: CLINIC | Age: 61
End: 2021-10-26

## 2021-10-26 DIAGNOSIS — I48.0 PAROXYSMAL ATRIAL FIBRILLATION (H): ICD-10-CM

## 2021-10-27 ENCOUNTER — TELEPHONE (OUTPATIENT)
Dept: FAMILY MEDICINE | Facility: CLINIC | Age: 61
End: 2021-10-27

## 2021-10-27 NOTE — TELEPHONE ENCOUNTER
Returned Joyce's voicemail. She'd like to go forward with pharmacogenomics testing, will forward my former message to Ce Crowe NP, to sign [facility] order forms:    Kortney Peralta, PharmD  Medication Therapy Management Pharmacist  [phone]

## 2021-10-27 NOTE — TELEPHONE ENCOUNTER
"Hi Ce,    I'm not sure if you received my inbasket a week or so ago, but I spoke with Joyce today and she confirmed she'd like to do pharmagenomics testing, can you please sign and fax the below forms [facility]?  Needs provider signature.   Thanks!  Kortney Peralta, PharmNILA  Medication Therapy Management Pharmacist  [phone]      --    Jose uLis Encinas,     My colleague, Lisa Medrano PharmD, [facility], and I have both met with Joyce Marroquin and feel that pharmacogenomic testing would be very valuable for her.  I have ordered everything in Epic that needs to be done (including a MTM referral to a pharmacist with training in pharmacogenomics), but I do need you to fill out the below form, sign it, and fax it into [facility] to get this started for her.  The results will be sent back to your office for upload into the medical record when they are completed.     Can you please complete the followin. Fill out both pages on this document (please check \"Add MTHFR test\", \"send buccal kit to patient\" and select \"Option 3\" under insurance billing) and fax to [employer] (fax number in document):   [email or web address]   (may need to copy and paste into browser)    2. Also print out \"RW FINANCIAL SUMMARY W/COV\" from \"Misc Reports\" tab in Epic and fax along with above documents.     Please let me know if you have any questions on this process, or if you can't access that document.     Thank you!   Kortney Peralta, PharmNILA   Medication Therapy Management Pharmacist   [phone]     "

## 2021-10-29 ENCOUNTER — MEDICAL CORRESPONDENCE (OUTPATIENT)
Dept: HEALTH INFORMATION MANAGEMENT | Facility: CLINIC | Age: 61
End: 2021-10-29

## 2021-10-29 NOTE — TELEPHONE ENCOUNTER
"----- Message from Ce Crowe NP sent at 10/29/2021  7:25 AM CDT -----  Regarding: FW: Pharmacogenomics testing - order form  Forms completed.    SHAYNA Smion, [facility]-BC  ----- Message -----  From: Karen Peralta, [facility]  Sent: 10/20/2021  12:36 PM CDT  To: Ce Crowe NP  Subject: Pharmacogenomics testing - order form            Hi Ce,    My colleague, Lisa Medrano PharmD, BCACP, and I have both met with Joyce Marroquin and feel that pharmacogenomic testing would be very valuable for her.  I have ordered everything in Epic that needs to be done (including a MTM referral to a pharmacist with training in pharmacogenomics), but I do need you to fill out the below form, sign it, and fax it into [employer] to get this started for her.  The results will be sent back to your office for upload into the medical record when they are completed.    Can you please complete the followin. Fill out both pages on this document (please check \"Add MTHFR test\", \"send buccal kit to patient\" and select \"Option 3\" under insurance billing) and fax to [employer] (fax number in document):  [email or web address]    2. Also print and out \"RW FINANCIAL SUMMARY W/COV\" from \"Misc Reports\" tab in Epic and fax along with above documents.    Please let me know if you have any questions on this process, or if you can't access that document.    Thank you!  Kortney Peralta, PharmD  Medication Therapy Management Pharmacist  [phone]                  "

## 2021-10-29 NOTE — TELEPHONE ENCOUNTER
Last Clinic Visit: 8/24/2021  [facility]  Future Visit: 11/23/2021

## 2021-10-31 DIAGNOSIS — F41.9 ANXIETY: ICD-10-CM

## 2021-11-01 ENCOUNTER — TELEPHONE (OUTPATIENT)
Dept: FAMILY MEDICINE | Facility: CLINIC | Age: 61
End: 2021-11-01

## 2021-11-01 ENCOUNTER — MYC MEDICAL ADVICE (OUTPATIENT)
Dept: FAMILY MEDICINE | Facility: CLINIC | Age: 61
End: 2021-11-01

## 2021-11-01 RX ORDER — CLONAZEPAM 0.5 MG/1
TABLET ORAL
Qty: 90 TABLET | Refills: 0 | Status: SHIPPED | OUTPATIENT
Start: 2021-11-01 | End: 2021-12-01

## 2021-11-01 NOTE — TELEPHONE ENCOUNTER
Reason for Call:  Other     Detailed comments: Steven said that he needs an ICD code as to why the labs were ordered.    Phone Number   [facility]  [phone]         Best Time:     Can we leave a detailed message on this number? Not Applicable    Call taken on 11/1/2021 at 11:05 AM by Michelle Villavicencio

## 2021-11-01 NOTE — TELEPHONE ENCOUNTER
Routing refill request to provider for review/approval because:  Drug not on the [facility] refill protocol     Last Written Prescription Date:  8-4-21  Last Fill Quantity: 90,  # refills: 1   Last office visit: 10/5/2021 with prescribing provider:  Ce Crowe   Future Office Visit:

## 2021-11-03 NOTE — TELEPHONE ENCOUNTER
"Called patient. She said she has heard positive and negatives with the genetic testing and is trying to remain hopeful.      She said depression should be the diagnosis for the forms. She expressed that she is concerned about her weight and her heart issues along with her mental health. She reports she is activity intolerant and is struggling to lose weight. She states she is emotional and sad. She denies any thoughts of harming herself. She said she is \" fragile\" and she is not hopeful that drugs are going to work for her. She said she really values Ce's opinion and is wanting to know if she has any guidance or recommendations in the mean time seeing she cannot get in until December.    Cristiane Espitia RN     "

## 2021-11-04 ENCOUNTER — MYC MEDICAL ADVICE (OUTPATIENT)
Dept: FAMILY MEDICINE | Facility: CLINIC | Age: 61
End: 2021-11-04
Payer: COMMERCIAL

## 2021-11-08 NOTE — LETTER
[facility]  4th Floor, [address]  [place] [address]  Phone: [phone]  Fax: [phone]       To Whom It May Concern:         Ms. Marroquin underwent a pacemaker procedure at the [facility] on 12/4/17. She may return to work on 12/18/17 without restrictions. Please let us know if you have any questions or if we can help in any way.           Sincerely,             Lino Funes MD  Electrophysiology, Cardiology  [facility]     EGD and colonoscopy

## 2021-11-09 NOTE — TELEPHONE ENCOUNTER
You should be able to make a ancillary visit here with a nurse or an appointment in the pharmacy; or if you have an office visit we can do it at that time. SHAYNA Simon, FNP-BC

## 2021-11-12 ENCOUNTER — TELEPHONE (OUTPATIENT)
Dept: CARDIOLOGY | Facility: CLINIC | Age: 61
End: 2021-11-12
Payer: COMMERCIAL

## 2021-11-12 DIAGNOSIS — I50.22 CHRONIC SYSTOLIC HEART FAILURE (H): Primary | ICD-10-CM

## 2021-11-12 NOTE — TELEPHONE ENCOUNTER
[facility] Call Center    Phone Message    May a detailed message be left on voicemail: yes     Reason for Call: Other: Shereileen calling stating Joyce wanted to see her for physical therapy but she thinks she needs more cardio rehab. Please call and discuss. Thank you!     Action Taken: Message routed to:  [facility] Cardio    Travel Screening: Not Applicable

## 2021-11-12 NOTE — TELEPHONE ENCOUNTER
Called Tona to review. She states understanding - will try to [facility] program. She understands referral will be processed and then they will call her for scheduling and to determine which location she prefers.

## 2021-11-12 NOTE — TELEPHONE ENCOUNTER
Date: 11/12/2021    Time of Call: 11:10 AM     Diagnosis:  Heart failure, deconditioning     [ VORB ] Ordering provider: Fiorella Swanson NP    Order: cardiac rehab referral     Order received by: Joellen Jimenez RN     Follow-up/additional notes: order placed. Returned call to [place]. She states she believes that Joyce likely would benefit from a monitored exercise program. Reviewed that she may not qualify from insurance standpoint for cardiac rehab phase 2, but we would try for a monitored program.

## 2021-11-16 ENCOUNTER — MEDICAL CORRESPONDENCE (OUTPATIENT)
Dept: FAMILY MEDICINE | Facility: CLINIC | Age: 61
End: 2021-11-16

## 2021-11-16 ENCOUNTER — TRANSFERRED RECORDS (OUTPATIENT)
Dept: HEALTH INFORMATION MANAGEMENT | Facility: CLINIC | Age: 61
End: 2021-11-16
Payer: COMMERCIAL

## 2021-11-16 DIAGNOSIS — I50.22 CHRONIC SYSTOLIC HEART FAILURE (H): Primary | ICD-10-CM

## 2021-11-17 ENCOUNTER — TELEPHONE (OUTPATIENT)
Dept: CARDIOLOGY | Facility: CLINIC | Age: 61
End: 2021-11-17
Payer: COMMERCIAL

## 2021-11-17 NOTE — TELEPHONE ENCOUNTER
[facility] Call Center    Phone Message    May a detailed message be left on voicemail: yes     Reason for Call: Other: Joyce called stating that her eye doctor wants her to take 1,000mg of EPA-DHA. Joyce wants to make sure that Dr. Ordonez is ok with that. Please advise. Thank you.      Action Taken: Message routed to:  [facility] Cardio    Travel Screening: Not Applicable

## 2021-11-18 ENCOUNTER — HOSPITAL ENCOUNTER (OUTPATIENT)
Dept: CARDIAC REHAB | Facility: CLINIC | Age: 61
End: 2021-11-18
Attending: NURSE PRACTITIONER
Payer: COMMERCIAL

## 2021-11-18 DIAGNOSIS — I50.22 CHRONIC SYSTOLIC HEART FAILURE (H): ICD-10-CM

## 2021-11-18 PROCEDURE — 93798 PHYS/QHP OP CAR RHAB W/ECG: CPT

## 2021-11-18 PROCEDURE — 93797 PHYS/QHP OP CAR RHAB WO ECG: CPT

## 2021-11-19 ENCOUNTER — VIRTUAL VISIT (OUTPATIENT)
Dept: PHARMACY | Facility: CLINIC | Age: 61
End: 2021-11-19
Payer: COMMERCIAL

## 2021-11-19 DIAGNOSIS — F43.23 ADJUSTMENT DISORDER WITH MIXED ANXIETY AND DEPRESSED MOOD: Primary | ICD-10-CM

## 2021-11-19 PROCEDURE — 99607 MTMS BY PHARM ADDL 15 MIN: CPT | Performed by: PHARMACIST

## 2021-11-19 PROCEDURE — 99606 MTMS BY PHARM EST 15 MIN: CPT | Performed by: PHARMACIST

## 2021-11-19 NOTE — Clinical Note
Hi Ladies,    Wondering if you would mind looking over my note for Joyce before I close it out and send to her other providers. I reviewed her PGx results today. I'd love your thoughts on potential next steps for her antidepressant that also jives with her GI and cardiac co morbidities.    Thanks in advance!!  Tonya

## 2021-11-19 NOTE — PROGRESS NOTES
"Medication Therapy Management (MTM) Encounter    ASSESSMENT:                            Medication Adherence/Access: No issues identified    Depression/Anxiety: She may benefit from additional therapy. Medications reviewed in detail for appropriate metabolism and condition-drug interactions with colitis, CHF, PH.   Would want to avoid citalopram and escitalopram due to risk of QT prolongation  - Sertraline may be a reasonable option given her genetics but may exacerbate colitis  - Fetzima has an unknown effect on QT prolongation; [place] labelling suggest to avoid in patients with MI or cardiac intervention within past 12 months and HR Class III or IV  - Nefazadone has an unknown effect on QT prolongation but appears to be low relative to other antidepressants; does not list diarrhea as a side effect - has more anticholinergic effects so could cause some confusion; colitis is listed as a <1% incidence  - Trazodone has a high risk of prolonged QT so would want to avoid  - Vilazodone has an unknown effect on QT prolongation; it has a high incidence of diarrhea (26-29%) but no reports of colitis    PLAN:                            1. I will mail out hard copies of your genetic test results along with my visit summary. The visit summary will also be available in [employer].    2. We did not talk in detail about your heart medications, but in reviewing your genetic results, it does appear that your body breaks metoprolol down slowly. I agree that the half dose you are taking is likely good to continue on.    3. Here are some considerations for choosing a depression/anxiety medication that fall into the \"green zone\" from your test results:    Zoloft (Sertraline - a selective serotonin reuptake inhibitor): Metabolized normally. Low risk of QT Prolongation as long as your electrolyte levels are within normal ranges. There is an incidence of diarrhea of up to 20% and this could exacerbate colitis.    Serzone (Nefazodone - a " "serotonin antagonist and reuptake inhibitor): Metabolized normally. Appears to be a low risk of QT Prolongation. There is no incidence of diarrhea, and a less than 1% incidence of colitis. The most serious warning with this medication is liver abnormalities, which we would monitor regularly and is not currently an issue for you. This appears to be one of the best choices for you at this time.    Viibryd (Vilazodone - a more specific selective serotonin reuptake inhibitor): Metabolized normally. Risk of QT Prolongation is not really known with this medication, but overall cardiovascular side effects are low. The only cardiac side effect listed is a 1-2% incidence of palpitations. There are no reports of colitis, but it does have a 26-29% incidence of diarrhea.    No matter what you choose, I am fine with starting a low dose and slowly working our way up. Please review and let us know how you would like to proceed.    Follow-up: No follow-ups on file.      SUBJECTIVE/OBJECTIVE:                          Joyce Marroquin is a 61 year old female contacted via secure video for an initial visit. She was referred to me from Kortney Peralta, RossanaD and Rossana EganD.      Reason for visit: Pharmacogenetic testing results review. [facility] testing done.  Primary concern is psych medications: depression, anxiety, PTSD that is safe for her to take with her comorbidities of colitis, CHF, PH, and prolonged QT.    Covid booster done on Wednesday (11/17) - HM/Immunizations updated, not in Care Everywhere    Allergies/ADRs: Reviewed in chart  Past Medical History: Reviewed in chart  Tobacco: She reports that she has quit smoking. She smoked 0.00 packs per day for 0.00 years. She has never used smokeless tobacco.  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 She becomes tearful and sad easily - tearful several times during our visit today. States her mental health is \"not in a good place\". Inability to work, health conditions and loneliness contributing to symptoms. Is working with therapist. Would eventually like to use less clonazepam due to addictive nature of the medication. At last visit with Kortney, re-trial of sertraline was suggested, however at recent visit with Lisa Medrano PharmD in the GI clinic, sertraline does appear to be a trigger for colitis  Medication Hx:  Buspar - didn't help  Sertraline - not effective  Citalopram - possible effective prior to ablation; now avoids due to risk of QT prolongation  Cymbalta - confusion and withdrawal effects, stopped 10/5/21     Had genetic testing done through [facility] prior to today's visit, clinically relevant results include:             Today's Vitals: LMP 08/21/2007   ----------------      I spent 45 minutes with this patient today. All changes were made via collaborative practice agreement with Ce Crowe NP. A copy of the visit note was provided to the patient's primary care and referring providers.    The patient was mailed a summary of these recommendations.     Tonya Boston, Pharm.D., [facility]  Medication Therapy Management Pharmacist  [phone]    Telemedicine Visit Details  Type of service:  Video Conference via [employer]  Start Time: 9:00 AM  End Time: 9:45 AM  Originating Location (patient location): Home  Distant Location (provider location):  [facility]     Medication Therapy Recommendations  No " medication therapy recommendations to display

## 2021-11-22 ENCOUNTER — HOSPITAL ENCOUNTER (OUTPATIENT)
Dept: CARDIAC REHAB | Facility: CLINIC | Age: 61
End: 2021-11-22
Attending: NURSE PRACTITIONER
Payer: COMMERCIAL

## 2021-11-22 ENCOUNTER — MYC MEDICAL ADVICE (OUTPATIENT)
Dept: FAMILY MEDICINE | Facility: CLINIC | Age: 61
End: 2021-11-22
Payer: COMMERCIAL

## 2021-11-22 DIAGNOSIS — E89.0 POSTABLATIVE HYPOTHYROIDISM: ICD-10-CM

## 2021-11-22 PROCEDURE — 93797 PHYS/QHP OP CAR RHAB WO ECG: CPT | Mod: 59

## 2021-11-22 PROCEDURE — 93798 PHYS/QHP OP CAR RHAB W/ECG: CPT

## 2021-11-22 NOTE — PROGRESS NOTES
November 23, 2021  Ms. Marroquin is a very pleasant 60 y/o lady who worked as a nurse anesthetist and has a history of RF ablation for PVCs which led to AV node ablation and complete heart block requiring a dual-chamber pacemaker implantation, which is a [employer] device MRI compatible.  She also has a recent history of heart failure with reduced ejection fraction with EF around 35% later improved to 45%.  There was a history of Takotsubo cardiomyopathy.  Patient on 7/2 had meeting with EP at which time low dose carvedilol was started 3.125 mg PO bid due to atrial ectopy and short runs of VT. she did recently complete an exercise out of catheterization with myself which showed normal resting biventricular filling pressures and normal cardiac output.  She did have limited augmentation of cardiac index with exercise and filling pressures especially pulmonary capillary wedge pressure increased significantly consistent with heart failure preserved ejection fraction.    She did initially better but for the past couple of months she has not been feeling well.  She did gain some weight and increased the Lasix to 40 mg as needed.  She does not feel that these helped for her.  In fact at times she feels that she had some confusion or disorientation.  Possibly she is dry at times.  She did see in follow-up with core clinic he had some of the instructions were confusing and she appropriately notes that the discharge papers stated a lot of extra information.  I did see her and performed a CPX that was marginal as well as a cardiac MRI that showed a significantly improved LV function to 50% and no other significant abnormalities. She did meet with Dr. Funes and sildenafil was initiated yet rapidly discontinued due to muscle aches. Dr. Funes also adjusted the rate response to allow for faster heart rates in an attempt to improve forward flow. In addition, she was felt to be dry and lasix has been reduced to 20mg 2 times per week.  She was in ER with abdominal pain recently, CT was concerning for ileus versus early SBO. Repeat CT a couple days later showed resolution of these however watery diarrhea remained and saw PCP for this. She is having GI follow-up. She noted that she at times she forgets to take the PM dose of the revatio. She was last seen on 8/24/2021 when she was endorsing SOB, thought to be due to weight gain sustained after AVN ablation. No medication changes were performed after her visit. Her symptoms continue to persist. Her EF on recent TTE is 40-45%, she is V-pacing to 99% and her EKG reveals a LBBB morphology with a QRS of 158ms.   Today, she feels about the same. During rehab she had an episode of lightheadedness and dizziness with exercise, device did not show any significant arrhythmias. Denied chest pain at the time. She finds that her symptoms continue to persist. She stopped revatio due to diarrhea. We denies PND, orthopnea. Continues to have class III symptoms at the time. Understandably she feels overall down about her clinical condition.      PAST MEDICAL HISTORY:  Past Medical History:   Diagnosis Date     Arthritis      Cardiomyopathy (H)     ff Cardiology     Chronic depressive personality disorder      Congestive heart failure (H) see dr martínez    heart failure correct term     COPD exacerbation (H)      Esophageal reflux      Ex-smoker     quit 2006; 1 PPD x 30     Hyperparathyroidism (H)     s/p parathyroidectomy     Hypothyroidism      LARRY on CPAP     ff Sleep medicine     Pulmonary nodules     ff Pulmonologist     S/P cardiac pacemaker procedure     checked every 6 months at the [facility]     Uncomplicated asthma years     FAMILY HISTORY:  Family History   Problem Relation Age of Onset     Hypothyroidism Mother      Hypertension Mother      Osteoarthritis Mother      Coronary Artery Disease Father         nonfatal MI in his 70s     Asthma Father      Diabetes Sister      Hypothyroidism Sister      Breast Cancer  Maternal Aunt      Anxiety Disorder Sister      SOCIAL HISTORY:  Social History     Socioeconomic History     Marital status:      Spouse name: Not on file     Number of children: Not on file     Years of education: Not on file     Highest education level: Master's degree (e.g., MA, MS, Florian, MEd, MSW, SONIA)   Occupational History     Not on file   Tobacco Use     Smoking status: Former Smoker     Packs/day: 0.00     Years: 0.00     Pack years: 0.00     Smokeless tobacco: Never Used   Vaping Use     Vaping Use: Never used   Substance and Sexual Activity     Alcohol use: Yes     Alcohol/week: 0.0 - 8.0 standard drinks     Comment: seldom     Drug use: No     Sexual activity: Yes     Partners: Male     Birth control/protection: None     Comment: vasectomy   Other Topics Concern     Parent/sibling w/ CABG, MI or angioplasty before 65F 55M? No   Social History Narrative         Social Determinants of Health     Financial Resource Strain: Low Risk      Difficulty of Paying Living Expenses: Not very hard   Food Insecurity: No Food Insecurity     Worried About Running Out of Food in the Last Year: Never true     Ran Out of Food in the Last Year: Never true   Transportation Needs: No Transportation Needs     Lack of Transportation (Medical): No     Lack of Transportation (Non-Medical): No   Physical Activity: Not on file   Stress: Not on file   Social Connections: Not on file   Intimate Partner Violence: Not At Risk     Fear of Current or Ex-Partner: No     Emotionally Abused: No     Physically Abused: No     Sexually Abused: No   Housing Stability: Not on file     CURRENT MEDICATIONS:  Current Outpatient Medications   Medication     clonazePAM (KLONOPIN) 0.5 MG tablet     acetaminophen (TYLENOL) 500 MG tablet     albuterol (PROAIR HFA/PROVENTIL HFA/VENTOLIN HFA) 108 (90 BASE) MCG/ACT Inhaler     apixaban ANTICOAGULANT (ELIQUIS ANTICOAGULANT) 5 MG tablet     azelastine (ASTELIN) 0.1 % nasal spray     Calcium Lactate  "500 MG CAPS     DiphenhydrAMINE HCl (BENADRYL PO)     Ferrous Sulfate (IRON SUPPLEMENT PO)     fluticasone-salmeterol (ADVAIR) 250-50 MCG/DOSE inhaler     furosemide (LASIX) 20 MG tablet     ipratropium (ATROVENT) 0.06 % nasal spray     loperamide (IMODIUM A-D) 2 MG tablet     MAGNESIUM LACTATE PO     MELATONIN PO     Menaquinone-7 (VITAMIN K2 PO)     METHYLPREDNISOLONE PO     metoprolol succinate ER (TOPROL XL) 25 MG 24 hr tablet     Multiple Vitamin (DAILY MULTIVITAMIN PO)     nebulizer nebulization     Probiotic Product (PROBIOTIC DAILY PO)     spironolactone (ALDACTONE) 25 MG tablet     SYNTHROID 150 MCG tablet     TURMERIC PO     vitamin B complex with vitamin C (VITAMIN  B COMPLEX) PO tablet     vitamin D3 (CHOLECALCIFEROL) 2000 units tablet     Zinc 15 MG CAPS     No current facility-administered medications for this visit.     ROS:   Constitutional: No fever, chills, or sweats.   ENT: No visual disturbance, ear ache, epistaxis, sore throat.   Allergies/Immunologic: Negative.   Respiratory: No cough, hemoptysis.   Cardiovascular: As per HPI.   GI: No nausea, vomiting, hematemesis, melena, or hematochezia.   : No urinary frequency, dysuria, or hematuria.   Integument: Negative.   Psychiatric: Pleasant, no major depression noted  Neuro: No focal neurological deficits noted  Endocrinology: Negative.   Musculoskeletal: As per HPI.      EXAM:  /80 (BP Location: Right arm, Patient Position: Chair, Cuff Size: Adult Regular)   Pulse 109   Ht 1.67 m (5' 5.75\")   Wt 84.2 kg (185 lb 11.2 oz)   LMP 08/21/2007   SpO2 95%   BMI 30.20 kg/m    General: appears comfortable, alert and oriented  Head: normocephalic, atraumatic  Eyes: anicteric sclera, EOMI , PERRL  Neck: no adenopathy  Orophyarynx: moist mucosa, no lesions noted  Heart: regular, S1/S2, no murmurs, rubs or gallop. Estimated JVP at 5 cmH2O  Lungs: CTAB, No wheezing.   Abdomen: soft, non-tender, bowel sounds present, no " hepatosplenomegaly  Extremities: No LE edema today  Skin: no open lesions noted  Neuro: grossly non-focal     Labs:  Lab Results   Component Value Date    WBC 6.0 11/23/2021    HGB 13.3 11/23/2021    HCT 40.1 11/23/2021     11/23/2021     11/23/2021    POTASSIUM 4.3 11/23/2021    CHLORIDE 108 11/23/2021    CO2 26 11/23/2021    BUN 9 11/23/2021    CR 0.63 11/23/2021    GLC 89 11/23/2021    SED 22 11/09/2017    DD 0.7 (H) 11/22/2017    NTBNPI 142 09/28/2021    NTBNP 127 (H) 11/23/2021    TROPONIN <0.015 09/28/2021    TROPI <0.015 12/20/2020    AST 18 11/23/2021    ALT 35 11/23/2021    ALKPHOS 110 11/23/2021    BILITOTAL 0.4 11/23/2021    INR 1.02 10/06/2019     NM Lexiscan 3/2020:  The nuclear stress test is negative for inducible myocardial ischemia or infarction.  Fixed septal/apical defect due to Paced rhythm and increased gut photon activity.LVEDv 133ml. LV ESv 40ml. LV EF 70%.     There is no prior study for comparison     Echocardiogram 3/2020:  Mildly (EF 40-45%) reduced left ventricular function is present.  Global right ventricular function is normal.  No pericardial effusion is present. IVC diameter <2.1 cm collapsing >50% with sniff suggests a normal RA pressure  of 3 mmHg. This study was compared with the study from 12/3/19.  There has been no change.      Coronary angiogram 9/2019:  Mild-moderate non-obstructive coronary disease involving LAD, LCx, and RCA. No significant coronary disease to explain new cardiomyopathy.     TTE 9/5/2019:  Moderately (EF 30-35%) reduced left ventricular function is present. All segments from mid to apical left ventricle are severely hypokinetic to akinetic. Basal segments are hypercontractile. Overall pattern is suggestive of stress cardiomyopathy, but cannot rule out ischemic cardiomyopathy. Right ventricular function, chamber size, wall motion, and thickness are normal. No significant valve abnormalities. Mild pulmonary hypertension with increased RA  pressure.     TTE 2/19/21:  Moderately (EF 35%) reduced left ventricular function is present.  Global right ventricular function is normal.  No significant valvular dysfunction. The inferior vena cava was normal in size with preserved respiratory variability. No pericardial effusion present.     MRI cardiac 4/2017:  1. Normal left ventricular size.  Left ventricular wall thickness is  normal except a very small area in the basal inferior septum that appears  thin in some images.  Real-time images suggest a very small area of  hypokinesis in the basal inferior septum (not well visualized).  Left  ventricular ejection fraction is estimated at about 60-65% (unable to   perform volumetric analysis given frequent ectopy; real time images used  to assess LVEF).  2. Normal right ventricular size and systolic function.  No obvious regional wall motion abnormalities noted.  3. Gadolinium imaging:  No obvious right ventricular myocardial late  gadolinium enhancement noted.  There is a very small area of probable late  gadolinium enhancement in the mildly thinned basal inferior segment.    4. Moderate left atrial dilatation.  The right atrial size is at the upper limits of normal.  5. Thickened mitral valve leaflets, without obvious stenosis or significant regurgitation. No obvious hemodynamically significant valvular dysfunction noted.   6. Normal sized aortic root and ascending aorta.  For the remainder of the thoracic aorta, see separate radiology report.  7. Normal pericardium without significant pericardial effusion.      Cardiac MRI 5/2021:  1. The LV is normal in cavity size and wall thickness. The global systolic function is mildly reduced. The LVEF is 50%. There are no regional wall motion abnormalities. There is abnormal septal motion related to pacing.  2. The RV is normal in cavity size. The global systolic function is normal. The RVEF is 65%.   3. The left atrium is mildly dilated.  4. There is no significant  valvular disease.   5. Late gadolinium enhancement imaging shows no MI, fibrosis or infiltrative disease.   6. Regadenoson stress perfusion imaging shows no ischemia.  7. There is no pericardial effusion or thickening.  8. There is no LV thrombus.  CONCLUSIONS: No myocardial ischemia or fibrosis. Mild NICM possibly related to pacing, LVEF 50% and RVEF 65%.     TTE 11/23/2021  Left ventricular function is decreased. The ejection fraction is 40-45% (mildly reduced).Abnormal septal motion consistent with left bundle branch block is present.  Global right ventricular function is normal.  No significant valvular abnormalities present.  Pulmonary artery systolic pressure is normal.  The inferior vena cava was normal in size with preserved respiratory  variability.  No pericardial effusion is prese    ASSESSMENT AND PLAN:  Ms. Marroquin is a pleasant 61 year old lady with history of mildly reduced LVEF at EF:45% improved transiently to to 55-60% and on most recent study it remains around 45% (reviewed personally), NICM, Takubsubo CM, PVC ablation that led to complete heart block necessitating permanent pacemaker implantation who presents today for a follow up visit. Overall notes that her condition remained unchanged and continues to have Class III HF symptoms.    #HFrEF now with recovered EF  #NICM  Patient is still symptomatic as above. LVEF remains at around 40-45% with LBBB pattern with QRS 158ms and V-pacing to 99%. The etiology remains unclear however the LBBB pattern with high percentage of pacing may contribute to a decline in her LV function. We discussed with Dr. Funes about upgrading to CRT-D as I do believe she may potentially get some benefit from resynchronization, especially given the close to 100% pacing burden. We will refer patient back to EP for this (I have personally discussed above with Dr. Funes and he will set up a visit with Joyce in the very near future). Her weight has increased some since last  visit. Now 10lbs up, this is likely diet related weight gain rather than volume overload and also contributing is the limited exercise tolerance. We recommended that she reach out to her dietician to discuss healthy heart diet and continue with cardiac rehab as tolerated.  - Metop succinate 12.5mg qday, continue. Will not adjust dose today   - Lasix 20mg qday as needed for LE edema or worsening heart failure symptoms  - Aldactone 12.5mg qday   - Eliquis 5mg BID   - Refer to EP for CRT-D upgrade as discussed above    Staffed with Dr. José Luis Askew MD  Cardiology Fellow  [id number]      I have seen and evaluated the patient and agree with the above edited assessment and plan.  Plan for CRT upgrade (discussed with Dr. Funes). Timing pending lab availability.   OK to hold sildenafil given severe diarrhea and side effects.  Alonso Ordonez MD

## 2021-11-22 NOTE — TELEPHONE ENCOUNTER
TSH   Date Value Ref Range Status   07/27/2021 1.44 0.40 - 4.00 mU/L Final   12/06/2020 1.46 0.40 - 4.00 mU/L Final     See My Chart message from patient.     According to the pharmacy, patient canceled the previous prescription (on file) last written on 10/08/21 by her Endocrinologist Garland Patel. Note from Virtual Visit 10/8/21 states: Instructions    - continue current Synthroid dose  - she can have lab done yearly with PCP           Pended for PCP approval.     Alpa Chávez RN BSN  [facility]

## 2021-11-23 ENCOUNTER — OFFICE VISIT (OUTPATIENT)
Dept: CARDIOLOGY | Facility: CLINIC | Age: 61
End: 2021-11-23
Attending: INTERNAL MEDICINE
Payer: COMMERCIAL

## 2021-11-23 ENCOUNTER — LAB (OUTPATIENT)
Dept: LAB | Facility: CLINIC | Age: 61
End: 2021-11-23
Payer: COMMERCIAL

## 2021-11-23 VITALS
HEART RATE: 109 BPM | SYSTOLIC BLOOD PRESSURE: 115 MMHG | BODY MASS INDEX: 29.84 KG/M2 | OXYGEN SATURATION: 95 % | HEIGHT: 66 IN | DIASTOLIC BLOOD PRESSURE: 80 MMHG | WEIGHT: 185.7 LBS

## 2021-11-23 DIAGNOSIS — I10 BENIGN ESSENTIAL HYPERTENSION: Primary | ICD-10-CM

## 2021-11-23 DIAGNOSIS — I42.8 NONISCHEMIC CARDIOMYOPATHY (H): ICD-10-CM

## 2021-11-23 DIAGNOSIS — I50.30 NYHA CLASS 3 HEART FAILURE WITH PRESERVED EJECTION FRACTION (H): ICD-10-CM

## 2021-11-23 DIAGNOSIS — I44.2 COMPLETE ATRIOVENTRICULAR BLOCK (H): ICD-10-CM

## 2021-11-23 DIAGNOSIS — I48.0 PAROXYSMAL ATRIAL FIBRILLATION (H): ICD-10-CM

## 2021-11-23 LAB
ALBUMIN SERPL-MCNC: 3.5 G/DL (ref 3.4–5)
ALP SERPL-CCNC: 110 U/L (ref 40–150)
ALT SERPL W P-5'-P-CCNC: 35 U/L (ref 0–50)
ANION GAP SERPL CALCULATED.3IONS-SCNC: 7 MMOL/L (ref 3–14)
AST SERPL W P-5'-P-CCNC: 18 U/L (ref 0–45)
BILIRUB SERPL-MCNC: 0.4 MG/DL (ref 0.2–1.3)
BUN SERPL-MCNC: 9 MG/DL (ref 7–30)
CALCIUM SERPL-MCNC: 9.6 MG/DL (ref 8.5–10.1)
CHLORIDE BLD-SCNC: 108 MMOL/L (ref 94–109)
CO2 SERPL-SCNC: 26 MMOL/L (ref 20–32)
CREAT SERPL-MCNC: 0.63 MG/DL (ref 0.52–1.04)
ERYTHROCYTE [DISTWIDTH] IN BLOOD BY AUTOMATED COUNT: 12.5 % (ref 10–15)
GFR SERPL CREATININE-BSD FRML MDRD: >90 ML/MIN/1.73M2
GLUCOSE BLD-MCNC: 89 MG/DL (ref 70–99)
HCT VFR BLD AUTO: 40.1 % (ref 35–47)
HGB BLD-MCNC: 13.3 G/DL (ref 11.7–15.7)
LVEF ECHO: NORMAL
MCH RBC QN AUTO: 32.3 PG (ref 26.5–33)
MCHC RBC AUTO-ENTMCNC: 33.2 G/DL (ref 31.5–36.5)
MCV RBC AUTO: 97 FL (ref 78–100)
NT-PROBNP SERPL-MCNC: 127 PG/ML (ref 0–125)
PLATELET # BLD AUTO: 189 10E3/UL (ref 150–450)
POTASSIUM BLD-SCNC: 4.3 MMOL/L (ref 3.4–5.3)
PROT SERPL-MCNC: 7.5 G/DL (ref 6.8–8.8)
RBC # BLD AUTO: 4.12 10E6/UL (ref 3.8–5.2)
SODIUM SERPL-SCNC: 141 MMOL/L (ref 133–144)
WBC # BLD AUTO: 6 10E3/UL (ref 4–11)

## 2021-11-23 PROCEDURE — 36415 COLL VENOUS BLD VENIPUNCTURE: CPT | Performed by: PATHOLOGY

## 2021-11-23 PROCEDURE — 99215 OFFICE O/P EST HI 40 MIN: CPT | Mod: 25 | Performed by: INTERNAL MEDICINE

## 2021-11-23 PROCEDURE — 80053 COMPREHEN METABOLIC PANEL: CPT | Performed by: PATHOLOGY

## 2021-11-23 PROCEDURE — 85027 COMPLETE CBC AUTOMATED: CPT | Performed by: PATHOLOGY

## 2021-11-23 PROCEDURE — 93306 TTE W/DOPPLER COMPLETE: CPT | Performed by: INTERNAL MEDICINE

## 2021-11-23 PROCEDURE — 83880 ASSAY OF NATRIURETIC PEPTIDE: CPT | Performed by: PATHOLOGY

## 2021-11-23 PROCEDURE — G0463 HOSPITAL OUTPT CLINIC VISIT: HCPCS

## 2021-11-23 RX ORDER — LEVOTHYROXINE SODIUM 150 MCG
TABLET ORAL
Qty: 90 TABLET | Refills: 3 | Status: SHIPPED | OUTPATIENT
Start: 2021-11-23 | End: 2022-11-28

## 2021-11-23 ASSESSMENT — MIFFLIN-ST. JEOR: SCORE: 1410.08

## 2021-11-23 ASSESSMENT — PAIN SCALES - GENERAL: PAINLEVEL: NO PAIN (0)

## 2021-11-23 NOTE — PATIENT INSTRUCTIONS
"Recommendations from today's [facility] visit:                                                       1. I will mail out hard copies of your genetic test results along with my visit summary. The visit summary will also be available in [facility].    2. We did not talk in detail about your heart medications, but in reviewing your genetic results, it does appear that your body breaks metoprolol down slowly. I agree that the half dose you are taking is likely good to continue on.    3. Here are some considerations for choosing a depression/anxiety medication that fall into the \"green zone\" from your test results:    Zoloft (Sertraline - a selective serotonin reuptake inhibitor): Metabolized normally. Low risk of QT Prolongation as long as your electrolyte levels are within normal ranges. There is an incidence of diarrhea of up to 20% and this could exacerbate colitis.    Serzone (Nefazodone - a serotonin antagonist and reuptake inhibitor): Metabolized normally. Appears to be a low risk of QT Prolongation. There is no incidence of diarrhea, and a less than 1% incidence of colitis. The most serious warning with this medication is liver abnormalities, which we would monitor regularly and is not currently an issue for you. This appears to be one of the best choices for you at this time.    Viibryd (Vilazodone - a more specific selective serotonin reuptake inhibitor): Metabolized normally. Risk of QT Prolongation is not really known with this medication, but overall cardiovascular side effects are low. The only cardiac side effect listed is a 1-2% incidence of palpitations. There are no reports of colitis, but it does have a 26-29% incidence of diarrhea.    No matter what you choose, I am fine with starting a low dose and slowly working our way up. Please review and let us know how you would like to proceed.    Follow-up: No follow-ups on file.    It was great to speak with you today.  I value your experience and would be very " thankful for your time with providing feedback on our clinic survey. You may receive a survey via email or text message in the next few days.     To schedule another [facility] appointment, please call the clinic directly or you may call the [facility] scheduling line at [phone] or toll-free at [phone].     My Clinical Pharmacist's contact information:                                                      Please feel free to contact me with any questions or concerns you have.      Tonya Boston, Pharm.D., [facility]  Medication Therapy Management Pharmacist  [phone]

## 2021-11-23 NOTE — LETTER
11/23/2021      RE: Joyce Marroquin  [address] [facility] [id number]       Dear Colleague,    Thank you for the opportunity to participate in the care of your patient, Joyce Marroquin, at the [facility] [place] at [facility]. Please see a copy of my visit note below.    November 23, 2021     Ms. Marroquin is a 63 year old Female with HFrEf with an EF:45% now recovered to 55-60, NICM, Takubsubo CM, afib s/p RF ablation s/p AVN ablation with PPM placement who presents today for a follow up visit.    On 7/2 had meeting with EP at which time low dose carvedilol was started 3.125 mg PO bid due to atrial ectopy and short runs of VT. she later underwent  Right heart catheterization  which showed normal resting biventricular filling pressures and normal cardiac output. However, she had limited augmentation of her CI with exercise along with concomitant increase in right sided filling pressure during exercise suggestive of HFpEF. Patient underwent CPX that revealed marginal METS, cMRI revealed improved LVEF to 50% with no suggesitons of fibrosis. Patient was seen by Sh[facility] where she was started on sildenafil to augment forward flow, however she did not tolerate her medicaiton well due to muscle aches. She also was diagnosed with SBO on CT that appeared to have resolved on repeat CT.     She was last seen on 8/24/2021 where she was endorsing SOB. This SOB was thought to be due to weight gain sustained after AVN ablation. No medication changes were performed after her visit. Her symptoms continue to persist. Her EF on recent TTE is 40-45%, she is V-pacing to 99% and her EKG reveals a LBBB morphology with a QRS of 158ms.     Today, she is doing the same. At rehab, she had an episode of lightheadedness and dizziness with exercise. Denied chest pain at the time. She finds that her symptoms continue to persist. She stopped revatio due to diarrhea. We denies PND,  orthopnea.          PAST MEDICAL HISTORY:  Past Medical History:   Diagnosis Date     Arthritis      Cardiomyopathy (H)     ff Cardiology     Chronic depressive personality disorder      Congestive heart failure (H) see dr martínez    heart failure correct term     COPD exacerbation (H)      Esophageal reflux      Ex-smoker     quit 2006; 1 PPD x 30     Hyperparathyroidism (H)     s/p parathyroidectomy     Hypothyroidism      LARRY on CPAP     ff Sleep medicine     Pulmonary nodules     ff Pulmonologist     S/P cardiac pacemaker procedure     checked every 6 months at the [facility]     Uncomplicated asthma years     FAMILY HISTORY:  Family History   Problem Relation Age of Onset     Hypothyroidism Mother      Hypertension Mother      Osteoarthritis Mother      Coronary Artery Disease Father         nonfatal MI in his 70s     Asthma Father      Diabetes Sister      Hypothyroidism Sister      Breast Cancer Maternal Aunt      Anxiety Disorder Sister      SOCIAL HISTORY:  Social History     Socioeconomic History     Marital status:      Spouse name: Not on file     Number of children: Not on file     Years of education: Not on file     Highest education level: Master's degree (e.g., MA, MS, Florian, MEd, MSW, SONIA)   Occupational History     Not on file   Tobacco Use     Smoking status: Former Smoker     Packs/day: 0.00     Years: 0.00     Pack years: 0.00     Smokeless tobacco: Never Used   Vaping Use     Vaping Use: Never used   Substance and Sexual Activity     Alcohol use: Yes     Alcohol/week: 0.0 - 8.0 standard drinks     Comment: seldom     Drug use: No     Sexual activity: Yes     Partners: Male     Birth control/protection: None     Comment: vasectomy   Other Topics Concern     Parent/sibling w/ CABG, MI or angioplasty before 65F 55M? No   Social History Narrative    CRNA on disability for vestibular symptoms      Social Determinants of Health     Financial Resource Strain: Low Risk      Difficulty of Paying Living  Expenses: Not very hard   Food Insecurity: No Food Insecurity     Worried About Running Out of Food in the Last Year: Never true     Ran Out of Food in the Last Year: Never true   Transportation Needs: No Transportation Needs     Lack of Transportation (Medical): No     Lack of Transportation (Non-Medical): No   Physical Activity: Not on file   Stress: Not on file   Social Connections: Not on file   Intimate Partner Violence: Not At Risk     Fear of Current or Ex-Partner: No     Emotionally Abused: No     Physically Abused: No     Sexually Abused: No   Housing Stability: Not on file     CURRENT MEDICATIONS:  Current Outpatient Medications   Medication     clonazePAM (KLONOPIN) 0.5 MG tablet     acetaminophen (TYLENOL) 500 MG tablet     albuterol (PROAIR HFA/PROVENTIL HFA/VENTOLIN HFA) 108 (90 BASE) MCG/ACT Inhaler     apixaban ANTICOAGULANT (ELIQUIS ANTICOAGULANT) 5 MG tablet     azelastine (ASTELIN) 0.1 % nasal spray     Calcium Lactate 500 MG CAPS     DiphenhydrAMINE HCl (BENADRYL PO)     Ferrous Sulfate (IRON SUPPLEMENT PO)     fluticasone-salmeterol (ADVAIR) 250-50 MCG/DOSE inhaler     furosemide (LASIX) 20 MG tablet     ipratropium (ATROVENT) 0.06 % nasal spray     loperamide (IMODIUM A-D) 2 MG tablet     MAGNESIUM LACTATE PO     MELATONIN PO     Menaquinone-7 (VITAMIN K2 PO)     METHYLPREDNISOLONE PO     metoprolol succinate ER (TOPROL XL) 25 MG 24 hr tablet     Multiple Vitamin (DAILY MULTIVITAMIN PO)     nebulizer nebulization     Probiotic Product (PROBIOTIC DAILY PO)     spironolactone (ALDACTONE) 25 MG tablet     SYNTHROID 150 MCG tablet     TURMERIC PO     vitamin B complex with vitamin C (VITAMIN  B COMPLEX) PO tablet     vitamin D3 (CHOLECALCIFEROL) 2000 units tablet     Zinc 15 MG CAPS     No current facility-administered medications for this visit.     ROS:   Constitutional: No fever, chills, or sweats.   ENT: No visual disturbance, ear ache, epistaxis, sore throat.   Allergies/Immunologic: Negative.  "  Respiratory: No cough, hemoptysis.   Cardiovascular: As per HPI.   GI: No nausea, vomiting, hematemesis, melena, or hematochezia.   : No urinary frequency, dysuria, or hematuria.   Integument: Negative.   Psychiatric: Pleasant, no major depression noted  Neuro: No focal neurological deficits noted  Endocrinology: Negative.   Musculoskeletal: As per HPI.      EXAM:  /80 (BP Location: Right arm, Patient Position: Chair, Cuff Size: Adult Regular)   Pulse 109   Ht 1.67 m (5' 5.75\")   Wt 84.2 kg (185 lb 11.2 oz)   LMP 08/21/2007   SpO2 95%   BMI 30.20 kg/m    General: appears comfortable, alert and oriented  Head: normocephalic, atraumatic  Eyes: anicteric sclera, EOMI , PERRL  Neck: no adenopathy  Orophyarynx: moist mucosa, no lesions noted  Heart: regular, S1/S2, no murmurs, rubs or gallop. Estimated JVP at 5 cmH2O  Lungs: CTAB, No wheezing.   Abdomen: soft, non-tender, bowel sounds present, no hepatosplenomegaly  Extremities: No LE edema today  Skin: no open lesions noted  Neuro: grossly non-focal     Labs:  Lab Results   Component Value Date    WBC 6.0 11/23/2021    HGB 13.3 11/23/2021    HCT 40.1 11/23/2021     11/23/2021     11/23/2021    POTASSIUM 4.3 11/23/2021    CHLORIDE 108 11/23/2021    CO2 26 11/23/2021    BUN 9 11/23/2021    CR 0.63 11/23/2021    GLC 89 11/23/2021    SED 22 11/09/2017    DD 0.7 (H) 11/22/2017    NTBNPI 142 09/28/2021    NTBNP 127 (H) 11/23/2021    TROPONIN <0.015 09/28/2021    TROPI <0.015 12/20/2020    AST 18 11/23/2021    ALT 35 11/23/2021    ALKPHOS 110 11/23/2021    BILITOTAL 0.4 11/23/2021    INR 1.02 10/06/2019       Echocardiogram reviewed:     NM Lexiscan 3/2020:  The nuclear stress test is negative for inducible myocardial ischemia or infarction.  Fixed septal/apical defect due to Paced rhythm and increased gut photon activity.LVEDv 133ml. LV ESv 40ml. LV EF 70%.     There is no prior study for comparison     Echocardiogram 3/2020:  Mildly (EF 40-45%) " reduced left ventricular function is present.  Global right ventricular function is normal.  No pericardial effusion is present. IVC diameter <2.1 cm collapsing >50% with sniff suggests a normal RA pressure  of 3 mmHg. This study was compared with the study from 12/3/19.  There has been no change.      Coronary angiogram 9/2019:  Mild-moderate non-obstructive coronary disease involving LAD, LCx, and RCA. No significant coronary disease to explain new cardiomyopathy.     TTE 9/5/2019:  Moderately (EF 30-35%) reduced left ventricular function is present. All segments from mid to apical left ventricle are severely hypokinetic to akinetic. Basal segments are hypercontractile. Overall pattern is suggestive of stress cardiomyopathy, but cannot rule out ischemic cardiomyopathy. Right ventricular function, chamber size, wall motion, and thickness are normal. No significant valve abnormalities. Mild pulmonary hypertension with increased RA pressure.     TTE 2/19/21:  Moderately (EF 35%) reduced left ventricular function is present.  Global right ventricular function is normal.  No significant valvular dysfunction. The inferior vena cava was normal in size with preserved respiratory variability. No pericardial effusion present.     MRI cardiac 4/2017:  1. Normal left ventricular size.  Left ventricular wall thickness is  normal except a very small area in the basal inferior septum that appears  thin in some images.  Real-time images suggest a very small area of  hypokinesis in the basal inferior septum (not well visualized).  Left  ventricular ejection fraction is estimated at about 60-65% (unable to   perform volumetric analysis given frequent ectopy; real time images used  to assess LVEF).  2. Normal right ventricular size and systolic function.  No obvious regional wall motion abnormalities noted.  3. Gadolinium imaging:  No obvious right ventricular myocardial late  gadolinium enhancement noted.  There is a very small  area of probable late  gadolinium enhancement in the mildly thinned basal inferior segment.    4. Moderate left atrial dilatation.  The right atrial size is at the upper limits of normal.  5. Thickened mitral valve leaflets, without obvious stenosis or significant regurgitation. No obvious hemodynamically significant valvular dysfunction noted.   6. Normal sized aortic root and ascending aorta.  For the remainder of the thoracic aorta, see separate radiology report.  7. Normal pericardium without significant pericardial effusion.      Cardiac MRI 5/2021:  1. The LV is normal in cavity size and wall thickness. The global systolic function is mildly reduced. The LVEF is 50%. There are no regional wall motion abnormalities. There is abnormal septal motion related to pacing.  2. The RV is normal in cavity size. The global systolic function is normal. The RVEF is 65%.   3. The left atrium is mildly dilated.  4. There is no significant valvular disease.   5. Late gadolinium enhancement imaging shows no MI, fibrosis or infiltrative disease.   6. Regadenoson stress perfusion imaging shows no ischemia.  7. There is no pericardial effusion or thickening.  8. There is no LV thrombus.  CONCLUSIONS: No myocardial ischemia or fibrosis. Mild NICM possibly related to pacing, LVEF 50% and RVEF 65%.       TTE 11/23/2021  Left ventricular function is decreased. The ejection fraction is 40-45% (mildly reduced).Abnormal septal motion consistent with left bundle branch block is present.  Global right ventricular function is normal.  No significant valvular abnormalities present.  Pulmonary artery systolic pressure is normal.  The inferior vena cava was normal in size with preserved respiratory  variability.  No pericardial effusion is prese      ASSESSMENT AND PLAN:  Ms. Marroquin is a 63 year old Female with HFrEf with an EF:45% now recovered to 55-60, NICM, Takubsubo CM, afib s/p RF ablation s/p AVN ablation with PPM placement who presents  today for a follow up visit.    #HFrEF now with recovered EF  #NICM  Patient is still symptomatic. 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 We discussed with Dr. Funes about upgrading to CRT-D. We will refer patient back to EP to discuss CRT-D. Her weight has increased since last visit. Now 10lbs up, this is likely diet related weight gain rather than volume overload. We recommended that she reach out to her dietician to discuss healthy heart diet and continue with cardiac rehab.   - Metop succinate 25mg qday  - Lasix 20mg qday   - Aldactone 12.5mg qday   - Refer to EP for CRT-D upgrade    Staffed with Dr. José Luis Askew MD  Cardiology Fellow  [id number]      I have seen and evaluated the patient and agree with the assessment and plan as above.  Plan for CRT upgrade (discussed with Dr. Funes). Timing pending lab availability.   OK to hold sildenafil given severe diarrhea and side effects.  Alonso Ordonez MD

## 2021-11-23 NOTE — PATIENT INSTRUCTIONS
Dr. Ordonez recommends:    Follow up clinic visit with Dr. Ordonez in 3 months with labs same day.    Thank you for your visit today.  Please call me with any questions or concerns.   Matthias García RN  Cardiology Care Coordinator  [phone], press option 1 then option 4

## 2021-11-23 NOTE — NURSING NOTE
Chief Complaint   Patient presents with     Follow Up     HFrEF      Vitals were taken and medications reconciled.    Shahriar Bull, EMT  9:53 AM

## 2021-11-24 ENCOUNTER — HOSPITAL ENCOUNTER (OUTPATIENT)
Dept: CARDIAC REHAB | Facility: CLINIC | Age: 61
End: 2021-11-24
Attending: NURSE PRACTITIONER
Payer: COMMERCIAL

## 2021-11-24 PROCEDURE — 93798 PHYS/QHP OP CAR RHAB W/ECG: CPT

## 2021-11-29 ENCOUNTER — MYC MEDICAL ADVICE (OUTPATIENT)
Dept: CARDIOLOGY | Facility: CLINIC | Age: 61
End: 2021-11-29
Payer: COMMERCIAL

## 2021-11-29 ENCOUNTER — MYC MEDICAL ADVICE (OUTPATIENT)
Dept: FAMILY MEDICINE | Facility: CLINIC | Age: 61
End: 2021-11-29
Payer: COMMERCIAL

## 2021-11-29 ENCOUNTER — HOSPITAL ENCOUNTER (OUTPATIENT)
Dept: CARDIAC REHAB | Facility: CLINIC | Age: 61
End: 2021-11-29
Attending: NURSE PRACTITIONER
Payer: COMMERCIAL

## 2021-11-29 DIAGNOSIS — F41.9 ANXIETY: ICD-10-CM

## 2021-11-29 DIAGNOSIS — I48.0 PAROXYSMAL ATRIAL FIBRILLATION (H): ICD-10-CM

## 2021-11-29 PROCEDURE — 93798 PHYS/QHP OP CAR RHAB W/ECG: CPT

## 2021-11-29 NOTE — TELEPHONE ENCOUNTER
Requested Prescriptions   Pending Prescriptions Disp Refills     clonazePAM (KLONOPIN) 0.5 MG tablet [Pharmacy Med Name: CLONAZEPAM 0.5MG TABLETS] 90 tablet      Sig: TAKE 1 TABLET(0.5 MG) BY MOUTH THREE TIMES DAILY AS NEEDED FOR ANXIETY       There is no refill protocol information for this order        Routing refill request to provider for review/approval because:  Drug not on the [facility] refill protocol

## 2021-11-30 DIAGNOSIS — I48.0 PAROXYSMAL ATRIAL FIBRILLATION (H): ICD-10-CM

## 2021-12-01 ENCOUNTER — TELEPHONE (OUTPATIENT)
Dept: CARDIOLOGY | Facility: CLINIC | Age: 61
End: 2021-12-01
Payer: COMMERCIAL

## 2021-12-01 ENCOUNTER — HOSPITAL ENCOUNTER (OUTPATIENT)
Dept: CARDIAC REHAB | Facility: CLINIC | Age: 61
End: 2021-12-01
Attending: NURSE PRACTITIONER
Payer: COMMERCIAL

## 2021-12-01 PROCEDURE — 93798 PHYS/QHP OP CAR RHAB W/ECG: CPT

## 2021-12-01 RX ORDER — APIXABAN 5 MG/1
TABLET, FILM COATED ORAL
Qty: 60 TABLET | Refills: 0 | OUTPATIENT
Start: 2021-12-01

## 2021-12-01 RX ORDER — APIXABAN 5 MG/1
TABLET, FILM COATED ORAL
Qty: 180 TABLET | Refills: 3 | Status: SHIPPED | OUTPATIENT
Start: 2021-12-01 | End: 2022-12-08

## 2021-12-01 RX ORDER — CLONAZEPAM 0.5 MG/1
TABLET ORAL
Qty: 90 TABLET | Refills: 0 | Status: SHIPPED | OUTPATIENT
Start: 2021-12-01 | End: 2022-01-11

## 2021-12-01 NOTE — TELEPHONE ENCOUNTER
[facility] Call Center    Phone Message    May a detailed message be left on voicemail: no     Reason for Call: Other: Joyce called to advise the care team she revised the original [employer]CoolTV message to ensure it was detailed and understandable. Please reach out to Joyce if you have questions.      Action Taken: Message routed to:  [facility] ([facility]): Cardiology    Travel Screening: Not Applicable

## 2021-12-02 ENCOUNTER — HOSPITAL ENCOUNTER (EMERGENCY)
Facility: CLINIC | Age: 61
Discharge: HOME | End: 2021-12-02
Attending: EMERGENCY MEDICINE | Admitting: EMERGENCY MEDICINE
Payer: COMMERCIAL

## 2021-12-02 ENCOUNTER — VIRTUAL VISIT (OUTPATIENT)
Dept: CARDIOLOGY | Facility: CLINIC | Age: 61
End: 2021-12-02
Attending: INTERNAL MEDICINE
Payer: COMMERCIAL

## 2021-12-02 VITALS
HEIGHT: 65 IN | OXYGEN SATURATION: 99 % | HEART RATE: 90 BPM | BODY MASS INDEX: 30.49 KG/M2 | TEMPERATURE: 98.7 F | RESPIRATION RATE: 16 BRPM | DIASTOLIC BLOOD PRESSURE: 88 MMHG | WEIGHT: 183 LBS | SYSTOLIC BLOOD PRESSURE: 107 MMHG

## 2021-12-02 DIAGNOSIS — I42.8 OTHER CARDIOMYOPATHY (H): Primary | ICD-10-CM

## 2021-12-02 DIAGNOSIS — Z95.0 CARDIAC PACEMAKER IN SITU: ICD-10-CM

## 2021-12-02 DIAGNOSIS — K08.89 PAIN, DENTAL: ICD-10-CM

## 2021-12-02 DIAGNOSIS — I50.20 HEART FAILURE WITH REDUCED EJECTION FRACTION, NYHA CLASS I (H): ICD-10-CM

## 2021-12-02 PROCEDURE — 99283 EMERGENCY DEPT VISIT LOW MDM: CPT | Performed by: EMERGENCY MEDICINE

## 2021-12-02 PROCEDURE — 99214 OFFICE O/P EST MOD 30 MIN: CPT | Mod: 95 | Performed by: INTERNAL MEDICINE

## 2021-12-02 PROCEDURE — 250N000013 HC RX MED GY IP 250 OP 250 PS 637: Performed by: EMERGENCY MEDICINE

## 2021-12-02 PROCEDURE — 99283 EMERGENCY DEPT VISIT LOW MDM: CPT

## 2021-12-02 RX ADMIN — ACETAMINOPHEN AND CODEINE PHOSPHATE 2 TABLET: 300; 30 TABLET ORAL at 04:40

## 2021-12-02 ASSESSMENT — ENCOUNTER SYMPTOMS
DIFFICULTY URINATING: 0
NERVOUS/ANXIOUS: 0
HEADACHES: 0
VOMITING: 0
ABDOMINAL PAIN: 0
CHILLS: 0
FEVER: 0
NAUSEA: 0
NECK PAIN: 0
SHORTNESS OF BREATH: 0
BACK PAIN: 0

## 2021-12-02 ASSESSMENT — MIFFLIN-ST. JEOR: SCORE: 1385.34

## 2021-12-02 NOTE — NURSING NOTE
"Joyce is a 61 year old who is being evaluated via a billable video visit.      How would you like to obtain your AVS? MyChart  If the video visit is dropped, the invitation should be resent by: Text to cell phone: [phone]  Will anyone else be joining your video visit? No   Vitals - Patient Reported  Systolic (Patient Reported): 97  Diastolic (Patient Reported): 60  Weight (Patient Reported): 80.7 kg (178 lb)  Height (Patient Reported): 165.1 cm (5' 5\")  BMI (Based on Pt Reported Ht/Wt): 29.62  Pulse (Patient Reported): 98  Pain Score: No Pain (0) (Pt has SOB up on exertion and anxity)  Vitals were taken and medications were reconciled.   Everton Sanchez, EMT  11:58 AM      "

## 2021-12-02 NOTE — LETTER
12/2/2021      RE: Joyce Marroquin  [id number] [address] [id number]       Dear Colleague,    Thank you for the opportunity to participate in the care of your patient, Joyce Marroquin, at the [facility] [place] at [facility]. Please see a copy of my visit note below.    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  She has recently been seen by the heart failure service and it has been suggested that given her pacing situation and her left ventricular function which seems to vary between 35 to 45% on different measures that CRT may be a consideration.  Preauthorization will be needed from the insurance.    At today's visit I discussed the fact with Zaida and her  that CRT in the situation is not a typical indication but may be helpful.  We cannot be absolutely certain.  In the event that that step is approved it would be helpful to know the venous anatomy beforehand.  Scheduling imaging before making a final decision timing of CRT would seem to be appropriate.  Patient and  agree.    The appropriate imaging to be undertaken will be discussed with the imaging team.  Whether this is best accomplished with a CT or MR scan versus a venogram needs to be reviewed.    Joyce also has some problems with root canal being treated at the present time.  This will inevitably delay any implantable devices and she voiced understanding of that.  There is some consideration need for antibiotics.    Patient also has problems with concerns regarding QT and concerns regarding colitis.  She is being evaluated for by Dr Crowe for an antidepressant.  Pharmacy review suggests that the agents that are least likely to cause problems with QT or colitis are the serotonin reuptake inhibitors.  In particular Serzone seems to be a low risk contender.    We  will try and arrange the imaging from next week and depending on insurance approval and her dental issues we could then discuss timing for CRT.  PAST MEDICAL HISTORY:  Past Medical History:   Diagnosis Date     Arthritis      Cardiomyopathy (H)     ff Cardiology     Chronic depressive personality disorder      Congestive heart failure (H) see dr martínez    heart failure correct term     COPD exacerbation (H)      Esophageal reflux      Ex-smoker     quit 2006; 1 PPD x 30     Hyperparathyroidism (H)     s/p parathyroidectomy     Hypothyroidism      LARRY on CPAP     ff Sleep medicine     Pulmonary nodules     ff Pulmonologist     S/P cardiac pacemaker procedure     [facility]      Uncomplicated asthma years       CURRENT MEDICATIONS:  Current Outpatient Medications   Medication Sig Dispense Refill     acetaminophen (TYLENOL) 500 MG tablet Take 500-1,000 mg by mouth every 6 hours as needed for mild pain       acetaminophen-codeine (TYLENOL #3) 300-30 MG tablet Take 1 tablet by mouth every 6 hours as needed for severe pain 6 tablet 0     albuterol (PROAIR HFA/PROVENTIL HFA/VENTOLIN HFA) 108 (90 BASE) MCG/ACT Inhaler Inhale 2 puffs into the lungs as needed for shortness of breath / dyspnea or wheezing        azelastine (ASTELIN) 0.1 % nasal spray Spray 1 spray into both nostrils 2 times daily as needed        Calcium Lactate 500 MG CAPS Take 250-500 mg by mouth daily        clonazePAM (KLONOPIN) 0.5 MG tablet TAKE 1 TABLET(0.5 MG) BY MOUTH THREE TIMES DAILY AS NEEDED FOR ANXIETY 90 tablet 0     DiphenhydrAMINE HCl (BENADRYL PO) Take 25 mg by mouth nightly as needed for allergies        ELIQUIS ANTICOAGULANT 5 MG tablet TAKE 1 TABLET(5 MG) BY MOUTH TWICE DAILY 180 tablet 3     Ferrous Sulfate (IRON SUPPLEMENT PO) Take 130 mg by mouth daily        fluticasone-salmeterol (ADVAIR) 250-50 MCG/DOSE inhaler Inhale 1 puff into the lungs every 12 hours       furosemide (LASIX) 20 MG tablet Take 1 tablet, no  more than twice a week.  Call with weight gain. 180 tablet 1     ipratropium (ATROVENT) 0.06 % nasal spray Spray 2 sprays into both nostrils 4 times daily as needed        loperamide (IMODIUM A-D) 2 MG tablet Take 2 tabs (4 mg) after first loose stool, and then take one tab (2 mg) after each diarrheal stool.  Max of 8 tabs (16 mg) per day. 1 tablet 0     MAGNESIUM LACTATE PO Take  mg by mouth nightly as needed        MELATONIN PO Take 1-3 mg by mouth nightly as needed        Menaquinone-7 (VITAMIN K2 PO) Take 1 tablet by mouth every morning        METHYLPREDNISOLONE PO Take 40 mg by mouth as needed        metoprolol succinate ER (TOPROL XL) 25 MG 24 hr tablet Take 1 tablet (25 mg) by mouth daily (Patient taking differently: Take 25 mg by mouth daily Taking 1/2 tablet daily.) 45 tablet 3     Multiple Vitamin (DAILY MULTIVITAMIN PO) Take 1 tablet by mouth every morning        nebulizer nebulization as needed       Probiotic Product (PROBIOTIC DAILY PO) Take 1 capsule by mouth 2 times daily as needed        spironolactone (ALDACTONE) 25 MG tablet Take 1/2 tablet daily 90 tablet 3     SYNTHROID 150 MCG tablet TAKE ONE TABLET BY MOUTH SIX DAYS A WEEK; TAKE ONE-HALF TABLET ONE DAY A WEEK 90 tablet 3     TURMERIC PO Take 1 tablet by mouth every morning        vitamin B complex with vitamin C (VITAMIN  B COMPLEX) PO tablet Take 1 tablet by mouth as needed       vitamin D3 (CHOLECALCIFEROL) 2000 units tablet Take 1 tablet by mouth daily 1 tablet 0     Zinc 15 MG CAPS Take 15 mg by mouth          PAST SURGICAL HISTORY:  Past Surgical History:   Procedure Laterality Date     BUNIONECTOMY Bilateral      COLONOSCOPY N/A 8/30/2021    Procedure: COLONOSCOPY, WITH POLYPECTOMY AND BIOPSY;  Surgeon: Ranjit Penn MD;  Location:  GI     CV CORONARY ANGIOGRAM N/A 9/26/2019    Procedure: CV CORONARY ANGIOGRAM;  Surgeon: Erickson Trejo MD;  Location:  HEART CARDIAC CATH LAB     CV RIGHT HEART CATH MEASUREMENTS  RECORDED N/A 7/20/2020    Procedure: CV RIGHT HEART CATH;  Surgeon: Alonso Ordonez MD;  Location:  HEART CARDIAC CATH LAB     EP ABLATION / EP STUDIES  04/21/2017     EXPLORE NECK N/A 9/19/2018    Procedure: EXPLORE NECK;  Neck Exploration Resection of Left superior Parathyroid gland;  Surgeon: Kristine Venegas MD;  Location: [facility] OR      CORRECT BUNION,SIMPLE       PARATHYROIDECTOMY N/A 9/19/2018    Procedure: PARATHYROIDECTOMY;;  Surgeon: Kristine Venegas MD;  Location: [facility] OR     [facility] INSERT OF NEW OR REPL W/VENT LEAD  04/21/2017     TONSILLECTOMY & ADENOIDECTOMY         ALLERGIES:     Allergies   Allergen Reactions     Tetracycline Swelling     Zofran [Ondansetron]      Prolonged QT  Prolonged QT at baseline per patient     Flagyl [Metronidazole] Rash     Buspar [Buspirone]      Muscle weakness     Corn Oil Other (See Comments)     Headache       Cymbalta Diarrhea     Seasonal Allergies Difficulty breathing     Sulfamethoxazole-Trimethoprim      Other reaction(s): Other  Passed out     Cyclobenzaprine Other (See Comments)     Extreme heat intolerance     Levaquin [Levofloxacin]      Other reaction(s): Other  Tendon rupture     Nsaids Other (See Comments)     Exacerbated asthma       FAMILY HISTORY:  Family History   Problem Relation Age of Onset     Hypothyroidism Mother      Hypertension Mother      Osteoarthritis Mother      Coronary Artery Disease Father         nonfatal MI in his 70s     Asthma Father      Diabetes Sister      Hypothyroidism Sister      Breast Cancer Maternal Aunt      Anxiety Disorder Sister        SOCIAL HISTORY:  Social History     Tobacco Use     Smoking status: Former Smoker     Packs/day: 0.00     Years: 0.00     Pack years: 0.00     Smokeless tobacco: Never Used   Vaping Use     Vaping Use: Never used   Substance Use Topics     Alcohol use: Yes     Alcohol/week: 0.0 - 8.0 standard drinks     Comment: seldom     Drug use: No       ROS:   Constitutional: No fever, chills, or  sweats. Weight stable.   ENT: Dental issues as in HPI.  No visual disturbance, ear ache, epistaxis, sore throat.   Cardiovascular: As per HPI.   Respiratory: No cough, hemoptysis.    GI: No nausea, vomiting but sensitivity to drug related colitis,   : No hematuria.   Integument: Negative.   Psychiatric: PTSD/depression.   Hematologic:  , no easy bleeding.  Neuro: Negative.   Endocrinology: No significant heat or cold intolerance   Musculoskeletal: No myalgia.    Exam:  [facility] 08/21/2007   GENERAL APPEARANCE: healthy, alert and no distress  HEENT: no icterus,no central cyanosis  NECK: no adenopathy, no asymmetry, masses, or scars, thyroid normal to palpation and no bruits, JVP not elevated  RESPIRATORY: no rales, rhonchi or wheezes, no use of accessory muscles, no retractions, respirations are unlabored, normal respiratory rate  NEURO: alert and oriented to person/place/time, normal speech, gait and affect  VASC: Radial, femoral, dorsalis pedis and posterior tibialis pulses are normal in volumes and symmetric bilaterally. No bruits are heard.  SKIN: no ecchymoses, no rashes    Labs:  CBC RESULTS:   Lab Results   Component Value Date    WBC 6.0 11/23/2021    WBC 6.6 05/25/2021    RBC 4.12 11/23/2021    RBC 4.33 05/25/2021    HGB 13.3 11/23/2021    HGB 13.7 05/25/2021    HCT 40.1 11/23/2021    HCT 41.2 05/25/2021    MCV 97 11/23/2021    MCV 95 05/25/2021    MCH 32.3 11/23/2021    MCH 31.6 05/25/2021    MCHC 33.2 11/23/2021    MCHC 33.3 05/25/2021    RDW 12.5 11/23/2021    RDW 11.9 05/25/2021     11/23/2021     05/25/2021       BMP RESULTS:  Lab Results   Component Value Date     11/23/2021     05/25/2021    POTASSIUM 4.3 11/23/2021    POTASSIUM 4.2 05/25/2021    CHLORIDE 108 11/23/2021    CHLORIDE 107 05/25/2021    CO2 26 11/23/2021    CO2 24 05/25/2021    ANIONGAP 7 11/23/2021    ANIONGAP 7 05/25/2021    GLC 89 11/23/2021    GLC 92 05/25/2021    BUN 9 11/23/2021    BUN 12 05/25/2021    CR 0.63  11/23/2021    CR 0.64 05/25/2021    GFRESTIMATED >90 11/23/2021    GFRESTIMATED >90 05/25/2021    GFRESTBLACK >90 05/25/2021    MANJU 9.6 11/23/2021    MANJU 8.7 05/25/2021        INR RESULTS:  Lab Results   Component Value Date    INR 1.02 10/06/2019    INR 0.95 06/28/2017       Procedures:  PULMONARY FUNCTION TESTS:   No flowsheet data found.      ECHOCARDIOGRAM:   No results found for this or any previous visit (from the past 8760 hour(s)).      Assessment and Plan:   1.  Pacing induced cardiomyopathy  2.  Exertional intolerance secondary to #1  3.  Multiple drug sensitivities  4.  Dual-chamber pacemaker in situ  5.  Dental infection necessitating root canal    Plan  1.  Schedule for CT angio heart: Particular evaluation of coronary sinus anatomy (prefer Friday, December 10 if possible).... Discussed with   2.  Reevaluate for CRT upgrade after preauthorization with insurance and after patient seen current dental issues have been resolved  3.  Follow-up appointment TBD after we determine CRT scheduling    Total elapsed time today with chart review, video visit documentation 40 minutes    Video on 12: 02; video off 12: 35  Platform Doximity  Patient at home; clinic [facility] heart    I very much appreciated the opportunity to see and assess Joyce Marroquin in the clinic today.  Please do not hesitate to contact my office if you have any questions or concerns.      Lino Funes MD  Cardiac Arrhythmia Service  [facility]  [phone]      CC  Alonso Ordonez MD ([facility])

## 2021-12-02 NOTE — TELEPHONE ENCOUNTER
[facility]    Phone Message    May a detailed message be left on voicemail: yes     Reason for Call: Other: Joyce calling to follow up on the concerns regarding the errors in her chart.  She states she is extremely disappointment and said that it is increasing her PTSD because of this.  Please call her back to discuss. (Also see her Plink Search message from 12/2/2021 for more information)    Action Taken: Message routed to:  [facility] ([facility]  Cardiology    Travel Screening: Not Applicable

## 2021-12-02 NOTE — DISCHARGE INSTRUCTIONS
Patient to follow-up with your dentist tomorrow for follow-up and to discuss pain medications moving forward.  Return to the ED with any new or worsening symptoms.

## 2021-12-02 NOTE — PROGRESS NOTES
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  She has recently been seen by the heart failure service and it has been suggested that given her pacing situation and her left ventricular function which seems to vary between 35 to 45% on different measures that CRT may be a consideration.  Preauthorization will be needed from the insurance.    At today's visit I discussed the fact with Zaida and her  that CRT in the situation is not a typical indication but may be helpful.  We cannot be absolutely certain.  In the event that that step is approved it would be helpful to know the venous anatomy beforehand.  Scheduling imaging before making a final decision timing of CRT would seem to be appropriate.  Patient and  agree.    The appropriate imaging to be undertaken will be discussed with the imaging team.  Whether this is best accomplished with a CT or MR scan versus a venogram needs to be reviewed.    Joyce also has some problems with root canal being treated at the present time.  This will inevitably delay any implantable devices and she voiced understanding of that.  There is some consideration need for antibiotics.    Patient also has problems with concerns regarding QT and concerns regarding colitis.  She is being evaluated for by Dr Crowe for an antidepressant.  Pharmacy review suggests that the agents that are least likely to cause problems with QT or colitis are the serotonin reuptake inhibitors.  In particular Serzone seems to be a low risk contender.    We will try and arrange the imaging from next week and depending on insurance approval and her dental issues we could then discuss timing for CRT.  PAST MEDICAL HISTORY:  Past Medical History:   Diagnosis Date     Arthritis      Cardiomyopathy (H)     ff Cardiology     Chronic depressive personality disorder      Congestive heart failure (H)  see  note    heart failure correct term     COPD exacerbation (H)      Esophageal reflux      Ex-smoker     quit 2006; 1 PPD x 30     Hyperparathyroidism (H)     s/p parathyroidectomy     Hypothyroidism      LARRY on CPAP     ff Sleep medicine     Pulmonary nodules     ff Pulmonologist     S/P cardiac pacemaker procedure     checked every 6 months at [facility]     Uncomplicated asthma years       CURRENT MEDICATIONS:  Current Outpatient Medications   Medication Sig Dispense Refill     acetaminophen (TYLENOL) 500 MG tablet Take 500-1,000 mg by mouth every 6 hours as needed for mild pain       acetaminophen-codeine (TYLENOL #3) 300-30 MG tablet Take 1 tablet by mouth every 6 hours as needed for severe pain 6 tablet 0     albuterol (PROAIR HFA/PROVENTIL HFA/VENTOLIN HFA) 108 (90 BASE) MCG/ACT Inhaler Inhale 2 puffs into the lungs as needed for shortness of breath / dyspnea or wheezing        azelastine (ASTELIN) 0.1 % nasal spray Spray 1 spray into both nostrils 2 times daily as needed        Calcium Lactate 500 MG CAPS Take 250-500 mg by mouth daily        clonazePAM (KLONOPIN) 0.5 MG tablet TAKE 1 TABLET(0.5 MG) BY MOUTH THREE TIMES DAILY AS NEEDED FOR ANXIETY 90 tablet 0     DiphenhydrAMINE HCl (BENADRYL PO) Take 25 mg by mouth nightly as needed for allergies        ELIQUIS ANTICOAGULANT 5 MG tablet TAKE 1 TABLET(5 MG) BY MOUTH TWICE DAILY 180 tablet 3     Ferrous Sulfate (IRON SUPPLEMENT PO) Take 130 mg by mouth daily        fluticasone-salmeterol (ADVAIR) 250-50 MCG/DOSE inhaler Inhale 1 puff into the lungs every 12 hours       furosemide (LASIX) 20 MG tablet Take 1 tablet, no more than twice a week.  Call with weight gain. 180 tablet 1     ipratropium (ATROVENT) 0.06 % nasal spray Spray 2 sprays into both nostrils 4 times daily as needed        loperamide (IMODIUM A-D) 2 MG tablet Take 2 tabs (4 mg) after first loose stool, and then take one tab (2 mg) after each diarrheal stool.  Max of 8 tabs (16 mg) per day.  1 tablet 0     MAGNESIUM LACTATE PO Take  mg by mouth nightly as needed        MELATONIN PO Take 1-3 mg by mouth nightly as needed        Menaquinone-7 (VITAMIN K2 PO) Take 1 tablet by mouth every morning        METHYLPREDNISOLONE PO Take 40 mg by mouth as needed        metoprolol succinate ER (TOPROL XL) 25 MG 24 hr tablet Take 1 tablet (25 mg) by mouth daily (Patient taking differently: Take 25 mg by mouth daily Taking 1/2 tablet daily.) 45 tablet 3     Multiple Vitamin (DAILY MULTIVITAMIN PO) Take 1 tablet by mouth every morning        nebulizer nebulization as needed       Probiotic Product (PROBIOTIC DAILY PO) Take 1 capsule by mouth 2 times daily as needed        spironolactone (ALDACTONE) 25 MG tablet Take 1/2 tablet daily 90 tablet 3     SYNTHROID 150 MCG tablet TAKE ONE TABLET BY MOUTH SIX DAYS A WEEK; TAKE ONE-HALF TABLET ONE DAY A WEEK 90 tablet 3     TURMERIC PO Take 1 tablet by mouth every morning        vitamin B complex with vitamin C (VITAMIN  B COMPLEX) PO tablet Take 1 tablet by mouth as needed       vitamin D3 (CHOLECALCIFEROL) 2000 units tablet Take 1 tablet by mouth daily 1 tablet 0     Zinc 15 MG CAPS Take 15 mg by mouth          PAST SURGICAL HISTORY:  Past Surgical History:   Procedure Laterality Date     BUNIONECTOMY Bilateral      COLONOSCOPY N/A 8/30/2021    Procedure: COLONOSCOPY, WITH POLYPECTOMY AND BIOPSY;  Surgeon: Ranjit Penn MD;  Location:  GI     CV CORONARY ANGIOGRAM N/A 9/26/2019    Procedure: CV CORONARY ANGIOGRAM;  Surgeon: Erickson Trejo MD;  Location:  HEART CARDIAC CATH LAB     CV RIGHT HEART CATH MEASUREMENTS RECORDED N/A 7/20/2020    Procedure: CV RIGHT HEART CATH;  Surgeon: Alonso Ordonez MD;  Location:  HEART CARDIAC CATH LAB     EP ABLATION / EP STUDIES  04/21/2017     EXPLORE NECK N/A 9/19/2018    Procedure: EXPLORE NECK;  Neck Exploration Resection of Left superior Parathyroid gland;  Surgeon: Kristine Venegas MD;  Location:  OR      HC CORRECT BUNION,SIMPLE       PARATHYROIDECTOMY N/A 9/19/2018    Procedure: PARATHYROIDECTOMY;;  Surgeon: Kristine Venegas MD;  Location: [facility] OR     PPM INSERT OF NEW OR REPL W/VENT LEAD  04/21/2017     TONSILLECTOMY & ADENOIDECTOMY         ALLERGIES:     Allergies   Allergen Reactions     Tetracycline Swelling     Zofran [Ondansetron]      Prolonged QT  Prolonged QT at baseline per patient     Flagyl [Metronidazole] Rash     Buspar [Buspirone]      Muscle weakness     Corn Oil Other (See Comments)     Headache       Cymbalta Diarrhea     Seasonal Allergies Difficulty breathing     Sulfamethoxazole-Trimethoprim      Other reaction(s): Other  Passed out     Cyclobenzaprine Other (See Comments)     Extreme heat intolerance     Levaquin [Levofloxacin]      Other reaction(s): Other  Tendon rupture     Nsaids Other (See Comments)     Exacerbated asthma       FAMILY HISTORY:  Family History   Problem Relation Age of Onset     Hypothyroidism Mother      Hypertension Mother      Osteoarthritis Mother      Coronary Artery Disease Father         nonfatal MI in his 70s     Asthma Father      Diabetes Sister      Hypothyroidism Sister      Breast Cancer Maternal Aunt      Anxiety Disorder Sister        SOCIAL HISTORY:  Social History     Tobacco Use     Smoking status: Former Smoker     Packs/day: 0.00     Years: 0.00     Pack years: 0.00     Smokeless tobacco: Never Used   Vaping Use     Vaping Use: Never used   Substance Use Topics     Alcohol use: Yes     Alcohol/week: 0.0 - 8.0 standard drinks     Comment: seldom     Drug use: No       ROS:   Constitutional: No fever, chills, or sweats. Weight stable.   ENT: Dental issues as in HPI.  No visual disturbance, ear ache, epistaxis, sore throat.   Cardiovascular: As per HPI.   Respiratory: No cough, hemoptysis.    GI: No nausea, vomiting but sensitivity to drug related colitis,   : No hematuria.   Integument: Negative.   Psychiatric: PTSD/depression.   Hematologic:  , no  easy bleeding.  Neuro: Negative.   Endocrinology: No significant heat or cold intolerance   Musculoskeletal: No myalgia.    Exam:  [facility] 08/21/2007   GENERAL APPEARANCE: healthy, alert and no distress  HEENT: no icterus,no central cyanosis  NECK: no adenopathy, no asymmetry, masses, or scars, thyroid normal to palpation and no bruits, JVP not elevated  RESPIRATORY: no rales, rhonchi or wheezes, no use of accessory muscles, no retractions, respirations are unlabored, normal respiratory rate  NEURO: alert and oriented to person/place/time, normal speech, gait and affect  VASC: Radial, femoral, dorsalis pedis and posterior tibialis pulses are normal in volumes and symmetric bilaterally. No bruits are heard.  SKIN: no ecchymoses, no rashes    Labs:  CBC RESULTS:   Lab Results   Component Value Date    WBC 6.0 11/23/2021    WBC 6.6 05/25/2021    RBC 4.12 11/23/2021    RBC 4.33 05/25/2021    HGB 13.3 11/23/2021    HGB 13.7 05/25/2021    HCT 40.1 11/23/2021    HCT 41.2 05/25/2021    MCV 97 11/23/2021    MCV 95 05/25/2021    MCH 32.3 11/23/2021    MCH 31.6 05/25/2021    MCHC 33.2 11/23/2021    MCHC 33.3 05/25/2021    RDW 12.5 11/23/2021    RDW 11.9 05/25/2021     11/23/2021     05/25/2021       BMP RESULTS:  Lab Results   Component Value Date     11/23/2021     05/25/2021    POTASSIUM 4.3 11/23/2021    POTASSIUM 4.2 05/25/2021    CHLORIDE 108 11/23/2021    CHLORIDE 107 05/25/2021    CO2 26 11/23/2021    CO2 24 05/25/2021    ANIONGAP 7 11/23/2021    ANIONGAP 7 05/25/2021    GLC 89 11/23/2021    GLC 92 05/25/2021    BUN 9 11/23/2021    BUN 12 05/25/2021    CR 0.63 11/23/2021    CR 0.64 05/25/2021    GFRESTIMATED >90 11/23/2021    GFRESTIMATED >90 05/25/2021    GFRESTBLACK >90 05/25/2021    MANJU 9.6 11/23/2021    MANJU 8.7 05/25/2021        INR RESULTS:  Lab Results   Component Value Date    INR 1.02 10/06/2019    INR 0.95 06/28/2017       Procedures:  PULMONARY FUNCTION TESTS:   No flowsheet data  found.      ECHOCARDIOGRAM:   No results found for this or any previous visit (from the past 8760 hour(s)).      Assessment and Plan:   1.  Pacing induced cardiomyopathy  2.  Exertional intolerance secondary to #1  3.  Multiple drug sensitivities  4.  Dual-chamber pacemaker in situ  5.  Dental infection necessitating root canal    Plan  1.  Schedule for CT angio heart: Particular evaluation of coronary sinus anatomy (prefer Friday, December 10 if possible).... Discussed with   2.  Reevaluate for CRT upgrade after preauthorization with insurance and after patient seen current dental issues have been resolved  3.  Follow-up appointment TBD after we determine CRT scheduling    Total elapsed time today with chart review, video visit documentation 40 minutes    Video on 12: 02; video off 12: 35  Platform Doximity  Patient at home; clinic [facility] heart    I very much appreciated the opportunity to see and assess Joyce Marroquin in the clinic today.  Please do not hesitate to contact my office if you have any questions or concerns.      Lino Funes MD  Cardiac Arrhythmia Service  [facility]  [phone]      CC  Alonso Ordonez MD ([facility])

## 2021-12-02 NOTE — ED PROVIDER NOTES
"ED Provider Note  [facility]      History     Chief Complaint   Patient presents with     Dental Pain     Root canal at 1639 12/1/21     HPI  Joyce Marroquin is a 61 year old female who presents to the ED with complaint of tooth pain.  The pain is located on the lower left second from posterior molar.  She had a root canal done earlier today.  She was not discharged with any pain medications.  Pain has been constant for the past 4 hours.  Feels like a sharp stabbing pain.  She denies any tongue swelling, difficulty swallowing, inability to tolerate secretions, fever/chills, bleeding, and has no other medical complaints.  She requests Tylenol 3 which has worked for her in the past.  She does not tolerate \"stronger pain medicines\".    Past Medical History  Past Medical History:   Diagnosis Date     Arthritis      Cardiomyopathy (H)     ff Cardiology     Chronic depressive personality disorder      Congestive heart failure (H) see dr martínez    heart failure correct term     COPD exacerbation (H)      Esophageal reflux      Ex-smoker     quit 2006; 1 PPD x 30     Hyperparathyroidism (H)     s/p parathyroidectomy     Hypothyroidism      LARRY on CPAP     ff Sleep medicine     Pulmonary nodules     ff Pulmonologist     S/P cardiac pacemaker procedure     [facility]      Uncomplicated asthma years     Past Surgical History:   Procedure Laterality Date     BUNIONECTOMY Bilateral      COLONOSCOPY N/A 8/30/2021    Procedure: COLONOSCOPY, WITH POLYPECTOMY AND BIOPSY;  Surgeon: Ranjit Penn MD;  Location:  GI     CV CORONARY ANGIOGRAM N/A 9/26/2019    Procedure: CV CORONARY ANGIOGRAM;  Surgeon: Erickson Trejo MD;  Location:  HEART CARDIAC CATH LAB     CV RIGHT HEART CATH MEASUREMENTS RECORDED N/A 7/20/2020    Procedure: CV RIGHT HEART CATH;  Surgeon: Alonso Ordonez MD;  Location:  HEART CARDIAC CATH LAB     EP ABLATION / EP STUDIES  04/21/2017     EXPLORE NECK " N/A 9/19/2018    Procedure: EXPLORE NECK;  Neck Exploration Resection of Left superior Parathyroid gland;  Surgeon: Kristine Venegas MD;  Location: [facility] OR     HC CORRECT BUNION,SIMPLE       PARATHYROIDECTOMY N/A 9/19/2018    Procedure: PARATHYROIDECTOMY;;  Surgeon: Kristine Venegas MD;  Location: [facility] OR     PPM INSERT OF NEW OR REPL W/VENT LEAD  04/21/2017     TONSILLECTOMY & ADENOIDECTOMY       acetaminophen-codeine (TYLENOL #3) 300-30 MG tablet  acetaminophen (TYLENOL) 500 MG tablet  albuterol (PROAIR HFA/PROVENTIL HFA/VENTOLIN HFA) 108 (90 BASE) MCG/ACT Inhaler  azelastine (ASTELIN) 0.1 % nasal spray  Calcium Lactate 500 MG CAPS  clonazePAM (KLONOPIN) 0.5 MG tablet  DiphenhydrAMINE HCl (BENADRYL PO)  ELIQUIS ANTICOAGULANT 5 MG tablet  Ferrous Sulfate (IRON SUPPLEMENT PO)  fluticasone-salmeterol (ADVAIR) 250-50 MCG/DOSE inhaler  furosemide (LASIX) 20 MG tablet  ipratropium (ATROVENT) 0.06 % nasal spray  loperamide (IMODIUM A-D) 2 MG tablet  MAGNESIUM LACTATE PO  MELATONIN PO  Menaquinone-7 (VITAMIN K2 PO)  METHYLPREDNISOLONE PO  metoprolol succinate ER (TOPROL XL) 25 MG 24 hr tablet  Multiple Vitamin (DAILY MULTIVITAMIN PO)  nebulizer nebulization  Probiotic Product (PROBIOTIC DAILY PO)  spironolactone (ALDACTONE) 25 MG tablet  SYNTHROID 150 MCG tablet  TURMERIC PO  vitamin B complex with vitamin C (VITAMIN  B COMPLEX) PO tablet  vitamin D3 (CHOLECALCIFEROL) 2000 units tablet  Zinc 15 MG CAPS      Allergies   Allergen Reactions     Tetracycline Swelling     Zofran [Ondansetron]      Prolonged QT  Prolonged QT at baseline per patient     Flagyl [Metronidazole] Rash     Buspar [Buspirone]      Muscle weakness     Corn Oil Other (See Comments)     Headache       Cymbalta Diarrhea     Seasonal Allergies Difficulty breathing     Sulfamethoxazole-Trimethoprim      Other reaction(s): Other  Passed out     Cyclobenzaprine Other (See Comments)     Extreme heat intolerance     Levaquin [Levofloxacin]      Other  "reaction(s): Other  Tendon rupture     Nsaids Other (See Comments)     Exacerbated asthma     Family History  Family History   Problem Relation Age of Onset     Hypothyroidism Mother      Hypertension Mother      Osteoarthritis Mother      Coronary Artery Disease Father         nonfatal MI in his 70s     Asthma Father      Diabetes Sister      Hypothyroidism Sister      Breast Cancer Maternal Aunt      Anxiety Disorder Sister      Social History   Social History     Tobacco Use     Smoking status: Former Smoker     Packs/day: 0.00     Years: 0.00     Pack years: 0.00     Smokeless tobacco: Never Used   Vaping Use     Vaping Use: Never used   Substance Use Topics     Alcohol use: Yes     Alcohol/week: 0.0 - 8.0 standard drinks     Comment: seldom     Drug use: No      Past medical history, past surgical history, medications, allergies, family history, and social history were reviewed with the patient. No additional pertinent items.       Review of Systems   Constitutional: Negative for chills and fever.   HENT: Positive for dental problem.    Respiratory: Negative for shortness of breath.    Cardiovascular: Negative for chest pain.   Gastrointestinal: Negative for abdominal pain, nausea and vomiting.   Genitourinary: Negative for difficulty urinating.   Musculoskeletal: Negative for back pain and neck pain.   Neurological: Negative for headaches.   Psychiatric/Behavioral: The patient is not nervous/anxious.      A complete review of systems was performed with pertinent positives and negatives noted in the HPI, and all other systems negative.    Physical Exam   BP: 107/88  Pulse: 90  Temp: 98.7  F (37.1  C)  Resp: 16  Height: 165 cm (5' 4.96\")  Weight: 83 kg (183 lb)  SpO2: 99 %  Physical Exam  Vitals and nursing note reviewed.   Constitutional:       General: She is not in acute distress.     Appearance: Normal appearance.   HENT:      Head: Normocephalic.      Nose: Nose normal.      Mouth/Throat:     Eyes:      " Pupils: Pupils are equal, round, and reactive to light.   Cardiovascular:      Rate and Rhythm: Normal rate and regular rhythm.   Pulmonary:      Effort: Pulmonary effort is normal.   Abdominal:      General: There is no distension.   Musculoskeletal:         General: No deformity. Normal range of motion.      Cervical back: Normal range of motion.   Skin:     General: Skin is warm.   Neurological:      Mental Status: She is alert and oriented to person, place, and time.   Psychiatric:         Mood and Affect: Mood normal.         ED Course      Procedures       The medical record was reviewed and interpreted.  Managed outpatient prescription medications.       No results found for any visits on 12/02/21.  Medications   acetaminophen-codeine (TYLENOL #3) 300-30 MG per tablet 2 tablet (2 tablets Oral Given 12/2/21 0440)        Assessments & Plan (with Medical Decision Making)   Patient presents to the ED looking for pain medication after root canal.  She has pain at the site of the canal.  There is no signs of allergic reaction.  No signs of infection.  No active bleeding.  She was given 2 doses of acetaminophen with codeine in the ED.  This pain medication as ordered for her previously.  Symptoms improved.  She was discharged with a 2-day course of this pain medication.  PDMP reviewed.  She plans to call her dentist tomorrow for re-eval and to get a longer course of pain medication if needed.    I have reviewed the nursing notes. I have reviewed the findings, diagnosis, plan and need for follow up with the patient.    Discharge Medication List as of 12/2/2021  4:37 AM      START taking these medications    Details   acetaminophen-codeine (TYLENOL #3) 300-30 MG tablet Take 1 tablet by mouth every 6 hours as needed for severe pain, Disp-6 tablet, R-0, Local Print             Final diagnoses:   Pain, dental       --  DO KAROLYN Bartlett [facility] EMERGENCY DEPARTMENT  12/2/2021     Kvng Heller  DO  12/02/21 0645

## 2021-12-02 NOTE — PATIENT INSTRUCTIONS
You were seen in the Electrophysiology Clinic today by: Dr Funes    Plan:       Labs/Tests Needed:    CT Angio- Heart to assess Coronary Sinus      Follow up visit:    To Be Determined, based on CT results         Your Care Team:  EP Cardiology   Telephone Number     Nurse Line  Marsha Hawthorne RN  [phone]     For scheduling appts or procedures:    Merced Heller   [phone]   For the Device Clinic (Pacemakers, ICDs, Loop Recorders)    During business hours: [phone]  After business hours:   [phone]- select option 4 and ask for job code [id number].     On-call cardiologist for after hours or on weekends: [phone], option #4, and ask to speak to the on-call cardiologist.     Cardiovascular Clinic:   [address] [place] [address]. [place] [address]      As always, Thank you for trusting us with your health care needs!

## 2021-12-02 NOTE — ED TRIAGE NOTES
Pt had root canal done 12/1/21 at 1630. Pt states that she is having pain 9/10. Pt states that she called the on call Dentist and no one is able to see her at this time. Pt was advised to go to the ER.

## 2021-12-05 ENCOUNTER — MYC MEDICAL ADVICE (OUTPATIENT)
Dept: FAMILY MEDICINE | Facility: CLINIC | Age: 61
End: 2021-12-05
Payer: COMMERCIAL

## 2021-12-05 DIAGNOSIS — F43.23 ADJUSTMENT DISORDER WITH MIXED ANXIETY AND DEPRESSED MOOD: Primary | ICD-10-CM

## 2021-12-08 ENCOUNTER — HOSPITAL ENCOUNTER (OUTPATIENT)
Dept: CARDIAC REHAB | Facility: CLINIC | Age: 61
End: 2021-12-08
Attending: NURSE PRACTITIONER
Payer: COMMERCIAL

## 2021-12-08 PROCEDURE — 93797 PHYS/QHP OP CAR RHAB WO ECG: CPT | Mod: XU

## 2021-12-08 PROCEDURE — 93798 PHYS/QHP OP CAR RHAB W/ECG: CPT

## 2021-12-09 ENCOUNTER — MYC MEDICAL ADVICE (OUTPATIENT)
Dept: PHARMACY | Facility: CLINIC | Age: 61
End: 2021-12-09
Payer: COMMERCIAL

## 2021-12-09 ENCOUNTER — TELEPHONE (OUTPATIENT)
Dept: FAMILY MEDICINE | Facility: CLINIC | Age: 61
End: 2021-12-09
Payer: COMMERCIAL

## 2021-12-09 DIAGNOSIS — F43.23 ADJUSTMENT DISORDER WITH MIXED ANXIETY AND DEPRESSED MOOD: ICD-10-CM

## 2021-12-09 DIAGNOSIS — F41.8 SITUATIONAL ANXIETY: Primary | ICD-10-CM

## 2021-12-09 DIAGNOSIS — I42.9 CARDIOMYOPATHY, UNSPECIFIED TYPE (H): ICD-10-CM

## 2021-12-09 NOTE — TELEPHONE ENCOUNTER
Spoke with patient, she is very upset with pharm D because the nefazodone that was ordered has been off the market since 2003 due to causing liver problems. She will not take that, even if it is returned to market    She is frustrated on trying to find a medication she can take on a daily basis for her anxiety and depression.  She can only use the klonopin if she is home, she can't drive if she takes the klonopin.  Cymbalta she did not tolerate.  She wonders if Zoloft can be an option?   but she would be concerned if it could cause any QT issues, (since she does have chronic systolic heart failure) or diarrhea she could not take it.  She does not know what she can try and any medication she tries she wants Ce to consult with her cardiology Dr. Funes.  RN did discuss, it may be best to schedule an appointment with Ce to discuss medication options.  Patient is agreeable to this and patient wants Ce to determine if it would be best to schedule an in person, video or telephone visit?  Please advise,  Libertad Akers RN  [facility]

## 2021-12-09 NOTE — TELEPHONE ENCOUNTER
Patient calling to [facility] for ordering nefazodone (SERZONE) 100 MG tablet. Per Pharm D medication has not been on market since 20023 due to liver problems.     Patient requesting call from RN.

## 2021-12-09 NOTE — TELEPHONE ENCOUNTER
These were the meds recommended on her genetic testing. If her cardiologist is ok with her taking sertraline, I will send it in for her. What about viibryd?    viibryd, latuda, trazodone, sertraline, quetiapine, and serzone.       SHAYNA Simon, FNP-BC

## 2021-12-09 NOTE — TELEPHONE ENCOUNTER
Looks like Ce Crowe would like cardiology input on using zoloft or vibryd.    Routed encounter to Dr. Lino Funes to weigh in.    I also called patient and advised her we are hoping to hear what Dr. Funes advises before moving ahead with a medication.    Will keep encounter in RN box to watch for cardiology response.      Ana Greer RN  [facility]

## 2021-12-10 ENCOUNTER — TELEPHONE (OUTPATIENT)
Dept: PHARMACY | Facility: CLINIC | Age: 61
End: 2021-12-10

## 2021-12-10 ENCOUNTER — HOSPITAL ENCOUNTER (OUTPATIENT)
Dept: CT IMAGING | Facility: CLINIC | Age: 61
Discharge: HOME | End: 2021-12-10
Attending: INTERNAL MEDICINE | Admitting: INTERNAL MEDICINE
Payer: COMMERCIAL

## 2021-12-10 DIAGNOSIS — I50.20 HEART FAILURE WITH REDUCED EJECTION FRACTION, NYHA CLASS I (H): ICD-10-CM

## 2021-12-10 DIAGNOSIS — Z95.0 CARDIAC PACEMAKER IN SITU: ICD-10-CM

## 2021-12-10 DIAGNOSIS — I42.8 OTHER CARDIOMYOPATHY (H): ICD-10-CM

## 2021-12-10 PROCEDURE — 250N000011 HC RX IP 250 OP 636: Performed by: INTERNAL MEDICINE

## 2021-12-10 PROCEDURE — 75572 CT HRT W/3D IMAGE: CPT

## 2021-12-10 PROCEDURE — 75572 CT HRT W/3D IMAGE: CPT | Mod: 26 | Performed by: INTERNAL MEDICINE

## 2021-12-10 RX ORDER — IOPAMIDOL 755 MG/ML
120 INJECTION, SOLUTION INTRAVASCULAR ONCE
Status: COMPLETED | OUTPATIENT
Start: 2021-12-10 | End: 2021-12-10

## 2021-12-10 RX ORDER — IOPAMIDOL 755 MG/ML
100 INJECTION, SOLUTION INTRAVASCULAR ONCE
Status: DISCONTINUED | OUTPATIENT
Start: 2021-12-10 | End: 2021-12-10

## 2021-12-10 RX ADMIN — IOPAMIDOL 120 ML: 755 INJECTION, SOLUTION INTRAVENOUS at 11:56

## 2021-12-10 NOTE — TELEPHONE ENCOUNTER
[facility] - please see below, thank you!    Kortney Peralta, PharmD  Medication Therapy Management Pharmacist  [phone]

## 2021-12-10 NOTE — TELEPHONE ENCOUNTER
Joyce had called and left me a voicemail regarding questions on options for mental health. I have been in communication with [facility] specialty pharmacists Lisa Medrano, RossanaD, BCACP (gastroenterology and rheumatology)  as well as Eleni Cervantes PharmD (mental health), as well have reviewed recommendations from Tonya Boston, Theo, BCACP regarding options for Joyce and I called her back and we spoke about the following:    The best options right now for her are:  1. Trial of fluoxetine (Prozac) - fluoxetine associated colitis is not as prevalent in the literature, however certainly still a risk with any medication in the selective serotonin reuptake inhibitor class.  Joyce reports remember trying this in the past for loneliness, however it was not effective at that time. She feels her concern now is more depression/anxiety related.   2. Retrial of sertraline (Zoloft), higher reported incidence of drug induced colitis (~20%).  3. Bupropion (Wellbutrin), drug induced colitis not commonly reported in the literature.  Joyce reports having taken this in the past for tobacco cessation. She can't recall why she stopped it. She didn't take it very long, so there must have been something she didn't like about it.     After discussing options, she is open to retrial of fluoxetine or sertraline, she would prefer to avoid bupropion. Ce Crowe NP would like cardiology to sign off before starting either.    Plan:  1. Ce Crowe NP - can you discuss with cardiology about starting either fluoxetine (5 mg once daily, half of a 10 mg tablet) or sertraline (12.5 mg once daily, half of a 25 mg tablet)? Joyce is open to retrial of either (and both should have low impact on QTc interval), and then please get her started on either option?  2. Kortney will follow-up with patient on 12/22/21 (pending I am not on maternity leave yet - otherwise patient is aware and plans to reschedule with Loreto Colby, Theo, BCACP who will be  covering my leave).  She prefers not to follow-up with Tonya Boston, MTM PharmD, BCACP.     Kortney Peralta, PharmD  Medication Therapy Management Pharmacist  [phone]

## 2021-12-13 ENCOUNTER — HOSPITAL ENCOUNTER (OUTPATIENT)
Dept: CARDIAC REHAB | Facility: CLINIC | Age: 61
End: 2021-12-13
Attending: NURSE PRACTITIONER
Payer: COMMERCIAL

## 2021-12-13 ENCOUNTER — OFFICE VISIT (OUTPATIENT)
Dept: SPIRITUAL SERVICES | Facility: CLINIC | Age: 61
End: 2021-12-13
Payer: COMMERCIAL

## 2021-12-13 DIAGNOSIS — I51.89 LEFT VENTRICULAR DYSFUNCTION WITH REDUCED LEFT VENTRICULAR FUNCTION: Primary | ICD-10-CM

## 2021-12-13 DIAGNOSIS — I44.2 COMPLETE ATRIOVENTRICULAR BLOCK (H): ICD-10-CM

## 2021-12-13 DIAGNOSIS — R68.89 EXERCISE INTOLERANCE: ICD-10-CM

## 2021-12-13 DIAGNOSIS — I42.8 NONISCHEMIC CARDIOMYOPATHY (H): ICD-10-CM

## 2021-12-13 PROCEDURE — 93798 PHYS/QHP OP CAR RHAB W/ECG: CPT

## 2021-12-13 PROCEDURE — 93797 PHYS/QHP OP CAR RHAB WO ECG: CPT | Mod: XU

## 2021-12-13 NOTE — PROGRESS NOTES
Spirituality Group Note    [facility] - [facility]    Name:    Joyce Marroquin                                                                     YOB: 1960    MRN:     [id number]                                                                       Age: 61 year old      Patient attended -led group, which included discussion of Coping with Depression and Anxiety during Serious Illness, Emotional Responses to Cardiac Illness, and The Healing Process as it relates to coping with stress.    Patient attended group for 1.0 hrs.    The patient actively participated in group discussion and shared some of her personal health narrative as well as some of the frustrations related to her CHF.    Matthias Gregg M.A., [facility]  Staff    Spiritual Health Services  [facility]  [phone] (Office)  [phone] (Pager)  [email or web address]

## 2021-12-14 ENCOUNTER — ANCILLARY PROCEDURE (OUTPATIENT)
Dept: CARDIOLOGY | Facility: CLINIC | Age: 61
End: 2021-12-14
Attending: INTERNAL MEDICINE
Payer: COMMERCIAL

## 2021-12-14 DIAGNOSIS — I44.2 COMPLETE ATRIOVENTRICULAR BLOCK (H): ICD-10-CM

## 2021-12-14 PROCEDURE — 93296 REM INTERROG EVL PM/IDS: CPT

## 2021-12-14 PROCEDURE — 93294 REM INTERROG EVL PM/LDLS PM: CPT | Performed by: INTERNAL MEDICINE

## 2021-12-14 RX ORDER — FLUOXETINE 10 MG/1
TABLET, FILM COATED ORAL
Qty: 67 TABLET | Refills: 0 | Status: SHIPPED | OUTPATIENT
Start: 2021-12-14 | End: 2022-03-15

## 2021-12-15 ENCOUNTER — HOSPITAL ENCOUNTER (OUTPATIENT)
Dept: CARDIAC REHAB | Facility: CLINIC | Age: 61
End: 2021-12-15
Attending: NURSE PRACTITIONER
Payer: COMMERCIAL

## 2021-12-15 PROCEDURE — 93798 PHYS/QHP OP CAR RHAB W/ECG: CPT

## 2021-12-16 ENCOUNTER — VIRTUAL VISIT (OUTPATIENT)
Dept: CARDIOLOGY | Facility: CLINIC | Age: 61
End: 2021-12-16
Attending: INTERNAL MEDICINE
Payer: COMMERCIAL

## 2021-12-16 DIAGNOSIS — Z95.0 CARDIAC PACEMAKER IN SITU: ICD-10-CM

## 2021-12-16 DIAGNOSIS — I44.2 COMPLETE HEART BLOCK (H): ICD-10-CM

## 2021-12-16 DIAGNOSIS — I50.20 HEART FAILURE WITH REDUCED EJECTION FRACTION, NYHA CLASS II (H): Primary | ICD-10-CM

## 2021-12-16 PROCEDURE — 99215 OFFICE O/P EST HI 40 MIN: CPT | Mod: 95 | Performed by: INTERNAL MEDICINE

## 2021-12-16 RX ORDER — SODIUM CHLORIDE 9 MG/ML
INJECTION, SOLUTION INTRAVENOUS CONTINUOUS
Status: CANCELLED | OUTPATIENT
Start: 2021-12-16

## 2021-12-16 RX ORDER — LIDOCAINE 40 MG/G
CREAM TOPICAL
Status: CANCELLED | OUTPATIENT
Start: 2021-12-16

## 2021-12-16 ASSESSMENT — PATIENT HEALTH QUESTIONNAIRE - PHQ9: SUM OF ALL RESPONSES TO PHQ QUESTIONS 1-9: 18

## 2021-12-16 NOTE — PATIENT INSTRUCTIONS
You were seen in the Electrophysiology Clinic today by: Dr Funes    Plan:       Labs/Tests Needed:    CRT-P upgrade in mid-late January      Follow up visit:    Dr Funes 2-3 months after device upgrade    Your Care Team:  EP Cardiology   Telephone Number     Nurse Line  Marsha Hawthorne RN  [phone]     For scheduling appts or procedures:    Merced Heller   [phone]   For the Device Clinic (Pacemakers, ICDs, Loop Recorders)    During business hours: [phone]  After business hours:   [phone]- select option 4 and ask for job code [id number].     On-call cardiologist for after hours or on weekends: [phone], option #4, and ask to speak to the on-call cardiologist.     Cardiovascular Clinic:   [address] [place] [address]. [place] [address]      As always, Thank you for trusting us with your health care needs!

## 2021-12-16 NOTE — PROGRESS NOTES
Joyce is a 61 year old who is being evaluated via a billable video visit.      How would you like to obtain your AVS? Mail a copy  If the video visit is dropped, the invitation should be resent by: Text to cell phone: [phone]   Will anyone else be joining your video visit? Rossy Ozuna, Virtual Facilitator/LETICIA, 12/16/2021   HPI:     Joyce is a 61-year-old woman with nonischemic cardiomyopathy possibly in part related to permanent cardiac pacemaker.  Patient is pacemaker dependent.  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    After full evaluation by the heart failure service and by electrophysiology it is recommended that she undergo a pacemaker upgrade to a CRT P device.  A recent CT angiogram shows patent coronary sinus which should be a reasonable target.    We will attempt to get insurance coverage for this procedure.  Patient is hoping to have the procedure done in the latter half of January.    Joyce white indicates that she has been bothered by diarrhea which has made treatment for her for heart failure difficult.  The diarrhea may reflect antibiotic side effect from recent treatment for upper respiratory tract infection    At today's visit I discussed the CT angio and the plans for pacemaker upgrade.  I reiterated that we cannot be absolutely sure that it will make a functional difference in her exercise capacity but that it was worth an attempt.  Patient voiced understanding and agreement.    PAST MEDICAL HISTORY:  Past Medical History:   Diagnosis Date     Arthritis      Cardiomyopathy (H)     ff Cardiology     Chronic depressive personality disorder      Congestive heart failure (H) see dr martínez    heart failure correct term     COPD exacerbation (H)      Esophageal reflux      Ex-smoker     quit 2006; 1 PPD x 30     Hyperparathyroidism (H)     s/p parathyroidectomy     Hypothyroidism      LARRY on CPAP     ff Sleep  medicine     Pulmonary nodules     ff Pulmonologist     S/P cardiac pacemaker procedure     [facility]      Uncomplicated asthma years       CURRENT MEDICATIONS:  Current Outpatient Medications   Medication Sig Dispense Refill     acetaminophen (TYLENOL) 500 MG tablet Take 500-1,000 mg by mouth every 6 hours as needed for mild pain       albuterol (PROAIR HFA/PROVENTIL HFA/VENTOLIN HFA) 108 (90 BASE) MCG/ACT Inhaler Inhale 2 puffs into the lungs as needed for shortness of breath / dyspnea or wheezing        azelastine (ASTELIN) 0.1 % nasal spray Spray 1 spray into both nostrils 2 times daily as needed        Calcium Lactate 500 MG CAPS Take 250-500 mg by mouth daily        clonazePAM (KLONOPIN) 0.5 MG tablet TAKE 1 TABLET(0.5 MG) BY MOUTH THREE TIMES DAILY AS NEEDED FOR ANXIETY 90 tablet 0     DiphenhydrAMINE HCl (BENADRYL PO) Take 25 mg by mouth nightly as needed for allergies        ELIQUIS ANTICOAGULANT 5 MG tablet TAKE 1 TABLET(5 MG) BY MOUTH TWICE DAILY 180 tablet 3     Ferrous Sulfate (IRON SUPPLEMENT PO) Take 130 mg by mouth daily        FLUoxetine (PROZAC) 10 MG tablet Take 1 tablet (10 mg) by mouth daily for 7 days, THEN 2 tablets (20 mg) daily. (can be increased by 10 mg weekly up to 50 mg daily if tolerated) Let me know how it goes. 67 tablet 0     fluticasone-salmeterol (ADVAIR) 250-50 MCG/DOSE inhaler Inhale 1 puff into the lungs every 12 hours       furosemide (LASIX) 20 MG tablet Take 1 tablet, no more than twice a week.  Call with weight gain. 180 tablet 1     ipratropium (ATROVENT) 0.06 % nasal spray Spray 2 sprays into both nostrils 4 times daily as needed        loperamide (IMODIUM A-D) 2 MG tablet Take 2 tabs (4 mg) after first loose stool, and then take one tab (2 mg) after each diarrheal stool.  Max of 8 tabs (16 mg) per day. 1 tablet 0     MAGNESIUM LACTATE PO Take  mg by mouth nightly as needed        MELATONIN PO Take 1-3 mg by mouth nightly as needed         Menaquinone-7 (VITAMIN K2 PO) Take 1 tablet by mouth every morning        METHYLPREDNISOLONE PO Take 40 mg by mouth as needed        metoprolol succinate ER (TOPROL XL) 25 MG 24 hr tablet Take 1 tablet (25 mg) by mouth daily (Patient taking differently: Take 25 mg by mouth daily Taking 1/2 tablet daily.) 45 tablet 3     Multiple Vitamin (DAILY MULTIVITAMIN PO) Take 1 tablet by mouth every morning        nebulizer nebulization as needed       nefazodone (SERZONE) 100 MG tablet Take 1 tablet (100 mg) by mouth 2 times daily 60 tablet 0     Probiotic Product (PROBIOTIC DAILY PO) Take 1 capsule by mouth 2 times daily as needed        spironolactone (ALDACTONE) 25 MG tablet Take 1/2 tablet daily 90 tablet 3     SYNTHROID 150 MCG tablet TAKE ONE TABLET BY MOUTH SIX DAYS A WEEK; TAKE ONE-HALF TABLET ONE DAY A WEEK 90 tablet 3     TURMERIC PO Take 1 tablet by mouth every morning        vitamin B complex with vitamin C (VITAMIN  B COMPLEX) PO tablet Take 1 tablet by mouth as needed       vitamin D3 (CHOLECALCIFEROL) 2000 units tablet Take 1 tablet by mouth daily 1 tablet 0     Zinc 15 MG CAPS Take 15 mg by mouth          PAST SURGICAL HISTORY:  Past Surgical History:   Procedure Laterality Date     BUNIONECTOMY Bilateral      COLONOSCOPY N/A 8/30/2021    Procedure: COLONOSCOPY, WITH POLYPECTOMY AND BIOPSY;  Surgeon: Ranjit Penn MD;  Location:  GI     CV CORONARY ANGIOGRAM N/A 9/26/2019    Procedure: CV CORONARY ANGIOGRAM;  Surgeon: Erickson Trejo MD;  Location:  HEART CARDIAC CATH LAB     CV RIGHT HEART CATH MEASUREMENTS RECORDED N/A 7/20/2020    Procedure: CV RIGHT HEART CATH;  Surgeon: Alonso Ordonez MD;  Location:  HEART CARDIAC CATH LAB     EP ABLATION / EP STUDIES  04/21/2017     EXPLORE NECK N/A 9/19/2018    Procedure: EXPLORE NECK;  Neck Exploration Resection of Left superior Parathyroid gland;  Surgeon: Kristine Venegas MD;  Location:  OR      CORRECT BUNION,SIMPLE        PARATHYROIDECTOMY N/A 9/19/2018    Procedure: PARATHYROIDECTOMY;;  Surgeon: Kristine Venegas MD;  Location: [facility] OR     PPM INSERT OF NEW OR REPL W/VENT LEAD  04/21/2017     TONSILLECTOMY & ADENOIDECTOMY         ALLERGIES:     Allergies   Allergen Reactions     Tetracycline Swelling     Zofran [Ondansetron]      Prolonged QT  Prolonged QT at baseline per patient     Flagyl [Metronidazole] Rash     Buspar [Buspirone]      Muscle weakness     Corn Oil Other (See Comments)     Headache       Cymbalta Diarrhea     Seasonal Allergies Difficulty breathing     Sulfamethoxazole-Trimethoprim      Other reaction(s): Other  Passed out     Cyclobenzaprine Other (See Comments)     Extreme heat intolerance     Levaquin [Levofloxacin]      Other reaction(s): Other  Tendon rupture     Nsaids Other (See Comments)     Exacerbated asthma       FAMILY HISTORY:  Family History   Problem Relation Age of Onset     Hypothyroidism Mother      Hypertension Mother      Osteoarthritis Mother      Coronary Artery Disease Father         nonfatal MI in his 70s     Asthma Father      Diabetes Sister      Hypothyroidism Sister      Breast Cancer Maternal Aunt      Anxiety Disorder Sister          SOCIAL HISTORY:  Social History     Tobacco Use     Smoking status: Former Smoker     Packs/day: 0.00     Years: 0.00     Pack years: 0.00     Smokeless tobacco: Never Used   Vaping Use     Vaping Use: Never used   Substance Use Topics     Alcohol use: Yes     Alcohol/week: 0.0 - 8.0 standard drinks     Comment: seldom     Drug use: No       ROS:   Constitutional: No fever, chills, or sweats. Weight stable.   ENT: No visual disturbance, .   Cardiovascular: As per HPI.   Respiratory: No cough, hemoptysis.    GI: No nausea, vomiting, has had recent diarrhea which may be a reflection antibiotic treatment for upper respiratory tract infection.   : No hematuria.   Integument: Negative.   Psychiatric: Negative.   Hematologic: no easy bleeding.  Neuro:  Negative.   Endocrinology: No significant heat or cold intolerance   Musculoskeletal: No myalgia.    Exam:  LMP 08/21/2007   GENERAL APPEARANCE:alert and no distress  HEENT: no icterus, no xanthelasmas, normal pupil size and reaction, normal palate, mucosa moist, no central cyanosis  NECK: no adenopathy, no asymmetry, masses, or scars, thyroid normal to palpation and no bruits, JVP not elevated  RESPIRATORY: lungs clear to auscultation - no rales, rhonchi or wheezes, no use of accessory muscles, no retractions, respirations are unlabored, normal respiratory rate  CARDIOVASCULAR: regular rhythm, normal S1 with physiologic split S2, no S3 or S4 and no murmur, click or rub, precordium quiet with normal PMI.  ABDOMEN: soft, non tender, without hepatosplenomegaly, no masses palpable, bowel sounds normal, aorta not enlarged by palpation, no abdominal bruits  EXTREMITIES: peripheral pulses normal, no edema, no bruits  NEURO: alert and oriented to person/place/time, normal speech, gait and affect  VASC: Radial, femoral, dorsalis pedis and posterior tibialis pulses are normal in volumes and symmetric bilaterally. No bruits are heard.  SKIN: no ecchymoses, no rashes    Labs:  CBC RESULTS:   Lab Results   Component Value Date    WBC 6.0 11/23/2021    WBC 6.6 05/25/2021    RBC 4.12 11/23/2021    RBC 4.33 05/25/2021    HGB 13.3 11/23/2021    HGB 13.7 05/25/2021    HCT 40.1 11/23/2021    HCT 41.2 05/25/2021    MCV 97 11/23/2021    MCV 95 05/25/2021    MCH 32.3 11/23/2021    MCH 31.6 05/25/2021    MCHC 33.2 11/23/2021    MCHC 33.3 05/25/2021    RDW 12.5 11/23/2021    RDW 11.9 05/25/2021     11/23/2021     05/25/2021       BMP RESULTS:  Lab Results   Component Value Date     11/23/2021     05/25/2021    POTASSIUM 4.3 11/23/2021    POTASSIUM 4.2 05/25/2021    CHLORIDE 108 11/23/2021    CHLORIDE 107 05/25/2021    CO2 26 11/23/2021    CO2 24 05/25/2021    ANIONGAP 7 11/23/2021    ANIONGAP 7 05/25/2021    GLC  89 11/23/2021    GLC 92 05/25/2021    BUN 9 11/23/2021    BUN 12 05/25/2021    CR 0.63 11/23/2021    CR 0.64 05/25/2021    GFRESTIMATED >90 11/23/2021    GFRESTIMATED >90 05/25/2021    GFRESTBLACK >90 05/25/2021    MANJU 9.6 11/23/2021    MANJU 8.7 05/25/2021        INR RESULTS:  Lab Results   Component Value Date    INR 1.02 10/06/2019    INR 0.95 06/28/2017       Procedures:  PULMONARY FUNCTION TESTS:   No flowsheet data found.      ECHOCARDIOGRAM:   No results found for this or any previous visit (from the past 8760 hour(s)).      Assessment and Plan:   1.  Nonischemic cardiomyopathy with exertional intolerance and heart failure  2.  Permanent cardiac pacemaker due to AV block-likely contributing to #1  3.  We we will try to obtain insurance coverage for upgrade to CRT-P device in mid-to-late January    Plan  1.  See #3 above and refer to appropriate office for approval request  2.  Schedule upgrade to CRT-P after #1 approval is obtained  3.  Schedule clinic follow-up 2 to 3 months after CRT upgrade    Total elapsed time today with chart review, clinic visit and documentation 40 minutes    Patient at home; clinic [facility] heart  Video on 11: 30 a.m.; video off 11: 50 a.m.  Platform Doximity    I very much appreciated the opportunity to see and assess Joyce Marroquin in the clinic today . Please do not hesitate to contact my office if you have any questions or concerns.      Lino Funes MD  Cardiac Arrhythmia Service  [facility]  [phone]      CC  SELF, REFERRED     Sudden numbness or weakness of the face, arm, or leg, especially on one side of the body. Confusion, trouble speaking or understanding. Trouble seeing in one or both eyes. Trouble walking, dizziness, loss of balance or coordination. Severe headache.

## 2021-12-16 NOTE — NURSING NOTE
Chief Complaint   Patient presents with     Follow Up     Having SOB x 1 week, did poor in cardiac rehab on Wednesday. would like to discuss.

## 2021-12-16 NOTE — LETTER
12/16/2021      RE: Joyce Marroquin  [address] [address]       Dear Colleague,    Thank you for the opportunity to participate in the care of your patient, Joyce Marroquin, at the [facility] [place] [facility]. Please see a copy of my visit note below.    Joyce is a 61 year old who is being evaluated via a billable video visit.      How would you like to obtain your AVS? Mail a copy  If the video visit is dropped, the invitation should be resent by: Text to cell phone: [phone]   Will anyone else be joining your video visit? No      Stacia Ozuna, Virtual Facilitator/CMA, 12/16/2021   HPI:     Joyce is a 61-year-old woman with nonischemic cardiomyopathy possibly in part related to permanent cardiac pacemaker.  Patient is pacemaker dependent.  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    After full evaluation by the heart failure service and by electrophysiology it is recommended that she undergo a pacemaker upgrade to a CRT P device.  A recent CT angiogram shows patent coronary sinus which should be a reasonable target.    We will attempt to get insurance coverage for this procedure.  Patient is hoping to have the procedure done in the latter half of January.    Joyce white indicates that she has been bothered by diarrhea which has made treatment for her for heart failure difficult.  The diarrhea may reflect antibiotic side effect from recent treatment for upper respiratory tract infection    At today's visit I discussed the CT angio and the plans for pacemaker upgrade.  I reiterated that we cannot be absolutely sure that it will make a functional difference in her exercise capacity but that it was worth an attempt.  Patient voiced understanding and agreement.    PAST MEDICAL HISTORY:  Past Medical History:   Diagnosis Date     Arthritis       Cardiomyopathy (H)     ff Cardiology     Chronic depressive personality disorder      Congestive heart failure (H) see dr martínez    heart failure correct term     COPD exacerbation (H)      Esophageal reflux      Ex-smoker     quit 2006; 1 PPD x 30     Hyperparathyroidism (H)     s/p parathyroidectomy     Hypothyroidism      LARRY on CPAP     ff Sleep medicine     Pulmonary nodules     ff Pulmonologist     S/P cardiac pacemaker procedure     [facility]      Uncomplicated asthma years       CURRENT MEDICATIONS:  Current Outpatient Medications   Medication Sig Dispense Refill     acetaminophen (TYLENOL) 500 MG tablet Take 500-1,000 mg by mouth every 6 hours as needed for mild pain       albuterol (PROAIR HFA/PROVENTIL HFA/VENTOLIN HFA) 108 (90 BASE) MCG/ACT Inhaler Inhale 2 puffs into the lungs as needed for shortness of breath / dyspnea or wheezing        azelastine (ASTELIN) 0.1 % nasal spray Spray 1 spray into both nostrils 2 times daily as needed        Calcium Lactate 500 MG CAPS Take 250-500 mg by mouth daily        clonazePAM (KLONOPIN) 0.5 MG tablet TAKE 1 TABLET(0.5 MG) BY MOUTH THREE TIMES DAILY AS NEEDED FOR ANXIETY 90 tablet 0     DiphenhydrAMINE HCl (BENADRYL PO) Take 25 mg by mouth nightly as needed for allergies        ELIQUIS ANTICOAGULANT 5 MG tablet TAKE 1 TABLET(5 MG) BY MOUTH TWICE DAILY 180 tablet 3     Ferrous Sulfate (IRON SUPPLEMENT PO) Take 130 mg by mouth daily        FLUoxetine (PROZAC) 10 MG tablet Take 1 tablet (10 mg) by mouth daily for 7 days, THEN 2 tablets (20 mg) daily. (can be increased by 10 mg weekly up to 50 mg daily if tolerated) Let me know how it goes. 67 tablet 0     fluticasone-salmeterol (ADVAIR) 250-50 MCG/DOSE inhaler Inhale 1 puff into the lungs every 12 hours       furosemide (LASIX) 20 MG tablet Take 1 tablet, no more than twice a week.  Call with weight gain. 180 tablet 1     ipratropium (ATROVENT) 0.06 % nasal spray Spray 2 sprays into both  nostrils 4 times daily as needed        loperamide (IMODIUM A-D) 2 MG tablet Take 2 tabs (4 mg) after first loose stool, and then take one tab (2 mg) after each diarrheal stool.  Max of 8 tabs (16 mg) per day. 1 tablet 0     MAGNESIUM LACTATE PO Take  mg by mouth nightly as needed        MELATONIN PO Take 1-3 mg by mouth nightly as needed        Menaquinone-7 (VITAMIN K2 PO) Take 1 tablet by mouth every morning        METHYLPREDNISOLONE PO Take 40 mg by mouth as needed        metoprolol succinate ER (TOPROL XL) 25 MG 24 hr tablet Take 1 tablet (25 mg) by mouth daily (Patient taking differently: Take 25 mg by mouth daily Taking 1/2 tablet daily.) 45 tablet 3     Multiple Vitamin (DAILY MULTIVITAMIN PO) Take 1 tablet by mouth every morning        nebulizer nebulization as needed       nefazodone (SERZONE) 100 MG tablet Take 1 tablet (100 mg) by mouth 2 times daily 60 tablet 0     Probiotic Product (PROBIOTIC DAILY PO) Take 1 capsule by mouth 2 times daily as needed        spironolactone (ALDACTONE) 25 MG tablet Take 1/2 tablet daily 90 tablet 3     SYNTHROID 150 MCG tablet TAKE ONE TABLET BY MOUTH SIX DAYS A WEEK; TAKE ONE-HALF TABLET ONE DAY A WEEK 90 tablet 3     TURMERIC PO Take 1 tablet by mouth every morning        vitamin B complex with vitamin C (VITAMIN  B COMPLEX) PO tablet Take 1 tablet by mouth as needed       vitamin D3 (CHOLECALCIFEROL) 2000 units tablet Take 1 tablet by mouth daily 1 tablet 0     Zinc 15 MG CAPS Take 15 mg by mouth          PAST SURGICAL HISTORY:  Past Surgical History:   Procedure Laterality Date     BUNIONECTOMY Bilateral      COLONOSCOPY N/A 8/30/2021    Procedure: COLONOSCOPY, WITH POLYPECTOMY AND BIOPSY;  Surgeon: Ranjit Penn MD;  Location:  GI     CV CORONARY ANGIOGRAM N/A 9/26/2019    Procedure: CV CORONARY ANGIOGRAM;  Surgeon: Erickson Trejo MD;  Location:  HEART CARDIAC CATH LAB     CV RIGHT HEART CATH MEASUREMENTS RECORDED N/A 7/20/2020    Procedure:  CV RIGHT HEART CATH;  Surgeon: Alonso Ordonez MD;  [facility]     EP ABLATION / EP STUDIES  04/21/2017     EXPLORE NECK N/A 9/19/2018    Procedure: EXPLORE NECK;  Neck Exploration Resection of Left superior Parathyroid gland;  Surgeon: Kristine Venegas MD;  Location: [facility] OR      CORRECT BUNION,SIMPLE       PARATHYROIDECTOMY N/A 9/19/2018    Procedure: PARATHYROIDECTOMY;;  Surgeon: Kristine Venegas MD;  Location: [facility] OR     [facility] INSERT OF NEW OR REPL W/VENT LEAD  04/21/2017     TONSILLECTOMY & ADENOIDECTOMY         ALLERGIES:     Allergies   Allergen Reactions     Tetracycline Swelling     Zofran [Ondansetron]      Prolonged QT  Prolonged QT at baseline per patient     Flagyl [Metronidazole] Rash     Buspar [Buspirone]      Muscle weakness     Corn Oil Other (See Comments)     Headache       Cymbalta Diarrhea     Seasonal Allergies Difficulty breathing     Sulfamethoxazole-Trimethoprim      Other reaction(s): Other  Passed out     Cyclobenzaprine Other (See Comments)     Extreme heat intolerance     Levaquin [Levofloxacin]      Other reaction(s): Other  Tendon rupture     Nsaids Other (See Comments)     Exacerbated asthma       FAMILY HISTORY:  Family History   Problem Relation Age of Onset     Hypothyroidism Mother      Hypertension Mother      Osteoarthritis Mother      Coronary Artery Disease Father         nonfatal MI in his 70s     Asthma Father      Diabetes Sister      Hypothyroidism Sister      Breast Cancer Maternal Aunt      Anxiety Disorder Sister          SOCIAL HISTORY:  Social History     Tobacco Use     Smoking status: Former Smoker     Packs/day: 0.00     Years: 0.00     Pack years: 0.00     Smokeless tobacco: Never Used   Vaping Use     Vaping Use: Never used   Substance Use Topics     Alcohol use: Yes     Alcohol/week: 0.0 - 8.0 standard drinks     Comment: seldom     Drug use: No       ROS:   Constitutional: No fever, chills, or sweats. Weight stable.   ENT: No  visual disturbance, .   Cardiovascular: As per HPI.   Respiratory: No cough, hemoptysis.    GI: No nausea, vomiting, has had recent diarrhea which may be a reflection antibiotic treatment for upper respiratory tract infection.   : No hematuria.   Integument: Negative.   Psychiatric: Negative.   Hematologic: no easy bleeding.  Neuro: Negative.   Endocrinology: No significant heat or cold intolerance   Musculoskeletal: No myalgia.    Exam:  [facility] 08/21/2007   GENERAL APPEARANCE:alert and no distress  HEENT: no icterus, no xanthelasmas, normal pupil size and reaction, normal palate, mucosa moist, no central cyanosis  NECK: no adenopathy, no asymmetry, masses, or scars, thyroid normal to palpation and no bruits, JVP not elevated  RESPIRATORY: lungs clear to auscultation - no rales, rhonchi or wheezes, no use of accessory muscles, no retractions, respirations are unlabored, normal respiratory rate  CARDIOVASCULAR: regular rhythm, normal S1 with physiologic split S2, no S3 or S4 and no murmur, click or rub, precordium quiet with normal PMI.  ABDOMEN: soft, non tender, without hepatosplenomegaly, no masses palpable, bowel sounds normal, aorta not enlarged by palpation, no abdominal bruits  EXTREMITIES: peripheral pulses normal, no edema, no bruits  NEURO: alert and oriented to person/place/time, normal speech, gait and affect  VASC: Radial, femoral, dorsalis pedis and posterior tibialis pulses are normal in volumes and symmetric bilaterally. No bruits are heard.  SKIN: no ecchymoses, no rashes    Labs:  CBC RESULTS:   Lab Results   Component Value Date    WBC 6.0 11/23/2021    WBC 6.6 05/25/2021    RBC 4.12 11/23/2021    RBC 4.33 05/25/2021    HGB 13.3 11/23/2021    HGB 13.7 05/25/2021    HCT 40.1 11/23/2021    HCT 41.2 05/25/2021    MCV 97 11/23/2021    MCV 95 05/25/2021    MCH 32.3 11/23/2021    MCH 31.6 05/25/2021    MCHC 33.2 11/23/2021    MCHC 33.3 05/25/2021    RDW 12.5 11/23/2021    RDW 11.9 05/25/2021      11/23/2021     05/25/2021       BMP RESULTS:  Lab Results   Component Value Date     11/23/2021     05/25/2021    POTASSIUM 4.3 11/23/2021    POTASSIUM 4.2 05/25/2021    CHLORIDE 108 11/23/2021    CHLORIDE 107 05/25/2021    CO2 26 11/23/2021    CO2 24 05/25/2021    ANIONGAP 7 11/23/2021    ANIONGAP 7 05/25/2021    GLC 89 11/23/2021    GLC 92 05/25/2021    BUN 9 11/23/2021    BUN 12 05/25/2021    CR 0.63 11/23/2021    CR 0.64 05/25/2021    GFRESTIMATED >90 11/23/2021    GFRESTIMATED >90 05/25/2021    GFRESTBLACK >90 05/25/2021    MANJU 9.6 11/23/2021    MANJU 8.7 05/25/2021        INR RESULTS:  Lab Results   Component Value Date    INR 1.02 10/06/2019    INR 0.95 06/28/2017       Procedures:  PULMONARY FUNCTION TESTS:   No flowsheet data found.      ECHOCARDIOGRAM:   No results found for this or any previous visit (from the past 8760 hour(s)).      Assessment and Plan:   1.  Nonischemic cardiomyopathy with exertional intolerance and heart failure  2.  Permanent cardiac pacemaker due to AV block-likely contributing to #1  3.  We we will try to obtain insurance coverage for upgrade to CRT-P device in mid-to-late January    Plan  1.  See #3 above and refer to appropriate office for approval request  2.  Schedule upgrade to CRT-P after #1 approval is obtained  3.  Schedule clinic follow-up 2 to 3 months after CRT upgrade    Total elapsed time today with chart review, clinic visit and documentation 40 minutes    Patient at home; clinic [facility] heart  Video on 11: 30 a.m.; video off 11: 50 a.m.  Platform Doximity    I very much appreciated the opportunity to see and assess Joyce Marroquin in the clinic today . Please do not hesitate to contact my office if you have any questions or concerns.      Lino Funes MD  Cardiac Arrhythmia Service  [facility]  [phone]

## 2021-12-17 ENCOUNTER — TELEPHONE (OUTPATIENT)
Dept: GASTROENTEROLOGY | Facility: CLINIC | Age: 61
End: 2021-12-17
Payer: COMMERCIAL

## 2021-12-17 ENCOUNTER — MYC MEDICAL ADVICE (OUTPATIENT)
Dept: CARDIOLOGY | Facility: CLINIC | Age: 61
End: 2021-12-17
Payer: COMMERCIAL

## 2021-12-17 DIAGNOSIS — K52.839 MICROSCOPIC COLITIS, UNSPECIFIED MICROSCOPIC COLITIS TYPE: Primary | ICD-10-CM

## 2021-12-17 DIAGNOSIS — R19.7 DIARRHEA, UNSPECIFIED TYPE: ICD-10-CM

## 2021-12-17 NOTE — TELEPHONE ENCOUNTER
[facility] Call Center    Phone Message    May a detailed message be left on voicemail: yes     Reason for Call: Other: Pt called in stating she has microscopic colitis and has different antibiotics and is now in need of a call back for next best options. Please reach out. Thank you.      Action Taken: Message routed to:  [facility]    Travel Screening: Not Applicable

## 2021-12-20 NOTE — TELEPHONE ENCOUNTER
[facility] Call [facility]    Phone Message    May a detailed message be left on voicemail: yes     Reason for Call: Other: Pt calling requesting the team please give her a call back please, she states she is becoming more weak and would like to discuss getting some medication. Please advise. Thank you!      Action Taken: Message routed to:  [facility] GI    Travel Screening: Not Applicable

## 2021-12-20 NOTE — TELEPHONE ENCOUNTER
Returned patient's call.      Joyce states she has had worsened diarrhea she is no longer able to control with probiotics. Had 5 bouts of it last night overnight and 4 already this AM at 11:15. States she has a history of microscopic colitis. She has had to stop different medications due to her developing microscopic colitis from them (Cymbalta, Viagra, etc). Was recently on Amoxicillin for 22 doses after a root canal.      Patient is also very labile, states she is weak, started to cry when RN trying to gather more information. Has had heart palpitations last night, and cramping in her foot this morning. Also has a significant heart history including complete heart block with pacemaker, heart failure, a long QT, and pulmonary hypertension. States she has watery diarrhea that is urgent and does have cramping and nausea, in addition to stating she is confused.      RN recommended ER or Urgent Care and to have someone else drive her. She denies having anyone home with her now, though wouldn't answer if she lives with anyone. Also recommended that she call 911 if no one available to take her to ER. Joyce refusing to go to ED or urgent care due to the long wait she assumes she will have.        Will review with Dr. Penn, but again urged her to go to ED or urgent care--which patient again refused and wants to wait for lab orders and medication order from Dr. Penn.

## 2021-12-21 DIAGNOSIS — I51.89 LEFT VENTRICULAR DYSFUNCTION WITH REDUCED LEFT VENTRICULAR FUNCTION: Primary | ICD-10-CM

## 2021-12-22 ENCOUNTER — VIRTUAL VISIT (OUTPATIENT)
Dept: PHARMACY | Facility: CLINIC | Age: 61
End: 2021-12-22
Payer: COMMERCIAL

## 2021-12-22 ENCOUNTER — TELEPHONE (OUTPATIENT)
Dept: FAMILY MEDICINE | Facility: CLINIC | Age: 61
End: 2021-12-22

## 2021-12-22 ENCOUNTER — MYC MEDICAL ADVICE (OUTPATIENT)
Dept: FAMILY MEDICINE | Facility: CLINIC | Age: 61
End: 2021-12-22

## 2021-12-22 ENCOUNTER — LAB (OUTPATIENT)
Dept: LAB | Facility: CLINIC | Age: 61
End: 2021-12-22
Payer: COMMERCIAL

## 2021-12-22 DIAGNOSIS — I51.89 LEFT VENTRICULAR DYSFUNCTION WITH REDUCED LEFT VENTRICULAR FUNCTION: Primary | ICD-10-CM

## 2021-12-22 DIAGNOSIS — Z11.59 ENCOUNTER FOR SCREENING FOR OTHER VIRAL DISEASES: ICD-10-CM

## 2021-12-22 DIAGNOSIS — F43.23 ADJUSTMENT DISORDER WITH MIXED ANXIETY AND DEPRESSED MOOD: ICD-10-CM

## 2021-12-22 DIAGNOSIS — K52.839 MICROSCOPIC COLITIS, UNSPECIFIED MICROSCOPIC COLITIS TYPE: ICD-10-CM

## 2021-12-22 DIAGNOSIS — I42.8 NONISCHEMIC CARDIOMYOPATHY (H): ICD-10-CM

## 2021-12-22 DIAGNOSIS — R19.7 DIARRHEA, UNSPECIFIED TYPE: ICD-10-CM

## 2021-12-22 DIAGNOSIS — K52.9 COLITIS: ICD-10-CM

## 2021-12-22 DIAGNOSIS — F41.8 SITUATIONAL ANXIETY: Primary | ICD-10-CM

## 2021-12-22 LAB
C DIFF TOX B STL QL: NEGATIVE
MDC_IDC_EPISODE_DTM: NORMAL
MDC_IDC_EPISODE_DURATION: 1 S
MDC_IDC_EPISODE_DURATION: 1 S
MDC_IDC_EPISODE_DURATION: 2 S
MDC_IDC_EPISODE_DURATION: 2 S
MDC_IDC_EPISODE_DURATION: 3 S
MDC_IDC_EPISODE_ID: 14
MDC_IDC_EPISODE_ID: 15
MDC_IDC_EPISODE_ID: 16
MDC_IDC_EPISODE_ID: 17
MDC_IDC_EPISODE_ID: 18
MDC_IDC_EPISODE_TYPE: NORMAL
MDC_IDC_LEAD_IMPLANT_DT: NORMAL
MDC_IDC_LEAD_IMPLANT_DT: NORMAL
MDC_IDC_LEAD_LOCATION: NORMAL
MDC_IDC_LEAD_LOCATION: NORMAL
MDC_IDC_LEAD_LOCATION_DETAIL_1: NORMAL
MDC_IDC_LEAD_LOCATION_DETAIL_1: NORMAL
MDC_IDC_LEAD_MFG: NORMAL
MDC_IDC_LEAD_MFG: NORMAL
MDC_IDC_LEAD_MODEL: NORMAL
MDC_IDC_LEAD_MODEL: NORMAL
MDC_IDC_LEAD_POLARITY_TYPE: NORMAL
MDC_IDC_LEAD_POLARITY_TYPE: NORMAL
MDC_IDC_LEAD_SERIAL: NORMAL
MDC_IDC_LEAD_SERIAL: NORMAL
MDC_IDC_MSMT_BATTERY_DTM: NORMAL
MDC_IDC_MSMT_BATTERY_REMAINING_LONGEVITY: 59 MO
MDC_IDC_MSMT_BATTERY_RRT_TRIGGER: 2.83
MDC_IDC_MSMT_BATTERY_STATUS: NORMAL
MDC_IDC_MSMT_BATTERY_VOLTAGE: 2.99 V
MDC_IDC_MSMT_LEADCHNL_RA_IMPEDANCE_VALUE: 380 OHM
MDC_IDC_MSMT_LEADCHNL_RA_IMPEDANCE_VALUE: 456 OHM
MDC_IDC_MSMT_LEADCHNL_RA_PACING_THRESHOLD_AMPLITUDE: 0.75 V
MDC_IDC_MSMT_LEADCHNL_RA_PACING_THRESHOLD_PULSEWIDTH: 0.4 MS
MDC_IDC_MSMT_LEADCHNL_RA_SENSING_INTR_AMPL: 3.12 MV
MDC_IDC_MSMT_LEADCHNL_RA_SENSING_INTR_AMPL: 3.12 MV
MDC_IDC_MSMT_LEADCHNL_RV_IMPEDANCE_VALUE: 399 OHM
MDC_IDC_MSMT_LEADCHNL_RV_IMPEDANCE_VALUE: 437 OHM
MDC_IDC_MSMT_LEADCHNL_RV_PACING_THRESHOLD_AMPLITUDE: 0.88 V
MDC_IDC_MSMT_LEADCHNL_RV_PACING_THRESHOLD_PULSEWIDTH: 0.4 MS
MDC_IDC_MSMT_LEADCHNL_RV_SENSING_INTR_AMPL: 7.25 MV
MDC_IDC_MSMT_LEADCHNL_RV_SENSING_INTR_AMPL: 7.25 MV
MDC_IDC_PG_IMPLANT_DTM: NORMAL
MDC_IDC_PG_MFG: NORMAL
MDC_IDC_PG_MODEL: NORMAL
MDC_IDC_PG_SERIAL: NORMAL
MDC_IDC_PG_TYPE: NORMAL
MDC_IDC_SESS_CLINIC_NAME: NORMAL
MDC_IDC_SESS_DTM: NORMAL
MDC_IDC_SESS_TYPE: NORMAL
MDC_IDC_SET_BRADY_AT_MODE_SWITCH_RATE: 171 {BEATS}/MIN
MDC_IDC_SET_BRADY_HYSTRATE: NORMAL
MDC_IDC_SET_BRADY_LOWRATE: 60 {BEATS}/MIN
MDC_IDC_SET_BRADY_MAX_SENSOR_RATE: 140 {BEATS}/MIN
MDC_IDC_SET_BRADY_MAX_TRACKING_RATE: 140 {BEATS}/MIN
MDC_IDC_SET_BRADY_MODE: NORMAL
MDC_IDC_SET_BRADY_PAV_DELAY_HIGH: 140 MS
MDC_IDC_SET_BRADY_PAV_DELAY_LOW: 180 MS
MDC_IDC_SET_BRADY_SAV_DELAY_HIGH: 110 MS
MDC_IDC_SET_BRADY_SAV_DELAY_LOW: 150 MS
MDC_IDC_SET_LEADCHNL_RA_PACING_AMPLITUDE: 1.5 V
MDC_IDC_SET_LEADCHNL_RA_PACING_ANODE_ELECTRODE_1: NORMAL
MDC_IDC_SET_LEADCHNL_RA_PACING_ANODE_LOCATION_1: NORMAL
MDC_IDC_SET_LEADCHNL_RA_PACING_CAPTURE_MODE: NORMAL
MDC_IDC_SET_LEADCHNL_RA_PACING_CATHODE_ELECTRODE_1: NORMAL
MDC_IDC_SET_LEADCHNL_RA_PACING_CATHODE_LOCATION_1: NORMAL
MDC_IDC_SET_LEADCHNL_RA_PACING_POLARITY: NORMAL
MDC_IDC_SET_LEADCHNL_RA_PACING_PULSEWIDTH: 0.4 MS
MDC_IDC_SET_LEADCHNL_RA_SENSING_ANODE_ELECTRODE_1: NORMAL
MDC_IDC_SET_LEADCHNL_RA_SENSING_ANODE_LOCATION_1: NORMAL
MDC_IDC_SET_LEADCHNL_RA_SENSING_CATHODE_ELECTRODE_1: NORMAL
MDC_IDC_SET_LEADCHNL_RA_SENSING_CATHODE_LOCATION_1: NORMAL
MDC_IDC_SET_LEADCHNL_RA_SENSING_POLARITY: NORMAL
MDC_IDC_SET_LEADCHNL_RA_SENSING_SENSITIVITY: 0.3 MV
MDC_IDC_SET_LEADCHNL_RV_PACING_AMPLITUDE: 1.75 V
MDC_IDC_SET_LEADCHNL_RV_PACING_ANODE_ELECTRODE_1: NORMAL
MDC_IDC_SET_LEADCHNL_RV_PACING_ANODE_LOCATION_1: NORMAL
MDC_IDC_SET_LEADCHNL_RV_PACING_CAPTURE_MODE: NORMAL
MDC_IDC_SET_LEADCHNL_RV_PACING_CATHODE_ELECTRODE_1: NORMAL
MDC_IDC_SET_LEADCHNL_RV_PACING_CATHODE_LOCATION_1: NORMAL
MDC_IDC_SET_LEADCHNL_RV_PACING_POLARITY: NORMAL
MDC_IDC_SET_LEADCHNL_RV_PACING_PULSEWIDTH: 0.4 MS
MDC_IDC_SET_LEADCHNL_RV_SENSING_ANODE_ELECTRODE_1: NORMAL
MDC_IDC_SET_LEADCHNL_RV_SENSING_ANODE_LOCATION_1: NORMAL
MDC_IDC_SET_LEADCHNL_RV_SENSING_CATHODE_ELECTRODE_1: NORMAL
MDC_IDC_SET_LEADCHNL_RV_SENSING_CATHODE_LOCATION_1: NORMAL
MDC_IDC_SET_LEADCHNL_RV_SENSING_POLARITY: NORMAL
MDC_IDC_SET_LEADCHNL_RV_SENSING_SENSITIVITY: 0.9 MV
MDC_IDC_SET_ZONE_DETECTION_INTERVAL: 350 MS
MDC_IDC_SET_ZONE_DETECTION_INTERVAL: 400 MS
MDC_IDC_SET_ZONE_TYPE: NORMAL
MDC_IDC_STAT_AT_BURDEN_PERCENT: 0 %
MDC_IDC_STAT_AT_DTM_END: NORMAL
MDC_IDC_STAT_AT_DTM_START: NORMAL
MDC_IDC_STAT_BRADY_AP_VP_PERCENT: 45.07 %
MDC_IDC_STAT_BRADY_AP_VS_PERCENT: 0.2 %
MDC_IDC_STAT_BRADY_AS_VP_PERCENT: 54.45 %
MDC_IDC_STAT_BRADY_AS_VS_PERCENT: 0.28 %
MDC_IDC_STAT_BRADY_DTM_END: NORMAL
MDC_IDC_STAT_BRADY_DTM_START: NORMAL
MDC_IDC_STAT_BRADY_RA_PERCENT_PACED: 44.65 %
MDC_IDC_STAT_BRADY_RV_PERCENT_PACED: 98.55 %
MDC_IDC_STAT_EPISODE_RECENT_COUNT: 0
MDC_IDC_STAT_EPISODE_RECENT_COUNT: 5
MDC_IDC_STAT_EPISODE_RECENT_COUNT_DTM_END: NORMAL
MDC_IDC_STAT_EPISODE_RECENT_COUNT_DTM_START: NORMAL
MDC_IDC_STAT_EPISODE_TOTAL_COUNT: 0
MDC_IDC_STAT_EPISODE_TOTAL_COUNT: 1
MDC_IDC_STAT_EPISODE_TOTAL_COUNT: 1
MDC_IDC_STAT_EPISODE_TOTAL_COUNT: 16
MDC_IDC_STAT_EPISODE_TOTAL_COUNT_DTM_END: NORMAL
MDC_IDC_STAT_EPISODE_TOTAL_COUNT_DTM_START: NORMAL
MDC_IDC_STAT_EPISODE_TYPE: NORMAL

## 2021-12-22 PROCEDURE — 87493 C DIFF AMPLIFIED PROBE: CPT

## 2021-12-22 PROCEDURE — 99607 MTMS BY PHARM ADDL 15 MIN: CPT | Performed by: PHARMACIST

## 2021-12-22 PROCEDURE — 99606 MTMS BY PHARM EST 15 MIN: CPT | Performed by: PHARMACIST

## 2021-12-22 NOTE — TELEPHONE ENCOUNTER
See other message sent to PCP.    Kristine RN,BSN  Triage Nurse  [facility]: [facility]  Ph: [phone]

## 2021-12-22 NOTE — PROGRESS NOTES
Medication Therapy Management (MTM) Encounter    ASSESSMENT:                            Medication Adherence/Access: No issues identified    Depression/Anxiety: Hard to know if Prozac has worsened colitis/diarrhea, lower reported incidence of this in the literature, but possible. Discussed options of continuing x another week, patient prefers to stop Prozac now and will consider retrial after January procedure, as it may have been helpful for mood thus far.     PLAN:                            1. Ok to stop the Prozac and consider retrial after your heart procedure on 1/19/21.    Follow-up: No follow-ups on file.      SUBJECTIVE/OBJECTIVE:                          Joyce Marroquin is a 61 year old female called for a follow-up visit. She was referred to me from Ce Crowe.  Today's visit is a follow-up MTM visit from 3/15/21 and subsequent follow-up from 10/8/21 with Lisa Medrano PharmD, JESSENIA (gastroenterology and rheumatology) and 11/19/21 with Tonya Boston, Pharm.D, YUMIKOCP and additional communication with Eleni Cervantes PharmD (mental health) and the patient.     *patient prefers not to follow-up with Tonya Boston.     Reason for visit: follow-up on options for mental health.    Allergies/ADRs: Reviewed in chart  Past Medical History: Reviewed in chart  Tobacco: She reports that she has quit smoking. She smoked 0.00 packs per day for 0.00 years. She has never used smokeless tobacco.  Alcohol: not currently using    Medication Adherence/Access: no issues reported    Depression/Anxiety:   Clonazepam 0.5 mg three times daily as needed anxiety   Prozac 10 mg daily less than 1 week (ordered by PCP and approved by cardiology)    Cymbalta contributed/worsened microscopic colitis   Viagra also caused issues with diarrhea  Had a root canal to follow, was on antibiotics and has had terrible diarrhea since.     Received supplies to do C diff testing today.     Mentally was doing better when first started the  Prozac.  Her diarrhea wasn't gone by the time she started the Prozac. She's not sure if it's diarrhea is worse since stopping. One night had diarrhea 5x, watery. Doesn't always happen after food, has diarrhea several times in the morning. Today only had diarrhea 4x. Her friend, Masha, thought she sounded more tired.   She can't get into gastroenterology until February.   On January 19th she will have a third wire placed in her heart for her cardiac myopathy and really doesn't want to have that postponed due to diarrhea, is debating on whether or not to stop the Prozac.     Medication History:  Buspar - didn't help  Fluoxetine - tried in the past for loneliness, but not depression/anxiety which is more-so what she has going on now.   Sertraline - not effective  Citalopram - possible effective prior to ablation; now avoids due to risk of QT prolongation  Cymbalta - microscopic colitis, confusion and withdrawal effects, stopped 10/5/21      Had genetic testing done through [facility] prior to today's visit, previously reviewed by Tonya Boston.     Today's Vitals: LMP 08/21/2007   ----------------      I spent 17 minutes with this patient today. All changes were made via collaborative practice agreement with Ce Crowe NP. A copy of the visit note was provided to the patient's primary care provider.    The patient was sent via [employer] a summary of these recommendations.     Kortney Peralta, PharmD  Medication Therapy Management Pharmacist  [phone]    Telemedicine Visit Details  Type of service:  Telephone visit  Start Time: 2:34 PM  End Time: 2:51 PM  Originating Location (patient location): Home  Distant Location (provider location):  [facility]     Medication Therapy Recommendations  No medication therapy recommendations to display

## 2021-12-22 NOTE — TELEPHONE ENCOUNTER
Received message from cardiology:    Lino Funes MD [place], [facility]  Either option is OK   DB     Will follow-up with patient at our telephone appt this afternoon.    Kortney Peralta, PharmD  Medication Therapy Management Pharmacist  [phone]

## 2021-12-22 NOTE — TELEPHONE ENCOUNTER
Elba - could you please have her get a stool for Cl. Diff.     I agree she should be seen by her primary, or in urgent care/ED, if she has persistent systemic symptoms.     If her diarrhea persists, we can consider giving her Rx for microscopic colitis - I should have a visit with her in that case.     Thanks,    J     Called Joyce to explain the above. She started arguing about provider not explaining she would need another appointment if diarrhea persisted, though she admits to wanting to treat diarrhea independently with probiotics and not start anything at her last appointment. Wants to do the C Diff test, but not wanting another appointment and kept repeating that she needs her diarrhea to be gone before her procedure for her CHF in January that will be life saving. RNCC trying to assist her in looking for an appointment, though nothing available with Dr. Penn before then. She will hold off and see if c-diff is positive.

## 2021-12-22 NOTE — TELEPHONE ENCOUNTER
Patient stated that she talked to GI and they ordered her a cdiff.    Patient also stated that she started Prozac, and was advised that there is

## 2021-12-22 NOTE — TELEPHONE ENCOUNTER
Patient states she is still having diarrhea and GI is not getting back to her. She is concerned. Please call patient to discuss.

## 2021-12-22 NOTE — TELEPHONE ENCOUNTER
Called patient.    Please see new mychart message below from patient.  This was sent now per mychart to PCP.  Patient does not want to take imodium, but she will follow the BRAT diet. She will go to ER if she is getting dehydrated.  Patient also stated that GI can not get in her until Feb/March. She is wondering if PCP can get her in sooner.        Ce,  I just has a visit with Karen the pharm d she was just checking in about drugs for depression   I told her I had started the Prozac,since I have diarrhea still will put that drug on hold till we find out where my diarrhea is coming from   (Just dropped off a stool specimen to see if c diff)  My main focus is to stop the diarrhea so I can have my procedure for a 3rd wire on January 19   I am going to try different kinds of probiotics,if you have any other ideas let me know   I had quite the tit for tat with the GI nurse very disrespectful so not sure if that department will be helpful   Diana Cook   I think the Prozac will end up being okay but will have to try later  I tell you it s always some obstacles just when I am hopeful     Routed to PCP to please advise.    Kristine RN,BSN  Triage Nurse  [facility]: [facility]  Ph: [phone]

## 2021-12-23 ENCOUNTER — TELEPHONE (OUTPATIENT)
Dept: NEPHROLOGY | Facility: CLINIC | Age: 61
End: 2021-12-23
Payer: COMMERCIAL

## 2021-12-23 NOTE — RESULT ENCOUNTER NOTE
Ms. Marroquin:This test did not show evidence of Cl. Difficile infection.    If you have persistent significant diarrhea, we should probably see you in clinic (a video appt would be OK), to consider other treatment for presumed microscopic colitis.    - Dr. Penn

## 2021-12-23 NOTE — TELEPHONE ENCOUNTER
----- Message from Ranjit Penn MD sent at 12/23/2021 10:40 AM CST -----  Ms. Marroquin:This test did not show evidence of Cl. Difficile infection.    If you have persistent significant diarrhea, we should probably see you in clinic (a video appt would be OK), to consider other treatment for presumed microscopic colitis.    - Dr. Penn      Called and notified Joyce that Dr. Penn would like to see her in clinic or video visit. She is okay with a video visit. Will schedule with Dr. Penn.

## 2021-12-28 NOTE — TELEPHONE ENCOUNTER
I am ok with either medication; I sent in prozac previously ok'd by patient.  If she is wanting a different medication sent in, she needs to let me know. SHAYNA Simon, FNP-BC

## 2021-12-28 NOTE — TELEPHONE ENCOUNTER
I called and spoke to patient, she does not want to start anything new at this time as she has been having diarrhea and cannot have diarrhea when she has her pacemaker wire replaced.    She scheduled an office visit with PCP after the procedure to follow back around regarding meds.    Ana Greer RN  [facility]

## 2021-12-28 NOTE — TELEPHONE ENCOUNTER
"See note from cardiology routed to me, cc'ed note not complete:      Lino Funes MD  You 1 hour ago (10:53 AM)     DB    I think either drug would be   DB    Message text        Assume cardiology means \"either drug would be OKAY\"?      Routed to PCP to address.  Looks like patient has been advised to stop prozac since this encounter started.    Routed to Ce Crowe to address alternative Rx, possibly sertraline or viibryd per earlier notes in this encounter.    Ana Greer RN  [facility]          "

## 2021-12-29 ENCOUNTER — HOSPITAL ENCOUNTER (OUTPATIENT)
Dept: CARDIAC REHAB | Facility: CLINIC | Age: 61
End: 2021-12-29
Attending: NURSE PRACTITIONER
Payer: COMMERCIAL

## 2021-12-29 PROCEDURE — 93797 PHYS/QHP OP CAR RHAB WO ECG: CPT | Mod: XU

## 2021-12-29 PROCEDURE — 93798 PHYS/QHP OP CAR RHAB W/ECG: CPT

## 2022-01-01 NOTE — TELEPHONE ENCOUNTER
Problem: Respiratory Impairment - Respiratory Therapy 253  Intervention: Inhaled gas administration  Note: Intervention Status  Done  Intervention: Respiratory support devices  Note: Intervention Status  Done      04/12/22 1844   Vitals   SpO2 97 %   O2 Flow Rate (L/min) 3 L/min   FiO2 (%) 21 %   Oxygen Therapy   Pulse Ox  Mode Continuous   Oxygen In Use Yes   O2 Device Interface High-flow nasal cannula  (Vapotherm)   Heated Humidification Yes   Device Temperature (°C) 33 °C   Equipment water level 1/2      Writer called patient to schedule a revision of her pacemaker pocket with Dr. Funes. There was no answer at the numbers provided and a message was left with the EP jaren-op scheduling number to call and schedule her procedure.     Merced Heller  Periop Electrophysiology   [phone]

## 2022-01-03 ENCOUNTER — HOSPITAL ENCOUNTER (OUTPATIENT)
Dept: CARDIAC REHAB | Facility: CLINIC | Age: 62
End: 2022-01-03
Attending: NURSE PRACTITIONER
Payer: COMMERCIAL

## 2022-01-03 PROCEDURE — 93798 PHYS/QHP OP CAR RHAB W/ECG: CPT | Performed by: CLINICAL EXERCISE PHYSIOLOGIST

## 2022-01-04 ENCOUNTER — TELEPHONE (OUTPATIENT)
Dept: GASTROENTEROLOGY | Facility: CLINIC | Age: 62
End: 2022-01-04

## 2022-01-04 ENCOUNTER — VIRTUAL VISIT (OUTPATIENT)
Dept: GASTROENTEROLOGY | Facility: CLINIC | Age: 62
End: 2022-01-04
Payer: COMMERCIAL

## 2022-01-04 DIAGNOSIS — K52.839 MICROSCOPIC COLITIS, UNSPECIFIED MICROSCOPIC COLITIS TYPE: Primary | ICD-10-CM

## 2022-01-04 PROCEDURE — 99214 OFFICE O/P EST MOD 30 MIN: CPT | Mod: 25 | Performed by: INTERNAL MEDICINE

## 2022-01-04 RX ORDER — BUDESONIDE 3 MG/1
9 CAPSULE, COATED PELLETS ORAL EVERY MORNING
Qty: 168 CAPSULE | Refills: 0 | Status: SHIPPED | OUTPATIENT
Start: 2022-01-04 | End: 2022-03-15

## 2022-01-04 ASSESSMENT — ENCOUNTER SYMPTOMS
DECREASED CONCENTRATION: 1
BLOATING: 1
COUGH DISTURBING SLEEP: 0
BOWEL INCONTINENCE: 1
POLYDIPSIA: 0
INSOMNIA: 1
PARALYSIS: 0
LIGHT-HEADEDNESS: 1
TINGLING: 0
WHEEZING: 0
JAUNDICE: 0
NECK MASS: 0
SPUTUM PRODUCTION: 0
SORE THROAT: 0
CONSTIPATION: 0
HEARTBURN: 1
COUGH: 0
SEIZURES: 0
FATIGUE: 1
EYE WATERING: 0
EYE IRRITATION: 0
BLOOD IN STOOL: 0
HYPERTENSION: 0
WEIGHT LOSS: 1
SPEECH CHANGE: 0
ABDOMINAL PAIN: 0
POSTURAL DYSPNEA: 0
DIZZINESS: 1
NIGHT SWEATS: 0
EYE PAIN: 0
ARTHRALGIAS: 1
DISTURBANCES IN COORDINATION: 0
NAUSEA: 1
EXERCISE INTOLERANCE: 1
INCREASED ENERGY: 1
ALTERED TEMPERATURE REGULATION: 0
TROUBLE SWALLOWING: 0
TREMORS: 0
DECREASED APPETITE: 0
SYNCOPE: 0
MUSCLE CRAMPS: 1
HEADACHES: 1
WEIGHT GAIN: 0
NERVOUS/ANXIOUS: 1
SINUS PAIN: 1
SWOLLEN GLANDS: 0
VOMITING: 1
LOSS OF CONSCIOUSNESS: 0
FEVER: 0
POLYPHAGIA: 0
SLEEP DISTURBANCES DUE TO BREATHING: 0
BACK PAIN: 1
NUMBNESS: 0
HYPOTENSION: 1
WEAKNESS: 1
JOINT SWELLING: 0
STIFFNESS: 1
PANIC: 1
SNORES LOUDLY: 1
MEMORY LOSS: 0
DYSPNEA ON EXERTION: 1
ORTHOPNEA: 0
TASTE DISTURBANCE: 0
PALPITATIONS: 1
SMELL DISTURBANCE: 0
HALLUCINATIONS: 0
NECK PAIN: 0
SINUS CONGESTION: 1
DIARRHEA: 1
SHORTNESS OF BREATH: 1
MUSCLE WEAKNESS: 0
DEPRESSION: 1

## 2022-01-04 NOTE — PROGRESS NOTES
Joyce is a 61 year old who is being evaluated via a billable video visit.      How would you like to obtain your AVS? MyChart  If the video visit is dropped, the invitation should be resent by: Text to cell phone: [phone]  Will anyone else be joining your video visit? No    Video Start Time: 1103  Video-Visit Details    Type of service:  Video Visit    Video End Time:11:20 AM    Originating Location (pt. Location): Home    Distant Location (provider location):  [facility]     Platform used for Video Visit: [employer]

## 2022-01-04 NOTE — TELEPHONE ENCOUNTER
Joyce needs follow-up in approximately 8 weeks. Will send to scheduling department to please reach out to schedule.    Other recommendations from today's visit is to start Budesonide.

## 2022-01-04 NOTE — PATIENT INSTRUCTIONS
It was a pleasure taking care of you today. I've included a brief summary of our discussion and care plan from today's visit below.  Please review this information with your primary care provider.  _______________________________________________________________________    My recommendations are summarized as follows:    1. Budesonide 9 mg daily for 8 weeks. We can discuss whether to continue or taper this medication at that time.    2. If you do not have adequate control of your diarrhea for your upcoming cardiac procedure, we can try to schedule a video or in-person appointment before then to discuss other measures.      If you need any follow-up appointments, please use the following phone numbers below.    To schedule or reschedule a follow-up GI appointment, call [phone] option 1    To schedule your endoscopy procedure, call [phone] option 2    To schedule imaging, please call [phone]     To schedule your lab appointment at [facility] 1st floor lab call [phone]. Call your [place] lab directly if it is not [facility]. If you use a non-[place] lab, please let us know where to fax your lab order (call [facility] at [phone]).      _______________________________________________________________________    Please be in touch if there are any further questions that arise following today's visit.  There are multiple ways to contact your gastroenterology care team.      During business hours, you may reach your gastroenterology RN Care Coordinator, Elba Ruth, at [phone].      You can always send a secure message through "[employer] "[employer]" messages are answered by your nurse or doctor typically within 24 hours. Please allow extra time on weekends and holidays.     What is "[employer]"?  "[employer]" is a secure way for you to access all of your healthcare records from the [facility].  It is a web based computer program, so you can sign on to it from any location.  It  also allows you to send secure messages to your care team.  I recommend signing up for [employer] access if you have not already done so and are comfortable with using a computer.     For urgent/emergent questions after business hours, you may reach the on-call GI Fellow by contacting the [facility]  at [phone].     How will I get the results of any tests ordered?    You will receive all of your results.  If you have signed up for [employer], any tests ordered at your visit will be available to you after your physician reviews them.  Typically this takes 1-2 weeks.  If there are urgent results that require a change in your care plan, your physician or nurse will call you to discuss the next steps.      Thank you for choosing [facility]!       Sincerely,    Ranjit Penn MD  Professor of Medicine  [facility]  Division of Gastroenterology, Hepatology, and Nutrition

## 2022-01-04 NOTE — TELEPHONE ENCOUNTER
[facility] for PT to call [phone] to help schedule the f/u appointment w/ Dr. Penn for the beginning of March.

## 2022-01-04 NOTE — PROGRESS NOTES
[facility]       Gastroenterology and Hepatology Clinic    Date of Service: 1/4/2022       Primary Care Provider: Ce Crowe    History of Present Illness     Ms. Marroquin presents for follow-up of diarrhea thought due to microscopic colitis.  Please refer to her prior consultation and clinic notes.    This patient was given the diagnosis of microscopic colitis many years ago.  She was tried on various medications, but eventually found that high doses of probiotics helped her the best.  She had recurrent symptoms in 2021, and underwent a repeat colonoscopy in August 2021.  This did not show overt colitis, but the biopsies were compatible with microscopic colitis.    At the time, she elected to restart her high-dose probiotics.  We also had her work with her other providers to stop taking Duloxetine.  At the time of her last video visit with me in October, her diarrhea had resolved.    She is started having recurrent diarrhea in the last month or two.  This is significantly impacted on her quality of life, and is also made her heart failure management more difficult.  She is scheduled for replacement of her pacemaker later this month; that procedure has been postponed because of her diarrhea (diarrhea during the procedure could cause contamination).    More recently, she is started on acupuncture to try to help her diarrhea.  She reports that it is currently working, and that she has not had severe diarrhea in the last few days.    Aside from a colonoscopy, she had a CT scan in August that did not show any overt small bowel abnormalities.  Prior to that, she had a CT showing ileus.  Other fairly recent testing included a normal CRP and fecal elastase (August and September 2021).  She also had a normal tissue transglutaminase antibody.  She had a positive fecal lactoferrin test in August 2021.    She has chronic heart failure and is pacemaker dependent.  She is very concerned  about drugs that may raise the risk of cardiac arrhythmia.      Past Medical History:  Past Medical History:   Diagnosis Date     Arthritis      Cardiomyopathy (H)     ff Cardiology     Chronic depressive personality disorder      Congestive heart failure (H) see dr martínez    heart failure correct term     COPD exacerbation (H)      Esophageal reflux      Ex-smoker     quit 2006; 1 PPD x 30     Hyperparathyroidism (H)     s/p parathyroidectomy     Hypothyroidism      LARRY on CPAP     ff Sleep medicine     Pulmonary nodules     ff Pulmonologist     S/P cardiac pacemaker procedure     [facility]      Uncomplicated asthma years       Patient Active Problem List   Diagnosis     Benign neoplasm of vulva     Esophageal reflux     Chronic depressive personality disorder     Complete atrioventricular block (H)     Cardiac pacemaker in situ- [employer], dual lead- DEPENDENT     Hypothyroidism     Ex-smoker     Hyperparathyroidism (H)     Pulmonary nodules     Cardiomyopathy (H)     Hx of cardiac pacemaker     Situational anxiety     LARRY (obstructive sleep apnea)     Restless legs syndrome (RLS)     Vestibular disequilibrium, unspecified laterality     Collagenous colitis     Death of parent     Panic attack     Insomnia, unspecified type     Exacerbation of asthma, unspecified asthma severity, unspecified whether persistent     Status post coronary angiogram     Adjustment disorder with mixed anxiety and depressed mood     Other ill-defined heart diseases     PTSD (post-traumatic stress disorder)     History of cardiac radiofrequency ablation       Surgical History:  Past Surgical History:   Procedure Laterality Date     BUNIONECTOMY Bilateral      COLONOSCOPY N/A 8/30/2021    Procedure: COLONOSCOPY, WITH POLYPECTOMY AND BIOPSY;  Surgeon: Ranjit Penn MD;  Location:  GI     CV CORONARY ANGIOGRAM N/A 9/26/2019    Procedure: CV CORONARY ANGIOGRAM;  Surgeon: Erickson Trejo MD;  Location:  [facility]     CV RIGHT HEART CATH MEASUREMENTS RECORDED N/A 7/20/2020    Procedure: CV RIGHT HEART CATH;  Surgeon: Alonso Ordonez MD;  [facility]     EP ABLATION / EP STUDIES  04/21/2017     EXPLORE NECK N/A 9/19/2018    Procedure: EXPLORE NECK;  Neck Exploration Resection of Left superior Parathyroid gland;  Surgeon: Kristine Venegas MD;  Location: [facility] OR     HC CORRECT BUNION,SIMPLE       PARATHYROIDECTOMY N/A 9/19/2018    Procedure: PARATHYROIDECTOMY;;  Surgeon: Kristine Venegas MD;  Location: [facility] OR     PPM INSERT OF NEW OR REPL W/VENT LEAD  04/21/2017     TONSILLECTOMY & ADENOIDECTOMY         Social History:  Social History     Tobacco Use     Smoking status: Former Smoker     Packs/day: 0.00     Years: 0.00     Pack years: 0.00     Smokeless tobacco: Never Used   Vaping Use     Vaping Use: Never used   Substance Use Topics     Alcohol use: Yes     Alcohol/week: 0.0 - 8.0 standard drinks     Comment: seldom     Drug use: No       Family History:  Family History   Problem Relation Age of Onset     Hypothyroidism Mother      Hypertension Mother      Osteoarthritis Mother      Coronary Artery Disease Father         nonfatal MI in his 70s     Asthma Father      Diabetes Sister      Hypothyroidism Sister      Breast Cancer Maternal Aunt      Anxiety Disorder Sister        Medications:  Current Outpatient Medications   Medication     acetaminophen (TYLENOL) 500 MG tablet     albuterol (PROAIR HFA/PROVENTIL HFA/VENTOLIN HFA) 108 (90 BASE) MCG/ACT Inhaler     azelastine (ASTELIN) 0.1 % nasal spray     budesonide (ENTOCORT EC) 3 MG EC capsule     Calcium Lactate 500 MG CAPS     clonazePAM (KLONOPIN) 0.5 MG tablet     DiphenhydrAMINE HCl (BENADRYL PO)     ELIQUIS ANTICOAGULANT 5 MG tablet     Ferrous Sulfate (IRON SUPPLEMENT PO)     fluticasone-salmeterol (ADVAIR) 250-50 MCG/DOSE inhaler     furosemide (LASIX) 20 MG tablet     ipratropium (ATROVENT) 0.06 % nasal spray      loperamide (IMODIUM A-D) 2 MG tablet     MAGNESIUM LACTATE PO     MELATONIN PO     Menaquinone-7 (VITAMIN K2 PO)     METHYLPREDNISOLONE PO     metoprolol succinate ER (TOPROL XL) 25 MG 24 hr tablet     Multiple Vitamin (DAILY MULTIVITAMIN PO)     nebulizer nebulization     Probiotic Product (PROBIOTIC DAILY PO)     spironolactone (ALDACTONE) 25 MG tablet     SYNTHROID 150 MCG tablet     vitamin B complex with vitamin C (VITAMIN  B COMPLEX) PO tablet     vitamin D3 (CHOLECALCIFEROL) 2000 units tablet     Zinc 15 MG CAPS     FLUoxetine (PROZAC) 10 MG tablet     nefazodone (SERZONE) 100 MG tablet     TURMERIC PO     No current facility-administered medications for this visit.           Objective:         There were no vitals filed for this visit.  There is no height or weight on file to calculate BMI.     Physical Exam  Constitutional: Alert, no apparent distress.    This was a video visit.    Labs and Diagnostic tests:  Lab Results   Component Value Date     11/23/2021     05/25/2021    POTASSIUM 4.3 11/23/2021    POTASSIUM 4.2 05/25/2021    CHLORIDE 108 11/23/2021    CHLORIDE 107 05/25/2021    CO2 26 11/23/2021    CO2 24 05/25/2021    BUN 9 11/23/2021    BUN 12 05/25/2021    CR 0.63 11/23/2021    CR 0.64 05/25/2021     Lab Results   Component Value Date    BILITOTAL 0.4 11/23/2021    BILITOTAL 0.4 05/25/2021    ALT 35 11/23/2021    ALT 29 05/25/2021    AST 18 11/23/2021    AST 17 05/25/2021    ALKPHOS 110 11/23/2021    ALKPHOS 114 05/25/2021     Lab Results   Component Value Date    ALBUMIN 3.5 11/23/2021    ALBUMIN 3.4 05/25/2021    PROTTOTAL 7.5 11/23/2021    PROTTOTAL 7.3 05/25/2021      Lab Results   Component Value Date    WBC 6.0 11/23/2021    WBC 6.6 05/25/2021    HGB 13.3 11/23/2021    HGB 13.7 05/25/2021    MCV 97 11/23/2021    MCV 95 05/25/2021     11/23/2021     05/25/2021     Lab Results   Component Value Date    INR 1.02 10/06/2019       Computed MELD-Na score unavailable.  Necessary lab results were not found in the last year.  Computed MELD score unavailable. Necessary lab results were not found in the last year.        Assessment and Plan:    1.  Diarrhea likely due to microscopic colitis.  We discussed options today, and I recommended that she try a course of budesonide 9 mg daily for 8 weeks.  If this is not successful in consistently controlling her diarrhea, other considerations might include cholestyramine.  She does not want to take Lomotil or Pepto-Bismol because of the concern of cardiac arrhythmia.    If needed, she can have a video visit with me prior to her cardiac procedure if her diarrhea is not adequately controlled.    I will tentatively schedule her back for a return to clinic in about 8 weeks with labs prior.        About 30 minutes spent with patient, reviewing results, and coordinating care.        Ranjit Penn MD  Professor of Medicine  [facility]  Division of Gastroenterology, Hepatology, and Nutrition

## 2022-01-04 NOTE — TELEPHONE ENCOUNTER
Called [facility] to see if prior authorization was needed for Budesonide. No prior auth needed, however, patient's responsibility would be $300, so patient refused medication fill. Will notify provider.

## 2022-01-06 ENCOUNTER — TELEPHONE (OUTPATIENT)
Dept: GASTROENTEROLOGY | Facility: CLINIC | Age: 62
End: 2022-01-06
Payer: COMMERCIAL

## 2022-01-06 NOTE — TELEPHONE ENCOUNTER
[facility] Call Center    Phone Message    May a detailed message be left on voicemail: yes     Reason for Call: Other: Patient saw Dr. Penn on 1/4/22 and he prescribed some medications.  However, patient has decided to go with acupuncture in lieu.  She will not be taking the medications.  Patient states she is constipated and was using the BRAT diet, then introduced a few other foods.  However, that increased her nausea and vomiting up into the back of her throat.  Patient would like advice as to what to do.  Please follow up with patient.  Thank you.     Action Taken: Message routed to:  [facility] Nurse Intake    Travel Screening: Not Applicable

## 2022-01-06 NOTE — TELEPHONE ENCOUNTER
Called Joyce back.    As of 1/4, patient developed constipation and then started the BRAT diet. This AM had a PB and J sandwich and developed loose stools this AM (only one).     Wonders if budesonide would still be recommended if she is now alternating loose stools and constipation. Will still do acupuncture and BRAT diet.     Will review with Dr. Penn.      Dr. Penn recommending Budesonide-at least through cardiac procedure. Called Joyce to recommend. She was appreciative of the information.

## 2022-01-06 NOTE — TELEPHONE ENCOUNTER
[facility] Call Center    Phone Message    May a detailed message be left on voicemail: yes     Reason for Call: Other: Pt called back and stated that she has loose stool, wants to know if doctor wants her to do the medication that he ordered for constipation? Also stated that she has lower abdominal cramping.     Action Taken: Message routed to:  [facility]    Travel Screening: Not Applicable

## 2022-01-07 ENCOUNTER — OFFICE VISIT (OUTPATIENT)
Dept: SPIRITUAL SERVICES | Facility: CLINIC | Age: 62
End: 2022-01-07

## 2022-01-07 ENCOUNTER — HOSPITAL ENCOUNTER (EMERGENCY)
Facility: CLINIC | Age: 62
Discharge: HOME | End: 2022-01-07
Attending: EMERGENCY MEDICINE | Admitting: EMERGENCY MEDICINE
Payer: COMMERCIAL

## 2022-01-07 ENCOUNTER — HOSPITAL ENCOUNTER (OUTPATIENT)
Dept: CARDIAC REHAB | Facility: CLINIC | Age: 62
End: 2022-01-07
Attending: NURSE PRACTITIONER
Payer: COMMERCIAL

## 2022-01-07 ENCOUNTER — APPOINTMENT (OUTPATIENT)
Dept: GENERAL RADIOLOGY | Facility: CLINIC | Age: 62
End: 2022-01-07
Attending: EMERGENCY MEDICINE
Payer: COMMERCIAL

## 2022-01-07 ENCOUNTER — MYC MEDICAL ADVICE (OUTPATIENT)
Dept: CARDIOLOGY | Facility: CLINIC | Age: 62
End: 2022-01-07

## 2022-01-07 VITALS
HEART RATE: 86 BPM | SYSTOLIC BLOOD PRESSURE: 131 MMHG | DIASTOLIC BLOOD PRESSURE: 64 MMHG | OXYGEN SATURATION: 97 % | RESPIRATION RATE: 12 BRPM

## 2022-01-07 DIAGNOSIS — Z86.79 HISTORY OF CARDIOMYOPATHY: ICD-10-CM

## 2022-01-07 DIAGNOSIS — R06.02 SHORTNESS OF BREATH: ICD-10-CM

## 2022-01-07 DIAGNOSIS — R07.89 CHEST DISCOMFORT: ICD-10-CM

## 2022-01-07 LAB
ANION GAP SERPL CALCULATED.3IONS-SCNC: 9 MMOL/L (ref 3–14)
BASOPHILS # BLD AUTO: 0.1 10E3/UL (ref 0–0.2)
BASOPHILS NFR BLD AUTO: 1 %
BUN SERPL-MCNC: 7 MG/DL (ref 7–30)
CALCIUM SERPL-MCNC: 8.6 MG/DL (ref 8.5–10.1)
CHLORIDE BLD-SCNC: 110 MMOL/L (ref 94–109)
CO2 SERPL-SCNC: 20 MMOL/L (ref 20–32)
CREAT SERPL-MCNC: 0.62 MG/DL (ref 0.52–1.04)
EOSINOPHIL # BLD AUTO: 0.1 10E3/UL (ref 0–0.7)
EOSINOPHIL NFR BLD AUTO: 1 %
ERYTHROCYTE [DISTWIDTH] IN BLOOD BY AUTOMATED COUNT: 12 % (ref 10–15)
FLUAV RNA SPEC QL NAA+PROBE: NEGATIVE
FLUBV RNA RESP QL NAA+PROBE: NEGATIVE
GFR SERPL CREATININE-BSD FRML MDRD: >90 ML/MIN/1.73M2
GLUCOSE BLD-MCNC: 83 MG/DL (ref 70–99)
HCT VFR BLD AUTO: 39.9 % (ref 35–47)
HGB BLD-MCNC: 13.3 G/DL (ref 11.7–15.7)
HOLD SPECIMEN: NORMAL
HOLD SPECIMEN: NORMAL
IMM GRANULOCYTES # BLD: 0 10E3/UL
IMM GRANULOCYTES NFR BLD: 1 %
LYMPHOCYTES # BLD AUTO: 1.6 10E3/UL (ref 0.8–5.3)
LYMPHOCYTES NFR BLD AUTO: 26 %
MCH RBC QN AUTO: 31.5 PG (ref 26.5–33)
MCHC RBC AUTO-ENTMCNC: 33.3 G/DL (ref 31.5–36.5)
MCV RBC AUTO: 95 FL (ref 78–100)
MONOCYTES # BLD AUTO: 0.5 10E3/UL (ref 0–1.3)
MONOCYTES NFR BLD AUTO: 8 %
NEUTROPHILS # BLD AUTO: 4 10E3/UL (ref 1.6–8.3)
NEUTROPHILS NFR BLD AUTO: 63 %
NRBC # BLD AUTO: 0 10E3/UL
NRBC BLD AUTO-RTO: 0 /100
PLATELET # BLD AUTO: 233 10E3/UL (ref 150–450)
POTASSIUM BLD-SCNC: 4.2 MMOL/L (ref 3.4–5.3)
RBC # BLD AUTO: 4.22 10E6/UL (ref 3.8–5.2)
SARS-COV-2 RNA RESP QL NAA+PROBE: NEGATIVE
SODIUM SERPL-SCNC: 139 MMOL/L (ref 133–144)
TROPONIN I SERPL HS-MCNC: 4 NG/L
TROPONIN I SERPL HS-MCNC: 5 NG/L
WBC # BLD AUTO: 6.3 10E3/UL (ref 4–11)

## 2022-01-07 PROCEDURE — 87636 SARSCOV2 & INF A&B AMP PRB: CPT | Performed by: EMERGENCY MEDICINE

## 2022-01-07 PROCEDURE — 93798 PHYS/QHP OP CAR RHAB W/ECG: CPT

## 2022-01-07 PROCEDURE — 96361 HYDRATE IV INFUSION ADD-ON: CPT | Performed by: EMERGENCY MEDICINE

## 2022-01-07 PROCEDURE — 258N000003 HC RX IP 258 OP 636: Performed by: EMERGENCY MEDICINE

## 2022-01-07 PROCEDURE — 93005 ELECTROCARDIOGRAM TRACING: CPT | Performed by: EMERGENCY MEDICINE

## 2022-01-07 PROCEDURE — 85004 AUTOMATED DIFF WBC COUNT: CPT | Performed by: EMERGENCY MEDICINE

## 2022-01-07 PROCEDURE — 96360 HYDRATION IV INFUSION INIT: CPT | Performed by: EMERGENCY MEDICINE

## 2022-01-07 PROCEDURE — 80048 BASIC METABOLIC PNL TOTAL CA: CPT | Performed by: EMERGENCY MEDICINE

## 2022-01-07 PROCEDURE — 84484 ASSAY OF TROPONIN QUANT: CPT | Mod: 91 | Performed by: EMERGENCY MEDICINE

## 2022-01-07 PROCEDURE — 36415 COLL VENOUS BLD VENIPUNCTURE: CPT | Performed by: EMERGENCY MEDICINE

## 2022-01-07 PROCEDURE — 93010 ELECTROCARDIOGRAM REPORT: CPT | Performed by: EMERGENCY MEDICINE

## 2022-01-07 PROCEDURE — 99285 EMERGENCY DEPT VISIT HI MDM: CPT | Mod: 25 | Performed by: EMERGENCY MEDICINE

## 2022-01-07 PROCEDURE — C9803 HOPD COVID-19 SPEC COLLECT: HCPCS | Performed by: EMERGENCY MEDICINE

## 2022-01-07 PROCEDURE — 250N000013 HC RX MED GY IP 250 OP 250 PS 637: Performed by: EMERGENCY MEDICINE

## 2022-01-07 PROCEDURE — 93296 REM INTERROG EVL PM/IDS: CPT | Performed by: EMERGENCY MEDICINE

## 2022-01-07 PROCEDURE — 71045 X-RAY EXAM CHEST 1 VIEW: CPT

## 2022-01-07 RX ORDER — SODIUM CHLORIDE 9 MG/ML
1000 INJECTION, SOLUTION INTRAVENOUS CONTINUOUS
Status: DISCONTINUED | OUTPATIENT
Start: 2022-01-07 | End: 2022-01-07

## 2022-01-07 RX ORDER — CLONAZEPAM 0.5 MG/1
0.5 TABLET ORAL ONCE
Status: COMPLETED | OUTPATIENT
Start: 2022-01-07 | End: 2022-01-07

## 2022-01-07 RX ADMIN — CLONAZEPAM 0.5 MG: 0.5 TABLET ORAL at 12:08

## 2022-01-07 RX ADMIN — SODIUM CHLORIDE 1000 ML: 9 INJECTION, SOLUTION INTRAVENOUS at 12:08

## 2022-01-07 ASSESSMENT — ENCOUNTER SYMPTOMS
HEMATOLOGIC/LYMPHATIC NEGATIVE: 1
SHORTNESS OF BREATH: 1
MUSCULOSKELETAL NEGATIVE: 1
PSYCHIATRIC NEGATIVE: 1
EYES NEGATIVE: 1
LIGHT-HEADEDNESS: 1
CONSTITUTIONAL NEGATIVE: 1
GASTROINTESTINAL NEGATIVE: 1
DIZZINESS: 1
ENDOCRINE NEGATIVE: 1
CHEST TIGHTNESS: 1

## 2022-01-07 NOTE — ED NOTES
Pt here from cardiac rehab feeling light headed and faint. Pt is pacemaker dependent. HR in triage in the 40's. Back in ED 21 HR in 70's -80's on monitor. EKG obtained. Pacemaker interrogated.

## 2022-01-07 NOTE — PROGRESS NOTES
SPIR[facility]UAL HE[facility] SERVICES  [facility] - Cardiac Rehab    Referral Source: Patient request    - Joyce stated that her [employer] is going through pastoral transition and she felt the need for support as she anticipates a cardiac procedure on Jan 19, 2022.  Joyce had recently attended the class I taught on Emotional Aspects of Cardiac Disease.  She shared some of her cardiac narrative and asked specifically for prayer - both for her and for the team that will be doing the procedure.    - I provided prayer in those specific areas as she had requested.    - Joyce had done her exercise time on the treadmill before our visit.  She spoke of not feeling well but rallied with some rest and water.  We then met for our visit.    Plan:  will remain available for future support needs.    Matthias Gregg M.A., [facility]  Lead   [facility]  Office: [phone]

## 2022-01-07 NOTE — DISCHARGE INSTRUCTIONS
1) Your evaluation today did not confirm the exact cause of your symptoms as reported after cardiac rehab.  The work-up present suggest an emergency diagnosis.  We discussed that altering your exercise routine may be beneficial given your medical diagnosis and health history.    2) be sure to talk to your cardiology care team and the regular clinic provider about any medication needs and symptom follow-up after your evaluation today.    3) Although you appear stable for discharge to home at this time if you develop new symptoms of concern or your symptoms worsen you may need to return to be reevaluated.

## 2022-01-07 NOTE — ED PROVIDER NOTES
History     Chief Complaint   Patient presents with     Chest Pain     Pt reports she was at cardiac rehab and wasnt feeling too well after, pt does have a hx of CHF and pulmonary hypertension and a pacemaker. Pt reports feeling lightheaded and dizzy with some chest pressure and sob      HPI  Joyce Marroquin is a 61 year old female who presents for evaluation with report of feeling unwell while at cardiac rehabilitation.  She reported she started feeling dizzy and lightheaded with chest discomfort and shortness of breath.  Her medical records show she has a history of complete atrioventricular block, esophageal reflux, history of cardiac pacemaker in situ durally dependence, hypothyroidism, hyperparathyroidism, history of cardiomyopathy obstructive sleep apnea, PTSD and prior history of cardiac radiofrequency ablation.  Patient's medical records show she is prescribed furosemide 20 mg as needed for increased weight gain, Tylenol, albuterol, Atrovent, spironolactone 12.5 mg daily.  On examination patient reported that she had completed a new core exercise at cardiac rehab earlier today when she felt lightheaded dizzy short of breath and had chest pressure. She typically goes to cardiac rehab 3 times a week. She missed her appointment 2 days ago due to feeling fatigued and tired after snowblowing her core exercise began this week. 5 days ago where she participates in extension exercises with flexion at the waist and extension at the waist holding light weights to her side. Patient reports she has labile blood pressure and does not tolerate significant position changes. She reports she is vaccinated and boosted for COVID-19. Due to feeling unwell she wanted to come in to be evaluated. There was no fainting at cardiac rehab. Patient reports she has a pacemaker in place was placed after a failed cardiac ablation in 2017. Patient also notes that she was recently diagnosed with microscopic colitis about 5 months earlier and  has chronic lower abdominal cramping and pain. She reports she has had loose stools but no significant diarrhea and that she does not have any melena hematochezia. Patient reports she has poor exercise tolerance and does not do much.      Allergies:  Allergies   Allergen Reactions     Tetracycline Swelling     Zofran [Ondansetron]      Prolonged QT  Prolonged QT at baseline per patient     Flagyl [Metronidazole] Rash     Buspar [Buspirone]      Muscle weakness     Corn Oil Other (See Comments)     Headache       Cymbalta Diarrhea     Seasonal Allergies Difficulty breathing     Sulfamethoxazole-Trimethoprim      Other reaction(s): Other  Passed out     Cyclobenzaprine Other (See Comments)     Extreme heat intolerance     Levaquin [Levofloxacin]      Other reaction(s): Other  Tendon rupture     Nsaids Other (See Comments)     Exacerbated asthma       Problem List:    Patient Active Problem List    Diagnosis Date Noted     History of cardiac radiofrequency ablation 06/01/2021     Priority: Medium     PTSD (post-traumatic stress disorder) 04/27/2021     Priority: Medium     Other ill-defined heart diseases 07/09/2020     Priority: Medium     Added automatically from request for surgery [id number]       Adjustment disorder with mixed anxiety and depressed mood 04/20/2020     Priority: Medium     Status post coronary angiogram 09/26/2019     Priority: Medium     Death of parent 05/16/2019     Priority: Medium     Both Mother and Father 4-5/2019       Panic attack 05/16/2019     Priority: Medium     Insomnia, unspecified type 05/16/2019     Priority: Medium     Exacerbation of asthma, unspecified asthma severity, unspecified whether persistent 05/16/2019     Priority: Medium     LARRY (obstructive sleep apnea) 12/04/2018     Priority: Medium     Restless legs syndrome (RLS) 12/04/2018     Priority: Medium     Vestibular disequilibrium, unspecified laterality 12/04/2018     Priority: Medium     Collagenous colitis 12/04/2018      Priority: Medium     Situational anxiety 11/12/2018     Priority: Medium     Hypothyroidism      Priority: Medium     Ex-smoker      Priority: Medium     quit 2006; 1 PPD x 30       Hyperparathyroidism (H)      Priority: Medium     s/p parathyroidectomy       Pulmonary nodules      Priority: Medium     ff Pulmonologist       Cardiomyopathy (H)      Priority: Medium     ff Cardiology       Hx of cardiac pacemaker      Priority: Medium     Cardiac pacemaker in situ- [employer], dual lead- DEPENDENT 05/03/2018     Priority: Medium     Complete atrioventricular block (H) 06/29/2017     Priority: Medium     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     Priority: Medium     Esophageal reflux      Priority: Medium     Chronic depressive personality disorder      Priority: Medium        Past Medical History:    Past Medical History:   Diagnosis Date     Arthritis      Cardiomyopathy (H)      Chronic depressive personality disorder      Congestive heart failure (H) see dr note     COPD exacerbation (H)      Esophageal reflux      Ex-smoker      Hyperparathyroidism (H)      Hypothyroidism      LARRY on CPAP      Pulmonary nodules      S/P cardiac pacemaker procedure      Uncomplicated asthma years       Past Surgical History:    Past Surgical History:   Procedure Laterality Date     BUNIONECTOMY Bilateral      COLONOSCOPY N/A 8/30/2021    Procedure: COLONOSCOPY, WITH POLYPECTOMY AND BIOPSY;  Surgeon: Ranjit Penn MD;  Location:  GI     CV CORONARY ANGIOGRAM N/A 9/26/2019    Procedure: CV CORONARY ANGIOGRAM;  Surgeon: Erickson Trejo MD;  Location:  HEART CARDIAC CATH LAB     CV RIGHT HEART CATH MEASUREMENTS RECORDED N/A 7/20/2020    Procedure: CV RIGHT HEART CATH;  Surgeon: Alonso Odronez MD;  Location:  HEART CARDIAC CATH LAB     EP ABLATION / EP STUDIES  04/21/2017     EXPLORE NECK N/A 9/19/2018    Procedure: EXPLORE NECK;  Neck Exploration Resection of Left superior Parathyroid gland;  Surgeon: Kristine Venegas  MD Thania;  Location: [facility] OR     HC CORRECT BUNION,SIMPLE       PARATHYROIDECTOMY N/A 9/19/2018    Procedure: PARATHYROIDECTOMY;;  Surgeon: Kristine Venegas MD;  Location: [facility] OR     PPM INSERT OF NEW OR REPL W/VENT LEAD  04/21/2017     TONSILLECTOMY & ADENOIDECTOMY         Family History:    Family History   Problem Relation Age of Onset     Hypothyroidism Mother      Hypertension Mother      Osteoarthritis Mother      Coronary Artery Disease Father         nonfatal MI in his 70s     Asthma Father      Diabetes Sister      Hypothyroidism Sister      Breast Cancer Maternal Aunt      Anxiety Disorder Sister        Social History:  Marital Status:   [2]  Social History     Tobacco Use     Smoking status: Former Smoker     Packs/day: 0.00     Years: 0.00     Pack years: 0.00     Smokeless tobacco: Never Used   Vaping Use     Vaping Use: Never used   Substance Use Topics     Alcohol use: Yes     Alcohol/week: 0.0 - 8.0 standard drinks     Comment: seldom     Drug use: No        Medications:    acetaminophen (TYLENOL) 500 MG tablet  albuterol (PROAIR HFA/PROVENTIL HFA/VENTOLIN HFA) 108 (90 BASE) MCG/ACT Inhaler  azelastine (ASTELIN) 0.1 % nasal spray  budesonide (ENTOCORT EC) 3 MG EC capsule  Calcium Lactate 500 MG CAPS  clonazePAM (KLONOPIN) 0.5 MG tablet  DiphenhydrAMINE HCl (BENADRYL PO)  ELIQUIS ANTICOAGULANT 5 MG tablet  Ferrous Sulfate (IRON SUPPLEMENT PO)  FLUoxetine (PROZAC) 10 MG tablet  fluticasone-salmeterol (ADVAIR) 250-50 MCG/DOSE inhaler  furosemide (LASIX) 20 MG tablet  ipratropium (ATROVENT) 0.06 % nasal spray  loperamide (IMODIUM A-D) 2 MG tablet  MAGNESIUM LACTATE PO  MELATONIN PO  Menaquinone-7 (VITAMIN K2 PO)  METHYLPREDNISOLONE PO  metoprolol succinate ER (TOPROL XL) 25 MG 24 hr tablet  Multiple Vitamin (DAILY MULTIVITAMIN PO)  nebulizer nebulization  nefazodone (SERZONE) 100 MG tablet  Probiotic Product (PROBIOTIC DAILY PO)  spironolactone (ALDACTONE) 25 MG tablet  SYNTHROID 150 MCG  tablet  TURMERIC PO  vitamin B complex with vitamin C (VITAMIN  B COMPLEX) PO tablet  vitamin D3 (CHOLECALCIFEROL) 2000 units tablet  Zinc 15 MG CAPS          Review of Systems   Constitutional: Negative.    HENT: Negative.    Eyes: Negative.    Respiratory: Positive for chest tightness and shortness of breath.    Cardiovascular: Positive for chest pain.   Gastrointestinal: Negative.    Endocrine: Negative.    Genitourinary: Negative.    Musculoskeletal: Negative.    Skin: Negative.    Neurological: Positive for dizziness and light-headedness.   Hematological: Negative.    Psychiatric/Behavioral: Negative.    All other systems reviewed and are negative.      Physical Exam   BP: 118/66  Pulse: 91  Resp: 13  SpO2: 95 %      Physical Exam  Constitutional:       Appearance: She is not toxic-appearing or diaphoretic.   HENT:      Head: Normocephalic and atraumatic.   Eyes:      Extraocular Movements: Extraocular movements intact.      Pupils: Pupils are equal, round, and reactive to light.   Cardiovascular:      Rate and Rhythm: Normal rate.      Heart sounds: No murmur heard.   No systolic murmur is present.      Pulmonary:      Effort: Pulmonary effort is normal.      Breath sounds: Normal breath sounds.   Musculoskeletal:      Cervical back: Normal range of motion.   Skin:     Capillary Refill: Capillary refill takes less than 2 seconds.      Coloration: Skin is not cyanotic or pale.      Findings: No ecchymosis, erythema or rash.      Nails: There is no clubbing.   Neurological:      General: No focal deficit present.      Mental Status: She is alert and oriented to person, place, and time.   Psychiatric:         Mood and Affect: Mood normal. Mood is not anxious.         Behavior: Behavior normal. Behavior is not agitated.         ED Course                 Procedures              EKG Interpretation:      Interpreted by Robi Issa MD  Time reviewed: 1015  Symptoms at time of EKG: Chest discomfort,  Shortness of breath   Rhythm: paced  Rate: Normal  Axis: Left Axis Deviation  Ectopy: premature ventricular contractions (unifocal)  Conduction: left bundle branch block (complete)  ST Segments/ T Waves: Non-specific ST-T wave changes  Q Waves: nonspecific  Comparison to prior: When compared with EKG dated September 20, 2021-paced rhythm with PVCs    Clinical Impression: Paced rhythm, PVCs          Critical Care time:  none             ED medications:  Medications   0.9% sodium chloride BOLUS (0 mLs Intravenous Stopped 1/7/22 1352)   clonazePAM (klonoPIN) tablet 0.5 mg (0.5 mg Oral Given 1/7/22 1208)         ED Vitals:  Vitals:    01/07/22 1130 01/07/22 1145 01/07/22 1200 01/07/22 1215   BP: 118/66 108/50 113/69 131/64   Pulse: 90 84 81 86   Resp: 17 17 8 12   SpO2: 96% 96% 95% 97%     Vitals:    01/07/22 1130 01/07/22 1145 01/07/22 1200 01/07/22 1215   BP: 118/66 108/50 113/69 131/64   Pulse: 90 84 81 86   Resp: 17 17 8 12   SpO2: 96% 96% 95% 97%     ED Labs and imaging:  Results for orders placed or performed during the hospital encounter of 01/07/22   XR Chest Port 1 View     Status: None    Narrative    XR CHEST PORT 1 VIEW 1/7/2022 10:39 AM    HISTORY: Hx of pulm HTN, pacemaker dependence. Chest pressure and  shortness of breath after cardiac rehab. Evaluate for acute  cardiopulmonary process    COMPARISON: CT 12/10/2021      Impression    IMPRESSION:  1. No focal consolidation, pleural effusion, or pneumothorax.  2. Normal cardiomediastinal silhouette with left anterior chest wall  CIED.    MALIKA RIZO MD         SYSTEM ID:  [id number]   Troponin I     Status: Normal   Result Value Ref Range    Troponin I High Sensitivity 4 <54 ng/L   Basic metabolic panel     Status: Abnormal   Result Value Ref Range    Sodium 139 133 - 144 mmol/L    Potassium 4.2 3.4 - 5.3 mmol/L    Chloride 110 (H) 94 - 109 mmol/L    Carbon Dioxide (CO2) 20 20 - 32 mmol/L    Anion Gap 9 3 - 14 mmol/L    Urea Nitrogen 7 7 - 30 mg/dL     Creatinine 0.62 0.52 - 1.04 mg/dL    Calcium 8.6 8.5 - 10.1 mg/dL    Glucose 83 70 - 99 mg/dL    GFR Estimate >90 >60 mL/min/1.73m2   CBC with platelets and differential     Status: None   Result Value Ref Range    WBC Count 6.3 4.0 - 11.0 10e3/uL    RBC Count 4.22 3.80 - 5.20 10e6/uL    Hemoglobin 13.3 11.7 - 15.7 g/dL    Hematocrit 39.9 35.0 - 47.0 %    MCV 95 78 - 100 fL    MCH 31.5 26.5 - 33.0 pg    MCHC 33.3 31.5 - 36.5 g/dL    RDW 12.0 10.0 - 15.0 %    Platelet Count 233 150 - 450 10e3/uL    % Neutrophils 63 %    % Lymphocytes 26 %    % Monocytes 8 %    % Eosinophils 1 %    % Basophils 1 %    % Immature Granulocytes 1 %    NRBCs per 100 WBC 0 <1 /100    Absolute Neutrophils 4.0 1.6 - 8.3 10e3/uL    Absolute Lymphocytes 1.6 0.8 - 5.3 10e3/uL    Absolute Monocytes 0.5 0.0 - 1.3 10e3/uL    Absolute Eosinophils 0.1 0.0 - 0.7 10e3/uL    Absolute Basophils 0.1 0.0 - 0.2 10e3/uL    Absolute Immature Granulocytes 0.0 <=0.4 10e3/uL    Absolute NRBCs 0.0 10e3/uL   Extra Blue Top Tube     Status: None   Result Value Ref Range    Hold Specimen JIC    Extra Red Top Tube     Status: None   Result Value Ref Range    Hold Specimen JIC    Symptomatic; Yes; 1/12/2022 Influenza A/B & SARS-CoV2 (COVID-19) Virus PCR Multiplex Nasopharyngeal     Status: Normal    Specimen: Nasopharyngeal; Swab   Result Value Ref Range    Influenza A PCR Negative Negative    Influenza B PCR Negative Negative    SARS CoV2 PCR Negative Negative    Narrative    Testing was performed using the kathleen SARS-CoV-2 & Influenza A/B Assay on the kathleen Mariaa System. This test should be ordered for the detection of SARS-CoV-2 and influenza viruses in individuals who meet clinical and/or epidemiological criteria. Test performance is unknown in asymptomatic patients. This test is for in vitro diagnostic use under the FDA EUA for laboratories certified under CLIA to perform moderate and/or high complexity testing. This test has not been FDA cleared or approved. A  negative result does not rule out the presence of PCR inhibitors in the specimen or target RNA in concentration below the limit of detection for the assay. If only one viral target is positive but coinfection with multiple targets is suspected, the sample should be re-tested with another FDA cleared, approved or authorized test, if coinfection would change clinical management. [facility] are certified under the Clinical Laboratory Improvement Amendments of 1988 (CLIA-88) as  qualified to perform moderate and/or high complexity laboratory testing.   Troponin I     Status: Normal   Result Value Ref Range    Troponin I High Sensitivity 5 <54 ng/L   CBC with platelets differential     Status: None    Narrative    The following orders were created for panel order CBC with platelets differential.  Procedure                               Abnormality         Status                     ---------                               -----------         ------                     CBC with platelets and d...[[id number]]                      Final result                 Please view results for these tests on the individual orders.   [place] Draw     Status: None    Narrative    The following orders were created for panel order [place] Draw.  Procedure                               Abnormality         Status                     ---------                               -----------         ------                     Extra Blue Top Tube[[id number]]                              Final result               Extra Red Top Tube[[id number]]                               Final result                 Please view results for these tests on the individual orders.           Assessments & Plan (with Medical Decision Making)   Assessment Summary and Clinical Impression: 61-year-old female who presented from cardiac rehab with reporting that she felt chest discomfort shortness of breath, dizzy and lightheaded. I suspect her symptoms are  likely multifactorial in the context of poor exercise tolerance, nonischemic cardiomyopathy, complete AV block post ablation in 2017 now pacemaker dependent-dual lead. She had arrived after completing her core exercise routine which is new for her this week reported that she felt dizzy lightheaded and wanted to be checked out. She also has a history of microscopic colitis but reported no melena hematochezia and chronic loose stools. She noted that she missed rehab 48 hours earlier because of snow blowing and feeling fatigued and tired. On exam she was pleasant on intake she was noted to be bradycardic heart rate in the 40s however monitoring she is captured to have PVCs which are where she is had in the past. She was monitored frequent vital signs and we agreed to ensure there was no anemia given history of microscopic colitis, electrolyte abnormalities.  Her work-up was reassuring and she remained stable during her ED course and expressed comfort with outpatient follow-up.    ED course and Plan:  Reviewed the medical record.  Patient's pacemaker was interrogated upon arrival on intake due to concern for bradycardia initially on arrival heart rate in the 40s although was on telemetry at bedside however was in the 80s.  Last echocardiogram-viewable report from November 23, 2021.  LVEF was 40 to 45% mildly reduced and no significant valvular abnormality noted.  Abnormal septal motion consistent with left bundle branch block was present.  See additional details in the medical record. EKG in the department revealed a paced rhythm with PVCs compared with EKG from September 20, 2021.    Her work-up revealed troponin within normal limits. Chest imaging revealed no focal or acute cardiopulmonary findings on my independent review. COVID-19 PCR was negative. Hemogram was normal and her electrolytes are within normal limits. Patient reported that her chest pain was worsening and increasing from pressure to discomfort.  On  reexamination she reported she felt that she was more anxious with her history of PTSD and requested Tylenol 3 and/or Klonopin.  After reviewing options for treating her chest discomfort is reported with  Vineet at the bedside who later arrived she requested Klonopin which was provided.  Patient typically takes Klonopin 0.5 mg up to three times a day as needed.  She was given fluids because she reports she gets dehydrated after cardiac rehab.    There was no change in her troponin with a normal delta.  She was reassured by her work-up and we agreed that she was stable and safe for discharge home with close outpatient follow-up with her care team.  Patient and spouse was present at the bedside expressed understanding that the cause of her symptoms reported is unclear although I suspect there might be a correlation with her exercise routine cardiac rehab and her comorbidities.  We reviewed worrisome symptoms including reasons to return for reevaluation she expressed comfort, understanding and agreement with plan of care.      Disclaimer: This note consists of symbols derived from keyboarding, dictation and/or voice recognition software. As a result, there may be errors in the script that have gone undetected. Please consider this when interpreting information found in this chart.  I have reviewed the nursing notes.    I have reviewed the findings, diagnosis, plan and need for follow up with the patient.       New Prescriptions    No medications on file       Final diagnoses:   Chest discomfort - After cardiac rehabilitation with extension exercises   History of cardiomyopathy - Non-ischemic   Shortness of breath - After cardiac rehabilitation with extension exercises       1/7/2022   [facility] EMERGENCY DEPT     Robi Issa MD  01/07/22 0574

## 2022-01-10 ENCOUNTER — MYC MEDICAL ADVICE (OUTPATIENT)
Dept: CARDIOLOGY | Facility: CLINIC | Age: 62
End: 2022-01-10
Payer: COMMERCIAL

## 2022-01-10 ENCOUNTER — MYC MEDICAL ADVICE (OUTPATIENT)
Dept: FAMILY MEDICINE | Facility: CLINIC | Age: 62
End: 2022-01-10
Payer: COMMERCIAL

## 2022-01-10 ENCOUNTER — TELEPHONE (OUTPATIENT)
Dept: CARDIOLOGY | Facility: CLINIC | Age: 62
End: 2022-01-10
Payer: COMMERCIAL

## 2022-01-10 DIAGNOSIS — F41.9 ANXIETY: ICD-10-CM

## 2022-01-10 DIAGNOSIS — R91.8 PULMONARY NODULES: Primary | ICD-10-CM

## 2022-01-10 NOTE — TELEPHONE ENCOUNTER
[facility] Call Center    Phone Message    May a detailed message be left on voicemail: yes     Reason for Call: Other: Pt would like a call back asap as she was in the E.R over the weekend and was told to F/U with Dr. Ordonez and discuss the visit and moving forward on what needs to be done     Action Taken: Message routed to:  [facility] ([facility]): Cardio    Travel Screening: Not Applicable

## 2022-01-10 NOTE — TELEPHONE ENCOUNTER
Called and spoke with patient. ER notes reviewed with patient. Patient wants to have Dr. Ordonez ok her going back to cardiac rehab. Will notify provider and contact patient with reply.

## 2022-01-10 NOTE — TELEPHONE ENCOUNTER
Requested Prescriptions   Pending Prescriptions Disp Refills     clonazePAM (KLONOPIN) 0.5 MG tablet [Pharmacy Med Name: CLONAZEPAM 0.5MG TABLETS] 90 tablet      Sig: TAKE 1 TABLET(0.5 MG) BY MOUTH THREE TIMES DAILY AS NEEDED FOR ANXIETY       There is no refill protocol information for this order        Routing refill request to provider for review/approval because:  Drug not on the [facility] refill protocol     Ana Greer RN  [facility]

## 2022-01-11 RX ORDER — CLONAZEPAM 0.5 MG/1
TABLET ORAL
Qty: 90 TABLET | Refills: 1 | Status: SHIPPED | OUTPATIENT
Start: 2022-01-11 | End: 2022-03-15

## 2022-01-12 ENCOUNTER — HOSPITAL ENCOUNTER (OUTPATIENT)
Dept: CARDIAC REHAB | Facility: CLINIC | Age: 62
End: 2022-01-12
Attending: NURSE PRACTITIONER
Payer: COMMERCIAL

## 2022-01-12 PROCEDURE — 93797 PHYS/QHP OP CAR RHAB WO ECG: CPT | Mod: XU

## 2022-01-12 PROCEDURE — 93798 PHYS/QHP OP CAR RHAB W/ECG: CPT

## 2022-01-14 ENCOUNTER — PATIENT OUTREACH (OUTPATIENT)
Dept: GASTROENTEROLOGY | Facility: CLINIC | Age: 62
End: 2022-01-14
Payer: COMMERCIAL

## 2022-01-14 ENCOUNTER — HOSPITAL ENCOUNTER (OUTPATIENT)
Dept: CARDIAC REHAB | Facility: CLINIC | Age: 62
End: 2022-01-14
Attending: NURSE PRACTITIONER
Payer: COMMERCIAL

## 2022-01-14 PROCEDURE — 93798 PHYS/QHP OP CAR RHAB W/ECG: CPT

## 2022-01-14 NOTE — PROGRESS NOTES
Called Joyce to offer her a clinic visit with Dr Souza to discuss her colitis.  Patient states that after acupuncture and BRAT diet for two seeks she is asymptomatic and does not feel that she needs to see a colitis specialist at this time.      She says that she has been slowly introducing foods and she has had no recurrence of symptoms except when she added dairy.  Patient was informed that having a aversion to dairy after a colitis flare is common and it may be  A few months until she is able to eat dairy without experiencing  issues  Such as bloating and diarrhea.     Sent the patient a my chart message with my contact information if her symptoms return

## 2022-01-17 ENCOUNTER — LAB (OUTPATIENT)
Dept: LAB | Facility: CLINIC | Age: 62
End: 2022-01-17
Payer: COMMERCIAL

## 2022-01-17 DIAGNOSIS — I50.42 CHRONIC COMBINED SYSTOLIC AND DIASTOLIC CONGESTIVE HEART FAILURE (H): ICD-10-CM

## 2022-01-17 DIAGNOSIS — Z11.59 ENCOUNTER FOR SCREENING FOR OTHER VIRAL DISEASES: ICD-10-CM

## 2022-01-17 DIAGNOSIS — Z13.220 SCREENING FOR HYPERLIPIDEMIA: ICD-10-CM

## 2022-01-17 PROCEDURE — U0005 INFEC AGEN DETEC AMPLI PROBE: HCPCS

## 2022-01-17 PROCEDURE — U0003 INFECTIOUS AGENT DETECTION BY NUCLEIC ACID (DNA OR RNA); SEVERE ACUTE RESPIRATORY SYNDROME CORONAVIRUS 2 (SARS-COV-2) (CORONAVIRUS DISEASE [COVID-19]), AMPLIFIED PROBE TECHNIQUE, MAKING USE OF HIGH THROUGHPUT TECHNOLOGIES AS DESCRIBED BY CMS-2020-01-R: HCPCS

## 2022-01-18 ENCOUNTER — TELEPHONE (OUTPATIENT)
Dept: CARDIOLOGY | Facility: CLINIC | Age: 62
End: 2022-01-18
Payer: COMMERCIAL

## 2022-01-18 LAB — SARS-COV-2 RNA RESP QL NAA+PROBE: NEGATIVE

## 2022-01-19 ENCOUNTER — APPOINTMENT (OUTPATIENT)
Dept: LAB | Facility: CLINIC | Age: 62
End: 2022-01-19
Attending: INTERNAL MEDICINE
Payer: COMMERCIAL

## 2022-01-19 ENCOUNTER — HOSPITAL ENCOUNTER (OUTPATIENT)
Facility: CLINIC | Age: 62
Discharge: HOME | End: 2022-01-19
Attending: INTERNAL MEDICINE | Admitting: INTERNAL MEDICINE
Payer: COMMERCIAL

## 2022-01-19 ENCOUNTER — APPOINTMENT (OUTPATIENT)
Dept: MEDSURG UNIT | Facility: CLINIC | Age: 62
End: 2022-01-19
Attending: INTERNAL MEDICINE
Payer: COMMERCIAL

## 2022-01-19 ENCOUNTER — APPOINTMENT (OUTPATIENT)
Dept: GENERAL RADIOLOGY | Facility: CLINIC | Age: 62
End: 2022-01-19
Attending: INTERNAL MEDICINE
Payer: COMMERCIAL

## 2022-01-19 VITALS
SYSTOLIC BLOOD PRESSURE: 145 MMHG | WEIGHT: 177 LBS | OXYGEN SATURATION: 98 % | HEART RATE: 64 BPM | TEMPERATURE: 98.1 F | RESPIRATION RATE: 14 BRPM | HEIGHT: 65 IN | DIASTOLIC BLOOD PRESSURE: 79 MMHG | BODY MASS INDEX: 29.49 KG/M2

## 2022-01-19 DIAGNOSIS — I44.2 COMPLETE ATRIOVENTRICULAR BLOCK (H): ICD-10-CM

## 2022-01-19 DIAGNOSIS — R68.89 EXERCISE INTOLERANCE: ICD-10-CM

## 2022-01-19 DIAGNOSIS — Z95.0 CARDIAC PACEMAKER: Primary | ICD-10-CM

## 2022-01-19 DIAGNOSIS — I51.89 LEFT VENTRICULAR DYSFUNCTION WITH REDUCED LEFT VENTRICULAR FUNCTION: ICD-10-CM

## 2022-01-19 DIAGNOSIS — I42.8 NONISCHEMIC CARDIOMYOPATHY (H): ICD-10-CM

## 2022-01-19 LAB
ANION GAP SERPL CALCULATED.3IONS-SCNC: 4 MMOL/L (ref 3–14)
ATRIAL RATE - MUSE: 79 BPM
BUN SERPL-MCNC: 12 MG/DL (ref 7–30)
CALCIUM SERPL-MCNC: 9.4 MG/DL (ref 8.5–10.1)
CHLORIDE BLD-SCNC: 108 MMOL/L (ref 94–109)
CO2 SERPL-SCNC: 26 MMOL/L (ref 20–32)
CREAT SERPL-MCNC: 0.71 MG/DL (ref 0.52–1.04)
DIASTOLIC BLOOD PRESSURE - MUSE: NORMAL MMHG
ERYTHROCYTE [DISTWIDTH] IN BLOOD BY AUTOMATED COUNT: 12 % (ref 10–15)
GFR SERPL CREATININE-BSD FRML MDRD: >90 ML/MIN/1.73M2
GLUCOSE BLD-MCNC: 93 MG/DL (ref 70–99)
HCT VFR BLD AUTO: 41.8 % (ref 35–47)
HGB BLD-MCNC: 13.4 G/DL (ref 11.7–15.7)
INTERPRETATION ECG - MUSE: NORMAL
MCH RBC QN AUTO: 31.2 PG (ref 26.5–33)
MCHC RBC AUTO-ENTMCNC: 32.1 G/DL (ref 31.5–36.5)
MCV RBC AUTO: 97 FL (ref 78–100)
P AXIS - MUSE: 46 DEGREES
PLATELET # BLD AUTO: 192 10E3/UL (ref 150–450)
POTASSIUM BLD-SCNC: 4.1 MMOL/L (ref 3.4–5.3)
PR INTERVAL - MUSE: 160 MS
QRS DURATION - MUSE: 156 MS
QT - MUSE: 448 MS
QTC - MUSE: 513 MS
R AXIS - MUSE: -68 DEGREES
RBC # BLD AUTO: 4.3 10E6/UL (ref 3.8–5.2)
SODIUM SERPL-SCNC: 138 MMOL/L (ref 133–144)
SYSTOLIC BLOOD PRESSURE - MUSE: NORMAL MMHG
T AXIS - MUSE: 73 DEGREES
VENTRICULAR RATE- MUSE: 79 BPM
WBC # BLD AUTO: 4.9 10E3/UL (ref 4–11)

## 2022-01-19 PROCEDURE — 250N000011 HC RX IP 250 OP 636: Performed by: INTERNAL MEDICINE

## 2022-01-19 PROCEDURE — 99152 MOD SED SAME PHYS/QHP 5/>YRS: CPT | Performed by: INTERNAL MEDICINE

## 2022-01-19 PROCEDURE — C1894 INTRO/SHEATH, NON-LASER: HCPCS | Performed by: INTERNAL MEDICINE

## 2022-01-19 PROCEDURE — C1769 GUIDE WIRE: HCPCS | Performed by: INTERNAL MEDICINE

## 2022-01-19 PROCEDURE — 93005 ELECTROCARDIOGRAM TRACING: CPT

## 2022-01-19 PROCEDURE — 33999 UNLISTED PX CARDIAC SURGERY: CPT | Performed by: INTERNAL MEDICINE

## 2022-01-19 PROCEDURE — C2621 PMKR, OTHER THAN SING/DUAL: HCPCS | Performed by: INTERNAL MEDICINE

## 2022-01-19 PROCEDURE — 272N000001 HC OR GENERAL SUPPLY STERILE: Performed by: INTERNAL MEDICINE

## 2022-01-19 PROCEDURE — 80048 BASIC METABOLIC PNL TOTAL CA: CPT | Performed by: INTERNAL MEDICINE

## 2022-01-19 PROCEDURE — 250N000013 HC RX MED GY IP 250 OP 250 PS 637: Performed by: INTERNAL MEDICINE

## 2022-01-19 PROCEDURE — 999N000065 XR CHEST PORT 1 VIEW

## 2022-01-19 PROCEDURE — C1887 CATHETER, GUIDING: HCPCS | Performed by: INTERNAL MEDICINE

## 2022-01-19 PROCEDURE — 93010 ELECTROCARDIOGRAM REPORT: CPT | Mod: 59 | Performed by: INTERNAL MEDICINE

## 2022-01-19 PROCEDURE — 33225 L VENTRIC PACING LEAD ADD-ON: CPT | Performed by: INTERNAL MEDICINE

## 2022-01-19 PROCEDURE — 250N000009 HC RX 250: Performed by: INTERNAL MEDICINE

## 2022-01-19 PROCEDURE — 33229 REMV&REPLC PM GEN MULT LEADS: CPT | Performed by: INTERNAL MEDICINE

## 2022-01-19 PROCEDURE — 36415 COLL VENOUS BLD VENIPUNCTURE: CPT | Performed by: INTERNAL MEDICINE

## 2022-01-19 PROCEDURE — 85027 COMPLETE CBC AUTOMATED: CPT | Performed by: INTERNAL MEDICINE

## 2022-01-19 PROCEDURE — C1898 LEAD, PMKR, OTHER THAN TRANS: HCPCS | Performed by: INTERNAL MEDICINE

## 2022-01-19 PROCEDURE — 99153 MOD SED SAME PHYS/QHP EA: CPT | Performed by: INTERNAL MEDICINE

## 2022-01-19 PROCEDURE — 71045 X-RAY EXAM CHEST 1 VIEW: CPT | Mod: 26 | Performed by: RADIOLOGY

## 2022-01-19 PROCEDURE — 999N000142 HC STATISTIC PROCEDURE PREP ONLY

## 2022-01-19 PROCEDURE — 258N000003 HC RX IP 258 OP 636: Performed by: INTERNAL MEDICINE

## 2022-01-19 PROCEDURE — 999N000134 HC STATISTIC PP CARE STAGE 3

## 2022-01-19 PROCEDURE — 999N000054 HC STATISTIC EKG NON-CHARGEABLE

## 2022-01-19 PROCEDURE — 272N000002 HC OR SUPPLY OTHER OPNP: Performed by: INTERNAL MEDICINE

## 2022-01-19 DEVICE — PCMKR PERCEPTA BIPOLAR CRT-P M: Type: IMPLANTABLE DEVICE | Status: FUNCTIONAL

## 2022-01-19 DEVICE — LEAD PACEMAKER SELECTSECURE 69CM MD  383069: Type: IMPLANTABLE DEVICE | Status: FUNCTIONAL

## 2022-01-19 RX ORDER — LIDOCAINE HYDROCHLORIDE 20 MG/ML
INJECTION, SOLUTION INFILTRATION; PERINEURAL
Status: DISCONTINUED | OUTPATIENT
Start: 2022-01-19 | End: 2022-01-19 | Stop reason: HOSPADM

## 2022-01-19 RX ORDER — ACETAMINOPHEN AND CODEINE PHOSPHATE 300; 30 MG/1; MG/1
1-2 TABLET ORAL EVERY 4 HOURS PRN
Qty: 10 TABLET | Refills: 0 | Status: SHIPPED | OUTPATIENT
Start: 2022-01-19 | End: 2022-03-15

## 2022-01-19 RX ORDER — LIDOCAINE 40 MG/G
CREAM TOPICAL
Status: DISCONTINUED | OUTPATIENT
Start: 2022-01-19 | End: 2022-01-19 | Stop reason: HOSPADM

## 2022-01-19 RX ORDER — IOPAMIDOL 755 MG/ML
INJECTION, SOLUTION INTRAVASCULAR
Status: DISCONTINUED | OUTPATIENT
Start: 2022-01-19 | End: 2022-01-19 | Stop reason: HOSPADM

## 2022-01-19 RX ORDER — FENTANYL CITRATE 50 UG/ML
INJECTION, SOLUTION INTRAMUSCULAR; INTRAVENOUS
Status: DISCONTINUED | OUTPATIENT
Start: 2022-01-19 | End: 2022-01-19 | Stop reason: HOSPADM

## 2022-01-19 RX ORDER — DIPHENHYDRAMINE HCL 25 MG
25 CAPSULE ORAL EVERY 6 HOURS PRN
Status: DISCONTINUED | OUTPATIENT
Start: 2022-01-19 | End: 2022-01-19 | Stop reason: HOSPADM

## 2022-01-19 RX ORDER — ACETAMINOPHEN 325 MG/1
650 TABLET ORAL EVERY 4 HOURS PRN
Status: DISCONTINUED | OUTPATIENT
Start: 2022-01-19 | End: 2022-01-19 | Stop reason: HOSPADM

## 2022-01-19 RX ORDER — SODIUM CHLORIDE 9 MG/ML
INJECTION, SOLUTION INTRAVENOUS CONTINUOUS
Status: DISCONTINUED | OUTPATIENT
Start: 2022-01-19 | End: 2022-01-19 | Stop reason: HOSPADM

## 2022-01-19 RX ORDER — CEPHALEXIN 500 MG/1
500 TABLET ORAL 3 TIMES DAILY
Qty: 15 TABLET | Refills: 0 | Status: SHIPPED | OUTPATIENT
Start: 2022-01-19 | End: 2022-01-24

## 2022-01-19 RX ORDER — NITROGLYCERIN 5 MG/ML
VIAL (ML) INTRAVENOUS
Status: DISCONTINUED | OUTPATIENT
Start: 2022-01-19 | End: 2022-01-19 | Stop reason: HOSPADM

## 2022-01-19 RX ADMIN — DIPHENHYDRAMINE HYDROCHLORIDE 25 MG: 25 CAPSULE ORAL at 14:15

## 2022-01-19 RX ADMIN — VANCOMYCIN HYDROCHLORIDE 1500 MG: 1 INJECTION, POWDER, LYOPHILIZED, FOR SOLUTION INTRAVENOUS at 09:12

## 2022-01-19 RX ADMIN — SODIUM CHLORIDE: 9 INJECTION, SOLUTION INTRAVENOUS at 07:52

## 2022-01-19 ASSESSMENT — MIFFLIN-ST. JEOR: SCORE: 1358.75

## 2022-01-19 NOTE — H&P
Cardiac Electrophysiology H&P      Reason For Presentation: BiV PPM upgrade     HPI:    60 y/o lady presents for a scheduled CRT-P upgrade.    Hx significant for:  PVC ablation -> CHB -> MDT dual chamber PPM  ?PAF on eliquis and metoprolol  NICM - possibly pacer induced (LVEF 40-45%)  COPD  Depression  Anxiety  LARRY on CPAP    Accompanied by her . Her exercise tolerance has been very limited. Discussed the case in great detail with both of them. Answered questions. Confirmed medications. Took eliquis this morning reported for Afib.    Past Medical History:      Past Medical History:   Diagnosis Date     Arthritis      Cardiomyopathy (H)     ff Cardiology     Chronic depressive personality disorder      Congestive heart failure (H) see dr martínez    heart failure correct term     COPD exacerbation (H)      Esophageal reflux      Ex-smoker     quit 2006; 1 PPD x 30     Hyperparathyroidism (H)     s/p parathyroidectomy     Hypothyroidism      LARRY on CPAP     ff Sleep medicine     Pulmonary nodules     ff Pulmonologist     S/P cardiac pacemaker procedure     checked every 6 [facility] the [facility]     Uncomplicated asthma years       Past Surgical  History:      Past Surgical History:   Procedure Laterality Date     BUNIONECTOMY Bilateral      COLONOSCOPY N/A 8/30/2021    Procedure: COLONOSCOPY, WITH POLYPECTOMY AND BIOPSY;  Surgeon: Ranjit Penn MD;  Location:  GI     CV CORONARY ANGIOGRAM N/A 9/26/2019    Procedure: CV CORONARY ANGIOGRAM;  Surgeon: Erickson Trejo MD;  Location:  HEART CARDIAC CATH LAB     CV RIGHT HEART CATH MEASUREMENTS RECORDED N/A 7/20/2020    Procedure: CV RIGHT HEART CATH;  Surgeon: Alonso Ordonez MD;  Location:  HEART CARDIAC CATH LAB     EP ABLATION / EP STUDIES  04/21/2017     EXPLORE NECK N/A 9/19/2018    Procedure: EXPLORE NECK;  Neck Exploration Resection of Left superior Parathyroid gland;  Surgeon: Kristine Venegas MD;  Location:  OR      CORRECT  BUNION,SIMPLE       PARATHYROIDECTOMY N/A 9/19/2018    Procedure: PARATHYROIDECTOMY;;  Surgeon: Kristine Venegas MD;  Location: [facility] OR     [facility] INSERT OF NEW OR REPL W/VENT LEAD  04/21/2017     TONSILLECTOMY & ADENOIDECTOMY         Family History:      Family History   Problem Relation Age of Onset     Hypothyroidism Mother      Hypertension Mother      Osteoarthritis Mother      Coronary Artery Disease Father         nonfatal MI in his 70s     Asthma Father      Diabetes Sister      Hypothyroidism Sister      Breast Cancer Maternal Aunt      Anxiety Disorder Sister        Social History:      Social History     Socioeconomic History     Marital status:      Spouse name: Not on file     Number of children: Not on file     Years of education: Not on file     Highest education level: Master's degree (e.g., MA, MS, Florian, MEd, MSW, SONIA)   Occupational History     Not on file   Tobacco Use     Smoking status: Former Smoker     Packs/day: 0.00     Years: 0.00     Pack years: 0.00     Smokeless tobacco: Never Used   Vaping Use     Vaping Use: Never used   Substance and Sexual Activity     Alcohol use: Yes     Alcohol/week: 0.0 - 8.0 standard drinks     Comment: seldom     Drug use: No     Sexual activity: Yes     Partners: Male     Birth control/protection: None     Comment: vasectomy   Other Topics Concern     Parent/sibling w/ CABG, MI or angioplasty before 65F 55M? No   Social History Narrative    CRNA on disability for vestibular symptoms      Social Determinants of Health     Financial Resource Strain: Low Risk      Difficulty of Paying Living Expenses: Not very hard   Food Insecurity: No Food Insecurity     Worried About Running Out of Food in the Last Year: Never true     Ran Out of Food in the Last Year: Never true   Transportation Needs: No Transportation Needs     Lack of Transportation (Medical): No     Lack of Transportation (Non-Medical): No   Physical Activity: Not on file   Stress: Not on file  "  Social Connections: Not on file   Intimate Partner Violence: Not At Risk     Fear of Current or Ex-Partner: No     Emotionally Abused: No     Physically Abused: No     Sexually Abused: No   Housing Stability: Not on file       ROS:    A complete review of systems is negative except as noted above in the HPI.    Physical Exam:   Vitals: /80 (BP Location: Right arm)   Pulse 112   Temp 98.1  F (36.7  C) (Oral)   Resp 18   Ht 1.651 m (5' 5\")   Wt 80.3 kg (177 lb)   LMP 08/21/2007   SpO2 98%   BMI 29.45 kg/m    Gen: Anxious  Neck: No JVD  Chest: CTAB  Cardiovascular: Tachy 100s, regular, no M/R/G   Abd: soft, NT/ND  Neuro: grossly intact  Extremities: +1 b/l LE edema         Relevant labs, imaging, medications and procedures were reviewed.  WBC 4.9  Hb 13.4  Plts 192  K 4.1  BUN/Cr 12/0.71    TTE: LVEF 40-45%    Assessment and Recommendations:   62 y/o lady presents for a scheduled CRT-P upgrade.    Hx significant for:  PVC ablation -> CHB -> MDT dual chamber PPM  ?PAF on eliquis and metoprolol  NICM - possibly pacer induced (LVEF 40-45%)  HFrEF NYHA III stage C  COPD  Depression  Anxiety  LARRY on CPAP    Had been evaluated as an outpatient and recommended upgrade to CRT-P given concern for pacer induced CMP with ongoing significant HF symptoms ( 98.6%). Informed consent obtained and questions answered. Last dose of eliquis was this morning.    I discussed the patient with Dr. Funes.    Moisés Nelson MD   Cardiac electrophysiology fellow    I very much appreciated the opportunity to see and assess Joyce Marroquin in the hospital with EP Fellow Dr Nelson . I agree with the note above which  summarizes my findings and current recommendations.  Please do not hesitate to contact my office if you have any questions or concerns.      Lino Funes MD  Cardiac Arrhythmia Service  [facility]  [phone]      "

## 2022-01-19 NOTE — Clinical Note
dry, intact, no bleeding and no hematoma. Left upper chest incision covered with dermabond and primapore and pressure drsg

## 2022-01-19 NOTE — DISCHARGE INSTRUCTIONS
Home Care after an ICD implant    Wound care:  Keep your incision (surgery wound) dry for 3 days.  After 3 days, you may remove the outer bandage.  Keep the strips of tape on.  They will be removed at your clinic visit.  Check for signs of infection each day.  These include increased redness, swelling, drainage or a fever over 101 F (38.3 C).  Call us immediately if you see any of these signs.  If there are no signs of infection, you may shower in 3 days.  Do not submerge the incision (in a bath tub, hot tub, or swimming pool) until fully healed.  Pain:   You may have mild to moderate pain for 3 to 5 days.  Take acetaminophen (Tylenol) or ibuprofen (Advil) for the pain.  Call us if the pain is severe or lasts more than 5 days.    Activity:  You should slowly go back to your normal activities after 24 hours.  Healing will take 4 to 6 weeks.  No driving for 3 days  Avoid climbing a ladder alone.  It is best to stay within 4 feet of the ground.  Avoid anything that may cause rough contact or a hard hit to your chest.  This includes football, hockey, and other contact sports.  Do not go swimming or boating alone.      For at least 4 weeks:  Do not raise your affected arm above your shoulder.  Do not use your affected arm to push, pull, or lift anything over 10 pounds.  Avoid repetitive upper body activities for 6 weeks (ie: golf, swimming, and weight lifting)    Follow Up Visits:  Return to the clinic in 7 to 10 days to have your device and wound checked.    Telling others about your device:  Before you have any medical tests or treatments, tell the doctors, dentists, and other care providers about your device.  There are a few tests and treatments that may interfere with your device.  (These include MRI, radiation therapy, electrocautery, and others.)  Your care team may need to take special steps to keep you safe.  Before you leave the hospital, you will receive a temporary ID card.  A permanent card will be mailed  to you about 6 to 8 weeks later.  Always carry the ID card with you.  It has important details about your device.  You should also get a MedicAlert ID.  Please ask us for a MedicAlert brochure, or go to [email or web address].    Safety near electrical equipment:  All of these are safe to use when in good repair:    Microwaves    Radios    Cordless phone    Remote controls    Small electrical tools  Cell phones: Keep cell phones at least 6 inches from your device.  Do not carry the phone in a pocket near your device.  Security winters: It is okay to walk through security winters at the airports and department stores.  Tell airport security that you have a defibrillator.  They should keep the screening wand at least 6 inches from your device.  Full-body scanners are safe.    Avoid the following:    MRI tests in the hospital unless you have a MRI safe defibrillator.    Arc welding, chain saws and high-powered industrial or commercial tools.    Power lines, power plants and large power generators.    Electric body fat scales.    Magnetic mattress pads or pillow.    What to do after a shock from your ICD:  1. Stop what you re doing and rest.  2. If you feel fine before and after the shock, call the device clinic.  3. If you feel unwell or receive more than one shock, call 911 or go to the emergency room.  A shock could mean that your condition has changed and you may need to see a doctor.    Questions?  Please call [facility]    Device Nurse:          Business Hours:  [phone]    After Hours:  [phone]   Choose option 4, then ask for the on-call device nurse at job code [id number].    Your next device clinic appointment is scheduled on:    January 26, 2022 at 9:30 am.                                                 [facility] - [facility] [address]  [address] [place], [place] [id number]

## 2022-01-19 NOTE — PROGRESS NOTES
Pt arrives to 2A in room 12. Pt arrives with , who will transport pt home after procedure. Pt VSS, AOx4. Prep completed, including placement of PIV and labs are complete. No intervention needed on labs. Will continue to assess pt until taken for procedure.

## 2022-01-19 NOTE — PROGRESS NOTES
Patient tolerated recovery stage well. VSS, left chest site clean/dry/intact, no hematoma, and denies pain. Patient tolerated PO food and fluids. Teaching was done and discharge instructions were given. Patient ambulated, voided, and PIV was removed. Patient discharged from the hospital via wheel chair to home with family.

## 2022-01-20 ENCOUNTER — ANCILLARY PROCEDURE (OUTPATIENT)
Dept: CARDIOLOGY | Facility: CLINIC | Age: 62
End: 2022-01-20
Attending: INTERNAL MEDICINE
Payer: COMMERCIAL

## 2022-01-20 ENCOUNTER — TELEPHONE (OUTPATIENT)
Dept: CARDIOLOGY | Facility: CLINIC | Age: 62
End: 2022-01-20

## 2022-01-20 ENCOUNTER — APPOINTMENT (OUTPATIENT)
Dept: GENERAL RADIOLOGY | Facility: CLINIC | Age: 62
End: 2022-01-20
Attending: EMERGENCY MEDICINE
Payer: COMMERCIAL

## 2022-01-20 ENCOUNTER — HOSPITAL ENCOUNTER (OUTPATIENT)
Facility: CLINIC | Age: 62
Setting detail: OBSERVATION
Discharge: HOME | End: 2022-01-20
Attending: EMERGENCY MEDICINE | Admitting: INTERNAL MEDICINE
Payer: COMMERCIAL

## 2022-01-20 ENCOUNTER — APPOINTMENT (OUTPATIENT)
Dept: CT IMAGING | Facility: CLINIC | Age: 62
End: 2022-01-20
Attending: EMERGENCY MEDICINE
Payer: COMMERCIAL

## 2022-01-20 ENCOUNTER — ANCILLARY PROCEDURE (OUTPATIENT)
Dept: CARDIOLOGY | Facility: CLINIC | Age: 62
End: 2022-01-20
Attending: STUDENT IN AN ORGANIZED HEALTH CARE EDUCATION/TRAINING PROGRAM
Payer: COMMERCIAL

## 2022-01-20 VITALS
OXYGEN SATURATION: 96 % | HEART RATE: 73 BPM | RESPIRATION RATE: 13 BRPM | HEIGHT: 65 IN | SYSTOLIC BLOOD PRESSURE: 110 MMHG | TEMPERATURE: 98.2 F | WEIGHT: 188.5 LBS | BODY MASS INDEX: 31.4 KG/M2 | DIASTOLIC BLOOD PRESSURE: 57 MMHG

## 2022-01-20 DIAGNOSIS — R55 SYNCOPE AND COLLAPSE: ICD-10-CM

## 2022-01-20 DIAGNOSIS — I44.2 COMPLETE ATRIOVENTRICULAR BLOCK (H): ICD-10-CM

## 2022-01-20 DIAGNOSIS — Z11.52 ENCOUNTER FOR SCREENING LABORATORY TESTING FOR SEVERE ACUTE RESPIRATORY SYNDROME CORONAVIRUS 2 (SARS-COV-2): ICD-10-CM

## 2022-01-20 LAB
ALBUMIN UR-MCNC: NEGATIVE MG/DL
ANION GAP SERPL CALCULATED.3IONS-SCNC: 6 MMOL/L (ref 3–14)
APPEARANCE UR: CLEAR
ATRIAL RATE - MUSE: 90 BPM
BASOPHILS # BLD AUTO: 0 10E3/UL (ref 0–0.2)
BASOPHILS NFR BLD AUTO: 0 %
BILIRUB UR QL STRIP: NEGATIVE
BUN SERPL-MCNC: 10 MG/DL (ref 7–30)
CALCIUM SERPL-MCNC: 9.2 MG/DL (ref 8.5–10.1)
CHLORIDE BLD-SCNC: 108 MMOL/L (ref 94–109)
CO2 SERPL-SCNC: 24 MMOL/L (ref 20–32)
COLOR UR AUTO: NORMAL
CREAT SERPL-MCNC: 0.62 MG/DL (ref 0.52–1.04)
DIASTOLIC BLOOD PRESSURE - MUSE: NORMAL MMHG
EOSINOPHIL # BLD AUTO: 0.1 10E3/UL (ref 0–0.7)
EOSINOPHIL NFR BLD AUTO: 1 %
ERYTHROCYTE [DISTWIDTH] IN BLOOD BY AUTOMATED COUNT: 12.1 % (ref 10–15)
GFR SERPL CREATININE-BSD FRML MDRD: >90 ML/MIN/1.73M2
GLUCOSE BLD-MCNC: 95 MG/DL (ref 70–99)
GLUCOSE BLDC GLUCOMTR-MCNC: 92 MG/DL (ref 70–99)
GLUCOSE UR STRIP-MCNC: NEGATIVE MG/DL
HCT VFR BLD AUTO: 42.1 % (ref 35–47)
HGB BLD-MCNC: 13.8 G/DL (ref 11.7–15.7)
HGB UR QL STRIP: NEGATIVE
HOLD SPECIMEN: NORMAL
IMM GRANULOCYTES # BLD: 0.1 10E3/UL
IMM GRANULOCYTES NFR BLD: 1 %
INTERPRETATION ECG - MUSE: NORMAL
KETONES UR STRIP-MCNC: NEGATIVE MG/DL
LEUKOCYTE ESTERASE UR QL STRIP: NEGATIVE
LYMPHOCYTES # BLD AUTO: 1 10E3/UL (ref 0.8–5.3)
LYMPHOCYTES NFR BLD AUTO: 10 %
MCH RBC QN AUTO: 31.4 PG (ref 26.5–33)
MCHC RBC AUTO-ENTMCNC: 32.8 G/DL (ref 31.5–36.5)
MCV RBC AUTO: 96 FL (ref 78–100)
MONOCYTES # BLD AUTO: 1 10E3/UL (ref 0–1.3)
MONOCYTES NFR BLD AUTO: 10 %
NEUTROPHILS # BLD AUTO: 7.7 10E3/UL (ref 1.6–8.3)
NEUTROPHILS NFR BLD AUTO: 78 %
NITRATE UR QL: NEGATIVE
NRBC # BLD AUTO: 0 10E3/UL
NRBC BLD AUTO-RTO: 0 /100
P AXIS - MUSE: 34 DEGREES
PH UR STRIP: 6 [PH] (ref 5–7)
PLATELET # BLD AUTO: 176 10E3/UL (ref 150–450)
POTASSIUM BLD-SCNC: 4.5 MMOL/L (ref 3.4–5.3)
PR INTERVAL - MUSE: NORMAL MS
QRS DURATION - MUSE: 100 MS
QT - MUSE: 418 MS
QTC - MUSE: 511 MS
R AXIS - MUSE: -7 DEGREES
RBC # BLD AUTO: 4.4 10E6/UL (ref 3.8–5.2)
RBC URINE: 1 /HPF
SARS-COV-2 RNA RESP QL NAA+PROBE: NEGATIVE
SODIUM SERPL-SCNC: 138 MMOL/L (ref 133–144)
SP GR UR STRIP: 1 (ref 1–1.03)
SQUAMOUS EPITHELIAL: 1 /HPF
SYSTOLIC BLOOD PRESSURE - MUSE: NORMAL MMHG
T AXIS - MUSE: -89 DEGREES
TRANSITIONAL EPI: <1 /HPF
UROBILINOGEN UR STRIP-MCNC: NORMAL MG/DL
VENTRICULAR RATE- MUSE: 90 BPM
WBC # BLD AUTO: 9.9 10E3/UL (ref 4–11)
WBC URINE: 1 /HPF

## 2022-01-20 PROCEDURE — 81001 URINALYSIS AUTO W/SCOPE: CPT | Performed by: EMERGENCY MEDICINE

## 2022-01-20 PROCEDURE — 99207 CARDIAC DEVICE CHECK - REMOTE: CPT | Performed by: INTERNAL MEDICINE

## 2022-01-20 PROCEDURE — 85025 COMPLETE CBC W/AUTO DIFF WBC: CPT | Performed by: EMERGENCY MEDICINE

## 2022-01-20 PROCEDURE — 93281 PM DEVICE PROGR EVAL MULTI: CPT

## 2022-01-20 PROCEDURE — 99285 EMERGENCY DEPT VISIT HI MDM: CPT | Mod: 25

## 2022-01-20 PROCEDURE — C9803 HOPD COVID-19 SPEC COLLECT: HCPCS

## 2022-01-20 PROCEDURE — G0378 HOSPITAL OBSERVATION PER HR: HCPCS

## 2022-01-20 PROCEDURE — 93296 REM INTERROG EVL PM/IDS: CPT

## 2022-01-20 PROCEDURE — 36415 COLL VENOUS BLD VENIPUNCTURE: CPT | Performed by: EMERGENCY MEDICINE

## 2022-01-20 PROCEDURE — 72125 CT NECK SPINE W/O DYE: CPT | Mod: 26 | Performed by: RADIOLOGY

## 2022-01-20 PROCEDURE — 72125 CT NECK SPINE W/O DYE: CPT

## 2022-01-20 PROCEDURE — 71045 X-RAY EXAM CHEST 1 VIEW: CPT | Mod: 26 | Performed by: RADIOLOGY

## 2022-01-20 PROCEDURE — 80048 BASIC METABOLIC PNL TOTAL CA: CPT | Performed by: EMERGENCY MEDICINE

## 2022-01-20 PROCEDURE — 71045 X-RAY EXAM CHEST 1 VIEW: CPT

## 2022-01-20 PROCEDURE — 70450 CT HEAD/BRAIN W/O DYE: CPT

## 2022-01-20 PROCEDURE — 93005 ELECTROCARDIOGRAM TRACING: CPT

## 2022-01-20 PROCEDURE — U0005 INFEC AGEN DETEC AMPLI PROBE: HCPCS | Performed by: EMERGENCY MEDICINE

## 2022-01-20 PROCEDURE — 70450 CT HEAD/BRAIN W/O DYE: CPT | Mod: 26 | Performed by: RADIOLOGY

## 2022-01-20 PROCEDURE — 999N000054 HC STATISTIC EKG NON-CHARGEABLE

## 2022-01-20 PROCEDURE — 99236 HOSP IP/OBS SAME DATE HI 85: CPT | Mod: 25 | Performed by: INTERNAL MEDICINE

## 2022-01-20 PROCEDURE — 250N000013 HC RX MED GY IP 250 OP 250 PS 637: Performed by: STUDENT IN AN ORGANIZED HEALTH CARE EDUCATION/TRAINING PROGRAM

## 2022-01-20 PROCEDURE — 93010 ELECTROCARDIOGRAM REPORT: CPT | Performed by: EMERGENCY MEDICINE

## 2022-01-20 PROCEDURE — 250N000013 HC RX MED GY IP 250 OP 250 PS 637: Performed by: EMERGENCY MEDICINE

## 2022-01-20 PROCEDURE — 93281 PM DEVICE PROGR EVAL MULTI: CPT | Performed by: INTERNAL MEDICINE

## 2022-01-20 PROCEDURE — 99285 EMERGENCY DEPT VISIT HI MDM: CPT | Mod: 25 | Performed by: EMERGENCY MEDICINE

## 2022-01-20 RX ORDER — LEVOTHYROXINE SODIUM 75 UG/1
150 TABLET ORAL ONCE
Status: COMPLETED | OUTPATIENT
Start: 2022-01-20 | End: 2022-01-20

## 2022-01-20 RX ORDER — SPIRONOLACTONE 25 MG
12.5 TABLET ORAL DAILY
Status: DISCONTINUED | OUTPATIENT
Start: 2022-01-20 | End: 2022-01-20 | Stop reason: HOSPADM

## 2022-01-20 RX ORDER — METOPROLOL SUCCINATE 25 MG/1
25 TABLET, EXTENDED RELEASE ORAL DAILY
Status: DISCONTINUED | OUTPATIENT
Start: 2022-01-20 | End: 2022-01-20

## 2022-01-20 RX ORDER — VITAMIN B COMPLEX
50 TABLET ORAL DAILY
Status: DISCONTINUED | OUTPATIENT
Start: 2022-01-20 | End: 2022-01-20 | Stop reason: HOSPADM

## 2022-01-20 RX ORDER — CEPHALEXIN 500 MG/1
500 CAPSULE ORAL 3 TIMES DAILY
Status: DISCONTINUED | OUTPATIENT
Start: 2022-01-20 | End: 2022-01-20 | Stop reason: HOSPADM

## 2022-01-20 RX ORDER — BUDESONIDE 3 MG/1
9 CAPSULE, COATED PELLETS ORAL EVERY MORNING
Status: DISCONTINUED | OUTPATIENT
Start: 2022-01-20 | End: 2022-01-20 | Stop reason: HOSPADM

## 2022-01-20 RX ORDER — ACETAMINOPHEN 325 MG/1
975 TABLET ORAL EVERY 8 HOURS PRN
Status: DISCONTINUED | OUTPATIENT
Start: 2022-01-20 | End: 2022-01-20 | Stop reason: HOSPADM

## 2022-01-20 RX ORDER — ACETAMINOPHEN 325 MG/1
650 TABLET ORAL ONCE
Status: COMPLETED | OUTPATIENT
Start: 2022-01-20 | End: 2022-01-20

## 2022-01-20 RX ADMIN — FLUTICASONE FUROATE AND VILANTEROL TRIFENATATE 1 PUFF: 200; 25 POWDER RESPIRATORY (INHALATION) at 08:31

## 2022-01-20 RX ADMIN — METOPROLOL SUCCINATE 12.5 MG: 25 TABLET, FILM COATED, EXTENDED RELEASE ORAL at 08:31

## 2022-01-20 RX ADMIN — CEPHALEXIN 500 MG: 500 CAPSULE ORAL at 08:31

## 2022-01-20 RX ADMIN — LEVOTHYROXINE SODIUM 150 MCG: 75 TABLET ORAL at 08:31

## 2022-01-20 RX ADMIN — ACETAMINOPHEN 650 MG: 325 TABLET, FILM COATED ORAL at 03:46

## 2022-01-20 RX ADMIN — ACETAMINOPHEN 975 MG: 325 TABLET, FILM COATED ORAL at 11:39

## 2022-01-20 RX ADMIN — DIVALPROEX SODIUM 12.5 MG: 500 TABLET, FILM COATED, EXTENDED RELEASE ORAL at 08:31

## 2022-01-20 RX ADMIN — APIXABAN 5 MG: 5 TABLET, FILM COATED ORAL at 08:31

## 2022-01-20 ASSESSMENT — ENCOUNTER SYMPTOMS
CONFUSION: 1
LIGHT-HEADEDNESS: 1
HEADACHES: 0
SHORTNESS OF BREATH: 0
NAUSEA: 1
ABDOMINAL PAIN: 0
NERVOUS/ANXIOUS: 1
WEAKNESS: 0
SEIZURES: 0
DIZZINESS: 1
DIARRHEA: 0
NECK PAIN: 1
VOMITING: 0
PALPITATIONS: 0
FEVER: 0
NUMBNESS: 0

## 2022-01-20 ASSESSMENT — MIFFLIN-ST. JEOR: SCORE: 1410.91

## 2022-01-20 NOTE — ED NOTES
"Unable to perform orthostatic blood pressure and pulse. Patient reports she is feeling \"much worse\", stating she is \"more dizzy\"  and refuses to change positions at this time. MD notified.   "

## 2022-01-20 NOTE — DISCHARGE SUMMARY
[address] [address]  p: [phone]    Discharge Summary: Cardiology Service    Joyce Marroquin MRN# [id number]   YOB: 1960 Age: 61 year old       Admission Date: 1/20/2022  Discharge Date: 01/20/22    Discharge Diagnoses:  1. Syncope   2. Paroxsymal atrial fibrillation   3. NICM s/p CRT-d upgrade   4. PVC ablation c/b CHB s/p PPM (2017)      Brief HPI:  Joyce Marroquin is 60 y/o lady s/p CRT-P upgrade with L septal lead on 1/19/22 who was admitted overnight for further evaluation of syncope. Other PMHx significant for PVC ablation c/b CHB s/p dual chamber PPM (2017), pAF on Eliquis, NICM likely pacer induced (LVEF 40-45%), COPD, depression, anxiety, and LARRY.     States she went home around 5pm day of admission as she felt ready. Her  walked behind her up the stairs to her house to ensure she wouldn't fall. Had dinner, felt some pocket pain so took Tylenol and then felt anxious so she also took her klonopin. Got up to use the restroom, was washing her hands afterwards and felt lightheaded so she called for her . Her helped her to the edge of her bed and she states he had told her she passed out for about 15 seconds. No apparent injuries. Called 911 and brought her to the hospital. Since admission, she states she still feels somewhat groggy. She doesn't remember anything else from the event last night.    Hospital Course by Diagnosis:  #Syncope  #Paroxysmal atrial fibrillation   #PVC ablation c/b CHB s/p dual chamber PPM (2017)  #NICM (LVEF 40-45%) s/p CRT-P upgrade on 1/19/22  Hemodynamically stable. EKG reveals normal PPM function. Good result with left septal lead placement. Device interrogation shows normal device function. Telemetry unremarkable. Her syncopal episode was likely multifactorial: had received fentanyl 400mcg and versed 8mg during the procedure, also took klonopin at home, had just used the restroom, likely  dehydrated post procedurally exacerbating any aspect of orthostatic hypotension and vasovagal syncope. Device interrogation showed normal device function.    - OK to attend cardiac rehab on 1/24 with arm restrictions  - Encourage continued oral intake   - Follow-up with PCP in 1 week, would recommend medication reconciliation    - Follow-up with device clinic in 1 week for incision check     Pertinent Procedures:  1. None     Consults:  - EP     Medication Changes:  -None     Discharge medications:   Current Discharge Medication List      CONTINUE these medications which have NOT CHANGED    Details   acetaminophen (TYLENOL) 500 MG tablet Take 500-1,000 mg by mouth every 6 hours as needed for mild pain      acetaminophen-codeine (TYLENOL W/CODEINE #3) 300-30 MG per tablet Take 1-2 tablets by mouth every 4 hours as needed for moderate to severe pain  Qty: 10 tablet, Refills: 0    Associated Diagnoses: Cardiac pacemaker      albuterol (PROAIR HFA/PROVENTIL HFA/VENTOLIN HFA) 108 (90 BASE) MCG/ACT Inhaler Inhale 2 puffs into the lungs as needed for shortness of breath / dyspnea or wheezing       azelastine (ASTELIN) 0.1 % nasal spray Spray 1 spray into both nostrils 2 times daily as needed       budesonide (ENTOCORT EC) 3 MG EC capsule Take 3 capsules (9 mg) by mouth every morning  Qty: 168 capsule, Refills: 0    Associated Diagnoses: Microscopic colitis, unspecified microscopic colitis type      Calcium Lactate 500 MG CAPS Take 250-500 mg by mouth daily       cephalexin 500 MG tablet Take 500 mg by mouth 3 times daily for 5 days  Qty: 15 tablet, Refills: 0    Associated Diagnoses: Cardiac pacemaker      clonazePAM (KLONOPIN) 0.5 MG tablet TAKE 1 TABLET(0.5 MG) BY MOUTH THREE TIMES DAILY AS NEEDED FOR ANXIETY  Qty: 90 tablet, Refills: 1    Associated Diagnoses: Anxiety      DiphenhydrAMINE HCl (BENADRYL PO) Take 25 mg by mouth nightly as needed for allergies       ELIQUIS ANTICOAGULANT 5 MG tablet TAKE 1 TABLET(5 MG) BY  MOUTH TWICE DAILY  Qty: 180 tablet, Refills: 3    Associated Diagnoses: Paroxysmal atrial fibrillation (H)      Ferrous Sulfate (IRON SUPPLEMENT PO) Take 130 mg by mouth daily       FLUoxetine (PROZAC) 10 MG tablet Take 1 tablet (10 mg) by mouth daily for 7 days, THEN 2 tablets (20 mg) daily. (can be increased by 10 mg weekly up to 50 mg daily if tolerated) Let me know how it goes.  Qty: 67 tablet, Refills: 0    Associated Diagnoses: Situational anxiety; Cardiomyopathy, unspecified type (H); Adjustment disorder with mixed anxiety and depressed mood      fluticasone-salmeterol (ADVAIR) 250-50 MCG/DOSE inhaler Inhale 1 puff into the lungs every 12 hours      furosemide (LASIX) 20 MG tablet Take 1 tablet, no more than twice a week.  Call with weight gain.  Qty: 180 tablet, Refills: 1    Associated Diagnoses: Cardiomyopathy, unspecified type (H)      ipratropium (ATROVENT) 0.06 % nasal spray Spray 2 sprays into both nostrils 4 times daily as needed       loperamide (IMODIUM A-D) 2 MG tablet Take 2 tabs (4 mg) after first loose stool, and then take one tab (2 mg) after each diarrheal stool.  Max of 8 tabs (16 mg) per day.  Qty: 1 tablet, Refills: 0    Associated Diagnoses: Collagenous colitis      MAGNESIUM LACTATE PO Take  mg by mouth nightly as needed     Associated Diagnoses: Fatigue, unspecified type      MELATONIN PO Take 1-3 mg by mouth nightly as needed       Menaquinone-7 (VITAMIN K2 PO) Take 1 tablet by mouth every morning     Associated Diagnoses: H/O pericarditis      METHYLPREDNISOLONE PO Take 40 mg by mouth as needed       metoprolol succinate ER (TOPROL XL) 25 MG 24 hr tablet Take 1 tablet (25 mg) by mouth daily  Qty: 45 tablet, Refills: 3    Associated Diagnoses: Benign essential hypertension      Multiple Vitamin (DAILY MULTIVITAMIN PO) Take 1 tablet by mouth every morning       nebulizer nebulization as needed    Associated Diagnoses: H/O pericarditis      nefazodone (SERZONE) 100 MG tablet Take 1  tablet (100 mg) by mouth 2 times daily  Qty: 60 tablet, Refills: 0    Associated Diagnoses: Adjustment disorder with mixed anxiety and depressed mood      Probiotic Product (PROBIOTIC DAILY PO) Take 1 capsule by mouth 2 times daily as needed       spironolactone (ALDACTONE) 25 MG tablet Take 1/2 tablet daily  Qty: 90 tablet, Refills: 3    Associated Diagnoses: Heart failure with preserved ejection fraction, borderline, class III (H)      SYNTHROID 150 MCG tablet TAKE ONE TABLET BY MOUTH SIX DAYS A WEEK; TAKE ONE-HALF TABLET ONE DAY A WEEK  Qty: 90 tablet, Refills: 3    Associated Diagnoses: Postablative hypothyroidism      TURMERIC PO Take 1 tablet by mouth every morning     Associated Diagnoses: Fatigue, unspecified type      vitamin B complex with vitamin C (VITAMIN  B COMPLEX) PO tablet Take 1 tablet by mouth as needed      vitamin D3 (CHOLECALCIFEROL) 2000 units tablet Take 1 tablet by mouth daily  Qty: 1 tablet, Refills: 0    Associated Diagnoses: Vitamin D deficiency      Zinc 15 MG CAPS Take 15 mg by mouth              Follow-up:  - Device clinic for incision check in 1 week   - PCP in 1 week     Labs or imaging requiring follow-up after discharge:  - None     Code status:  Full     Condition on discharge  Temp:  [98.2  F (36.8  C)] 98.2  F (36.8  C)  Pulse:  [60-85] 79  Resp:  [9-21] 15  BP: ()/(51-85) 107/75  SpO2:  [93 %-100 %] 95 %  General: Alert, interactive, NAD  Eyes: sclera anicteric, EOMI  Neck: JVP 0, carotid 2+ bilaterally  Cardiovascular: regular rate and rhythm, normal S1 and S2, no murmurs, gallops, or rubs  Resp: clear to auscultation bilaterally, no rales, wheezes, or rhonchi  GI: Soft, nontender, nondistended. +BS.  No HSM or masses, no rebound or guarding.  Extremities: no edema, no cyanosis or clubbing, dorsalis pedis and posterior tibialis pulses 2+ bilaterally  Skin: Warm and dry, no jaundice or rash  Neuro: CN 2-12 intact, moves all extremities equally  Psych: Alert & oriented x  3    Imaging with results:  Device interrogation: 22  Remote pacemaker transmission received and reviewed. Device transmission sent per MD orders, from the [facility] ER. Pt had pacemaker upgrade today to CRT-P.     Device: Medtronic [id number] Percepta CRT-P  Normal Device Function.  Mode: DDDR  bpm  AP: 43.2%  BVP: 95.2%  VSR pacin.7%  Presenting EGM: AS/BVP @ 72 bpm  Short V-V intervals: 0  No arrhythmias recorded  ER RN Notified of Transmission Results.     Plan: RTC in 1 week for incision check  JOHNNIE Danielson RN       Patient Care Team:  Ce Crowe NP as PCP - General (Nurse Practitioner - Family)  Yuliet Mclean as Garland Velez MD as MD (Internal Medicine)  Adelaida De La Torre, SHAYNA CNP as Nurse Practitioner (Nurse Practitioner)  Lino Funes MD as MD (Clinical Cardiac Electrophysiology)  Mame Valadez, RN as Specialty Care Coordinator (Cardiology)  Garland Patel MD as Assigned Endocrinology Provider  Karen Peralta [facility] as Pharmacist (Pharmacist Ambulatory Care)  Ce Crowe NP as Assigned PCP  Matthias García RN as Specialty Care Coordinator (Cardiology)  Renetta Pugh MD as MD (Gastroenterology)  Fiorella Swanson, SHAYNA CNP as Assigned Heart and Vascular Provider  Ranjit Penn MD as Assigned Gastroenterology Provider  Ranjit Penn MD as MD (Gastroenterology)  Lisa Medrano [facility] as Pharmacist (Pharmacist Ambulatory Care)  David Forrester as Assigned Behavioral Health Provider  Loreto Colby [facility] as Pharmacist (Pharmacist)    Myra Leos DNP, APRN, CNP  [facility] Cardiology  [phone]

## 2022-01-20 NOTE — H&P
[facility]    Cardiology History and Physical - Cardiology 1       Date of Admission:  1/20/2022    Assessment & Plan: HVSL    Joyce Marroquin is a 61 year old female with history of NICM, HFrecoveredEF (LVEF 40-45%), pAfib on eliquis, CHB s/p PPM with upgrade to CRT-P 1/19/2022 who presents with syncope and is admitted to observation for further monitoring.     Syncope  Suspect vasovagal as occurred after micturition and in setting of polypharmacy. Potential component of orthostatic given mild dehydration. VSS upon arrival. Recent CRT-P upgrade yesterday; EKG with narrow complex rhythm with rates in 90s similar to post-procedure. CXR with leads in expected placement without obvious abnormalities; less likely cardiac but will r/o. Will hold on obtaining TTE at this time as unlikely to be of benefit given recent study. No hx of seizures, hgb stable. CT head/neck negative.  - orthostatic vitals  - PO hydration  - hold further benzos/narcotics/sedating medications  - telemetry monitoring   - EP consult placed to review given recent device upgrade  - Device interrogation as unable to remotely interrogate for first 24 hours     NICM with HFrecoveredEF (LVEF 40-45%)  PVC ablation c/b CHB s/p PPM with upgrade to CRT-P 1/19/2022  Paroxysmal Afib  Follows with Dr. Ordonez who recommended CRT-P upgrade due to LBBB with  ms and ~100% v-paced. There was also concern her NICM was potentially pacer related.   - cont PTA cephalexin, eliquis, Toprol XL 12.5 mg daily, spironolactone,     CHRONIC PROBLEMS  COPD- albuterol prn  Depression/Anxiety - hold PTA tylenol/codeine, benadryl, fluoxetine, nefazodone  LARRY on CPAP  Hypothyroxine - cont PTA synthroid        Diet: regular diet  DVT Prophylaxis: eliquis  Huddleston Catheter: Not present  Code Status: FULL  Fluids: PO  Lines: PIV     Disposition Plan   Expected discharge: Tomorrow, recommended to prior living arrangement once workup  "complete and patient back to baseline.    Entered: Renetta Jenkins MD 01/20/2022, 3:51 AM     The patient's care was discussed with the overnight fellow, Dr. Romo. Patient to be formally staffed in the AM.     Renetta Jenkins MD  [facility]    ______________________________________________________________________    Chief Complaint   syncope    History is obtained from the patient    History of Present Illness   Joyce Marroquin is a 61 year old female who had CRT-P upgrade 1/19/2022. Procedure went well without complications. She received a total of 400 mcg fentanyl, 25 mg benadryl, and 8 mg versed intra-op. She was monitored until 430 due to \"feeling confused and doped up\". She went home and was feeling OK except for pain around her pacer insertion site so she took 2 tablets of tylenol #3 and 1 tab of klonopin as she felt like she was starting to have a panic attack. Approx. 30 minutes afterwards, she states she was drowsy went to the bathroom and as she was washing her hands she started to feel dizziness/lightheaded with unsteadiness on her feet and notified her  she felt like she was going to faint. She walked into the bedroom where she lost consciousness and she fell onto bed/floor?. She does think she hit her head but she doesn't know where. She said her  told her she was \"out\" for ~ 15 seconds. She had some chest/throat tightness after she awoke but she relates this to her symptom of anxiety.     She stated multiple times through the interview that she was \"confused\" however she was oriented and recalled a similar story to what is documented by ER doc. NO e/o confusion on my exam.    In the ER, VSS upon arrival. Labs all wnl. CT head/neck negative. Given tylenol for slight headache.     Review of Systems    The 10 point Review of Systems is negative other than noted in the HPI or here.     Past Medical History    I have reviewed this patient's medical " history and updated it with pertinent information if needed.   Past Medical History:   Diagnosis Date     Arthritis      Cardiomyopathy (H)     ff Cardiology     Chronic depressive personality disorder      Congestive heart failure (H) see dr martínez    heart failure correct term     COPD exacerbation (H)      Esophageal reflux      Ex-smoker     quit 2006; 1 PPD x 30     Hyperparathyroidism (H)     s/p parathyroidectomy     Hypothyroidism      LARRY on CPAP     ff Sleep medicine     Pulmonary nodules     ff Pulmonologist     S/P cardiac pacemaker procedure     checked every 6 months at the [facility]     Uncomplicated asthma years       Past Surgical History   I have reviewed this patient's surgical history and updated it with pertinent information if needed.  Past Surgical History:   Procedure Laterality Date     BUNIONECTOMY Bilateral      COLONOSCOPY N/A 8/30/2021    Procedure: COLONOSCOPY, WITH POLYPECTOMY AND BIOPSY;  Surgeon: Ranjit Penn MD;  Location:  GI     CV CORONARY ANGIOGRAM N/A 9/26/2019    Procedure: CV CORONARY ANGIOGRAM;  Surgeon: Ericksno Trejo MD;  Location:  HEART CARDIAC CATH LAB     CV RIGHT HEART CATH MEASUREMENTS RECORDED N/A 7/20/2020    Procedure: CV RIGHT HEART CATH;  Surgeon: Alonso Ordonez MD;  Location:  HEART CARDIAC CATH LAB     EP ABLATION / EP STUDIES  04/21/2017     EXPLORE NECK N/A 9/19/2018    Procedure: EXPLORE NECK;  Neck Exploration Resection of Left superior Parathyroid gland;  Surgeon: Kristine Venegas MD;  Location: [facility] OR      CORRECT BUNION,SIMPLE       PARATHYROIDECTOMY N/A 9/19/2018    Procedure: PARATHYROIDECTOMY;;  Surgeon: Kristine Venegas MD;  Location: [facility] OR     PPM INSERT OF NEW OR REPL W/VENT LEAD  04/21/2017     TONSILLECTOMY & ADENOIDECTOMY         Social History   I have reviewed this patient's social history and updated it with pertinent information if needed.  Social History     Tobacco Use     Smoking status: Former Smoker      Packs/day: 0.00     Years: 0.00     Pack years: 0.00     Smokeless tobacco: Never Used   Vaping Use     Vaping Use: Never used   Substance Use Topics     Alcohol use: Yes     Alcohol/week: 0.0 - 8.0 standard drinks     Comment: seldom     Drug use: No     Family History   I have reviewed this patient's family history and updated it with pertinent information if needed.   I have reviewed this patient's family history and updated it with pertinent information if needed.  Family History   Problem Relation Age of Onset     Hypothyroidism Mother      Hypertension Mother      Osteoarthritis Mother      Coronary Artery Disease Father         nonfatal MI in his 70s     Asthma Father      Diabetes Sister      Hypothyroidism Sister      Breast Cancer Maternal Aunt      Anxiety Disorder Sister        Prior to Admission Medications   Prior to Admission Medications   Prescriptions Last Dose Informant Patient Reported? Taking?   Calcium Lactate 500 MG CAPS   Yes No   Sig: Take 250-500 mg by mouth daily    DiphenhydrAMINE HCl (BENADRYL PO)   Yes No   Sig: Take 25 mg by mouth nightly as needed for allergies    ELIQUIS ANTICOAGULANT 5 MG tablet   No No   Sig: TAKE 1 TABLET(5 MG) BY MOUTH TWICE DAILY   FLUoxetine (PROZAC) 10 MG tablet   No No   Sig: Take 1 tablet (10 mg) by mouth daily for 7 days, THEN 2 tablets (20 mg) daily. (can be increased by 10 mg weekly up to 50 mg daily if tolerated) Let me know how it goes.   Patient not taking: Reported on 1/4/2022   Ferrous Sulfate (IRON SUPPLEMENT PO)   Yes No   Sig: Take 130 mg by mouth daily    MAGNESIUM LACTATE PO   Yes No   Sig: Take  mg by mouth nightly as needed    MELATONIN PO   Yes No   Sig: Take 1-3 mg by mouth nightly as needed    METHYLPREDNISOLONE PO   Yes No   Sig: Take 40 mg by mouth as needed    Menaquinone-7 (VITAMIN K2 PO)   Yes No   Sig: Take 1 tablet by mouth every morning    Multiple Vitamin (DAILY MULTIVITAMIN PO)   Yes No   Sig: Take 1 tablet by mouth every  morning    Probiotic Product (PROBIOTIC DAILY PO)   Yes No   Sig: Take 1 capsule by mouth 2 times daily as needed    SYNTHROID 150 MCG tablet   No No   Sig: TAKE ONE TABLET BY MOUTH SIX DAYS A WEEK; TAKE ONE-HALF TABLET ONE DAY A WEEK   TURMERIC PO   Yes No   Sig: Take 1 tablet by mouth every morning    Zinc 15 MG CAPS   Yes No   Sig: Take 15 mg by mouth    acetaminophen (TYLENOL) 500 MG tablet   Yes No   Sig: Take 500-1,000 mg by mouth every 6 hours as needed for mild pain   acetaminophen-codeine (TYLENOL W/CODEINE #3) 300-30 MG per tablet   No No   Sig: Take 1-2 tablets by mouth every 4 hours as needed for moderate to severe pain   albuterol (PROAIR HFA/PROVENTIL HFA/VENTOLIN HFA) 108 (90 BASE) MCG/ACT Inhaler   Yes No   Sig: Inhale 2 puffs into the lungs as needed for shortness of breath / dyspnea or wheezing    azelastine (ASTELIN) 0.1 % nasal spray   Yes No   Sig: Spray 1 spray into both nostrils 2 times daily as needed    budesonide (ENTOCORT EC) 3 MG EC capsule   No No   Sig: Take 3 capsules (9 mg) by mouth every morning   cephalexin 500 MG tablet   No No   Sig: Take 500 mg by mouth 3 times daily for 5 days   clonazePAM (KLONOPIN) 0.5 MG tablet   No No   Sig: TAKE 1 TABLET(0.5 MG) BY MOUTH THREE TIMES DAILY AS NEEDED FOR ANXIETY   fluticasone-salmeterol (ADVAIR) 250-50 MCG/DOSE inhaler   Yes No   Sig: Inhale 1 puff into the lungs every 12 hours   furosemide (LASIX) 20 MG tablet   Yes No   Sig: Take 1 tablet, no more than twice a week.  Call with weight gain.   ipratropium (ATROVENT) 0.06 % nasal spray   Yes No   Sig: Spray 2 sprays into both nostrils 4 times daily as needed    loperamide (IMODIUM A-D) 2 MG tablet   No No   Sig: Take 2 tabs (4 mg) after first loose stool, and then take one tab (2 mg) after each diarrheal stool.  Max of 8 tabs (16 mg) per day.   metoprolol succinate ER (TOPROL XL) 25 MG 24 hr tablet   No No   Sig: Take 1 tablet (25 mg) by mouth daily   Patient taking differently: Take 25 mg by  mouth daily Taking 1/2 tablet daily.   nebulizer nebulization   Yes No   Sig: as needed   nefazodone (SERZONE) 100 MG tablet   No No   Sig: Take 1 tablet (100 mg) by mouth 2 times daily   Patient not taking: Reported on 1/4/2022   spironolactone (ALDACTONE) 25 MG tablet   Yes No   Sig: Take 1/2 tablet daily   vitamin B complex with vitamin C (VITAMIN  B COMPLEX) PO tablet   Yes No   Sig: Take 1 tablet by mouth as needed   vitamin D3 (CHOLECALCIFEROL) 2000 units tablet   No No   Sig: Take 1 tablet by mouth daily      Facility-Administered Medications: None     Allergies   Allergies   Allergen Reactions     Tetracycline Swelling     Zofran [Ondansetron]      Prolonged QT  Prolonged QT at baseline per patient     Flagyl [Metronidazole] Rash     Buspar [Buspirone]      Muscle weakness     Corn Oil Other (See Comments)     Headache       Cymbalta Diarrhea     Seasonal Allergies Difficulty breathing     Sulfamethoxazole-Trimethoprim      Other reaction(s): Other  Passed out     Cyclobenzaprine Other (See Comments)     Extreme heat intolerance     Levaquin [Levofloxacin]      Other reaction(s): Other  Tendon rupture     Nsaids Other (See Comments)     Exacerbated asthma       Physical Exam   Vital Signs: Temp: 98.2  F (36.8  C) Temp src: Oral BP: 102/68 Pulse: 77   Resp: 20 SpO2: 95 % O2 Device: None (Room air)    Weight: 188 lbs 8 oz     General: appears comfortable, alert and oriented  Head: normocephalic, atraumatic  Eyes: EOMI , PERRL  Heart: regular, S1/S2, no murmurs, rubs or gallop. Estimated JVP   Lungs: CTAB, No wheezing.   Abdomen: soft, non-tender, bowel sounds present  Extremities: No LE edema   Skin: L upper chest pacer insertion site with bandage overlying, two small areas of bleeding, not overly TTP  Neuro: grossly non-focal    Data   Data reviewed today: I reviewed all medications, new labs and imaging results over the last 24 hours. I personally reviewed EKG, CXR, labs    Recent Labs   Lab 01/20/22  [id number]  01/20/22  0117 01/19/22  0651   WBC  --  9.9 4.9   HGB  --  13.8 13.4   MCV  --  96 97   PLT  --  176 192   NA  --  138 138   POTASSIUM  --  4.5 4.1   CHLORIDE  --  108 108   CO2  --  24 26   BUN  --  10 12   CR  --  0.62 0.71   ANIONGAP  --  6 4   MANJU  --  9.2 9.4   GLC 92 95 93

## 2022-01-20 NOTE — ED TRIAGE NOTES
Patient arrives to ED via EMS c/o syncopal episode.Per pt's  pt hit her head. Patient is on eliquis. Patient states she became dizzy after using the restroom and was helped back to her bedroom by her . She states the next thing she remembers was waking up on the floor. She states she had a pacemaker placed 1/19/22 and was discharged same-day. Patient reports baseline confusion. She reports nausea, denies vomiting.

## 2022-01-20 NOTE — ED PROVIDER NOTES
[facility] ([facility])  1/20/22    History     Chief Complaint   Patient presents with     Fall     HPI  Joyce Marroquin is a 61 year old female with PMH significant for s/p PPM placement with scheduled CRT-P upgrade earlier today, PAF on Eliquis, NICM possibly pacer induced (LVEF 40-45%), COPD, depression, anxiety, and LARRY who presents to the Emergency Department for evaluation of a fall.  Patient reports that she had pacemaker revised at 4:30 PM and following this was at home with her  when she became dizzy after going to the bathroom.  She states that her  then helped her to the bed where she began to crawl in on her side.  Patient states that she felt lightheaded the entire time. Patient reports that getting into bed is the last thing she remembers and then she woke up on the opposite side of the bed.  Patient's  reports that the patient was out for about 15 seconds and does think that the patient hit her head on the wood floor.  Patient denies previous syncopal events related to her cardiac disease.  She denies history of seizures.  Patient states she is currently lightheaded and was nauseous and short of breath following the fall.  She reports she was given Benadryl for nausea by EMS that she has a known long QT.  She denies current chest pain, palpitations, fever, head pain, neck pain, abdominal pain, or shortness of breath.  Patient states that she did not hold Eliquis prior to the surgery.  She denies a history of alcohol abuse.        Past Medical History  Past Medical History:   Diagnosis Date     Arthritis      Cardiomyopathy (H)     ff Cardiology     Chronic depressive personality disorder      Congestive heart failure (H) see dr martínez    heart failure correct term     COPD exacerbation (H)      Esophageal reflux      Ex-smoker     quit 2006; 1 PPD x 30     Hyperparathyroidism (H)     s/p parathyroidectomy     Hypothyroidism      LARRY on CPAP     ff Sleep  medicine     Pulmonary nodules     ff Pulmonologist     S/P cardiac pacemaker procedure     checked every 6 months at the [facility]     Uncomplicated asthma years     Past Surgical History:   Procedure Laterality Date     BUNIONECTOMY Bilateral      COLONOSCOPY N/A 8/30/2021    Procedure: COLONOSCOPY, WITH POLYPECTOMY AND BIOPSY;  Surgeon: Ranjit Penn MD;  Location: [facility] GI     CV CORONARY ANGIOGRAM N/A 9/26/2019    Procedure: CV CORONARY ANGIOGRAM;  Surgeon: Erickson Trejo MD;  Location:  HEART CARDIAC CATH LAB     CV RIGHT HEART CATH MEASUREMENTS RECORDED N/A 7/20/2020    Procedure: CV RIGHT HEART CATH;  Surgeon: Alonso Ordonez MD;  Location:  HEART CARDIAC CATH LAB     EP ABLATION / EP STUDIES  04/21/2017     EXPLORE NECK N/A 9/19/2018    Procedure: EXPLORE NECK;  Neck Exploration Resection of Left superior Parathyroid gland;  Surgeon: Kristine Venegas MD;  Location: [facility] OR     HC CORRECT BUNION,SIMPLE       PARATHYROIDECTOMY N/A 9/19/2018    Procedure: PARATHYROIDECTOMY;;  Surgeon: Kristine Venegas MD;  Location: [facility] OR     PPM INSERT OF NEW OR REPL W/VENT LEAD  04/21/2017     TONSILLECTOMY & ADENOIDECTOMY       acetaminophen (TYLENOL) 500 MG tablet  acetaminophen-codeine (TYLENOL W/CODEINE #3) 300-30 MG per tablet  albuterol (PROAIR HFA/PROVENTIL HFA/VENTOLIN HFA) 108 (90 BASE) MCG/ACT Inhaler  azelastine (ASTELIN) 0.1 % nasal spray  budesonide (ENTOCORT EC) 3 MG EC capsule  Calcium Lactate 500 MG CAPS  cephalexin 500 MG tablet  clonazePAM (KLONOPIN) 0.5 MG tablet  DiphenhydrAMINE HCl (BENADRYL PO)  ELIQUIS ANTICOAGULANT 5 MG tablet  Ferrous Sulfate (IRON SUPPLEMENT PO)  FLUoxetine (PROZAC) 10 MG tablet  fluticasone-salmeterol (ADVAIR) 250-50 MCG/DOSE inhaler  furosemide (LASIX) 20 MG tablet  ipratropium (ATROVENT) 0.06 % nasal spray  loperamide (IMODIUM A-D) 2 MG tablet  MAGNESIUM LACTATE PO  MELATONIN PO  Menaquinone-7 (VITAMIN K2 PO)  METHYLPREDNISOLONE PO  metoprolol succinate ER  (TOPROL XL) 25 MG 24 hr tablet  Multiple Vitamin (DAILY MULTIVITAMIN PO)  nebulizer nebulization  nefazodone (SERZONE) 100 MG tablet  Probiotic Product (PROBIOTIC DAILY PO)  spironolactone (ALDACTONE) 25 MG tablet  SYNTHROID 150 MCG tablet  TURMERIC PO  vitamin B complex with vitamin C (VITAMIN  B COMPLEX) PO tablet  vitamin D3 (CHOLECALCIFEROL) 2000 units tablet  Zinc 15 MG CAPS      Allergies   Allergen Reactions     Tetracycline Swelling     Zofran [Ondansetron]      Prolonged QT  Prolonged QT at baseline per patient     Flagyl [Metronidazole] Rash     Buspar [Buspirone]      Muscle weakness     Corn Oil Other (See Comments)     Headache       Cymbalta Diarrhea     Seasonal Allergies Difficulty breathing     Sulfamethoxazole-Trimethoprim      Other reaction(s): Other  Passed out     Cyclobenzaprine Other (See Comments)     Extreme heat intolerance     Levaquin [Levofloxacin]      Other reaction(s): Other  Tendon rupture     Nsaids Other (See Comments)     Exacerbated asthma     Family History  Family History   Problem Relation Age of Onset     Hypothyroidism Mother      Hypertension Mother      Osteoarthritis Mother      Coronary Artery Disease Father         nonfatal MI in his 70s     Asthma Father      Diabetes Sister      Hypothyroidism Sister      Breast Cancer Maternal Aunt      Anxiety Disorder Sister      Social History   Social History     Tobacco Use     Smoking status: Former Smoker     Packs/day: 0.00     Years: 0.00     Pack years: 0.00     Smokeless tobacco: Never Used   Vaping Use     Vaping Use: Never used   Substance Use Topics     Alcohol use: Yes     Alcohol/week: 0.0 - 8.0 standard drinks     Comment: seldom     Drug use: No      Past medical history, past surgical history, medications, allergies, family history, and social history were reviewed with the patient. No additional pertinent items.       Review of Systems   Constitutional: Negative for fever.   Respiratory: Negative for shortness of  "breath.    Cardiovascular: Negative for chest pain and palpitations.   Gastrointestinal: Positive for nausea. Negative for abdominal pain, diarrhea and vomiting.   Musculoskeletal: Positive for neck pain.   Neurological: Positive for dizziness, syncope and light-headedness. Negative for seizures, weakness, numbness and headaches.   Psychiatric/Behavioral: Positive for confusion. The patient is nervous/anxious.    All other systems reviewed and are negative.    A complete review of systems was performed with pertinent positives and negatives noted in the HPI, and all other systems negative.    Physical Exam   BP: 120/70  Pulse: 65  Temp: 98.2  F (36.8  C)  Resp: 18  Height: 165.1 cm (5' 5\")  Weight: 85.5 kg (188 lb 8 oz)  SpO2: 97 %  Physical Exam  Vitals and nursing note reviewed.   Constitutional:       General: She is not in acute distress.     Appearance: Normal appearance. She is well-developed. She is not ill-appearing or diaphoretic.   HENT:      Head: Normocephalic and atraumatic. No contusion or laceration.      Nose: Nose normal.   Eyes:      General: No scleral icterus.     Extraocular Movements: Extraocular movements intact.      Conjunctiva/sclera: Conjunctivae normal.   Cardiovascular:      Rate and Rhythm: Normal rate.   Pulmonary:      Effort: Pulmonary effort is normal. No respiratory distress.      Breath sounds: No stridor.   Chest:          Comments: pacemaker implanted in the left apical chest with dressing in place.  Trace dried blood on the dressing.  Scattered petechiae over the anterior chest wall bilaterally in the shape of cardiac monitoring stickers  Abdominal:      General: There is no distension.      Palpations: Abdomen is soft.      Tenderness: There is no abdominal tenderness.   Musculoskeletal:         General: No deformity or signs of injury. Normal range of motion.      Cervical back: Normal range of motion and neck supple. No rigidity. Pain with movement, spinous process " tenderness and muscular tenderness present. Normal range of motion.   Skin:     General: Skin is warm and dry.      Coloration: Skin is not jaundiced or pale.   Neurological:      General: No focal deficit present.      Mental Status: She is alert and oriented to person, place, and time.      GCS: GCS eye subscore is 4. GCS verbal subscore is 5. GCS motor subscore is 6.      Cranial Nerves: No dysarthria or facial asymmetry.      Sensory: Sensation is intact.      Motor: Motor function is intact. No tremor.   Psychiatric:         Attention and Perception: She is inattentive.         Mood and Affect: Mood is anxious.         Speech: Speech normal.         Behavior: Behavior normal. Behavior is cooperative.         Cognition and Memory: Cognition normal. She exhibits impaired recent memory. She does not exhibit impaired remote memory.         Judgment: Judgment is impulsive.       ED Course      Procedures   1:18 AM  The patient was seen and examined by Tash Fenton MD in Room ED10.             EKG Interpretation:      Interpreted by Tash Fenton MD  Time reviewed: 0134  Symptoms at time of EKG: syncope   Rhythm: Ventricular paced rhythm with occasional PVCs  Rate: Normal  Axis: Normal  Ectopy: premature ventricular contractions (infrequent)  Conduction: nonspecific interventricular conduction block  ST Segments/ T Waves: No acute ischemic changes  Q Waves: nonspecific  Comparison to prior: Unchanged    Clinical Impression: non-specific EKG                          Results for orders placed or performed during the hospital encounter of 01/20/22   XR Chest Port 1 View     Status: None    Narrative    EXAM: XR CHEST PORT 1 VIEW  LOCATION: [facility]  DATE/TIME: 1/20/2022 1:47 AM    INDICATION: Syncope. Post pacemaker implant.  COMPARISON: 01/19/2022 at 2:42 PM      Impression    IMPRESSION: Left-sided cardiac pacemaker with leads projecting over the expected location  of the RA and RV. Normal heart size. Normal pulmonary vascularity. Lungs clear. No overt osseous abnormality.   Extra Blue Top Tube     Status: None   Result Value Ref Range    Hold Specimen JIC    Extra Red Top Tube     Status: None   Result Value Ref Range    Hold Specimen JIC    Extra Green Top (Lithium Heparin) Tube     Status: None   Result Value Ref Range    Hold Specimen JIC    Extra Purple Top Tube     Status: None   Result Value Ref Range    Hold Specimen JIC    Basic metabolic panel     Status: Normal   Result Value Ref Range    Sodium 138 133 - 144 mmol/L    Potassium 4.5 3.4 - 5.3 mmol/L    Chloride 108 94 - 109 mmol/L    Carbon Dioxide (CO2) 24 20 - 32 mmol/L    Anion Gap 6 3 - 14 mmol/L    Urea Nitrogen 10 7 - 30 mg/dL    Creatinine 0.62 0.52 - 1.04 mg/dL    Calcium 9.2 8.5 - 10.1 mg/dL    Glucose 95 70 - 99 mg/dL    GFR Estimate >90 >60 mL/min/1.73m2   CBC with platelets and differential     Status: None   Result Value Ref Range    WBC Count 9.9 4.0 - 11.0 10e3/uL    RBC Count 4.40 3.80 - 5.20 10e6/uL    Hemoglobin 13.8 11.7 - 15.7 g/dL    Hematocrit 42.1 35.0 - 47.0 %    MCV 96 78 - 100 fL    MCH 31.4 26.5 - 33.0 pg    MCHC 32.8 31.5 - 36.5 g/dL    RDW 12.1 10.0 - 15.0 %    Platelet Count 176 150 - 450 10e3/uL    % Neutrophils 78 %    % Lymphocytes 10 %    % Monocytes 10 %    % Eosinophils 1 %    % Basophils 0 %    % Immature Granulocytes 1 %    NRBCs per 100 WBC 0 <1 /100    Absolute Neutrophils 7.7 1.6 - 8.3 10e3/uL    Absolute Lymphocytes 1.0 0.8 - 5.3 10e3/uL    Absolute Monocytes 1.0 0.0 - 1.3 10e3/uL    Absolute Eosinophils 0.1 0.0 - 0.7 10e3/uL    Absolute Basophils 0.0 0.0 - 0.2 10e3/uL    Absolute Immature Granulocytes 0.1 <=0.4 10e3/uL    Absolute NRBCs 0.0 10e3/uL   Asymptomatic COVID-19 Virus (Coronavirus) by PCR Nasopharyngeal     Status: Normal    Specimen: Nasopharyngeal; Swab   Result Value Ref Range    SARS CoV2 PCR Negative Negative, Testing sent to reference lab. Results will be  returned via unsolicited result    Narrative    Testing was performed using the Xpert Xpress SARS-CoV-2 Assay on the  Cepheid Gene-Xpert Instrument Systems. Additional information about  this Emergency Use Authorization (EUA) assay can be found via the Lab  Guide. This test should be ordered for the detection of SARS-CoV-2 in  individuals who meet SARS-CoV-2 clinical and/or epidemiological  criteria. Test performance is unknown in asymptomatic patients. This  test is for in vitro diagnostic use under the FDA EUA for  laboratories certified under CLIA to perform high complexity testing.  This test has not been FDA cleared or approved. A negative result  does not rule out the presence of PCR inhibitors in the specimen or  target RNA in concentration below the limit of detection for the  assay. The possibility of a false negative should be considered if  the patient's recent exposure or clinical presentation suggests  COVID-19. This test was validated by the [facility]. This laboratory is certified under  the Clinical Laboratory Improvement Amendments of 1988 (CLIA-88) as  qualified to perform high complexity laboratory testing.     Glucose by meter     Status: Normal   Result Value Ref Range    GLUCOSE BY METER POCT 92 70 - 99 mg/dL   EKG 12-lead, tracing only     Status: None (Preliminary result)   Result Value Ref Range    Systolic Blood Pressure  mmHg    Diastolic Blood Pressure  mmHg    Ventricular Rate 90 BPM    Atrial Rate 90 BPM    CT Interval  ms    QRS Duration 100 ms     ms    QTc 511 ms    P Axis 34 degrees    R AXIS -7 degrees    T Axis -89 degrees    Interpretation ECG       Suspect unspecified pacemaker failure  Ventricular-paced rhythm with occasional Premature ventricular complexes  Abnormal ECG     [place] Draw     Status: None    Narrative    The following orders were created for panel order [place] Draw.  Procedure                                Abnormality         Status                     ---------                               -----------         ------                     Extra Blue Top Tube[[id number]]                              Final result               Extra Red Top Tube[[id number]]                               Final result               Extra Green Top (Lithium...[[id number]]                      Final result               Extra Purple Top Tube[[id number]]                            Final result                 Please view results for these tests on the individual orders.   CBC with platelets differential     Status: None    Narrative    The following orders were created for panel order CBC with platelets differential.  Procedure                               Abnormality         Status                     ---------                               -----------         ------                     CBC with platelets and d...[[id number]]                      Final result                 Please view results for these tests on the individual orders.     Medications   acetaminophen (TYLENOL) tablet 650 mg (has no administration in time range)        Assessments & Plan (with Medical Decision Making)   Joyce Marroquin is a 61 year old female with PMH significant for s/p PPM placement with scheduled CRT-P upgrade earlier today, PAF on Eliquis, NICM possibly pacer induced (LVEF 40-45%), COPD, depression, anxiety, and LARRY who presents to the Emergency Department for evaluation of a fall.     Ddx: Cardiac arrhythmia, pacemaker failure, orthostatic hypotension, hypoglycemia, vasovagal syndrome, sedative medications, intracranial hemorrhage/TBI, C-spine trauma, seizure    Patient extremely anxious on arrival without focal neurologic deficit.  No history of cardiac syncope.  Afebrile with normal blood pressure and heart rate.  EKG shows ventricular paced rhythm with occasional PVCs.  This is unchanged from previous.  No acute ischemic changes on this limited EKG.  Sats  97% on room air.  Chest x-ray shows left sided cardiac pacemaker leads projecting over the expected location of the RA and RV.  Normal heart size and pulmonary vascularity.  Lungs clear.  CBC and BMP normal.  COVID-negative.    Patient's  called in to patient's room to speak with her and provided further clarification of history.  Patient reports her  has informed her that she was not found on the opposite side of the bed.  She had collapsed and fell through his arms onto the floor on her side of the bed.  She also informed the ED RN that she took more than she was supposed to of her Tylenol with codeine and she took Klonopin as well.  With this information, it is seeming more likely that the patient may have experienced a vasovagal syncope.  She also had been mostly n.p.o. throughout the day and may be dehydrated.  Point-of-care glucose 92 on arrival.  ED RN spoke with the pacemaker rep who informed us that they would not be able to interrogate the pacemaker until after 24 hours following implantation.  They's dated that the patient's pacemaker settings are unchanged but they could not comment on any cardiac events.  They recommended we call in the [employer] representative to do in-person interrogation, if that is needed.  I called back the cardiology staff who felt it would be appropriate to admit the patient to cardiology on observation overnight on telemetry and have a formal pacemaker interrogation performed in the morning.  Cardiology fellow updated on the patient's admission.    Patient complaining of sensation of throat closure, confusion, chest tightness, head feeling funny.  On my examination the patient is very anxious and states these are symptoms she has had in the past with anxiety and panic.  She is afraid to take any additional Klonopin because she thinks it may have contributed to her syncopal event this evening.  After further discussion I think patient's confusion is related to  anxiety.  She is completely oriented but is uncertain about the etiology of her presentation and events surrounding her syncope.  I provided reassurance.  Patient would like to proceed with obtaining head CT and then try to go to sleep.  She requested Tylenol for her headache.  I think this is appropriate and I do not think her symptoms warrant any additional work-up outside of the plan outlined above.    Patient signed out to Dr. Sevilla at shift change with head CT and CT of the C-spine pending.  Unless there is evidence of acute trauma on imaging, which I have a low suspicion for, the patient will continue with observation admission on cardiology service.    I have reviewed the nursing notes. I have reviewed the findings, diagnosis, plan and need for follow up with the patient.    New Prescriptions    No medications on file       Final diagnoses:   Syncope and collapse     I, Pramod Sheets, am serving as a trained medical scribe to document services personally performed by Tash Fenton MD, based on the provider's statements to me.     ITash MD, was physically present and have reviewed and verified the accuracy of this note documented by Pramod Sheets.    --  Tash Fenton MD  [facility] EMERGENCY DEPARTMENT  1/20/2022     Tash Fenton MD  01/20/22 [id number]

## 2022-01-20 NOTE — CONSULTS
Cardiac Electrophysiology consultation      Reason For Consultation: Syncope     HPI:    60 y/o lady s/p CRT-P upgrade with L septal lead POD 1. Admitted overnight for further evaluation of syncope.    Hx significant for:  PVC ablation -> CHB -> MDT dual chamber PPM  ?PAF on eliquis and metoprolol  NICM - possibly pacer induced (LVEF 40-45%)  COPD  Depression  Anxiety  LARRY on CPAP    States she went home around 5pm as she felt ready. Her  walked behind her up the stairs to her house to ensure she wouldn't fall. Had dinner, felt some pocket pain so took Tylenol and then felt anxious so she also took her klonopin. Got up to use the restroom, was washing her hands afterwards and felt lightheaded so she called for her . Her helped her to the edge of her bed and she states he had told her she passed out for about 15 seconds. No apparent injuries. Called 911 and brought her to the hospital. Since admission, she states she still feels somewhat groggy. She doesn't remember anything else from the event last night.    Past Medical History:      Past Medical History:   Diagnosis Date     Arthritis      Cardiomyopathy (H)     ff Cardiology     Chronic depressive personality disorder      Congestive heart failure (H) see dr martínez    heart failure correct term     COPD exacerbation (H)      Esophageal reflux      Ex-smoker     quit 2006; 1 PPD x 30     Hyperparathyroidism (H)     s/p parathyroidectomy     Hypothyroidism      LARRY on CPAP     ff Sleep medicine     Pulmonary nodules     ff Pulmonologist     S/P cardiac pacemaker procedure     checked every 6 [facility]     Uncomplicated asthma years       Past Surgical  History:      Past Surgical History:   Procedure Laterality Date     BUNIONECTOMY Bilateral      COLONOSCOPY N/A 8/30/2021    Procedure: COLONOSCOPY, WITH POLYPECTOMY AND BIOPSY;  Surgeon: Ranjit Penn MD;  Location:  GI     CV CORONARY ANGIOGRAM N/A 9/26/2019    Procedure: CV  CORONARY ANGIOGRAM;  Surgeon: Erickson Trejo MD;  Location:  HEART CARDIAC CATH LAB     CV RIGHT HEART CATH MEASUREMENTS RECORDED N/A 7/20/2020    Procedure: CV RIGHT HEART CATH;  Surgeon: Alonso Ordonez MD;  Location:  HEART CARDIAC CATH LAB     EP ABLATION / EP STUDIES  04/21/2017     EP PPM UPGRADE TO BIVENT N/A 1/19/2022    Procedure: EP PPM UPGRADE TO BIVENT;  Surgeon: Lino Funes MD;  Location:  HEART CARDIAC CATH LAB     EXPLORE NECK N/A 9/19/2018    Procedure: EXPLORE NECK;  Neck Exploration Resection of Left superior Parathyroid gland;  Surgeon: Kristine Venegas MD;  Location: [facility] OR     HC CORRECT BUNION,SIMPLE       PARATHYROIDECTOMY N/A 9/19/2018    Procedure: PARATHYROIDECTOMY;;  Surgeon: Kristine Venegas MD;  Location: [facility] OR     PPM INSERT OF NEW OR REPL W/VENT LEAD  04/21/2017     TONSILLECTOMY & ADENOIDECTOMY         Family History:      Family History   Problem Relation Age of Onset     Hypothyroidism Mother      Hypertension Mother      Osteoarthritis Mother      Coronary Artery Disease Father         nonfatal MI in his 70s     Asthma Father      Diabetes Sister      Hypothyroidism Sister      Breast Cancer Maternal Aunt      Anxiety Disorder Sister        Social History:      Social History     Socioeconomic History     Marital status:      Spouse name: Not on file     Number of children: Not on file     Years of education: Not on file     Highest education level: Master's degree (e.g., MA, MS, Florian, MEd, MSW, SONIA)   Occupational History     Not on file   Tobacco Use     Smoking status: Former Smoker     Packs/day: 0.00     Years: 0.00     Pack years: 0.00     Smokeless tobacco: Never Used   Vaping Use     Vaping Use: Never used   Substance and Sexual Activity     Alcohol use: Yes     Alcohol/week: 0.0 - 8.0 standard drinks     Comment: seldom     Drug use: No     Sexual activity: Yes     Partners: Male     Birth control/protection: None     Comment: vasectomy  "  Other Topics Concern     Parent/sibling w/ CABG, MI or angioplasty before 65F 55M? No   Social History Narrative    CRNA on disability for vestibular symptoms      Social Determinants of Health     Financial Resource Strain: Low Risk      Difficulty of Paying Living Expenses: Not very hard   Food Insecurity: No Food Insecurity     Worried About Running Out of Food in the Last Year: Never true     Ran Out of Food in the Last Year: Never true   Transportation Needs: No Transportation Needs     Lack of Transportation (Medical): No     Lack of Transportation (Non-Medical): No   Physical Activity: Not on file   Stress: Not on file   Social Connections: Not on file   Intimate Partner Violence: Not At Risk     Fear of Current or Ex-Partner: No     Emotionally Abused: No     Physically Abused: No     Sexually Abused: No   Housing Stability: Not on file       ROS:    A complete review of systems is negative except as noted above in the HPI.    Physical Exam:   Vitals: BP 95/55   Pulse 78   Temp 98.2  F (36.8  C) (Oral)   Resp 14   Ht 1.651 m (5' 5\")   Wt 85.5 kg (188 lb 8 oz)   LMP 08/21/2007   SpO2 94%   BMI 31.37 kg/m    Gen: NAD  Neck: No JVD  Chest: CTAB  Cardiovascular: RRR, no M/R/G   Abd: soft/NT/ND  Neuro: grossly intact  Extremities: Trace b/l LE edema         Relevant labs, imaging, medications and procedures were reviewed.  BUN/Cr 10/0.62  K 4.5  Hb 13.8  Plts 176  WBC 9.9  Glucose 92    EKG: A sensed V paced, appropriate pacemaker function    TTE: LVEF 40-45%    CXR: no acute abnormality      Assessment and Recommendations:   60 y/o lady s/p CRT-P upgrade with L septal lead POD 1. Admitted overnight for further evaluation of syncope.    Hx significant for:  PVC ablation -> CHB -> MDT dual chamber PPM  ?PAF on eliquis and metoprolol  NICM - possibly pacer induced (LVEF 40-45%)  COPD  Depression  Anxiety  LARRY on CPAP    Hemodynamically stable. EKG reveals normal PPM function. Good result with left septal " lead placement. Device interrogation pending. Telemetry unremarkable. Her syncopal episode was likely multifactorial: had received fentanyl 400mcg and versed 8mg during the procedure, also took klonopin at home, had just used the restroom, likely dehydrated post procedurally exacerbating any aspect of orthostatic hypotension and vasovagal syncope. Device interrogation ordered. If the report is unremarkable, she may be discharged home without further interventions required and follow up as previously scheduled.    I discussed the patient with Dr. Wylie.    Moisés Nelson MD   Cardiac electrophysiology fellow    Addendum:  I saw and evaluated the patient and agree with the fellow s finding and plans as written.  Pacemaker interrogation revealed that the pacemaker has been functioning appropriately with no arrhythmic events.  The event was most likely to be caused by vaso-vagal syncope combined with postprocedural sedation and dehydration. We can send her home today.  Silvano Wylie MD

## 2022-01-20 NOTE — ED NOTES
[employer] tech returned call and stated pacemaker does not start showing recording for 24 hours after implantation. She stated that programming was the same as her previous and that there was no concern for malfunctions. She stated if patient remains symptomatic she would need to be tested in person by a medtronic technician.

## 2022-01-21 ENCOUNTER — TELEPHONE (OUTPATIENT)
Dept: CARDIOLOGY | Facility: CLINIC | Age: 62
End: 2022-01-21
Payer: COMMERCIAL

## 2022-01-21 ENCOUNTER — MYC MEDICAL ADVICE (OUTPATIENT)
Dept: CARDIOLOGY | Facility: CLINIC | Age: 62
End: 2022-01-21
Payer: COMMERCIAL

## 2022-01-21 ENCOUNTER — PATIENT OUTREACH (OUTPATIENT)
Dept: CARE COORDINATION | Facility: CLINIC | Age: 62
End: 2022-01-21
Payer: COMMERCIAL

## 2022-01-21 DIAGNOSIS — Z95.0 CARDIAC PACEMAKER: ICD-10-CM

## 2022-01-21 DIAGNOSIS — Z71.89 OTHER SPECIFIED COUNSELING: ICD-10-CM

## 2022-01-21 LAB
MDC_IDC_LEAD_IMPLANT_DT: NORMAL
MDC_IDC_LEAD_LOCATION: NORMAL
MDC_IDC_LEAD_LOCATION_DETAIL_1: NORMAL
MDC_IDC_LEAD_MFG: NORMAL
MDC_IDC_LEAD_MODEL: NORMAL
MDC_IDC_LEAD_POLARITY_TYPE: NORMAL
MDC_IDC_LEAD_SERIAL: NORMAL
MDC_IDC_MSMT_BATTERY_DTM: NORMAL
MDC_IDC_MSMT_BATTERY_RRT_TRIGGER: 2.6
MDC_IDC_MSMT_BATTERY_STATUS: NORMAL
MDC_IDC_MSMT_BATTERY_VOLTAGE: 3.19 V
MDC_IDC_MSMT_LEADCHNL_LV_IMPEDANCE_VALUE: 209 OHM
MDC_IDC_MSMT_LEADCHNL_LV_IMPEDANCE_VALUE: 209 OHM
MDC_IDC_MSMT_LEADCHNL_LV_IMPEDANCE_VALUE: 323 OHM
MDC_IDC_MSMT_LEADCHNL_LV_IMPEDANCE_VALUE: 323 OHM
MDC_IDC_MSMT_LEADCHNL_LV_IMPEDANCE_VALUE: 361 OHM
MDC_IDC_MSMT_LEADCHNL_LV_IMPEDANCE_VALUE: 361 OHM
MDC_IDC_MSMT_LEADCHNL_LV_IMPEDANCE_VALUE: 494 OHM
MDC_IDC_MSMT_LEADCHNL_LV_IMPEDANCE_VALUE: 494 OHM
MDC_IDC_MSMT_LEADCHNL_LV_IMPEDANCE_VALUE: 513 OHM
MDC_IDC_MSMT_LEADCHNL_LV_IMPEDANCE_VALUE: 513 OHM
MDC_IDC_MSMT_LEADCHNL_LV_PACING_THRESHOLD_AMPLITUDE: 0.5 V
MDC_IDC_MSMT_LEADCHNL_LV_PACING_THRESHOLD_PULSEWIDTH: 0.4 MS
MDC_IDC_MSMT_LEADCHNL_RA_IMPEDANCE_VALUE: 304 OHM
MDC_IDC_MSMT_LEADCHNL_RA_IMPEDANCE_VALUE: 304 OHM
MDC_IDC_MSMT_LEADCHNL_RA_IMPEDANCE_VALUE: 361 OHM
MDC_IDC_MSMT_LEADCHNL_RA_IMPEDANCE_VALUE: 361 OHM
MDC_IDC_MSMT_LEADCHNL_RA_PACING_THRESHOLD_AMPLITUDE: 0.5 V
MDC_IDC_MSMT_LEADCHNL_RA_PACING_THRESHOLD_PULSEWIDTH: 0.4 MS
MDC_IDC_MSMT_LEADCHNL_RA_SENSING_INTR_AMPL: 2.25 MV
MDC_IDC_MSMT_LEADCHNL_RA_SENSING_INTR_AMPL: 2.25 MV
MDC_IDC_MSMT_LEADCHNL_RA_SENSING_INTR_AMPL: 2.38 MV
MDC_IDC_MSMT_LEADCHNL_RA_SENSING_INTR_AMPL: 2.38 MV
MDC_IDC_MSMT_LEADCHNL_RV_IMPEDANCE_VALUE: 323 OHM
MDC_IDC_MSMT_LEADCHNL_RV_IMPEDANCE_VALUE: 323 OHM
MDC_IDC_MSMT_LEADCHNL_RV_IMPEDANCE_VALUE: 399 OHM
MDC_IDC_MSMT_LEADCHNL_RV_IMPEDANCE_VALUE: 399 OHM
MDC_IDC_MSMT_LEADCHNL_RV_PACING_THRESHOLD_AMPLITUDE: 0.75 V
MDC_IDC_MSMT_LEADCHNL_RV_PACING_THRESHOLD_AMPLITUDE: 0.75 V
MDC_IDC_MSMT_LEADCHNL_RV_PACING_THRESHOLD_PULSEWIDTH: 0.4 MS
MDC_IDC_MSMT_LEADCHNL_RV_PACING_THRESHOLD_PULSEWIDTH: 0.4 MS
MDC_IDC_PG_IMPLANT_DTM: NORMAL
MDC_IDC_PG_MFG: NORMAL
MDC_IDC_PG_MODEL: NORMAL
MDC_IDC_PG_SERIAL: NORMAL
MDC_IDC_PG_TYPE: NORMAL
MDC_IDC_SESS_CLINIC_NAME: NORMAL
MDC_IDC_SESS_DTM: NORMAL
MDC_IDC_SESS_TYPE: NORMAL
MDC_IDC_SET_BRADY_AT_MODE_SWITCH_RATE: 171 {BEATS}/MIN
MDC_IDC_SET_BRADY_LOWRATE: 60 {BEATS}/MIN
MDC_IDC_SET_BRADY_MAX_SENSOR_RATE: 140 {BEATS}/MIN
MDC_IDC_SET_BRADY_MAX_TRACKING_RATE: 140 {BEATS}/MIN
MDC_IDC_SET_BRADY_MODE: NORMAL
MDC_IDC_SET_BRADY_PAV_DELAY_HIGH: 100 MS
MDC_IDC_SET_BRADY_PAV_DELAY_LOW: 180 MS
MDC_IDC_SET_BRADY_SAV_DELAY_HIGH: 70 MS
MDC_IDC_SET_BRADY_SAV_DELAY_LOW: 150 MS
MDC_IDC_SET_CRT_LVRV_DELAY: 80 MS
MDC_IDC_SET_CRT_PACED_CHAMBERS: NORMAL
MDC_IDC_SET_LEADCHNL_LV_PACING_AMPLITUDE: 2.5 V
MDC_IDC_SET_LEADCHNL_LV_PACING_ANODE_ELECTRODE_1: NORMAL
MDC_IDC_SET_LEADCHNL_LV_PACING_ANODE_LOCATION_1: NORMAL
MDC_IDC_SET_LEADCHNL_LV_PACING_CAPTURE_MODE: NORMAL
MDC_IDC_SET_LEADCHNL_LV_PACING_CATHODE_ELECTRODE_1: NORMAL
MDC_IDC_SET_LEADCHNL_LV_PACING_CATHODE_LOCATION_1: NORMAL
MDC_IDC_SET_LEADCHNL_LV_PACING_POLARITY: NORMAL
MDC_IDC_SET_LEADCHNL_LV_PACING_PULSEWIDTH: 0.4 MS
MDC_IDC_SET_LEADCHNL_RA_PACING_AMPLITUDE: 2 V
MDC_IDC_SET_LEADCHNL_RA_PACING_ANODE_ELECTRODE_1: NORMAL
MDC_IDC_SET_LEADCHNL_RA_PACING_ANODE_LOCATION_1: NORMAL
MDC_IDC_SET_LEADCHNL_RA_PACING_CAPTURE_MODE: NORMAL
MDC_IDC_SET_LEADCHNL_RA_PACING_CATHODE_ELECTRODE_1: NORMAL
MDC_IDC_SET_LEADCHNL_RA_PACING_CATHODE_LOCATION_1: NORMAL
MDC_IDC_SET_LEADCHNL_RA_PACING_POLARITY: NORMAL
MDC_IDC_SET_LEADCHNL_RA_PACING_PULSEWIDTH: 0.4 MS
MDC_IDC_SET_LEADCHNL_RA_SENSING_ANODE_ELECTRODE_1: NORMAL
MDC_IDC_SET_LEADCHNL_RA_SENSING_ANODE_LOCATION_1: NORMAL
MDC_IDC_SET_LEADCHNL_RA_SENSING_CATHODE_ELECTRODE_1: NORMAL
MDC_IDC_SET_LEADCHNL_RA_SENSING_CATHODE_LOCATION_1: NORMAL
MDC_IDC_SET_LEADCHNL_RA_SENSING_POLARITY: NORMAL
MDC_IDC_SET_LEADCHNL_RA_SENSING_SENSITIVITY: 0.3 MV
MDC_IDC_SET_LEADCHNL_RV_PACING_AMPLITUDE: 3.5 V
MDC_IDC_SET_LEADCHNL_RV_PACING_ANODE_ELECTRODE_1: NORMAL
MDC_IDC_SET_LEADCHNL_RV_PACING_ANODE_LOCATION_1: NORMAL
MDC_IDC_SET_LEADCHNL_RV_PACING_CAPTURE_MODE: NORMAL
MDC_IDC_SET_LEADCHNL_RV_PACING_CATHODE_ELECTRODE_1: NORMAL
MDC_IDC_SET_LEADCHNL_RV_PACING_CATHODE_LOCATION_1: NORMAL
MDC_IDC_SET_LEADCHNL_RV_PACING_POLARITY: NORMAL
MDC_IDC_SET_LEADCHNL_RV_PACING_PULSEWIDTH: 0.4 MS
MDC_IDC_SET_LEADCHNL_RV_SENSING_ANODE_ELECTRODE_1: NORMAL
MDC_IDC_SET_LEADCHNL_RV_SENSING_ANODE_LOCATION_1: NORMAL
MDC_IDC_SET_LEADCHNL_RV_SENSING_CATHODE_ELECTRODE_1: NORMAL
MDC_IDC_SET_LEADCHNL_RV_SENSING_CATHODE_LOCATION_1: NORMAL
MDC_IDC_SET_LEADCHNL_RV_SENSING_POLARITY: NORMAL
MDC_IDC_SET_LEADCHNL_RV_SENSING_SENSITIVITY: 0.9 MV
MDC_IDC_SET_ZONE_DETECTION_INTERVAL: 350 MS
MDC_IDC_SET_ZONE_DETECTION_INTERVAL: 400 MS
MDC_IDC_SET_ZONE_TYPE: NORMAL
MDC_IDC_STAT_AT_BURDEN_PERCENT: 0 %
MDC_IDC_STAT_AT_DTM_END: NORMAL
MDC_IDC_STAT_AT_DTM_START: NORMAL
MDC_IDC_STAT_BRADY_AP_VP_PERCENT: 39 %
MDC_IDC_STAT_BRADY_AP_VS_PERCENT: 0.01 %
MDC_IDC_STAT_BRADY_AS_VP_PERCENT: 54.18 %
MDC_IDC_STAT_BRADY_AS_VS_PERCENT: 6.82 %
MDC_IDC_STAT_BRADY_DTM_END: NORMAL
MDC_IDC_STAT_BRADY_DTM_START: NORMAL
MDC_IDC_STAT_BRADY_RA_PERCENT_PACED: 39.37 %
MDC_IDC_STAT_BRADY_RV_PERCENT_PACED: 93.17 %
MDC_IDC_STAT_CRT_DTM_END: NORMAL
MDC_IDC_STAT_CRT_DTM_START: NORMAL
MDC_IDC_STAT_CRT_LV_PERCENT_PACED: 93.04 %
MDC_IDC_STAT_CRT_PERCENT_PACED: 93.04 %
MDC_IDC_STAT_EPISODE_RECENT_COUNT: 0
MDC_IDC_STAT_EPISODE_RECENT_COUNT_DTM_END: NORMAL
MDC_IDC_STAT_EPISODE_RECENT_COUNT_DTM_START: NORMAL
MDC_IDC_STAT_EPISODE_TOTAL_COUNT: 0
MDC_IDC_STAT_EPISODE_TOTAL_COUNT_DTM_END: NORMAL
MDC_IDC_STAT_EPISODE_TOTAL_COUNT_DTM_START: NORMAL
MDC_IDC_STAT_EPISODE_TYPE: NORMAL

## 2022-01-21 NOTE — PROGRESS NOTES
Clinic Care Coordination Contact    Background: Care Coordination referral placed from [facility] discharge report for reason of patient meeting criteria for a TCM outreach call by [facility] [place] team.    Assessment: Upon chart review, CCRC Team member will cancel/close the referral for TCM outreach due to reason below:    Patient declined completing post hospital discharge questions.  Before she hung up, she said that she was going to call the hospital.    Plan: Care Coordination referral for TCM outreach canceled.    Carol Holloway, [facility]  [phone]  [facility]

## 2022-01-21 NOTE — TELEPHONE ENCOUNTER
Called and spoke with patient. She wouldlike to get signed up for the meals and is willing to go pick them up. Will sign up patient and notify her of the office location.

## 2022-01-21 NOTE — PROGRESS NOTES
"  Assessment & Plan     Adjustment disorder with mixed anxiety and depressed mood      Situational anxiety      Cardiac pacemaker in situ- [employer]tronic, dual lead- DEPENDENT      Pulmonary nodules      Syncope, unspecified syncope type    20 minutes spent on the date of the encounter doing chart review, history and exam, documentation and further activities per the note     BMI:   Estimated body mass index is 30.15 kg/m  as calculated from the following:    Height as of 1/20/22: 1.651 m (5' 5\").    Weight as of this encounter: 82.2 kg (181 lb 3.2 oz).   Weight management plan: Discussed healthy diet and exercise guidelines    See Patient Instructions: follow up as needed for any healthcare questions or concerns.     Return in about 6 months (around 7/27/2022), or if symptoms worsen or fail to improve.    MARCELL Alcantar  [facility] ROLAND Cook is a 61 year old who presents for the following health issues     HPI     ED/UC Followup:    Facility:  [facility]  Date of visit: 1/20/22  Reason for visit: SYNCOPE  Current Status: discharged-medication side effect to serzone   She reports she had 3rd wire placed last week. She has an appointment today with psych.  She wants to get on a daily medication; she thinks it will be the prozac; as she has relied too heavily on clonazepam and she has been trying to reduce this.  On disability.  Has an appt with pulmonology Friday to review lung nodules. She reports post procedure, got up to the bathroom in the middle of the night and had a syncopal episode lasting 15 seconds.  She had head CT at hospital.  Feeling better.     Review of Systems   Constitutional, HEENT, cardiovascular, pulmonary, GI, , musculoskeletal, neuro, skin, endocrine and psych systems are negative, except as otherwise noted.       Objective    /75   Pulse 99   Temp 99.3  F (37.4  C) (Tympanic)   Resp 20   Wt 82.2 kg (181 lb 3.2 oz)   LMP 08/21/2007   SpO2 97%   BMI 30.15 " kg/m    Body mass index is 30.15 kg/m .  Physical Exam   GENERAL: healthy, alert and no distress  EYES: Eyes grossly normal to inspection, PERRL and conjunctivae and sclerae normal  RESP: lungs clear to auscultation - no rales, rhonchi or wheezes  CV: regular rate and rhythm, normal S1 S2, no S3 or S4, no murmur, click or rub, no peripheral edema and peripheral pulses strong  NEURO: mentation intact and speech normal  PSYCH: mentation appears normal, affect normal/bright    See orders

## 2022-01-21 NOTE — TELEPHONE ENCOUNTER
Called [facility] pharmacy, confirmed Dr Funes's MU#, they will fill script and contact pt when its ready.     WBC from 1/19 was 4.9 WNL  WBC from 1/20 was 9.9 WNL    Pt was prescribed an antibiotic Keflex for 5 days on 1/19.     Called pt, advised her that pharmacy is filling her rx and that she does not need repeat labs at this point. Patient verbalizes understandings and will call with further questions or concerns.     Marsha Hawthorne RN on 1/21/2022 at 1:28 PM

## 2022-01-24 ENCOUNTER — HOSPITAL ENCOUNTER (OUTPATIENT)
Dept: CARDIAC REHAB | Facility: CLINIC | Age: 62
End: 2022-01-24
Attending: NURSE PRACTITIONER
Payer: COMMERCIAL

## 2022-01-24 LAB
ATRIAL RATE - MUSE: 70 BPM
DIASTOLIC BLOOD PRESSURE - MUSE: NORMAL MMHG
INTERPRETATION ECG - MUSE: NORMAL
P AXIS - MUSE: 57 DEGREES
PR INTERVAL - MUSE: 198 MS
QRS DURATION - MUSE: 100 MS
QT - MUSE: 440 MS
QTC - MUSE: 475 MS
R AXIS - MUSE: 24 DEGREES
SYSTOLIC BLOOD PRESSURE - MUSE: NORMAL MMHG
T AXIS - MUSE: -70 DEGREES
VENTRICULAR RATE- MUSE: 70 BPM

## 2022-01-24 PROCEDURE — 93798 PHYS/QHP OP CAR RHAB W/ECG: CPT

## 2022-01-25 ENCOUNTER — PATIENT OUTREACH (OUTPATIENT)
Dept: CARDIOLOGY | Facility: CLINIC | Age: 62
End: 2022-01-25
Payer: COMMERCIAL

## 2022-01-25 NOTE — TELEPHONE ENCOUNTER
Patient has requested to apply for meals through [facility]. Called and spoke to [facility]. They emailed the form and physicians portion filled out. Called and spoke to patient. Will mail the form to patient to fill out the client section with signatures and initials.

## 2022-01-26 ENCOUNTER — TELEPHONE (OUTPATIENT)
Dept: CARDIOLOGY | Facility: CLINIC | Age: 62
End: 2022-01-26

## 2022-01-26 ENCOUNTER — ANCILLARY PROCEDURE (OUTPATIENT)
Dept: CARDIOLOGY | Facility: CLINIC | Age: 62
End: 2022-01-26
Payer: COMMERCIAL

## 2022-01-26 ENCOUNTER — MYC MEDICAL ADVICE (OUTPATIENT)
Dept: CARDIOLOGY | Facility: CLINIC | Age: 62
End: 2022-01-26

## 2022-01-26 DIAGNOSIS — I44.2 COMPLETE ATRIOVENTRICULAR BLOCK (H): ICD-10-CM

## 2022-01-26 LAB
MDC_IDC_LEAD_IMPLANT_DT: NORMAL
MDC_IDC_LEAD_LOCATION: NORMAL
MDC_IDC_LEAD_LOCATION_DETAIL_1: NORMAL
MDC_IDC_LEAD_MFG: NORMAL
MDC_IDC_LEAD_MODEL: NORMAL
MDC_IDC_LEAD_POLARITY_TYPE: NORMAL
MDC_IDC_LEAD_SERIAL: NORMAL
MDC_IDC_MSMT_CAP_CHARGE_TYPE: NORMAL
MDC_IDC_PG_IMPLANT_DTM: NORMAL
MDC_IDC_PG_MFG: NORMAL
MDC_IDC_PG_MODEL: NORMAL
MDC_IDC_PG_SERIAL: NORMAL
MDC_IDC_PG_TYPE: NORMAL
MDC_IDC_SESS_CLINIC_NAME: NORMAL
MDC_IDC_SESS_DTM: NORMAL
MDC_IDC_SESS_TYPE: NORMAL
MDC_IDC_SET_BRADY_AT_MODE_SWITCH_RATE: 171 {BEATS}/MIN
MDC_IDC_SET_BRADY_LOWRATE: 60 {BEATS}/MIN
MDC_IDC_SET_BRADY_MAX_SENSOR_RATE: 140 {BEATS}/MIN
MDC_IDC_SET_BRADY_MAX_TRACKING_RATE: 140 {BEATS}/MIN
MDC_IDC_SET_BRADY_MODE: NORMAL
MDC_IDC_SET_BRADY_PAV_DELAY_HIGH: 100 MS
MDC_IDC_SET_BRADY_PAV_DELAY_LOW: 180 MS
MDC_IDC_SET_BRADY_SAV_DELAY_HIGH: 70 MS
MDC_IDC_SET_BRADY_SAV_DELAY_LOW: 150 MS
MDC_IDC_SET_CRT_LVRV_DELAY: 80 MS
MDC_IDC_SET_CRT_PACED_CHAMBERS: NORMAL
MDC_IDC_SET_LEADCHNL_LV_PACING_AMPLITUDE: 2.5 V
MDC_IDC_SET_LEADCHNL_LV_PACING_ANODE_ELECTRODE_1: NORMAL
MDC_IDC_SET_LEADCHNL_LV_PACING_ANODE_LOCATION_1: NORMAL
MDC_IDC_SET_LEADCHNL_LV_PACING_CAPTURE_MODE: NORMAL
MDC_IDC_SET_LEADCHNL_LV_PACING_CATHODE_ELECTRODE_1: NORMAL
MDC_IDC_SET_LEADCHNL_LV_PACING_CATHODE_LOCATION_1: NORMAL
MDC_IDC_SET_LEADCHNL_LV_PACING_POLARITY: NORMAL
MDC_IDC_SET_LEADCHNL_LV_PACING_PULSEWIDTH: 0.4 MS
MDC_IDC_SET_LEADCHNL_RA_PACING_AMPLITUDE: 2 V
MDC_IDC_SET_LEADCHNL_RA_PACING_ANODE_ELECTRODE_1: NORMAL
MDC_IDC_SET_LEADCHNL_RA_PACING_ANODE_LOCATION_1: NORMAL
MDC_IDC_SET_LEADCHNL_RA_PACING_CAPTURE_MODE: NORMAL
MDC_IDC_SET_LEADCHNL_RA_PACING_CATHODE_ELECTRODE_1: NORMAL
MDC_IDC_SET_LEADCHNL_RA_PACING_CATHODE_LOCATION_1: NORMAL
MDC_IDC_SET_LEADCHNL_RA_PACING_POLARITY: NORMAL
MDC_IDC_SET_LEADCHNL_RA_PACING_PULSEWIDTH: 0.4 MS
MDC_IDC_SET_LEADCHNL_RA_SENSING_ANODE_ELECTRODE_1: NORMAL
MDC_IDC_SET_LEADCHNL_RA_SENSING_ANODE_LOCATION_1: NORMAL
MDC_IDC_SET_LEADCHNL_RA_SENSING_CATHODE_ELECTRODE_1: NORMAL
MDC_IDC_SET_LEADCHNL_RA_SENSING_CATHODE_LOCATION_1: NORMAL
MDC_IDC_SET_LEADCHNL_RA_SENSING_POLARITY: NORMAL
MDC_IDC_SET_LEADCHNL_RA_SENSING_SENSITIVITY: 0.3 MV
MDC_IDC_SET_LEADCHNL_RV_PACING_AMPLITUDE: 2 V
MDC_IDC_SET_LEADCHNL_RV_PACING_ANODE_ELECTRODE_1: NORMAL
MDC_IDC_SET_LEADCHNL_RV_PACING_ANODE_LOCATION_1: NORMAL
MDC_IDC_SET_LEADCHNL_RV_PACING_CAPTURE_MODE: NORMAL
MDC_IDC_SET_LEADCHNL_RV_PACING_CATHODE_ELECTRODE_1: NORMAL
MDC_IDC_SET_LEADCHNL_RV_PACING_CATHODE_LOCATION_1: NORMAL
MDC_IDC_SET_LEADCHNL_RV_PACING_POLARITY: NORMAL
MDC_IDC_SET_LEADCHNL_RV_PACING_PULSEWIDTH: 0.4 MS
MDC_IDC_SET_LEADCHNL_RV_SENSING_ANODE_ELECTRODE_1: NORMAL
MDC_IDC_SET_LEADCHNL_RV_SENSING_ANODE_LOCATION_1: NORMAL
MDC_IDC_SET_LEADCHNL_RV_SENSING_CATHODE_ELECTRODE_1: NORMAL
MDC_IDC_SET_LEADCHNL_RV_SENSING_CATHODE_LOCATION_1: NORMAL
MDC_IDC_SET_LEADCHNL_RV_SENSING_POLARITY: NORMAL
MDC_IDC_SET_LEADCHNL_RV_SENSING_SENSITIVITY: 0.9 MV
MDC_IDC_SET_ZONE_DETECTION_INTERVAL: 350 MS
MDC_IDC_SET_ZONE_DETECTION_INTERVAL: 400 MS
MDC_IDC_SET_ZONE_TYPE: NORMAL

## 2022-01-26 PROCEDURE — 93281 PM DEVICE PROGR EVAL MULTI: CPT | Performed by: INTERNAL MEDICINE

## 2022-01-26 NOTE — TELEPHONE ENCOUNTER
See phone encounter dated 1/26/22 for further details. Spoke with patient regarding PPM concerns. Katherine Arenas RN on 1/26/2022 at 2:10 PM

## 2022-01-26 NOTE — TELEPHONE ENCOUNTER
"Patient called EP line and feels that she needs to be seen sooner than recommended time and she would like a call back from an EP nurse because she has many questions regarding her procedure and follow up.     Merced Heller  Periop Electrophysiology   [phone]  _______________________________________________    Patient had a couple of questions regarding her current medications. States she does not want to be taking Klonapin anymore; advised QT is now normal. Would like to know if Dr. Funes would put her on Prozac. Also stated she can \"feel my beats;\" asks if she should I be seen sooner than May. Advised I would message Dr. Funes for follow up. Patient verbalized understanding and did not have any further questions at this time. Katherine Arenas RN on 1/26/2022 at 2:06 PM    Messaged Dr. Funes for follow up. Katherine Arenas RN on 1/26/2022 at 2:09 PM      "

## 2022-01-27 ENCOUNTER — OFFICE VISIT (OUTPATIENT)
Dept: FAMILY MEDICINE | Facility: CLINIC | Age: 62
End: 2022-01-27
Payer: COMMERCIAL

## 2022-01-27 VITALS
SYSTOLIC BLOOD PRESSURE: 116 MMHG | TEMPERATURE: 99.3 F | DIASTOLIC BLOOD PRESSURE: 75 MMHG | RESPIRATION RATE: 20 BRPM | HEART RATE: 99 BPM | BODY MASS INDEX: 30.15 KG/M2 | OXYGEN SATURATION: 97 % | WEIGHT: 181.2 LBS

## 2022-01-27 DIAGNOSIS — F41.8 SITUATIONAL ANXIETY: ICD-10-CM

## 2022-01-27 DIAGNOSIS — Z95.0 CARDIAC PACEMAKER IN SITU: ICD-10-CM

## 2022-01-27 DIAGNOSIS — F43.23 ADJUSTMENT DISORDER WITH MIXED ANXIETY AND DEPRESSED MOOD: Primary | ICD-10-CM

## 2022-01-27 DIAGNOSIS — R55 SYNCOPE, UNSPECIFIED SYNCOPE TYPE: ICD-10-CM

## 2022-01-27 DIAGNOSIS — R91.8 PULMONARY NODULES: ICD-10-CM

## 2022-01-27 LAB
MDC_IDC_LEAD_IMPLANT_DT: NORMAL
MDC_IDC_LEAD_LOCATION: NORMAL
MDC_IDC_LEAD_LOCATION_DETAIL_1: NORMAL
MDC_IDC_LEAD_MFG: NORMAL
MDC_IDC_LEAD_MODEL: NORMAL
MDC_IDC_LEAD_POLARITY_TYPE: NORMAL
MDC_IDC_LEAD_SERIAL: NORMAL
MDC_IDC_MSMT_BATTERY_DTM: NORMAL
MDC_IDC_MSMT_BATTERY_REMAINING_LONGEVITY: 112 MO
MDC_IDC_MSMT_BATTERY_RRT_TRIGGER: 2.6
MDC_IDC_MSMT_BATTERY_STATUS: NORMAL
MDC_IDC_MSMT_BATTERY_VOLTAGE: 3.2 V
MDC_IDC_MSMT_LEADCHNL_LV_IMPEDANCE_VALUE: 228 OHM
MDC_IDC_MSMT_LEADCHNL_LV_IMPEDANCE_VALUE: 342 OHM
MDC_IDC_MSMT_LEADCHNL_LV_IMPEDANCE_VALUE: 380 OHM
MDC_IDC_MSMT_LEADCHNL_LV_IMPEDANCE_VALUE: 494 OHM
MDC_IDC_MSMT_LEADCHNL_LV_IMPEDANCE_VALUE: 532 OHM
MDC_IDC_MSMT_LEADCHNL_LV_PACING_THRESHOLD_AMPLITUDE: 0.5 V
MDC_IDC_MSMT_LEADCHNL_LV_PACING_THRESHOLD_PULSEWIDTH: 0.4 MS
MDC_IDC_MSMT_LEADCHNL_RA_IMPEDANCE_VALUE: 342 OHM
MDC_IDC_MSMT_LEADCHNL_RA_IMPEDANCE_VALUE: 418 OHM
MDC_IDC_MSMT_LEADCHNL_RA_PACING_THRESHOLD_AMPLITUDE: 0.5 V
MDC_IDC_MSMT_LEADCHNL_RA_PACING_THRESHOLD_PULSEWIDTH: 0.4 MS
MDC_IDC_MSMT_LEADCHNL_RA_SENSING_INTR_AMPL: 2.6 MV
MDC_IDC_MSMT_LEADCHNL_RV_IMPEDANCE_VALUE: 418 OHM
MDC_IDC_MSMT_LEADCHNL_RV_IMPEDANCE_VALUE: 513 OHM
MDC_IDC_MSMT_LEADCHNL_RV_PACING_THRESHOLD_AMPLITUDE: 0.75 V
MDC_IDC_MSMT_LEADCHNL_RV_PACING_THRESHOLD_PULSEWIDTH: 0.4 MS
MDC_IDC_PG_IMPLANT_DTM: NORMAL
MDC_IDC_PG_MFG: NORMAL
MDC_IDC_PG_MODEL: NORMAL
MDC_IDC_PG_SERIAL: NORMAL
MDC_IDC_PG_TYPE: NORMAL
MDC_IDC_SESS_CLINIC_NAME: NORMAL
MDC_IDC_SESS_DTM: NORMAL
MDC_IDC_SESS_TYPE: NORMAL
MDC_IDC_SET_BRADY_AT_MODE_SWITCH_RATE: 171 {BEATS}/MIN
MDC_IDC_SET_BRADY_LOWRATE: 60 {BEATS}/MIN
MDC_IDC_SET_BRADY_MAX_SENSOR_RATE: 140 {BEATS}/MIN
MDC_IDC_SET_BRADY_MAX_TRACKING_RATE: 140 {BEATS}/MIN
MDC_IDC_SET_BRADY_MODE: NORMAL
MDC_IDC_SET_BRADY_PAV_DELAY_HIGH: 100 MS
MDC_IDC_SET_BRADY_PAV_DELAY_LOW: 180 MS
MDC_IDC_SET_BRADY_SAV_DELAY_HIGH: 70 MS
MDC_IDC_SET_BRADY_SAV_DELAY_LOW: 150 MS
MDC_IDC_SET_CRT_LVRV_DELAY: 80 MS
MDC_IDC_SET_CRT_PACED_CHAMBERS: NORMAL
MDC_IDC_SET_LEADCHNL_LV_PACING_AMPLITUDE: 2.25 V
MDC_IDC_SET_LEADCHNL_LV_PACING_ANODE_ELECTRODE_1: NORMAL
MDC_IDC_SET_LEADCHNL_LV_PACING_ANODE_LOCATION_1: NORMAL
MDC_IDC_SET_LEADCHNL_LV_PACING_CAPTURE_MODE: NORMAL
MDC_IDC_SET_LEADCHNL_LV_PACING_CATHODE_ELECTRODE_1: NORMAL
MDC_IDC_SET_LEADCHNL_LV_PACING_CATHODE_LOCATION_1: NORMAL
MDC_IDC_SET_LEADCHNL_LV_PACING_POLARITY: NORMAL
MDC_IDC_SET_LEADCHNL_LV_PACING_PULSEWIDTH: 0.4 MS
MDC_IDC_SET_LEADCHNL_RA_PACING_AMPLITUDE: 3.5 V
MDC_IDC_SET_LEADCHNL_RA_PACING_ANODE_ELECTRODE_1: NORMAL
MDC_IDC_SET_LEADCHNL_RA_PACING_ANODE_LOCATION_1: NORMAL
MDC_IDC_SET_LEADCHNL_RA_PACING_CAPTURE_MODE: NORMAL
MDC_IDC_SET_LEADCHNL_RA_PACING_CATHODE_ELECTRODE_1: NORMAL
MDC_IDC_SET_LEADCHNL_RA_PACING_CATHODE_LOCATION_1: NORMAL
MDC_IDC_SET_LEADCHNL_RA_PACING_POLARITY: NORMAL
MDC_IDC_SET_LEADCHNL_RA_PACING_PULSEWIDTH: 0.4 MS
MDC_IDC_SET_LEADCHNL_RA_SENSING_ANODE_ELECTRODE_1: NORMAL
MDC_IDC_SET_LEADCHNL_RA_SENSING_ANODE_LOCATION_1: NORMAL
MDC_IDC_SET_LEADCHNL_RA_SENSING_CATHODE_ELECTRODE_1: NORMAL
MDC_IDC_SET_LEADCHNL_RA_SENSING_CATHODE_LOCATION_1: NORMAL
MDC_IDC_SET_LEADCHNL_RA_SENSING_POLARITY: NORMAL
MDC_IDC_SET_LEADCHNL_RA_SENSING_SENSITIVITY: 0.3 MV
MDC_IDC_SET_LEADCHNL_RV_PACING_AMPLITUDE: 3.5 V
MDC_IDC_SET_LEADCHNL_RV_PACING_ANODE_ELECTRODE_1: NORMAL
MDC_IDC_SET_LEADCHNL_RV_PACING_ANODE_LOCATION_1: NORMAL
MDC_IDC_SET_LEADCHNL_RV_PACING_CAPTURE_MODE: NORMAL
MDC_IDC_SET_LEADCHNL_RV_PACING_CATHODE_ELECTRODE_1: NORMAL
MDC_IDC_SET_LEADCHNL_RV_PACING_CATHODE_LOCATION_1: NORMAL
MDC_IDC_SET_LEADCHNL_RV_PACING_POLARITY: NORMAL
MDC_IDC_SET_LEADCHNL_RV_PACING_PULSEWIDTH: 0.4 MS
MDC_IDC_SET_LEADCHNL_RV_SENSING_ANODE_ELECTRODE_1: NORMAL
MDC_IDC_SET_LEADCHNL_RV_SENSING_ANODE_LOCATION_1: NORMAL
MDC_IDC_SET_LEADCHNL_RV_SENSING_CATHODE_ELECTRODE_1: NORMAL
MDC_IDC_SET_LEADCHNL_RV_SENSING_CATHODE_LOCATION_1: NORMAL
MDC_IDC_SET_LEADCHNL_RV_SENSING_POLARITY: NORMAL
MDC_IDC_SET_LEADCHNL_RV_SENSING_SENSITIVITY: 0.9 MV
MDC_IDC_SET_ZONE_DETECTION_INTERVAL: 350 MS
MDC_IDC_SET_ZONE_DETECTION_INTERVAL: 400 MS
MDC_IDC_SET_ZONE_TYPE: NORMAL
MDC_IDC_STAT_AT_BURDEN_PERCENT: 0 %
MDC_IDC_STAT_AT_DTM_END: NORMAL
MDC_IDC_STAT_AT_DTM_START: NORMAL
MDC_IDC_STAT_BRADY_AP_VP_PERCENT: 40.64 %
MDC_IDC_STAT_BRADY_AP_VS_PERCENT: 0.04 %
MDC_IDC_STAT_BRADY_AS_VP_PERCENT: 53.17 %
MDC_IDC_STAT_BRADY_AS_VS_PERCENT: 6.15 %
MDC_IDC_STAT_BRADY_DTM_END: NORMAL
MDC_IDC_STAT_BRADY_DTM_START: NORMAL
MDC_IDC_STAT_BRADY_RA_PERCENT_PACED: 42.2 %
MDC_IDC_STAT_BRADY_RV_PERCENT_PACED: 93.81 %
MDC_IDC_STAT_CRT_DTM_END: NORMAL
MDC_IDC_STAT_CRT_DTM_START: NORMAL
MDC_IDC_STAT_CRT_LV_PERCENT_PACED: 93.78 %
MDC_IDC_STAT_CRT_PERCENT_PACED: 93.78 %
MDC_IDC_STAT_EPISODE_RECENT_COUNT: 0
MDC_IDC_STAT_EPISODE_RECENT_COUNT_DTM_END: NORMAL
MDC_IDC_STAT_EPISODE_RECENT_COUNT_DTM_START: NORMAL
MDC_IDC_STAT_EPISODE_TOTAL_COUNT: 0
MDC_IDC_STAT_EPISODE_TOTAL_COUNT_DTM_END: NORMAL
MDC_IDC_STAT_EPISODE_TOTAL_COUNT_DTM_START: NORMAL
MDC_IDC_STAT_EPISODE_TYPE: NORMAL

## 2022-01-27 PROCEDURE — 99213 OFFICE O/P EST LOW 20 MIN: CPT | Performed by: NURSE PRACTITIONER

## 2022-01-27 ASSESSMENT — ASTHMA QUESTIONNAIRES: ACT_TOTALSCORE: 20

## 2022-01-27 ASSESSMENT — PAIN SCALES - GENERAL: PAINLEVEL: NO PAIN (0)

## 2022-01-27 NOTE — TELEPHONE ENCOUNTER
Patient calling again for answers from Dr. Funes. The patient has the scheduling line number and called it looking for Dr. Funes or Shahriar for answers to yesterday's questions.     Dr. Funes - can you please call her? (or have someone call her)    Merced Heller  Periop Electrophysiology   [phone]

## 2022-01-27 NOTE — PATIENT INSTRUCTIONS
Keep up the great work! You are doing awesome!    Patient Education   Please make an appointment to follow up with your therapist and/or Psychiatric Clinic (phone: [phone]) within 1-3 days.     Return to the ED if you are having worsening thoughts/feelings of harming yourself or others, or any urgent/life-threatening concerns.     DISCHARGE RESOURCES:    [facility] [phone]     [facility] [phone] (detox), [phone] (outpatient & [facility])      Crisis Intervention: [phone] or [phone] (TTY: 953.529.7164).  Call anytime.    Suicide Awareness Voices of Education (SAVE) (www.save.org): 039-250-CIZV (9233)    National Suicide Prevention Line (www.mentalhealthmn.org): 892-680-XKGY (7824)    National [place] on Mental Illness (www.[place].carissa.org): 692.933.4863 or 638-275-4436.    Sbwh8cetl: text the word LIFE to 38819 for immediate support and crisis intervention    Mental Health Consumer/Survivor Network of [place] (www.mhcsn.net): 449.272.1664 or 960-422-4582    Mental Health Association [place] (www.mentalhealth.org): 583.967.1074 or 229-680-1746

## 2022-01-28 ENCOUNTER — HOSPITAL ENCOUNTER (OUTPATIENT)
Dept: CARDIAC REHAB | Facility: CLINIC | Age: 62
End: 2022-01-28
Attending: NURSE PRACTITIONER
Payer: COMMERCIAL

## 2022-01-28 ENCOUNTER — ANCILLARY PROCEDURE (OUTPATIENT)
Dept: CARDIOLOGY | Facility: CLINIC | Age: 62
End: 2022-01-28
Attending: INTERNAL MEDICINE
Payer: COMMERCIAL

## 2022-01-28 DIAGNOSIS — I44.2 COMPLETE ATRIOVENTRICULAR BLOCK (H): ICD-10-CM

## 2022-01-28 PROCEDURE — 93798 PHYS/QHP OP CAR RHAB W/ECG: CPT

## 2022-01-28 PROCEDURE — 93281 PM DEVICE PROGR EVAL MULTI: CPT | Performed by: INTERNAL MEDICINE

## 2022-01-28 NOTE — PATIENT INSTRUCTIONS
It was a pleasure to see you in clinic today.  Please do not hesitate to call with any questions or concerns.  We look forward to seeing you in clinic at your next device check on 5/25/22 as scheduled.    Jeanna Nixon, RN, MS, CCRN  Electrophysiology Nurse Clinician  [facility]    During Business Hours Please Call:  [phone]  After Hours Please Call:  [phone] - select option #4 and ask for job code [phone]

## 2022-01-31 ENCOUNTER — MYC MEDICAL ADVICE (OUTPATIENT)
Dept: CARDIOLOGY | Facility: CLINIC | Age: 62
End: 2022-01-31
Payer: COMMERCIAL

## 2022-02-01 ENCOUNTER — TRANSFERRED RECORDS (OUTPATIENT)
Dept: HEALTH INFORMATION MANAGEMENT | Facility: CLINIC | Age: 62
End: 2022-02-01
Payer: COMMERCIAL

## 2022-02-01 LAB
MDC_IDC_LEAD_IMPLANT_DT: NORMAL
MDC_IDC_LEAD_LOCATION: NORMAL
MDC_IDC_LEAD_LOCATION_DETAIL_1: NORMAL
MDC_IDC_LEAD_MFG: NORMAL
MDC_IDC_LEAD_MODEL: NORMAL
MDC_IDC_LEAD_POLARITY_TYPE: NORMAL
MDC_IDC_LEAD_SERIAL: NORMAL
MDC_IDC_MSMT_BATTERY_DTM: NORMAL
MDC_IDC_MSMT_BATTERY_REMAINING_LONGEVITY: 115 MO
MDC_IDC_MSMT_BATTERY_RRT_TRIGGER: 2.6
MDC_IDC_MSMT_BATTERY_STATUS: NORMAL
MDC_IDC_MSMT_BATTERY_VOLTAGE: 3.2 V
MDC_IDC_MSMT_LEADCHNL_LV_IMPEDANCE_VALUE: 228 OHM
MDC_IDC_MSMT_LEADCHNL_LV_IMPEDANCE_VALUE: 323 OHM
MDC_IDC_MSMT_LEADCHNL_LV_IMPEDANCE_VALUE: 380 OHM
MDC_IDC_MSMT_LEADCHNL_LV_IMPEDANCE_VALUE: 475 OHM
MDC_IDC_MSMT_LEADCHNL_LV_IMPEDANCE_VALUE: 513 OHM
MDC_IDC_MSMT_LEADCHNL_LV_PACING_THRESHOLD_AMPLITUDE: 0.5 V
MDC_IDC_MSMT_LEADCHNL_LV_PACING_THRESHOLD_PULSEWIDTH: 0.4 MS
MDC_IDC_MSMT_LEADCHNL_RA_IMPEDANCE_VALUE: 342 OHM
MDC_IDC_MSMT_LEADCHNL_RA_IMPEDANCE_VALUE: 399 OHM
MDC_IDC_MSMT_LEADCHNL_RA_PACING_THRESHOLD_AMPLITUDE: 0.5 V
MDC_IDC_MSMT_LEADCHNL_RA_PACING_THRESHOLD_PULSEWIDTH: 0.4 MS
MDC_IDC_MSMT_LEADCHNL_RA_SENSING_INTR_AMPL: 2.5 MV
MDC_IDC_MSMT_LEADCHNL_RA_SENSING_INTR_AMPL: 3.38 MV
MDC_IDC_MSMT_LEADCHNL_RV_IMPEDANCE_VALUE: 361 OHM
MDC_IDC_MSMT_LEADCHNL_RV_IMPEDANCE_VALUE: 437 OHM
MDC_IDC_MSMT_LEADCHNL_RV_PACING_THRESHOLD_AMPLITUDE: 0.75 V
MDC_IDC_MSMT_LEADCHNL_RV_PACING_THRESHOLD_PULSEWIDTH: 0.4 MS
MDC_IDC_PG_IMPLANT_DTM: NORMAL
MDC_IDC_PG_MFG: NORMAL
MDC_IDC_PG_MODEL: NORMAL
MDC_IDC_PG_SERIAL: NORMAL
MDC_IDC_PG_TYPE: NORMAL
MDC_IDC_SESS_CLINIC_NAME: NORMAL
MDC_IDC_SESS_DTM: NORMAL
MDC_IDC_SESS_TYPE: NORMAL
MDC_IDC_SET_BRADY_AT_MODE_SWITCH_RATE: 171 {BEATS}/MIN
MDC_IDC_SET_BRADY_LOWRATE: 60 {BEATS}/MIN
MDC_IDC_SET_BRADY_MAX_SENSOR_RATE: 140 {BEATS}/MIN
MDC_IDC_SET_BRADY_MAX_TRACKING_RATE: 140 {BEATS}/MIN
MDC_IDC_SET_BRADY_MODE: NORMAL
MDC_IDC_SET_BRADY_PAV_DELAY_HIGH: 100 MS
MDC_IDC_SET_BRADY_PAV_DELAY_LOW: 180 MS
MDC_IDC_SET_BRADY_SAV_DELAY_HIGH: 70 MS
MDC_IDC_SET_BRADY_SAV_DELAY_LOW: 150 MS
MDC_IDC_SET_CRT_LVRV_DELAY: 80 MS
MDC_IDC_SET_CRT_PACED_CHAMBERS: NORMAL
MDC_IDC_SET_LEADCHNL_LV_PACING_AMPLITUDE: 1.5 V
MDC_IDC_SET_LEADCHNL_LV_PACING_ANODE_ELECTRODE_1: NORMAL
MDC_IDC_SET_LEADCHNL_LV_PACING_ANODE_LOCATION_1: NORMAL
MDC_IDC_SET_LEADCHNL_LV_PACING_CAPTURE_MODE: NORMAL
MDC_IDC_SET_LEADCHNL_LV_PACING_CATHODE_ELECTRODE_1: NORMAL
MDC_IDC_SET_LEADCHNL_LV_PACING_CATHODE_LOCATION_1: NORMAL
MDC_IDC_SET_LEADCHNL_LV_PACING_POLARITY: NORMAL
MDC_IDC_SET_LEADCHNL_LV_PACING_PULSEWIDTH: 0.4 MS
MDC_IDC_SET_LEADCHNL_RA_PACING_AMPLITUDE: 2 V
MDC_IDC_SET_LEADCHNL_RA_PACING_ANODE_ELECTRODE_1: NORMAL
MDC_IDC_SET_LEADCHNL_RA_PACING_ANODE_LOCATION_1: NORMAL
MDC_IDC_SET_LEADCHNL_RA_PACING_CAPTURE_MODE: NORMAL
MDC_IDC_SET_LEADCHNL_RA_PACING_CATHODE_ELECTRODE_1: NORMAL
MDC_IDC_SET_LEADCHNL_RA_PACING_CATHODE_LOCATION_1: NORMAL
MDC_IDC_SET_LEADCHNL_RA_PACING_POLARITY: NORMAL
MDC_IDC_SET_LEADCHNL_RA_PACING_PULSEWIDTH: 0.4 MS
MDC_IDC_SET_LEADCHNL_RA_SENSING_ANODE_ELECTRODE_1: NORMAL
MDC_IDC_SET_LEADCHNL_RA_SENSING_ANODE_LOCATION_1: NORMAL
MDC_IDC_SET_LEADCHNL_RA_SENSING_CATHODE_ELECTRODE_1: NORMAL
MDC_IDC_SET_LEADCHNL_RA_SENSING_CATHODE_LOCATION_1: NORMAL
MDC_IDC_SET_LEADCHNL_RA_SENSING_POLARITY: NORMAL
MDC_IDC_SET_LEADCHNL_RA_SENSING_SENSITIVITY: 0.3 MV
MDC_IDC_SET_LEADCHNL_RV_PACING_AMPLITUDE: 2 V
MDC_IDC_SET_LEADCHNL_RV_PACING_ANODE_ELECTRODE_1: NORMAL
MDC_IDC_SET_LEADCHNL_RV_PACING_ANODE_LOCATION_1: NORMAL
MDC_IDC_SET_LEADCHNL_RV_PACING_CAPTURE_MODE: NORMAL
MDC_IDC_SET_LEADCHNL_RV_PACING_CATHODE_ELECTRODE_1: NORMAL
MDC_IDC_SET_LEADCHNL_RV_PACING_CATHODE_LOCATION_1: NORMAL
MDC_IDC_SET_LEADCHNL_RV_PACING_POLARITY: NORMAL
MDC_IDC_SET_LEADCHNL_RV_PACING_PULSEWIDTH: 0.4 MS
MDC_IDC_SET_LEADCHNL_RV_SENSING_ANODE_ELECTRODE_1: NORMAL
MDC_IDC_SET_LEADCHNL_RV_SENSING_ANODE_LOCATION_1: NORMAL
MDC_IDC_SET_LEADCHNL_RV_SENSING_CATHODE_ELECTRODE_1: NORMAL
MDC_IDC_SET_LEADCHNL_RV_SENSING_CATHODE_LOCATION_1: NORMAL
MDC_IDC_SET_LEADCHNL_RV_SENSING_POLARITY: NORMAL
MDC_IDC_SET_LEADCHNL_RV_SENSING_SENSITIVITY: 0.9 MV
MDC_IDC_SET_ZONE_DETECTION_INTERVAL: 350 MS
MDC_IDC_SET_ZONE_DETECTION_INTERVAL: 400 MS
MDC_IDC_SET_ZONE_TYPE: NORMAL
MDC_IDC_STAT_AT_BURDEN_PERCENT: 0 %
MDC_IDC_STAT_AT_DTM_END: NORMAL
MDC_IDC_STAT_AT_DTM_START: NORMAL
MDC_IDC_STAT_BRADY_AP_VP_PERCENT: 53.66 %
MDC_IDC_STAT_BRADY_AP_VS_PERCENT: 0.05 %
MDC_IDC_STAT_BRADY_AS_VP_PERCENT: 45.92 %
MDC_IDC_STAT_BRADY_AS_VS_PERCENT: 0.37 %
MDC_IDC_STAT_BRADY_DTM_END: NORMAL
MDC_IDC_STAT_BRADY_DTM_START: NORMAL
MDC_IDC_STAT_BRADY_RA_PERCENT_PACED: 53.16 %
MDC_IDC_STAT_BRADY_RV_PERCENT_PACED: 99.58 %
MDC_IDC_STAT_CRT_DTM_END: NORMAL
MDC_IDC_STAT_CRT_DTM_START: NORMAL
MDC_IDC_STAT_CRT_LV_PERCENT_PACED: 99.56 %
MDC_IDC_STAT_CRT_PERCENT_PACED: 99.56 %
MDC_IDC_STAT_EPISODE_RECENT_COUNT: 0
MDC_IDC_STAT_EPISODE_RECENT_COUNT_DTM_END: NORMAL
MDC_IDC_STAT_EPISODE_RECENT_COUNT_DTM_START: NORMAL
MDC_IDC_STAT_EPISODE_TOTAL_COUNT: 0
MDC_IDC_STAT_EPISODE_TOTAL_COUNT_DTM_END: NORMAL
MDC_IDC_STAT_EPISODE_TOTAL_COUNT_DTM_START: NORMAL
MDC_IDC_STAT_EPISODE_TYPE: NORMAL

## 2022-02-02 ENCOUNTER — HOSPITAL ENCOUNTER (OUTPATIENT)
Dept: CARDIAC REHAB | Facility: CLINIC | Age: 62
End: 2022-02-02
Attending: NURSE PRACTITIONER
Payer: COMMERCIAL

## 2022-02-02 PROCEDURE — 93798 PHYS/QHP OP CAR RHAB W/ECG: CPT | Performed by: CLINICAL EXERCISE PHYSIOLOGIST

## 2022-02-02 PROCEDURE — 93797 PHYS/QHP OP CAR RHAB WO ECG: CPT

## 2022-02-07 ENCOUNTER — APPOINTMENT (OUTPATIENT)
Dept: GENERAL RADIOLOGY | Facility: CLINIC | Age: 62
End: 2022-02-07
Attending: EMERGENCY MEDICINE
Payer: COMMERCIAL

## 2022-02-07 ENCOUNTER — HOSPITAL ENCOUNTER (EMERGENCY)
Facility: CLINIC | Age: 62
Discharge: HOME | End: 2022-02-07
Attending: EMERGENCY MEDICINE | Admitting: EMERGENCY MEDICINE
Payer: COMMERCIAL

## 2022-02-07 ENCOUNTER — ANCILLARY PROCEDURE (OUTPATIENT)
Dept: CARDIOLOGY | Facility: CLINIC | Age: 62
End: 2022-02-07
Attending: EMERGENCY MEDICINE
Payer: COMMERCIAL

## 2022-02-07 VITALS
HEART RATE: 64 BPM | OXYGEN SATURATION: 100 % | TEMPERATURE: 98 F | SYSTOLIC BLOOD PRESSURE: 113 MMHG | RESPIRATION RATE: 20 BRPM | HEIGHT: 65 IN | WEIGHT: 180 LBS | DIASTOLIC BLOOD PRESSURE: 66 MMHG | BODY MASS INDEX: 29.99 KG/M2

## 2022-02-07 DIAGNOSIS — R07.9 ACUTE CHEST PAIN: ICD-10-CM

## 2022-02-07 LAB
ALBUMIN SERPL-MCNC: 3.4 G/DL (ref 3.4–5)
ALP SERPL-CCNC: 96 U/L (ref 40–150)
ALT SERPL W P-5'-P-CCNC: 23 U/L (ref 0–50)
ANION GAP SERPL CALCULATED.3IONS-SCNC: 4 MMOL/L (ref 3–14)
AST SERPL W P-5'-P-CCNC: 14 U/L (ref 0–45)
BASOPHILS # BLD AUTO: 0 10E3/UL (ref 0–0.2)
BASOPHILS NFR BLD AUTO: 1 %
BILIRUB SERPL-MCNC: 0.4 MG/DL (ref 0.2–1.3)
BUN SERPL-MCNC: 13 MG/DL (ref 7–30)
CALCIUM SERPL-MCNC: 9.3 MG/DL (ref 8.5–10.1)
CHLORIDE BLD-SCNC: 107 MMOL/L (ref 94–109)
CO2 SERPL-SCNC: 26 MMOL/L (ref 20–32)
CREAT SERPL-MCNC: 0.68 MG/DL (ref 0.52–1.04)
EOSINOPHIL # BLD AUTO: 0.1 10E3/UL (ref 0–0.7)
EOSINOPHIL NFR BLD AUTO: 1 %
ERYTHROCYTE [DISTWIDTH] IN BLOOD BY AUTOMATED COUNT: 12.2 % (ref 10–15)
GFR SERPL CREATININE-BSD FRML MDRD: >90 ML/MIN/1.73M2
GLUCOSE BLD-MCNC: 83 MG/DL (ref 70–99)
HCT VFR BLD AUTO: 41.1 % (ref 35–47)
HGB BLD-MCNC: 13.4 G/DL (ref 11.7–15.7)
HOLD SPECIMEN: NORMAL
HOLD SPECIMEN: NORMAL
IMM GRANULOCYTES # BLD: 0 10E3/UL
IMM GRANULOCYTES NFR BLD: 0 %
LIPASE SERPL-CCNC: 124 U/L (ref 73–393)
LYMPHOCYTES # BLD AUTO: 1.2 10E3/UL (ref 0.8–5.3)
LYMPHOCYTES NFR BLD AUTO: 17 %
MAGNESIUM SERPL-MCNC: 2 MG/DL (ref 1.8–2.6)
MCH RBC QN AUTO: 31.2 PG (ref 26.5–33)
MCHC RBC AUTO-ENTMCNC: 32.6 G/DL (ref 31.5–36.5)
MCV RBC AUTO: 96 FL (ref 78–100)
MONOCYTES # BLD AUTO: 0.7 10E3/UL (ref 0–1.3)
MONOCYTES NFR BLD AUTO: 10 %
NEUTROPHILS # BLD AUTO: 4.9 10E3/UL (ref 1.6–8.3)
NEUTROPHILS NFR BLD AUTO: 71 %
NRBC # BLD AUTO: 0 10E3/UL
NRBC BLD AUTO-RTO: 0 /100
NT-PROBNP SERPL-MCNC: 83 PG/ML (ref 0–900)
PLATELET # BLD AUTO: 225 10E3/UL (ref 150–450)
POTASSIUM BLD-SCNC: 4.2 MMOL/L (ref 3.4–5.3)
PROT SERPL-MCNC: 7.4 G/DL (ref 6.8–8.8)
RBC # BLD AUTO: 4.29 10E6/UL (ref 3.8–5.2)
SODIUM SERPL-SCNC: 137 MMOL/L (ref 133–144)
TROPONIN I SERPL HS-MCNC: 3 NG/L
WBC # BLD AUTO: 6.9 10E3/UL (ref 4–11)

## 2022-02-07 PROCEDURE — 258N000003 HC RX IP 258 OP 636: Performed by: EMERGENCY MEDICINE

## 2022-02-07 PROCEDURE — 93010 ELECTROCARDIOGRAM REPORT: CPT | Performed by: EMERGENCY MEDICINE

## 2022-02-07 PROCEDURE — 99285 EMERGENCY DEPT VISIT HI MDM: CPT | Mod: 25 | Performed by: EMERGENCY MEDICINE

## 2022-02-07 PROCEDURE — 93005 ELECTROCARDIOGRAM TRACING: CPT | Mod: 59 | Performed by: EMERGENCY MEDICINE

## 2022-02-07 PROCEDURE — 71046 X-RAY EXAM CHEST 2 VIEWS: CPT | Mod: 26 | Performed by: RADIOLOGY

## 2022-02-07 PROCEDURE — 250N000013 HC RX MED GY IP 250 OP 250 PS 637: Performed by: EMERGENCY MEDICINE

## 2022-02-07 PROCEDURE — 36415 COLL VENOUS BLD VENIPUNCTURE: CPT | Performed by: EMERGENCY MEDICINE

## 2022-02-07 PROCEDURE — 99214 OFFICE O/P EST MOD 30 MIN: CPT | Mod: 25 | Performed by: INTERNAL MEDICINE

## 2022-02-07 PROCEDURE — 93281 PM DEVICE PROGR EVAL MULTI: CPT | Mod: 26 | Performed by: INTERNAL MEDICINE

## 2022-02-07 PROCEDURE — 80053 COMPREHEN METABOLIC PANEL: CPT | Performed by: EMERGENCY MEDICINE

## 2022-02-07 PROCEDURE — 85025 COMPLETE CBC W/AUTO DIFF WBC: CPT | Performed by: EMERGENCY MEDICINE

## 2022-02-07 PROCEDURE — 71046 X-RAY EXAM CHEST 2 VIEWS: CPT

## 2022-02-07 PROCEDURE — 83690 ASSAY OF LIPASE: CPT | Performed by: EMERGENCY MEDICINE

## 2022-02-07 PROCEDURE — 96360 HYDRATION IV INFUSION INIT: CPT | Performed by: EMERGENCY MEDICINE

## 2022-02-07 PROCEDURE — 93005 ELECTROCARDIOGRAM TRACING: CPT | Mod: 76,59 | Performed by: EMERGENCY MEDICINE

## 2022-02-07 PROCEDURE — 83735 ASSAY OF MAGNESIUM: CPT | Performed by: EMERGENCY MEDICINE

## 2022-02-07 PROCEDURE — 84484 ASSAY OF TROPONIN QUANT: CPT | Performed by: EMERGENCY MEDICINE

## 2022-02-07 PROCEDURE — 93010 ELECTROCARDIOGRAM REPORT: CPT | Mod: 76 | Performed by: EMERGENCY MEDICINE

## 2022-02-07 PROCEDURE — 83880 ASSAY OF NATRIURETIC PEPTIDE: CPT | Performed by: EMERGENCY MEDICINE

## 2022-02-07 PROCEDURE — 93281 PM DEVICE PROGR EVAL MULTI: CPT

## 2022-02-07 RX ORDER — DIPHENHYDRAMINE HYDROCHLORIDE 50 MG/ML
25 INJECTION INTRAMUSCULAR; INTRAVENOUS ONCE
Status: DISCONTINUED | OUTPATIENT
Start: 2022-02-07 | End: 2022-02-07 | Stop reason: HOSPADM

## 2022-02-07 RX ORDER — METOCLOPRAMIDE HYDROCHLORIDE 5 MG/ML
10 INJECTION INTRAMUSCULAR; INTRAVENOUS ONCE
Status: DISCONTINUED | OUTPATIENT
Start: 2022-02-07 | End: 2022-02-07 | Stop reason: HOSPADM

## 2022-02-07 RX ORDER — ACETAMINOPHEN 500 MG
1000 TABLET ORAL ONCE
Status: COMPLETED | OUTPATIENT
Start: 2022-02-07 | End: 2022-02-07

## 2022-02-07 RX ORDER — CLONAZEPAM 0.5 MG/1
0.5 TABLET ORAL ONCE
Status: COMPLETED | OUTPATIENT
Start: 2022-02-07 | End: 2022-02-07

## 2022-02-07 RX ADMIN — ACETAMINOPHEN 1000 MG: 500 TABLET ORAL at 15:11

## 2022-02-07 RX ADMIN — CLONAZEPAM 0.5 MG: 0.5 TABLET ORAL at 15:12

## 2022-02-07 RX ADMIN — SODIUM CHLORIDE 1000 ML: 9 INJECTION, SOLUTION INTRAVENOUS at 15:12

## 2022-02-07 ASSESSMENT — MIFFLIN-ST. JEOR: SCORE: 1372.35

## 2022-02-07 NOTE — ED PROVIDER NOTES
History     Chief Complaint   Patient presents with     Chest Pain     intermittent pain / pressure     Nausea     HPI  Joyce Marroquin is a 61 year old female with a past medical history of cardiomyopathy, depression, congestive heart failure, COPD, GERD, LARRY, cardiac pacemaker in place, asthma who presents to the emergency department with a chief complaint of chest pain.  It started as a sharp chest pain on the left side of her chest and has progressed to a substernal pressure-like sensation.  It is intermittent.  It is associated with shortness of breath.  She is concerned that her cardiac pacemaker may have migrated and the wires may be dislodged.  She is concerned about this because she states that her pacemaker (a previous one) had migrated once before after a mammogram.  The patient is also concerned that she may be having a heart attack.  She states she has not had a heart attack before.    I have reviewed the Medications, Allergies, Past Medical and Surgical History, and Social History in the [employer] system.    Past Medical History:   Diagnosis Date     Arthritis      Cardiomyopathy (H)     ff Cardiology     Chronic depressive personality disorder      Congestive heart failure (H) see dr martínez    heart failure correct term     COPD exacerbation (H)      Esophageal reflux      Ex-smoker     quit 2006; 1 PPD x 30     Hyperparathyroidism (H)     s/p parathyroidectomy     Hypothyroidism      LARRY on CPAP     ff Sleep medicine     Pulmonary nodules     ff Pulmonologist     S/P cardiac pacemaker procedure     checked every 6 [facility] at the [facility]     Uncomplicated asthma years     Past Surgical History:   Procedure Laterality Date     BUNIONECTOMY Bilateral      COLONOSCOPY N/A 8/30/2021    Procedure: COLONOSCOPY, WITH POLYPECTOMY AND BIOPSY;  Surgeon: Ranjit Penn MD;  Location:  GI     CV CORONARY ANGIOGRAM N/A 9/26/2019    Procedure: CV CORONARY ANGIOGRAM;  Surgeon: Erickson Trejo MD;  Location:   HEART CARDIAC CATH LAB     CV RIGHT HEART CATH MEASUREMENTS RECORDED N/A 7/20/2020    Procedure: CV RIGHT HEART CATH;  Surgeon: Alonso Ordonez MD;  Location:  HEART CARDIAC CATH LAB     EP ABLATION / EP STUDIES  04/21/2017     EP PPM UPGRADE TO BIVENT N/A 1/19/2022    Procedure: EP PPM UPGRADE TO BIVENT;  Surgeon: Lino Funes MD;  Location:  HEART CARDIAC CATH LAB     EXPLORE NECK N/A 9/19/2018    Procedure: EXPLORE NECK;  Neck Exploration Resection of Left superior Parathyroid gland;  Surgeon: Kristine Venegas MD;  Location: [facility] OR      CORRECT BUNION,SIMPLE       PARATHYROIDECTOMY N/A 9/19/2018    Procedure: PARATHYROIDECTOMY;;  Surgeon: Kristine Venegas MD;  Location: [facility] OR     PPM INSERT OF NEW OR REPL W/VENT LEAD  04/21/2017     TONSILLECTOMY & ADENOIDECTOMY       No current facility-administered medications for this encounter.     Current Outpatient Medications   Medication     acetaminophen (TYLENOL) 500 MG tablet     acetaminophen-codeine (TYLENOL W/CODEINE #3) 300-30 MG per tablet     albuterol (PROAIR HFA/PROVENTIL HFA/VENTOLIN HFA) 108 (90 BASE) MCG/ACT Inhaler     azelastine (ASTELIN) 0.1 % nasal spray     budesonide (ENTOCORT EC) 3 MG EC capsule     Calcium Lactate 500 MG CAPS     clonazePAM (KLONOPIN) 0.5 MG tablet     DiphenhydrAMINE HCl (BENADRYL PO)     ELIQUIS ANTICOAGULANT 5 MG tablet     Ferrous Sulfate (IRON SUPPLEMENT PO)     FLUoxetine (PROZAC) 10 MG tablet     fluticasone-salmeterol (ADVAIR) 250-50 MCG/DOSE inhaler     furosemide (LASIX) 20 MG tablet     ipratropium (ATROVENT) 0.06 % nasal spray     loperamide (IMODIUM A-D) 2 MG tablet     MAGNESIUM LACTATE PO     MELATONIN PO     Menaquinone-7 (VITAMIN K2 PO)     METHYLPREDNISOLONE PO     metoprolol succinate ER (TOPROL XL) 25 MG 24 hr tablet     Multiple Vitamin (DAILY MULTIVITAMIN PO)     nebulizer nebulization     Probiotic Product (PROBIOTIC DAILY PO)     spironolactone (ALDACTONE) 25 MG tablet     SYNTHROID 150  MCG tablet     TURMERIC PO     vitamin D3 (CHOLECALCIFEROL) 2000 units tablet     Zinc 15 MG CAPS     Allergies   Allergen Reactions     Tetracycline Swelling     Viagra [Sildenafil] Muscle Pain (Myalgia)     Zofran [Ondansetron]      Prolonged QT  Prolonged QT at baseline per patient     Flagyl [Metronidazole] Rash     Buspar [Buspirone]      Muscle weakness     Corn Oil Other (See Comments)     Headache       Cymbalta Diarrhea     Seasonal Allergies Difficulty breathing     Serzone [Nefazodone]      Sulfamethoxazole-Trimethoprim      Other reaction(s): Other  Passed out     Cyclobenzaprine Other (See Comments)     Extreme heat intolerance     Levaquin [Levofloxacin]      Other reaction(s): Other  Tendon rupture     Nsaids Other (See Comments)     Exacerbated asthma     Past medical history, past surgical history, medications, and allergies were reviewed with the patient. Additional pertinent items: None    Social History     Socioeconomic History     Marital status:      Spouse name: Not on file     Number of children: Not on file     Years of education: Not on file     Highest education level: Master's degree (e.g., MA, MS, Florian, MEd, MSW, SONIA)   Occupational History     Not on file   Tobacco Use     Smoking status: Former Smoker     Packs/day: 0.00     Years: 0.00     Pack years: 0.00     Smokeless tobacco: Never Used   Vaping Use     Vaping Use: Never used   Substance and Sexual Activity     Alcohol use: Yes     Alcohol/week: 0.0 - 8.0 standard drinks     Comment: seldom     Drug use: No     Sexual activity: Yes     Partners: Male     Birth control/protection: None     Comment: vasectomy   Other Topics Concern     Parent/sibling w/ CABG, MI or angioplasty before 65F 55M? No   Social History Narrative    CRNA on disability for vestibular symptoms      Social Determinants of Health     Financial Resource Strain: Low Risk      Difficulty of Paying Living Expenses: Not very hard   Food Insecurity: No Food  "Insecurity     Worried About Running Out of Food in the Last Year: Never true     Ran Out of Food in the Last Year: Never true   Transportation Needs: No Transportation Needs     Lack of Transportation (Medical): No     Lack of Transportation (Non-Medical): No   Physical Activity: Not on file   Stress: Not on file   Social Connections: Not on file   Intimate Partner Violence: Not At Risk     Fear of Current or Ex-Partner: No     Emotionally Abused: No     Physically Abused: No     Sexually Abused: No   Housing Stability: Not on file     Social history was reviewed with the patient. Additional pertinent items: None    Review of Systems  General: No fevers or chills  Skin: No rash or diaphoresis  Eyes: No eye redness or discharge  Ears/Nose/Throat: No rhinorrhea or nasal congestion  Respiratory: No cough or SOB  Cardiovascular: No chest pain or palpitations  Gastrointestinal: No nausea, vomiting, or diarrhea  Genitourinary: No urinary frequency, hematuria, or dysuria  Musculoskeletal: No arthralgias or myalgias  Neurologic: No numbness or weakness  Psychiatric: No depression or SI  Hematologic/Lymphatic/Immunologic: No leg swelling, no easy bruising/bleeding  Endocrine: No polyuria/polydypsia    A complete review of systems was performed with pertinent positives and negatives noted in the HPI, and all other systems negative.    Physical Exam   BP: 113/64  Pulse: 64  Temp: 98  F (36.7  C)  Resp: 16  Height: 165.1 cm (5' 5\")  Weight: 81.6 kg (180 lb)  SpO2: 99 %      General: Well nourished, well developed, NAD  HEENT: EOMI, anicteric. NCAT, MMM  Neck: no jugular venous distension, supple, nl ROM  Cardiac: Regular rate and rhythm. No murmurs, rubs, or gallops. Normal S1, S2.  Intact peripheral pulses.  There is chest wall tenderness overlying pacemaker site on left chest wall  Pulm: CTAB, no stridor, wheezes, rales, rhonchi  Abd: Soft, nontender, nondistended.  No masses palpated.    Skin: Warm and dry to the touch.  No " rash.  Pacemaker insertion site appears to be healing well with no surrounding skin changes  Extremities: No LE edema, no cyanosis, w/w/p  Neuro: A&Ox3, no gross focal deficits    ED Course        Procedures               EKG Interpretation:      Interpreted by Esther Osman MD  Time reviewed: 1133  Symptoms at time of EKG: Chest pain  Rhythm: Atrially paced rhythm  Rate: normal, 67 bpm  Ectopy: none  Conduction: normal  ST Segments/ T Waves: Diffuse T wave flattening, T wave inversion in III   Comparison to prior: Compared to previous EKG, dated 1/20/2022, atrially paced rhythm has replaced ventricular paced rhythm    Clinical Impression: abnormal EKG                Labs Ordered and Resulted from Time of ED Arrival to Time of ED Departure   COMPREHENSIVE METABOLIC PANEL - Normal       Result Value    Sodium 137      Potassium 4.2      Chloride 107      Carbon Dioxide (CO2) 26      Anion Gap 4      Urea Nitrogen 13      Creatinine 0.68      Calcium 9.3      Glucose 83      Alkaline Phosphatase 96      AST 14      ALT 23      Protein Total 7.4      Albumin 3.4      Bilirubin Total 0.4      GFR Estimate >90     LIPASE - Normal    Lipase 124     TROPONIN I - Normal    Troponin I High Sensitivity 3     NT PROBNP INPATIENT - Normal    N terminal Pro BNP Inpatient 83     CBC WITH PLATELETS AND DIFFERENTIAL    WBC Count 6.9      RBC Count 4.29      Hemoglobin 13.4      Hematocrit 41.1      MCV 96      MCH 31.2      MCHC 32.6      RDW 12.2      Platelet Count 225      % Neutrophils 71      % Lymphocytes 17      % Monocytes 10      % Eosinophils 1      % Basophils 1      % Immature Granulocytes 0      NRBCs per 100 WBC 0      Absolute Neutrophils 4.9      Absolute Lymphocytes 1.2      Absolute Monocytes 0.7      Absolute Eosinophils 0.1      Absolute Basophils 0.0      Absolute Immature Granulocytes 0.0      Absolute NRBCs 0.0     MAGNESIUM            Results for orders placed or performed during the hospital  encounter of 02/07/22 (from the past 24 hour(s))   CBC with platelets differential    Narrative    The following orders were created for panel order CBC with platelets differential.  Procedure                               Abnormality         Status                     ---------                               -----------         ------                     CBC with platelets and d...[[id number]]                      Final result                 Please view results for these tests on the individual orders.   Comprehensive metabolic panel   Result Value Ref Range    Sodium 137 133 - 144 mmol/L    Potassium 4.2 3.4 - 5.3 mmol/L    Chloride 107 94 - 109 mmol/L    Carbon Dioxide (CO2) 26 20 - 32 mmol/L    Anion Gap 4 3 - 14 mmol/L    Urea Nitrogen 13 7 - 30 mg/dL    Creatinine 0.68 0.52 - 1.04 mg/dL    Calcium 9.3 8.5 - 10.1 mg/dL    Glucose 83 70 - 99 mg/dL    Alkaline Phosphatase 96 40 - 150 U/L    AST 14 0 - 45 U/L    ALT 23 0 - 50 U/L    Protein Total 7.4 6.8 - 8.8 g/dL    Albumin 3.4 3.4 - 5.0 g/dL    Bilirubin Total 0.4 0.2 - 1.3 mg/dL    GFR Estimate >90 >60 mL/min/1.73m2   Lipase   Result Value Ref Range    Lipase 124 73 - 393 U/L   Troponin I   Result Value Ref Range    Troponin I High Sensitivity 3 <54 ng/L   Nt probnp inpatient (BNP)   Result Value Ref Range    N terminal Pro BNP Inpatient 83 0 - 900 pg/mL   [place] Draw    Narrative    The following orders were created for panel order [place] Draw.  Procedure                               Abnormality         Status                     ---------                               -----------         ------                     Extra Blue Top Tube[[id number]]                              Final result               Extra Red Top Tube[[id number]]                               Final result                 Please view results for these tests on the individual orders.   CBC with platelets and differential   Result Value Ref Range    WBC Count 6.9 4.0 - 11.0 10e3/uL    RBC  Count 4.29 3.80 - 5.20 10e6/uL    Hemoglobin 13.4 11.7 - 15.7 g/dL    Hematocrit 41.1 35.0 - 47.0 %    MCV 96 78 - 100 fL    MCH 31.2 26.5 - 33.0 pg    MCHC 32.6 31.5 - 36.5 g/dL    RDW 12.2 10.0 - 15.0 %    Platelet Count 225 150 - 450 10e3/uL    % Neutrophils 71 %    % Lymphocytes 17 %    % Monocytes 10 %    % Eosinophils 1 %    % Basophils 1 %    % Immature Granulocytes 0 %    NRBCs per 100 WBC 0 <1 /100    Absolute Neutrophils 4.9 1.6 - 8.3 10e3/uL    Absolute Lymphocytes 1.2 0.8 - 5.3 10e3/uL    Absolute Monocytes 0.7 0.0 - 1.3 10e3/uL    Absolute Eosinophils 0.1 0.0 - 0.7 10e3/uL    Absolute Basophils 0.0 0.0 - 0.2 10e3/uL    Absolute Immature Granulocytes 0.0 <=0.4 10e3/uL    Absolute NRBCs 0.0 10e3/uL   Extra Blue Top Tube   Result Value Ref Range    Hold Specimen JIC    Extra Red Top Tube   Result Value Ref Range    Hold Specimen JIC    XR Chest 2 Views    Narrative    Chest 2 views    INDICATION: Chest pain    COMPARISON: 1/20/2022    FINDINGS: Heart size appears normal. There is calcification of the  aortic arch. Bipolar pacemaker from the left again noted. Lungs are  mildly hyperinflated. Bones are osteopenic.      Impression    IMPRESSION: Hyperinflation of lungs with osteopenia. Pacemaker.  Atherosclerosis.    STEVE JACOB MD         SYSTEM ID:  [id number]   EKG 12 lead   Result Value Ref Range    Systolic Blood Pressure  mmHg    Diastolic Blood Pressure  mmHg    Ventricular Rate 60 BPM    Atrial Rate 60 BPM    NC Interval 202 ms    QRS Duration 98 ms     ms    QTc 418 ms    P Axis 35 degrees    R AXIS -13 degrees    T Axis 11 degrees    Interpretation ECG       Atrial-paced rhythm  Low voltage QRS  Nonspecific ST and T wave abnormality  Abnormal ECG         Labs, vital signs, and imaging studies were reviewed by me.    Medications   metoclopramide (REGLAN) injection 10 mg (has no administration in time range)   diphenhydrAMINE (BENADRYL) injection 25 mg (has no administration in time range)    0.9% sodium chloride BOLUS (has no administration in time range)   acetaminophen (TYLENOL) tablet 1,000 mg (has no administration in time range)   clonazePAM (klonoPIN) tablet 0.5 mg (has no administration in time range)       Assessments & Plan (with Medical Decision Making)   Joyce Marroquin is a 61 year old female who presents with chest pain.  Differential diagnosis includes ACS, pacemaker dysfunction, lead migration, cardiac arrhythmia, musculoskeletal chest wall pain, pneumonia.  Labs, chest x-ray, EKG ordered to further evaluate the patient.    EKG shows apparent atrially paced rhythm, this was discussed with EP cardiology, they state appearance is consistent with appropriately AV paced rhythm with third lead in place, resulting in narrow complex.    Laboratory work-up is remarkable for normal troponin.    Chest x-ray shows no acute findings, pacemaker and leads in appropriate position     Pt d/w cardiology, they will come to the emergency department to evaluate the patient    Cardiology fellow evaluated patient in the emergency department.  After this, I discussed the patient with Dr. Funes, the electrophysiologist on-call, he has no further concerns regarding patient's pacemaker at this time.  Chest pain seems noncardiac in nature.  He will come evaluate the patient in the emergency department prior to her discharge.    Patient reports chest pain has returned.  It is associated with some anxiety and nausea.  Medications were ordered for symptomatic relief in the emergency department.  Repeat EKG was performed and was unchanged.         EKG Interpretation:      Interpreted by Esther Osman MD  Time reviewed: 1437  Symptoms at time of EKG: Chest pain  Rhythm: AV paced  Rate: normal, 68 bpm  Axis: NORMAL  Ectopy: none  Conduction: normal  ST Segments/ T Waves: T wave flattening, no changes compared to prior   Comparison to prior: Unchanged from EKG from earlier today    Clinical Impression: abnormal  EKG    I have reviewed the nursing notes.    I have reviewed the findings, diagnosis, plan and need for follow up with the patient.    Patient to be discharged home. Advised to follow up with cardiology in clinic as directed. To return to ER immediately with any new/worsening symptoms. Plan of care discussed with patient who expresses understanding and agrees with plan of care.      New Prescriptions    No medications on file       Final diagnoses:   Acute chest pain     Esther Osman MD  2/7/2022   [facility] EMERGENCY DEPARTMENT     Esther Osman MD  02/07/22 [id number]

## 2022-02-07 NOTE — DISCHARGE INSTRUCTIONS
TODAY'S VISIT:  You were seen today for chest pain  -   - If you had any labs or imaging/radiology tests performed today, you should also discuss these tests with your usual provider.     FOLLOW-UP:  Please make an appointment to follow up with:  - Your Primary Care Provider. If you do not have a PCP, please call the Primary Care Center (phone: [phone] for an appointment  - Cardiology Clinic (phone: [phone]) on the 22nd as scheduled    - Have your provider review the results from today's visit with you again to make sure no further follow-up or additional testing is needed based on those results.     PRESCRIPTIONS / MEDICATIONS:  -Tylenol as needed for pain    RETURN TO THE EMERGENCY DEPARTMENT  Return to the Emergency Department at any time for any new or worsening symptoms or any concerns.

## 2022-02-08 LAB
ATRIAL RATE - MUSE: 60 BPM
ATRIAL RATE - MUSE: 67 BPM
DIASTOLIC BLOOD PRESSURE - MUSE: NORMAL MMHG
DIASTOLIC BLOOD PRESSURE - MUSE: NORMAL MMHG
INTERPRETATION ECG - MUSE: NORMAL
INTERPRETATION ECG - MUSE: NORMAL
P AXIS - MUSE: 35 DEGREES
P AXIS - MUSE: NORMAL DEGREES
PR INTERVAL - MUSE: 202 MS
PR INTERVAL - MUSE: NORMAL MS
QRS DURATION - MUSE: 98 MS
QRS DURATION - MUSE: 98 MS
QT - MUSE: 418 MS
QT - MUSE: 432 MS
QTC - MUSE: 418 MS
QTC - MUSE: 456 MS
R AXIS - MUSE: -13 DEGREES
R AXIS - MUSE: -8 DEGREES
SYSTOLIC BLOOD PRESSURE - MUSE: NORMAL MMHG
SYSTOLIC BLOOD PRESSURE - MUSE: NORMAL MMHG
T AXIS - MUSE: 11 DEGREES
T AXIS - MUSE: 49 DEGREES
VENTRICULAR RATE- MUSE: 60 BPM
VENTRICULAR RATE- MUSE: 67 BPM

## 2022-02-08 NOTE — PROGRESS NOTES
EP called for pt evaluation:    Joyce Marroquin is a 61 year old female with a past medical history of cardiomyopathy,  COPD, GERD, LARRY, dual chamber pacemaker with a recent upgrade to a LBB pacer on 1/19/2022. who presents to the emergency department with a chief complaint of chest pain.  It started as a sharp chest pain on the left side of her chest and has progressed to a substernal pressure-like sensation.  It is intermittent.  It is associated with shortness of breath.     Pt was seen and examined chest pain is not related to her pacemaker, site is clean and healing well, no signs of inflammation, chest xray with leads are stable in position, device interrogation with stable measurement and no arrhythmias. Pt can f/u in regular EP/device clinic.        Device interrogation on 2/7/2022  Device: Ophis Vape [id number] Percepta CRT-P  Normal device function  Mode: DDDR  bpm  AP: 60.9%  : 99.5%  BVP: 99.5%  VSR Pace: 0.2%  Intrinsic Rhythm: NSR with  @ 30 bpm  Short V-V intervals: 0  Lead Trends Appear Stable: yes  Estimated battery longevity to RRT = 10.4 years  Atrial Arrhythmia: 0  Ventricular Arrhythmia: 0  Setting Changes: None    \I very much appreciated the opportunity to see and assess Joyce Marroquin in the hospital ED with CVEP Fellow Dr Negrete at ED MD request. I discussed Joyce's symptoms at length with the patient in the ED and reassured her that there was no evidence of cardiac or device abnormality. She voiced understanding.     I agree with the note above which summarizes my findings and current recommendations.  Please do not hesitate to contact my office if you have any questions or concerns.      Lino Funes MD  Cardiac Arrhythmia Service  [facility]  [phone]

## 2022-02-09 ENCOUNTER — PATIENT OUTREACH (OUTPATIENT)
Dept: CARDIOLOGY | Facility: CLINIC | Age: 62
End: 2022-02-09
Payer: COMMERCIAL

## 2022-02-09 ENCOUNTER — HOSPITAL ENCOUNTER (OUTPATIENT)
Dept: CARDIAC REHAB | Facility: CLINIC | Age: 62
End: 2022-02-09
Attending: NURSE PRACTITIONER
Payer: COMMERCIAL

## 2022-02-09 PROCEDURE — 93798 PHYS/QHP OP CAR RHAB W/ECG: CPT

## 2022-02-09 NOTE — TELEPHONE ENCOUNTER
Called and spoke with patient who states she is doing well. No c/o shortness of breath or chest discomfort.

## 2022-02-11 ENCOUNTER — HOSPITAL ENCOUNTER (OUTPATIENT)
Dept: CARDIAC REHAB | Facility: CLINIC | Age: 62
End: 2022-02-11
Attending: NURSE PRACTITIONER
Payer: COMMERCIAL

## 2022-02-11 LAB
MDC_IDC_LEAD_IMPLANT_DT: NORMAL
MDC_IDC_LEAD_LOCATION: NORMAL
MDC_IDC_LEAD_LOCATION_DETAIL_1: NORMAL
MDC_IDC_LEAD_MFG: NORMAL
MDC_IDC_LEAD_MODEL: NORMAL
MDC_IDC_LEAD_POLARITY_TYPE: NORMAL
MDC_IDC_LEAD_SERIAL: NORMAL
MDC_IDC_MSMT_BATTERY_DTM: NORMAL
MDC_IDC_MSMT_BATTERY_REMAINING_LONGEVITY: 125 MO
MDC_IDC_MSMT_BATTERY_RRT_TRIGGER: 2.6
MDC_IDC_MSMT_BATTERY_STATUS: NORMAL
MDC_IDC_MSMT_BATTERY_VOLTAGE: 3.21 V
MDC_IDC_MSMT_LEADCHNL_LV_IMPEDANCE_VALUE: 247 OHM
MDC_IDC_MSMT_LEADCHNL_LV_IMPEDANCE_VALUE: 342 OHM
MDC_IDC_MSMT_LEADCHNL_LV_IMPEDANCE_VALUE: 399 OHM
MDC_IDC_MSMT_LEADCHNL_LV_IMPEDANCE_VALUE: 494 OHM
MDC_IDC_MSMT_LEADCHNL_LV_IMPEDANCE_VALUE: 532 OHM
MDC_IDC_MSMT_LEADCHNL_LV_PACING_THRESHOLD_AMPLITUDE: 0.75 V
MDC_IDC_MSMT_LEADCHNL_LV_PACING_THRESHOLD_PULSEWIDTH: 0.4 MS
MDC_IDC_MSMT_LEADCHNL_RA_IMPEDANCE_VALUE: 361 OHM
MDC_IDC_MSMT_LEADCHNL_RA_IMPEDANCE_VALUE: 418 OHM
MDC_IDC_MSMT_LEADCHNL_RA_PACING_THRESHOLD_AMPLITUDE: 0.5 V
MDC_IDC_MSMT_LEADCHNL_RA_PACING_THRESHOLD_PULSEWIDTH: 0.4 MS
MDC_IDC_MSMT_LEADCHNL_RA_SENSING_INTR_AMPL: 2.75 MV
MDC_IDC_MSMT_LEADCHNL_RA_SENSING_INTR_AMPL: 3.38 MV
MDC_IDC_MSMT_LEADCHNL_RV_IMPEDANCE_VALUE: 380 OHM
MDC_IDC_MSMT_LEADCHNL_RV_IMPEDANCE_VALUE: 437 OHM
MDC_IDC_MSMT_LEADCHNL_RV_PACING_THRESHOLD_AMPLITUDE: 0.75 V
MDC_IDC_MSMT_LEADCHNL_RV_PACING_THRESHOLD_PULSEWIDTH: 0.4 MS
MDC_IDC_PG_IMPLANT_DTM: NORMAL
MDC_IDC_PG_MFG: NORMAL
MDC_IDC_PG_MODEL: NORMAL
MDC_IDC_PG_SERIAL: NORMAL
MDC_IDC_PG_TYPE: NORMAL
MDC_IDC_SESS_CLINIC_NAME: NORMAL
MDC_IDC_SESS_DTM: NORMAL
MDC_IDC_SESS_TYPE: NORMAL
MDC_IDC_SET_BRADY_AT_MODE_SWITCH_RATE: 171 {BEATS}/MIN
MDC_IDC_SET_BRADY_LOWRATE: 60 {BEATS}/MIN
MDC_IDC_SET_BRADY_MAX_SENSOR_RATE: 140 {BEATS}/MIN
MDC_IDC_SET_BRADY_MAX_TRACKING_RATE: 140 {BEATS}/MIN
MDC_IDC_SET_BRADY_MODE: NORMAL
MDC_IDC_SET_BRADY_PAV_DELAY_HIGH: 100 MS
MDC_IDC_SET_BRADY_PAV_DELAY_LOW: 180 MS
MDC_IDC_SET_BRADY_SAV_DELAY_HIGH: 70 MS
MDC_IDC_SET_BRADY_SAV_DELAY_LOW: 150 MS
MDC_IDC_SET_CRT_LVRV_DELAY: 80 MS
MDC_IDC_SET_CRT_PACED_CHAMBERS: NORMAL
MDC_IDC_SET_LEADCHNL_LV_PACING_AMPLITUDE: 2 V
MDC_IDC_SET_LEADCHNL_LV_PACING_ANODE_ELECTRODE_1: NORMAL
MDC_IDC_SET_LEADCHNL_LV_PACING_ANODE_LOCATION_1: NORMAL
MDC_IDC_SET_LEADCHNL_LV_PACING_CAPTURE_MODE: NORMAL
MDC_IDC_SET_LEADCHNL_LV_PACING_CATHODE_ELECTRODE_1: NORMAL
MDC_IDC_SET_LEADCHNL_LV_PACING_CATHODE_LOCATION_1: NORMAL
MDC_IDC_SET_LEADCHNL_LV_PACING_POLARITY: NORMAL
MDC_IDC_SET_LEADCHNL_LV_PACING_PULSEWIDTH: 0.4 MS
MDC_IDC_SET_LEADCHNL_RA_PACING_AMPLITUDE: 1.5 V
MDC_IDC_SET_LEADCHNL_RA_PACING_ANODE_ELECTRODE_1: NORMAL
MDC_IDC_SET_LEADCHNL_RA_PACING_ANODE_LOCATION_1: NORMAL
MDC_IDC_SET_LEADCHNL_RA_PACING_CAPTURE_MODE: NORMAL
MDC_IDC_SET_LEADCHNL_RA_PACING_CATHODE_ELECTRODE_1: NORMAL
MDC_IDC_SET_LEADCHNL_RA_PACING_CATHODE_LOCATION_1: NORMAL
MDC_IDC_SET_LEADCHNL_RA_PACING_POLARITY: NORMAL
MDC_IDC_SET_LEADCHNL_RA_PACING_PULSEWIDTH: 0.4 MS
MDC_IDC_SET_LEADCHNL_RA_SENSING_ANODE_ELECTRODE_1: NORMAL
MDC_IDC_SET_LEADCHNL_RA_SENSING_ANODE_LOCATION_1: NORMAL
MDC_IDC_SET_LEADCHNL_RA_SENSING_CATHODE_ELECTRODE_1: NORMAL
MDC_IDC_SET_LEADCHNL_RA_SENSING_CATHODE_LOCATION_1: NORMAL
MDC_IDC_SET_LEADCHNL_RA_SENSING_POLARITY: NORMAL
MDC_IDC_SET_LEADCHNL_RA_SENSING_SENSITIVITY: 0.3 MV
MDC_IDC_SET_LEADCHNL_RV_PACING_AMPLITUDE: 2 V
MDC_IDC_SET_LEADCHNL_RV_PACING_ANODE_ELECTRODE_1: NORMAL
MDC_IDC_SET_LEADCHNL_RV_PACING_ANODE_LOCATION_1: NORMAL
MDC_IDC_SET_LEADCHNL_RV_PACING_CAPTURE_MODE: NORMAL
MDC_IDC_SET_LEADCHNL_RV_PACING_CATHODE_ELECTRODE_1: NORMAL
MDC_IDC_SET_LEADCHNL_RV_PACING_CATHODE_LOCATION_1: NORMAL
MDC_IDC_SET_LEADCHNL_RV_PACING_POLARITY: NORMAL
MDC_IDC_SET_LEADCHNL_RV_PACING_PULSEWIDTH: 0.4 MS
MDC_IDC_SET_LEADCHNL_RV_SENSING_ANODE_ELECTRODE_1: NORMAL
MDC_IDC_SET_LEADCHNL_RV_SENSING_ANODE_LOCATION_1: NORMAL
MDC_IDC_SET_LEADCHNL_RV_SENSING_CATHODE_ELECTRODE_1: NORMAL
MDC_IDC_SET_LEADCHNL_RV_SENSING_CATHODE_LOCATION_1: NORMAL
MDC_IDC_SET_LEADCHNL_RV_SENSING_POLARITY: NORMAL
MDC_IDC_SET_LEADCHNL_RV_SENSING_SENSITIVITY: 0.9 MV
MDC_IDC_SET_ZONE_DETECTION_INTERVAL: 350 MS
MDC_IDC_SET_ZONE_DETECTION_INTERVAL: 400 MS
MDC_IDC_SET_ZONE_TYPE: NORMAL
MDC_IDC_STAT_AT_BURDEN_PERCENT: 0 %
MDC_IDC_STAT_AT_DTM_END: NORMAL
MDC_IDC_STAT_AT_DTM_START: NORMAL
MDC_IDC_STAT_BRADY_AP_VP_PERCENT: 61.31 %
MDC_IDC_STAT_BRADY_AP_VS_PERCENT: 0.11 %
MDC_IDC_STAT_BRADY_AS_VP_PERCENT: 38.17 %
MDC_IDC_STAT_BRADY_AS_VS_PERCENT: 0.41 %
MDC_IDC_STAT_BRADY_DTM_END: NORMAL
MDC_IDC_STAT_BRADY_DTM_START: NORMAL
MDC_IDC_STAT_BRADY_RA_PERCENT_PACED: 60.91 %
MDC_IDC_STAT_BRADY_RV_PERCENT_PACED: 99.48 %
MDC_IDC_STAT_CRT_DTM_END: NORMAL
MDC_IDC_STAT_CRT_DTM_START: NORMAL
MDC_IDC_STAT_CRT_LV_PERCENT_PACED: 99.45 %
MDC_IDC_STAT_CRT_PERCENT_PACED: 99.45 %
MDC_IDC_STAT_EPISODE_RECENT_COUNT: 0
MDC_IDC_STAT_EPISODE_RECENT_COUNT_DTM_END: NORMAL
MDC_IDC_STAT_EPISODE_RECENT_COUNT_DTM_START: NORMAL
MDC_IDC_STAT_EPISODE_TOTAL_COUNT: 0
MDC_IDC_STAT_EPISODE_TOTAL_COUNT_DTM_END: NORMAL
MDC_IDC_STAT_EPISODE_TOTAL_COUNT_DTM_START: NORMAL
MDC_IDC_STAT_EPISODE_TYPE: NORMAL

## 2022-02-11 PROCEDURE — 93798 PHYS/QHP OP CAR RHAB W/ECG: CPT

## 2022-02-12 DIAGNOSIS — I50.30 HEART FAILURE WITH PRESERVED EJECTION FRACTION, BORDERLINE, CLASS III (H): ICD-10-CM

## 2022-02-14 ENCOUNTER — MYC MEDICAL ADVICE (OUTPATIENT)
Dept: FAMILY MEDICINE | Facility: CLINIC | Age: 62
End: 2022-02-14
Payer: COMMERCIAL

## 2022-02-16 ENCOUNTER — HOSPITAL ENCOUNTER (OUTPATIENT)
Dept: CARDIAC REHAB | Facility: CLINIC | Age: 62
End: 2022-02-16
Attending: NURSE PRACTITIONER
Payer: COMMERCIAL

## 2022-02-16 PROCEDURE — 93798 PHYS/QHP OP CAR RHAB W/ECG: CPT

## 2022-02-16 RX ORDER — SPIRONOLACTONE 25 MG/1
12.5 TABLET ORAL DAILY
Qty: 90 TABLET | Refills: 1 | Status: SHIPPED | OUTPATIENT
Start: 2022-02-16 | End: 2022-02-17

## 2022-02-16 NOTE — TELEPHONE ENCOUNTER
SPIRONOLACTONE 25MG TABLETS      Requested: TAKE 1 TABLET(25 MG) BY MOUTH DAILY  Current: Take 1/2 tablet daily  Last Written Prescription Date:  historical    Last Office Visit : 11-23-21 José Luis                               12-2-21 [facility]  Future Office visit:  2-22-22 José Luis    Routing refill request to provider for review/approval because:  Medication is reported/historical  Requested and current direction( historical) do not match

## 2022-02-17 ENCOUNTER — TELEPHONE (OUTPATIENT)
Dept: CARDIOLOGY | Facility: CLINIC | Age: 62
End: 2022-02-17
Payer: COMMERCIAL

## 2022-02-17 DIAGNOSIS — I50.30 HEART FAILURE WITH PRESERVED EJECTION FRACTION, BORDERLINE, CLASS III (H): ICD-10-CM

## 2022-02-17 RX ORDER — SPIRONOLACTONE 25 MG/1
25 TABLET ORAL DAILY
Qty: 90 TABLET | Refills: 1 | COMMUNITY
Start: 2022-02-17 | End: 2022-02-25

## 2022-02-17 NOTE — TELEPHONE ENCOUNTER
[facility] Call Center    Phone Message    May a detailed message be left on voicemail: yes     Reason for Call: Other: pharmacy needs more information dosage on medication spironolactone (ALDACTONE) 25 MG tablet     Action Taken: Other: clinical pool    Travel Screening: Not Applicable

## 2022-02-17 NOTE — TELEPHONE ENCOUNTER
Called and spoke with pharmacy who states patient has been taking Spironolactone 25 MG daily. Reviewed chart and no changes to Spironolactone in the last year.  Pharmacy will refill at 25 MG daily.

## 2022-02-18 ENCOUNTER — HOSPITAL ENCOUNTER (OUTPATIENT)
Dept: CARDIAC REHAB | Facility: CLINIC | Age: 62
End: 2022-02-18
Attending: NURSE PRACTITIONER
Payer: COMMERCIAL

## 2022-02-18 PROCEDURE — 93798 PHYS/QHP OP CAR RHAB W/ECG: CPT

## 2022-02-22 ENCOUNTER — OFFICE VISIT (OUTPATIENT)
Dept: CARDIOLOGY | Facility: CLINIC | Age: 62
End: 2022-02-22
Attending: INTERNAL MEDICINE
Payer: COMMERCIAL

## 2022-02-22 ENCOUNTER — LAB (OUTPATIENT)
Dept: LAB | Facility: CLINIC | Age: 62
End: 2022-02-22
Attending: INTERNAL MEDICINE
Payer: COMMERCIAL

## 2022-02-22 VITALS
BODY MASS INDEX: 28.93 KG/M2 | HEIGHT: 66 IN | DIASTOLIC BLOOD PRESSURE: 78 MMHG | WEIGHT: 180 LBS | SYSTOLIC BLOOD PRESSURE: 113 MMHG | HEART RATE: 89 BPM | OXYGEN SATURATION: 97 %

## 2022-02-22 DIAGNOSIS — I10 BENIGN ESSENTIAL HYPERTENSION: Primary | ICD-10-CM

## 2022-02-22 DIAGNOSIS — I42.8 NONISCHEMIC CARDIOMYOPATHY (H): ICD-10-CM

## 2022-02-22 DIAGNOSIS — I50.30 NYHA CLASS 3 HEART FAILURE WITH PRESERVED EJECTION FRACTION (H): ICD-10-CM

## 2022-02-22 DIAGNOSIS — I50.42 CHRONIC COMBINED SYSTOLIC AND DIASTOLIC CONGESTIVE HEART FAILURE (H): ICD-10-CM

## 2022-02-22 LAB
ALBUMIN SERPL-MCNC: 3.6 G/DL (ref 3.4–5)
ALP SERPL-CCNC: 94 U/L (ref 40–150)
ALT SERPL W P-5'-P-CCNC: 25 U/L (ref 0–50)
ANION GAP SERPL CALCULATED.3IONS-SCNC: 7 MMOL/L (ref 3–14)
AST SERPL W P-5'-P-CCNC: 17 U/L (ref 0–45)
BILIRUB SERPL-MCNC: 0.4 MG/DL (ref 0.2–1.3)
BUN SERPL-MCNC: 16 MG/DL (ref 7–30)
CALCIUM SERPL-MCNC: 8.8 MG/DL (ref 8.5–10.1)
CHLORIDE BLD-SCNC: 106 MMOL/L (ref 94–109)
CHOLEST SERPL-MCNC: 218 MG/DL
CO2 SERPL-SCNC: 23 MMOL/L (ref 20–32)
CREAT SERPL-MCNC: 0.65 MG/DL (ref 0.52–1.04)
ERYTHROCYTE [DISTWIDTH] IN BLOOD BY AUTOMATED COUNT: 12.2 % (ref 10–15)
FASTING STATUS PATIENT QL REPORTED: ABNORMAL
GFR SERPL CREATININE-BSD FRML MDRD: >90 ML/MIN/1.73M2
GLUCOSE BLD-MCNC: 116 MG/DL (ref 70–99)
HCT VFR BLD AUTO: 40.3 % (ref 35–47)
HDLC SERPL-MCNC: 67 MG/DL
HGB BLD-MCNC: 13.5 G/DL (ref 11.7–15.7)
LDLC SERPL CALC-MCNC: 117 MG/DL
MCH RBC QN AUTO: 30.9 PG (ref 26.5–33)
MCHC RBC AUTO-ENTMCNC: 33.5 G/DL (ref 31.5–36.5)
MCV RBC AUTO: 92 FL (ref 78–100)
NONHDLC SERPL-MCNC: 151 MG/DL
NT-PROBNP SERPL-MCNC: 101 PG/ML (ref 0–125)
PLATELET # BLD AUTO: 227 10E3/UL (ref 150–450)
POTASSIUM BLD-SCNC: 4.6 MMOL/L (ref 3.4–5.3)
PROT SERPL-MCNC: 7.6 G/DL (ref 6.8–8.8)
RBC # BLD AUTO: 4.37 10E6/UL (ref 3.8–5.2)
SODIUM SERPL-SCNC: 136 MMOL/L (ref 133–144)
TRIGL SERPL-MCNC: 168 MG/DL
WBC # BLD AUTO: 6.7 10E3/UL (ref 4–11)

## 2022-02-22 PROCEDURE — 36415 COLL VENOUS BLD VENIPUNCTURE: CPT | Performed by: PATHOLOGY

## 2022-02-22 PROCEDURE — 80061 LIPID PANEL: CPT | Performed by: PATHOLOGY

## 2022-02-22 PROCEDURE — 80053 COMPREHEN METABOLIC PANEL: CPT | Performed by: PATHOLOGY

## 2022-02-22 PROCEDURE — 85027 COMPLETE CBC AUTOMATED: CPT | Performed by: PATHOLOGY

## 2022-02-22 PROCEDURE — G0463 HOSPITAL OUTPT CLINIC VISIT: HCPCS

## 2022-02-22 PROCEDURE — 83880 ASSAY OF NATRIURETIC PEPTIDE: CPT | Performed by: PATHOLOGY

## 2022-02-22 PROCEDURE — 99214 OFFICE O/P EST MOD 30 MIN: CPT | Performed by: INTERNAL MEDICINE

## 2022-02-22 ASSESSMENT — PAIN SCALES - GENERAL: PAINLEVEL: MILD PAIN (2)

## 2022-02-22 NOTE — NURSING NOTE
Return Appointment: Patient given instructions regarding scheduling next clinic visit. Patient demonstrated understanding of this information and agreed to call with further questions or concerns. Follow up with Dr. Ordonez after your echo on 5/25.    Patient stated she understood all health information given and agreed to call with further questions or concerns.    Verna Gutierrez RN

## 2022-02-22 NOTE — PROGRESS NOTES
February 22, 2022     Ms. Marroquin is a very pleasant 62 y/o lady who worked as a nurse anesthetist and has a history of RF ablation for PVCs which led to AV node ablation and complete heart block requiring a dual-chamber pacemaker implantation, which is a [employer] device MRI compatible.  She also has a recent history of heart failure with reduced ejection fraction with EF around 35% later improved to 45%.  There was a history of Takotsubo cardiomyopathy.  Patient on 7/2 had meeting with EP at which time low dose carvedilol was started 3.125 mg PO bid due to atrial ectopy and short runs of VT. she did recently complete an exercise out of catheterization with myself which showed normal resting biventricular filling pressures and normal cardiac output.  She did have limited augmentation of cardiac index with exercise and filling pressures especially pulmonary capillary wedge pressure increased significantly consistent with heart failure preserved ejection fraction.    She did initially better but for the past couple of months she has not been feeling well.  She did gain some weight and increased the Lasix to 40 mg as needed.  She does not feel that these helped for her.  In fact at times she feels that she had some confusion or disorientation.  Possibly she is dry at times.  She did see in follow-up with core clinic he had some of the instructions were confusing and she appropriately notes that the discharge papers stated a lot of extra information.  I did see her and performed a CPX that was marginal as well as a cardiac MRI that showed a significantly improved LV function to 50% and no other significant abnormalities. She did meet with Dr. Funes and sildenafil was initiated yet rapidly discontinued due to muscle aches. Dr. Funes also adjusted the rate response to allow for faster heart rates in an attempt to improve forward flow. In addition, she was felt to be dry and lasix has been reduced to 20mg 2 times per  week. She was in ER with abdominal pain recently, CT was concerning for ileus versus early SBO. Repeat CT a couple days later showed resolution of these however watery diarrhea remained and saw PCP for this. She is having GI follow-up. She noted that she at times she forgets to take the PM dose of the revatio. She was last seen on 8/24/2021 when she was endorsing SOB, thought to be due to weight gain sustained after AVN ablation. No medication changes were performed after her visit. Her symptoms continue to persist. Her EF on recent TTE is 40-45%, she is V-pacing to 99% and her EKG reveals a LBBB morphology with a QRS of 158ms. After lengthy discussions, she did have CRT-D upgrade in hopes that resynchronization will help her symptomatology. This was performed in January 2022 and was uneventful. However, the next day she experienced a fall and was seen in the ER for this. IN addition, early 2/2022 was seen in the ER for episode of chest pain. Workup included normal troponin, CXR and device function and her symptoms were deemed non-cardiac. Today she is here for follow-up.  She notes today that she feels significantly better after the left ventricular wire insertion.  She has much more energy and she is following a cardiac rehab and she can do 22 minutes on the treadmill now versus the 3 minutes she could do in the past before the upgrade.  She denies any lower extremity edema, she has some occasional episodes of dizziness however these do not seem to be related to blood pressure at this time.  Again she feels significantly better    PAST MEDICAL HISTORY:  Past Medical History:   Diagnosis Date     Arthritis      Cardiomyopathy (H)     ff Cardiology     Chronic depressive personality disorder      Congestive heart failure (H) see dr martínez    heart failure correct term     COPD exacerbation (H)      Esophageal reflux      Ex-smoker     quit 2006; 1 PPD x 30     Hyperparathyroidism (H)     s/p parathyroidectomy      Hypothyroidism      LARRY on CPAP     ff Sleep medicine     Pulmonary nodules     ff Pulmonologist     S/P cardiac pacemaker procedure     checked every 6 months at the [facility]     Uncomplicated asthma years     FAMILY HISTORY:  Family History   Problem Relation Age of Onset     Hypothyroidism Mother      Hypertension Mother      Osteoarthritis Mother      Coronary Artery Disease Father         nonfatal MI in his 70s     Asthma Father      Diabetes Sister      Hypothyroidism Sister      Breast Cancer Maternal Aunt      Anxiety Disorder Sister      SOCIAL HISTORY:  Social History     Socioeconomic History     Marital status:      Spouse name: Not on file     Number of children: Not on file     Years of education: Not on file     Highest education level: Master's degree (e.g., MA, MS, Florian, MEd, MSW, SONIA)   Occupational History     Not on file   Tobacco Use     Smoking status: Former Smoker     Packs/day: 0.00     Years: 0.00     Pack years: 0.00     Smokeless tobacco: Never Used   Vaping Use     Vaping Use: Never used   Substance and Sexual Activity     Alcohol use: Yes     Alcohol/week: 0.0 - 8.0 standard drinks     Comment: seldom     Drug use: No     Sexual activity: Yes     Partners: Male     Birth control/protection: None     Comment: vasectomy   Other Topics Concern     Parent/sibling w/ CABG, MI or angioplasty before 65F 55M? No   Social History Narrative    CRNA on disability for vestibular symptoms      Social Determinants of Health     Financial Resource Strain: Low Risk      Difficulty of Paying Living Expenses: Not very hard   Food Insecurity: No Food Insecurity     Worried About Running Out of Food in the Last Year: Never true     Ran Out of Food in the Last Year: Never true   Transportation Needs: No Transportation Needs     Lack of Transportation (Medical): No     Lack of Transportation (Non-Medical): No   Physical Activity: Not on file   Stress: Not on file   Social Connections: Not on file    Intimate Partner Violence: Not At Risk     Fear of Current or Ex-Partner: No     Emotionally Abused: No     Physically Abused: No     Sexually Abused: No   Housing Stability: Not on file     CURRENT MEDICATIONS:  Current Outpatient Medications   Medication     spironolactone (ALDACTONE) 25 MG tablet     acetaminophen (TYLENOL) 500 MG tablet     acetaminophen-codeine (TYLENOL W/CODEINE #3) 300-30 MG per tablet     albuterol (PROAIR HFA/PROVENTIL HFA/VENTOLIN HFA) 108 (90 BASE) MCG/ACT Inhaler     azelastine (ASTELIN) 0.1 % nasal spray     budesonide (ENTOCORT EC) 3 MG EC capsule     Calcium Lactate 500 MG CAPS     clonazePAM (KLONOPIN) 0.5 MG tablet     DiphenhydrAMINE HCl (BENADRYL PO)     ELIQUIS ANTICOAGULANT 5 MG tablet     Ferrous Sulfate (IRON SUPPLEMENT PO)     FLUoxetine (PROZAC) 10 MG tablet     fluticasone-salmeterol (ADVAIR) 250-50 MCG/DOSE inhaler     furosemide (LASIX) 20 MG tablet     ipratropium (ATROVENT) 0.06 % nasal spray     loperamide (IMODIUM A-D) 2 MG tablet     MAGNESIUM LACTATE PO     MELATONIN PO     Menaquinone-7 (VITAMIN K2 PO)     METHYLPREDNISOLONE PO     metoprolol succinate ER (TOPROL XL) 25 MG 24 hr tablet     Multiple Vitamin (DAILY MULTIVITAMIN PO)     nebulizer nebulization     Probiotic Product (PROBIOTIC DAILY PO)     SYNTHROID 150 MCG tablet     TURMERIC PO     vitamin D3 (CHOLECALCIFEROL) 2000 units tablet     Zinc 15 MG CAPS     No current facility-administered medications for this visit.     ROS:   Constitutional: No fever, chills, or sweats.   ENT: No visual disturbance, ear ache, epistaxis, sore throat.   Allergies/Immunologic: Negative.   Respiratory: No cough, hemoptysis.   Cardiovascular: As per HPI.   GI: No nausea, vomiting, hematemesis, melena, or hematochezia.   : No urinary frequency, dysuria, or hematuria.   Integument: Negative.   Psychiatric: Pleasant, no major depression noted  Neuro: No focal neurological deficits noted  Endocrinology: Negative.  "  Musculoskeletal: As per HPI.      EXAM:  /78 (BP Location: Right arm, Patient Position: Chair, Cuff Size: Adult Regular)   Pulse 89   Ht 1.674 m (5' 5.91\")   Wt 81.6 kg (180 lb)   LMP 08/21/2007   SpO2 97%   BMI 29.14 kg/m    General: appears comfortable, alert and oriented  Head: normocephalic, atraumatic  Eyes: anicteric sclera, EOMI , PERRL  Neck: no adenopathy  Orophyarynx: moist mucosa, no lesions noted  Heart: regular, S1/S2, no murmurs, rubs or gallop. Estimated JVP at 5 cmH2O  Lungs: CTAB, No wheezing.   Abdomen: soft, non-tender, bowel sounds present, no hepatosplenomegaly  Extremities: No LE edema today  Skin: no open lesions noted  Neuro: grossly non-focal     Labs:  Lab Results   Component Value Date    WBC 6.9 02/07/2022    HGB 13.4 02/07/2022    HCT 41.1 02/07/2022     02/07/2022     02/07/2022    POTASSIUM 4.2 02/07/2022    CHLORIDE 107 02/07/2022    CO2 26 02/07/2022    BUN 13 02/07/2022    CR 0.68 02/07/2022    GLC 83 02/07/2022    SED 22 11/09/2017    DD 0.7 (H) 11/22/2017    NTBNPI 83 02/07/2022    NTBNP 127 (H) 11/23/2021    TROPONIN <0.015 09/28/2021    TROPI <0.015 12/20/2020    AST 14 02/07/2022    ALT 23 02/07/2022    ALKPHOS 96 02/07/2022    BILITOTAL 0.4 02/07/2022    INR 1.02 10/06/2019     NM Lexiscan 3/2020:  The nuclear stress test is negative for inducible myocardial ischemia or infarction.  Fixed septal/apical defect due to Paced rhythm and increased gut photon activity.LVEDv 133ml. LV ESv 40ml. LV EF 70%.     There is no prior study for comparison     Echocardiogram 3/2020:  Mildly (EF 40-45%) reduced left ventricular function is present.  Global right ventricular function is normal.  No pericardial effusion is present. IVC diameter <2.1 cm collapsing >50% with sniff suggests a normal RA pressure  of 3 mmHg. This study was compared with the study from 12/3/19.  There has been no change.      Coronary angiogram 9/2019:  Mild-moderate non-obstructive coronary " disease involving LAD, LCx, and RCA. No significant coronary disease to explain new cardiomyopathy.     TTE 9/5/2019:  Moderately (EF 30-35%) reduced left ventricular function is present. All segments from mid to apical left ventricle are severely hypokinetic to akinetic. Basal segments are hypercontractile. Overall pattern is suggestive of stress cardiomyopathy, but cannot rule out ischemic cardiomyopathy. Right ventricular function, chamber size, wall motion, and thickness are normal. No significant valve abnormalities. Mild pulmonary hypertension with increased RA pressure.     TTE 2/19/21:  Moderately (EF 35%) reduced left ventricular function is present.  Global right ventricular function is normal.  No significant valvular dysfunction. The inferior vena cava was normal in size with preserved respiratory variability. No pericardial effusion present.     MRI cardiac 4/2017:  1. Normal left ventricular size.  Left ventricular wall thickness is  normal except a very small area in the basal inferior septum that appears  thin in some images.  Real-time images suggest a very small area of  hypokinesis in the basal inferior septum (not well visualized).  Left  ventricular ejection fraction is estimated at about 60-65% (unable to   perform volumetric analysis given frequent ectopy; real time images used  to assess LVEF).  2. Normal right ventricular size and systolic function.  No obvious regional wall motion abnormalities noted.  3. Gadolinium imaging:  No obvious right ventricular myocardial late  gadolinium enhancement noted.  There is a very small area of probable late  gadolinium enhancement in the mildly thinned basal inferior segment.    4. Moderate left atrial dilatation.  The right atrial size is at the upper limits of normal.  5. Thickened mitral valve leaflets, without obvious stenosis or significant regurgitation. No obvious hemodynamically significant valvular dysfunction noted.   6. Normal sized aortic  root and ascending aorta.  For the remainder of the thoracic aorta, see separate radiology report.  7. Normal pericardium without significant pericardial effusion.      Cardiac MRI 5/2021:  1. The LV is normal in cavity size and wall thickness. The global systolic function is mildly reduced. The LVEF is 50%. There are no regional wall motion abnormalities. There is abnormal septal motion related to pacing.  2. The RV is normal in cavity size. The global systolic function is normal. The RVEF is 65%.   3. The left atrium is mildly dilated.  4. There is no significant valvular disease.   5. Late gadolinium enhancement imaging shows no MI, fibrosis or infiltrative disease.   6. Regadenoson stress perfusion imaging shows no ischemia.  7. There is no pericardial effusion or thickening.  8. There is no LV thrombus.  CONCLUSIONS: No myocardial ischemia or fibrosis. Mild NICM possibly related to pacing, LVEF 50% and RVEF 65%.      TTE 11/23/2021  Left ventricular function is decreased. The ejection fraction is 40-45% (mildly reduced).Abnormal septal motion consistent with left bundle branch block is present.  Global right ventricular function is normal.  No significant valvular abnormalities present.  Pulmonary artery systolic pressure is normal.  The inferior vena cava was normal in size with preserved respiratory  variability.  No pericardial effusion is prese     ASSESSMENT AND PLAN:  Ms. Marroquin is a pleasant 61 year old lady with history of mildly reduced LVEF at EF:45% improved transiently to to 55-60% and on most recent study it remains around 45% (reviewed personally), NICM, Coleen CM, PVC ablation that led to complete heart block necessitating permanent pacemaker implantation who presents today for a follow up visit. Overall notes that her condition remained unchanged and continues to have Class III HF symptoms although she has improved quite significantly especially with regards to her exercise tolerance after the  third wire placement and now she may be a little bit closer to class II.  She does have some allergies today so is very hard to tell.     #HFrEF now with recovered EF  #NICM  Patient is still symptomatic as above. LVEF remains at around 40-45%.  She did have recent pacemaker upgrade with 1/3-lead as detailed above and since then she feels feels significantly better.  Her exercise tolerance has improved she remains off of Lasix at this time without any significant lower extremity edema.  In addition her overall NT proBNP trend has improved and now within the normal range.  I do think she is euvolemic and her heart failure symptoms overall have improved after the extra lead.  We decide on think we should change her medication therapy at this time.  - Metop succinate 12.5mg qday, continue. Will not adjust dose today   - Lasix 20mg qday as needed for LE edema or worsening heart failure symptoms, she is not taking this right now however it is available to her and she will let us know should she gain some weight to feel more short of breath and then she will take the dose.  - Aldactone 12.5mg qday   - Eliquis 5mg BID , continue no complications or side effects.  Overall I am very happy with the progress what she has made since the device upgrade.    Her symptoms have improved and we will continue to monitor her closely.  I will see her back in 3 months with an echo.    I appreciate the opportunity to participate in the care of Joyce Marroquin . Please do not hesitate to contact me with any further questions.    Sincerely,   Alonso Ordonez MD  [facility] Division of Cardiology

## 2022-02-22 NOTE — RESULT ENCOUNTER NOTE
Jose Luis Cook,    Thank you for your recent office visit.    Here are your recent results.  Fasting labs look pretty normal     Feel free to contact me via ditlo or call the clinic at [phone].    Sincerely,    SHAYNA Simon, FNP-BC

## 2022-02-22 NOTE — PATIENT INSTRUCTIONS
"You were seen today in the Cardiovascular Clinic at the [facility].      Cardiology Providers you saw during your visit:  Dr. Alonso Ordonez     Recommendations:   No medication changes.  You are scheduled to see Dr. Funes on  with an echo and device check prior.  Plan to follow up with Dr. Ordonez after your appointment with Dr. Funes.      Thank you for your visit today!   Please MyChart message or call if you have any questions or concerns.      During Business Hours:  [phone], option # 1 (University)  Then option # 4 (medical questions)     After hours, weekends or holidays:   [phone], Option #4  Ask to speak to the On-Call Cardiologist. Inform them you are a heart failure patient at the [place].      Verna Gutierrez RN BSN   Cardiology Nurse Coordinator - Heart Failure/C.O.R.E. Clinic  [facility]  Questions and [phone]   First press #1 for the [employer] and then press #4 for \"To send a message to your care team\"        "

## 2022-02-22 NOTE — NURSING NOTE
Chief Complaint   Patient presents with     Follow Up     Dr. Ordonez: HFrEF      Vitals were taken and medications reconciled.    Shahriar Bull, EMT  8:55 AM

## 2022-02-22 NOTE — LETTER
2/22/2022      RE: Joyce Marroquin  [address] [facility] [id number]       Dear Colleague,    Thank you for the opportunity to participate in the care of your patient, Joyce Marroquin, at the [facility] [place] [facility]. Please see a copy of my visit note below.    February 22, 2022     Ms. Marroquin is a very pleasant 60 y/o lady who worked as a nurse anesthetist and has a history of RF ablation for PVCs which led to AV node ablation and complete heart block requiring a dual-chamber pacemaker implantation, which is a [employer] device MRI compatible.  She also has a recent history of heart failure with reduced ejection fraction with EF around 35% later improved to 45%.  There was a history of Takotsubo cardiomyopathy.  Patient on 7/2 had meeting with EP at which time low dose carvedilol was started 3.125 mg PO bid due to atrial ectopy and short runs of VT. she did recently complete an exercise out of catheterization with myself which showed normal resting biventricular filling pressures and normal cardiac output.  She did have limited augmentation of cardiac index with exercise and filling pressures especially pulmonary capillary wedge pressure increased significantly consistent with heart failure preserved ejection fraction.    She did initially better but for the past couple of months she has not been feeling well.  She did gain some weight and increased the Lasix to 40 mg as needed.  She does not feel that these helped for her.  In fact at times she feels that she had some confusion or disorientation.  Possibly she is dry at times.  She did see in follow-up with core clinic he had some of the instructions were confusing and she appropriately notes that the discharge papers stated a lot of extra information.  I did see her and performed a CPX that was marginal as well as a cardiac MRI that showed a significantly improved LV function to 50% and  no other significant abnormalities. She did meet with Dr. Funes and sildenafil was initiated yet rapidly discontinued due to muscle aches. Dr. Funes also adjusted the rate response to allow for faster heart rates in an attempt to improve forward flow. In addition, she was felt to be dry and lasix has been reduced to 20mg 2 times per week. She was in ER with abdominal pain recently, CT was concerning for ileus versus early SBO. Repeat CT a couple days later showed resolution of these however watery diarrhea remained and saw PCP for this. She is having GI follow-up. She noted that she at times she forgets to take the PM dose of the revatio. She was last seen on 8/24/2021 when she was endorsing SOB, thought to be due to weight gain sustained after AVN ablation. No medication changes were performed after her visit. Her symptoms continue to persist. Her EF on recent TTE is 40-45%, she is V-pacing to 99% and her EKG reveals a LBBB morphology with a QRS of 158ms. After lengthy discussions, she did have CRT-D upgrade in hopes that resynchronization will help her symptomatology. This was performed in January 2022 and was uneventful. However, the next day she experienced a fall and was seen in the ER for this. IN addition, early 2/2022 was seen in the ER for episode of chest pain. Workup included normal troponin, CXR and device function and her symptoms were deemed non-cardiac. Today she is here for follow-up.  She notes today that she feels significantly better after the left ventricular wire insertion.  She has much more energy and she is following a cardiac rehab and she can do 22 minutes on the treadmill now versus the 3 minutes she could do in the past before the upgrade.  She denies any lower extremity edema, she has some occasional episodes of dizziness however these do not seem to be related to blood pressure at this time.  Again she feels significantly better    PAST MEDICAL HISTORY:  Past Medical History:    Diagnosis Date     Arthritis      Cardiomyopathy (H)     ff Cardiology     Chronic depressive personality disorder      Congestive heart failure (H) see  note    heart failure correct term     COPD exacerbation (H)      Esophageal reflux      Ex-smoker     quit 2006; 1 PPD x 30     Hyperparathyroidism (H)     s/p parathyroidectomy     Hypothyroidism      LARRY on CPAP     ff Sleep medicine     Pulmonary nodules     ff Pulmonologist     S/P cardiac pacemaker procedure     checked every 6 months at the [facility]     Uncomplicated asthma years     FAMILY HISTORY:  Family History   Problem Relation Age of Onset     Hypothyroidism Mother      Hypertension Mother      Osteoarthritis Mother      Coronary Artery Disease Father         nonfatal MI in his 70s     Asthma Father      Diabetes Sister      Hypothyroidism Sister      Breast Cancer Maternal Aunt      Anxiety Disorder Sister      SOCIAL HISTORY:  Social History     Socioeconomic History     Marital status:      Spouse name: Not on file     Number of children: Not on file     Years of education: Not on file     Highest education level: Master's degree (e.g., MA, MS, Florian, MEd, MSW, SONIA)   Occupational History     Not on file   Tobacco Use     Smoking status: Former Smoker     Packs/day: 0.00     Years: 0.00     Pack years: 0.00     Smokeless tobacco: Never Used   Vaping Use     Vaping Use: Never used   Substance and Sexual Activity     Alcohol use: Yes     Alcohol/week: 0.0 - 8.0 standard drinks     Comment: seldom     Drug use: No     Sexual activity: Yes     Partners: Male     Birth control/protection: None     Comment: vasectomy   Other Topics Concern     Parent/sibling w/ CABG, MI or angioplasty before 65F 55M? No   Social History Narrative    CRNA on disability for vestibular symptoms      Social Determinants of Health     Financial Resource Strain: Low Risk      Difficulty of Paying Living Expenses: Not very hard   Food Insecurity: No Food Insecurity      Worried About Running Out of Food in the Last Year: Never true     Ran Out of Food in the Last Year: Never true   Transportation Needs: No Transportation Needs     Lack of Transportation (Medical): No     Lack of Transportation (Non-Medical): No   Physical Activity: Not on file   Stress: Not on file   Social Connections: Not on file   Intimate Partner Violence: Not At Risk     Fear of Current or Ex-Partner: No     Emotionally Abused: No     Physically Abused: No     Sexually Abused: No   Housing Stability: Not on file     CURRENT MEDICATIONS:  Current Outpatient Medications   Medication     spironolactone (ALDACTONE) 25 MG tablet     acetaminophen (TYLENOL) 500 MG tablet     acetaminophen-codeine (TYLENOL W/CODEINE #3) 300-30 MG per tablet     albuterol (PROAIR HFA/PROVENTIL HFA/VENTOLIN HFA) 108 (90 BASE) MCG/ACT Inhaler     azelastine (ASTELIN) 0.1 % nasal spray     budesonide (ENTOCORT EC) 3 MG EC capsule     Calcium Lactate 500 MG CAPS     clonazePAM (KLONOPIN) 0.5 MG tablet     DiphenhydrAMINE HCl (BENADRYL PO)     ELIQUIS ANTICOAGULANT 5 MG tablet     Ferrous Sulfate (IRON SUPPLEMENT PO)     FLUoxetine (PROZAC) 10 MG tablet     fluticasone-salmeterol (ADVAIR) 250-50 MCG/DOSE inhaler     furosemide (LASIX) 20 MG tablet     ipratropium (ATROVENT) 0.06 % nasal spray     loperamide (IMODIUM A-D) 2 MG tablet     MAGNESIUM LACTATE PO     MELATONIN PO     Menaquinone-7 (VITAMIN K2 PO)     METHYLPREDNISOLONE PO     metoprolol succinate ER (TOPROL XL) 25 MG 24 hr tablet     Multiple Vitamin (DAILY MULTIVITAMIN PO)     nebulizer nebulization     Probiotic Product (PROBIOTIC DAILY PO)     SYNTHROID 150 MCG tablet     TURMERIC PO     vitamin D3 (CHOLECALCIFEROL) 2000 units tablet     Zinc 15 MG CAPS     No current facility-administered medications for this visit.     ROS:   Constitutional: No fever, chills, or sweats.   ENT: No visual disturbance, ear ache, epistaxis, sore throat.   Allergies/Immunologic: Negative.  "  Respiratory: No cough, hemoptysis.   Cardiovascular: As per HPI.   GI: No nausea, vomiting, hematemesis, melena, or hematochezia.   : No urinary frequency, dysuria, or hematuria.   Integument: Negative.   Psychiatric: Pleasant, no major depression noted  Neuro: No focal neurological deficits noted  Endocrinology: Negative.   Musculoskeletal: As per HPI.      EXAM:  /78 (BP Location: Right arm, Patient Position: Chair, Cuff Size: Adult Regular)   Pulse 89   Ht 1.674 m (5' 5.91\")   Wt 81.6 kg (180 lb)   LMP 08/21/2007   SpO2 97%   BMI 29.14 kg/m    General: appears comfortable, alert and oriented  Head: normocephalic, atraumatic  Eyes: anicteric sclera, EOMI , PERRL  Neck: no adenopathy  Orophyarynx: moist mucosa, no lesions noted  Heart: regular, S1/S2, no murmurs, rubs or gallop. Estimated JVP at 5 cmH2O  Lungs: CTAB, No wheezing.   Abdomen: soft, non-tender, bowel sounds present, no hepatosplenomegaly  Extremities: No LE edema today  Skin: no open lesions noted  Neuro: grossly non-focal     Labs:  Lab Results   Component Value Date    WBC 6.9 02/07/2022    HGB 13.4 02/07/2022    HCT 41.1 02/07/2022     02/07/2022     02/07/2022    POTASSIUM 4.2 02/07/2022    CHLORIDE 107 02/07/2022    CO2 26 02/07/2022    BUN 13 02/07/2022    CR 0.68 02/07/2022    GLC 83 02/07/2022    SED 22 11/09/2017    DD 0.7 (H) 11/22/2017    NTBNPI 83 02/07/2022    NTBNP 127 (H) 11/23/2021    TROPONIN <0.015 09/28/2021    TROPI <0.015 12/20/2020    AST 14 02/07/2022    ALT 23 02/07/2022    ALKPHOS 96 02/07/2022    BILITOTAL 0.4 02/07/2022    INR 1.02 10/06/2019     NM Lexiscan 3/2020:  The nuclear stress test is negative for inducible myocardial ischemia or infarction.  Fixed septal/apical defect due to Paced rhythm and increased gut photon activity.LVEDv 133ml. LV ESv 40ml. LV EF 70%.     There is no prior study for comparison     Echocardiogram 3/2020:  Mildly (EF 40-45%) reduced left ventricular function is " present.  Global right ventricular function is normal.  No pericardial effusion is present. IVC diameter <2.1 cm collapsing >50% with sniff suggests a normal RA pressure  of 3 mmHg. This study was compared with the study from 12/3/19.  There has been no change.      Coronary angiogram 9/2019:  Mild-moderate non-obstructive coronary disease involving LAD, LCx, and RCA. No significant coronary disease to explain new cardiomyopathy.     TTE 9/5/2019:  Moderately (EF 30-35%) reduced left ventricular function is present. All segments from mid to apical left ventricle are severely hypokinetic to akinetic. Basal segments are hypercontractile. Overall pattern is suggestive of stress cardiomyopathy, but cannot rule out ischemic cardiomyopathy. Right ventricular function, chamber size, wall motion, and thickness are normal. No significant valve abnormalities. Mild pulmonary hypertension with increased RA pressure.     TTE 2/19/21:  Moderately (EF 35%) reduced left ventricular function is present.  Global right ventricular function is normal.  No significant valvular dysfunction. The inferior vena cava was normal in size with preserved respiratory variability. No pericardial effusion present.     MRI cardiac 4/2017:  1. Normal left ventricular size.  Left ventricular wall thickness is  normal except a very small area in the basal inferior septum that appears  thin in some images.  Real-time images suggest a very small area of  hypokinesis in the basal inferior septum (not well visualized).  Left  ventricular ejection fraction is estimated at about 60-65% (unable to   perform volumetric analysis given frequent ectopy; real time images used  to assess LVEF).  2. Normal right ventricular size and systolic function.  No obvious regional wall motion abnormalities noted.  3. Gadolinium imaging:  No obvious right ventricular myocardial late  gadolinium enhancement noted.  There is a very small area of probable late  gadolinium  enhancement in the mildly thinned basal inferior segment.    4. Moderate left atrial dilatation.  The right atrial size is at the upper limits of normal.  5. Thickened mitral valve leaflets, without obvious stenosis or significant regurgitation. No obvious hemodynamically significant valvular dysfunction noted.   6. Normal sized aortic root and ascending aorta.  For the remainder of the thoracic aorta, see separate radiology report.  7. Normal pericardium without significant pericardial effusion.      Cardiac MRI 5/2021:  1. The LV is normal in cavity size and wall thickness. The global systolic function is mildly reduced. The LVEF is 50%. There are no regional wall motion abnormalities. There is abnormal septal motion related to pacing.  2. The RV is normal in cavity size. The global systolic function is normal. The RVEF is 65%.   3. The left atrium is mildly dilated.  4. There is no significant valvular disease.   5. Late gadolinium enhancement imaging shows no MI, fibrosis or infiltrative disease.   6. Regadenoson stress perfusion imaging shows no ischemia.  7. There is no pericardial effusion or thickening.  8. There is no LV thrombus.  CONCLUSIONS: No myocardial ischemia or fibrosis. Mild NICM possibly related to pacing, LVEF 50% and RVEF 65%.      TTE 11/23/2021  Left ventricular function is decreased. The ejection fraction is 40-45% (mildly reduced).Abnormal septal motion consistent with left bundle branch block is present.  Global right ventricular function is normal.  No significant valvular abnormalities present.  Pulmonary artery systolic pressure is normal.  The inferior vena cava was normal in size with preserved respiratory  variability.  No pericardial effusion is prese     ASSESSMENT AND PLAN:  Ms. Marroquin is a pleasant 61 year old lady with history of mildly reduced LVEF at EF:45% improved transiently to to 55-60% and on most recent study it remains around 45% (reviewed personally), Coleen KIRAN  CM, PVC ablation that led to complete heart block necessitating permanent pacemaker implantation who presents today for a follow up visit. Overall notes that her condition remained unchanged and continues to have Class III HF symptoms although she has improved quite significantly especially with regards to her exercise tolerance after the third wire placement and now she may be a little bit closer to class II.  She does have some allergies today so is very hard to tell.     #HFrEF now with recovered EF  #NICM  Patient is still symptomatic as above. LVEF remains at around 40-45%.  She did have recent pacemaker upgrade with 1/3-lead as detailed above and since then she feels feels significantly better.  Her exercise tolerance has improved she remains off of Lasix at this time without any significant lower extremity edema.  In addition her overall NT proBNP trend has improved and now within the normal range.  I do think she is euvolemic and her heart failure symptoms overall have improved after the extra lead.  We decide on think we should change her medication therapy at this time.  - Metop succinate 12.5mg qday, continue. Will not adjust dose today   - Lasix 20mg qday as needed for LE edema or worsening heart failure symptoms, she is not taking this right now however it is available to her and she will let us know should she gain some weight to feel more short of breath and then she will take the dose.  - Aldactone 12.5mg qday   - Eliquis 5mg BID , continue no complications or side effects.  Overall I am very happy with the progress what she has made since the device upgrade.    Her symptoms have improved and we will continue to monitor her closely.  I will see her back in 3 months with an echo.    I appreciate the opportunity to participate in the care of Joyce JUAN Marroquin . Please do not hesitate to contact me with any further questions.              Please do not hesitate to contact me if you have any  questions/concerns.     Sincerely,     Alonso Ordonez MD

## 2022-02-25 ENCOUNTER — TELEPHONE (OUTPATIENT)
Dept: CARDIOLOGY | Facility: CLINIC | Age: 62
End: 2022-02-25
Payer: COMMERCIAL

## 2022-02-25 ENCOUNTER — CARE COORDINATION (OUTPATIENT)
Dept: CARDIOLOGY | Facility: CLINIC | Age: 62
End: 2022-02-25
Payer: COMMERCIAL

## 2022-02-25 DIAGNOSIS — I50.30 NYHA CLASS 3 HEART FAILURE WITH PRESERVED EJECTION FRACTION (H): Primary | ICD-10-CM

## 2022-02-25 DIAGNOSIS — I50.30 HEART FAILURE WITH PRESERVED EJECTION FRACTION, BORDERLINE, CLASS III (H): ICD-10-CM

## 2022-02-25 RX ORDER — SPIRONOLACTONE 25 MG/1
12.5 TABLET ORAL DAILY
Qty: 45 TABLET | Refills: 3 | Status: SHIPPED | OUTPATIENT
Start: 2022-02-25 | End: 2022-02-28

## 2022-02-25 NOTE — TELEPHONE ENCOUNTER
[facility] Call Center    Phone Message    May a detailed message be left on voicemail: yes     Reason for Call: Medication Refill Request    Has the patient contacted the pharmacy for the refill? Yes   Name of medication being requested: spironolactone (ALDACTONE) 25 MG tablet  Provider who prescribed the medication: VALENTE Ordonez  Pharmacy: [id number] - [place], [place] - [address] [facility] [address]  Date medication is needed: ASAP, pt will be out tomorrow         Action Taken: Message routed to:  [facility] ([facility] cardio    Travel Screening: Not Applicable

## 2022-02-25 NOTE — TELEPHONE ENCOUNTER
Last Clinic Visit: 2/22/2022  [facility]  - Aldactone 12.5mg qday     Spoke to Joyce and notified her the medication was refilled  She states she has been taking 12.5 mg for over a year..

## 2022-02-28 DIAGNOSIS — I50.30 HEART FAILURE WITH PRESERVED EJECTION FRACTION, BORDERLINE, CLASS III (H): ICD-10-CM

## 2022-02-28 RX ORDER — SPIRONOLACTONE 25 MG/1
12.5 TABLET ORAL DAILY
Qty: 45 TABLET | Refills: 3 | Status: SHIPPED | OUTPATIENT
Start: 2022-02-28 | End: 2022-12-06

## 2022-03-07 ENCOUNTER — HOSPITAL ENCOUNTER (OUTPATIENT)
Dept: CARDIAC REHAB | Facility: CLINIC | Age: 62
End: 2022-03-07
Attending: NURSE PRACTITIONER
Payer: COMMERCIAL

## 2022-03-07 PROCEDURE — 93798 PHYS/QHP OP CAR RHAB W/ECG: CPT

## 2022-03-11 ENCOUNTER — HOSPITAL ENCOUNTER (OUTPATIENT)
Dept: CARDIAC REHAB | Facility: CLINIC | Age: 62
Discharge: HOME | End: 2022-03-11
Attending: NURSE PRACTITIONER
Payer: COMMERCIAL

## 2022-03-11 PROCEDURE — 93798 PHYS/QHP OP CAR RHAB W/ECG: CPT | Performed by: REHABILITATION PRACTITIONER

## 2022-03-14 ENCOUNTER — TELEPHONE (OUTPATIENT)
Dept: FAMILY MEDICINE | Facility: CLINIC | Age: 62
End: 2022-03-14
Payer: COMMERCIAL

## 2022-03-14 NOTE — TELEPHONE ENCOUNTER
Patient calling says she has ongoing concerns about headaches and forgetfulness since she had a traumatic brain injury 1/19 or 1/20 (fall with loss of consciousness), was assessed in ER, is on Eliquis.    Saw Ce 1/27, note indicates incident of syncope was reviewed.    Says headaches have continued and worsen, daily occurrence, tylenol not helping much.    Denies one-sided weakness, trouble talking, facial droop.    Says she is not sure but thinks she's having more trouble with forgetfulness.    Wants to see only Ce Crowe since they discussed this previously.    Scheduled in approval req spot tomorrow, Ce not in today.    Advised if alarming symptoms occur, one sided weakness, worst headache of her life, trouble talking, dizziness or falling, then needs to be evaluated urgently in ER again.    Patient verbalized understanding of and agreement with plan.    Ana Greer RN  [facility]

## 2022-03-15 ENCOUNTER — OFFICE VISIT (OUTPATIENT)
Dept: FAMILY MEDICINE | Facility: CLINIC | Age: 62
End: 2022-03-15
Payer: COMMERCIAL

## 2022-03-15 VITALS
HEIGHT: 65 IN | RESPIRATION RATE: 20 BRPM | WEIGHT: 179.13 LBS | HEART RATE: 107 BPM | TEMPERATURE: 98 F | DIASTOLIC BLOOD PRESSURE: 75 MMHG | BODY MASS INDEX: 29.84 KG/M2 | OXYGEN SATURATION: 98 % | SYSTOLIC BLOOD PRESSURE: 114 MMHG

## 2022-03-15 DIAGNOSIS — F41.9 ANXIETY: ICD-10-CM

## 2022-03-15 PROCEDURE — 96127 BRIEF EMOTIONAL/BEHAV ASSMT: CPT | Mod: 59 | Performed by: NURSE PRACTITIONER

## 2022-03-15 PROCEDURE — 99214 OFFICE O/P EST MOD 30 MIN: CPT | Performed by: NURSE PRACTITIONER

## 2022-03-15 RX ORDER — CLONAZEPAM 0.5 MG/1
TABLET ORAL
Qty: 90 TABLET | Refills: 1 | Status: SHIPPED | OUTPATIENT
Start: 2022-03-15 | End: 2022-03-30

## 2022-03-15 ASSESSMENT — ANXIETY QUESTIONNAIRES
3. WORRYING TOO MUCH ABOUT DIFFERENT THINGS: NOT AT ALL
7. FEELING AFRAID AS IF SOMETHING AWFUL MIGHT HAPPEN: NOT AT ALL
5. BEING SO RESTLESS THAT IT IS HARD TO SIT STILL: NOT AT ALL
1. FEELING NERVOUS, ANXIOUS, OR ON EDGE: NOT AT ALL
2. NOT BEING ABLE TO STOP OR CONTROL WORRYING: NOT AT ALL
6. BECOMING EASILY ANNOYED OR IRRITABLE: NOT AT ALL
IF YOU CHECKED OFF ANY PROBLEMS ON THIS QUESTIONNAIRE, HOW DIFFICULT HAVE THESE PROBLEMS MADE IT FOR YOU TO DO YOUR WORK, TAKE CARE OF THINGS AT HOME, OR GET ALONG WITH OTHER PEOPLE: NOT DIFFICULT AT ALL
GAD7 TOTAL SCORE: 0

## 2022-03-15 ASSESSMENT — PATIENT HEALTH QUESTIONNAIRE - PHQ9
SUM OF ALL RESPONSES TO PHQ QUESTIONS 1-9: 9
5. POOR APPETITE OR OVEREATING: NOT AT ALL
SUM OF ALL RESPONSES TO PHQ QUESTIONS 1-9: 9
10. IF YOU CHECKED OFF ANY PROBLEMS, HOW DIFFICULT HAVE THESE PROBLEMS MADE IT FOR YOU TO DO YOUR WORK, TAKE CARE OF THINGS AT HOME, OR GET ALONG WITH OTHER PEOPLE: VERY DIFFICULT

## 2022-03-15 ASSESSMENT — PAIN SCALES - GENERAL: PAINLEVEL: NO PAIN (0)

## 2022-03-15 ASSESSMENT — ASTHMA QUESTIONNAIRES: ACT_TOTALSCORE: 25

## 2022-03-15 NOTE — PATIENT INSTRUCTIONS
"  Patient Education       Stress Relief: Relaxation    Focusing the mind helps provide stress relief. Taking 5 to 10 minutes to practice relaxation each day helps you feel more refreshed. You can do these exercises almost anywhere. Try one or more until you find what works best for you.  Calm your mind  Find a quiet place where you won't be disturbed. Then try the following:    Sit comfortably. Take off your shoes. Turn off your cell phone. Take a few deep breaths.    Focus your mind on one peaceful thought, image, or word. Then try to hold that thought for 5 minutes.    When other thoughts enter your mind, relax and refocus. Let the invading thoughts fall away.    When you're done, stand up slowly and stretch your arms over your head. With practice, this exercise can help you feel restored.  Calm your body  With practice, you can use mental cues to tell your body how to feel.    Sit comfortably and clear your mind. A few deep breaths will help.    Mentally focus on your left hand and repeat to yourself, \"My left hand feels warm and heavy.\" Keep doing this until your hand does feel heavier and warmer.    Repeat the exercise using your right hand. Then focus on your arms, legs, and feet until your whole body feels relaxed.    When you're done, stand up slowly and stretch your arms overhead.  Visualization  Visualization is like taking a mental vacation. It frees your mind while keeping your body in a calm state. To get started, picture yourself feeling warm and relaxed. Choose a peaceful setting that appeals to you and fill in the details. If you imagine a tropical beach, listen to the waves on the shore. Feel the sun on your face. Dig your toes in the sand. By using the power of your mind, you can take a soothing break when you need to.  Other relaxing activities include yoga and gia chi. You can learn about these and other healthy and relaxing activities on the National [employer] for [employer]plementary and Integrative " Health ([facility]) website at www.[facility].gov/health/stress.   Luis Armando last reviewed this educational content on 12/1/2019 2000-2021 [employer]. All rights reserved. This information is not intended as a substitute for professional medical care. Always follow your healthcare professional's instructions.           Patient Education     Stress Relief: A Positive Lifestyle     Leave plenty of time for family fun.    Learning to manage stress doesn't happen overnight. It's a process. The more you keep at it, the more you'll feel in control of daily events.    Set limits  Trying to fit too much into a day is a major cause of stress. Setting limits will help you feel more in control. This sometimes means saying no--to people and even to things you might want to do. This can be hard at times. But knowing your priorities can help you make choices.   Know your priorities  Using your time and energy wisely is a good way to control stress. Save time for the things that matter most in your life. Ask yourself: Do I really need to do this? Do I want to do this? If you answer  yes,  then go ahead. But keep in mind you can also answer  no.  Consider writing down your responsibilities and ranking them from the most to the least important. This concrete exercise can help you develop a strategy for managing your time.   Learn to accept support  Everybody needs support now and then. Don't feel embarrassed to accept or to ask for help when you need it. Most people are glad to lend a hand. And asking for help can open up new lines of communication and friendships.   Stress and children  Be careful not to take your stress out on your children. They may not understand why you're stressed. But they can sense your moods. Be aware that many children--especially teenagers--are under stress, too. Plan time to talk with your kids. Ask them about school and any problems they re having. When you talk with them, make certain you  "are \"there.\" Don't check your phone, do turn the TV off, and give your attention only to them. Finally, make sure they have plenty of time to just be kids and have fun.   Be part of your community  If taking part in community or dave-based events reduces your stress, consider getting involved with an organization. If it's a good fit, it can offer a sense of belonging. It also helps put you in touch with active and caring people nearby. So whether it s a clean-up day at a local park or taking meals to the elderly, try to reach out to friends and neighbors. Just remember: Make sure it's an activity that reduces, not increases your stress. Taking time for yourself, even if that means going for a walk by yourself, is important for your well-being and that of your children. Don't hesitate to decrease, rather than increase, your obligations.   [employer] last reviewed this educational content on 4/1/2020 2000-2021 [employer]. All rights reserved. This information is not intended as a substitute for professional medical care. Always follow your healthcare professional's instructions.           Patient Education       Stress Relief: Changing Your Response  You are the only person responsible for your thoughts and actions. This simple idea is your most powerful tool for managing stress. Start by having realistic expectations. Then learn to recognize what you can--and can't--control. Finally, think about ways to change your response. With practice, you can learn to let go of stressful ways of thinking.  Have realistic expectations    When it comes to events that cause you stress, ask yourself:    What are my expectations?    How likely is it that my expectations, good or bad, will be met? Are they realistic?    If they aren't met, do I have to respond by feeling badly? How can I work with other outcomes?  Understand what you can do  To get better at managing stress, try these tips:    Put the stressor in " perspective. Will being late to work really get you fired?    Be flexible and look for answers. If you re stuck in traffic and your car is stopped or you are not the , try calling to let people know you re on the way.    Plan ahead for next time. If being late is a worry, plan to leave a few minutes earlier.    Get some physical activity. Exercise boosts stress-relieving hormones and improves mood.    Get enough sleep. Poor or little sleep can add to your feelings of stress and anxiety.    Don't turn to alcohol or drugs. Drinking and drug use make stress and anxiety worse. If you use drugs or alcohol to reduce stress, see your healthcare provider for help.  Make mountains into molehills  A common cause of stress is feeling as if you have to solve all your problems at once. To shake this feeling, learn to take things one step at a time. Try to break big problems into smaller tasks that you can handle. That way, worries that seem like mountains become little hills you can climb over. Remember, taking small steps will carry you forward. Get professional help if you find you can t manage your stress or your reactions are becoming more frequent or violent. Also get help if you have stress-related symptoms such as insomnia or nausea.  [employer] last reviewed this educational content on 9/1/2019 2000-2021 [employer]. All rights reserved. This information is not intended as a substitute for professional medical care. Always follow your healthcare professional's instructions.           Patient Education       Stress Relief: Activities  Walking  Taking a walk is a great way to fight stress. Walking offers a chance to take a break from a stressful situation. It can also give you a few minutes to think things through. Even a short walk can help you feel better. That's because walking is a positive action that you control.     When you're feeling stressed, some simple exercises can provide relief right  away. These exercises are not the kind you need sweatpants for. You can do them almost anytime and anywhere. They will help you feel more relaxed.   Stretching  Muscle tension is a common response to stress. Stretching is a simple way to loosen up. Try these stretches:     Neck stretch. Sit up straight and tuck in your chin. Place your left hand on the right side of your head. Gently pull your head to the left and hold for 10 seconds. Switch sides and repeat the exercise.    Shoulder and arm stretch. Put your hands together and lock your fingers. Then raise your hands above your head, palms upward. Hold for 15 seconds and relax. Repeat 3 times.  Deep breathing  Deep breathing is a simple method for relieving tension. Use 3 deep breaths each time you do this exercise.   1. Inhale. Breathe in slowly and deeply through your nose. Take in as much air as possible. Hold for 3 seconds.  2. Exhale. Breathe out slowly through your mouth. Try pursing your lips as if you were going to whistle. This helps control how fast you exhale.  [employer] last reviewed this educational content on 6/1/2020 2000-2021 [employer]. All rights reserved. This information is not intended as a substitute for professional medical care. Always follow your healthcare professional's instructions.

## 2022-03-15 NOTE — PROGRESS NOTES
"  Assessment & Plan     Anxiety    - clonazePAM (KLONOPIN) 0.5 MG tablet; TAKE 1 TABLET(0.5 MG) BY MOUTH THREE TIMES DAILY AS NEEDED FOR ANXIETY    30 minutes spent on the date of the encounter doing chart review, history and exam, documentation and further activities per the note     See Patient Instructions: follow up as needed. Continue to taper on clonazepam as tolerated. Discussed medical marijuana certification if needed.  Pt will do research on this.     Return in about 3 months (around 6/15/2022), or if symptoms worsen or fail to improve.    Ce Crowe, [facility] ROLAND Cook is a 61 year old who presents for the following health issues     History of Present Illness       Mental Health Follow-up:                    Today's PHQ-9         PHQ-9 Total Score: 9  PHQ-9 Q9 Thoughts of better off dead/self-harm past 2 weeks :   (P) Not at all    How difficult have these problems made it for you to do your work, take care of things at home, or get along with other people: Very difficult        Reason for visit:  Headache increasingly worse  Symptom onset:  More than a month  Symptom intensity:  Moderate  Symptom progression:  Worsening  What makes it worse:  None  What makes it better:  Tylenol and Ice     She eats 4 or more servings of fruits and vegetables daily.She consumes 0 sweetened beverage(s) daily. She exercises with enough effort to increase her heart rate 3 or less days per week.   She is taking medications regularly.       Review of Systems   Constitutional, HEENT, cardiovascular, pulmonary, GI, , musculoskeletal, neuro, skin, endocrine and psych systems are negative, except as otherwise noted.      Objective    /75   Pulse 107   Temp 98  F (36.7  C) (Tympanic)   Resp 20   Ht 1.651 m (5' 5\")   Wt 81.3 kg (179 lb 2 oz)   LMP 08/21/2007   SpO2 98%   Breastfeeding No   BMI 29.81 kg/m    Body mass index is 29.81 kg/m .  Physical Exam   GENERAL: " healthy, alert and no distress  EYES: Eyes grossly normal to inspection, PERRL and conjunctivae and sclerae normal  HENT: ear canals and TM's normal, POSITIVE nasal mucosal lining inflamed bilaterally  RESP: lungs clear to auscultation - no rales, rhonchi or wheezes  CV: regular rate and rhythm, normal S1 S2, no S3 or S4, no murmur, click or rub, no peripheral edema and peripheral pulses strong  MS: no gross musculoskeletal defects noted, no edema, no CVA tenderness  NEURO: Normal strength and tone, cranial nerves intact, mentation intact and speech normal  PSYCH: mentation appears normal, affect normal/bright    See orders

## 2022-03-16 ENCOUNTER — HOSPITAL ENCOUNTER (OUTPATIENT)
Dept: CARDIAC REHAB | Facility: CLINIC | Age: 62
Discharge: HOME | End: 2022-03-16
Attending: NURSE PRACTITIONER
Payer: COMMERCIAL

## 2022-03-16 PROCEDURE — 93798 PHYS/QHP OP CAR RHAB W/ECG: CPT

## 2022-03-16 PROCEDURE — 93797 PHYS/QHP OP CAR RHAB WO ECG: CPT | Mod: XU

## 2022-03-16 ASSESSMENT — ANXIETY QUESTIONNAIRES: GAD7 TOTAL SCORE: 0

## 2022-03-30 DIAGNOSIS — F41.9 ANXIETY: ICD-10-CM

## 2022-03-30 RX ORDER — CLONAZEPAM 0.5 MG/1
TABLET ORAL
Qty: 90 TABLET | Refills: 1 | Status: SHIPPED | OUTPATIENT
Start: 2022-03-30 | End: 2022-08-22

## 2022-03-30 NOTE — TELEPHONE ENCOUNTER
Requested Prescriptions   Pending Prescriptions Disp Refills    clonazePAM (KLONOPIN) 0.5 MG tablet [Pharmacy Med Name: CLONAZEPAM 0.5MG TABLETS] 90 tablet      Sig: TAKE 1 TABLET(0.5 MG) BY MOUTH THREE TIMES DAILY AS NEEDED FOR ANXIETY        There is no refill protocol information for this order         Routing refill request to provider for review/approval because:  Drug not on the [facility] [place] [place] refill protocol

## 2022-04-11 ENCOUNTER — VIRTUAL VISIT (OUTPATIENT)
Dept: PHARMACY | Facility: CLINIC | Age: 62
End: 2022-04-11
Payer: COMMERCIAL

## 2022-04-11 DIAGNOSIS — J45.901 EXACERBATION OF ASTHMA, UNSPECIFIED ASTHMA SEVERITY, UNSPECIFIED WHETHER PERSISTENT: ICD-10-CM

## 2022-04-11 DIAGNOSIS — F43.23 ADJUSTMENT DISORDER WITH MIXED ANXIETY AND DEPRESSED MOOD: ICD-10-CM

## 2022-04-11 DIAGNOSIS — I42.9 CARDIOMYOPATHY, UNSPECIFIED TYPE (H): ICD-10-CM

## 2022-04-11 DIAGNOSIS — Z95.0 S/P CARDIAC PACEMAKER PROCEDURE: ICD-10-CM

## 2022-04-11 DIAGNOSIS — I10 BENIGN ESSENTIAL HYPERTENSION: ICD-10-CM

## 2022-04-11 DIAGNOSIS — K52.9 COLITIS: ICD-10-CM

## 2022-04-11 DIAGNOSIS — E89.0 POSTABLATIVE HYPOTHYROIDISM: ICD-10-CM

## 2022-04-11 DIAGNOSIS — Z78.9 TAKES DIETARY SUPPLEMENTS: ICD-10-CM

## 2022-04-11 DIAGNOSIS — I44.2 COMPLETE ATRIOVENTRICULAR BLOCK (H): ICD-10-CM

## 2022-04-11 DIAGNOSIS — F41.8 SITUATIONAL ANXIETY: Primary | ICD-10-CM

## 2022-04-11 PROCEDURE — 99607 MTMS BY PHARM ADDL 15 MIN: CPT | Performed by: PHARMACIST

## 2022-04-11 PROCEDURE — 99605 MTMS BY PHARM NP 15 MIN: CPT | Performed by: PHARMACIST

## 2022-04-11 RX ORDER — NITROGLYCERIN 0.4 MG/1
0.4 TABLET SUBLINGUAL EVERY 5 MIN PRN
COMMUNITY
End: 2022-10-27

## 2022-04-11 RX ORDER — IPRATROPIUM BROMIDE 42 UG/1
2 SPRAY, METERED NASAL 4 TIMES DAILY PRN
COMMUNITY
End: 2023-08-16

## 2022-04-11 NOTE — PATIENT INSTRUCTIONS
Recommendations from today's MTM visit:                                                    MTM (medication therapy management) is a service provided by a clinical pharmacist designed to help you get the most of out of your medicines.   Today we reviewed what your medicines are for, how to know if they are working, that your medicines are safe and how to make your medicine regimen as easy as possible.      1. Ask Ce about getting Vitamin B12 rechecked next time you see her.     2. I'll send a message to Dr. Ordonez (cardiology) to see if they can update the diagnosis associated with metoprolol.    Follow-up: Return in about 1 year (around 4/11/2023) for Medication Therapy Management.    It was great to speak with you today.  I value your experience and would be very thankful for your time with providing feedback on our clinic survey. You may receive a survey via email or text message in the next few days.     To schedule another MTM appointment, please call the clinic directly or you may call the MTM scheduling line at [phone] or toll-free at [phone].     My Clinical Pharmacist's contact information:                                                      Please feel free to contact me with any questions or concerns you have.      Kortney Peralta, PharmD  Medication Therapy Management Pharmacist  [phone]

## 2022-04-11 NOTE — PROGRESS NOTES
Medication Therapy Management (MTM) Encounter    ASSESSMENT:                            Medication Adherence/Access: No issues identified    Depression/Anxiety: unimproved, limited options. We did discuss risk of memory issues with clonazepam use. Joyce prefers to remain on clonazepam at this time.  See supplements    Diarrhea/microscopic colitis: some improvement, plan in place with acupuncture. Avoid Imodium due to risk of prolonging QT interval.    HFrEF/Pacemaker/Prolonged QT interval: Stable/improved. Metoprolol isn't for hypertension, it's for heart failure, heart rate.     Hypothyroidism: Stable. TSH is within normal limits.     Pain/Thumb:  Stable.     Asthma/Allergies: Stable.     Supplements: No longer on Vitamin B12, ran out of, may benefit from recheck. Reviewed Energex supplement - would not recommended, offered to look into further however Joyce felt that wasn't necessary.     PLAN:                            1. Ask Ce about getting Vitamin B12 rechecked next time you see her.     2. I'll send a message to Dr. Ordonez (cardiology) to see if they can update the diagnosis associated with metoprolol.    --    Sent message:  Hi Dr. Ordonez,    I met with Joyce today for medication therapy management. Would you mind updating her metoprolol ER prescription with a diagnosis code of heart failure (vs hypertension)?  She's never had hypertension and it concerns her that is the current associated diagnosis.  I'd be happy to update the prescription as well with your permission.  She is on metoprolol XL 12.5 mg once daily.    Thank you,  Kortney Peralta, PharmD  Medication Therapy Management Pharmacist  [phone]    Addendum 4/13/22:  ----- Message -----   From: Alonso Ordonez MD   Sent: 4/11/2022   3:43 PM CDT   To: Karen Peralta, [facility], Matthias García RN   Subject: RE: Metoprolol                                   Matthias, could you please change that?   Thank you Kortney, that would be good   Thx   T   - metoprolol  prescription has been updated.  -Kortney    Follow-up: Return in about 1 year (around 4/11/2023) for Medication Therapy Management.    SUBJECTIVE/OBJECTIVE:                          Joyce Marroquin is a 61 year old female coming in for a follow-up visit.  Today's visit is a follow-up MTM visit from 12/22/21.     Reason for visit: update med list.    Allergies/ADRs: Reviewed in chart  Past Medical History: Reviewed in chart  Tobacco: She reports that she has quit smoking. She smoked 0.00 packs per day for 0.00 years. She has never used smokeless tobacco.  Alcohol: not currently using    Medication Adherence/Access: no issues reported    Depression/Anxiety:   Clonazepam 0.5 mg three times daily as needed anxiety     We were going to check in after her January 2022 heart procedure to see if she wanted to retrial Prozac, it may have contributed to diarrhea last fall, unsure if it was helpful. She prefers not to retrial and would like Prozac added to allergy list. She has been seeing mental health provider. She avoids driving if she takes clonazepam, feels like she needs to be on it currently.    Energex - wonders if this could be helpful or if should avoid.       Medication History:  Buspar - didn't help  Fluoxetine - tried in the past for loneliness, but not depression/anxiety which is more-so what she has going on now, possible contributed to diarrhea Fall 2021  Sertraline - not effective   Citalopram - possible effective prior to ablation; now avoids due to risk of QT prolongation  Cymbalta - microscopic colitis, confusion and withdrawal effects, stopped 10/5/21      Had genetic testing done through [employer], has not found it helpful.    Diarrhea/microscopic colitis:  increases probiotic and doing acupuncture    HFrEF/Pacemaker/Prolonged QT interval:   Eliquis 5 mg twice daily   Metoprolol ER 12.5 mg daily - has hypertension as diagnosis, but she's never had hypertension  Spironolactone 12.5 mg daily  NTG SL PRN (has taken  twice)  Furosemide 20 mg daily PRN    Resynchronization/left ventricular wire insertion was done January 2022. She's completed cardiac rehab.   Follows with cardiology.  Has always had low blood pressure, limited options for HF due to hypotension.   She has never had torsades.   Avoids medications that can prolong QT.  Patient reports the following medication side effects: none.     ECHO:  Date 2/19/21, EF 35-40%  BP Readings from Last 3 Encounters:   03/15/22 114/75   02/22/22 113/78   02/07/22 113/66     Potassium   Date Value Ref Range Status   02/22/2022 4.6 3.4 - 5.3 mmol/L Final   05/25/2021 4.2 3.4 - 5.3 mmol/L Final     Hypothyroidism:   Synthroid 150 mcg daily, except one day take one-half tablet (Sunday).    Patient is having the following symptoms: none, she's unable to tell based on symptoms, relies on labs.   TSH   Date Value Ref Range Status   07/27/2021 1.44 0.40 - 4.00 mU/L Final   12/06/2020 1.46 0.40 - 4.00 mU/L Final     Pain/Thumb:    Acetaminophen 1000 mg PRN  Tumeric daily     Avoids NSAIDS due to asthma and history of colagenous colitis. States this/these are effective. Denies side effects.     Asthma/Allergies:   ICS/LABA- Advair 250-50 mcg one puff(s) twice daily   Short-Acting Bronchodilator: Albuterol MDI and Nebs PRN (hasn't used in > two years)  Diphenhydramine 25 mg at bedtime PRN - allergies  Xylitol nasal spray daily PRN (no recent use)  Medrol 40 mg daily PRN bronchitis (hasn't used in > two years)  Azelastine 0.1% nasal spray twice daily PRN - irritates nasal passages if uses too much   Ipratropium 0.06% 2 sprays QID PRN (no recent use)    Triggers include: unknown, seasonal  Patient reports the following symptoms: none.    Supplements:   Calcium carbonate/Vitamin D 600/400 daily  Calcium-magensium-iodine 120/20/20 at bedtime PRN  Ferrous sulfate 130 mg daily  Magnesium lactate  mg at bedtime PRN  Melatonin 1-3 mg at bedtime PRN  Menaquinone-7 (vitamin K2) daily   Multivitamin  daily   Probiotic twice daily PRN - increases if has diarrhea  Vitamin D 2000 international unit(s) daily   Zinc 15 mg daily     No reported issues at this time.   Vitamin D Deficiency Screening Results:  Lab Results   Component Value Date    VITDT 54 07/27/2021    VITDT 67 07/20/2020    VITDT 75 01/25/2019    VITDT 87 (H) 11/09/2018    VITDT 78 (H) 09/24/2018         Magnesium   Date Value Ref Range Status   02/07/2022 2.0 1.8 - 2.6 mg/dL Final   07/20/2020 2.3 1.6 - 2.3 mg/dL Final     GFR Estimate   Date Value Ref Range Status   02/22/2022 >90 >60 mL/min/1.73m2 Final     Comment:     Effective December 21, 2021 eGFRcr in adults is calculated using the 2021 CKD-EPI creatinine equation which includes age and gender (Ronni et al., NE, DOI: 10.1056/VPVOec5890478)   02/07/2022 >90 >60 mL/min/1.73m2 Final     Comment:     Effective December 21, 2021 eGFRcr in adults is calculated using the 2021 CKD-EPI creatinine equation which includes age and gender ( et al., NEJM, DOI: 10.1056/RVWSmo4299367)   01/20/2022 >90 >60 mL/min/1.73m2 Final     Comment:     Effective December 21, 2021 eGFRcr in adults is calculated using the 2021 CKD-EPI creatinine equation which includes age and gender (Ronni et al., NEJ, DOI: 10.1056/HWXGrb6495415)   05/25/2021 >90 >60 mL/min/[1.73_m2] Final     Comment:     Non  GFR Calc  Starting 12/18/2018, serum creatinine based estimated GFR (eGFR) will be   calculated using the Chronic Kidney Disease Epidemiology Collaboration   (CKD-EPI) equation.     04/27/2021 82 >60 mL/min/[1.73_m2] Final     Comment:     Non  GFR Calc  Starting 12/18/2018, serum creatinine based estimated GFR (eGFR) will be   calculated using the Chronic Kidney Disease Epidemiology Collaboration   (CKD-EPI) equation.     03/24/2021 >90 >60 mL/min/[1.73_m2] Final     Comment:     Non  GFR Calc  Starting 12/18/2018, serum creatinine based estimated GFR (eGFR) will be    calculated using the Chronic Kidney Disease Epidemiology Collaboration   (CKD-EPI) equation.       Today's Vitals: LMP 08/21/2007   ----------------      I spent 46 minutes with this patient today. All changes were made via collaborative practice agreement with Ce Crowe NP. A copy of the visit note was provided to the patient's provider(s).    The patient was sent via [employer] a summary of these recommendations.     Kortney Peralta, PharmD  Medication Therapy Management Pharmacist  [phone]    Telemedicine Visit Details  Type of service:  Telephone visit  Start Time: 10:32 AM  End Time: 11:18 AM  Originating Location (patient location): [place]  Distant Location (provider location):  [facility]     Medication Therapy Recommendations  No medication therapy recommendations to display

## 2022-04-11 NOTE — Clinical Note
DAYTON SAUCEDO note, thanks!  Kortney Peralta, PharmD Medication Therapy Management Pharmacist [phone]

## 2022-04-12 ENCOUNTER — CARE COORDINATION (OUTPATIENT)
Dept: CARDIOLOGY | Facility: CLINIC | Age: 62
End: 2022-04-12
Payer: COMMERCIAL

## 2022-04-12 ENCOUNTER — NURSE TRIAGE (OUTPATIENT)
Dept: NURSING | Facility: CLINIC | Age: 62
End: 2022-04-12
Payer: COMMERCIAL

## 2022-04-12 DIAGNOSIS — I50.9 HEART FAILURE (H): Primary | ICD-10-CM

## 2022-04-12 DIAGNOSIS — Z20.822 EXPOSURE TO 2019 NOVEL CORONAVIRUS: Primary | ICD-10-CM

## 2022-04-12 RX ORDER — METOPROLOL SUCCINATE 25 MG/1
12.5 TABLET, EXTENDED RELEASE ORAL DAILY
Qty: 45 TABLET | Refills: 3 | Status: SHIPPED | OUTPATIENT
Start: 2022-04-12 | End: 2023-05-04

## 2022-04-12 NOTE — TELEPHONE ENCOUNTER
Patient is requesting serology antibody testing.    COVID-19 testing at [facility] is by appointment only. You'll need to schedule a time to get tested. If you have symptoms (signs) of COVID, please log in to [employer] to complete the symptom .     If you don't have COVID symptoms and want to get tested, you should also log in to [employer] to complete the symptom .   This includes people who:    have had close contact with a COVID-positive person    want to be tested before or after travel    have taken part in high-risk activities    have a school testing mandate, or     were told to get tested by their care team or the health department.    After the symptom  has been completed, you will be able to schedule your COVID test on [employer]. To learn more about our testing locations or for other details, please visit our COVID-19 Resource Hub.    [employer] is also the fastest way to get your test results. You'll get your results in [employer] within 3 days. If you don't use [employer], you'll get your results in the mail in 7 to 10 days. If your test is positive and you don't view your result in [employer] within 1 business day, you'll get a phone call with your result. A positive result means that you have COVID-19.    We are scheduling all people age 5 and older for COVID-19 vaccines (patients age 5-17 can only receive the Pfizer vaccine).    We are offering third doses of the Pfizer and Moderna COVID-19 vaccines to moderately and severely immunocompromised patients who are 28 days or more from their second dose and boosters when they are 3 months after their 3rd dose.    [facility] is offering initial booster doses of Covid-19 vaccination to anyone age 18 and up who got the Rory and Rory vaccine 2 or more months ago or Moderna COVID-19 vaccine or Pfizer COVID-19 vaccine 5 months or more ago.  We are also offering boosters to people age 12-17 who got their second Pfizer vaccine in the [place] 5  "months or more ago.    If your initial vaccination was Rory & Rory, we recommend getting a Pfizer or Moderna initial booster dose to maximize immunity.  If you received Moderna or Pfizer, you can schedule either Moderna or Pfizer for your booster dose based on convenience or personal preference other than people younger than 18 years old who can only get pfizer for a booster.      On Monday, April 4th, we will begin offering second booster doses to patients age 50 and up or who are 4 months are more from their first booster.  We will also offer a second booster to patients 12 and up who are immunocompromised and 4 months or more from their first booster.    Patient is calling requesting COVID serologic antibody testing.  NOTE: Serologic testing is a blood test for 'antibodies' which are made at 10-14 days after you have had symptoms of COVID or were exposed and had an asymptomatic infection.  This does NOT test you for 'active' infection or tell you if you are contagious.    Are you a healthcare worker? no  Do you currently have a cough, fever, body aches, shortness of breath, or difficulty breathing?  No  Did you previously have cough, fever, body aches, shortness of breath, or difficulty breathing that have now resolved? Has had previous covid symptoms.   Symptoms began 900 days ago.  Symptoms started >14 days ago. Per SARS-CoV-2 Serology test standing order, placed \"COVID-19 Bj RBD Yessica and Reflex Titer\" lab order (found in the COVID-19 (Coronavirus) Antibody by Serology Panel (FUTURE) with associated diagnosis code \"Encounter for Screening for other viral disease\" (Z11.59)\"      The patient was informed: \"Testing is limited each day and it may take time for testing to be available to everyone who has called. You will receive a call within 48-72 hours to schedule the serology testing. Please confirm the best number to reach you is [phone]. If you have any questions about scheduling, call " "[phone].\"       "

## 2022-04-13 ENCOUNTER — HOSPITAL ENCOUNTER (OUTPATIENT)
Facility: CLINIC | Age: 62
Setting detail: OBSERVATION
Discharge: HOME | End: 2022-04-14
Attending: EMERGENCY MEDICINE | Admitting: PHYSICIAN ASSISTANT
Payer: COMMERCIAL

## 2022-04-13 ENCOUNTER — APPOINTMENT (OUTPATIENT)
Dept: CT IMAGING | Facility: CLINIC | Age: 62
End: 2022-04-13
Attending: EMERGENCY MEDICINE
Payer: COMMERCIAL

## 2022-04-13 DIAGNOSIS — Z79.01 LONG TERM (CURRENT) USE OF ANTICOAGULANTS: ICD-10-CM

## 2022-04-13 DIAGNOSIS — R07.9 CHEST PAIN, UNSPECIFIED TYPE: ICD-10-CM

## 2022-04-13 DIAGNOSIS — J44.89 OBSTRUCTIVE CHRONIC BRONCHITIS WITHOUT EXACERBATION (H): ICD-10-CM

## 2022-04-13 DIAGNOSIS — Z11.52 ENCOUNTER FOR SCREENING LABORATORY TESTING FOR SEVERE ACUTE RESPIRATORY SYNDROME CORONAVIRUS 2 (SARS-COV-2): ICD-10-CM

## 2022-04-13 DIAGNOSIS — R55 SYNCOPE AND COLLAPSE: ICD-10-CM

## 2022-04-13 DIAGNOSIS — Z95.0 CARDIAC PACEMAKER IN SITU: ICD-10-CM

## 2022-04-13 DIAGNOSIS — I42.9 FAMILIAL CARDIOMYOPATHY (H): ICD-10-CM

## 2022-04-13 DIAGNOSIS — I48.0 PAROXYSMAL ATRIAL FIBRILLATION (H): ICD-10-CM

## 2022-04-13 DIAGNOSIS — R07.89 OTHER CHEST PAIN: ICD-10-CM

## 2022-04-13 LAB
ALBUMIN SERPL-MCNC: 3.3 G/DL (ref 3.4–5)
ALP SERPL-CCNC: 91 U/L (ref 40–150)
ALT SERPL W P-5'-P-CCNC: 21 U/L (ref 0–50)
ANION GAP SERPL CALCULATED.3IONS-SCNC: 5 MMOL/L (ref 3–14)
AST SERPL W P-5'-P-CCNC: 20 U/L (ref 0–45)
ATRIAL RATE - MUSE: 74 BPM
BASOPHILS # BLD AUTO: 0.1 10E3/UL (ref 0–0.2)
BASOPHILS NFR BLD AUTO: 1 %
BILIRUB SERPL-MCNC: 0.3 MG/DL (ref 0.2–1.3)
BUN SERPL-MCNC: 11 MG/DL (ref 7–30)
CALCIUM SERPL-MCNC: 8.8 MG/DL (ref 8.5–10.1)
CHLORIDE BLD-SCNC: 112 MMOL/L (ref 94–109)
CO2 SERPL-SCNC: 23 MMOL/L (ref 20–32)
CREAT SERPL-MCNC: 0.57 MG/DL (ref 0.52–1.04)
D DIMER PPP FEU-MCNC: 1.65 UG/ML FEU (ref 0–0.5)
DIASTOLIC BLOOD PRESSURE - MUSE: NORMAL MMHG
EOSINOPHIL # BLD AUTO: 0.1 10E3/UL (ref 0–0.7)
EOSINOPHIL NFR BLD AUTO: 2 %
ERYTHROCYTE [DISTWIDTH] IN BLOOD BY AUTOMATED COUNT: 12.8 % (ref 10–15)
GFR SERPL CREATININE-BSD FRML MDRD: >90 ML/MIN/1.73M2
GLUCOSE BLD-MCNC: 96 MG/DL (ref 70–99)
HCT VFR BLD AUTO: 39.7 % (ref 35–47)
HGB BLD-MCNC: 13 G/DL (ref 11.7–15.7)
HOLD SPECIMEN: NORMAL
IMM GRANULOCYTES # BLD: 0 10E3/UL
IMM GRANULOCYTES NFR BLD: 1 %
INTERPRETATION ECG - MUSE: NORMAL
LIPASE SERPL-CCNC: 110 U/L (ref 73–393)
LYMPHOCYTES # BLD AUTO: 1.3 10E3/UL (ref 0.8–5.3)
LYMPHOCYTES NFR BLD AUTO: 22 %
MCH RBC QN AUTO: 31.3 PG (ref 26.5–33)
MCHC RBC AUTO-ENTMCNC: 32.7 G/DL (ref 31.5–36.5)
MCV RBC AUTO: 96 FL (ref 78–100)
MONOCYTES # BLD AUTO: 0.6 10E3/UL (ref 0–1.3)
MONOCYTES NFR BLD AUTO: 10 %
NEUTROPHILS # BLD AUTO: 3.9 10E3/UL (ref 1.6–8.3)
NEUTROPHILS NFR BLD AUTO: 64 %
NRBC # BLD AUTO: 0 10E3/UL
NRBC BLD AUTO-RTO: 0 /100
NT-PROBNP SERPL-MCNC: 58 PG/ML (ref 0–900)
P AXIS - MUSE: 33 DEGREES
PLATELET # BLD AUTO: 182 10E3/UL (ref 150–450)
POTASSIUM BLD-SCNC: 4.1 MMOL/L (ref 3.4–5.3)
PR INTERVAL - MUSE: 210 MS
PROT SERPL-MCNC: 7 G/DL (ref 6.8–8.8)
QRS DURATION - MUSE: 96 MS
QT - MUSE: 402 MS
QTC - MUSE: 446 MS
R AXIS - MUSE: -12 DEGREES
RBC # BLD AUTO: 4.15 10E6/UL (ref 3.8–5.2)
SARS-COV-2 RNA RESP QL NAA+PROBE: NEGATIVE
SODIUM SERPL-SCNC: 140 MMOL/L (ref 133–144)
SYSTOLIC BLOOD PRESSURE - MUSE: NORMAL MMHG
T AXIS - MUSE: 64 DEGREES
TROPONIN I SERPL HS-MCNC: 3 NG/L
TROPONIN I SERPL HS-MCNC: 4 NG/L
TROPONIN I SERPL HS-MCNC: <3 NG/L
TROPONIN I SERPL HS-MCNC: <3 NG/L
VENTRICULAR RATE- MUSE: 74 BPM
WBC # BLD AUTO: 5.9 10E3/UL (ref 4–11)

## 2022-04-13 PROCEDURE — G0378 HOSPITAL OBSERVATION PER HR: HCPCS

## 2022-04-13 PROCEDURE — 80053 COMPREHEN METABOLIC PANEL: CPT | Performed by: EMERGENCY MEDICINE

## 2022-04-13 PROCEDURE — 250N000013 HC RX MED GY IP 250 OP 250 PS 637: Performed by: PHYSICIAN ASSISTANT

## 2022-04-13 PROCEDURE — 36415 COLL VENOUS BLD VENIPUNCTURE: CPT | Performed by: PHYSICIAN ASSISTANT

## 2022-04-13 PROCEDURE — 96361 HYDRATE IV INFUSION ADD-ON: CPT | Performed by: EMERGENCY MEDICINE

## 2022-04-13 PROCEDURE — 99285 EMERGENCY DEPT VISIT HI MDM: CPT | Mod: 25 | Performed by: EMERGENCY MEDICINE

## 2022-04-13 PROCEDURE — 71275 CT ANGIOGRAPHY CHEST: CPT | Mod: 26 | Performed by: RADIOLOGY

## 2022-04-13 PROCEDURE — 36415 COLL VENOUS BLD VENIPUNCTURE: CPT | Performed by: EMERGENCY MEDICINE

## 2022-04-13 PROCEDURE — 250N000013 HC RX MED GY IP 250 OP 250 PS 637: Performed by: EMERGENCY MEDICINE

## 2022-04-13 PROCEDURE — 93010 ELECTROCARDIOGRAM REPORT: CPT | Mod: 59 | Performed by: EMERGENCY MEDICINE

## 2022-04-13 PROCEDURE — 71275 CT ANGIOGRAPHY CHEST: CPT

## 2022-04-13 PROCEDURE — 84484 ASSAY OF TROPONIN QUANT: CPT | Mod: 91 | Performed by: PHYSICIAN ASSISTANT

## 2022-04-13 PROCEDURE — 83880 ASSAY OF NATRIURETIC PEPTIDE: CPT | Performed by: EMERGENCY MEDICINE

## 2022-04-13 PROCEDURE — C9803 HOPD COVID-19 SPEC COLLECT: HCPCS | Performed by: EMERGENCY MEDICINE

## 2022-04-13 PROCEDURE — 83690 ASSAY OF LIPASE: CPT | Performed by: EMERGENCY MEDICINE

## 2022-04-13 PROCEDURE — 99220 PR INITIAL OBSERVATION CARE,LEVEL III: CPT | Performed by: PHYSICIAN ASSISTANT

## 2022-04-13 PROCEDURE — 84484 ASSAY OF TROPONIN QUANT: CPT | Performed by: EMERGENCY MEDICINE

## 2022-04-13 PROCEDURE — 93005 ELECTROCARDIOGRAM TRACING: CPT | Performed by: EMERGENCY MEDICINE

## 2022-04-13 PROCEDURE — 85379 FIBRIN DEGRADATION QUANT: CPT | Performed by: EMERGENCY MEDICINE

## 2022-04-13 PROCEDURE — 96360 HYDRATION IV INFUSION INIT: CPT | Mod: 59 | Performed by: EMERGENCY MEDICINE

## 2022-04-13 PROCEDURE — U0005 INFEC AGEN DETEC AMPLI PROBE: HCPCS | Performed by: EMERGENCY MEDICINE

## 2022-04-13 PROCEDURE — 258N000003 HC RX IP 258 OP 636: Performed by: EMERGENCY MEDICINE

## 2022-04-13 PROCEDURE — 93005 ELECTROCARDIOGRAM TRACING: CPT | Mod: 76 | Performed by: EMERGENCY MEDICINE

## 2022-04-13 PROCEDURE — 85025 COMPLETE CBC W/AUTO DIFF WBC: CPT | Performed by: EMERGENCY MEDICINE

## 2022-04-13 PROCEDURE — 99214 OFFICE O/P EST MOD 30 MIN: CPT | Performed by: NURSE PRACTITIONER

## 2022-04-13 PROCEDURE — 250N000011 HC RX IP 250 OP 636: Performed by: EMERGENCY MEDICINE

## 2022-04-13 RX ORDER — NITROGLYCERIN 0.4 MG/1
0.4 TABLET SUBLINGUAL EVERY 5 MIN PRN
Status: DISCONTINUED | OUTPATIENT
Start: 2022-04-13 | End: 2022-04-14 | Stop reason: HOSPADM

## 2022-04-13 RX ORDER — CLONAZEPAM 0.5 MG/1
0.5 TABLET ORAL 3 TIMES DAILY PRN
Status: DISCONTINUED | OUTPATIENT
Start: 2022-04-13 | End: 2022-04-14 | Stop reason: HOSPADM

## 2022-04-13 RX ORDER — SPIRONOLACTONE 25 MG
12.5 TABLET ORAL DAILY
Status: DISCONTINUED | OUTPATIENT
Start: 2022-04-14 | End: 2022-04-14 | Stop reason: HOSPADM

## 2022-04-13 RX ORDER — MAGNESIUM HYDROXIDE/ALUMINUM HYDROXICE/SIMETHICONE 120; 1200; 1200 MG/30ML; MG/30ML; MG/30ML
30 SUSPENSION ORAL EVERY 4 HOURS PRN
Status: DISCONTINUED | OUTPATIENT
Start: 2022-04-13 | End: 2022-04-14 | Stop reason: HOSPADM

## 2022-04-13 RX ORDER — IOPAMIDOL 755 MG/ML
62 INJECTION, SOLUTION INTRAVASCULAR ONCE
Status: COMPLETED | OUTPATIENT
Start: 2022-04-13 | End: 2022-04-13

## 2022-04-13 RX ORDER — ACETAMINOPHEN 325 MG/1
650 TABLET ORAL EVERY 4 HOURS PRN
Status: DISCONTINUED | OUTPATIENT
Start: 2022-04-13 | End: 2022-04-14 | Stop reason: HOSPADM

## 2022-04-13 RX ORDER — ZINC OXIDE 13 %
1 CREAM (GRAM) TOPICAL 2 TIMES DAILY
Status: DISCONTINUED | OUTPATIENT
Start: 2022-04-13 | End: 2022-04-13

## 2022-04-13 RX ORDER — SPIRONOLACTONE 25 MG
12.5 TABLET ORAL DAILY
Status: DISCONTINUED | OUTPATIENT
Start: 2022-04-13 | End: 2022-04-13

## 2022-04-13 RX ORDER — DIPHENHYDRAMINE HCL 25 MG
25 CAPSULE ORAL
Status: DISCONTINUED | OUTPATIENT
Start: 2022-04-13 | End: 2022-04-14 | Stop reason: HOSPADM

## 2022-04-13 RX ORDER — NITROGLYCERIN 0.4 MG/1
0.4 TABLET SUBLINGUAL EVERY 5 MIN PRN
Status: DISCONTINUED | OUTPATIENT
Start: 2022-04-13 | End: 2022-04-13

## 2022-04-13 RX ADMIN — SODIUM CHLORIDE 500 ML: 9 INJECTION, SOLUTION INTRAVENOUS at 08:34

## 2022-04-13 RX ADMIN — CLONAZEPAM 0.5 MG: 0.5 TABLET ORAL at 21:59

## 2022-04-13 RX ADMIN — DIPHENHYDRAMINE HYDROCHLORIDE 25 MG: 25 CAPSULE ORAL at 17:17

## 2022-04-13 RX ADMIN — CLONAZEPAM 0.5 MG: 0.5 TABLET ORAL at 16:21

## 2022-04-13 RX ADMIN — APIXABAN 5 MG: 5 TABLET, FILM COATED ORAL at 17:17

## 2022-04-13 RX ADMIN — NITROGLYCERIN 0.4 MG: 0.4 TABLET SUBLINGUAL at 08:33

## 2022-04-13 RX ADMIN — ACETAMINOPHEN 650 MG: 325 TABLET ORAL at 12:02

## 2022-04-13 RX ADMIN — IOPAMIDOL 62 ML: 755 INJECTION, SOLUTION INTRAVENOUS at 10:46

## 2022-04-13 RX ADMIN — SODIUM CHLORIDE 500 ML: 9 INJECTION, SOLUTION INTRAVENOUS at 11:55

## 2022-04-13 ASSESSMENT — ENCOUNTER SYMPTOMS
FEVER: 0
NAUSEA: 1
LIGHT-HEADEDNESS: 0
DYSURIA: 0
VOMITING: 0
SHORTNESS OF BREATH: 1
CHILLS: 0
DIZZINESS: 0
HEADACHES: 0
COUGH: 0
SORE THROAT: 0
BLOOD IN STOOL: 0
DIARRHEA: 1
PALPITATIONS: 0
ABDOMINAL PAIN: 1
HEMATURIA: 0

## 2022-04-13 NOTE — ED PROVIDER NOTES
"ED Provider Note  [facility]      History     Chief Complaint   Patient presents with     Chest Pain     HPI  Joyce Marroquin is a 61 year old female with PMHx HTN, HFrEF, CHB s/p dual chamber PPM, NICM, paroxysmal afib on eliquis, COPD, LARRY, GERD, depression, anxiety, who presents to the ED for evaluation of chest pain. Patient reports intermittent midsternal chest pain over the past two days. She is unable to determine what provokes pain although it does occur while at rest and woke her from sleep this AM. Pain is described as a pressure sensation, rated a 5/10 in severity that occasionally radiates into her neck. Pain is accompanied by SOB. She is unable to clarify how long symptoms last. After taking her morning medications she felt as though she was going to pass out which prompted her to call EMS today. En route she was feeling nauseous and was given benadryl. She denies palpitations or vomiting. No recent fever, chills, cough, URI symptoms or LE edema. She did notice her weight was 3 lbs up today compared to yesterday. She has chronic abdominal pain that is at baseline and chronic diarrhea \"due to microscopic colitis\". No bloody stools.     Past Medical History  Past Medical History:   Diagnosis Date     Arthritis      Cardiomyopathy (H)     ff Cardiology     Chronic depressive personality disorder      Congestive heart failure (H) see dr martínez    heart failure correct term     COPD exacerbation (H)      Esophageal reflux      Ex-smoker     quit 2006; 1 PPD x 30     Hyperparathyroidism (H)     s/p parathyroidectomy     Hypothyroidism      LARRY on CPAP     ff Sleep medicine     Pulmonary nodules     ff Pulmonologist     S/P cardiac pacemaker procedure     checked every 6 months at the [facility]     Uncomplicated asthma years     Past Surgical History:   Procedure Laterality Date     BUNIONECTOMY Bilateral      COLONOSCOPY N/A 8/30/2021    Procedure: COLONOSCOPY, WITH POLYPECTOMY AND BIOPSY; "  Surgeon: Ranjit Penn MD;  Location:  GI     CV CORONARY ANGIOGRAM N/A 9/26/2019    Procedure: CV CORONARY ANGIOGRAM;  Surgeon: Erickson Trejo MD;  Location:  HEART CARDIAC CATH LAB     CV RIGHT HEART CATH MEASUREMENTS RECORDED N/A 7/20/2020    Procedure: CV RIGHT HEART CATH;  Surgeon: Alonso Ordonez MD;  Location:  HEART CARDIAC CATH LAB     EP ABLATION / EP STUDIES  04/21/2017     EP PPM UPGRADE TO BIVENT N/A 1/19/2022    Procedure: EP PPM UPGRADE TO BIVENT;  Surgeon: Lino Funes MD;  Location:  HEART CARDIAC CATH LAB     EXPLORE NECK N/A 9/19/2018    Procedure: EXPLORE NECK;  Neck Exploration Resection of Left superior Parathyroid gland;  Surgeon: Kristine Venegas MD;  Location: [facility] OR     HC CORRECT BUNION,SIMPLE       PARATHYROIDECTOMY N/A 9/19/2018    Procedure: PARATHYROIDECTOMY;;  Surgeon: Kristine Venegas MD;  Location: [facility] OR     PPM INSERT OF NEW OR REPL W/VENT LEAD  04/21/2017     TONSILLECTOMY & ADENOIDECTOMY       acetaminophen (TYLENOL) 500 MG tablet  albuterol (PROAIR HFA/PROVENTIL HFA/VENTOLIN HFA) 108 (90 BASE) MCG/ACT Inhaler  azelastine (ASTELIN) 0.1 % nasal spray  Calcium Carbonate-Vitamin D (CALCIUM-CARB 600 + D PO)  clonazePAM (KLONOPIN) 0.5 MG tablet  DiphenhydrAMINE HCl (BENADRYL PO)  ELIQUIS ANTICOAGULANT 5 MG tablet  Ferrous Sulfate (IRON SUPPLEMENT PO)  fluticasone-salmeterol (ADVAIR) 250-50 MCG/DOSE inhaler  furosemide (LASIX) 20 MG tablet  ipratropium (ATROVENT) 0.06 % nasal spray  MAGNESIUM LACTATE PO  MELATONIN PO  Menaquinone-7 (VITAMIN K2 PO)  METHYLPREDNISOLONE PO  metoprolol succinate ER (TOPROL XL) 25 MG 24 hr tablet  Multiple Vitamin (DAILY MULTIVITAMIN PO)  nebulizer nebulization  nitroGLYcerin (NITROSTAT) 0.4 MG sublingual tablet  Probiotic Product (PROBIOTIC DAILY PO)  spironolactone (ALDACTONE) 25 MG tablet  SYNTHROID 150 MCG tablet  TURMERIC PO  vitamin D3 (CHOLECALCIFEROL) 2000 units tablet  XYLITOL MT  Zinc 15 MG  CAPS      Allergies   Allergen Reactions     Tetracycline Swelling     Tongue swelling     Viagra [Sildenafil] Muscle Pain (Myalgia) and Diarrhea     Zofran [Ondansetron]      Prolonged QT  Prolonged QT at baseline per patient     Flagyl [Metronidazole] Rash     Pruritic rash      Buspar [Buspirone]      Muscle weakness     Corn Oil Other (See Comments)     Headache       Cymbalta Other (See Comments) and Diarrhea     Confusion     Prozac [Fluoxetine] Diarrhea     Seasonal Allergies Difficulty breathing     Sulfamethoxazole-Trimethoprim      Other reaction(s): Other  Passed out     Cyclobenzaprine Other (See Comments)     Extreme heat intolerance     Levaquin [Levofloxacin]      Other reaction(s): Other  Tendon rupture     Nsaids Other (See Comments)     Exacerbated asthma     Family History  Family History   Problem Relation Age of Onset     Hypothyroidism Mother      Hypertension Mother      Osteoarthritis Mother      Coronary Artery Disease Father         nonfatal MI in his 70s     Asthma Father      Diabetes Sister      Hypothyroidism Sister      Breast Cancer Maternal Aunt      Anxiety Disorder Sister      Social History   Social History     Tobacco Use     Smoking status: Former Smoker     Packs/day: 0.00     Years: 0.00     Pack years: 0.00     Smokeless tobacco: Never Used   Vaping Use     Vaping Use: Never used   Substance Use Topics     Alcohol use: Yes     Alcohol/week: 0.0 - 8.0 standard drinks     Comment: seldom     Drug use: No      Past medical history, past surgical history, medications, allergies, family history, and social history were reviewed with the patient. No additional pertinent items.       Review of Systems   Constitutional: Negative for chills and fever.   HENT: Negative for congestion and sore throat.    Eyes: Negative for discharge.   Respiratory: Positive for shortness of breath. Negative for cough.    Cardiovascular: Positive for chest pain. Negative for palpitations and leg  swelling.   Gastrointestinal: Positive for abdominal pain (chronic, baseline), diarrhea (chronic, baseline) and nausea. Negative for blood in stool and vomiting.   Genitourinary: Negative for dysuria, frequency and hematuria.   Musculoskeletal: Negative for myalgias.   Skin: Negative for color change and rash.   Neurological: Negative for dizziness, weakness, light-headedness and headaches.   Hematological: Negative for adenopathy.   Psychiatric/Behavioral: Negative for agitation, behavioral problems and confusion.   All other systems reviewed and are negative.        Physical Exam   BP: 125/86  Pulse: 77  Temp: 97.6  F (36.4  C)  Resp: 16  Weight: 80.7 kg (178 lb)  SpO2: 97 %     Physical Exam  Vitals and nursing note reviewed.   Constitutional:       General: She is not in acute distress.     Appearance: She is well-developed. She is not toxic-appearing.   HENT:      Head: Normocephalic and atraumatic.   Eyes:      Pupils: Pupils are equal, round, and reactive to light.   Cardiovascular:      Rate and Rhythm: Normal rate and regular rhythm.      Heart sounds: No murmur heard.  Pulmonary:      Effort: Pulmonary effort is normal. No respiratory distress.      Breath sounds: Normal breath sounds.   Chest:      Chest wall: No tenderness.   Abdominal:      General: Bowel sounds are normal.      Palpations: Abdomen is soft.      Tenderness: There is no abdominal tenderness.   Musculoskeletal:         General: Normal range of motion.      Cervical back: Normal range of motion and neck supple.      Right lower leg: No edema.      Left lower leg: No edema.   Skin:     General: Skin is warm and dry.   Neurological:      General: No focal deficit present.      Mental Status: She is alert and oriented to person, place, and time.   Psychiatric:         Mood and Affect: Mood normal.         Behavior: Behavior normal.       ED Course                EKG Interpretation:      Interpreted by Lino Mayo  Time reviewed:  7:22  Symptoms at time of EKG: none  Rhythm: Atrially paced rhythm  Rate: normal  Axis: normal  Ectopy: none  Conduction: normal  ST Segments/ T Waves: No ST elevation or depression  Q Waves: none  Comparison to prior: Unchanged from 2/27/2022    Patient with ongoing chest discomfort.  An EKG was repeated.  It was read by me at 8:31 AM.  It shows a sinus rhythm with a rate of 68 and a first-degree AV block.  There continues to be low voltage QRS and no acute ST-T segment changes.    DMilbrandt       Comprehensive metabolic panel     Status: Abnormal   Result Value Ref Range    Sodium 140 133 - 144 mmol/L    Potassium 4.1 3.4 - 5.3 mmol/L    Chloride 112 (H) 94 - 109 mmol/L    Carbon Dioxide (CO2) 23 20 - 32 mmol/L    Anion Gap 5 3 - 14 mmol/L    Urea Nitrogen 11 7 - 30 mg/dL    Creatinine 0.57 0.52 - 1.04 mg/dL    Calcium 8.8 8.5 - 10.1 mg/dL    Glucose 96 70 - 99 mg/dL    Alkaline Phosphatase 91 40 - 150 U/L    AST 20 0 - 45 U/L    ALT 21 0 - 50 U/L    Protein Total 7.0 6.8 - 8.8 g/dL    Albumin 3.3 (L) 3.4 - 5.0 g/dL    Bilirubin Total 0.3 0.2 - 1.3 mg/dL    GFR Estimate >90 >60 mL/min/1.73m2   Lipase     Status: Normal   Result Value Ref Range    Lipase 110 73 - 393 U/L   Troponin I     Status: Normal   Result Value Ref Range    Troponin I High Sensitivity 4 <54 ng/L   Nt probnp inpatient (BNP)     Status: Normal   Result Value Ref Range    N terminal Pro BNP Inpatient 58 0 - 900 pg/mL   D dimer quantitative     Status: Abnormal   Result Value Ref Range    D-Dimer Quantitative 1.65 (H) 0.00 - 0.50 ug/mL FEU    Narrative    This D-dimer assay is intended for use in conjunction with a clinical pretest probability assessment model to exclude pulmonary embolism (PE) and deep venous thrombosis (DVT) in outpatients suspected of PE or DVT. The cut-off value is 0.50 ug/mL FEU.   CBC with platelets and differential     Status: None   Result Value Ref Range    WBC Count 5.9 4.0 - 11.0 10e3/uL    RBC Count 4.15 3.80 - 5.20  10e6/uL    Hemoglobin 13.0 11.7 - 15.7 g/dL    Hematocrit 39.7 35.0 - 47.0 %    MCV 96 78 - 100 fL    MCH 31.3 26.5 - 33.0 pg    MCHC 32.7 31.5 - 36.5 g/dL    RDW 12.8 10.0 - 15.0 %    Platelet Count 182 150 - 450 10e3/uL    % Neutrophils 64 %    % Lymphocytes 22 %    % Monocytes 10 %    % Eosinophils 2 %    % Basophils 1 %    % Immature Granulocytes 1 %    NRBCs per 100 WBC 0 <1 /100    Absolute Neutrophils 3.9 1.6 - 8.3 10e3/uL    Absolute Lymphocytes 1.3 0.8 - 5.3 10e3/uL    Absolute Monocytes 0.6 0.0 - 1.3 10e3/uL    Absolute Eosinophils 0.1 0.0 - 0.7 10e3/uL    Absolute Basophils 0.1 0.0 - 0.2 10e3/uL    Absolute Immature Granulocytes 0.0 <=0.4 10e3/uL    Absolute NRBCs 0.0 10e3/uL   EKG 12 lead     Status: None   Result Value Ref Range    Systolic Blood Pressure  mmHg    Diastolic Blood Pressure  mmHg    Ventricular Rate 74 BPM    Atrial Rate 74 BPM    FL Interval 210 ms    QRS Duration 96 ms     ms    QTc 446 ms    P Axis 33 degrees    R AXIS -12 degrees    T Axis 64 degrees    Interpretation ECG       Atrial-paced rhythm with prolonged AV conduction  Low voltage QRS  Septal infarct , age undetermined  Abnormal ECG  Unconfirmed report - interpretation of this ECG is computer generated - see medical record for final interpretation  Confirmed by - EMERGENCY ROOM, PHYSICIAN ([id number]),  JULIO ALONSO ([id number]) on 4/13/2022 7:35:10 AM     EKG 12 lead     Status: None (Preliminary result)   Result Value Ref Range    Systolic Blood Pressure  mmHg    Diastolic Blood Pressure  mmHg    Ventricular Rate 68 BPM    Atrial Rate 68 BPM    FL Interval 228 ms    QRS Duration 98 ms     ms    QTc 450 ms    P Axis 74 degrees    R AXIS -12 degrees    T Axis 69 degrees    Interpretation ECG       Sinus rhythm with 1st degree A-V block  Low voltage QRS  Borderline ECG       Medications   nitroGLYcerin (NITROSTAT) sublingual tablet 0.4 mg (0.4 mg Sublingual Given 4/13/22 0890)   0.9% sodium chloride BOLUS (500 mLs  Intravenous New Bag 4/13/22 1155)   acetaminophen (TYLENOL) tablet 650 mg (650 mg Oral Given 4/13/22 1202)   0.9% sodium chloride BOLUS (0 mLs Intravenous Stopped 4/13/22 0948)   iopamidol (ISOVUE-370) solution 62 mL (62 mLs Intravenous Given 4/13/22 1046)   sodium chloride (PF) 0.9% PF flush 92 mL (92 mLs Intravenous Given 4/13/22 1047)        Assessments & Plan (with Medical Decision Making)   61 year old female with PMHx HTN, HFrEF, CHB s/p dual chamber PPM, NICM, paroxysmal afib on eliquis, COPD, LARRY, GERD, depression, anxiety, presenting for evaluation of intermittent chest pain and SOB over the past 2 days. DDx includes ACS, heart failure exacerbation, pericarditis, myopericarditis, PE, pneumonia, pleural effusion, musculoskeletal chest wall pain, GERD, PUD, pancreatitis, gastritis, anxiety, others.     Clinically she appears nontoxic and is in no acute distress. She is afebrile with HR 77, /86 and sating 97% on room air. Cardiopulmonary exam wnl, no LE edema. EKG shows an atrially paced rhythm with no signs of ischemia. Repeat EKG obtained due to patient reporting ongoing chest discomfortand which was unchanged.    Labs including CBC, CMP, BNP and lipase are within normal limits. Negative trop. COVID 19 negative. Ddimer elevated at 1.6. CT PE negative.      She was given sublingual NTG with resolution of chest pain. Also received tylenol and IVF.  Work-up thus far has been unremarkable,, but will admit to obs for cardiology eval given pt history/risk factors and improvement with nitro.      I have reviewed the nursing notes. I have reviewed the findings, diagnosis, plan and need for follow up with the patient.    New Prescriptions    No medications on file       Final diagnoses:   Chest pain, unspecified type     Michael Lerma, MS4    --    ED Attending Physician Attestation    I Lino Mayo MD, cared for this patient with the Medical Student. I performed, or re-performed, the physical exam  and medical decision-making. I have verified the accuracy of all the medical student findings and documentation above, and have edited as necessary.    Summary of HPI, PE, ED Course   Patient is a 61 year old female evaluated in the emergency department for chest pain which has been noted over the past 2 days.  Nothing particularly has made it better or worse.  She did not take nitro secondary to having low blood pressure and feeling lightheaded.. Exam notable for clear breath sounds.. ED course notable for relatively normal work-up with exception of an elevated D-dimer.  CT scan of the chest showed no evidence of PE.  Patient's chest pain did improve with nitro. After the completion of care in the emergency department, the patient was admitted to observation.    Critical Care & Procedures  Not applicable.    Medical Decision Making  The medical record was reviewed and interpreted.  Current labs reviewed and interpreted.  Previous labs reviewed and interpreted.  Current images reviewed and interpreted: Chest CT.  EKG reviewed and interpreted: No significant change.     Patient with nonspecific chest pain which improved with nitroglycerin.  Initial troponin was normal.  Cardiology has been consulted.  Patient be placed in observation status for serial troponins and cardiac monitoring.  I spoke with Marilee Sewell from the ED observation unit.      Lino Mayo MD  Emergency Medicine       --  Lino Mayo  [facility] EMERGENCY DEPARTMENT  4/13/2022     Lino Mayo MD  04/14/22 [id number]

## 2022-04-13 NOTE — ED NOTES
Pt complains of increasing chest pain 6/10 and dizziness. Given  Nitroglycerin sl. On reassessment, cp 4/10 and pt says chest pain feels better, feels she is anxious also. Provider aware

## 2022-04-13 NOTE — ED TRIAGE NOTES
Pt BIBA for chest pain that has been going on for a couple of days, but today reports worsening chest pain, nausea, and hypotension. Hx of atrial pacemaker and heart block. Received benadryl in ambulance.EKG has PVCs with the nausea but otherwise looks normal. Tachy at times, otherwise vitally stable.

## 2022-04-13 NOTE — CONSULTS
"     Cardiology Inpatient Consultation  April 13, 2022    Reason for Consult:  A cardiology consult was requested by Dr. Mayo from the ED/Obs service to provide clinical guidance regarding chest pain.    Assessment and Recommendation:  Joyce Marroquin is a 61 year old female with a PMHx of systolic heart failure with recovered EF, CHB s/p dual chamber PPM with upgrade to LBB pacer (1/19/22), pAF (on Eliquis), COPD, GERD, LRARY, JESUS who presented to ED today with chest pain    # Atypical Chest Pain, Chronic  # Throat tightness  Pt woke up this am with throat tightness, which she gets with anxiety, but this \"felt different.\" She took a SL Nitroglycerin which helped pain. Pain/tightness unaffected by activity/rest. High sensitivity trop 4, ruling out ACS. EKG with no ST-T changes. Patient has had cMRI, CTA, Cors in past 2 years; no ischemia, mild-mod non-obstructive CAD.    - No further work up needed at this time given trop 4  - Spoke to patient about possibly increasing Metoprolol to 25 mg daily or adding Imdur for anti-anginal effects, she would like to hold off on making any medications changes currently, given history of adverse reactions with medication  - Pt reports feeling ready to discharge home, would be ok from cardiac stand point    # Chronic Systolic Heart Failure with recovered EF  # pAF  # CHB s/p PPM  # Possible orthostatic HOTN  This am, felt like she was going to pass out, checked her BP, SBP 80's. She drank a glass of water and lowered herself to ground. Symptoms resolved prior to arrival to ED. BP stable in ED -120's (pt states baseline is usually in 's). Pt reports she has been told she has orthostatic HOTN  - Volume status: appears euvolemic on exam  - BB/Rate control: Continue PTA Metoprolol 12.5 mg daily   - AC: Continue PTA Eliquis  - AA: Continue PTA Aldactone 12.5 mg daily  - Re-educated patient about ensuring good fluid intake (2L/day), watch salt intake, slow positional " "changes, spanks/compression shorts  - She has follow up with Dr. Funes 5/26 with repeat echo 5/25    Patient seen and discussed with Dr. Sibley, who agrees with above plan.      Thank you for consulting the cardiovascular services at the [facility]. We will sign off at this time. Please do not hesitate to call us with any questions.     Roseann CORRAL, CNP  [facility] Cardiology Consult Team  [phone]    HPI:   Joyce Marroquin is a 61 year old female with a PMHx of systolic heart failure with recovered EF, CHB s/p dual chamber PPM with upgrade to LBB pacer (1/19/22), pAF (on Eliquis), COPD, GERD, LARRY, JESUS who presented to ED today with chest pain    Patient reports she has had issues with chest pain for several years, and typically has 2 different types of pain; anxiety driven chest pain--feels like a throat tightness associated with increased anxiousness, pocket pain, felt near armpit/chest, related to PPM device, this morning she woke up with throat tightness, however she states that she was not anxious, and something about this pain felt \"different.\" She took a SL Nitroglycerin, which helped pain improve. Prior to taking Nitroglycerin, she also felt like she was \"going to drop,\" so she lowered herself to the ground, she did not fall or lose consciousness. She states at that time, at home, her SBP was in the 80's. She also drank a glass of water, feeling that it could be related to dehydration. Patient overall reports that something felt wrong, so she came into ED.     ED work up with troponin 4, ruling out ACS. EKG with no ST-T changes. BP stable, -120's (pt states her baseline is usually in 100's). dDimer elevated, CT PE negative for PE. Patient previously reported 6/10 chest pain, got a SL Nitroglycerin in ED, and now the chest pain is gone.     Patient follows with Dr. Ordonez in HF clinic and Dr. Funes in EP Clinic. She has had a coronary angiogram in 9/2019 that revealed mild " non-obstructive CAD, a CTA 12/2021 which had mild-mod diffuse coronary calcifications, and a cMRI 5/21 which did not have any signs of ischemia. Most recent echocardiogram with EF 40-45% (had been as low as 30-35%).     At the time of interview, the patient denies chest pain, stating chest pain has resolved, she denies any SOB at rest or with exertion, orthopnea, PND, palpitations, lightheadedness, or syncope. Patient states she was nervous, but now is feeling back to her baseline, like she could go home.     Review of Systems:    Complete review of systems was performed and negative except per HPI.    PMH:  Past Medical History:   Diagnosis Date     Arthritis      Cardiomyopathy (H)     ff Cardiology     Chronic depressive personality disorder      Congestive heart failure (H) see  note    heart failure correct term     COPD exacerbation (H)      Esophageal reflux      Ex-smoker     quit 2006; 1 PPD x 30     Hyperparathyroidism (H)     s/p parathyroidectomy     Hypothyroidism      LARRY on CPAP     ff Sleep medicine     Pulmonary nodules     ff Pulmonologist     S/P cardiac pacemaker procedure     [facility]      Uncomplicated asthma years     Active Problems:  Patient Active Problem List    Diagnosis Date Noted     Chest pain 04/13/2022     Priority: Medium     Syncope 01/20/2022     Priority: Medium     History of cardiac radiofrequency ablation 06/01/2021     Priority: Medium     PTSD (post-traumatic stress disorder) 04/27/2021     Priority: Medium     Other ill-defined heart diseases 07/09/2020     Priority: Medium     Added automatically from request for surgery [id number]       Adjustment disorder with mixed anxiety and depressed mood 04/20/2020     Priority: Medium     Status post coronary angiogram 09/26/2019     Priority: Medium     Death of parent 05/16/2019     Priority: Medium     Both Mother and Father 4-5/2019       Panic attack 05/16/2019     Priority: Medium     Insomnia,  unspecified type 05/16/2019     Priority: Medium     Exacerbation of asthma, unspecified asthma severity, unspecified whether persistent 05/16/2019     Priority: Medium     LARRY (obstructive sleep apnea) 12/04/2018     Priority: Medium     Restless legs syndrome (RLS) 12/04/2018     Priority: Medium     Vestibular disequilibrium, unspecified laterality 12/04/2018     Priority: Medium     Collagenous colitis 12/04/2018     Priority: Medium     Situational anxiety 11/12/2018     Priority: Medium     Hypothyroidism      Priority: Medium     Ex-smoker      Priority: Medium     quit 2006; 1 PPD x 30       Hyperparathyroidism (H)      Priority: Medium     s/p parathyroidectomy       Pulmonary nodules      Priority: Medium     ff Pulmonologist       Cardiomyopathy (H)      Priority: Medium     ff Cardiology       Hx of cardiac pacemaker      Priority: Medium     Cardiac pacemaker in situ- [employer]tr[employer], dual lead- DEPENDENT 05/03/2018     Priority: Medium     Complete atrioventricular block (H) 06/29/2017     Priority: Medium     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     Priority: Medium     Esophageal reflux      Priority: Medium     Chronic depressive personality disorder      Priority: Medium     Social History:  Social History     Tobacco Use     Smoking status: Former Smoker     Packs/day: 0.00     Years: 0.00     Pack years: 0.00     Smokeless tobacco: Never Used   Vaping Use     Vaping Use: Never used   Substance Use Topics     Alcohol use: Yes     Alcohol/week: 0.0 - 8.0 standard drinks     Comment: seldom     Drug use: No     Family History:  Family History   Problem Relation Age of Onset     Hypothyroidism Mother      Hypertension Mother      Osteoarthritis Mother      Coronary Artery Disease Father         nonfatal MI in his 70s     Asthma Father      Diabetes Sister      Hypothyroidism Sister      Breast Cancer Maternal Aunt      Anxiety Disorder Sister        Cardiac Medications:  Metoprolol 12.5 mg daily  Aldactone  12.5 mg daily  Eliquis 5 mg BID  Lasix 20 mg daily as needed  SL Nitroglycerin as needed      Physical Exam:  Temp:  [97.6  F (36.4  C)] 97.6  F (36.4  C)  Pulse:  [77-96] 96  Resp:  [16] 16  BP: (105-127)/(67-86) 127/67  SpO2:  [96 %-97 %] 96 %  No intake or output data in the 24 hours ending 04/13/22 1351  GEN: pleasant, no acute distress  HEENT: no icterus  CV: RRR, normal s1/s2, no murmurs/rubs/s3/s4, no heave. No JVD  CHEST: clear to ausculation bilaterally, no rales or wheezing  ABD: soft, non-tender, normal active bowel sounds  EXTR: pulses intact. No clubbing, cyanosis or edema.   NEURO: alert oriented, speech fluent/appropriate, motor grossly nonfocal      Diagnostics:  All labs and imaging were reviewed, of note:    CMP  Recent Labs   Lab 04/13/22  0752      POTASSIUM 4.1   CHLORIDE 112*   CO2 23   ANIONGAP 5   GLC 96   BUN 11   CR 0.57   GFRESTIMATED >90   MANJU 8.8   PROTTOTAL 7.0   ALBUMIN 3.3*   BILITOTAL 0.3   ALKPHOS 91   AST 20   ALT 21     CBC  Recent Labs   Lab 04/13/22  0752   WBC 5.9   RBC 4.15   HGB 13.0   HCT 39.7   MCV 96   MCH 31.3   MCHC 32.7   RDW 12.8        INRNo lab results found in last 7 days.  Arterial Blood GasNo lab results found in last 7 days.    Lab Results   Component Value Date    TROPI <0.015 12/20/2020    TROPI <0.015 12/20/2020    TROPI <0.015 10/06/2019    TROPI <0.015 07/15/2018    TROPI <0.015 11/22/2017    TROPONIN <0.015 09/28/2021    TROPONIN <0.015 09/28/2021    TROPONIN <0.015 08/08/2021    TROPONIN <0.07 01/04/2006         EKG 4/13/2022: NSR, HR 68, 1st deg AVB    Device Interrogation 2/7/2022:  Device: Med[employer]AC ON TRAC [id number] Percepta CRT-P  Normal device function  Mode: DDDR  bpm  AP: 60.9%  : 99.5%  BVP: 99.5%  VSR Pace: 0.2%  Intrinsic Rhythm: NSR with  @ 30 bpm  Short V-V intervals: 0  Lead Trends Appear Stable: yes  Estimated battery longevity to RRT = 10.4 years  Atrial Arrhythmia: 0  Ventricular Arrhythmia: 0  Setting Changes: None    CT Angio  12/2021:  IMPRESSION:  1. The coronary sinus is widely patent and drains to the right atrium.  It measures 7.7x5.0mm at at ostium. The posterior interventricular  vein and great cardiac vein drain into the coronary sinus.      FINDINGS:   1.  The coronary sinus is widely patent and drains to the right  atrium. It measures 7.7x5.0mm at its ostium. The posterior  interventricular vein and great cardiac vein drain into the coronary  sinus.   2.  Single right-sided SVC which drains normally into the right  atrium.   3.  The visualized upper extremity veins are widely patent.   4.  Device leads are seen in right atrium and right ventricle.   5.  Normal pulmonary venous anatomy with all pulmonary veins draining  into the right atrium.   6.  The visualized aortic root, ascending aorta, aortic arch, and  descending aorta are normal in size and are widely patent.   Atherosclerotic calcification is present in the descending aorta.  7.  Normal left-sided 3-vessel aortic arch.   8.  No thrombus in left atrial appendage.   9.  Moderate diffuse coronary artery calcifications.   10.  No intracardiac thrombus is seen.   11.  Please review Radiology report for incidental noncardiac findings  that will follow separately.    cMRI 5/4/2021:  Clinical history: 60 F with NICM LVEF 35-40%, PAF, cath - non obstructive CAD, PVCs s/p RVOT ablation  complicated by CHB s/p PPM.   Comparison CMR: none     1. The LV is normal in cavity size and wall thickness. The global systolic function is mildly reduced. The  LVEF is 50%. There are no regional wall motion abnormalities. There is abnormal septal motion related to  pacing.  2. The RV is normal in cavity size. The global systolic function is normal. The RVEF is 65%.   3. The left atrium is mildly dilated.  4. There is no significant valvular disease.   5. Late gadolinium enhancement imaging shows no MI, fibrosis or infiltrative disease.   6. Regadenoson stress perfusion imaging shows no  ischemia.  7. There is no pericardial effusion or thickening.  8. There is no LV thrombus.     CONCLUSIONS: No myocardial ischemia or fibrosis. Mild NICM possibly related to pacing, LVEF 50% and RVEF  65%.     Coronary Angiogram 9/2019:  Conclusion       Mid LAD lesion is 40% stenosed.     Mild-moderate non-obstructive coronary disease involving LAD, LCx, and RCA.  No significant coronary disease to explain new cardiomyopathy.        Plan    Follow bedrest per protocol    Continued medical management and lifestyle modifications for cardiovascular risk factor optimizations.    Follow up visit with Nurse Practitioner in 1-2 weeks.    Arterial sheath removed from radial artery with TR band placement.    Discontinue tobacco smokingDiscontinue smoking    Discharge today per protocol      Start ASA 81mg, Atorvastatin.    Follow up with Adelaida Slater         Coronary Findings  DiagnosticDominance: Right    Left Main   Mid LM to Ost LAD lesion is 20% stenosed.   Left Anterior Descending   Distal LAD becomes small after D1 takeoff.   Ost LAD to Prox LAD lesion is 15% stenosed.   Mid LAD lesion is 40% stenosed.   Mid LAD to Dist LAD lesion is 15% stenosed. The lesion is serial.   First Diagonal Branch   The vessel is large.   1st Diag lesion is 20% stenosed.   First Septal Branch   1st Sept lesion is 40% stenosed. 40% lesion of bifurcating ostial septal branches in patient without anginal symptoms.   Second Diagonal Branch   The vessel is small.   Third Diagonal Branch   The vessel is small.   Left Circumflex   Prox Cx to Dist Cx lesion is 30% stenosed. The lesion is serial. The lesion is calcified.   First Obtuse Marginal Branch   The vessel is moderate in size.   Second Obtuse Marginal Branch   The vessel is small.   Third Obtuse Marginal Branch   The vessel is large.   3rd Mrg lesion is 15% stenosed.   Right Coronary Artery   Prox RCA lesion is 25% stenosed.   Prox RCA to Dist RCA lesion is 30% stenosed. The lesion is  serial. The lesion is calcified.   Right Ventricular Branch   The vessel is large.   Right Posterior Descending Artery   The vessel is large.   Right Posterior Atrioventricular Artery   The vessel is moderate in size.   RPAV lesion is 20% stenosed.   First Right Posterolateral Branch       Intervention  No interventions have been documented.      Time Spent on this Encounter   I spent 60 minutes on the unit/floor managing the care of Joyce Marroquin. Over 50% of my time was spent on the following:   - Counseling the patient and/or family regarding: diagnostic results, prognosis, risks and benefits of treatment options, recommended follow-up and prevention of disease  - Coordination of care with the: primary team    Ketty Alvarez, CNP

## 2022-04-13 NOTE — PROGRESS NOTES
List all goals to be met before discharge home:   - Serial troponins and stress test complete. In progress.  - Seen and cleared by consultant if applicable In progress.  - Adequate pain control on oral analgesia Met  - Vital signs normal or at patient baseline In progress  /78 (BP Location: Right arm)   Pulse 71   Temp 97.9  F (36.6  C) (Oral)   Resp 16   Wt 80.7 kg (178 lb)   LMP 08/21/2007   SpO2 99%   BMI 29.62 kg/m    - Safe disposition plan has been identified In progress  - Nurse to notify provider when observation goals have been met and patient is ready for discharge.

## 2022-04-13 NOTE — PHARMACY-ADMISSION MEDICATION HISTORY
Admission Medication History Completed by Pharmacy    See [facility] Admission Navigator for allergy information, preferred outpatient pharmacy, prior to admission medications and immunization status.     Medication History Sources:     Spoke with pt over the phone    Changes made to PTA medication list (reason):    Added: None    Deleted: None    Changed: None    Additional Information:    Pt is a reliable historian    Prior to Admission medications    Medication Sig Last Dose Taking? Auth Provider   acetaminophen (TYLENOL) 500 MG tablet Take 500-1,000 mg by mouth every 6 hours as needed for mild pain Past Week at Unknown time Yes Reported, Patient   azelastine (ASTELIN) 0.1 % nasal spray Spray 1 spray into both nostrils 2 times daily as needed  Past Month at Unknown time Yes Reported, Patient   Calcium Carbonate-Vitamin D (CALCIUM-CARB 600 + D PO) Take 1 tablet by mouth daily 4/12/2022 at PM Yes Reported, Patient   clonazePAM (KLONOPIN) 0.5 MG tablet TAKE 1 TABLET(0.5 MG) BY MOUTH THREE TIMES DAILY AS NEEDED FOR ANXIETY 4/12/2022 at PM Yes Ce Crowe NP   DiphenhydrAMINE HCl (BENADRYL PO) Take 25 mg by mouth nightly as needed for allergies  4/12/2022 at PM Yes Unknown, Entered By History   ELIQUIS ANTICOAGULANT 5 MG tablet TAKE 1 TABLET(5 MG) BY MOUTH TWICE DAILY 4/13/2022 at 0500 Yes Alonso Ordonez MD   Ferrous Sulfate (IRON SUPPLEMENT PO) Take 130 mg by mouth daily  4/12/2022 at AM Yes Unknown, Entered By History   fluticasone-salmeterol (ADVAIR) 250-50 MCG/DOSE inhaler Inhale 1 puff into the lungs every 12 hours 4/13/2022 at AM Yes Reported, Patient   furosemide (LASIX) 20 MG tablet Take 20 mg by mouth as needed Past Week at Unknown time Yes Lino Funes MD   ipratropium (ATROVENT) 0.06 % nasal spray Spray 2 sprays into both nostrils 4 times daily as needed for rhinitis More than a month at Unknown time Yes Reported, Patient   MAGNESIUM LACTATE PO Take  mg by mouth nightly as needed  4/11/2022 at  PM Yes Reported, Patient   MELATONIN PO Take 1-3 mg by mouth nightly as needed  4/12/2022 at PM Yes Unknown, Entered By History   Menaquinone-7 (VITAMIN K2 PO) Take 1 tablet by mouth daily 4/12/2022 at PM Yes Reported, Patient   metoprolol succinate ER (TOPROL XL) 25 MG 24 hr tablet Take 0.5 tablets (12.5 mg) by mouth in the morning. 4/12/2022 at AM Yes Alonso Ordonez MD   Multiple Vitamin (DAILY MULTIVITAMIN PO) Take 1 tablet by mouth every morning  4/12/2022 at AM Yes Reported, Patient   nitroGLYcerin (NITROSTAT) 0.4 MG sublingual tablet Place 0.4 mg under the tongue every 5 minutes as needed for chest pain For chest pain place 1 tablet under the tongue every 5 minutes for 3 doses. If symptoms persist 5 minutes after 1st dose call 911. More than a month at Unknown time Yes Reported, Patient   Probiotic Product (PROBIOTIC DAILY PO) Take 1 capsule by mouth 2 times daily Takes additional doses if has diarrhea 4/12/2022 at PM Yes Reported, Patient   spironolactone (ALDACTONE) 25 MG tablet Take 0.5 tablets (12.5 mg) by mouth daily 4/12/2022 at PM Yes Alonso Ordonez MD   SYNTHROID 150 MCG tablet TAKE ONE TABLET BY MOUTH SIX DAYS A WEEK; TAKE ONE-HALF TABLET ONE DAY A WEEK 4/13/2022 at AM Yes Ce Crowe, PO   TURMERIC PO Take 1 tablet by mouth every morning  4/12/2022 at PM Yes Reported, Patient   vitamin D3 (CHOLECALCIFEROL) 2000 units tablet Take 1 tablet by mouth daily 4/12/2022 at PM Yes Crescencio Sánchez MD   XYLITOL MT Spray 1 spray into both nostrils daily as needed More than a month at Unknown time Yes Reported, Patient   Zinc 15 MG CAPS Take 15 mg by mouth  4/12/2022 at Unknown time Yes Reported, Patient   albuterol (PROAIR HFA/PROVENTIL HFA/VENTOLIN HFA) 108 (90 BASE) MCG/ACT Inhaler Inhale 2 puffs into the lungs as needed for shortness of breath / dyspnea or wheezing    Unknown, Entered By History   METHYLPREDNISOLONE PO Take 40 mg by mouth as needed    Reported, Patient   nebulizer nebulization as needed    Reported, Patient       Date completed: 04/13/22    Medication history completed by: Tila Neal

## 2022-04-13 NOTE — H&P
[facility] ED Observation Admission Note    Chief Complaint   Patient presents with     Chest Pain       Assessment/Plan:  Joyce Marroquin is a 61 year old female with PMHx HTN, HFrEF, CHB s/p dual chamber PPM, NICM, paroxysmal afib on eliquis, COPD, LARRY, GERD, depression, anxiety, who presents to the ED for evaluation of chest pain.      #Near Syncope  ##NICM (LVEF 40-45%) s/p CRT-P upgrade on 1/19/22  #Paroxysmal atrial fibrillation   # CHB s/p PPM  # Possible orthostatic HOTN  Intermitted chest pain for the past 2 days. Chest pain described as midsternal pressure that comes in waves. Chest pain present at rest and with exertion. Reports associated SOB. Patient reports that the chest pain woke her up from sleep this morning. She checked her BP , SBP in the 80's. She says she then drank glass of water and then took her AM medications after which she felt dizzy and lightheaded. She felt like she was going to pass out and lowered herself to the ground. Her  checked her BP again, . EMS was called. Patient denies LOC. She was given benadryl en route for feeling nauseous. Patient denies cough, fever or chills. Denies abdominal pain, LE edema or palpitations. In the ED, she is afebrile. BP  -120's (pt states baseline is usually in 's). P 65-96 RR 16, O2 96-99% RA. LAB: CMP and CBC unremarkable. BNP normal. Lipase wnl. Covid-19 negative. Trop x 1 negative. EKG without ischemic changes. D-dimer was elevated at 1.65. CT chest PE was obtained and this was negative for PE and no other acute finding within the chest. Given cardiac hx, patient was admitted to ed observation unit for serial troponin and cardiology consult.   - Serial troponins q4h x 2 more  - Continuous telemetry  - Stress Test: TBD  - Cardiology consult   - Nitro PRN  - Clear liquid diet after midnight  - Continue PTA Metoprolol 12.5 mg daily   - Continue PTA Eliquis 5 mg BID   - Continue PTA Aldactone 12.5 mg daily          HPI:    Joyce MARTINEZ  "Lopez is a 61 year old female with PMHx HTN, HFrEF, CHB s/p dual chamber PPM, NICM, paroxysmal afib on eliquis, COPD, LARRY, GERD, depression, anxiety, who presents to the ED for evaluation of chest pain. Patient reports intermittent midsternal chest pain over the past two days. She is unable to determine what provokes pain although it does occur while at rest and woke her from sleep this AM. Pain is described as a pressure sensation, rated a 5/10 in severity that occasionally radiates into her neck. Pain is accompanied by SOB. She is unable to clarify how long symptoms last. After taking her morning medications she felt as though she was going to pass out which prompted her to call EMS today. En route she was feeling nauseous and was given benadryl. She denies palpitations or vomiting. No recent fever, chills, cough, URI symptoms or LE edema. She did notice her weight was 3 lbs up today compared to yesterday. She has chronic abdominal pain that is at baseline and chronic diarrhea \"due to microscopic colitis\". No bloody stools.      In the ED, she is afebrile. BP  -120's (pt states baseline is usually in 's). P 65-96 RR 16, O2 96-99% RA. LAB: CMP and CBC unremarkable. BNP normal. Lipase wnl. Covid-19 negative. Trop x 1 negative. EKG without ischemic changes. D-dimer was elevated at 1.65. CT chest PE was obtained and this was negative for PE and no other acute finding within the chest. Given cardiac hx, patient was admitted to ed observation unit for serial troponin and cardiology consult.     On admission to the observation unit the patient was stable.    History:    Past Medical History:   Diagnosis Date     Arthritis      Cardiomyopathy (H)     ff Cardiology     Chronic depressive personality disorder      Congestive heart failure (H) see dr martínez    heart failure correct term     COPD exacerbation (H)      Esophageal reflux      Ex-smoker     quit 2006; 1 PPD x 30     Hyperparathyroidism (H)     s/p " parathyroidectomy     Hypothyroidism      LARRY on CPAP     ff Sleep medicine     Pulmonary nodules     ff Pulmonologist     S/P cardiac pacemaker procedure     [facility]      Uncomplicated asthma years       Past Surgical History:   Procedure Laterality Date     BUNIONECTOMY Bilateral      COLONOSCOPY N/A 8/30/2021    Procedure: COLONOSCOPY, WITH POLYPECTOMY AND BIOPSY;  Surgeon: Ranjit Penn MD;  Location:  GI     CV CORONARY ANGIOGRAM N/A 9/26/2019    Procedure: CV CORONARY ANGIOGRAM;  Surgeon: Erickson Trejo MD;  Location:  HEART CARDIAC CATH LAB     CV RIGHT HEART CATH MEASUREMENTS RECORDED N/A 7/20/2020    Procedure: CV RIGHT HEART CATH;  Surgeon: Alonso Ordonez MD;  Location:  HEART CARDIAC CATH LAB     EP ABLATION / EP STUDIES  04/21/2017     EP PPM UPGRADE TO BIVENT N/A 1/19/2022    Procedure: EP PPM UPGRADE TO BIVENT;  Surgeon: Lino Funes MD;  Location:  HEART CARDIAC CATH LAB     EXPLORE NECK N/A 9/19/2018    Procedure: EXPLORE NECK;  Neck Exploration Resection of Left superior Parathyroid gland;  Surgeon: Kristine Venegas MD;  Location: [facility] OR     HC CORRECT BUNION,SIMPLE       PARATHYROIDECTOMY N/A 9/19/2018    Procedure: PARATHYROIDECTOMY;;  Surgeon: Kristine Venegas MD;  Location: [facility] OR     PPM INSERT OF NEW OR REPL W/VENT LEAD  04/21/2017     TONSILLECTOMY & ADENOIDECTOMY         Family History   Problem Relation Age of Onset     Hypothyroidism Mother      Hypertension Mother      Osteoarthritis Mother      Coronary Artery Disease Father         nonfatal MI in his 70s     Asthma Father      Diabetes Sister      Hypothyroidism Sister      Breast Cancer Maternal Aunt      Anxiety Disorder Sister        Social History     Socioeconomic History     Marital status:      Spouse name: Not on file     Number of children: Not on file     Years of education: Not on file     Highest education level: Master's degree (e.g., MA, MS, Florian,  Pascual, MSW, SONIA)   Occupational History     Not on file   Tobacco Use     Smoking status: Former Smoker     Packs/day: 0.00     Years: 0.00     Pack years: 0.00     Smokeless tobacco: Never Used   Vaping Use     Vaping Use: Never used   Substance and Sexual Activity     Alcohol use: Yes     Alcohol/week: 0.0 - 8.0 standard drinks     Comment: seldom     Drug use: No     Sexual activity: Yes     Partners: Male     Birth control/protection: None     Comment: vasectomy   Other Topics Concern     Parent/sibling w/ CABG, MI or angioplasty before 65F 55M? No   Social History Narrative    CRNA on disability for vestibular symptoms      Social Determinants of Health     Financial Resource Strain: Not on file   Food Insecurity: Not on file   Transportation Needs: Not on file   Physical Activity: Not on file   Stress: Not on file   Social Connections: Not on file   Intimate Partner Violence: Not At Risk     Fear of Current or Ex-Partner: No     Emotionally Abused: No     Physically Abused: No     Sexually Abused: No   Housing Stability: Not on file       No current facility-administered medications on file prior to encounter.  acetaminophen (TYLENOL) 500 MG tablet, Take 500-1,000 mg by mouth every 6 hours as needed for mild pain  azelastine (ASTELIN) 0.1 % nasal spray, Spray 1 spray into both nostrils 2 times daily as needed   Calcium Carbonate-Vitamin D (CALCIUM-CARB 600 + D PO), Take 1 tablet by mouth daily  clonazePAM (KLONOPIN) 0.5 MG tablet, TAKE 1 TABLET(0.5 MG) BY MOUTH THREE TIMES DAILY AS NEEDED FOR ANXIETY  DiphenhydrAMINE HCl (BENADRYL PO), Take 25 mg by mouth nightly as needed for allergies   ELIQUIS ANTICOAGULANT 5 MG tablet, TAKE 1 TABLET(5 MG) BY MOUTH TWICE DAILY  Ferrous Sulfate (IRON SUPPLEMENT PO), Take 130 mg by mouth daily   fluticasone-salmeterol (ADVAIR) 250-50 MCG/DOSE inhaler, Inhale 1 puff into the lungs every 12 hours  furosemide (LASIX) 20 MG tablet, Take 20 mg by mouth as needed  MAGNESIUM LACTATE  PO, Take  mg by mouth nightly as needed   MELATONIN PO, Take 1-3 mg by mouth nightly as needed   metoprolol succinate ER (TOPROL XL) 25 MG 24 hr tablet, Take 0.5 tablets (12.5 mg) by mouth in the morning.  Multiple Vitamin (DAILY MULTIVITAMIN PO), Take 1 tablet by mouth every morning   nitroGLYcerin (NITROSTAT) 0.4 MG sublingual tablet, Place 0.4 mg under the tongue every 5 minutes as needed for chest pain For chest pain place 1 tablet under the tongue every 5 minutes for 3 doses. If symptoms persist 5 minutes after 1st dose call 911.  Probiotic Product (PROBIOTIC DAILY PO), Take 1 capsule by mouth 2 times daily Takes additional doses if has diarrhea  spironolactone (ALDACTONE) 25 MG tablet, Take 0.5 tablets (12.5 mg) by mouth daily  SYNTHROID 150 MCG tablet, TAKE ONE TABLET BY MOUTH SIX DAYS A WEEK; TAKE ONE-HALF TABLET ONE DAY A WEEK  TURMERIC PO, Take 1 tablet by mouth every morning   vitamin D3 (CHOLECALCIFEROL) 2000 units tablet, Take 1 tablet by mouth daily  Zinc 15 MG CAPS, Take 15 mg by mouth   albuterol (PROAIR HFA/PROVENTIL HFA/VENTOLIN HFA) 108 (90 BASE) MCG/ACT Inhaler, Inhale 2 puffs into the lungs as needed for shortness of breath / dyspnea or wheezing   ipratropium (ATROVENT) 0.06 % nasal spray, Spray 2 sprays into both nostrils 4 times daily as needed for rhinitis  Menaquinone-7 (VITAMIN K2 PO), Take 1 tablet by mouth daily  METHYLPREDNISOLONE PO, Take 40 mg by mouth as needed   nebulizer nebulization, as needed  XYLITOL MT, Spray 1 spray into both nostrils daily as needed        Data:    Results for orders placed or performed during the hospital encounter of 04/13/22   CT Chest Pulmonary Embolism w Contrast     Status: None    Narrative    EXAMINATION: CTA pulmonary angiogram, 4/13/2022 11:03 AM     COMPARISON: Radiograph 2/7/2022. CT 12/10/2021, 8/8/2021, CT  5/21/2007.    HISTORY: PE suspected, low/intermediate prob, positive D-dimer    TECHNIQUE: Volumetric helical acquisition of CT images  of the chest  from the lung apices to the kidneys were acquired after the  administration of 80 mL of Isovue-370 IV contrast. Non-Flash technique  with shallow inspiratory breath hold technique.  Post-processed  multiplanar and/or MIP reformations were obtained, archived to PACS  and used in interpretation of this study.     FINDINGS:  Contrast bolus is: excellent.  Exam is negative for acute  pulmonary embolism.     The largest right ventricle transaxial diameter is (measured from  endocardium to endocardium): 3.3 cm   The largest left ventricle transaxial diameter is (measured from  endocardium to endocardium): 4.3 cm  RV/LV ratio is: 0.77 (if ratio greater than 1.1 then sign is  suspicious for right heart strain)  Reflux of contrast into the IVC? no    Paradoxical bowing of the interventricular septum to the left? no  Pericardial effusion?:not present    Left chest wall cardiac pacing device with leads in stable  positioning.    The heart size is normal. Standard branching pattern of the great  vessels. Mild coronary calcifications. The main pulmonary artery and  descending thoracic aorta are within normal limits. No abnormal  mediastinal, hilar, or axillary lymph nodes. Small sliding hiatal  hernia.    Central tracheobronchial tree is clear. No pneumothorax, pleural  effusion, or focal pulmonary consolidation. Linear scarring of the  bilateral lower and right middle lobes. Biapical scarring. Unchanged  subpleural medial right lower lobe zone pulmonary nodule (series 7  image 153) since at least 2017. Additional sub-7 mm solid pulmonary  nodules are unchanged. No new or enlarging pulmonary nodules. Apically  predominant centrilobular and paraseptal emphysema. Stable tree-in-bud  nodularity in the peripheral inferior right upper lobe and lingula.    Limited evaluation of the upper abdomen. Unchanged subcapsular right  hepatic lobe cystic lesion. Unchanged 1.4 cm left adrenal nodule  (series 6 image 303) since at  least 2007.      Impression    IMPRESSION:   1. Exam is negative for acute pulmonary embolism.      2. No acute finding within the chest.    3. Multiple solid pulmonary nodules and areas of tree-in-bud  nodularity are unchanged since at least 2017, statistically benign  and/or post-infectious.    In the event of a positive result for acute pulmonary embolism  resulting in right heart strain, consider calling the   [facility] patient placement ([phone]) for PERT (Pulmonary Embolism  Response Team) Activation?    PERT -- Pulmonary Embolism Response Team (Multidisciplinary team  including cardiology, interventional radiology, critical care,  hematology)    I have personally reviewed the examination and initial interpretation  and I agree with the findings.    LANEY HOLLOWAY DO         SYSTEM ID:  [id number]   [place] Draw     Status: None    Narrative    The following orders were created for panel order [place] Draw.  Procedure                               Abnormality         Status                     ---------                               -----------         ------                     Extra Blue Top Tube[[id number]]                              Final result               Extra Red Top Tube[[id number]]                               Final result               Extra Green Top (Lithium...[[id number]]                      Final result               Extra Purple Top Tube[[id number]]                            Final result               Extra Green Top (Lithium...[[id number]]                      Final result                 Please view results for these tests on the individual orders.   Extra Blue Top Tube     Status: None   Result Value Ref Range    Hold Specimen [facility]    Extra Red Top Tube     Status: None   Result Value Ref Range    Hold Specimen [facility]    Extra Green Top (Lithium Heparin) Tube     Status: None   Result Value Ref Range    Hold Specimen [facility]    Extra Purple Top Tube     Status: None   Result Value Ref Range    Hold  Specimen [facility]    Extra Green Top (Lithium Heparin) ON ICE     Status: None   Result Value Ref Range    Hold Specimen [facility]    Comprehensive metabolic panel     Status: Abnormal   Result Value Ref Range    Sodium 140 133 - 144 mmol/L    Potassium 4.1 3.4 - 5.3 mmol/L    Chloride 112 (H) 94 - 109 mmol/L    Carbon Dioxide (CO2) 23 20 - 32 mmol/L    Anion Gap 5 3 - 14 mmol/L    Urea Nitrogen 11 7 - 30 mg/dL    Creatinine 0.57 0.52 - 1.04 mg/dL    Calcium 8.8 8.5 - 10.1 mg/dL    Glucose 96 70 - 99 mg/dL    Alkaline Phosphatase 91 40 - 150 U/L    AST 20 0 - 45 U/L    ALT 21 0 - 50 U/L    Protein Total 7.0 6.8 - 8.8 g/dL    Albumin 3.3 (L) 3.4 - 5.0 g/dL    Bilirubin Total 0.3 0.2 - 1.3 mg/dL    GFR Estimate >90 >60 mL/min/1.73m2   Lipase     Status: Normal   Result Value Ref Range    Lipase 110 73 - 393 U/L   Troponin I     Status: Normal   Result Value Ref Range    Troponin I High Sensitivity 4 <54 ng/L   Nt probnp inpatient (BNP)     Status: Normal   Result Value Ref Range    N terminal Pro BNP Inpatient 58 0 - 900 pg/mL   D dimer quantitative     Status: Abnormal   Result Value Ref Range    D-Dimer Quantitative 1.65 (H) 0.00 - 0.50 ug/mL FEU    Narrative    This D-dimer assay is intended for use in conjunction with a clinical pretest probability assessment model to exclude pulmonary embolism (PE) and deep venous thrombosis (DVT) in outpatients suspected of PE or DVT. The cut-off value is 0.50 ug/mL FEU.   CBC with platelets and differential     Status: None   Result Value Ref Range    WBC Count 5.9 4.0 - 11.0 10e3/uL    RBC Count 4.15 3.80 - 5.20 10e6/uL    Hemoglobin 13.0 11.7 - 15.7 g/dL    Hematocrit 39.7 35.0 - 47.0 %    MCV 96 78 - 100 fL    MCH 31.3 26.5 - 33.0 pg    MCHC 32.7 31.5 - 36.5 g/dL    RDW 12.8 10.0 - 15.0 %    Platelet Count 182 150 - 450 10e3/uL    % Neutrophils 64 %    % Lymphocytes 22 %    % Monocytes 10 %    % Eosinophils 2 %    % Basophils 1 %    % Immature Granulocytes 1 %    NRBCs per 100 WBC 0 <1  /100    Absolute Neutrophils 3.9 1.6 - 8.3 10e3/uL    Absolute Lymphocytes 1.3 0.8 - 5.3 10e3/uL    Absolute Monocytes 0.6 0.0 - 1.3 10e3/uL    Absolute Eosinophils 0.1 0.0 - 0.7 10e3/uL    Absolute Basophils 0.1 0.0 - 0.2 10e3/uL    Absolute Immature Granulocytes 0.0 <=0.4 10e3/uL    Absolute NRBCs 0.0 10e3/uL   Asymptomatic COVID-19 Virus (Coronavirus) by PCR Nasopharyngeal     Status: Normal    Specimen: Nasopharyngeal; Swab   Result Value Ref Range    SARS CoV2 PCR Negative Negative, Testing sent to reference lab. Results will be returned via unsolicited result    Narrative    Testing was performed using the Xpert Xpress SARS-CoV-2 Assay on the  [employer]. Additional information about  this Emergency Use Authorization (EUA) assay can be found via the Lab  Guide. This test should be ordered for the detection of SARS-CoV-2 in  individuals who meet SARS-CoV-2 clinical and/or epidemiological  criteria. Test performance is unknown in asymptomatic patients. This  test is for in vitro diagnostic use under the FDA EUA for  laboratories certified under CLIA to perform high complexity testing.  This test has not been FDA cleared or approved. A negative result  does not rule out the presence of PCR inhibitors in the specimen or  target RNA in concentration below the limit of detection for the  assay. The possibility of a false negative should be considered if  the patient's recent exposure or clinical presentation suggests  COVID-19. This test was validated by the [facility]. This laboratory is certified under  the Clinical Laboratory Improvement Amendments of 1988 (CLIA-88) as  qualified to perform high complexity laboratory testing.     Troponin I     Status: Normal   Result Value Ref Range    Troponin I High Sensitivity <3 <54 ng/L   EKG 12 lead     Status: None   Result Value Ref Range    Systolic Blood Pressure  mmHg    Diastolic Blood  Pressure  mmHg    Ventricular Rate 74 BPM    Atrial Rate 74 BPM    MN Interval 210 ms    QRS Duration 96 ms     ms    QTc 446 ms    P Axis 33 degrees    R AXIS -12 degrees    T Axis 64 degrees    Interpretation ECG       Atrial-paced rhythm with prolonged AV conduction  Low voltage QRS  Septal infarct , age undetermined  Abnormal ECG  Unconfirmed report - interpretation of this ECG is computer generated - see medical record for final interpretation  Confirmed by - EMERGENCY ROOM, PHYSICIAN ([id number]),  JULIO ALONSO ([id number]) on 4/13/2022 7:35:10 AM     EKG 12 lead     Status: None (Preliminary result)   Result Value Ref Range    Systolic Blood Pressure  mmHg    Diastolic Blood Pressure  mmHg    Ventricular Rate 68 BPM    Atrial Rate 68 BPM    MN Interval 228 ms    QRS Duration 98 ms     ms    QTc 450 ms    P Axis 74 degrees    R AXIS -12 degrees    T Axis 69 degrees    Interpretation ECG       Sinus rhythm with 1st degree A-V block  Low voltage QRS  Borderline ECG     CBC with platelets differential     Status: None    Narrative    The following orders were created for panel order CBC with platelets differential.  Procedure                               Abnormality         Status                     ---------                               -----------         ------                     CBC with platelets and d...[[id number]]                      Final result                 Please view results for these tests on the individual orders.             EKG Interpretation:      Interpreted by Lino Mayo  Time reviewed: 7:22  Symptoms at time of EKG: none  Rhythm: Atrially paced rhythm  Rate: normal  Axis: normal  Ectopy: none  Conduction: normal  ST Segments/ T Waves: No ST elevation or depression  Q Waves: none  Comparison to prior: Unchanged from 2/27/2022    ROS:  REVIEW OF SYSTEMS:   General: fatigue   EYES: Negative for vision changes or eye problems   ENT: No problems with ears, nose or throat. No  difficulty swallowing.   RESP: Positive for shortness of breath. No cough   CV: Positive for chest pains    GI: No nausea, vomiting, heartburn, abdominal pain, diarrhea, constipation or change in bowel habits   : No urinary frequency or dysuria, bladder or kidney problems   MUSCULOSKELETAL: No significant muscle or joint pains   NEUROLOGIC: No headaches, numbness, tingling, weakness, problems with balance or coordination   PSYCHIATRIC: No problems with anxiety, depression or mental health   HEME/IMMUNE/ALLERGY: No history of bleeding or clotting problems or anemia. No allergies or immune system problems   ENDOCRINE: No history of thyroid disease, diabetes or other endocrine disorders   SKIN: No rashes,worrisome lesions or skin problems    PCP: Ce Crowe NP  CARDIAC RISK: HTN, CHF, A. fib    10 point ROS negative other than the symptoms noted above.      Exam:    Vitals:  B/P: 121/80, T: 97.6, P: 65, R: 16  Physical Exam   Constitutional: Pt is oriented to person, place, and time.Pt appears well-developed and well-nourished.   HENT:   Head: Normocephalic and atraumatic.   Eyes: Conjunctivae are normal. Pupils are equal, round, and reactive to light.   Neck: Normal range of motion. Neck supple.   Cardiovascular: Normal rate, regular rhythm, normal heart sounds and intact distal pulses.    Pulmonary/Chest: Effort normal and breath sounds normal. No respiratory distress. Pt has no wheezes. Pt has no rales  Abdominal: Soft. Bowel sounds are normal. Pt exhibits no distension and no mass. No tenderness. Pt has no rebound and no guarding.   Musculoskeletal: Normal range of motion. Pt exhibits no edema.   Neurological: Pt is alert and oriented to person, place, and time. Normal reflexes.   Skin: Skin is warm and dry. No rash noted.   Psychiatric: Pt has a normal mood and affect. Behavior is normal. Judgment and thought content normal.     Consults: Cardiology   FEN: Regular   DVT prophylaxis: Elliquis   Code Status:  Full  Disposition: Stable vital signs. Patient return to baseline.  All labs/images reviewed    Signed:  Marilee Sewell PA-C  April 13, 2022 at 3:04 PM

## 2022-04-13 NOTE — PHARMACY-ADMISSION MEDICATION HISTORY
Admission Medication History Completed by Pharmacy    See [facility] Admission Navigator for allergy information, preferred outpatient pharmacy, prior to admission medications and immunization status.     Medication History Sources:     MTM Pharmacist note (Karen Peralta) 4/11     Dispense fill data     Changes made to PTA medication list (reason):  None    Additional Information:    Patient had very detailed MTM visit with pharmacist on 4/11 - see note for details    Prior to Admission medications    Medication Sig Last Dose Taking? Auth Provider   acetaminophen (TYLENOL) 500 MG tablet Take 500-1,000 mg by mouth every 6 hours as needed for mild pain  Yes Reported, Patient   azelastine (ASTELIN) 0.1 % nasal spray Spray 1 spray into both nostrils 2 times daily as needed   Yes Reported, Patient   Calcium Carbonate-Vitamin D (CALCIUM-CARB 600 + D PO) Take 1 tablet by mouth daily  Yes Reported, Patient   clonazePAM (KLONOPIN) 0.5 MG tablet TAKE 1 TABLET(0.5 MG) BY MOUTH THREE TIMES DAILY AS NEEDED FOR ANXIETY  Yes Ce Crowe, NP   DiphenhydrAMINE HCl (BENADRYL PO) Take 25 mg by mouth nightly as needed for allergies   Yes Unknown, Entered By History   ELIQUIS ANTICOAGULANT 5 MG tablet TAKE 1 TABLET(5 MG) BY MOUTH TWICE DAILY  Yes Alonso Ordonez MD   Ferrous Sulfate (IRON SUPPLEMENT PO) Take 130 mg by mouth daily   Yes Unknown, Entered By History   fluticasone-salmeterol (ADVAIR) 250-50 MCG/DOSE inhaler Inhale 1 puff into the lungs every 12 hours  Yes Reported, Patient   furosemide (LASIX) 20 MG tablet Take 20 mg by mouth as needed  Yes Lino Funes MD   MAGNESIUM LACTATE PO Take  mg by mouth nightly as needed   Yes Reported, Patient   MELATONIN PO Take 1-3 mg by mouth nightly as needed   Yes Unknown, Entered By History   metoprolol succinate ER (TOPROL XL) 25 MG 24 hr tablet Take 0.5 tablets (12.5 mg) by mouth in the morning.  Yes Alonso Ordonez MD   Multiple Vitamin (DAILY MULTIVITAMIN PO) Take 1 tablet by  mouth every morning   Yes Reported, Patient   nitroGLYcerin (NITROSTAT) 0.4 MG sublingual tablet Place 0.4 mg under the tongue every 5 minutes as needed for chest pain For chest pain place 1 tablet under the tongue every 5 minutes for 3 doses. If symptoms persist 5 minutes after 1st dose call 911.  Yes Reported, Patient   Probiotic Product (PROBIOTIC DAILY PO) Take 1 capsule by mouth 2 times daily Takes additional doses if has diarrhea  Yes Reported, Patient   spironolactone (ALDACTONE) 25 MG tablet Take 0.5 tablets (12.5 mg) by mouth daily  Yes Alonso Ordonez MD   SYNTHROID 150 MCG tablet TAKE ONE TABLET BY MOUTH SIX DAYS A WEEK; TAKE ONE-HALF TABLET ONE DAY A WEEK  Yes Ce Crowe, PO   TURMERIC PO Take 1 tablet by mouth every morning   Yes Reported, Patient   vitamin D3 (CHOLECALCIFEROL) 2000 units tablet Take 1 tablet by mouth daily  Yes Crescencio Sánchez MD   Zinc 15 MG CAPS Take 15 mg by mouth   Yes Reported, Patient   albuterol (PROAIR HFA/PROVENTIL HFA/VENTOLIN HFA) 108 (90 BASE) MCG/ACT Inhaler Inhale 2 puffs into the lungs as needed for shortness of breath / dyspnea or wheezing    Unknown, Entered By History   ipratropium (ATROVENT) 0.06 % nasal spray Spray 2 sprays into both nostrils 4 times daily as needed for rhinitis   Reported, Patient   Menaquinone-7 (VITAMIN K2 PO) Take 1 tablet by mouth daily   Reported, Patient   METHYLPREDNISOLONE PO Take 40 mg by mouth as needed    Reported, Patient   nebulizer nebulization as needed   Reported, Patient   XYLITOL MT Spray 1 spray into both nostrils daily as needed   Reported, Patient       Date completed: 04/13/22    Medication history completed by: THOR BOWEN [facility]

## 2022-04-13 NOTE — ED NOTES
Bed: ED22  Expected date:   Expected time:   Means of arrival:   Comments:  Yan Marroquin 61 F  Chest pain- resolved, hypotension - resolved, Dizziness,    126/70 per EMS  Hx pacemaker - No shock

## 2022-04-14 ENCOUNTER — APPOINTMENT (OUTPATIENT)
Dept: CARDIOLOGY | Facility: CLINIC | Age: 62
End: 2022-04-14
Payer: COMMERCIAL

## 2022-04-14 VITALS
TEMPERATURE: 97.7 F | RESPIRATION RATE: 18 BRPM | DIASTOLIC BLOOD PRESSURE: 80 MMHG | WEIGHT: 178 LBS | OXYGEN SATURATION: 99 % | SYSTOLIC BLOOD PRESSURE: 124 MMHG | BODY MASS INDEX: 29.62 KG/M2 | HEART RATE: 67 BPM

## 2022-04-14 LAB
ANION GAP SERPL CALCULATED.3IONS-SCNC: 7 MMOL/L (ref 3–14)
ATRIAL RATE - MUSE: 68 BPM
BUN SERPL-MCNC: 9 MG/DL (ref 7–30)
CALCIUM SERPL-MCNC: 8.6 MG/DL (ref 8.5–10.1)
CHLORIDE BLD-SCNC: 111 MMOL/L (ref 94–109)
CO2 SERPL-SCNC: 23 MMOL/L (ref 20–32)
CREAT SERPL-MCNC: 0.6 MG/DL (ref 0.52–1.04)
DIASTOLIC BLOOD PRESSURE - MUSE: NORMAL MMHG
ERYTHROCYTE [DISTWIDTH] IN BLOOD BY AUTOMATED COUNT: 12.7 % (ref 10–15)
GFR SERPL CREATININE-BSD FRML MDRD: >90 ML/MIN/1.73M2
GLUCOSE BLD-MCNC: 91 MG/DL (ref 70–99)
HCT VFR BLD AUTO: 38.4 % (ref 35–47)
HGB BLD-MCNC: 12.7 G/DL (ref 11.7–15.7)
INTERPRETATION ECG - MUSE: NORMAL
MAGNESIUM SERPL-MCNC: 2.3 MG/DL (ref 1.6–2.3)
MCH RBC QN AUTO: 31.3 PG (ref 26.5–33)
MCHC RBC AUTO-ENTMCNC: 33.1 G/DL (ref 31.5–36.5)
MCV RBC AUTO: 95 FL (ref 78–100)
P AXIS - MUSE: 74 DEGREES
PHOSPHATE SERPL-MCNC: 3.2 MG/DL (ref 2.5–4.5)
PLATELET # BLD AUTO: 180 10E3/UL (ref 150–450)
POTASSIUM BLD-SCNC: 4.1 MMOL/L (ref 3.4–5.3)
PR INTERVAL - MUSE: 228 MS
QRS DURATION - MUSE: 98 MS
QT - MUSE: 424 MS
QTC - MUSE: 450 MS
R AXIS - MUSE: -12 DEGREES
RBC # BLD AUTO: 4.06 10E6/UL (ref 3.8–5.2)
SODIUM SERPL-SCNC: 141 MMOL/L (ref 133–144)
SYSTOLIC BLOOD PRESSURE - MUSE: NORMAL MMHG
T AXIS - MUSE: 69 DEGREES
VENTRICULAR RATE- MUSE: 68 BPM
WBC # BLD AUTO: 5.3 10E3/UL (ref 4–11)

## 2022-04-14 PROCEDURE — 83735 ASSAY OF MAGNESIUM: CPT | Performed by: PHYSICIAN ASSISTANT

## 2022-04-14 PROCEDURE — G0378 HOSPITAL OBSERVATION PER HR: HCPCS

## 2022-04-14 PROCEDURE — 80048 BASIC METABOLIC PNL TOTAL CA: CPT | Performed by: PHYSICIAN ASSISTANT

## 2022-04-14 PROCEDURE — 84100 ASSAY OF PHOSPHORUS: CPT | Performed by: PHYSICIAN ASSISTANT

## 2022-04-14 PROCEDURE — 250N000013 HC RX MED GY IP 250 OP 250 PS 637: Performed by: PHYSICIAN ASSISTANT

## 2022-04-14 PROCEDURE — 85027 COMPLETE CBC AUTOMATED: CPT | Performed by: PHYSICIAN ASSISTANT

## 2022-04-14 PROCEDURE — 36415 COLL VENOUS BLD VENIPUNCTURE: CPT | Performed by: PHYSICIAN ASSISTANT

## 2022-04-14 PROCEDURE — 99217 PR OBSERVATION CARE DISCHARGE: CPT | Performed by: EMERGENCY MEDICINE

## 2022-04-14 RX ADMIN — METOPROLOL SUCCINATE 12.5 MG: 25 TABLET, FILM COATED, EXTENDED RELEASE ORAL at 08:15

## 2022-04-14 RX ADMIN — APIXABAN 5 MG: 5 TABLET, FILM COATED ORAL at 08:16

## 2022-04-14 RX ADMIN — SPIRONOLACTONE 12.5 MG: 25 TABLET, FILM COATED ORAL at 08:15

## 2022-04-14 RX ADMIN — LEVOTHYROXINE SODIUM 150 MCG: 0.05 TABLET ORAL at 08:16

## 2022-04-14 ASSESSMENT — ENCOUNTER SYMPTOMS
EYE DISCHARGE: 0
WEAKNESS: 0
FREQUENCY: 0
AGITATION: 0
COLOR CHANGE: 0
MYALGIAS: 0
CONFUSION: 0
ADENOPATHY: 0

## 2022-04-14 NOTE — PROGRESS NOTES
Observation Goals: Prior to Discharge    - Serial troponins and stress test complete: troponin tests negative/stress test not completed     - Seen and cleared by consultant if applicable: In progress    - Adequate pain control on oral analgesia: In progress; pt currently denies pain    - Vital signs normal or at patient baseline: Met: Blood pressure 136/78, pulse 71, temperature 97.9  F (36.6  C), temperature source Oral, resp. rate 16, weight 80.7 kg (178 lb), SpO2 99 % on room air    - Safe disposition plan has been identified: Not met    Nurse to notify provider when observation goals have been met and patient is ready for discharge.

## 2022-04-14 NOTE — PROGRESS NOTES
Observation Goals: Prior to Discharge     - Serial troponins and stress test complete: Troponins negative, Echo discontinued.  - Seen and cleared by consultant if applicable: Met    - Adequate pain control on oral analgesia: Met    - Vital signs normal or at patient baseline: Met    - Safe disposition plan has been identified: met, returning to previous living arrangements.    Nurse to notify provider when observation goals have been met and patient is ready for discharge.    /81 (BP Location: Right arm)   Pulse 82   Temp 97.6  F (36.4  C) (Oral)   Resp 18   Wt 80.7 kg (178 lb)   LMP 08/21/2007   SpO2 96%   BMI 29.62 kg/m

## 2022-04-14 NOTE — PROGRESS NOTES
Observation Goals: Prior to Discharge     - Serial troponins and stress test complete: In progress; trops complete    - Seen and cleared by consultant if applicable: In progress    - Adequate pain control on oral analgesia: In progress; pt continues to deny pain    - Vital signs normal or at patient baseline: Met (blood pressure 108/65, pulse 73, temperature 98.5  F (36.9  C), temperature source Oral, resp. rate 16, weight 80.7 kg (178 lb), SpO2 99 % on room air    - Safe disposition plan has been identified: In progress    Nurse to notify provider when observation goals have been met and patient is ready for discharge.

## 2022-04-14 NOTE — PROGRESS NOTES
DC instructions given to pt,  verbalized understanding.  All belongings with pt, IV DC'd and documented. Pt discharged.

## 2022-04-14 NOTE — PROGRESS NOTES
Observation Goals: Prior to Discharge     - Serial troponins and stress test complete: In progress; stress test still to be completed    - Seen and cleared by consultant if applicable: In progress    - Adequate pain control on oral analgesia: Met    - Vital signs normal or at patient baseline: Met    - Safe disposition plan has been identified: In progress    Nurse to notify provider when observation goals have been met and patient is ready for discharge.

## 2022-04-14 NOTE — DISCHARGE SUMMARY
[facility]  Hospitalist Discharge Summary      Date of Admission:  4/13/2022  Date of Discharge:  4/14/2022  Discharging Provider: SHAYNA Posey CNP  Discharge Service: Hospitalist Service    Discharge Diagnoses   Chest Pain    Follow-ups Needed After Discharge   Follow-up Appointments     Follow Up and recommended labs and tests      Follow up with primary care provider, Ce Crowe, within 7 days for   hospital follow- up.     We recommend you keep you upcoming cardiology appointment and ECHO and   follow up with them as previously planned.             Discharge Disposition   Discharged to home  Condition at discharge: Good      Hospital Course   Joyce Marroquin is a 61 year old female with PMHx HTN, HFrEF, CHB s/p dual chamber PPM, NICM, paroxysmal afib on eliquis, COPD, LARRY, GERD, depression, anxiety, who presents to the ED for evaluation of chest pain.        #Near Syncope  ##NICM (LVEF 40-45%) s/p CRT-P upgrade on 1/19/22  #Paroxysmal atrial fibrillation   # CHB s/p PPM  # Possible orthostatic HOTN  Intermitted chest pain for the past 2 days. Chest pain described as midsternal pressure that comes in waves. Chest pain present at rest and with exertion. Reports associated SOB. Patient reports that the chest pain woke her up from sleep this morning. She checked her BP , SBP in the 80's. She says she then drank glass of water and then took her AM medications after which she felt dizzy and lightheaded. She felt like she was going to pass out and lowered herself to the ground. Her  checked her BP again, . EMS was called. Patient denies LOC. She was given benadryl en route for feeling nauseous. Patient denies cough, fever or chills. Denies abdominal pain, LE edema or palpitations. In the ED, she is afebrile. BP  -120's (pt states baseline is usually in 's). P 65-96 RR 16, O2 96-99% RA. LAB: CMP and CBC unremarkable. BNP normal. Lipase  wnl. Covid-19 negative. Trop x 1 negative. EKG without ischemic changes. D-dimer was elevated at 1.65. CT chest PE was obtained and this was negative for PE and no other acute finding within the chest. Patient was seen by cardiology who recommended no further work-up at this time given negative troponin. Cardiology did discuss with patient medication changes (increasing metoprolol to 25mg daily or adding imdur for anti-anginal effects) but patient refused medication changes at this time. Patient will follow-up with her outpatient cardiology appointment that were scheduled prior to admission on 2/25 (repeat ECHO) and 5/26 with Dr. Funes. At the time of discharge, patient reports no chest pain or SOB and is agreeable to the outpatient follow-up plan.   - Nitro PRN  - Continue PTA Metoprolol 12.5 mg daily   - Continue PTA Eliquis 5 mg BID   - Continue PTA Aldactone 12.5 mg daily     Consultations This Hospital Stay   None    Code Status   Full Code    SHAYNA Posey [facility] [address] [address]  Phone: [phone]  Fax: [phone]  ______________________________________________________________________    Physical Exam   Vital Signs: Temp: 97.7  F (36.5  C) Temp src: Oral BP: 124/80 Pulse: 67   Resp: 18 SpO2: 99 % O2 Device: None (Room air)    Weight: 178 lbs 0 oz    Constitutional: Pt is oriented to person, place, and time.Pt appears well-developed and well-nourished.   HENT:   Head: Normocephalic and atraumatic.   Eyes: Conjunctivae are normal. Pupils are equal, round, and reactive to light.   Neck: Normal range of motion. Neck supple.   Cardiovascular: Normal rate, regular rhythm, normal heart sounds and intact distal pulses.    Pulmonary/Chest: Effort normal and breath sounds normal. No respiratory distress. Pt has no wheezes. Pt has no rales  Abdominal: Soft. Bowel sounds are normal. Pt exhibits no distension and no mass. No  tenderness. Pt has no rebound and no guarding.   Musculoskeletal: Normal range of motion. Pt exhibits no edema.   Neurological: Pt is alert and oriented to person, place, and time. Normal reflexes.   Skin: Skin is warm and dry. No rash noted.   Psychiatric: Pt has a normal mood and affect. Behavior is normal. Judgment and thought content normal.        Primary Care Physician   Ce Crowe    Discharge Orders      Reason for your hospital stay    Chest Pain and near syncope     Activity    Your activity upon discharge: activity as tolerated     Follow Up and recommended labs and tests    Follow up with primary care provider, Ce Crowe, within 7 days for hospital follow- up.     We recommend you keep you upcoming cardiology appointment and ECHO and follow up with them as previously planned.     When to contact your care team    Return to the ED with fever, uncontrolled nausea, vomiting, unrelieved pain, bleeding not relieved with pressure, dizziness, chest pain, shortness of breath, loss of consciousness, and any new or concerning symptoms.     Discharge Instructions    YOu were admitted to ED observation for evaluation of chest pain and near syncope. IN the ED, your vital signs were stable. Your CMP and CBC were unremarkable. BNP normal, lipase normal, and troponins negative. You had a CT chest PE which was negative for PE and acute findings. You were seen by cardiology who recommended no further work-up and that you follow-up at your scheduled outpatient cardiology appointment.     Diet    Follow this diet upon discharge: Regular       Significant Results and Procedures   Most Recent 3 CBC's:Recent Labs   Lab Test 04/14/22  0558 04/13/22  0752 02/22/22  0821   WBC 5.3 5.9 6.7   HGB 12.7 13.0 13.5   MCV 95 96 92    182 227     Most Recent 3 BMP's:Recent Labs   Lab Test 04/14/22  0558 04/13/22  0752 02/22/22  0821    140 136   POTASSIUM 4.1 4.1 4.6   CHLORIDE 111* 112* 106   CO2 23 23 23   BUN 9  11 16   CR 0.60 0.57 0.65   ANIONGAP 7 5 7   MANJU 8.6 8.8 8.8   GLC 91 96 116*     Most Recent 3 Troponin's:Recent Labs   Lab Test 09/28/21  1255 09/28/21  0906 08/08/21  1416 12/20/20  1036 12/20/20  0635 10/06/19  1253   TROPI  --   --   --  <0.015 <0.015 <0.015   TROPONIN <0.015 <0.015 <0.015  --   --   --    ,   Results for orders placed or performed during the hospital encounter of 04/13/22   CT Chest Pulmonary Embolism w Contrast    Narrative    EXAMINATION: CTA pulmonary angiogram, 4/13/2022 11:03 AM     COMPARISON: Radiograph 2/7/2022. CT 12/10/2021, 8/8/2021, CT  5/21/2007.    HISTORY: PE suspected, low/intermediate prob, positive D-dimer    TECHNIQUE: Volumetric helical acquisition of CT images of the chest  from the lung apices to the kidneys were acquired after the  administration of 80 mL of Isovue-370 IV contrast. Non-Flash technique  with shallow inspiratory breath hold technique.  Post-processed  multiplanar and/or MIP reformations were obtained, archived to PACS  and used in interpretation of this study.     FINDINGS:  Contrast bolus is: excellent.  Exam is negative for acute  pulmonary embolism.     The largest right ventricle transaxial diameter is (measured from  endocardium to endocardium): 3.3 cm   The largest left ventricle transaxial diameter is (measured from  endocardium to endocardium): 4.3 cm  RV/LV ratio is: 0.77 (if ratio greater than 1.1 then sign is  suspicious for right heart strain)  Reflux of contrast into the IVC? no    Paradoxical bowing of the interventricular septum to the left? no  Pericardial effusion?:not present    Left chest wall cardiac pacing device with leads in stable  positioning.    The heart size is normal. Standard branching pattern of the great  vessels. Mild coronary calcifications. The main pulmonary artery and  descending thoracic aorta are within normal limits. No abnormal  mediastinal, hilar, or axillary lymph nodes. Small sliding hiatal  hernia.    Central  tracheobronchial tree is clear. No pneumothorax, pleural  effusion, or focal pulmonary consolidation. Linear scarring of the  bilateral lower and right middle lobes. Biapical scarring. Unchanged  subpleural medial right lower lobe zone pulmonary nodule (series 7  image 153) since at least 2017. Additional sub-7 mm solid pulmonary  nodules are unchanged. No new or enlarging pulmonary nodules. Apically  predominant centrilobular and paraseptal emphysema. Stable tree-in-bud  nodularity in the peripheral inferior right upper lobe and lingula.    Limited evaluation of the upper abdomen. Unchanged subcapsular right  hepatic lobe cystic lesion. Unchanged 1.4 cm left adrenal nodule  (series 6 image 303) since at least 2007.      Impression    IMPRESSION:   1. Exam is negative for acute pulmonary embolism.      2. No acute finding within the chest.    3. Multiple solid pulmonary nodules and areas of tree-in-bud  nodularity are unchanged since at least 2017, statistically benign  and/or post-infectious.    In the event of a positive result for acute pulmonary embolism  resulting in right heart strain, consider calling the   [facility] patient placement ([phone]) for PERT (Pulmonary Embolism  Response Team) Activation?    PERT -- Pulmonary Embolism Response Team (Multidisciplinary team  including cardiology, interventional radiology, critical care,  hematology)    I have personally reviewed the examination and initial interpretation  and I agree with the findings.    LANEY HOLLOWAY DO         SYSTEM ID:  [id number]     *Note: Due to a large number of results and/or encounters for the requested time period, some results have not been displayed. A complete set of results can be found in Results Review.       Discharge Medications   Current Discharge Medication List      CONTINUE these medications which have NOT CHANGED    Details   acetaminophen (TYLENOL) 500 MG tablet Take 500-1,000 mg by mouth every 6 hours as needed for mild  pain      azelastine (ASTELIN) 0.1 % nasal spray Spray 1 spray into both nostrils 2 times daily as needed       Calcium Carbonate-Vitamin D (CALCIUM-CARB 600 + D PO) Take 1 tablet by mouth daily      clonazePAM (KLONOPIN) 0.5 MG tablet TAKE 1 TABLET(0.5 MG) BY MOUTH THREE TIMES DAILY AS NEEDED FOR ANXIETY  Qty: 90 tablet, Refills: 1    Associated Diagnoses: Anxiety      DiphenhydrAMINE HCl (BENADRYL PO) Take 25 mg by mouth nightly as needed for allergies       ELIQUIS ANTICOAGULANT 5 MG tablet TAKE 1 TABLET(5 MG) BY MOUTH TWICE DAILY  Qty: 180 tablet, Refills: 3    Associated Diagnoses: Paroxysmal atrial fibrillation (H)      Ferrous Sulfate (IRON SUPPLEMENT PO) Take 130 mg by mouth daily       fluticasone-salmeterol (ADVAIR) 250-50 MCG/DOSE inhaler Inhale 1 puff into the lungs every 12 hours      furosemide (LASIX) 20 MG tablet Take 20 mg by mouth as needed  Qty: 180 tablet, Refills: 1    Associated Diagnoses: Cardiomyopathy, unspecified type (H)      ipratropium (ATROVENT) 0.06 % nasal spray Spray 2 sprays into both nostrils 4 times daily as needed for rhinitis      MAGNESIUM LACTATE PO Take  mg by mouth nightly as needed     Associated Diagnoses: Fatigue, unspecified type      MELATONIN PO Take 1-3 mg by mouth nightly as needed       Menaquinone-7 (VITAMIN K2 PO) Take 1 tablet by mouth daily    Associated Diagnoses: H/O pericarditis      metoprolol succinate ER (TOPROL XL) 25 MG 24 hr tablet Take 0.5 tablets (12.5 mg) by mouth in the morning.  Qty: 45 tablet, Refills: 3    Associated Diagnoses: Heart failure (H)      Multiple Vitamin (DAILY MULTIVITAMIN PO) Take 1 tablet by mouth every morning       nitroGLYcerin (NITROSTAT) 0.4 MG sublingual tablet Place 0.4 mg under the tongue every 5 minutes as needed for chest pain For chest pain place 1 tablet under the tongue every 5 minutes for 3 doses. If symptoms persist 5 minutes after 1st dose call 911.      Probiotic Product (PROBIOTIC DAILY PO) Take 1 capsule  by mouth 2 times daily Takes additional doses if has diarrhea      spironolactone (ALDACTONE) 25 MG tablet Take 0.5 tablets (12.5 mg) by mouth daily  Qty: 45 tablet, Refills: 3    Associated Diagnoses: Heart failure with preserved ejection fraction, borderline, class III (H)      SYNTHROID 150 MCG tablet TAKE ONE TABLET BY MOUTH SIX DAYS A WEEK; TAKE ONE-HALF TABLET ONE DAY A WEEK  Qty: 90 tablet, Refills: 3    Associated Diagnoses: Postablative hypothyroidism      TURMERIC PO Take 1 tablet by mouth every morning     Associated Diagnoses: Fatigue, unspecified type      vitamin D3 (CHOLECALCIFEROL) 2000 units tablet Take 1 tablet by mouth daily  Qty: 1 tablet, Refills: 0    Associated Diagnoses: Vitamin D deficiency      XYLITOL MT Spray 1 spray into both nostrils daily as needed      Zinc 15 MG CAPS Take 15 mg by mouth       albuterol (PROAIR HFA/PROVENTIL HFA/VENTOLIN HFA) 108 (90 BASE) MCG/ACT Inhaler Inhale 2 puffs into the lungs as needed for shortness of breath / dyspnea or wheezing       METHYLPREDNISOLONE PO Take 40 mg by mouth as needed       nebulizer nebulization as needed    Associated Diagnoses: H/O pericarditis           Allergies   Allergies   Allergen Reactions     Tetracycline Swelling     Tongue swelling     Viagra [Sildenafil] Muscle Pain (Myalgia) and Diarrhea     Zofran [Ondansetron]      Prolonged QT  Prolonged QT at baseline per patient     Flagyl [Metronidazole] Rash     Pruritic rash      Buspar [Buspirone]      Muscle weakness     Corn Oil Other (See Comments)     Headache       Cymbalta Other (See Comments) and Diarrhea     Confusion     Prozac [Fluoxetine] Diarrhea     Seasonal Allergies Difficulty breathing     Sulfamethoxazole-Trimethoprim      Other reaction(s): Other  Passed out     Cyclobenzaprine Other (See Comments)     Extreme heat intolerance     Levaquin [Levofloxacin]      Other reaction(s): Other  Tendon rupture     Nsaids Other (See Comments)     Exacerbated asthma          This patient was discussed with the Care Team in the OBS Unit.  The patient's chart was reviewed and the patient was also seen and evaluated by me.  The plan of care was discussed and reviewed with the Care Team.  The above documentation reflects the evaluation, medical decision making and plan under my supervision.    Qasim Washington MD, FACEP  [facility] Staff Emergency Physician

## 2022-04-15 ENCOUNTER — PATIENT OUTREACH (OUTPATIENT)
Dept: CARE COORDINATION | Facility: CLINIC | Age: 62
End: 2022-04-15
Payer: COMMERCIAL

## 2022-04-15 DIAGNOSIS — Z71.89 OTHER SPECIFIED COUNSELING: ICD-10-CM

## 2022-04-15 NOTE — PROGRESS NOTES
Clinic Care Coordination Contact    Background: Care Coordination referral placed from [facility] discharge report for reason of patient meeting criteria for a TCM outreach call by [facility] team.    Assessment: Upon chart review, [facility] Team member will cancel/close the referral for TCM outreach due to reason below:    Patient isn't interested in speaking with care team today and disconnected call    Plan: Care Coordination referral for TCM outreach canceled.    Rachell Mayer MA  [facility] [place], [facility]

## 2022-04-18 ENCOUNTER — LAB (OUTPATIENT)
Dept: LAB | Facility: CLINIC | Age: 62
End: 2022-04-18
Payer: COMMERCIAL

## 2022-04-18 DIAGNOSIS — R19.7 DIARRHEA, UNSPECIFIED TYPE: ICD-10-CM

## 2022-04-18 DIAGNOSIS — Z20.822 EXPOSURE TO 2019 NOVEL CORONAVIRUS: ICD-10-CM

## 2022-04-18 DIAGNOSIS — K52.839 MICROSCOPIC COLITIS, UNSPECIFIED MICROSCOPIC COLITIS TYPE: ICD-10-CM

## 2022-04-18 DIAGNOSIS — I50.22 CHRONIC SYSTOLIC HEART FAILURE (H): ICD-10-CM

## 2022-04-18 DIAGNOSIS — R53.83 OTHER FATIGUE: ICD-10-CM

## 2022-04-18 LAB — CK SERPL-CCNC: 36 U/L (ref 30–225)

## 2022-04-18 PROCEDURE — 36415 COLL VENOUS BLD VENIPUNCTURE: CPT

## 2022-04-18 PROCEDURE — 99000 SPECIMEN HANDLING OFFICE-LAB: CPT

## 2022-04-18 PROCEDURE — 86769 SARS-COV-2 COVID-19 ANTIBODY: CPT | Mod: 90

## 2022-04-18 PROCEDURE — 82550 ASSAY OF CK (CPK): CPT

## 2022-04-19 LAB
SARS-COV-2 AB SERPL IA-ACNC: >250 U/ML
SARS-COV-2 AB SERPL QL IA: POSITIVE

## 2022-04-19 NOTE — PROGRESS NOTES
Joyce is a 61 year old who is being evaluated via a billable video visit.      How would you like to obtain your AVS? MyChart  If the video visit is dropped, the invitation should be resent by: Text to cell phone: [phone]  Will anyone else be joining your video visit? No    Video Start Time: 0740    Assessment & Plan     Hospital discharge follow-up      Acute chest pain    20 minutes spent on the date of the encounter doing chart review, history and exam, documentation and further activities per the note     See Patient Instructions: follow up as needed. Go to ER if needed for symptoms.     Return if symptoms worsen or fail to improve.    Ce Crowe NP  [facility] ROLAND Cook is a 61 year old who presents for the following health issues     HPI       Hospital Follow-up Visit:    Hospital/Nursing Home/IP Rehab Facility: [facility]  Date of Admission: 4/13/22  Date of Discharge: 4/14/22  Reason(s) for Admission: chest pain/ weakness. Labs showed elevated d dimer- no PE present. Is on blood thinner.  Cardiology said can take 3-6 months for heart to get acclimated to new pacemaker. Arteries look good. Has immunity to COVID- thinking against booster.  Would try nitro with chest pain if BP is not low like it was when she called EMS/hospital.  Has NSAID allergy so unsure if she should try aspirin. Does not tolerated protonix; discussed trying tums.  She had been reducing her anxiety medicine, but has since increased back to 3 times daily; needs it and does not tolerate other anxiety medication due to side effects.       Was your hospitalization related to COVID-19? No   Problems taking medications regularly:  None  Medication changes since discharge: None  Problems adhering to non-medication therapy:  None    Summary of hospitalization:  [facility] discharge summary reviewed  Diagnostic Tests/Treatments reviewed.  Follow  up needed: cardiology  Other Healthcare Providers Involved in Patient s Care:         None  Update since discharge: fluctuating course. Post Discharge Medication Reconciliation: discharge medications reconciled, continue medications without change.  Plan of care communicated with patient            Review of Systems   Constitutional, HEENT, cardiovascular, pulmonary, GI, , musculoskeletal, neuro, skin, endocrine and psych systems are negative, except as otherwise noted.      Objective           Vitals:  No vitals were obtained today due to virtual visit.    Physical Exam   GENERAL: Healthy, alert and no distress  EYES: Eyes grossly normal to inspection.  No discharge or erythema, or obvious scleral/conjunctival abnormalities.  RESP: No audible wheeze, cough, or visible cyanosis.  No visible retractions or increased work of breathing.    SKIN: Visible skin clear. No significant rash, abnormal pigmentation or lesions.  NEURO: Cranial nerves grossly intact.  Mentation and speech appropriate for age.  PSYCH: Mentation appears normal, affect normal/bright, judgement and insight intact, normal speech and appearance well-groomed.    See orders    Video-Visit Details    Type of service:  Video Visit    Video End Time:0800    Originating Location (pt. Location): Home    Distant Location (provider location):  working remote from home     Platform used for Video Visit: Super[employer]

## 2022-04-20 ENCOUNTER — VIRTUAL VISIT (OUTPATIENT)
Dept: FAMILY MEDICINE | Facility: CLINIC | Age: 62
End: 2022-04-20
Payer: COMMERCIAL

## 2022-04-20 DIAGNOSIS — R07.9 ACUTE CHEST PAIN: ICD-10-CM

## 2022-04-20 DIAGNOSIS — Z09 HOSPITAL DISCHARGE FOLLOW-UP: Primary | ICD-10-CM

## 2022-04-20 PROCEDURE — 99213 OFFICE O/P EST LOW 20 MIN: CPT | Mod: GT | Performed by: NURSE PRACTITIONER

## 2022-05-25 ENCOUNTER — ANCILLARY PROCEDURE (OUTPATIENT)
Dept: CARDIOLOGY | Facility: CLINIC | Age: 62
End: 2022-05-25
Payer: COMMERCIAL

## 2022-05-25 DIAGNOSIS — I44.2 COMPLETE ATRIOVENTRICULAR BLOCK (H): ICD-10-CM

## 2022-05-25 DIAGNOSIS — I51.89 LEFT VENTRICULAR DYSFUNCTION WITH REDUCED LEFT VENTRICULAR FUNCTION: ICD-10-CM

## 2022-05-25 LAB — LVEF ECHO: NORMAL

## 2022-05-25 PROCEDURE — 93281 PM DEVICE PROGR EVAL MULTI: CPT | Performed by: INTERNAL MEDICINE

## 2022-05-25 PROCEDURE — 93306 TTE W/DOPPLER COMPLETE: CPT | Performed by: INTERNAL MEDICINE

## 2022-05-25 NOTE — PATIENT INSTRUCTIONS
It was a pleasure to see you in clinic today.  Please do not hesitate to call with any questions or concerns.  You are scheduled for a remote transmission on 8/29/22.  We look forward to seeing you in clinic at your next device check in 12 months.    Matthias Garrido, RN  Electrophysiology Nurse Clinician  [facility]    During Business Hours Please Call:  [phone]  After Hours Please Call:  [phone] - select option #4 and ask for job code [phone]

## 2022-05-26 ENCOUNTER — VIRTUAL VISIT (OUTPATIENT)
Dept: CARDIOLOGY | Facility: CLINIC | Age: 62
End: 2022-05-26
Attending: INTERNAL MEDICINE
Payer: COMMERCIAL

## 2022-05-26 DIAGNOSIS — I44.2 COMPLETE HEART BLOCK (H): ICD-10-CM

## 2022-05-26 DIAGNOSIS — Z95.0 CARDIAC PACEMAKER IN SITU: ICD-10-CM

## 2022-05-26 DIAGNOSIS — I42.8 NONISCHEMIC CARDIOMYOPATHY (H): Primary | ICD-10-CM

## 2022-05-26 DIAGNOSIS — I50.23 ACUTE ON CHRONIC SYSTOLIC CONGESTIVE HEART FAILURE (H): ICD-10-CM

## 2022-05-26 PROCEDURE — 99215 OFFICE O/P EST HI 40 MIN: CPT | Mod: 95 | Performed by: INTERNAL MEDICINE

## 2022-05-26 NOTE — LETTER
5/26/2022      RE: Joyce Marroquin  [address] [id number]       Dear Colleague,    Thank you for the opportunity to participate in the care of your patient, Joyce Marroquin, at the [facility] [place] [facility]. Please see a copy of my visit note below.    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  Her ejection fraction has been in the mid 40s.  She underwent implantation of a pacemaker with a left bundle pacing lead in January of this year.    Subsequent to the pacemaker implant Joyce did have some chest pain related to her left shoulder but this seems to be improving and much less trouble to her now than initially.    In terms of exercise tolerance Joyce feels much better and is able to walk around a long block twice per day without feeling excessively short of breath.  She is also able to climb a flight of stairs without feeling breathless.  Her exertional capacity does seem to be much improved compared to baseline preplacement of the left ventricular lead.    Joyce is interested in the technique of left bundle pacing and I will try to provide some literature on the subject.    At today's visit we reviewed the echocardiogram that was done yesterday.  It is possible that the improvement in left ventricular performance has diminished her pulmonary artery pressures and that may be the basis for the improved exercise tolerance.  We also reviewed the pacemaker follow-up and this information is summarized below.  Ppm interrogation  Device: Medtronic Percepta CRT  Normal device function  Mode: DDDR  bpm  AP: 46%  : 100%  BVP: 100%  Intrinsic Rhythm: CHB: SR @ 70 bpm/  @ 30 bpm  Thoracic Impedance: Near reference line.   Short V-V intervals: 0  Lead Trends Appear Stable: yes  Estimated battery longevity to RRT = 10.5 years.  Battery voltage = 3.16V.   Atrial Arrhythmia: 0  AF [place] = 0%  Anticoagulant: Eliquis  Ventricular Arrhythmia: 1 monitored VT. EGM shows NSVT lasting 8 beats.  Setting Changes: none    Joyce has an appointment in the heart failure clinic next week.  Assuming no major changes are needed I will plan to revisit with her after her next remote pacemaker check at the end of August.  She voiced understanding and agreement.    PAST MEDICAL HISTORY:  Past Medical History:   Diagnosis Date     Arthritis      Cardiomyopathy (H)     ff Cardiology     Chronic depressive personality disorder      Congestive heart failure (H) see  note    heart failure correct term     COPD exacerbation (H)      Esophageal reflux      Ex-smoker     quit 2006; 1 PPD x 30     Hyperparathyroidism (H)     s/p parathyroidectomy     Hypothyroidism      LARRY on CPAP     ff Sleep medicine     Pulmonary nodules     ff Pulmonologist     S/P cardiac pacemaker procedure     [facility]      Uncomplicated asthma years       CURRENT MEDICATIONS:  Current Outpatient Medications   Medication Sig Dispense Refill     acetaminophen (TYLENOL) 500 MG tablet Take 500-1,000 mg by mouth every 6 hours as needed for mild pain       albuterol (PROAIR HFA/PROVENTIL HFA/VENTOLIN HFA) 108 (90 BASE) MCG/ACT Inhaler Inhale 2 puffs into the lungs as needed for shortness of breath / dyspnea or wheezing        azelastine (ASTELIN) 0.1 % nasal spray Spray 1 spray into both nostrils 2 times daily as needed        Calcium Carbonate-Vitamin D (CALCIUM-CARB 600 + D PO) Take 1 tablet by mouth daily       clonazePAM (KLONOPIN) 0.5 MG tablet TAKE 1 TABLET(0.5 MG) BY MOUTH THREE TIMES DAILY AS NEEDED FOR ANXIETY 90 tablet 1     DiphenhydrAMINE HCl (BENADRYL PO) Take 25 mg by mouth nightly as needed for allergies        ELIQUIS ANTICOAGULANT 5 MG tablet TAKE 1 TABLET(5 MG) BY MOUTH TWICE DAILY 180 tablet 3     Ferrous Sulfate (IRON SUPPLEMENT PO) Take 130 mg by  mouth daily        fluticasone-salmeterol (ADVAIR) 250-50 MCG/DOSE inhaler Inhale 1 puff into the lungs every 12 hours       ipratropium (ATROVENT) 0.06 % nasal spray Spray 2 sprays into both nostrils 4 times daily as needed for rhinitis       MAGNESIUM LACTATE PO Take  mg by mouth nightly as needed        MELATONIN PO Take 1-3 mg by mouth nightly as needed        Menaquinone-7 (VITAMIN K2 PO) Take 1 tablet by mouth daily       metoprolol succinate ER (TOPROL XL) 25 MG 24 hr tablet Take 0.5 tablets (12.5 mg) by mouth in the morning. 45 tablet 3     Multiple Vitamin (DAILY MULTIVITAMIN PO) Take 1 tablet by mouth every morning        nebulizer nebulization as needed       nitroGLYcerin (NITROSTAT) 0.4 MG sublingual tablet Place 0.4 mg under the tongue every 5 minutes as needed for chest pain For chest pain place 1 tablet under the tongue every 5 minutes for 3 doses. If symptoms persist 5 minutes after 1st dose call 911.       Probiotic Product (PROBIOTIC DAILY PO) Take 1 capsule by mouth 2 times daily Takes additional doses if has diarrhea       spironolactone (ALDACTONE) 25 MG tablet Take 0.5 tablets (12.5 mg) by mouth daily 45 tablet 3     SYNTHROID 150 MCG tablet TAKE ONE TABLET BY MOUTH SIX DAYS A WEEK; TAKE ONE-HALF TABLET ONE DAY A WEEK 90 tablet 3     TURMERIC PO Take 1 tablet by mouth every morning        vitamin D3 (CHOLECALCIFEROL) 2000 units tablet Take 1 tablet by mouth daily 1 tablet 0     XYLITOL MT Spray 1 spray into both nostrils daily as needed       Zinc 15 MG CAPS Take 15 mg by mouth        furosemide (LASIX) 20 MG tablet Take 20 mg by mouth as needed (Patient not taking: Reported on 5/26/2022) 180 tablet 1     METHYLPREDNISOLONE PO Take 40 mg by mouth as needed  (Patient not taking: Reported on 5/26/2022)         PAST SURGICAL HISTORY:  Past Surgical History:   Procedure Laterality Date     BUNIONECTOMY Bilateral      COLONOSCOPY N/A 8/30/2021    Procedure: COLONOSCOPY, WITH POLYPECTOMY AND  BIOPSY;  Surgeon: Ranjit Penn MD;  [facility]     CV CORONARY ANGIOGRAM N/A 9/26/2019    Procedure: CV CORONARY ANGIOGRAM;  Surgeon: Erickson Trejo MD;  Location:  HEART CARDIAC CATH LAB     CV RIGHT HEART CATH MEASUREMENTS RECORDED N/A 7/20/2020    Procedure: CV RIGHT HEART CATH;  Surgeon: Alonso Ordonez MD;  Location:  HEART CARDIAC CATH LAB     EP ABLATION / EP STUDIES  04/21/2017     EP PPM UPGRADE TO BIVENT N/A 1/19/2022    Procedure: EP PPM UPGRADE TO BIVENT;  Surgeon: Lino Funes MD;  Location:  HEART CARDIAC CATH LAB     EXPLORE NECK N/A 9/19/2018    Procedure: EXPLORE NECK;  Neck Exploration Resection of Left superior Parathyroid gland;  Surgeon: Kristine Venegas MD;  [facility]      CORRECT BUNION,SIMPLE       PARATHYROIDECTOMY N/A 9/19/2018    Procedure: PARATHYROIDECTOMY;;  Surgeon: Kristine Venegas MD;  [facility]     PPM INSERT OF NEW OR REPL W/VENT LEAD  04/21/2017     TONSILLECTOMY & ADENOIDECTOMY         ALLERGIES:     Allergies   Allergen Reactions     Tetracycline Swelling     Tongue swelling     Viagra [Sildenafil] Muscle Pain (Myalgia) and Diarrhea     Zofran [Ondansetron]      Prolonged QT  Prolonged QT at baseline per patient     Flagyl [Metronidazole] Rash     Pruritic rash      Buspar [Buspirone]      Muscle weakness     Corn Oil Other (See Comments)     Headache       Cymbalta Other (See Comments) and Diarrhea     Confusion     Prozac [Fluoxetine] Diarrhea     Seasonal Allergies Difficulty breathing     Sulfamethoxazole-Trimethoprim      Other reaction(s): Other  Passed out     Cyclobenzaprine Other (See Comments)     Extreme heat intolerance     Levaquin [Levofloxacin]      Other reaction(s): Other  Tendon rupture     Nsaids Other (See Comments)     Exacerbated asthma       FAMILY HISTORY:  Family History   Problem Relation Age of Onset     Hypothyroidism Mother      Hypertension Mother      Osteoarthritis Mother      Coronary  Artery Disease Father         nonfatal MI in his 70s     Asthma Father      Diabetes Sister      Hypothyroidism Sister      Breast Cancer Maternal Aunt      Anxiety Disorder Sister      SOCIAL HISTORY:  Social History     Tobacco Use     Smoking status: Former Smoker     Packs/day: 0.00     Years: 0.00     Pack years: 0.00     Smokeless tobacco: Never Used   Vaping Use     Vaping Use: Never used   Substance Use Topics     Alcohol use: Yes     Alcohol/week: 0.0 - 8.0 standard drinks     Comment: seldom     Drug use: No       ROS:   Constitutional: No fever, chills, or sweats. Weight stable.   ENT: No visual disturbance, sore throat.   Cardiovascular: As per HPI.   Respiratory: No cough, hemoptysis.    GI: No nausea, vomiting,   : No hematuria.   Integument: Negative.   Psychiatric: Negative.   Hematologic:  no easy bleeding.  Neuro: Negative.   Endocrinology: No significant heat or cold intolerance   Musculoskeletal: No myalgia.    Exam:  [facility] 08/21/2007   GENERAL APPEARANCE: healthy, alert and no distress  HEENT: no icterus, no central cyanosis  NECK:  JVP not elevated  RESPIRATORY:no rales, rhonchi or wheezes, no use of accessory muscles, no retractions, respirations are unlabored, normal respiratory rate  NEURO: alert and oriented to person/place/time, normal speech,and affect  SKIN: no ecchymoses, no rashes    Labs:  CBC RESULTS:   Lab Results   Component Value Date    WBC 5.3 04/14/2022    WBC 6.6 05/25/2021    RBC 4.06 04/14/2022    RBC 4.33 05/25/2021    HGB 12.7 04/14/2022    HGB 13.7 05/25/2021    HCT 38.4 04/14/2022    HCT 41.2 05/25/2021    MCV 95 04/14/2022    MCV 95 05/25/2021    MCH 31.3 04/14/2022    MCH 31.6 05/25/2021    MCHC 33.1 04/14/2022    MCHC 33.3 05/25/2021    RDW 12.7 04/14/2022    RDW 11.9 05/25/2021     04/14/2022     05/25/2021       BMP RESULTS:  Lab Results   Component Value Date     04/14/2022     05/25/2021    POTASSIUM 4.1 04/14/2022    POTASSIUM 4.2  05/25/2021    CHLORIDE 111 (H) 04/14/2022    CHLORIDE 107 05/25/2021    CO2 23 04/14/2022    CO2 24 05/25/2021    ANIONGAP 7 04/14/2022    ANIONGAP 7 05/25/2021    GLC 91 04/14/2022    GLC 92 05/25/2021    BUN 9 04/14/2022    BUN 12 05/25/2021    CR 0.60 04/14/2022    CR 0.64 05/25/2021    GFRESTIMATED >90 04/14/2022    GFRESTIMATED >90 05/25/2021    GFRESTBLACK >90 05/25/2021    MANJU 8.6 04/14/2022    MANJU 8.7 05/25/2021        INR RESULTS:  Lab Results   Component Value Date    INR 1.02 10/06/2019    INR 0.95 06/28/2017       Procedures:  PULMONARY FUNCTION TESTS:   No flowsheet data found.      ECHOCARDIOGRAM:   Interpretation Summary  Left ventricular function is decreased. The ejection fraction is 40-45%  (mildly reduced).  Abnormal septal motion consistent with left bundle branch block is present.  Right ventricular function, chamber size, wall motion, and thickness are  normal.  This study was compared with the study from 11/23/21 .  No significant changes noted.    Assessment and Plan:   1.  Nonischemic cardiomyopathy with heart failure symptoms -improved since pacemaker upgrade (see #3)  2.  Complete heart block  3.  Recent pacemaker upgrade with left bundle branch pacing    Plan  1.  Continue remote pacemaker follow-up  2.  Clinic follow-up video in mid September    Total elapsed time today with chart review, video visit and documentation 40 minutes    Video on 3: 45; video off 4: 15  Platform D[employer]  Patient at home; clinic [facility] heart    I very much appreciated the opportunity to see and assess Joyce Marroquin in the clinic today. Please do not hesitate to contact my office if you have any questions or concerns.      Lino Funes MD  Cardiac Arrhythmia Service  [facility]  [phone]      CC  CHARLINE NIELSON

## 2022-05-26 NOTE — PROGRESS NOTES
Joyce is a 61 year old who is being evaluated via a billable video visit.      How would you like to obtain your AVS? MyChart  If the video visit is dropped, the invitation should be resent by: Text to cell phone: [phone]  Will anyone else be joining your video visit? No       Jb Orozco, Visit Facilitator/MA.  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  Her ejection fraction has been in the mid 40s.  She underwent implantation of a pacemaker with a left bundle pacing lead in January of this year.    Subsequent to the pacemaker implant Joyce did have some chest pain related to her left shoulder but this seems to be improving and much less trouble to her now than initially.    In terms of exercise tolerance Joyce feels much better and is able to walk around a long block twice per day without feeling excessively short of breath.  She is also able to climb a flight of stairs without feeling breathless.  Her exertional capacity does seem to be much improved compared to baseline preplacement of the left ventricular lead.    Joyce is interested in the technique of left bundle pacing and I will try to provide some literature on the subject.    At today's visit we reviewed the echocardiogram that was done yesterday.  It is possible that the improvement in left ventricular performance has diminished her pulmonary artery pressures and that may be the basis for the improved exercise tolerance.  We also reviewed the pacemaker follow-up and this information is summarized below.  Ppm interrogation  Device: Medtronic Percepta CRT  Normal device function  Mode: DDDR  bpm  AP: 46%  : 100%  BVP: 100%  Intrinsic Rhythm: CHB: SR @ 70 bpm/  @ 30 bpm  Thoracic Impedance: Near reference line.   Short V-V intervals: 0  Lead Trends Appear Stable: yes  Estimated battery longevity to RRT = 10.5 years. Battery voltage =  3.16V.   Atrial Arrhythmia: 0  AF [place] = 0%  Anticoagulant: Eliquis  Ventricular Arrhythmia: 1 monitored VT. EGM shows NSVT lasting 8 beats.  Setting Changes: none    Joyce has an appointment in the heart failure clinic next week.  Assuming no major changes are needed I will plan to revisit with her after her next remote pacemaker check at the end of August.  She voiced understanding and agreement.    PAST MEDICAL HISTORY:  Past Medical History:   Diagnosis Date     Arthritis      Cardiomyopathy (H)     ff Cardiology     Chronic depressive personality disorder      Congestive heart failure (H) see  note    heart failure correct term     COPD exacerbation (H)      Esophageal reflux      Ex-smoker     quit 2006; 1 PPD x 30     Hyperparathyroidism (H)     s/p parathyroidectomy     Hypothyroidism      LARRY on CPAP     ff Sleep medicine     Pulmonary nodules     ff Pulmonologist     S/P cardiac pacemaker procedure     [facility]      Uncomplicated asthma years       CURRENT MEDICATIONS:  Current Outpatient Medications   Medication Sig Dispense Refill     acetaminophen (TYLENOL) 500 MG tablet Take 500-1,000 mg by mouth every 6 hours as needed for mild pain       albuterol (PROAIR HFA/PROVENTIL HFA/VENTOLIN HFA) 108 (90 BASE) MCG/ACT Inhaler Inhale 2 puffs into the lungs as needed for shortness of breath / dyspnea or wheezing        azelastine (ASTELIN) 0.1 % nasal spray Spray 1 spray into both nostrils 2 times daily as needed        Calcium Carbonate-Vitamin D (CALCIUM-CARB 600 + D PO) Take 1 tablet by mouth daily       clonazePAM (KLONOPIN) 0.5 MG tablet TAKE 1 TABLET(0.5 MG) BY MOUTH THREE TIMES DAILY AS NEEDED FOR ANXIETY 90 tablet 1     DiphenhydrAMINE HCl (BENADRYL PO) Take 25 mg by mouth nightly as needed for allergies        ELIQUIS ANTICOAGULANT 5 MG tablet TAKE 1 TABLET(5 MG) BY MOUTH TWICE DAILY 180 tablet 3     Ferrous Sulfate (IRON SUPPLEMENT PO) Take 130 mg by mouth daily         fluticasone-salmeterol (ADVAIR) 250-50 MCG/DOSE inhaler Inhale 1 puff into the lungs every 12 hours       ipratropium (ATROVENT) 0.06 % nasal spray Spray 2 sprays into both nostrils 4 times daily as needed for rhinitis       MAGNESIUM LACTATE PO Take  mg by mouth nightly as needed        MELATONIN PO Take 1-3 mg by mouth nightly as needed        Menaquinone-7 (VITAMIN K2 PO) Take 1 tablet by mouth daily       metoprolol succinate ER (TOPROL XL) 25 MG 24 hr tablet Take 0.5 tablets (12.5 mg) by mouth in the morning. 45 tablet 3     Multiple Vitamin (DAILY MULTIVITAMIN PO) Take 1 tablet by mouth every morning        nebulizer nebulization as needed       nitroGLYcerin (NITROSTAT) 0.4 MG sublingual tablet Place 0.4 mg under the tongue every 5 minutes as needed for chest pain For chest pain place 1 tablet under the tongue every 5 minutes for 3 doses. If symptoms persist 5 minutes after 1st dose call 911.       Probiotic Product (PROBIOTIC DAILY PO) Take 1 capsule by mouth 2 times daily Takes additional doses if has diarrhea       spironolactone (ALDACTONE) 25 MG tablet Take 0.5 tablets (12.5 mg) by mouth daily 45 tablet 3     SYNTHROID 150 MCG tablet TAKE ONE TABLET BY MOUTH SIX DAYS A WEEK; TAKE ONE-HALF TABLET ONE DAY A WEEK 90 tablet 3     TURMERIC PO Take 1 tablet by mouth every morning        vitamin D3 (CHOLECALCIFEROL) 2000 units tablet Take 1 tablet by mouth daily 1 tablet 0     XYLITOL MT Spray 1 spray into both nostrils daily as needed       Zinc 15 MG CAPS Take 15 mg by mouth        furosemide (LASIX) 20 MG tablet Take 20 mg by mouth as needed (Patient not taking: Reported on 5/26/2022) 180 tablet 1     METHYLPREDNISOLONE PO Take 40 mg by mouth as needed  (Patient not taking: Reported on 5/26/2022)         PAST SURGICAL HISTORY:  Past Surgical History:   Procedure Laterality Date     BUNIONECTOMY Bilateral      COLONOSCOPY N/A 8/30/2021    Procedure: COLONOSCOPY, WITH POLYPECTOMY AND BIOPSY;  Surgeon:  Ranjit Penn MD;  Location:  GI     CV CORONARY ANGIOGRAM N/A 9/26/2019    Procedure: CV CORONARY ANGIOGRAM;  Surgeon: Erickson Trejo MD;  Location:  HEART CARDIAC CATH LAB     CV RIGHT HEART CATH MEASUREMENTS RECORDED N/A 7/20/2020    Procedure: CV RIGHT HEART CATH;  Surgeon: Alonso Ordonez MD;  Location:  HEART CARDIAC CATH LAB     EP ABLATION / EP STUDIES  04/21/2017     EP PPM UPGRADE TO BIVENT N/A 1/19/2022    Procedure: EP PPM UPGRADE TO BIVENT;  Surgeon: Lino Funes MD;  Location:  HEART CARDIAC CATH LAB     EXPLORE NECK N/A 9/19/2018    Procedure: EXPLORE NECK;  Neck Exploration Resection of Left superior Parathyroid gland;  Surgeon: Kristine Venegas MD;  Location: [facility] OR      CORRECT BUNION,SIMPLE       PARATHYROIDECTOMY N/A 9/19/2018    Procedure: PARATHYROIDECTOMY;;  Surgeon: Kristine Venegas MD;  Location: [facility] OR     PPM INSERT OF NEW OR REPL W/VENT LEAD  04/21/2017     TONSILLECTOMY & ADENOIDECTOMY         ALLERGIES:     Allergies   Allergen Reactions     Tetracycline Swelling     Tongue swelling     Viagra [Sildenafil] Muscle Pain (Myalgia) and Diarrhea     Zofran [Ondansetron]      Prolonged QT  Prolonged QT at baseline per patient     Flagyl [Metronidazole] Rash     Pruritic rash      Buspar [Buspirone]      Muscle weakness     Corn Oil Other (See Comments)     Headache       Cymbalta Other (See Comments) and Diarrhea     Confusion     Prozac [Fluoxetine] Diarrhea     Seasonal Allergies Difficulty breathing     Sulfamethoxazole-Trimethoprim      Other reaction(s): Other  Passed out     Cyclobenzaprine Other (See Comments)     Extreme heat intolerance     Levaquin [Levofloxacin]      Other reaction(s): Other  Tendon rupture     Nsaids Other (See Comments)     Exacerbated asthma       FAMILY HISTORY:  Family History   Problem Relation Age of Onset     Hypothyroidism Mother      Hypertension Mother      Osteoarthritis Mother      Coronary Artery Disease Father          nonfatal MI in his 70s     Asthma Father      Diabetes Sister      Hypothyroidism Sister      Breast Cancer Maternal Aunt      Anxiety Disorder Sister      SOCIAL HISTORY:  Social History     Tobacco Use     Smoking status: Former Smoker     Packs/day: 0.00     Years: 0.00     Pack years: 0.00     Smokeless tobacco: Never Used   Vaping Use     Vaping Use: Never used   Substance Use Topics     Alcohol use: Yes     Alcohol/week: 0.0 - 8.0 standard drinks     Comment: seldom     Drug use: No       ROS:   Constitutional: No fever, chills, or sweats. Weight stable.   ENT: No visual disturbance, sore throat.   Cardiovascular: As per HPI.   Respiratory: No cough, hemoptysis.    GI: No nausea, vomiting,   : No hematuria.   Integument: Negative.   Psychiatric: Negative.   Hematologic:  no easy bleeding.  Neuro: Negative.   Endocrinology: No significant heat or cold intolerance   Musculoskeletal: No myalgia.    Exam:  LMP 08/21/2007   GENERAL APPEARANCE: healthy, alert and no distress  HEENT: no icterus, no central cyanosis  NECK:  JVP not elevated  RESPIRATORY:no rales, rhonchi or wheezes, no use of accessory muscles, no retractions, respirations are unlabored, normal respiratory rate  NEURO: alert and oriented to person/place/time, normal speech,and affect  SKIN: no ecchymoses, no rashes    Labs:  CBC RESULTS:   Lab Results   Component Value Date    WBC 5.3 04/14/2022    WBC 6.6 05/25/2021    RBC 4.06 04/14/2022    RBC 4.33 05/25/2021    HGB 12.7 04/14/2022    HGB 13.7 05/25/2021    HCT 38.4 04/14/2022    HCT 41.2 05/25/2021    MCV 95 04/14/2022    MCV 95 05/25/2021    MCH 31.3 04/14/2022    MCH 31.6 05/25/2021    MCHC 33.1 04/14/2022    MCHC 33.3 05/25/2021    RDW 12.7 04/14/2022    RDW 11.9 05/25/2021     04/14/2022     05/25/2021       BMP RESULTS:  Lab Results   Component Value Date     04/14/2022     05/25/2021    POTASSIUM 4.1 04/14/2022    POTASSIUM 4.2 05/25/2021    CHLORIDE 111  (H) 04/14/2022    CHLORIDE 107 05/25/2021    CO2 23 04/14/2022    CO2 24 05/25/2021    ANIONGAP 7 04/14/2022    ANIONGAP 7 05/25/2021    GLC 91 04/14/2022    GLC 92 05/25/2021    BUN 9 04/14/2022    BUN 12 05/25/2021    CR 0.60 04/14/2022    CR 0.64 05/25/2021    GFRESTIMATED >90 04/14/2022    GFRESTIMATED >90 05/25/2021    GFRESTBLACK >90 05/25/2021    MANJU 8.6 04/14/2022    MANJU 8.7 05/25/2021        INR RESULTS:  Lab Results   Component Value Date    INR 1.02 10/06/2019    INR 0.95 06/28/2017       Procedures:  PULMONARY FUNCTION TESTS:   No flowsheet data found.      ECHOCARDIOGRAM:   Interpretation Summary  Left ventricular function is decreased. The ejection fraction is 40-45%  (mildly reduced).  Abnormal septal motion consistent with left bundle branch block is present.  Right ventricular function, chamber size, wall motion, and thickness are  normal.  This study was compared with the study from 11/23/21 .  No significant changes noted.    Assessment and Plan:   1.  Nonischemic cardiomyopathy with heart failure symptoms -improved since pacemaker upgrade (see #3)  2.  Complete heart block  3.  Recent pacemaker upgrade with left bundle branch pacing    Plan  1.  Continue remote pacemaker follow-up  2.  Clinic follow-up video in mid September    Total elapsed time today with chart review, video visit and documentation 40 minutes    Video on 3: 45; video off 4: 15  [facility]  Patient at [facility]; clinic [facility] heart    I very much appreciated the opportunity to see and assess Joyce Marroquin in the clinic today. Please do not hesitate to contact my office if you have any questions or concerns.      Lino Funes MD  Cardiac Arrhythmia Service  [facility]  [phone]      CC  CHARLINE NIELSON

## 2022-05-26 NOTE — NURSING NOTE
Chief Complaint   Patient presents with     Follow Up     CRTP follow up, no updates or questions at time of check in. Medications reviewed with pt, states she has not taken the methylprednisolone in almost 2 years, and also only takes the Lasix as needed. Pt refused PHQ-2 today, states she does deal with depression and is currently being treated and sees a therapist.      Jb Orozco, Visit Facilitator/MA.

## 2022-05-26 NOTE — PATIENT INSTRUCTIONS
You were seen in the Electrophysiology Clinic today by: Dr Funes    Plan:     Follow up visit:  Video visit in September with Dr Funes and a device check prior          Your Care Team:  EP Cardiology   Telephone Number     Nurse Line  Marsha Hawthorne RN  [phone]     For scheduling appts or procedures:    Merced Heller   [phone]   For the Device Clinic (Pacemakers, ICDs, Loop Recorders)    During business hours: [phone]  After business hours:   [phone]- select option 4 and ask for job code [id number].     On-call cardiologist for after hours or on weekends: [phone], option #4, and ask to speak to the on-call cardiologist.     Cardiovascular Clinic:   [address] [place] [address]. [place] [address]      As always, Thank you for trusting us with your health care needs!

## 2022-05-31 LAB
MDC_IDC_EPISODE_DTM: NORMAL
MDC_IDC_EPISODE_DURATION: 2 S
MDC_IDC_EPISODE_ID: 1
MDC_IDC_EPISODE_TYPE: NORMAL
MDC_IDC_LEAD_IMPLANT_DT: NORMAL
MDC_IDC_LEAD_LOCATION: NORMAL
MDC_IDC_LEAD_LOCATION_DETAIL_1: NORMAL
MDC_IDC_LEAD_MFG: NORMAL
MDC_IDC_LEAD_MODEL: NORMAL
MDC_IDC_LEAD_POLARITY_TYPE: NORMAL
MDC_IDC_LEAD_SERIAL: NORMAL
MDC_IDC_MSMT_BATTERY_DTM: NORMAL
MDC_IDC_MSMT_BATTERY_REMAINING_LONGEVITY: 128 MO
MDC_IDC_MSMT_BATTERY_RRT_TRIGGER: 2.6
MDC_IDC_MSMT_BATTERY_STATUS: NORMAL
MDC_IDC_MSMT_BATTERY_VOLTAGE: 3.16 V
MDC_IDC_MSMT_LEADCHNL_LV_IMPEDANCE_VALUE: 266 OHM
MDC_IDC_MSMT_LEADCHNL_LV_IMPEDANCE_VALUE: 361 OHM
MDC_IDC_MSMT_LEADCHNL_LV_IMPEDANCE_VALUE: 380 OHM
MDC_IDC_MSMT_LEADCHNL_LV_IMPEDANCE_VALUE: 475 OHM
MDC_IDC_MSMT_LEADCHNL_LV_IMPEDANCE_VALUE: 513 OHM
MDC_IDC_MSMT_LEADCHNL_LV_PACING_THRESHOLD_AMPLITUDE: 0.75 V
MDC_IDC_MSMT_LEADCHNL_LV_PACING_THRESHOLD_PULSEWIDTH: 0.4 MS
MDC_IDC_MSMT_LEADCHNL_RA_IMPEDANCE_VALUE: 380 OHM
MDC_IDC_MSMT_LEADCHNL_RA_IMPEDANCE_VALUE: 437 OHM
MDC_IDC_MSMT_LEADCHNL_RA_PACING_THRESHOLD_AMPLITUDE: 0.5 V
MDC_IDC_MSMT_LEADCHNL_RA_PACING_THRESHOLD_PULSEWIDTH: 0.4 MS
MDC_IDC_MSMT_LEADCHNL_RA_SENSING_INTR_AMPL: 3.1 MV
MDC_IDC_MSMT_LEADCHNL_RV_IMPEDANCE_VALUE: 399 OHM
MDC_IDC_MSMT_LEADCHNL_RV_IMPEDANCE_VALUE: 437 OHM
MDC_IDC_MSMT_LEADCHNL_RV_PACING_THRESHOLD_AMPLITUDE: 0.75 V
MDC_IDC_MSMT_LEADCHNL_RV_PACING_THRESHOLD_PULSEWIDTH: 0.4 MS
MDC_IDC_PG_IMPLANT_DTM: NORMAL
MDC_IDC_PG_MFG: NORMAL
MDC_IDC_PG_MODEL: NORMAL
MDC_IDC_PG_SERIAL: NORMAL
MDC_IDC_PG_TYPE: NORMAL
MDC_IDC_SESS_CLINIC_NAME: NORMAL
MDC_IDC_SESS_DTM: NORMAL
MDC_IDC_SESS_TYPE: NORMAL
MDC_IDC_SET_BRADY_AT_MODE_SWITCH_RATE: 171 {BEATS}/MIN
MDC_IDC_SET_BRADY_LOWRATE: 60 {BEATS}/MIN
MDC_IDC_SET_BRADY_MAX_SENSOR_RATE: 140 {BEATS}/MIN
MDC_IDC_SET_BRADY_MAX_TRACKING_RATE: 140 {BEATS}/MIN
MDC_IDC_SET_BRADY_MODE: NORMAL
MDC_IDC_SET_BRADY_PAV_DELAY_HIGH: 100 MS
MDC_IDC_SET_BRADY_PAV_DELAY_LOW: 180 MS
MDC_IDC_SET_BRADY_SAV_DELAY_HIGH: 70 MS
MDC_IDC_SET_BRADY_SAV_DELAY_LOW: 150 MS
MDC_IDC_SET_CRT_LVRV_DELAY: 80 MS
MDC_IDC_SET_CRT_PACED_CHAMBERS: NORMAL
MDC_IDC_SET_LEADCHNL_LV_PACING_AMPLITUDE: 1.25 V
MDC_IDC_SET_LEADCHNL_LV_PACING_ANODE_ELECTRODE_1: NORMAL
MDC_IDC_SET_LEADCHNL_LV_PACING_ANODE_LOCATION_1: NORMAL
MDC_IDC_SET_LEADCHNL_LV_PACING_CAPTURE_MODE: NORMAL
MDC_IDC_SET_LEADCHNL_LV_PACING_CATHODE_ELECTRODE_1: NORMAL
MDC_IDC_SET_LEADCHNL_LV_PACING_CATHODE_LOCATION_1: NORMAL
MDC_IDC_SET_LEADCHNL_LV_PACING_POLARITY: NORMAL
MDC_IDC_SET_LEADCHNL_LV_PACING_PULSEWIDTH: 0.4 MS
MDC_IDC_SET_LEADCHNL_RA_PACING_AMPLITUDE: 1.5 V
MDC_IDC_SET_LEADCHNL_RA_PACING_ANODE_ELECTRODE_1: NORMAL
MDC_IDC_SET_LEADCHNL_RA_PACING_ANODE_LOCATION_1: NORMAL
MDC_IDC_SET_LEADCHNL_RA_PACING_CAPTURE_MODE: NORMAL
MDC_IDC_SET_LEADCHNL_RA_PACING_CATHODE_ELECTRODE_1: NORMAL
MDC_IDC_SET_LEADCHNL_RA_PACING_CATHODE_LOCATION_1: NORMAL
MDC_IDC_SET_LEADCHNL_RA_PACING_POLARITY: NORMAL
MDC_IDC_SET_LEADCHNL_RA_PACING_PULSEWIDTH: 0.4 MS
MDC_IDC_SET_LEADCHNL_RA_SENSING_ANODE_ELECTRODE_1: NORMAL
MDC_IDC_SET_LEADCHNL_RA_SENSING_ANODE_LOCATION_1: NORMAL
MDC_IDC_SET_LEADCHNL_RA_SENSING_CATHODE_ELECTRODE_1: NORMAL
MDC_IDC_SET_LEADCHNL_RA_SENSING_CATHODE_LOCATION_1: NORMAL
MDC_IDC_SET_LEADCHNL_RA_SENSING_POLARITY: NORMAL
MDC_IDC_SET_LEADCHNL_RA_SENSING_SENSITIVITY: 0.3 MV
MDC_IDC_SET_LEADCHNL_RV_PACING_AMPLITUDE: 2 V
MDC_IDC_SET_LEADCHNL_RV_PACING_ANODE_ELECTRODE_1: NORMAL
MDC_IDC_SET_LEADCHNL_RV_PACING_ANODE_LOCATION_1: NORMAL
MDC_IDC_SET_LEADCHNL_RV_PACING_CAPTURE_MODE: NORMAL
MDC_IDC_SET_LEADCHNL_RV_PACING_CATHODE_ELECTRODE_1: NORMAL
MDC_IDC_SET_LEADCHNL_RV_PACING_CATHODE_LOCATION_1: NORMAL
MDC_IDC_SET_LEADCHNL_RV_PACING_POLARITY: NORMAL
MDC_IDC_SET_LEADCHNL_RV_PACING_PULSEWIDTH: 0.4 MS
MDC_IDC_SET_LEADCHNL_RV_SENSING_ANODE_ELECTRODE_1: NORMAL
MDC_IDC_SET_LEADCHNL_RV_SENSING_ANODE_LOCATION_1: NORMAL
MDC_IDC_SET_LEADCHNL_RV_SENSING_CATHODE_ELECTRODE_1: NORMAL
MDC_IDC_SET_LEADCHNL_RV_SENSING_CATHODE_LOCATION_1: NORMAL
MDC_IDC_SET_LEADCHNL_RV_SENSING_POLARITY: NORMAL
MDC_IDC_SET_LEADCHNL_RV_SENSING_SENSITIVITY: 0.9 MV
MDC_IDC_SET_ZONE_DETECTION_INTERVAL: 350 MS
MDC_IDC_SET_ZONE_DETECTION_INTERVAL: 400 MS
MDC_IDC_SET_ZONE_TYPE: NORMAL
MDC_IDC_STAT_AT_BURDEN_PERCENT: 0 %
MDC_IDC_STAT_AT_DTM_END: NORMAL
MDC_IDC_STAT_AT_DTM_START: NORMAL
MDC_IDC_STAT_BRADY_AP_VP_PERCENT: 46.38 %
MDC_IDC_STAT_BRADY_AP_VS_PERCENT: 0.1 %
MDC_IDC_STAT_BRADY_AS_VP_PERCENT: 53.27 %
MDC_IDC_STAT_BRADY_AS_VS_PERCENT: 0.25 %
MDC_IDC_STAT_BRADY_DTM_END: NORMAL
MDC_IDC_STAT_BRADY_DTM_START: NORMAL
MDC_IDC_STAT_BRADY_RA_PERCENT_PACED: 46.31 %
MDC_IDC_STAT_BRADY_RV_PERCENT_PACED: 99.64 %
MDC_IDC_STAT_CRT_DTM_END: NORMAL
MDC_IDC_STAT_CRT_DTM_START: NORMAL
MDC_IDC_STAT_CRT_LV_PERCENT_PACED: 99.61 %
MDC_IDC_STAT_CRT_PERCENT_PACED: 99.61 %
MDC_IDC_STAT_EPISODE_RECENT_COUNT: 0
MDC_IDC_STAT_EPISODE_RECENT_COUNT: 0
MDC_IDC_STAT_EPISODE_RECENT_COUNT: 1
MDC_IDC_STAT_EPISODE_RECENT_COUNT_DTM_END: NORMAL
MDC_IDC_STAT_EPISODE_RECENT_COUNT_DTM_START: NORMAL
MDC_IDC_STAT_EPISODE_TOTAL_COUNT: 0
MDC_IDC_STAT_EPISODE_TOTAL_COUNT: 0
MDC_IDC_STAT_EPISODE_TOTAL_COUNT: 1
MDC_IDC_STAT_EPISODE_TOTAL_COUNT_DTM_END: NORMAL
MDC_IDC_STAT_EPISODE_TOTAL_COUNT_DTM_START: NORMAL
MDC_IDC_STAT_EPISODE_TYPE: NORMAL

## 2022-06-01 ENCOUNTER — TELEPHONE (OUTPATIENT)
Dept: CARDIOLOGY | Facility: CLINIC | Age: 62
End: 2022-06-01
Payer: COMMERCIAL

## 2022-06-04 NOTE — PROGRESS NOTES
June 7, 2022     Ms. Marroquin is a very pleasant 62 y/o lady who worked as a nurse anesthetist and has a history of RF ablation for PVCs which led to AV node ablation and complete heart block requiring a dual-chamber pacemaker implantation, which is a Medtronic device MRI compatible.  She also has a recent history of heart failure with reduced ejection fraction with EF around 35% later improved to 45%.  There was a history of Takotsubo cardiomyopathy.  Patient on 7/2/21 had meeting with EP at which time low dose carvedilol was started 3.125 mg PO bid due to atrial ectopy and short runs of VT. she did recently complete an exercise out of catheterization with myself which showed normal resting biventricular filling pressures and normal cardiac output.  She did have limited augmentation of cardiac index with exercise and filling pressures especially pulmonary capillary wedge pressure increased significantly consistent with heart failure preserved ejection fraction.  She did initially better but for the past couple of months she has not been feeling well.  She did gain some weight and increased the Lasix to 40 mg as needed.  She does not feel that these helped for her.  In fact at times she feels that she had some confusion or disorientation.  Possibly she is dry at times.  She did see in follow-up with [facility] he had some of the instructions were confusing and she appropriately notes that the discharge papers stated a lot of extra information.  I did see her and performed a CPX that was marginal as well as a cardiac MRI that showed a significantly improved LV function to 50% and no other significant abnormalities. She did meet with Dr. Funes and sildenafil was initiated yet rapidly discontinued due to muscle aches. Dr. Funes also adjusted the rate response to allow for faster heart rates in an attempt to improve forward flow. In addition, she was felt to be dry and lasix has been reduced to 20mg 2 times per week.  She noted that she at times she forgets to take the PM dose of the revatio. After lengthy discussions, she did have CRT-D upgrade in hopes that resynchronization will help her symptomatology. This was performed in January 2022 and was uneventful.    Today she is here for follow-up. She notes today that she feels better. She is able to walk 2 blocks without any significant issues. She continues to require assistance with some of her chores but she does most things independently. She continues to take lasix as needed. She has intermittent dizziness with rapid position changes but these spontaneously resolve.     PAST MEDICAL HISTORY:  Past Medical History:   Diagnosis Date     Arthritis      Cardiomyopathy (H)     ff Cardiology     Chronic depressive personality disorder      Congestive heart failure (H) see dr martínez    heart failure correct term     COPD exacerbation (H)      Esophageal reflux      Ex-smoker     quit 2006; 1 PPD x 30     Hyperparathyroidism (H)     s/p parathyroidectomy     Hypothyroidism      LARRY on CPAP     ff Sleep medicine     Pulmonary nodules     ff Pulmonologist     S/P cardiac pacemaker procedure     checked every 6 months at the [facility]     Uncomplicated asthma years     FAMILY HISTORY:  Family History   Problem Relation Age of Onset     Hypothyroidism Mother      Hypertension Mother      Osteoarthritis Mother      Coronary Artery Disease Father         nonfatal MI in his 70s     Asthma Father      Diabetes Sister      Hypothyroidism Sister      Breast Cancer Maternal Aunt      Anxiety Disorder Sister      SOCIAL HISTORY:  Social History     Socioeconomic History     Marital status:      Spouse name: Not on file     Number of children: Not on file     Years of education: Not on file     Highest education level: Master's degree (e.g., MA, MS, Florian, MEd, MSW, SONIA)   Occupational History     Not on file   Tobacco Use     Smoking status: Former Smoker     Packs/day: 0.00     Years: 0.00      Pack years: 0.00     Smokeless tobacco: Never Used   Vaping Use     Vaping Use: Never used   Substance and Sexual Activity     Alcohol use: Yes     Alcohol/week: 0.0 - 8.0 standard drinks     Comment: seldom     Drug use: No     Sexual activity: Yes     Partners: Male     Birth control/protection: None     Comment: vasectomy   Other Topics Concern     Parent/sibling w/ CABG, MI or angioplasty before 65F 55M? No   Social History Narrative    CRNA on disability for vestibular symptoms      Social Determinants of Health     Financial Resource Strain: Low Risk      Difficulty of Paying Living Expenses: Not very hard   Food Insecurity: No Food Insecurity     Worried About Running Out of Food in the Last Year: Never true     Ran Out of Food in the Last Year: Never true   Transportation Needs: No Transportation Needs     Lack of Transportation (Medical): No     Lack of Transportation (Non-Medical): No   Physical Activity: Not on file   Stress: Not on file   Social Connections: Not on file   Intimate Partner Violence: Not At Risk     Fear of Current or Ex-Partner: No     Emotionally Abused: No     Physically Abused: No     Sexually Abused: No   Housing Stability: Not on file     CURRENT MEDICATIONS:  Current Outpatient Medications   Medication     acetaminophen (TYLENOL) 500 MG tablet     albuterol (PROAIR HFA/PROVENTIL HFA/VENTOLIN HFA) 108 (90 BASE) MCG/ACT Inhaler     azelastine (ASTELIN) 0.1 % nasal spray     Calcium Carbonate-Vitamin D (CALCIUM-CARB 600 + D PO)     clonazePAM (KLONOPIN) 0.5 MG tablet     DiphenhydrAMINE HCl (BENADRYL PO)     ELIQUIS ANTICOAGULANT 5 MG tablet     Ferrous Sulfate (IRON SUPPLEMENT PO)     fluticasone-salmeterol (ADVAIR) 250-50 MCG/DOSE inhaler     ipratropium (ATROVENT) 0.06 % nasal spray     MAGNESIUM LACTATE PO     MELATONIN PO     Menaquinone-7 (VITAMIN K2 PO)     metoprolol succinate ER (TOPROL XL) 25 MG 24 hr tablet     Multiple Vitamin (DAILY MULTIVITAMIN PO)     nebulizer  nebulization     nitroGLYcerin (NITROSTAT) 0.4 MG sublingual tablet     Probiotic Product (PROBIOTIC DAILY PO)     spironolactone (ALDACTONE) 25 MG tablet     SYNTHROID 150 MCG tablet     TURMERIC PO     vitamin D3 (CHOLECALCIFEROL) 2000 units tablet     XYLITOL MT     Zinc 15 MG CAPS     No current facility-administered medications for this visit.     ROS:   Constitutional: No fever, chills, or sweats.   ENT: No visual disturbance, ear ache, epistaxis, sore throat.   Allergies/Immunologic: Negative.   Respiratory: No cough, hemoptysis.   Cardiovascular: As per HPI.   GI: No nausea, vomiting, hematemesis, melena, or hematochezia.   : No urinary frequency, dysuria, or hematuria.   Integument: Negative.   Psychiatric: Pleasant, no major depression noted  Neuro: No focal neurological deficits noted  Endocrinology: Negative.   Musculoskeletal: As per HPI.      EXAM:  /78 (BP Location: Right arm, Patient Position: Sitting, Cuff Size: Adult Regular)   Pulse 91   Wt 77.5 kg (170 lb 14.4 oz)   LMP 08/21/2007   SpO2 95%   BMI 28.44 kg/m    General: appears comfortable, alert and oriented  Head: normocephalic, atraumatic  Eyes: anicteric sclera, EOMI , PERRL  Neck: no adenopathy  Orophyarynx: moist mucosa, no lesions noted  Heart: regular, S1/S2, no murmurs, rubs or gallop. Estimated JVP at 5 cmH2O  Lungs: CTAB, No wheezing.   Abdomen: soft, non-tender, bowel sounds present, no hepatosplenomegaly  Extremities: No LE edema today  Skin: no open lesions noted  Neuro: grossly non-focal     Labs:  Lab Results   Component Value Date    WBC 5.9 06/07/2022    HGB 13.1 06/07/2022    HCT 40.3 06/07/2022     06/07/2022     06/07/2022    POTASSIUM 4.2 06/07/2022    CHLORIDE 106 06/07/2022    CO2 27 06/07/2022    BUN 9 06/07/2022    CR 0.66 06/07/2022    GLC 90 06/07/2022    SED 22 11/09/2017    DD 1.65 (H) 04/13/2022    NTBNPI 58 04/13/2022    NTBNP 82 06/07/2022    TROPONIN <0.015 09/28/2021    TROPI <0.015  12/20/2020    AST 13 06/07/2022    ALT 22 06/07/2022    ALKPHOS 104 06/07/2022    BILITOTAL 0.3 06/07/2022    INR 1.02 10/06/2019     NM Lexiscan 3/2020:  The nuclear stress test is negative for inducible myocardial ischemia or infarction.  Fixed septal/apical defect due to Paced rhythm and increased gut photon activity.LVEDv 133ml. LV ESv 40ml. LV EF 70%.     There is no prior study for comparison     Echocardiogram 3/2020:  Mildly (EF 40-45%) reduced left ventricular function is present.  Global right ventricular function is normal.  No pericardial effusion is present. IVC diameter <2.1 cm collapsing >50% with sniff suggests a normal RA pressure  of 3 mmHg. This study was compared with the study from 12/3/19.  There has been no change.      Coronary angiogram 9/2019:  Mild-moderate non-obstructive coronary disease involving LAD, LCx, and RCA. No significant coronary disease to explain new cardiomyopathy.     TTE 9/5/2019:  Moderately (EF 30-35%) reduced left ventricular function is present. All segments from mid to apical left ventricle are severely hypokinetic to akinetic. Basal segments are hypercontractile. Overall pattern is suggestive of stress cardiomyopathy, but cannot rule out ischemic cardiomyopathy. Right ventricular function, chamber size, wall motion, and thickness are normal. No significant valve abnormalities. Mild pulmonary hypertension with increased RA pressure.     TTE 2/19/21:  Moderately (EF 35%) reduced left ventricular function is present.  Global right ventricular function is normal.  No significant valvular dysfunction. The inferior vena cava was normal in size with preserved respiratory variability. No pericardial effusion present.     MRI cardiac 4/2017:  1. Normal left ventricular size.  Left ventricular wall thickness is  normal except a very small area in the basal inferior septum that appears  thin in some images.  Real-time images suggest a very small area of  hypokinesis in the  basal inferior septum (not well visualized).  Left  ventricular ejection fraction is estimated at about 60-65% (unable to   perform volumetric analysis given frequent ectopy; real time images used  to assess LVEF).  2. Normal right ventricular size and systolic function.  No obvious regional wall motion abnormalities noted.  3. Gadolinium imaging:  No obvious right ventricular myocardial late  gadolinium enhancement noted.  There is a very small area of probable late  gadolinium enhancement in the mildly thinned basal inferior segment.    4. Moderate left atrial dilatation.  The right atrial size is at the upper limits of normal.  5. Thickened mitral valve leaflets, without obvious stenosis or significant regurgitation. No obvious hemodynamically significant valvular dysfunction noted.   6. Normal sized aortic root and ascending aorta.  For the remainder of the thoracic aorta, see separate radiology report.  7. Normal pericardium without significant pericardial effusion.      Cardiac MRI 5/2021:  1. The LV is normal in cavity size and wall thickness. The global systolic function is mildly reduced. The LVEF is 50%. There are no regional wall motion abnormalities. There is abnormal septal motion related to pacing.  2. The RV is normal in cavity size. The global systolic function is normal. The RVEF is 65%.   3. The left atrium is mildly dilated.  4. There is no significant valvular disease.   5. Late gadolinium enhancement imaging shows no MI, fibrosis or infiltrative disease.   6. Regadenoson stress perfusion imaging shows no ischemia.  7. There is no pericardial effusion or thickening.  8. There is no LV thrombus.  CONCLUSIONS: No myocardial ischemia or fibrosis. Mild NICM possibly related to pacing, LVEF 50% and RVEF 65%.      TTE 11/23/2021  Left ventricular function is decreased. The ejection fraction is 40-45% (mildly reduced).Abnormal septal motion consistent with left bundle branch block is present.  Global  right ventricular function is normal.  No significant valvular abnormalities present.  Pulmonary artery systolic pressure is normal.  The inferior vena cava was normal in size with preserved respiratory  variability.  No pericardial effusion is present     Device check 5/25/22  Device: Medtronic Percepta CRT  Normal device function  Mode: DDDR  bpm  AP: 46%  : 100%  BVP: 100%  Intrinsic Rhythm: CHB: SR @ 70 bpm/  @ 30 bpm  Thoracic Impedance: Near reference line.   Short V-V intervals: 0  Lead Trends Appear Stable: yes  Estimated battery longevity to RRT = 10.5 years. Battery voltage = 3.16V.   Atrial Arrhythmia: 0  AF [place] = 0%  Anticoagulant: Eliquis  Ventricular Arrhythmia: 1 monitored VT. EGM shows NSVT lasting 8 beats.    TTE 5/25/22  Left ventricular function is decreased. The ejection fraction is 40-45%  (mildly reduced).  Abnormal septal motion consistent with left bundle branch block is present.  Right ventricular function, chamber size, wall motion, and thickness are  normal.  This study was compared with the study from 11/23/21 .  No significant changes noted.     ASSESSMENT AND PLAN:  Ms. Marroquin is a pleasant 61 year old lady with history of mildly reduced LVEF at EF:45% improved transiently to to 55-60% and on most recent study done 5/2022 it remains around 45% (reviewed personally), Coleen KIRAN CM, PVC ablation that led to complete heart block necessitating permanent pacemaker implantation s/p upgrade to CRT-D who presents today for a follow up visit. Overall, noted to have symptom improvement after CRT upgrade. Currently has NYHA Class II-III symptoms.      #HFrEF now with mildly recovered EF  #NICM  #Paroxysmal atrial fibrillation   NYHA Class II-III. LVEF remains at around 40-45% (5/2022 TTE).  She did have recent pacemaker upgrade as detailed above and since then she feels feels significantly better.  Her exercise tolerance has improved she remains off of Lasix at this time without  any significant lower extremity edema.  In addition her overall NT proBNP trend has improved and now within the normal range. She currently appears euvolemic.   - Metoprolol succinate 12.5mg qday   - Lasix 20mg qday as needed for LE edema or worsening heart failure symptoms, she is not taking this right now however it is available to her and she will let us know should she gain some weight to feel more short of breath and then she will take the dose.  - Aldactone 12.5mg qday   - Eliquis 5mg BID    Follow-up in 6 months with repeat labs   Attending addendum to follow.     Tanika Rodriguez MD   Cardiology fellow     I have seen and evaluated the patient and agree with the assessment and plan as above. Feels significantly better after CRT implant. We discussed the importance of continued medications. No changes today  Alonso Ordonez MD

## 2022-06-07 ENCOUNTER — LAB (OUTPATIENT)
Dept: LAB | Facility: CLINIC | Age: 62
End: 2022-06-07
Payer: COMMERCIAL

## 2022-06-07 ENCOUNTER — OFFICE VISIT (OUTPATIENT)
Dept: CARDIOLOGY | Facility: CLINIC | Age: 62
End: 2022-06-07
Attending: INTERNAL MEDICINE
Payer: COMMERCIAL

## 2022-06-07 VITALS
OXYGEN SATURATION: 95 % | WEIGHT: 170.9 LBS | DIASTOLIC BLOOD PRESSURE: 78 MMHG | SYSTOLIC BLOOD PRESSURE: 112 MMHG | BODY MASS INDEX: 28.44 KG/M2 | HEART RATE: 91 BPM

## 2022-06-07 DIAGNOSIS — I50.30 NYHA CLASS 3 HEART FAILURE WITH PRESERVED EJECTION FRACTION (H): ICD-10-CM

## 2022-06-07 DIAGNOSIS — I42.8 NONISCHEMIC CARDIOMYOPATHY (H): ICD-10-CM

## 2022-06-07 DIAGNOSIS — E78.2 MIXED HYPERLIPIDEMIA: ICD-10-CM

## 2022-06-07 DIAGNOSIS — I44.2 COMPLETE HEART BLOCK (H): ICD-10-CM

## 2022-06-07 DIAGNOSIS — I10 BENIGN ESSENTIAL HYPERTENSION: Primary | ICD-10-CM

## 2022-06-07 LAB
ALBUMIN SERPL-MCNC: 3.5 G/DL (ref 3.4–5)
ALP SERPL-CCNC: 104 U/L (ref 40–150)
ALT SERPL W P-5'-P-CCNC: 22 U/L (ref 0–50)
ANION GAP SERPL CALCULATED.3IONS-SCNC: 8 MMOL/L (ref 3–14)
AST SERPL W P-5'-P-CCNC: 13 U/L (ref 0–45)
BILIRUB SERPL-MCNC: 0.3 MG/DL (ref 0.2–1.3)
BUN SERPL-MCNC: 9 MG/DL (ref 7–30)
CALCIUM SERPL-MCNC: 9.3 MG/DL (ref 8.5–10.1)
CHLORIDE BLD-SCNC: 106 MMOL/L (ref 94–109)
CO2 SERPL-SCNC: 27 MMOL/L (ref 20–32)
CREAT SERPL-MCNC: 0.66 MG/DL (ref 0.52–1.04)
ERYTHROCYTE [DISTWIDTH] IN BLOOD BY AUTOMATED COUNT: 12.5 % (ref 10–15)
GFR SERPL CREATININE-BSD FRML MDRD: >90 ML/MIN/1.73M2
GLUCOSE BLD-MCNC: 90 MG/DL (ref 70–99)
HCT VFR BLD AUTO: 40.3 % (ref 35–47)
HGB BLD-MCNC: 13.1 G/DL (ref 11.7–15.7)
MCH RBC QN AUTO: 31.8 PG (ref 26.5–33)
MCHC RBC AUTO-ENTMCNC: 32.5 G/DL (ref 31.5–36.5)
MCV RBC AUTO: 98 FL (ref 78–100)
NT-PROBNP SERPL-MCNC: 82 PG/ML (ref 0–900)
PLATELET # BLD AUTO: 187 10E3/UL (ref 150–450)
POTASSIUM BLD-SCNC: 4.2 MMOL/L (ref 3.4–5.3)
PROT SERPL-MCNC: 7.3 G/DL (ref 6.8–8.8)
RBC # BLD AUTO: 4.12 10E6/UL (ref 3.8–5.2)
SODIUM SERPL-SCNC: 141 MMOL/L (ref 133–144)
WBC # BLD AUTO: 5.9 10E3/UL (ref 4–11)

## 2022-06-07 PROCEDURE — G0463 HOSPITAL OUTPT CLINIC VISIT: HCPCS

## 2022-06-07 PROCEDURE — 80053 COMPREHEN METABOLIC PANEL: CPT | Performed by: PATHOLOGY

## 2022-06-07 PROCEDURE — 83880 ASSAY OF NATRIURETIC PEPTIDE: CPT | Performed by: PATHOLOGY

## 2022-06-07 PROCEDURE — 36415 COLL VENOUS BLD VENIPUNCTURE: CPT | Performed by: PATHOLOGY

## 2022-06-07 PROCEDURE — 99214 OFFICE O/P EST MOD 30 MIN: CPT | Mod: GC | Performed by: INTERNAL MEDICINE

## 2022-06-07 PROCEDURE — 85027 COMPLETE CBC AUTOMATED: CPT | Performed by: PATHOLOGY

## 2022-06-07 ASSESSMENT — PAIN SCALES - GENERAL: PAINLEVEL: MILD PAIN (3)

## 2022-06-07 NOTE — PATIENT INSTRUCTIONS
Dr. Ordonez recommends:    Follow up clinic with Dr. Ordonez in 6 months with labs same day.    Thank you for your visit today.  Please call me with any questions or concerns.   Matthias García RN  Cardiology Care Coordinator  [phone]

## 2022-06-07 NOTE — NURSING NOTE
Chief Complaint   Patient presents with     Follow Up     3 month follow up lab prior   Vitals were taken and medications reconciled.    Daniel Perez, EMT  9:22 AM

## 2022-06-07 NOTE — LETTER
6/7/2022      RE: Joyce Marroquin  [address] [facility] [id number]       Dear Colleague,    Thank you for the opportunity to participate in the care of your patient, Joyce Marroquin, at the [facility] [place] [facility]. Please see a copy of my visit note below.    June 7, 2022     Ms. Marroquin is a very pleasant 62 y/o lady who worked as a nurse anesthetist and has a history of RF ablation for PVCs which led to AV node ablation and complete heart block requiring a dual-chamber pacemaker implantation, which is a Medtronic device MRI compatible.  She also has a recent history of heart failure with reduced ejection fraction with EF around 35% later improved to 45%.  There was a history of Takotsubo cardiomyopathy.  Patient on 7/2/21 had meeting with EP at which time low dose carvedilol was started 3.125 mg PO bid due to atrial ectopy and short runs of VT. she did recently complete an exercise out of catheterization with myself which showed normal resting biventricular filling pressures and normal cardiac output.  She did have limited augmentation of cardiac index with exercise and filling pressures especially pulmonary capillary wedge pressure increased significantly consistent with heart failure preserved ejection fraction.  She did initially better but for the past couple of months she has not been feeling well.  She did gain some weight and increased the Lasix to 40 mg as needed.  She does not feel that these helped for her.  In fact at times she feels that she had some confusion or disorientation.  Possibly she is dry at times.  She did see in follow-up with core clinic he had some of the instructions were confusing and she appropriately notes that the discharge papers stated a lot of extra information.  I did see her and performed a CPX that was marginal as well as a cardiac MRI that showed a significantly improved LV function to 50% and no  other significant abnormalities. She did meet with Dr. Funes and sildenafil was initiated yet rapidly discontinued due to muscle aches. Dr. Funes also adjusted the rate response to allow for faster heart rates in an attempt to improve forward flow. In addition, she was felt to be dry and lasix has been reduced to 20mg 2 times per week. She noted that she at times she forgets to take the PM dose of the revatio. After lengthy discussions, she did have CRT-D upgrade in hopes that resynchronization will help her symptomatology. This was performed in January 2022 and was uneventful.    Today she is here for follow-up. She notes today that she feels better. She is able to walk 2 blocks without any significant issues. She continues to require assistance with some of her chores but she does most things independently. She continues to take lasix as needed. She has intermittent dizziness with rapid position changes but these spontaneously resolve.     PAST MEDICAL HISTORY:  Past Medical History:   Diagnosis Date     Arthritis      Cardiomyopathy (H)     ff Cardiology     Chronic depressive personality disorder      Congestive heart failure (H) see dr martínez    heart failure correct term     COPD exacerbation (H)      Esophageal reflux      Ex-smoker     quit 2006; 1 PPD x 30     Hyperparathyroidism (H)     s/p parathyroidectomy     Hypothyroidism      LARRY on CPAP     ff Sleep medicine     Pulmonary nodules     ff Pulmonologist     S/P cardiac pacemaker procedure     checked every 6 months at the [facility]     Uncomplicated asthma years     FAMILY HISTORY:  Family History   Problem Relation Age of Onset     Hypothyroidism Mother      Hypertension Mother      Osteoarthritis Mother      Coronary Artery Disease Father         nonfatal MI in his 70s     Asthma Father      Diabetes Sister      Hypothyroidism Sister      Breast Cancer Maternal Aunt      Anxiety Disorder Sister      SOCIAL HISTORY:  Social History     Socioeconomic  History     Marital status:      Spouse name: Not on file     Number of children: Not on file     Years of education: Not on file     Highest education level: Master's degree (e.g., MA, MS, Florian, MEd, MSW, SONIA)   Occupational History     Not on file   Tobacco Use     Smoking status: Former Smoker     Packs/day: 0.00     Years: 0.00     Pack years: 0.00     Smokeless tobacco: Never Used   Vaping Use     Vaping Use: Never used   Substance and Sexual Activity     Alcohol use: Yes     Alcohol/week: 0.0 - 8.0 standard drinks     Comment: seldom     Drug use: No     Sexual activity: Yes     Partners: Male     Birth control/protection: None     Comment: vasectomy   Other Topics Concern     Parent/sibling w/ CABG, MI or angioplasty before 65F 55M? No   Social History Narrative    CRNA on disability for vestibular symptoms      Social Determinants of Health     Financial Resource Strain: Low Risk      Difficulty of Paying Living Expenses: Not very hard   Food Insecurity: No Food Insecurity     Worried About Running Out of Food in the Last Year: Never true     Ran Out of Food in the Last Year: Never true   Transportation Needs: No Transportation Needs     Lack of Transportation (Medical): No     Lack of Transportation (Non-Medical): No   Physical Activity: Not on file   Stress: Not on file   Social Connections: Not on file   Intimate Partner Violence: Not At Risk     Fear of Current or Ex-Partner: No     Emotionally Abused: No     Physically Abused: No     Sexually Abused: No   Housing Stability: Not on file     CURRENT MEDICATIONS:  Current Outpatient Medications   Medication     acetaminophen (TYLENOL) 500 MG tablet     albuterol (PROAIR HFA/PROVENTIL HFA/VENTOLIN HFA) 108 (90 BASE) MCG/ACT Inhaler     azelastine (ASTELIN) 0.1 % nasal spray     Calcium Carbonate-Vitamin D (CALCIUM-CARB 600 + D PO)     clonazePAM (KLONOPIN) 0.5 MG tablet     DiphenhydrAMINE HCl (BENADRYL PO)     ELIQUIS ANTICOAGULANT 5 MG tablet      Ferrous Sulfate (IRON SUPPLEMENT PO)     fluticasone-salmeterol (ADVAIR) 250-50 MCG/DOSE inhaler     ipratropium (ATROVENT) 0.06 % nasal spray     MAGNESIUM LACTATE PO     MELATONIN PO     Menaquinone-7 (VITAMIN K2 PO)     metoprolol succinate ER (TOPROL XL) 25 MG 24 hr tablet     Multiple Vitamin (DAILY MULTIVITAMIN PO)     nebulizer nebulization     nitroGLYcerin (NITROSTAT) 0.4 MG sublingual tablet     Probiotic Product (PROBIOTIC DAILY PO)     spironolactone (ALDACTONE) 25 MG tablet     SYNTHROID 150 MCG tablet     TURMERIC PO     vitamin D3 (CHOLECALCIFEROL) 2000 units tablet     XYLITOL MT     Zinc 15 MG CAPS     No current facility-administered medications for this visit.     ROS:   Constitutional: No fever, chills, or sweats.   ENT: No visual disturbance, ear ache, epistaxis, sore throat.   Allergies/Immunologic: Negative.   Respiratory: No cough, hemoptysis.   Cardiovascular: As per HPI.   GI: No nausea, vomiting, hematemesis, melena, or hematochezia.   : No urinary frequency, dysuria, or hematuria.   Integument: Negative.   Psychiatric: Pleasant, no major depression noted  Neuro: No focal neurological deficits noted  Endocrinology: Negative.   Musculoskeletal: As per HPI.      EXAM:  /78 (BP Location: Right arm, Patient Position: Sitting, Cuff Size: Adult Regular)   Pulse 91   Wt 77.5 kg (170 lb 14.4 oz)   LMP 08/21/2007   SpO2 95%   BMI 28.44 kg/m    General: appears comfortable, alert and oriented  Head: normocephalic, atraumatic  Eyes: anicteric sclera, EOMI , PERRL  Neck: no adenopathy  Orophyarynx: moist mucosa, no lesions noted  Heart: regular, S1/S2, no murmurs, rubs or gallop. Estimated JVP at 5 cmH2O  Lungs: CTAB, No wheezing.   Abdomen: soft, non-tender, bowel sounds present, no hepatosplenomegaly  Extremities: No LE edema today  Skin: no open lesions noted  Neuro: grossly non-focal     Labs:  Lab Results   Component Value Date    WBC 5.9 06/07/2022    HGB 13.1 06/07/2022    HCT 40.3  06/07/2022     06/07/2022     06/07/2022    POTASSIUM 4.2 06/07/2022    CHLORIDE 106 06/07/2022    CO2 27 06/07/2022    BUN 9 06/07/2022    CR 0.66 06/07/2022    GLC 90 06/07/2022    SED 22 11/09/2017    DD 1.65 (H) 04/13/2022    NTBNPI 58 04/13/2022    NTBNP 82 06/07/2022    TROPONIN <0.015 09/28/2021    TROPI <0.015 12/20/2020    AST 13 06/07/2022    ALT 22 06/07/2022    ALKPHOS 104 06/07/2022    BILITOTAL 0.3 06/07/2022    INR 1.02 10/06/2019     NM Lexiscan 3/2020:  The nuclear stress test is negative for inducible myocardial ischemia or infarction.  Fixed septal/apical defect due to Paced rhythm and increased gut photon activity.LVEDv 133ml. LV ESv 40ml. LV EF 70%.     There is no prior study for comparison     Echocardiogram 3/2020:  Mildly (EF 40-45%) reduced left ventricular function is present.  Global right ventricular function is normal.  No pericardial effusion is present. IVC diameter <2.1 cm collapsing >50% with sniff suggests a normal RA pressure  of 3 mmHg. This study was compared with the study from 12/3/19.  There has been no change.      Coronary angiogram 9/2019:  Mild-moderate non-obstructive coronary disease involving LAD, LCx, and RCA. No significant coronary disease to explain new cardiomyopathy.     TTE 9/5/2019:  Moderately (EF 30-35%) reduced left ventricular function is present. All segments from mid to apical left ventricle are severely hypokinetic to akinetic. Basal segments are hypercontractile. Overall pattern is suggestive of stress cardiomyopathy, but cannot rule out ischemic cardiomyopathy. Right ventricular function, chamber size, wall motion, and thickness are normal. No significant valve abnormalities. Mild pulmonary hypertension with increased RA pressure.     TTE 2/19/21:  Moderately (EF 35%) reduced left ventricular function is present.  Global right ventricular function is normal.  No significant valvular dysfunction. The inferior vena cava was normal in size  with preserved respiratory variability. No pericardial effusion present.     MRI cardiac 4/2017:  1. Normal left ventricular size.  Left ventricular wall thickness is  normal except a very small area in the basal inferior septum that appears  thin in some images.  Real-time images suggest a very small area of  hypokinesis in the basal inferior septum (not well visualized).  Left  ventricular ejection fraction is estimated at about 60-65% (unable to   perform volumetric analysis given frequent ectopy; real time images used  to assess LVEF).  2. Normal right ventricular size and systolic function.  No obvious regional wall motion abnormalities noted.  3. Gadolinium imaging:  No obvious right ventricular myocardial late  gadolinium enhancement noted.  There is a very small area of probable late  gadolinium enhancement in the mildly thinned basal inferior segment.    4. Moderate left atrial dilatation.  The right atrial size is at the upper limits of normal.  5. Thickened mitral valve leaflets, without obvious stenosis or significant regurgitation. No obvious hemodynamically significant valvular dysfunction noted.   6. Normal sized aortic root and ascending aorta.  For the remainder of the thoracic aorta, see separate radiology report.  7. Normal pericardium without significant pericardial effusion.      Cardiac MRI 5/2021:  1. The LV is normal in cavity size and wall thickness. The global systolic function is mildly reduced. The LVEF is 50%. There are no regional wall motion abnormalities. There is abnormal septal motion related to pacing.  2. The RV is normal in cavity size. The global systolic function is normal. The RVEF is 65%.   3. The left atrium is mildly dilated.  4. There is no significant valvular disease.   5. Late gadolinium enhancement imaging shows no MI, fibrosis or infiltrative disease.   6. Regadenoson stress perfusion imaging shows no ischemia.  7. There is no pericardial effusion or thickening.  8.  There is no LV thrombus.  CONCLUSIONS: No myocardial ischemia or fibrosis. Mild NICM possibly related to pacing, LVEF 50% and RVEF 65%.      TTE 11/23/2021  Left ventricular function is decreased. The ejection fraction is 40-45% (mildly reduced).Abnormal septal motion consistent with left bundle branch block is present.  Global right ventricular function is normal.  No significant valvular abnormalities present.  Pulmonary artery systolic pressure is normal.  The inferior vena cava was normal in size with preserved respiratory  variability.  No pericardial effusion is present     Device check 5/25/22  Device: Medtronic Percepta CRT  Normal device function  Mode: DDDR  bpm  AP: 46%  : 100%  BVP: 100%  Intrinsic Rhythm: CHB: SR @ 70 bpm/  @ 30 bpm  Thoracic Impedance: Near reference line.   Short V-V intervals: 0  Lead Trends Appear Stable: yes  Estimated battery longevity to RRT = 10.5 years. Battery voltage = 3.16V.   Atrial Arrhythmia: 0  AF [place] = 0%  Anticoagulant: Eliquis  Ventricular Arrhythmia: 1 monitored VT. EGM shows NSVT lasting 8 beats.    TTE 5/25/22  Left ventricular function is decreased. The ejection fraction is 40-45%  (mildly reduced).  Abnormal septal motion consistent with left bundle branch block is present.  Right ventricular function, chamber size, wall motion, and thickness are  normal.  This study was compared with the study from 11/23/21 .  No significant changes noted.     ASSESSMENT AND PLAN:  Ms. Marroquin is a pleasant 61 year old lady with history of mildly reduced LVEF at EF:45% improved transiently to to 55-60% and on most recent study done 5/2022 it remains around 45% (reviewed personally), NICM, Coleen CM, PVC ablation that led to complete heart block necessitating permanent pacemaker implantation s/p upgrade to CRT-D who presents today for a follow up visit. Overall, noted to have symptom improvement after CRT upgrade. Currently has NYHA Class II-III symptoms.       #HFrEF now with mildly recovered EF  #NICM  #Paroxysmal atrial fibrillation   NYHA Class II-III. LVEF remains at around 40-45% (5/2022 TTE).  She did have recent pacemaker upgrade as detailed above and since then she feels feels significantly better.  Her exercise tolerance has improved she remains off of Lasix at this time without any significant lower extremity edema.  In addition her overall NT proBNP trend has improved and now within the normal range. She currently appears euvolemic.   - Metoprolol succinate 12.5mg qday   - Lasix 20mg qday as needed for LE edema or worsening heart failure symptoms, she is not taking this right now however it is available to her and she will let us know should she gain some weight to feel more short of breath and then she will take the dose.  - Aldactone 12.5mg qday   - Eliquis 5mg BID    Follow-up in 6 months with repeat labs   Attending addendum to follow.     Tanika Rodriguez MD   Cardiology fellow     I have seen and evaluated the patient and agree with the assessment and plan as above. Feels significantly better after CRT implant. We discussed the importance of continued medications. No changes today  Alonso Ordonez MD

## 2022-06-28 ENCOUNTER — NURSE TRIAGE (OUTPATIENT)
Dept: NURSING | Facility: CLINIC | Age: 62
End: 2022-06-28

## 2022-06-28 ENCOUNTER — TELEPHONE (OUTPATIENT)
Dept: FAMILY MEDICINE | Facility: CLINIC | Age: 62
End: 2022-06-28

## 2022-06-28 NOTE — TELEPHONE ENCOUNTER
Reason for Call:  Other appointment    Detailed comments: patient called and would like to schedule a f/u appointment with only Ce Crowe CNP at [facility].  F/U on CHF; fatigue; headaches; and new symptoms of abdominal pain.  Patient has been transferred to [facility].  Please contact patient today.  Thank you.    Phone Number Patient can be reached at: [phone]    Best Time: any    Can we leave a detailed message on this number? YES    Call taken on 6/28/2022 at 2:25 PM by Ruby Dixon

## 2022-06-28 NOTE — TELEPHONE ENCOUNTER
"I called and spoke to patient, she says the triage nurse is \"making a bigger deal out of this than she had to\"; patient states she does not need to go to , will do so if rapidly worsening.   She does not want to see another provider.    Scheduled for Friday in a same day slot with Ce as \"same day\" was advised in previous note.    Patient states Ce knows her well and will understand her issues much better than another provider or urgent care.    Ana Greer RN  [facility]      "

## 2022-06-28 NOTE — TELEPHONE ENCOUNTER
Triage call:     CHF   - denies shortness of breath  - states some congestion as well  - pacer dependent    Fatigue- sleeping 10-12 hours a day, doing her exercises like she normally does   Also having headaches as well    abdominal pain   Sleeping with a heating pad  Comes and goes  Notes more at night  Rating pain 5-6/10 when she has this pain  Heating pad helps  States she has been having diarrhea but sees acuptuturis     States she has colitis but her discomfort is different  Also reports she has gained 5 lbs as well- 4 lbs now, BP is low and unable to take her lasix- patient states she has not called cards yet.     Advised to be seen in the office today - reviewed additional care advice with patient and she verbalizes understanding. Assisted in connecting with scheduling. Advised that if patient is unable to be seen then to be seen in the UC- patient prefers to be seen in the clinic - no appointments- message to care team to see if patient can be seen in clinic.     Halima Barillas RN BSN 6/28/2022 2:41 PM w        Reason for Disposition    MODERATE weakness (i.e., interferes with work, school, normal activities) and persists > 3 days    MODERATE OR MILD pain that comes and goes (cramps) lasts > 24 hours    Additional Information    Negative: Severe difficulty breathing (e.g., struggling for each breath, speaks in single words)    Negative: Shock suspected (e.g., cold/pale/clammy skin, too weak to stand, low BP, rapid pulse)    Negative: Difficult to awaken or acting confused (e.g., disoriented, slurred speech)    Negative: Fainted > 15 minutes ago and still feels too weak or dizzy to stand    Negative: SEVERE weakness (i.e., unable to walk or barely able to walk, requires support) and new onset or worsening    Negative: Sounds like a life-threatening emergency to the triager    Negative: Weakness of the face, arm or leg on one side of the body    Negative: Has diabetes and weakness from low blood sugar (i.e.,  < 60 mg/dL or 3.5 mmol/L)    Negative: Recent heat exposure, suspected cause of weakness    Negative: Vomiting is the main symptom    Negative: Diarrhea is the main symptom    Negative: Difficulty breathing    Negative: Heart beating < 50 beats per minute OR > 140 beats per minute    Negative: Extra heartbeats OR irregular heart beating (i.e., 'palpitations')    Negative: Follows bleeding (e.g., from vomiting, rectum, vagina) (Exception: small transient weakness from sight of a small amount blood)    Negative: Bloody, black, or tarry bowel movements (Exception: chronic-unchanged  black-grey bowel movements and is taking iron pills or Pepto-bismol)    Negative: MODERATE weakness from poor fluid intake with no improvement after 2 hours of rest and fluids    Negative: Drinking very little and dehydration suspected (e.g., no urine > 12 hours, very dry mouth, very lightheaded)    Negative: Patient sounds very sick or weak to the triager    Negative: MODERATE weakness (i.e., interferes with work, school, normal activities) and cause unknown (Exceptions: weakness with acute minor illness, or weakness from poor fluid intake)    Negative: Fever > 103 F (39.4 C) and not able to get the Fever down using CARE ADVICE    Negative: Fever > 100.0 F (37.8 C) and bedridden (e.g., nursing home patient, stroke, chronic illness, recovering from surgery)    Negative: Fever > 101 F (38.3 C) and over 60 years of age    Negative: Fever > 100.0 F (37.8 C) and diabetes mellitus or weak immune system (e.g., HIV positive, cancer chemo, splenectomy, organ transplant, chronic steroids)    Negative: Pale skin (pallor)    Negative: Passed out (i.e., fainted, collapsed and was not responding)    Negative: Shock suspected (e.g., cold/pale/clammy skin, too weak to stand, low BP, rapid pulse)    Negative: Sounds like a life-threatening emergency to the triager    Negative: Chest pain    Negative: Pain is mainly in upper abdomen (if needed ask: 'is it  mainly above the belly button?')    Negative: Abdominal pain and pregnant > 20 weeks    Negative: Abdominal pain and pregnant < 20 weeks    Negative: SEVERE abdominal pain (e.g., excruciating)    Negative: Vomiting red blood or black (coffee ground) material    Negative: Bloody, black, or tarry bowel movements (Exception: chronic-unchanged black-grey bowel movements and is taking iron pills or Pepto-bismol)    Negative: Constant abdominal pain lasting > 2 hours    Negative: Vomiting bile (green color)    Negative: Patient sounds very sick or weak to the triager    Negative: Vomiting and abdomen looks much more swollen than usual    Negative: White of the eyes have turned yellow (i.e., jaundice)    Negative: Blood in urine (red, pink, or tea-colored)    Negative: Fever > 103 F (39.4 C)    Negative: Fever > 101 F (38.3 C) and over 60 years of age    Negative: Fever > 100.0 F (37.8 C) and has diabetes mellitus or a weak immune system (e.g., HIV positive, cancer chemotherapy, organ transplant, splenectomy, chronic steroids)    Negative: Fever > 100.0 F (37.8 C) and bedridden (e.g., nursing home patient, stroke, chronic illness, recovering from surgery)    Negative: Pregnant or could be pregnant (i.e., missed last menstrual period)    Protocols used: WEAKNESS (GENERALIZED) AND FATIGUE-A-OH, ABDOMINAL PAIN - FEMALE-A-OH

## 2022-06-28 NOTE — TELEPHONE ENCOUNTER
I see triage encounter has been routed to Ce Crowe, patient was advised UC if no appointments but patient prefers to see PCP.    Routed to Ce to advise if she can see patient (or okay to schedule with same day provider) or if needs to be seen in Urgent Care.    Ana Greer RN  [facility]

## 2022-06-29 NOTE — TELEPHONE ENCOUNTER
Patient is scheduled with Ce on Friday.      Next 5 appointments (look out 90 days)      Jul 01, 2022  1:30 PM  (Arrive by 1:10 PM)  Provider Visit with Ce Maldonado NP  [facility] [place] ([facility] ) [address]  [place] [address]  [phone]   Aug 23, 2022  8:00 AM  (Arrive by 7:40 AM)  Provider Visit with Ce Maldonado NP  [facility] [place] ([facility] [place] ) [address]  [place] [address]  [phone]         Alpa Chávez RN BSN  [facility]

## 2022-06-30 ENCOUNTER — NURSE TRIAGE (OUTPATIENT)
Dept: NURSING | Facility: CLINIC | Age: 62
End: 2022-06-30

## 2022-06-30 ENCOUNTER — VIRTUAL VISIT (OUTPATIENT)
Dept: FAMILY MEDICINE | Facility: CLINIC | Age: 62
End: 2022-06-30
Payer: COMMERCIAL

## 2022-06-30 DIAGNOSIS — R68.89 SUSPECTED LYME DISEASE: Primary | ICD-10-CM

## 2022-06-30 PROCEDURE — 99213 OFFICE O/P EST LOW 20 MIN: CPT | Mod: 95 | Performed by: FAMILY MEDICINE

## 2022-06-30 RX ORDER — CEFUROXIME AXETIL 500 MG/1
500 TABLET ORAL 2 TIMES DAILY
Qty: 20 TABLET | Refills: 0 | Status: SHIPPED | OUTPATIENT
Start: 2022-06-30 | End: 2022-07-01

## 2022-06-30 ASSESSMENT — PATIENT HEALTH QUESTIONNAIRE - PHQ9
10. IF YOU CHECKED OFF ANY PROBLEMS, HOW DIFFICULT HAVE THESE PROBLEMS MADE IT FOR YOU TO DO YOUR WORK, TAKE CARE OF THINGS AT HOME, OR GET ALONG WITH OTHER PEOPLE: VERY DIFFICULT
10. IF YOU CHECKED OFF ANY PROBLEMS, HOW DIFFICULT HAVE THESE PROBLEMS MADE IT FOR YOU TO DO YOUR WORK, TAKE CARE OF THINGS AT HOME, OR GET ALONG WITH OTHER PEOPLE: VERY DIFFICULT
SUM OF ALL RESPONSES TO PHQ QUESTIONS 1-9: 16

## 2022-06-30 NOTE — PATIENT INSTRUCTIONS
We will start presumptively on antibiotics- when you are seen tomorrow your provider can determine if you should continue or not.

## 2022-06-30 NOTE — PROGRESS NOTES
"Joyce is a 61 year old who is being evaluated via a billable video visit.      How would you like to obtain your AVS? MyChart  If the video visit is dropped, the invitation should be resent by: Text to cell phone: [phone]   Will anyone else be joining your video visit? No          Assessment & Plan       Suspected Lyme disease  - will start presumptively on Abx. Unable to properly visualize the rash this morning. Patient is scheduled to see her PCP tomorrow and further testing can be done if warranted.   - cefuroxime (CEFTIN) 500 MG tablet; Take 1 tablet (500 mg) by mouth 2 times daily for 10 days          Return in about 1 day (around 7/1/2022) for medication recheck, in person, with your primary care physician.    Temi Gillis MD  [facility]    Subjective   Joyce is a 61 year old, presenting for the following health issues:  Derm Problem      History of Present Illness       Headaches:   Since the patient's last clinic visit, headaches are: worsened  The patient is getting headaches:  Not a migraine  She is able to do normal daily activities when she has a migraine.  The patient is taking the following rescue/relief medications:  No rescue/relief medications   Patient states \"I get some relief\" from the rescue/relief medications.   The patient is taking the following medications to prevent migraines:  No medications to prevent migraines  In the past 4 weeks, the patient has gone to an Urgent Care or Emergency Room 0 times times due to headaches.    Reason for visit:  Bulls eye rash r/o lymes  Symptom onset:  1-3 days ago  Symptoms include:  Rash ha eye discomfort  Symptom intensity:  Moderate  Symptom progression:  Staying the same  Had these symptoms before:  No    She eats 2-3 servings of fruits and vegetables daily.She consumes 0 sweetened beverage(s) daily.   She is taking medications regularly.    Today's PHQ-9         PHQ-9 Total Score: 16    PHQ-9 Q9 Thoughts of " "better off dead/self-harm past 2 weeks :   Not at all    How difficult have these problems made it for you to do your work, take care of things at home, or get along with other people: Very difficult     Per patient she noticed a bulls eye rash on her left forearm. States     Review of Systems   Constitutional, HEENT, cardiovascular, pulmonary, GI, , musculoskeletal, neuro, skin, endocrine and psych systems are negative, except as otherwise noted.      Objective    Vitals - Patient Reported  Systolic (Patient Reported): 93  Diastolic (Patient Reported): 70  Weight (Patient Reported): 78 kg (172 lb)  Height (Patient Reported): 165.1 cm (5' 5\")  BMI (Based on Pt Reported Ht/Wt): 28.62  SpO2 (Patient Reported): 97  Temperature (Patient Reported): 98.2  F (36.8  C)  Pulse (Patient Reported): 80      Vitals:  No vitals were obtained today due to virtual visit.    Physical Exam   GENERAL: Healthy, alert and no distress  EYES: Eyes grossly normal to inspection.  No discharge or erythema, or obvious scleral/conjunctival abnormalities.  RESP: No audible wheeze, cough, or visible cyanosis.  No visible retractions or increased work of breathing.    SKIN: erythematous macule on left forearm   PSYCH: Mentation appears normal, affect normal/bright, judgement and insight intact, normal speech and appearance well-groomed.            Video-Visit Details    Video Start Time: 7:28 AM    Type of service:  Video Visit    Video End Time:7:37 AM    Originating Location (pt. Location): Home    Distant Location (provider location):  [facility] setObject     Platform used for Video Visit: Maco    .  ..  "

## 2022-06-30 NOTE — TELEPHONE ENCOUNTER
"Triage Call    Pt calling with report of a new \"bulls- eye\" circular rash.    Triaged to disposition of See Physician within 24 hours, and Care Advice given per Rash Guideline.    Caller transferred to  for assistance with arranging an office or virtual appointment.    Ambar Quintanilla RN      Reason for Disposition    Ring-like appearance of rash (or ask: does it look like a  \"target\" or \"bulls- eye\")    Additional Information    Negative: [1] Life-threatening reaction (anaphylaxis) in the past to similar substance (e.g., food, insect bite/sting, chemical, etc.) AND [2] < 2 hours since exposure    Negative: [1] Sudden onset of rash (within last 2 hours) AND [2] difficulty with breathing or swallowing    Negative: Shock suspected (e.g., cold/pale/clammy skin, too weak to stand, low BP, rapid pulse)    Negative: Difficult to awaken or acting confused (e.g., disoriented, slurred speech)    Negative: [1] Purple or blood-colored spots or dots AND [2] fever    Negative: Sounds like a life-threatening emergency to the triager    Negative: Insect bites suspected    Negative: Swimmer's Itch suspected    Negative: Sunburn suspected    Negative: Hives suspected    Negative: Measles suspected AND [2] known exposure to measles in past 3 weeks    Negative: [1] Chickenpox suspected AND [2] known exposure to chickenpox in past 3 weeks    Negative: [1] Drug rash suspected AND [2] started taking new medicine within last 2 weeks(Exception: antihistamine, eye drops, ear drops, decongestant or other OTC cough/cold medicines)    Negative: [1] Widespread rash AND [2] bright red, sunburn-like AND [3] current tampon use or nasal packing    Negative: [1] Widespread rash AND [2] bright red, sunburn-like AND [3] wound infection or recent surgery    Negative: [1] Bright red skin AND [2] peels off in sheets    Negative: Stiff neck (unable to touch chin to chest)    Negative: Fever    Negative: Joint pain or swelling    Negative: " Rash looks like large or small blisters (i.e., fluid filled bubbles or sacs on the skin)    Negative: Patient sounds very sick or weak to the triager    Negative: Sore throat    Negative: SEVERE itching (i.e., interferes with sleep, normal activities or school)    Negative: [1] Purple or blood-colored rash (spots or dots) AND [2] no fever AND [3] sounds well to triager    Negative: Sores in mouth    Negative: Face becomes swollen    Negative: [1] Headache AND [2] no fever    Protocols used: RASH OR REDNESS - WIDESPREAD-A-AH

## 2022-07-01 ENCOUNTER — OFFICE VISIT (OUTPATIENT)
Dept: FAMILY MEDICINE | Facility: CLINIC | Age: 62
End: 2022-07-01
Payer: COMMERCIAL

## 2022-07-01 VITALS
RESPIRATION RATE: 16 BRPM | HEART RATE: 108 BPM | WEIGHT: 176.4 LBS | OXYGEN SATURATION: 97 % | SYSTOLIC BLOOD PRESSURE: 102 MMHG | DIASTOLIC BLOOD PRESSURE: 70 MMHG | BODY MASS INDEX: 29.35 KG/M2 | TEMPERATURE: 97.1 F

## 2022-07-01 DIAGNOSIS — K76.89 LIVER CYST: ICD-10-CM

## 2022-07-01 DIAGNOSIS — R68.89 SUSPECTED LYME DISEASE: ICD-10-CM

## 2022-07-01 DIAGNOSIS — E61.1 IRON DEFICIENCY: ICD-10-CM

## 2022-07-01 DIAGNOSIS — E89.0 POSTABLATIVE HYPOTHYROIDISM: ICD-10-CM

## 2022-07-01 DIAGNOSIS — Z91.81 PERSONAL HISTORY OF FALL: ICD-10-CM

## 2022-07-01 DIAGNOSIS — I42.9 CARDIOMYOPATHY, UNSPECIFIED TYPE (H): ICD-10-CM

## 2022-07-01 DIAGNOSIS — E21.3 HYPERPARATHYROIDISM (H): ICD-10-CM

## 2022-07-01 DIAGNOSIS — H35.89 BULL'S EYE MACULOPATHY: Primary | ICD-10-CM

## 2022-07-01 DIAGNOSIS — S06.0X0S CONCUSSION WITHOUT LOSS OF CONSCIOUSNESS, SEQUELA (H): ICD-10-CM

## 2022-07-01 DIAGNOSIS — E27.8 ADRENAL MASS (H): ICD-10-CM

## 2022-07-01 DIAGNOSIS — K52.9 COLITIS: ICD-10-CM

## 2022-07-01 LAB
FERRITIN SERPL-MCNC: 263 NG/ML (ref 8–252)
IRON SATN MFR SERPL: 37 % (ref 15–46)
IRON SERPL-MCNC: 93 UG/DL (ref 35–180)
PTH-INTACT SERPL-MCNC: 38 PG/ML (ref 15–65)
TIBC SERPL-MCNC: 249 UG/DL (ref 240–430)
TRANSFERRIN SERPL-MCNC: 203 MG/DL (ref 200–360)
TSH SERPL DL<=0.005 MIU/L-ACNC: 0.7 MU/L (ref 0.4–4)

## 2022-07-01 PROCEDURE — 83970 ASSAY OF PARATHORMONE: CPT | Performed by: NURSE PRACTITIONER

## 2022-07-01 PROCEDURE — 86618 LYME DISEASE ANTIBODY: CPT | Performed by: NURSE PRACTITIONER

## 2022-07-01 PROCEDURE — 82728 ASSAY OF FERRITIN: CPT | Performed by: NURSE PRACTITIONER

## 2022-07-01 PROCEDURE — 86800 THYROGLOBULIN ANTIBODY: CPT | Performed by: NURSE PRACTITIONER

## 2022-07-01 PROCEDURE — 36415 COLL VENOUS BLD VENIPUNCTURE: CPT | Performed by: NURSE PRACTITIONER

## 2022-07-01 PROCEDURE — 86376 MICROSOMAL ANTIBODY EACH: CPT | Performed by: NURSE PRACTITIONER

## 2022-07-01 PROCEDURE — 99214 OFFICE O/P EST MOD 30 MIN: CPT | Performed by: NURSE PRACTITIONER

## 2022-07-01 PROCEDURE — 86800 THYROGLOBULIN ANTIBODY: CPT | Mod: 59 | Performed by: NURSE PRACTITIONER

## 2022-07-01 PROCEDURE — 84432 ASSAY OF THYROGLOBULIN: CPT | Mod: 59 | Performed by: NURSE PRACTITIONER

## 2022-07-01 PROCEDURE — 84466 ASSAY OF TRANSFERRIN: CPT | Performed by: NURSE PRACTITIONER

## 2022-07-01 PROCEDURE — 84443 ASSAY THYROID STIM HORMONE: CPT | Performed by: NURSE PRACTITIONER

## 2022-07-01 PROCEDURE — 84432 ASSAY OF THYROGLOBULIN: CPT | Performed by: NURSE PRACTITIONER

## 2022-07-01 RX ORDER — CEFUROXIME AXETIL 500 MG/1
500 TABLET ORAL 2 TIMES DAILY
Qty: 8 TABLET | Refills: 0 | Status: SHIPPED | OUTPATIENT
Start: 2022-07-01 | End: 2022-08-23

## 2022-07-01 ASSESSMENT — ENCOUNTER SYMPTOMS
HEADACHES: 1
FATIGUE: 1

## 2022-07-01 ASSESSMENT — PATIENT HEALTH QUESTIONNAIRE - PHQ9
10. IF YOU CHECKED OFF ANY PROBLEMS, HOW DIFFICULT HAVE THESE PROBLEMS MADE IT FOR YOU TO DO YOUR WORK, TAKE CARE OF THINGS AT HOME, OR GET ALONG WITH OTHER PEOPLE: VERY DIFFICULT
SUM OF ALL RESPONSES TO PHQ QUESTIONS 1-9: 16

## 2022-07-01 NOTE — PROGRESS NOTES
Assessment & Plan     Bull's eye maculopathy    - Lyme Disease Total Abs Bld with Reflex to Confirm CLIA; Future  - Lyme Disease Total Abs Bld with Reflex to Confirm CLIA    Postablative hypothyroidism    - TSH with free T4 reflex; Future  - Thyroid peroxidase antibody; Future  - Anti thyroglobulin antibody; Future  - Thyroglobulin and Antibody Reflex; Future  - Thyroglobulin and Antibody Reflex  - Anti thyroglobulin antibody  - Thyroid peroxidase antibody  - TSH with free T4 reflex    Cardiomyopathy, unspecified type (H)    - Lyme Disease Total Abs Bld with Reflex to Confirm CLIA; Future  - Lyme Disease Total Abs Bld with Reflex to Confirm CLIA    Hyperparathyroidism (H)    - Parathyroid Hormone Intact; Future  - Parathyroid Hormone Intact    Iron deficiency    - Iron and iron binding capacity; Future  - Transferrin; Future  - Ferritin; Future  - Ferritin  - Transferrin  - Iron and iron binding capacity    Suspected Lyme disease    - cefuroxime (CEFTIN) 500 MG tablet; Take 1 tablet (500 mg) by mouth 2 times daily    Concussion without loss of consciousness, sequela (H)      Personal history of fall      Colitis    30 minutes spent on the date of the encounter doing chart review, history and exam, documentation and further activities per the note     See Patient Instructions: follow up as needed at any time. We will let you know lab results.     Return in about 3 months (around 10/1/2022), or if symptoms worsen or fail to improve.    CHARLINE DIXON, MARCELL  [facility] ROLAND Cook is a 61 year old, presenting for the following health issues:  Headache (Rt eye pain ), Fatigue, and Heart Problem (Check heart failure, possible fatigue from this?)    She reports headache has returned, she thinks it is from when she fell and hit her head back in January.  Using Tylenol/ acupuncture for this.  Wondering how long normal concussion/ post concussion symptoms can last.  Was having  "incontinent BMs r/t her ongoing colitis. It has since improved after seeing her acupuncturist and using triple probiotics. Gets colonoscopies [id number] d/t hx colonic polyps. She is feeling fatigued. Wondering if she should have tyroid labs, iron panel, lymes test- reports bullseye rash- was given ceftin x 10 d BID due to her allergies; UTD recommends 14 days; will extend course of ABX. She does report balance issues.  Additionally, she reports adrenal mass seen on xray initially in 2007; seen again on recent imaging and liver cyst.  She is not elizabeth if this was discovered in ER or specialty but wanted to mention it incase we should be monitoring.     Headache     Fatigue  Associated symptoms include fatigue and headaches.   History of Present Illness       Headaches:   Since the patient's last clinic visit, headaches are: worsened  The patient is getting headaches:  Not a migraine  She is able to do normal daily activities when she has a migraine.  The patient is taking the following rescue/relief medications:  No rescue/relief medications   Patient states \"I get some relief\" from the rescue/relief medications.   The patient is taking the following medications to prevent migraines:  No medications to prevent migraines  In the past 4 weeks, the patient has gone to an Urgent Care or Emergency Room 0 times times due to headaches.    Reason for visit:  Bulls eye rash r/o lymes  Symptom onset:  1-3 days ago  Symptoms include:  Rash ha eye discomfort  Symptom intensity:  Moderate  Symptom progression:  Staying the same  Had these symptoms before:  No    She eats 2-3 servings of fruits and vegetables daily.She consumes 0 sweetened beverage(s) daily.   She is taking medications regularly.    Today's PHQ-9         PHQ-9 Total Score: 16    PHQ-9 Q9 Thoughts of better off dead/self-harm past 2 weeks :   Not at all    How difficult have these problems made it for you to do your work, take care of things at home, or get along with " other people: Very difficult      Review of Systems   Constitutional: Positive for fatigue.   Neurological: Positive for headaches.          Objective    /70   Pulse 108   Temp 97.1  F (36.2  C) (Oral)   Resp 16   Wt 80 kg (176 lb 6.4 oz)   LMP 08/21/2007   SpO2 97%   BMI 29.35 kg/m    Body mass index is 29.35 kg/m .  Physical Exam   GENERAL: Healthy, alert and no distress  EYES: Eyes grossly normal to inspection.  No discharge or erythema, or obvious scleral/conjunctival abnormalities.  RESP: No audible wheeze, cough, or visible cyanosis.  No visible retractions or increased work of breathing.    SKIN: Visible skin clear. No significant rash, abnormal pigmentation or lesions.  NEURO: Cranial nerves grossly intact.  Mentation and speech appropriate for age.  PSYCH: Mentation appears normal, affect normal/bright, judgement and insight intact, normal speech and appearance well-groomed.     See orders

## 2022-07-05 LAB
B BURGDOR IGG+IGM SER QL: 0.7
THYROGLOB AB SERPL IA-ACNC: <20 IU/ML
THYROGLOB AB SERPL IA-ACNC: <20 IU/ML
THYROGLOB SERPL-MCNC: NORMAL NG/DL
THYROPEROXIDASE AB SERPL-ACNC: <10 IU/ML

## 2022-07-05 NOTE — RESULT ENCOUNTER NOTE
Jose Luis Cook,    Thank you for your recent office visit.    Here are your recent results.  Normal blood labs.  I would still complete the course of your antibiotics if you are tolerating them with that bullseye rash you had.  Hope you have a good 4th of July!    Feel free to contact me via Powered or call the clinic at [phone].    Sincerely,    SHAYNA Simon, FNP-BC

## 2022-07-10 ENCOUNTER — MYC MEDICAL ADVICE (OUTPATIENT)
Dept: FAMILY MEDICINE | Facility: CLINIC | Age: 62
End: 2022-07-10

## 2022-07-11 PROBLEM — S06.0X1A CONCUSSION WITH LOSS OF CONSCIOUSNESS OF 30 MINUTES OR LESS: Status: ACTIVE | Noted: 2022-07-01

## 2022-07-14 ENCOUNTER — TRANSFERRED RECORDS (OUTPATIENT)
Dept: FAMILY MEDICINE | Facility: CLINIC | Age: 62
End: 2022-07-14

## 2022-07-19 ENCOUNTER — TELEPHONE (OUTPATIENT)
Dept: FAMILY MEDICINE | Facility: CLINIC | Age: 62
End: 2022-07-19

## 2022-07-19 ENCOUNTER — MYC MEDICAL ADVICE (OUTPATIENT)
Dept: CARDIOLOGY | Facility: CLINIC | Age: 62
End: 2022-07-19

## 2022-07-19 NOTE — TELEPHONE ENCOUNTER
Forms received from:  Jeancarl[employer] Tavares Phone number listed: [phone]   Fax listed: [phone]  Date received: 7/14/22  Form description:  Plan of care  Once forms are completed, please return to [employer] Tavares via fax [phone].  Is patient requesting to be contacted when forms are completed: na  Phone: na  Form placed:  To Ce's basket  Melony Mcintosh

## 2022-07-28 ENCOUNTER — TELEPHONE (OUTPATIENT)
Dept: CARDIOLOGY | Facility: CLINIC | Age: 62
End: 2022-07-28

## 2022-07-28 ENCOUNTER — APPOINTMENT (OUTPATIENT)
Dept: GENERAL RADIOLOGY | Facility: CLINIC | Age: 62
End: 2022-07-28
Attending: INTERNAL MEDICINE
Payer: COMMERCIAL

## 2022-07-28 ENCOUNTER — NURSE TRIAGE (OUTPATIENT)
Dept: CARDIOLOGY | Facility: CLINIC | Age: 62
End: 2022-07-28

## 2022-07-28 ENCOUNTER — ANCILLARY PROCEDURE (OUTPATIENT)
Dept: CARDIOLOGY | Facility: CLINIC | Age: 62
End: 2022-07-28
Attending: INTERNAL MEDICINE
Payer: COMMERCIAL

## 2022-07-28 ENCOUNTER — HOSPITAL ENCOUNTER (EMERGENCY)
Facility: CLINIC | Age: 62
Discharge: HOME | End: 2022-07-28
Attending: INTERNAL MEDICINE | Admitting: INTERNAL MEDICINE
Payer: COMMERCIAL

## 2022-07-28 VITALS
HEART RATE: 71 BPM | RESPIRATION RATE: 14 BRPM | SYSTOLIC BLOOD PRESSURE: 110 MMHG | TEMPERATURE: 98.7 F | OXYGEN SATURATION: 96 % | DIASTOLIC BLOOD PRESSURE: 66 MMHG | BODY MASS INDEX: 28.29 KG/M2 | WEIGHT: 170 LBS

## 2022-07-28 DIAGNOSIS — I44.2 COMPLETE HEART BLOCK (H): ICD-10-CM

## 2022-07-28 DIAGNOSIS — Z95.0 CARDIAC PACEMAKER IN SITU: ICD-10-CM

## 2022-07-28 DIAGNOSIS — I42.8 NONISCHEMIC CARDIOMYOPATHY (H): Primary | ICD-10-CM

## 2022-07-28 DIAGNOSIS — I42.8 NONISCHEMIC CARDIOMYOPATHY (H): ICD-10-CM

## 2022-07-28 DIAGNOSIS — R07.9 CHEST PAIN, UNSPECIFIED TYPE: ICD-10-CM

## 2022-07-28 LAB
ALBUMIN SERPL BCG-MCNC: 4.1 G/DL (ref 3.5–5.2)
ALP SERPL-CCNC: 96 U/L (ref 35–104)
ALT SERPL W P-5'-P-CCNC: 21 U/L (ref 10–35)
ANION GAP SERPL CALCULATED.3IONS-SCNC: 9 MMOL/L (ref 7–15)
AST SERPL W P-5'-P-CCNC: 22 U/L (ref 10–35)
BASOPHILS # BLD AUTO: 0.1 10E3/UL (ref 0–0.2)
BASOPHILS NFR BLD AUTO: 1 %
BILIRUB SERPL-MCNC: 0.2 MG/DL
BUN SERPL-MCNC: 13.7 MG/DL (ref 8–23)
CALCIUM SERPL-MCNC: 9.7 MG/DL (ref 8.8–10.2)
CHLORIDE SERPL-SCNC: 104 MMOL/L (ref 98–107)
CREAT SERPL-MCNC: 0.68 MG/DL (ref 0.51–0.95)
CRP SERPL-MCNC: 9.4 MG/L
DEPRECATED HCO3 PLAS-SCNC: 24 MMOL/L (ref 22–29)
EOSINOPHIL # BLD AUTO: 0.1 10E3/UL (ref 0–0.7)
EOSINOPHIL NFR BLD AUTO: 1 %
ERYTHROCYTE [DISTWIDTH] IN BLOOD BY AUTOMATED COUNT: 12.2 % (ref 10–15)
GFR SERPL CREATININE-BSD FRML MDRD: >90 ML/MIN/1.73M2
GLUCOSE SERPL-MCNC: 100 MG/DL (ref 70–99)
HCO3 BLDV-SCNC: 24 MMOL/L (ref 21–28)
HCT VFR BLD AUTO: 37.9 % (ref 35–47)
HGB BLD-MCNC: 12.4 G/DL (ref 11.7–15.7)
HOLD SPECIMEN: NORMAL
IMM GRANULOCYTES # BLD: 0 10E3/UL
IMM GRANULOCYTES NFR BLD: 0 %
INR PPP: 1.06 (ref 0.85–1.15)
LACTATE BLD-SCNC: 0.6 MMOL/L
LYMPHOCYTES # BLD AUTO: 1.7 10E3/UL (ref 0.8–5.3)
LYMPHOCYTES NFR BLD AUTO: 24 %
MAGNESIUM SERPL-MCNC: 2.3 MG/DL (ref 1.7–2.3)
MCH RBC QN AUTO: 31.6 PG (ref 26.5–33)
MCHC RBC AUTO-ENTMCNC: 32.7 G/DL (ref 31.5–36.5)
MCV RBC AUTO: 96 FL (ref 78–100)
MONOCYTES # BLD AUTO: 0.6 10E3/UL (ref 0–1.3)
MONOCYTES NFR BLD AUTO: 9 %
NEUTROPHILS # BLD AUTO: 4.6 10E3/UL (ref 1.6–8.3)
NEUTROPHILS NFR BLD AUTO: 65 %
NRBC # BLD AUTO: 0 10E3/UL
NRBC BLD AUTO-RTO: 0 /100
NT-PROBNP SERPL-MCNC: 129 PG/ML (ref 0–900)
PCO2 BLDV: 37 MM HG (ref 40–50)
PH BLDV: 7.42 [PH] (ref 7.32–7.43)
PLATELET # BLD AUTO: 193 10E3/UL (ref 150–450)
PO2 BLDV: 70 MM HG (ref 25–47)
POTASSIUM SERPL-SCNC: 4 MMOL/L (ref 3.4–5.3)
PROT SERPL-MCNC: 7 G/DL (ref 6.4–8.3)
RBC # BLD AUTO: 3.93 10E6/UL (ref 3.8–5.2)
SAO2 % BLDV: 94 % (ref 94–100)
SODIUM SERPL-SCNC: 137 MMOL/L (ref 136–145)
TROPONIN T SERPL HS-MCNC: <6 NG/L
WBC # BLD AUTO: 7 10E3/UL (ref 4–11)

## 2022-07-28 PROCEDURE — 80053 COMPREHEN METABOLIC PANEL: CPT | Performed by: INTERNAL MEDICINE

## 2022-07-28 PROCEDURE — 71045 X-RAY EXAM CHEST 1 VIEW: CPT | Mod: 26 | Performed by: STUDENT IN AN ORGANIZED HEALTH CARE EDUCATION/TRAINING PROGRAM

## 2022-07-28 PROCEDURE — 82040 ASSAY OF SERUM ALBUMIN: CPT | Performed by: INTERNAL MEDICINE

## 2022-07-28 PROCEDURE — 83735 ASSAY OF MAGNESIUM: CPT | Performed by: INTERNAL MEDICINE

## 2022-07-28 PROCEDURE — 71045 X-RAY EXAM CHEST 1 VIEW: CPT

## 2022-07-28 PROCEDURE — 83880 ASSAY OF NATRIURETIC PEPTIDE: CPT | Performed by: INTERNAL MEDICINE

## 2022-07-28 PROCEDURE — 93294 REM INTERROG EVL PM/LDLS PM: CPT | Performed by: INTERNAL MEDICINE

## 2022-07-28 PROCEDURE — 84484 ASSAY OF TROPONIN QUANT: CPT | Performed by: INTERNAL MEDICINE

## 2022-07-28 PROCEDURE — 85610 PROTHROMBIN TIME: CPT | Performed by: INTERNAL MEDICINE

## 2022-07-28 PROCEDURE — 99285 EMERGENCY DEPT VISIT HI MDM: CPT | Mod: 25 | Performed by: INTERNAL MEDICINE

## 2022-07-28 PROCEDURE — 36415 COLL VENOUS BLD VENIPUNCTURE: CPT | Performed by: INTERNAL MEDICINE

## 2022-07-28 PROCEDURE — 82803 BLOOD GASES ANY COMBINATION: CPT

## 2022-07-28 PROCEDURE — 93296 REM INTERROG EVL PM/IDS: CPT

## 2022-07-28 PROCEDURE — 93005 ELECTROCARDIOGRAM TRACING: CPT | Mod: 59 | Performed by: INTERNAL MEDICINE

## 2022-07-28 PROCEDURE — 85025 COMPLETE CBC W/AUTO DIFF WBC: CPT | Performed by: INTERNAL MEDICINE

## 2022-07-28 PROCEDURE — 93010 ELECTROCARDIOGRAM REPORT: CPT | Mod: 59 | Performed by: INTERNAL MEDICINE

## 2022-07-28 PROCEDURE — 86140 C-REACTIVE PROTEIN: CPT | Performed by: INTERNAL MEDICINE

## 2022-07-28 ASSESSMENT — ENCOUNTER SYMPTOMS
ABDOMINAL PAIN: 1
PALPITATIONS: 0
WEAKNESS: 0
SPEECH DIFFICULTY: 0
FEVER: 0
VOMITING: 0
NUMBNESS: 0
ADENOPATHY: 0
NECK PAIN: 0
WHEEZING: 0
FATIGUE: 1
SINUS PAIN: 0
CHEST TIGHTNESS: 1
SORE THROAT: 0
BACK PAIN: 0
COUGH: 1
CONFUSION: 0
SHORTNESS OF BREATH: 1
CHILLS: 0
LIGHT-HEADEDNESS: 0
DIFFICULTY URINATING: 0
HEADACHES: 0
NAUSEA: 1

## 2022-07-28 NOTE — ED PROVIDER NOTES
ED Provider Note  [facility]      History     Chief Complaint   Patient presents with     Chest Pain     HPI  Joyce Marroquin is a 61 year old female who presents with 3 days of precordial chest pain and pressure. 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 She does not normally have chest pain. She was awakened from sleep 3 nights ago with left sided chest pain without radiation. She has occasional tight sensation in her throat which she attributes to anxiety. She took NTG with some relief. TUMS did not help. She had another episode of chest tightness 2 nights ago and again this morning. Her blood pressure dropped after 2 NTG. She went for a walk this afternoon without chest pain. She has been feeling quite fatigued for about a month. She took doxycycline for 2 weeks at the end of June for suspected Lyme disease and possible sinusitis. She believes her weight is a bit up. She has been trying to lose weight, but has been less compliant with low sodium diet recently. She does have some swelling in her legs. She had some nasal congestion a couple of weeks ago that has resolved. She has no fever or chills. She has occasional cough. She feels somewhat short of breath. She has chronic abdominal pain associated with collagenous colitis which is unchanged from baseline. She has no dysuria. Respiration and palpation do not affect the chest discomfort. She has been feeling tired and anxious.    Past Medical History:   Diagnosis Date     Arthritis      Cardiomyopathy (H)     ff Cardiology     Chronic depressive personality disorder      Congestive heart failure (H) see dr martínez    heart failure correct term     COPD exacerbation (H)      Esophageal reflux      Ex-smoker     quit 2006; 1 PPD x 30     Hyperparathyroidism (H)     s/p parathyroidectomy     Hypothyroidism      LARRY on CPAP     ff Sleep medicine     Pulmonary nodules     ff Pulmonologist     S/P cardiac  pacemaker procedure     checked every 6 months at the [facility]     Uncomplicated asthma years       Past Surgical History:   Procedure Laterality Date     BUNIONECTOMY Bilateral      COLONOSCOPY N/A 8/30/2021    Procedure: COLONOSCOPY, WITH POLYPECTOMY AND BIOPSY;  Surgeon: Ranjit Penn MD;  Location:  GI     CV CORONARY ANGIOGRAM N/A 9/26/2019    Procedure: CV CORONARY ANGIOGRAM;  Surgeon: Erickson Trejo MD;  Location:  HEART CARDIAC CATH LAB     CV RIGHT HEART CATH MEASUREMENTS RECORDED N/A 7/20/2020    Procedure: CV RIGHT HEART CATH;  Surgeon: Alonso Ordonez MD;  Location:  HEART CARDIAC CATH LAB     EP ABLATION / EP STUDIES  04/21/2017     EP PPM UPGRADE TO BIVENT N/A 1/19/2022    Procedure: EP PPM UPGRADE TO BIVENT;  Surgeon: Lino Funes MD;  Location:  HEART CARDIAC CATH LAB     EXPLORE NECK N/A 9/19/2018    Procedure: EXPLORE NECK;  Neck Exploration Resection of Left superior Parathyroid gland;  Surgeon: Kristine Venegas MD;  Location: [facility] OR     HC CORRECT BUNION,SIMPLE       PARATHYROIDECTOMY N/A 9/19/2018    Procedure: PARATHYROIDECTOMY;;  Surgeon: Kristine Venegas MD;  Location: [facility] OR     PPM INSERT OF NEW OR REPL W/VENT LEAD  04/21/2017     TONSILLECTOMY & ADENOIDECTOMY         Family History   Problem Relation Age of Onset     Hypothyroidism Mother      Hypertension Mother      Osteoarthritis Mother      Coronary Artery Disease Father         nonfatal MI in his 70s     Asthma Father      Diabetes Sister      Hypothyroidism Sister      Breast Cancer Maternal Aunt      Anxiety Disorder Sister        Social History     Tobacco Use     Smoking status: Former Smoker     Packs/day: 0.00     Years: 0.00     Pack years: 0.00     Smokeless tobacco: Never Used   Substance Use Topics     Alcohol use: Yes     Alcohol/week: 0.0 - 8.0 standard drinks     Comment: seldom          Review of Systems   Constitutional: Positive for fatigue. Negative for chills and fever.   HENT:  Negative for congestion, sinus pain and sore throat.    Eyes: Negative for visual disturbance.   Respiratory: Positive for cough, chest tightness and shortness of breath. Negative for wheezing.    Cardiovascular: Positive for chest pain and leg swelling. Negative for palpitations.   Gastrointestinal: Positive for abdominal pain and nausea. Negative for vomiting.   Genitourinary: Negative for difficulty urinating.   Musculoskeletal: Negative for back pain and neck pain.   Skin: Negative for rash.   Neurological: Negative for speech difficulty, weakness, light-headedness, numbness and headaches.   Hematological: Negative for adenopathy.   Psychiatric/Behavioral: Negative for confusion.         Physical Exam   BP: 128/79  Pulse: 87  Temp: 98.7  F (37.1  C)  Resp: 14  Weight: 77.1 kg (170 lb)  SpO2: 98 %  Physical Exam  Vitals and nursing note reviewed.   Constitutional:       General: She is not in acute distress.     Appearance: She is well-developed.   HENT:      Head: Normocephalic and atraumatic.      Right Ear: External ear normal.      Left Ear: External ear normal.      Nose: Nose normal.      Mouth/Throat:      Mouth: Mucous membranes are moist.   Eyes:      General: No scleral icterus.     Extraocular Movements: Extraocular movements intact.      Pupils: Pupils are equal, round, and reactive to light.   Neck:      Vascular: No carotid bruit.   Cardiovascular:      Rate and Rhythm: Normal rate and regular rhythm.      Heart sounds: No murmur heard.    No gallop.   Pulmonary:      Effort: Pulmonary effort is normal.      Breath sounds: Normal breath sounds. No wheezing or rales.   Abdominal:      General: Abdomen is flat.      Palpations: Abdomen is soft.      Tenderness: There is no abdominal tenderness. There is no guarding.   Musculoskeletal:      Cervical back: Normal range of motion and neck supple.      Right lower leg: No edema.      Left lower leg: No edema.   Skin:     General: Skin is warm and dry.    Neurological:      General: No focal deficit present.      Mental Status: She is alert and oriented to person, place, and time.      Cranial Nerves: No cranial nerve deficit.      Sensory: No sensory deficit.      Motor: No weakness.      Coordination: Coordination normal.   Psychiatric:         Mood and Affect: Mood normal.         Behavior: Behavior normal.         ED Course      Procedures            EKG Interpretation:      Interpreted by YUNIOR FLYNN MD  Time reviewed: 1700  Symptoms at time of EKG: chest pain   Rhythm: paced  Rate: Normal  Axis: Normal  Ectopy: none  Conduction: Atrial sensed, ventricular paced  ST Segments/ T Waves: No ST-T wave changes and No acute ischemic changes  Q Waves: v1, v2, v3 and III  Comparison to prior: Unchanged from 4/13/22    Clinical Impression: paced rhythm      Labs/Imaging    Results for orders placed or performed during the hospital encounter of 07/28/22 (from the past 24 hour(s))   [place] Draw    Narrative    The following orders were created for panel order [place] Draw.  Procedure                               Abnormality         Status                     ---------                               -----------         ------                     Extra Blue Top Tube[[id number]]                              Final result               Extra Red Top Tube[[id number]]                               Final result               Extra Green Top (Lithium...[[id number]]                      Final result               Extra Purple Top Tube[[id number]]                            Final result                 Please view results for these tests on the individual orders.   Extra Blue Top Tube   Result Value Ref Range    Hold Specimen JIC    Extra Red Top Tube   Result Value Ref Range    Hold Specimen JIC    Extra Green Top (Lithium Heparin) Tube   Result Value Ref Range    Hold Specimen JIC    Extra Purple Top Tube   Result Value Ref Range    Hold Specimen JIC    CBC with platelets  differential    Narrative    The following orders were created for panel order CBC with platelets differential.  Procedure                               Abnormality         Status                     ---------                               -----------         ------                     CBC with platelets and d...[[id number]]                      Final result                 Please view results for these tests on the individual orders.   INR   Result Value Ref Range    INR 1.06 0.85 - 1.15   Comprehensive metabolic panel   Result Value Ref Range    Sodium 137 136 - 145 mmol/L    Potassium 4.0 3.4 - 5.3 mmol/L    Creatinine 0.68 0.51 - 0.95 mg/dL    Urea Nitrogen 13.7 8.0 - 23.0 mg/dL    Chloride 104 98 - 107 mmol/L    Carbon Dioxide (CO2) 24 22 - 29 mmol/L    Anion Gap 9 7 - 15 mmol/L    Glucose 100 (H) 70 - 99 mg/dL    Calcium 9.7 8.8 - 10.2 mg/dL    Protein Total 7.0 6.4 - 8.3 g/dL    Albumin 4.1 3.5 - 5.2 g/dL    Bilirubin Total 0.2 <=1.2 mg/dL    Alkaline Phosphatase 96 35 - 104 U/L    AST 22 10 - 35 U/L    ALT 21 10 - 35 U/L    GFR Estimate >90 >60 mL/min/1.73m2   Troponin T, High Sensitivity   Result Value Ref Range    Troponin T, High Sensitivity <6 <=14 ng/L   Magnesium   Result Value Ref Range    Magnesium 2.3 1.7 - 2.3 mg/dL   Nt probnp inpatient (BNP)   Result Value Ref Range    N terminal Pro BNP Inpatient 129 0 - 900 pg/mL   CRP inflammation   Result Value Ref Range    CRP Inflammation 9.40 (H) <5.00 mg/L   CBC with platelets and differential   Result Value Ref Range    WBC Count 7.0 4.0 - 11.0 10e3/uL    RBC Count 3.93 3.80 - 5.20 10e6/uL    Hemoglobin 12.4 11.7 - 15.7 g/dL    Hematocrit 37.9 35.0 - 47.0 %    MCV 96 78 - 100 fL    MCH 31.6 26.5 - 33.0 pg    MCHC 32.7 31.5 - 36.5 g/dL    RDW 12.2 10.0 - 15.0 %    Platelet Count 193 150 - 450 10e3/uL    % Neutrophils 65 %    % Lymphocytes 24 %    % Monocytes 9 %    % Eosinophils 1 %    % Basophils 1 %    % Immature Granulocytes 0 %    NRBCs per 100 WBC 0  <1 /100    Absolute Neutrophils 4.6 1.6 - 8.3 10e3/uL    Absolute Lymphocytes 1.7 0.8 - 5.3 10e3/uL    Absolute Monocytes 0.6 0.0 - 1.3 10e3/uL    Absolute Eosinophils 0.1 0.0 - 0.7 10e3/uL    Absolute Basophils 0.1 0.0 - 0.2 10e3/uL    Absolute Immature Granulocytes 0.0 <=0.4 10e3/uL    Absolute NRBCs 0.0 10e3/uL   iStat Gases (lactate) venous, POCT   Result Value Ref Range    Lactic Acid POCT 0.6 <=2.0 mmol/L    Bicarbonate Venous POCT 24 21 - 28 mmol/L    O2 Sat, Venous POCT 94 94 - 100 %    pCO2V Venous POCT 37 (L) 40 - 50 mm Hg    pH Venous POCT 7.42 7.32 - 7.43    pO2 Venous POCT 70 (H) 25 - 47 mm Hg   XR Chest Port 1 View    Impression    RESIDENT PRELIMINARY INTERPRETATION  IMPRESSION:   1. Bibasilar streaky pulmonary opacities, which may represent  atelectasis, edema, or infection.  2. Small left pleural effusion.     *Note: Due to a large number of results and/or encounters for the requested time period, some results have not been displayed. A complete set of results can be found in Results Review.     1910: Patient is angry over delay in chemistry labs. Reason for delay unclear. Chemistry lab contacted regarding delay.        Medications - No data to display     Assessments & Plan (with Medical Decision Making)   Impression:  Middle aged woman with a history of AV block after ablation, pacemaker, cardiomyopathy (likely Takayasu) with improving EF on most recent echocardiogram. She presents with left chest pressure/ pain sensation intermittently over the past 3 days. The pain seems to be mostly occurring at night or in the early morning. She has no acute changes on EKG. CXR has basilar atelectasis. Heart size is stable. She has no friction rub and the pain is not pleuritic in character. CBC, electrolytes, LFTs and BNP are normal. Pacemaker was interrogated and appears to be functioning normally. She has high sensitivity troponin below the lower limit of detection despite 3 days of intermittent pain. It  appears unlikely that her pain is related to cardiac ischemia.  She is eager to leave the department as soon as possible. She can continue her current medications and follow up with her primary clinic and cardiologist. She should return for worsening symptoms.    I have reviewed the nursing notes. I have reviewed the findings, diagnosis, plan and need for follow up with the patient.    New Prescriptions    No medications on file       Final diagnoses:   Chest pain, unspecified type       --  Matthias Irwin  [facility] EMERGENCY DEPARTMENT  7/28/2022     Matthias Irwin MD  07/28/22 1957

## 2022-07-28 NOTE — TELEPHONE ENCOUNTER
"Patient called with concerns of chest pain. She had chest pain on Monday night and last night. Monday night she took NTG with relief. This morning she took NTG around 5:30am which helped a little. No current chest pain or pressure but she feels weak. She took metoprolol and spironolactone at 6:20am. RN advised if chest pain recurs, progresses or unrelieved with NTG, to present to ED. She has appointments scheduled today and wants to know what she should do. She says she has a complex history. RN will route message to her care team to call her back to discuss further direction.     1. LOCATION: \"Where does it hurt?\"   2. RADIATION: \"Does the pain go anywhere else?\" (e.g., into neck, jaw, arms, back)  3. ONSET: \"When did the chest pain begin?\" (Minutes, hours or days)   4. PATTERN \"Does the pain come and go, or has it been constant since it started?\" \"Does it get worse with exertion?\"   5. DURATION: \"How long does it last\" (e.g., seconds, minutes, hours)  6. SEVERITY: \"How bad is the pain?\" (e.g., Scale 1-10; mild, moderate, or severe)  - MILD (1-3): doesn't interfere with normal activities   - MODERATE (4-7): interferes with normal activities or awakens from sleep  - SEVERE (8-10): excruciating pain, unable to do any normal activities   7. CARDIAC RISK FACTORS: \"Do you have any history of heart problems or risk factors for heart disease?\" (e.g., angina, prior heart attack; diabetes, high blood pressure, high cholesterol, smoker, or strong family history of heart disease)  8. PULMONARY RISK FACTORS: \"Do you have any history of lung disease?\" (e.g., blood clots in lung, asthma, emphysema, birth control pills)  9. CAUSE: \"What do you think is causing the chest pain?\"  10. OTHER SYMPTOMS: \"Do you have any other symptoms?\" (e.g., dizziness, nausea, vomiting, sweating, fever, difficulty breathing, cough)  11. PREGNANCY: \"Is there any chance you are pregnant?\" \"When was your last menstrual period?\"      "
Called patient, no answer, left message. Message encouraged patient to go to ER with chest pain for assessment and treatment. Patient encouraged to call back with any questions.  
positive S1/positive S2

## 2022-07-28 NOTE — TELEPHONE ENCOUNTER
"[facility]    Phone Message    May a detailed message be left on voicemail: yes     Reason for Call: Other: The patient called back and wanted to speak to the nurse, when i read her the response from the message below she said \"no that is not good enough, I want to speak to the nurse\"   I asked her to hang on so I could get the nurse and she screamed in my ear\"Whatever, whatever\" She stated that she called this morning at 7 and was just now receiving a call back. I could not get a hold of the nurse but left message with staff who answered the vocera and he said he would have a nurse call the pt back as she had already hung up     Action Taken: Other: Cardiology         Called and spoke with patient. She was at the ER angry and upset stating she had not been advised to to to the ER previously. She then hung up the phone stating she did not want to talk anymore.     Travel Screening: Not Applicable                                                                      "

## 2022-07-28 NOTE — ED TRIAGE NOTES
"BIBA from home for CP. Hx of pacer. 100% paced. Took x2 Nitro SL with some relief. Also took klonopin and benadryl at home for anxiety and nausea. Reports that pain \"is better, just some pressure but I think it's anxiety\".     Triage Assessment     Row Name 07/28/22 [id number]       Triage Assessment (Adult)    Airway WDL WDL       Respiratory WDL    Respiratory WDL WDL       Skin Circulation/Temperature WDL    Skin Circulation/Temperature WDL WDL       Cardiac WDL    Cardiac WDL X;all       Chest Pain Assessment    Chest Pain Location midsternal    Character pressure    Chest Pain Intervention cardiac biomarkers drawn;cardiac monitoring continued;cardiac monitor placed;12-lead ECG obtained;activity minimized;aspirin given;nitroglycerin SL given       Peripheral/Neurovascular WDL    Peripheral Neurovascular WDL WDL       Cognitive/Neuro/Behavioral WDL    Cognitive/Neuro/Behavioral WDL WDL       Delma Coma Scale    Best Eye Response 4-->(E4) spontaneous    Best Motor Response 6-->(M6) obeys commands    Best Verbal Response 5-->(V5) oriented    [place] Coma Scale Score 15              "

## 2022-07-29 LAB
ATRIAL RATE - MUSE: 70 BPM
DIASTOLIC BLOOD PRESSURE - MUSE: NORMAL MMHG
INTERPRETATION ECG - MUSE: NORMAL
MDC_IDC_EPISODE_DTM: NORMAL
MDC_IDC_EPISODE_DTM: NORMAL
MDC_IDC_EPISODE_DURATION: 22 S
MDC_IDC_EPISODE_DURATION: 9 S
MDC_IDC_EPISODE_ID: 1
MDC_IDC_EPISODE_ID: 2
MDC_IDC_EPISODE_TYPE: NORMAL
MDC_IDC_EPISODE_TYPE: NORMAL
MDC_IDC_LEAD_IMPLANT_DT: NORMAL
MDC_IDC_LEAD_LOCATION: NORMAL
MDC_IDC_LEAD_LOCATION_DETAIL_1: NORMAL
MDC_IDC_LEAD_MFG: NORMAL
MDC_IDC_LEAD_MODEL: NORMAL
MDC_IDC_LEAD_POLARITY_TYPE: NORMAL
MDC_IDC_LEAD_SERIAL: NORMAL
MDC_IDC_MSMT_BATTERY_DTM: NORMAL
MDC_IDC_MSMT_BATTERY_REMAINING_LONGEVITY: 123 MO
MDC_IDC_MSMT_BATTERY_RRT_TRIGGER: 2.6
MDC_IDC_MSMT_BATTERY_STATUS: NORMAL
MDC_IDC_MSMT_BATTERY_VOLTAGE: 3.11 V
MDC_IDC_MSMT_LEADCHNL_LV_IMPEDANCE_VALUE: 247 OHM
MDC_IDC_MSMT_LEADCHNL_LV_IMPEDANCE_VALUE: 304 OHM
MDC_IDC_MSMT_LEADCHNL_LV_IMPEDANCE_VALUE: 380 OHM
MDC_IDC_MSMT_LEADCHNL_LV_IMPEDANCE_VALUE: 437 OHM
MDC_IDC_MSMT_LEADCHNL_LV_IMPEDANCE_VALUE: 494 OHM
MDC_IDC_MSMT_LEADCHNL_LV_PACING_THRESHOLD_AMPLITUDE: 0.62 V
MDC_IDC_MSMT_LEADCHNL_LV_PACING_THRESHOLD_PULSEWIDTH: 0.4 MS
MDC_IDC_MSMT_LEADCHNL_RA_IMPEDANCE_VALUE: 342 OHM
MDC_IDC_MSMT_LEADCHNL_RA_IMPEDANCE_VALUE: 380 OHM
MDC_IDC_MSMT_LEADCHNL_RA_PACING_THRESHOLD_AMPLITUDE: 0.5 V
MDC_IDC_MSMT_LEADCHNL_RA_PACING_THRESHOLD_PULSEWIDTH: 0.4 MS
MDC_IDC_MSMT_LEADCHNL_RA_SENSING_INTR_AMPL: 2 MV
MDC_IDC_MSMT_LEADCHNL_RV_IMPEDANCE_VALUE: 361 OHM
MDC_IDC_MSMT_LEADCHNL_RV_IMPEDANCE_VALUE: 399 OHM
MDC_IDC_MSMT_LEADCHNL_RV_PACING_THRESHOLD_AMPLITUDE: 0.62 V
MDC_IDC_MSMT_LEADCHNL_RV_PACING_THRESHOLD_PULSEWIDTH: 0.4 MS
MDC_IDC_MSMT_LEADCHNL_RV_SENSING_INTR_AMPL: 6.38 MV
MDC_IDC_PG_IMPLANT_DTM: NORMAL
MDC_IDC_PG_MFG: NORMAL
MDC_IDC_PG_MODEL: NORMAL
MDC_IDC_PG_SERIAL: NORMAL
MDC_IDC_PG_TYPE: NORMAL
MDC_IDC_SESS_CLINIC_NAME: NORMAL
MDC_IDC_SESS_DTM: NORMAL
MDC_IDC_SESS_TYPE: NORMAL
MDC_IDC_SET_BRADY_AT_MODE_SWITCH_RATE: 171 {BEATS}/MIN
MDC_IDC_SET_BRADY_LOWRATE: 60 {BEATS}/MIN
MDC_IDC_SET_BRADY_MAX_SENSOR_RATE: 140 {BEATS}/MIN
MDC_IDC_SET_BRADY_MAX_TRACKING_RATE: 140 {BEATS}/MIN
MDC_IDC_SET_BRADY_MODE: NORMAL
MDC_IDC_SET_BRADY_PAV_DELAY_HIGH: 100 MS
MDC_IDC_SET_BRADY_PAV_DELAY_LOW: 180 MS
MDC_IDC_SET_BRADY_SAV_DELAY_HIGH: 70 MS
MDC_IDC_SET_BRADY_SAV_DELAY_LOW: 150 MS
MDC_IDC_SET_CRT_LVRV_DELAY: 80 MS
MDC_IDC_SET_CRT_PACED_CHAMBERS: NORMAL
MDC_IDC_SET_LEADCHNL_LV_PACING_AMPLITUDE: 1.25 V
MDC_IDC_SET_LEADCHNL_LV_PACING_ANODE_ELECTRODE_1: NORMAL
MDC_IDC_SET_LEADCHNL_LV_PACING_ANODE_LOCATION_1: NORMAL
MDC_IDC_SET_LEADCHNL_LV_PACING_CAPTURE_MODE: NORMAL
MDC_IDC_SET_LEADCHNL_LV_PACING_CATHODE_ELECTRODE_1: NORMAL
MDC_IDC_SET_LEADCHNL_LV_PACING_CATHODE_LOCATION_1: NORMAL
MDC_IDC_SET_LEADCHNL_LV_PACING_POLARITY: NORMAL
MDC_IDC_SET_LEADCHNL_LV_PACING_PULSEWIDTH: 0.4 MS
MDC_IDC_SET_LEADCHNL_RA_PACING_AMPLITUDE: 1.5 V
MDC_IDC_SET_LEADCHNL_RA_PACING_ANODE_ELECTRODE_1: NORMAL
MDC_IDC_SET_LEADCHNL_RA_PACING_ANODE_LOCATION_1: NORMAL
MDC_IDC_SET_LEADCHNL_RA_PACING_CAPTURE_MODE: NORMAL
MDC_IDC_SET_LEADCHNL_RA_PACING_CATHODE_ELECTRODE_1: NORMAL
MDC_IDC_SET_LEADCHNL_RA_PACING_CATHODE_LOCATION_1: NORMAL
MDC_IDC_SET_LEADCHNL_RA_PACING_POLARITY: NORMAL
MDC_IDC_SET_LEADCHNL_RA_PACING_PULSEWIDTH: 0.4 MS
MDC_IDC_SET_LEADCHNL_RA_SENSING_ANODE_ELECTRODE_1: NORMAL
MDC_IDC_SET_LEADCHNL_RA_SENSING_ANODE_LOCATION_1: NORMAL
MDC_IDC_SET_LEADCHNL_RA_SENSING_CATHODE_ELECTRODE_1: NORMAL
MDC_IDC_SET_LEADCHNL_RA_SENSING_CATHODE_LOCATION_1: NORMAL
MDC_IDC_SET_LEADCHNL_RA_SENSING_POLARITY: NORMAL
MDC_IDC_SET_LEADCHNL_RA_SENSING_SENSITIVITY: 0.3 MV
MDC_IDC_SET_LEADCHNL_RV_PACING_AMPLITUDE: 2 V
MDC_IDC_SET_LEADCHNL_RV_PACING_ANODE_ELECTRODE_1: NORMAL
MDC_IDC_SET_LEADCHNL_RV_PACING_ANODE_LOCATION_1: NORMAL
MDC_IDC_SET_LEADCHNL_RV_PACING_CAPTURE_MODE: NORMAL
MDC_IDC_SET_LEADCHNL_RV_PACING_CATHODE_ELECTRODE_1: NORMAL
MDC_IDC_SET_LEADCHNL_RV_PACING_CATHODE_LOCATION_1: NORMAL
MDC_IDC_SET_LEADCHNL_RV_PACING_POLARITY: NORMAL
MDC_IDC_SET_LEADCHNL_RV_PACING_PULSEWIDTH: 0.4 MS
MDC_IDC_SET_LEADCHNL_RV_SENSING_ANODE_ELECTRODE_1: NORMAL
MDC_IDC_SET_LEADCHNL_RV_SENSING_ANODE_LOCATION_1: NORMAL
MDC_IDC_SET_LEADCHNL_RV_SENSING_CATHODE_ELECTRODE_1: NORMAL
MDC_IDC_SET_LEADCHNL_RV_SENSING_CATHODE_LOCATION_1: NORMAL
MDC_IDC_SET_LEADCHNL_RV_SENSING_POLARITY: NORMAL
MDC_IDC_SET_LEADCHNL_RV_SENSING_SENSITIVITY: 0.9 MV
MDC_IDC_SET_ZONE_DETECTION_INTERVAL: 350 MS
MDC_IDC_SET_ZONE_DETECTION_INTERVAL: 400 MS
MDC_IDC_SET_ZONE_TYPE: NORMAL
MDC_IDC_STAT_AT_BURDEN_PERCENT: 0 %
MDC_IDC_STAT_AT_DTM_END: NORMAL
MDC_IDC_STAT_AT_DTM_START: NORMAL
MDC_IDC_STAT_BRADY_AP_VP_PERCENT: 37.6 %
MDC_IDC_STAT_BRADY_AP_VS_PERCENT: 0.11 %
MDC_IDC_STAT_BRADY_AS_VP_PERCENT: 61.83 %
MDC_IDC_STAT_BRADY_AS_VS_PERCENT: 0.45 %
MDC_IDC_STAT_BRADY_DTM_END: NORMAL
MDC_IDC_STAT_BRADY_DTM_START: NORMAL
MDC_IDC_STAT_BRADY_RA_PERCENT_PACED: 37.75 %
MDC_IDC_STAT_BRADY_RV_PERCENT_PACED: 99.44 %
MDC_IDC_STAT_CRT_DTM_END: NORMAL
MDC_IDC_STAT_CRT_DTM_START: NORMAL
MDC_IDC_STAT_CRT_LV_PERCENT_PACED: 99.4 %
MDC_IDC_STAT_CRT_PERCENT_PACED: 99.4 %
MDC_IDC_STAT_EPISODE_RECENT_COUNT: 0
MDC_IDC_STAT_EPISODE_RECENT_COUNT_DTM_END: NORMAL
MDC_IDC_STAT_EPISODE_RECENT_COUNT_DTM_START: NORMAL
MDC_IDC_STAT_EPISODE_TOTAL_COUNT: 0
MDC_IDC_STAT_EPISODE_TOTAL_COUNT: 1
MDC_IDC_STAT_EPISODE_TOTAL_COUNT_DTM_END: NORMAL
MDC_IDC_STAT_EPISODE_TOTAL_COUNT_DTM_START: NORMAL
MDC_IDC_STAT_EPISODE_TYPE: NORMAL
P AXIS - MUSE: 75 DEGREES
PR INTERVAL - MUSE: 230 MS
QRS DURATION - MUSE: 94 MS
QT - MUSE: 406 MS
QTC - MUSE: 438 MS
R AXIS - MUSE: -2 DEGREES
SYSTOLIC BLOOD PRESSURE - MUSE: NORMAL MMHG
T AXIS - MUSE: 67 DEGREES
VENTRICULAR RATE- MUSE: 70 BPM

## 2022-07-29 NOTE — TELEPHONE ENCOUNTER
Spoke with patient by phone.  She is currently being evaluated in the [facility] ER [place].  The ER sent a [employer] for evaluation of pacemaker. See that documentation for device specifics.  Patient wanted to know the results of transmission. Reviewed with patient that device was functioning as programmed and any specific questions could be discussed with the ER physician.  Patient states understanding.

## 2022-07-29 NOTE — DISCHARGE INSTRUCTIONS
Continue your current medications.  Nitroglycerine as needed for pain.  Follow up with Dr Ordonez in cardiology.  Return for worsening symptoms..

## 2022-08-01 ENCOUNTER — TELEPHONE (OUTPATIENT)
Dept: FAMILY MEDICINE | Facility: CLINIC | Age: 62
End: 2022-08-01

## 2022-08-01 ENCOUNTER — CARE COORDINATION (OUTPATIENT)
Dept: CARDIOLOGY | Facility: CLINIC | Age: 62
End: 2022-08-01

## 2022-08-01 NOTE — TELEPHONE ENCOUNTER
See plan per 7/28/22 [facility] ER visit:    She presents with left chest pressure/ pain sensation intermittently over the past 3 days. The pain seems to be mostly occurring at night or in the early morning. She has no acute changes on EKG. CXR has basilar atelectasis. Heart size is stable. She has no friction rub and the pain is not pleuritic in character. CBC, electrolytes, LFTs and BNP are normal. Pacemaker was interrogated and appears to be functioning normally. She has high sensitivity troponin below the lower limit of detection despite 3 days of intermittent pain. It appears unlikely that her pain is related to cardiac ischemia.  She is eager to leave the department as soon as possible. She can continue her current medications and follow up with her primary clinic and cardiologist. She should return for worsening symptoms.    Routed to BE RN [place] to follow up.    Ana Greer RN  [facility]

## 2022-08-01 NOTE — TELEPHONE ENCOUNTER
"See notes copied from \"patient scheduling request\" into this phone encounter so team can address:    Jono Rivera  Be-Rn Pool 3 days ago     CR    Patient was seen in [facility] OR on 7/28. Central scheduling protocol says they must be seen within 14 days. There are currently no appointments with this patient's PCP within that timeframe. Please assist patient in finding care in the appropriate timeframe.     Thank you,   Jono Rivera      Central Scheduling     Message text       Joyce Marroquin  Patient Appointment Schedule Request Pool 3 days ago           Appointment Request From: Joyce Marroquin     With Provider: CHARLINE DIXON NP [[facility]]     Preferred Date Range: Any     Preferred Times: Any Time     Reason for visit: Request an Appointment     Comments:  Post er visit   Some labs and cxy may need her attention     "

## 2022-08-01 NOTE — PROGRESS NOTES
Called and spoke with patient. Offered the first 2 available appointments with Dr. Ordonez on 9/20 and 9/27. Patient would like to schedule on 9/20/22 with Dr. Ordonez. Spoke to schedulers who schedule patient.

## 2022-08-01 NOTE — TELEPHONE ENCOUNTER
She is scheduled to see Ce Crowe on 8/23/22    We can likely get her in with another provider sooner than 8/23 if needed.    Attempted to call patient at home/mobile number, no answer, left message on voicemail; patient was instructed to return call to [facility] at [phone].    Ana Greer RN  [facility]

## 2022-08-01 NOTE — TELEPHONE ENCOUNTER
"Patient called back, she does not want to reschedule her 8/23 visit with Ce, she says the ER advised she needed more rapid follow up with cardiology.    I advised I cannot schedule with cardiology.    She says she sent a [employer] message to cardiology, I reviewed \"patient message review\" in [email or web address] and it looks like she sent a \"scheduling message\" to cardiology but does not appear anyone has responded?    I provided her with phone number for cardiology and routed this encounter to cardiology to address patient's request to follow up with Dr. Ordonez.    Ana Greer, RN  [facility]      "

## 2022-08-06 ENCOUNTER — HEALTH MAINTENANCE LETTER (OUTPATIENT)
Age: 62
End: 2022-08-06

## 2022-08-22 DIAGNOSIS — F41.9 ANXIETY: ICD-10-CM

## 2022-08-22 RX ORDER — CLONAZEPAM 0.5 MG/1
TABLET ORAL
Qty: 90 TABLET | Refills: 1 | Status: SHIPPED | OUTPATIENT
Start: 2022-08-22 | End: 2022-10-27

## 2022-08-23 ENCOUNTER — TELEPHONE (OUTPATIENT)
Dept: FAMILY MEDICINE | Facility: CLINIC | Age: 62
End: 2022-08-23

## 2022-08-23 ENCOUNTER — VIRTUAL VISIT (OUTPATIENT)
Dept: FAMILY MEDICINE | Facility: CLINIC | Age: 62
End: 2022-08-23
Payer: COMMERCIAL

## 2022-08-23 ENCOUNTER — NURSE TRIAGE (OUTPATIENT)
Dept: NURSING | Facility: CLINIC | Age: 62
End: 2022-08-23

## 2022-08-23 ENCOUNTER — TELEPHONE (OUTPATIENT)
Dept: CARDIOLOGY | Facility: CLINIC | Age: 62
End: 2022-08-23

## 2022-08-23 DIAGNOSIS — Z95.0 CARDIAC PACEMAKER IN SITU: ICD-10-CM

## 2022-08-23 DIAGNOSIS — I50.9 CONGESTIVE HEART FAILURE, UNSPECIFIED HF CHRONICITY, UNSPECIFIED HEART FAILURE TYPE (H): ICD-10-CM

## 2022-08-23 DIAGNOSIS — U07.1 INFECTION DUE TO 2019 NOVEL CORONAVIRUS: ICD-10-CM

## 2022-08-23 DIAGNOSIS — I42.9 CARDIOMYOPATHY, UNSPECIFIED TYPE (H): ICD-10-CM

## 2022-08-23 DIAGNOSIS — J45.31 MILD PERSISTENT ASTHMA WITH EXACERBATION: ICD-10-CM

## 2022-08-23 DIAGNOSIS — U07.1 INFECTION DUE TO 2019 NOVEL CORONAVIRUS: Primary | ICD-10-CM

## 2022-08-23 DIAGNOSIS — F41.9 ANXIETY: ICD-10-CM

## 2022-08-23 DIAGNOSIS — I44.2 COMPLETE ATRIOVENTRICULAR BLOCK (H): ICD-10-CM

## 2022-08-23 DIAGNOSIS — K76.89 LIVER CYST: Primary | ICD-10-CM

## 2022-08-23 PROCEDURE — 99214 OFFICE O/P EST MOD 30 MIN: CPT | Performed by: NURSE PRACTITIONER

## 2022-08-23 RX ORDER — METHYLPREDNISOLONE 8 MG/1
TABLET ORAL
Qty: 150 TABLET | Refills: 0 | Status: SHIPPED | OUTPATIENT
Start: 2022-08-23 | End: 2022-09-02

## 2022-08-23 RX ORDER — CEFUROXIME AXETIL 500 MG/1
500 TABLET ORAL 2 TIMES DAILY
Qty: 20 TABLET | Refills: 0 | Status: SHIPPED | OUTPATIENT
Start: 2022-08-23 | End: 2022-09-13

## 2022-08-23 NOTE — TELEPHONE ENCOUNTER
Nurse unsure of other options to order.  Will send to ordering provider and provider pool to advise

## 2022-08-23 NOTE — TELEPHONE ENCOUNTER
[facility]    Phone Message    May a detailed message be left on voicemail: yes     Reason for Call: Other: Joyce calling to report that she was Dx'd with Covid this morning and would like to change her appt tomorrow, 8/24, to a video appt if possible.  She states she still may be able to get her labs done today.  Please review and call Joyce back to discuss    Action Taken: Message routed to:  Other: Cardiology    Travel Screening: Not Applicable

## 2022-08-23 NOTE — TELEPHONE ENCOUNTER
Coronavirus (COVID-19) Notification    Caller Name (Patient, parent, daughter/son, grandparent, etc)  Joyce Marroquin    Reason for call  Notify of Positive Coronavirus (COVID-19) lab results, assess symptoms,  review [facility] recommendations    Lab Result    Lab test:  2019-nCoV Rapid Result Home Test 8/23/22  Oropharyngeal AND/OR nasopharyngeal swabs is POSITIVE for 2019-nCoV RNA/SARS-COV-2 PCR (COVID-19 virus)      Gather patient reported symptoms   Assessment   Current Symptoms at time of phone call, reported by patient: (if no symptoms, document No symptoms] Shortness of Breath  Cough  Sore Throat  Nasal congestion  HA  Fatigue   Date of Symptom(s) onset (if applicable) 8/22/22     If at time of call, Patients symptoms hare worsened, the Patient should contact 911 or have someone drive them to Emergency Dept promptly:      If Patient calling 911, inform 911 personal that you have tested positive for the Coronavirus (COVID-19).  Place mask on and await 911 to arrive.    If Emergency Dept, If possible, please have another adult drive you to the Emergency Dept but you need to wear mask when in contact with other people.      Monoclonal Antibody Administration    You may be eligible to receive a new treatment with a monoclonal antibody for preventing hospitalization in patients at high risk for complications from COVID-19. This medication is still experimental and available on a limited basis; it is given through an IV and must be given at an infusion center. Please note that not all people who are eligible will receive the medication since it is in limited supply.  Is the patient symptomatic and onset of symptoms within the last 7 days?  Yes  Is the patient interested in a visit with a provider to discuss treatment options?: Yes  Is the patient seen at [facility]?  No: Warm transfer caller to [phone] to be scheduled with a virtual urgent provider.  During transfer, instruct  on  appropriate time frame for visit     Pt already has an appointment this morning with a provider to discuss treatment options.  Review information with Patient    Your result was positive. This means you have COVID-19 (coronavirus).      How can I protect others?    These guidelines are for isolating before returning to work, school or .       If you DO have symptoms:  o Stay home and away from others  - For at least 5 days after your symptoms started, AND   - You are fever free for 24 hours (with no medicine that reduces fever), AND  - Your other symptoms are better.  o Wear a mask for 10 full days any time you are around others.    If you DON'T have symptoms:  o Stay at home and away from others for at least 5 days after your positive test.  o Wear a mask for 10 full days any time you are around others.    There may be different guidelines for healthcare facilities. Please check with the specific sites before arriving.     If you've been told by a doctor that you were severely ill with COVID-19 or are immunocompromised, you should isolate for at least 10 days.    You should not go back to work until you meet the guidelines above for ending your home isolation. You don't need to be retested for COVID-19 before going back to work--studies show that you won't spread the virus if it's been at least 10 days since your symptoms started (or 20 days, if you have a weak immune system).    Employers, schools, and daycares: This is an official notice for this person's medical guidelines for returning in-person. They must meet the above guidelines before going back to work, school, or  in person.    You will receive a positive COVID-19 letter via [employer] or the mail soon with additional self-care information.      Would you like me to review some of that information with you now?  Yes    How can I take care of myself?      Get lots of rest. Drink extra fluids (unless a doctor has told you not to).      Take  Tylenol (acetaminophen) for fever or pain. If you have liver or kidney problems, ask your family doctor if it's okay to take Tylenol.     Take either:     650 mg (two 325 mg pills) every 4 to 6 hours, or     1,000 mg (two 500 mg pills) every 8 hours as needed.     Note: Do not take more than 3,000 mg in one day. Acetaminophen is found in many medicines (both prescribed and over-the-counter medicines). Read all labels to be sure you don't take too much.    For children, check the Tylenol bottle for the right dose (based on their age or weight).      If you have other health problems (like cancer, heart failure, an organ transplant or severe kidney disease): Call your specialty clinic if you don't feel better in the next 2 days.      Know when to call 911: Emergency warning signs include:    Trouble breathing or shortness of breath    Pain or pressure in the chest that doesn't go away    Feeling confused like you haven't felt before, or not being able to wake up    Bluish-colored lips or face        If you were tested for an upcoming procedure, please contact your provider for next steps.     Ambar Quintanilla RN    Reason for Disposition    Health Information question, no triage required and triager able to answer question     Information regarding covid reviewed as outlined in POSITIVE Covid Result (.phrase)    Additional Information    Negative: [1] Caller is not with the adult (patient) AND [2] reporting urgent symptoms    Negative: Lab result questions    Negative: Medication questions    Negative: Caller can't be reached by phone    Negative: Caller has already spoken to PCP or another triager    Negative: RN needs further essential information from caller in order to complete triage    Negative: Requesting regular office appointment    Negative: [1] Caller requesting NON-URGENT health information AND [2] PCP's office is the best resource    Protocols used: INFORMATION ONLY CALL - [facility]

## 2022-08-23 NOTE — TELEPHONE ENCOUNTER
I put in orders for monoclonal antibody treatment to [facility] and [facility].  Please fax monoclonal antibody order to the below per pt request if needed. Ce Crowe, SHAYNA, FNP-BC

## 2022-08-23 NOTE — TELEPHONE ENCOUNTER
Returned call to Joyce. COnfirmed okay for video visit, visit type changed and confirmed with patient. Lab appointment canceled (okay per Hailey estrada np), will reschedule once recovered.

## 2022-08-23 NOTE — TELEPHONE ENCOUNTER
Patient had virtual visit today with Ce Crowe and monoclonal antibody referral was ordered.  Patient called to set it up and stated they only answer calls Monday, Wednesday and Friday so unable to speak with anyone today.  Patient stating she is anxious and does not want to wait until tomorrow to schedule.  Patient asking if order can be sent elsewhere? Sending to ordering provider to advise.

## 2022-08-23 NOTE — TELEPHONE ENCOUNTER
Patient can look on the [place] department of health website for scheduling information. She can bring referral if she gets an appointment or it can be faxed by . SHAYNA Simon, FNP-BC

## 2022-08-23 NOTE — PROGRESS NOTES
"Joyce is a 61 year old who is being evaluated via a billable telephone visit.      What phone number would you like to be contacted at? [phone]  How would you like to obtain your AVS? MyChart    Assessment & Plan     Infection due to 2019 novel coronavirus    - Covid Monoclonal Antibody Referral; Future    Cardiomyopathy, unspecified type (H)    - Covid Monoclonal Antibody Referral; Future    Complete atrioventricular block (H)    - Covid Monoclonal Antibody Referral; Future    Congestive heart failure, unspecified HF chronicity, unspecified heart failure type (H)    - Covid Monoclonal Antibody Referral; Future    Cardiac pacemaker in situ- Medtronic, dual lead- DEPENDENT    - Covid Monoclonal Antibody Referral; Future    Mild persistent asthma with exacerbation    - cefuroxime (CEFTIN) 500 MG tablet; Take 1 tablet (500 mg) by mouth 2 times daily Take daily probiotic. Do not take until day 7-10 of illness without improvement.  - methylPREDNISolone (MEDROL) 8 MG tablet; Take 5 tablets (40 mg) by mouth daily for 2 days, THEN 4 tablets (32 mg) daily for 2 days, THEN 3 tablets (24 mg) daily for 2 days, THEN 2 tablets (16 mg) daily for 2 days, THEN 1 tablet (8 mg) daily for 2 days. Per asthma flare  - Covid Monoclonal Antibody Referral; Future    Liver cyst    - US Abdomen Limited; Future    30 minutes spent on the date of the encounter doing chart review, history and exam, documentation and further activities per the note     BMI:   Estimated body mass index is 28.29 kg/m  as calculated from the following:    Height as of 3/15/22: 1.651 m (5' 5\").    Weight as of 7/28/22: 77.1 kg (170 lb).   Weight management plan: Discussed healthy diet and exercise guidelines She reports 40+ lb weight gain.  Discussed regular exercise/ diet change/ weight loss could improve cardiac, respiratory and mental health issues.     See Patient Instructions: schedule monoclonal antibody treatment when they call; if you do not receive a " call, contact clinic for assistance with scheduling. Follow up if worsening symptoms for in person evaluation.  Do not start antibiotics until no improvement/ worsening symptoms days 7-10 of illness; if taking antibiotics- take daily probiotic/ yogurt.  Follow up as needed. Pt in agreement.     Return in about 1 week (around 8/30/2022), or if symptoms worsen or fail to improve.    CHARLINE DIXON, MARCELL  [facility] ROLAND Cook is a 61 year old, presenting for the following health issues:    She reports onset of symptoms last night- harsh barking cough; SOB laying down but she was in camper not in her adjustable bed. Had positive COVID test today.  In the past, her pulmonologist would send in 150 tablets of 8 mg methylprednisolone to use for asthma flares (does not tolerate prednisone); she would start at 40 mg daily. He would also send in ceftin for her; discussed not to start antibiotic unless sick for 7-10 days with no improvement in symptoms as it will not help before that time. Pt in agreement. Discussed COVID treatments; many of her medications interact with the antivirals- clonazepam, eloquis, advair, etc.  She reports she prefers referral for monoclonal antibody treatment. Discussed liver cyst seen on xray; she would like follow up US ordered for next year; order placed as it should be good for one year. Discussed follow up for in person evaluation if breathing symptoms worsen or other worsening symptoms.  Pt in agreement. She is needing refills of her clonazepam as well, which she uses for anxiety.    HPI       COVID-19 Symptom Review  How many days ago did these symptoms start? 2 days     Are any of the following symptoms significant for you?  New or worsening difficulty breathing? Yes    Worsening cough? Yes, I am coughing up mucus.    Fever or chills? Yes, I felt feverish or had chills.    Headache: YES    Sore throat: YES    Chest pain: YES    Diarrhea: No    Body  aches? YES    What treatments has patient tried? Acetaminophen and Decongestant - oral   Does patient live in a nursing home, group home, or shelter? No  Does patient have a way to get food/medications during quarantined? Yes, I have a friend or family member who can help me.      Review of Systems   Constitutional, HEENT, cardiovascular, pulmonary, GI, , musculoskeletal, neuro, skin, endocrine and psych systems are negative, except as otherwise noted.      Objective           Vitals:  No vitals were obtained today due to virtual visit.    Physical Exam   healthy, alert and no distress  PSYCH: Alert and oriented times 3; coherent speech, normal   rate and volume, able to articulate logical thoughts, able   to abstract reason, no tangential thoughts, no hallucinations   or delusions  Her affect is normal  RESP: No cough, no audible wheezing, able to talk in full sentences  Remainder of exam unable to be completed due to telephone visits    See orders        Phone call duration: 15 minutes    .  ..

## 2022-08-23 NOTE — TELEPHONE ENCOUNTER
We use a smart set to place order for monoclonal antibody treatment.  I do not know of any other way to order. SHAYNA Simon, FNP-BC

## 2022-08-23 NOTE — TELEPHONE ENCOUNTER
Patient informed per Ce Crowe see below read word for word.  Patient was in agreement and stated she will check out the [place] department of health website and will let us know if she needs referral sent elsewhere's.

## 2022-08-23 NOTE — TELEPHONE ENCOUNTER
Reason for Call:  Other     Detailed comments:  Patient calling about getting order for  monoclonal antibody treatment faxed to [facility] @ [phone] and [facility] @ [phone] patient be calling tomorrow.  Any questions please call patient.  Phone Number Patient can be reached at: Home number on file [phone] (home)    Best Time:  Anytime    Can we leave a detailed message on this number? YES    Call taken on 8/23/2022 at 5:13 PM by Melony Mcintosh

## 2022-08-24 ENCOUNTER — HOME INFUSION (PRE-WILLOW HOME INFUSION) (OUTPATIENT)
Dept: PHARMACY | Facility: CLINIC | Age: 62
End: 2022-08-24

## 2022-08-24 ENCOUNTER — VIRTUAL VISIT (OUTPATIENT)
Dept: CARDIOLOGY | Facility: CLINIC | Age: 62
End: 2022-08-24
Attending: NURSE PRACTITIONER
Payer: COMMERCIAL

## 2022-08-24 ENCOUNTER — TELEPHONE (OUTPATIENT)
Dept: CARDIOLOGY | Facility: CLINIC | Age: 62
End: 2022-08-24

## 2022-08-24 DIAGNOSIS — I50.30 NYHA CLASS 3 HEART FAILURE WITH PRESERVED EJECTION FRACTION (H): Primary | ICD-10-CM

## 2022-08-24 PROCEDURE — G0463 HOSPITAL OUTPT CLINIC VISIT: HCPCS | Mod: PN,RTG | Performed by: NURSE PRACTITIONER

## 2022-08-24 PROCEDURE — 99213 OFFICE O/P EST LOW 20 MIN: CPT | Mod: 95 | Performed by: NURSE PRACTITIONER

## 2022-08-24 NOTE — NURSING NOTE
Chief Complaint   Patient presents with     Follow Up     From hospital per pt, currently has covid, pt states she started taking medrol due to covid and has not started ceftin, she states no other changes in medications since last reviewed, antibody infusion tomorrow per pt     Patient declined individual allergy and medication review by support staff because they deny any changes and state that all information remains accurate since last reviewed/verified.   Reviewed yesterday    Prosha Hairston, LAURA/CMA

## 2022-08-24 NOTE — LETTER
8/24/2022      RE: Joyce Marroquin  [address] [place] [address]       Dear Colleague,    Thank you for the opportunity to participate in the care of your patient, Joyce Marroquin, at the [facility]. Please see a copy of my visit note below.    HPI: 61 year old female seen via video for follow up. She is followed by Dr. Ordonez for HFrEF initially had an EF of 35%, now increased to 45%. She also has a history of RF ablation for PVCs which led to AV node ablation and complete heart block requiring a dual-chamber pacemaker implantation, which is a Medtronic device MRI compatible.  She has recently contracted COVID and is monitoring her O2 sat at home, reporting O2 sats of 96-98%. She is having chest pressure which she thinks is due to coughing with COVID. She most recently has had episodes of chest pain which is different from her previous episodes of chest pain. She was seen at [facility] and it was determined it was non cardiac pain.   Her last coronary cath - done 2019 showed no critical lesions. She did have a Lexiscan done 3. 2020 - which was negative for ischemia. We discussed an ischemic work up with another lexiscan, but she is reluctant to have more gadolinium exposure. She is scheduled for an appt with Dr. Brasher for pulmonary hypertension - thus we decided we would hold on ischemic workup until she is seen by Dr. Sun and if indicated may be combined with any work up needed for pulmonary hypertension.     She is feeling poorly as of late, due to chest pain, covid and stress related to such.     She sent a my chart message after the video visit indicating she has had some LE edema, but did not respond to lasix, and is concerned she is dry. She has apt on 9/13 with Dr. uSn, so will add labs to that visit, given today's visit is virtual.     PAST MEDICAL HISTORY:  Past Medical History:   Diagnosis  Date     Arthritis      Cardiomyopathy (H)     ff Cardiology     Chronic depressive personality disorder      Congestive heart failure (H) see dr martínez    heart failure correct term     COPD exacerbation (H)      Esophageal reflux      Ex-smoker     quit 2006; 1 PPD x 30     Hyperparathyroidism (H)     s/p parathyroidectomy     Hypothyroidism      ALRRY on CPAP     ff Sleep medicine     Pulmonary nodules     ff Pulmonologist     S/P cardiac pacemaker procedure     checked every 6 months at the [facility]     Uncomplicated asthma years       FAMILY HISTORY:  Family History   Problem Relation Age of Onset     Hypothyroidism Mother      Hypertension Mother      Osteoarthritis Mother      Coronary Artery Disease Father         nonfatal MI in his 70s     Asthma Father      Diabetes Sister      Hypothyroidism Sister      Breast Cancer Maternal Aunt      Anxiety Disorder Sister        SOCIAL HISTORY:  Social History     Socioeconomic History     Marital status:      Spouse name: None     Number of children: None     Years of education: None     Highest education level: Master's degree (e.g., MA, MS, Florian, MEd, MSW, SONIA)   Tobacco Use     Smoking status: Former Smoker     Packs/day: 0.00     Years: 0.00     Pack years: 0.00     Smokeless tobacco: Never Used   Vaping Use     Vaping Use: Never used   Substance and Sexual Activity     Alcohol use: Yes     Alcohol/week: 0.0 - 8.0 standard drinks     Comment: seldom     Drug use: No     Sexual activity: Yes     Partners: Male     Birth control/protection: None     Comment: vasectomy   Other Topics Concern     Parent/sibling w/ CABG, MI or angioplasty before 65F 55M? No   Social History Narrative    CRNA on disability for vestibular symptoms      Social Determinants of Health     Intimate Partner Violence: Not At Risk     Fear of Current or Ex-Partner: No     Emotionally Abused: No     Physically Abused: No     Sexually Abused: No       CURRENT MEDICATIONS:  Current Outpatient  Medications   Medication Sig Dispense Refill     acetaminophen (TYLENOL) 500 MG tablet Take 500-1,000 mg by mouth every 6 hours as needed for mild pain       albuterol (PROAIR HFA/PROVENTIL HFA/VENTOLIN HFA) 108 (90 BASE) MCG/ACT Inhaler Inhale 2 puffs into the lungs as needed for shortness of breath / dyspnea or wheezing        azelastine (ASTELIN) 0.1 % nasal spray Spray 1 spray into both nostrils 2 times daily as needed        Calcium Carbonate-Vitamin D (CALCIUM-CARB 600 + D PO) Take 1 tablet by mouth daily       cefuroxime (CEFTIN) 500 MG tablet Take 1 tablet (500 mg) by mouth 2 times daily Take daily probiotic. Do not take until day 7-10 of illness without improvement. 20 tablet 0     clonazePAM (KLONOPIN) 0.5 MG tablet TAKE 1 TABLET(0.5 MG) BY MOUTH THREE TIMES DAILY AS NEEDED FOR ANXIETY 90 tablet 1     DiphenhydrAMINE HCl (BENADRYL PO) Take 25 mg by mouth nightly as needed for allergies        ELIQUIS ANTICOAGULANT 5 MG tablet TAKE 1 TABLET(5 MG) BY MOUTH TWICE DAILY 180 tablet 3     Ferrous Sulfate (IRON SUPPLEMENT PO) Take 130 mg by mouth daily        fluticasone-salmeterol (ADVAIR) 250-50 MCG/DOSE inhaler Inhale 1 puff into the lungs every 12 hours       ipratropium (ATROVENT) 0.06 % nasal spray Spray 2 sprays into both nostrils 4 times daily as needed for rhinitis       MAGNESIUM LACTATE PO Take  mg by mouth nightly as needed        MELATONIN PO Take 1-3 mg by mouth nightly as needed        Menaquinone-7 (VITAMIN K2 PO) Take 1 tablet by mouth daily       methylPREDNISolone (MEDROL) 8 MG tablet Take 5 tablets (40 mg) by mouth daily for 2 days, THEN 4 tablets (32 mg) daily for 2 days, THEN 3 tablets (24 mg) daily for 2 days, THEN 2 tablets (16 mg) daily for 2 days, THEN 1 tablet (8 mg) daily for 2 days. Per asthma flare 150 tablet 0     metoprolol succinate ER (TOPROL XL) 25 MG 24 hr tablet Take 0.5 tablets (12.5 mg) by mouth in the morning. 45 tablet 3     Multiple Vitamin (DAILY MULTIVITAMIN PO)  Take 1 tablet by mouth every morning        nebulizer nebulization as needed       nitroGLYcerin (NITROSTAT) 0.4 MG sublingual tablet Place 0.4 mg under the tongue every 5 minutes as needed for chest pain For chest pain place 1 tablet under the tongue every 5 minutes for 3 doses. If symptoms persist 5 minutes after 1st dose call 911.       Probiotic Product (PROBIOTIC DAILY PO) Take 1 capsule by mouth 2 times daily Takes additional doses if has diarrhea       spironolactone (ALDACTONE) 25 MG tablet Take 0.5 tablets (12.5 mg) by mouth daily 45 tablet 3     SYNTHROID 150 MCG tablet TAKE ONE TABLET BY MOUTH SIX DAYS A WEEK; TAKE ONE-HALF TABLET ONE DAY A WEEK 90 tablet 3     TURMERIC PO Take 1 tablet by mouth every morning        vitamin D3 (CHOLECALCIFEROL) 2000 units tablet Take 1 tablet by mouth daily 1 tablet 0     Zinc 15 MG CAPS Take 15 mg by mouth          ROS:  She is coughing throughout visit. Is SOB due to COVID, is not having chest pain daily.     Labs:  Will get with Dr. Sun visit.    Lab Results   Component Value Date    NTBNP 82 2022    NTBNP 156 (H) 2021       Assessment and Plan:   1. Chronic HFrED heart failure secondary to presumed Takosubo.    Stage C  NYHA Class III  ACEi/ARB no  BB yes  Aldosterone antagonist yes (currently on 12.5mg daily, but is having trouble splitting tablets)  SCD prophylaxis CRT-D  % BiV pacin.4%  Fluid status unable to assess due to virtual     2, chest pain - although chronic - having symptoms that are different - reviewed NTG use x 3 then call 911 if not relieved. - Will discuss with Dr. Sun.    3. Paroxsymal Afib; S/P CRT -D upgrade - followed by EP>    No medication changes made.  Follow-up Dr. Sun on       Please do not hesitate to contact me if you have any questions/concerns.     Sincerely,     Fiorella Swanson, SHAYNA CNP    CC  Patient Care Team:  No Ref-Primary, Physician as PCP - Yuliet Merino as MD Patel,  MD Garland as MD (Internal Medicine)  Adelaida De La Torre, APRN CNP as Nurse Practitioner (Nurse Practitioner)  Lino Funes MD as MD (Clinical Cardiac Electrophysiology)  Garland Patel MD as Assigned Endocrinology Provider  Karen Peralta RP as Pharmacist (Pharmacist Ambulatory Care)  Ce Maldonado NP as Assigned PCP  Matthias García RN as Specialty Care Coordinator (Cardiology)  Renetta Pugh MD as MD (Gastroenterology)  Ranjit Penn MD as Assigned Gastroenterology Provider  Ranjit Penn MD as MD (Gastroenterology)  Lisa Medrano RP as Pharmacist (Pharmacist Ambulatory Care)  Lino Funes MD as Assigned Heart and Vascular Provider  Karen Peralta [facility] as Assigned MTM Pharmacist  Flako Welsh MD as MD (Ophthalmology)  KRYSTA HUTTON

## 2022-08-24 NOTE — PROGRESS NOTES
HPI: 61 year old female seen via video for follow up. She is followed by Dr. Ordonez for HFrEF initially had an EF of 35%, now increased to 45%. She also has a history of RF ablation for PVCs which led to AV node ablation and complete heart block requiring a dual-chamber pacemaker implantation, which is a Medtronic device MRI compatible.  She has recently contracted COVID and is monitoring her O2 sat at home, reporting O2 sats of 96-98%. She is having chest pressure which she thinks is due to coughing with COVID. She most recently has had episodes of chest pain which is different from her previous episodes of chest pain. She was seen at [facility] and it was determined it was non cardiac pain.   Her last coronary cath - done 2019 showed no critical lesions. She did have a Lexiscan done 3. 2020 - which was negative for ischemia. We discussed an ischemic work up with another lexiscan, but she is reluctant to have more gadolinium exposure. She is scheduled for an appt with Dr. Brasher for pulmonary hypertension - thus we decided we would hold on ischemic workup until she is seen by Dr. Sun and if indicated may be combined with any work up needed for pulmonary hypertension.     She is feeling poorly as of late, due to chest pain, covid and stress related to such.     She sent a my chart message after the video visit indicating she has had some LE edema, but did not respond to lasix, and is concerned she is dry. She has apt on 9/13 with Dr. Sun, so will add labs to that visit, given today's visit is virtual.     PAST MEDICAL HISTORY:  Past Medical History:   Diagnosis Date     Arthritis      Cardiomyopathy (H)     ff Cardiology     Chronic depressive personality disorder      Congestive heart failure (H) see  note    heart failure correct term     COPD exacerbation (H)      Esophageal reflux      Ex-smoker     quit 2006; 1 PPD x 30     Hyperparathyroidism (H)     s/p parathyroidectomy      Hypothyroidism      LARRY on CPAP     ff Sleep medicine     Pulmonary nodules     ff Pulmonologist     S/P cardiac pacemaker procedure     checked every 6 months at the [facility]     Uncomplicated asthma years       FAMILY HISTORY:  Family History   Problem Relation Age of Onset     Hypothyroidism Mother      Hypertension Mother      Osteoarthritis Mother      Coronary Artery Disease Father         nonfatal MI in his 70s     Asthma Father      Diabetes Sister      Hypothyroidism Sister      Breast Cancer Maternal Aunt      Anxiety Disorder Sister        SOCIAL HISTORY:  Social History     Socioeconomic History     Marital status:      Spouse name: None     Number of children: None     Years of education: None     Highest education level: Master's degree (e.g., MA, MS, Florian, MEd, MSW, SONIA)   Tobacco Use     Smoking status: Former Smoker     Packs/day: 0.00     Years: 0.00     Pack years: 0.00     Smokeless tobacco: Never Used   Vaping Use     Vaping Use: Never used   Substance and Sexual Activity     Alcohol use: Yes     Alcohol/week: 0.0 - 8.0 standard drinks     Comment: seldom     Drug use: No     Sexual activity: Yes     Partners: Male     Birth control/protection: None     Comment: vasectomy   Other Topics Concern     Parent/sibling w/ CABG, MI or angioplasty before 65F 55M? No   Social History Narrative    CRNA on disability for vestibular symptoms      Social Determinants of Health     Intimate Partner Violence: Not At Risk     Fear of Current or Ex-Partner: No     Emotionally Abused: No     Physically Abused: No     Sexually Abused: No       CURRENT MEDICATIONS:  Current Outpatient Medications   Medication Sig Dispense Refill     acetaminophen (TYLENOL) 500 MG tablet Take 500-1,000 mg by mouth every 6 hours as needed for mild pain       albuterol (PROAIR HFA/PROVENTIL HFA/VENTOLIN HFA) 108 (90 BASE) MCG/ACT Inhaler Inhale 2 puffs into the lungs as needed for shortness of breath / dyspnea or wheezing         azelastine (ASTELIN) 0.1 % nasal spray Spray 1 spray into both nostrils 2 times daily as needed        Calcium Carbonate-Vitamin D (CALCIUM-CARB 600 + D PO) Take 1 tablet by mouth daily       cefuroxime (CEFTIN) 500 MG tablet Take 1 tablet (500 mg) by mouth 2 times daily Take daily probiotic. Do not take until day 7-10 of illness without improvement. 20 tablet 0     clonazePAM (KLONOPIN) 0.5 MG tablet TAKE 1 TABLET(0.5 MG) BY MOUTH THREE TIMES DAILY AS NEEDED FOR ANXIETY 90 tablet 1     DiphenhydrAMINE HCl (BENADRYL PO) Take 25 mg by mouth nightly as needed for allergies        ELIQUIS ANTICOAGULANT 5 MG tablet TAKE 1 TABLET(5 MG) BY MOUTH TWICE DAILY 180 tablet 3     Ferrous Sulfate (IRON SUPPLEMENT PO) Take 130 mg by mouth daily        fluticasone-salmeterol (ADVAIR) 250-50 MCG/DOSE inhaler Inhale 1 puff into the lungs every 12 hours       ipratropium (ATROVENT) 0.06 % nasal spray Spray 2 sprays into both nostrils 4 times daily as needed for rhinitis       MAGNESIUM LACTATE PO Take  mg by mouth nightly as needed        MELATONIN PO Take 1-3 mg by mouth nightly as needed        Menaquinone-7 (VITAMIN K2 PO) Take 1 tablet by mouth daily       methylPREDNISolone (MEDROL) 8 MG tablet Take 5 tablets (40 mg) by mouth daily for 2 days, THEN 4 tablets (32 mg) daily for 2 days, THEN 3 tablets (24 mg) daily for 2 days, THEN 2 tablets (16 mg) daily for 2 days, THEN 1 tablet (8 mg) daily for 2 days. Per asthma flare 150 tablet 0     metoprolol succinate ER (TOPROL XL) 25 MG 24 hr tablet Take 0.5 tablets (12.5 mg) by mouth in the morning. 45 tablet 3     Multiple Vitamin (DAILY MULTIVITAMIN PO) Take 1 tablet by mouth every morning        nebulizer nebulization as needed       nitroGLYcerin (NITROSTAT) 0.4 MG sublingual tablet Place 0.4 mg under the tongue every 5 minutes as needed for chest pain For chest pain place 1 tablet under the tongue every 5 minutes for 3 doses. If symptoms persist 5 minutes after 1st dose call  911.       Probiotic Product (PROBIOTIC DAILY PO) Take 1 capsule by mouth 2 times daily Takes additional doses if has diarrhea       spironolactone (ALDACTONE) 25 MG tablet Take 0.5 tablets (12.5 mg) by mouth daily 45 tablet 3     SYNTHROID 150 MCG tablet TAKE ONE TABLET BY MOUTH SIX DAYS A WEEK; TAKE ONE-HALF TABLET ONE DAY A WEEK 90 tablet 3     TURMERIC PO Take 1 tablet by mouth every morning        vitamin D3 (CHOLECALCIFEROL) 2000 units tablet Take 1 tablet by mouth daily 1 tablet 0     Zinc 15 MG CAPS Take 15 mg by mouth          ROS:  She is coughing throughout visit. Is SOB due to COVID, is not having chest pain daily.     Labs:  Will get with Dr. Sun visit.    Lab Results   Component Value Date    NTBNP 82 2022    NTBNP 156 (H) 2021       Assessment and Plan:   1. Chronic HFrED heart failure secondary to presumed Takosubo.    Stage C  NYHA Class III  ACEi/ARB no  BB yes  Aldosterone antagonist yes (currently on 12.5mg daily, but is having trouble splitting tablets)  SCD prophylaxis CRT-D  % BiV pacin.4%  Fluid status unable to assess due to virtual     2, chest pain - although chronic - having symptoms that are different - reviewed NTG use x 3 then call 911 if not relieved. - Will discuss with Dr. Sun.    3. Paroxsymal Afib; S/P CRT -D upgrade - followed by EP>    No medication changes made.  Follow-up Dr. Sun on       Patient Care Team:  No Ref-Primary, Physician as PCP - Yuliet Merino as Garland Velez MD as MD (Internal Medicine)  Adelaida De La Torre, SHAYNA CNP as Nurse Practitioner (Nurse Practitioner)  Lino Funes MD as MD (Clinical Cardiac Electrophysiology)  Garland Patel MD as Assigned Endocrinology Provider  Karen Peralta RPH as Pharmacist (Pharmacist Ambulatory Care)  Ce Maldonado NP as Assigned PCP  Matthias García RN as Specialty Care Coordinator (Cardiology)  Renetta Pugh MD as MD  (Gastroenterology)  Ranjit Penn MD as Assigned Gastroenterology Provider  Ranjit Penn MD as MD (Gastroenterology)  Lisa Medrano [facility] as Pharmacist (Pharmacist Ambulatory Care)  Lino Funes MD as Assigned Heart and Vascular Provider  Karen Peralta [facility] as Assigned MTM Pharmacist  Flako Welsh MD as MD (Ophthalmology)  ANNIKRYSTA is a 61 year old who is being evaluated via a billable video visit.      How would you like to obtain your AVS? MyChart  If the video visit is dropped, the invitation should be resent by: Text to cell phone: [phone]  Will anyone else be joining your video visit?   Possibly pts   LAURA Pineda/LETICIA          Video-Visit Details    Video Start Time: 5:17    Type of service:  Video Visit    Video End Time:5:45    Originating Location (pt. Location): Home    Distant Location (provider location):  [facility]     Platform used for Video Visit: [employer]

## 2022-08-24 NOTE — TELEPHONE ENCOUNTER
Reviewed with Fiorella Swanson NP. In agreement with antibody infusion.  Called Joyce back. Reviewed recommendation. She states understanding. Has appointment for infusion tomorrow. No further questions.

## 2022-08-24 NOTE — TELEPHONE ENCOUNTER
Order for monoclonal antibody treatment faxed to [facility] @ [phone] and [facility] @ [phone]. Patient informed.

## 2022-08-24 NOTE — TELEPHONE ENCOUNTER
[facility] Call Center    Phone Message    May a detailed message be left on voicemail: yes     Reason for Call: Other: Pt would like a call back to discuss the infusion for covid and would like a call back asap if poissible     Action Taken: Message routed to:  [facility] ([facility]): Cardio    Travel Screening: Not Applicable

## 2022-08-26 ENCOUNTER — HOME INFUSION (PRE-WILLOW HOME INFUSION) (OUTPATIENT)
Dept: PHARMACY | Facility: CLINIC | Age: 62
End: 2022-08-26

## 2022-08-30 ENCOUNTER — NURSE TRIAGE (OUTPATIENT)
Dept: FAMILY MEDICINE | Facility: CLINIC | Age: 62
End: 2022-08-30

## 2022-08-30 DIAGNOSIS — R06.02 SOB (SHORTNESS OF BREATH): Primary | ICD-10-CM

## 2022-08-30 RX ORDER — ALBUTEROL SULFATE 0.83 MG/ML
2.5 SOLUTION RESPIRATORY (INHALATION) EVERY 6 HOURS PRN
Qty: 90 ML | Refills: 0 | Status: SHIPPED | OUTPATIENT
Start: 2022-08-30

## 2022-08-30 NOTE — TELEPHONE ENCOUNTER
Notified patient of the orders/messasge below per Jessica Manning PA-C.    Patient stated understanding and agreeable with the plan of care.     Kristine RN,BSN  Triage Nurse  [facility]: [facility]

## 2022-08-30 NOTE — TELEPHONE ENCOUNTER
Reason for Disposition    MILD difficulty breathing (e.g., minimal/no SOB at rest, SOB with walking, pulse <100)    Oxygen level (e.g., pulse oximetry) 91 to 94 percent    Additional Information    Negative: SEVERE difficulty breathing (e.g., struggling for each breath, speaks in single words)     Advised patient to call 911 if this occurs.  Patient stated understanding and agreeable with the plan of care.    Negative: Difficult to awaken or acting confused (e.g., disoriented, slurred speech)     Advised patient to call 911 if this occurs.  Patient stated understanding and agreeable with the plan of care.    Negative: Bluish (or gray) lips or face now     Advised patient to call 911 if this occurs.  Patient stated understanding and agreeable with the plan of care.    Negative: Shock suspected (e.g., cold/pale/clammy skin, too weak to stand, low BP, rapid pulse)     Advised patient to call 911 if this occurs.  Patient stated understanding and agreeable with the plan of care.    Negative: Sounds like a life-threatening emergency to the triager    Negative: [1] Diagnosed or suspected COVID-19 AND [2] symptoms lasting 3 or more weeks    Negative: [1] COVID-19 exposure AND [2] no symptoms    Negative: COVID-19 vaccine reaction suspected (e.g., fever, headache, muscle aches) occurring 1 to 3 days after getting vaccine    Negative: COVID-19 vaccine, questions about    Negative: [1] Lives with someone known to have influenza (flu test positive) AND [2] flu-like symptoms (e.g., cough, runny nose, sore throat, SOB; with or without fever)    Negative: [1] Adult with possible COVID-19 symptoms AND [2] triager concerned about severity of symptoms or other causes    Negative: COVID-19 and breastfeeding, questions about    Negative: SEVERE or constant chest pain or pressure  (Exception: Mild central chest pain, present only when coughing.)     Advised patient to call 911 if this occurs.  Patient stated understanding and  "agreeable with the plan of care.    Negative: MODERATE difficulty breathing (e.g., speaks in phrases, SOB even at rest, pulse 100-120)     Advised patient to ED if this occurs.  Patient stated understanding and agreeable with the plan of care.    Negative: Headache and stiff neck (can't touch chin to chest)    Negative: Oxygen level (e.g., pulse oximetry) 90 percent or lower     Advised patient to ED if this occurs.  Patient stated understanding and agreeable with the plan of care.    Negative: Chest pain or pressure  (Exception: MILD central chest pain, present only when coughing)    Negative: Fever > 103 F (39.4 C)    Negative: [1] Fever > 101 F (38.3 C) AND [2] over 60 years of age    Negative: [1] Fever > 100.0 F (37.8 C) AND [2] bedridden (e.g., nursing home patient, CVA, chronic illness, recovering from surgery)    Negative: [1] HIGH RISK for severe COVID complications (e.g., weak immune system, age > 64 years, obesity with BMI of 30 or higher, pregnant, chronic lung disease or other chronic medical condition) AND [2] COVID symptoms (e.g., cough, fever)  (Exceptions: Already seen by PCP and no new or worsening symptoms.)    Answer Assessment - Initial Assessment Questions  1. COVID-19 DIAGNOSIS: \"Who made your COVID-19 diagnosis?\"       Josephine test last week  2. COVID-19 EXPOSURE: \"Was there any known exposure to COVID before the symptoms began?\" CDC Definition of close contact: within 6 feet (2 meters) for a total of 15 minutes or more over a 24-hour period.        also postive    3. ONSET: \"When did the COVID-19 symptoms start?\"       Over a week ago  4. WORST SYMPTOM: \"What is your worst symptom?\" (e.g., cough, fever, shortness of breath, muscle aches)      Cough, shortness of breath    5. COUGH: \"Do you have a cough?\" If Yes, ask: \"How bad is the cough?\"        Yes, non productive    6. FEVER: \"Do you have a fever?\" If Yes, ask: \"What is your temperature, how was it measured, and when did it start?\"   " "   Denies    7. RESPIRATORY STATUS: \"Describe your breathing?\" (e.g., shortness of breath, wheezing, unable to speak)       I feel short of breath with increase in walking. I walked around the block, and then went to [employer], and I felt lightheaded, however, my sats were 91 %.  They do go up  To 95% after inhaler. I do not have any nebs left.    8. BETTER-SAME-WORSE: \"Are you getting better, staying the same or getting worse compared to yesterday?\"  If getting worse, ask, \"In what way?\"    Getting worse  9. HIGH RISK DISEASE: \"Do you have any chronic medical problems?\" (e.g., asthma, heart or lung disease, weak immune system, obesity, etc.)      Yes, heart issues, and several other issues  10. VACCINE: \"Have you had the COVID-19 vaccine?\" If Yes, ask: \"Which one, how many shots, when did you get it?\"        yes  11. BOOSTER: \"Have you received your COVID-19 booster?\" If Yes, ask: \"Which one and when did you get it?\"        na  12. PREGNANCY: \"Is there any chance you are pregnant?\" \"When was your last menstrual period?\"        na  13. OTHER SYMPTOMS: \"Do you have any other symptoms?\"  (e.g., chills, fatigue, headache, loss of smell or taste, muscle pain, sore throat)        denies  14. O2 SATURATION MONITOR:  \"Do you use an oxygen saturation monitor (pulse oximeter) at home?\" If Yes, ask \"What is your reading (oxygen level)   today?\" \"What is your usual oxygen saturation reading?\" (e.g., 95%)  It is 91%-95%.    Protocols used: CORONAVIRUS (COVID-19) DIAGNOSED OR [id number]      "

## 2022-08-30 NOTE — TELEPHONE ENCOUNTER
Start abx as directed by Ce, can take 2-3 days to see improvements after starting  Nebs renewed, continue using PRN  If no improvements in 2-3 days or sxs continue to worsen she'll need to be seen for follow up in person

## 2022-08-30 NOTE — TELEPHONE ENCOUNTER
S-(situation): patient had an office visit for positive COVID on 22.  She feels that she is getting more shortness of breath, and more congested    B-(background): patient tested positive for COVID on 22, she received monoclonal antibodies on 22 (see OV notes from 22).    A-(assessment): patient stated that she was feeling better, but since last night/and this AM, she has been feeling some increased shortness of breath.  She stated that she went for a short walk, and she felt short of breath, but her sats were 95%.  She then went to [employer], and started to feel lightheaded/dizzy, and short of breath.  She was able to talk in full sentences. Her sats were about 95%. While talking to patient on the phone, she stated that the longer we talked the more short of breath she felt.    She stated that her sats have been running about 91% off and on, and then she will do an albuterol inhaler.  Her sats will go up to about 95%.  She has a non productive cough, that seems to be getting worse, she is going to start her antibiotics per SHAYNA Looney, FNKACI-BC, (advised to start after 7 days, if she is getting worse).  She is on a prednisone taper.    She denies any further symptoms or concerns.    1. Please advise on next steps  2. Patient is wanting a neb refill to help with congestion and shortness of breath. She has old solution that  on 2022.  3. She wanted to make sure that it was ok to start the antibiotics now.    Per OV notes:SHAYNA Looney, FNP-BC  Follow up if worsening symptoms for in person evaluation.  Do not start antibiotics until no improvement/ worsening symptoms days 7-10 of illness; if taking antibiotics- take daily probiotic/ yogurt.  Follow up as needed. Pt in agreement.       R-(recommendations): per protocol.  Advised to ER/call 911 if increased shortness of breath,  sats drop < 90 and red flag symptoms per protocol.    PER PROTOCOL DISCUSS WITH  PCP AND CALLBACK BY NURSE WITHIN 1 HOUR.    Provider is not in today.    Routed to covering providers to please advise.    Patient stated understanding and agreeable with the plan of care.     Kristine RN,BSN  Triage Nurse  [facility]: [facility]

## 2022-09-01 NOTE — PROGRESS NOTES
This is a recent snapshot of the patient's [facility] medical record.  For current drug dose and complete information and questions, call [phone] or In Basket pool, [facility]v home infusion ([id number])  CSN Number:  [id number]

## 2022-09-01 NOTE — PROGRESS NOTES
This is a recent snapshot of the patient's [facility] medical record.  For current drug dose and complete information and questions, call [phone] or In Basket pool, fv home infusion ([id number])  CSN Number:  [id number]

## 2022-09-13 ENCOUNTER — OFFICE VISIT (OUTPATIENT)
Dept: CARDIOLOGY | Facility: CLINIC | Age: 62
End: 2022-09-13
Payer: COMMERCIAL

## 2022-09-13 VITALS
WEIGHT: 180 LBS | HEART RATE: 90 BPM | BODY MASS INDEX: 29.95 KG/M2 | RESPIRATION RATE: 16 BRPM | DIASTOLIC BLOOD PRESSURE: 68 MMHG | SYSTOLIC BLOOD PRESSURE: 126 MMHG

## 2022-09-13 DIAGNOSIS — I27.20 PULMONARY HYPERTENSION (H): Primary | ICD-10-CM

## 2022-09-13 PROCEDURE — 99205 OFFICE O/P NEW HI 60 MIN: CPT | Performed by: INTERNAL MEDICINE

## 2022-09-13 RX ORDER — MULTIVIT WITH MINERALS/LUTEIN
250 TABLET ORAL DAILY
COMMUNITY
End: 2023-07-19

## 2022-09-13 NOTE — LETTER
2022    [place]  [address]  [place] [address]    RE: Joyce Marroquin       Dear Colleague,     I had the pleasure of seeing Joyce Marroquin in the [facility].  Service Date: 2022    Alonso Ordonez MD   [facility]  [address] [place], [place] [id number]    RE:  Joyce Marroquin  MRN: [id number]  : 1960    Dear Dr. Ordonez:    We had the pleasure of seeing Ms. Joyce Marroquin in our Pulmonary Hypertension Clinic at [facility].  Although you are familiar with her history, please allow me to summarize it for the purpose of our records.    Ms. Marroquin is a very delightful 61-year-old female who worked as a nurse anesthetist up until recently.  She was doing reasonably well up until 2016.  She started having PVCs.  She underwent a PVC ablation, which was complicated by complete heart block, and subsequently required a dual chamber pacemaker.      She established care with Dr. Funes in  for management of her permanent pacemaker.  Her left ventricular systolic function gradually declined to as low as 30%-35%.  This was thought to be due to chronic RV pacing.  She developed significant exertional shortness of breath and chest discomfort, which was thought to be secondary to her LV systolic function.  She was started on guideline-directed medical therapy. She underwent a CRT-D.   She has not been able to tolerate all guideline directed neurohormonal therapy for her LV systolic function due to low blood pressure.  She is currently on low dose of metoprolol and spironolactone only.      She had an exercise right heart catheterization as part of her evaluation for shortness of breath in .  This showed normal resting pulmonary pressures.  However, with exercise, her PA pressure increased to 38 mm Hg with a corresponding increase in pulmonary capillary wedge pressure up to 30.  She was diagnosed with  exercise-induced pulmonary hypertension.  She was tried on off-label sildenafil 20 mg 3 times a day.  She developed significant diarrhea with it, and discontinued it.    She self-referred herself to our clinic to get a second opinion on her pulmonary hypertension.  Clinically now, she states that she has been feeling better after the CRT-D placement in January.  Her shortness of breath and chest pressure with exertion has improved.  She is now able to do her activities of daily living.  She can walk on a flat surface up to a mile or half a mile per day with her dog.  She has not had any lower extremity swelling.  No exertional presyncope or syncope.  She has had exertional presyncope before, but now none since her CRT-D.  She has not had any syncope.  She denies having any PND or orthopnea.  She has had multiple ER visits for exertional shortness of breath and chest pain.    PAST MEDICAL HISTORY:    1.  PVCs and PACs.  2.  Complete heart block, status post dual chamber pacemaker.  3.  CRT-D Upgrade in 01/2022 (LBBB pacing)  4.  Heart failure with reduced ejection fraction, likely secondary to chronic RV pacing, now status post CRT-D with the most recent ejection fraction of 40%-45%.  4.  Graves disease, status post radioactive iodine treatment.  5.  Hypothyroidism following radioactive iodine therapy for Graves disease.  6.  Hyperparathyroidism, status post parathyroidectomy.  7.  Posttraumatic stress disorder.    PAST SURGICAL HISTORY:  Parathyroidectomy, tonsillectomy, multiple ankle surgeries.    MEDICATIONS:    Current Outpatient Medications   Medication Sig     acetaminophen (TYLENOL) 500 MG tablet Take 500-1,000 mg by mouth every 6 hours as needed for mild pain     albuterol (PROAIR HFA/PROVENTIL HFA/VENTOLIN HFA) 108 (90 BASE) MCG/ACT Inhaler Inhale 2 puffs into the lungs as needed for shortness of breath / dyspnea or wheezing      albuterol (PROVENTIL) (2.5 MG/3ML) 0.083% neb solution Take 1 vial (2.5 mg) by  nebulization every 6 hours as needed for shortness of breath / dyspnea or wheezing     azelastine (ASTELIN) 0.1 % nasal spray Spray 1 spray into both nostrils 2 times daily as needed (as needed)     Calcium Carbonate-Vitamin D (CALCIUM-CARB 600 + D PO) Take 1 tablet by mouth daily     clonazePAM (KLONOPIN) 0.5 MG tablet TAKE 1 TABLET(0.5 MG) BY MOUTH THREE TIMES DAILY AS NEEDED FOR ANXIETY     DiphenhydrAMINE HCl (BENADRYL PO) Take 25 mg by mouth nightly as needed for allergies      ELIQUIS ANTICOAGULANT 5 MG tablet TAKE 1 TABLET(5 MG) BY MOUTH TWICE DAILY     Ferrous Sulfate (IRON SUPPLEMENT PO) Take 130 mg by mouth daily      fluticasone-salmeterol (ADVAIR) 250-50 MCG/DOSE inhaler Inhale 1 puff into the lungs every 12 hours     ipratropium (ATROVENT) 0.06 % nasal spray Spray 2 sprays into both nostrils 4 times daily as needed for rhinitis     MAGNESIUM LACTATE PO Take  mg by mouth nightly as needed      MELATONIN PO Take 1-3 mg by mouth nightly as needed      Menaquinone-7 (VITAMIN K2 PO) Take 1 tablet by mouth daily     metoprolol succinate ER (TOPROL XL) 25 MG 24 hr tablet Take 0.5 tablets (12.5 mg) by mouth in the morning.     Multiple Vitamin (DAILY MULTIVITAMIN PO) Take 1 tablet by mouth every morning      nebulizer nebulization as needed     nitroGLYcerin (NITROSTAT) 0.4 MG sublingual tablet Place 0.4 mg under the tongue every 5 minutes as needed for chest pain For chest pain place 1 tablet under the tongue every 5 minutes for 3 doses. If symptoms persist 5 minutes after 1st dose call 911.     Probiotic Product (PROBIOTIC DAILY PO) Take 1 capsule by mouth 2 times daily Takes additional doses if has diarrhea     spironolactone (ALDACTONE) 25 MG tablet Take 0.5 tablets (12.5 mg) by mouth daily     SYNTHROID 150 MCG tablet TAKE ONE TABLET BY MOUTH SIX DAYS A WEEK; TAKE ONE-HALF TABLET ONE DAY A WEEK     TURMERIC PO Take 1 tablet by mouth every morning      vitamin C (ASCORBIC ACID) 250 MG tablet Take 250  mg by mouth daily     vitamin D3 (CHOLECALCIFEROL) 2000 units tablet Take 1 tablet by mouth daily     Zinc 15 MG CAPS Take 15 mg by mouth      No current facility-administered medications for this visit.         ALLERGIES:      Allergies   Allergen Reactions     Tetracycline Swelling     Tongue swelling     Viagra [Sildenafil] Muscle Pain (Myalgia) and Diarrhea     Zofran [Ondansetron]      Prolonged QT  Prolonged QT at baseline per patient     Flagyl [Metronidazole] Rash     Pruritic rash      Buspar [Buspirone]      Muscle weakness     Corn Oil Other (See Comments)     Headache       Cymbalta Other (See Comments) and Diarrhea     Confusion     Prozac [Fluoxetine] Diarrhea     Seasonal Allergies Difficulty breathing     Sulfamethoxazole-Trimethoprim      Other reaction(s): Other  Passed out     Cyclobenzaprine Other (See Comments)     Extreme heat intolerance     Levaquin [Levofloxacin]      Other reaction(s): Other  Tendon rupture     Nsaids Other (See Comments)     Exacerbated asthma     REVIEW OF SYSTEMS:  A detailed 12-point review of systems obtained as described in history of present illness.  All other systems reviewed are negative.    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  She retired in 2021 due to her cardiac condition.  She last worked at [employer].  She is  and living with her .  She drinks alcohol socially.  She has a 30-pack-year smoking history.  No other recreational drug use.    FAMILY HISTORY:  Her maternal grandfather  from myocardial infarction in his early 50s.  No other significant family history for premature cardiac disease, sudden cardiac death or pulmonary hypertension.    PHYSICAL EXAMINATION:  She was comfortable.  She was in no apparent distress.  Her blood pressure was 126/68.  Pulse rate was 90.  Her respiratory rate was 16.  She weighed 180 pounds.  She had no pallor, cyanosis, or  jaundice.  Her neck exam revealed no jugular venous distention.  Her carotids were 2+ bilaterally.  Her pulse was regular in rhythm.  Cardiac auscultation revealed normal S1 and S2 with no murmur, rub or gallop.  Auscultation of the lungs revealed equal air entry on both sides with no added sounds.  Her abdomen was soft with normal bowel sounds, no tenderness, no rigidity, no guarding.  She did not have any gross neurological deficits.  Her extremities showed no edema.      Testing:  Her most recent EKG showed low voltage, atrial sensed biventricular paced rhythm.      Her most recent echocardiogram was from 05/2022.  I personally reviewed this echo.  Her left ventricular systolic function was mildly reduced at 40%-45%.  Abnormal septal motion consistent with left bundle branch.  Right ventricular size and function were normal.  Her right atrium was normal.  Her estimated right ventricular systolic pressure was only 23 mmHg plus right atrial pressure.  She had no pericardial effusion.  She had no flattening of the interventricular septum to suggest right ventricular pressure or volume overload at rest.  She had no other valvular abnormalities.      I reviewed her right heart catheterization from 07/2020.  Her resting heart catheterization showed an RA pressure of 5, RV of 30/5, PA of 30/16 with a mean of 21, pulmonary capillary wedge pressure of 10, PA saturation of 67.8 thermodilution cardiac output of 8.6 with an index of 4.8, estimated Sita cardiac output of 3.5 with an index of 1.95 and a calculated PVR of 3.14 based on estimated Sita but a PVR of 1.3 Wood units based on thermodilution cardiac output.      With exercise, her PA pressure was 51/31 with a mean of 38, pulmonary capillary wedge pressure was 31.  Unfortunately, cardiac output was not measured with exercise.    ASSESSMENT AND PLAN:      In summary, Joyce Marroquin is a 61-year-old female with past medical history significant for PVCs, complete heart block,  status post pacemaker placement, LV systolic function, likely due to RV pacing, status post CRT-D, who self-referred herself for pulmonary hypertension.    Ms. Marroquin had normal pulmonary artery pressures, normal biventricular filling pressure and normal cardiac output by thermodilution at rest.  Her PVR was also normal at rest.  Thermodilution cardiac output is more reliable than estimated Sita.  Estimated Sita usually underestimates cardiac output.  With exercise, she had an increase in pulmonary capillary wedge pressure along with an increase in PA pressure.  This is consistent with exercise-induced pulmonary hypertension.  This is likely secondary to her left heart disease. Based on this, she does have exercise-induced pulmonary hypertension secondary to her LV systolic dysfunction.    As you know, there is no indication for pulmonary vasodilator therapy in patients with exercise-induced pulmonary hypertension due to left heart disease.  The best line of treatment would be to treat the underlying left heart disease, which is currently being treated very well.  She does not need any further testing.  She is already clinically feeling better after the CRT-D.  We will defer further management of her LV systolic function to Dr. Alonso Ordonez, who follows her on a regular basis.    We will be happy to see her on an as as-needed basis.  We do not need to see her on a regular basis.      It was a pleasure meeting Ms. Joyce Marroquin in our Pulmonary Hypertension Clinic.  I explained everything to the patient and answered her questions.  She felt relieved after understanding that she does not need any additional pulmonary vasodilator therapy.  She also feels relieved after understanding that she does not have pulmonary hypertension at rest and exercise-induced pulmonary hypertension is only secondary to her left heart disease.  We thank you for involving us in her care.    Total time today was 60 minutes reviewing notes,  imaging, labs, patient visit, orders and documentation         Sincerely,  Corinne Sun MD   [facility]  Heart Failure, Transplant, and Mechanical Circulatory Support Cardiology   Cardiovascular Division  [facility]   [phone]    cc:    MD Fiorella Hogan, APRN, CNP    Physicians  [address], [address] [facility] [address]  [place] [address]         Corinne Sun MD        D: 2022   T: 2022   MT:     Name:     MARCELO DAVIS  MRN:      [id number]        Account:      [id number]   :      1960           Service Date: 2022       Document: [id number]    Thank you for allowing me to participate in the care of your patient.      Sincerely,     Corinne Sun MD     [facility]  cc:   No referring provider defined for this encounter.

## 2022-09-13 NOTE — NURSING NOTE
Patient educated to the following during visit and educated to contact RN or MD for any questions.     Med Reconcile: Reviewed and verified all current medications with the patient. The updated medication list was printed and given to the patient.  Labs: Patient was given results of the laboratory testing obtained today. Patient demonstrated understanding of this information and agreed to call with further questions or concerns.   Diet: Patient instructed regarding a heart healthy diet, including discussion of reduced fat and sodium intake. Patient demonstrated understanding of this information and agreed to call with further questions or concerns.     Plan:   Medication Changes & Instructions:  No changes    Follow up Appointment Information:  No need to follow-up with the pulmonary hypertension clinic    Patient stated she understood all health information given and agreed to call with further questions or concerns.        Yani Guillen RN

## 2022-09-13 NOTE — PATIENT INSTRUCTIONS
Medication Changes & Instructions:  No changes    Follow up Appointment Information:  No need to follow-up with the pulmonary hypertension clinic      [address]  [place] [address]      Thank you for allowing us to be a part of your care here at the [facility].      If you have questions or concerns please contact us at:    Nurse Coordinator: Yani Guillen RN [phone]  Cardiovascular Clinic  [facility]   [phone] Press #1 to send a message to your care team  On Call Cardiologist for after hours or on weekends: [phone]  option #4     *All co-payments are due at the time of services, if financial concerns are keeping you from attending scheduled clinic visits please contact our financial counselors at [phone] for further assistance.   * Please note that you will NOT receive a reminder call regarding your scheduled testing, reminder calls are for provider appointments only.  If you are scheduled for testing within the [facility] system you may receive a call regarding pre-registration for billing purposes only.**

## 2022-09-13 NOTE — PROGRESS NOTES
Service Date: 2022    Alonso Ordonez MD   [facility]  [address] [place] [address]    RE:  Joyce Marroquin  MRN: [id number]  : 1960    Dear Dr. Ordonez:    We had the pleasure of seeing Ms. Joyce Marroquin in our Pulmonary Hypertension Clinic at [facility].  Although you are familiar with her history, please allow me to summarize it for the purpose of our records.    Ms. Marroquin is a very delightful 61-year-old female who worked as a nurse anesthetist up until recently.  She was doing reasonably well up until 2016.  She started having PVCs.  She underwent a PVC ablation, which was complicated by complete heart block, and subsequently required a dual chamber pacemaker.      She established care with Dr. Funes in 2017 for management of her permanent pacemaker.  Her left ventricular systolic function gradually declined to as low as 30%-35%.  This was thought to be due to chronic RV pacing.  She developed significant exertional shortness of breath and chest discomfort, which was thought to be secondary to her LV systolic function.  She was started on guideline-directed medical therapy. She underwent a CRT-D.   She has not been able to tolerate all guideline directed neurohormonal therapy for her LV systolic function due to low blood pressure.  She is currently on low dose of metoprolol and spironolactone only.      She had an exercise right heart catheterization as part of her evaluation for shortness of breath in .  This showed normal resting pulmonary pressures.  However, with exercise, her PA pressure increased to 38 mm Hg with a corresponding increase in pulmonary capillary wedge pressure up to 30.  She was diagnosed with exercise-induced pulmonary hypertension.  She was tried on off-label sildenafil 20 mg 3 times a day.  She developed significant diarrhea with it, and discontinued it.    She self-referred herself to our clinic to get a  second opinion on her pulmonary hypertension.  Clinically now, she states that she has been feeling better after the CRT-D placement in January.  Her shortness of breath and chest pressure with exertion has improved.  She is now able to do her activities of daily living.  She can walk on a flat surface up to a mile or half a mile per day with her dog.  She has not had any lower extremity swelling.  No exertional presyncope or syncope.  She has had exertional presyncope before, but now none since her CRT-D.  She has not had any syncope.  She denies having any PND or orthopnea.  She has had multiple ER visits for exertional shortness of breath and chest pain.    PAST MEDICAL HISTORY:    1.  PVCs and PACs.  2.  Complete heart block, status post dual chamber pacemaker.  3.  CRT-D Upgrade in 01/2022 (LBBB pacing)  4.  Heart failure with reduced ejection fraction, likely secondary to chronic RV pacing, now status post CRT-D with the most recent ejection fraction of 40%-45%.  4.  Graves disease, status post radioactive iodine treatment.  5.  Hypothyroidism following radioactive iodine therapy for Graves disease.  6.  Hyperparathyroidism, status post parathyroidectomy.  7.  Posttraumatic stress disorder.    PAST SURGICAL HISTORY:  Parathyroidectomy, tonsillectomy, multiple ankle surgeries.    MEDICATIONS:    Current Outpatient Medications   Medication Sig     acetaminophen (TYLENOL) 500 MG tablet Take 500-1,000 mg by mouth every 6 hours as needed for mild pain     albuterol (PROAIR HFA/PROVENTIL HFA/VENTOLIN HFA) 108 (90 BASE) MCG/ACT Inhaler Inhale 2 puffs into the lungs as needed for shortness of breath / dyspnea or wheezing      albuterol (PROVENTIL) (2.5 MG/3ML) 0.083% neb solution Take 1 vial (2.5 mg) by nebulization every 6 hours as needed for shortness of breath / dyspnea or wheezing     azelastine (ASTELIN) 0.1 % nasal spray Spray 1 spray into both nostrils 2 times daily as needed (as needed)     Calcium  Carbonate-Vitamin D (CALCIUM-CARB 600 + D PO) Take 1 tablet by mouth daily     clonazePAM (KLONOPIN) 0.5 MG tablet TAKE 1 TABLET(0.5 MG) BY MOUTH THREE TIMES DAILY AS NEEDED FOR ANXIETY     DiphenhydrAMINE HCl (BENADRYL PO) Take 25 mg by mouth nightly as needed for allergies      ELIQUIS ANTICOAGULANT 5 MG tablet TAKE 1 TABLET(5 MG) BY MOUTH TWICE DAILY     Ferrous Sulfate (IRON SUPPLEMENT PO) Take 130 mg by mouth daily      fluticasone-salmeterol (ADVAIR) 250-50 MCG/DOSE inhaler Inhale 1 puff into the lungs every 12 hours     ipratropium (ATROVENT) 0.06 % nasal spray Spray 2 sprays into both nostrils 4 times daily as needed for rhinitis     MAGNESIUM LACTATE PO Take  mg by mouth nightly as needed      MELATONIN PO Take 1-3 mg by mouth nightly as needed      Menaquinone-7 (VITAMIN K2 PO) Take 1 tablet by mouth daily     metoprolol succinate ER (TOPROL XL) 25 MG 24 hr tablet Take 0.5 tablets (12.5 mg) by mouth in the morning.     Multiple Vitamin (DAILY MULTIVITAMIN PO) Take 1 tablet by mouth every morning      nebulizer nebulization as needed     nitroGLYcerin (NITROSTAT) 0.4 MG sublingual tablet Place 0.4 mg under the tongue every 5 minutes as needed for chest pain For chest pain place 1 tablet under the tongue every 5 minutes for 3 doses. If symptoms persist 5 minutes after 1st dose call 911.     Probiotic Product (PROBIOTIC DAILY PO) Take 1 capsule by mouth 2 times daily Takes additional doses if has diarrhea     spironolactone (ALDACTONE) 25 MG tablet Take 0.5 tablets (12.5 mg) by mouth daily     SYNTHROID 150 MCG tablet TAKE ONE TABLET BY MOUTH SIX DAYS A WEEK; TAKE ONE-HALF TABLET ONE DAY A WEEK     TURMERIC PO Take 1 tablet by mouth every morning      vitamin C (ASCORBIC ACID) 250 MG tablet Take 250 mg by mouth daily     vitamin D3 (CHOLECALCIFEROL) 2000 units tablet Take 1 tablet by mouth daily     Zinc 15 MG CAPS Take 15 mg by mouth      No current facility-administered medications for this visit.          ALLERGIES:      Allergies   Allergen Reactions     Tetracycline Swelling     Tongue swelling     Viagra [Sildenafil] Muscle Pain (Myalgia) and Diarrhea     Zofran [Ondansetron]      Prolonged QT  Prolonged QT at baseline per patient     Flagyl [Metronidazole] Rash     Pruritic rash      Buspar [Buspirone]      Muscle weakness     Corn Oil Other (See Comments)     Headache       Cymbalta Other (See Comments) and Diarrhea     Confusion     Prozac [Fluoxetine] Diarrhea     Seasonal Allergies Difficulty breathing     Sulfamethoxazole-Trimethoprim      Other reaction(s): Other  Passed out     Cyclobenzaprine Other (See Comments)     Extreme heat intolerance     Levaquin [Levofloxacin]      Other reaction(s): Other  Tendon rupture     Nsaids Other (See Comments)     Exacerbated asthma     REVIEW OF SYSTEMS:  A detailed 12-point review of systems obtained as described in history of present illness.  All other systems reviewed are negative.    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  She retired in 2021 due to her cardiac condition.  She last worked at [employer].  She is  and living with her .  She drinks alcohol socially.  She has a 30-pack-year smoking history.  No other recreational drug use.    FAMILY HISTORY:  Her maternal grandfather  from myocardial infarction in his early 50s.  No other significant family history for premature cardiac disease, sudden cardiac death or pulmonary hypertension.    PHYSICAL EXAMINATION:  She was comfortable.  She was in no apparent distress.  Her blood pressure was 126/68.  Pulse rate was 90.  Her respiratory rate was 16.  She weighed 180 pounds.  She had no pallor, cyanosis, or jaundice.  Her neck exam revealed no jugular venous distention.  Her carotids were 2+ bilaterally.  Her pulse was regular in rhythm.  Cardiac auscultation revealed normal S1 and S2 with no murmur, rub or  gallop.  Auscultation of the lungs revealed equal air entry on both sides with no added sounds.  Her abdomen was soft with normal bowel sounds, no tenderness, no rigidity, no guarding.  She did not have any gross neurological deficits.  Her extremities showed no edema.      Testing:  Her most recent EKG showed low voltage, atrial sensed biventricular paced rhythm.      Her most recent echocardiogram was from 05/2022.  I personally reviewed this echo.  Her left ventricular systolic function was mildly reduced at 40%-45%.  Abnormal septal motion consistent with left bundle branch.  Right ventricular size and function were normal.  Her right atrium was normal.  Her estimated right ventricular systolic pressure was only 23 mmHg plus right atrial pressure.  She had no pericardial effusion.  She had no flattening of the interventricular septum to suggest right ventricular pressure or volume overload at rest.  She had no other valvular abnormalities.      I reviewed her right heart catheterization from 07/2020.  Her resting heart catheterization showed an RA pressure of 5, RV of 30/5, PA of 30/16 with a mean of 21, pulmonary capillary wedge pressure of 10, PA saturation of 67.8 thermodilution cardiac output of 8.6 with an index of 4.8, estimated Sita cardiac output of 3.5 with an index of 1.95 and a calculated PVR of 3.14 based on estimated Sita but a PVR of 1.3 Wood units based on thermodilution cardiac output.      With exercise, her PA pressure was 51/31 with a mean of 38, pulmonary capillary wedge pressure was 31.  Unfortunately, cardiac output was not measured with exercise.    ASSESSMENT AND PLAN:      In summary, Joyce Marroquin is a 61-year-old female with past medical history significant for PVCs, complete heart block, status post pacemaker placement, LV systolic function, likely due to RV pacing, status post CRT-D, who self-referred herself for pulmonary hypertension.    Ms. Marroquin had normal pulmonary artery  pressures, normal biventricular filling pressure and normal cardiac output by thermodilution at rest.  Her PVR was also normal at rest.  Thermodilution cardiac output is more reliable than estimated Sita.  Estimated Sita usually underestimates cardiac output.  With exercise, she had an increase in pulmonary capillary wedge pressure along with an increase in PA pressure.  This is consistent with exercise-induced pulmonary hypertension.  This is likely secondary to her left heart disease. Based on this, she does have exercise-induced pulmonary hypertension secondary to her LV systolic dysfunction.    As you know, there is no indication for pulmonary vasodilator therapy in patients with exercise-induced pulmonary hypertension due to left heart disease.  The best line of treatment would be to treat the underlying left heart disease, which is currently being treated very well.  She does not need any further testing.  She is already clinically feeling better after the CRT-D.  We will defer further management of her LV systolic function to Dr. Alonso Ordonez, who follows her on a regular basis.    We will be happy to see her on an as as-needed basis.  We do not need to see her on a regular basis.      It was a pleasure meeting MsTahira Joyce Marroquin in our Pulmonary Hypertension Clinic.  I explained everything to the patient and answered her questions.  She felt relieved after understanding that she does not need any additional pulmonary vasodilator therapy.  She also feels relieved after understanding that she does not have pulmonary hypertension at rest and exercise-induced pulmonary hypertension is only secondary to her left heart disease.  We thank you for involving us in her care.    Total time today was 60 minutes reviewing notes, imaging, labs, patient visit, orders and documentation         Sincerely,  Corinne Sun MD   [facility]  Heart Failure, Transplant, and Mechanical Circulatory Support  Cardiology   Cardiovascular Division  [facility]   [phone]    cc:    MD Fiorella Hogan, APRN, CNP    Physicians  [address], [facility] [address]  [place] [address]         Corinne Sun MD        D: 2022   T: 2022   MT: BRIAN    Name:     MARCELO DAVIS  MRN:      [id number]        Account:      [id number]   :      1960           Service Date: 2022       Document: [id number]

## 2022-09-14 RX ORDER — AZELASTINE 1 MG/ML
1 SPRAY, METERED NASAL 2 TIMES DAILY PRN
Refills: 0 | COMMUNITY
Start: 2022-09-14 | End: 2023-08-16

## 2022-09-20 ENCOUNTER — LAB REQUISITION (OUTPATIENT)
Dept: LAB | Facility: CLINIC | Age: 62
End: 2022-09-20

## 2022-09-20 DIAGNOSIS — N13.39 OTHER HYDRONEPHROSIS: ICD-10-CM

## 2022-09-20 DIAGNOSIS — R35.0 FREQUENCY OF MICTURITION: ICD-10-CM

## 2022-09-20 DIAGNOSIS — Z13.1 ENCOUNTER FOR SCREENING FOR DIABETES MELLITUS: ICD-10-CM

## 2022-09-20 DIAGNOSIS — Z13.220 ENCOUNTER FOR SCREENING FOR LIPOID DISORDERS: ICD-10-CM

## 2022-09-20 LAB
ANION GAP SERPL CALCULATED.3IONS-SCNC: 9 MMOL/L (ref 7–15)
BUN SERPL-MCNC: 13.6 MG/DL (ref 8–23)
CALCIUM SERPL-MCNC: 9.1 MG/DL (ref 8.8–10.2)
CHLORIDE SERPL-SCNC: 101 MMOL/L (ref 98–107)
CHOLEST SERPL-MCNC: 251 MG/DL
CREAT SERPL-MCNC: 0.6 MG/DL (ref 0.51–0.95)
DEPRECATED HCO3 PLAS-SCNC: 25 MMOL/L (ref 22–29)
GFR SERPL CREATININE-BSD FRML MDRD: >90 ML/MIN/1.73M2
GLUCOSE SERPL-MCNC: 86 MG/DL (ref 70–99)
HBA1C MFR BLD: 6.3 %
HDLC SERPL-MCNC: 78 MG/DL
LDLC SERPL CALC-MCNC: 153 MG/DL
NONHDLC SERPL-MCNC: 173 MG/DL
POTASSIUM SERPL-SCNC: 4.4 MMOL/L (ref 3.4–5.3)
SODIUM SERPL-SCNC: 135 MMOL/L (ref 136–145)
TRIGL SERPL-MCNC: 100 MG/DL

## 2022-09-20 PROCEDURE — 87086 URINE CULTURE/COLONY COUNT: CPT | Performed by: OBSTETRICS & GYNECOLOGY

## 2022-09-20 PROCEDURE — 80061 LIPID PANEL: CPT | Performed by: OBSTETRICS & GYNECOLOGY

## 2022-09-20 PROCEDURE — 80048 BASIC METABOLIC PNL TOTAL CA: CPT | Performed by: OBSTETRICS & GYNECOLOGY

## 2022-09-20 PROCEDURE — 83036 HEMOGLOBIN GLYCOSYLATED A1C: CPT | Performed by: OBSTETRICS & GYNECOLOGY

## 2022-09-22 LAB — BACTERIA UR CULT: NORMAL

## 2022-09-23 ENCOUNTER — ANCILLARY PROCEDURE (OUTPATIENT)
Dept: ULTRASOUND IMAGING | Facility: CLINIC | Age: 62
End: 2022-09-23
Attending: NURSE PRACTITIONER
Payer: COMMERCIAL

## 2022-09-23 ENCOUNTER — ANCILLARY PROCEDURE (OUTPATIENT)
Dept: ULTRASOUND IMAGING | Facility: CLINIC | Age: 62
End: 2022-09-23
Attending: OBSTETRICS & GYNECOLOGY
Payer: COMMERCIAL

## 2022-09-23 DIAGNOSIS — D21.9 FIBROID: ICD-10-CM

## 2022-09-23 DIAGNOSIS — K76.89 LIVER CYST: ICD-10-CM

## 2022-09-23 PROCEDURE — 76830 TRANSVAGINAL US NON-OB: CPT | Mod: TC | Performed by: RADIOLOGY

## 2022-09-23 PROCEDURE — 76856 US EXAM PELVIC COMPLETE: CPT | Mod: TC | Performed by: RADIOLOGY

## 2022-09-23 PROCEDURE — 76705 ECHO EXAM OF ABDOMEN: CPT | Mod: TC | Performed by: RADIOLOGY

## 2022-09-26 NOTE — RESULT ENCOUNTER NOTE
Jose Luis Cook,    Thank you for your recent office visit.    Here are your recent results.  Per radiology- IMPRESSION:  1.  Mild diffuse fatty infiltration of the liver.  2.  Otherwise unremarkable right upper quadrant ultrasound.      Nonalcoholic Fatty Liver Disease (NAFLD)  Nonalcoholic fatty liver disease (NAFLD) is a common disease of the liver. It occurs when you have too much fat in the liver. If NAFLD is severe, it can cause liver damage that seems like the damage caused by drinking too much alcohol. But NAFLD is not caused by drinking alcohol. This sheet tells you more about NAFLD and how it can be managed.     How the liver works   The liver is an organ in the upper right side of the belly (abdomen). It has many important jobs. These include:     Breaking down (metabolizing) proteins, carbohydrates, and fats    Making a substance called bile that helps break down fats    Storing and releasing sugar (glucose) into the blood to give the body energy    Removing toxins from the blood    Helping with blood clotting  Understanding NAFLD  A healthy liver may contain some fat. But if too much fat builds up in the liver, this causes NAFLD. NAFLD can be mild, causing fatty liver. Or it can be more severe and have inflammation as well as excess fat. This can cause non-alcoholic steatohepatitis (SMART).     Fatty liver. With fatty liver, the liver simply has more fat than normal. This extra fat usually does not harm the liver.    SMART. With SMART, the fatty liver becomes inflamed over time. SMART is serious because it can lead to scarring of the liver (fibrosis). Over time, the scarring may lead to cirrhosis of the liver. This can eventually cause liver failure or liver cancer.  Causes and risk factors of NAFLD  Doctors don't know what causes NAFLD. But certain things make the problem more likely to happen. These include:     Obesity    Prediabetes or diabetes    High levels of fat found in the blood (cholesterol and  triglycerides)    Taking certain medicines     Having polycystic ovary syndrome (PCOS)  Symptoms of NAFLD  Most people with NAFLD have no symptoms. If symptoms do occur, they can include:    Tiredness    Weakness    Weight loss    Loss of appetite    Nausea and vomiting    Belly pain and cramping    Yellowing of the skin and eyes (jaundice)    Dark urine    Light-colored stools that look gray    Swelling in the belly or legs  Diagnosing NAFLD  Your healthcare provider may think you have NAFLD if routine blood tests show high levels of liver enzymes. This may mean you have a liver problem. You may need 1 or more imaging tests, such as an ultrasound, CT, or MRI. You may need more blood tests to look for other causes of liver disease. You may also need a liver biopsy. During this test, a hollow needle is used to remove a tiny tissue sample from your liver. This tissue is then checked in a lab. This test can find signs of damage to liver tissue. It can also help figure out the cause of the damage and tell the difference between fatty liver and SMART.   Treating NAFLD  Treatment for NAFLD varies for each person. The best early treatment is to treat any conditions that are causing metabolic syndrome. This syndrome is a group of conditions that includes:     High blood pressure    High levels of cholesterol and triglycerides    Being overweight or obese    Diabetes  Your healthcare provider will monitor your health and treat any symptoms or underlying health problems you have. Your provider will also work with you to control your risk factors. This will make liver damage less likely. In fact, treating those underlying conditions can often improve liver disease. You may need to take certain medicines, but no medicine will cure NAFLD. This is why treating the underlying conditions is most important. Your plan may include:     Losing excess weight    Getting regular exercise    Controlling diabetes    Controlling high  cholesterol or triglyceride levels    Taking medicines and vitamins as prescribed by your provider    Not smoking    Not drinking alcohol    Eating a healthy diet  Living with NAFLD  If NAFLD is caught early, it can be managed with treatment. Your healthcare provider will discuss further treatment choices with you as needed.   Be sure to ask your provider about recommended vaccines. These include vaccines for viruses that can cause liver disease.   depict last reviewed this educational content on 2/1/2020 2000-2021 [employer]. All rights reserved. This information is not intended as a substitute for professional medical care. Always follow your healthcare professional's instructions.              Feel free to contact me via [employer] or call the clinic at [phone].    Sincerely,    SHAYNA Simon, FNP-BC

## 2022-09-28 ENCOUNTER — ANCILLARY PROCEDURE (OUTPATIENT)
Dept: CT IMAGING | Facility: CLINIC | Age: 62
End: 2022-09-28
Attending: INTERNAL MEDICINE
Payer: COMMERCIAL

## 2022-09-28 ENCOUNTER — ANCILLARY PROCEDURE (OUTPATIENT)
Dept: CARDIOLOGY | Facility: CLINIC | Age: 62
End: 2022-09-28
Attending: INTERNAL MEDICINE
Payer: COMMERCIAL

## 2022-09-28 DIAGNOSIS — R91.8 PULMONARY NODULES: ICD-10-CM

## 2022-09-28 DIAGNOSIS — I44.2 COMPLETE ATRIOVENTRICULAR BLOCK (H): ICD-10-CM

## 2022-09-28 LAB
MDC_IDC_LEAD_IMPLANT_DT: NORMAL
MDC_IDC_LEAD_LOCATION: NORMAL
MDC_IDC_LEAD_LOCATION_DETAIL_1: NORMAL
MDC_IDC_LEAD_MFG: NORMAL
MDC_IDC_LEAD_MODEL: NORMAL
MDC_IDC_LEAD_POLARITY_TYPE: NORMAL
MDC_IDC_LEAD_SERIAL: NORMAL
MDC_IDC_MSMT_BATTERY_DTM: NORMAL
MDC_IDC_MSMT_BATTERY_REMAINING_LONGEVITY: 122 MO
MDC_IDC_MSMT_BATTERY_RRT_TRIGGER: 2.6
MDC_IDC_MSMT_BATTERY_STATUS: NORMAL
MDC_IDC_MSMT_BATTERY_VOLTAGE: 3.06 V
MDC_IDC_MSMT_LEADCHNL_LV_IMPEDANCE_VALUE: 228 OHM
MDC_IDC_MSMT_LEADCHNL_LV_IMPEDANCE_VALUE: 304 OHM
MDC_IDC_MSMT_LEADCHNL_LV_IMPEDANCE_VALUE: 380 OHM
MDC_IDC_MSMT_LEADCHNL_LV_IMPEDANCE_VALUE: 456 OHM
MDC_IDC_MSMT_LEADCHNL_LV_IMPEDANCE_VALUE: 475 OHM
MDC_IDC_MSMT_LEADCHNL_LV_PACING_THRESHOLD_AMPLITUDE: 0.62 V
MDC_IDC_MSMT_LEADCHNL_LV_PACING_THRESHOLD_PULSEWIDTH: 0.4 MS
MDC_IDC_MSMT_LEADCHNL_RA_IMPEDANCE_VALUE: 342 OHM
MDC_IDC_MSMT_LEADCHNL_RA_IMPEDANCE_VALUE: 380 OHM
MDC_IDC_MSMT_LEADCHNL_RA_PACING_THRESHOLD_AMPLITUDE: 0.5 V
MDC_IDC_MSMT_LEADCHNL_RA_PACING_THRESHOLD_PULSEWIDTH: 0.4 MS
MDC_IDC_MSMT_LEADCHNL_RA_SENSING_INTR_AMPL: 2.1 MV
MDC_IDC_MSMT_LEADCHNL_RV_IMPEDANCE_VALUE: 380 OHM
MDC_IDC_MSMT_LEADCHNL_RV_IMPEDANCE_VALUE: 418 OHM
MDC_IDC_MSMT_LEADCHNL_RV_PACING_THRESHOLD_AMPLITUDE: 0.75 V
MDC_IDC_MSMT_LEADCHNL_RV_PACING_THRESHOLD_PULSEWIDTH: 0.4 MS
MDC_IDC_MSMT_LEADCHNL_RV_SENSING_INTR_AMPL: 6.4 MV
MDC_IDC_PG_IMPLANT_DTM: NORMAL
MDC_IDC_PG_MFG: NORMAL
MDC_IDC_PG_MODEL: NORMAL
MDC_IDC_PG_SERIAL: NORMAL
MDC_IDC_PG_TYPE: NORMAL
MDC_IDC_SESS_CLINIC_NAME: NORMAL
MDC_IDC_SESS_DTM: NORMAL
MDC_IDC_SESS_TYPE: NORMAL
MDC_IDC_SET_BRADY_AT_MODE_SWITCH_RATE: 171 {BEATS}/MIN
MDC_IDC_SET_BRADY_LOWRATE: 60 {BEATS}/MIN
MDC_IDC_SET_BRADY_MAX_SENSOR_RATE: 140 {BEATS}/MIN
MDC_IDC_SET_BRADY_MAX_TRACKING_RATE: 140 {BEATS}/MIN
MDC_IDC_SET_BRADY_MODE: NORMAL
MDC_IDC_SET_BRADY_PAV_DELAY_HIGH: 100 MS
MDC_IDC_SET_BRADY_PAV_DELAY_LOW: 180 MS
MDC_IDC_SET_BRADY_SAV_DELAY_HIGH: 70 MS
MDC_IDC_SET_BRADY_SAV_DELAY_LOW: 150 MS
MDC_IDC_SET_CRT_LVRV_DELAY: 80 MS
MDC_IDC_SET_CRT_PACED_CHAMBERS: NORMAL
MDC_IDC_SET_LEADCHNL_LV_PACING_AMPLITUDE: 1.25 V
MDC_IDC_SET_LEADCHNL_LV_PACING_ANODE_ELECTRODE_1: NORMAL
MDC_IDC_SET_LEADCHNL_LV_PACING_ANODE_LOCATION_1: NORMAL
MDC_IDC_SET_LEADCHNL_LV_PACING_CAPTURE_MODE: NORMAL
MDC_IDC_SET_LEADCHNL_LV_PACING_CATHODE_ELECTRODE_1: NORMAL
MDC_IDC_SET_LEADCHNL_LV_PACING_CATHODE_LOCATION_1: NORMAL
MDC_IDC_SET_LEADCHNL_LV_PACING_POLARITY: NORMAL
MDC_IDC_SET_LEADCHNL_LV_PACING_PULSEWIDTH: 0.4 MS
MDC_IDC_SET_LEADCHNL_RA_PACING_AMPLITUDE: 1.5 V
MDC_IDC_SET_LEADCHNL_RA_PACING_ANODE_ELECTRODE_1: NORMAL
MDC_IDC_SET_LEADCHNL_RA_PACING_ANODE_LOCATION_1: NORMAL
MDC_IDC_SET_LEADCHNL_RA_PACING_CAPTURE_MODE: NORMAL
MDC_IDC_SET_LEADCHNL_RA_PACING_CATHODE_ELECTRODE_1: NORMAL
MDC_IDC_SET_LEADCHNL_RA_PACING_CATHODE_LOCATION_1: NORMAL
MDC_IDC_SET_LEADCHNL_RA_PACING_POLARITY: NORMAL
MDC_IDC_SET_LEADCHNL_RA_PACING_PULSEWIDTH: 0.4 MS
MDC_IDC_SET_LEADCHNL_RA_SENSING_ANODE_ELECTRODE_1: NORMAL
MDC_IDC_SET_LEADCHNL_RA_SENSING_ANODE_LOCATION_1: NORMAL
MDC_IDC_SET_LEADCHNL_RA_SENSING_CATHODE_ELECTRODE_1: NORMAL
MDC_IDC_SET_LEADCHNL_RA_SENSING_CATHODE_LOCATION_1: NORMAL
MDC_IDC_SET_LEADCHNL_RA_SENSING_POLARITY: NORMAL
MDC_IDC_SET_LEADCHNL_RA_SENSING_SENSITIVITY: 0.3 MV
MDC_IDC_SET_LEADCHNL_RV_PACING_AMPLITUDE: 2 V
MDC_IDC_SET_LEADCHNL_RV_PACING_ANODE_ELECTRODE_1: NORMAL
MDC_IDC_SET_LEADCHNL_RV_PACING_ANODE_LOCATION_1: NORMAL
MDC_IDC_SET_LEADCHNL_RV_PACING_CAPTURE_MODE: NORMAL
MDC_IDC_SET_LEADCHNL_RV_PACING_CATHODE_ELECTRODE_1: NORMAL
MDC_IDC_SET_LEADCHNL_RV_PACING_CATHODE_LOCATION_1: NORMAL
MDC_IDC_SET_LEADCHNL_RV_PACING_POLARITY: NORMAL
MDC_IDC_SET_LEADCHNL_RV_PACING_PULSEWIDTH: 0.4 MS
MDC_IDC_SET_LEADCHNL_RV_SENSING_ANODE_ELECTRODE_1: NORMAL
MDC_IDC_SET_LEADCHNL_RV_SENSING_ANODE_LOCATION_1: NORMAL
MDC_IDC_SET_LEADCHNL_RV_SENSING_CATHODE_ELECTRODE_1: NORMAL
MDC_IDC_SET_LEADCHNL_RV_SENSING_CATHODE_LOCATION_1: NORMAL
MDC_IDC_SET_LEADCHNL_RV_SENSING_POLARITY: NORMAL
MDC_IDC_SET_LEADCHNL_RV_SENSING_SENSITIVITY: 0.9 MV
MDC_IDC_SET_ZONE_DETECTION_INTERVAL: 350 MS
MDC_IDC_SET_ZONE_DETECTION_INTERVAL: 400 MS
MDC_IDC_SET_ZONE_TYPE: NORMAL
MDC_IDC_STAT_AT_BURDEN_PERCENT: 0 %
MDC_IDC_STAT_AT_DTM_END: NORMAL
MDC_IDC_STAT_AT_DTM_START: NORMAL
MDC_IDC_STAT_BRADY_AP_VP_PERCENT: 36.64 %
MDC_IDC_STAT_BRADY_AP_VS_PERCENT: 0.14 %
MDC_IDC_STAT_BRADY_AS_VP_PERCENT: 62.63 %
MDC_IDC_STAT_BRADY_AS_VS_PERCENT: 0.59 %
MDC_IDC_STAT_BRADY_DTM_END: NORMAL
MDC_IDC_STAT_BRADY_DTM_START: NORMAL
MDC_IDC_STAT_BRADY_RA_PERCENT_PACED: 37.02 %
MDC_IDC_STAT_BRADY_RV_PERCENT_PACED: 99.27 %
MDC_IDC_STAT_CRT_DTM_END: NORMAL
MDC_IDC_STAT_CRT_DTM_START: NORMAL
MDC_IDC_STAT_CRT_LV_PERCENT_PACED: 99.22 %
MDC_IDC_STAT_CRT_PERCENT_PACED: 99.22 %
MDC_IDC_STAT_EPISODE_RECENT_COUNT: 0
MDC_IDC_STAT_EPISODE_RECENT_COUNT_DTM_END: NORMAL
MDC_IDC_STAT_EPISODE_RECENT_COUNT_DTM_START: NORMAL
MDC_IDC_STAT_EPISODE_TOTAL_COUNT: 0
MDC_IDC_STAT_EPISODE_TOTAL_COUNT: 1
MDC_IDC_STAT_EPISODE_TOTAL_COUNT_DTM_END: NORMAL
MDC_IDC_STAT_EPISODE_TOTAL_COUNT_DTM_START: NORMAL
MDC_IDC_STAT_EPISODE_TYPE: NORMAL

## 2022-09-28 PROCEDURE — 93296 REM INTERROG EVL PM/IDS: CPT

## 2022-09-28 PROCEDURE — 71250 CT THORAX DX C-: CPT | Mod: TC | Performed by: RADIOLOGY

## 2022-09-28 PROCEDURE — 99207 CARDIAC DEVICE CHECK - REMOTE: CPT | Performed by: INTERNAL MEDICINE

## 2022-09-29 ENCOUNTER — ANCILLARY PROCEDURE (OUTPATIENT)
Dept: CT IMAGING | Facility: CLINIC | Age: 62
End: 2022-09-29
Attending: NURSE PRACTITIONER
Payer: COMMERCIAL

## 2022-09-29 ENCOUNTER — OFFICE VISIT (OUTPATIENT)
Dept: FAMILY MEDICINE | Facility: CLINIC | Age: 62
End: 2022-09-29
Payer: COMMERCIAL

## 2022-09-29 ENCOUNTER — VIRTUAL VISIT (OUTPATIENT)
Dept: CARDIOLOGY | Facility: CLINIC | Age: 62
End: 2022-09-29
Attending: INTERNAL MEDICINE
Payer: COMMERCIAL

## 2022-09-29 VITALS
TEMPERATURE: 98.9 F | HEART RATE: 101 BPM | WEIGHT: 180.8 LBS | BODY MASS INDEX: 30.09 KG/M2 | SYSTOLIC BLOOD PRESSURE: 117 MMHG | DIASTOLIC BLOOD PRESSURE: 72 MMHG | OXYGEN SATURATION: 96 % | RESPIRATION RATE: 14 BRPM

## 2022-09-29 DIAGNOSIS — S06.0X0A CONCUSSION WITHOUT LOSS OF CONSCIOUSNESS, INITIAL ENCOUNTER: ICD-10-CM

## 2022-09-29 DIAGNOSIS — I44.2 COMPLETE HEART BLOCK (H): ICD-10-CM

## 2022-09-29 DIAGNOSIS — Z95.0 CARDIAC PACEMAKER IN SITU: ICD-10-CM

## 2022-09-29 DIAGNOSIS — Z79.01 LONG TERM CURRENT USE OF ANTICOAGULANT THERAPY: ICD-10-CM

## 2022-09-29 DIAGNOSIS — F34.1 CHRONIC DEPRESSIVE PERSONALITY DISORDER: ICD-10-CM

## 2022-09-29 DIAGNOSIS — F43.23 ADJUSTMENT DISORDER WITH MIXED ANXIETY AND DEPRESSED MOOD: ICD-10-CM

## 2022-09-29 DIAGNOSIS — S09.90XA CLOSED HEAD INJURY, INITIAL ENCOUNTER: ICD-10-CM

## 2022-09-29 DIAGNOSIS — S06.0X0A CONCUSSION WITHOUT LOSS OF CONSCIOUSNESS, INITIAL ENCOUNTER: Primary | ICD-10-CM

## 2022-09-29 PROCEDURE — 99214 OFFICE O/P EST MOD 30 MIN: CPT | Performed by: NURSE PRACTITIONER

## 2022-09-29 PROCEDURE — 70450 CT HEAD/BRAIN W/O DYE: CPT | Mod: TC | Performed by: RADIOLOGY

## 2022-09-29 PROCEDURE — 99214 OFFICE O/P EST MOD 30 MIN: CPT | Mod: 95 | Performed by: INTERNAL MEDICINE

## 2022-09-29 ASSESSMENT — ANXIETY QUESTIONNAIRES
7. FEELING AFRAID AS IF SOMETHING AWFUL MIGHT HAPPEN: MORE THAN HALF THE DAYS
GAD7 TOTAL SCORE: 14
8. IF YOU CHECKED OFF ANY PROBLEMS, HOW DIFFICULT HAVE THESE MADE IT FOR YOU TO DO YOUR WORK, TAKE CARE OF THINGS AT HOME, OR GET ALONG WITH OTHER PEOPLE?: VERY DIFFICULT

## 2022-09-29 ASSESSMENT — PATIENT HEALTH QUESTIONNAIRE - PHQ9: SUM OF ALL RESPONSES TO PHQ QUESTIONS 1-9: 17

## 2022-09-29 ASSESSMENT — PAIN SCALES - GENERAL: PAINLEVEL: MILD PAIN (2)

## 2022-09-29 NOTE — RESULT ENCOUNTER NOTE
Jose Luis Cook,    Thank you for your recent office visit.    Here are your recent results.  Per radiology- good news! - IMPRESSION: No evidence of acute intracranial hemorrhage, mass, or  herniation.    Feel free to contact me via [employer] or call the clinic at [phone].    Sincerely,    SHAYNA Simon, FNP-BC

## 2022-09-29 NOTE — PROGRESS NOTES
Joyce Marroquin  is being evaluated via a billable video visit.      How would you like to obtain your AVS? [place] [employer]  For the video visit, send the invitation by: Text to cell phone: [phone]  Will anyone else be joining your video visit? 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  Given her exertional limitations recently altered the pacing system to include left bundle pacing via transseptal methodology.  This seems to have been very helpful and her exertional symptoms have improved.  Currently she is able to climb a flight of stairs without shortness of breath and is able to walk her dogs without excessive difficulty.  She still does have stamina limitations and is unable to return to her usual work and has difficulty doing standing for overall, however, Joyce is definitely improved from a cardiovascular perspective.  Her most recent device interrogation is summarized below and shows virtually 100% ventricular pacing and 40% atrial pacing.    Joyce's most recent ECG was in late July 2022 evidences a normal QT of 405 and a normal QRS duration despite pacing suggesting excellent left bundle capture.    Joyce has not had atrial fibrillation documented on recent device interrogations her ECGs.  Nonetheless we will continue with apixaban therapy.  She does note some bruises on her forearms that are a nuisance but shannanant has not had any significant bleeding issues.    Plan will be to continue current treatment and follow-up.  Joyce has with the heart failure group (Dr Ordonez) in December.  She apparently has some diminution of her plasma sodium but there is not appear to be any reason to recommend intervention    Joyce will continue her usual pacemaker follow-up and absent any change in her clinical status  I we will plan to see her in approximately 1 year.       DEVICE INTERROGATION    Device: Medtronic [id number] Percepta CRT-P (His lead in LV port)  Normal Device Function.  Mode: DDDR  bpm  AP: 37%  : 99.2%  BVP: 99.3%  VSR Pace: 0.3%  Presenting EGM: AP-AS/BVP @ ~100 bpm w/ PVC  Thoracic Impedance: Below reference line, suggesting possible intrathoracic fluid accumulation.  Short V-V intervals: 0  Lead Trends Appear Stable: Yes  Estimated battery longevity to RRT = 10.1 years. Battery voltage = 3.06 V.   Atrial Arrhythmia: None  AF [place]: 0%  Anticoagulant: Eliquis  Ventricular Arrhythmia: None      PAST MEDICAL HISTORY:  Past Medical History:   Diagnosis Date     Arthritis      Cardiomyopathy (H)     ff Cardiology     Chronic depressive personality disorder      Congestive heart failure (H) see dr martínez    heart failure correct term     COPD exacerbation (H)      Esophageal reflux      Ex-smoker     quit 2006; 1 PPD x 30     Hyperparathyroidism (H)     s/p parathyroidectomy     Hypothyroidism      LARRY on CPAP     ff Sleep medicine     Pulmonary nodules     ff Pulmonologist     S/P cardiac pacemaker procedure     checked every 6 months at the [facility]     Uncomplicated asthma years       CURRENT MEDICATIONS:  Current Outpatient Medications   Medication Sig Dispense Refill     acetaminophen (TYLENOL) 500 MG tablet Take 500-1,000 mg by mouth every 6 hours as needed for mild pain       albuterol (PROAIR HFA/PROVENTIL HFA/VENTOLIN HFA) 108 (90 BASE) MCG/ACT Inhaler Inhale 2 puffs into the lungs as needed for shortness of breath / dyspnea or wheezing        albuterol (PROVENTIL) (2.5 MG/3ML) 0.083% neb solution Take 1 vial (2.5 mg) by nebulization every 6 hours as needed for shortness of breath / dyspnea or wheezing 90 mL 0     azelastine (ASTELIN) 0.1 % nasal spray Spray 1 spray into both nostrils 2 times daily as needed (as needed)  0     Calcium Carbonate-Vitamin D (CALCIUM-CARB 600 + D PO) Take 1 tablet by mouth  daily       clonazePAM (KLONOPIN) 0.5 MG tablet TAKE 1 TABLET(0.5 MG) BY MOUTH THREE TIMES DAILY AS NEEDED FOR ANXIETY 90 tablet 1     DiphenhydrAMINE HCl (BENADRYL PO) Take 25 mg by mouth nightly as needed for allergies        ELIQUIS ANTICOAGULANT 5 MG tablet TAKE 1 TABLET(5 MG) BY MOUTH TWICE DAILY 180 tablet 3     Ferrous Sulfate (IRON SUPPLEMENT PO) Take 130 mg by mouth daily        fluticasone-salmeterol (ADVAIR) 250-50 MCG/DOSE inhaler Inhale 1 puff into the lungs every 12 hours       ipratropium (ATROVENT) 0.06 % nasal spray Spray 2 sprays into both nostrils 4 times daily as needed for rhinitis       MAGNESIUM LACTATE PO Take  mg by mouth nightly as needed        MELATONIN PO Take 1-3 mg by mouth nightly as needed        Menaquinone-7 (VITAMIN K2 PO) Take 1 tablet by mouth daily       metoprolol succinate ER (TOPROL XL) 25 MG 24 hr tablet Take 0.5 tablets (12.5 mg) by mouth in the morning. 45 tablet 3     Multiple Vitamin (DAILY MULTIVITAMIN PO) Take 1 tablet by mouth every morning        nebulizer nebulization as needed       nitroGLYcerin (NITROSTAT) 0.4 MG sublingual tablet Place 0.4 mg under the tongue every 5 minutes as needed for chest pain For chest pain place 1 tablet under the tongue every 5 minutes for 3 doses. If symptoms persist 5 minutes after 1st dose call 911.       Probiotic Product (PROBIOTIC DAILY PO) Take 1 capsule by mouth 2 times daily Takes additional doses if has diarrhea       spironolactone (ALDACTONE) 25 MG tablet Take 0.5 tablets (12.5 mg) by mouth daily 45 tablet 3     SYNTHROID 150 MCG tablet TAKE ONE TABLET BY MOUTH SIX DAYS A WEEK; TAKE ONE-HALF TABLET ONE DAY A WEEK 90 tablet 3     TURMERIC PO Take 1 tablet by mouth every morning        vitamin C (ASCORBIC ACID) 250 MG tablet Take 250 mg by mouth daily       vitamin D3 (CHOLECALCIFEROL) 2000 units tablet Take 1 tablet by mouth daily 1 tablet 0     Zinc 15 MG CAPS Take 15 mg by mouth          PAST SURGICAL HISTORY:  Past  Surgical History:   Procedure Laterality Date     BUNIONECTOMY Bilateral      COLONOSCOPY N/A 8/30/2021    Procedure: COLONOSCOPY, WITH POLYPECTOMY AND BIOPSY;  Surgeon: Ranjit Penn MD;  Location: [facility]     CV CORONARY ANGIOGRAM N/A 9/26/2019    Procedure: CV CORONARY ANGIOGRAM;  Surgeon: Erickson Trejo MD;  Location:  HEART CARDIAC CATH LAB     CV RIGHT HEART CATH MEASUREMENTS RECORDED N/A 7/20/2020    Procedure: CV RIGHT HEART CATH;  Surgeon: Alonso Ordonez MD;  Location:  HEART CARDIAC CATH LAB     EP ABLATION / EP STUDIES  04/21/2017     EP PPM UPGRADE TO BIVENT N/A 1/19/2022    Procedure: EP PPM UPGRADE TO BIVENT;  Surgeon: Lino Funes MD;  Location:  HEART CARDIAC CATH LAB     EXPLORE NECK N/A 9/19/2018    Procedure: EXPLORE NECK;  Neck Exploration Resection of Left superior Parathyroid gland;  Surgeon: Kristine Venegas MD;  Location: [facility] OR      CORRECT BUNION,SIMPLE       PARATHYROIDECTOMY N/A 9/19/2018    Procedure: PARATHYROIDECTOMY;;  Surgeon: Kristine Venegas MD;  Location: [facility] OR     PPM INSERT OF NEW OR REPL W/VENT LEAD  04/21/2017     TONSILLECTOMY & ADENOIDECTOMY         ALLERGIES:     Allergies   Allergen Reactions     Tetracycline Swelling     Tongue swelling     Viagra [Sildenafil] Muscle Pain (Myalgia) and Diarrhea     Zofran [Ondansetron]      Prolonged QT  Prolonged QT at baseline per patient     Flagyl [Metronidazole] Rash     Pruritic rash      Buspar [Buspirone]      Muscle weakness     Corn Oil Other (See Comments)     Headache       Cymbalta Other (See Comments) and Diarrhea     Confusion     Prozac [Fluoxetine] Diarrhea     Seasonal Allergies Difficulty breathing     Sulfamethoxazole-Trimethoprim      Other reaction(s): Other  Passed out     Cyclobenzaprine Other (See Comments)     Extreme heat intolerance     Levaquin [Levofloxacin]      Other reaction(s): Other  Tendon rupture     Nsaids Other (See Comments)     Exacerbated asthma       FAMILY  HISTORY:  Family History   Problem Relation Age of Onset     Hypothyroidism Mother      Hypertension Mother      Osteoarthritis Mother      Coronary Artery Disease Father         nonfatal MI in his 70s     Asthma Father      Diabetes Sister      Hypothyroidism Sister      Breast Cancer Maternal Aunt      Anxiety Disorder Sister        SOCIAL HISTORY:  Social History     Tobacco Use     Smoking status: Former Smoker     Packs/day: 0.00     Years: 0.00     Pack years: 0.00     Smokeless tobacco: Never Used   Vaping Use     Vaping Use: Never used   Substance Use Topics     Alcohol use: Yes     Alcohol/week: 0.0 - 8.0 standard drinks     Comment: seldom     Drug use: No       ROS:   Constitutional: No fever, chills, or sweats. Weight stable.   ENT: No visual disturbance, ear ache, epistaxis, sore throat.   Cardiovascular: As per HPI.   Respiratory: No cough, hemoptysis.    GI: No nausea, vomiting, .   : No hematuria.   Integument: Negative.   Psychiatric: Negative.   Hematologic:  Easy bruising, no easy bleeding.  Neuro: Negative.   Endocrinology: No significant heat or cold intolerance   Musculoskeletal: No myalgia.    Exam:  LMP 08/21/2007   GENERAL APPEARANCE: healthy, alert and no distress  HEENT: no icterus, , no central cyanosis  NECK: no adenopathy, no asymmetry, masses, or scars, thyroid normal to palpation and no bruits, JVP not elevated  RESPIRATORY:- no rales, rhonchi or wheezes, no use of accessory muscles, no retractions, respirations are unlabored, normal respiratory rate  NEURO: alert and oriented to person/place/time, normal speech, gait and affect  SKIN: no ecchymoses, no rashes    Labs:  CBC RESULTS:   Lab Results   Component Value Date    WBC 7.0 07/28/2022    WBC 6.6 05/25/2021    RBC 3.93 07/28/2022    RBC 4.33 05/25/2021    HGB 12.4 07/28/2022    HGB 13.7 05/25/2021    HCT 37.9 07/28/2022    HCT 41.2 05/25/2021    MCV 96 07/28/2022    MCV 95 05/25/2021    MCH 31.6 07/28/2022    MCH 31.6  05/25/2021    MCHC 32.7 07/28/2022    MCHC 33.3 05/25/2021    RDW 12.2 07/28/2022    RDW 11.9 05/25/2021     07/28/2022     05/25/2021       BMP RESULTS:  Lab Results   Component Value Date     (L) 09/20/2022     05/25/2021    POTASSIUM 4.4 09/20/2022    POTASSIUM 4.2 06/07/2022    POTASSIUM 4.2 05/25/2021    CHLORIDE 101 09/20/2022    CHLORIDE 106 06/07/2022    CHLORIDE 107 05/25/2021    CO2 25 09/20/2022    CO2 27 06/07/2022    CO2 24 05/25/2021    ANIONGAP 9 09/20/2022    ANIONGAP 8 06/07/2022    ANIONGAP 7 05/25/2021    GLC 86 09/20/2022    GLC 90 06/07/2022    GLC 92 05/25/2021    BUN 13.6 09/20/2022    BUN 9 06/07/2022    BUN 12 05/25/2021    CR 0.60 09/20/2022    CR 0.64 05/25/2021    GFRESTIMATED >90 09/20/2022    GFRESTIMATED >90 05/25/2021    GFRESTBLACK >90 05/25/2021    MANJU 9.1 09/20/2022    MANJU 8.7 05/25/2021        INR RESULTS:  Lab Results   Component Value Date    INR 1.06 07/28/2022    INR 1.02 10/06/2019    INR 0.95 06/28/2017       Procedures:  PULMONARY FUNCTION TESTS:   No flowsheet data found.      ECHOCARDIOGRAM:   No results found for this or any previous visit (from the past 8760 hour(s)).      Assessment and Plan:   1.  Nonischemic cardiomyopathy status post cardiac pacing for ablation induced heart block at outside hospital  2.  Good clinical result with left bundle branch transseptal pacing introduced within the past year  3.  Heart failure symptoms being followed by the heart failure team    Plan  1.  Continue current routine pacer follow-up  2.  No medicine change  3.  Arrhythmia clinic follow-up 1 year unless problems arise  4.  Continued follow-up with heart failure team    Total elapsed time today with chart review, clinic visit and documentation 30 minutes    Video on 1: 57; video of 2: 15  Platform [employer]  Patient at home; clinic [facility] heart    I very much appreciated the opportunity to see and assess Joyce Marroquin in the clinic today. Please do not  hesitate to contact my office if you have any questions or concerns.      Lino Funes MD  Cardiac Arrhythmia Service  [facility]  [phone]        CC  LINO FUNES

## 2022-09-29 NOTE — NURSING NOTE
Chief Complaint   Patient presents with     Follow Up     CRTP, PHQ9 score 22, declined triage nurse     Patient declined individual allergy and medication review by support staff because they deny any changes and state that all information remains accurate since last reviewed/verified.   Reviewed within last two weeks, no changes per pt    Porsha Hairston, VF/CMA

## 2022-09-29 NOTE — PATIENT INSTRUCTIONS
You were seen in the Electrophysiology Clinic today by: Dr Funes    Plan:       Follow up visit:  1 year with Dr Funes and pacemaker check prior  Also continue following with the Heart Failure team      If you have further questions, please utilize [employer] to contact us.     Your Care Team:    EP Cardiology   Telephone Number     Nurse Line  Marsha Hawthorne, RN   Kristine Lemons, NUNO   [phone]     For scheduling appointments:   [place]   [phone]   For procedure scheduling:    [facility]     [phone]   For the Device Clinic (Pacemakers, ICDs, Loop Recorders)    During business hours: [phone]  After business hours:   [phone]- select option 4 and ask for job code [id number].       On-call cardiologist for after hours or on weekends:   [phone], option #4, and ask to speak to the on-call cardiologist.     Cardiovascular Clinic:   [address] [place] [address]. [place] [address]      As always, Thank you for trusting us with your health care needs!

## 2022-09-29 NOTE — LETTER
9/29/2022      RE: Joyce Marroquin  [address] [address] [facility] [address] [address]       Dear Colleague,    Thank you for the opportunity to participate in the care of your patient, Joyce Marroquin, at the [facility] [place] at [facility]. Please see a copy of my visit note below.    Joyce Marroquin  is being evaluated via a billable video visit.      How would you like to obtain your AVS? MyChart  For the video visit, send the invitation by: Text to cell phone: [phone]  Will anyone else be joining your video visit? 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  Given her exertional limitations recently altered the pacing system to include left bundle pacing via transseptal methodology.  This seems to have been very helpful and her exertional symptoms have improved.  Currently she is able to climb a flight of stairs without shortness of breath and is able to walk her dogs without excessive difficulty.  She still does have stamina limitations and is unable to return to her usual work and has difficulty doing standing for overall, however, Joyce is definitely improved from a cardiovascular perspective.  Her most recent device interrogation is summarized below and shows virtually 100% ventricular pacing and 40% atrial pacing.    Joyce's most recent ECG was in late July 2022 evidences a normal QT of 405 and a normal QRS duration despite pacing suggesting excellent left bundle capture.    Joyce has not had atrial fibrillation documented on recent device interrogations her ECGs.  Nonetheless we will continue with apixaban therapy.  She does note some bruises on her forearms that are a nuisance but tati has not had any significant bleeding  issues.    Plan will be to continue current treatment and follow-up.  Joyce has with the heart failure group (Dr Ordonez) in December.  She apparently has some diminution of her plasma sodium but there is not appear to be any reason to recommend intervention    Joyce will continue her usual pacemaker follow-up and absent any change in her clinical status I we will plan to see her in approximately 1 year.       DEVICE INTERROGATION    Device: Medtronic [id number] Percepta CRT-P (His lead in LV port)  Normal Device Function.  Mode: DDDR  bpm  AP: 37%  : 99.2%  BVP: 99.3%  VSR Pace: 0.3%  Presenting EGM: AP-AS/BVP @ ~100 bpm w/ PVC  Thoracic Impedance: Below reference line, suggesting possible intrathoracic fluid accumulation.  Short V-V intervals: 0  Lead Trends Appear Stable: Yes  Estimated battery longevity to RRT = 10.1 years. Battery voltage = 3.06 V.   Atrial Arrhythmia: None  AF [place]: 0%  Anticoagulant: Eliquis  Ventricular Arrhythmia: None      PAST MEDICAL HISTORY:  Past Medical History:   Diagnosis Date     Arthritis      Cardiomyopathy (H)     ff Cardiology     Chronic depressive personality disorder      Congestive heart failure (H) see dr martínez    heart failure correct term     COPD exacerbation (H)      Esophageal reflux      Ex-smoker     quit 2006; 1 PPD x 30     Hyperparathyroidism (H)     s/p parathyroidectomy     Hypothyroidism      LARRY on CPAP     ff Sleep medicine     Pulmonary nodules     ff Pulmonologist     S/P cardiac pacemaker procedure     checked every 6 months at the [facility]     Uncomplicated asthma years       CURRENT MEDICATIONS:  Current Outpatient Medications   Medication Sig Dispense Refill     acetaminophen (TYLENOL) 500 MG tablet Take 500-1,000 mg by mouth every 6 hours as needed for mild pain       albuterol (PROAIR HFA/PROVENTIL HFA/VENTOLIN HFA) 108 (90 BASE) MCG/ACT Inhaler Inhale 2 puffs into the lungs as needed for shortness of breath / dyspnea or wheezing        albuterol  (PROVENTIL) (2.5 MG/3ML) 0.083% neb solution Take 1 vial (2.5 mg) by nebulization every 6 hours as needed for shortness of breath / dyspnea or wheezing 90 mL 0     azelastine (ASTELIN) 0.1 % nasal spray Spray 1 spray into both nostrils 2 times daily as needed (as needed)  0     Calcium Carbonate-Vitamin D (CALCIUM-CARB 600 + D PO) Take 1 tablet by mouth daily       clonazePAM (KLONOPIN) 0.5 MG tablet TAKE 1 TABLET(0.5 MG) BY MOUTH THREE TIMES DAILY AS NEEDED FOR ANXIETY 90 tablet 1     DiphenhydrAMINE HCl (BENADRYL PO) Take 25 mg by mouth nightly as needed for allergies        ELIQUIS ANTICOAGULANT 5 MG tablet TAKE 1 TABLET(5 MG) BY MOUTH TWICE DAILY 180 tablet 3     Ferrous Sulfate (IRON SUPPLEMENT PO) Take 130 mg by mouth daily        fluticasone-salmeterol (ADVAIR) 250-50 MCG/DOSE inhaler Inhale 1 puff into the lungs every 12 hours       ipratropium (ATROVENT) 0.06 % nasal spray Spray 2 sprays into both nostrils 4 times daily as needed for rhinitis       MAGNESIUM LACTATE PO Take  mg by mouth nightly as needed        MELATONIN PO Take 1-3 mg by mouth nightly as needed        Menaquinone-7 (VITAMIN K2 PO) Take 1 tablet by mouth daily       metoprolol succinate ER (TOPROL XL) 25 MG 24 hr tablet Take 0.5 tablets (12.5 mg) by mouth in the morning. 45 tablet 3     Multiple Vitamin (DAILY MULTIVITAMIN PO) Take 1 tablet by mouth every morning        nebulizer nebulization as needed       nitroGLYcerin (NITROSTAT) 0.4 MG sublingual tablet Place 0.4 mg under the tongue every 5 minutes as needed for chest pain For chest pain place 1 tablet under the tongue every 5 minutes for 3 doses. If symptoms persist 5 minutes after 1st dose call 911.       Probiotic Product (PROBIOTIC DAILY PO) Take 1 capsule by mouth 2 times daily Takes additional doses if has diarrhea       spironolactone (ALDACTONE) 25 MG tablet Take 0.5 tablets (12.5 mg) by mouth daily 45 tablet 3     SYNTHROID 150 MCG tablet TAKE ONE TABLET BY MOUTH SIX  DAYS A WEEK; TAKE ONE-HALF TABLET ONE DAY A WEEK 90 tablet 3     TURMERIC PO Take 1 tablet by mouth every morning        vitamin C (ASCORBIC ACID) 250 MG tablet Take 250 mg by mouth daily       vitamin D3 (CHOLECALCIFEROL) 2000 units tablet Take 1 tablet by mouth daily 1 tablet 0     Zinc 15 MG CAPS Take 15 mg by mouth          PAST SURGICAL HISTORY:  Past Surgical History:   Procedure Laterality Date     BUNIONECTOMY Bilateral      COLONOSCOPY N/A 8/30/2021    Procedure: COLONOSCOPY, WITH POLYPECTOMY AND BIOPSY;  Surgeon: Ranjit Penn MD;  Location:  GI     CV CORONARY ANGIOGRAM N/A 9/26/2019    Procedure: CV CORONARY ANGIOGRAM;  Surgeon: Erickson Trejo MD;  Location:  HEART CARDIAC CATH LAB     CV RIGHT HEART CATH MEASUREMENTS RECORDED N/A 7/20/2020    Procedure: CV RIGHT HEART CATH;  Surgeon: Alonso Ordonez MD;  Location:  HEART CARDIAC CATH LAB     EP ABLATION / EP STUDIES  04/21/2017     EP PPM UPGRADE TO BIVENT N/A 1/19/2022    Procedure: EP PPM UPGRADE TO BIVENT;  Surgeon: Lino Funes MD;  Location:  HEART CARDIAC CATH LAB     EXPLORE NECK N/A 9/19/2018    Procedure: EXPLORE NECK;  Neck Exploration Resection of Left superior Parathyroid gland;  Surgeon: Kristine Venegas MD;  Location: [facility] OR      CORRECT BUNION,SIMPLE       PARATHYROIDECTOMY N/A 9/19/2018    Procedure: PARATHYROIDECTOMY;;  Surgeon: Kristine Venegas MD;  Location: [facility] OR     PPM INSERT OF NEW OR REPL W/VENT LEAD  04/21/2017     TONSILLECTOMY & ADENOIDECTOMY         ALLERGIES:     Allergies   Allergen Reactions     Tetracycline Swelling     Tongue swelling     Viagra [Sildenafil] Muscle Pain (Myalgia) and Diarrhea     Zofran [Ondansetron]      Prolonged QT  Prolonged QT at baseline per patient     Flagyl [Metronidazole] Rash     Pruritic rash      Buspar [Buspirone]      Muscle weakness     Corn Oil Other (See Comments)     Headache       Cymbalta Other (See Comments) and Diarrhea     Confusion      Prozac [Fluoxetine] Diarrhea     Seasonal Allergies Difficulty breathing     Sulfamethoxazole-Trimethoprim      Other reaction(s): Other  Passed out     Cyclobenzaprine Other (See Comments)     Extreme heat intolerance     Levaquin [Levofloxacin]      Other reaction(s): Other  Tendon rupture     Nsaids Other (See Comments)     Exacerbated asthma       FAMILY HISTORY:  Family History   Problem Relation Age of Onset     Hypothyroidism Mother      Hypertension Mother      Osteoarthritis Mother      Coronary Artery Disease Father         nonfatal MI in his 70s     Asthma Father      Diabetes Sister      Hypothyroidism Sister      Breast Cancer Maternal Aunt      Anxiety Disorder Sister        SOCIAL HISTORY:  Social History     Tobacco Use     Smoking status: Former Smoker     Packs/day: 0.00     Years: 0.00     Pack years: 0.00     Smokeless tobacco: Never Used   Vaping Use     Vaping Use: Never used   Substance Use Topics     Alcohol use: Yes     Alcohol/week: 0.0 - 8.0 standard drinks     Comment: seldom     Drug use: No       ROS:   Constitutional: No fever, chills, or sweats. Weight stable.   ENT: No visual disturbance, ear ache, epistaxis, sore throat.   Cardiovascular: As per HPI.   Respiratory: No cough, hemoptysis.    GI: No nausea, vomiting, .   : No hematuria.   Integument: Negative.   Psychiatric: Negative.   Hematologic:  Easy bruising, no easy bleeding.  Neuro: Negative.   Endocrinology: No significant heat or cold intolerance   Musculoskeletal: No myalgia.    Exam:  [facility] 08/21/2007   GENERAL APPEARANCE: healthy, alert and no distress  HEENT: no icterus, , no central cyanosis  NECK: no adenopathy, no asymmetry, masses, or scars, thyroid normal to palpation and no bruits, JVP not elevated  RESPIRATORY:- no rales, rhonchi or wheezes, no use of accessory muscles, no retractions, respirations are unlabored, normal respiratory rate  NEURO: alert and oriented to person/place/time, normal speech, gait and  affect  SKIN: no ecchymoses, no rashes    Labs:  CBC RESULTS:   Lab Results   Component Value Date    WBC 7.0 07/28/2022    WBC 6.6 05/25/2021    RBC 3.93 07/28/2022    RBC 4.33 05/25/2021    HGB 12.4 07/28/2022    HGB 13.7 05/25/2021    HCT 37.9 07/28/2022    HCT 41.2 05/25/2021    MCV 96 07/28/2022    MCV 95 05/25/2021    MCH 31.6 07/28/2022    MCH 31.6 05/25/2021    MCHC 32.7 07/28/2022    MCHC 33.3 05/25/2021    RDW 12.2 07/28/2022    RDW 11.9 05/25/2021     07/28/2022     05/25/2021       BMP RESULTS:  Lab Results   Component Value Date     (L) 09/20/2022     05/25/2021    POTASSIUM 4.4 09/20/2022    POTASSIUM 4.2 06/07/2022    POTASSIUM 4.2 05/25/2021    CHLORIDE 101 09/20/2022    CHLORIDE 106 06/07/2022    CHLORIDE 107 05/25/2021    CO2 25 09/20/2022    CO2 27 06/07/2022    CO2 24 05/25/2021    ANIONGAP 9 09/20/2022    ANIONGAP 8 06/07/2022    ANIONGAP 7 05/25/2021    GLC 86 09/20/2022    GLC 90 06/07/2022    GLC 92 05/25/2021    BUN 13.6 09/20/2022    BUN 9 06/07/2022    BUN 12 05/25/2021    CR 0.60 09/20/2022    CR 0.64 05/25/2021    GFRESTIMATED >90 09/20/2022    GFRESTIMATED >90 05/25/2021    GFRESTBLACK >90 05/25/2021    MANJU 9.1 09/20/2022    MANJU 8.7 05/25/2021        INR RESULTS:  Lab Results   Component Value Date    INR 1.06 07/28/2022    INR 1.02 10/06/2019    INR 0.95 06/28/2017       Procedures:  PULMONARY FUNCTION TESTS:   No flowsheet data found.      ECHOCARDIOGRAM:   No results found for this or any previous visit (from the past 8760 hour(s)).      Assessment and Plan:   1.  Nonischemic cardiomyopathy status post cardiac pacing for ablation induced heart block at outside hospital  2.  Good clinical result with left bundle branch transseptal pacing introduced within the past year  3.  Heart failure symptoms being followed by the heart failure team    Plan  1.  Continue current routine pacer follow-up  2.  No medicine change  3.  Arrhythmia clinic follow-up 1 year unless  problems arise  4.  Continued follow-up with heart failure team    Total elapsed time today with chart review, clinic visit and documentation 30 minutes    Video on 1: 57; video of 2: 15  Platform D[employer]imity  Patient at home; clinic [facility] heart    I very much appreciated the opportunity to see and assess Joyce Marroquin in the clinic today. Please do not hesitate to contact my office if you have any questions or concerns.      Lino Funes MD  Cardiac Arrhythmia Service  [facility]  [phone]        CC  LINO FUNES

## 2022-09-29 NOTE — PROGRESS NOTES
Assessment & Plan     Concussion without loss of consciousness, initial encounter    - CT Head w/o Contrast; Future    Closed head injury, initial encounter    - CT Head w/o Contrast; Future    Cardiac pacemaker in situ    - CT Head w/o Contrast; Future    Long term current use of anticoagulant therapy    - CT Head w/o Contrast; Future    Chronic depressive personality disorder      Adjustment disorder with mixed anxiety and depressed mood      30 minutes spent on the date of the encounter doing chart review, history and exam, documentation and further activities per the note     Depression Screening Follow Up    PHQ 9/29/2022   PHQ-9 Total Score 17   Q9: Thoughts of better off dead/self-harm past 2 weeks More than half the days     Last PHQ-9 9/29/2022   1.  Little interest or pleasure in doing things 2   2.  Feeling down, depressed, or hopeless 2   3.  Trouble falling or staying asleep, or sleeping too much 2   4.  Feeling tired or having little energy 3   5.  Poor appetite or overeating 0   6.  Feeling bad about yourself 0   7.  Trouble concentrating 3   8.  Moving slowly or restless 3   Q9: Thoughts of better off dead/self-harm past 2 weeks 2   PHQ-9 Total Score 17   Difficulty at work, home, or with people Very difficult         Follow Up      Follow Up Actions Taken  Referred patient back to mental health provider    Discussed the following ways the patient can remain in a safe environment:  be around others and do daily activities that you enjoy, increase activity as tolerated.      See Patient Instructions: schedule brain CT, continue working with therapist. Follow up as needed for any healthcare questions/ concerns.  Try hydrocortisone to inner ears, let me know drops you need if not improving.  Pt in agreement.     Return in about 4 weeks (around 10/27/2022), or if symptoms worsen or fail to improve.    CHARLINE DIXON, MARCELL  [facility] ROLAND Cook is a 61 year old,  "presenting for the following health issues:  Fall      HPI     Patient reports she was under her deck measuring something systolic 2 days ago (9/27/22), when she stood up and hit the top front of her head on the deck.  She did not fall nor lose consciousness.  She has had regular headaches since having COVID, the area she hit her head is tender to the touch with a persistent headache. She reports she was a little confused initially, though she has gone from almost no clonazepam back up to 2 pills of clonazepam daily for her anxiety which does cause some fatigue/memory slowness.  She also reports she had nausea and dry heaves; though she has frequent nausea as well.  She is on chronic Eliquis; she recently had third wire placed on her pacemaker for her ongoing cardiac issues.  She reports she has been feeling better since that time, with increased energy, less shortness of breath.  Discussed she more than likely is okay from this head trauma 2 days ago, but there is no way to know that she does not have a slow bleed and less we did perform imaging.  It would take too long for her to get in for an MRI with her pacemaker, discussed CT and the risks associated with CT scans and radiation.  Patient would like to do imaging at this time.  She reports she is still struggling with her mental health, she works with her therapist every Thursday including today.  She does have a lot of anger and bitterness or\"bitchiness\" per patient.  We did discuss some resources that may be helpful for this as well.      Review of Systems   Constitutional, HEENT, cardiovascular, pulmonary, GI, , musculoskeletal, neuro, skin, endocrine and psych systems are negative, except as otherwise noted.      Objective    /72   Pulse 101   Temp 98.9  F (37.2  C) (Tympanic)   Resp 14   Wt 82 kg (180 lb 12.8 oz)   LMP 08/21/2007   SpO2 96%   BMI 30.09 kg/m    Body mass index is 30.09 kg/m .  Physical Exam   GENERAL: healthy, alert and no " distress  EYES: Eyes grossly normal to inspection, PERRL and conjunctivae and sclerae normal  HENT: ear canals and TM's normal, nose and mouth without ulcers or lesions  RESP: lungs clear to auscultation - no rales, rhonchi or wheezes  CV: regular rate and rhythm, normal S1 S2, no S3 or S4, no murmur, click or rub, no peripheral edema and peripheral pulses strong  Skin:POSITIVE slight bruising to right frontal forehead, tender to touch  NEURO: Normal strength and tone, cranial nerves intact mentation intact and speech normal  PSYCH: mentation appears normal, affect normal/bright    See orders

## 2022-10-05 ENCOUNTER — TRANSFERRED RECORDS (OUTPATIENT)
Dept: HEALTH INFORMATION MANAGEMENT | Facility: CLINIC | Age: 62
End: 2022-10-05

## 2022-10-07 ENCOUNTER — MYC MEDICAL ADVICE (OUTPATIENT)
Dept: CARDIOLOGY | Facility: CLINIC | Age: 62
End: 2022-10-07

## 2022-10-11 NOTE — TELEPHONE ENCOUNTER
[facility] Call Center    Phone Message    May a detailed message be left on voicemail: yes     Reason for Call: Other: Joyce calling to request a call back from Marsha to discuss her concerns regarding the information she is unable to find in her MyChart.  She would greatly appreciate a call back at your earliest convenience to discuss.  Thank you!    Action Taken: Message routed to:  Other: Cardiology    Travel Screening: Not Applicable

## 2022-10-12 ENCOUNTER — LAB (OUTPATIENT)
Dept: LAB | Facility: CLINIC | Age: 62
End: 2022-10-12
Payer: COMMERCIAL

## 2022-10-12 ENCOUNTER — MYC MEDICAL ADVICE (OUTPATIENT)
Dept: FAMILY MEDICINE | Facility: CLINIC | Age: 62
End: 2022-10-12

## 2022-10-12 DIAGNOSIS — R19.7 DIARRHEA, UNSPECIFIED TYPE: ICD-10-CM

## 2022-10-12 DIAGNOSIS — R19.7 DIARRHEA, UNSPECIFIED TYPE: Primary | ICD-10-CM

## 2022-10-12 NOTE — TELEPHONE ENCOUNTER
I have ordered test for C. difficile.  Please call patient to let her know she can  supplies at lab. SHAYNA Simon, FNP-BC

## 2022-10-13 LAB — C DIFF TOX B STL QL: NEGATIVE

## 2022-10-13 PROCEDURE — 87493 C DIFF AMPLIFIED PROBE: CPT

## 2022-10-13 NOTE — RESULT ENCOUNTER NOTE
Jose Luis Cook,    Thank you for your recent office visit.    Here are your recent results.  Stool sample negative for C. difficile.  I hope you are feeling better.    Feel free to contact me via [employer] or call the clinic at [phone].    Sincerely,    SHAYNA Simon, FN-BC

## 2022-10-17 ENCOUNTER — OFFICE VISIT (OUTPATIENT)
Dept: OPHTHALMOLOGY | Facility: CLINIC | Age: 62
End: 2022-10-17
Attending: OPHTHALMOLOGY
Payer: COMMERCIAL

## 2022-10-17 DIAGNOSIS — H04.123 DRY EYE SYNDROME OF BOTH EYES: ICD-10-CM

## 2022-10-17 DIAGNOSIS — H50.05 ESOTROPIA, ALTERNATING: Primary | ICD-10-CM

## 2022-10-17 PROCEDURE — 92015 DETERMINE REFRACTIVE STATE: CPT | Performed by: TECHNICIAN/TECHNOLOGIST

## 2022-10-17 PROCEDURE — 92060 SENSORIMOTOR EXAMINATION: CPT | Performed by: OPHTHALMOLOGY

## 2022-10-17 PROCEDURE — 92004 COMPRE OPH EXAM NEW PT 1/>: CPT | Mod: GC | Performed by: OPHTHALMOLOGY

## 2022-10-17 PROCEDURE — G0463 HOSPITAL OUTPT CLINIC VISIT: HCPCS | Mod: 25 | Performed by: TECHNICIAN/TECHNOLOGIST

## 2022-10-17 ASSESSMENT — TONOMETRY
IOP_METHOD: SINGLE ICARE
OS_IOP_MMHG: 12
OD_IOP_MMHG: 11
OS_IOP_MMHG: 09
OD_IOP_MMHG: 07
IOP_METHOD: UPGAZE ICARE

## 2022-10-17 ASSESSMENT — CONF VISUAL FIELD
OD_SUPERIOR_NASAL_RESTRICTION: 0
OS_NORMAL: 1
OD_INFERIOR_TEMPORAL_RESTRICTION: 0
OD_INFERIOR_NASAL_RESTRICTION: 0
OS_INFERIOR_NASAL_RESTRICTION: 0
OS_SUPERIOR_NASAL_RESTRICTION: 0
OD_NORMAL: 1
OD_SUPERIOR_TEMPORAL_RESTRICTION: 0
OS_SUPERIOR_TEMPORAL_RESTRICTION: 0
OS_INFERIOR_TEMPORAL_RESTRICTION: 0

## 2022-10-17 ASSESSMENT — SLIT LAMP EXAM - LIDS
COMMENTS: NORMAL
COMMENTS: NORMAL

## 2022-10-17 ASSESSMENT — REFRACTION_WEARINGRX
OS_SPHERE: +1.00
OD_AXIS: 180
SPECS_TYPE: BIFOCAL
OS_ADD: +2.50
OS_CYLINDER: +0.25
OD_SPHERE: +1.00
OS_AXIS: 175
OD_CYLINDER: +0.50
OD_ADD: +2.50

## 2022-10-17 ASSESSMENT — LAGOPHTHALMOS
OS_LAGOPHTHALMOS: 0
OD_LAGOPHTHALMOS: 0

## 2022-10-17 ASSESSMENT — REFRACTION_MANIFEST
OS_ADD: +2.75
OD_ADD: +2.75
OD_SPHERE: +1.00
OD_CYLINDER: +0.50
OD_AXIS: 175
OS_CYLINDER: SPHERE
OS_SPHERE: +1.00

## 2022-10-17 ASSESSMENT — LEVATOR FUNCTION
OS_LEVATOR: 15
OD_LEVATOR: 15

## 2022-10-17 ASSESSMENT — MARGIN REFLEX DISTANCE
OD_MRD1: 5
OS_MRD1: 5

## 2022-10-17 ASSESSMENT — CUP TO DISC RATIO
OD_RATIO: 0.3
OS_RATIO: 0.3

## 2022-10-17 ASSESSMENT — VISUAL ACUITY
OD_CC: 20/20
METHOD: SNELLEN - LINEAR
CORRECTION_TYPE: GLASSES
OS_CC: 20/20

## 2022-10-17 NOTE — NURSING NOTE
Chief Complaint(s) and History of Present Illness(es)     Thyroid Disease            Laterality: both eyes    Associated symptoms: tearing and floaters.  Negative for redness and photophobia    Treatments tried: eye drops    Comments: eyes ache R>L, using systane drops and AT, has been on two different medications for dry eye but seems to blur vision - no longer using, no diplopia, no previous eye sx           Comments    Inf patient   Has been seen at [facility] recently, had eye pain this summer told could be related to LINDSEY  07/01/2022  TSH 0.70      CT SCAN OF THE HEAD WITHOUT CONTRAST September 29, 2022 3:46 PM      HISTORY: Concussion without loss of consciousness. Closed head injury     TECHNIQUE: Axial images of the head and coronal reformations without  IV contrast material. Radiation dose for this scan was reduced using  automated exposure control, adjustment of the mA and/or kV according  to patient size, or iterative reconstruction technique.     COMPARISON: Head CT 1/20/2022. Brain MR 6/8/2021.     FINDINGS: There is no evidence of intracranial hemorrhage, mass, acute  infarct or anomaly. The ventricles are normal in size, shape and  configuration. The brain parenchyma and subarachnoid spaces are  normal.      The visualized portions of the sinuses and mastoids appear normal. The  bony calvarium and bones of the skull base appear intact.                                                                       IMPRESSION: No evidence of acute intracranial hemorrhage, mass, or  herniation.        NAVEEN GERMAIN MD         SYSTEM ID:  [id number]

## 2022-10-17 NOTE — PROGRESS NOTES
Assessment & Plan     Referring physician: Self    HPI:     Patient was seen by her usual eye doctor for severe right eye pain, which was sharp with sudden onset; this persisted for about 2-3 days and improved gradually with antibiotic. She was placed on cefuroxime by mouth for at least 10 days. Within about a week of the eye pain she had also noticed a bulls-eye rash and the cefuroxime was also used as empiric treatment for Lyme. She had COVID in August/September of this year, and due to voice/throat swelling she was given a short course of Medrol.     Two weeks ago she started to notice pressure, pain, and fatigue in her eyes; this has been alternating between the right and left eyes. No subjective vision changes, including diplopia. She is unsure if the pain worsens with eye movement, but did seem to improve with relaxation. She denies redness of the eyes/lids, no eyelid swelling, no change in the appearance of the eyelids.    She has been using Systane nighttime gel inconsistently, rarely using artificial tears otherwise. She has LARRY and uses CPAP mask.    Thyroid history:  Hypothyroidism secondary to I131 treatment for Graves's disease.  Diagnosed when? Yes, early-mid 2000s  ARELLANO: Yes, early-mid 2000s   Thyroidectomy: No    TSH (7/1/22): 0.70  Thyroglobulin antibody (7/1/22): <20         Eye symptoms (since when):   Proptosis (better/worse/same since last visit):  No   Diplopia(better/worse/same since last visit): No  Eyelid retraction(better/worse/same since last visit): No  Tearing(better/worse/same since last visit): No  Redness (better/worse/same since last visit): No  Pain (better/worse/same since last visit): Yes  Pain to move the eyes (better/worse/same since last visit): No  Blurred vision: No    Ocular history:   Orbital decompression (date, details): No  Strabismus surgery (date, details): No  Eyelid surgery (date, details): No    Medical history:  Patient Active Problem List   Diagnosis      Benign neoplasm of vulva     Esophageal reflux     Chronic depressive personality disorder     Complete atrioventricular block (H)     Cardiac pacemaker in situ- Medtronic, dual lead- DEPENDENT     Hypothyroidism     Ex-smoker     Hyperparathyroidism (H)     Pulmonary nodules     Cardiomyopathy (H)     Hx of cardiac pacemaker     Situational anxiety     LARRY (obstructive sleep apnea)     Restless legs syndrome (RLS)     Vestibular disequilibrium, unspecified laterality     Colitis     Death of parent     Panic attack     Insomnia, unspecified type     Exacerbation of asthma, unspecified asthma severity, unspecified whether persistent     Status post coronary angiogram     Adjustment disorder with mixed anxiety and depressed mood     Other ill-defined heart diseases     PTSD (post-traumatic stress disorder)     History of cardiac radiofrequency ablation     Syncope     Chest pain     Adrenal mass (H)     Liver cyst     Personal history of fall     Concussion with loss of consciousness of 30 minutes or less     Suspected Lyme disease     - history of RF ablation for PVCs which led to AV node ablation and complete heart block requiring a dual-chamber pacemaker implantation, follows with cardiology.       Patient has a current medication list which includes the following prescription(s): acetaminophen, albuterol, albuterol, azelastine, calcium carbonate-vitamin d, clonazepam, diphenhydramine hcl, eliquis anticoagulant, ferrous sulfate, fluticasone-salmeterol, ipratropium, magnesium lactate, melatonin, menaquinone-7, metoprolol succinate er, multiple vitamins-minerals, nebulizer, nitroglycerin, probiotic product, spironolactone, synthroid, turmeric, vitamin c, vitamin d3, and zinc.    Patient  reports that she has quit smoking. She has never used smokeless tobacco. She reports current alcohol use. She reports that she does not use drugs.      Exam:   Rikki (base):11/12/90     Better/worse same: (Prior visit   Strabismus  (better/worse/same): None  Eyelid retraction (better/worse/same): None    PAOLO Score:  1. Spontaneous orbital pain.     Yes  2. Gaze evoked orbital pain.     No  3. Eyelid swelling due to active thyroid eye disease  No  4. Eyelid erythema.      No  5. Conjunctival redness due to active thyroid eye disease . No  6. Chemosis.        No  7. Inflammation of caruncle OR plica.       Patients assessed after follow-up can be scored out of 10 by  including items 8-10.    8. Increase of > 2mm in proptosis.    N/A   9. Decrease in uniocular excursion in any direction of > 8 . N/A  10. Decrease of acuity equivalent to 1 Snellen line.  N/A    PAOLO SCORE = 1    Beverly Diplopia Score = 0  0 = no diplopia  1 = intermittent (when tired, upon waking, end of day etc)  2 = inconstant (extreme gazes)  3 = constant      Joyce Marroquin is a 61 year old female with the following diagnoses:   1. Esotropia, alternating    2. Dry eye syndrome of both eyes         It is my impression that patient has ocular symptoms most consistent with dry eye/surface disease. We discussed that her presentation is not suggestive of active LINDSEY, and in fact, CTH performed after a fall last month do not show any EOM enlargement.     We discussed management with continued and more consistent lubrication, especially with nighttime gel and AT throughout the day.          Attending Physician Attestation:  Complete documentation of historical and exam elements from today's encounter can be found in the full encounter summary report (not reduplicated in this progress note).  I personally obtained the chief complaint(s) and history of present illness.  I confirmed and edited as necessary the review of systems, past medical/surgical history, family history, social history, and examination findings as documented by others; and I examined the patient myself.  I personally reviewed the relevant tests, images, and reports as documented above.  I formulated and edited as  necessary the assessment and plan and discussed the findings and management plan with the patient and family. I personally reviewed the ophthalmic test(s) associated with this encounter, agree with the interpretation(s) as documented by the resident/fellow, and have edited the corresponding report(s) as necessary.  - Flako Welsh MD        Pre-charting:  Chon Menard MD  Ophthalmology, PGY-3    Kyle Bain MD PhD  Fellow, Neuro-Ophthalmology

## 2022-10-21 ENCOUNTER — NURSE TRIAGE (OUTPATIENT)
Dept: FAMILY MEDICINE | Facility: CLINIC | Age: 62
End: 2022-10-21

## 2022-10-21 ENCOUNTER — HOSPITAL ENCOUNTER (EMERGENCY)
Facility: CLINIC | Age: 62
Discharge: HOME | End: 2022-10-21
Attending: EMERGENCY MEDICINE | Admitting: EMERGENCY MEDICINE
Payer: COMMERCIAL

## 2022-10-21 VITALS
OXYGEN SATURATION: 98 % | SYSTOLIC BLOOD PRESSURE: 126 MMHG | HEIGHT: 65 IN | RESPIRATION RATE: 16 BRPM | TEMPERATURE: 97.9 F | WEIGHT: 172 LBS | HEART RATE: 80 BPM | DIASTOLIC BLOOD PRESSURE: 81 MMHG | BODY MASS INDEX: 28.66 KG/M2

## 2022-10-21 DIAGNOSIS — R19.7 DIARRHEA, UNSPECIFIED TYPE: ICD-10-CM

## 2022-10-21 LAB
ALBUMIN SERPL BCG-MCNC: 4 G/DL (ref 3.5–5.2)
ALP SERPL-CCNC: 103 U/L (ref 35–104)
ALT SERPL W P-5'-P-CCNC: 8 U/L (ref 10–35)
ANION GAP SERPL CALCULATED.3IONS-SCNC: 11 MMOL/L (ref 7–15)
AST SERPL W P-5'-P-CCNC: 14 U/L (ref 10–35)
BASOPHILS # BLD AUTO: 0.1 10E3/UL (ref 0–0.2)
BASOPHILS NFR BLD AUTO: 1 %
BILIRUB SERPL-MCNC: 0.3 MG/DL
BUN SERPL-MCNC: 4.8 MG/DL (ref 8–23)
CALCIUM SERPL-MCNC: 9 MG/DL (ref 8.8–10.2)
CHLORIDE SERPL-SCNC: 104 MMOL/L (ref 98–107)
CREAT SERPL-MCNC: 0.54 MG/DL (ref 0.51–0.95)
DEPRECATED HCO3 PLAS-SCNC: 20 MMOL/L (ref 22–29)
EOSINOPHIL # BLD AUTO: 0.1 10E3/UL (ref 0–0.7)
EOSINOPHIL NFR BLD AUTO: 2 %
ERYTHROCYTE [DISTWIDTH] IN BLOOD BY AUTOMATED COUNT: 12.2 % (ref 10–15)
GFR SERPL CREATININE-BSD FRML MDRD: >90 ML/MIN/1.73M2
GLUCOSE SERPL-MCNC: 87 MG/DL (ref 70–99)
HCT VFR BLD AUTO: 39 % (ref 35–47)
HGB BLD-MCNC: 13 G/DL (ref 11.7–15.7)
IMM GRANULOCYTES # BLD: 0 10E3/UL
IMM GRANULOCYTES NFR BLD: 0 %
LYMPHOCYTES # BLD AUTO: 1.9 10E3/UL (ref 0.8–5.3)
LYMPHOCYTES NFR BLD AUTO: 28 %
MCH RBC QN AUTO: 31.3 PG (ref 26.5–33)
MCHC RBC AUTO-ENTMCNC: 33.3 G/DL (ref 31.5–36.5)
MCV RBC AUTO: 94 FL (ref 78–100)
MONOCYTES # BLD AUTO: 0.6 10E3/UL (ref 0–1.3)
MONOCYTES NFR BLD AUTO: 10 %
NEUTROPHILS # BLD AUTO: 3.9 10E3/UL (ref 1.6–8.3)
NEUTROPHILS NFR BLD AUTO: 59 %
NRBC # BLD AUTO: 0 10E3/UL
NRBC BLD AUTO-RTO: 0 /100
PLATELET # BLD AUTO: 211 10E3/UL (ref 150–450)
POTASSIUM SERPL-SCNC: 3.6 MMOL/L (ref 3.4–5.3)
PROT SERPL-MCNC: 7 G/DL (ref 6.4–8.3)
RBC # BLD AUTO: 4.16 10E6/UL (ref 3.8–5.2)
SODIUM SERPL-SCNC: 135 MMOL/L (ref 136–145)
WBC # BLD AUTO: 6.6 10E3/UL (ref 4–11)

## 2022-10-21 PROCEDURE — 99283 EMERGENCY DEPT VISIT LOW MDM: CPT | Performed by: EMERGENCY MEDICINE

## 2022-10-21 PROCEDURE — 36415 COLL VENOUS BLD VENIPUNCTURE: CPT | Performed by: EMERGENCY MEDICINE

## 2022-10-21 PROCEDURE — 82947 ASSAY GLUCOSE BLOOD QUANT: CPT | Performed by: EMERGENCY MEDICINE

## 2022-10-21 PROCEDURE — 85025 COMPLETE CBC W/AUTO DIFF WBC: CPT | Performed by: EMERGENCY MEDICINE

## 2022-10-21 PROCEDURE — 99282 EMERGENCY DEPT VISIT SF MDM: CPT | Performed by: EMERGENCY MEDICINE

## 2022-10-21 ASSESSMENT — ENCOUNTER SYMPTOMS
NAUSEA: 1
DIARRHEA: 1
CONFUSION: 1
ABDOMINAL PAIN: 0
VOMITING: 0
FEVER: 0
SHORTNESS OF BREATH: 1
DYSURIA: 0
UNEXPECTED WEIGHT CHANGE: 1
FATIGUE: 1
WEAKNESS: 1

## 2022-10-21 ASSESSMENT — ACTIVITIES OF DAILY LIVING (ADL): ADLS_ACUITY_SCORE: 37

## 2022-10-21 NOTE — TELEPHONE ENCOUNTER
"Patient calling, says she's had diarrhea for a long time.  However, today she is feeling \"goofy\".  See triage below.    CALL  NOW:  * Immediate medical attention is needed. You need to hang up and call 911 (or an ambulance).  * Triager Discretion: I'll call you back in a few minutes to be sure you were able to reach them.    Patient is with her , she says she is feeling \"goofy\" and does not trust herself to drive, she thinks she is feeling a bit confused.    She will have her  take her to a nearby urgency center ([facility]) a mile away as they can do IV's theres.    Patient verbalized understanding of and agreement with plan.    Ana Greer RN  [facility]    Reason for Disposition    Difficult to awaken or acting confused (e.g., disoriented, slurred speech)    Additional Information    Negative: Shock suspected (e.g., cold/pale/clammy skin, too weak to stand, low BP, rapid pulse)    Answer Assessment - Initial Assessment Questions  1. DIARRHEA SEVERITY: \"How bad is the diarrhea?\" \"How many more stools have you had in the past 24 hours than normal?\"     - NO DIARRHEA (SCALE 0)    - MILD (SCALE 1-3): Few loose or mushy BMs; increase of 1-3 stools over normal daily number of stools; mild increase in ostomy output.    -  MODERATE (SCALE 4-7): Increase of 4-6 stools daily over normal; moderate increase in ostomy output.  * SEVERE (SCALE 8-10; OR 'WORST POSSIBLE'): Increase of 7 or more stools daily over normal; moderate increase in ostomy output; incontinence.      Severe, at least 10 stools in last 24 hours, liquid stools most of the time  2. ONSET: \"When did the diarrhea begin?\"       3 weeks ago  3. BM CONSISTENCY: \"How loose or watery is the diarrhea?\"       watery  4. VOMITING: \"Are you also vomiting?\" If Yes, ask: \"How many times in the past 24 hours?\"       no  5. ABDOMINAL PAIN: \"Are you having any abdominal pain?\" If Yes, ask: \"What does it feel like?\" (e.g., " "crampy, dull, intermittent, constant)       Intermittent, sometimes cramping, sometimes sharp   6. ABDOMINAL PAIN SEVERITY: If present, ask: \"How bad is the pain?\"  (e.g., Scale 1-10; mild, moderate, or severe)    - MILD (1-3): doesn't interfere with normal activities, abdomen soft and not tender to touch     - MODERATE (4-7): interferes with normal activities or awakens from sleep, abdomen tender to touch     - SEVERE (8-10): excruciating pain, doubled over, unable to do any normal activities        moderate  7. ORAL INTAKE: If vomiting, \"Have you been able to drink liquids?\" \"How much liquids have you had in the past 24 hours?\"      No vomiting  8. HYDRATION: \"Any signs of dehydration?\" (e.g., dry mouth [not just dry lips], too weak to stand, dizziness, new weight loss) \"When did you last urinate?\"      Skin with \"poor turgor\", more dizziness today after being very active yesterday, says she urinated 3 times today, last was 1 pm  9. EXPOSURE: \"Have you traveled to a foreign country recently?\" \"Have you been exposed to anyone with diarrhea?\" \"Could you have eaten any food that was spoiled?\"      no  10. ANTIBIOTIC USE: \"Are you taking antibiotics now or have you taken antibiotics in the past 2 months?\"        End of August, post covid treatment  11. OTHER SYMPTOMS: \"Do you have any other symptoms?\" (e.g., fever, blood in stool)        no  12. PREGNANCY: \"Is there any chance you are pregnant?\" \"When was your last menstrual period?\"        n/a    Protocols used: DIARRHEA-A-AH      "

## 2022-10-21 NOTE — ED TRIAGE NOTES
Pt BIBA with complaints of diarrhea for the past month. Feeling increased dehydration and weakness. Pt has cardiac hx. Pt went to clinic and was told they are unable to do labs and fluids there.    Pacemaker  /78  BS 90  HR 98

## 2022-10-22 ENCOUNTER — HEALTH MAINTENANCE LETTER (OUTPATIENT)
Age: 62
End: 2022-10-22

## 2022-10-22 NOTE — ED PROVIDER NOTES
ED Provider Note  [facility]      History     Chief Complaint   Patient presents with     Diarrhea     HPI  Joyce Marroquin is a 61 year old female with past medical history of LARRY on CPAP, congestive heart failure with EF of 40 to 45%, hyperparathyroidism, hypothyroidism, complete AV block status post pacemaker who presents with complaint of over 4 weeks of diarrhea.  Patient reports increased weakness and confusion.  She initially had abdominal pain but this is no longer present.  She has had 5-6 episodes of watery diarrhea per day.  This is nonbloody.  She has had nausea but no vomiting.  Her shortness of breath is at baseline consistent with her chronic congestive heart failure.  She has had unintentional weight loss and is felt like it has been hard to make decisions.  She was tested for C. difficile colitis and tested negative on October 13.  She has been taking Pedialyte.  She has not taken any medications for the diarrhea at home but has tried acupuncture and different dietary modifications including the brat diet.  Denies fever, sick contacts.  She is not on antibiotics currently but was last on a cephalosporin for a respiratory infection in early September.    Past Medical History  Past Medical History:   Diagnosis Date     Arthritis      Cardiomyopathy (H)     ff Cardiology     Chronic depressive personality disorder      Congestive heart failure (H) see dr martínez    heart failure correct term     COPD exacerbation (H)      Esophageal reflux      Ex-smoker     quit 2006; 1 PPD x 30     Hyperparathyroidism (H)     s/p parathyroidectomy     Hypothyroidism      LARRY on CPAP     ff Sleep medicine     Pulmonary nodules     ff Pulmonologist     S/P cardiac pacemaker procedure     checked every 6 months at the [facility]     Uncomplicated asthma years     Past Surgical History:   Procedure Laterality Date     BUNIONECTOMY Bilateral      COLONOSCOPY N/A 8/30/2021    Procedure: COLONOSCOPY, WITH  POLYPECTOMY AND BIOPSY;  Surgeon: Ranjit Penn MD;  Location:  GI     CV CORONARY ANGIOGRAM N/A 9/26/2019    Procedure: CV CORONARY ANGIOGRAM;  Surgeon: Erickson Trejo MD;  Location:  HEART CARDIAC CATH LAB     CV RIGHT HEART CATH MEASUREMENTS RECORDED N/A 7/20/2020    Procedure: CV RIGHT HEART CATH;  Surgeon: Alonso Ordonez MD;  Location:  HEART CARDIAC CATH LAB     EP ABLATION / EP STUDIES  04/21/2017     EP PPM UPGRADE TO BIVENT N/A 1/19/2022    Procedure: EP PPM UPGRADE TO BIVENT;  Surgeon: Lino Funes MD;  Location:  HEART CARDIAC CATH LAB     EXPLORE NECK N/A 9/19/2018    Procedure: EXPLORE NECK;  Neck Exploration Resection of Left superior Parathyroid gland;  Surgeon: Kristine Venegas MD;  Location: [facility] OR     HC CORRECT BUNION,SIMPLE       PARATHYROIDECTOMY N/A 9/19/2018    Procedure: PARATHYROIDECTOMY;;  Surgeon: Kristine Venegas MD;  Location: [facility] OR     PPM INSERT OF NEW OR REPL W/VENT LEAD  04/21/2017     TONSILLECTOMY & ADENOIDECTOMY       acetaminophen (TYLENOL) 500 MG tablet  albuterol (PROAIR HFA/PROVENTIL HFA/VENTOLIN HFA) 108 (90 BASE) MCG/ACT Inhaler  albuterol (PROVENTIL) (2.5 MG/3ML) 0.083% neb solution  azelastine (ASTELIN) 0.1 % nasal spray  Calcium Carbonate-Vitamin D (CALCIUM-CARB 600 + D PO)  clonazePAM (KLONOPIN) 0.5 MG tablet  DiphenhydrAMINE HCl (BENADRYL PO)  ELIQUIS ANTICOAGULANT 5 MG tablet  Ferrous Sulfate (IRON SUPPLEMENT PO)  fluticasone-salmeterol (ADVAIR) 250-50 MCG/DOSE inhaler  ipratropium (ATROVENT) 0.06 % nasal spray  MAGNESIUM LACTATE PO  MELATONIN PO  Menaquinone-7 (VITAMIN K2 PO)  metoprolol succinate ER (TOPROL XL) 25 MG 24 hr tablet  Multiple Vitamin (DAILY MULTIVITAMIN PO)  nebulizer nebulization  nitroGLYcerin (NITROSTAT) 0.4 MG sublingual tablet  Probiotic Product (PROBIOTIC DAILY PO)  spironolactone (ALDACTONE) 25 MG tablet  SYNTHROID 150 MCG tablet  TURMERIC PO  vitamin C (ASCORBIC ACID) 250 MG tablet  vitamin D3  (CHOLECALCIFEROL) 2000 units tablet  Zinc 15 MG CAPS      Allergies   Allergen Reactions     Tetracycline Swelling     Tongue swelling     Viagra [Sildenafil] Muscle Pain (Myalgia) and Diarrhea     Zofran [Ondansetron]      Prolonged QT  Prolonged QT at baseline per patient     Flagyl [Metronidazole] Rash     Pruritic rash      Buspar [Buspirone]      Muscle weakness     Corn Oil Other (See Comments)     Headache       Cymbalta Other (See Comments) and Diarrhea     Confusion     Prozac [Fluoxetine] Diarrhea     Seasonal Allergies Difficulty breathing     Sulfamethoxazole-Trimethoprim      Other reaction(s): Other  Passed out     Cyclobenzaprine Other (See Comments)     Extreme heat intolerance     Levaquin [Levofloxacin]      Other reaction(s): Other  Tendon rupture     Nsaids Other (See Comments)     Exacerbated asthma     Family History  Family History   Problem Relation Age of Onset     Hypothyroidism Mother      Hypertension Mother      Osteoarthritis Mother      Coronary Artery Disease Father         nonfatal MI in his 70s     Asthma Father      Diabetes Sister      Hypothyroidism Sister      Anxiety Disorder Sister      Breast Cancer Maternal Aunt      Social History   Social History     Tobacco Use     Smoking status: Former     Packs/day: 0.00     Years: 0.00     Pack years: 0.00     Types: Cigarettes     Smokeless tobacco: Never   Vaping Use     Vaping Use: Never used   Substance Use Topics     Alcohol use: Yes     Alcohol/week: 0.0 - 8.0 standard drinks     Comment: seldom     Drug use: No      Past medical history, past surgical history, medications, allergies, family history, and social history were reviewed with the patient. No additional pertinent items.       Review of Systems   Constitutional: Positive for fatigue and unexpected weight change. Negative for fever.   Respiratory: Positive for shortness of breath.    Cardiovascular: Negative for chest pain.   Gastrointestinal: Positive for diarrhea and  "nausea. Negative for abdominal pain and vomiting.   Genitourinary: Negative for dysuria.   Neurological: Positive for weakness.   Psychiatric/Behavioral: Positive for confusion.   All other systems reviewed and are negative.    A complete review of systems was performed with pertinent positives and negatives noted in the HPI, and all other systems negative.    Physical Exam   BP: 126/81  Pulse: 80  Temp: 97.9  F (36.6  C)  Resp: 16  Height: 165.1 cm (5' 5\")  Weight: 78 kg (172 lb)  SpO2: 98 %  Physical Exam  Vitals and nursing note reviewed.   Constitutional:       General: She is not in acute distress.     Appearance: She is well-developed and well-nourished. She is not ill-appearing or diaphoretic.   HENT:      Head: Normocephalic and atraumatic.      Nose: Nose normal. No congestion or rhinorrhea.   Eyes:      General: No scleral icterus.     Conjunctiva/sclera: Conjunctivae normal.   Cardiovascular:      Rate and Rhythm: Normal rate.   Pulmonary:      Effort: Pulmonary effort is normal. No respiratory distress.      Breath sounds: No stridor.   Abdominal:      General: There is no distension.      Palpations: Abdomen is soft.      Tenderness: There is no abdominal tenderness. There is no guarding or rebound.   Musculoskeletal:         General: No deformity or signs of injury. Normal range of motion.      Cervical back: Normal range of motion and neck supple. No rigidity.   Skin:     General: Skin is warm and dry.      Coloration: Skin is not jaundiced or pale.   Neurological:      General: No focal deficit present.      Mental Status: She is alert and oriented to person, place, and time.   Psychiatric:         Attention and Perception: Attention normal.         Mood and Affect: Mood and affect normal. Affect is tearful.         Speech: Speech normal.         Behavior: Behavior normal. Behavior is cooperative.         ED Course      Procedures                     Results for orders placed or performed during the " hospital encounter of 10/21/22   Comprehensive metabolic panel     Status: Abnormal   Result Value Ref Range    Sodium 135 (L) 136 - 145 mmol/L    Potassium 3.6 3.4 - 5.3 mmol/L    Chloride 104 98 - 107 mmol/L    Carbon Dioxide (CO2) 20 (L) 22 - 29 mmol/L    Anion Gap 11 7 - 15 mmol/L    Urea Nitrogen 4.8 (L) 8.0 - 23.0 mg/dL    Creatinine 0.54 0.51 - 0.95 mg/dL    Calcium 9.0 8.8 - 10.2 mg/dL    Glucose 87 70 - 99 mg/dL    Alkaline Phosphatase 103 35 - 104 U/L    AST 14 10 - 35 U/L    ALT 8 (L) 10 - 35 U/L    Protein Total 7.0 6.4 - 8.3 g/dL    Albumin 4.0 3.5 - 5.2 g/dL    Bilirubin Total 0.3 <=1.2 mg/dL    GFR Estimate >90 >60 mL/min/1.73m2   CBC with platelets and differential     Status: None   Result Value Ref Range    WBC Count 6.6 4.0 - 11.0 10e3/uL    RBC Count 4.16 3.80 - 5.20 10e6/uL    Hemoglobin 13.0 11.7 - 15.7 g/dL    Hematocrit 39.0 35.0 - 47.0 %    MCV 94 78 - 100 fL    MCH 31.3 26.5 - 33.0 pg    MCHC 33.3 31.5 - 36.5 g/dL    RDW 12.2 10.0 - 15.0 %    Platelet Count 211 150 - 450 10e3/uL    % Neutrophils 59 %    % Lymphocytes 28 %    % Monocytes 10 %    % Eosinophils 2 %    % Basophils 1 %    % Immature Granulocytes 0 %    NRBCs per 100 WBC 0 <1 /100    Absolute Neutrophils 3.9 1.6 - 8.3 10e3/uL    Absolute Lymphocytes 1.9 0.8 - 5.3 10e3/uL    Absolute Monocytes 0.6 0.0 - 1.3 10e3/uL    Absolute Eosinophils 0.1 0.0 - 0.7 10e3/uL    Absolute Basophils 0.1 0.0 - 0.2 10e3/uL    Absolute Immature Granulocytes 0.0 <=0.4 10e3/uL    Absolute NRBCs 0.0 10e3/uL   CBC with platelets differential     Status: None    Narrative    The following orders were created for panel order CBC with platelets differential.  Procedure                               Abnormality         Status                     ---------                               -----------         ------                     CBC with platelets and d...[[id number]]                      Final result                 Please view results for these tests on  the individual orders.     Medications - No data to display     Assessments & Plan (with Medical Decision Making)   Joyce Marroquin is a 61 year old female with past medical history of LARRY on CPAP, congestive heart failure with EF of 40 to 45%, hyperparathyroidism, hypothyroidism, complete AV block status post pacemaker who presents with complaint of over 4 weeks of diarrhea.     Ddx: C. difficile colitis, dehydration, STACY, electrolyte abnormalities, chronic diarrhea    Patient requests IV fluids and discharged home.  Will obtain labs to check her electrolytes and kidney function.  If these result normal patient is well-appearing with normal vital signs and safe to follow-up with her primary care provider would recommend Citrucel or Metamucil to try in the interim until she can follow-up.  Also repeat stool cultures including C. difficile colitis.  Patient received a half a liter of IV fluids by EMS on the way here.  Given her history of heart failure will hold additional fluids unless her labs support significant dehydration.  Abdomen nontender.  No imaging indicated.    Labs within normal limits.  Sodium 135, normal potassium.  Bicarb 20, normal anion gap.  Normal creatinine and liver enzymes.  CBC normal.  Patient reassured by normal labs.  She was unable to provide a stool sample while in the emergency department but was discharged with take-home labs for stool specimen collection.  Advised close primary care and GI follow-up.  Advised Citrucel or Metamucil to help with diarrhea.  Ongoing oral hydration and a bland diet as tolerated.  Detailed return precautions provided.    I have reviewed the nursing notes. I have reviewed the findings, diagnosis, plan and need for follow up with the patient.    Discharge Medication List as of 10/21/2022  9:11 PM          Final diagnoses:   Diarrhea, unspecified type       --    [facility] EMERGENCY DEPARTMENT  10/21/2022     Tash Fenton MD  10/21/22 212

## 2022-10-22 NOTE — ED TRIAGE NOTES
"Patient presents to triage with complaints of \"dehydration\" due to diarrhea for \"at least four weeks.\" Patient reports about 5-6 episodes per day and being unable to eat food. See triage MD note.      Triage Assessment       Row Name 10/21/22 1948       Triage Assessment (Adult)    Airway WDL WDL       Respiratory WDL    Respiratory WDL WDL       Skin Circulation/Temperature WDL    Skin Circulation/Temperature WDL WDL       Cardiac WDL    Cardiac WDL WDL       Peripheral/Neurovascular WDL    Peripheral Neurovascular WDL WDL       Cognitive/Neuro/Behavioral WDL    Cognitive/Neuro/Behavioral WDL WDL                  "

## 2022-10-22 NOTE — DISCHARGE INSTRUCTIONS
Please make an appointment to follow up with Your Primary Care Provider as soon as possible.    You were given a specimen cup for stool sample collection at home. Bring it to any [facility] laboratory to be run. You should follow up with your primary care doctor to get the results.     Take over-the-counter Tylenol, ibuprofen for abdominal pain and fever.      Drink plenty of fluids to maintain hydration.  You should drink water and an electrolyte beverage (such as Powerade, Pedialyte, or Gatorade).      Limit food intake to bland, clear liquids (such as chicken or vegetable broth) until your symptoms improve.  You can advance your diet, as tolerated.      You may take over-the-counter Imodium to help with diarrhea symptoms.  However, this can make some bacterial infections worse and you should discontinue if your symptoms progress.    You may take Citrucel or Metamucil to help treat diarrhea.     Seek evaluation in the emergency department if you develop worsening pain, dehydration, blood in your stool or vomit.

## 2022-10-25 ENCOUNTER — LAB (OUTPATIENT)
Dept: LAB | Facility: CLINIC | Age: 62
End: 2022-10-25
Payer: COMMERCIAL

## 2022-10-25 DIAGNOSIS — R19.7 DIARRHEA, UNSPECIFIED TYPE: ICD-10-CM

## 2022-10-25 LAB — C DIFF TOX B STL QL: NEGATIVE

## 2022-10-25 PROCEDURE — 87493 C DIFF AMPLIFIED PROBE: CPT

## 2022-10-25 NOTE — RESULT ENCOUNTER NOTE
Jose Luis Cook,    Thank you for your recent office visit.    Here are your recent results.  Negative for C. difficile    Feel free to contact me via [facility] or call the clinic at [phone].    Sincerely,    SHAYNA Simon, FNP-BC

## 2022-10-27 ENCOUNTER — OFFICE VISIT (OUTPATIENT)
Dept: FAMILY MEDICINE | Facility: CLINIC | Age: 62
End: 2022-10-27
Payer: COMMERCIAL

## 2022-10-27 VITALS
SYSTOLIC BLOOD PRESSURE: 100 MMHG | RESPIRATION RATE: 16 BRPM | WEIGHT: 175 LBS | OXYGEN SATURATION: 95 % | HEART RATE: 109 BPM | DIASTOLIC BLOOD PRESSURE: 64 MMHG | HEIGHT: 65 IN | BODY MASS INDEX: 29.16 KG/M2 | TEMPERATURE: 98.6 F

## 2022-10-27 DIAGNOSIS — J45.901 EXACERBATION OF ASTHMA, UNSPECIFIED ASTHMA SEVERITY, UNSPECIFIED WHETHER PERSISTENT: ICD-10-CM

## 2022-10-27 DIAGNOSIS — H60.543 ACUTE ECZEMATOID OTITIS EXTERNA OF BOTH EARS: ICD-10-CM

## 2022-10-27 DIAGNOSIS — R07.9 CHEST PAIN: Primary | ICD-10-CM

## 2022-10-27 DIAGNOSIS — Z09 HOSPITAL DISCHARGE FOLLOW-UP: Primary | ICD-10-CM

## 2022-10-27 DIAGNOSIS — F41.9 ANXIETY: ICD-10-CM

## 2022-10-27 DIAGNOSIS — K52.9 CHRONIC DIARRHEA OF UNKNOWN ORIGIN: ICD-10-CM

## 2022-10-27 PROCEDURE — 99214 OFFICE O/P EST MOD 30 MIN: CPT | Performed by: NURSE PRACTITIONER

## 2022-10-27 RX ORDER — TRIAMCINOLONE ACETONIDE 1 MG/G
CREAM TOPICAL
COMMUNITY
End: 2023-07-19

## 2022-10-27 RX ORDER — CLINDAMYCIN PHOSPHATE 11.9 MG/ML
SOLUTION TOPICAL
COMMUNITY
End: 2022-11-11

## 2022-10-27 RX ORDER — CLONAZEPAM 0.5 MG/1
TABLET ORAL
Qty: 90 TABLET | Refills: 2 | Status: SHIPPED | OUTPATIENT
Start: 2022-10-27 | End: 2023-01-17

## 2022-10-27 RX ORDER — FLUTICASONE PROPIONATE AND SALMETEROL 250; 50 UG/1; UG/1
POWDER RESPIRATORY (INHALATION)
COMMUNITY
End: 2023-08-16

## 2022-10-27 RX ORDER — FLUOCINOLONE ACETONIDE 0.11 MG/ML
5 OIL AURICULAR (OTIC) 2 TIMES DAILY PRN
Qty: 20 ML | Refills: 2 | Status: SHIPPED | OUTPATIENT
Start: 2022-10-27 | End: 2023-05-02

## 2022-10-27 RX ORDER — METHYLPREDNISOLONE 8 MG/1
40 TABLET ORAL DAILY
Qty: 150 TABLET | Refills: 0 | Status: SHIPPED | OUTPATIENT
Start: 2022-10-27 | End: 2022-11-26

## 2022-10-27 RX ORDER — NITROGLYCERIN 0.4 MG/1
0.4 TABLET SUBLINGUAL EVERY 5 MIN PRN
Qty: 25 TABLET | Refills: 1 | Status: SHIPPED | OUTPATIENT
Start: 2022-10-27

## 2022-10-27 ASSESSMENT — ANXIETY QUESTIONNAIRES
7. FEELING AFRAID AS IF SOMETHING AWFUL MIGHT HAPPEN: MORE THAN HALF THE DAYS
2. NOT BEING ABLE TO STOP OR CONTROL WORRYING: NEARLY EVERY DAY
5. BEING SO RESTLESS THAT IT IS HARD TO SIT STILL: NOT AT ALL
3. WORRYING TOO MUCH ABOUT DIFFERENT THINGS: MORE THAN HALF THE DAYS
GAD7 TOTAL SCORE: 14
4. TROUBLE RELAXING: MORE THAN HALF THE DAYS
GAD7 TOTAL SCORE: 14
8. IF YOU CHECKED OFF ANY PROBLEMS, HOW DIFFICULT HAVE THESE MADE IT FOR YOU TO DO YOUR WORK, TAKE CARE OF THINGS AT HOME, OR GET ALONG WITH OTHER PEOPLE?: VERY DIFFICULT
IF YOU CHECKED OFF ANY PROBLEMS ON THIS QUESTIONNAIRE, HOW DIFFICULT HAVE THESE PROBLEMS MADE IT FOR YOU TO DO YOUR WORK, TAKE CARE OF THINGS AT HOME, OR GET ALONG WITH OTHER PEOPLE: VERY DIFFICULT
1. FEELING NERVOUS, ANXIOUS, OR ON EDGE: MORE THAN HALF THE DAYS
6. BECOMING EASILY ANNOYED OR IRRITABLE: NEARLY EVERY DAY
7. FEELING AFRAID AS IF SOMETHING AWFUL MIGHT HAPPEN: MORE THAN HALF THE DAYS

## 2022-10-27 ASSESSMENT — PAIN SCALES - GENERAL: PAINLEVEL: SEVERE PAIN (6)

## 2022-10-27 NOTE — PROGRESS NOTES
Assessment & Plan     Hospital discharge follow-up      Anxiety    - clonazePAM (KLONOPIN) 0.5 MG tablet; TAKE 1 TABLET(0.5 MG) BY MOUTH THREE TIMES DAILY AS NEEDED FOR ANXIETY    Acute eczematoid otitis externa of both ears    - fluocinolone acetonide 0.01 % OIL; Place 5 drops in ear(s) 2 times daily as needed (ear eczema)    Exacerbation of asthma, unspecified asthma severity, unspecified whether persistent    -to have on had for virus/ breathing flare.   - methylPREDNISolone (MEDROL) 8 MG tablet; Take 5 tablets (40 mg) by mouth daily for 30 days    Chronic diarrhea of unknown origin    - continue metamucil, drink water until not thirsty    30 minutes spent on the date of the encounter doing chart review, history and exam, documentation and further activities per the note     See Patient Instructions: Let me know if there is anything that would be helpful for you, follow-up as needed anytime.  Patient in agreement.    Return in about 3 months (around 1/27/2023), or if symptoms worsen or fail to improve.    CHARLINE DIXON, MARCELL  [facility] ROLAND Cook is a 61 year old, presenting for the following health issues:  Hospital F/U and Recheck Medication    She reports she went to the hospital due to ongoing diarrhea with significant dehydration.  She reports diarrhea for the last 20 years, had to wear brief or wedding.  Colonoscopy within the last 5 years.  C. difficile testing negative.  She is unsure how much to drink with her heart failure.  Hospital advised her to take Metamucil which seems to be helping.  Cardiology advised her to drink fluids until she is not thirsty.  She does report she had a 10 pound weight loss currently 7 pound weight loss.  We did measure her upper arms today as she was concerned her left arm was hanging more than the right and wanted to make sure she is not retaining fluid.  Bilateral upper arms are approximately 13 cm diameter.  She reports issues  "with  not understanding her chronic illness, he becomes a \"parrot\" at times, recently mentions her being a burden.  She mentions brief volunteering experience at her [employer] which was positive and showed she is slowly moving forward.  She reports she has a good therapist; has not talked with her in the last 2 weeks so she is sure to have a lot to report.  She is working with physical therapy on her balance which is actually pretty good due to her muscle loss related to laying in bed due to diarrhea, fatigue.  She reports she saw the kidney specialist as she will be getting on Medicare and she is not sure of her future coverage.  Needing refills of Klonopin, fluocinolone oil, methylprednisone for respiratory flares that she likes to keep on hand.  Declining vaccines today.  Discussed getting flu shot.    History of Present Illness       Reason for visit:  Follow up er medications larger left arm   Today's JESUS-7 Score: 14       ED/UC Followup:    Facility:  [facility]   Date of visit: 10/21/2022  Reason for visit: Diarrhea, dehydration   Current Status: still having diarrhea, slightly better    Medication Followup of Klonopin, Methylprednisolone and Fluocinolone    Taking Medication as prescribed: yes    Side Effects:  None    Medication Helping Symptoms:  yes    Patient states that she is having ear pain as well.     Review of Systems   Constitutional, HEENT, cardiovascular, pulmonary, GI, , musculoskeletal, neuro, skin, endocrine and psych systems are negative, except as otherwise noted.      Objective    /64   Pulse 109   Temp 98.6  F (37  C) (Tympanic)   Resp 16   Ht 1.645 m (5' 4.76\")   Wt 79.4 kg (175 lb)   LMP 08/21/2007   SpO2 95%   BMI 29.33 kg/m    Body mass index is 29.33 kg/m .  Physical Exam   GENERAL: Healthy, alert and no distress  EYES: Eyes grossly normal to inspection.  No discharge or erythema, or obvious scleral/conjunctival abnormalities.  HENT: ear " canals slightly dry, no erythema and TM's normal POSITIVE for clear serous fluid seen behind bilateral TMs, nose and mouth without ulcers or lesions  RESP: No audible wheeze, cough, or visible cyanosis.  No visible retractions or increased work of breathing.    SKIN: Visible skin clear.   NEURO: Cranial nerves grossly intact.  Mentation and speech appropriate for age.  PSYCH: Mentation appears normal, affect normal, judgement and insight intact, normal speech and appearance well-groomed POSITIVE for tearful at times during visit.     See orders, repeat c-diff testing has been negative.

## 2022-10-28 ENCOUNTER — MYC MEDICAL ADVICE (OUTPATIENT)
Dept: FAMILY MEDICINE | Facility: CLINIC | Age: 62
End: 2022-10-28

## 2022-10-28 DIAGNOSIS — Z23 NEEDS FLU SHOT: Primary | ICD-10-CM

## 2022-11-01 ENCOUNTER — OFFICE VISIT (OUTPATIENT)
Dept: UROLOGY | Facility: CLINIC | Age: 62
End: 2022-11-01
Payer: COMMERCIAL

## 2022-11-01 VITALS — HEART RATE: 97 BPM | OXYGEN SATURATION: 98 % | DIASTOLIC BLOOD PRESSURE: 78 MMHG | SYSTOLIC BLOOD PRESSURE: 104 MMHG

## 2022-11-01 DIAGNOSIS — R93.89 ABNORMAL CT SCAN: Primary | ICD-10-CM

## 2022-11-01 PROCEDURE — 99203 OFFICE O/P NEW LOW 30 MIN: CPT | Performed by: UROLOGY

## 2022-11-04 ENCOUNTER — IMMUNIZATION (OUTPATIENT)
Dept: FAMILY MEDICINE | Facility: CLINIC | Age: 62
End: 2022-11-04
Payer: COMMERCIAL

## 2022-11-04 DIAGNOSIS — Z23 NEED FOR PROPHYLACTIC VACCINATION AND INOCULATION AGAINST INFLUENZA: Primary | ICD-10-CM

## 2022-11-04 DIAGNOSIS — Z23 NEED FOR VACCINATION: ICD-10-CM

## 2022-11-04 PROCEDURE — 90677 PCV20 VACCINE IM: CPT

## 2022-11-04 PROCEDURE — 90472 IMMUNIZATION ADMIN EACH ADD: CPT

## 2022-11-04 PROCEDURE — 90682 RIV4 VACC RECOMBINANT DNA IM: CPT

## 2022-11-04 PROCEDURE — 99207 PR NO CHARGE NURSE ONLY: CPT

## 2022-11-04 PROCEDURE — 90471 IMMUNIZATION ADMIN: CPT

## 2022-11-04 NOTE — PROGRESS NOTES
Prior to immunization administration, verified patients identity using patient s name and date of birth. Please see Immunization Activity for additional information.     Screening Questionnaire for Adult Immunization    Are you sick today?   No   Do you have allergies to medications, food, a vaccine component or latex?   Yes   Have you ever had a serious reaction after receiving a vaccination?   No   Do you have a long-term health problem with heart, lung, kidney, or metabolic disease (e.g., diabetes), asthma, a blood disorder, no spleen, complement component deficiency, a cochlear implant, or a spinal fluid leak?  Are you on long-term aspirin therapy?   Yes   Do you have cancer, leukemia, HIV/AIDS, or any other immune system problem?   No   Do you have a parent, brother, or sister with an immune system problem?   No   In the past 3 months, have you taken medications that affect  your immune system, such as prednisone, other steroids, or anticancer drugs; drugs for the treatment of rheumatoid arthritis, Crohn s disease, or psoriasis; or have you had radiation treatments?   Yes   Have you had a seizure, or a brain or other nervous system problem?   No   During the past year, have you received a transfusion of blood or blood    products, or been given immune (gamma) globulin or antiviral drug?   No   For women: Are you pregnant or is there a chance you could become       pregnant during the next month?   No   Have you received any vaccinations in the past 4 weeks?   No     Immunization questionnaire was positive for at least one answer.  Notified Dr. Aburto.        Per orders of Dr. Aburto, injection of flu and PCV 20 given by BENNIE PADILLA MA. Patient instructed to remain in clinic for 15 minutes afterwards, and to report any adverse reaction to me immediately.       Screening performed by BENNIE PADILLA MA on 11/4/2022 at 8:54 AM.

## 2022-11-08 ENCOUNTER — TELEPHONE (OUTPATIENT)
Dept: FAMILY MEDICINE | Facility: CLINIC | Age: 62
End: 2022-11-08

## 2022-11-08 DIAGNOSIS — Z12.39 ENCOUNTER FOR SCREENING FOR MALIGNANT NEOPLASM OF BREAST, UNSPECIFIED SCREENING MODALITY: Primary | ICD-10-CM

## 2022-11-08 DIAGNOSIS — Z95.0 CARDIAC PACEMAKER IN SITU: ICD-10-CM

## 2022-11-08 NOTE — TELEPHONE ENCOUNTER
"Meera with [facility] calling, says patient scheduled a \"complete US left breast\" to be done on 11/15/22.    They don't have an order.   Says the note indicates patient does not want to discuss having a screening mammogram, she is not having symptoms but wants to do the US due to presence of a pacemaker.    Looks like cardiac pacemaker is in left chest.    I see she has had previous left breast US orders.    Routed to PCP to address.      Fax order to  [facility] at:  [phone]    Ana Greer RN  [facility]      "

## 2022-11-09 ENCOUNTER — MYC MEDICAL ADVICE (OUTPATIENT)
Dept: CARDIOLOGY | Facility: CLINIC | Age: 62
End: 2022-11-09

## 2022-11-09 ENCOUNTER — TELEPHONE (OUTPATIENT)
Dept: PHARMACY | Facility: CLINIC | Age: 62
End: 2022-11-09

## 2022-11-09 NOTE — TELEPHONE ENCOUNTER
Returned Joyce's phone call, she'd like to schedule with MTM, scheduled for 11/11.    Kortney Peralta, PharmD  Medication Therapy Management Pharmacist  [phone]

## 2022-11-11 ENCOUNTER — VIRTUAL VISIT (OUTPATIENT)
Dept: PHARMACY | Facility: CLINIC | Age: 62
End: 2022-11-11
Payer: COMMERCIAL

## 2022-11-11 DIAGNOSIS — K52.9 COLITIS: ICD-10-CM

## 2022-11-11 DIAGNOSIS — F43.23 ADJUSTMENT DISORDER WITH MIXED ANXIETY AND DEPRESSED MOOD: ICD-10-CM

## 2022-11-11 DIAGNOSIS — E89.0 POSTABLATIVE HYPOTHYROIDISM: ICD-10-CM

## 2022-11-11 DIAGNOSIS — Z78.9 TAKES DIETARY SUPPLEMENTS: ICD-10-CM

## 2022-11-11 DIAGNOSIS — J45.901 EXACERBATION OF ASTHMA, UNSPECIFIED ASTHMA SEVERITY, UNSPECIFIED WHETHER PERSISTENT: ICD-10-CM

## 2022-11-11 DIAGNOSIS — I42.9 CARDIOMYOPATHY, UNSPECIFIED TYPE (H): ICD-10-CM

## 2022-11-11 DIAGNOSIS — I44.2 COMPLETE ATRIOVENTRICULAR BLOCK (H): ICD-10-CM

## 2022-11-11 DIAGNOSIS — Z95.0 S/P CARDIAC PACEMAKER PROCEDURE: ICD-10-CM

## 2022-11-11 DIAGNOSIS — F41.8 SITUATIONAL ANXIETY: ICD-10-CM

## 2022-11-11 DIAGNOSIS — I10 BENIGN ESSENTIAL HYPERTENSION: Primary | ICD-10-CM

## 2022-11-11 PROCEDURE — 99606 MTMS BY PHARM EST 15 MIN: CPT | Performed by: PHARMACIST

## 2022-11-11 PROCEDURE — 99607 MTMS BY PHARM ADDL 15 MIN: CPT | Performed by: PHARMACIST

## 2022-11-11 RX ORDER — FUROSEMIDE 20 MG
20 TABLET ORAL DAILY PRN
COMMUNITY
End: 2023-05-02

## 2022-11-11 RX ORDER — BENZOCAINE/MENTHOL 6 MG-10 MG
LOZENGE MUCOUS MEMBRANE DAILY PRN
COMMUNITY
End: 2023-07-19

## 2022-11-11 RX ORDER — PSYLLIUM SEED (WITH DEXTROSE)
2 POWDER (GRAM) ORAL DAILY
COMMUNITY

## 2022-11-11 RX ORDER — ACETAMINOPHEN 325 MG/1
325-650 TABLET ORAL EVERY 6 HOURS PRN
COMMUNITY

## 2022-11-11 NOTE — PATIENT INSTRUCTIONS
"Recommendations from today's MTM visit:                                                         1. Aim for no more than zinc 15 mg daily, otherwise could contribute to copper deficiency.     2. Ok to take guaifenasin at the same time in the morning with Synthroid, Eliquis, spironolactone and metoprolol.     3. Ok to continue to take morning vitamins 2 hours after Synthroid.    4. Ok to take the probiotic with Eliquis in the evening (but would also be ok to take with morning medications as well).     Follow-up: as needed, if new insurance covers    It was great speaking with you today.  I value your experience and would be very thankful for your time in providing feedback in our clinic survey. In the next few days, you may receive an email or text message from [employer] with a link to a survey related to your  clinical pharmacist.\"     To schedule another MTM appointment, please call the clinic directly or you may call the MTM scheduling line at [phone] or toll-free at [phone].     My Clinical Pharmacist's contact information:                                                      Please feel free to contact me with any questions or concerns you have.      Kortney Peralta, PharmD  Medication Therapy Management Pharmacist  [phone]   "

## 2022-11-11 NOTE — Clinical Note
DAYTON SAUCEDO note, thanks!  Kortney Peralta, PharmD Medication Therapy Management Pharmacist [phone]

## 2022-11-11 NOTE — PROGRESS NOTES
Medication Therapy Management (MTM) Encounter    ASSESSMENT:                            Medication Adherence/Access: No issues identified    Depression/Anxiety: Joyce prefers to remain on clonazepam at this time. Have reviewed in the past.    Diarrhea/microscopic colitis: improved    HFrEF/Pacemaker/Prolonged QT interval: Stable. Reviewed possible interactions, current administration times appropriate.     Hypothyroidism: Stable. TSH is within normal limits. Reviewed possible interactions, current administration times appropriate.     Pain/Thumb:  Stable.     Asthma/Allergies: Stable.     Supplements: Would avoid doses of zinc > 15 mg, see plan.    PLAN:                            1. Aim for no more than zinc 15 mg daily, otherwise could contribute to copper deficiency.     2. Ok to take guaifenasin at the same time in the morning with Synthroid, Eliquis, spironolactone and metoprolol.     3. Ok to continue to take morning vitamins 2 hours after Synthroid.    4. Ok to take the probiotic with Eliquis in the evening (but would also be ok to take with morning medications as well).     Follow-up: as needed, if new insurance covers.    SUBJECTIVE/OBJECTIVE:                          Joyce Marroquin is a 62 year old female called for a follow-up visit.  Today's visit is a follow-up MTM visit from 4/11/22.     Reason for visit: med rec, can she take guaifenesin with her medications, including Synthroid? Takes supplements two hours after Synthroid.     Allergies/ADRs: Reviewed in chart  Past Medical History: Reviewed in chart  Tobacco: She reports that she has quit smoking. Her smoking use included cigarettes. She has never used smokeless tobacco.  Alcohol: not currently using    Medication Adherence/Access: no issues reported    Depression/Anxiety:   Clonazepam 0.5 mg three times daily as needed anxiety     She'd like to not be on this, however colitis worsened with other agents.  Medication History:  Buspar - didn't  help  Fluoxetine - tried in the past for loneliness, but not depression/anxiety which is more-so what she has going on now, possible contributed to diarrhea Fall 2021  Sertraline - not effective   Citalopram - possible effective prior to ablation; now avoids due to risk of QT prolongation  Cymbalta - microscopic colitis, confusion and withdrawal effects, stopped 10/5/21      Had genetic testing done through [employer], has not found it helpful.    Diarrhea/microscopic colitis:  Metamucil fiber thins, 2 (one packet) daily at noon. Increases probiotic when needed    The metamucil has been very effective and she wishes she'd known about this sooner.     HFrEF/Pacemaker/Prolonged QT interval:   Eliquis 5 mg twice daily   Metoprolol ER 12.5 mg daily (now correctly says heart failure)  Spironolactone 12.5 mg daily  NTG SL PRN (has taken twice ever)  Furosemide 20 mg daily PRN    Resynchronization/left ventricular wire insertion was done January 2022, no longer has prolonged Qt, however she has been instructed by cardiology not to be on anything that could prolong this unless an emergency situation.   Has always had low blood pressure, limited options for HF due to hypotension.   She has never had torsades.   Patient reports the following medication side effects: none.     ECHO:  Date 2/19/21, EF 35-40%  BP Readings from Last 3 Encounters:   11/01/22 104/78   10/27/22 100/64   10/21/22 126/81     Potassium   Date Value Ref Range Status   10/21/2022 3.6 3.4 - 5.3 mmol/L Final   06/07/2022 4.2 3.4 - 5.3 mmol/L Final   05/25/2021 4.2 3.4 - 5.3 mmol/L Final     Hypothyroidism:   Synthroid 150 mcg daily, except one day take one-half tablet (Sunday).    Patient is having the following symptoms: none.  TSH   Date Value Ref Range Status   07/01/2022 0.70 0.40 - 4.00 mU/L Final   12/06/2020 1.46 0.40 - 4.00 mU/L Final     Pain/Thumb:    Acetaminophen 325-500 mg PRN  Tumeric daily     Limiting acetaminophen due to fatty liver, would  never go over 2000mg/day.   Avoids NSAIDS due to asthma and history of colagenous colitis. States this/these are effective. Denies side effects.     Asthma/Allergies:   ICS/LABA- Wixela inhub 250-50 mcg one puff(s) twice daily   Short-Acting Bronchodilator: Albuterol MDI and Nebs PRN (hasn't used in > two years)  Diphenhydramine 25 mg at bedtime PRN - allergies  Medrol 40 mg daily PRN bronchitis (not currently using)  Azelastine 0.1% nasal spray twice daily PRN - irritates nasal passages if uses too much   Ipratropium 0.06% 2 sprays QID PRN (no recent use)    Triggers include: unknown, seasonal  Patient reports the following symptoms: none.    Supplements:   Calcium daily  Ferrous sulfate 130 mg daily (iron anemia)  Magnesium lactate  mg at bedtime PRN (muscle relaxation)  Melatonin 1-3 mg at bedtime PRN  Menaquinone-7 (vitamin K2) daily   Multivitamin daily   Probiotic twice daily PRN - increases if has diarrhea  Vitamin D 2000 international unit(s) daily   Zinc 15-50 mg daily     No reported issues at this time.   Vitamin D Deficiency Screening Results:  Lab Results   Component Value Date    VITDT 54 07/27/2021    VITDT 67 07/20/2020    VITDT 75 01/25/2019    VITDT 87 (H) 11/09/2018    VITDT 78 (H) 09/24/2018         Magnesium   Date Value Ref Range Status   07/28/2022 2.3 1.7 - 2.3 mg/dL Final   07/20/2020 2.3 1.6 - 2.3 mg/dL Final     GFR Estimate   Date Value Ref Range Status   10/21/2022 >90 >60 mL/min/1.73m2 Final     Comment:     Effective December 21, 2021 eGFRcr in adults is calculated using the 2021 CKD-EPI creatinine equation which includes age and gender (Ronni et al., NEJM, DOI: 10.1056/XRUMvd6892776)   09/20/2022 >90 >60 mL/min/1.73m2 Final     Comment:     Effective December 21, 2021 eGFRcr in adults is calculated using the 2021 CKD-EPI creatinine equation which includes age and gender (Ronni et al., NEJ, DOI: 10.1056/RQXFgu5891526)   07/28/2022 >90 >60 mL/min/1.73m2 Final     Comment:      Effective December 21, 2021 eGFRcr in adults is calculated using the 2021 CKD-EPI creatinine equation which includes age and gender (Ronni leon al., NEJM, DOI: 10.1056/NWWOlu1980011)   05/25/2021 >90 >60 mL/min/[1.73_m2] Final     Comment:     Non  GFR Calc  Starting 12/18/2018, serum creatinine based estimated GFR (eGFR) will be   calculated using the Chronic Kidney Disease Epidemiology Collaboration   (CKD-EPI) equation.     04/27/2021 82 >60 mL/min/[1.73_m2] Final     Comment:     Non  GFR Calc  Starting 12/18/2018, serum creatinine based estimated GFR (eGFR) will be   calculated using the Chronic Kidney Disease Epidemiology Collaboration   (CKD-EPI) equation.     03/24/2021 >90 >60 mL/min/[1.73_m2] Final     Comment:     Non  GFR Calc  Starting 12/18/2018, serum creatinine based estimated GFR (eGFR) will be   calculated using the Chronic Kidney Disease Epidemiology Collaboration   (CKD-EPI) equation.       Today's Vitals: LMP 08/21/2007   ----------------      I spent 33 minutes with this patient today. All changes were made via collaborative practice agreement with CHARLINE DIXON NP. A copy of the visit note was provided to the patient's provider(s).    The patient was sent via [employer] a summary of these recommendations.     Kortney Peralta, PharmD  Medication Therapy Management Pharmacist  [phone]    Telemedicine Visit Details  Type of service:  Telephone visit  Start Time: 11:33 AM  End Time: 12:06 PM  Originating Location (pt. Location): Home      Distant Location (provider location):  Off-site  Provider has received verbal consent for a visit from the patient? Yes     Medication Therapy Recommendations  Cardiomyopathy (H)    Current Medication: ELIQUIS ANTICOAGULANT 5 MG tablet   Rationale: Does not understand instructions - Adherence - Adherence   Recommendation: Provide Education   Status: Patient Agreed - Adherence/Education         Hypothyroidism     Current Medication: SYNTHROID 150 MCG tablet   Rationale: Does not understand instructions - Adherence - Adherence   Recommendation: Provide Education   Status: Patient Agreed - Adherence/Education         Takes dietary supplements    Current Medication: Zinc 15 MG CAPS   Rationale: Does not understand instructions - Adherence - Adherence   Recommendation: Provide Education   Status: Patient Agreed - Adherence/Education

## 2022-11-15 ENCOUNTER — TRANSFERRED RECORDS (OUTPATIENT)
Dept: HEALTH INFORMATION MANAGEMENT | Facility: CLINIC | Age: 62
End: 2022-11-15

## 2022-11-21 ENCOUNTER — CARE COORDINATION (OUTPATIENT)
Dept: CARDIOLOGY | Facility: CLINIC | Age: 62
End: 2022-11-21

## 2022-11-21 DIAGNOSIS — I50.30 NYHA CLASS 3 HEART FAILURE WITH PRESERVED EJECTION FRACTION (H): Primary | ICD-10-CM

## 2022-11-28 DIAGNOSIS — E89.0 POSTABLATIVE HYPOTHYROIDISM: ICD-10-CM

## 2022-11-28 RX ORDER — LEVOTHYROXINE SODIUM 150 MCG
TABLET ORAL
Qty: 90 TABLET | Refills: 0 | Status: SHIPPED | OUTPATIENT
Start: 2022-11-28 | End: 2023-03-12

## 2022-12-06 ENCOUNTER — LAB (OUTPATIENT)
Dept: LAB | Facility: CLINIC | Age: 62
End: 2022-12-06
Payer: COMMERCIAL

## 2022-12-06 ENCOUNTER — OFFICE VISIT (OUTPATIENT)
Dept: CARDIOLOGY | Facility: CLINIC | Age: 62
End: 2022-12-06
Attending: INTERNAL MEDICINE
Payer: COMMERCIAL

## 2022-12-06 VITALS
BODY MASS INDEX: 30.32 KG/M2 | DIASTOLIC BLOOD PRESSURE: 80 MMHG | WEIGHT: 180.9 LBS | OXYGEN SATURATION: 98 % | HEART RATE: 102 BPM | SYSTOLIC BLOOD PRESSURE: 121 MMHG

## 2022-12-06 DIAGNOSIS — I44.2 COMPLETE HEART BLOCK (H): ICD-10-CM

## 2022-12-06 DIAGNOSIS — I10 BENIGN ESSENTIAL HYPERTENSION: Primary | ICD-10-CM

## 2022-12-06 DIAGNOSIS — I50.30 NYHA CLASS 3 HEART FAILURE WITH PRESERVED EJECTION FRACTION (H): ICD-10-CM

## 2022-12-06 DIAGNOSIS — I50.30 HEART FAILURE WITH PRESERVED EJECTION FRACTION, BORDERLINE, CLASS III (H): ICD-10-CM

## 2022-12-06 DIAGNOSIS — I42.8 NONISCHEMIC CARDIOMYOPATHY (H): ICD-10-CM

## 2022-12-06 LAB
ALBUMIN SERPL BCG-MCNC: 4.3 G/DL (ref 3.5–5.2)
ALP SERPL-CCNC: 115 U/L (ref 35–104)
ALT SERPL W P-5'-P-CCNC: 19 U/L (ref 10–35)
ANION GAP SERPL CALCULATED.3IONS-SCNC: 12 MMOL/L (ref 7–15)
AST SERPL W P-5'-P-CCNC: 19 U/L (ref 10–35)
BILIRUB SERPL-MCNC: 0.2 MG/DL
BUN SERPL-MCNC: 13.1 MG/DL (ref 8–23)
CALCIUM SERPL-MCNC: 9.4 MG/DL (ref 8.8–10.2)
CHLORIDE SERPL-SCNC: 102 MMOL/L (ref 98–107)
CREAT SERPL-MCNC: 0.72 MG/DL (ref 0.51–0.95)
DEPRECATED HCO3 PLAS-SCNC: 24 MMOL/L (ref 22–29)
ERYTHROCYTE [DISTWIDTH] IN BLOOD BY AUTOMATED COUNT: 12.4 % (ref 10–15)
GFR SERPL CREATININE-BSD FRML MDRD: >90 ML/MIN/1.73M2
GLUCOSE SERPL-MCNC: 86 MG/DL (ref 70–99)
HCT VFR BLD AUTO: 42.1 % (ref 35–47)
HGB BLD-MCNC: 13.6 G/DL (ref 11.7–15.7)
IRON SERPL-MCNC: 68 UG/DL (ref 37–145)
MCH RBC QN AUTO: 30.6 PG (ref 26.5–33)
MCHC RBC AUTO-ENTMCNC: 32.3 G/DL (ref 31.5–36.5)
MCV RBC AUTO: 95 FL (ref 78–100)
NT-PROBNP SERPL-MCNC: 91 PG/ML (ref 0–900)
PLATELET # BLD AUTO: 214 10E3/UL (ref 150–450)
POTASSIUM SERPL-SCNC: 4.8 MMOL/L (ref 3.4–5.3)
PROT SERPL-MCNC: 7.3 G/DL (ref 6.4–8.3)
RBC # BLD AUTO: 4.44 10E6/UL (ref 3.8–5.2)
SODIUM SERPL-SCNC: 138 MMOL/L (ref 136–145)
WBC # BLD AUTO: 6.5 10E3/UL (ref 4–11)

## 2022-12-06 PROCEDURE — 83540 ASSAY OF IRON: CPT | Performed by: INTERNAL MEDICINE

## 2022-12-06 PROCEDURE — 83880 ASSAY OF NATRIURETIC PEPTIDE: CPT | Performed by: INTERNAL MEDICINE

## 2022-12-06 PROCEDURE — 80053 COMPREHEN METABOLIC PANEL: CPT | Performed by: INTERNAL MEDICINE

## 2022-12-06 PROCEDURE — 36415 COLL VENOUS BLD VENIPUNCTURE: CPT | Performed by: PATHOLOGY

## 2022-12-06 PROCEDURE — 85027 COMPLETE CBC AUTOMATED: CPT | Performed by: PATHOLOGY

## 2022-12-06 PROCEDURE — G0463 HOSPITAL OUTPT CLINIC VISIT: HCPCS

## 2022-12-06 PROCEDURE — 99215 OFFICE O/P EST HI 40 MIN: CPT | Performed by: INTERNAL MEDICINE

## 2022-12-06 RX ORDER — SPIRONOLACTONE 25 MG/1
25 TABLET ORAL DAILY
Qty: 90 TABLET | Refills: 3 | Status: SHIPPED | OUTPATIENT
Start: 2022-12-06 | End: 2023-07-17

## 2022-12-06 ASSESSMENT — PAIN SCALES - GENERAL: PAINLEVEL: NO PAIN (0)

## 2022-12-06 NOTE — PROGRESS NOTES
December 6, 2022     Ms. Marroquin is a very pleasant 61 y/o lady who worked as a nurse anesthetist and has a history of RF ablation for PVCs which led to AV node ablation and complete heart block requiring a dual-chamber pacemaker implantation, which is a Medtronic device MRI compatible.  She also has a recent history of heart failure with reduced ejection fraction with EF around 35% later improved to 45%.  There was a history of Takotsubo cardiomyopathy.  Patient on 7/2/21 had meeting with EP at which time low dose carvedilol was started 3.125 mg PO bid due to atrial ectopy and short runs of VT. she did recently complete an exercise out of catheterization with myself which showed normal resting biventricular filling pressures and normal cardiac output.  She did have limited augmentation of cardiac index with exercise and filling pressures especially pulmonary capillary wedge pressure increased significantly consistent with heart failure preserved ejection fraction.  She did initially better but for the past couple of months she has not been feeling well.  She did gain some weight and increased the Lasix to 40 mg as needed.  She does not feel that these helped for her.  In fact at times she feels that she had some confusion or disorientation.  Possibly she is dry at times.  She did see in follow-up with [facility] he had some of the instructions were confusing and she appropriately notes that the discharge papers stated a lot of extra information.  I did see her and performed a CPX that was marginal as well as a cardiac MRI that showed a significantly improved LV function to 50% and no other significant abnormalities. She did meet with Dr. Funes and sildenafil was initiated yet rapidly discontinued due to muscle aches. Dr. Funes also adjusted the rate response to allow for faster heart rates in an attempt to improve forward flow. In addition, she was felt to be dry and lasix has been reduced to 20mg 2 times per  week. She noted that she at times she forgets to take the PM dose of the revatio. After lengthy discussions, she did have CRT-D upgrade in hopes that resynchronization will help her symptomatology. This was performed in January 2022 and was uneventful. This led to an improvement tin her overall functional status.  Overall she feels okay without any new issues or complaints.  Exercise tolerance has been decreasing recently, made further worse by ankle pain and some difficulty with ambulation. This is a new change for her. Her balance is also somewhat off.Overall. from the cardiac standpoint she feels better after the CRT upgrade.  No dizziness lightheadedness no significant lower extremity edema.  She did have some diarrhea recently but improved.  No other issues     PAST MEDICAL HISTORY:  Past Medical History:   Diagnosis Date     Arthritis      Cardiomyopathy (H)     ff Cardiology     Chronic depressive personality disorder      Congestive heart failure (H) see dr martínez    heart failure correct term     COPD exacerbation (H)      Esophageal reflux      Ex-smoker     quit 2006; 1 PPD x 30     Hyperparathyroidism (H)     s/p parathyroidectomy     Hypothyroidism      LARRY on CPAP     ff Sleep medicine     Pulmonary nodules     ff Pulmonologist     S/P cardiac pacemaker procedure     checked every 6 months at the [facility]     Uncomplicated asthma years     FAMILY HISTORY:  Family History   Problem Relation Age of Onset     Hypothyroidism Mother      Hypertension Mother      Osteoarthritis Mother      Coronary Artery Disease Father         nonfatal MI in his 70s     Asthma Father      Diabetes Sister      Hypothyroidism Sister      Anxiety Disorder Sister      Breast Cancer Maternal Aunt      SOCIAL HISTORY:  Social History     Socioeconomic History     Marital status:      Highest education level: Master's degree (e.g., MA, MS, Florian, MEd, MSW, SONIA)   Tobacco Use     Smoking status: Former     Packs/day: 0.00      Years: 0.00     Pack years: 0.00     Types: Cigarettes     Smokeless tobacco: Never   Vaping Use     Vaping Use: Never used   Substance and Sexual Activity     Alcohol use: Not Currently     Alcohol/week: 0.0 - 8.0 standard drinks     Comment: seldom     Drug use: No     Sexual activity: Not Currently     Partners: Male     Birth control/protection: None     Comment: vasectomy   Other Topics Concern     Parent/sibling w/ CABG, MI or angioplasty before 65F 55M? No   Social History Narrative    CRNA on disability for vestibular symptoms      Social Determinants of Health     Intimate Partner Violence: Not At Risk     Fear of Current or Ex-Partner: No     Emotionally Abused: No     Physically Abused: No     Sexually Abused: No     CURRENT MEDICATIONS:  Current Outpatient Medications   Medication     acetaminophen (TYLENOL) 325 MG tablet     albuterol (PROAIR HFA/PROVENTIL HFA/VENTOLIN HFA) 108 (90 BASE) MCG/ACT Inhaler     albuterol (PROVENTIL) (2.5 MG/3ML) 0.083% neb solution     azelastine (ASTELIN) 0.1 % nasal spray     Calcium Carbonate-Vitamin D (CALCIUM-CARB 600 + D PO)     clonazePAM (KLONOPIN) 0.5 MG tablet     DiphenhydrAMINE HCl (BENADRYL PO)     ELIQUIS ANTICOAGULANT 5 MG tablet     Ferrous Sulfate (IRON SUPPLEMENT PO)     fluocinolone acetonide 0.01 % OIL     fluticasone-salmeterol (ADVAIR) 250-50 MCG/ACT inhaler     fluticasone-salmeterol (ADVAIR) 250-50 MCG/DOSE inhaler     furosemide (LASIX) 20 MG tablet     hydrocortisone (CORTAID) 1 % external cream     ipratropium (ATROVENT) 0.06 % nasal spray     MAGNESIUM LACTATE PO     MELATONIN PO     Menaquinone-7 (VITAMIN K2 PO)     METAMUCIL FIBER PO     metoprolol succinate ER (TOPROL XL) 25 MG 24 hr tablet     Multiple Vitamin (DAILY MULTIVITAMIN PO)     nitroGLYcerin (NITROSTAT) 0.4 MG sublingual tablet     Probiotic Product (PROBIOTIC DAILY PO)     psyllium (METAMUCIL) WAFR     spironolactone (ALDACTONE) 25 MG tablet     SYNTHROID 150 MCG tablet      triamcinolone (KENALOG) 0.1 % external cream     TURMERIC PO     vitamin C (ASCORBIC ACID) 250 MG tablet     vitamin D3 (CHOLECALCIFEROL) 2000 units tablet     Zinc 15 MG CAPS     No current facility-administered medications for this visit.     ROS:   Constitutional: No fever, chills, or sweats.   ENT: No visual disturbance, ear ache, epistaxis, sore throat.   Allergies/Immunologic: Negative.   Respiratory: No cough, hemoptysis.   Cardiovascular: As per HPI.   GI: No nausea, vomiting, hematemesis, melena, or hematochezia.   : No urinary frequency, dysuria, or hematuria.   Integument: Negative.   Psychiatric: Pleasant, no major depression noted  Neuro: No focal neurological deficits noted  Endocrinology: Negative.   Musculoskeletal: As per HPI.      EXAM:  /80 (BP Location: Right arm, Patient Position: Chair, Cuff Size: Adult Large)   Pulse 102   Wt 82.1 kg (180 lb 14.4 oz)   LMP 08/21/2007   SpO2 98%   BMI 30.32 kg/m    General: appears comfortable, alert and oriented  Head: normocephalic, atraumatic  Eyes: anicteric sclera, EOMI , PERRL  Neck: no adenopathy  Orophyarynx: moist mucosa, no lesions noted  Heart: regular, S1/S2, no murmurs, rubs or gallop. Estimated JVP at 5 cmH2O  Lungs: CTAB, No wheezing.   Abdomen: soft, non-tender, bowel sounds present, no hepatosplenomegaly  Extremities: No LE edema today  Skin: no open lesions noted  Neuro: grossly non-focal    Labs:  Lab Results   Component Value Date    WBC 6.6 10/21/2022    HGB 13.0 10/21/2022    HCT 39.0 10/21/2022     10/21/2022     (L) 10/21/2022    POTASSIUM 3.6 10/21/2022    CHLORIDE 104 10/21/2022    CO2 20 (L) 10/21/2022    BUN 4.8 (L) 10/21/2022    CR 0.54 10/21/2022    GLC 87 10/21/2022    SED 22 11/09/2017    DD 1.65 (H) 04/13/2022    NTBNPI 129 07/28/2022    NTBNP 82 06/07/2022    TROPONIN <0.015 09/28/2021    TROPI <0.015 12/20/2020    AST 14 10/21/2022    ALT 8 (L) 10/21/2022    ALKPHOS 103 10/21/2022    BILITOTAL 0.3  10/21/2022    INR 1.06 07/28/2022     NM Lexiscan 3/2020:  The nuclear stress test is negative for inducible myocardial ischemia or infarction.  Fixed septal/apical defect due to Paced rhythm and increased gut photon activity.LVEDv 133ml. LV ESv 40ml. LV EF 70%.     There is no prior study for comparison     Echocardiogram 3/2020:  Mildly (EF 40-45%) reduced left ventricular function is present.  Global right ventricular function is normal.  No pericardial effusion is present. IVC diameter <2.1 cm collapsing >50% with sniff suggests a normal RA pressure  of 3 mmHg. This study was compared with the study from 12/3/19.  There has been no change.      Coronary angiogram 9/2019:  Mild-moderate non-obstructive coronary disease involving LAD, LCx, and RCA. No significant coronary disease to explain new cardiomyopathy.     TTE 9/5/2019:  Moderately (EF 30-35%) reduced left ventricular function is present. All segments from mid to apical left ventricle are severely hypokinetic to akinetic. Basal segments are hypercontractile. Overall pattern is suggestive of stress cardiomyopathy, but cannot rule out ischemic cardiomyopathy. Right ventricular function, chamber size, wall motion, and thickness are normal. No significant valve abnormalities. Mild pulmonary hypertension with increased RA pressure.     TTE 2/19/21:  Moderately (EF 35%) reduced left ventricular function is present.  Global right ventricular function is normal.  No significant valvular dysfunction. The inferior vena cava was normal in size with preserved respiratory variability. No pericardial effusion present.     MRI cardiac 4/2017:  1. Normal left ventricular size.  Left ventricular wall thickness is  normal except a very small area in the basal inferior septum that appears  thin in some images.  Real-time images suggest a very small area of  hypokinesis in the basal inferior septum (not well visualized).  Left  ventricular ejection fraction is estimated at  about 60-65% (unable to   perform volumetric analysis given frequent ectopy; real time images used  to assess LVEF).  2. Normal right ventricular size and systolic function.  No obvious regional wall motion abnormalities noted.  3. Gadolinium imaging:  No obvious right ventricular myocardial late  gadolinium enhancement noted.  There is a very small area of probable late  gadolinium enhancement in the mildly thinned basal inferior segment.    4. Moderate left atrial dilatation.  The right atrial size is at the upper limits of normal.  5. Thickened mitral valve leaflets, without obvious stenosis or significant regurgitation. No obvious hemodynamically significant valvular dysfunction noted.   6. Normal sized aortic root and ascending aorta.  For the remainder of the thoracic aorta, see separate radiology report.  7. Normal pericardium without significant pericardial effusion.      Cardiac MRI 5/2021:  1. The LV is normal in cavity size and wall thickness. The global systolic function is mildly reduced. The LVEF is 50%. There are no regional wall motion abnormalities. There is abnormal septal motion related to pacing.  2. The RV is normal in cavity size. The global systolic function is normal. The RVEF is 65%.   3. The left atrium is mildly dilated.  4. There is no significant valvular disease.   5. Late gadolinium enhancement imaging shows no MI, fibrosis or infiltrative disease.   6. Regadenoson stress perfusion imaging shows no ischemia.  7. There is no pericardial effusion or thickening.  8. There is no LV thrombus.  CONCLUSIONS: No myocardial ischemia or fibrosis. Mild NICM possibly related to pacing, LVEF 50% and RVEF 65%.      TTE 11/23/2021  Left ventricular function is decreased. The ejection fraction is 40-45% (mildly reduced).Abnormal septal motion consistent with left bundle branch block is present.  Global right ventricular function is normal.  No significant valvular abnormalities present.  Pulmonary  artery systolic pressure is normal.  The inferior vena cava was normal in size with preserved respiratory  variability.  No pericardial effusion is present      Device check 5/25/22  Device: Medtronic Percepta CRT  Normal device function  Mode: DDDR  bpm  AP: 46%  : 100%  BVP: 100%  Intrinsic Rhythm: CHB: SR @ 70 bpm/  @ 30 bpm  Thoracic Impedance: Near reference line.   Short V-V intervals: 0  Lead Trends Appear Stable: yes  Estimated battery longevity to RRT = 10.5 years. Battery voltage = 3.16V.   Atrial Arrhythmia: 0  AF [place] = 0%  Anticoagulant: Eliquis  Ventricular Arrhythmia: 1 monitored VT. EGM shows NSVT lasting 8 beats.     TTE 5/25/22  Left ventricular function is decreased. The ejection fraction is 40-45% (mildly reduced).  Abnormal septal motion consistent with left bundle branch block is present.  Right ventricular function, chamber size, wall motion, and thickness are normal.  This study was compared with the study from 11/23/21 .  No significant changes noted.     ASSESSMENT AND PLAN:  Ms. Marroquin is a pleasant 62 year old lady with history of mildly reduced LVEF at EF:45% improved transiently to to 55-60% and on most recent study done 5/2022 it remains around 45% (reviewed personally), Coleen KIRAN CM, PVC ablation that led to complete heart block necessitating permanent pacemaker implantation s/p upgrade to CRT-D who presents today for a follow up visit. Overall, noted to have symptom improvement after CRT upgrade. Currently has NYHA Class II-III symptoms.   Overall no change she feels unchanged.  She is thinking about moving to warmer climate for the wintertime.  She is asking about increasing the spironolactone potentially to 25 mg daily given that it is hard to split the pill on a consistent basis.  I do agree with this.  Unfortunately labs are extremely delayed today but reviewing her history I do believe it is safe to increase the spironolactone at this time if the labs with come  back incompatible then we will decrease it back to where we are at the moment. -- reviewed and are in normal range, OK to increase spironolactone. No other medication changes.  I will see her back in 6 months with an echo at that time.  Concerned about the reduction in exercise tolerance and stability issues. Will need to monitor closely. Reasonable to continue her disability parking at this time yet will defer to PCP who can proceed with renewal.     #HFrEF now with mildly recovered EF  #NICM  #Paroxysmal atrial fibrillation   NYHA Class II-III. LVEF remains at around 40-45% (5/2022 TTE).  She did have recent pacemaker upgrade as detailed above and since then she feels feels significantly better.  Her exercise tolerance has improved she remains off of Lasix at this time without any significant lower extremity edema.  In addition her overall NT proBNP trend has improved and now within the normal range. She currently appears euvolemic.   - Metoprolol succinate 12.5mg qday   - Lasix 20mg qday as needed for LE edema or worsening heart failure symptoms, she is not taking this right now however it is available to her and she will let us know should she gain some weight to feel more short of breath and then she will take the dose.  - Aldactone 12.5mg qday, increase to 25mg daily as above   - Eliquis 5mg BID, as above     Follow-up in 6 months with repeat labs     I appreciate the opportunity to participate in the care of Joyce Marroquin . Please do not hesitate to contact me with any further questions.    Sincerely,   Alonso Ordonez MD  [facility] Division of Cardiology

## 2022-12-06 NOTE — LETTER
12/6/2022      RE: Joyce Marroquin  [address] [facility] [id number]       Dear Colleague,    Thank you for the opportunity to participate in the care of your patient, Joyce Marroquin, at the [facility] [place] at [facility]. Please see a copy of my visit note below.    December 6, 2022     Ms. Marroquin is a very pleasant 63 y/o lady who worked as a nurse anesthetist and has a history of RF ablation for PVCs which led to AV node ablation and complete heart block requiring a dual-chamber pacemaker implantation, which is a Medtronic device MRI compatible.  She also has a recent history of heart failure with reduced ejection fraction with EF around 35% later improved to 45%.  There was a history of Takotsubo cardiomyopathy.  Patient on 7/2/21 had meeting with EP at which time low dose carvedilol was started 3.125 mg PO bid due to atrial ectopy and short runs of VT. she did recently complete an exercise out of catheterization with myself which showed normal resting biventricular filling pressures and normal cardiac output.  She did have limited augmentation of cardiac index with exercise and filling pressures especially pulmonary capillary wedge pressure increased significantly consistent with heart failure preserved ejection fraction.  She did initially better but for the past couple of months she has not been feeling well.  She did gain some weight and increased the Lasix to 40 mg as needed.  She does not feel that these helped for her.  In fact at times she feels that she had some confusion or disorientation.  Possibly she is dry at times.  She did see in follow-up with core clinic he had some of the instructions were confusing and she appropriately notes that the discharge papers stated a lot of extra information.  I did see her and performed a CPX that was marginal as well as a cardiac MRI that showed a significantly improved LV function to 50% and  no other significant abnormalities. She did meet with Dr. Funes and sildenafil was initiated yet rapidly discontinued due to muscle aches. Dr. Funes also adjusted the rate response to allow for faster heart rates in an attempt to improve forward flow. In addition, she was felt to be dry and lasix has been reduced to 20mg 2 times per week. She noted that she at times she forgets to take the PM dose of the revatio. After lengthy discussions, she did have CRT-D upgrade in hopes that resynchronization will help her symptomatology. This was performed in January 2022 and was uneventful. This led to an improvement tin her overall functional status.  Overall she feels okay without any new issues or complaints.  Exercise tolerance has been remained closely stable.  She definitely feels significantly better after the CRT upgrade.  No dizziness lightheadedness no significant lower extremity edema.  She did have some diarrhea recently but improved.  No other issues     PAST MEDICAL HISTORY:  Past Medical History:   Diagnosis Date     Arthritis      Cardiomyopathy (H)     ff Cardiology     Chronic depressive personality disorder      Congestive heart failure (H) see  note    heart failure correct term     COPD exacerbation (H)      Esophageal reflux      Ex-smoker     quit 2006; 1 PPD x 30     Hyperparathyroidism (H)     s/p parathyroidectomy     Hypothyroidism      LARRY on CPAP     ff Sleep medicine     Pulmonary nodules     ff Pulmonologist     S/P cardiac pacemaker procedure     checked every 6 months at the [facility]     Uncomplicated asthma years     FAMILY HISTORY:  Family History   Problem Relation Age of Onset     Hypothyroidism Mother      Hypertension Mother      Osteoarthritis Mother      Coronary Artery Disease Father         nonfatal MI in his 70s     Asthma Father      Diabetes Sister      Hypothyroidism Sister      Anxiety Disorder Sister      Breast Cancer Maternal Aunt      SOCIAL HISTORY:  Social History      Socioeconomic History     Marital status:      Highest education level: Master's degree (e.g., MA, MS, Florian, MEd, MSW, SONIA)   Tobacco Use     Smoking status: Former     Packs/day: 0.00     Years: 0.00     Pack years: 0.00     Types: Cigarettes     Smokeless tobacco: Never   Vaping Use     Vaping Use: Never used   Substance and Sexual Activity     Alcohol use: Not Currently     Alcohol/week: 0.0 - 8.0 standard drinks     Comment: seldom     Drug use: No     Sexual activity: Not Currently     Partners: Male     Birth control/protection: None     Comment: vasectomy   Other Topics Concern     Parent/sibling w/ CABG, MI or angioplasty before 65F 55M? No   Social History Narrative    CRNA on disability for vestibular symptoms      Social Determinants of Health     Intimate Partner Violence: Not At Risk     Fear of Current or Ex-Partner: No     Emotionally Abused: No     Physically Abused: No     Sexually Abused: No     CURRENT MEDICATIONS:  Current Outpatient Medications   Medication     acetaminophen (TYLENOL) 325 MG tablet     albuterol (PROAIR HFA/PROVENTIL HFA/VENTOLIN HFA) 108 (90 BASE) MCG/ACT Inhaler     albuterol (PROVENTIL) (2.5 MG/3ML) 0.083% neb solution     azelastine (ASTELIN) 0.1 % nasal spray     Calcium Carbonate-Vitamin D (CALCIUM-CARB 600 + D PO)     clonazePAM (KLONOPIN) 0.5 MG tablet     DiphenhydrAMINE HCl (BENADRYL PO)     ELIQUIS ANTICOAGULANT 5 MG tablet     Ferrous Sulfate (IRON SUPPLEMENT PO)     fluocinolone acetonide 0.01 % OIL     fluticasone-salmeterol (ADVAIR) 250-50 MCG/ACT inhaler     fluticasone-salmeterol (ADVAIR) 250-50 MCG/DOSE inhaler     furosemide (LASIX) 20 MG tablet     hydrocortisone (CORTAID) 1 % external cream     ipratropium (ATROVENT) 0.06 % nasal spray     MAGNESIUM LACTATE PO     MELATONIN PO     Menaquinone-7 (VITAMIN K2 PO)     METAMUCIL FIBER PO     metoprolol succinate ER (TOPROL XL) 25 MG 24 hr tablet     Multiple Vitamin (DAILY MULTIVITAMIN PO)      nitroGLYcerin (NITROSTAT) 0.4 MG sublingual tablet     Probiotic Product (PROBIOTIC DAILY PO)     psyllium (METAMUCIL) WAFR     spironolactone (ALDACTONE) 25 MG tablet     SYNTHROID 150 MCG tablet     triamcinolone (KENALOG) 0.1 % external cream     TURMERIC PO     vitamin C (ASCORBIC ACID) 250 MG tablet     vitamin D3 (CHOLECALCIFEROL) 2000 units tablet     Zinc 15 MG CAPS     No current facility-administered medications for this visit.     ROS:   Constitutional: No fever, chills, or sweats.   ENT: No visual disturbance, ear ache, epistaxis, sore throat.   Allergies/Immunologic: Negative.   Respiratory: No cough, hemoptysis.   Cardiovascular: As per HPI.   GI: No nausea, vomiting, hematemesis, melena, or hematochezia.   : No urinary frequency, dysuria, or hematuria.   Integument: Negative.   Psychiatric: Pleasant, no major depression noted  Neuro: No focal neurological deficits noted  Endocrinology: Negative.   Musculoskeletal: As per HPI.      EXAM:  /80 (BP Location: Right arm, Patient Position: Chair, Cuff Size: Adult Large)   Pulse 102   Wt 82.1 kg (180 lb 14.4 oz)   LMP 08/21/2007   SpO2 98%   BMI 30.32 kg/m    General: appears comfortable, alert and oriented  Head: normocephalic, atraumatic  Eyes: anicteric sclera, EOMI , PERRL  Neck: no adenopathy  Orophyarynx: moist mucosa, no lesions noted  Heart: regular, S1/S2, no murmurs, rubs or gallop. Estimated JVP at 5 cmH2O  Lungs: CTAB, No wheezing.   Abdomen: soft, non-tender, bowel sounds present, no hepatosplenomegaly  Extremities: No LE edema today  Skin: no open lesions noted  Neuro: grossly non-focal    Labs:  Lab Results   Component Value Date    WBC 6.6 10/21/2022    HGB 13.0 10/21/2022    HCT 39.0 10/21/2022     10/21/2022     (L) 10/21/2022    POTASSIUM 3.6 10/21/2022    CHLORIDE 104 10/21/2022    CO2 20 (L) 10/21/2022    BUN 4.8 (L) 10/21/2022    CR 0.54 10/21/2022    GLC 87 10/21/2022    SED 22 11/09/2017    DD 1.65 (H)  04/13/2022    NTBNPI 129 07/28/2022    NTBNP 82 06/07/2022    TROPONIN <0.015 09/28/2021    TROPI <0.015 12/20/2020    AST 14 10/21/2022    ALT 8 (L) 10/21/2022    ALKPHOS 103 10/21/2022    BILITOTAL 0.3 10/21/2022    INR 1.06 07/28/2022     NM Lexiscan 3/2020:  The nuclear stress test is negative for inducible myocardial ischemia or infarction.  Fixed septal/apical defect due to Paced rhythm and increased gut photon activity.LVEDv 133ml. LV ESv 40ml. LV EF 70%.     There is no prior study for comparison     Echocardiogram 3/2020:  Mildly (EF 40-45%) reduced left ventricular function is present.  Global right ventricular function is normal.  No pericardial effusion is present. IVC diameter <2.1 cm collapsing >50% with sniff suggests a normal RA pressure  of 3 mmHg. This study was compared with the study from 12/3/19.  There has been no change.      Coronary angiogram 9/2019:  Mild-moderate non-obstructive coronary disease involving LAD, LCx, and RCA. No significant coronary disease to explain new cardiomyopathy.     TTE 9/5/2019:  Moderately (EF 30-35%) reduced left ventricular function is present. All segments from mid to apical left ventricle are severely hypokinetic to akinetic. Basal segments are hypercontractile. Overall pattern is suggestive of stress cardiomyopathy, but cannot rule out ischemic cardiomyopathy. Right ventricular function, chamber size, wall motion, and thickness are normal. No significant valve abnormalities. Mild pulmonary hypertension with increased RA pressure.     TTE 2/19/21:  Moderately (EF 35%) reduced left ventricular function is present.  Global right ventricular function is normal.  No significant valvular dysfunction. The inferior vena cava was normal in size with preserved respiratory variability. No pericardial effusion present.     MRI cardiac 4/2017:  1. Normal left ventricular size.  Left ventricular wall thickness is  normal except a very small area in the basal inferior  septum that appears  thin in some images.  Real-time images suggest a very small area of  hypokinesis in the basal inferior septum (not well visualized).  Left  ventricular ejection fraction is estimated at about 60-65% (unable to   perform volumetric analysis given frequent ectopy; real time images used  to assess LVEF).  2. Normal right ventricular size and systolic function.  No obvious regional wall motion abnormalities noted.  3. Gadolinium imaging:  No obvious right ventricular myocardial late  gadolinium enhancement noted.  There is a very small area of probable late  gadolinium enhancement in the mildly thinned basal inferior segment.    4. Moderate left atrial dilatation.  The right atrial size is at the upper limits of normal.  5. Thickened mitral valve leaflets, without obvious stenosis or significant regurgitation. No obvious hemodynamically significant valvular dysfunction noted.   6. Normal sized aortic root and ascending aorta.  For the remainder of the thoracic aorta, see separate radiology report.  7. Normal pericardium without significant pericardial effusion.      Cardiac MRI 5/2021:  1. The LV is normal in cavity size and wall thickness. The global systolic function is mildly reduced. The LVEF is 50%. There are no regional wall motion abnormalities. There is abnormal septal motion related to pacing.  2. The RV is normal in cavity size. The global systolic function is normal. The RVEF is 65%.   3. The left atrium is mildly dilated.  4. There is no significant valvular disease.   5. Late gadolinium enhancement imaging shows no MI, fibrosis or infiltrative disease.   6. Regadenoson stress perfusion imaging shows no ischemia.  7. There is no pericardial effusion or thickening.  8. There is no LV thrombus.  CONCLUSIONS: No myocardial ischemia or fibrosis. Mild NICM possibly related to pacing, LVEF 50% and RVEF 65%.      TTE 11/23/2021  Left ventricular function is decreased. The ejection fraction is  40-45% (mildly reduced).Abnormal septal motion consistent with left bundle branch block is present.  Global right ventricular function is normal.  No significant valvular abnormalities present.  Pulmonary artery systolic pressure is normal.  The inferior vena cava was normal in size with preserved respiratory  variability.  No pericardial effusion is present      Device check 5/25/22  Device: Medtronic Percepta CRT  Normal device function  Mode: DDDR  bpm  AP: 46%  : 100%  BVP: 100%  Intrinsic Rhythm: CHB: SR @ 70 bpm/  @ 30 bpm  Thoracic Impedance: Near reference line.   Short V-V intervals: 0  Lead Trends Appear Stable: yes  Estimated battery longevity to RRT = 10.5 years. Battery voltage = 3.16V.   Atrial Arrhythmia: 0  AF [place] = 0%  Anticoagulant: Eliquis  Ventricular Arrhythmia: 1 monitored VT. EGM shows NSVT lasting 8 beats.     TTE 5/25/22  Left ventricular function is decreased. The ejection fraction is 40-45% (mildly reduced).  Abnormal septal motion consistent with left bundle branch block is present.  Right ventricular function, chamber size, wall motion, and thickness are normal.  This study was compared with the study from 11/23/21 .  No significant changes noted.     ASSESSMENT AND PLAN:  Ms. Marroquin is a pleasant 62 year old lady with history of mildly reduced LVEF at EF:45% improved transiently to to 55-60% and on most recent study done 5/2022 it remains around 45% (reviewed personally), Coleen KIRAN CM, PVC ablation that led to complete heart block necessitating permanent pacemaker implantation s/p upgrade to CRT-D who presents today for a follow up visit. Overall, noted to have symptom improvement after CRT upgrade. Currently has NYHA Class II-III symptoms.   Overall no change she feels unchanged.  She is thinking about moving to warmer climate for the wintertime.  She is asking about increasing the spironolactone potentially to 25 mg daily given that it is hard to split the pill on a  consistent basis.  I do agree with this.  Unfortunately labs are extremely delayed today but reviewing her history I do believe it is safe to increase the spironolactone at this time if the labs with come back incompatible then we will decrease it back to where we are at the moment.  No other medication changes.  I will see her back in 6 months with an echo at that time.     #HFrEF now with mildly recovered EF  #NICM  #Paroxysmal atrial fibrillation   NYHA Class II-III. LVEF remains at around 40-45% (5/2022 TTE).  She did have recent pacemaker upgrade as detailed above and since then she feels feels significantly better.  Her exercise tolerance has improved she remains off of Lasix at this time without any significant lower extremity edema.  In addition her overall NT proBNP trend has improved and now within the normal range. She currently appears euvolemic.   - Metoprolol succinate 12.5mg qday   - Lasix 20mg qday as needed for LE edema or worsening heart failure symptoms, she is not taking this right now however it is available to her and she will let us know should she gain some weight to feel more short of breath and then she will take the dose.  - Aldactone 12.5mg qday, increase to 25mg daily as above   - Eliquis 5mg BID, as above     Follow-up in 6 months with repeat labs     I appreciate the opportunity to participate in the care of oJyce Marroquin . Please do not hesitate to contact me with any further questions.    Sincerely,   Alnoso Ordonez MD  [facility] Division of Cardiology

## 2022-12-06 NOTE — NURSING NOTE
Chief Complaint   Patient presents with     Follow Up     12/6/22--Dr. Ordonez: HFrEF     Vitals were taken and medications reconciled.    Denis Barnett, EMT  1:00 PM

## 2022-12-06 NOTE — PATIENT INSTRUCTIONS
Dr. Ordonez recommends:    Increase Spironolactone to 25 MG once daily.    Follow up clinic visit with Dr. Ordonez in 6 months with an echocardiogram and labs the same day.    Thank you for your visit today.  Please call me with any questions or concerns.   Matthias García RN  Cardiology Care Coordinator  [phone]

## 2022-12-07 DIAGNOSIS — I48.0 PAROXYSMAL ATRIAL FIBRILLATION (H): ICD-10-CM

## 2022-12-08 RX ORDER — APIXABAN 5 MG/1
TABLET, FILM COATED ORAL
Qty: 180 TABLET | Refills: 3 | Status: SHIPPED | OUTPATIENT
Start: 2022-12-08 | End: 2023-07-17

## 2022-12-13 ENCOUNTER — OFFICE VISIT (OUTPATIENT)
Dept: FAMILY MEDICINE | Facility: CLINIC | Age: 62
End: 2022-12-13
Payer: COMMERCIAL

## 2022-12-13 VITALS
RESPIRATION RATE: 20 BRPM | DIASTOLIC BLOOD PRESSURE: 70 MMHG | SYSTOLIC BLOOD PRESSURE: 107 MMHG | BODY MASS INDEX: 29.8 KG/M2 | TEMPERATURE: 99.2 F | HEART RATE: 110 BPM | OXYGEN SATURATION: 93 % | WEIGHT: 177.8 LBS

## 2022-12-13 DIAGNOSIS — J20.9 ACUTE BRONCHITIS, UNSPECIFIED ORGANISM: Primary | ICD-10-CM

## 2022-12-13 DIAGNOSIS — J02.9 SORETHROAT: ICD-10-CM

## 2022-12-13 LAB
DEPRECATED S PYO AG THROAT QL EIA: NEGATIVE
GROUP A STREP BY PCR: NOT DETECTED
SARS-COV-2 RNA RESP QL NAA+PROBE: NEGATIVE

## 2022-12-13 PROCEDURE — 99214 OFFICE O/P EST MOD 30 MIN: CPT | Performed by: STUDENT IN AN ORGANIZED HEALTH CARE EDUCATION/TRAINING PROGRAM

## 2022-12-13 PROCEDURE — U0005 INFEC AGEN DETEC AMPLI PROBE: HCPCS | Performed by: STUDENT IN AN ORGANIZED HEALTH CARE EDUCATION/TRAINING PROGRAM

## 2022-12-13 PROCEDURE — U0003 INFECTIOUS AGENT DETECTION BY NUCLEIC ACID (DNA OR RNA); SEVERE ACUTE RESPIRATORY SYNDROME CORONAVIRUS 2 (SARS-COV-2) (CORONAVIRUS DISEASE [COVID-19]), AMPLIFIED PROBE TECHNIQUE, MAKING USE OF HIGH THROUGHPUT TECHNOLOGIES AS DESCRIBED BY CMS-2020-01-R: HCPCS | Performed by: STUDENT IN AN ORGANIZED HEALTH CARE EDUCATION/TRAINING PROGRAM

## 2022-12-13 PROCEDURE — 87651 STREP A DNA AMP PROBE: CPT | Performed by: STUDENT IN AN ORGANIZED HEALTH CARE EDUCATION/TRAINING PROGRAM

## 2022-12-13 RX ORDER — CEFUROXIME AXETIL 500 MG/1
500 TABLET ORAL 2 TIMES DAILY
Qty: 10 TABLET | Refills: 0 | Status: SHIPPED | OUTPATIENT
Start: 2022-12-13 | End: 2022-12-18

## 2022-12-13 ASSESSMENT — PATIENT HEALTH QUESTIONNAIRE - PHQ9
SUM OF ALL RESPONSES TO PHQ QUESTIONS 1-9: 15
10. IF YOU CHECKED OFF ANY PROBLEMS, HOW DIFFICULT HAVE THESE PROBLEMS MADE IT FOR YOU TO DO YOUR WORK, TAKE CARE OF THINGS AT HOME, OR GET ALONG WITH OTHER PEOPLE: VERY DIFFICULT
SUM OF ALL RESPONSES TO PHQ QUESTIONS 1-9: 15

## 2022-12-13 NOTE — PROGRESS NOTES
Assessment & Plan     1. Acute bronchitis, unspecified organism    - Streptococcus A Rapid Screen w/Reflex to PCR  - Symptomatic COVID-19 Virus (Coronavirus) by PCR Nose; Future  - Symptomatic COVID-19 Virus (Coronavirus) by PCR Nose  - Group A Streptococcus PCR Throat Swab  - cefuroxime (CEFTIN) 500 MG tablet; Take 1 tablet (500 mg) by mouth 2 times daily for 5 days  Dispense: 10 tablet; Refill: 0  Patient has steroid at home. Instruction given to taper medication. I recommend azelastine which she has at home  The risks, benefits and treatment options of prescribed medications or other treatments have been discussed with the patient. The patient verbalized their understanding and should call or follow up if no improvement or if they develop further problems      Return in about 1 week (around 12/20/2022), or if symptoms worsen or fail to improve, for Follow up, in person.    Akilah Waterman MD  [facility] ROLAND Cook is a 62 year old, presenting for the following health issues:  Sick      History of Present Illness       Reason for visit:  Cough scratchy throat sob [id number] @night better after cough r/o strep pnuemonia  Symptom onset:  1-2 weeks ago  Symptom progression:  Worsening  Had these symptoms before:  Yes  Has tried/received treatment for these symptoms:  Yes  Previous treatment was successful:  Yes  Prior treatment description:  We first havefigure whatfirt    She eats 4 or more servings of fruits and vegetables daily.She consumes 0 sweetened beverage(s) daily.She exercises with enough effort to increase her heart rate 10 to 19 minutes per day.    She is taking medications regularly.    Today's PHQ-9         PHQ-9 Total Score: 15    PHQ-9 Q9 Thoughts of better off dead/self-harm past 2 weeks :   Not at all    How difficult have these problems made it for you to do your work, take care of things at home, or get along with other people: Very difficult              Review of Systems   Constitutional, HEENT, cardiovascular, pulmonary, gi and gu systems are negative, except as otherwise noted.      Objective    /70   Pulse 110   Temp 99.2  F (37.3  C) (Tympanic)   Resp 20   Wt 80.6 kg (177 lb 12.8 oz)   LMP 08/21/2007   SpO2 93%   BMI 29.80 kg/m    Body mass index is 29.8 kg/m .  Physical Exam   GENERAL: healthy, alert and no distress  ENT:ATT/NC, congested nares,   RESP: lungs clear to auscultation for slightly diminished air entry in the left lower lobe CV: regular rate and rhythm, normal S1 S2, no S3 or S4, no murmur, click or rub,   MS: no gross musculoskeletal defects noted, no edema

## 2022-12-13 NOTE — PATIENT INSTRUCTIONS
Jose Luis Cook,    Thank you for allowing [facility] to manage your care.      I sent your prescriptions to your pharmacy.    For your convenience, test results are released as soon as they are available  Please allow 1-2 business days for me to send you a comment about your results.  If not done so, I encourage you to login into [employer] [email or web address] to review your results in real time.     If you have any questions or concerns, please feel free to call us at [phone].    Sincerely,    Dr. Waterman    Did you know?      You can schedule a video visit for follow-up appointments as well as future appointments for certain conditions.  Please see the below link.     [email or web address]    If you have not already done so,  I encourage you to sign up for [employer] ([email or web address]).  This will allow you to review your results, securely communicate with a provider, and schedule virtual visits as well.

## 2022-12-16 ENCOUNTER — MYC MEDICAL ADVICE (OUTPATIENT)
Dept: FAMILY MEDICINE | Facility: CLINIC | Age: 62
End: 2022-12-16

## 2022-12-16 ENCOUNTER — ANCILLARY PROCEDURE (OUTPATIENT)
Dept: GENERAL RADIOLOGY | Facility: CLINIC | Age: 62
End: 2022-12-16
Attending: STUDENT IN AN ORGANIZED HEALTH CARE EDUCATION/TRAINING PROGRAM
Payer: COMMERCIAL

## 2022-12-16 DIAGNOSIS — J18.9 COMMUNITY ACQUIRED PNEUMONIA OF RIGHT LUNG, UNSPECIFIED PART OF LUNG: Primary | ICD-10-CM

## 2022-12-16 DIAGNOSIS — J20.9 ACUTE BRONCHITIS, UNSPECIFIED ORGANISM: ICD-10-CM

## 2022-12-16 PROCEDURE — 71046 X-RAY EXAM CHEST 2 VIEWS: CPT | Mod: TC | Performed by: RADIOLOGY

## 2022-12-16 RX ORDER — AZITHROMYCIN 250 MG/1
TABLET, FILM COATED ORAL
Qty: 6 TABLET | Refills: 0 | Status: SHIPPED | OUTPATIENT
Start: 2022-12-16 | End: 2022-12-21

## 2022-12-16 RX ORDER — CEFUROXIME AXETIL 500 MG/1
500 TABLET ORAL 2 TIMES DAILY
Qty: 10 TABLET | Refills: 0 | Status: SHIPPED | OUTPATIENT
Start: 2022-12-16 | End: 2022-12-21

## 2022-12-16 RX ORDER — CEFUROXIME AXETIL 500 MG/1
500 TABLET ORAL 2 TIMES DAILY
Qty: 10 TABLET | Refills: 0 | Status: CANCELLED | OUTPATIENT
Start: 2022-12-16

## 2022-12-16 NOTE — TELEPHONE ENCOUNTER
I called patient with result. Discussed xr finding ( Irregular nodular opacity in the right mid lung is new  since the previous exam. This finding could be infectious or  inflammatory, however neoplasm is difficult to exclude.) with patient. Given symptoms, I will treat her for pneumonia (Ceftin and Zpak).She has tolerated both in the past.     Follow up xr in 3 weeks.

## 2022-12-16 NOTE — TELEPHONE ENCOUNTER
I have responded to patient via [employer]-Sc[employer]. Please follow up with a phone call to make sure she checks the message as soon as possible.

## 2022-12-16 NOTE — TELEPHONE ENCOUNTER
"Routing jameel lyles to Akilah Farmer.    Last saw pt on 12/13/22 for this.    \"I am still coughing last night thought I heard an audible expiratory wheeze this am none  I am unable to auscultate my own lungs   A febrile to low grade 99.1,other vss normal   Nasal drainage less and clear ears still full   Extreme coughing with increased activity   You ordered only a short course of ceftin 5 days saying tx for bronchitis vs pneumonia same   Since that time have gotten information that if pneumonia need a 10 day course   Am concerned about that and would prefer a 10 day course  Ur thoughts  Respectfully   Joyce \"    Pt wondering if a longer course of antibiotics would be beneficial.    Rx pending refill if appropriate    Kortney Zuniga RN        "

## 2022-12-16 NOTE — TELEPHONE ENCOUNTER
I called and spoke to patient, she will come over now for x-ray (no appt needed).    Ana Greer RN  [facility]

## 2022-12-19 ENCOUNTER — VIRTUAL VISIT (OUTPATIENT)
Dept: FAMILY MEDICINE | Facility: CLINIC | Age: 62
End: 2022-12-19
Payer: COMMERCIAL

## 2022-12-19 DIAGNOSIS — J18.9 COMMUNITY ACQUIRED PNEUMONIA OF RIGHT LUNG, UNSPECIFIED PART OF LUNG: Primary | ICD-10-CM

## 2022-12-19 PROCEDURE — 99213 OFFICE O/P EST LOW 20 MIN: CPT | Mod: 95 | Performed by: STUDENT IN AN ORGANIZED HEALTH CARE EDUCATION/TRAINING PROGRAM

## 2022-12-19 NOTE — PROGRESS NOTES
Joyce is a 62 year old who is being evaluated via a billable video visit.      How would you like to obtain your AVS? MyChart  If the video visit is dropped, the invitation should be resent by: Text to cell phone: [phone]  Will anyone else be joining your video visit? No        Assessment & Plan     1. Community acquired pneumonia of right lung, unspecified part of lung  Improving.  Continue antibiotics      Return for Follow up, in person.    Akilah Waterman MD  [facility] ROLAND Cook is a 62 year old presenting for the following health issues:  Illness and Medication Reconciliation (Needs medication list updated)      HPI     Acute Illness  Acute illness concerns: cough  Onset/Duration: 3 weeks  Symptoms:  Fever: No  Chills/Sweats: NO  Headache (location?): YES  Sinus Pressure: No  Conjunctivitis:  No  Ear Pain: no  Rhinorrhea: YES  Congestion: YES  Sore Throat: No  Cough: YES-productive  Wheeze: No  Decreased Appetite: No  Nausea: YES  Vomiting: No  Diarrhea: No  Dysuria/Freq.: No  Dysuria or Hematuria: No  Fatigue/Achiness: No  Sick/Strep Exposure: No  Therapies tried and outcome: mucinex, zithromax, ceftin  Patient reports she is getting better.  Review of Systems   Constitutional, HEENT, cardiovascular, pulmonary, gi and gu systems are negative, except as otherwise noted.      Objective    Vitals - Patient Reported  Systolic (Patient Reported): 110  Diastolic (Patient Reported): 77  SpO2 (Patient Reported): 98  Temperature (Patient Reported): 99.1  F (37.3  C) (temporal)  Pulse (Patient Reported): 103      Vitals:  No vitals were obtained today due to virtual visit.    Physical Exam   GENERAL: Healthy, alert and no distress  RESP: No audible wheeze, cough, or visible cyanosis.  No visible retractions or increased work of breathing.    NEURO: Cranial nerves grossly intact.  Mentation and speech appropriate for age.  PSYCH: Mentation appears normal, affect  normal/bright, judgement and insight intact, normal speech and appearance well-groomed.            Video-Visit Details    Video Start Time: 11:32 AM    Type of service:  Video Visit    Video End Time:11:36 AM    Originating Location (pt. Location): Home    Distant Location (provider location):  On-site    Platform used for Video Visit: Well

## 2022-12-19 NOTE — PATIENT INSTRUCTIONS
Jose Luis Cook,    Thank you for allowing [facility] to manage your care.    For your convenience, test results are released as soon as they are available  Please allow 1-2 business days for me to send you a comment about your results.  If not done so, I encourage you to login into [email or web address] ([email or web address] to review your results in real time.     If you have any questions or concerns, please feel free to call us at [phone].    Sincerely,    Dr. Waterman    Did you know?      You can schedule a video visit for follow-up appointments as well as future appointments for certain conditions.  Please see the below link.     [email or web address]    If you have not already done so,  I encourage you to sign up for [email or web address] ([email or web address]).  This will allow you to review your results, securely communicate with a provider, and schedule virtual visits as well.

## 2022-12-28 ENCOUNTER — MYC MEDICAL ADVICE (OUTPATIENT)
Dept: CARDIOLOGY | Facility: CLINIC | Age: 62
End: 2022-12-28

## 2022-12-28 ENCOUNTER — CARE COORDINATION (OUTPATIENT)
Dept: CARDIOLOGY | Facility: CLINIC | Age: 62
End: 2022-12-28

## 2022-12-28 ENCOUNTER — TELEPHONE (OUTPATIENT)
Dept: CARDIOLOGY | Facility: CLINIC | Age: 62
End: 2022-12-28

## 2022-12-28 DIAGNOSIS — I48.0 PAROXYSMAL ATRIAL FIBRILLATION (H): Primary | ICD-10-CM

## 2022-12-28 NOTE — TELEPHONE ENCOUNTER
[facility] Call Center    Phone Message    May a detailed message be left on voicemail: yes     Reason for Call: Other: Patient called [employer] and crying stating that  will not respond to her via [employer]. Patient has a few questions for  and would like for them to be answered ASAP.    Patient would like to know if a follow up is necessary to have him answer her questions. If a follow up is needed she will need an in clinic device check. Please place an order for an in clinic device check and call patient back to further coordinate, thank you.    Action Taken: Message routed to:  Other: Cardiology     Travel Screening: Not Applicable     Thank you!  [facility]

## 2022-12-29 ENCOUNTER — ANCILLARY PROCEDURE (OUTPATIENT)
Dept: CARDIOLOGY | Facility: CLINIC | Age: 62
End: 2022-12-29
Attending: INTERNAL MEDICINE
Payer: COMMERCIAL

## 2022-12-29 ENCOUNTER — TELEPHONE (OUTPATIENT)
Dept: CARDIOLOGY | Facility: CLINIC | Age: 62
End: 2022-12-29

## 2022-12-29 DIAGNOSIS — I44.2 COMPLETE ATRIOVENTRICULAR BLOCK (H): ICD-10-CM

## 2022-12-29 PROCEDURE — 93296 REM INTERROG EVL PM/IDS: CPT

## 2022-12-29 PROCEDURE — 93294 REM INTERROG EVL PM/LDLS PM: CPT | Performed by: INTERNAL MEDICINE

## 2022-12-29 NOTE — TELEPHONE ENCOUNTER
See notes.  I spoke with Joyce this morning, we are addressing her questions via phone and will avoid responses in MyChart. Katherine Foster RN

## 2022-12-29 NOTE — TELEPHONE ENCOUNTER
[facility] Call Center    Phone Message    May a detailed message be left on voicemail: yes     Reason for Call: Other: Joyce calling to request Fiorella call her to discuss her plan of care.  She is having some major concerns and would really appreciate speaking ideally with Fiorella tomorrow.  Joyce is available at 11am and after 3pm on Friday.  She states she is feeling overwhelmed and having a lot of anxiety and panic attacks about her plan of care, feels too many hands are involved and that it isn't clear for her.  If Fiorella is unable to call her Friday, is there a chance to fit her in for a virtual visit as soon as possible?  Nothing was available in Fiorella's schedule for a virtual appointment.  Thank you    Action Taken: Message routed to:  Other: Cardiology    Travel Screening: Not Applicable

## 2022-12-29 NOTE — TELEPHONE ENCOUNTER
12/29/22--Called patient, no answer, voice message left apologizing for the delay in response to patient's questions, notifying that Dr. Ordonez is out [place], and that her questions were being sent to Fiorella Swanson per her [employer] request.

## 2022-12-29 NOTE — TELEPHONE ENCOUNTER
Message received.  I called Joyce back and we discussed Joyce's questions and concerns.  I've sent an email to Dr. Funes and care team requesting the article Joyce's been asking for. I will follow-up with Joyce after connecting with Dr. Funes and team. Katherine Foster RN

## 2022-12-29 NOTE — TELEPHONE ENCOUNTER
[facility] Call Center    Phone Message    May a detailed message be left on voicemail: yes     Reason for Call: Other: Patient called requesting to speak with her care team. Patient states she has been having issues with communication.    Patient would like to speak with the nurse manager.    Patient would like to discuss a medical article that was meant to be given to her about a year ago.    Patient would like access to the nurse line, that way she can contact the nurses directly.     Please call patient back to further discuss, thank you.    Action Taken: Message routed to:  Other: Cardiology    Travel Screening: Not Applicable     Thank you!  [facility]

## 2022-12-30 NOTE — TELEPHONE ENCOUNTER
[facility] Call Center    Phone Message    May a detailed message be left on voicemail: yes     Reason for Call: Other: PT said her My Chart said she had visits with DR Ordonez, Dr Funes and Fiorella Swanson. PT informed that this was just documentation in her chart and that she would not be billed for anything and she asked to speak to whoever made the decision for any communication to show up on her My chart. PT was given the number to My Chart and she really would like to speak to a nurse to get more information about this.    Action Taken: Other: Cardiology    Travel Screening: Not Applicable     Thank you!  [facility]

## 2022-12-30 NOTE — TELEPHONE ENCOUNTER
Returned call to Joyce. She remains very stressed by her communication situation with her cardiac care teams. She reports her phone call yesterday caused her a lot of stress and anxiety as it triggered her PTSD. She feels very frustrated by not getting answers from her care team and not getting answers in a timely manner. Provided reassurance and active listening. She agrees that her messages can be misconstrued by jameel but also worries that she isn't being heard. She is distressed that she was never given an answer regarding a decongestant for her sinus symptoms and feels very upset that she was left to suffer. Reports her other question for dr griffin can wait. Reviewed that Fiorella Swanson NP has agreed to call her later today, let Joyce know that I don't have an exact time for this. She confirms she is available after 3pm as she has an appointment at 1pm for 90 minutes.   This latest call she wanted us to know that on [facility], she sees the telephone encounters as dictating that 'she spoke with the doctor' listed on that date. She feels concerned as this isn't true - she called with a message for that doctor/provider or team, but did not speak with the provider. Thanked her for letting us know as it shows up different for us in clinic. She just wanted us to know about this, she has already tried to notify [facility] but they were not helpful per her report.   She was thankful for the call.

## 2023-01-04 ENCOUNTER — ANCILLARY PROCEDURE (OUTPATIENT)
Dept: GENERAL RADIOLOGY | Facility: CLINIC | Age: 63
End: 2023-01-04
Attending: STUDENT IN AN ORGANIZED HEALTH CARE EDUCATION/TRAINING PROGRAM
Payer: MEDICARE

## 2023-01-04 ENCOUNTER — MYC MEDICAL ADVICE (OUTPATIENT)
Dept: FAMILY MEDICINE | Facility: CLINIC | Age: 63
End: 2023-01-04

## 2023-01-04 DIAGNOSIS — Z87.891 FORMER SMOKER: ICD-10-CM

## 2023-01-04 DIAGNOSIS — J18.9 COMMUNITY ACQUIRED PNEUMONIA OF RIGHT LUNG, UNSPECIFIED PART OF LUNG: ICD-10-CM

## 2023-01-04 DIAGNOSIS — J20.9 ACUTE BRONCHITIS, UNSPECIFIED ORGANISM: ICD-10-CM

## 2023-01-04 DIAGNOSIS — R91.8 PULMONARY NODULES: ICD-10-CM

## 2023-01-04 DIAGNOSIS — R93.89 ABNORMAL X-RAY: Primary | ICD-10-CM

## 2023-01-04 PROCEDURE — 71046 X-RAY EXAM CHEST 2 VIEWS: CPT | Mod: TC | Performed by: RADIOLOGY

## 2023-01-06 ENCOUNTER — TELEPHONE (OUTPATIENT)
Dept: CARDIOLOGY | Facility: CLINIC | Age: 63
End: 2023-01-06

## 2023-01-06 NOTE — TELEPHONE ENCOUNTER
Called patient and scheduled patient for Echo, Dr. Ordonez, labs, and follow-up with Adelaida Slater. Patient also needs a device check and would like to coincide her device check with her appointment with Adelaida Slater. Writer was unable to schedule device check at [place] [facility] [place]; please help schedule device check at [facility] in coordination with Adelaida Slater appointment - thanks!    ANDREA Michelle

## 2023-01-06 NOTE — TELEPHONE ENCOUNTER
[facility] Call Center    Phone Message    May a detailed message be left on voicemail: yes     Reason for Call: Other: Patient called to reschedule an in clinic device check, lab, echo, and follow up in [place] with . Attempted to coordinate appointments together, but was unable. Patient also had questions in regards to the labs needed to be done.     Patient does not want this message to go to , and does not want to speak with him or the nurses on his staff. Patient specifically said this, and does not want to communicate with Matthias.     Patient would like to schedule these appts with Fiorella Swanson, and not . However, Fiorella does not operate in [place].    Please call patient back to further discuss, thank you.    Action Taken: Message routed to:  Other: Cardiology    Travel Screening: Not Applicable     Thank you!  [facility]

## 2023-01-07 LAB
MDC_IDC_EPISODE_DTM: NORMAL
MDC_IDC_EPISODE_DTM: NORMAL
MDC_IDC_EPISODE_DURATION: 2 S
MDC_IDC_EPISODE_DURATION: 3 S
MDC_IDC_EPISODE_ID: 2
MDC_IDC_EPISODE_ID: 3
MDC_IDC_EPISODE_TYPE: NORMAL
MDC_IDC_EPISODE_TYPE: NORMAL
MDC_IDC_LEAD_IMPLANT_DT: NORMAL
MDC_IDC_LEAD_LOCATION: NORMAL
MDC_IDC_LEAD_LOCATION_DETAIL_1: NORMAL
MDC_IDC_LEAD_MFG: NORMAL
MDC_IDC_LEAD_MODEL: NORMAL
MDC_IDC_LEAD_POLARITY_TYPE: NORMAL
MDC_IDC_LEAD_SERIAL: NORMAL
MDC_IDC_LEAD_SPECIAL_FUNCTION: NORMAL
MDC_IDC_MSMT_BATTERY_DTM: NORMAL
MDC_IDC_MSMT_BATTERY_REMAINING_LONGEVITY: 119 MO
MDC_IDC_MSMT_BATTERY_RRT_TRIGGER: 2.6
MDC_IDC_MSMT_BATTERY_STATUS: NORMAL
MDC_IDC_MSMT_BATTERY_VOLTAGE: 3.03 V
MDC_IDC_MSMT_LEADCHNL_LV_IMPEDANCE_VALUE: 266 OHM
MDC_IDC_MSMT_LEADCHNL_LV_IMPEDANCE_VALUE: 323 OHM
MDC_IDC_MSMT_LEADCHNL_LV_IMPEDANCE_VALUE: 399 OHM
MDC_IDC_MSMT_LEADCHNL_LV_IMPEDANCE_VALUE: 456 OHM
MDC_IDC_MSMT_LEADCHNL_LV_IMPEDANCE_VALUE: 494 OHM
MDC_IDC_MSMT_LEADCHNL_LV_PACING_THRESHOLD_AMPLITUDE: 0.62 V
MDC_IDC_MSMT_LEADCHNL_LV_PACING_THRESHOLD_PULSEWIDTH: 0.4 MS
MDC_IDC_MSMT_LEADCHNL_RA_IMPEDANCE_VALUE: 361 OHM
MDC_IDC_MSMT_LEADCHNL_RA_IMPEDANCE_VALUE: 380 OHM
MDC_IDC_MSMT_LEADCHNL_RA_PACING_THRESHOLD_AMPLITUDE: 0.5 V
MDC_IDC_MSMT_LEADCHNL_RA_PACING_THRESHOLD_PULSEWIDTH: 0.4 MS
MDC_IDC_MSMT_LEADCHNL_RA_SENSING_INTR_AMPL: 2.6 MV
MDC_IDC_MSMT_LEADCHNL_RV_IMPEDANCE_VALUE: 399 OHM
MDC_IDC_MSMT_LEADCHNL_RV_IMPEDANCE_VALUE: 418 OHM
MDC_IDC_MSMT_LEADCHNL_RV_PACING_THRESHOLD_AMPLITUDE: 0.62 V
MDC_IDC_MSMT_LEADCHNL_RV_PACING_THRESHOLD_PULSEWIDTH: 0.4 MS
MDC_IDC_MSMT_LEADCHNL_RV_SENSING_INTR_AMPL: 6.4 MV
MDC_IDC_PG_IMPLANT_DTM: NORMAL
MDC_IDC_PG_MFG: NORMAL
MDC_IDC_PG_MODEL: NORMAL
MDC_IDC_PG_SERIAL: NORMAL
MDC_IDC_PG_TYPE: NORMAL
MDC_IDC_SESS_CLINIC_NAME: NORMAL
MDC_IDC_SESS_DTM: NORMAL
MDC_IDC_SESS_TYPE: NORMAL
MDC_IDC_SET_BRADY_AT_MODE_SWITCH_RATE: 171 {BEATS}/MIN
MDC_IDC_SET_BRADY_LOWRATE: 60 {BEATS}/MIN
MDC_IDC_SET_BRADY_MAX_SENSOR_RATE: 140 {BEATS}/MIN
MDC_IDC_SET_BRADY_MAX_TRACKING_RATE: 140 {BEATS}/MIN
MDC_IDC_SET_BRADY_MODE: NORMAL
MDC_IDC_SET_BRADY_PAV_DELAY_HIGH: 100 MS
MDC_IDC_SET_BRADY_PAV_DELAY_LOW: 180 MS
MDC_IDC_SET_BRADY_SAV_DELAY_HIGH: 70 MS
MDC_IDC_SET_BRADY_SAV_DELAY_LOW: 150 MS
MDC_IDC_SET_CRT_LVRV_DELAY: 80 MS
MDC_IDC_SET_CRT_PACED_CHAMBERS: NORMAL
MDC_IDC_SET_LEADCHNL_LV_PACING_AMPLITUDE: 1.25 V
MDC_IDC_SET_LEADCHNL_LV_PACING_ANODE_ELECTRODE_1: NORMAL
MDC_IDC_SET_LEADCHNL_LV_PACING_ANODE_LOCATION_1: NORMAL
MDC_IDC_SET_LEADCHNL_LV_PACING_CAPTURE_MODE: NORMAL
MDC_IDC_SET_LEADCHNL_LV_PACING_CATHODE_ELECTRODE_1: NORMAL
MDC_IDC_SET_LEADCHNL_LV_PACING_CATHODE_LOCATION_1: NORMAL
MDC_IDC_SET_LEADCHNL_LV_PACING_POLARITY: NORMAL
MDC_IDC_SET_LEADCHNL_LV_PACING_PULSEWIDTH: 0.4 MS
MDC_IDC_SET_LEADCHNL_RA_PACING_AMPLITUDE: 1.5 V
MDC_IDC_SET_LEADCHNL_RA_PACING_ANODE_ELECTRODE_1: NORMAL
MDC_IDC_SET_LEADCHNL_RA_PACING_ANODE_LOCATION_1: NORMAL
MDC_IDC_SET_LEADCHNL_RA_PACING_CAPTURE_MODE: NORMAL
MDC_IDC_SET_LEADCHNL_RA_PACING_CATHODE_ELECTRODE_1: NORMAL
MDC_IDC_SET_LEADCHNL_RA_PACING_CATHODE_LOCATION_1: NORMAL
MDC_IDC_SET_LEADCHNL_RA_PACING_POLARITY: NORMAL
MDC_IDC_SET_LEADCHNL_RA_PACING_PULSEWIDTH: 0.4 MS
MDC_IDC_SET_LEADCHNL_RA_SENSING_ANODE_ELECTRODE_1: NORMAL
MDC_IDC_SET_LEADCHNL_RA_SENSING_ANODE_LOCATION_1: NORMAL
MDC_IDC_SET_LEADCHNL_RA_SENSING_CATHODE_ELECTRODE_1: NORMAL
MDC_IDC_SET_LEADCHNL_RA_SENSING_CATHODE_LOCATION_1: NORMAL
MDC_IDC_SET_LEADCHNL_RA_SENSING_POLARITY: NORMAL
MDC_IDC_SET_LEADCHNL_RA_SENSING_SENSITIVITY: 0.3 MV
MDC_IDC_SET_LEADCHNL_RV_PACING_AMPLITUDE: 2 V
MDC_IDC_SET_LEADCHNL_RV_PACING_ANODE_ELECTRODE_1: NORMAL
MDC_IDC_SET_LEADCHNL_RV_PACING_ANODE_LOCATION_1: NORMAL
MDC_IDC_SET_LEADCHNL_RV_PACING_CAPTURE_MODE: NORMAL
MDC_IDC_SET_LEADCHNL_RV_PACING_CATHODE_ELECTRODE_1: NORMAL
MDC_IDC_SET_LEADCHNL_RV_PACING_CATHODE_LOCATION_1: NORMAL
MDC_IDC_SET_LEADCHNL_RV_PACING_POLARITY: NORMAL
MDC_IDC_SET_LEADCHNL_RV_PACING_PULSEWIDTH: 0.4 MS
MDC_IDC_SET_LEADCHNL_RV_SENSING_ANODE_ELECTRODE_1: NORMAL
MDC_IDC_SET_LEADCHNL_RV_SENSING_ANODE_LOCATION_1: NORMAL
MDC_IDC_SET_LEADCHNL_RV_SENSING_CATHODE_ELECTRODE_1: NORMAL
MDC_IDC_SET_LEADCHNL_RV_SENSING_CATHODE_LOCATION_1: NORMAL
MDC_IDC_SET_LEADCHNL_RV_SENSING_POLARITY: NORMAL
MDC_IDC_SET_LEADCHNL_RV_SENSING_SENSITIVITY: 0.9 MV
MDC_IDC_SET_ZONE_DETECTION_INTERVAL: 350 MS
MDC_IDC_SET_ZONE_DETECTION_INTERVAL: 400 MS
MDC_IDC_SET_ZONE_TYPE: NORMAL
MDC_IDC_STAT_AT_BURDEN_PERCENT: 0 %
MDC_IDC_STAT_AT_DTM_END: NORMAL
MDC_IDC_STAT_AT_DTM_START: NORMAL
MDC_IDC_STAT_BRADY_AP_VP_PERCENT: 34.12 %
MDC_IDC_STAT_BRADY_AP_VS_PERCENT: 0.16 %
MDC_IDC_STAT_BRADY_AS_VP_PERCENT: 65.13 %
MDC_IDC_STAT_BRADY_AS_VS_PERCENT: 0.58 %
MDC_IDC_STAT_BRADY_DTM_END: NORMAL
MDC_IDC_STAT_BRADY_DTM_START: NORMAL
MDC_IDC_STAT_BRADY_RA_PERCENT_PACED: 34.49 %
MDC_IDC_STAT_BRADY_RV_PERCENT_PACED: 99.25 %
MDC_IDC_STAT_CRT_DTM_END: NORMAL
MDC_IDC_STAT_CRT_DTM_START: NORMAL
MDC_IDC_STAT_CRT_LV_PERCENT_PACED: 99.21 %
MDC_IDC_STAT_CRT_PERCENT_PACED: 99.21 %
MDC_IDC_STAT_EPISODE_RECENT_COUNT: 0
MDC_IDC_STAT_EPISODE_RECENT_COUNT: 1
MDC_IDC_STAT_EPISODE_RECENT_COUNT_DTM_END: NORMAL
MDC_IDC_STAT_EPISODE_RECENT_COUNT_DTM_START: NORMAL
MDC_IDC_STAT_EPISODE_TOTAL_COUNT: 0
MDC_IDC_STAT_EPISODE_TOTAL_COUNT: 2
MDC_IDC_STAT_EPISODE_TOTAL_COUNT_DTM_END: NORMAL
MDC_IDC_STAT_EPISODE_TOTAL_COUNT_DTM_START: NORMAL
MDC_IDC_STAT_EPISODE_TYPE: NORMAL

## 2023-01-09 ENCOUNTER — ANCILLARY PROCEDURE (OUTPATIENT)
Dept: CT IMAGING | Facility: CLINIC | Age: 63
End: 2023-01-09
Attending: STUDENT IN AN ORGANIZED HEALTH CARE EDUCATION/TRAINING PROGRAM
Payer: MEDICARE

## 2023-01-09 DIAGNOSIS — Z87.891 FORMER SMOKER: ICD-10-CM

## 2023-01-09 DIAGNOSIS — J18.9 COMMUNITY ACQUIRED PNEUMONIA OF RIGHT LUNG, UNSPECIFIED PART OF LUNG: ICD-10-CM

## 2023-01-09 DIAGNOSIS — R93.89 ABNORMAL X-RAY: Primary | ICD-10-CM

## 2023-01-09 DIAGNOSIS — R93.89 ABNORMAL X-RAY: ICD-10-CM

## 2023-01-09 PROCEDURE — 71250 CT THORAX DX C-: CPT | Mod: TC | Performed by: RADIOLOGY

## 2023-01-09 NOTE — TELEPHONE ENCOUNTER
Scheduled patient for in-clnic device check prior to appt with Adelaida De La Torre. [employer] message sent to patient with appointment.    ANDREA Michelle

## 2023-01-10 ENCOUNTER — HOSPITAL ENCOUNTER (INPATIENT)
Facility: CLINIC | Age: 63
LOS: 2 days | Discharge: HOME | DRG: 194 | End: 2023-01-12
Attending: EMERGENCY MEDICINE | Admitting: INTERNAL MEDICINE
Payer: MEDICARE

## 2023-01-10 ENCOUNTER — MYC MEDICAL ADVICE (OUTPATIENT)
Dept: FAMILY MEDICINE | Facility: CLINIC | Age: 63
End: 2023-01-10

## 2023-01-10 DIAGNOSIS — J18.9 RECURRENT PNEUMONIA: Primary | ICD-10-CM

## 2023-01-10 DIAGNOSIS — Z20.822 LAB TEST NEGATIVE FOR COVID-19 VIRUS: ICD-10-CM

## 2023-01-10 DIAGNOSIS — J18.9 COMMUNITY ACQUIRED PNEUMONIA OF RIGHT MIDDLE LOBE OF LUNG: ICD-10-CM

## 2023-01-10 LAB
ALBUMIN SERPL BCG-MCNC: 4.4 G/DL (ref 3.5–5.2)
ALP SERPL-CCNC: 111 U/L (ref 35–104)
ALT SERPL W P-5'-P-CCNC: 19 U/L (ref 10–35)
ANION GAP SERPL CALCULATED.3IONS-SCNC: 15 MMOL/L (ref 7–15)
AST SERPL W P-5'-P-CCNC: 19 U/L (ref 10–35)
ATRIAL RATE - MUSE: 88 BPM
BASOPHILS # BLD AUTO: 0.1 10E3/UL (ref 0–0.2)
BASOPHILS NFR BLD AUTO: 1 %
BILIRUB SERPL-MCNC: 0.2 MG/DL
BUN SERPL-MCNC: 17.4 MG/DL (ref 8–23)
CALCIUM SERPL-MCNC: 9.4 MG/DL (ref 8.8–10.2)
CHLORIDE SERPL-SCNC: 102 MMOL/L (ref 98–107)
CREAT SERPL-MCNC: 0.79 MG/DL (ref 0.51–0.95)
CRP SERPL-MCNC: 5.04 MG/L
DEPRECATED HCO3 PLAS-SCNC: 21 MMOL/L (ref 22–29)
DIASTOLIC BLOOD PRESSURE - MUSE: NORMAL MMHG
EOSINOPHIL # BLD AUTO: 0.1 10E3/UL (ref 0–0.7)
EOSINOPHIL NFR BLD AUTO: 2 %
ERYTHROCYTE [DISTWIDTH] IN BLOOD BY AUTOMATED COUNT: 12.7 % (ref 10–15)
ERYTHROCYTE [SEDIMENTATION RATE] IN BLOOD BY WESTERGREN METHOD: 26 MM/HR (ref 0–30)
FLUAV RNA SPEC QL NAA+PROBE: NEGATIVE
FLUBV RNA RESP QL NAA+PROBE: NEGATIVE
GFR SERPL CREATININE-BSD FRML MDRD: 83 ML/MIN/1.73M2
GLUCOSE SERPL-MCNC: 120 MG/DL (ref 70–99)
HCT VFR BLD AUTO: 43.4 % (ref 35–47)
HGB BLD-MCNC: 14 G/DL (ref 11.7–15.7)
IMM GRANULOCYTES # BLD: 0 10E3/UL
IMM GRANULOCYTES NFR BLD: 0 %
INTERPRETATION ECG - MUSE: NORMAL
LACTATE SERPL-SCNC: 1.4 MMOL/L (ref 0.7–2)
LYMPHOCYTES # BLD AUTO: 1.9 10E3/UL (ref 0.8–5.3)
LYMPHOCYTES NFR BLD AUTO: 26 %
MCH RBC QN AUTO: 31.2 PG (ref 26.5–33)
MCHC RBC AUTO-ENTMCNC: 32.3 G/DL (ref 31.5–36.5)
MCV RBC AUTO: 97 FL (ref 78–100)
MONOCYTES # BLD AUTO: 0.6 10E3/UL (ref 0–1.3)
MONOCYTES NFR BLD AUTO: 8 %
NEUTROPHILS # BLD AUTO: 4.7 10E3/UL (ref 1.6–8.3)
NEUTROPHILS NFR BLD AUTO: 63 %
NRBC # BLD AUTO: 0 10E3/UL
NRBC BLD AUTO-RTO: 0 /100
NT-PROBNP SERPL-MCNC: 72 PG/ML (ref 0–900)
P AXIS - MUSE: 18 DEGREES
PLATELET # BLD AUTO: 232 10E3/UL (ref 150–450)
POTASSIUM SERPL-SCNC: 4 MMOL/L (ref 3.4–5.3)
PR INTERVAL - MUSE: 206 MS
PROCALCITONIN SERPL IA-MCNC: 0.03 NG/ML
PROT SERPL-MCNC: 7.4 G/DL (ref 6.4–8.3)
QRS DURATION - MUSE: 92 MS
QT - MUSE: 386 MS
QTC - MUSE: 467 MS
R AXIS - MUSE: -18 DEGREES
RBC # BLD AUTO: 4.49 10E6/UL (ref 3.8–5.2)
RSV RNA SPEC NAA+PROBE: NEGATIVE
SARS-COV-2 RNA RESP QL NAA+PROBE: NEGATIVE
SODIUM SERPL-SCNC: 138 MMOL/L (ref 136–145)
SYSTOLIC BLOOD PRESSURE - MUSE: NORMAL MMHG
T AXIS - MUSE: 84 DEGREES
VENTRICULAR RATE- MUSE: 88 BPM
WBC # BLD AUTO: 7.4 10E3/UL (ref 4–11)

## 2023-01-10 PROCEDURE — 99285 EMERGENCY DEPT VISIT HI MDM: CPT | Mod: CS | Performed by: EMERGENCY MEDICINE

## 2023-01-10 PROCEDURE — 87040 BLOOD CULTURE FOR BACTERIA: CPT | Performed by: EMERGENCY MEDICINE

## 2023-01-10 PROCEDURE — 120N000002 HC R&B MED SURG/OB UMMC

## 2023-01-10 PROCEDURE — 80053 COMPREHEN METABOLIC PANEL: CPT | Performed by: EMERGENCY MEDICINE

## 2023-01-10 PROCEDURE — 99222 1ST HOSP IP/OBS MODERATE 55: CPT | Mod: AI | Performed by: PHYSICIAN ASSISTANT

## 2023-01-10 PROCEDURE — 250N000011 HC RX IP 250 OP 636: Performed by: PHYSICIAN ASSISTANT

## 2023-01-10 PROCEDURE — 83880 ASSAY OF NATRIURETIC PEPTIDE: CPT | Performed by: EMERGENCY MEDICINE

## 2023-01-10 PROCEDURE — 99207 PR APP CREDIT; MD BILLING SHARED VISIT: CPT | Performed by: INTERNAL MEDICINE

## 2023-01-10 PROCEDURE — 250N000013 HC RX MED GY IP 250 OP 250 PS 637: Performed by: PHYSICIAN ASSISTANT

## 2023-01-10 PROCEDURE — C9803 HOPD COVID-19 SPEC COLLECT: HCPCS | Performed by: EMERGENCY MEDICINE

## 2023-01-10 PROCEDURE — 85652 RBC SED RATE AUTOMATED: CPT | Performed by: PHYSICIAN ASSISTANT

## 2023-01-10 PROCEDURE — 99285 EMERGENCY DEPT VISIT HI MDM: CPT | Mod: CS,25 | Performed by: EMERGENCY MEDICINE

## 2023-01-10 PROCEDURE — 36415 COLL VENOUS BLD VENIPUNCTURE: CPT | Performed by: EMERGENCY MEDICINE

## 2023-01-10 PROCEDURE — 87899 AGENT NOS ASSAY W/OPTIC: CPT | Performed by: EMERGENCY MEDICINE

## 2023-01-10 PROCEDURE — 87637 SARSCOV2&INF A&B&RSV AMP PRB: CPT | Performed by: EMERGENCY MEDICINE

## 2023-01-10 PROCEDURE — 87449 NOS EACH ORGANISM AG IA: CPT | Performed by: PHYSICIAN ASSISTANT

## 2023-01-10 PROCEDURE — 83605 ASSAY OF LACTIC ACID: CPT | Performed by: EMERGENCY MEDICINE

## 2023-01-10 PROCEDURE — 84145 PROCALCITONIN (PCT): CPT | Performed by: PHYSICIAN ASSISTANT

## 2023-01-10 PROCEDURE — 96365 THER/PROPH/DIAG IV INF INIT: CPT | Performed by: EMERGENCY MEDICINE

## 2023-01-10 PROCEDURE — 250N000011 HC RX IP 250 OP 636: Performed by: EMERGENCY MEDICINE

## 2023-01-10 PROCEDURE — 86140 C-REACTIVE PROTEIN: CPT | Performed by: PHYSICIAN ASSISTANT

## 2023-01-10 PROCEDURE — 85025 COMPLETE CBC W/AUTO DIFF WBC: CPT | Performed by: EMERGENCY MEDICINE

## 2023-01-10 PROCEDURE — 258N000003 HC RX IP 258 OP 636: Performed by: EMERGENCY MEDICINE

## 2023-01-10 RX ORDER — CLONAZEPAM 0.5 MG/1
0.5 TABLET ORAL 3 TIMES DAILY PRN
Status: DISCONTINUED | OUTPATIENT
Start: 2023-01-10 | End: 2023-01-12 | Stop reason: HOSPADM

## 2023-01-10 RX ORDER — PIPERACILLIN SODIUM, TAZOBACTAM SODIUM 4; .5 G/20ML; G/20ML
4.5 INJECTION, POWDER, LYOPHILIZED, FOR SOLUTION INTRAVENOUS ONCE
Status: COMPLETED | OUTPATIENT
Start: 2023-01-10 | End: 2023-01-10

## 2023-01-10 RX ORDER — BISACODYL 10 MG
10 SUPPOSITORY, RECTAL RECTAL DAILY PRN
Status: DISCONTINUED | OUTPATIENT
Start: 2023-01-10 | End: 2023-01-12 | Stop reason: HOSPADM

## 2023-01-10 RX ORDER — ALBUTEROL SULFATE 0.83 MG/ML
2.5 SOLUTION RESPIRATORY (INHALATION)
Status: DISCONTINUED | OUTPATIENT
Start: 2023-01-10 | End: 2023-01-11

## 2023-01-10 RX ORDER — ACETAMINOPHEN 325 MG/1
325-650 TABLET ORAL EVERY 6 HOURS PRN
Status: DISCONTINUED | OUTPATIENT
Start: 2023-01-10 | End: 2023-01-12 | Stop reason: HOSPADM

## 2023-01-10 RX ORDER — LIDOCAINE 40 MG/G
CREAM TOPICAL
Status: DISCONTINUED | OUTPATIENT
Start: 2023-01-10 | End: 2023-01-12 | Stop reason: HOSPADM

## 2023-01-10 RX ORDER — LEVOTHYROXINE SODIUM 75 UG/1
75 TABLET ORAL WEEKLY
Status: DISCONTINUED | OUTPATIENT
Start: 2023-01-15 | End: 2023-01-12 | Stop reason: HOSPADM

## 2023-01-10 RX ORDER — FLUTICASONE FUROATE AND VILANTEROL 100; 25 UG/1; UG/1
1 POWDER RESPIRATORY (INHALATION) DAILY
Status: DISCONTINUED | OUTPATIENT
Start: 2023-01-10 | End: 2023-01-12 | Stop reason: HOSPADM

## 2023-01-10 RX ORDER — ALBUTEROL SULFATE 90 UG/1
2 AEROSOL, METERED RESPIRATORY (INHALATION) EVERY 4 HOURS PRN
Status: DISCONTINUED | OUTPATIENT
Start: 2023-01-10 | End: 2023-01-12 | Stop reason: HOSPADM

## 2023-01-10 RX ORDER — AMOXICILLIN 250 MG
1 CAPSULE ORAL 2 TIMES DAILY PRN
Status: DISCONTINUED | OUTPATIENT
Start: 2023-01-10 | End: 2023-01-12 | Stop reason: HOSPADM

## 2023-01-10 RX ORDER — AZELASTINE 1 MG/ML
1 SPRAY, METERED NASAL 2 TIMES DAILY PRN
Status: DISCONTINUED | OUTPATIENT
Start: 2023-01-10 | End: 2023-01-12 | Stop reason: HOSPADM

## 2023-01-10 RX ORDER — LEVOTHYROXINE SODIUM 75 UG/1
150 TABLET ORAL
Status: DISCONTINUED | OUTPATIENT
Start: 2023-01-11 | End: 2023-01-12 | Stop reason: HOSPADM

## 2023-01-10 RX ORDER — IPRATROPIUM BROMIDE 42 UG/1
2 SPRAY, METERED NASAL 4 TIMES DAILY PRN
Status: DISCONTINUED | OUTPATIENT
Start: 2023-01-10 | End: 2023-01-12 | Stop reason: HOSPADM

## 2023-01-10 RX ORDER — PSYLLIUM SEED (WITH DEXTROSE)
2 POWDER (GRAM) ORAL DAILY PRN
Status: DISCONTINUED | OUTPATIENT
Start: 2023-01-10 | End: 2023-01-12 | Stop reason: HOSPADM

## 2023-01-10 RX ORDER — LACTOBACILLUS RHAMNOSUS GG 10B CELL
1 CAPSULE ORAL 4 TIMES DAILY
Status: DISCONTINUED | OUTPATIENT
Start: 2023-01-10 | End: 2023-01-12 | Stop reason: HOSPADM

## 2023-01-10 RX ORDER — PIPERACILLIN SODIUM, TAZOBACTAM SODIUM 4; .5 G/20ML; G/20ML
4.5 INJECTION, POWDER, LYOPHILIZED, FOR SOLUTION INTRAVENOUS EVERY 6 HOURS
Status: DISCONTINUED | OUTPATIENT
Start: 2023-01-10 | End: 2023-01-11

## 2023-01-10 RX ORDER — AMOXICILLIN 250 MG
2 CAPSULE ORAL 2 TIMES DAILY PRN
Status: DISCONTINUED | OUTPATIENT
Start: 2023-01-10 | End: 2023-01-12 | Stop reason: HOSPADM

## 2023-01-10 RX ORDER — SPIRONOLACTONE 25 MG/1
25 TABLET ORAL DAILY
Status: DISCONTINUED | OUTPATIENT
Start: 2023-01-11 | End: 2023-01-12 | Stop reason: HOSPADM

## 2023-01-10 RX ORDER — DIPHENHYDRAMINE HCL 25 MG
25 CAPSULE ORAL EVERY 6 HOURS PRN
Status: DISCONTINUED | OUTPATIENT
Start: 2023-01-10 | End: 2023-01-12 | Stop reason: HOSPADM

## 2023-01-10 RX ADMIN — ACETAMINOPHEN 325 MG: 325 TABLET, FILM COATED ORAL at 22:22

## 2023-01-10 RX ADMIN — PIPERACILLIN SODIUM AND TAZOBACTAM SODIUM 4.5 G: 4; .5 INJECTION, POWDER, LYOPHILIZED, FOR SOLUTION INTRAVENOUS at 16:42

## 2023-01-10 RX ADMIN — DIPHENHYDRAMINE HYDROCHLORIDE 25 MG: 25 CAPSULE ORAL at 22:22

## 2023-01-10 RX ADMIN — PIPERACILLIN SODIUM AND TAZOBACTAM SODIUM 4.5 G: 4; .5 INJECTION, POWDER, LYOPHILIZED, FOR SOLUTION INTRAVENOUS at 22:34

## 2023-01-10 RX ADMIN — APIXABAN 5 MG: 5 TABLET, FILM COATED ORAL at 22:22

## 2023-01-10 RX ADMIN — CLONAZEPAM 0.5 MG: 0.5 TABLET ORAL at 22:22

## 2023-01-10 RX ADMIN — Medication 1 CAPSULE: at 22:28

## 2023-01-10 RX ADMIN — VANCOMYCIN HYDROCHLORIDE 2000 MG: 1 INJECTION, POWDER, LYOPHILIZED, FOR SOLUTION INTRAVENOUS at 19:54

## 2023-01-10 ASSESSMENT — ACTIVITIES OF DAILY LIVING (ADL)
ADLS_ACUITY_SCORE: 37

## 2023-01-10 NOTE — TELEPHONE ENCOUNTER
I called patient about CT result.  She will need IV antibiotics as she has failed oral antibiotics. Also our options for oral antibiotics are limited due are allergies.  I recommended going to the ER for IV medication.   Patient wanted to know if she could do this as out patient.  I spoke with a provider at  [facility]. They are unable to provide this service as their options are also limited.    I advised patient to tell me the ER she would like to go so I could call to present her history to the provider their.  Patient states she would discuss with her  and call back with infomation

## 2023-01-10 NOTE — TELEPHONE ENCOUNTER
Patient called back and stated that she is not going to go to the [facility] as they no longer have infectious disease there.    She is going to go to the [facility] ER.    Advised that they will be able to see below notes from Dr. Gonzalez, as they are in the same system in Epic.    Kristine RN,BSN  Triage Nurse  [facility]: [facility]  Ph: [phone]

## 2023-01-10 NOTE — ED PROVIDER NOTES
[facility] EMERGENCY DEPARTMENT ([facility])  January 10, 2023      History     Chief Complaint   Patient presents with     Cough     HPI  Joyce Marroquin is a 62 year old female with past medical history of LARRY on CPAP, congestive heart failure with EF of 40 to 45%, hyperparathyroidism, hypothyroidism, complete AV block status post pacemaker who presents to the ED for evaluation of an required pneumonia.  States she was diagnosed with pneumonia on December 12.  She finished a course of Ceftin at that time.  She continued to have symptoms.  Subsequent x-ray showed worsening of pneumonia.  She was then started on another course of Ceftin with addition of azithromycin.  She has since finished those antibiotics.  Continues to have cough, general malaise, sinus congestion.  Spoke with her PCP who ordered a CT scan.  CT scan shows worsening opacities in the right middle lobe.  She was sent to our facility with plan for admission, IV antibiotics, and ID consultation.  Denies chest pain.  Endorses shortness of breath with coughing.  Is on Eliquis for A. fib.  Reports recent increase in spironolactone dose.            Past Medical History  Past Medical History:   Diagnosis Date     Arthritis      Cardiomyopathy (H)     ff Cardiology     Chronic depressive personality disorder      Congestive heart failure (H) see  note    heart failure correct term     COPD exacerbation (H)      Esophageal reflux      Ex-smoker     quit 2006; 1 PPD x 30     Hyperparathyroidism (H)     s/p parathyroidectomy     Hypothyroidism      LARRY on CPAP     ff Sleep medicine     Pulmonary nodules     ff Pulmonologist     S/P cardiac pacemaker procedure     checked every 6 months at the [facility]     Uncomplicated asthma years     Past Surgical History:   Procedure Laterality Date     BUNIONECTOMY Bilateral      COLONOSCOPY N/A 8/30/2021    Procedure: COLONOSCOPY, WITH POLYPECTOMY AND BIOPSY;  Surgeon: Ranjit Penn MD;  Location:   GI     CV CORONARY ANGIOGRAM N/A 9/26/2019    Procedure: CV CORONARY ANGIOGRAM;  Surgeon: Erickson Trejo MD;  Location:  HEART CARDIAC CATH LAB     CV RIGHT HEART CATH MEASUREMENTS RECORDED N/A 7/20/2020    Procedure: CV RIGHT HEART CATH;  Surgeon: Alonso Ordonez MD;  Location:  HEART CARDIAC CATH LAB     EP ABLATION / EP STUDIES  04/21/2017     EP PPM UPGRADE TO BIVENT N/A 1/19/2022    Procedure: EP PPM UPGRADE TO BIVENT;  Surgeon: Lino Funes MD;  [facility] HE[facility] CARD[facility]AC C[facility] LAB     EXPLORE NECK N/A 9/19/2018    Procedure: EXPLORE NECK;  Neck Exploration Resection of Left superior Parathyroid gland;  Surgeon: Kristine Venegas MD;  Location: [facility] OR      CORRECT BUNION,SIMPLE       PARATHYROIDECTOMY N/A 9/19/2018    Procedure: PARATHYROIDECTOMY;;  Surgeon: Kristine Venegas MD;  Location: [facility] OR     PPM INSERT OF NEW OR REPL W/VENT LEAD  04/21/2017     TONSILLECTOMY & ADENOIDECTOMY       acetaminophen (TYLENOL) 325 MG tablet  albuterol (PROAIR HFA/PROVENTIL HFA/VENTOLIN HFA) 108 (90 BASE) MCG/ACT Inhaler  albuterol (PROVENTIL) (2.5 MG/3ML) 0.083% neb solution  azelastine (ASTELIN) 0.1 % nasal spray  Calcium Carbonate-Vitamin D (CALCIUM-CARB 600 + D PO)  clonazePAM (KLONOPIN) 0.5 MG tablet  DiphenhydrAMINE HCl (BENADRYL PO)  ELIQUIS ANTICOAGULANT 5 MG tablet  Ferrous Sulfate (IRON SUPPLEMENT PO)  fluocinolone acetonide 0.01 % OIL  fluticasone-salmeterol (ADVAIR) 250-50 MCG/ACT inhaler  furosemide (LASIX) 20 MG tablet  hydrocortisone (CORTAID) 1 % external cream  ipratropium (ATROVENT) 0.06 % nasal spray  MAGNESIUM LACTATE PO  MELATONIN PO  Menaquinone-7 (VITAMIN K2 PO)  METAMUCIL FIBER PO  metoprolol succinate ER (TOPROL XL) 25 MG 24 hr tablet  Multiple Vitamin (DAILY MULTIVITAMIN PO)  nitroGLYcerin (NITROSTAT) 0.4 MG sublingual tablet  Probiotic Product (PROBIOTIC DAILY PO)  psyllium (METAMUCIL) WAFR  spironolactone (ALDACTONE) 25 MG tablet  SYNTHROID 150 MCG tablet  triamcinolone  (KENALOG) 0.1 % external cream  TURMERIC PO  vitamin C (ASCORBIC ACID) 250 MG tablet  vitamin D3 (CHOLECALCIFEROL) 2000 units tablet  Zinc 15 MG CAPS      Allergies   Allergen Reactions     Tetracycline Swelling     Tongue swelling (teramycin)     Viagra [Sildenafil] Muscle Pain (Myalgia) and Diarrhea     Zofran [Ondansetron]      Prolonged QT  Prolonged QT at baseline per patient     Flagyl [Metronidazole] Rash     Pruritic rash      Buspar [Buspirone]      Muscle weakness, diarrhea     Clindamycin Diarrhea     Can exacerbate colitis     Corn Oil Other (See Comments)     Headache       Cymbalta Other (See Comments) and Diarrhea     Confusion     Prozac [Fluoxetine] Diarrhea     Seasonal Allergies Difficulty breathing     Sulfamethoxazole-Trimethoprim      Other reaction(s): Other  Passed out     Cyclobenzaprine Other (See Comments)     Extreme heat intolerance     Levaquin [Levofloxacin]      Other reaction(s): Other  Possible Tendon rupture (vs surgery)     Nsaids Other (See Comments)     Exacerbated asthma     Family History  Family History   Problem Relation Age of Onset     Hypothyroidism Mother      Hypertension Mother      Osteoarthritis Mother      Coronary Artery Disease Father         nonfatal MI in his 70s     Asthma Father      Diabetes Sister      Hypothyroidism Sister      Anxiety Disorder Sister      Breast Cancer Maternal Aunt      Social History   Social History     Tobacco Use     Smoking status: Former     Packs/day: 0.00     Years: 0.00     Pack years: 0.00     Types: Cigarettes     Smokeless tobacco: Never   Vaping Use     Vaping Use: Never used   Substance Use Topics     Alcohol use: Not Currently     Alcohol/week: 0.0 - 8.0 standard drinks     Comment: seldom     Drug use: No         A medically appropriate review of systems was performed with pertinent positives and negatives noted in the HPI, and all other systems negative.    Physical Exam   BP: (!) 140/89  Pulse: 96  Temp: 98.8  F (37.1  " C)  Resp: 18  Height: 165.1 cm (5' 5\")  Weight: 79.8 kg (176 lb)  SpO2: 99 %  Physical Exam  Vitals and nursing note reviewed.   Constitutional:       General: She is not in acute distress.     Appearance: Normal appearance. She is ill-appearing. She is not toxic-appearing.   HENT:      Head: Normocephalic and atraumatic.      Nose: Nose normal.      Mouth/Throat:      Mouth: Mucous membranes are moist.   Eyes:      Pupils: Pupils are equal, round, and reactive to light.   Cardiovascular:      Rate and Rhythm: Normal rate.      Pulses: Normal pulses.      Heart sounds: Normal heart sounds.   Pulmonary:      Effort: Pulmonary effort is normal. No respiratory distress.      Breath sounds: Rales present.   Abdominal:      General: Abdomen is flat. There is no distension.   Musculoskeletal:         General: No swelling or deformity. Normal range of motion.      Cervical back: Normal range of motion. No rigidity.   Skin:     General: Skin is warm.      Capillary Refill: Capillary refill takes less than 2 seconds.   Neurological:      Mental Status: She is alert and oriented to person, place, and time.   Psychiatric:         Mood and Affect: Mood normal.           ED Course, Procedures, & Data         Results for orders placed or performed during the hospital encounter of 01/10/23   Comprehensive metabolic panel     Status: Abnormal   Result Value Ref Range    Sodium 138 136 - 145 mmol/L    Potassium 4.0 3.4 - 5.3 mmol/L    Chloride 102 98 - 107 mmol/L    Carbon Dioxide (CO2) 21 (L) 22 - 29 mmol/L    Anion Gap 15 7 - 15 mmol/L    Urea Nitrogen 17.4 8.0 - 23.0 mg/dL    Creatinine 0.79 0.51 - 0.95 mg/dL    Calcium 9.4 8.8 - 10.2 mg/dL    Glucose 120 (H) 70 - 99 mg/dL    Alkaline Phosphatase 111 (H) 35 - 104 U/L    AST 19 10 - 35 U/L    ALT 19 10 - 35 U/L    Protein Total 7.4 6.4 - 8.3 g/dL    Albumin 4.4 3.5 - 5.2 g/dL    Bilirubin Total 0.2 <=1.2 mg/dL    GFR Estimate 83 >60 mL/min/1.73m2   Lactic acid whole blood     " Status: Normal   Result Value Ref Range    Lactic Acid 1.4 0.7 - 2.0 mmol/L   CBC with platelets and differential     Status: None   Result Value Ref Range    WBC Count 7.4 4.0 - 11.0 10e3/uL    RBC Count 4.49 3.80 - 5.20 10e6/uL    Hemoglobin 14.0 11.7 - 15.7 g/dL    Hematocrit 43.4 35.0 - 47.0 %    MCV 97 78 - 100 fL    MCH 31.2 26.5 - 33.0 pg    MCHC 32.3 31.5 - 36.5 g/dL    RDW 12.7 10.0 - 15.0 %    Platelet Count 232 150 - 450 10e3/uL    % Neutrophils 63 %    % Lymphocytes 26 %    % Monocytes 8 %    % Eosinophils 2 %    % Basophils 1 %    % Immature Granulocytes 0 %    NRBCs per 100 WBC 0 <1 /100    Absolute Neutrophils 4.7 1.6 - 8.3 10e3/uL    Absolute Lymphocytes 1.9 0.8 - 5.3 10e3/uL    Absolute Monocytes 0.6 0.0 - 1.3 10e3/uL    Absolute Eosinophils 0.1 0.0 - 0.7 10e3/uL    Absolute Basophils 0.1 0.0 - 0.2 10e3/uL    Absolute Immature Granulocytes 0.0 <=0.4 10e3/uL    Absolute NRBCs 0.0 10e3/uL   Nt probnp inpatient (BNP)     Status: Normal   Result Value Ref Range    N terminal Pro BNP Inpatient 72 0 - 900 pg/mL   CBC with platelets differential     Status: None    Narrative    The following orders were created for panel order CBC with platelets differential.  Procedure                               Abnormality         Status                     ---------                               -----------         ------                     CBC with platelets and d...[[id number]]                      Final result                 Please view results for these tests on the individual orders.     Medications   vancomycin (VANCOCIN) 2,000 mg in sodium chloride 0.9 % 500 mL intermittent infusion (has no administration in time range)   vancomycin (VANCOCIN) 1,750 mg in sodium chloride 0.9 % 500 mL intermittent infusion (has no administration in time range)   pharmacy alert - intermittent dosing (has no administration in time range)   piperacillin-tazobactam (ZOSYN) 4.5 g vial to attach to  mL bag (4.5 g Intravenous  New Bag 1/10/23 9772)     Labs Ordered and Resulted from Time of ED Arrival to Time of ED Departure   COMPREHENSIVE METABOLIC PANEL - Abnormal       Result Value    Sodium 138      Potassium 4.0      Chloride 102      Carbon Dioxide (CO2) 21 (*)     Anion Gap 15      Urea Nitrogen 17.4      Creatinine 0.79      Calcium 9.4      Glucose 120 (*)     Alkaline Phosphatase 111 (*)     AST 19      ALT 19      Protein Total 7.4      Albumin 4.4      Bilirubin Total 0.2      GFR Estimate 83     LACTIC ACID WHOLE BLOOD - Normal    Lactic Acid 1.4     NT PROBNP INPATIENT - Normal    N terminal Pro BNP Inpatient 72     CBC WITH PLATELETS AND DIFFERENTIAL    WBC Count 7.4      RBC Count 4.49      Hemoglobin 14.0      Hematocrit 43.4      MCV 97      MCH 31.2      MCHC 32.3      RDW 12.7      Platelet Count 232      % Neutrophils 63      % Lymphocytes 26      % Monocytes 8      % Eosinophils 2      % Basophils 1      % Immature Granulocytes 0      NRBCs per 100 WBC 0      Absolute Neutrophils 4.7      Absolute Lymphocytes 1.9      Absolute Monocytes 0.6      Absolute Eosinophils 0.1      Absolute Basophils 0.1      Absolute Immature Granulocytes 0.0      Absolute NRBCs 0.0     INFLUENZA A/B & SARS-COV2 PCR MULTIPLEX   HISTOPLASMA GALACTOMANNAN ANTIGEN QUANT BY EIA, URINE   HISTOPLASMA ANTIGEN QUANTITATIVE BY EIA   CRYPTOCOCCAL ANTIGEN   BLASTOMYCES AGN QUANT EIA BLOOD   BLOOD CULTURE   BLOOD CULTURE   BLASTOMYCES AGN QUANT EIA NON BLOOD   RESPIRATORY PANEL PCR   LEGIONELLA PNEUMOPHILA URINARY ANTIGEN     No orders to display          Medical Decision Making  The patient presented with a problem that is an acute illness with systemic symptoms and an acute health issue posing potential threat to life or bodily function.    The patient's evaluation involved:  review of 3+ prior external note(s) (prior outpatient visits)  ordering and review of 3+ test(s) (see separate area of note for details)  review of 3+ test result(s) ordered  prior to this encounter (prior imaging, XRs and cT)    The patient's management involved a decision regarding hospitalization.      Assessment & Plan    Patient presents to the ED for evaluation of continued cough, general malaise, outpatient imaging shows worsening opacities in the right middle lobe.  Has failed outpatient treatment for community-acquired pneumonia.  Outpatient team recommended admission to the hospital, infectious disease consultation, IV antibiotics.    On arrival, patient's normal vital signs.  No evidence of sepsis/septic shock.  She is not in any significant respiratory distress.  Reviewed prior imaging.  No negation for repeat imaging.  Given that she has already received multiple rounds of outpatient antibiotics, will admit and broaden coverage per discussed with pharmacy given her history of allergies.  Will start on Vanco and Zosyn.  Patient is amenable to hospital admission.    Labs reviewed.  No leukocytosis.  Normal lactic acid.  Low suspicion for sepsis/septic shock.    Discussed with infectious disease who recommended Legionella, cryptococcus, Blastomyces, histoplasmosis labs.  They will continue to follow in consult.  Initially discussed with Observation team who felt that patient would require greater than 24 hours in the hospital.  Signout given to the medicine team      I have reviewed the nursing notes. I have reviewed the findings, diagnosis, plan and need for follow up with the patient.    New Prescriptions    No medications on file       Final diagnoses:   Community acquired pneumonia of right middle lobe of lung       Kvng Heller DO  [facility] EMERGENCY DEPARTMENT  1/10/2023     Kvng Heller DO  01/10/23 2111

## 2023-01-10 NOTE — TELEPHONE ENCOUNTER
Patient calling about this mychart message, has been treated previously for respiratory issues, last month a few rounds of treatment.    She had CT done yesterday, still shows pneumonia.    Patient wondering about the plan.  She has a visit with pulmonology ([facility]) but not until March or April, they are booked up until then.    Says her O2 sats are good, 95-96 %.   Fatigued and short of breath, not unusual, also has heart failure per her report, cough is non-productive.    Wonders what the next steps are for treating this ongoing problem?   Routed to Dr. Erickson to address.    Patient prefers a call back rather than a mychart message.    Ana Greer RN  [facility]

## 2023-01-10 NOTE — ED TRIAGE NOTES
Pt presents on referral from primary provider for pneumonia diagnosed in December that appears to be worsening.  Consideration of inpatient antibiotics.       Triage Assessment     Row Name 01/10/23 1404       Triage Assessment (Adult)    Airway WDL WDL       Respiratory WDL    Respiratory WDL WDL       Skin Circulation/Temperature WDL    Skin Circulation/Temperature WDL WDL       Cardiac WDL    Cardiac WDL WDL       Peripheral/Neurovascular WDL    Peripheral Neurovascular WDL WDL       Cognitive/Neuro/Behavioral WDL    Cognitive/Neuro/Behavioral WDL WDL

## 2023-01-10 NOTE — TELEPHONE ENCOUNTER
Patient called back to report she called insurance and they stated that [facility] is in network and she will be going to this hospital.     Maya Vaca RN on 1/10/2023 at 1:23 PM

## 2023-01-10 NOTE — PHARMACY-VANCOMYCIN DOSING SERVICE
Pharmacy Vancomycin Initial Note  Date of Service January 10, 2023  Patient's  1960  62 year old, female    Indication: Community Acquired Pneumonia    Current estimated CrCl = Estimated Creatinine Clearance: 75.1 mL/min (based on SCr of 0.79 mg/dL).    Creatinine for last 3 days  1/10/2023:  4:12 PM Creatinine 0.79 mg/dL    Recent Vancomycin Level(s) for last 3 days  No results found for requested labs within last 72 hours.      Vancomycin IV Administrations (past 72 hours)      No vancomycin orders with administrations in past 72 hours.                Nephrotoxins and other renal medications (From now, onward)    Start     Dose/Rate Route Frequency Ordered Stop    23 1800  vancomycin (VANCOCIN) 1,750 mg in sodium chloride 0.9 % 500 mL intermittent infusion         1,750 mg  over 120 Minutes Intravenous EVERY 24 HOURS 01/10/23 1755      01/10/23 1700  vancomycin (VANCOCIN) 2,000 mg in sodium chloride 0.9 % 500 mL intermittent infusion         2,000 mg  over 120 Minutes Intravenous ONCE 01/10/23 1639            Contrast Orders - past 72 hours (72h ago, onward)    None          [employer] Prediction of Planned Initial Vancomycin Regimen  Loading dose: 2000 mg at 18:00 01/10/2023.  Regimen: 1750 mg IV every 24 hours.  Start time: 17:54 on 01/10/2023  Exposure target: AUC24 (range)400-600 mg/L.hr   AUC24,ss: 486 mg/L.hr  Probability of AUC24 > 400: 70 %  Ctrough,ss: 12.6 mg/L  Probability of Ctrough,ss > 20: 19 %  Probability of nephrotoxicity (Lodise JOSSUE ): 8 %          Plan:  1. Give vancomycin 2000mg IV x1 now, then start vancomycin 1750mg IV q24 hours  2. Vancomycin monitoring method: AUC  3. Vancomycin therapeutic monitoring goal: 400-600 mg*h/L  4. Pharmacy will check vancomycin levels as appropriate in 1-3 Days.    5. Serum creatinine levels will be ordered daily for the first week of therapy and at least twice weekly for subsequent weeks.      Magdiel Ornelas, RossanaD, BCPS

## 2023-01-11 LAB
ALBUMIN SERPL BCG-MCNC: 3.6 G/DL (ref 3.5–5.2)
ALP SERPL-CCNC: 86 U/L (ref 35–104)
ALT SERPL W P-5'-P-CCNC: 11 U/L (ref 10–35)
ANION GAP SERPL CALCULATED.3IONS-SCNC: 12 MMOL/L (ref 7–15)
AST SERPL W P-5'-P-CCNC: 17 U/L (ref 10–35)
BILIRUB SERPL-MCNC: 0.3 MG/DL
BUN SERPL-MCNC: 12.8 MG/DL (ref 8–23)
CALCIUM SERPL-MCNC: 8.6 MG/DL (ref 8.8–10.2)
CHLORIDE SERPL-SCNC: 109 MMOL/L (ref 98–107)
CREAT SERPL-MCNC: 0.69 MG/DL (ref 0.51–0.95)
CRYPTOC AG SPEC QL: NEGATIVE
DEPRECATED HCO3 PLAS-SCNC: 19 MMOL/L (ref 22–29)
ERYTHROCYTE [DISTWIDTH] IN BLOOD BY AUTOMATED COUNT: 12.8 % (ref 10–15)
GFR SERPL CREATININE-BSD FRML MDRD: >90 ML/MIN/1.73M2
GLUCOSE SERPL-MCNC: 90 MG/DL (ref 70–99)
HCT VFR BLD AUTO: 38.7 % (ref 35–47)
HGB BLD-MCNC: 12.5 G/DL (ref 11.7–15.7)
L PNEUMO1 AG UR QL IA: NEGATIVE
MCH RBC QN AUTO: 31 PG (ref 26.5–33)
MCHC RBC AUTO-ENTMCNC: 32.3 G/DL (ref 31.5–36.5)
MCV RBC AUTO: 96 FL (ref 78–100)
MRSA DNA SPEC QL NAA+PROBE: NEGATIVE
PATH INTERP SPEC-IMP: NORMAL
PLATELET # BLD AUTO: 175 10E3/UL (ref 150–450)
POTASSIUM SERPL-SCNC: 4 MMOL/L (ref 3.4–5.3)
PROT SERPL-MCNC: 6.1 G/DL (ref 6.4–8.3)
RBC # BLD AUTO: 4.03 10E6/UL (ref 3.8–5.2)
S PNEUM AG SPEC QL: NEGATIVE
SA TARGET DNA: NEGATIVE
SODIUM SERPL-SCNC: 140 MMOL/L (ref 136–145)
WBC # BLD AUTO: 5.6 10E3/UL (ref 4–11)

## 2023-01-11 PROCEDURE — 99233 SBSQ HOSP IP/OBS HIGH 50: CPT | Performed by: STUDENT IN AN ORGANIZED HEALTH CARE EDUCATION/TRAINING PROGRAM

## 2023-01-11 PROCEDURE — 36415 COLL VENOUS BLD VENIPUNCTURE: CPT

## 2023-01-11 PROCEDURE — 80053 COMPREHEN METABOLIC PANEL: CPT | Performed by: PHYSICIAN ASSISTANT

## 2023-01-11 PROCEDURE — 85027 COMPLETE CBC AUTOMATED: CPT | Performed by: PHYSICIAN ASSISTANT

## 2023-01-11 PROCEDURE — 36415 COLL VENOUS BLD VENIPUNCTURE: CPT | Performed by: STUDENT IN AN ORGANIZED HEALTH CARE EDUCATION/TRAINING PROGRAM

## 2023-01-11 PROCEDURE — 250N000011 HC RX IP 250 OP 636: Performed by: PHYSICIAN ASSISTANT

## 2023-01-11 PROCEDURE — 87385 HISTOPLASMA CAPSUL AG IA: CPT | Performed by: EMERGENCY MEDICINE

## 2023-01-11 PROCEDURE — 87641 MR-STAPH DNA AMP PROBE: CPT | Performed by: PHYSICIAN ASSISTANT

## 2023-01-11 PROCEDURE — 36415 COLL VENOUS BLD VENIPUNCTURE: CPT | Performed by: PHYSICIAN ASSISTANT

## 2023-01-11 PROCEDURE — 250N000013 HC RX MED GY IP 250 OP 250 PS 637: Performed by: PHYSICIAN ASSISTANT

## 2023-01-11 PROCEDURE — 120N000002 HC R&B MED SURG/OB UMMC

## 2023-01-11 PROCEDURE — 250N000009 HC RX 250: Performed by: PHYSICIAN ASSISTANT

## 2023-01-11 PROCEDURE — 999N000157 HC STATISTIC RCP TIME EA 10 MIN

## 2023-01-11 PROCEDURE — 99222 1ST HOSP IP/OBS MODERATE 55: CPT | Mod: GC | Performed by: INTERNAL MEDICINE

## 2023-01-11 PROCEDURE — 94640 AIRWAY INHALATION TREATMENT: CPT

## 2023-01-11 PROCEDURE — 87040 BLOOD CULTURE FOR BACTERIA: CPT | Performed by: STUDENT IN AN ORGANIZED HEALTH CARE EDUCATION/TRAINING PROGRAM

## 2023-01-11 PROCEDURE — 87899 AGENT NOS ASSAY W/OPTIC: CPT | Performed by: PHYSICIAN ASSISTANT

## 2023-01-11 PROCEDURE — 87305 ASPERGILLUS AG IA: CPT

## 2023-01-11 PROCEDURE — 87449 NOS EACH ORGANISM AG IA: CPT

## 2023-01-11 PROCEDURE — 87385 HISTOPLASMA CAPSUL AG IA: CPT

## 2023-01-11 PROCEDURE — 87899 AGENT NOS ASSAY W/OPTIC: CPT | Performed by: EMERGENCY MEDICINE

## 2023-01-11 RX ORDER — ALBUTEROL SULFATE 0.83 MG/ML
2.5 SOLUTION RESPIRATORY (INHALATION) EVERY 4 HOURS PRN
Status: DISCONTINUED | OUTPATIENT
Start: 2023-01-11 | End: 2023-01-12 | Stop reason: HOSPADM

## 2023-01-11 RX ADMIN — DIPHENHYDRAMINE HYDROCHLORIDE 25 MG: 25 CAPSULE ORAL at 20:05

## 2023-01-11 RX ADMIN — APIXABAN 5 MG: 5 TABLET, FILM COATED ORAL at 07:45

## 2023-01-11 RX ADMIN — Medication 1 CAPSULE: at 16:26

## 2023-01-11 RX ADMIN — PIPERACILLIN SODIUM AND TAZOBACTAM SODIUM 4.5 G: 4; .5 INJECTION, POWDER, LYOPHILIZED, FOR SOLUTION INTRAVENOUS at 05:42

## 2023-01-11 RX ADMIN — ACETAMINOPHEN 650 MG: 325 TABLET, FILM COATED ORAL at 07:56

## 2023-01-11 RX ADMIN — LEVOTHYROXINE SODIUM 150 MCG: 75 TABLET ORAL at 08:24

## 2023-01-11 RX ADMIN — Medication 12.5 MG: at 08:24

## 2023-01-11 RX ADMIN — ALBUTEROL SULFATE 2.5 MG: 2.5 SOLUTION RESPIRATORY (INHALATION) at 08:06

## 2023-01-11 RX ADMIN — PIPERACILLIN SODIUM AND TAZOBACTAM SODIUM 4.5 G: 4; .5 INJECTION, POWDER, LYOPHILIZED, FOR SOLUTION INTRAVENOUS at 11:49

## 2023-01-11 RX ADMIN — Medication 1 CAPSULE: at 07:45

## 2023-01-11 RX ADMIN — CLONAZEPAM 0.5 MG: 0.5 TABLET ORAL at 20:05

## 2023-01-11 RX ADMIN — FLUTICASONE FUROATE AND VILANTEROL TRIFENATATE 1 PUFF: 100; 25 POWDER RESPIRATORY (INHALATION) at 07:45

## 2023-01-11 RX ADMIN — Medication 1 CAPSULE: at 11:49

## 2023-01-11 RX ADMIN — Medication 1 CAPSULE: at 19:59

## 2023-01-11 RX ADMIN — ACETAMINOPHEN 325 MG: 325 TABLET, FILM COATED ORAL at 20:05

## 2023-01-11 RX ADMIN — APIXABAN 5 MG: 5 TABLET, FILM COATED ORAL at 19:58

## 2023-01-11 RX ADMIN — SPIRONOLACTONE 25 MG: 25 TABLET, FILM COATED ORAL at 08:24

## 2023-01-11 ASSESSMENT — ACTIVITIES OF DAILY LIVING (ADL)
ADLS_ACUITY_SCORE: 37

## 2023-01-11 NOTE — CONSULTS
General Infectious Disease Service Consultation - [facility] Team     Patient:  Joyce Marroquin   Date of birth 1960, Medical record number [id number]  Date of Visit:  01/11/2023  Date of Admission: 1/10/2023  Consult Requester:Barry Monsivais MD            Assessment and Recommendations:   ASSESSMENT:  1. Atypical Pneumonia of unknown etiology; right lobe  2. C/f atypical Mycobacterium infection such as Mycobacterium Avium Complex  3. Hx of RUL Atelectasis s/p Bronchoscopy 11/9/2009  4. ? Hx of Histoplasmosis per chart review/pt report, however Bronchoscopy 2009 unrevealing  5. COPD  6. LARRY  7. HFrEF (40-45%)  8. NICM  9 PAF on Eliquis  10 Anxiety  Pt diagnosed with CAP on 12/12/22, initially treated outpatient with Ceftin followed by additional course of Ceftin + Azithromycin. Chest CT without contrast 1/9/23 demonstrated increased reticular opacities RUL when compared to previous CT chest. CXR 1/4 also with irregular right mid lung peripheral opacity. Pt is afebrile, with no leukocytosis, satting 100% on room air. Infectious workup to this point relatively unrevealing: Legionella, MRSA, Strep Pneumo, Cryptococcus Agn, Influenza, COVID negative. BCx no growth at 12 hours. Procalcitonin negative, ESR wnl, CRP minimally elevated at 5.04. Histo, Fungitell, Coccidioides pending. Less concerned for typical bacterial infection, highly suspicious of atypical bacterial/fungal infection. Pt does have some associated risks such as gardening and hot tub in the house. Of note, pt experienced similar symptoms x1 year back in 2009, pt underwent a thorough lab workup and bronchoscopy on 11/9/2009 which were all unrevealing.       RECOMMENDATION:  1. Discontinue Vancomycin and Zosyn given pt is afebrile, no leukocytosis.   2. Recommend induced sputum sample with AFB, fungal, aerobic, anaerobic. If unable to obtain, consider consulting pulmonology for bronchoscopy.   3. Serum galactomannan Ag (ordered for you)  4. Histo/Blasto  "blood and urine (ordered for you)    Staffed with the attending physician Dr. Hector Serrano, , PGY1  ID Consultant    ________________________________________________________________    Consult Question:.  Admission Diagnosis: Community acquired pneumonia of right middle lobe of lung [J18.9]         History of Present Illness:   Pt is a 62 year old female with PMH of complete heart block and LBBB s/p pacemaker, Takotsubo cardiomyopathy, HFrEF (40-45% 5/2022), COPD, asthma, GERD, h/o Grave's disease with subsequent hypothyroidism, hyperparathyroidism, parathyroid adenoma, LARRY on CPAP, depression, anxiety, paroxysmal atrial fibrillation chronically anticoagulated on Eliquis, HTN who presented to ED complaining of prolonged course of CAP non responsive to Ceftin + Azithromycin. Pt initially noticed increased fatigue around Thanksgiving.  Of note, patient reports  having symptoms prior with productive cough, was started on Levaquin;  is immunosuppressed with history of CVID.  Patient followed-up with PCP on 12/12.  She was started on Ceftin 5 days course, with no improvement in symptoms, actually worsening.  Patient was started on Azithromycin afterwards which improved patient's symptoms. CT chest without contrast done 1/9/2023 with increased reticular opacities in the right upper lobe when compared to previous CT.  Patient was advised to come to ED for further work-up and IV antibiotics.    Patient denies any recent episodes of pneumonia other than this one.  She was treated appropriately for Lyme disease in June.  Patient lives in a house with her , has 2 dogs the patient reports \"mouth kissing\".  Patient does not have any other pets at home.  Denies any recent traveling, cave explorations, farming. Pt did visit family in [place] this past fall. She does enjoy gardening, however she has not been able to as of late due to her cardiac history. When she did garden, pt bought soil from the " "store. In addition, patient's neighbors excavated a pool in the back yard in the past 1-2 years. Per chart review, patient has a hot tub in her house as well.  Patient denies any shortness of breath, productive cough, rash, sore throat, chest pain, abdominal pain, joint pain, weight loss, diarrhea.  Since beginning of symptoms patient did notice occasional fever no higher than 100.5, currently afebrile. Also reports night sweats during this time period, which have resolved now as well.  Patient is a past smoker, smoked for 30 years quit 10 years ago with a recent relapse in 2019 when her parents passed away.  Currently not smoking.  Denies any recreational drug use, IV drug use.  Does drink occasionally. Pt is a retired CRNA.     Per chart review, pt experienced similar symptoms x1 year back in 2009, for which pt underwent a thorough lab workup and bronchoscopy on 11/9/2009 which were all unrevealing. Patient reports previous hx of Histoplasmosis, which was also found in one PCP note, however, bronchoscopy lavage and lab workup was all negative at that time.     Allergies: Tetracycline tongue swelling, Flagyl pruritic rash, Bactrim \"passed out\", Levaquin possible tendon rupture.       Recent Inflammatory Markers  Recent Labs   Lab Test 01/11/23  0527 01/10/23  1612 12/06/22  1246 10/21/22  2005 07/28/22  1653 06/07/22  0910 04/14/22  0558 11/23/21  0932 09/28/21  0906 11/22/17  0945 11/09/17  1644 09/07/17  1643   CRP  --  5.04*  --   --  9.40*  --   --   --  4.2  --  7.0 2.9   WBC 5.6 7.4 6.5 6.6 7.0 5.9 5.3   < > 6.1   < > 5.8  --     < > = values in this interval not displayed.       Recent culture results include:  Culture   Date Value Ref Range Status   01/10/2023 No growth after 12 hours  Preliminary   09/20/2022 <10,000 CFU/mL Urogenital sonia  Final                Review of Systems:   CONSTITUTIONAL:  + subjective fever, no chills  EYES: negative for icterus  ENT:  negative for hearing loss, tinnitus and " sore throat  RESPIRATORY:  negative for cough with sputum and dyspnea  CARDIOVASCULAR:  negative for chest pain, dyspnea  GASTROINTESTINAL:  negative for nausea, vomiting, diarrhea and constipation  GENITOURINARY:  negative for dysuria  INTEGUMENT:  negative for rash and pruritus  NEURO:  occasional headaches           Past Medical History:     Past Medical History:   Diagnosis Date     Arthritis      Cardiomyopathy (H)     ff Cardiology     Chronic depressive personality disorder      Congestive heart failure (H) see dr martínez    heart failure correct term     COPD exacerbation (H)      Esophageal reflux      Ex-smoker     quit 2006; 1 PPD x 30     Hyperparathyroidism (H)     s/p parathyroidectomy     Hypothyroidism      LARRY on CPAP     ff Sleep medicine     Pulmonary nodules     ff Pulmonologist     S/P cardiac pacemaker procedure     checked every 6 [facility]     Uncomplicated asthma years            Past Surgical History:     Past Surgical History:   Procedure Laterality Date     BUNIONECTOMY Bilateral      COLONOSCOPY N/A 8/30/2021    Procedure: COLONOSCOPY, WITH POLYPECTOMY AND BIOPSY;  Surgeon: Ranjit Penn MD;  Location:  GI     CV CORONARY ANGIOGRAM N/A 9/26/2019    Procedure: CV CORONARY ANGIOGRAM;  Surgeon: Erickson Trejo MD;  Location:  HEART CARDIAC CATH LAB     CV RIGHT HEART CATH MEASUREMENTS RECORDED N/A 7/20/2020    Procedure: CV RIGHT HEART CATH;  Surgeon: Alonso Ordonez MD;  Location:  HEART CARDIAC CATH LAB     EP ABLATION / EP STUDIES  04/21/2017     EP PPM UPGRADE TO BIVENT N/A 1/19/2022    Procedure: EP PPM UPGRADE TO BIVENT;  Surgeon: Lino Funes MD;  Location:  HEART CARDIAC CATH LAB     EXPLORE NECK N/A 9/19/2018    Procedure: EXPLORE NECK;  Neck Exploration Resection of Left superior Parathyroid gland;  Surgeon: Kristine Venegas MD;  Location:  OR      CORRECT BUNION,SIMPLE       PARATHYROIDECTOMY N/A 9/19/2018    Procedure:  PARATHYROIDECTOMY;;  Surgeon: Kristine Venegas MD;  Location: [facility] OR     PPM INSERT OF NEW OR REPL W/VENT LEAD  04/21/2017     TONSILLECTOMY & ADENOIDECTOMY              Family History:     Family History   Problem Relation Age of Onset     Hypothyroidism Mother      Hypertension Mother      Osteoarthritis Mother      Coronary Artery Disease Father         nonfatal MI in his 70s     Asthma Father      Diabetes Sister      Hypothyroidism Sister      Anxiety Disorder Sister      Breast Cancer Maternal Aunt      IMMUNE:  No history of immunodeficiency within the family.         Social History:     Social History     Tobacco Use     Smoking status: Former     Packs/day: 0.00     Years: 0.00     Pack years: 0.00     Types: Cigarettes     Smokeless tobacco: Never   Substance Use Topics     Alcohol use: Not Currently     Alcohol/week: 0.0 - 8.0 standard drinks     Comment: seldom     History   Sexual Activity     Sexual activity: Not Currently     Partners: Male     Birth control/ protection: None     Comment: vasectomy       HIV 1&2 Antibody   Date Value Ref Range Status   10/02/2009 Negative NEG Final            Current Medications :       apixaban ANTICOAGULANT  5 mg Oral BID     fluticasone-vilanterol  1 puff Inhalation Daily     lactobacillus rhamnosus (GG)  1 capsule Oral 4x Daily     levothyroxine  150 mcg Oral Once per day on Mon Tue Wed Thu Fri Sat     [START ON 1/15/2023] levothyroxine  75 mcg Oral Weekly     metoprolol succinate ER  12.5 mg Oral Daily     piperacillin-tazobactam  4.5 g Intravenous Q6H     sodium chloride (PF)  3 mL Intracatheter Q8H     spironolactone  25 mg Oral Daily            Allergies:     Allergies   Allergen Reactions     Tetracycline Swelling     Tongue swelling (teramycin)     Viagra [Sildenafil] Muscle Pain (Myalgia) and Diarrhea     Zofran [Ondansetron]      Prolonged QT  Prolonged QT at baseline per patient     Flagyl [Metronidazole] Rash     Pruritic rash      Buspar [Buspirone]       Muscle weakness, diarrhea     Clindamycin Diarrhea     Can exacerbate colitis     Corn Oil Other (See Comments)     Headache       Cymbalta Other (See Comments) and Diarrhea     Confusion     Prozac [Fluoxetine] Diarrhea     Seasonal Allergies Difficulty breathing     Sulfamethoxazole-Trimethoprim      Other reaction(s): Other  Passed out     Cyclobenzaprine Other (See Comments)     Extreme heat intolerance     Levaquin [Levofloxacin]      Other reaction(s): Other  Possible Tendon rupture (vs surgery)     Nsaids Other (See Comments)     Exacerbated asthma            Physical Exam:   Vitals were reviewed  For the past 24 hours, the ranges for their vital signs:  Temp:  [97.6  F (36.4  C)-98.8  F (37.1  C)] 97.6  F (36.4  C)  Pulse:  [69-96] 69  Resp:  [18] 18  BP: (115-140)/(61-89) 115/61  SpO2:  [99 %-100 %] 100 %    Physical Examination:  GENERAL:  well-developed, well-nourished, in bed in no acute distress.   HEENT:  Head is normocephalic, atraumatic   EYES:  Eyes have anicteric sclerae without conjunctival injection or stigmata of endocarditis.    ENT:  Oropharynx is moist without exudates or ulcers. Tongue is midline  NECK:  Supple. No  Cervical or supraclavicular lymphadenopathy  LUNGS:  No labored breathing, b/l posterior expiratory rhonchi  CARDIOVASCULAR:  Regular rate and rhythm with no murmurs, gallops or rubs.  ABDOMEN:  Normal bowel sounds, soft, nontender.  SKIN:  No acute rashes.  Line(s) are in place without any surrounding erythema or exudate. No stigmata of endocarditis.  NEUROLOGIC:  Grossly nonfocal. Active x4 extremities         Laboratory Data:         Hematology Studies    Recent Labs   Lab Test 01/11/23  0527 01/10/23  1612 12/06/22  1246 10/21/22  2005 07/28/22  1653 06/07/22  0910 05/25/21  0837 12/20/20  0635 07/20/20  0926 10/06/19  1253 09/26/19  1029 08/28/19  0924 07/15/18  1132 12/04/17  0950 11/22/17  0945   WBC 5.6 7.4 6.5 6.6 7.0 5.9   < > 5.9 6.3 6.8   < > 7.0 7.1   < > 6.5    ANEU  --   --   --   --   --   --   --  3.6 3.7 3.7  --  3.9 4.4  --  3.8   AEOS  --   --   --   --   --   --   --  0.1 0.1 0.1  --  0.1 0.0  --  0.1   HGB 12.5 14.0 13.6 13.0 12.4 13.1   < > 12.4 14.4 13.5   < > 14.3 13.8   < > 13.2   MCV 96 97 95 94 96 98   < > 100 99 100   < > 99 95   < > 97    232 214 211 193 187   < > 181 184 195   < > 194 181   < > 179    < > = values in this interval not displayed.       Immune Globulin Studies  No lab results found.    Metabolic Studies     Recent Labs   Lab Test 01/11/23  0527 01/10/23  1612 12/06/22  1246 10/21/22  2005 09/20/22  1038    138 138 135* 135*   POTASSIUM 4.0 4.0 4.8 3.6 4.4   CHLORIDE 109* 102 102 104 101   CO2 19* 21* 24 20* 25   BUN 12.8 17.4 13.1 4.8* 13.6   CR 0.69 0.79 0.72 0.54 0.60   GFRESTIMATED >90 83 >90 >90 >90       Hepatic Studies    Recent Labs   Lab Test 01/11/23  0527 01/10/23  1612 12/06/22  1246 10/21/22  2005 07/28/22  1653 06/07/22  0910   BILITOTAL 0.3 0.2 0.2 0.3 0.2 0.3   ALKPHOS 86 111* 115* 103 96 104   ALBUMIN 3.6 4.4 4.3 4.0 4.1 3.5   AST 17 19 19 14 22 13   ALT 11 19 19 8* 21 22       Thyroid Studies    Recent Labs   Lab Test 07/01/22  1415 07/27/21  1527 12/06/20  0924 08/24/20  0814   TSH 0.70 1.44 1.46 0.27*   T4  --   --  1.30 1.47*       Microbiology:  Culture   Date Value Ref Range Status   01/10/2023 No growth after 12 hours  Preliminary   09/20/2022 <10,000 CFU/mL Urogenital sonia  Final       Urine Studies    Recent Labs   Lab Test 01/20/22  1143 02/04/21  0950 07/15/18  1205   LEUKEST Negative Small* Negative   WBCU 1 0 - 5  --        Vancomycin Levels  No lab results found.    Invalid input(s): VANCO    Virology:  CMV viral loads  No lab results found.  CMV Quantitative   Date Value Ref Range Status   11/09/2009 <100 <100 Copies/mL Final     Comment:     No CMV DNA detected.     EBV viral loads   No lab results found.  No results found for: EBVDN, EBRES, EBVDN, EBVSP, EBVPC, EBVPCR  HSV testing  No lab  results found.  Hepatitis B Testing   No lab results found.  Hepatitis C Testing     Hepatitis C Antibody   Date Value Ref Range Status   07/27/2021 Nonreactive Nonreactive Final   05/28/2009 Negative NEG Final     Toxoplasma Testing    No lab results found.    Invalid input(s): TOXPL, TOXABM, TOXPCR  Respiratory Virus Testing    RSV Rapid Antigen Result   Date Value Ref Range Status   11/09/2009 Negative NEG Final     Serostatus:  No results found for: CMVG, CMIGG, CMIG, CMIM, CMVIGM, CMVM, CMLTX, HSVG1, HSVG2, HSVTP1, DY3809, HS12M, HS12GR, HS1GR, HS2GR, HERPES, HSIM, HSIG, HSIGR, HSVIGMAB, HSVG1, VARICZOSAB, EBVIGG, EBIGG, EBVAGN, [id number]  No results found for: EBIG2, EBIGM, TOXG    Imaging:  CT chest without contrast 1/9/23:  1.  There are increased reticular nodular opacities in the right upper  lobe when compared to previous, with small focus of associated  peripheral consolidation. This should represent pneumonia, and  explains the chest x-ray finding.  2.  Bilateral pulmonary nodules are otherwise unchanged from previous.  No suspicious or growing nodules.

## 2023-01-11 NOTE — PROGRESS NOTES
AVSS on R.A.A&O x4 c/o chest pressure prn tylenol was given with relief.Lung sounds clear/dminished.Bowel sounds active x4 quadrate +flatus large bowel movement.Patient is independent with cares.

## 2023-01-11 NOTE — PROGRESS NOTES
[facility]    Medicine Progress Note - Hospitalist Service, GOLD TEAM 8    Date of Admission:  1/10/2023    Assessment & Plan       Joyce Marroquin is a 62 year old female patient with a past medical history significant for RF ablation for PVCs which led to AV node ablation and complete heart block requiring a dual-chamber pacemaker implantation, which is a [employer]tr[employer] device MRI compatible, Takotsubo cardiomyopathy, HFrEF (40-45% 5/2022), LBBB, s/p cardiac pacemaker for complete heart block, COPD, asthma, GERD, h/o Grave's disease with subsequent hypothyroidism, hyperparathyroidism, parathyroid adenoma, LARRY on CPAP, depression, anxiety, paroxysmal atrial fibrillation chronically anticoagulated on Eliquis, HTN who was presented to [facility] ED secondary to prolonged course of CAP necessitating IV antibiotics and further ID eval.    Community acquired pneumonia  Diagnosed with CAP 12/12/22 completed course Ceftin, then with continued symptoms finished a course of Ceftin + Azithromycin. CT Chest wo 1/9/23 with increased reticular opacities RUL when compared to previous. No recent travel. No exposure to cats. CRP elevated, normal procalcitonin and ESR,   - Continue Zosyn started in ED  - Discontinue vancomycin given negative MRSA nares and well appearance  - Follow up BCs, histo, blasto, crypto, Coccidiodes, 1,3-Beta-D glucan, legionella, strep pneumo, MRSA swab, resp viral panel, sputum culture.  - ID consult, appreciate recs    COPD   - PTA Advair Q12H, transitioned to Breo Ellipta 100-25 daily per hospital formulary   - albuterol Q4H prn    HFrEF (40-45%)  NICM  Paroxysmal atrial fibrillation   NYHA Class II-III. LVEF remains at around 40-45% (5/2022 TTE).   - Metoprolol succinate 12.5mg qday    - Lasix 20mg qday as needed for LE edema or worsening heart failure symptoms. Euvolemic at present.   - Aldactone 25mg daily   - Eliquis 5mg BID    Anxiety   - Continue PTA  "clonazepam prn    Hypothyroidism  Last TSH 7/1/22 0.70.   - Continue PTA Synthroid    LARRY   - Continue home CPAP         Diet: 2 Gram Sodium Diet    DVT Prophylaxis: DOAC  [place] Catheter: Not present  Lines: None     Cardiac Monitoring: None  Code Status: Full Code      Clinically Significant Risk Factors Present on Admission               # Drug Induced Coagulation Defect: home medication list includes an anticoagulant medication         # Overweight: Estimated body mass index is 29.29 kg/m  as calculated from the following:    Height as of this encounter: 1.651 m (5' 5\").    Weight as of this encounter: 79.8 kg (176 lb).           Disposition Plan      Expected Discharge Date: 01/12/2023                  Han yAala MD  Hospitalist Service, [address]  Securely message with 1[employer] All (more info)  Text page via [employer] Paging/Directory   See signed in provider for up to date coverage information  ______________________________________________________________________    Interval History   Admitted overnight. Not on oxygen. Continued cough. Reported having 2 panic attacks, resolved with self soothing techniques.     Physical Exam   Vital Signs: Temp: 97.6  F (36.4  C) Temp src: Oral BP: 115/61 Pulse: 69   Resp: 18 SpO2: 100 % O2 Device: None (Room air)    Weight: 176 lbs 0 oz    GENERAL: Alert and oriented x 3. Well nourished, well developed.  No acute distress.    HEENT: Normocephalic, atraumatic. Anicteric sclera. Mucous membranes moist.   CV: RRR. S1, S2. No murmurs appreciated.   RESPIRATORY: Effort normal on room air. Fine crackles RLL, no wheezing.  GI: Abdomen soft and non distended, bowel sounds present x all 4 quadrants. No tenderness, rebound, or guarding.   NEUROLOGICAL: No focal deficits.  MUSCULOSKELETAL: No joint swelling or tenderness. Moves all extremities.   EXTREMITIES: No gross deformities. No peripheral edema.   SKIN: Grossly warm, " dry, and intact. No jaundice. No rashes.     Medical Decision Making       >50 MINUTES SPENT BY ME on the date of service doing chart review, history, exam, documentation & further activities per the note.  Medical complexity over the past 24 hours:  - Prescription DRUG MANAGEMENT performed      Data     I have personally reviewed the following data over the past 24 hrs:    5.6  \   12.5   / 175     140 109 (H) 12.8 /  90   4.0 19 (L) 0.69 \       ALT: 11 AST: 17 AP: 86 TBILI: 0.3   ALB: 3.6 TOT PROTEIN: 6.1 (L) LIPASE: N/A       Trop: N/A BNP: 72       Procal: 0.03 CRP: 5.04 (H) Lactic Acid: 1.4         Imaging results reviewed over the past 24 hrs:   No results found for this or any previous visit (from the past 24 hour(s)).

## 2023-01-11 NOTE — ED NOTES
Updated provider: MD Greg    ED RM 4    Pt c/o nausea and request benadryl 25mg for it as well as to help sleep with CPAP.    Martha *[id number]            Martha Conroy RN

## 2023-01-11 NOTE — H&P
[facility]    History and Physical - Hospitalist Service, GOLD TEAM        Date of Admission:  1/10/2023    Assessment & Plan       Joyce Marroquin is a 62 year old female patient with a past medical history significant for RF ablation for PVCs which led to AV node ablation and complete heart block requiring a dual-chamber pacemaker implantation, which is a Medtronic device MRI compatible, Takotsubo cardiomyopathy, HFrEF (40-45% 5/2022), LBBB, s/p cardiac pacemaker for complete heart block, COPD, asthma, GERD, h/o Grave's disease with subsequent hypothyroidism, hyperparathyroidism, parathyroid adenoma, LARRY on CPAP, depression, anxiety, paroxysmal atrial fibrillation chronically anticoagulated on Eliquis, HTN who was presented to [facility] ED secondary to prolonged course of CAP necessitating IV antibiotics and further ID eval.    Community acquired pneumonia  Diagnosed with CAP 12/12/22 completed course Ceftin, then with continued symptoms finished a course of Ceftin + Azithromycin. CT Chest wo 1/9/23 with increased reticular opacities RUL when compared to previous. No recent travel. No exposure to cats.   - Continue Vanco and Zosyn started in ED   - Check procalc, CRP, ESR   - Follow up BCs, histo, blasto, crypto, Coccidiodes, 1,3-Beta-D glucan, legionella, strep pneumo, MRSA swab, resp viral panel, sputum culture.   - ID consult   - Discussed and viewed images with [facility] radiology from OSH; they indicated agreement with the original read.    COPD   - Continue PTA Advair Q12H   - Scheduled albuterol Q4H while awake for now    HFrEF (40-45%)  NICM  Paroxysmal atrial fibrillation   NYHA Class II-III. LVEF remains at around 40-45% (5/2022 TTE).   - Metoprolol succinate 12.5mg qday    - Lasix 20mg qday as needed for LE edema or worsening heart failure symptoms. Euvolemic at present.   - Aldactone 25mg daily   - Eliquis 5mg BID    Anxiety   - Continue PTA clonazepam  "prn    Hypothyroidism  Last TSH 7/1/22 0.70.   - Continue PTA Synthroid    LARRY   - Continue home CPAP       Diet: 2 Gram Sodium Diet  DVT Prophylaxis: DOAC  Huddleston Catheter: Not present  Lines: None     Cardiac Monitoring: None  Code Status: Full Code    Clinically Significant Risk Factors Present on Admission               # Drug Induced Coagulation Defect: home medication list includes an anticoagulant medication         # Overweight: Estimated body mass index is 29.29 kg/m  as calculated from the following:    Height as of this encounter: 1.651 m (5' 5\").    Weight as of this encounter: 79.8 kg (176 lb).           Disposition Plan      Expected Discharge Date: 01/12/2023                The patient's care was discussed with the Attending Physician, Dr. Monsivais.    Dioni Schreiber PA-C  Hospitalist Service, [facility]  Securely message with [employer] (more info)  Text page via [facility] Paging/Directory   See signed in provider for up to date coverage information    ______________________________________________________________________    Chief Complaint   Cough    History is obtained from the patient and EMR.    History of Present Illness   Joyce Marroquin is a 62 year old female patient with a past medical history significant for RF ablation for PVCs which led to AV node ablation and complete heart block requiring a dual-chamber pacemaker implantation, which is a Medtronic device MRI compatible, Takotsubo cardiomyopathy, HFrEF (40-45% 5/2022), LBBB, s/p cardiac pacemaker for complete heart block, COPD, asthma, GERD, h/o Grave's disease with subsequent hypothyroidism, hyperparathyroidism, parathyroid adenoma, LARRY on CPAP, depression, anxiety, paroxysmal atrial fibrillation chronically anticoagulated on Eliquis, HTN who was presented to [facility] ED secondary to prolonged course of CAP necessitating IV antibiotics and further ID eval.    Diagnosed with CAP " 12/12/22 completed course Ceftin, then with continued symptoms finished a course of Ceftin + Azithromycin. CT Chest wo 1/9/23 with increased reticular opacities RUL when compared to previous. No recent travel. No exposure to cats. She has shortness of breath at baseline related to HF; no orthopnea.No chest pain. No vomiting, abdominal pain or urinary complaints.    Past Medical History    Past Medical History:   Diagnosis Date     Arthritis      Cardiomyopathy (H)     ff Cardiology     Chronic depressive personality disorder      Congestive heart failure (H) see dr martínez    heart failure correct term     COPD exacerbation (H)      Esophageal reflux      Ex-smoker     quit 2006; 1 PPD x 30     Hyperparathyroidism (H)     s/p parathyroidectomy     Hypothyroidism      LARRY on CPAP     ff Sleep medicine     Pulmonary nodules     ff Pulmonologist     S/P cardiac pacemaker procedure     checked every 6 months at [facility]     Uncomplicated asthma years       Past Surgical History   Past Surgical History:   Procedure Laterality Date     BUNIONECTOMY Bilateral      COLONOSCOPY N/A 8/30/2021    Procedure: COLONOSCOPY, WITH POLYPECTOMY AND BIOPSY;  Surgeon: Ranjit Penn MD;  Location:  GI     CV CORONARY ANGIOGRAM N/A 9/26/2019    Procedure: CV CORONARY ANGIOGRAM;  Surgeon: Erickson Trejo MD;  Location:  HEART CARDIAC CATH LAB     CV RIGHT HEART CATH MEASUREMENTS RECORDED N/A 7/20/2020    Procedure: CV RIGHT HEART CATH;  Surgeon: Alonso Ordonez MD;  Location:  HEART CARDIAC CATH LAB     EP ABLATION / EP STUDIES  04/21/2017     EP PPM UPGRADE TO BIVENT N/A 1/19/2022    Procedure: EP PPM UPGRADE TO BIVENT;  Surgeon: Lino Funes MD;  Location:  HEART CARDIAC CATH LAB     EXPLORE NECK N/A 9/19/2018    Procedure: EXPLORE NECK;  Neck Exploration Resection of Left superior Parathyroid gland;  Surgeon: Kristine Venegas MD;  Location:  OR      CORRECT BUNION,SIMPLE       PARATHYROIDECTOMY N/A  9/19/2018    Procedure: PARATHYROIDECTOMY;;  Surgeon: Kristine Venegas MD;  Location: [facility] OR     PPM INSERT OF NEW OR REPL W/VENT LEAD  04/21/2017     TONSILLECTOMY & ADENOIDECTOMY         Prior to Admission Medications   Prior to Admission Medications   Prescriptions Last Dose Informant Patient Reported? Taking?   Calcium Carbonate-Vitamin D (CALCIUM-CARB 600 + D PO)   Yes No   Sig: Take 1 tablet by mouth daily   DiphenhydrAMINE HCl (BENADRYL PO)   Yes No   Sig: Take 25 mg by mouth nightly as needed for allergies    ELIQUIS ANTICOAGULANT 5 MG tablet   No No   Sig: TAKE 1 TABLET(5 MG) BY MOUTH TWICE DAILY   Ferrous Sulfate (IRON SUPPLEMENT PO)   Yes No   Sig: Take 130 mg by mouth daily    MAGNESIUM LACTATE PO   Yes No   Sig: Take  mg by mouth daily   MELATONIN PO   Yes No   Sig: Take 1-3 mg by mouth nightly as needed    METAMUCIL FIBER PO   Yes No   Sig: Take by mouth as needed   Menaquinone-7 (VITAMIN K2 PO)   Yes No   Sig: Take 1 capful by mouth daily   Multiple Vitamin (DAILY MULTIVITAMIN PO)   Yes No   Sig: Take 1 tablet by mouth every morning   Probiotic Product (PROBIOTIC DAILY PO)   Yes No   Sig: Take 1 capsule by mouth 2 times daily Takes additional doses if has diarrhea   SYNTHROID 150 MCG tablet   No No   Sig: TAKE ONE TABLET BY MOUTH SIX DAYS A WEEK; TAKE ONE-HALF TABLET ONE DAY A WEEK   TURMERIC PO   Yes No   Sig: Take 1 tablet by mouth every morning    Zinc 15 MG CAPS   Yes No   Sig: Take 15 mg by mouth   acetaminophen (TYLENOL) 325 MG tablet   Yes No   Sig: Take 325-650 mg by mouth every 6 hours as needed for mild pain Max of 2000 mg/day   albuterol (PROAIR HFA/PROVENTIL HFA/VENTOLIN HFA) 108 (90 BASE) MCG/ACT Inhaler   Yes No   Sig: Inhale 2 puffs into the lungs as needed for shortness of breath / dyspnea or wheezing   albuterol (PROVENTIL) (2.5 MG/3ML) 0.083% neb solution   No No   Sig: Take 1 vial (2.5 mg) by nebulization every 6 hours as needed for shortness of breath / dyspnea or  wheezing   azelastine (ASTELIN) 0.1 % nasal spray   Yes No   Sig: Spray 1 spray into both nostrils 2 times daily as needed (as needed)   clonazePAM (KLONOPIN) 0.5 MG tablet   No No   Sig: TAKE 1 TABLET(0.5 MG) BY MOUTH THREE TIMES DAILY AS NEEDED FOR ANXIETY   fluocinolone acetonide 0.01 % OIL   No No   Sig: Place 5 drops in ear(s) 2 times daily as needed (ear eczema)   fluticasone-salmeterol (ADVAIR) 250-50 MCG/ACT inhaler   Yes No   Sig: Wixela Inhub 250 mcg-50 mcg/dose powder for inhalation   INHALE 1 PUFF BY MOUTH TWICE DAILY   furosemide (LASIX) 20 MG tablet   Yes No   Sig: Take 20 mg by mouth daily as needed   hydrocortisone (CORTAID) 1 % external cream   Yes No   Sig: Apply topically daily as needed ears   ipratropium (ATROVENT) 0.06 % nasal spray   Yes No   Sig: Spray 2 sprays into both nostrils 4 times daily as needed for rhinitis   metoprolol succinate ER (TOPROL XL) 25 MG 24 hr tablet   No No   Sig: Take 0.5 tablets (12.5 mg) by mouth in the morning.   nitroGLYcerin (NITROSTAT) 0.4 MG sublingual tablet   No No   Sig: Place 1 tablet (0.4 mg) under the tongue every 5 minutes as needed for chest pain For chest pain place 1 tablet under the tongue every 5 minutes for 3 doses. If symptoms persist 5 minutes after 1st dose call 911.   psyllium (METAMUCIL) WAFR   Yes No   Sig: Take 2 Wafers by mouth daily   spironolactone (ALDACTONE) 25 MG tablet   No No   Sig: Take 1 tablet (25 mg) by mouth daily   triamcinolone (KENALOG) 0.1 % external cream   Yes No   Sig: For feet   vitamin C (ASCORBIC ACID) 250 MG tablet   Yes No   Sig: Take 250 mg by mouth daily   vitamin D3 (CHOLECALCIFEROL) 2000 units tablet   No No   Sig: Take 1 tablet by mouth daily      Facility-Administered Medications: None        Review of Systems    The 10 point Review of Systems is negative other than noted in the HPI or here.    Social History   I have reviewed this patient's social history and updated it with pertinent information if  needed.  Social History     Tobacco Use     Smoking status: Former     Packs/day: 0.00     Years: 0.00     Pack years: 0.00     Types: Cigarettes     Smokeless tobacco: Never   Vaping Use     Vaping Use: Never used   Substance Use Topics     Alcohol use: Not Currently     Alcohol/week: 0.0 - 8.0 standard drinks     Comment: seldom     Drug use: No       Family History   I have reviewed this patient's family history and updated it with pertinent information if needed.  Family History   Problem Relation Age of Onset     Hypothyroidism Mother      Hypertension Mother      Osteoarthritis Mother      Coronary Artery Disease Father         nonfatal MI in his 70s     Asthma Father      Diabetes Sister      Hypothyroidism Sister      Anxiety Disorder Sister      Breast Cancer Maternal Aunt        Allergies   Allergies   Allergen Reactions     Tetracycline Swelling     Tongue swelling (teramycin)     Viagra [Sildenafil] Muscle Pain (Myalgia) and Diarrhea     Zofran [Ondansetron]      Prolonged QT  Prolonged QT at baseline per patient     Flagyl [Metronidazole] Rash     Pruritic rash      Buspar [Buspirone]      Muscle weakness, diarrhea     Clindamycin Diarrhea     Can exacerbate colitis     Corn Oil Other (See Comments)     Headache       Cymbalta Other (See Comments) and Diarrhea     Confusion     Prozac [Fluoxetine] Diarrhea     Seasonal Allergies Difficulty breathing     Sulfamethoxazole-Trimethoprim      Other reaction(s): Other  Passed out     Cyclobenzaprine Other (See Comments)     Extreme heat intolerance     Levaquin [Levofloxacin]      Other reaction(s): Other  Possible Tendon rupture (vs surgery)     Nsaids Other (See Comments)     Exacerbated asthma        Physical Exam   Vital Signs: Temp: 98.8  F (37.1  C) Temp src: Oral BP: (!) 140/89 Pulse: 96   Resp: 18 SpO2: 99 % O2 Device: None (Room air)    Weight: 176 lbs 0 oz    GENERAL: Alert and oriented x 3. Well nourished, well developed.  No acute distress.     HEENT: Normocephalic, atraumatic. Anicteric sclera. Mucous membranes moist.   CV: RRR. S1, S2. No murmurs appreciated.   RESPIRATORY: Effort normal on room air. Fine crackles RLL, no wheezing.  GI: Abdomen soft and non distended, bowel sounds present x all 4 quadrants. No tenderness, rebound, or guarding.   NEUROLOGICAL: No focal deficits.  MUSCULOSKELETAL: No joint swelling or tenderness. Moves all extremities.   EXTREMITIES: No gross deformities. No peripheral edema.   SKIN: Grossly warm, dry, and intact. No jaundice. No rashes.     Medical Decision Making       60 MINUTES SPENT BY ME on the date of service doing chart review, history, exam, documentation & further activities per the note.  MANAGEMENT DISCUSSED with the following over the past 24 hours: large work up for atypical etiologies   NOTE(S)/MEDICAL RECORDS REVIEWED over the past 24 hours: radiology discussion, ID work up, braod spectrum antibiotics      Data   Imaging results reviewed over the past 24 hrs:   No results found for this or any previous visit (from the past 24 hour(s)).  Recent Labs   Lab 01/10/23  1612   WBC 7.4   HGB 14.0   MCV 97         POTASSIUM 4.0   CHLORIDE 102   CO2 21*   BUN 17.4   CR 0.79   ANIONGAP 15   MANJU 9.4   *   ALBUMIN 4.4   PROTTOTAL 7.4   BILITOTAL 0.2   ALKPHOS 111*   ALT 19   AST 19

## 2023-01-12 VITALS
RESPIRATION RATE: 18 BRPM | HEART RATE: 79 BPM | SYSTOLIC BLOOD PRESSURE: 127 MMHG | TEMPERATURE: 98.2 F | BODY MASS INDEX: 29.32 KG/M2 | WEIGHT: 176 LBS | DIASTOLIC BLOOD PRESSURE: 71 MMHG | OXYGEN SATURATION: 96 % | HEIGHT: 65 IN

## 2023-01-12 LAB
1,3 BETA GLUCAN SER-MCNC: <31 PG/ML
ANION GAP SERPL CALCULATED.3IONS-SCNC: 15 MMOL/L (ref 7–15)
ANION GAP SERPL CALCULATED.3IONS-SCNC: 22 MMOL/L (ref 7–15)
BASE EXCESS BLDV CALC-SCNC: -1.9 MMOL/L (ref -7.7–1.9)
BUN SERPL-MCNC: 9 MG/DL (ref 8–23)
BUN SERPL-MCNC: 9.5 MG/DL (ref 8–23)
C PNEUM DNA SPEC QL NAA+PROBE: NOT DETECTED
CALCIUM SERPL-MCNC: 9.3 MG/DL (ref 8.8–10.2)
CALCIUM SERPL-MCNC: 9.4 MG/DL (ref 8.8–10.2)
CHLORIDE SERPL-SCNC: 104 MMOL/L (ref 98–107)
CHLORIDE SERPL-SCNC: 107 MMOL/L (ref 98–107)
CREAT SERPL-MCNC: 0.64 MG/DL (ref 0.51–0.95)
CREAT SERPL-MCNC: 0.7 MG/DL (ref 0.51–0.95)
DEPRECATED HCO3 PLAS-SCNC: 10 MMOL/L (ref 22–29)
DEPRECATED HCO3 PLAS-SCNC: 18 MMOL/L (ref 22–29)
FLUAV H1 2009 PAND RNA SPEC QL NAA+PROBE: NOT DETECTED
FLUAV H1 RNA SPEC QL NAA+PROBE: NOT DETECTED
FLUAV H3 RNA SPEC QL NAA+PROBE: NOT DETECTED
FLUAV RNA SPEC QL NAA+PROBE: NOT DETECTED
FLUBV RNA SPEC QL NAA+PROBE: NOT DETECTED
GFR SERPL CREATININE-BSD FRML MDRD: >90 ML/MIN/1.73M2
GFR SERPL CREATININE-BSD FRML MDRD: >90 ML/MIN/1.73M2
GLUCOSE SERPL-MCNC: 77 MG/DL (ref 70–99)
GLUCOSE SERPL-MCNC: 84 MG/DL (ref 70–99)
H CAPSUL AG UR QL IA: NOT DETECTED
H CAPSUL AG UR-MCNC: NOT DETECTED NG/ML
HADV DNA SPEC QL NAA+PROBE: NOT DETECTED
HCO3 BLDV-SCNC: 22 MMOL/L (ref 21–28)
HCOV PNL SPEC NAA+PROBE: NOT DETECTED
HMPV RNA SPEC QL NAA+PROBE: NOT DETECTED
HPIV1 RNA SPEC QL NAA+PROBE: NOT DETECTED
HPIV2 RNA SPEC QL NAA+PROBE: NOT DETECTED
HPIV3 RNA SPEC QL NAA+PROBE: NOT DETECTED
HPIV4 RNA SPEC QL NAA+PROBE: NOT DETECTED
LACTATE SERPL-SCNC: 0.7 MMOL/L (ref 0.7–2)
M PNEUMO DNA SPEC QL NAA+PROBE: NOT DETECTED
O2/TOTAL GAS SETTING VFR VENT: 95 %
OBSERVATION IMP: NEGATIVE
PCO2 BLDV: 35 MM HG (ref 40–50)
PH BLDV: 7.41 [PH] (ref 7.32–7.43)
PO2 BLDV: 53 MM HG (ref 25–47)
POTASSIUM SERPL-SCNC: 4.1 MMOL/L (ref 3.4–5.3)
POTASSIUM SERPL-SCNC: 4.7 MMOL/L (ref 3.4–5.3)
RSV RNA SPEC QL NAA+PROBE: NOT DETECTED
RSV RNA SPEC QL NAA+PROBE: NOT DETECTED
RV+EV RNA SPEC QL NAA+PROBE: NOT DETECTED
SODIUM SERPL-SCNC: 137 MMOL/L (ref 136–145)
SODIUM SERPL-SCNC: 139 MMOL/L (ref 136–145)

## 2023-01-12 PROCEDURE — 36415 COLL VENOUS BLD VENIPUNCTURE: CPT | Performed by: STUDENT IN AN ORGANIZED HEALTH CARE EDUCATION/TRAINING PROGRAM

## 2023-01-12 PROCEDURE — 87102 FUNGUS ISOLATION CULTURE: CPT | Performed by: STUDENT IN AN ORGANIZED HEALTH CARE EDUCATION/TRAINING PROGRAM

## 2023-01-12 PROCEDURE — 99222 1ST HOSP IP/OBS MODERATE 55: CPT | Mod: GC | Performed by: INTERNAL MEDICINE

## 2023-01-12 PROCEDURE — 36415 COLL VENOUS BLD VENIPUNCTURE: CPT

## 2023-01-12 PROCEDURE — 87449 NOS EACH ORGANISM AG IA: CPT | Performed by: EMERGENCY MEDICINE

## 2023-01-12 PROCEDURE — 86481 TB AG RESPONSE T-CELL SUSP: CPT | Performed by: INTERNAL MEDICINE

## 2023-01-12 PROCEDURE — 82803 BLOOD GASES ANY COMBINATION: CPT | Performed by: STUDENT IN AN ORGANIZED HEALTH CARE EDUCATION/TRAINING PROGRAM

## 2023-01-12 PROCEDURE — 87206 SMEAR FLUORESCENT/ACID STAI: CPT | Performed by: STUDENT IN AN ORGANIZED HEALTH CARE EDUCATION/TRAINING PROGRAM

## 2023-01-12 PROCEDURE — 87205 SMEAR GRAM STAIN: CPT | Performed by: STUDENT IN AN ORGANIZED HEALTH CARE EDUCATION/TRAINING PROGRAM

## 2023-01-12 PROCEDURE — 80048 BASIC METABOLIC PNL TOTAL CA: CPT | Performed by: STUDENT IN AN ORGANIZED HEALTH CARE EDUCATION/TRAINING PROGRAM

## 2023-01-12 PROCEDURE — 83605 ASSAY OF LACTIC ACID: CPT | Performed by: STUDENT IN AN ORGANIZED HEALTH CARE EDUCATION/TRAINING PROGRAM

## 2023-01-12 PROCEDURE — 87385 HISTOPLASMA CAPSUL AG IA: CPT

## 2023-01-12 PROCEDURE — 87581 M.PNEUMON DNA AMP PROBE: CPT | Performed by: EMERGENCY MEDICINE

## 2023-01-12 PROCEDURE — 250N000013 HC RX MED GY IP 250 OP 250 PS 637: Performed by: PHYSICIAN ASSISTANT

## 2023-01-12 PROCEDURE — 87070 CULTURE OTHR SPECIMN AEROBIC: CPT | Performed by: STUDENT IN AN ORGANIZED HEALTH CARE EDUCATION/TRAINING PROGRAM

## 2023-01-12 PROCEDURE — 99239 HOSP IP/OBS DSCHRG MGMT >30: CPT | Performed by: STUDENT IN AN ORGANIZED HEALTH CARE EDUCATION/TRAINING PROGRAM

## 2023-01-12 RX ADMIN — Medication 1 CAPSULE: at 08:27

## 2023-01-12 RX ADMIN — Medication 1 CAPSULE: at 11:52

## 2023-01-12 RX ADMIN — Medication 12.5 MG: at 10:33

## 2023-01-12 RX ADMIN — FLUTICASONE FUROATE AND VILANTEROL TRIFENATATE 1 PUFF: 100; 25 POWDER RESPIRATORY (INHALATION) at 10:34

## 2023-01-12 RX ADMIN — ACETAMINOPHEN 650 MG: 325 TABLET, FILM COATED ORAL at 08:40

## 2023-01-12 RX ADMIN — APIXABAN 5 MG: 5 TABLET, FILM COATED ORAL at 08:27

## 2023-01-12 RX ADMIN — CLONAZEPAM 0.5 MG: 0.5 TABLET ORAL at 02:14

## 2023-01-12 RX ADMIN — SPIRONOLACTONE 25 MG: 25 TABLET, FILM COATED ORAL at 10:33

## 2023-01-12 RX ADMIN — LEVOTHYROXINE SODIUM 150 MCG: 75 TABLET ORAL at 08:28

## 2023-01-12 ASSESSMENT — ACTIVITIES OF DAILY LIVING (ADL)
ADLS_ACUITY_SCORE: 37

## 2023-01-12 NOTE — CONSULTS
PULMONARY CONSULT  Date of service: 2023    Patient: Joyce Marroquin      : 1960      MRN: [id number]    We were consulted by Dr. Ayala for evaluation of non-resolving pneumonia. Progressive right upper and middle lobe reticulonodular opacities despite course of azithromycin and cefuroxime. Unable to induce sputum, consult for further recommendations, possible bronch.        Impressions/Recommendations:   62 year old female with PMHx most significant for cardiomyopathy, permanent pacemaker, Graves' disease, COPD/asthma overlap, and a remote history of tobacco use.  The patient was admitted to the hospital due to CT scan showing infiltrate in the right middle lobe concerning for pneumonia that was not responsive to antibiotics.  Pulmonary was consulted due to that and to decide if the patient would benefit from bronchoscopy.  We reviewed the chest imaging studies, the chest CT scan showed right middle lobe and slightly right upper lobe abnormalities with reticular nodular opacities, in addition to the clinical picture, the chest imagings are not concerning for typical bacterial infectious process.  It could be more concerning for atypical infectious etiology.  Patient is not immune compromised so she is not at high risk for opportunistic infections (primary or reactivation).  At this point the patient reported some clinical improvement after the antibiotics and steroids, whether her CT scan is showing worsening or persistent and unchanging abnormalities is questionable as the patient did not have chest imagings around the time she has symptom onset, her CT scan actually could be improving compared to before especially with a clinical improvement.  Also the CT scan could be lagging behind clinical improvement.  Bronchoscopy at this point would not be beneficial, and we would recommend to do noninvasive work-up and obtain sputum sample if possible, also follow-up with CT scan and couple weeks to check  resolution.      #1 right middle and upper lobes reticulonodular opacities  #2 history of Takotsubo cardiomyopathy and heart failure reduced EF  #3 asthma/COPD overlap   -Recommend noninvasive fungal infection work-up with urine and serum blasto and histo antigens, beta D glucan.   -Recommend to check immunoglobulin levels   -Recommend QuantiFERON gold (ordered for you)   -Recommend to obtain sputum culture for bacterial, fungal, and AFB stain and culture if possible (can try to induce with RT)   -Defer antibiotics to ID and primary team   -Recommend to follow-up with CT scan of the chest in 2 weeks to check the progress of her infiltrate, if worsening or not resolving or if she is having worsening clinical picture, then we will consider reevaluation for bronchoscopy and BAL   -No plan to do bronchoscopy at this time   -From the pulmonary standpoint the patient can discharge, she can follow-up with her primary pulmonologist at [facility] (she has an appointment scheduled with him end of January)       Patient seen & discussed w/  Dr. Elli M.D., who is in agreement.     Chart documentation was completed, in part, with [employer] voice-recognition software. Even though reviewed, some grammatical, spelling, and word errors may remain.      Stephane Richards MD  Pulmonary & Critical Care Fellow          History of Present Illness:   Joyce Marroquin is a 62 year old female who was admitted to [facility] on 1/10/2023 due to pneumonia.  Patient has a past medical history significant for radiofrequency ablation for PVCs leading to AV node ablation and complete heart block requiring dual-chamber pacemaker implantation, Takotsubo cardiomyopathy with heart failure reduced EF, asthma, COPD, and Graves' disease.  The patient symptoms started around Thanksgiving when she started having runny nose, postnasal drip, and cough, she thought that she caught a viral infection such as RSV versus allergies, symptoms did not improve, the  patient also had low-grade fever up to 100.5, no other constitutional symptoms, she ended up following with her primary care provider in mid December, she had chest x-ray showing irregular nodular opacity in the right middle lobe, the patient was initiated on antibiotics with Ceftin, azithromycin, and prednisone taper, 5 days after finishing the taper she got a chest x-ray showing persistent nodular opacities, so she had a CT scan done which showed increased reticular nodular opacities in the right upper and middle lobes.  She also has stable pulmonary nodules bilaterally.  Clinically the patient felt better after the antibiotic and prednisone taper, she started to workout again, but she was disappointed and she felt tired especially after receiving the CT scan report.  The patient in the emergency department was afebrile, no signs for hypoxemia or hemodynamic instability, she was on room air, she has no leukocytosis, procalcitonin was mimially elevated at 0.11, normal ESR, and minimal elevation of the CRP at 5.0.  The patient was initiated on antibiotic with vancomycin and Zosyn, both infectious disease team and pulmonology were consulted.    Patient reports no occupational exposure, no recent outdoors activities, no exposure to mold.    Patient is a previous smoker w/ 30 pack years history.  She quit long time ago more than 10 years.  Patient reports no excessive EtOH use. Patient reports no recreational drug use.           Review of Symptoms:   10-point ROS reviewed, & found negative w/ exceptions noted in the HPI.          Past Medical History:     Past Medical History:   Diagnosis Date     Arthritis      Cardiomyopathy (H)     ff Cardiology     Chronic depressive personality disorder      Congestive heart failure (H) see dr martínez    heart failure correct term     COPD exacerbation (H)      Esophageal reflux      Ex-smoker     quit 2006; 1 PPD x 30     Hyperparathyroidism (H)     s/p parathyroidectomy      Hypothyroidism      LARRY on CPAP     ff Sleep medicine     Pulmonary nodules     ff Pulmonologist     S/P cardiac pacemaker procedure     [facility]      Uncomplicated asthma years       Past Surgical History:   Procedure Laterality Date     BUNIONECTOMY Bilateral      COLONOSCOPY N/A 8/30/2021    Procedure: COLONOSCOPY, WITH POLYPECTOMY AND BIOPSY;  Surgeon: Ranjit Penn MD;  Location:  GI     CV CORONARY ANGIOGRAM N/A 9/26/2019    Procedure: CV CORONARY ANGIOGRAM;  Surgeon: Erickson Trejo MD;  Location:  HEART CARDIAC CATH LAB     CV RIGHT HEART CATH MEASUREMENTS RECORDED N/A 7/20/2020    Procedure: CV RIGHT HEART CATH;  Surgeon: Alonso rOdonez MD;  Location:  HEART CARDIAC CATH LAB     EP ABLATION / EP STUDIES  04/21/2017     EP PPM UPGRADE TO BIVENT N/A 1/19/2022    Procedure: EP PPM UPGRADE TO BIVENT;  Surgeon: Lino Funes MD;  Location:  HEART CARDIAC CATH LAB     EXPLORE NECK N/A 9/19/2018    Procedure: EXPLORE NECK;  Neck Exploration Resection of Left superior Parathyroid gland;  Surgeon: Kristine Venegas MD;  Location: [facility] OR     HC CORRECT BUNION,SIMPLE       PARATHYROIDECTOMY N/A 9/19/2018    Procedure: PARATHYROIDECTOMY;;  Surgeon: Kristine Venegas MD;  Location: [facility] OR     PPM INSERT OF NEW OR REPL W/VENT LEAD  04/21/2017     TONSILLECTOMY & ADENOIDECTOMY              Allergies:     Allergies   Allergen Reactions     Tetracycline Swelling     Tongue swelling (teramycin)     Viagra [Sildenafil] Muscle Pain (Myalgia) and Diarrhea     Zofran [Ondansetron]      Prolonged QT  Prolonged QT at baseline per patient     Flagyl [Metronidazole] Rash     Pruritic rash      Buspar [Buspirone]      Muscle weakness, diarrhea     Clindamycin Diarrhea     Can exacerbate colitis     Corn Oil Other (See Comments)     Headache       Cymbalta Other (See Comments) and Diarrhea     Confusion     Prozac [Fluoxetine] Diarrhea     Seasonal Allergies Difficulty  breathing     Sulfamethoxazole-Trimethoprim      Other reaction(s): Other  Passed out     Cyclobenzaprine Other (See Comments)     Extreme heat intolerance     Levaquin [Levofloxacin]      Other reaction(s): Other  Possible Tendon rupture (vs surgery)     Nsaids Other (See Comments)     Exacerbated asthma             Outpatient Medications:     No current facility-administered medications on file prior to encounter.  acetaminophen (TYLENOL) 325 MG tablet, Take 325-650 mg by mouth every 6 hours as needed for mild pain Max of 2000 mg/day  albuterol (PROAIR HFA/PROVENTIL HFA/VENTOLIN HFA) 108 (90 BASE) MCG/ACT Inhaler, Inhale 2 puffs into the lungs as needed for shortness of breath / dyspnea or wheezing  albuterol (PROVENTIL) (2.5 MG/3ML) 0.083% neb solution, Take 1 vial (2.5 mg) by nebulization every 6 hours as needed for shortness of breath / dyspnea or wheezing  azelastine (ASTELIN) 0.1 % nasal spray, Spray 1 spray into both nostrils 2 times daily as needed (as needed)  Calcium Carbonate-Vitamin D (CALCIUM-CARB 600 + D PO), Take 1 tablet by mouth daily  clonazePAM (KLONOPIN) 0.5 MG tablet, TAKE 1 TABLET(0.5 MG) BY MOUTH THREE TIMES DAILY AS NEEDED FOR ANXIETY  DiphenhydrAMINE HCl (BENADRYL PO), Take 25 mg by mouth nightly as needed for allergies   ELIQUIS ANTICOAGULANT 5 MG tablet, TAKE 1 TABLET(5 MG) BY MOUTH TWICE DAILY  Ferrous Sulfate (IRON SUPPLEMENT PO), Take 130 mg by mouth daily   fluocinolone acetonide 0.01 % OIL, Place 5 drops in ear(s) 2 times daily as needed (ear eczema)  fluticasone-salmeterol (ADVAIR) 250-50 MCG/ACT inhaler, Wixela Inhub 250 mcg-50 mcg/dose powder for inhalation   INHALE 1 PUFF BY MOUTH TWICE DAILY  furosemide (LASIX) 20 MG tablet, Take 20 mg by mouth daily as needed  hydrocortisone (CORTAID) 1 % external cream, Apply topically daily as needed ears  ipratropium (ATROVENT) 0.06 % nasal spray, Spray 2 sprays into both nostrils 4 times daily as needed for rhinitis  MAGNESIUM LACTATE PO,  Take  mg by mouth daily  MELATONIN PO, Take 1-3 mg by mouth nightly as needed   Menaquinone-7 (VITAMIN K2 PO), Take 1 capful by mouth daily  METAMUCIL FIBER PO, Take by mouth as needed  metoprolol succinate ER (TOPROL XL) 25 MG 24 hr tablet, Take 0.5 tablets (12.5 mg) by mouth in the morning.  Multiple Vitamin (DAILY MULTIVITAMIN PO), Take 1 tablet by mouth every morning  nitroGLYcerin (NITROSTAT) 0.4 MG sublingual tablet, Place 1 tablet (0.4 mg) under the tongue every 5 minutes as needed for chest pain For chest pain place 1 tablet under the tongue every 5 minutes for 3 doses. If symptoms persist 5 minutes after 1st dose call 911.  Probiotic Product (PROBIOTIC DAILY PO), Take 1 capsule by mouth 2 times daily Takes additional doses if has diarrhea  psyllium (METAMUCIL) WAFR, Take 2 Wafers by mouth daily  spironolactone (ALDACTONE) 25 MG tablet, Take 1 tablet (25 mg) by mouth daily  SYNTHROID 150 MCG tablet, TAKE ONE TABLET BY MOUTH SIX DAYS A WEEK; TAKE ONE-HALF TABLET ONE DAY A WEEK (Patient taking differently: TAKE ONE TABLET BY MOUTH SIX DAYS A WEEK; TAKE ONE-HALF TABLET ONE DAY A WEEK (takes one half tablet on sundays))  triamcinolone (KENALOG) 0.1 % external cream, For feet  TURMERIC PO, Take 1 tablet by mouth every morning   vitamin C (ASCORBIC ACID) 250 MG tablet, Take 250 mg by mouth daily  vitamin D3 (CHOLECALCIFEROL) 2000 units tablet, Take 1 tablet by mouth daily  Zinc 15 MG CAPS, Take 15 mg by mouth              Family History:     Family History   Problem Relation Age of Onset     Hypothyroidism Mother      Hypertension Mother      Osteoarthritis Mother      Coronary Artery Disease Father         nonfatal MI in his 70s     Asthma Father      Diabetes Sister      Hypothyroidism Sister      Anxiety Disorder Sister      Breast Cancer Maternal Aunt                Social History:     Social History     Tobacco Use     Smoking status: Former     Packs/day: 0.00     Years: 0.00     Pack years: 0.00      "Types: Cigarettes     Smokeless tobacco: Never   Vaping Use     Vaping Use: Never used   Substance Use Topics     Alcohol use: Not Currently     Alcohol/week: 0.0 - 8.0 standard drinks     Comment: seldom     Drug use: No             Physical Exam:   /76 (BP Location: Right arm)   Pulse 95   Temp 98.7  F (37.1  C) (Oral)   Resp 18   Ht 1.651 m (5' 5\")   Wt 79.8 kg (176 lb)   LMP 08/21/2007   SpO2 96%   BMI 29.29 kg/m      General: NAD  HEENT: Anicteric sclera, EOMI, OP unobstructed, w/o erythema/discharge  CV: RRR, no m/r/g  Lungs: Crackles in the right midlung field, no rhonchi or wheezing.  Abd: Soft, NT, ND  Ext: No LE edema  Skin: No rashes, cyanosis, or jaundice  Neuro: AAOx3, no focal deficits          Data:   Labs (all laboratory studies reviewed by me):   CMP:   Recent Labs   Lab 01/12/23  0530 01/11/23  0527 01/10/23  1612    140 138   POTASSIUM 4.7 4.0 4.0   CHLORIDE 107 109* 102   CO2 10* 19* 21*   ANIONGAP 22* 12 15   GLC 77 90 120*   BUN 9.5 12.8 17.4   CR 0.70 0.69 0.79   GFRESTIMATED >90 >90 83   MANJU 9.3 8.6* 9.4   PROTTOTAL  --  6.1* 7.4   ALBUMIN  --  3.6 4.4   BILITOTAL  --  0.3 0.2   ALKPHOS  --  86 111*   AST  --  17 19   ALT  --  11 19     CBC:   Recent Labs   Lab 01/11/23  0527 01/10/23  1612   WBC 5.6 7.4   RBC 4.03 4.49   HGB 12.5 14.0   HCT 38.7 43.4   MCV 96 97   MCH 31.0 31.2   MCHC 32.3 32.3   RDW 12.8 12.7    232       INR: No lab results found in last 7 days.    Glucose:   Recent Labs   Lab 01/12/23 0530 01/11/23  0527 01/10/23  1612   GLC 77 90 120*       Blood Gas: No lab results found in last 7 days.    Culture Data    7-Day Micro Results     Collected Updated Procedure Result Status      01/12/2023 1132 01/12/2023 1203 Acid-Fast Bacilli Culture and Stain [[id number]]    Sputum from Bronchus    In process Component Value   No component results            01/12/2023 1132 01/12/2023 1202 Fungal or Yeast Culture Routine [[id number]]   Sputum from Other    " In process Component Value   No component results               01/12/2023 1132 01/12/2023 1203 Acid-Fast Bacilli Culture and Stain [[id number]]   Sputum from Bronchus    In process Component Value   No component results            01/12/2023 1115 01/12/2023 1203 Respiratory Aerobic Bacterial Culture with Gram Stain [[id number]]   Sputum from Expectorate    In process Component Value   No component results               01/12/2023 0535 01/12/2023 0811 Respiratory Panel PCR [[id number]]    Swab from Nasopharyngeal    Final result Component Value   Adenovirus Not Detected   Coronavirus Not Detected   This test detects Coronavirus 229E, HKU1, NL63 and OC43 but does not distinguish between them. It does not detect MERS ( Respiratory Syndrome), SARS (Severe Acute Respiratory Syndrome) or 2019-nCoV (Novel 2019) Coronavirus.   Human Metapneumovirus Not Detected   Human Rhin/Enterovirus Not Detected   Influenza A Not Detected   Influenza A, H1 Not Detected   Influenza A 2009 H1N1 Not Detected   Influenza A, H3 Not Detected   Influenza B Not Detected   Parainfluenza Virus 1 Not Detected   Parainfluenza Virus 2 Not Detected   Parainfluenza Virus 3 Not Detected   Parainfluenza Virus 4 Not Detected   Respiratory Syncytial Virus A Not Detected   Respiratory Syncytial Virus B Not Detected   Chlamydia Pneumoniae Not Detected   Mycoplasma Pneumoniae Not Detected            01/12/2023 0533 01/12/2023 0541 Blastomyces Agn Quant EIA Non Blood [[id number]]   Urine, Clean Catch    In process Component Value   No component results            01/11/2023 1648 01/11/2023 1656 Aspergillus Galactomannan Antigen [[id number]]   Blood from Hand, Right    In process Component Value   No component results            01/11/2023 1607 01/11/2023 1612 Blastomyces Agn Quant EIA Blood [[id number]]   Blood from Arm, Right    In process Component Value   No component results            01/11/2023 1158 01/11/2023 1251 Streptococcus  pneumoniae antigen [[id number]]   Urine, Clean Catch    Final result Component Value   Streptococcus pneumoniae antigen Negative   A negative Streptococcus pneumoniae antigen result does not rule out infection with Streptococcus pneumoniae.            01/11/2023 1158 01/11/2023 1538 Histoplasma Capsulatum Agn Non Blood [[id number]]   Urine, Clean Catch    In process Component Value   No component results            01/11/2023 1119 01/12/2023 0106 Blood Culture Hand, Right [[id number]]   Blood from Hand, Right    Preliminary result Component Value   Culture No growth after 12 hours  [P]                01/11/2023 0527 01/11/2023 1600 Histoplasma Capsulatum Antigen [[id number]]   Blood from Hand, Right    In process Component Value   No component results            01/11/2023 0349 01/11/2023 0523 Histoplasma Galactomannan Antigen Quant by EIA, Urine [[id number]]   Urine, Clean Catch    In process Component Value   No component results            01/11/2023 0349 01/11/2023 0707 MRSA MSSA PCR, Nasal Swab [[id number]]    Swab from Nares, Bilateral    Final result Component Value   MRSA Target DNA Negative   SA Target DNA Negative            01/11/2023 0348 01/11/2023 1035 Legionella pneumophila antigen urine [[id number]]   Urine, Midstream    Final result Component Value   Legionella pneumophila serogroup 1 urinary antigen Negative   Suggests no recent or current infection. Infection due to Legionella cannot be ruled out, since other serogroups and species may cause disease, antigen may not be present in urine in early infection, and the level of antigen present in the urine may be below detectable limits of the test.            01/10/2023 2232 01/12/2023 0002 Blood Culture Peripheral Blood [[id number]]   Peripheral Blood    Preliminary result Component Value   Culture No growth after 1 day  [P]                01/10/2023 2212 01/11/2023 0010 Cryptococcus antigen [[id number]]   Blood from Hand, Right     Final result Component Value   Cryptococcal Antigen Negative   Cryptococcal titer interpretation -            01/10/2023 2212 01/10/2023 2221 1,3 Beta D glucan fungitell [[id number]]   Blood from Hand, Right    In process Component Value   No component results            01/10/2023 2212 01/10/2023 2221 Coccidioides Agn Quant EIA Blood [[id number]]   Blood from Hand, Right    In process Component Value   No component results            01/10/2023 1615 01/10/2023 1905 Symptomatic Influenza A/B & SARS-CoV2 (COVID-19) Virus PCR Multiplex Nasopharyngeal [[id number]]    Swab from Nasopharyngeal    Final result Component Value   Influenza A PCR Negative   Influenza B PCR Negative   RSV PCR Negative   SARS CoV2 PCR Negative   NEGATIVE: SARS-CoV-2 (COVID-19) RNA not detected, presumed negative.                  Virology Data:   Lab Results   Component Value Date    FLUAH1 Not Detected 01/12/2023    FLUAH3 Not Detected 01/12/2023    PK2752 Not Detected 01/12/2023    IFLUB Not Detected 01/12/2023    RSVA Not Detected 01/12/2023    RSVB Not Detected 01/12/2023    PIV1 Not Detected 01/12/2023    PIV2 Not Detected 01/12/2023    PIV3 Not Detected 01/12/2023    HMPV Not Detected 01/12/2023       Historical CMV results (last 3 of prior testing):  No results found for: CMVQNT  No results found for: CMVLOG    Urine Studies    Recent Labs   Lab Test 01/20/22  1143 02/04/21  0950   URINEPH 6.0 7.0   NITRITE Negative Negative   LEUKEST Negative Small*   WBCU 1 0 - 5       Most Recent [facility] Pulmonary Function Testing (FVC/FEV1 only)  No results found for: 20002  No results found for: 20003  No results found for: 20015  No results found for: 20016      Imaging (all imaging studies reviewed by me):  CT CHEST WITHOUT CONTRAST 1/9/2023  IMPRESSION:  1.  There are increased reticular nodular opacities in the right upper  lobe when compared to previous, with small focus of associated  peripheral consolidation. This should represent  pneumonia, and  explains the chest x-ray finding.  2.  Bilateral pulmonary nodules are otherwise unchanged from previous.  No suspicious or growing nodules.    Procedures:   No plan to do bronchoscopy

## 2023-01-12 NOTE — PROGRESS NOTES
RT attempted to get sputum sample from pt. Pt unable to cough up anything, but does have a specimen cup at bedside and knows to cough up sputum into it should they have any. Pt has a strong non-productive cough with clear breath sounds. Pt knows to notify nurse when they are able to get up any sputum.     Addis Garcia, RT on 1/11/2023 at 7:37 PM

## 2023-01-12 NOTE — PROGRESS NOTES
Very anxious and upset this evening. Tried to calm patient. She requested klonopin, benadryl, and tylenol. Upset that she has been unable to give sputum sample and unsure of plan of care. Called Charlette WILSON to speak to patient and she refused to converse with a PA. At present, she just want to sleep and not be disturbed.

## 2023-01-13 LAB
GALACTOMANNAN AG SERPL QL IA: NEGATIVE
GALACTOMANNAN AG SPEC IA-ACNC: 0.08
SCANNED LAB RESULT: NORMAL

## 2023-01-13 NOTE — PROGRESS NOTES
Assessment & Plan     Hospital discharge follow-up      Abnormal chest CT      Anxiety    - clonazePAM (KLONOPIN) 0.5 MG tablet; TAKE 1 TABLET(0.5 MG) BY MOUTH THREE TIMES DAILY AS NEEDED FOR ANXIETY    30 minutes spent on the date of the encounter doing chart review, history and exam, documentation and further activities per the note   MED REC REQUIRED  Post Medication Reconciliation Status:       See Patient Instructions: Complete follow-up CT on the 25th or 26, keep your pulmonologist appointment on the 31st.  Continue with therapy.  Await lab results.  Follow-up as needed for questions or concerns at any time.  Controlled medications require some type of follow-up visit every 3 months for refill.  Return in about 3 months (around 4/17/2023), or if symptoms worsen or fail to improve, for using a MyChart eVisit.    CHARLINE DIXON, [facility] ROLAND Cook is a 62 year old, presenting for the following health issues:  Recheck Medication, Hospital F/U, and Medication Reconciliation (Patient states she is taking milk thistle. )    Patient reports her mental health is not doing very well.  She has high anxiety related to her health, being in the hospital.  Awaiting test results.  Only thing that helps her anxiety is the Klonopin which she has been trying to use sparingly.  She knows this is not the best solution but she has not tolerated other meds for anxiety or they did not work for her.  She takes the Klonopin at night and sometimes 3 times a day if anxiety is severe ; she tries to take half a tablet to see if that will be effective at first and she does not feel the amount she is taking has been losing effectiveness.  She did see a psych NP who had her try Prozac again but that did not work.  She felt the Sezion test was a waste of money.  She does talk with her therapist every Thursday who is very helpful and she prays and belongs to a Spiritism group ; though she has  "been sick and not able to go for some time.  She reports she was seen here in clinic had chest x-ray and chest CT-in the doctor advised her to go to the hospital due to not improving with antibiotics; needing IV antibiotics.  While in the hospital she saw pulmonary medicine and infectious disease, she had another chest CT which alerted infectious disease that she needed to stop antibiotics.  We are awaiting test to see if this is his histoplasmosis.  She is unsure what the treatment plan will be if that is the case.  Plan is to have follow-up CT on the 25th or 26, this was ordered at hospital discharge.  She reports difficulty with communicating and navigating [employer] since the update.  She has a pulmonologist/sleep medicine follow-up visit on the 31st.    HPI       Post Discharge Outreach 1/14/2023   Admission Date 1/10/2023   Reason for Admission evaluation of continued cough, general malaise, outpatient imaging shows worsening opacities in the right middle lobe   Discharge Date 1/12/2023   Discharge Diagnosis Community acquired pneumonia of right middle lobe of lung   How are you doing now that you are home? \"about the same, but I'm stable. Much better than when I was in the hospital, though.\"   How are your symptoms? (Red Flag symptoms escalate to triage hotline per guidelines) Unchanged   Do you feel your condition is stable enough to be safe at home until your provider visit? Yes   Does the patient have their discharge instructions?  Yes   Does the patient have questions regarding their discharge instructions?  No   Were you started on any new medications or were there changes to any of your previous medications?  No   Do you have all of your needed medical supplies or equipment (DME)?  (i.e. oxygen tank, CPAP, cane, etc.) Yes   Discharge follow-up appointment scheduled within 14 calendar days?  Yes   Discharge Follow Up Appointment Date 1/17/2023   Discharge Follow Up Appointment Scheduled with? Primary Care " "Provider     Hospital Follow-up Visit:    Hospital/Nursing Home/IP Rehab Facility: [facility]  Date of Admission: 1/10/23  Date of Discharge: 1/12/2023  Reason(s) for Admission: recurrent pneumonia; community acquired pneumonia of right middle lobe of lung, lab test covid negative     Was your hospitalization related to COVID-19? No   Problems taking medications regularly:  None  Medication changes since discharge: None  Problems adhering to non-medication therapy:  Yes     Summary of hospitalization:  [facility] hospital discharge summary reviewed  Diagnostic Tests/Treatments reviewed.  Follow up needed: Pulmonary medicine/sleep medicine; possibly infectious disease, repeat CT on the 25th and 26.  Continue therapy  Other Healthcare Providers Involved in Patient s Care:         None  Update since discharge: fluctuating course.   Plan of care communicated with patient     Anxiety Follow-Up    How are you doing with your anxiety since your last visit? Worsened \"awful\"    Are you having other symptoms that might be associated with anxiety? Patient refused.     Have you had a significant life event? Health Concerns     Are you feeling depressed? patient refused to discuss  Do you have any concerns with your use of alcohol or other drugs? Patient refused to discuss.  Patient refused all questions.     Social History     Tobacco Use     Smoking status: Former     Packs/day: 0.00     Years: 0.00     Pack years: 0.00     Types: Cigarettes     Smokeless tobacco: Never   Vaping Use     Vaping Use: Never used   Substance Use Topics     Alcohol use: Not Currently     Alcohol/week: 0.0 - 8.0 standard drinks     Comment: seldom     Drug use: No     JESUS-7 SCORE 9/29/2021 3/15/2022 10/27/2022   Total Score - - 14 (moderate anxiety)   Total Score 6 0 14     PHQ 9/29/2022 9/29/2022 12/13/2022   PHQ-9 Total Score - 17 15   Q9: Thoughts of better off dead/self-harm past 2 weeks (No Data) More " "than half the days Not at all     Last PHQ-9 12/13/2022   1.  Little interest or pleasure in doing things 2   2.  Feeling down, depressed, or hopeless 2   3.  Trouble falling or staying asleep, or sleeping too much 3   4.  Feeling tired or having little energy 2   5.  Poor appetite or overeating 2   6.  Feeling bad about yourself 2   7.  Trouble concentrating 2   8.  Moving slowly or restless 0   Q9: Thoughts of better off dead/self-harm past 2 weeks 0   PHQ-9 Total Score 15   Difficulty at work, home, or with people -     JESUS-7  10/27/2022   1. Feeling nervous, anxious, or on edge 2   2. Not being able to stop or control worrying 3   3. Worrying too much about different things 2   4. Trouble relaxing 2   5. Being so restless that it is hard to sit still 0   6. Becoming easily annoyed or irritable 3   7. Feeling afraid, as if something awful might happen 2   JESUS-7 Total Score 14   If you checked any problems, how difficult have they made it for you to do your work, take care of things at home, or get along with other people? Very difficult         Medication Followup of Klonopin    Taking Medication as prescribed: yes    Side Effects:  Does not know     Medication Helping Symptoms:  Patient refused        Review of Systems   Constitutional, HEENT, cardiovascular, pulmonary, GI, , musculoskeletal, neuro, skin, endocrine and psych systems are negative, except as otherwise noted.      Objective    /60   Pulse 102   Temp 98.6  F (37  C) (Tympanic)   Resp 20   Ht 1.67 m (5' 5.75\")   Wt 82.3 kg (181 lb 6.4 oz)   LMP 08/21/2007   SpO2 97%   BMI 29.50 kg/m    Body mass index is 29.5 kg/m .  Physical Exam   GENERAL: healthy, alert and no distress  EYES: Eyes grossly normal to inspection  RESP: lungs clear to auscultation - no rales, rhonchi or wheezes  CV: regular rate and rhythm, normal S1 S2, no S3 or S4, no murmur, click or rub, no peripheral edema and peripheral pulses strong  MS: no gross musculoskeletal " defects noted, no edema  NEURO: mentation intact and speech normal  PSYCH: mentation appears normal, affect normal POSITIVE tearful at times

## 2023-01-13 NOTE — PROGRESS NOTES
ID Chart Note  1/12/2023     Ms. Marroquin was discharged to home today with plans to follow-up with her outside pulmonary provider for repeat chest CT in 2 weeks and to proceed to bronchoscopy if infiltrates persist and pending workup is unremarkable. No empiric antibiotics are recommended at this time given mild symptoms and unknown etiology. No ID follow-up planned for now but if pending workup reveals a likely pathogen or if future bronchoscopy is suggestive of infction I would be happy to see her clinic.     Andra Young MD  Infectious Diseases  Pager [phone]

## 2023-01-14 ENCOUNTER — PATIENT OUTREACH (OUTPATIENT)
Dept: CARE COORDINATION | Facility: CLINIC | Age: 63
End: 2023-01-14

## 2023-01-14 LAB
BACTERIA SPT CULT: NORMAL
GAMMA INTERFERON BACKGROUND BLD IA-ACNC: 0.04 IU/ML
GRAM STAIN RESULT: NORMAL
M TB IFN-G BLD-IMP: NEGATIVE
M TB IFN-G CD4+ BCKGRND COR BLD-ACNC: 1.29 IU/ML
MITOGEN IGNF BCKGRD COR BLD-ACNC: 0.01 IU/ML
MITOGEN IGNF BCKGRD COR BLD-ACNC: 0.01 IU/ML
QUANTIFERON MITOGEN: 1.33 IU/ML
QUANTIFERON NIL TUBE: 0.04 IU/ML
QUANTIFERON TB1 TUBE: 0.05 IU/ML
QUANTIFERON TB2 TUBE: 0.05

## 2023-01-14 NOTE — PROGRESS NOTES
"Clinic Care Coordination Contact  [facility]: Post-Discharge Note  SITUATION                                                      Admission:    Admission Date: 01/10/23   Reason for Admission: evaluation of continued cough, general malaise, outpatient imaging shows worsening opacities in the right middle lobe  Discharge:   Discharge Date: 01/12/23  Discharge Diagnosis: Community acquired pneumonia of right middle lobe of lung    BACKGROUND                                                      Per hospital discharge summary and inpatient provider notes:    Joyce Marroquin is a 62 year old female with past medical history of LARRY on CPAP, congestive heart failure with EF of 40 to 45%, hyperparathyroidism, hypothyroidism, complete AV block status post pacemaker who presents to the ED for evaluation of an required pneumonia    ASSESSMENT           Discharge Assessment  How are you doing now that you are home?: \"about the same, but I'm stable. Much better than when I was in the hospital, though.\"  How are your symptoms? (Red Flag symptoms escalate to triage hotline per guidelines): Unchanged  Do you feel your condition is stable enough to be safe at home until your provider visit?: Yes  Does the patient have their discharge instructions? : Yes  Does the patient have questions regarding their discharge instructions? : No  Were you started on any new medications or were there changes to any of your previous medications? : No  Do you have all of your needed medical supplies or equipment (DME)?  (i.e. oxygen tank, CPAP, cane, etc.): Yes (pulse oximeter, blood pressure machine)  Discharge follow-up appointment scheduled within 14 calendar days? : Yes  Discharge Follow Up Appointment Date: 01/17/23  Discharge Follow Up Appointment Scheduled with?: Primary Care Provider       Patient reports home oximetry readings of 94-98%. \"forgets\" to check her blood pressure.            PLAN                                                 "      Outpatient Plan:  Follow up with PCP within 7 days of discharge    Future Appointments   Date Time Provider Department Center   1/17/2023 11:00 AM Ce Maldonado, [facility]   3/30/2023 12:00 AM UC ICD REMOTE [facility]   6/30/2023  1:00 AM UC ICD REMOTE [facility]   7/19/2023  7:30 AM UC CV DEVICE 1 [facility]   7/19/2023  8:30 AM [facility]   7/19/2023  3:30 PM FK LAB [facility]   7/19/2023  4:00 PM FKECHR1 [facility]   7/21/2023 10:30 AM Alonso Ordonez MD FKUM[facility]         For any urgent concerns, please contact our 24 hour nurse triage line: [phone] ([phone])         Ursula Washington RN

## 2023-01-16 LAB
BACTERIA BLD CULT: NO GROWTH
BACTERIA BLD CULT: NO GROWTH
SCANNED LAB RESULT: NORMAL

## 2023-01-17 ENCOUNTER — OFFICE VISIT (OUTPATIENT)
Dept: FAMILY MEDICINE | Facility: CLINIC | Age: 63
End: 2023-01-17
Payer: MEDICARE

## 2023-01-17 VITALS
TEMPERATURE: 98.6 F | RESPIRATION RATE: 20 BRPM | DIASTOLIC BLOOD PRESSURE: 60 MMHG | WEIGHT: 181.4 LBS | OXYGEN SATURATION: 97 % | HEART RATE: 102 BPM | HEIGHT: 66 IN | BODY MASS INDEX: 29.15 KG/M2 | SYSTOLIC BLOOD PRESSURE: 104 MMHG

## 2023-01-17 DIAGNOSIS — R93.89 ABNORMAL CHEST CT: ICD-10-CM

## 2023-01-17 DIAGNOSIS — Z09 HOSPITAL DISCHARGE FOLLOW-UP: Primary | ICD-10-CM

## 2023-01-17 DIAGNOSIS — F41.9 ANXIETY: ICD-10-CM

## 2023-01-17 PROCEDURE — 99214 OFFICE O/P EST MOD 30 MIN: CPT | Performed by: NURSE PRACTITIONER

## 2023-01-17 RX ORDER — CLONAZEPAM 0.5 MG/1
TABLET ORAL
Qty: 90 TABLET | Refills: 2 | Status: SHIPPED | OUTPATIENT
Start: 2023-01-17 | End: 2023-04-04

## 2023-01-17 ASSESSMENT — PAIN SCALES - GENERAL: PAINLEVEL: NO PAIN (0)

## 2023-01-17 NOTE — DISCHARGE SUMMARY
[facility]  Hospitalist Discharge Summary      Date of Admission:  1/10/2023  Date of Discharge:  1/12/2023  3:41 PM  Discharging Provider: Han Ayala MD  Discharge Service: Hospitalist Service, GOLD TEAM 8    Discharge Diagnoses   Community acquire pneumonia, unspecified pathogen  COPD, chronic  Heart failure reduced ejection fraction, chronic    Follow-ups Needed After Discharge   Follow-up Appointments     Follow Up ([facility])      Keep your previously scheduled appointment with your primary care   provider and pulmonologist    Appointments on [place] and/or [facility] (with [facility] or [facility]   provider or service). Call [phone] if you haven't heard regarding   these appointments within 7 days of discharge.             Unresulted Labs Ordered in the Past 30 Days of this Admission     Date and Time Order Name Status Description    1/12/2023 11:06 AM Fungal or Yeast Culture Routine Preliminary     1/12/2023 11:06 AM Acid-Fast Bacilli Culture and Stain In process           Discharge Disposition   Discharged to home  Condition at discharge: Stable    Hospital Course      Joyce Marroquin is a 62 year old female patient with a past medical history significant for RF ablation for PVCs which led to AV node ablation and complete heart block requiring a dual-chamber pacemaker implantation, which is a Medtronic device MRI compatible, Takotsubo cardiomyopathy, HFrEF (40-45% 5/2022), LBBB, s/p cardiac pacemaker for complete heart block, COPD, asthma, GERD, h/o Grave's disease with subsequent hypothyroidism, hyperparathyroidism, parathyroid adenoma, LARRY on CPAP, depression, anxiety, paroxysmal atrial fibrillation chronically anticoagulated on Eliquis, HTN who was presented to [facility] ED secondary to prolonged course of CAP necessitating IV antibiotics and further ID eval.     Community acquired pneumonia  Diagnosed with CAP 12/12/22 completed course Ceftin,  then with continued symptoms finished a course of Ceftin + Azithromycin. CT Chest wo 1/9/23 with increased reticular opacities RUL when compared to previous. No recent travel. CRP elevated, normal procalcitonin and ESR. Started on vancomycin and zosyn in the emergency department. Vancomycin stopped given negative MRSA nares. Infectious disease consulted recommended testing as below. If infectious cause responsible for pneumonia, would likely be atypical, no empiric abx indicated. Zosyn stopped prior to discharge. Unable to obtain sputum, if no improvement in symptoms and below testing unrevealing, consider preceding with bronchoscopy as outpatient.   - Follow up BCs, histo, blasto, crypto, Coccidiodes, 1,3-Beta-D glucan, legionella, strep pneumo, MRSA swab, resp viral panel, sputum culture.    Consultations This Hospital Stay   PHARMACY TO DOSE VANCO  INFECTIOUS DISEASE GENERAL ADULT IP CONSULT  PULMONARY GENERAL ADULT IP CONSULT    Code Status   Prior    Time Spent on this Encounter   I, Han Ayala MD, personally saw the patient today and spent greater than 30 minutes discharging this patient.       Han Ayala MD  [facility] EMERGENCY DEPARTMENT  [address] [address]  Phone: [phone]  ______________________________________________________________________    Physical Exam   Vital Signs:                    Weight: 176 lbs 0 oz  GENERAL: Alert and oriented x 3. Well nourished, well developed.  No acute distress.    HEENT: Normocephalic, atraumatic. Anicteric sclera. Mucous membranes moist.   CV: RRR. S1, S2. No murmurs appreciated.   RESPIRATORY: Effort normal on room air. Fine crackles RLL, no wheezing.  GI: Abdomen soft and non distended, bowel sounds present x all 4 quadrants. No tenderness, rebound, or guarding.   NEUROLOGICAL: No focal deficits.  MUSCULOSKELETAL: No joint swelling or tenderness. Moves all extremities.   EXTREMITIES: No gross deformities. No  peripheral edema.   SKIN: Grossly warm, dry, and intact. No jaundice. No rashes.           Primary Care Physician   CHARLINE DIXON    Discharge Orders      CT Chest w/o Contrast     Reason for your hospital stay    Dear Joyce Marroquin    Your were hospitalized at [facility] with pneumonia and treated with antibiotics initially. After discussion with Infectious disease and pulmonology, we have discontinued your antibiotics.  Please keep your scheduled follow up with your Pulmonologist at the end of the month. Obtain CT chest prior to that appointment. Your pulmonologist will review the labs that we have obtained here and the CT scan in two weeks and make a decision about proceeding with bronchoscopy.       Please get the following tests done:  CT scan in 2 weeks    Please set up an appointment with:  Keep your scheduled appointments with your nurse practitioner and pulmonologist. No need to see infectious disease unless your pulmonologist recommends it.     It was a pleasure meeting with you today. Thank you for allowing me and my team the privilege of caring for you today. You are the reason we are here, and I truly hope we provided you with the excellent service you deserve. Please let us know if there is anything else we can do for you so that we can be sure you are leaving completely satisfied with your care experience.      Be well,     JOHNNIE Ayala MD     Activity    Your activity upon discharge: activity as tolerated     Follow Up ([facility])    Keep your previously scheduled appointment with your primary care provider and pulmonologist    Appointments on [place] and/or [facility] (with [facility] or [facility] provider or service). Call [phone] if you haven't heard regarding these appointments within 7 days of discharge.     Diet    Follow this diet upon discharge: Orders Placed This Encounter      2 Gram Sodium Diet       Significant Results and Procedures   Most Recent 3  CBC's:Recent Labs   Lab Test 01/11/23  0527 01/10/23  1612 12/06/22  1246   WBC 5.6 7.4 6.5   HGB 12.5 14.0 13.6   MCV 96 97 95    232 214     Most Recent 3 BMP's:Recent Labs   Lab Test 01/12/23  1131 01/12/23  0530 01/11/23  0527    139 140   POTASSIUM 4.1 4.7 4.0   CHLORIDE 104 107 109*   CO2 18* 10* 19*   BUN 9.0 9.5 12.8   CR 0.64 0.70 0.69   ANIONGAP 15 22* 12   MANJU 9.4 9.3 8.6*   GLC 84 77 90   ,   Results for orders placed or performed in visit on 01/09/23   CT Chest w/o Contrast    Narrative    CT CHEST WITHOUT CONTRAST 1/9/2023 12:08 PM     HISTORY: Abnormal x-ray; Community acquired pneumonia of right lung,  unspecified part of lung; Former smoker    TECHNIQUE: CT scan of the chest was performed without IV contrast.  Multiplanar reformats were obtained. Dose reduction techniques were  used.  CONTRAST: None.  COMPARISON: Chest x-ray 1/4/2023, CT 9/28/2022    FINDINGS:     LUNGS AND PLEURA: Peripheral reticulonodular opacities in the right  upper lobe and right middle lobe. These have increased when compared  to previous. There is a small focus of associated consolidation in the  periphery of the inferior right upper lobe and is new from previous.  This accounts for the chest x-ray finding. Scattered pulmonary nodules  are otherwise unchanged from previous, including an 8 mm subpleural  nodule in the right lower lobe. There are no new pulmonary nodules.  Stable emphysema.    MEDIASTINUM/AXILLAE: Cardiac pacemaker. Mild coronary artery  calcification.    UPPER ABDOMEN: Benign hepatic cyst.      Impression    IMPRESSION:  1.  There are increased reticular nodular opacities in the right upper  lobe when compared to previous, with small focus of associated  peripheral consolidation. This should represent pneumonia, and  explains the chest x-ray finding.  2.  Bilateral pulmonary nodules are otherwise unchanged from previous.  No suspicious or growing nodules.    YUNIOR HARLEY MD         SYSTEM ID:   [id number]     *Note: Due to a large number of results and/or encounters for the requested time period, some results have not been displayed. A complete set of results can be found in Results Review.       Discharge Medications   Discharge Medication List as of 1/12/2023  3:12 PM      CONTINUE these medications which have NOT CHANGED    Details   acetaminophen (TYLENOL) 325 MG tablet Take 325-650 mg by mouth every 6 hours as needed for mild pain Max of 2000 mg/day, Historical      albuterol (PROAIR HFA/PROVENTIL HFA/VENTOLIN HFA) 108 (90 BASE) MCG/ACT Inhaler Inhale 2 puffs into the lungs as needed for shortness of breath / dyspnea or wheezing, Historical      albuterol (PROVENTIL) (2.5 MG/3ML) 0.083% neb solution Take 1 vial (2.5 mg) by nebulization every 6 hours as needed for shortness of breath / dyspnea or wheezing, Disp-90 mL, R-0, E-Prescribe      azelastine (ASTELIN) 0.1 % nasal spray Spray 1 spray into both nostrils 2 times daily as needed (as needed), R-0, Historical      Calcium Carbonate-Vitamin D (CALCIUM-CARB 600 + D PO) Take 1 tablet by mouth daily, Historical      clonazePAM (KLONOPIN) 0.5 MG tablet TAKE 1 TABLET(0.5 MG) BY MOUTH THREE TIMES DAILY AS NEEDED FOR ANXIETY, Disp-90 tablet, R-2, E-Prescribe      DiphenhydrAMINE HCl (BENADRYL PO) Take 25 mg by mouth nightly as needed for allergies , Historical      ELIQUIS ANTICOAGULANT 5 MG tablet TAKE 1 TABLET(5 MG) BY MOUTH TWICE DAILY, Disp-180 tablet, R-3, E-Prescribe      Ferrous Sulfate (IRON SUPPLEMENT PO) Take 130 mg by mouth daily , Historical      fluocinolone acetonide 0.01 % OIL Place 5 drops in ear(s) 2 times daily as needed (ear eczema), Disp-20 mL, R-2, E-Prescribe      fluticasone-salmeterol (ADVAIR) 250-50 MCG/ACT inhaler Wixela Inhub 250 mcg-50 mcg/dose powder for inhalation   INHALE 1 PUFF BY MOUTH TWICE DAILY, Historical      furosemide (LASIX) 20 MG tablet Take 20 mg by mouth daily as needed, Historical      hydrocortisone (CORTAID) 1 %  external cream Apply topically daily as needed earsHistorical      ipratropium (ATROVENT) 0.06 % nasal spray Spray 2 sprays into both nostrils 4 times daily as needed for rhinitis, Historical      MAGNESIUM LACTATE PO Take  mg by mouth daily, Historical      MELATONIN PO Take 1-3 mg by mouth nightly as needed , Historical      Menaquinone-7 (VITAMIN K2 PO) Take 1 capful by mouth daily, Historical      METAMUCIL FIBER PO Take by mouth as needed, Historical      metoprolol succinate ER (TOPROL XL) 25 MG 24 hr tablet Take 0.5 tablets (12.5 mg) by mouth in the morning., Disp-45 tablet, R-3, E-Prescribe      Multiple Vitamin (DAILY MULTIVITAMIN PO) Take 1 tablet by mouth every morning, Historical      nitroGLYcerin (NITROSTAT) 0.4 MG sublingual tablet Place 1 tablet (0.4 mg) under the tongue every 5 minutes as needed for chest pain For chest pain place 1 tablet under the tongue every 5 minutes for 3 doses. If symptoms persist 5 minutes after 1st dose call 911., Disp-25 tablet, R-1, E-Prescribe      Probiotic Product (PROBIOTIC DAILY PO) Take 1 capsule by mouth 2 times daily Takes additional doses if has diarrhea, Historical      psyllium (METAMUCIL) WAFR Take 2 Wafers by mouth daily, Historical      spironolactone (ALDACTONE) 25 MG tablet Take 1 tablet (25 mg) by mouth daily, Disp-90 tablet, R-3, E-PrescribeNew increased dose.      SYNTHROID 150 MCG tablet TAKE ONE TABLET BY MOUTH SIX DAYS A WEEK; TAKE ONE-HALF TABLET ONE DAY A WEEK, Disp-90 tablet, R-0, GERALDO, E-Prescribe      triamcinolone (KENALOG) 0.1 % external cream For feetHistorical      TURMERIC PO Take 1 tablet by mouth every morning , Historical      vitamin C (ASCORBIC ACID) 250 MG tablet Take 250 mg by mouth daily, Historical      vitamin D3 (CHOLECALCIFEROL) 2000 units tablet Take 1 tablet by mouth daily, Disp-1 tablet, R-0, No Print Out      Zinc 15 MG CAPS Take 15 mg by mouth, Historical           Allergies   Allergies   Allergen Reactions      Tetracycline Swelling     Tongue swelling (teramycin)     Viagra [Sildenafil] Muscle Pain (Myalgia) and Diarrhea     Zofran [Ondansetron]      Prolonged QT  Prolonged QT at baseline per patient     Flagyl [Metronidazole] Rash     Pruritic rash      Buspar [Buspirone]      Muscle weakness, diarrhea     Clindamycin Diarrhea     Can exacerbate colitis     Corn Oil Other (See Comments)     Headache       Cymbalta Other (See Comments) and Diarrhea     Confusion     Prozac [Fluoxetine] Diarrhea     Seasonal Allergies Difficulty breathing     Sulfamethoxazole-Trimethoprim      Other reaction(s): Other  Passed out     Cyclobenzaprine Other (See Comments)     Extreme heat intolerance     Levaquin [Levofloxacin]      Other reaction(s): Other  Possible Tendon rupture (vs surgery)     Nsaids Other (See Comments)     Exacerbated asthma      s/p CT head negative. D/w ED attending and Dr. Sutherland from [facility] who is aware of all delusions and concerns as verbalized by the patient. Will accept patient back - [facility] pgy2

## 2023-01-19 PROBLEM — R93.89 ABNORMAL CHEST CT: Status: ACTIVE | Noted: 2023-01-19

## 2023-01-22 ENCOUNTER — MYC MEDICAL ADVICE (OUTPATIENT)
Dept: FAMILY MEDICINE | Facility: CLINIC | Age: 63
End: 2023-01-22
Payer: MEDICARE

## 2023-01-24 ENCOUNTER — ANCILLARY PROCEDURE (OUTPATIENT)
Dept: CT IMAGING | Facility: CLINIC | Age: 63
End: 2023-01-24
Attending: STUDENT IN AN ORGANIZED HEALTH CARE EDUCATION/TRAINING PROGRAM
Payer: MEDICARE

## 2023-01-24 DIAGNOSIS — J18.9 RECURRENT PNEUMONIA: ICD-10-CM

## 2023-01-24 PROCEDURE — G1010 CDSM STANSON: HCPCS | Performed by: RADIOLOGY

## 2023-01-24 PROCEDURE — 71250 CT THORAX DX C-: CPT | Mod: TC | Performed by: RADIOLOGY

## 2023-02-09 LAB — BACTERIA SPT CULT: NO GROWTH

## 2023-02-15 NOTE — PLAN OF CARE
"Shift: 4252-1470    VS: /76 (BP Location: Right arm)   Pulse 95   Temp 97.7  F (36.5  C) (Oral)   Resp 18   Ht 1.651 m (5' 5\")   Wt 79.8 kg (176 lb)   LMP 08/21/2007   SpO2 96%   BMI 29.29 kg/m      Pain: Pt denies pain overnight.   Neuro: A/Ox4   Respiratory: On RA with O2 sats above 95%.   Diet/Appetite: Good appetite  /GI: Pt voided a couple times overnight and has no BM.  LDA's: Left upper arm PIV SL  Skin: Dry and intact   Activity: Stand by assist   Pertinent labs: Am labs     Plan:  - Antibiotics  -Recommend induced sputum sample, if unable to obtain, consider consulting pulmonology for bronchoscopy.       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

## 2023-02-22 ENCOUNTER — TELEPHONE (OUTPATIENT)
Dept: FAMILY MEDICINE | Facility: CLINIC | Age: 63
End: 2023-02-22
Payer: MEDICARE

## 2023-02-22 NOTE — TELEPHONE ENCOUNTER
Patient Quality Outreach    Patient is due for the following:   Physical Annual Wellness Visit      Topic Date Due     Zoster (Shingles) Vaccine (1 of 2) Never done     COVID-19 Vaccine (4 - Booster for Pfizer series) 01/12/2022       Next Steps:   Patient has upcoming appointment, these items will be addressed at that time.    Type of outreach:    Chart review performed, no outreach needed.      Questions for provider review:    None     Lata Foley MA

## 2023-03-09 LAB
ACID FAST STAIN (ARUP): NORMAL

## 2023-03-12 ENCOUNTER — MYC MEDICAL ADVICE (OUTPATIENT)
Dept: FAMILY MEDICINE | Facility: CLINIC | Age: 63
End: 2023-03-12
Payer: MEDICARE

## 2023-03-12 ENCOUNTER — MYC REFILL (OUTPATIENT)
Dept: FAMILY MEDICINE | Facility: CLINIC | Age: 63
End: 2023-03-12
Payer: MEDICARE

## 2023-03-12 DIAGNOSIS — E89.0 POSTABLATIVE HYPOTHYROIDISM: ICD-10-CM

## 2023-03-13 RX ORDER — LEVOTHYROXINE SODIUM 150 MCG
TABLET ORAL
Qty: 90 TABLET | Refills: 0 | Status: SHIPPED | OUTPATIENT
Start: 2023-03-13 | End: 2023-06-09

## 2023-03-13 NOTE — TELEPHONE ENCOUNTER
"Requested Prescriptions   Pending Prescriptions Disp Refills     SYNTHROID 150 MCG tablet 90 tablet 0     Sig: TAKE ONE TABLET BY MOUTH SIX DAYS A WEEK; TAKE ONE-HALF TABLET ONE DAY A WEEK       Thyroid Protocol Passed - 3/13/2023  8:03 AM        Passed - Patient is 12 years or older        Passed - Recent (12 mo) or future (30 days) visit within the authorizing provider's specialty     Patient has had an office visit with the authorizing provider or a provider within the authorizing providers department within the previous 12 mos or has a future within next 30 days. See \"Patient Info\" tab in inbasket, or \"Choose Columns\" in Meds & Orders section of the refill encounter.              Passed - Medication is active on med list        Passed - Normal TSH on file in past 12 months     Recent Labs   Lab Test 07/01/22  1415   TSH 0.70              Passed - No active pregnancy on record     If patient is pregnant or has had a positive pregnancy test, please check TSH.          Passed - No positive pregnancy test in past 12 months     If patient is pregnant or has had a positive pregnancy test, please check TSH.             Prescription approved per [facility] Refill Protocol.    Ana Greer RN  [facility]      "

## 2023-03-15 NOTE — TELEPHONE ENCOUNTER
I called patient, however our call was dropped.     The pharmacy states that patient picked up her Synthroid on 3/13/23.     I called Joyce  back and left a detailed message asking her to let us know if she needed anything or had other questions.     My Chart message sent also.     Alpa Chávez RN BSN  [facility]

## 2023-03-30 ENCOUNTER — ANCILLARY PROCEDURE (OUTPATIENT)
Dept: CARDIOLOGY | Facility: CLINIC | Age: 63
End: 2023-03-30
Attending: INTERNAL MEDICINE
Payer: MEDICARE

## 2023-03-30 DIAGNOSIS — I44.2 COMPLETE ATRIOVENTRICULAR BLOCK (H): ICD-10-CM

## 2023-03-30 PROCEDURE — 93294 REM INTERROG EVL PM/LDLS PM: CPT | Performed by: INTERNAL MEDICINE

## 2023-03-30 PROCEDURE — 93296 REM INTERROG EVL PM/IDS: CPT

## 2023-04-03 LAB
MDC_IDC_EPISODE_DTM: NORMAL
MDC_IDC_EPISODE_DTM: NORMAL
MDC_IDC_EPISODE_DURATION: 1 S
MDC_IDC_EPISODE_DURATION: 4 S
MDC_IDC_EPISODE_ID: 3
MDC_IDC_EPISODE_ID: 4
MDC_IDC_EPISODE_TYPE: NORMAL
MDC_IDC_EPISODE_TYPE: NORMAL
MDC_IDC_LEAD_IMPLANT_DT: NORMAL
MDC_IDC_LEAD_LOCATION: NORMAL
MDC_IDC_LEAD_LOCATION_DETAIL_1: NORMAL
MDC_IDC_LEAD_MFG: NORMAL
MDC_IDC_LEAD_MODEL: NORMAL
MDC_IDC_LEAD_POLARITY_TYPE: NORMAL
MDC_IDC_LEAD_SERIAL: NORMAL
MDC_IDC_LEAD_SPECIAL_FUNCTION: NORMAL
MDC_IDC_MSMT_BATTERY_DTM: NORMAL
MDC_IDC_MSMT_BATTERY_REMAINING_LONGEVITY: 116 MO
MDC_IDC_MSMT_BATTERY_RRT_TRIGGER: 2.6
MDC_IDC_MSMT_BATTERY_STATUS: NORMAL
MDC_IDC_MSMT_BATTERY_VOLTAGE: 3.02 V
MDC_IDC_MSMT_LEADCHNL_LV_IMPEDANCE_VALUE: 247 OHM
MDC_IDC_MSMT_LEADCHNL_LV_IMPEDANCE_VALUE: 304 OHM
MDC_IDC_MSMT_LEADCHNL_LV_IMPEDANCE_VALUE: 399 OHM
MDC_IDC_MSMT_LEADCHNL_LV_IMPEDANCE_VALUE: 456 OHM
MDC_IDC_MSMT_LEADCHNL_LV_IMPEDANCE_VALUE: 475 OHM
MDC_IDC_MSMT_LEADCHNL_LV_PACING_THRESHOLD_AMPLITUDE: 0.75 V
MDC_IDC_MSMT_LEADCHNL_LV_PACING_THRESHOLD_PULSEWIDTH: 0.4 MS
MDC_IDC_MSMT_LEADCHNL_RA_IMPEDANCE_VALUE: 380 OHM
MDC_IDC_MSMT_LEADCHNL_RA_PACING_THRESHOLD_AMPLITUDE: 0.5 V
MDC_IDC_MSMT_LEADCHNL_RA_PACING_THRESHOLD_PULSEWIDTH: 0.4 MS
MDC_IDC_MSMT_LEADCHNL_RA_SENSING_INTR_AMPL: 2.5 MV
MDC_IDC_MSMT_LEADCHNL_RV_IMPEDANCE_VALUE: 418 OHM
MDC_IDC_MSMT_LEADCHNL_RV_PACING_THRESHOLD_AMPLITUDE: 0.75 V
MDC_IDC_MSMT_LEADCHNL_RV_PACING_THRESHOLD_PULSEWIDTH: 0.4 MS
MDC_IDC_MSMT_LEADCHNL_RV_SENSING_INTR_AMPL: 6.4 MV
MDC_IDC_PG_IMPLANT_DTM: NORMAL
MDC_IDC_PG_MFG: NORMAL
MDC_IDC_PG_MODEL: NORMAL
MDC_IDC_PG_SERIAL: NORMAL
MDC_IDC_PG_TYPE: NORMAL
MDC_IDC_SESS_CLINIC_NAME: NORMAL
MDC_IDC_SESS_DTM: NORMAL
MDC_IDC_SESS_TYPE: NORMAL
MDC_IDC_SET_BRADY_AT_MODE_SWITCH_RATE: 171 {BEATS}/MIN
MDC_IDC_SET_BRADY_LOWRATE: 60 {BEATS}/MIN
MDC_IDC_SET_BRADY_MAX_SENSOR_RATE: 140 {BEATS}/MIN
MDC_IDC_SET_BRADY_MAX_TRACKING_RATE: 140 {BEATS}/MIN
MDC_IDC_SET_BRADY_MODE: NORMAL
MDC_IDC_SET_BRADY_PAV_DELAY_HIGH: 100 MS
MDC_IDC_SET_BRADY_PAV_DELAY_LOW: 180 MS
MDC_IDC_SET_BRADY_SAV_DELAY_HIGH: 70 MS
MDC_IDC_SET_BRADY_SAV_DELAY_LOW: 150 MS
MDC_IDC_SET_CRT_LVRV_DELAY: 80 MS
MDC_IDC_SET_CRT_PACED_CHAMBERS: NORMAL
MDC_IDC_SET_LEADCHNL_LV_PACING_AMPLITUDE: 1.25 V
MDC_IDC_SET_LEADCHNL_LV_PACING_ANODE_ELECTRODE_1: NORMAL
MDC_IDC_SET_LEADCHNL_LV_PACING_ANODE_LOCATION_1: NORMAL
MDC_IDC_SET_LEADCHNL_LV_PACING_CAPTURE_MODE: NORMAL
MDC_IDC_SET_LEADCHNL_LV_PACING_CATHODE_ELECTRODE_1: NORMAL
MDC_IDC_SET_LEADCHNL_LV_PACING_CATHODE_LOCATION_1: NORMAL
MDC_IDC_SET_LEADCHNL_LV_PACING_POLARITY: NORMAL
MDC_IDC_SET_LEADCHNL_LV_PACING_PULSEWIDTH: 0.4 MS
MDC_IDC_SET_LEADCHNL_RA_PACING_AMPLITUDE: 1.5 V
MDC_IDC_SET_LEADCHNL_RA_PACING_ANODE_ELECTRODE_1: NORMAL
MDC_IDC_SET_LEADCHNL_RA_PACING_ANODE_LOCATION_1: NORMAL
MDC_IDC_SET_LEADCHNL_RA_PACING_CAPTURE_MODE: NORMAL
MDC_IDC_SET_LEADCHNL_RA_PACING_CATHODE_ELECTRODE_1: NORMAL
MDC_IDC_SET_LEADCHNL_RA_PACING_CATHODE_LOCATION_1: NORMAL
MDC_IDC_SET_LEADCHNL_RA_PACING_POLARITY: NORMAL
MDC_IDC_SET_LEADCHNL_RA_PACING_PULSEWIDTH: 0.4 MS
MDC_IDC_SET_LEADCHNL_RA_SENSING_ANODE_ELECTRODE_1: NORMAL
MDC_IDC_SET_LEADCHNL_RA_SENSING_ANODE_LOCATION_1: NORMAL
MDC_IDC_SET_LEADCHNL_RA_SENSING_CATHODE_ELECTRODE_1: NORMAL
MDC_IDC_SET_LEADCHNL_RA_SENSING_CATHODE_LOCATION_1: NORMAL
MDC_IDC_SET_LEADCHNL_RA_SENSING_POLARITY: NORMAL
MDC_IDC_SET_LEADCHNL_RA_SENSING_SENSITIVITY: 0.3 MV
MDC_IDC_SET_LEADCHNL_RV_PACING_AMPLITUDE: 2 V
MDC_IDC_SET_LEADCHNL_RV_PACING_ANODE_ELECTRODE_1: NORMAL
MDC_IDC_SET_LEADCHNL_RV_PACING_ANODE_LOCATION_1: NORMAL
MDC_IDC_SET_LEADCHNL_RV_PACING_CAPTURE_MODE: NORMAL
MDC_IDC_SET_LEADCHNL_RV_PACING_CATHODE_ELECTRODE_1: NORMAL
MDC_IDC_SET_LEADCHNL_RV_PACING_CATHODE_LOCATION_1: NORMAL
MDC_IDC_SET_LEADCHNL_RV_PACING_POLARITY: NORMAL
MDC_IDC_SET_LEADCHNL_RV_PACING_PULSEWIDTH: 0.4 MS
MDC_IDC_SET_LEADCHNL_RV_SENSING_ANODE_ELECTRODE_1: NORMAL
MDC_IDC_SET_LEADCHNL_RV_SENSING_ANODE_LOCATION_1: NORMAL
MDC_IDC_SET_LEADCHNL_RV_SENSING_CATHODE_ELECTRODE_1: NORMAL
MDC_IDC_SET_LEADCHNL_RV_SENSING_CATHODE_LOCATION_1: NORMAL
MDC_IDC_SET_LEADCHNL_RV_SENSING_POLARITY: NORMAL
MDC_IDC_SET_LEADCHNL_RV_SENSING_SENSITIVITY: 0.9 MV
MDC_IDC_SET_ZONE_DETECTION_INTERVAL: 350 MS
MDC_IDC_SET_ZONE_DETECTION_INTERVAL: 400 MS
MDC_IDC_SET_ZONE_TYPE: NORMAL
MDC_IDC_STAT_AT_BURDEN_PERCENT: 0 %
MDC_IDC_STAT_AT_DTM_END: NORMAL
MDC_IDC_STAT_AT_DTM_START: NORMAL
MDC_IDC_STAT_BRADY_AP_VP_PERCENT: 37.22 %
MDC_IDC_STAT_BRADY_AP_VS_PERCENT: 0.07 %
MDC_IDC_STAT_BRADY_AS_VP_PERCENT: 62.45 %
MDC_IDC_STAT_BRADY_AS_VS_PERCENT: 0.26 %
MDC_IDC_STAT_BRADY_DTM_END: NORMAL
MDC_IDC_STAT_BRADY_DTM_START: NORMAL
MDC_IDC_STAT_BRADY_RA_PERCENT_PACED: 37.36 %
MDC_IDC_STAT_BRADY_RV_PERCENT_PACED: 99.66 %
MDC_IDC_STAT_CRT_DTM_END: NORMAL
MDC_IDC_STAT_CRT_DTM_START: NORMAL
MDC_IDC_STAT_CRT_LV_PERCENT_PACED: 99.63 %
MDC_IDC_STAT_CRT_PERCENT_PACED: 99.62 %
MDC_IDC_STAT_EPISODE_RECENT_COUNT: 0
MDC_IDC_STAT_EPISODE_RECENT_COUNT: 1
MDC_IDC_STAT_EPISODE_RECENT_COUNT_DTM_END: NORMAL
MDC_IDC_STAT_EPISODE_RECENT_COUNT_DTM_START: NORMAL
MDC_IDC_STAT_EPISODE_TOTAL_COUNT: 0
MDC_IDC_STAT_EPISODE_TOTAL_COUNT: 3
MDC_IDC_STAT_EPISODE_TOTAL_COUNT_DTM_END: NORMAL
MDC_IDC_STAT_EPISODE_TOTAL_COUNT_DTM_START: NORMAL
MDC_IDC_STAT_EPISODE_TYPE: NORMAL

## 2023-04-04 ENCOUNTER — TELEPHONE (OUTPATIENT)
Dept: CARDIOLOGY | Facility: CLINIC | Age: 63
End: 2023-04-04

## 2023-04-04 ENCOUNTER — MYC MEDICAL ADVICE (OUTPATIENT)
Dept: CARDIOLOGY | Facility: CLINIC | Age: 63
End: 2023-04-04

## 2023-04-04 ENCOUNTER — VIRTUAL VISIT (OUTPATIENT)
Dept: FAMILY MEDICINE | Facility: CLINIC | Age: 63
End: 2023-04-04
Payer: MEDICARE

## 2023-04-04 DIAGNOSIS — Z95.0 HX OF CARDIAC PACEMAKER: ICD-10-CM

## 2023-04-04 DIAGNOSIS — S06.0X1S CONCUSSION WITH LOSS OF CONSCIOUSNESS OF 30 MINUTES OR LESS, SEQUELA (H): ICD-10-CM

## 2023-04-04 DIAGNOSIS — F41.8 SITUATIONAL ANXIETY: ICD-10-CM

## 2023-04-04 DIAGNOSIS — F43.10 PTSD (POST-TRAUMATIC STRESS DISORDER): Primary | ICD-10-CM

## 2023-04-04 DIAGNOSIS — I51.89 OTHER ILL-DEFINED HEART DISEASES: ICD-10-CM

## 2023-04-04 DIAGNOSIS — F41.9 ANXIETY: ICD-10-CM

## 2023-04-04 DIAGNOSIS — I42.8 NONISCHEMIC CARDIOMYOPATHY (H): ICD-10-CM

## 2023-04-04 PROCEDURE — 99214 OFFICE O/P EST MOD 30 MIN: CPT | Mod: VID | Performed by: NURSE PRACTITIONER

## 2023-04-04 RX ORDER — HYDROXYZINE HYDROCHLORIDE 10 MG/1
10 TABLET, FILM COATED ORAL 3 TIMES DAILY PRN
Qty: 180 TABLET | Refills: 1 | Status: SHIPPED | OUTPATIENT
Start: 2023-04-04 | End: 2023-08-16

## 2023-04-04 RX ORDER — CLONAZEPAM 0.5 MG/1
TABLET ORAL
Qty: 90 TABLET | Refills: 2 | Status: SHIPPED | OUTPATIENT
Start: 2023-04-04 | End: 2023-08-16

## 2023-04-04 NOTE — TELEPHONE ENCOUNTER
[facility] Call Center    Phone Message    May a detailed message be left on voicemail: yes     Reason for Call: Medication Question or concern regarding medication   Prescription Clarification  Name of Medication: Hydroxyzine  Prescribing Provider: Erica     What on the order needs clarification? Patient called with questions on medication and wanting to verify that it is OK for her to take. Please call patient to further discuss.           Action Taken: Other: Cardiology    Travel Screening: Not Applicable     Thank you!  [facility]                                               Dr. Funes's team forwarded to Dr. Ordonez to address. Dr. Ordonze states it is ok to take the hydroxyzine. Patient was called, no answer, and message left stating Dr. Ordonez states it is ok to take hydroxyzine.

## 2023-04-04 NOTE — PROGRESS NOTES
"Joyce is a 62 year old who is being evaluated via a billable video visit.      How would you like to obtain your AVS? MyChart  If the video visit is dropped, the invitation should be resent by: Text to cell phone: [phone]  Will anyone else be joining your video visit? No      Assessment & Plan     Anxiety    - hydrOXYzine (ATARAX) 10 MG tablet; Take 1 tablet (10 mg) by mouth 3 times daily as needed for anxiety  - clonazePAM (KLONOPIN) 0.5 MG tablet; TAKE 1 TABLET(0.5 MG) BY MOUTH THREE TIMES DAILY AS NEEDED FOR ANXIETY    PTSD (post-traumatic stress disorder)      Concussion with loss of consciousness of 30 minutes or less, sequela (H)      Other ill-defined heart diseases      Hx of cardiac pacemaker      Situational anxiety      Nonischemic cardiomyopathy (H)    30 minutes spent by me on the date of the encounter doing chart review, history and exam, documentation and further activities per the note     BMI:   Estimated body mass index is 29.5 kg/m  as calculated from the following:    Height as of 1/17/23: 1.67 m (5' 5.75\").    Weight as of 1/17/23: 82.3 kg (181 lb 6.4 oz).       See Patient Instructions: Follow-up at anytime for healthcare questions or concerns.  Controlled medications require some type of follow-up visit every 3 months for refills.  Continue therapy.  Let me know if hydroxyzine helps.  Review after visit summary tips.    MARCELL BOSS  [facility] ROLAND Cook is a 62 year old, presenting for the following health issues:  Recheck Medication and Headache    She reports still dealing with headache from fall with 15-second loss of consciousness in January.  Discussed concussion symptoms.  She has been to the concussion clinic in the past (since 2017).  She did physical therapy.  She had joined the [facility] for exercise (gia chi /swimming ) but has been unable to go due to her concussion symptoms.  She reports IV antibiotic treatment for pneumonia which " "causes constipation; she went out to eat and feels she ate something that upset her stomach she is now dealing with diarrhea.  She reports she has been doing therapy similar to EMDR called [facility]?  Which has been helpful.  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  She does a heart failure support group which usually affects her mental health.  She is still working with her therapist.  She is taking the Klonopin 3 times a day for her anxiety; she would like to reduce or get off this medication but has been unable due to her significant drug allergies or intolerance.  She does not think she is ever tried hydroxyzine for her anxiety.  Her blood pressure has been okay.       View : No data to display.              HPI     Patient would like refills on her clonazepam. Patient states that she is still having headaches after a fall in January and having constipation. She also states that she has been having balance issues.         Review of Systems   Constitutional, HEENT, cardiovascular, pulmonary, GI, , musculoskeletal, neuro, skin, endocrine and psych systems are negative, except as otherwise noted.      Objective    Vitals - Patient Reported  Systolic (Patient Reported): 108  Diastolic (Patient Reported): 87  Weight (Patient Reported): 79.8 kg (176 lb)  Height (Patient Reported): 165.1 cm (5' 5\")  BMI (Based on Pt Reported Ht/Wt): 29.29  SpO2 (Patient Reported): 97  Temperature (Patient Reported): 98.7  F (37.1  C)  Pulse (Patient Reported): 100      Vitals:  No vitals were obtained today due to virtual visit.    Physical Exam   GENERAL: Healthy, alert and no distress  EYES: Eyes grossly normal to inspection.  No discharge or erythema, or obvious scleral/conjunctival abnormalities.  RESP: No audible wheeze, cough, or visible cyanosis.  No visible retractions or increased work of breathing.    SKIN: Visible skin clear. No significant rash, abnormal pigmentation or lesions.  NEURO: Cranial nerves " grossly intact.  Mentation and speech appropriate for age.  PSYCH: Mentation appears normal, affect normal, judgement and insight intact, normal speech and appearance well-groomed.  POSITIVE tearful at times when discussing health related PTSD/anxiety          Video-Visit Details    Type of service:  Video Visit   Video Start Time: 0710  Video End Time:0728    Originating Location (pt. Location): Other car  Distant Location (provider location):  On-site  Platform used for Video Visit: Dipak

## 2023-04-05 ENCOUNTER — MYC MEDICAL ADVICE (OUTPATIENT)
Dept: FAMILY MEDICINE | Facility: CLINIC | Age: 63
End: 2023-04-05
Payer: MEDICARE

## 2023-04-05 NOTE — PROGRESS NOTES
Patient will be discharging to home and will follow up with her primary.  Patient was able to produce some sputum which was sent to the lab and MD was aware.  Patient was seen by the pulmonologist and ID team and they decided that patient will not be getting the bronch and will be discharging to home.  Patient is alert and oriented, up independently, voids in commode, denies pain and no SOB noted,  Vitals stable. CO2 was 10 and MD notified  and on recheck it came up to 18.   Received request via: Pharmacy    Was the patient seen in the last year in this department? Yes    Does the patient have an active prescription (recently filled or refills available) for medication(s) requested? No    Does the patient have snf Plus and need 100 day supply (blood pressure, diabetes and cholesterol meds only)? Patient does not have SCP

## 2023-04-12 NOTE — PROGRESS NOTES
Request received for patient to have a MRI. Patient has a MRI non-conditional Medtronic [id number] Percepta CRT-P. This is non-conditional because the lead in the LV port is in the Left Bundle Branch (LBB). Tacho Slater NP, has approved the MRI utilizing the Magnetic resonance nonconditional cardiac Implantable electronic device (CIED) Protocol.

## 2023-04-24 ENCOUNTER — NURSE TRIAGE (OUTPATIENT)
Dept: FAMILY MEDICINE | Facility: CLINIC | Age: 63
End: 2023-04-24
Payer: MEDICARE

## 2023-04-24 ENCOUNTER — NURSE TRIAGE (OUTPATIENT)
Dept: CARDIOLOGY | Facility: CLINIC | Age: 63
End: 2023-04-24
Payer: MEDICARE

## 2023-04-24 ENCOUNTER — TELEPHONE (OUTPATIENT)
Dept: CARDIOLOGY | Facility: CLINIC | Age: 63
End: 2023-04-24
Payer: MEDICARE

## 2023-04-24 ENCOUNTER — MYC MEDICAL ADVICE (OUTPATIENT)
Dept: CARDIOLOGY | Facility: CLINIC | Age: 63
End: 2023-04-24
Payer: MEDICARE

## 2023-04-24 ENCOUNTER — APPOINTMENT (OUTPATIENT)
Dept: CT IMAGING | Facility: CLINIC | Age: 63
End: 2023-04-24
Attending: INTERNAL MEDICINE
Payer: MEDICARE

## 2023-04-24 ENCOUNTER — MYC MEDICAL ADVICE (OUTPATIENT)
Dept: FAMILY MEDICINE | Facility: CLINIC | Age: 63
End: 2023-04-24
Payer: MEDICARE

## 2023-04-24 ENCOUNTER — HOSPITAL ENCOUNTER (EMERGENCY)
Facility: CLINIC | Age: 63
Discharge: HOME | End: 2023-04-24
Attending: INTERNAL MEDICINE | Admitting: INTERNAL MEDICINE
Payer: MEDICARE

## 2023-04-24 VITALS
HEIGHT: 65 IN | HEART RATE: 99 BPM | DIASTOLIC BLOOD PRESSURE: 97 MMHG | BODY MASS INDEX: 28.66 KG/M2 | TEMPERATURE: 98.1 F | SYSTOLIC BLOOD PRESSURE: 140 MMHG | RESPIRATION RATE: 18 BRPM | WEIGHT: 172 LBS | OXYGEN SATURATION: 99 %

## 2023-04-24 DIAGNOSIS — Z79.01 CHRONIC ANTICOAGULATION: ICD-10-CM

## 2023-04-24 DIAGNOSIS — I48.0 PAROXYSMAL ATRIAL FIBRILLATION (H): ICD-10-CM

## 2023-04-24 DIAGNOSIS — K57.90 DIVERTICULOSIS: ICD-10-CM

## 2023-04-24 DIAGNOSIS — K62.5 RECTAL BLEEDING: ICD-10-CM

## 2023-04-24 LAB
ABO/RH(D): NORMAL
ALBUMIN SERPL BCG-MCNC: 4.2 G/DL (ref 3.5–5.2)
ALP SERPL-CCNC: 96 U/L (ref 35–104)
ALT SERPL W P-5'-P-CCNC: 13 U/L (ref 10–35)
ANION GAP SERPL CALCULATED.3IONS-SCNC: 13 MMOL/L (ref 7–15)
ANTIBODY SCREEN: NEGATIVE
AST SERPL W P-5'-P-CCNC: 16 U/L (ref 10–35)
BASOPHILS # BLD AUTO: 0.1 10E3/UL (ref 0–0.2)
BASOPHILS NFR BLD AUTO: 1 %
BILIRUB SERPL-MCNC: 0.2 MG/DL
BUN SERPL-MCNC: 10.5 MG/DL (ref 8–23)
CALCIUM SERPL-MCNC: 9.3 MG/DL (ref 8.8–10.2)
CHLORIDE SERPL-SCNC: 103 MMOL/L (ref 98–107)
CREAT SERPL-MCNC: 0.71 MG/DL (ref 0.51–0.95)
DEPRECATED HCO3 PLAS-SCNC: 22 MMOL/L (ref 22–29)
EOSINOPHIL # BLD AUTO: 0.1 10E3/UL (ref 0–0.7)
EOSINOPHIL NFR BLD AUTO: 1 %
ERYTHROCYTE [DISTWIDTH] IN BLOOD BY AUTOMATED COUNT: 12 % (ref 10–15)
GFR SERPL CREATININE-BSD FRML MDRD: >90 ML/MIN/1.73M2
GLUCOSE SERPL-MCNC: 89 MG/DL (ref 70–99)
HCT VFR BLD AUTO: 42.1 % (ref 35–47)
HGB BLD-MCNC: 13.6 G/DL (ref 11.7–15.7)
HOLD SPECIMEN: NORMAL
IMM GRANULOCYTES # BLD: 0 10E3/UL
IMM GRANULOCYTES NFR BLD: 1 %
INR PPP: 1.08 (ref 0.85–1.15)
LYMPHOCYTES # BLD AUTO: 1.5 10E3/UL (ref 0.8–5.3)
LYMPHOCYTES NFR BLD AUTO: 24 %
MCH RBC QN AUTO: 31.3 PG (ref 26.5–33)
MCHC RBC AUTO-ENTMCNC: 32.3 G/DL (ref 31.5–36.5)
MCV RBC AUTO: 97 FL (ref 78–100)
MONOCYTES # BLD AUTO: 0.5 10E3/UL (ref 0–1.3)
MONOCYTES NFR BLD AUTO: 8 %
NEUTROPHILS # BLD AUTO: 4.2 10E3/UL (ref 1.6–8.3)
NEUTROPHILS NFR BLD AUTO: 65 %
NRBC # BLD AUTO: 0 10E3/UL
NRBC BLD AUTO-RTO: 0 /100
PLATELET # BLD AUTO: 229 10E3/UL (ref 150–450)
POTASSIUM SERPL-SCNC: 3.9 MMOL/L (ref 3.4–5.3)
PROT SERPL-MCNC: 7.2 G/DL (ref 6.4–8.3)
RBC # BLD AUTO: 4.34 10E6/UL (ref 3.8–5.2)
SODIUM SERPL-SCNC: 138 MMOL/L (ref 136–145)
SPECIMEN EXPIRATION DATE: NORMAL
WBC # BLD AUTO: 6.3 10E3/UL (ref 4–11)

## 2023-04-24 PROCEDURE — 258N000003 HC RX IP 258 OP 636: Performed by: INTERNAL MEDICINE

## 2023-04-24 PROCEDURE — 250N000011 HC RX IP 250 OP 636: Performed by: INTERNAL MEDICINE

## 2023-04-24 PROCEDURE — 74177 CT ABD & PELVIS W/CONTRAST: CPT | Mod: 26 | Performed by: RADIOLOGY

## 2023-04-24 PROCEDURE — 99284 EMERGENCY DEPT VISIT MOD MDM: CPT | Performed by: INTERNAL MEDICINE

## 2023-04-24 PROCEDURE — 85610 PROTHROMBIN TIME: CPT | Performed by: INTERNAL MEDICINE

## 2023-04-24 PROCEDURE — G1010 CDSM STANSON: HCPCS | Mod: GC | Performed by: RADIOLOGY

## 2023-04-24 PROCEDURE — 85025 COMPLETE CBC W/AUTO DIFF WBC: CPT | Performed by: INTERNAL MEDICINE

## 2023-04-24 PROCEDURE — 86850 RBC ANTIBODY SCREEN: CPT | Performed by: INTERNAL MEDICINE

## 2023-04-24 PROCEDURE — 99285 EMERGENCY DEPT VISIT HI MDM: CPT | Mod: 25 | Performed by: INTERNAL MEDICINE

## 2023-04-24 PROCEDURE — 36415 COLL VENOUS BLD VENIPUNCTURE: CPT | Performed by: STUDENT IN AN ORGANIZED HEALTH CARE EDUCATION/TRAINING PROGRAM

## 2023-04-24 PROCEDURE — 80053 COMPREHEN METABOLIC PANEL: CPT | Performed by: INTERNAL MEDICINE

## 2023-04-24 PROCEDURE — G1010 CDSM STANSON: HCPCS

## 2023-04-24 PROCEDURE — 96360 HYDRATION IV INFUSION INIT: CPT | Mod: 59 | Performed by: INTERNAL MEDICINE

## 2023-04-24 PROCEDURE — 96361 HYDRATE IV INFUSION ADD-ON: CPT | Performed by: INTERNAL MEDICINE

## 2023-04-24 RX ORDER — IOPAMIDOL 755 MG/ML
105 INJECTION, SOLUTION INTRAVASCULAR ONCE
Status: COMPLETED | OUTPATIENT
Start: 2023-04-24 | End: 2023-04-24

## 2023-04-24 RX ORDER — SODIUM CHLORIDE 9 MG/ML
INJECTION, SOLUTION INTRAVENOUS CONTINUOUS
Status: DISCONTINUED | OUTPATIENT
Start: 2023-04-24 | End: 2023-04-24 | Stop reason: HOSPADM

## 2023-04-24 RX ADMIN — SODIUM CHLORIDE: 0.9 INJECTION, SOLUTION INTRAVENOUS at 15:43

## 2023-04-24 RX ADMIN — IOPAMIDOL 105 ML: 755 INJECTION, SOLUTION INTRAVENOUS at 15:58

## 2023-04-24 RX ADMIN — SODIUM CHLORIDE 1000 ML: 9 INJECTION, SOLUTION INTRAVENOUS at 14:36

## 2023-04-24 ASSESSMENT — ACTIVITIES OF DAILY LIVING (ADL): ADLS_ACUITY_SCORE: 37

## 2023-04-24 NOTE — DISCHARGE INSTRUCTIONS
Please hold eloquis three days and restart if no further rectal bleed and make an appointment to follow up with Your Primary Care Provider in 3-7 days even if entirely better.

## 2023-04-24 NOTE — TELEPHONE ENCOUNTER
Joyce reports that she has had some scant bright red bleeding Saturday and Sunday.  Normally on iron so stools are generally darker,so hard to tell if she is having bleeding farther up.  States that one stool had a reddish hue, the other had a blood on the outside of the stool.   She has not had bleeding like this before.  She does have a hemorrhoid but denies ever having bled before.      She does report some diarrhea over the last 1-2 weeks and has been sleeping with a heating pad to relieve the discomfort of the cramping.  She is also feeling fairly tired from the diarrhea.      She has had some chest pain in the last few days- is not associated with activity.  She did not take nitroglycerin the first time as her blood pressure was too low, so she went to bed and the chest pain resolved.  The second time, her blood pressure was >90 and she took a nitroglycerin and had relief from the chest pain.      She takes Eliquis for PAF, has held two doses due to the bleeding but would like to know if she should be doing that.  She has also reached out to her primary provider about next steps.      Reviewed with Dr Ordonez:    Date: 4/24/2023    Time of Call: 10:19 AM     Diagnosis:  Bleeding     [ VORB ] Ordering provider: Dr Ordonez    Order: hold Eliquis and get worked up for bleeding with primary asap.  If bleeding increases should report to ER for expedited work up     Order received by: Mame Valadez RN       Follow-up/additional notes: Left detailed message for Joyce

## 2023-04-24 NOTE — TELEPHONE ENCOUNTER
"Patient called RN and said she is having rectal bleeding since Saturday 4/22. RN started new triage note prior to seeing cardiology triage note from today. Patient then said she already did this triage this am and asked what primary care PCP advises.    RN wanted to ask a couple of clarifying questions on eliquis as it was not clear whether or not she took her eliquis today and to see if anything changed from previous cardiology triage note. Patient said she decided to hold eliquis on her own. RN read cardiology triage from today and mychart medical advice from today. Cardiology advised her to stop eliquis until she reaches out to pcp and have them review and advise. She already took eliquis this am.    RN read statement from cardiology note: \"get worked up for bleeding with primary asap.  If bleeding increases should report to ER for expedited work up\". RN was about to advise ER per note from cards, but patient said she was frustrated with RN and said I am going to hang up - pt proceeded to hang up phone.    See additional note from another clinic Yudith REINA as pt called back and was advised.    Additional Information    Negative: Passed out (i.e., fainted, collapsed and was not responding)    Negative: Shock suspected (e.g., cold/pale/clammy skin, too weak to stand, low BP, rapid pulse)    Negative: Vomiting red blood or black (coffee ground) material    Negative: Sounds like a life-threatening emergency to the triager    Negative: Diarrhea is main symptom    Negative: Rectal symptoms    Answer Assessment - Initial Assessment Questions  1. APPEARANCE of BLOOD: \"What color is it?\" \"Is it passed separately, on the surface of the stool, or mixed in with the stool?\"       Red blood. She takes iron pills, so her poop is normally darker than this. On the poop. She has a hemorrhoid, but that has never caused her red blood. Scant amount of blood  2. AMOUNT: \"How much blood was passed?\"       Scant amount of blood  3. " "FREQUENCY: \"How many times has blood been passed with the stools?\"       Saturday and Sunday. Has not pooped yet today  4. ONSET: \"When was the blood first seen in the stools?\" (Days or weeks)       Saturday  5. DIARRHEA: \"Is there also some diarrhea?\" If Yes, ask: \"How many diarrhea stools in the past 24 hours?\"       Pure water with bile color. Wiped slight serosanguinous. Saturday had 6-7 times of diarrhea. Not sure how many on Sunday  6. CONSTIPATION: \"Do you have constipation?\" If Yes, ask: \"How bad is it?\"      Yes. She increases her fiber usually. Not constipated right now. She is having diarrhea  7. RECURRENT SYMPTOMS: \"Have you had blood in your stools before?\" If Yes, ask: \"When was the last time?\" and \"What happened that time?\"       never  8. BLOOD THINNERS: \"Do you take any blood thinners?\" (e.g., Coumadin/warfarin, Pradaxa/dabigatran, aspirin)      Eliquis. She called cardiology today and they said to stop it until primary care decides. She took it this   9. OTHER SYMPTOMS: \"Do you have any other symptoms?\"  (e.g., abdomen pain, vomiting, dizziness, fever)      *No Answer*  10. PREGNANCY: \"Is there any chance you are pregnant?\" \"When was your last menstrual period?\"        *No Answer*    Protocols used: RECTAL BLEEDING-A-TAIWO Mahajan RN    "

## 2023-04-24 NOTE — ED PROVIDER NOTES
[place] EMERGENCY DEPARTMENT ([facility])    4/24/23       ED PROVIDER NOTE    History     Chief Complaint   Patient presents with     Rectal Bleeding     The history is provided by the patient and medical records.     Joyce Marroquin is a 62 year old female with past medical history significant for with PMH of complete heart block and LBBB s/p pacemaker, Takotsubo cardiomyopathy, HFrEF (40-45% 5/2022), COPD, asthma, GERD, h/o Grave's disease with subsequent hypothyroidism, hyperparathyroidism, parathyroid adenoma, LARRY on CPAP, depression, anxiety, paroxysmal atrial fibrillation chronically anticoagulated on Eliquis, HTN, hemorrhoids who presents to the ED for blood in her stool and diarrhea for 2 days.  The patient first noticed the blood in her stool on Saturday, 2 days ago.  Symptoms continued along with cramping lower abdominal/pelvic pain for which she took Tylenol, Benadryl, and used heating pad yesterday.  She notes she is normally on a iron so her stools are generally darker.  Patient is anticoagulated on Eliquis and did not take her dose last night due to concern for bleeding.  She says there was some confusion with contacting her cardiology clinic for advice.  She says they told her to take her Eliquis this morning so she did, then was referred here. No chest pain, shortness of breath, dizziness, or fatigue.       Past Medical History  Past Medical History:   Diagnosis Date     Arthritis      Cardiomyopathy (H)     ff Cardiology     Chronic depressive personality disorder      Congestive heart failure (H) see dr martínez    heart failure correct term     COPD exacerbation (H)      Esophageal reflux      Ex-smoker     quit 2006; 1 PPD x 30     Hyperparathyroidism (H)     s/p parathyroidectomy     Hypothyroidism      LARRY on CPAP     ff Sleep medicine     Pulmonary nodules     ff Pulmonologist     S/P cardiac pacemaker procedure     checked every 6 months at the [facility]     Uncomplicated asthma years      Past Surgical History:   Procedure Laterality Date     BUNIONECTOMY Bilateral      COLONOSCOPY N/A 8/30/2021    Procedure: COLONOSCOPY, WITH POLYPECTOMY AND BIOPSY;  Surgeon: Ranjit Penn MD;  Location: [facility] GI     CV CORONARY ANGIOGRAM N/A 9/26/2019    Procedure: CV CORONARY ANGIOGRAM;  Surgeon: Erickson Trejo MD;  Location:  HEART CARDIAC CATH LAB     CV RIGHT HEART CATH MEASUREMENTS RECORDED N/A 7/20/2020    Procedure: CV RIGHT HEART CATH;  Surgeon: Alonso Ordonez MD;  Location:  HEART CARDIAC CATH LAB     EP ABLATION / EP STUDIES  04/21/2017     EP PPM UPGRADE TO BIVENT N/A 1/19/2022    Procedure: EP PPM UPGRADE TO BIVENT;  Surgeon: Lino Funes MD;  Location:  HEART CARDIAC CATH LAB     EXPLORE NECK N/A 9/19/2018    Procedure: EXPLORE NECK;  Neck Exploration Resection of Left superior Parathyroid gland;  Surgeon: Kristine Venegas MD;  Location: [facility] OR      CORRECT BUNION,SIMPLE       PARATHYROIDECTOMY N/A 9/19/2018    Procedure: PARATHYROIDECTOMY;;  Surgeon: Kristine Venegas MD;  Location: [facility] OR     PPM INSERT OF NEW OR REPL W/VENT LEAD  04/21/2017     TONSILLECTOMY & ADENOIDECTOMY       acetaminophen (TYLENOL) 325 MG tablet  albuterol (PROAIR HFA/PROVENTIL HFA/VENTOLIN HFA) 108 (90 BASE) MCG/ACT Inhaler  albuterol (PROVENTIL) (2.5 MG/3ML) 0.083% neb solution  azelastine (ASTELIN) 0.1 % nasal spray  Calcium Carbonate-Vitamin D (CALCIUM-CARB 600 + D PO)  clonazePAM (KLONOPIN) 0.5 MG tablet  DiphenhydrAMINE HCl (BENADRYL PO)  ELIQUIS ANTICOAGULANT 5 MG tablet  Ferrous Sulfate (IRON SUPPLEMENT PO)  fluocinolone acetonide 0.01 % OIL  fluticasone-salmeterol (ADVAIR) 250-50 MCG/ACT inhaler  furosemide (LASIX) 20 MG tablet  hydrocortisone (CORTAID) 1 % external cream  hydrOXYzine (ATARAX) 10 MG tablet  ipratropium (ATROVENT) 0.06 % nasal spray  MAGNESIUM LACTATE PO  MELATONIN PO  Menaquinone-7 (VITAMIN K2 PO)  METAMUCIL FIBER PO  metoprolol succinate ER (TOPROL XL) 25 MG 24 hr  tablet  Multiple Vitamin (DAILY MULTIVITAMIN PO)  nitroGLYcerin (NITROSTAT) 0.4 MG sublingual tablet  Probiotic Product (PROBIOTIC DAILY PO)  psyllium (METAMUCIL) WAFR  spironolactone (ALDACTONE) 25 MG tablet  SYNTHROID 150 MCG tablet  triamcinolone (KENALOG) 0.1 % external cream  TURMERIC PO  vitamin C (ASCORBIC ACID) 250 MG tablet  vitamin D3 (CHOLECALCIFEROL) 2000 units tablet  Zinc 15 MG CAPS      Allergies   Allergen Reactions     Tetracycline Swelling     Tongue swelling (teramycin)     Viagra [Sildenafil] Muscle Pain (Myalgia) and Diarrhea     Zofran [Ondansetron]      Prolonged QT  Prolonged QT at baseline per patient     Flagyl [Metronidazole] Rash     Pruritic rash      Buspar [Buspirone]      Muscle weakness, diarrhea     Clindamycin Diarrhea     Can exacerbate colitis     Corn Oil Other (See Comments)     Headache       Cymbalta Other (See Comments) and Diarrhea     Confusion     Prozac [Fluoxetine] Diarrhea     Seasonal Allergies Difficulty breathing     Sulfamethoxazole-Trimethoprim      Other reaction(s): Other  Passed out     Cyclobenzaprine Other (See Comments)     Extreme heat intolerance     Levaquin [Levofloxacin]      Other reaction(s): Other  Possible Tendon rupture (vs surgery)     Nsaids Other (See Comments)     Exacerbated asthma     Family History  Family History   Problem Relation Age of Onset     Hypothyroidism Mother      Hypertension Mother      Osteoarthritis Mother      Coronary Artery Disease Father         nonfatal MI in his 70s     Asthma Father      Diabetes Sister      Hypothyroidism Sister      Anxiety Disorder Sister      Breast Cancer Maternal Aunt      Social History   Social History     Tobacco Use     Smoking status: Former     Packs/day: 0.00     Years: 0.00     Pack years: 0.00     Types: Cigarettes     Smokeless tobacco: Never   Vaping Use     Vaping status: Never Used   Substance Use Topics     Alcohol use: Not Currently     Alcohol/week: 0.0 - 8.0 standard drinks of  "alcohol     Comment: seldom     Drug use: No      Past medical history, past surgical history, medications, allergies, family history, and social history were reviewed with the patient. No additional pertinent items.      A medically appropriate review of systems was performed with pertinent positives and negatives noted in the HPI, and all other systems negative.    Physical Exam   BP: 130/85  Pulse: 87  Temp: 98  F (36.7  C)  Resp: 16  Height: 165.1 cm (5' 5\")  Weight: 78 kg (172 lb)  SpO2: 100 %  Lying Orthostatic BP: 123/86  Lying Orthostatic Pulse: 73 bpm  Sitting Orthostatic BP: 107/79  Sitting Orthostatic Pulse: 86 bpm  Standing Orthostatic BP: 112/78  Standing Orthostatic Pulse: 76 bpm  Physical Exam  Constitutional:       General: She is not in acute distress.     Appearance: She is not diaphoretic.   HENT:      Head: Atraumatic.      Mouth/Throat:      Pharynx: No oropharyngeal exudate.   Eyes:      General: No scleral icterus.     Pupils: Pupils are equal, round, and reactive to light.   Cardiovascular:      Rate and Rhythm: Normal rate and regular rhythm.      Heart sounds: Normal heart sounds. No murmur heard.     No friction rub. No gallop.   Pulmonary:      Effort: Pulmonary effort is normal. No respiratory distress.      Breath sounds: Normal breath sounds. No stridor. No wheezing, rhonchi or rales.   Chest:      Chest wall: No tenderness.   Abdominal:      General: Abdomen is flat. Bowel sounds are normal. There is no distension.      Palpations: Abdomen is soft. There is no mass.      Tenderness: There is no abdominal tenderness. There is no right CVA tenderness, left CVA tenderness, guarding or rebound.      Hernia: No hernia is present.   Musculoskeletal:         General: No tenderness.      Cervical back: Neck supple.   Skin:     General: Skin is warm.      Findings: No rash.   Neurological:      General: No focal deficit present.      Cranial Nerves: No cranial nerve deficit.           ED " Course, Procedures, & Data     2:01 PM  The patient was seen and examined by Deana Ordaz MD in Room ED18.     Procedures                     Results for orders placed or performed during the hospital encounter of 04/24/23   CT Abdomen Pelvis w Contrast     Status: None (Preliminary result)    Impression    RESIDENT PRELIMINARY INTERPRETATION  IMPRESSION:   1. No acute pathology visualized within the abdomen or pelvis.  2. Colonic diverticulosis.  3. . Bilateral extrarenal pelvices with unchanged dilation of the  right renal pelvis. No hydroureter. Findings may be related to UPJ  obstruction as previously suggested.    Thomson Draw     Status: None    Narrative    The following orders were created for panel order Thomson Draw.  Procedure                               Abnormality         Status                     ---------                               -----------         ------                     Extra Blue Top Tube[[id number]]                              Final result               Extra Red Top Tube[[id number]]                               Final result               Extra Green Top (Lithium...[[id number]]                      Final result               Extra Purple Top Tube[[id number]]                            Final result                 Please view results for these tests on the individual orders.   Extra Blue Top Tube     Status: None   Result Value Ref Range    Hold Specimen JIC    Extra Red Top Tube     Status: None   Result Value Ref Range    Hold Specimen JIC    Extra Green Top (Lithium Heparin) Tube     Status: None   Result Value Ref Range    Hold Specimen JIC    Extra Purple Top Tube     Status: None   Result Value Ref Range    Hold Specimen JIC    INR     Status: Normal   Result Value Ref Range    INR 1.08 0.85 - 1.15   Comprehensive metabolic panel     Status: Normal   Result Value Ref Range    Sodium 138 136 - 145 mmol/L    Potassium 3.9 3.4 - 5.3 mmol/L    Chloride 103 98 - 107 mmol/L    Carbon  Dioxide (CO2) 22 22 - 29 mmol/L    Anion Gap 13 7 - 15 mmol/L    Urea Nitrogen 10.5 8.0 - 23.0 mg/dL    Creatinine 0.71 0.51 - 0.95 mg/dL    Calcium 9.3 8.8 - 10.2 mg/dL    Glucose 89 70 - 99 mg/dL    Alkaline Phosphatase 96 35 - 104 U/L    AST 16 10 - 35 U/L    ALT 13 10 - 35 U/L    Protein Total 7.2 6.4 - 8.3 g/dL    Albumin 4.2 3.5 - 5.2 g/dL    Bilirubin Total 0.2 <=1.2 mg/dL    GFR Estimate >90 >60 mL/min/1.73m2   CBC with platelets and differential     Status: None   Result Value Ref Range    WBC Count 6.3 4.0 - 11.0 10e3/uL    RBC Count 4.34 3.80 - 5.20 10e6/uL    Hemoglobin 13.6 11.7 - 15.7 g/dL    Hematocrit 42.1 35.0 - 47.0 %    MCV 97 78 - 100 fL    MCH 31.3 26.5 - 33.0 pg    MCHC 32.3 31.5 - 36.5 g/dL    RDW 12.0 10.0 - 15.0 %    Platelet Count 229 150 - 450 10e3/uL    % Neutrophils 65 %    % Lymphocytes 24 %    % Monocytes 8 %    % Eosinophils 1 %    % Basophils 1 %    % Immature Granulocytes 1 %    NRBCs per 100 WBC 0 <1 /100    Absolute Neutrophils 4.2 1.6 - 8.3 10e3/uL    Absolute Lymphocytes 1.5 0.8 - 5.3 10e3/uL    Absolute Monocytes 0.5 0.0 - 1.3 10e3/uL    Absolute Eosinophils 0.1 0.0 - 0.7 10e3/uL    Absolute Basophils 0.1 0.0 - 0.2 10e3/uL    Absolute Immature Granulocytes 0.0 <=0.4 10e3/uL    Absolute NRBCs 0.0 10e3/uL   CBC with platelets differential     Status: None    Narrative    The following orders were created for panel order CBC with platelets differential.  Procedure                               Abnormality         Status                     ---------                               -----------         ------                     CBC with platelets and d...[[id number]]                      Final result                 Please view results for these tests on the individual orders.   ABO/Rh type and screen     Status: None (In process)    Narrative    The following orders were created for panel order ABO/Rh type and screen.  Procedure                               Abnormality          Status                     ---------                               -----------         ------                     Adult Type and Screen[[id number]]                            In process                   Please view results for these tests on the individual orders.     Medications   0.9% sodium chloride BOLUS (0 mLs Intravenous Stopped 4/24/23 1543)     Followed by   sodium chloride 0.9% infusion (0 mLs Intravenous Stopped 4/24/23 1648)   iopamidol (ISOVUE-370) solution 105 mL (105 mLs Intravenous $Given 4/24/23 1558)   sodium chloride (PF) 0.9% PF flush 76 mL (76 mLs Intravenous $Given 4/24/23 1559)     Labs Ordered and Resulted from Time of ED Arrival to Time of ED Departure   INR - Normal       Result Value    INR 1.08     COMPREHENSIVE METABOLIC PANEL - Normal    Sodium 138      Potassium 3.9      Chloride 103      Carbon Dioxide (CO2) 22      Anion Gap 13      Urea Nitrogen 10.5      Creatinine 0.71      Calcium 9.3      Glucose 89      Alkaline Phosphatase 96      AST 16      ALT 13      Protein Total 7.2      Albumin 4.2      Bilirubin Total 0.2      GFR Estimate >90     CBC WITH PLATELETS AND DIFFERENTIAL    WBC Count 6.3      RBC Count 4.34      Hemoglobin 13.6      Hematocrit 42.1      MCV 97      MCH 31.3      MCHC 32.3      RDW 12.0      Platelet Count 229      % Neutrophils 65      % Lymphocytes 24      % Monocytes 8      % Eosinophils 1      % Basophils 1      % Immature Granulocytes 1      NRBCs per 100 WBC 0      Absolute Neutrophils 4.2      Absolute Lymphocytes 1.5      Absolute Monocytes 0.5      Absolute Eosinophils 0.1      Absolute Basophils 0.1      Absolute Immature Granulocytes 0.0      Absolute NRBCs 0.0     TYPE AND SCREEN, ADULT   ABO/RH TYPE AND SCREEN     CT Abdomen Pelvis w Contrast   Preliminary Result   RESIDENT PRELIMINARY INTERPRETATION   IMPRESSION:    1. No acute pathology visualized within the abdomen or pelvis.   2. Colonic diverticulosis.   3. . Bilateral extrarenal pelvices  with unchanged dilation of the   right renal pelvis. No hydroureter. Findings may be related to UPJ   obstruction as previously suggested.              Critical care was not performed.     Medical Decision Making  The patient's presentation was of moderate complexity (an acute illness with systemic symptoms).    The patient's evaluation involved:  ordering and/or review of 3+ test(s) in this encounter (see separate area of note for details)    The patient's management necessitated moderate risk (prescription drug management including medications given in the ED).      Assessment & Plan    Acute rectal bleed with low abd cramp in the pt with eloquis for paroxysmal a fib, HD stable and labs stable including Hgb 13, CT abd no sign of active bleed but diverticulosis, will discharge with holding eloquis for three days and if no further bleed to restart, follow up with her PMD.    I have reviewed the nursing notes. I have reviewed the findings, diagnosis, plan and need for follow up with the patient.    Discharge Medication List as of 4/24/2023  4:48 PM          Final diagnoses:   Rectal bleeding   Chronic anticoagulation   Paroxysmal atrial fibrillation (H)   Diverticulosis     IAnna, am serving as a trained medical scribe to document services personally performed by Deana Ordaz MD based on the provider's statements to me on April 24, 2023.  This document has been checked and approved by the attending provider.    IDeana MD, was physically present and have reviewed and verified the accuracy of this note documented by Anna Garcia, medical scribe.        Deana Ordaz Md  [facility] EMERGENCY DEPARTMENT  4/24/2023     Deana Ordaz MD  04/24/23 [id number]

## 2023-04-24 NOTE — TELEPHONE ENCOUNTER
"I am speaking to the pt on the phone who states the previous RN \" was not listening to her\" so she hung up and called back . Pt reminded mutual respect is a must and that previous RN was trying to gather more information.     Pt reports that she is experiencing moderate to several abdominal discomfort     Reports bile colored stools and some rectal bleeding/ bloody stools  over the weekend , has not stooled today yet, but she states she is still experiencing abdominal discomfort.     She is on eliquis which cardiology has told her to stop for the time being  .     She was instructed to be evaluated in the ED given the nature of her symptoms. Pt verbalized understanding and denies further questions. Plans to proceed to the ED with a .     Yudith Ness RN  [facility]         "

## 2023-04-24 NOTE — TELEPHONE ENCOUNTER
Caller reporting the following red-flag symptom(s): Patient has been having bleeding for the last two days in her stool. Patient does take blood thinner as well, and stopped.     Per the system red-flag symptom policy, patient was instructed to:  speak with a Registered Nurse    Action:  Patient warm transferred to a Registered Nurse

## 2023-04-24 NOTE — TELEPHONE ENCOUNTER
Joyce called because she had bright red blood on her stool and on the toilet paper this weekend. She has been having GI problems and had cramping and diarrhea both Saturday and Sunday. She skipped her Eliquis last night and this morning. She said she thinks she should just hold it for now but I told her she needs to take it until she hears from Cardiology. She said she will take her morning dose and wait for a call.   Reason for Disposition    Bloody, black, or tarry bowel movements  (Exception: Chronic-unchanged black-grey bowel movements and is taking iron pills or Pepto-Bismol.)    Taking Coumadin (warfarin) or other strong blood thinner, or known bleeding disorder (e.g., thrombocytopenia)    Additional Information    Negative: Passed out (i.e., fainted, collapsed and was not responding)    Negative: Shock suspected (e.g., cold/pale/clammy skin, too weak to stand, low BP, rapid pulse)    Negative: Vomiting red blood or black (coffee ground) material    Negative: Sounds like a life-threatening emergency to the triager    Negative: Diarrhea is main symptom    Negative: Rectal symptoms    Negative: SEVERE rectal bleeding (large blood clots; constant or on and off bleeding)    Negative: SEVERE dizziness (e.g., unable to stand, requires support to walk, feels like passing out now)    Negative: MODERATE rectal bleeding (small blood clots, passing blood without stool, or toilet water turns red) more than once a day    Negative: High-risk adult (e.g., prior surgery on aorta, abdominal aortic aneurysm)    Negative: Rectal foreign body (inserted or swallowed)    Negative: SEVERE abdominal pain (e.g., excruciating)    Negative: Constant abdominal pain lasting > 2 hours    Negative: Pale skin (pallor) of new-onset or worsening    Negative: Patient sounds very sick or weak to the triager    Negative: MODERATE rectal bleeding (small blood clots, passing blood without stool, or toilet water turns red)    Negative: Colonoscopy in  "past 72 hours    Negative: Known cirrhosis of the liver (or history of liver failure or ascites)    Negative: Patient wants to be seen    Negative: MILD rectal bleeding (more than just a few drops or streaks)    Negative: Cancer of rectum or intestines (colon)    Negative: Radiation therapy to lower abdomen or pelvis    Negative: Normal formed BM with a few streaks or drops of blood on surface of BM    Negative: Rectal bleeding is minimal (e.g., blood just on toilet paper, a few drops in toilet bowl)    Answer Assessment - Initial Assessment Questions  1. APPEARANCE of BLOOD: \"What color is it?\" \"Is it passed separately, on the surface of the stool, or mixed in with the stool?\"       Bright red blood on the surface of the stool and when she wipes.  2. AMOUNT: \"How much blood was passed?\"     Scant amount  3. FREQUENCY: \"How many times has blood been passed with the stools?\"    both Saturday and Sunday, was having diarrhea so multiple times  4. ONSET: \"When was the blood first seen in the stools?\" (Days or weeks)    2 days ago  5. DIARRHEA: \"Is there also some diarrhea?\" If Yes, ask: \"How many diarrhea stools in the past 24 hours?\"      Yes, no stool yet today.   6. CONSTIPATION: \"Do you have constipation?\" If Yes, ask: \"How bad is it?\"     no  7. RECURRENT SYMPTOMS: \"Have you had blood in your stools before?\" If Yes, ask: \"When was the last time?\" and \"What happened that time?\"      Has not had blood in her stool before  8. BLOOD THINNERS: \"Do you take any blood thinners?\" (e.g., Coumadin/warfarin, Pradaxa/dabigatran, aspirin)      On Eliquis  9. OTHER SYMPTOMS: \"Do you have any other symptoms?\"  (e.g., abdomen pain, vomiting, dizziness, fever)     Abdominal pain Saturday and Sunday.  10. PREGNANCY: \"Is there any chance you are pregnant?\" \"When was your last menstrual period?\"       n/a    Protocols used: RECTAL BLEEDING-A-[place]    "

## 2023-04-24 NOTE — ED TRIAGE NOTES
From home for rectal bleeding that started Saturday  Scant amount of tae red blood  No CP/SOB currently  No dizziness or fatigue  pmhx heart failure  Has hemorrhoids   On eliquis for afib     Triage Assessment     Row Name 04/24/23 1329       Triage Assessment (Adult)    Airway WDL WDL       Respiratory WDL    Respiratory WDL WDL       Skin Circulation/Temperature WDL    Skin Circulation/Temperature WDL WDL       Cardiac WDL    Cardiac WDL WDL       Peripheral/Neurovascular WDL    Peripheral Neurovascular WDL WDL       Cognitive/Neuro/Behavioral WDL    Cognitive/Neuro/Behavioral WDL WDL

## 2023-05-01 ENCOUNTER — MYC MEDICAL ADVICE (OUTPATIENT)
Dept: CARDIOLOGY | Facility: CLINIC | Age: 63
End: 2023-05-01
Payer: MEDICARE

## 2023-05-01 DIAGNOSIS — I50.22 CHRONIC SYSTOLIC HEART FAILURE (H): Primary | ICD-10-CM

## 2023-05-02 ENCOUNTER — OFFICE VISIT (OUTPATIENT)
Dept: FAMILY MEDICINE | Facility: CLINIC | Age: 63
End: 2023-05-02
Payer: MEDICARE

## 2023-05-02 VITALS
DIASTOLIC BLOOD PRESSURE: 72 MMHG | RESPIRATION RATE: 20 BRPM | SYSTOLIC BLOOD PRESSURE: 114 MMHG | HEART RATE: 104 BPM | BODY MASS INDEX: 28.48 KG/M2 | TEMPERATURE: 97.8 F | OXYGEN SATURATION: 97 % | WEIGHT: 177.2 LBS | HEIGHT: 66 IN

## 2023-05-02 DIAGNOSIS — K57.31 DIVERTICULOSIS OF LARGE INTESTINE WITH HEMORRHAGE: ICD-10-CM

## 2023-05-02 DIAGNOSIS — K62.5 RECTAL BLEEDING: ICD-10-CM

## 2023-05-02 DIAGNOSIS — I48.91 ATRIAL FIBRILLATION, UNSPECIFIED TYPE (H): ICD-10-CM

## 2023-05-02 DIAGNOSIS — Z09 HOSPITAL DISCHARGE FOLLOW-UP: Primary | ICD-10-CM

## 2023-05-02 DIAGNOSIS — Z79.01 ANTICOAGULATED: ICD-10-CM

## 2023-05-02 PROCEDURE — 99214 OFFICE O/P EST MOD 30 MIN: CPT | Performed by: NURSE PRACTITIONER

## 2023-05-02 RX ORDER — FUROSEMIDE 20 MG
20 TABLET ORAL DAILY PRN
Qty: 30 TABLET | Refills: 1 | Status: SHIPPED | OUTPATIENT
Start: 2023-05-02 | End: 2023-07-17

## 2023-05-02 ASSESSMENT — PATIENT HEALTH QUESTIONNAIRE - PHQ9
10. IF YOU CHECKED OFF ANY PROBLEMS, HOW DIFFICULT HAVE THESE PROBLEMS MADE IT FOR YOU TO DO YOUR WORK, TAKE CARE OF THINGS AT HOME, OR GET ALONG WITH OTHER PEOPLE: VERY DIFFICULT
SUM OF ALL RESPONSES TO PHQ QUESTIONS 1-9: 12
SUM OF ALL RESPONSES TO PHQ QUESTIONS 1-9: 12

## 2023-05-02 ASSESSMENT — ANXIETY QUESTIONNAIRES
GAD7 TOTAL SCORE: 5
7. FEELING AFRAID AS IF SOMETHING AWFUL MIGHT HAPPEN: MORE THAN HALF THE DAYS
5. BEING SO RESTLESS THAT IT IS HARD TO SIT STILL: NOT AT ALL
1. FEELING NERVOUS, ANXIOUS, OR ON EDGE: NOT AT ALL
IF YOU CHECKED OFF ANY PROBLEMS ON THIS QUESTIONNAIRE, HOW DIFFICULT HAVE THESE PROBLEMS MADE IT FOR YOU TO DO YOUR WORK, TAKE CARE OF THINGS AT HOME, OR GET ALONG WITH OTHER PEOPLE: VERY DIFFICULT
6. BECOMING EASILY ANNOYED OR IRRITABLE: MORE THAN HALF THE DAYS
GAD7 TOTAL SCORE: 5
2. NOT BEING ABLE TO STOP OR CONTROL WORRYING: NOT AT ALL
8. IF YOU CHECKED OFF ANY PROBLEMS, HOW DIFFICULT HAVE THESE MADE IT FOR YOU TO DO YOUR WORK, TAKE CARE OF THINGS AT HOME, OR GET ALONG WITH OTHER PEOPLE?: VERY DIFFICULT
7. FEELING AFRAID AS IF SOMETHING AWFUL MIGHT HAPPEN: MORE THAN HALF THE DAYS
4. TROUBLE RELAXING: NOT AT ALL
3. WORRYING TOO MUCH ABOUT DIFFERENT THINGS: SEVERAL DAYS
GAD7 TOTAL SCORE: 5

## 2023-05-02 ASSESSMENT — PAIN SCALES - GENERAL: PAINLEVEL: NO PAIN (0)

## 2023-05-02 NOTE — PROGRESS NOTES
"  Assessment & Plan     Hospital discharge follow-up      Rectal bleeding      Diverticulosis of large intestine with hemorrhage      Anticoagulated      Atrial fibrillation, unspecified type (H)    30 minutes spent by me on the date of the encounter doing chart review, history and exam, documentation and further activities per the note   MED REC REQUIRED  Post Medication Reconciliation Status: discharge medications reconciled, continue medications without change  BMI:   Estimated body mass index is 29.04 kg/m  as calculated from the following:    Height as of this encounter: 1.664 m (5' 5.5\").    Weight as of this encounter: 80.4 kg (177 lb 3.2 oz).   Weight management plan: Discussed healthy diet and exercise guidelines She has been swimming at the [facility] regularly.  She reports this has been helping her physical and mental health.  We discussed her balance, she has been seen by neurology-had a normal MRI, has been to the vestibular clinic, had a normal eye exam, has seen an audiologist and is doing the physical therapy that she was taught at .  She believes it is just due to her weak ankles and weight gain.  She plans to continue exercise as able.  She reports she has abdominal water retention and takes Lasix if needed.  She went from 180 pounds to 169 pounds on her home scale after taking Lasix. (Water retention due to heart failure).    See Patient Instructions: Have a great safe trip to [place].  Take medication as prescribed.  Look up prazosin and medical marijuana-can review website for [employer] online to see cost.  Continue your tapping and ART.  Controlled medications require some type of follow-up visit every 3 months for refill.  Follow-up at any time if needed for healthcare questions or concerns.  Patient in agreement.    MARCELL BOSS  [facility] ROLAND Cook is a 62 year old, presenting for the following health issues:  Hospital F/U  She reports she was " having tae blood rectally she called the nurse line who advised her to go to the hospital.  She reports a long history of diverticulitis has been having colonoscopies regularly since age 33.  She does get abdominal cramping.  We reviewed her hemoglobin trend and its been similar to previous.  She is no longer having abnormal bleeding she does not feel she needs a recheck today.  Her mental health has improved she reports improvement with her  and his spiritual and mental health as well as seeing a therapist who specializes in trauma/workplace trauma (EMT).  She reports initially her cardiology advised her to stop her anticoagulant and iron; then they told her to restart it.  She reports has been swimming at the [facility] which has been really beneficial for her mood and physical health.  She was having more shortness of breath they even asked if she needed oxygen at 1 point.  She was having increased abdominal weight gain; which she attributes to eating out 4 times that week ;r/t to her heart failure.  She took the Lasix and went from 180 pounds to 169 pounds on her home scale.  Her breathing has improved.  We discussed that the fine-line between staying hydrated and dehydrated; being physically active and not being physically active.  She discusses she normally goes to bed early she has been doing great with the Vistaril.  She is also been using tapping and ART; which she feels is helpful.  She does take Klonopin at night.  She does sleep 10 to 12 hours and wakes up with nightmares around 2 to 3 AM.  We discussed prazosin versus medical marijuana she will look into both.  They are planning to travel to [place] Saturday with their RV; they hope to move there in the future as the humidity is worse for her heart issues.          5/2/2023    10:24 AM   Additional Questions   Roomed by Chioma LEGGETT MA   Accompanied by No one         5/2/2023    10:24 AM   Patient Reported Additional Medications   Patient reports taking  "the following new medications None     HPI       Hospital Follow-up Visit:    Hospital/Nursing Home/IP Rehab Facility: [facility]  Date of Admission: 04/24/2023  Date of Discharge: 04/24/2023  Reason(s) for Admission: Anxiety    Was your hospitalization related to COVID-19? No   Problems taking medications regularly:  None  Medication changes since discharge: None  Problems adhering to non-medication therapy:  None    Summary of hospitalization:  [facility] discharge summary reviewed  Diagnostic Tests/Treatments reviewed.  Follow up needed: none  Other Healthcare Providers Involved in Patient s Care:         None  Update since discharge: improved.   Plan of care communicated with patient         Review of Systems   Constitutional, HEENT, cardiovascular, pulmonary, GI, , musculoskeletal, neuro, skin, endocrine and psych systems are negative, except as otherwise noted.      Objective    /72   Pulse 104   Temp 97.8  F (36.6  C) (Temporal)   Resp 20   Ht 1.664 m (5' 5.5\")   Wt 80.4 kg (177 lb 3.2 oz)   LMP 08/21/2007   SpO2 97%   BMI 29.04 kg/m    Body mass index is 29.04 kg/m .  Physical Exam   GENERAL: Healthy, alert and no distress  EYES: Eyes grossly normal to inspection.  No discharge or erythema, or obvious scleral/conjunctival abnormalities.  RESP: No audible wheeze, cough, or visible cyanosis.  No visible retractions or increased work of breathing.    SKIN: Visible skin clear. No significant rash, abnormal pigmentation or lesions.  NEURO: Cranial nerves grossly intact.  Mentation and speech appropriate for age.  PSYCH: Mentation appears normal, affect normal/bright, judgement and insight intact, normal speech and appearance well-groomed. Mood seems significantly improved               Answers for HPI/ROS submitted by the patient on 5/2/2023  If you checked off any problems, how difficult have these problems made it for you to do your " work, take care of things at home, or get along with other people?: Very difficult  PHQ9 TOTAL SCORE: 12  JESUS 7 TOTAL SCORE: 5

## 2023-05-02 NOTE — CONFIDENTIAL NOTE
Date: 5/2/2023    Time of Call: 2:32 PM     Diagnosis:  Heart failure     [ VORB ] Ordering provider: Fiorella Swanson NP    Order: take PRN lasix dose when eating out OR when gaining 5 lbs     Order received by: Mame Valadez RN       Follow-up/additional notes: sent [employer] message to Joyce.

## 2023-05-04 ENCOUNTER — MYC MEDICAL ADVICE (OUTPATIENT)
Dept: CARDIOLOGY | Facility: CLINIC | Age: 63
End: 2023-05-04
Payer: MEDICARE

## 2023-05-04 DIAGNOSIS — I50.9 HEART FAILURE (H): ICD-10-CM

## 2023-05-04 RX ORDER — METOPROLOL SUCCINATE 25 MG/1
12.5 TABLET, EXTENDED RELEASE ORAL DAILY
Qty: 45 TABLET | Refills: 3 | Status: SHIPPED | OUTPATIENT
Start: 2023-05-04 | End: 2023-07-17

## 2023-05-22 ENCOUNTER — MYC MEDICAL ADVICE (OUTPATIENT)
Dept: CARDIOLOGY | Facility: CLINIC | Age: 63
End: 2023-05-22
Payer: MEDICARE

## 2023-05-22 DIAGNOSIS — I50.22 CHRONIC SYSTOLIC HEART FAILURE (H): Primary | ICD-10-CM

## 2023-05-22 NOTE — TELEPHONE ENCOUNTER
Per last mychart communication, if taking PRN lasix more than 3 x weekly, should get labs.   Date: 5/22/2023    Time of Call: 4:42 PM     Diagnosis:  HF     [ VORB ] Ordering provider: Fiorella Swanson NP  Order: BMP     Order received by: Trini Tony RN      Follow-up/additional notes:

## 2023-05-24 ENCOUNTER — MYC MEDICAL ADVICE (OUTPATIENT)
Dept: CARDIOLOGY | Facility: CLINIC | Age: 63
End: 2023-05-24
Payer: MEDICARE

## 2023-06-08 ENCOUNTER — MYC MEDICAL ADVICE (OUTPATIENT)
Dept: FAMILY MEDICINE | Facility: CLINIC | Age: 63
End: 2023-06-08
Payer: MEDICARE

## 2023-06-21 ENCOUNTER — HOSPITAL ENCOUNTER (OUTPATIENT)
Dept: MRI IMAGING | Facility: CLINIC | Age: 63
Discharge: HOME | End: 2023-06-21
Attending: PODIATRIST
Payer: MEDICARE

## 2023-06-21 ENCOUNTER — ANCILLARY PROCEDURE (OUTPATIENT)
Dept: CARDIOLOGY | Facility: CLINIC | Age: 63
End: 2023-06-21
Attending: INTERNAL MEDICINE
Payer: MEDICARE

## 2023-06-21 DIAGNOSIS — M25.572 LEFT ANKLE PAIN: ICD-10-CM

## 2023-06-21 DIAGNOSIS — Z45.02 ENCOUNTER FOR ADJUSTMENT AND MANAGEMENT OF AUTOMATIC IMPLANTABLE CARDIAC DEFIBRILLATOR: ICD-10-CM

## 2023-06-21 DIAGNOSIS — Z95.0 CARDIAC PACEMAKER IN SITU: ICD-10-CM

## 2023-06-21 DIAGNOSIS — Z95.0 CARDIAC PACEMAKER IN SITU: Primary | ICD-10-CM

## 2023-06-21 PROCEDURE — 93286 PERI-PX EVAL PM/LDLS PM IP: CPT | Mod: 26 | Performed by: INTERNAL MEDICINE

## 2023-06-21 PROCEDURE — 73721 MRI JNT OF LWR EXTRE W/O DYE: CPT | Mod: LT

## 2023-06-21 PROCEDURE — 73721 MRI JNT OF LWR EXTRE W/O DYE: CPT | Mod: 26 | Performed by: RADIOLOGY

## 2023-06-21 PROCEDURE — 93286 PERI-PX EVAL PM/LDLS PM IP: CPT

## 2023-06-22 DIAGNOSIS — M25.579 ANKLE PAIN: Primary | ICD-10-CM

## 2023-06-23 NOTE — TELEPHONE ENCOUNTER
DIAGNOSIS: Left Ankle Pain   APPOINTMENT DATE: 06/27/2023   NOTES STATUS DETAILS   OFFICE NOTE from referring provider SELF    OPERATIVE REPORT Internal 08/26/2008 - Repair and Reattachment of the RT Tibialis Anterior Tendon into the Navicular    07/22/2008 -  RT Foot Lapidus Procedure to Correct the Matatarsus Primus Varus and Bunion Deformity    05/15/2007 - Revision LT Foot Bunion Repair with Lapidus Procedure / LT 2nd Claw Toe Repair wit PIP Joint Excision and Flexor Tenotomy   MEDICATION LIST Internal    IMPLANT RECORD/STICKER Internal    LABS     MRI Internal    XRAYS (IMAGES & REPORTS) Internal

## 2023-06-25 NOTE — TELEPHONE ENCOUNTER
She should leave a message for the nurses at her GI to have the oncall call her back.  In the mean time, continue 4 times daily PRN imodium, and do BRAT diet. GO in if not staying hydrated. SHAYNA Simon, FNP-BC'   Subjective:      Chief Complaint   Patient presents with    Epistaxis     On and off x1wk               Epistaxis       C/o intermittent nosebleeds x 1 wk      The bleeding has been from the LEFT nare.     No bleeding currently    Nonsmoker    Denies nasal pain     Denies other bleeding such as hemoptysis, bleeding gums, hematuria, hematochezia           Social History     Tobacco Use    Smoking status: Never    Smokeless tobacco: Never   Vaping Use    Vaping Use: Never used   Substance Use Topics    Alcohol use: Not Currently     Comment: 1 drink/year    Drug use: No         Current Outpatient Medications on File Prior to Visit   Medication Sig Dispense Refill    dextroamphetamine (DEXEDRINE SPANSULE) 10 MG SR capsule Take 2 Capsules by mouth every day for 30 days. 60 Capsule 0    sertraline (ZOLOFT) 50 MG Tab Take 1 Tablet by mouth every day. 90 Tablet 1    dextroamphetamine (DEXEDRINE SPANSULE) 10 MG SR capsule Take 2 Capsules by mouth every day for 30 days. 60 Capsule 0    Empagliflozin (JARDIANCE) 25 MG Tab TAKE 1 TABLET BY MOUTH ONCE DAILY IN THE MORNING . 30 Tablet 9    SITagliptin (JANUVIA) 100 MG Tab Take 1 Tablet by mouth every day. 30 Tablet 9    lisinopril (PRINIVIL) 2.5 MG Tab Take 1 Tablet by mouth every day. (for kidney protection) 30 Tablet 9    atorvastatin (LIPITOR) 40 MG Tab Take 1 Tablet by mouth every day. 30 Tablet 9    diclofenac sodium (VOLTAREN) 1 % Gel Apply 4 g topically in the morning, at noon, and at bedtime. 50 g 0    tizanidine (ZANAFLEX) 4 MG capsule Take 1 Capsule by mouth every 8 hours as needed (muscle spasms). 30 Capsule 0    Lancet Devices (LANCET DEVICE WITH EJECTOR) Misc True metrix lancet ejector device. 1 Each 0    tadalafil (CIALIS) 10 MG tablet Take 1 tablet by mouth as needed for Erectile Dysfunction. 10 tablet 9    Blood Glucose Test Strips Test strips order: Test strips for Contour Next meter. Sig: use 2x/d and prn ssx high or low sugar #100 RF x 3 100 Strip 3    aspirin  "EC (ECOTRIN) 81 MG Tablet Delayed Response Take 2 Tabs by mouth every day. 30 Tab     Blood Glucose Monitoring Suppl Device Meter: Dispense 1 Contour Next Device . Sig. Use as directed for blood sugar monitoring. #1. NR. 1 Device 0    glucose blood (TRUE METRIX BLOOD GLUCOSE TEST) strip 1 Strip by Other route as needed. 300 Strip 1    Multiple Vitamin (MULTI VITAMIN MENS PO) Take 1 Tab by mouth every day.       No current facility-administered medications on file prior to visit.         Past Medical History:   Diagnosis Date    Eczema     GERD (gastroesophageal reflux disease)     Type II or unspecified type diabetes mellitus without mention of complication, not stated as uncontrolled     Umbilical hernia              Review of Systems   Constitutional: Negative for fever.   HENT: Positive for nosebleeds.    Respiratory: Negative for cough and shortness of breath.    Cardiovascular: Negative for chest pain.   Neurological: Negative for dizziness and headaches.   All other systems reviewed and are negative.         Objective:     /84   Pulse 86   Temp 36.4 °C (97.6 °F) (Temporal)   Resp 14   Ht 1.727 m (5' 8\")   Wt 104 kg (230 lb)   SpO2 96%     Physical Exam   Constitutional: pt is oriented to person, place, and time. Pt appears well-developed. No distress.   HENT:   Head: Normocephalic and atraumatic.   Nose: Epistaxis (  dried blood in left nare) is observed.   No masses, polyps or active bleeding  Eyes: Conjunctivae are normal.   Cardiovascular: Normal rate, regular rhythm and normal heart sounds.    Pulmonary/Chest: Effort normal and breath sounds normal. No respiratory distress. Pt has no wheezes.   Neurological: pt is alert and oriented to person, place, and time. No cranial nerve deficit.   Skin: Skin is warm. Pt is not diaphoretic and no erythema.   Psychiatric:  behavior is normal.   Nursing note and vitals reviewed.              Assessment/Plan:          1. Epistaxis  No active bleeding    - " Referral to ENT

## 2023-06-27 ENCOUNTER — PRE VISIT (OUTPATIENT)
Dept: ORTHOPEDICS | Facility: CLINIC | Age: 63
End: 2023-06-27

## 2023-06-27 ENCOUNTER — OFFICE VISIT (OUTPATIENT)
Dept: ORTHOPEDICS | Facility: CLINIC | Age: 63
End: 2023-06-27
Payer: MEDICARE

## 2023-06-27 ENCOUNTER — ANCILLARY PROCEDURE (OUTPATIENT)
Dept: GENERAL RADIOLOGY | Facility: CLINIC | Age: 63
End: 2023-06-27
Attending: ORTHOPAEDIC SURGERY
Payer: MEDICARE

## 2023-06-27 VITALS — BODY MASS INDEX: 29.16 KG/M2 | HEIGHT: 65 IN | WEIGHT: 175 LBS

## 2023-06-27 DIAGNOSIS — M25.579 ANKLE PAIN: ICD-10-CM

## 2023-06-27 DIAGNOSIS — M25.572 PAIN IN JOINT, ANKLE AND FOOT, LEFT: Primary | ICD-10-CM

## 2023-06-27 PROCEDURE — 99204 OFFICE O/P NEW MOD 45 MIN: CPT | Performed by: ORTHOPAEDIC SURGERY

## 2023-06-27 PROCEDURE — 73610 X-RAY EXAM OF ANKLE: CPT | Mod: LT | Performed by: RADIOLOGY

## 2023-06-27 NOTE — LETTER
6/27/2023         RE: Joyce Marroquin  [address] [facility] [id number]        Dear Colleague,    Thank you for referring your patient, Joyce Marroquin, to the [facility] [place]. Please see a copy of my visit note below.    Chief complaint: Left ankle pain    Mrs. Marroquin presents in the company of her  for evaluation of her left ankle.  Patient reports to have had pain and discomfort along the lateral aspect of the ankle since November 2022.  Denies to have any history of an injury trauma or twisting mechanism to the left ankle.    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  Patient reports to try to be as active as possible as part of looking after her heart then on the health of her heart.    Patient also reports to be planning to move out of state within the next 7 few months.    With regards to the ankle she reports to have pain and discomfort but clearly is activity related.  She also reports to have some pain along the plantar aspect of the lesser metatarsal heads.  She has tried some orthotics prescribed by local podiatrist intervention MRI has been obtained and presents with a full discussion of treatment options.    We reviewed today her past medical and surgical history current medications and drug allergies.  She reports to have a weight of 175 pounds with a BMI of 29.1    On today's exam she presents today with equinus contracture of the left ankle presents today with an uncorrectable flatfoot deformity there is palpable pulses the pain is mainly located along the lateral malleolus approximately 10 to 12 cm from the tip of the lateral malleolus.  Peroneal tendons are nontender.  Ankle joint itself is nontender.    An MRI was reviewed today which is grossly nondiagnostic except for the presence of an accessory navicular.    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    With regards to a surgical solution she would benefit from undergoing an left Achilles tendon lengthening with a subtalar and talonavicular joint arthrodesis.  I discussed with them the most likely postoperative course and complications from such intervention which included on the limited to infection bleeding nerve damage residual pain nonunion and stiffness    Given her history of cardiomyopathy I suspect that the surgery will have to take place in the hospital setting although hopefully we can get it done in outpatient surgery center.    Patient will proceed with the surgery there is of her convenience in the meantime she has no activity restrictions.  In the meantime also I recommend her to beef up the medial arch of her orthotic as much as possible.    TT 30 minutes    Ryan Reeder MD

## 2023-06-27 NOTE — NURSING NOTE
"Reason For Visit:   Chief Complaint   Patient presents with     Consult     Consult for left ankle abnormal MRI. MRI: 06/21/2023, XR today. Has had several surgeries between both feet. Sharp stabbing pain since November.  Went to [facility] urgent care. PT was ordered and a new brace. No change. Lateral ankle pain posterior to malleolus. Then saw podiatrist who initially ordered her orthotics, who ordered the MRI. No surgeries done at [facility]. Wants to establish care here.        Ht 1.651 m (5' 5\")   Wt 79.4 kg (175 lb)   LMP 08/21/2007   BMI 29.12 kg/m           Janeth Shaw ATC    "

## 2023-06-27 NOTE — PROGRESS NOTES
Chief complaint: Left ankle pain    Mrs. Marroquin presents in the company of her  for evaluation of her left ankle.  Patient reports to have had pain and discomfort along the lateral aspect of the ankle since November 2022.  Denies to have any history of an injury trauma or twisting mechanism to the left ankle.    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  Patient reports to try to be as active as possible as part of looking after her heart then on the health of her heart.    Patient also reports to be planning to move out of state within the next 7 few months.    With regards to the ankle she reports to have pain and discomfort but clearly is activity related.  She also reports to have some pain along the plantar aspect of the lesser metatarsal heads.  She has tried some orthotics prescribed by local podiatrist intervention MRI has been obtained and presents with a full discussion of treatment options.    We reviewed today her past medical and surgical history current medications and drug allergies.  She reports to have a weight of 175 pounds with a BMI of 29.1    On today's exam she presents today with equinus contracture of the left ankle presents today with an uncorrectable flatfoot deformity there is palpable pulses the pain is mainly located along the lateral malleolus approximately 10 to 12 cm from the tip of the lateral malleolus.  Peroneal tendons are nontender.  Ankle joint itself is nontender.    An MRI was reviewed today which is grossly nondiagnostic except for the presence of an accessory navicular.    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  #2 left posterior talus tendon dysfunction and stage III #3 left Achilles tendon contracture    Plan: I discussed with the patient and her  that at this point I believe that the pain that she is having is from the severe flatfoot deformity and the fact that the fibula is  getting a stress by the abutment of the calcaneus.    With regards to a surgical solution she would benefit from undergoing an left Achilles tendon lengthening with a subtalar and talonavicular joint arthrodesis.  I discussed with them the most likely postoperative course and complications from such intervention which included on the limited to infection bleeding nerve damage residual pain nonunion and stiffness    Given her history of cardiomyopathy I suspect that the surgery will have to take place in the hospital setting although hopefully we can get it done in outpatient surgery center.    Patient will proceed with the surgery there is of her convenience in the meantime she has no activity restrictions.  In the meantime also I recommend her to beef up the medial arch of her orthotic as much as possible.    TT 30 minutes

## 2023-06-28 ENCOUNTER — TELEPHONE (OUTPATIENT)
Dept: FAMILY MEDICINE | Facility: CLINIC | Age: 63
End: 2023-06-28
Payer: MEDICARE

## 2023-06-28 NOTE — TELEPHONE ENCOUNTER
Can pt be added to nurse schedule for hep a vaccine and if she wants immune globulin?  Do we have immune globulin in clinic?  If not, is she supposed to contact the [place] health department for hepatitis a exposure?  Please notify patient of process.  SHAYNA Simon, FNP-BC

## 2023-06-28 NOTE — ED NOTES
Pt is on thinners.  Noted blood in stool sat/sun.  Stopped thinner Sunday, advised by cards to continue taking.  Pt states lower abd discomfort.  Nauseous at baseline per patient.  Denies vomiting.  VSS.  A&Ox4.     20

## 2023-07-17 ENCOUNTER — LAB (OUTPATIENT)
Dept: LAB | Facility: CLINIC | Age: 63
End: 2023-07-17
Payer: MEDICARE

## 2023-07-17 ENCOUNTER — ANCILLARY PROCEDURE (OUTPATIENT)
Dept: CT IMAGING | Facility: CLINIC | Age: 63
End: 2023-07-17
Attending: INTERNAL MEDICINE
Payer: MEDICARE

## 2023-07-17 ENCOUNTER — OFFICE VISIT (OUTPATIENT)
Dept: CARDIOLOGY | Facility: CLINIC | Age: 63
End: 2023-07-17
Attending: NURSE PRACTITIONER
Payer: MEDICARE

## 2023-07-17 VITALS
OXYGEN SATURATION: 97 % | DIASTOLIC BLOOD PRESSURE: 82 MMHG | WEIGHT: 177.8 LBS | SYSTOLIC BLOOD PRESSURE: 121 MMHG | BODY MASS INDEX: 28.57 KG/M2 | HEIGHT: 66 IN | HEART RATE: 86 BPM

## 2023-07-17 DIAGNOSIS — E03.9 HYPOTHYROIDISM: ICD-10-CM

## 2023-07-17 DIAGNOSIS — I50.30 HEART FAILURE WITH PRESERVED EJECTION FRACTION, BORDERLINE, CLASS III (H): ICD-10-CM

## 2023-07-17 DIAGNOSIS — R91.8 OTHER NONSPECIFIC ABNORMAL FINDING OF LUNG FIELD: ICD-10-CM

## 2023-07-17 DIAGNOSIS — I48.0 PAROXYSMAL ATRIAL FIBRILLATION (H): ICD-10-CM

## 2023-07-17 DIAGNOSIS — I50.22 CHRONIC SYSTOLIC HEART FAILURE (H): ICD-10-CM

## 2023-07-17 DIAGNOSIS — J44.9 CHRONIC OBSTRUCTIVE PULMONARY DISEASE (H): ICD-10-CM

## 2023-07-17 DIAGNOSIS — I42.8 NONISCHEMIC CARDIOMYOPATHY (H): ICD-10-CM

## 2023-07-17 DIAGNOSIS — J47.9 BRONCHIECTASIS WITHOUT COMPLICATION (H): ICD-10-CM

## 2023-07-17 LAB
ALBUMIN SERPL BCG-MCNC: 4.5 G/DL (ref 3.5–5.2)
ALP SERPL-CCNC: 106 U/L (ref 35–104)
ALT SERPL W P-5'-P-CCNC: 13 U/L (ref 0–50)
ANION GAP SERPL CALCULATED.3IONS-SCNC: 10 MMOL/L (ref 7–15)
AST SERPL W P-5'-P-CCNC: 16 U/L (ref 0–45)
BILIRUB SERPL-MCNC: 0.2 MG/DL
BUN SERPL-MCNC: 15.1 MG/DL (ref 8–23)
CALCIUM SERPL-MCNC: 10 MG/DL (ref 8.8–10.2)
CHLORIDE SERPL-SCNC: 103 MMOL/L (ref 98–107)
CREAT SERPL-MCNC: 0.79 MG/DL (ref 0.51–0.95)
DEPRECATED HCO3 PLAS-SCNC: 26 MMOL/L (ref 22–29)
ERYTHROCYTE [DISTWIDTH] IN BLOOD BY AUTOMATED COUNT: 12.2 % (ref 10–15)
GFR SERPL CREATININE-BSD FRML MDRD: 83 ML/MIN/1.73M2
GLUCOSE SERPL-MCNC: 90 MG/DL (ref 70–99)
HCT VFR BLD AUTO: 41.4 % (ref 35–47)
HGB BLD-MCNC: 13.8 G/DL (ref 11.7–15.7)
MCH RBC QN AUTO: 31.6 PG (ref 26.5–33)
MCHC RBC AUTO-ENTMCNC: 33.3 G/DL (ref 31.5–36.5)
MCV RBC AUTO: 95 FL (ref 78–100)
NT-PROBNP SERPL-MCNC: 93 PG/ML (ref 0–900)
PLATELET # BLD AUTO: 205 10E3/UL (ref 150–450)
POTASSIUM SERPL-SCNC: 4.1 MMOL/L (ref 3.4–5.3)
PROT SERPL-MCNC: 7.5 G/DL (ref 6.4–8.3)
RBC # BLD AUTO: 4.37 10E6/UL (ref 3.8–5.2)
SODIUM SERPL-SCNC: 139 MMOL/L (ref 136–145)
TSH SERPL DL<=0.005 MIU/L-ACNC: 1.54 UIU/ML (ref 0.3–4.2)
WBC # BLD AUTO: 7.4 10E3/UL (ref 4–11)

## 2023-07-17 PROCEDURE — 99214 OFFICE O/P EST MOD 30 MIN: CPT | Performed by: NURSE PRACTITIONER

## 2023-07-17 PROCEDURE — 80053 COMPREHEN METABOLIC PANEL: CPT | Performed by: PATHOLOGY

## 2023-07-17 PROCEDURE — 83880 ASSAY OF NATRIURETIC PEPTIDE: CPT | Performed by: PATHOLOGY

## 2023-07-17 PROCEDURE — 71250 CT THORAX DX C-: CPT | Mod: TC | Performed by: RADIOLOGY

## 2023-07-17 PROCEDURE — 84443 ASSAY THYROID STIM HORMONE: CPT | Performed by: PATHOLOGY

## 2023-07-17 PROCEDURE — G0463 HOSPITAL OUTPT CLINIC VISIT: HCPCS | Performed by: NURSE PRACTITIONER

## 2023-07-17 PROCEDURE — 85027 COMPLETE CBC AUTOMATED: CPT | Performed by: PATHOLOGY

## 2023-07-17 PROCEDURE — 36415 COLL VENOUS BLD VENIPUNCTURE: CPT | Performed by: PATHOLOGY

## 2023-07-17 RX ORDER — METOPROLOL SUCCINATE 25 MG/1
12.5 TABLET, EXTENDED RELEASE ORAL DAILY
Qty: 90 TABLET | Refills: 3 | Status: SHIPPED | OUTPATIENT
Start: 2023-07-17

## 2023-07-17 RX ORDER — FUROSEMIDE 20 MG
10 TABLET ORAL 2 TIMES DAILY
Qty: 90 TABLET | Refills: 3 | Status: SHIPPED | OUTPATIENT
Start: 2023-07-17 | End: 2024-03-01

## 2023-07-17 RX ORDER — SPIRONOLACTONE 25 MG/1
25 TABLET ORAL DAILY
Qty: 90 TABLET | Refills: 3 | Status: SHIPPED | OUTPATIENT
Start: 2023-07-17

## 2023-07-17 ASSESSMENT — PAIN SCALES - GENERAL: PAINLEVEL: MODERATE PAIN (4)

## 2023-07-17 NOTE — NURSING NOTE
Diet: Patient instructed regarding a heart failure healthy diet, including discussion of reduced fat and 2000 mg daily sodium restriction, daily weights, medication purpose and compliance, fluid restrictions and resources for patient and family to access for assistance with heart failure management.       Labs: Patient was given results of the laboratory testing obtained today and patient was instructed about when to return for the next laboratory testing.     Med Reconcile: Reviewed and verified all current medications with the patient. The updated medication list was printed and given to the patient. CHANGE lasix to 10mg BID, extra 20mg as needed    Return Appointment: Patient given instructions regarding scheduling next clinic visit. RTC to see dr griffin as scheduled    Patient stated she understood all health information given and agreed to call with further questions or concerns.     Joellen Jimenez RN

## 2023-07-17 NOTE — LETTER
7/17/2023      RE: Joyce Marroquin  [address] [place] [address]       Dear Colleague,    Thank you for the opportunity to participate in the care of your patient, Joyce Marroquin, at the [facility] [place] [facility]. Please see a copy of my visit note below.    HPI: 62 yr old female returns to [facility] for follow up of mid range low EF (40-45%). She was recently traveling and ended up in the ED in [place] with a panic attack and mild volume overload. She was given lasix with large amount of urine produced and felt better/ she states she feels like she needs more lasix to prevent volume overload.  She has been swimming in the am almost daily which she really enjoys as she used to swim competitively. She was scheduled to have surgery on her L ankle but that is since on hold. She has been losing wt but that is intentional.  She states she is feeling more anxious due to recent marital stress. She feels SOB today, no chest pain. She did have a CT this am for follow up of pulmonary nodules. She is scheduled to see pulmonary in 1 week.   She has been using lasix 20mg almost every day. She feels like she needs it more often.     She has had some LE edema that comes and goes but states that she has pitting edema in the am.   She is planning on moving to [place] - her house is on the market here in [place].      PAST MEDICAL HISTORY:  Past Medical History:   Diagnosis Date    Arthritis     Cardiomyopathy (H)     ff Cardiology    Chronic depressive personality disorder     Congestive heart failure (H) see dr martínez    heart failure correct term    COPD exacerbation (H)     Esophageal reflux     Ex-smoker     quit 2006; 1 PPD x 30    Hyperparathyroidism (H)     s/p parathyroidectomy    Hypothyroidism     LARRY on CPAP     ff Sleep medicine    Pulmonary nodules     ff Pulmonologist    S/P cardiac pacemaker procedure     [facility]      Uncomplicated asthma years       FAMILY HISTORY:  Family History   Problem Relation Age of Onset    Hypothyroidism Mother     Hypertension Mother     Osteoarthritis Mother     Coronary Artery Disease Father         nonfatal MI in his 70s    Asthma Father     Diabetes Sister     Hypothyroidism Sister     Anxiety Disorder Sister     Breast Cancer Maternal Aunt        SOCIAL HISTORY:  Social History     Socioeconomic History    Marital status:        CURRENT MEDICATIONS:  Current Outpatient Medications   Medication Sig Dispense Refill    acetaminophen (TYLENOL) 325 MG tablet Take 325-650 mg by mouth every 6 hours as needed for mild pain Max of 2000 mg/day      albuterol (PROAIR HFA/PROVENTIL HFA/VENTOLIN HFA) 108 (90 BASE) MCG/ACT Inhaler Inhale 2 puffs into the lungs as needed for shortness of breath or wheezing      albuterol (PROVENTIL) (2.5 MG/3ML) 0.083% neb solution Take 1 vial (2.5 mg) by nebulization every 6 hours as needed for shortness of breath / dyspnea or wheezing 90 mL 0    azelastine (ASTELIN) 0.1 % nasal spray Spray 1 spray into both nostrils 2 times daily as needed (as needed)  0    Calcium Carbonate-Vitamin D (CALCIUM-CARB 600 + D PO) Take 1 tablet by mouth daily      clonazePAM (KLONOPIN) 0.5 MG tablet TAKE 1 TABLET(0.5 MG) BY MOUTH THREE TIMES DAILY AS NEEDED FOR ANXIETY 90 tablet 2    DiphenhydrAMINE HCl (BENADRYL PO) Take 25 mg by mouth nightly as needed for allergies       ELIQUIS ANTICOAGULANT 5 MG tablet TAKE 1 TABLET(5 MG) BY MOUTH TWICE DAILY 180 tablet 3    Ferrous Sulfate (IRON SUPPLEMENT PO) Take 130 mg by mouth daily       fluticasone-salmeterol (ADVAIR) 250-50 MCG/ACT inhaler Wixela Inhub 250 mcg-50 mcg/dose powder for inhalation   INHALE 1 PUFF BY MOUTH TWICE DAILY      furosemide (LASIX) 20 MG tablet Take 1 tablet (20 mg) by mouth daily as needed (when eating out OR if gained 5 lbs) 30 tablet 1    hydrOXYzine (ATARAX) 10 MG tablet Take 1 tablet (10 mg) by mouth 3 times daily  as needed for anxiety 180 tablet 1    ipratropium (ATROVENT) 0.06 % nasal spray Spray 2 sprays into both nostrils 4 times daily as needed for rhinitis      MAGNESIUM LACTATE PO Take  mg by mouth daily      MELATONIN PO Take 1-3 mg by mouth nightly as needed       Menaquinone-7 (VITAMIN K2 PO) Take 1 capful by mouth daily      METAMUCIL FIBER PO Take by mouth as needed      metoprolol succinate ER (TOPROL XL) 25 MG 24 hr tablet Take 0.5 tablets (12.5 mg) by mouth daily 45 tablet 3    Multiple Vitamin (DAILY MULTIVITAMIN PO) Take 1 tablet by mouth every morning      nitroGLYcerin (NITROSTAT) 0.4 MG sublingual tablet Place 1 tablet (0.4 mg) under the tongue every 5 minutes as needed for chest pain For chest pain place 1 tablet under the tongue every 5 minutes for 3 doses. If symptoms persist 5 minutes after 1st dose call 911. 25 tablet 1    Probiotic Product (PROBIOTIC DAILY PO) Take 1 capsule by mouth 2 times daily Takes additional doses if has diarrhea      psyllium (METAMUCIL) WAFR Take 2 Wafers by mouth daily      spironolactone (ALDACTONE) 25 MG tablet Take 1 tablet (25 mg) by mouth daily 90 tablet 3    SYNTHROID 150 MCG tablet TAKE 1 TABLET BY MOUTH 6 DAYS A WEEK THEN TAKE 1/2 TABLET BY MOUTH 1 TIME PER WEEK 90 tablet 0    TURMERIC PO Take 1 tablet by mouth every morning       vitamin D3 (CHOLECALCIFEROL) 2000 units tablet Take 1 tablet by mouth daily 1 tablet 0    Zinc 15 MG CAPS Take 15 mg by mouth      hydrocortisone (CORTAID) 1 % external cream Apply topically daily as needed ears (Patient not taking: Reported on 7/17/2023)      triamcinolone (KENALOG) 0.1 % external cream For feet (Patient not taking: Reported on 1/17/2023)      vitamin C (ASCORBIC ACID) 250 MG tablet Take 250 mg by mouth daily (Patient not taking: Reported on 7/17/2023)         ROS:   Constitutional: No fever, chills, or sweats. No weight gain/loss.   ENT: No visual disturbance, ear ache, epistaxis, sore throat.  "  Allergies/Immunologic: Negative.   Respiratory: No cough, hemoptysis.   Cardiovascular: As per HPI.   GI: No nausea, vomiting, hematemesis, melena, or hematochezia.   : No urinary frequency, dysuria, or hematuria.   Integument: Negative.   Psychiatric: Negative.   Neuro: Negative.   Endocrinology: Negative.   Musculoskeletal: Negative.    EXAM:  /82 (BP Location: Right arm, Patient Position: Chair, Cuff Size: Adult Regular)   Pulse 86   Ht 1.675 m (5' 5.95\")   Wt 80.6 kg (177 lb 12.8 oz)   LMP 08/21/2007   SpO2 97%   BMI 28.75 kg/m    General: appears comfortable, alert and articulate  Head: normocephalic, atraumatic  Eyes: anicteric sclera, EOMI  Neck: no adenopathy  Orophyarynx: moist mucosa, no lesions, dentition intact  Heart: regular, S1/S2, no murmur, gallop, rub, estimated JVP 8cm  Lungs: clear, no rales or wheezing  Abdomen: soft, non-tender, bowel sounds present, no hepatosplenomegaly  Extremities: no clubbing, cyanosis or edema  Neurological: normal speech and affect, no gross motor deficits    Labs:  CBC RESULTS:  Lab Results   Component Value Date    WBC 7.4 07/17/2023    WBC 6.6 05/25/2021    RBC 4.37 07/17/2023    RBC 4.33 05/25/2021    HGB 13.8 07/17/2023    HGB 13.7 05/25/2021    HCT 41.4 07/17/2023    HCT 41.2 05/25/2021    MCV 95 07/17/2023    MCV 95 05/25/2021    MCH 31.6 07/17/2023    MCH 31.6 05/25/2021    MCHC 33.3 07/17/2023    MCHC 33.3 05/25/2021    RDW 12.2 07/17/2023    RDW 11.9 05/25/2021     07/17/2023     05/25/2021       CMP RESULTS:  Lab Results   Component Value Date     07/17/2023     05/25/2021    POTASSIUM 4.1 07/17/2023    POTASSIUM 4.2 06/07/2022    POTASSIUM 4.2 05/25/2021    CHLORIDE 103 07/17/2023    CHLORIDE 106 06/07/2022    CHLORIDE 107 05/25/2021    CO2 26 07/17/2023    CO2 27 06/07/2022    CO2 24 05/25/2021    ANIONGAP 10 07/17/2023    ANIONGAP 8 06/07/2022    ANIONGAP 7 05/25/2021    GLC 90 07/17/2023    GLC 90 06/07/2022    GLC " 92 2021    BUN 15.1 2023    BUN 9 2022    BUN 12 2021    CR 0.79 2023    CR 0.64 2021    GFRESTIMATED 83 2023    GFRESTIMATED >90 2021    GFRESTBLACK >90 2021    MANJU 10.0 2023    MANJU 8.7 2021    BILITOTAL 0.2 2023    BILITOTAL 0.4 2021    ALBUMIN 4.5 2023    ALBUMIN 3.5 2022    ALBUMIN 3.4 2021    ALKPHOS 106 (H) 2023    ALKPHOS 114 2021    ALT 13 2023    ALT 29 2021    AST 16 2023    AST 17 2021        INR RESULTS:  Lab Results   Component Value Date    INR 1.08 2023    INR 1.02 10/06/2019       Lab Results   Component Value Date    MAG 2.3 2022    MAG 2.3 2020     Lab Results   Component Value Date    NTBNPI 72 01/10/2023    NTBNPI 107 2020     Lab Results   Component Value Date    NTBNP 91 2022    NTBNP 156 (H) 2021       Lab Results   Component Value Date    REGINA 263 (H) 2022    REGINA 235 2020    IRON 68 2022    IRON 111 2020     2022     2020       Results for orders placed in visit on 22    Echocardiogram Complete    Narrative  [id number]  [id number]  [id number]  [id number]^DEDRA^STUART^IRINEO    [facility]  Diagnostic and Treamtent-3rd Floor  [address] [place] [address]    Name: MARCELO DAVIS  MRN: [id number]  : 1960  Study Date: 2022 09:43 AM  Age: 61 yrs  Gender: Female  Patient [facility]: [facility]  Reason For Study: Left ventricular dysfunction with reduced left ventricular  funct  Ordering Physician: STUART COLMENARES  Referring Physician: STUART COLMENARES  Performed By: Tyler Gong    BSA: 1.9 m2  Height: 65 in  Weight: 172 lb  BP: 120/73 mmHg  ______________________________________________________________________________  Procedure  Echocardiogram with two-dimensional, color and spectral Doppler  performed.  Adequate quality two-dimensional was performed and interpreted.  ______________________________________________________________________________  Interpretation Summary  Left ventricular function is decreased. The ejection fraction is 40-45%  (mildly reduced).  Abnormal septal motion consistent with left bundle branch block is present.  Right ventricular function, chamber size, wall motion, and thickness are  normal.  This study was compared with the study from 11/23/21 .  No significant changes noted.  ______________________________________________________________________________  Left Ventricle  Left ventricular size is normal. Left ventricular wall thickness is normal.  Left ventricular function is decreased. The ejection fraction is 40-45%  (mildly reduced). Left ventricular diastolic function is indeterminate.  Abnormal septal motion consistent with left bundle branch block is present.    Right Ventricle  Right ventricular function, chamber size, wall motion, and thickness are  normal. A pacemaker lead is noted in the right ventricle.    Atria  Both atria appear normal. A pacemaker lead is noted in the right atrium.    Mitral Valve  Mild mitral insufficiency is present.    Aortic Valve  Aortic valve is normal in structure and function.    Tricuspid Valve  The tricuspid valve is normal. Trace tricuspid insufficiency is present. The  right ventricular systolic pressure is approximated at 23.9 mmHg plus the  right atrial pressure.    Pulmonic Valve  The pulmonic valve cannot be assessed.    Vessels  The aorta root is normal. The inferior vena cava was normal in size with  preserved respiratory variability. IVC diameter <2.1 cm collapsing >50% with  sniff suggests a normal RA pressure of 3 mmHg.    Pericardium  No pericardial effusion is present.    Compared to Previous Study  This study was compared with the study from 11/23/21 . No significant  changes  noted.  ______________________________________________________________________________  MMode/2D Measurements & Calculations    IVSd: 0.93 cm  LVIDd: 4.8 cm  LVIDs: 2.8 cm  LVPWd: 0.74 cm  FS: 40.5 %  LV mass(C)d: 133.0 grams  LV mass(C)dI: 71.7 grams/m2  Ao root diam: 2.6 cm  LA dimension: 3.2 cm  LA/Ao: 1.2  LVOT diam: 1.8 cm  LVOT area: 2.6 cm2  LA Volume (BP): 47.8 ml  LA Volume Index (BP): 25.7 ml/m2  RWT: 0.31    Doppler Measurements & Calculations  MV E max veronique: 62.1 cm/sec  MV A max veronique: 72.7 cm/sec  MV E/A: 0.85  MV dec slope: 298.8 cm/sec2  MV dec time: 0.21 sec  PA acc time: 0.15 sec  TR max veronique: 244.3 cm/sec  TR max P.9 mmHg  E/E' av.5  Lateral E/e': 9.1  Medial E/e': 7.9    ______________________________________________________________________________  Report approved by: Aki Aguilar 2022 01:06 PM       Assessment and Plan:   1. Chronic mid range and diastolic heart failure secondary to Takosubo .    Stage C  NYHA Class III  ACEi/ARB no  BB yes  Aldosterone antagonist yes  SGLT2i: discussed starting, but patient would like to consider and discuss with Dr. Ordonez.   SCD prophylaxis CRT-P   Fluid status euvolemic  NSAID use: no  Sleep Apnea Evaluation: uses CPAP    2. RF ablation for PVCs which led to AV node ablation and complete heart block requiring a dual-chamber pacemaker implantation, which is a [employer] device MRI compatible.      3. Depression: managed per pt    4. Paroxysmal atrial fib: rate controlled/on Eliquis/  Follow-up  With Dr. Ordonez next week.       Please do not hesitate to contact me if you have any questions/concerns.     Sincerely,     SHAYNA Sommers CNP

## 2023-07-17 NOTE — NURSING NOTE
Chief Complaint   Patient presents with     Follow Up     return core: 62 year old female presents with HFrEF, ef 40% for follow up with labs prior         Vitals were taken, medications reconciled.    Paige Thurner, Facilitator   3:59 PM

## 2023-07-17 NOTE — PATIENT INSTRUCTIONS
Take your medicines every day, as directed    Changes made today:  CHANGE lasix to 10mg two times daily (additional 20mg as needed)  Fiorella would like you to consider empagliflozin for treatment of heart failure     Monitor Your Weight and Symptoms    Contact us if you:    Gain 2 pounds in one day or 5 pounds in one week  Feel more short of breath  Notice more leg swelling  Feel lightheadeded   Change your lifestyle    Limit Salt or Sodium:  2000 mg  Limit Fluids:  2000 mL or approximately 64 ounces  Eat a Heart Healthy Diet  Low in saturated fats  Stay Active:  Aim to move at least 150 minutes every  week         To Contact us    During Business Hours:  [phone], option # 1      After hours, weekends or holidays:   [phone], Option #4  Ask to speak to the On-Call Cardiologist. Inform them you are a CORE/heart failure patient at the [facility].     Use [employer] allows you to communicate directly with your heart team through secure messaging.  [employer] can be accessed any time on your phone, computer, or tablet.  If you need assistance, we'd be happy to help!         Keep your Heart Appointments:    Follow up with Dr Ordonez as scheduled     Please consider attending our virtual support group which is held monthly. Please reach out to Han at [phone] for more information if you are interested in attending.       2023 dates:    Monday, August 7th, 1-2pm  Monday, September 11th, 1-2pm  Monday, October 2nd, 1-2pm  Monday, November 6th, 1-2pm  Monday, December 4th, 1-2pm

## 2023-07-17 NOTE — PROGRESS NOTES
HPI: 62 yr old female [place] [facility] [place] up of mid range low EF (40-45%). She was recently traveling and ended up in the ED in [place] with a panic attack and mild volume overload. She was given lasix with large amount of urine produced and felt better/ she states she feels like she needs more lasix to prevent volume overload.  She has been swimming in the am almost daily which she really enjoys as she used to swim competitively. She was scheduled to have surgery on her L ankle but that is since on hold. She has been losing wt but that is intentional.  She states she is feeling more anxious due to recent marital stress. She feels SOB today, no chest pain. She did have a CT this am for follow up of pulmonary nodules. She is scheduled to see pulmonary in 1 week.   She has been using lasix 20mg almost every day. She feels like she needs it more often.     She has had some LE edema that comes and goes but states that she has pitting edema in the am.   She is planning on moving to [place] - her house is on the market here in [place].      PAST MEDICAL HISTORY:  Past Medical History:   Diagnosis Date     Arthritis      Cardiomyopathy (H)     ff Cardiology     Chronic depressive personality disorder      Congestive heart failure (H) see dr martínez    heart failure correct term     COPD exacerbation (H)      Esophageal reflux      Ex-smoker     quit 2006; 1 PPD x 30     Hyperparathyroidism (H)     s/p parathyroidectomy     Hypothyroidism      LARRY on CPAP     ff Sleep medicine     Pulmonary nodules     ff Pulmonologist     S/P cardiac pacemaker procedure     checked every 6 months at the [facility]     Uncomplicated asthma years       FAMILY HISTORY:  Family History   Problem Relation Age of Onset     Hypothyroidism Mother      Hypertension Mother      Osteoarthritis Mother      Coronary Artery Disease Father         nonfatal MI in his 70s     Asthma Father      Diabetes Sister      Hypothyroidism Sister      Anxiety  Disorder Sister      Breast Cancer Maternal Aunt        SOCIAL HISTORY:  Social History     Socioeconomic History     Marital status:        CURRENT MEDICATIONS:  Current Outpatient Medications   Medication Sig Dispense Refill     acetaminophen (TYLENOL) 325 MG tablet Take 325-650 mg by mouth every 6 hours as needed for mild pain Max of 2000 mg/day       albuterol (PROAIR HFA/PROVENTIL HFA/VENTOLIN HFA) 108 (90 BASE) MCG/ACT Inhaler Inhale 2 puffs into the lungs as needed for shortness of breath or wheezing       albuterol (PROVENTIL) (2.5 MG/3ML) 0.083% neb solution Take 1 vial (2.5 mg) by nebulization every 6 hours as needed for shortness of breath / dyspnea or wheezing 90 mL 0     azelastine (ASTELIN) 0.1 % nasal spray Spray 1 spray into both nostrils 2 times daily as needed (as needed)  0     Calcium Carbonate-Vitamin D (CALCIUM-CARB 600 + D PO) Take 1 tablet by mouth daily       clonazePAM (KLONOPIN) 0.5 MG tablet TAKE 1 TABLET(0.5 MG) BY MOUTH THREE TIMES DAILY AS NEEDED FOR ANXIETY 90 tablet 2     DiphenhydrAMINE HCl (BENADRYL PO) Take 25 mg by mouth nightly as needed for allergies        ELIQUIS ANTICOAGULANT 5 MG tablet TAKE 1 TABLET(5 MG) BY MOUTH TWICE DAILY 180 tablet 3     Ferrous Sulfate (IRON SUPPLEMENT PO) Take 130 mg by mouth daily        fluticasone-salmeterol (ADVAIR) 250-50 MCG/ACT inhaler Wixela Inhub 250 mcg-50 mcg/dose powder for inhalation   INHALE 1 PUFF BY MOUTH TWICE DAILY       furosemide (LASIX) 20 MG tablet Take 1 tablet (20 mg) by mouth daily as needed (when eating out OR if gained 5 lbs) 30 tablet 1     hydrOXYzine (ATARAX) 10 MG tablet Take 1 tablet (10 mg) by mouth 3 times daily as needed for anxiety 180 tablet 1     ipratropium (ATROVENT) 0.06 % nasal spray Spray 2 sprays into both nostrils 4 times daily as needed for rhinitis       MAGNESIUM LACTATE PO Take  mg by mouth daily       MELATONIN PO Take 1-3 mg by mouth nightly as needed        Menaquinone-7 (VITAMIN K2  PO) Take 1 capful by mouth daily       METAMUCIL FIBER PO Take by mouth as needed       metoprolol succinate ER (TOPROL XL) 25 MG 24 hr tablet Take 0.5 tablets (12.5 mg) by mouth daily 45 tablet 3     Multiple Vitamin (DAILY MULTIVITAMIN PO) Take 1 tablet by mouth every morning       nitroGLYcerin (NITROSTAT) 0.4 MG sublingual tablet Place 1 tablet (0.4 mg) under the tongue every 5 minutes as needed for chest pain For chest pain place 1 tablet under the tongue every 5 minutes for 3 doses. If symptoms persist 5 minutes after 1st dose call 911. 25 tablet 1     Probiotic Product (PROBIOTIC DAILY PO) Take 1 capsule by mouth 2 times daily Takes additional doses if has diarrhea       psyllium (METAMUCIL) WAFR Take 2 Wafers by mouth daily       spironolactone (ALDACTONE) 25 MG tablet Take 1 tablet (25 mg) by mouth daily 90 tablet 3     SYNTHROID 150 MCG tablet TAKE 1 TABLET BY MOUTH 6 DAYS A WEEK THEN TAKE 1/2 TABLET BY MOUTH 1 TIME PER WEEK 90 tablet 0     TURMERIC PO Take 1 tablet by mouth every morning        vitamin D3 (CHOLECALCIFEROL) 2000 units tablet Take 1 tablet by mouth daily 1 tablet 0     Zinc 15 MG CAPS Take 15 mg by mouth       hydrocortisone (CORTAID) 1 % external cream Apply topically daily as needed ears (Patient not taking: Reported on 7/17/2023)       triamcinolone (KENALOG) 0.1 % external cream For feet (Patient not taking: Reported on 1/17/2023)       vitamin C (ASCORBIC ACID) 250 MG tablet Take 250 mg by mouth daily (Patient not taking: Reported on 7/17/2023)         ROS:   Constitutional: No fever, chills, or sweats. No weight gain/loss.   ENT: No visual disturbance, ear ache, epistaxis, sore throat.   Allergies/Immunologic: Negative.   Respiratory: No cough, hemoptysis.   Cardiovascular: As per HPI.   GI: No nausea, vomiting, hematemesis, melena, or hematochezia.   : No urinary frequency, dysuria, or hematuria.   Integument: Negative.   Psychiatric: Negative.   Neuro: Negative.   Endocrinology:  "Negative.   Musculoskeletal: Negative.    EXAM:  /82 (BP Location: Right arm, Patient Position: Chair, Cuff Size: Adult Regular)   Pulse 86   Ht 1.675 m (5' 5.95\")   Wt 80.6 kg (177 lb 12.8 oz)   LMP 08/21/2007   SpO2 97%   BMI 28.75 kg/m    General: appears comfortable, alert and articulate  Head: normocephalic, atraumatic  Eyes: anicteric sclera, EOMI  Neck: no adenopathy  Orophyarynx: moist mucosa, no lesions, dentition intact  Heart: regular, S1/S2, no murmur, gallop, rub, estimated JVP 8cm  Lungs: clear, no rales or wheezing  Abdomen: soft, non-tender, bowel sounds present, no hepatosplenomegaly  Extremities: no clubbing, cyanosis or edema  Neurological: normal speech and affect, no gross motor deficits    Labs:  CBC RESULTS:  Lab Results   Component Value Date    WBC 7.4 07/17/2023    WBC 6.6 05/25/2021    RBC 4.37 07/17/2023    RBC 4.33 05/25/2021    HGB 13.8 07/17/2023    HGB 13.7 05/25/2021    HCT 41.4 07/17/2023    HCT 41.2 05/25/2021    MCV 95 07/17/2023    MCV 95 05/25/2021    MCH 31.6 07/17/2023    MCH 31.6 05/25/2021    MCHC 33.3 07/17/2023    MCHC 33.3 05/25/2021    RDW 12.2 07/17/2023    RDW 11.9 05/25/2021     07/17/2023     05/25/2021       CMP RESULTS:  Lab Results   Component Value Date     07/17/2023     05/25/2021    POTASSIUM 4.1 07/17/2023    POTASSIUM 4.2 06/07/2022    POTASSIUM 4.2 05/25/2021    CHLORIDE 103 07/17/2023    CHLORIDE 106 06/07/2022    CHLORIDE 107 05/25/2021    CO2 26 07/17/2023    CO2 27 06/07/2022    CO2 24 05/25/2021    ANIONGAP 10 07/17/2023    ANIONGAP 8 06/07/2022    ANIONGAP 7 05/25/2021    GLC 90 07/17/2023    GLC 90 06/07/2022    GLC 92 05/25/2021    BUN 15.1 07/17/2023    BUN 9 06/07/2022    BUN 12 05/25/2021    CR 0.79 07/17/2023    CR 0.64 05/25/2021    GFRESTIMATED 83 07/17/2023    GFRESTIMATED >90 05/25/2021    GFRESTBLACK >90 05/25/2021    MANJU 10.0 07/17/2023    MANJU 8.7 05/25/2021    BILITOTAL 0.2 07/17/2023    BILITOTAL 0.4 " 2021    ALBUMIN 4.5 2023    ALBUMIN 3.5 2022    ALBUMIN 3.4 2021    ALKPHOS 106 (H) 2023    ALKPHOS 114 2021    ALT 13 2023    ALT 29 2021    AST 16 2023    AST 17 2021        INR RESULTS:  Lab Results   Component Value Date    INR 1.08 2023    INR 1.02 10/06/2019       Lab Results   Component Value Date    MAG 2.3 2022    MAG 2.3 2020     Lab Results   Component Value Date    NTBNPI 72 01/10/2023    NTBNPI 107 2020     Lab Results   Component Value Date    NTBNP 91 2022    NTBNP 156 (H) 2021       Lab Results   Component Value Date    REGINA 263 (H) 2022    REGINA 235 2020    IRON 68 2022    IRON 111 2020     2022     2020       Results for orders placed in visit on 22    Echocardiogram Complete    Narrative  [id number]  [id number]  [id number]  [id number]^DEDRA^STUART^IRINEO    [facility]  Diagnostic and [facility] Floor  [address] [place] [id number]    Name: MARCELO DAVIS  MRN: [id number]  : 1960  Study Date: 2022 09:43 AM  Age: 61 yrs  Gender: Female  Patient Location: [facility]  Reason For Study: Left ventricular dysfunction with reduced left ventricular  funct  Ordering Physician: STUART COLMENARES  Referring Physician: STUART COLMENARES  Performed By: Tyler Gong    BSA: 1.9 m2  Height: 65 in  Weight: 172 lb  BP: 120/73 mmHg  ______________________________________________________________________________  Procedure  Echocardiogram with two-dimensional, color and spectral Doppler performed.  Adequate quality two-dimensional was performed and interpreted.  ______________________________________________________________________________  Interpretation Summary  Left ventricular function is decreased. The ejection fraction is 40-45%  (mildly reduced).  Abnormal septal motion consistent with left  bundle branch block is present.  Right ventricular function, chamber size, wall motion, and thickness are  normal.  This study was compared with the study from 11/23/21 .  No significant changes noted.  ______________________________________________________________________________  Left Ventricle  Left ventricular size is normal. Left ventricular wall thickness is normal.  Left ventricular function is decreased. The ejection fraction is 40-45%  (mildly reduced). Left ventricular diastolic function is indeterminate.  Abnormal septal motion consistent with left bundle branch block is present.    Right Ventricle  Right ventricular function, chamber size, wall motion, and thickness are  normal. A pacemaker lead is noted in the right ventricle.    Atria  Both atria appear normal. A pacemaker lead is noted in the right atrium.    Mitral Valve  Mild mitral insufficiency is present.    Aortic Valve  Aortic valve is normal in structure and function.    Tricuspid Valve  The tricuspid valve is normal. Trace tricuspid insufficiency is present. The  right ventricular systolic pressure is approximated at 23.9 mmHg plus the  right atrial pressure.    Pulmonic Valve  The pulmonic valve cannot be assessed.    Vessels  The aorta root is normal. The inferior vena cava was normal in size with  preserved respiratory variability. IVC diameter <2.1 cm collapsing >50% with  sniff suggests a normal RA pressure of 3 mmHg.    Pericardium  No pericardial effusion is present.    Compared to Previous Study  This study was compared with the study from 11/23/21 . No significant changes  noted.  ______________________________________________________________________________  MMode/2D Measurements & Calculations    IVSd: 0.93 cm  LVIDd: 4.8 cm  LVIDs: 2.8 cm  LVPWd: 0.74 cm  FS: 40.5 %  LV mass(C)d: 133.0 grams  LV mass(C)dI: 71.7 grams/m2  Ao root diam: 2.6 cm  LA dimension: 3.2 cm  LA/Ao: 1.2  LVOT diam: 1.8 cm  LVOT area: 2.6 cm2  LA Volume (BP):  47.8 ml  LA Volume Index (BP): 25.7 ml/m2  RWT: 0.31    Doppler Measurements & Calculations  MV E max veronique: 62.1 cm/sec  MV A max veronique: 72.7 cm/sec  MV E/A: 0.85  MV dec slope: 298.8 cm/sec2  MV dec time: 0.21 sec  PA acc time: 0.15 sec  TR max veronique: 244.3 cm/sec  TR max P.9 mmHg  E/E' av.5  Lateral E/e': 9.1  Medial E/e': 7.9    ______________________________________________________________________________  Report approved by: Aki Aguilar 2022 01:06 PM       Assessment and Plan:   1. Chronic mid range and diastolic heart failure secondary to Takosubo .    Stage C  NYHA Class III  ACEi/ARB no  BB yes  Aldosterone antagonist yes  SGLT2i: discussed starting, but patient would like to consider and discuss with Dr. Ordonez.   SCD prophylaxis CRT-P   Fluid status euvolemic  NSAID use: no  Sleep Apnea Evaluation: uses CPAP    2. RF ablation for PVCs which led to AV node ablation and complete heart block requiring a dual-chamber pacemaker implantation, which is a [employer] device MRI compatible.      3. Depression: managed per pt    4. Paroxysmal atrial fib: rate controlled/on Eliquis/  Follow-up  With Dr. Ordonez next week.

## 2023-07-19 ENCOUNTER — OFFICE VISIT (OUTPATIENT)
Dept: CARDIOLOGY | Facility: CLINIC | Age: 63
End: 2023-07-19
Attending: NURSE PRACTITIONER
Payer: MEDICARE

## 2023-07-19 ENCOUNTER — ANCILLARY PROCEDURE (OUTPATIENT)
Dept: CARDIOLOGY | Facility: CLINIC | Age: 63
End: 2023-07-19
Attending: INTERNAL MEDICINE
Payer: MEDICARE

## 2023-07-19 VITALS
WEIGHT: 176.3 LBS | HEART RATE: 93 BPM | SYSTOLIC BLOOD PRESSURE: 113 MMHG | OXYGEN SATURATION: 96 % | DIASTOLIC BLOOD PRESSURE: 81 MMHG | BODY MASS INDEX: 28.5 KG/M2

## 2023-07-19 DIAGNOSIS — I50.30 HEART FAILURE WITH PRESERVED EJECTION FRACTION, BORDERLINE, CLASS III (H): ICD-10-CM

## 2023-07-19 DIAGNOSIS — I48.0 PAROXYSMAL ATRIAL FIBRILLATION (H): ICD-10-CM

## 2023-07-19 DIAGNOSIS — I44.2 COMPLETE HEART BLOCK (H): ICD-10-CM

## 2023-07-19 DIAGNOSIS — Z95.0 CARDIAC PACEMAKER IN SITU: ICD-10-CM

## 2023-07-19 LAB
BI-PLANE LVEF ECHO: NORMAL
LVEF ECHO: NORMAL

## 2023-07-19 PROCEDURE — G0463 HOSPITAL OUTPT CLINIC VISIT: HCPCS | Performed by: NURSE PRACTITIONER

## 2023-07-19 PROCEDURE — 99213 OFFICE O/P EST LOW 20 MIN: CPT | Mod: 25 | Performed by: NURSE PRACTITIONER

## 2023-07-19 PROCEDURE — 93306 TTE W/DOPPLER COMPLETE: CPT | Performed by: INTERNAL MEDICINE

## 2023-07-19 PROCEDURE — 93281 PM DEVICE PROGR EVAL MULTI: CPT | Performed by: INTERNAL MEDICINE

## 2023-07-19 ASSESSMENT — PAIN SCALES - GENERAL: PAINLEVEL: NO PAIN (0)

## 2023-07-19 NOTE — RESULT ENCOUNTER NOTE
Jose Luis Cook,    Thank you for your recent office visit.    Here are your recent results.  CT results ordered by Dr. Arechiga      IMPRESSION:   1.  Multiple bilateral pulmonary nodules again noted. Cluster of  nodularity at the right upper lobe again seen with some nodules that  are larger and others that are smaller. This most likely relates to an  infectious or inflammatory cause. There are other multiple stable  nodules bilaterally. As some nodules are larger, recommend follow-up  CT chest in 3-6 months for surveillance.  2.  No other acute abnormality identified.    Feel free to contact me via [employer] or call the clinic at [phone].    Sincerely,    Ce Crowe, SHAYNA, FNP-BC

## 2023-07-19 NOTE — LETTER
7/19/2023      RE: Joyce Marroquin  [id number] [facility] [id number]       Dear Colleague,    Thank you for the opportunity to participate in the care of your patient, Joyce Marroquin, at the [facility] [place] at [facility]. Please see a copy of my visit note below.    Electrophysiology Clinic Note  HPI:   Ms. Marroquin is a 62 year old female who has a past medical history significant for GERD, PTSD, Grave's Disease with post ablative hypothyroidism, hyperparathyroidism, LARRY (uses CPAP), NICM LVEF 35-40%, PAF (CHADSVASC 2), depression, prior tobacco use, and PVCs s/p RVOT ablation complicated by CHB s/p PPM 4/21/2017 s/p upgrade to CRT (LB) 1/19/22, and pericarditis. She presents today for follow up.      She reports a history of symptomatic PVCs for which she ultimately elected to pursue an ablation which she had done at [facility]. 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 She went into [facility] and required urgent PPM placed in 4/21/17. She then had lead dislodgement in the recovery area requiring lead revision. She had pericarditis and completed a course of colchicine post ablation. Initial echo post ablation showed LVEF 40-45%, then an echo a month later showed LVEF 50%. MRI in early 4/2017 showed LVEF 60-65% with area of basal inferior hypokinesis with possible LGE. Stress test from 3/2017 showed no inducible ischemia. Echo from 5/2018 showed LVEF 50-55%, mild diffuse hypokinesis, normal RV size and function. She had some pain at pacemaker site and reported it was too close to her collarbone. She elected to undergo a pocket revision in 12/2017. Ever since her initial pacemaker implant she has struggled with episodes of dizziness/lightheadedness and fatigue. No episodes of syncope. She had an updated echo in 2019 that showed LVEF decreased to 30-35% with all segments  severely hypokinetic to akinetic, suggestive of stress cardiomyopathy but could not rule out ischemic cardiomyopathy. She underwent coronary angiogram that showed mild/moderate non-obstructive CAD involving LAD, LCx, and RCA with no significant CAD to explain new cardiomyopathy. Recommended her to get CMRI; however, she wanted to defer. Therefore, treated her for stress cardiomyopathy. Started her on Toprol XL, she has only tolerated small doses in the past. This was later changed to low dose Coreg. She was also placed on low dose lisinopril. Hypotension has limited up titration of neurohormal agents. A follow up echo in 12/2019 showed LVEF 40-45%, grade I diastolic dysfunction, and mild diffuse hypokinesis. She followed up with Dr. Funes in 2/2020 and reported that since 1/2020 she noticed a decrease in her wellbeing and exercise tolerance. She found even pushing a cart had become more difficult and felt more short of breath. A follow up echo in 3/2020 showed LVEF 40-45%, normal RV size/function unchanged from 12/2019 echo. Due to progressively worsening symptoms, she underwent exercise RHC which showed normal biventricular and pulmonary pressures, normal CO, and limited exercise capacity with significant rise in PCW from 10 to 31 mmHg and PA pressure mean of 21 to 38 mmHg c/w exercise induced pulmonary HTN. She was started on spironolactone. She has been on Lasix 20-40 mg as needed for weight gain or SOB and volume status fluctuations. A follow up echo in 2/19/21 showed LVEF 35-40%, normal RV size/function, and no significant valvular abnormalities. She has had low burden of short PAF episodes noted on device that she mentioned to her care team and was subsequently started on Eliquis. She continued to have progressively worsening exercise tolerance, SOB/EASTON, and decreased stamina. She has recently seen Dr. Funes and discussed possible CRT upgrade. She has also seen Dr. Ordonez and Dr. Brown for HF consults.      EP Visit 4/21/21: She is following up today. She reports feeling at baseline. She continues to feel frustrated with her current symptomology and wants to improve her SOB and exertional capacities so she can return to work. Currently, these symptoms have been very limiting to her. She denies any chest pain/pressures, PND, orthopnea, palpitations, or syncopal symptoms. Device interrogation showed normal device function, stable lead parameters, AP 50.8%,  97.6%, AT/AF <0.1% none over 1 minute, and 1 NSVT. She has been talking to her psychologist/PCP about starting Cymbalta. Current cardiac medications include: Eliquis, Coreg, Lasix, Spironolactone.     EP Visit 6/23/21: She presents today for follow up. CMRI showed LVEF 50% with no myocardial fibrosis. Her CMRI report has wrong body weight/height and will request this is updated correctly. She has followed with Dr. Funes and Dr. Ordonez. Dr. Funes started her on sildenafil for possible exercise induced pulmonary HTN. She took the medication for a few doses but had extreme muscle pains on it. They would like her to try a lower dose but she is awaiting for approval from insurance for this. She has done acupuncture and feels this has been helpful to her. She does continue to struggle with activity intolerance 2/2 EASTON/SOB. Device clinic had ajusted her rate response in attempt to improve forward flow. Her weight has been stable. She denies any chest pain/pressures, leg/ankle swelling, PND, orthopnea, palpitations, or syncopal symptoms. Device interrogation shows normal device function, stable lead parameters, intrinsic rhythm is SR with PACs/, 100% , HR histograms flat she endorses she has not been very active recently. She has been taking Cymbalta now and feels some confusion with medication but does feel her emotions are less labile. Current cardiac medications include: Eliquis, Toprol XL, Lasix, Spironolactone.     EP Visit 8/11/21: She presents today for  follow up. She was in ER with abdominal pain and She was seen 2 days ago and the CT or her abd/pelvis reveals dilated loops of bowel that was concerning for ileus versus early SBO. Repeat CT a couple days later showed resolution of ileus.  She is interested in increasing her Cymbalta. She does feel this has helped her mental health and she continues to follow with a counselor. She reports feeling some liquid stools, SOB, and abdominal distention which is frustrating. She is seeing her PCP tomorrow for evaluations. She is trying to get out and see different [place] with her  and be as active as she can, but has to pace herself and sit to take breaks. She admits she forgets to take the revatio, particularly the afternoon dose at times. She denies any chest pain/pressures, dizziness, lightheadedness, leg/ankle swelling, PND, orthopnea, palpitations, or syncopal symptoms. Recent 12 lead ECG shows  Vent Rate 75 bpm,  ms,  ms, QTc 513 ms.  Current cardiac medications include: Eliquis, Toprol XL, Revatio, Lasix, Spironolactone.     She presents today for follow up. She is planning to move to [place]. She has some intermittent edema in her BLE. She feels volume up and saw Fiorella Swanson CNP yesterday who adjusted her diuretics. She follows with Dr. Ordonez and [facility] here. Device interrogation shows normal device function, stable lead parameters 1 NSVT 8 beats, and AP 46%, BVP 99.9%. Current cardiac medications include: Eliquis, Toprol XL, Revatio, Lasix, Spironolactone.         PAST MEDICAL HISTORY:  Past Medical History:   Diagnosis Date     Arthritis      Cardiomyopathy (H)     ff Cardiology     Chronic depressive personality disorder      Congestive heart failure (H) see dr martínez    heart failure correct term     COPD exacerbation (H)      Esophageal reflux      Ex-smoker     quit 2006; 1 PPD x 30     Hyperparathyroidism (H)     s/p parathyroidectomy     Hypothyroidism      LARRY on CPAP      ff Sleep medicine     Pulmonary nodules     ff Pulmonologist     S/P cardiac pacemaker procedure     [facility]      Uncomplicated asthma years       CURRENT MEDICATIONS:  Current Outpatient Medications   Medication Sig Dispense Refill     acetaminophen (TYLENOL) 325 MG tablet Take 325-650 mg by mouth every 6 hours as needed for mild pain Max of 2000 mg/day       albuterol (PROAIR HFA/PROVENTIL HFA/VENTOLIN HFA) 108 (90 BASE) MCG/ACT Inhaler Inhale 2 puffs into the lungs as needed for shortness of breath or wheezing       albuterol (PROVENTIL) (2.5 MG/3ML) 0.083% neb solution Take 1 vial (2.5 mg) by nebulization every 6 hours as needed for shortness of breath / dyspnea or wheezing 90 mL 0     apixaban ANTICOAGULANT (ELIQUIS ANTICOAGULANT) 5 MG tablet Take 1 tablet (5 mg) by mouth 2 times daily 180 tablet 3     azelastine (ASTELIN) 0.1 % nasal spray Spray 1 spray into both nostrils 2 times daily as needed (as needed)  0     Calcium Carbonate-Vitamin D (CALCIUM-CARB 600 + D PO) Take 1 tablet by mouth daily       clonazePAM (KLONOPIN) 0.5 MG tablet TAKE 1 TABLET(0.5 MG) BY MOUTH THREE TIMES DAILY AS NEEDED FOR ANXIETY 90 tablet 2     DiphenhydrAMINE HCl (BENADRYL PO) Take 25 mg by mouth nightly as needed for allergies        Ferrous Sulfate (IRON SUPPLEMENT PO) Take 130 mg by mouth daily        fluticasone-salmeterol (ADVAIR) 250-50 MCG/ACT inhaler Wixela Inhub 250 mcg-50 mcg/dose powder for inhalation   INHALE 1 PUFF BY MOUTH TWICE DAILY       furosemide (LASIX) 20 MG tablet Take 0.5 tablets (10 mg) by mouth 2 times daily Take extra 20mg with weight gain prn. 90 tablet 3     hydrocortisone (CORTAID) 1 % external cream Apply topically daily as needed ears (Patient not taking: Reported on 7/17/2023)       hydrOXYzine (ATARAX) 10 MG tablet Take 1 tablet (10 mg) by mouth 3 times daily as needed for anxiety 180 tablet 1     ipratropium (ATROVENT) 0.06 % nasal spray Spray 2 sprays into both  nostrils 4 times daily as needed for rhinitis       MAGNESIUM LACTATE PO Take  mg by mouth daily       MELATONIN PO Take 1-3 mg by mouth nightly as needed        Menaquinone-7 (VITAMIN K2 PO) Take 1 capful by mouth daily       METAMUCIL FIBER PO Take by mouth as needed       metoprolol succinate ER (TOPROL XL) 25 MG 24 hr tablet Take 0.5 tablets (12.5 mg) by mouth daily 90 tablet 3     Multiple Vitamin (DAILY MULTIVITAMIN PO) Take 1 tablet by mouth every morning       nitroGLYcerin (NITROSTAT) 0.4 MG sublingual tablet Place 1 tablet (0.4 mg) under the tongue every 5 minutes as needed for chest pain For chest pain place 1 tablet under the tongue every 5 minutes for 3 doses. If symptoms persist 5 minutes after 1st dose call 911. 25 tablet 1     Probiotic Product (PROBIOTIC DAILY PO) Take 1 capsule by mouth 2 times daily Takes additional doses if has diarrhea       psyllium (METAMUCIL) WAFR Take 2 Wafers by mouth daily       spironolactone (ALDACTONE) 25 MG tablet Take 1 tablet (25 mg) by mouth daily 90 tablet 3     SYNTHROID 150 MCG tablet TAKE 1 TABLET BY MOUTH 6 DAYS A WEEK THEN TAKE 1/2 TABLET BY MOUTH 1 TIME PER WEEK 90 tablet 0     triamcinolone (KENALOG) 0.1 % external cream For feet (Patient not taking: Reported on 1/17/2023)       TURMERIC PO Take 1 tablet by mouth every morning        vitamin C (ASCORBIC ACID) 250 MG tablet Take 250 mg by mouth daily (Patient not taking: Reported on 7/17/2023)       vitamin D3 (CHOLECALCIFEROL) 2000 units tablet Take 1 tablet by mouth daily 1 tablet 0     Zinc 15 MG CAPS Take 15 mg by mouth         PAST SURGICAL HISTORY:  Past Surgical History:   Procedure Laterality Date     BUNIONECTOMY Bilateral      COLONOSCOPY N/A 8/30/2021    Procedure: COLONOSCOPY, WITH POLYPECTOMY AND BIOPSY;  Surgeon: Ranjit Penn MD;  Location:  GI     CV CORONARY ANGIOGRAM N/A 9/26/2019    Procedure: CV CORONARY ANGIOGRAM;  Surgeon: Erickson Trejo MD;  Location:  HEART  CARDIAC CATH LAB     CV RIGHT HEART CATH MEASUREMENTS RECORDED N/A 7/20/2020    Procedure: CV RIGHT HEART CATH;  Surgeon: Alonso Ordonez MD;  Location:  HEART CARDIAC CATH LAB     EP ABLATION / EP STUDIES  04/21/2017     EP PPM UPGRADE TO BIVENT N/A 1/19/2022    Procedure: EP PPM UPGRADE TO BIVENT;  Surgeon: Lino Funes MD;  Location:  HEART CARDIAC CATH LAB     EXPLORE NECK N/A 9/19/2018    Procedure: EXPLORE NECK;  Neck Exploration Resection of Left superior Parathyroid gland;  Surgeon: Kristine Venegas MD;  Location: [facility] OR      CORRECT BUNION,SIMPLE       PARATHYROIDECTOMY N/A 9/19/2018    Procedure: PARATHYROIDECTOMY;;  Surgeon: Kristine Veneags MD;  Location: [facility] OR     PPM INSERT OF NEW OR REPL W/VENT LEAD  04/21/2017     TONSILLECTOMY & ADENOIDECTOMY         ALLERGIES:     Allergies   Allergen Reactions     Tetracycline Swelling     Tongue swelling (teramycin)     Viagra [Sildenafil] Muscle Pain (Myalgia) and Diarrhea     Zofran [Ondansetron]      Prolonged QT  Prolonged QT at baseline per patient     Flagyl [Metronidazole] Rash     Pruritic rash      Buspar [Buspirone]      Muscle weakness, diarrhea     Clindamycin Diarrhea     Can exacerbate colitis     Corn Oil Other (See Comments)     Headache       Duloxetine Hcl Other (See Comments) and Diarrhea     Confusion     Prozac [Fluoxetine] Diarrhea     Seasonal Allergies Difficulty breathing     Sulfamethoxazole-Trimethoprim      Other reaction(s): Other  Passed out     Cyclobenzaprine Other (See Comments)     Extreme heat intolerance     Levaquin [Levofloxacin]      Other reaction(s): Other  Possible Tendon rupture (vs surgery)     Nsaids Other (See Comments)     Exacerbated asthma       FAMILY HISTORY:  Family History   Problem Relation Age of Onset     Hypothyroidism Mother      Hypertension Mother      Osteoarthritis Mother      Coronary Artery Disease Father         nonfatal MI in his 70s     Asthma Father      Diabetes Sister       Hypothyroidism Sister      Anxiety Disorder Sister      Breast Cancer Maternal Aunt        SOCIAL HISTORY:  Social History     Tobacco Use     Smoking status: Former     Packs/day: 0.00     Years: 0.00     Pack years: 0.00     Types: Cigarettes     Smokeless tobacco: Never   Vaping Use     Vaping Use: Never used   Substance Use Topics     Alcohol use: Not Currently     Alcohol/week: 0.0 - 8.0 standard drinks of alcohol     Comment: seldom     Drug use: No       ROS:   A comprehensive 10 point review of systems negative other than as mentioned in HPI.  Exam:  /81 (BP Location: Right arm, Patient Position: Sitting, Cuff Size: Adult Large)   Pulse 93   Wt 80 kg (176 lb 4.8 oz)   LMP 08/21/2007   SpO2 96%   BMI 28.50 kg/m    GENERAL APPEARANCE: alert and no distress  HEENT: MMM. PERRLA.  NECK: supple.   RESPIRATORY:no use of accessory muscles, no retractions, respirations are unlabored, normal respiratory rate  CARDIOVASCULAR: device shows regular .   ABDOMEN: ND.  EXTREMITIES: no edema  NEURO: alert and oriented to person/place/time, normal speech, gait and affect  SKIN: no ecchymoses, no rashes  PSYCH: normal affect, cooperative    Labs:  Reviewed.     Testing/Procedures:  7/2020 RHC:  Conclusion     Right sided filling pressures are normal.  Normal PA pressures.  Left sided filling pressures are normal.  Normal cardiac index at [facility] with thermodilution  Limited exercise of 4 minutes and 20 seconds that propted a significant rise in patients PCWP as well as PA pressures consistent with diastolic dysfunction.         2/19/21 ECHOCARDIOGRAM:   Interpretation Summary     Moderately (EF 35%) reduced left ventricular function is present.  Global right ventricular function is normal.  No significant valvular dysfunction.  The inferior vena cava was normal in size with preserved respiratory  variability.  No pericardial effusion present.    5/4/21 CMRI:  1. The LV is normal in cavity size and wall thickness.  The global systolic function is mildly reduced. The  LVEF is 50%. There are no regional wall motion abnormalities. There is abnormal septal motion related to  pacing.     2. The RV is normal in cavity size. The global systolic function is normal. The RVEF is 65%.      3. The left atrium is mildly dilated.     4. There is no significant valvular disease.      5. Late gadolinium enhancement imaging shows no MI, fibrosis or infiltrative disease.      6. Regadenoson stress perfusion imaging shows no ischemia.     7. There is no pericardial effusion or thickening.     8. There is no LV thrombus.     CONCLUSIONS: No myocardial ischemia or fibrosis. Mild NICM possibly related to pacing, LVEF 50% and RVEF  65%.     Assessment and Plan:   Ms. Marroquin is a 62 year old female who has a past medical history significant for GERD, PTSD, Grave's Disease with post ablative hypothyroidism, hyperparathyroidism, LARRY (uses CPAP), NICM LVEF 35-40%, PAF (CHADSVASC 2), depression, prior tobacco use, and PVCs s/p RVOT ablation complicated by CHB s/p PPM 4/21/2017 s/p upgrade to CRT (LB) 1/19/22, and pericarditis.  She presents today for follow up.     NICM LVEF 30-35%, improved to 50% then to 40% NYHA III:  Unclear etiology, consider pacing induced or primary NICM.   1. ACEi/ARB: had not tolerated lisinopril d/t hypotension   2. BB: Continue Toprol XL  3. Aldosterone antagonist: continue spironolactone.  4. SCD prophylaxis: LVEF >35% at this time does not have indication for ICD. Had upgrade to CRT (LB) pacing 1/19/22. BVP 99%  5. Fluid status: Managed by [facility].   6. On revatio for exercise induced pulmonary HTN  7. Following with HF team/[facility] clinic         CHB s/p PPM 4/2017:   1. Pacemaker is functioning well with stable lead parameters. No sustained arrhythmias.  2. She will continue to follow with device clinic per routine.   3. High  %, has LVEF that has waxed and waned over time, had CRT (LB) lead placed. Good BVP %.      Follow up  on an as needed basis given [place].    The patient states understanding and is agreeable with plan.     Adelaida Slater, SHAYNA CNP  Pager: [phone]  CC  [facility]ES

## 2023-07-19 NOTE — RESULT ENCOUNTER NOTE
Jose Luis Cook,    Thank you for your recent office visit.    Here are your recent results.  Normal thyroid level    Feel free to contact me via [employer] or call the clinic at [phone].    Sincerely,    SHAYNA Simon, FNP-BC

## 2023-07-19 NOTE — NURSING NOTE
Chief Complaint   Patient presents with     Follow Up     Return EP 1 year follow up          Vitals were taken and medications reconciled.     Brad Chandler, Visit Facilitator    8:34 AM

## 2023-07-19 NOTE — PATIENT INSTRUCTIONS
It was a pleasure to see you in clinic today.  Please do not hesitate to call with any questions or concerns.  We look forward to seeing you in clinic at your next device check.    Stacia Lang, RN, BSN  Electrophysiology Nurse Clinician  [facility]    During Business Hours Please Call:  [phone]  After Hours Please Call:  [phone] - select option #4 and ask for job code [phone]

## 2023-07-19 NOTE — PROGRESS NOTES
Electrophysiology Clinic Note  HPI:   Ms. Marroquin is a 62 year old female who has a past medical history significant for GERD, PTSD, Grave's Disease with post ablative hypothyroidism, hyperparathyroidism, LARRY (uses CPAP), NICM LVEF 35-40%, PAF (CHADSVASC 2), depression, prior tobacco use, and PVCs s/p RVOT ablation complicated by CHB s/p PPM 4/21/2017 s/p upgrade to CRT (LB) 1/19/22, and pericarditis. She presents today for follow up.      She reports a history of symptomatic PVCs for which she ultimately elected to pursue an ablation which she had done at [facility]. 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 She went into [facility] and required urgent PPM placed in 4/21/17. She then had lead dislodgement in the recovery area requiring lead revision. She had pericarditis and completed a course of colchicine post ablation. Initial echo post ablation showed LVEF 40-45%, then an echo a month later showed LVEF 50%. MRI in early 4/2017 showed LVEF 60-65% with area of basal inferior hypokinesis with possible LGE. Stress test from 3/2017 showed no inducible ischemia. Echo from 5/2018 showed LVEF 50-55%, mild diffuse hypokinesis, normal RV size and function. She had some pain at pacemaker site and reported it was too close to her collarbone. She elected to undergo a pocket revision in 12/2017. Ever since her initial pacemaker implant she has struggled with episodes of dizziness/lightheadedness and fatigue. No episodes of syncope. She had an updated echo in 2019 that showed LVEF decreased to 30-35% with all segments severely hypokinetic to akinetic, suggestive of stress cardiomyopathy but could not rule out ischemic cardiomyopathy. She underwent coronary angiogram that showed mild/moderate non-obstructive CAD involving LAD, LCx, and RCA with no significant CAD to explain new cardiomyopathy. Recommended her to get CMRI; however, she wanted to defer.  Therefore, treated her for stress cardiomyopathy. Started her on Toprol XL, she has only tolerated small doses in the past. This was later changed to low dose Coreg. She was also placed on low dose lisinopril. Hypotension has limited up titration of neurohormal agents. A follow up echo in 12/2019 showed LVEF 40-45%, grade I diastolic dysfunction, and mild diffuse hypokinesis. She followed up with Dr. Funes in 2/2020 and reported that since 1/2020 she noticed a decrease in her wellbeing and exercise tolerance. She found even pushing a cart had become more difficult and felt more short of breath. A follow up echo in 3/2020 showed LVEF 40-45%, normal RV size/function unchanged from 12/2019 echo. Due to progressively worsening symptoms, she underwent exercise RHC which showed normal biventricular and pulmonary pressures, normal CO, and limited exercise capacity with significant rise in PCW from 10 to 31 mmHg and PA pressure mean of 21 to 38 mmHg c/w exercise induced pulmonary HTN. She was started on spironolactone. She has been on Lasix 20-40 mg as needed for weight gain or SOB and volume status fluctuations. A follow up echo in 2/19/21 showed LVEF 35-40%, normal RV size/function, and no significant valvular abnormalities. She has had low burden of short PAF episodes noted on device that she mentioned to her care team and was subsequently started on Eliquis. She continued to have progressively worsening exercise tolerance, SOB/EASTON, and decreased stamina. She has recently seen Dr. Funes and discussed possible CRT upgrade. She has also seen Dr. Ordonez and Dr. Brown for HF consults.     EP Visit 4/21/21: She is following up today. She reports feeling at baseline. She continues to feel frustrated with her current symptomology and wants to improve her SOB and exertional capacities so she can return to work. Currently, these symptoms have been very limiting to her. She denies any chest pain/pressures, PND, orthopnea,  palpitations, or syncopal symptoms. Device interrogation showed normal device function, stable lead parameters, AP 50.8%,  97.6%, AT/AF <0.1% none over 1 minute, and 1 NSVT. She has been talking to her psychologist/PCP about starting Cymbalta. Current cardiac medications include: Eliquis, Coreg, Lasix, Spironolactone.     EP Visit 6/23/21: She presents today for follow up. CMRI showed LVEF 50% with no myocardial fibrosis. Her CMRI report has wrong body weight/height and will request this is updated correctly. She has followed with Dr. Funes and Dr. Ordonez. Dr. Funes started her on sildenafil for possible exercise induced pulmonary HTN. She took the medication for a few doses but had extreme muscle pains on it. They would like her to try a lower dose but she is awaiting for approval from insurance for this. She has done acupuncture and feels this has been helpful to her. She does continue to struggle with activity intolerance 2/2 EASTON/SOB. Device clinic had ajusted her rate response in attempt to improve forward flow. Her weight has been stable. She denies any chest pain/pressures, leg/ankle swelling, PND, orthopnea, palpitations, or syncopal symptoms. Device interrogation shows normal device function, stable lead parameters, intrinsic rhythm is SR with PACs/, 100% , HR histograms flat she endorses she has not been very active recently. She has been taking Cymbalta now and feels some confusion with medication but does feel her emotions are less labile. Current cardiac medications include: Eliquis, Toprol XL, Lasix, Spironolactone.     EP Visit 8/11/21: She presents today for follow up. She was in ER with abdominal pain and She was seen 2 days ago and the CT or her abd/pelvis reveals dilated loops of bowel that was concerning for ileus versus early SBO. Repeat CT a couple days later showed resolution of ileus.  She is interested in increasing her Cymbalta. She does feel this has helped her mental health and  she continues to follow with a counselor. She reports feeling some liquid stools, SOB, and abdominal distention which is frustrating. She is seeing her PCP tomorrow for evaluations. She is trying to get out and see different [place] with her  and be as active as she can, but has to pace herself and sit to take breaks. She admits she forgets to take the revatio, particularly the afternoon dose at times. She denies any chest pain/pressures, dizziness, lightheadedness, leg/ankle swelling, PND, orthopnea, palpitations, or syncopal symptoms. Recent 12 lead ECG shows  Vent Rate 75 bpm,  ms,  ms, QTc 513 ms.  Current cardiac medications include: Eliquis, Toprol XL, Revatio, Lasix, Spironolactone.     She presents today for follow up. She is planning to move to [place]. She has some intermittent edema in her BLE. She feels volume up and saw Fiorella Swanson CNP yesterday who adjusted her diuretics. She follows with Dr. Ordonez and [facility] here. Device interrogation shows normal device function, stable lead parameters 1 NSVT 8 beats, and AP 46%, BVP 99.9%. Current cardiac medications include: Eliquis, Toprol XL, Revatio, Lasix, Spironolactone.         PAST MEDICAL HISTORY:  Past Medical History:   Diagnosis Date     Arthritis      Cardiomyopathy (H)     ff Cardiology     Chronic depressive personality disorder      Congestive heart failure (H) see dr martínez    heart failure correct term     COPD exacerbation (H)      Esophageal reflux      Ex-smoker     quit 2006; 1 PPD x 30     Hyperparathyroidism (H)     s/p parathyroidectomy     Hypothyroidism      LARRY on CPAP     ff Sleep medicine     Pulmonary nodules     ff Pulmonologist     S/P cardiac pacemaker procedure     checked every 6 months at [facility]     Uncomplicated asthma years       CURRENT MEDICATIONS:  Current Outpatient Medications   Medication Sig Dispense Refill     acetaminophen (TYLENOL) 325 MG tablet Take 325-650 mg by mouth every 6  hours as needed for mild pain Max of 2000 mg/day       albuterol (PROAIR HFA/PROVENTIL HFA/VENTOLIN HFA) 108 (90 BASE) MCG/ACT Inhaler Inhale 2 puffs into the lungs as needed for shortness of breath or wheezing       albuterol (PROVENTIL) (2.5 MG/3ML) 0.083% neb solution Take 1 vial (2.5 mg) by nebulization every 6 hours as needed for shortness of breath / dyspnea or wheezing 90 mL 0     apixaban ANTICOAGULANT (ELIQUIS ANTICOAGULANT) 5 MG tablet Take 1 tablet (5 mg) by mouth 2 times daily 180 tablet 3     azelastine (ASTELIN) 0.1 % nasal spray Spray 1 spray into both nostrils 2 times daily as needed (as needed)  0     Calcium Carbonate-Vitamin D (CALCIUM-CARB 600 + D PO) Take 1 tablet by mouth daily       clonazePAM (KLONOPIN) 0.5 MG tablet TAKE 1 TABLET(0.5 MG) BY MOUTH THREE TIMES DAILY AS NEEDED FOR ANXIETY 90 tablet 2     DiphenhydrAMINE HCl (BENADRYL PO) Take 25 mg by mouth nightly as needed for allergies        Ferrous Sulfate (IRON SUPPLEMENT PO) Take 130 mg by mouth daily        fluticasone-salmeterol (ADVAIR) 250-50 MCG/ACT inhaler Wixela Inhub 250 mcg-50 mcg/dose powder for inhalation   INHALE 1 PUFF BY MOUTH TWICE DAILY       furosemide (LASIX) 20 MG tablet Take 0.5 tablets (10 mg) by mouth 2 times daily Take extra 20mg with weight gain prn. 90 tablet 3     hydrocortisone (CORTAID) 1 % external cream Apply topically daily as needed ears (Patient not taking: Reported on 7/17/2023)       hydrOXYzine (ATARAX) 10 MG tablet Take 1 tablet (10 mg) by mouth 3 times daily as needed for anxiety 180 tablet 1     ipratropium (ATROVENT) 0.06 % nasal spray Spray 2 sprays into both nostrils 4 times daily as needed for rhinitis       MAGNESIUM LACTATE PO Take  mg by mouth daily       MELATONIN PO Take 1-3 mg by mouth nightly as needed        Menaquinone-7 (VITAMIN K2 PO) Take 1 capful by mouth daily       METAMUCIL FIBER PO Take by mouth as needed       metoprolol succinate ER (TOPROL XL) 25 MG 24 hr tablet Take  0.5 tablets (12.5 mg) by mouth daily 90 tablet 3     Multiple Vitamin (DAILY MULTIVITAMIN PO) Take 1 tablet by mouth every morning       nitroGLYcerin (NITROSTAT) 0.4 MG sublingual tablet Place 1 tablet (0.4 mg) under the tongue every 5 minutes as needed for chest pain For chest pain place 1 tablet under the tongue every 5 minutes for 3 doses. If symptoms persist 5 minutes after 1st dose call 911. 25 tablet 1     Probiotic Product (PROBIOTIC DAILY PO) Take 1 capsule by mouth 2 times daily Takes additional doses if has diarrhea       psyllium (METAMUCIL) WAFR Take 2 Wafers by mouth daily       spironolactone (ALDACTONE) 25 MG tablet Take 1 tablet (25 mg) by mouth daily 90 tablet 3     SYNTHROID 150 MCG tablet TAKE 1 TABLET BY MOUTH 6 DAYS A WEEK THEN TAKE 1/2 TABLET BY MOUTH 1 TIME PER WEEK 90 tablet 0     triamcinolone (KENALOG) 0.1 % external cream For feet (Patient not taking: Reported on 1/17/2023)       TURMERIC PO Take 1 tablet by mouth every morning        vitamin C (ASCORBIC ACID) 250 MG tablet Take 250 mg by mouth daily (Patient not taking: Reported on 7/17/2023)       vitamin D3 (CHOLECALCIFEROL) 2000 units tablet Take 1 tablet by mouth daily 1 tablet 0     Zinc 15 MG CAPS Take 15 mg by mouth         PAST SURGICAL HISTORY:  Past Surgical History:   Procedure Laterality Date     BUNIONECTOMY Bilateral      COLONOSCOPY N/A 8/30/2021    Procedure: COLONOSCOPY, WITH POLYPECTOMY AND BIOPSY;  Surgeon: Ranjit Penn MD;  Location:  GI     CV CORONARY ANGIOGRAM N/A 9/26/2019    Procedure: CV CORONARY ANGIOGRAM;  Surgeon: Erickson Trejo MD;  Location:  HEART CARDIAC CATH LAB     CV RIGHT HEART CATH MEASUREMENTS RECORDED N/A 7/20/2020    Procedure: CV RIGHT HEART CATH;  Surgeon: Alonso Ordonez MD;  Location:  HEART CARDIAC CATH LAB     EP ABLATION / EP STUDIES  04/21/2017     EP PPM UPGRADE TO BIVENT N/A 1/19/2022    Procedure: EP PPM UPGRADE TO BIVENT;  Surgeon: Lino Funes MD;   Location: [facility] HEART CARDIAC CATH LAB     EXPLORE NECK N/A 9/19/2018    Procedure: EXPLORE NECK;  Neck Exploration Resection of Left superior Parathyroid gland;  Surgeon: Kristine Venegas MD;  Location: [facility] OR      CORRECT BUNION,SIMPLE       PARATHYROIDECTOMY N/A 9/19/2018    Procedure: PARATHYROIDECTOMY;;  Surgeon: Kristine Venegas MD;  Location: [facility] OR     PPM INSERT OF NEW OR REPL W/VENT LEAD  04/21/2017     TONSILLECTOMY & ADENOIDECTOMY         ALLERGIES:     Allergies   Allergen Reactions     Tetracycline Swelling     Tongue swelling (teramycin)     Viagra [Sildenafil] Muscle Pain (Myalgia) and Diarrhea     Zofran [Ondansetron]      Prolonged QT  Prolonged QT at baseline per patient     Flagyl [Metronidazole] Rash     Pruritic rash      Buspar [Buspirone]      Muscle weakness, diarrhea     Clindamycin Diarrhea     Can exacerbate colitis     Corn Oil Other (See Comments)     Headache       Duloxetine Hcl Other (See Comments) and Diarrhea     Confusion     Prozac [Fluoxetine] Diarrhea     Seasonal Allergies Difficulty breathing     Sulfamethoxazole-Trimethoprim      Other reaction(s): Other  Passed out     Cyclobenzaprine Other (See Comments)     Extreme heat intolerance     Levaquin [Levofloxacin]      Other reaction(s): Other  Possible Tendon rupture (vs surgery)     Nsaids Other (See Comments)     Exacerbated asthma       FAMILY HISTORY:  Family History   Problem Relation Age of Onset     Hypothyroidism Mother      Hypertension Mother      Osteoarthritis Mother      Coronary Artery Disease Father         nonfatal MI in his 70s     Asthma Father      Diabetes Sister      Hypothyroidism Sister      Anxiety Disorder Sister      Breast Cancer Maternal Aunt        SOCIAL HISTORY:  Social History     Tobacco Use     Smoking status: Former     Packs/day: 0.00     Years: 0.00     Pack years: 0.00     Types: Cigarettes     Smokeless tobacco: Never   Vaping Use     Vaping Use: Never used   Substance Use Topics      Alcohol use: Not Currently     Alcohol/week: 0.0 - 8.0 standard drinks of alcohol     Comment: seldom     Drug use: No       ROS:   A comprehensive 10 point review of systems negative other than as mentioned in HPI.  Exam:  /81 (BP Location: Right arm, Patient Position: Sitting, Cuff Size: Adult Large)   Pulse 93   Wt 80 kg (176 lb 4.8 oz)   LMP 08/21/2007   SpO2 96%   BMI 28.50 kg/m    GENERAL APPEARANCE: alert and no distress  HEENT: MMM. PERRLA.  NECK: supple.   RESPIRATORY:no use of accessory muscles, no retractions, respirations are unlabored, normal respiratory rate  CARDIOVASCULAR: device shows regular .   ABDOMEN: ND.  EXTREMITIES: no edema  NEURO: alert and oriented to person/place/time, normal speech, gait and affect  SKIN: no ecchymoses, no rashes  PSYCH: normal affect, cooperative    Labs:  Reviewed.     Testing/Procedures:  7/2020 RHC:  Conclusion       Right sided filling pressures are normal.    Normal PA pressures.    Left sided filling pressures are normal.    Normal cardiac index at [facility] with thermodilution    Limited exercise of 4 minutes and 20 seconds that propted a significant rise in patients PCWP as well as PA pressures consistent with diastolic dysfunction.         2/19/21 ECHOCARDIOGRAM:   Interpretation Summary     Moderately (EF 35%) reduced left ventricular function is present.  Global right ventricular function is normal.  No significant valvular dysfunction.  The inferior vena cava was normal in size with preserved respiratory  variability.  No pericardial effusion present.    5/4/21 CMRI:  1. The LV is normal in cavity size and wall thickness. The global systolic function is mildly reduced. The  LVEF is 50%. There are no regional wall motion abnormalities. There is abnormal septal motion related to  pacing.     2. The RV is normal in cavity size. The global systolic function is normal. The RVEF is 65%.      3. The left atrium is mildly dilated.     4. There is no  significant valvular disease.      5. Late gadolinium enhancement imaging shows no MI, fibrosis or infiltrative disease.      6. Regadenoson stress perfusion imaging shows no ischemia.     7. There is no pericardial effusion or thickening.     8. There is no LV thrombus.     CONCLUSIONS: No myocardial ischemia or fibrosis. Mild NICM possibly related to pacing, LVEF 50% and RVEF  65%.     Assessment and Plan:   Ms. Marroquin is a 62 year old female who has a past medical history significant for GERD, PTSD, Grave's Disease with post ablative hypothyroidism, hyperparathyroidism, LARRY (uses CPAP), NICM LVEF 35-40%, PAF (CHADSVASC 2), depression, prior tobacco use, and PVCs s/p RVOT ablation complicated by CHB s/p PPM 4/21/2017 s/p upgrade to CRT (LB) 1/19/22, and pericarditis.  She presents today for follow up.     NICM LVEF 30-35%, improved to 50% then to 40% NYHA III:  Unclear etiology, consider pacing induced or primary NICM.   1. ACEi/ARB: had not tolerated lisinopril d/t hypotension   2. BB: Continue Toprol XL  3. Aldosterone antagonist: continue spironolactone.  4. SCD prophylaxis: LVEF >35% at this time does not have indication for ICD. Had upgrade to CRT (LB) pacing 1/19/22. BVP 99%  5. Fluid status: Managed by [facility].   6. On revatio for exercise induced pulmonary HTN  7. Following with HF team/[facility] clinic         CHB s/p PPM 4/2017:   1. Pacemaker is functioning well with stable lead parameters. No sustained arrhythmias.  2. She will continue to follow with device clinic per routine.   3. High  %, has LVEF that has waxed and waned over time, had CRT (LB) lead placed. Good BVP %.      Follow up on an as needed basis given she is [place] out of [place].    The patient states understanding and is agreeable with plan.     Adelaida Slater, SHAYNA CNP  Pager: [phone]  [facility]

## 2023-07-19 NOTE — PATIENT INSTRUCTIONS
You were seen in the Electrophysiology Clinic today by: Adelaida Slater NP    Plan:   Medication Changes:     Labs/Tests Needed:    Follow up Visit:    Device Follow up: 3 months    Further Instructions:      If you have further questions, please utilize [employer] / [email or web address] to contact us.     Your Care Team:    EP Cardiology   Telephone Number     Nurse Line  Marsha Hawthorne, RN   Kristine Lemons, NUNO   [phone]     For scheduling appointments:   [place]   [phone]   For procedure scheduling:    [facility]     [phone]   For the Device Clinic (Pacemakers, ICDs, Loop Recorders)    During business hours: [phone]  After business hours:   [phone]- select option 4 and ask for job code [id number].       On-call cardiologist for after hours or on weekends:   [phone], option #4, and ask to speak to the on-call cardiologist.     Cardiovascular Clinic:   [address] [place] [address]. [place] [address]      As always, Thank you for trusting us with your health care needs!

## 2023-07-22 LAB
MDC_IDC_EPISODE_DTM: NORMAL
MDC_IDC_EPISODE_DURATION: 5 S
MDC_IDC_EPISODE_ID: 5
MDC_IDC_EPISODE_TYPE: NORMAL
MDC_IDC_LEAD_IMPLANT_DT: NORMAL
MDC_IDC_LEAD_LOCATION: NORMAL
MDC_IDC_LEAD_LOCATION_DETAIL_1: NORMAL
MDC_IDC_LEAD_MFG: NORMAL
MDC_IDC_LEAD_MODEL: NORMAL
MDC_IDC_LEAD_POLARITY_TYPE: NORMAL
MDC_IDC_LEAD_SERIAL: NORMAL
MDC_IDC_LEAD_SPECIAL_FUNCTION: NORMAL
MDC_IDC_MSMT_BATTERY_DTM: NORMAL
MDC_IDC_MSMT_BATTERY_REMAINING_LONGEVITY: 114 MO
MDC_IDC_MSMT_BATTERY_RRT_TRIGGER: 2.6
MDC_IDC_MSMT_BATTERY_STATUS: NORMAL
MDC_IDC_MSMT_BATTERY_VOLTAGE: 3.01 V
MDC_IDC_MSMT_LEADCHNL_LV_IMPEDANCE_VALUE: 266 OHM
MDC_IDC_MSMT_LEADCHNL_LV_IMPEDANCE_VALUE: 342 OHM
MDC_IDC_MSMT_LEADCHNL_LV_IMPEDANCE_VALUE: 399 OHM
MDC_IDC_MSMT_LEADCHNL_LV_IMPEDANCE_VALUE: 475 OHM
MDC_IDC_MSMT_LEADCHNL_LV_IMPEDANCE_VALUE: 532 OHM
MDC_IDC_MSMT_LEADCHNL_LV_PACING_THRESHOLD_AMPLITUDE: 0.75 V
MDC_IDC_MSMT_LEADCHNL_LV_PACING_THRESHOLD_PULSEWIDTH: 0.4 MS
MDC_IDC_MSMT_LEADCHNL_RA_IMPEDANCE_VALUE: 418 OHM
MDC_IDC_MSMT_LEADCHNL_RA_PACING_THRESHOLD_AMPLITUDE: 0.5 V
MDC_IDC_MSMT_LEADCHNL_RA_PACING_THRESHOLD_PULSEWIDTH: 0.4 MS
MDC_IDC_MSMT_LEADCHNL_RA_SENSING_INTR_AMPL: 2.1 MV
MDC_IDC_MSMT_LEADCHNL_RV_IMPEDANCE_VALUE: 437 OHM
MDC_IDC_MSMT_LEADCHNL_RV_PACING_THRESHOLD_AMPLITUDE: 1 V
MDC_IDC_MSMT_LEADCHNL_RV_PACING_THRESHOLD_PULSEWIDTH: 0.4 MS
MDC_IDC_PG_IMPLANT_DTM: NORMAL
MDC_IDC_PG_MFG: NORMAL
MDC_IDC_PG_MODEL: NORMAL
MDC_IDC_PG_SERIAL: NORMAL
MDC_IDC_PG_TYPE: NORMAL
MDC_IDC_SESS_CLINIC_NAME: NORMAL
MDC_IDC_SESS_DTM: NORMAL
MDC_IDC_SESS_TYPE: NORMAL
MDC_IDC_SET_BRADY_AT_MODE_SWITCH_RATE: 171 {BEATS}/MIN
MDC_IDC_SET_BRADY_LOWRATE: 60 {BEATS}/MIN
MDC_IDC_SET_BRADY_MAX_SENSOR_RATE: 140 {BEATS}/MIN
MDC_IDC_SET_BRADY_MAX_TRACKING_RATE: 140 {BEATS}/MIN
MDC_IDC_SET_BRADY_MODE: NORMAL
MDC_IDC_SET_BRADY_PAV_DELAY_HIGH: 100 MS
MDC_IDC_SET_BRADY_PAV_DELAY_LOW: 180 MS
MDC_IDC_SET_BRADY_SAV_DELAY_HIGH: 70 MS
MDC_IDC_SET_BRADY_SAV_DELAY_LOW: 150 MS
MDC_IDC_SET_CRT_LVRV_DELAY: 80 MS
MDC_IDC_SET_CRT_PACED_CHAMBERS: NORMAL
MDC_IDC_SET_LEADCHNL_LV_PACING_AMPLITUDE: 1.25 V
MDC_IDC_SET_LEADCHNL_LV_PACING_ANODE_ELECTRODE_1: NORMAL
MDC_IDC_SET_LEADCHNL_LV_PACING_ANODE_LOCATION_1: NORMAL
MDC_IDC_SET_LEADCHNL_LV_PACING_CAPTURE_MODE: NORMAL
MDC_IDC_SET_LEADCHNL_LV_PACING_CATHODE_ELECTRODE_1: NORMAL
MDC_IDC_SET_LEADCHNL_LV_PACING_CATHODE_LOCATION_1: NORMAL
MDC_IDC_SET_LEADCHNL_LV_PACING_POLARITY: NORMAL
MDC_IDC_SET_LEADCHNL_LV_PACING_PULSEWIDTH: 0.4 MS
MDC_IDC_SET_LEADCHNL_RA_PACING_AMPLITUDE: 1.5 V
MDC_IDC_SET_LEADCHNL_RA_PACING_ANODE_ELECTRODE_1: NORMAL
MDC_IDC_SET_LEADCHNL_RA_PACING_ANODE_LOCATION_1: NORMAL
MDC_IDC_SET_LEADCHNL_RA_PACING_CAPTURE_MODE: NORMAL
MDC_IDC_SET_LEADCHNL_RA_PACING_CATHODE_ELECTRODE_1: NORMAL
MDC_IDC_SET_LEADCHNL_RA_PACING_CATHODE_LOCATION_1: NORMAL
MDC_IDC_SET_LEADCHNL_RA_PACING_POLARITY: NORMAL
MDC_IDC_SET_LEADCHNL_RA_PACING_PULSEWIDTH: 0.4 MS
MDC_IDC_SET_LEADCHNL_RA_SENSING_ANODE_ELECTRODE_1: NORMAL
MDC_IDC_SET_LEADCHNL_RA_SENSING_ANODE_LOCATION_1: NORMAL
MDC_IDC_SET_LEADCHNL_RA_SENSING_CATHODE_ELECTRODE_1: NORMAL
MDC_IDC_SET_LEADCHNL_RA_SENSING_CATHODE_LOCATION_1: NORMAL
MDC_IDC_SET_LEADCHNL_RA_SENSING_POLARITY: NORMAL
MDC_IDC_SET_LEADCHNL_RA_SENSING_SENSITIVITY: 0.3 MV
MDC_IDC_SET_LEADCHNL_RV_PACING_AMPLITUDE: 2 V
MDC_IDC_SET_LEADCHNL_RV_PACING_ANODE_ELECTRODE_1: NORMAL
MDC_IDC_SET_LEADCHNL_RV_PACING_ANODE_LOCATION_1: NORMAL
MDC_IDC_SET_LEADCHNL_RV_PACING_CAPTURE_MODE: NORMAL
MDC_IDC_SET_LEADCHNL_RV_PACING_CATHODE_ELECTRODE_1: NORMAL
MDC_IDC_SET_LEADCHNL_RV_PACING_CATHODE_LOCATION_1: NORMAL
MDC_IDC_SET_LEADCHNL_RV_PACING_POLARITY: NORMAL
MDC_IDC_SET_LEADCHNL_RV_PACING_PULSEWIDTH: 0.4 MS
MDC_IDC_SET_LEADCHNL_RV_SENSING_ANODE_ELECTRODE_1: NORMAL
MDC_IDC_SET_LEADCHNL_RV_SENSING_ANODE_LOCATION_1: NORMAL
MDC_IDC_SET_LEADCHNL_RV_SENSING_CATHODE_ELECTRODE_1: NORMAL
MDC_IDC_SET_LEADCHNL_RV_SENSING_CATHODE_LOCATION_1: NORMAL
MDC_IDC_SET_LEADCHNL_RV_SENSING_POLARITY: NORMAL
MDC_IDC_SET_LEADCHNL_RV_SENSING_SENSITIVITY: 0.9 MV
MDC_IDC_SET_ZONE_DETECTION_INTERVAL: 350 MS
MDC_IDC_SET_ZONE_DETECTION_INTERVAL: 400 MS
MDC_IDC_SET_ZONE_TYPE: NORMAL
MDC_IDC_STAT_AT_BURDEN_PERCENT: 0 %
MDC_IDC_STAT_AT_DTM_END: NORMAL
MDC_IDC_STAT_AT_DTM_START: NORMAL
MDC_IDC_STAT_BRADY_AP_VP_PERCENT: 45.92 %
MDC_IDC_STAT_BRADY_AP_VS_PERCENT: 0.05 %
MDC_IDC_STAT_BRADY_AS_VP_PERCENT: 53.95 %
MDC_IDC_STAT_BRADY_AS_VS_PERCENT: 0.08 %
MDC_IDC_STAT_BRADY_DTM_END: NORMAL
MDC_IDC_STAT_BRADY_DTM_START: NORMAL
MDC_IDC_STAT_BRADY_RA_PERCENT_PACED: 45.97 %
MDC_IDC_STAT_BRADY_RV_PERCENT_PACED: 99.86 %
MDC_IDC_STAT_CRT_DTM_END: NORMAL
MDC_IDC_STAT_CRT_DTM_START: NORMAL
MDC_IDC_STAT_CRT_LV_PERCENT_PACED: 99.82 %
MDC_IDC_STAT_CRT_PERCENT_PACED: 99.82 %
MDC_IDC_STAT_EPISODE_RECENT_COUNT: 0
MDC_IDC_STAT_EPISODE_RECENT_COUNT_DTM_END: NORMAL
MDC_IDC_STAT_EPISODE_RECENT_COUNT_DTM_START: NORMAL
MDC_IDC_STAT_EPISODE_TOTAL_COUNT: 0
MDC_IDC_STAT_EPISODE_TOTAL_COUNT: 0
MDC_IDC_STAT_EPISODE_TOTAL_COUNT: 3
MDC_IDC_STAT_EPISODE_TOTAL_COUNT_DTM_END: NORMAL
MDC_IDC_STAT_EPISODE_TOTAL_COUNT_DTM_START: NORMAL
MDC_IDC_STAT_EPISODE_TYPE: NORMAL

## 2023-07-24 ENCOUNTER — TELEPHONE (OUTPATIENT)
Dept: CARDIOLOGY | Facility: CLINIC | Age: 63
End: 2023-07-24
Payer: MEDICARE

## 2023-08-04 ENCOUNTER — OFFICE VISIT (OUTPATIENT)
Dept: CARDIOLOGY | Facility: CLINIC | Age: 63
End: 2023-08-04
Attending: INTERNAL MEDICINE
Payer: MEDICARE

## 2023-08-04 VITALS
WEIGHT: 177 LBS | BODY MASS INDEX: 28.45 KG/M2 | HEIGHT: 66 IN | SYSTOLIC BLOOD PRESSURE: 124 MMHG | OXYGEN SATURATION: 98 % | HEART RATE: 78 BPM | DIASTOLIC BLOOD PRESSURE: 85 MMHG

## 2023-08-04 DIAGNOSIS — I44.2 COMPLETE HEART BLOCK (H): ICD-10-CM

## 2023-08-04 DIAGNOSIS — I50.30 NYHA CLASS 3 HEART FAILURE WITH PRESERVED EJECTION FRACTION (H): ICD-10-CM

## 2023-08-04 DIAGNOSIS — I50.30 HEART FAILURE WITH PRESERVED EJECTION FRACTION, BORDERLINE, CLASS III (H): ICD-10-CM

## 2023-08-04 DIAGNOSIS — I10 BENIGN ESSENTIAL HYPERTENSION: ICD-10-CM

## 2023-08-04 DIAGNOSIS — R68.89 EXERCISE INTOLERANCE: ICD-10-CM

## 2023-08-04 DIAGNOSIS — Z95.0 CARDIAC PACEMAKER IN SITU: ICD-10-CM

## 2023-08-04 DIAGNOSIS — I48.0 PAROXYSMAL ATRIAL FIBRILLATION (H): Primary | ICD-10-CM

## 2023-08-04 DIAGNOSIS — E78.2 MIXED HYPERLIPIDEMIA: ICD-10-CM

## 2023-08-04 PROCEDURE — 99214 OFFICE O/P EST MOD 30 MIN: CPT | Performed by: INTERNAL MEDICINE

## 2023-08-04 NOTE — NURSING NOTE
"Chief Complaint   Patient presents with    Follow Up    Heart Failure       Initial /85   Pulse 78   Ht 1.676 m (5' 6\")   Wt 80.3 kg (177 lb)   LMP 08/21/2007   SpO2 98%   BMI 28.57 kg/m   Estimated body mass index is 28.57 kg/m  as calculated from the following:    Height as of this encounter: 1.676 m (5' 6\").    Weight as of this encounter: 80.3 kg (177 lb)..  BP completed using cuff size: carmen Olivares CMA (AAMA)    "

## 2023-08-04 NOTE — PATIENT INSTRUCTIONS
Thank you for coming to the [facility] at [place]; please note the following instructions:    1. No medication changes and no follow up with us.  2. Good luck with the move!        If you have any questions regarding your visit, please contact your care team:     CARDIOLOGY  TELEPHONE NUMBER   Dulce CERVANTES, Registered Nurse  Fiorella FERNANDES, Registered Nurse  Rayna LEBRON, Registered Nurse  Chiquita TAVERAS, Registered Medical Assistant  Ruba LEWIS, Certified Medical Assistant  Roxie LEGGETT, Visit Facilitator [phone] (select option 1)    *After hours: [phone]   For Scheduling Appts:     [phone] (select option 1)    *After hours: [phone]   For the Device Clinic (Pacemakers and ICD's)  Alpa ARENAS, Registered Nurse   During business hours: [phone]    *After business hours:  [phone] (select option 4)      Normal test result notifications will be released via [employer] or mailed within 7 business days.  All other test results, will be communicated via telephone once reviewed by your cardiologist.    If you need a medication refill, please contact your pharmacy.  Please allow 3 business days for your refill to be completed.    As always, thank you for trusting us with your health care needs!

## 2023-08-04 NOTE — LETTER
8/4/2023      RE: Joyce Marroquin  [address] [facility] [id number]       Dear Colleague,    Thank you for the opportunity to participate in the care of your patient, Joyce Marroquin, at the [facility]. Please see a copy of my visit note below.    August 4, 2023     Ms. Marroquin is a very pleasant 63 y/o lady who worked as a nurse anesthetist and has a history of RF ablation for PVCs which led to AV node ablation and complete heart block requiring a dual-chamber pacemaker implantation, which is a Medtronic device MRI compatible.  She also has a recent history of heart failure with reduced ejection fraction with EF around 35% later improved to 45%.  There was a history of Takotsubo cardiomyopathy.  Patient on 7/2/21 had meeting with EP at which time low dose carvedilol was started 3.125 mg PO bid due to atrial ectopy and short runs of VT. she did recently complete an exercise out of catheterization with myself which showed normal resting biventricular filling pressures and normal cardiac output.  She did have limited augmentation of cardiac index with exercise and filling pressures especially pulmonary capillary wedge pressure increased significantly consistent with heart failure preserved ejection fraction.  She did initially better but for the past couple of months she has not been feeling well.  She did gain some weight and increased the Lasix to 40 mg as needed.  She does not feel that these helped for her.  In fact at times she feels that she had some confusion or disorientation.  Possibly she is dry at times.  She did see in follow-up with [facility] he had some of the instructions were confusing and she appropriately notes that the discharge papers stated a lot of extra information.  I did see her and performed a CPX that was marginal as well as a cardiac MRI that showed a significantly improved LV function to 50% and no  other significant abnormalities. She did meet with Dr. Funes and sildenafil was initiated yet rapidly discontinued due to muscle aches. Dr. Funes also adjusted the rate response to allow for faster heart rates in an attempt to improve forward flow. In addition, she was felt to be dry and lasix has been reduced to 20mg 2 times per week. She noted that she at times she forgets to take the PM dose of the revatio. After lengthy discussions, she did have CRT-D upgrade in hopes that resynchronization will help her symptomatology. This was performed in January 2022 and was uneventful. This led to an improvement tin her overall functional status. Recently saw Fiorella Swanson in [facility]  She is in the process of moving to [place].  She did have an episode of anxiety that led to some volume overload requiring treatment in [facility].  She responded well to diuretics and did feel better right away allowing her to discharge home.  Notes that she is overall doing well without any new complaints.  Takes her medication as prescribed.  She was asking about SGLT2 inhibitors as we have discussed in the past as well.  However she is nervous about it she is very worried about the side effect profile.  She is mostly stressed about the movement related activities.  Denies any other new issues.  She feels significantly better after CRT upgrade.    PAST MEDICAL HISTORY:  Past Medical History:   Diagnosis Date     Arthritis      Cardiomyopathy (H)     ff Cardiology     Chronic depressive personality disorder      Congestive heart failure (H) see dr martínez    heart failure correct term     COPD exacerbation (H)      Esophageal reflux      Ex-smoker     quit 2006; 1 PPD x 30     Hyperparathyroidism (H)     s/p parathyroidectomy     Hypothyroidism      LARRY on CPAP     ff Sleep medicine     Pulmonary nodules     ff Pulmonologist     S/P cardiac pacemaker procedure     checked every 6 months at the [facility]     Uncomplicated asthma years      FAMILY HISTORY:  Family History   Problem Relation Age of Onset     Hypothyroidism Mother      Hypertension Mother      Osteoarthritis Mother      Coronary Artery Disease Father         nonfatal MI in his 70s     Asthma Father      Diabetes Sister      Hypothyroidism Sister      Anxiety Disorder Sister      Breast Cancer Maternal Aunt      SOCIAL HISTORY:  Social History     Socioeconomic History     Marital status:      Highest education level: Master's degree (e.g., MA, MS, Florian, MEd, MSW, SONIA)   Tobacco Use     Smoking status: Former     Packs/day: 0.00     Years: 0.00     Pack years: 0.00     Types: Cigarettes     Smokeless tobacco: Never   Vaping Use     Vaping Use: Never used   Substance and Sexual Activity     Alcohol use: Not Currently     Alcohol/week: 0.0 - 8.0 standard drinks of alcohol     Comment: seldom     Drug use: No     Sexual activity: Not Currently     Partners: Male     Birth control/protection: None     Comment: vasectomy   Other Topics Concern     Parent/sibling w/ CABG, MI or angioplasty before 65F 55M? No   Social History Narrative    CRNA on disability for vestibular symptoms      Social Determinants of Health     Financial Resource Strain: Low Risk  (4/20/2020)    Overall Financial Resource Strain (CARDIA)      Difficulty of Paying Living Expenses: Not very hard   Food Insecurity: No Food Insecurity (4/20/2020)    Hunger Vital Sign      Worried About Running Out of Food in the Last Year: Never true      Ran Out of Food in the Last Year: Never true   Transportation Needs: No Transportation Needs (4/20/2020)    PRAPARE - Transportation      Lack of Transportation (Medical): No      Lack of Transportation (Non-Medical): No   Physical Activity: Insufficiently Active (4/20/2020)    Exercise Vital Sign      Days of Exercise per Week: 3 days      Minutes of Exercise per Session: 10 min   Stress: Stress Concern Present (4/20/2020)    [place] [employer] -  Occupational Stress Questionnaire      Feeling of Stress : To some extent   Social Connections: Socially Integrated (4/20/2020)    Social Connection and Isolation Panel [NHANES]      Frequency of Communication with Friends and Family: More than three times a week      Frequency of Social Gatherings with Friends and Family: Never      Attends Scientologist Services: More than 4 times per year      Active Member of Clubs or Organizations: Yes      Attends Club or Organization Meetings: More than 4 times per year      Marital Status:    Intimate Partner Violence: Not At Risk (7/21/2021)    Humiliation, Afraid, Rape, and Kick questionnaire      Fear of Current or Ex-Partner: No      Emotionally Abused: No      Physically Abused: No      Sexually Abused: No   Housing Stability: Low Risk  (4/20/2020)    Housing Stability Vital Sign      Unable to Pay for Housing in the Last Year: No      Number of Places Lived in the Last Year: 1      Unstable Housing in the Last Year: No     CURRENT MEDICATIONS:  Current Outpatient Medications   Medication     acetaminophen (TYLENOL) 325 MG tablet     albuterol (PROAIR HFA/PROVENTIL HFA/VENTOLIN HFA) 108 (90 BASE) MCG/ACT Inhaler     albuterol (PROVENTIL) (2.5 MG/3ML) 0.083% neb solution     apixaban ANTICOAGULANT (ELIQUIS ANTICOAGULANT) 5 MG tablet     azelastine (ASTELIN) 0.1 % nasal spray     Calcium Carbonate-Vitamin D (CALCIUM-CARB 600 + D PO)     clonazePAM (KLONOPIN) 0.5 MG tablet     DiphenhydrAMINE HCl (BENADRYL PO)     Ferrous Sulfate (IRON SUPPLEMENT PO)     fluticasone-salmeterol (ADVAIR) 250-50 MCG/ACT inhaler     furosemide (LASIX) 20 MG tablet     hydrOXYzine (ATARAX) 10 MG tablet     ipratropium (ATROVENT) 0.06 % nasal spray     MAGNESIUM LACTATE PO     MELATONIN PO     Menaquinone-7 (VITAMIN K2 PO)     METAMUCIL FIBER PO     metoprolol succinate ER (TOPROL XL) 25 MG 24 hr tablet     Multiple Vitamin (DAILY MULTIVITAMIN PO)     nitroGLYcerin (NITROSTAT) 0.4 MG  "sublingual tablet     Probiotic Product (PROBIOTIC DAILY PO)     psyllium (METAMUCIL) WAFR     spironolactone (ALDACTONE) 25 MG tablet     SYNTHROID 150 MCG tablet     TURMERIC PO     vitamin D3 (CHOLECALCIFEROL) 2000 units tablet     Zinc 15 MG CAPS     No current facility-administered medications for this visit.     ROS:   Constitutional: No fever, chills, or sweats.   ENT: No visual disturbance, ear ache, epistaxis, sore throat.   Allergies/Immunologic: Negative.   Respiratory: No cough, hemoptysis.   Cardiovascular: As per HPI.   GI: No nausea, vomiting, hematemesis, melena, or hematochezia.   : No urinary frequency, dysuria, or hematuria.   Integrument: Negative.   Psychiatric: No evidence of major depression  Neuro: No new neurological complaints at this time. Non focal  Endocrinology: Negative.   Musculoskeletal: As per HPI.      EXAM:  /85   Pulse 78   Ht 1.676 m (5' 6\")   Wt 80.3 kg (177 lb)   LMP 08/21/2007   SpO2 98%   BMI 28.57 kg/m    General: appears comfortable, alert and oriented  Head: normocephalic, atraumatic  Eyes: anicteric sclera, EOMI , PERRL  Neck: no adenopathy  Orophyarynx: moist mucosa, no lesions noted  Heart: regular, S1/S2, no murmurs, rubs or gallop. Estimated JVP at 5 cmH2O  Lungs: CTAB, No wheezing.   Abdomen: soft, non-tender, bowel sounds present, no hepatosplenomegaly  Extremities: No LE edema today  Skin: no open lesions noted  Neuro: grossly non-focal     Labs:  Lab Results   Component Value Date    WBC 7.4 07/17/2023    HGB 13.8 07/17/2023    HCT 41.4 07/17/2023     07/17/2023     07/17/2023    POTASSIUM 4.1 07/17/2023    CHLORIDE 103 07/17/2023    CO2 26 07/17/2023    BUN 15.1 07/17/2023    CR 0.79 07/17/2023    GLC 90 07/17/2023    SED 26 01/10/2023    DD 1.65 (H) 04/13/2022    NTBNPI 72 01/10/2023    NTBNP 93 07/17/2023    TROPONIN <0.015 09/28/2021    TROPI <0.015 12/20/2020    AST 16 07/17/2023    ALT 13 07/17/2023    ALKPHOS 106 (H) 07/17/2023 "    BILITOTAL 0.2 07/17/2023    INR 1.08 04/24/2023     NM Lexiscan 3/2020:  The nuclear stress test is negative for inducible myocardial ischemia or infarction.  Fixed septal/apical defect due to Paced rhythm and increased gut photon activity.LVEDv 133ml. LV ESv 40ml. LV EF 70%.     There is no prior study for comparison     Echocardiogram 3/2020:  Mildly (EF 40-45%) reduced left ventricular function is present.  Global right ventricular function is normal.  No pericardial effusion is present. IVC diameter <2.1 cm collapsing >50% with sniff suggests a normal RA pressure  of 3 mmHg. This study was compared with the study from 12/3/19.  There has been no change.      Coronary angiogram 9/2019:  Mild-moderate non-obstructive coronary disease involving LAD, LCx, and RCA. No significant coronary disease to explain new cardiomyopathy.     TTE 9/5/2019:  Moderately (EF 30-35%) reduced left ventricular function is present. All segments from mid to apical left ventricle are severely hypokinetic to akinetic. Basal segments are hypercontractile. Overall pattern is suggestive of stress cardiomyopathy, but cannot rule out ischemic cardiomyopathy. Right ventricular function, chamber size, wall motion, and thickness are normal. No significant valve abnormalities. Mild pulmonary hypertension with increased RA pressure.     TTE 2/19/21:  Moderately (EF 35%) reduced left ventricular function is present.  Global right ventricular function is normal.  No significant valvular dysfunction. The inferior vena cava was normal in size with preserved respiratory variability. No pericardial effusion present.     MRI cardiac 4/2017:  1. Normal left ventricular size.  Left ventricular wall thickness is  normal except a very small area in the basal inferior septum that appears  thin in some images.  Real-time images suggest a very small area of  hypokinesis in the basal inferior septum (not well visualized).  Left  ventricular ejection fraction  is estimated at about 60-65% (unable to   perform volumetric analysis given frequent ectopy; real time images used  to assess LVEF).  2. Normal right ventricular size and systolic function.  No obvious regional wall motion abnormalities noted.  3. Gadolinium imaging:  No obvious right ventricular myocardial late  gadolinium enhancement noted.  There is a very small area of probable late  gadolinium enhancement in the mildly thinned basal inferior segment.    4. Moderate left atrial dilatation.  The right atrial size is at the upper limits of normal.  5. Thickened mitral valve leaflets, without obvious stenosis or significant regurgitation. No obvious hemodynamically significant valvular dysfunction noted.   6. Normal sized aortic root and ascending aorta.  For the remainder of the thoracic aorta, see separate radiology report.  7. Normal pericardium without significant pericardial effusion.      Cardiac MRI 5/2021:  1. The LV is normal in cavity size and wall thickness. The global systolic function is mildly reduced. The LVEF is 50%. There are no regional wall motion abnormalities. There is abnormal septal motion related to pacing.  2. The RV is normal in cavity size. The global systolic function is normal. The RVEF is 65%.   3. The left atrium is mildly dilated.  4. There is no significant valvular disease.   5. Late gadolinium enhancement imaging shows no MI, fibrosis or infiltrative disease.   6. Regadenoson stress perfusion imaging shows no ischemia.  7. There is no pericardial effusion or thickening.  8. There is no LV thrombus.  CONCLUSIONS: No myocardial ischemia or fibrosis. Mild NICM possibly related to pacing, LVEF 50% and RVEF 65%.      TTE 11/23/2021  Left ventricular function is decreased. The ejection fraction is 40-45% (mildly reduced).Abnormal septal motion consistent with left bundle branch block is present.  Global right ventricular function is normal.  No significant valvular abnormalities  present.  Pulmonary artery systolic pressure is normal.  The inferior vena cava was normal in size with preserved respiratory  variability.  No pericardial effusion is present      Device check 5/25/22  Device: Medtronic Percepta CRT  Normal device function  Mode: DDDR  bpm  AP: 46%  : 100%  BVP: 100%  Intrinsic Rhythm: CHB: SR @ 70 bpm/  @ 30 bpm  Thoracic Impedance: Near reference line.   Short V-V intervals: 0  Lead Trends Appear Stable: yes  Estimated battery longevity to RRT = 10.5 years. Battery voltage = 3.16V.   Atrial Arrhythmia: 0  AF [place] = 0%  Anticoagulant: Eliquis  Ventricular Arrhythmia: 1 monitored VT. EGM shows NSVT lasting 8 beats.     TTE 5/25/22  Left ventricular function is decreased. The ejection fraction is 40-45% (mildly reduced).  Abnormal septal motion consistent with left bundle branch block is present.  Right ventricular function, chamber size, wall motion, and thickness are normal.  This study was compared with the study from 11/23/21 .  No significant changes noted.     TTE 7/19/23:  Left ventricular function is decreased. The ejection fraction is 50-55% (borderline).  Right ventricular function, chamber size, wall motion, and thickness are normal.  No significant valvular abnormalities were noted.  This study was compared with the study from 5/25/22 . LVEF has improved slightly.    ASSESSMENT AND PLAN:  Ms. Marroquin is a pleasant 62 year old lady with history of mildly reduced LVEF at EF:45% improved transiently to to 55%, NICM, Benedictoubfan CM, PVC ablation that led to complete heart block necessitating permanent pacemaker implantation s/p upgrade to CRT-D who presents today for a follow up visit. Overall, noted to have symptom improvement after CRT upgrade. Currently has NYHA Class II-III symptoms.   Overall no change she feels unchanged.  She is thinking about moving to warmer climate and [place]. Was seen at [facility]  recently.  Denies any new symptoms she is doing very well.  Reviewed the most energetic she has been in the recent past.  Ejection fraction improved to 55% with acute on therapy and interventions.  Given that she is moving we are refilling all her medications.  We will be available for questions for her.  As well as her new providers.  She is moving to [place].  We greatly appreciate the opportunity of being part of her treatment team.    #HFrEF now with mildly recovered EF  #NICM  #Paroxysmal atrial fibrillation   NYHA Class II-III. LVEF remains at around 40-45% (5/2022 TTE).  She did have recent pacemaker upgrade as detailed above and since then she feels feels significantly better.  Her exercise tolerance has improved she remains off of Lasix at this time without any significant lower extremity edema.  In addition her overall NT proBNP trend has improved and now within the normal range. She currently appears euvolemic.   - Metoprolol succinate 12.5mg qday   - Lasix 20mg qday as needed for LE edema or worsening heart failure symptoms, she is not taking this right now however it is available to her and she will let us know should she gain some weight to feel more short of breath and then she will take the dose.  - Aldactone 12.5mg qday, increase to 25mg daily as above   - Eliquis 5mg BID, as above     Follow-up in 6 months with repeat labs      I appreciate the opportunity to participate in the care of Joyce Marroquin . Please do not hesitate to contact me with any further questions.     Sincerely,   Alonso Ordonez MD  [facility] Division of Cardiology       Please do not hesitate to contact me if you have any questions/concerns.     Sincerely,     Alonso Ordonez MD

## 2023-08-04 NOTE — PROGRESS NOTES
August 4, 2023     Ms. Marroquin is a very pleasant 63 y/o lady who worked as a nurse anesthetist and has a history of RF ablation for PVCs which led to AV node ablation and complete heart block requiring a dual-chamber pacemaker implantation, which is a Medtronic device MRI compatible.  She also has a recent history of heart failure with reduced ejection fraction with EF around 35% later improved to 45%.  There was a history of Takotsubo cardiomyopathy.  Patient on 7/2/21 had meeting with EP at which time low dose carvedilol was started 3.125 mg PO bid due to atrial ectopy and short runs of VT. she did recently complete an exercise out of catheterization with myself which showed normal resting biventricular filling pressures and normal cardiac output.  She did have limited augmentation of cardiac index with exercise and filling pressures especially pulmonary capillary wedge pressure increased significantly consistent with heart failure preserved ejection fraction.  She did initially better but for the past couple of months she has not been feeling well.  She did gain some weight and increased the Lasix to 40 mg as needed.  She does not feel that these helped for her.  In fact at times she feels that she had some confusion or disorientation.  Possibly she is dry at times.  She did see in follow-up with [facility] he had some of the instructions were confusing and she appropriately notes that the discharge papers stated a lot of extra information.  I did see her and performed a CPX that was marginal as well as a cardiac MRI that showed a significantly improved LV function to 50% and no other significant abnormalities. She did meet with Dr. Funes and sildenafil was initiated yet rapidly discontinued due to muscle aches. Dr. Funes also adjusted the rate response to allow for faster heart rates in an attempt to improve forward flow. In addition, she was felt to be dry and lasix has been reduced to 20mg 2 times per  week. She noted that she at times she forgets to take the PM dose of the revatio. After lengthy discussions, she did have CRT-D upgrade in hopes that resynchronization will help her symptomatology. This was performed in January 2022 and was uneventful. This led to an improvement tin her overall functional status. Recently saw Fiorella Swanson in [facility]  She is in the process of moving to [place].  She did have an episode of anxiety that led to some volume overload requiring treatment in [facility].  She responded well to diuretics and did feel better right away allowing her to discharge home.  Notes that she is overall doing well without any new complaints.  Takes her medication as prescribed.  She was asking about SGLT2 inhibitors as we have discussed in the past as well.  However she is nervous about it she is very worried about the side effect profile.  She is mostly stressed about the movement related activities.  Denies any other new issues.  She feels significantly better after CRT upgrade.    PAST MEDICAL HISTORY:  Past Medical History:   Diagnosis Date    Arthritis     Cardiomyopathy (H)     ff Cardiology    Chronic depressive personality disorder     Congestive heart failure (H) see dr martínez    heart failure correct term    COPD exacerbation (H)     Esophageal reflux     Ex-smoker     quit 2006; 1 PPD x 30    Hyperparathyroidism (H)     s/p parathyroidectomy    Hypothyroidism     LARRY on CPAP     ff Sleep medicine    Pulmonary nodules     ff Pulmonologist    S/P cardiac pacemaker procedure     checked every 6 months at the [facility]    Uncomplicated asthma years     FAMILY HISTORY:  Family History   Problem Relation Age of Onset    Hypothyroidism Mother     Hypertension Mother     Osteoarthritis Mother     Coronary Artery Disease Father         nonfatal MI in his 70s    Asthma Father     Diabetes Sister     Hypothyroidism Sister     Anxiety Disorder Sister     Breast Cancer Maternal Aunt      SOCIAL  HISTORY:  Social History     Socioeconomic History    Marital status:     Highest education level: Master's degree (e.g., MA, MS, Florian, MEd, MSW, SONIA)   Tobacco Use    Smoking status: Former     Packs/day: 0.00     Years: 0.00     Pack years: 0.00     Types: Cigarettes    Smokeless tobacco: Never   Vaping Use    Vaping Use: Never used   Substance and Sexual Activity    Alcohol use: Not Currently     Alcohol/week: 0.0 - 8.0 standard drinks of alcohol     Comment: seldom    Drug use: No    Sexual activity: Not Currently     Partners: Male     Birth control/protection: None     Comment: vasectomy   Other Topics Concern    Parent/sibling w/ CABG, MI or angioplasty before 65F 55M? No   Social History Narrative    CRNA on disability for vestibular symptoms      Social Determinants of Health     Financial Resource Strain: Low Risk  (4/20/2020)    Overall Financial Resource Strain (CARDIA)     Difficulty of Paying Living Expenses: Not very hard   Food Insecurity: No Food Insecurity (4/20/2020)    Hunger Vital Sign     Worried About Running Out of Food in the Last Year: Never true     Ran Out of Food in the Last Year: Never true   Transportation Needs: No Transportation Needs (4/20/2020)    PRAPARE - Transportation     Lack of Transportation (Medical): No     Lack of Transportation (Non-Medical): No   Physical Activity: Insufficiently Active (4/20/2020)    Exercise Vital Sign     Days of Exercise per Week: 3 days     Minutes of Exercise per Session: 10 min   Stress: Stress Concern Present (4/20/2020)    [employer] - Occupational Stress Questionnaire     Feeling of Stress : To some extent   Social Connections: Socially Integrated (4/20/2020)    Social Connection and Isolation Panel [NHANES]     Frequency of Communication with Friends and Family: More than three times a week     Frequency of Social Gatherings with Friends and Family: Never     Attends Jain Services: More than 4 times  per year     Active Member of Clubs or Organizations: Yes     Attends Club or Organization Meetings: More than 4 times per year     Marital Status:    Intimate Partner Violence: Not At Risk (7/21/2021)    Humiliation, Afraid, Rape, and Kick questionnaire     Fear of Current or Ex-Partner: No     Emotionally Abused: No     Physically Abused: No     Sexually Abused: No   Housing Stability: Low Risk  (4/20/2020)    Housing Stability Vital Sign     Unable to Pay for Housing in the Last Year: No     Number of Places Lived in the Last Year: 1     Unstable Housing in the Last Year: No     CURRENT MEDICATIONS:  Current Outpatient Medications   Medication    acetaminophen (TYLENOL) 325 MG tablet    albuterol (PROAIR HFA/PROVENTIL HFA/VENTOLIN HFA) 108 (90 BASE) MCG/ACT Inhaler    albuterol (PROVENTIL) (2.5 MG/3ML) 0.083% neb solution    apixaban ANTICOAGULANT (ELIQUIS ANTICOAGULANT) 5 MG tablet    azelastine (ASTELIN) 0.1 % nasal spray    Calcium Carbonate-Vitamin D (CALCIUM-CARB 600 + D PO)    clonazePAM (KLONOPIN) 0.5 MG tablet    DiphenhydrAMINE HCl (BENADRYL PO)    Ferrous Sulfate (IRON SUPPLEMENT PO)    fluticasone-salmeterol (ADVAIR) 250-50 MCG/ACT inhaler    furosemide (LASIX) 20 MG tablet    hydrOXYzine (ATARAX) 10 MG tablet    ipratropium (ATROVENT) 0.06 % nasal spray    MAGNESIUM LACTATE PO    MELATONIN PO    Menaquinone-7 (VITAMIN K2 PO)    METAMUCIL FIBER PO    metoprolol succinate ER (TOPROL XL) 25 MG 24 hr tablet    Multiple Vitamin (DAILY MULTIVITAMIN PO)    nitroGLYcerin (NITROSTAT) 0.4 MG sublingual tablet    Probiotic Product (PROBIOTIC DAILY PO)    psyllium (METAMUCIL) WAFR    spironolactone (ALDACTONE) 25 MG tablet    SYNTHROID 150 MCG tablet    TURMERIC PO    vitamin D3 (CHOLECALCIFEROL) 2000 units tablet    Zinc 15 MG CAPS     No current facility-administered medications for this visit.     ROS:   Constitutional: No fever, chills, or sweats.   ENT: No visual disturbance, ear ache, epistaxis, sore  "throat.   Allergies/Immunologic: Negative.   Respiratory: No cough, hemoptysis.   Cardiovascular: As per HPI.   GI: No nausea, vomiting, hematemesis, melena, or hematochezia.   : No urinary frequency, dysuria, or hematuria.   Integrument: Negative.   Psychiatric: No evidence of major depression  Neuro: No new neurological complaints at this time. Non focal  Endocrinology: Negative.   Musculoskeletal: As per HPI.      EXAM:  /85   Pulse 78   Ht 1.676 m (5' 6\")   Wt 80.3 kg (177 lb)   LMP 08/21/2007   SpO2 98%   BMI 28.57 kg/m    General: appears comfortable, alert and oriented  Head: normocephalic, atraumatic  Eyes: anicteric sclera, EOMI , PERRL  Neck: no adenopathy  Orophyarynx: moist mucosa, no lesions noted  Heart: regular, S1/S2, no murmurs, rubs or gallop. Estimated JVP at 5 cmH2O  Lungs: CTAB, No wheezing.   Abdomen: soft, non-tender, bowel sounds present, no hepatosplenomegaly  Extremities: No LE edema today  Skin: no open lesions noted  Neuro: grossly non-focal     Labs:  Lab Results   Component Value Date    WBC 7.4 07/17/2023    HGB 13.8 07/17/2023    HCT 41.4 07/17/2023     07/17/2023     07/17/2023    POTASSIUM 4.1 07/17/2023    CHLORIDE 103 07/17/2023    CO2 26 07/17/2023    BUN 15.1 07/17/2023    CR 0.79 07/17/2023    GLC 90 07/17/2023    SED 26 01/10/2023    DD 1.65 (H) 04/13/2022    NTBNPI 72 01/10/2023    NTBNP 93 07/17/2023    TROPONIN <0.015 09/28/2021    TROPI <0.015 12/20/2020    AST 16 07/17/2023    ALT 13 07/17/2023    ALKPHOS 106 (H) 07/17/2023    BILITOTAL 0.2 07/17/2023    INR 1.08 04/24/2023     NM Lexiscan 3/2020:  The nuclear stress test is negative for inducible myocardial ischemia or infarction.  Fixed septal/apical defect due to Paced rhythm and increased gut photon activity.LVEDv 133ml. LV ESv 40ml. LV EF 70%.    There is no prior study for comparison     Echocardiogram 3/2020:  Mildly (EF 40-45%) reduced left ventricular function is present.  Global right " ventricular function is normal.  No pericardial effusion is present. IVC diameter <2.1 cm collapsing >50% with sniff suggests a normal RA pressure  of 3 mmHg. This study was compared with the study from 12/3/19.  There has been no change.      Coronary angiogram 9/2019:  Mild-moderate non-obstructive coronary disease involving LAD, LCx, and RCA. No significant coronary disease to explain new cardiomyopathy.     TTE 9/5/2019:  Moderately (EF 30-35%) reduced left ventricular function is present. All segments from mid to apical left ventricle are severely hypokinetic to akinetic. Basal segments are hypercontractile. Overall pattern is suggestive of stress cardiomyopathy, but cannot rule out ischemic cardiomyopathy. Right ventricular function, chamber size, wall motion, and thickness are normal. No significant valve abnormalities. Mild pulmonary hypertension with increased RA pressure.     TTE 2/19/21:  Moderately (EF 35%) reduced left ventricular function is present.  Global right ventricular function is normal.  No significant valvular dysfunction. The inferior vena cava was normal in size with preserved respiratory variability. No pericardial effusion present.     MRI cardiac 4/2017:  1. Normal left ventricular size.  Left ventricular wall thickness is  normal except a very small area in the basal inferior septum that appears  thin in some images.  Real-time images suggest a very small area of  hypokinesis in the basal inferior septum (not well visualized).  Left  ventricular ejection fraction is estimated at about 60-65% (unable to   perform volumetric analysis given frequent ectopy; real time images used  to assess LVEF).  2. Normal right ventricular size and systolic function.  No obvious regional wall motion abnormalities noted.  3. Gadolinium imaging:  No obvious right ventricular myocardial late  gadolinium enhancement noted.  There is a very small area of probable late  gadolinium enhancement in the mildly  thinned basal inferior segment.    4. Moderate left atrial dilatation.  The right atrial size is at the upper limits of normal.  5. Thickened mitral valve leaflets, without obvious stenosis or significant regurgitation. No obvious hemodynamically significant valvular dysfunction noted.   6. Normal sized aortic root and ascending aorta.  For the remainder of the thoracic aorta, see separate radiology report.  7. Normal pericardium without significant pericardial effusion.      Cardiac MRI 5/2021:  1. The LV is normal in cavity size and wall thickness. The global systolic function is mildly reduced. The LVEF is 50%. There are no regional wall motion abnormalities. There is abnormal septal motion related to pacing.  2. The RV is normal in cavity size. The global systolic function is normal. The RVEF is 65%.   3. The left atrium is mildly dilated.  4. There is no significant valvular disease.   5. Late gadolinium enhancement imaging shows no MI, fibrosis or infiltrative disease.   6. Regadenoson stress perfusion imaging shows no ischemia.  7. There is no pericardial effusion or thickening.  8. There is no LV thrombus.  CONCLUSIONS: No myocardial ischemia or fibrosis. Mild NICM possibly related to pacing, LVEF 50% and RVEF 65%.      TTE 11/23/2021  Left ventricular function is decreased. The ejection fraction is 40-45% (mildly reduced).Abnormal septal motion consistent with left bundle branch block is present.  Global right ventricular function is normal.  No significant valvular abnormalities present.  Pulmonary artery systolic pressure is normal.  The inferior vena cava was normal in size with preserved respiratory  variability.  No pericardial effusion is present      Device check 5/25/22  Device: Medtronic Percepta CRT  Normal device function  Mode: DDDR  bpm  AP: 46%  : 100%  BVP: 100%  Intrinsic Rhythm: CHB: SR @ 70 bpm/  @ 30 bpm  Thoracic Impedance: Near reference line.   Short V-V intervals: 0  Lead  Trends Appear Stable: yes  Estimated battery longevity to RRT = 10.5 years. Battery voltage = 3.16V.   Atrial Arrhythmia: 0  AF [place] = 0%  Anticoagulant: Eliquis  Ventricular Arrhythmia: 1 monitored VT. EGM shows NSVT lasting 8 beats.     TTE 5/25/22  Left ventricular function is decreased. The ejection fraction is 40-45% (mildly reduced).  Abnormal septal motion consistent with left bundle branch block is present.  Right ventricular function, chamber size, wall motion, and thickness are normal.  This study was compared with the study from 11/23/21 .  No significant changes noted.     TTE 7/19/23:  Left ventricular function is decreased. The ejection fraction is 50-55% (borderline).  Right ventricular function, chamber size, wall motion, and thickness are normal.  No significant valvular abnormalities were noted.  This study was compared with the study from 5/25/22 . LVEF has improved slightly.    ASSESSMENT AND PLAN:  Ms. Marroquin is a pleasant 62 year old lady with history of mildly reduced LVEF at EF:45% improved transiently to to 55%, Coleen KIRAN CM, PVC ablation that led to complete heart block necessitating permanent pacemaker implantation s/p upgrade to CRT-D who presents today for a follow up visit. Overall, noted to have symptom improvement after CRT upgrade. Currently has NYHA Class II-III symptoms.   Overall no change she feels unchanged.  She is thinking about moving to warmer climate and [place]. Was [facility] recently.  Denies any new symptoms she is doing very well.  Reviewed the most energetic she has been in the recent past.  Ejection fraction improved to 55% with acute on therapy and interventions.  Given that she is moving we are refilling all her medications.  We will be available for questions for her.  As well as her new providers.  She is moving to [place].  We greatly appreciate the opportunity of being part of her treatment  team.    #HFrEF now with mildly recovered EF  #NICM  #Paroxysmal atrial fibrillation   NYHA Class II-III. LVEF remains at around 40-45% (5/2022 TTE).  She did have recent pacemaker upgrade as detailed above and since then she feels feels significantly better.  Her exercise tolerance has improved she remains off of Lasix at this time without any significant lower extremity edema.  In addition her overall NT proBNP trend has improved and now within the normal range. She currently appears euvolemic.   - Metoprolol succinate 12.5mg qday   - Lasix 20mg qday in divided doses and Joyce also takes 20mg extra on an as needed basis for LE edema or worsening heart failure symptoms,   - Aldactone 12.5mg qday, increase to 25mg daily as above   - Eliquis 5mg BID, as above     Follow-up in 6 months with repeat labs      I appreciate the opportunity to participate in the care of Joyce Marroquin . Please do not hesitate to contact me with any further questions.     Sincerely,   Alonso Ordonez MD  [facility] Division of Cardiology

## 2023-08-12 ENCOUNTER — MYC REFILL (OUTPATIENT)
Dept: FAMILY MEDICINE | Facility: CLINIC | Age: 63
End: 2023-08-12
Payer: MEDICARE

## 2023-08-12 DIAGNOSIS — E89.0 POSTABLATIVE HYPOTHYROIDISM: ICD-10-CM

## 2023-08-14 RX ORDER — LEVOTHYROXINE SODIUM 150 MCG
TABLET ORAL
Qty: 90 TABLET | Refills: 0 | Status: SHIPPED | OUTPATIENT
Start: 2023-08-14 | End: 2023-08-16

## 2023-08-16 ENCOUNTER — MYC MEDICAL ADVICE (OUTPATIENT)
Dept: FAMILY MEDICINE | Facility: CLINIC | Age: 63
End: 2023-08-16

## 2023-08-16 ENCOUNTER — VIRTUAL VISIT (OUTPATIENT)
Dept: FAMILY MEDICINE | Facility: CLINIC | Age: 63
End: 2023-08-16
Payer: MEDICARE

## 2023-08-16 DIAGNOSIS — J44.9 CHRONIC OBSTRUCTIVE PULMONARY DISEASE, UNSPECIFIED COPD TYPE (H): ICD-10-CM

## 2023-08-16 DIAGNOSIS — F41.9 ANXIETY: ICD-10-CM

## 2023-08-16 DIAGNOSIS — E89.0 POSTABLATIVE HYPOTHYROIDISM: ICD-10-CM

## 2023-08-16 DIAGNOSIS — F19.939 WITHDRAWAL FROM OTHER PSYCHOACTIVE SUBSTANCE (H): ICD-10-CM

## 2023-08-16 DIAGNOSIS — J30.2 SEASONAL ALLERGIC RHINITIS, UNSPECIFIED TRIGGER: Primary | ICD-10-CM

## 2023-08-16 DIAGNOSIS — E21.3 HYPERPARATHYROIDISM (H): ICD-10-CM

## 2023-08-16 PROCEDURE — 99213 OFFICE O/P EST LOW 20 MIN: CPT | Mod: VID | Performed by: NURSE PRACTITIONER

## 2023-08-16 RX ORDER — LEVOTHYROXINE SODIUM 150 MCG
TABLET ORAL
Qty: 90 TABLET | Refills: 3 | Status: SHIPPED | OUTPATIENT
Start: 2023-08-16

## 2023-08-16 RX ORDER — CLONAZEPAM 0.5 MG/1
TABLET ORAL
Qty: 90 TABLET | Refills: 2 | Status: SHIPPED | OUTPATIENT
Start: 2023-08-16 | End: 2023-09-22

## 2023-08-16 RX ORDER — CLONAZEPAM 0.5 MG/1
TABLET ORAL
Qty: 90 TABLET | Refills: 2 | OUTPATIENT
Start: 2023-08-16

## 2023-08-16 RX ORDER — AZELASTINE 1 MG/ML
1 SPRAY, METERED NASAL 2 TIMES DAILY PRN
Qty: 90 ML | Refills: 3 | Status: SHIPPED | OUTPATIENT
Start: 2023-08-16

## 2023-08-16 RX ORDER — IPRATROPIUM BROMIDE 42 UG/1
2 SPRAY, METERED NASAL 4 TIMES DAILY PRN
Qty: 90 ML | Refills: 3 | Status: SHIPPED | OUTPATIENT
Start: 2023-08-16

## 2023-08-16 RX ORDER — HYDROXYZINE HYDROCHLORIDE 10 MG/1
10 TABLET, FILM COATED ORAL 3 TIMES DAILY PRN
Qty: 180 TABLET | Refills: 1 | Status: SHIPPED | OUTPATIENT
Start: 2023-08-16

## 2023-08-16 RX ORDER — FLUTICASONE PROPIONATE AND SALMETEROL 250; 50 UG/1; UG/1
POWDER RESPIRATORY (INHALATION)
Qty: 180 EACH | Refills: 5 | Status: SHIPPED | OUTPATIENT
Start: 2023-08-16

## 2023-08-16 ASSESSMENT — PATIENT HEALTH QUESTIONNAIRE - PHQ9
SUM OF ALL RESPONSES TO PHQ QUESTIONS 1-9: 16
10. IF YOU CHECKED OFF ANY PROBLEMS, HOW DIFFICULT HAVE THESE PROBLEMS MADE IT FOR YOU TO DO YOUR WORK, TAKE CARE OF THINGS AT HOME, OR GET ALONG WITH OTHER PEOPLE: VERY DIFFICULT
SUM OF ALL RESPONSES TO PHQ QUESTIONS 1-9: 16

## 2023-08-16 NOTE — PROGRESS NOTES
Joyce is a 62 year old who is being evaluated via a billable video visit.      How would you like to obtain your AVS? MyChart  If the video visit is dropped, the invitation should be resent by: Text to cell phone: [phone]  Will anyone else be joining your video visit? No          Assessment & Plan     Postablative hypothyroidism    - SYNTHROID 150 MCG tablet; TAKE 1 TABLET BY MOUTH 6 DAYS A WEEK THEN TAKE 1/2 TABLET BY MOUTH 1 TIME PER WEEK    Anxiety    - clonazePAM (KLONOPIN) 0.5 MG tablet; TAKE 1 TABLET(0.5 MG) BY MOUTH THREE TIMES DAILY AS NEEDED FOR ANXIETY    Chronic obstructive pulmonary disease, unspecified COPD type (H)    - fluticasone-salmeterol (ADVAIR) 250-50 MCG/ACT inhaler; Wixela Inhub 250 mcg-50 mcg/dose powder for inhalation  INHALE 1 PUFF BY MOUTH TWICE DAILY    Seasonal allergic rhinitis, unspecified trigger    - ipratropium (ATROVENT) 0.06 % nasal spray; Spray 2 sprays into both nostrils 4 times daily as needed for rhinitis  - azelastine (ASTELIN) 0.1 % nasal spray; Spray 1 spray into both nostrils 2 times daily as needed (as needed)    Withdrawal from other psychoactive substance (H)      Hyperparathyroidism (H)      Prescription drug management  25 minutes spent by me on the date of the encounter doing chart review, history and exam, documentation and further activities per the note       FUTURE APPOINTMENTS:       - establish care in [place]. Controlled medications require some type of follow up visit every 3 months for refill- use sparingly.   Regular exercise  See Patient Instructions    CHARLINE DIXON NP  [facility] ROLAND Cook is a 62 year old, presenting for the following health issues:  Recheck Medication      8/16/2023     7:12 AM   Additional Questions   Roomed by Lata   Accompanied by n/a         8/16/2023     7:12 AM   Patient Reported Additional Medications   Patient reports taking the following new medications Cefuroxime 500 mg  "for 7 days (last day)     Sold house, moving to [place].  Needing medication refills; will take a few months to establish care once she arrives.  Mental health has improved, getting along better with spouse. Has years worth of refills from cardiologist. Still needing clonazepam; has been trying to reduce/ use sparingly. Will continue to need it due to heart failure diagnoses, shortness of breath flaring her anxiety.  Got recent hair cut due to hair loss; thyroid labs have been stable in the past. Needing refills of allergy medication incase weather flares allergies- put do not fill until requested. No other concerns at this time.   History of Present Illness       Headaches:   Since the patient's last clinic visit, headaches are: worsened  The patient is getting headaches:  Once per day  She is able to do normal daily activities when she has a migraine.  The patient is taking the following rescue/relief medications:  Other   Patient states \"I get total relief\" from the rescue/relief medications.   The patient is taking the following medications to prevent migraines:  No medications to prevent migraines  In the past 4 weeks, the patient has gone to an Urgent Care or Emergency Room 0 times times due to headaches.    Reason for visit:  Headache and balance issues, medication refills    She eats 2-3 servings of fruits and vegetables daily.She consumes 0 sweetened beverage(s) daily.She exercises with enough effort to increase her heart rate 30 to 60 minutes per day.  She exercises with enough effort to increase her heart rate 7 days per week.   She is taking medications regularly.         Review of Systems   Constitutional, HEENT, cardiovascular, pulmonary, GI, , musculoskeletal, neuro, skin, endocrine and psych systems are negative, except as otherwise noted.      Objective           Vitals:  No vitals were obtained today due to virtual visit.    Physical Exam   GENERAL: Healthy, alert and no distress  EYES: " Eyes grossly normal to inspection.  No discharge or erythema, or obvious scleral/conjunctival abnormalities.  RESP: No audible wheeze, cough, or visible cyanosis.  No visible retractions or increased work of breathing.    SKIN: Visible skin clear. No significant rash, abnormal pigmentation or lesions.  NEURO: Cranial nerves grossly intact.  Mentation and speech appropriate for age.  PSYCH: Mentation appears normal, affect normal/bright, judgement and insight intact, normal speech and appearance well-groomed.    See orders      Video-Visit Details    Type of service:  Video Visit   Originating Location (pt. Location): Other car    Distant Location (provider location):  Off-site  Platform used for Video Visit: eTippingWell

## 2023-08-16 NOTE — TELEPHONE ENCOUNTER
WISE s.r.l message sent to patient.     Thanks,  NUNO Francois  [facility]

## 2023-08-16 NOTE — TELEPHONE ENCOUNTER
Joyce Marroquin   to P Frandy-Rei Pool (supporting Ce Maldonado, PO)         8/16/23  8:15 AM  Fabián Encinas   Not sure if you added klonopin and vistaril  For renewal   Ty  Ur one of my favorites   Joyce

## 2023-08-23 ENCOUNTER — MYC MEDICAL ADVICE (OUTPATIENT)
Dept: CARDIOLOGY | Facility: CLINIC | Age: 63
End: 2023-08-23
Payer: MEDICARE

## 2023-08-24 ENCOUNTER — TELEPHONE (OUTPATIENT)
Dept: FAMILY MEDICINE | Facility: CLINIC | Age: 63
End: 2023-08-24

## 2023-08-24 ENCOUNTER — LAB (OUTPATIENT)
Dept: LAB | Facility: CLINIC | Age: 63
End: 2023-08-24
Payer: MEDICARE

## 2023-08-24 DIAGNOSIS — R19.7 DIARRHEA, UNSPECIFIED TYPE: ICD-10-CM

## 2023-08-24 DIAGNOSIS — T36.95XA ANTIBIOTIC REACTION: ICD-10-CM

## 2023-08-24 LAB — C DIFF TOX B STL QL: NEGATIVE

## 2023-08-24 PROCEDURE — 87493 C DIFF AMPLIFIED PROBE: CPT

## 2023-08-24 PROCEDURE — 87507 IADNA-DNA/RNA PROBE TQ 12-25: CPT

## 2023-08-24 NOTE — TELEPHONE ENCOUNTER
Reason for Call:  Other     Detailed comments:  Can you order me a stool specimen for c-diff/see MyChart message coming into town like to  asap.    Phone Number Patient can be reached at: Home number on file [phone] (home)    Best Time:  Any     Can we leave a detailed message on this number? YES    Call taken on 8/24/2023 at 8:12 AM by Melony Mcintosh

## 2023-08-25 LAB
ADV 40+41 DNA STL QL NAA+NON-PROBE: NEGATIVE
ASTRO TYP 1-8 RNA STL QL NAA+NON-PROBE: NEGATIVE
C CAYETANENSIS DNA STL QL NAA+NON-PROBE: NEGATIVE
CAMPYLOBACTER DNA SPEC NAA+PROBE: NEGATIVE
CRYPTOSP DNA STL QL NAA+NON-PROBE: NEGATIVE
E COLI O157 DNA STL QL NAA+NON-PROBE: NORMAL
E HISTOLYT DNA STL QL NAA+NON-PROBE: NEGATIVE
EAEC ASTA GENE ISLT QL NAA+PROBE: NEGATIVE
EC STX1+STX2 GENES STL QL NAA+NON-PROBE: NEGATIVE
EPEC EAE GENE STL QL NAA+NON-PROBE: NEGATIVE
ETEC LTA+ST1A+ST1B TOX ST NAA+NON-PROBE: NEGATIVE
G LAMBLIA DNA STL QL NAA+NON-PROBE: NEGATIVE
NOROVIRUS GI+II RNA STL QL NAA+NON-PROBE: NEGATIVE
P SHIGELLOIDES DNA STL QL NAA+NON-PROBE: NEGATIVE
RVA RNA STL QL NAA+NON-PROBE: NEGATIVE
SALMONELLA SP RPOD STL QL NAA+PROBE: NEGATIVE
SAPO I+II+IV+V RNA STL QL NAA+NON-PROBE: NEGATIVE
SHIGELLA SP+EIEC IPAH ST NAA+NON-PROBE: NEGATIVE
V CHOLERAE DNA SPEC QL NAA+PROBE: NEGATIVE
VIBRIO DNA SPEC NAA+PROBE: NEGATIVE
Y ENTEROCOL DNA STL QL NAA+PROBE: NEGATIVE

## 2023-08-25 NOTE — RESULT ENCOUNTER NOTE
Jose Luis Cook,    Thank you for your recent office visit.    Here are your recent results.  Normal stool testing hope you feel better soon    Feel free to contact me via [employer] or call the clinic at [phone].    Sincerely,    SHAYNA Simon, FNP-BC

## 2023-09-22 ENCOUNTER — MYC MEDICAL ADVICE (OUTPATIENT)
Dept: FAMILY MEDICINE | Facility: CLINIC | Age: 63
End: 2023-09-22
Payer: MEDICARE

## 2023-09-22 DIAGNOSIS — F41.9 ANXIETY: ICD-10-CM

## 2023-09-22 RX ORDER — CLONAZEPAM 0.5 MG/1
TABLET ORAL
Qty: 30 TABLET | Refills: 0 | Status: SHIPPED | OUTPATIENT
Start: 2023-09-22

## 2023-09-22 NOTE — TELEPHONE ENCOUNTER
"Patient called stating she was trying to fill her clonazePAM (KLONOPIN) 0.5 MG tablet in [place]. She is out of state during the call. RN stated PCP is out of office and stated that typically our providers cannot send controlled scripts out of state.   She stated that she could go through withdrawals. RN educated to present to the ER with withdrawal symptoms. Patient became intensely frustrated upon RN education and was raising her voice stating \"I am not in withdrawal right now\".   RN told her I would send the request but her PCP is off for the weekend. If she does not hear back to seek care where she is at. She verbalized understanding with frustration.     Maya Vaca RN on 9/22/2023 at 3:39 PM    "

## 2023-09-22 NOTE — TELEPHONE ENCOUNTER
September 22, 2023  Joyce JUAN Marroquin   to KACI Wise-Rn Pool (supporting Ce Maldonado, PO)         9/22/23  3:42 PM  Fabián Encinas   I know you renewed my klonopin but I am unable to fill here on vacation   I just want to be proactive as the [place] pharmacy said they could not transfer it over to [place]   Is this something you can do or should I go to a clinic here when I m low  I talked with a nurse at [facility] and she said that she would forward to the on call to see   The conversation did not leave me with great confidence so I am deferring to you   Respectfully   Joyce         Patient sent myChart after request was put forward    Maya Vaca, NUNO on 9/22/2023 at 3:46 PM

## 2023-09-25 ENCOUNTER — TELEPHONE (OUTPATIENT)
Dept: FAMILY MEDICINE | Facility: CLINIC | Age: 63
End: 2023-09-25
Payer: MEDICARE

## 2023-09-25 NOTE — TELEPHONE ENCOUNTER
Lucinda sent regarding RX.    Geoffrey Murray, RN  Triage Nurse  [facility], [facility]

## 2023-10-06 LAB
MDC_IDC_LEAD_IMPLANT_DT: NORMAL
MDC_IDC_LEAD_LOCATION: NORMAL
MDC_IDC_LEAD_LOCATION_DETAIL_1: NORMAL
MDC_IDC_LEAD_MFG: NORMAL
MDC_IDC_LEAD_MODEL: NORMAL
MDC_IDC_LEAD_POLARITY_TYPE: NORMAL
MDC_IDC_LEAD_SERIAL: NORMAL
MDC_IDC_LEAD_SPECIAL_FUNCTION: NORMAL
MDC_IDC_MSMT_BATTERY_DTM: NORMAL
MDC_IDC_MSMT_BATTERY_DTM: NORMAL
MDC_IDC_MSMT_BATTERY_REMAINING_LONGEVITY: 116 MO
MDC_IDC_MSMT_BATTERY_REMAINING_LONGEVITY: 116 MO
MDC_IDC_MSMT_BATTERY_RRT_TRIGGER: 2.6
MDC_IDC_MSMT_BATTERY_RRT_TRIGGER: 2.6
MDC_IDC_MSMT_BATTERY_STATUS: NORMAL
MDC_IDC_MSMT_BATTERY_STATUS: NORMAL
MDC_IDC_MSMT_BATTERY_VOLTAGE: 3.01 V
MDC_IDC_MSMT_BATTERY_VOLTAGE: 3.01 V
MDC_IDC_MSMT_LEADCHNL_LV_IMPEDANCE_VALUE: 266 OHM
MDC_IDC_MSMT_LEADCHNL_LV_IMPEDANCE_VALUE: 266 OHM
MDC_IDC_MSMT_LEADCHNL_LV_IMPEDANCE_VALUE: 342 OHM
MDC_IDC_MSMT_LEADCHNL_LV_IMPEDANCE_VALUE: 380 OHM
MDC_IDC_MSMT_LEADCHNL_LV_IMPEDANCE_VALUE: 399 OHM
MDC_IDC_MSMT_LEADCHNL_LV_IMPEDANCE_VALUE: 399 OHM
MDC_IDC_MSMT_LEADCHNL_LV_IMPEDANCE_VALUE: 494 OHM
MDC_IDC_MSMT_LEADCHNL_LV_IMPEDANCE_VALUE: 513 OHM
MDC_IDC_MSMT_LEADCHNL_LV_PACING_THRESHOLD_AMPLITUDE: 0.75 V
MDC_IDC_MSMT_LEADCHNL_LV_PACING_THRESHOLD_AMPLITUDE: 0.75 V
MDC_IDC_MSMT_LEADCHNL_LV_PACING_THRESHOLD_PULSEWIDTH: 0.4 MS
MDC_IDC_MSMT_LEADCHNL_LV_PACING_THRESHOLD_PULSEWIDTH: 0.4 MS
MDC_IDC_MSMT_LEADCHNL_RA_IMPEDANCE_VALUE: 437 OHM
MDC_IDC_MSMT_LEADCHNL_RA_IMPEDANCE_VALUE: 437 OHM
MDC_IDC_MSMT_LEADCHNL_RA_PACING_THRESHOLD_AMPLITUDE: 0.5 V
MDC_IDC_MSMT_LEADCHNL_RA_PACING_THRESHOLD_AMPLITUDE: 0.75 V
MDC_IDC_MSMT_LEADCHNL_RA_PACING_THRESHOLD_PULSEWIDTH: 0.4 MS
MDC_IDC_MSMT_LEADCHNL_RA_PACING_THRESHOLD_PULSEWIDTH: 0.4 MS
MDC_IDC_MSMT_LEADCHNL_RA_SENSING_INTR_AMPL: 1.9 MV
MDC_IDC_MSMT_LEADCHNL_RA_SENSING_INTR_AMPL: 2.5 MV
MDC_IDC_MSMT_LEADCHNL_RV_IMPEDANCE_VALUE: 456 OHM
MDC_IDC_MSMT_LEADCHNL_RV_IMPEDANCE_VALUE: 456 OHM
MDC_IDC_MSMT_LEADCHNL_RV_PACING_THRESHOLD_AMPLITUDE: 0.75 V
MDC_IDC_MSMT_LEADCHNL_RV_PACING_THRESHOLD_AMPLITUDE: 0.75 V
MDC_IDC_MSMT_LEADCHNL_RV_PACING_THRESHOLD_PULSEWIDTH: 0.4 MS
MDC_IDC_MSMT_LEADCHNL_RV_PACING_THRESHOLD_PULSEWIDTH: 0.4 MS
MDC_IDC_PG_IMPLANT_DTM: NORMAL
MDC_IDC_PG_IMPLANT_DTM: NORMAL
MDC_IDC_PG_MFG: NORMAL
MDC_IDC_PG_MFG: NORMAL
MDC_IDC_PG_MODEL: NORMAL
MDC_IDC_PG_MODEL: NORMAL
MDC_IDC_PG_SERIAL: NORMAL
MDC_IDC_PG_SERIAL: NORMAL
MDC_IDC_PG_TYPE: NORMAL
MDC_IDC_PG_TYPE: NORMAL
MDC_IDC_SESS_CLINIC_NAME: NORMAL
MDC_IDC_SESS_CLINIC_NAME: NORMAL
MDC_IDC_SESS_DTM: NORMAL
MDC_IDC_SESS_DTM: NORMAL
MDC_IDC_SESS_TYPE: NORMAL
MDC_IDC_SESS_TYPE: NORMAL
MDC_IDC_SET_BRADY_AT_MODE_SWITCH_RATE: 171 {BEATS}/MIN
MDC_IDC_SET_BRADY_AT_MODE_SWITCH_RATE: 171 {BEATS}/MIN
MDC_IDC_SET_BRADY_LOWRATE: 60 {BEATS}/MIN
MDC_IDC_SET_BRADY_LOWRATE: 85 {BEATS}/MIN
MDC_IDC_SET_BRADY_MAX_SENSOR_RATE: 140 {BEATS}/MIN
MDC_IDC_SET_BRADY_MAX_SENSOR_RATE: 140 {BEATS}/MIN
MDC_IDC_SET_BRADY_MAX_TRACKING_RATE: 140 {BEATS}/MIN
MDC_IDC_SET_BRADY_MAX_TRACKING_RATE: 140 {BEATS}/MIN
MDC_IDC_SET_BRADY_MODE: NORMAL
MDC_IDC_SET_BRADY_MODE: NORMAL
MDC_IDC_SET_BRADY_PAV_DELAY_HIGH: 100 MS
MDC_IDC_SET_BRADY_PAV_DELAY_HIGH: 100 MS
MDC_IDC_SET_BRADY_PAV_DELAY_LOW: 180 MS
MDC_IDC_SET_BRADY_PAV_DELAY_LOW: 180 MS
MDC_IDC_SET_BRADY_SAV_DELAY_HIGH: 70 MS
MDC_IDC_SET_BRADY_SAV_DELAY_HIGH: 70 MS
MDC_IDC_SET_BRADY_SAV_DELAY_LOW: 150 MS
MDC_IDC_SET_BRADY_SAV_DELAY_LOW: 150 MS
MDC_IDC_SET_CRT_LVRV_DELAY: 80 MS
MDC_IDC_SET_CRT_LVRV_DELAY: 80 MS
MDC_IDC_SET_CRT_PACED_CHAMBERS: NORMAL
MDC_IDC_SET_CRT_PACED_CHAMBERS: NORMAL
MDC_IDC_SET_LEADCHNL_LV_PACING_AMPLITUDE: 1.25 V
MDC_IDC_SET_LEADCHNL_LV_PACING_ANODE_ELECTRODE_1: NORMAL
MDC_IDC_SET_LEADCHNL_LV_PACING_ANODE_ELECTRODE_1: NORMAL
MDC_IDC_SET_LEADCHNL_LV_PACING_ANODE_LOCATION_1: NORMAL
MDC_IDC_SET_LEADCHNL_LV_PACING_ANODE_LOCATION_1: NORMAL
MDC_IDC_SET_LEADCHNL_LV_PACING_CAPTURE_MODE: NORMAL
MDC_IDC_SET_LEADCHNL_LV_PACING_CATHODE_ELECTRODE_1: NORMAL
MDC_IDC_SET_LEADCHNL_LV_PACING_CATHODE_ELECTRODE_1: NORMAL
MDC_IDC_SET_LEADCHNL_LV_PACING_CATHODE_LOCATION_1: NORMAL
MDC_IDC_SET_LEADCHNL_LV_PACING_CATHODE_LOCATION_1: NORMAL
MDC_IDC_SET_LEADCHNL_LV_PACING_POLARITY: NORMAL
MDC_IDC_SET_LEADCHNL_LV_PACING_PULSEWIDTH: 0.4 MS
MDC_IDC_SET_LEADCHNL_RA_PACING_AMPLITUDE: 1.5 V
MDC_IDC_SET_LEADCHNL_RA_PACING_AMPLITUDE: 5 V
MDC_IDC_SET_LEADCHNL_RA_PACING_ANODE_ELECTRODE_1: NORMAL
MDC_IDC_SET_LEADCHNL_RA_PACING_ANODE_ELECTRODE_1: NORMAL
MDC_IDC_SET_LEADCHNL_RA_PACING_ANODE_LOCATION_1: NORMAL
MDC_IDC_SET_LEADCHNL_RA_PACING_ANODE_LOCATION_1: NORMAL
MDC_IDC_SET_LEADCHNL_RA_PACING_CAPTURE_MODE: NORMAL
MDC_IDC_SET_LEADCHNL_RA_PACING_CAPTURE_MODE: NORMAL
MDC_IDC_SET_LEADCHNL_RA_PACING_CATHODE_ELECTRODE_1: NORMAL
MDC_IDC_SET_LEADCHNL_RA_PACING_CATHODE_ELECTRODE_1: NORMAL
MDC_IDC_SET_LEADCHNL_RA_PACING_CATHODE_LOCATION_1: NORMAL
MDC_IDC_SET_LEADCHNL_RA_PACING_CATHODE_LOCATION_1: NORMAL
MDC_IDC_SET_LEADCHNL_RA_PACING_POLARITY: NORMAL
MDC_IDC_SET_LEADCHNL_RA_PACING_POLARITY: NORMAL
MDC_IDC_SET_LEADCHNL_RA_PACING_PULSEWIDTH: 0.4 MS
MDC_IDC_SET_LEADCHNL_RA_PACING_PULSEWIDTH: 1 MS
MDC_IDC_SET_LEADCHNL_RA_SENSING_ANODE_ELECTRODE_1: NORMAL
MDC_IDC_SET_LEADCHNL_RA_SENSING_ANODE_ELECTRODE_1: NORMAL
MDC_IDC_SET_LEADCHNL_RA_SENSING_ANODE_LOCATION_1: NORMAL
MDC_IDC_SET_LEADCHNL_RA_SENSING_ANODE_LOCATION_1: NORMAL
MDC_IDC_SET_LEADCHNL_RA_SENSING_CATHODE_ELECTRODE_1: NORMAL
MDC_IDC_SET_LEADCHNL_RA_SENSING_CATHODE_ELECTRODE_1: NORMAL
MDC_IDC_SET_LEADCHNL_RA_SENSING_CATHODE_LOCATION_1: NORMAL
MDC_IDC_SET_LEADCHNL_RA_SENSING_CATHODE_LOCATION_1: NORMAL
MDC_IDC_SET_LEADCHNL_RA_SENSING_POLARITY: NORMAL
MDC_IDC_SET_LEADCHNL_RA_SENSING_POLARITY: NORMAL
MDC_IDC_SET_LEADCHNL_RA_SENSING_SENSITIVITY: 0.3 MV
MDC_IDC_SET_LEADCHNL_RA_SENSING_SENSITIVITY: 0.3 MV
MDC_IDC_SET_LEADCHNL_RV_PACING_AMPLITUDE: 2 V
MDC_IDC_SET_LEADCHNL_RV_PACING_AMPLITUDE: 5 V
MDC_IDC_SET_LEADCHNL_RV_PACING_ANODE_ELECTRODE_1: NORMAL
MDC_IDC_SET_LEADCHNL_RV_PACING_ANODE_ELECTRODE_1: NORMAL
MDC_IDC_SET_LEADCHNL_RV_PACING_ANODE_LOCATION_1: NORMAL
MDC_IDC_SET_LEADCHNL_RV_PACING_ANODE_LOCATION_1: NORMAL
MDC_IDC_SET_LEADCHNL_RV_PACING_CAPTURE_MODE: NORMAL
MDC_IDC_SET_LEADCHNL_RV_PACING_CAPTURE_MODE: NORMAL
MDC_IDC_SET_LEADCHNL_RV_PACING_CATHODE_ELECTRODE_1: NORMAL
MDC_IDC_SET_LEADCHNL_RV_PACING_CATHODE_ELECTRODE_1: NORMAL
MDC_IDC_SET_LEADCHNL_RV_PACING_CATHODE_LOCATION_1: NORMAL
MDC_IDC_SET_LEADCHNL_RV_PACING_CATHODE_LOCATION_1: NORMAL
MDC_IDC_SET_LEADCHNL_RV_PACING_POLARITY: NORMAL
MDC_IDC_SET_LEADCHNL_RV_PACING_POLARITY: NORMAL
MDC_IDC_SET_LEADCHNL_RV_PACING_PULSEWIDTH: 0.4 MS
MDC_IDC_SET_LEADCHNL_RV_PACING_PULSEWIDTH: 1 MS
MDC_IDC_SET_LEADCHNL_RV_SENSING_ANODE_ELECTRODE_1: NORMAL
MDC_IDC_SET_LEADCHNL_RV_SENSING_ANODE_ELECTRODE_1: NORMAL
MDC_IDC_SET_LEADCHNL_RV_SENSING_ANODE_LOCATION_1: NORMAL
MDC_IDC_SET_LEADCHNL_RV_SENSING_ANODE_LOCATION_1: NORMAL
MDC_IDC_SET_LEADCHNL_RV_SENSING_CATHODE_ELECTRODE_1: NORMAL
MDC_IDC_SET_LEADCHNL_RV_SENSING_CATHODE_ELECTRODE_1: NORMAL
MDC_IDC_SET_LEADCHNL_RV_SENSING_CATHODE_LOCATION_1: NORMAL
MDC_IDC_SET_LEADCHNL_RV_SENSING_CATHODE_LOCATION_1: NORMAL
MDC_IDC_SET_LEADCHNL_RV_SENSING_POLARITY: NORMAL
MDC_IDC_SET_LEADCHNL_RV_SENSING_POLARITY: NORMAL
MDC_IDC_SET_LEADCHNL_RV_SENSING_SENSITIVITY: 0.9 MV
MDC_IDC_SET_LEADCHNL_RV_SENSING_SENSITIVITY: 0.9 MV
MDC_IDC_SET_ZONE_DETECTION_INTERVAL: 350 MS
MDC_IDC_SET_ZONE_DETECTION_INTERVAL: 350 MS
MDC_IDC_SET_ZONE_DETECTION_INTERVAL: 400 MS
MDC_IDC_SET_ZONE_DETECTION_INTERVAL: 400 MS
MDC_IDC_SET_ZONE_TYPE: NORMAL
MDC_IDC_STAT_AT_BURDEN_PERCENT: 0 %
MDC_IDC_STAT_AT_BURDEN_PERCENT: 0 %
MDC_IDC_STAT_AT_DTM_END: NORMAL
MDC_IDC_STAT_AT_DTM_END: NORMAL
MDC_IDC_STAT_AT_DTM_START: NORMAL
MDC_IDC_STAT_AT_DTM_START: NORMAL
MDC_IDC_STAT_BRADY_AP_VP_PERCENT: 36.26 %
MDC_IDC_STAT_BRADY_AP_VP_PERCENT: 36.26 %
MDC_IDC_STAT_BRADY_AP_VS_PERCENT: 0.12 %
MDC_IDC_STAT_BRADY_AP_VS_PERCENT: 0.12 %
MDC_IDC_STAT_BRADY_AS_VP_PERCENT: 63.21 %
MDC_IDC_STAT_BRADY_AS_VP_PERCENT: 63.21 %
MDC_IDC_STAT_BRADY_AS_VS_PERCENT: 0.4 %
MDC_IDC_STAT_BRADY_AS_VS_PERCENT: 0.4 %
MDC_IDC_STAT_BRADY_DTM_END: NORMAL
MDC_IDC_STAT_BRADY_DTM_END: NORMAL
MDC_IDC_STAT_BRADY_DTM_START: NORMAL
MDC_IDC_STAT_BRADY_DTM_START: NORMAL
MDC_IDC_STAT_BRADY_RA_PERCENT_PACED: 36.51 %
MDC_IDC_STAT_BRADY_RA_PERCENT_PACED: 36.51 %
MDC_IDC_STAT_BRADY_RV_PERCENT_PACED: 99.47 %
MDC_IDC_STAT_BRADY_RV_PERCENT_PACED: 99.47 %
MDC_IDC_STAT_CRT_DTM_END: NORMAL
MDC_IDC_STAT_CRT_DTM_END: NORMAL
MDC_IDC_STAT_CRT_DTM_START: NORMAL
MDC_IDC_STAT_CRT_DTM_START: NORMAL
MDC_IDC_STAT_CRT_LV_PERCENT_PACED: 99.43 %
MDC_IDC_STAT_CRT_LV_PERCENT_PACED: 99.43 %
MDC_IDC_STAT_CRT_PERCENT_PACED: 99.43 %
MDC_IDC_STAT_CRT_PERCENT_PACED: 99.43 %
MDC_IDC_STAT_EPISODE_RECENT_COUNT: 0
MDC_IDC_STAT_EPISODE_RECENT_COUNT_DTM_END: NORMAL
MDC_IDC_STAT_EPISODE_RECENT_COUNT_DTM_START: NORMAL
MDC_IDC_STAT_EPISODE_TOTAL_COUNT: 0
MDC_IDC_STAT_EPISODE_TOTAL_COUNT: 3
MDC_IDC_STAT_EPISODE_TOTAL_COUNT: 3
MDC_IDC_STAT_EPISODE_TOTAL_COUNT_DTM_END: NORMAL
MDC_IDC_STAT_EPISODE_TOTAL_COUNT_DTM_START: NORMAL
MDC_IDC_STAT_EPISODE_TYPE: NORMAL

## 2023-10-09 ENCOUNTER — MYC MEDICAL ADVICE (OUTPATIENT)
Dept: FAMILY MEDICINE | Facility: CLINIC | Age: 63
End: 2023-10-09
Payer: MEDICARE

## 2023-10-09 ENCOUNTER — MYC MEDICAL ADVICE (OUTPATIENT)
Dept: CARDIOLOGY | Facility: CLINIC | Age: 63
End: 2023-10-09
Payer: MEDICARE

## 2023-10-19 ENCOUNTER — ANCILLARY PROCEDURE (OUTPATIENT)
Dept: CARDIOLOGY | Facility: CLINIC | Age: 63
End: 2023-10-19
Attending: INTERNAL MEDICINE
Payer: MEDICARE

## 2023-10-19 DIAGNOSIS — I44.2 COMPLETE ATRIOVENTRICULAR BLOCK (H): ICD-10-CM

## 2023-10-19 PROCEDURE — 93296 REM INTERROG EVL PM/IDS: CPT

## 2023-10-19 PROCEDURE — 93294 REM INTERROG EVL PM/LDLS PM: CPT | Performed by: INTERNAL MEDICINE

## 2023-11-05 ENCOUNTER — HEALTH MAINTENANCE LETTER (OUTPATIENT)
Age: 63
End: 2023-11-05

## 2023-11-06 LAB
MDC_IDC_EPISODE_DTM: NORMAL
MDC_IDC_EPISODE_DURATION: 104 S
MDC_IDC_EPISODE_ID: 4
MDC_IDC_EPISODE_TYPE: NORMAL
MDC_IDC_LEAD_CONNECTION_STATUS: NORMAL
MDC_IDC_LEAD_IMPLANT_DT: NORMAL
MDC_IDC_LEAD_LOCATION: NORMAL
MDC_IDC_LEAD_LOCATION_DETAIL_1: NORMAL
MDC_IDC_LEAD_MFG: NORMAL
MDC_IDC_LEAD_MODEL: NORMAL
MDC_IDC_LEAD_POLARITY_TYPE: NORMAL
MDC_IDC_LEAD_SERIAL: NORMAL
MDC_IDC_LEAD_SPECIAL_FUNCTION: NORMAL
MDC_IDC_MSMT_BATTERY_DTM: NORMAL
MDC_IDC_MSMT_BATTERY_REMAINING_LONGEVITY: 109 MO
MDC_IDC_MSMT_BATTERY_RRT_TRIGGER: 2.6
MDC_IDC_MSMT_BATTERY_STATUS: NORMAL
MDC_IDC_MSMT_BATTERY_VOLTAGE: 3.01 V
MDC_IDC_MSMT_LEADCHNL_LV_IMPEDANCE_VALUE: 247 OHM
MDC_IDC_MSMT_LEADCHNL_LV_IMPEDANCE_VALUE: 323 OHM
MDC_IDC_MSMT_LEADCHNL_LV_IMPEDANCE_VALUE: 380 OHM
MDC_IDC_MSMT_LEADCHNL_LV_IMPEDANCE_VALUE: 456 OHM
MDC_IDC_MSMT_LEADCHNL_LV_IMPEDANCE_VALUE: 494 OHM
MDC_IDC_MSMT_LEADCHNL_LV_PACING_THRESHOLD_AMPLITUDE: 0.62 V
MDC_IDC_MSMT_LEADCHNL_LV_PACING_THRESHOLD_PULSEWIDTH: 0.4 MS
MDC_IDC_MSMT_LEADCHNL_RA_IMPEDANCE_VALUE: 361 OHM
MDC_IDC_MSMT_LEADCHNL_RA_IMPEDANCE_VALUE: 399 OHM
MDC_IDC_MSMT_LEADCHNL_RA_PACING_THRESHOLD_AMPLITUDE: 0.62 V
MDC_IDC_MSMT_LEADCHNL_RA_PACING_THRESHOLD_PULSEWIDTH: 0.4 MS
MDC_IDC_MSMT_LEADCHNL_RA_SENSING_INTR_AMPL: 2.38 MV
MDC_IDC_MSMT_LEADCHNL_RA_SENSING_INTR_AMPL: 2.38 MV
MDC_IDC_MSMT_LEADCHNL_RV_IMPEDANCE_VALUE: 399 OHM
MDC_IDC_MSMT_LEADCHNL_RV_IMPEDANCE_VALUE: 418 OHM
MDC_IDC_MSMT_LEADCHNL_RV_PACING_THRESHOLD_AMPLITUDE: 0.75 V
MDC_IDC_MSMT_LEADCHNL_RV_PACING_THRESHOLD_PULSEWIDTH: 0.4 MS
MDC_IDC_MSMT_LEADCHNL_RV_SENSING_INTR_AMPL: 6.38 MV
MDC_IDC_MSMT_LEADCHNL_RV_SENSING_INTR_AMPL: 6.38 MV
MDC_IDC_PG_IMPLANT_DTM: NORMAL
MDC_IDC_PG_MFG: NORMAL
MDC_IDC_PG_MODEL: NORMAL
MDC_IDC_PG_SERIAL: NORMAL
MDC_IDC_PG_TYPE: NORMAL
MDC_IDC_SESS_CLINIC_NAME: NORMAL
MDC_IDC_SESS_DTM: NORMAL
MDC_IDC_SESS_TYPE: NORMAL
MDC_IDC_SET_BRADY_AT_MODE_SWITCH_RATE: 171 {BEATS}/MIN
MDC_IDC_SET_BRADY_LOWRATE: 60 {BEATS}/MIN
MDC_IDC_SET_BRADY_MAX_SENSOR_RATE: 140 {BEATS}/MIN
MDC_IDC_SET_BRADY_MAX_TRACKING_RATE: 140 {BEATS}/MIN
MDC_IDC_SET_BRADY_MODE: NORMAL
MDC_IDC_SET_BRADY_PAV_DELAY_HIGH: 100 MS
MDC_IDC_SET_BRADY_PAV_DELAY_LOW: 180 MS
MDC_IDC_SET_BRADY_SAV_DELAY_HIGH: 70 MS
MDC_IDC_SET_BRADY_SAV_DELAY_LOW: 150 MS
MDC_IDC_SET_CRT_LVRV_DELAY: 80 MS
MDC_IDC_SET_CRT_PACED_CHAMBERS: NORMAL
MDC_IDC_SET_LEADCHNL_LV_PACING_AMPLITUDE: 1.25 V
MDC_IDC_SET_LEADCHNL_LV_PACING_ANODE_ELECTRODE_1: NORMAL
MDC_IDC_SET_LEADCHNL_LV_PACING_ANODE_LOCATION_1: NORMAL
MDC_IDC_SET_LEADCHNL_LV_PACING_CAPTURE_MODE: NORMAL
MDC_IDC_SET_LEADCHNL_LV_PACING_CATHODE_ELECTRODE_1: NORMAL
MDC_IDC_SET_LEADCHNL_LV_PACING_CATHODE_LOCATION_1: NORMAL
MDC_IDC_SET_LEADCHNL_LV_PACING_POLARITY: NORMAL
MDC_IDC_SET_LEADCHNL_LV_PACING_PULSEWIDTH: 0.4 MS
MDC_IDC_SET_LEADCHNL_RA_PACING_AMPLITUDE: 1.5 V
MDC_IDC_SET_LEADCHNL_RA_PACING_ANODE_ELECTRODE_1: NORMAL
MDC_IDC_SET_LEADCHNL_RA_PACING_ANODE_LOCATION_1: NORMAL
MDC_IDC_SET_LEADCHNL_RA_PACING_CAPTURE_MODE: NORMAL
MDC_IDC_SET_LEADCHNL_RA_PACING_CATHODE_ELECTRODE_1: NORMAL
MDC_IDC_SET_LEADCHNL_RA_PACING_CATHODE_LOCATION_1: NORMAL
MDC_IDC_SET_LEADCHNL_RA_PACING_POLARITY: NORMAL
MDC_IDC_SET_LEADCHNL_RA_PACING_PULSEWIDTH: 0.4 MS
MDC_IDC_SET_LEADCHNL_RA_SENSING_ANODE_ELECTRODE_1: NORMAL
MDC_IDC_SET_LEADCHNL_RA_SENSING_ANODE_LOCATION_1: NORMAL
MDC_IDC_SET_LEADCHNL_RA_SENSING_CATHODE_ELECTRODE_1: NORMAL
MDC_IDC_SET_LEADCHNL_RA_SENSING_CATHODE_LOCATION_1: NORMAL
MDC_IDC_SET_LEADCHNL_RA_SENSING_POLARITY: NORMAL
MDC_IDC_SET_LEADCHNL_RA_SENSING_SENSITIVITY: 0.3 MV
MDC_IDC_SET_LEADCHNL_RV_PACING_AMPLITUDE: 2 V
MDC_IDC_SET_LEADCHNL_RV_PACING_ANODE_ELECTRODE_1: NORMAL
MDC_IDC_SET_LEADCHNL_RV_PACING_ANODE_LOCATION_1: NORMAL
MDC_IDC_SET_LEADCHNL_RV_PACING_CAPTURE_MODE: NORMAL
MDC_IDC_SET_LEADCHNL_RV_PACING_CATHODE_ELECTRODE_1: NORMAL
MDC_IDC_SET_LEADCHNL_RV_PACING_CATHODE_LOCATION_1: NORMAL
MDC_IDC_SET_LEADCHNL_RV_PACING_POLARITY: NORMAL
MDC_IDC_SET_LEADCHNL_RV_PACING_PULSEWIDTH: 0.4 MS
MDC_IDC_SET_LEADCHNL_RV_SENSING_ANODE_ELECTRODE_1: NORMAL
MDC_IDC_SET_LEADCHNL_RV_SENSING_ANODE_LOCATION_1: NORMAL
MDC_IDC_SET_LEADCHNL_RV_SENSING_CATHODE_ELECTRODE_1: NORMAL
MDC_IDC_SET_LEADCHNL_RV_SENSING_CATHODE_LOCATION_1: NORMAL
MDC_IDC_SET_LEADCHNL_RV_SENSING_POLARITY: NORMAL
MDC_IDC_SET_LEADCHNL_RV_SENSING_SENSITIVITY: 0.9 MV
MDC_IDC_SET_ZONE_DETECTION_INTERVAL: 350 MS
MDC_IDC_SET_ZONE_DETECTION_INTERVAL: 400 MS
MDC_IDC_SET_ZONE_STATUS: NORMAL
MDC_IDC_SET_ZONE_STATUS: NORMAL
MDC_IDC_SET_ZONE_TYPE: NORMAL
MDC_IDC_SET_ZONE_VENDOR_TYPE: NORMAL
MDC_IDC_STAT_AT_BURDEN_PERCENT: 0.1 %
MDC_IDC_STAT_AT_DTM_END: NORMAL
MDC_IDC_STAT_AT_DTM_START: NORMAL
MDC_IDC_STAT_BRADY_AP_VP_PERCENT: 41.77 %
MDC_IDC_STAT_BRADY_AP_VS_PERCENT: 0.14 %
MDC_IDC_STAT_BRADY_AS_VP_PERCENT: 57.92 %
MDC_IDC_STAT_BRADY_AS_VS_PERCENT: 0.17 %
MDC_IDC_STAT_BRADY_DTM_END: NORMAL
MDC_IDC_STAT_BRADY_DTM_START: NORMAL
MDC_IDC_STAT_BRADY_RA_PERCENT_PACED: 41.97 %
MDC_IDC_STAT_BRADY_RV_PERCENT_PACED: 99.69 %
MDC_IDC_STAT_CRT_DTM_END: NORMAL
MDC_IDC_STAT_CRT_DTM_START: NORMAL
MDC_IDC_STAT_CRT_LV_PERCENT_PACED: 99.65 %
MDC_IDC_STAT_CRT_PERCENT_PACED: 99.64 %
MDC_IDC_STAT_EPISODE_RECENT_COUNT: 0
MDC_IDC_STAT_EPISODE_RECENT_COUNT: 1
MDC_IDC_STAT_EPISODE_RECENT_COUNT_DTM_END: NORMAL
MDC_IDC_STAT_EPISODE_RECENT_COUNT_DTM_START: NORMAL
MDC_IDC_STAT_EPISODE_TOTAL_COUNT: 0
MDC_IDC_STAT_EPISODE_TOTAL_COUNT: 1
MDC_IDC_STAT_EPISODE_TOTAL_COUNT: 3
MDC_IDC_STAT_EPISODE_TOTAL_COUNT_DTM_END: NORMAL
MDC_IDC_STAT_EPISODE_TOTAL_COUNT_DTM_START: NORMAL
MDC_IDC_STAT_EPISODE_TYPE: NORMAL
MDC_IDC_STAT_TACHYTHERAPY_RECENT_DTM_END: NORMAL
MDC_IDC_STAT_TACHYTHERAPY_RECENT_DTM_START: NORMAL
MDC_IDC_STAT_TACHYTHERAPY_TOTAL_DTM_END: NORMAL
MDC_IDC_STAT_TACHYTHERAPY_TOTAL_DTM_START: NORMAL

## 2024-01-01 NOTE — TELEPHONE ENCOUNTER
[facility] Call Center    Phone Message    May a detailed message be left on voicemail: yes     Reason for Call: Symptoms or Concerns     If patient has red-flag symptoms, warm transfer to triage line    Current symptom or concern: Diarrhea, uncontrollabe.  She staeted that she has called several times and needs help ASAP.  She states gabby tit is affecting her electrolites.  Call ASAP to discuss    Symptoms have been present for:  2 month(s)    Has patient previously been seen for this? No      Are there any new or worsening symptoms? Yes: worsening      Action Taken: Message routed to:  [facility] gastro    Travel Screening: Not Applicable                                                                       2024/B positive

## 2024-01-14 ENCOUNTER — HEALTH MAINTENANCE LETTER (OUTPATIENT)
Age: 64
End: 2024-01-14

## 2024-02-25 DIAGNOSIS — I42.8 NONISCHEMIC CARDIOMYOPATHY (H): Primary | ICD-10-CM

## 2024-02-25 DIAGNOSIS — I51.89 OTHER ILL-DEFINED HEART DISEASES: ICD-10-CM

## 2024-03-01 RX ORDER — FUROSEMIDE 20 MG
TABLET ORAL
Qty: 120 TABLET | Refills: 0 | Status: SHIPPED | OUTPATIENT
Start: 2024-03-01

## 2024-03-01 NOTE — TELEPHONE ENCOUNTER
furosemide (LASIX) 20 MG tablet 90 tablet 3 7/17/2023     Remaining refills sent to requested pharmacy.     Serenity Danielle RN  P Red Flag Triage/MRT

## 2024-04-03 ENCOUNTER — APPOINTMENT (RX ONLY)
Dept: URBAN - METROPOLITAN AREA CLINIC 25 | Facility: CLINIC | Age: 64
Setting detail: DERMATOLOGY
End: 2024-04-03

## 2024-04-22 NOTE — PATIENT INSTRUCTIONS
You were seen in the Electrophysiology Clinic today by: Lino Funes MD    Plan:     Follow up visit: Per your convenience.    Your Care Team:  EP Cardiology   Telephone Number     Cheyenne Thomson RN [phone]     For scheduling appts or procedures:    Merced Heller   [phone]   For the Device Clinic (Pacemakers, ICDs, Loop Recorders)    During business hours: [phone]  After business hours:   [phone]- select option 4 and ask for job code [id number].     On-call cardiologist for after hours or on weekends: [phone], option #4, and ask to speak to the on-call cardiologist.     Cardiovascular Clinic:   [address] [place] [address]. [place] [address]      As always, Thank you for trusting us with your health care needs!     
Satisfactory

## 2024-04-26 ENCOUNTER — HOSPITAL ENCOUNTER (OUTPATIENT)
Dept: LAB | Age: 64
End: 2024-04-26
Payer: MEDICARE

## 2024-04-26 ENCOUNTER — OFFICE VISIT (OUTPATIENT)
Dept: ONCOLOGY | Age: 64
End: 2024-04-26
Payer: COMMERCIAL

## 2024-04-26 VITALS
RESPIRATION RATE: 18 BRPM | TEMPERATURE: 98.1 F | BODY MASS INDEX: 28.82 KG/M2 | HEART RATE: 82 BPM | WEIGHT: 173 LBS | SYSTOLIC BLOOD PRESSURE: 102 MMHG | DIASTOLIC BLOOD PRESSURE: 67 MMHG | OXYGEN SATURATION: 95 % | HEIGHT: 65 IN

## 2024-04-26 DIAGNOSIS — C85.18 B-CELL LYMPHOMA OF LYMPH NODES OF MULTIPLE REGIONS, UNSPECIFIED B-CELL LYMPHOMA TYPE (HCC): ICD-10-CM

## 2024-04-26 DIAGNOSIS — C85.18 B-CELL LYMPHOMA OF LYMPH NODES OF MULTIPLE REGIONS, UNSPECIFIED B-CELL LYMPHOMA TYPE (HCC): Primary | ICD-10-CM

## 2024-04-26 DIAGNOSIS — E55.9 VITAMIN D DEFICIENCY: ICD-10-CM

## 2024-04-26 LAB
25(OH)D3 SERPL-MCNC: 74.6 NG/ML (ref 30–100)
ALBUMIN SERPL-MCNC: 3.6 G/DL (ref 3.2–4.6)
ALBUMIN/GLOB SERPL: 1 (ref 1–1.9)
ALP SERPL-CCNC: 115 U/L (ref 35–104)
ALT SERPL-CCNC: 15 U/L (ref 12–65)
ANION GAP SERPL CALC-SCNC: 13 MMOL/L (ref 9–18)
AST SERPL-CCNC: 18 U/L (ref 15–37)
BASOPHILS # BLD: 0.1 K/UL (ref 0–0.2)
BASOPHILS NFR BLD: 1 % (ref 0–2)
BILIRUB SERPL-MCNC: <0.2 MG/DL (ref 0–1.2)
BUN SERPL-MCNC: 19 MG/DL (ref 8–23)
CALCIUM SERPL-MCNC: 9.3 MG/DL (ref 8.8–10.2)
CHLORIDE SERPL-SCNC: 102 MMOL/L (ref 98–107)
CO2 SERPL-SCNC: 23 MMOL/L (ref 20–28)
CREAT SERPL-MCNC: 0.78 MG/DL (ref 0.6–1.1)
DIFFERENTIAL METHOD BLD: NORMAL
EOSINOPHIL # BLD: 0.1 K/UL (ref 0–0.8)
EOSINOPHIL NFR BLD: 2 % (ref 0.5–7.8)
ERYTHROCYTE [DISTWIDTH] IN BLOOD BY AUTOMATED COUNT: 12.6 % (ref 11.9–14.6)
GLOBULIN SER CALC-MCNC: 3.6 G/DL (ref 2.3–3.5)
GLUCOSE SERPL-MCNC: 95 MG/DL (ref 70–99)
HAV IGM SER QL: NONREACTIVE
HBV CORE IGM SER QL: NONREACTIVE
HBV SURFACE AG SER QL: NONREACTIVE
HCT VFR BLD AUTO: 39.5 % (ref 35.8–46.3)
HCV AB SER QL: NONREACTIVE
HGB BLD-MCNC: 13.5 G/DL (ref 11.7–15.4)
HIV 1+2 AB+HIV1 P24 AG SERPL QL IA: NONREACTIVE
HIV 1/2 RESULT COMMENT: NORMAL
IMM GRANULOCYTES # BLD AUTO: 0 K/UL (ref 0–0.5)
IMM GRANULOCYTES NFR BLD AUTO: 0 % (ref 0–5)
LDH SERPL L TO P-CCNC: 168 U/L (ref 127–281)
LYMPHOCYTES # BLD: 1.7 K/UL (ref 0.5–4.6)
LYMPHOCYTES NFR BLD: 29 % (ref 13–44)
MCH RBC QN AUTO: 31.7 PG (ref 26.1–32.9)
MCHC RBC AUTO-ENTMCNC: 34.2 G/DL (ref 31.4–35)
MCV RBC AUTO: 92.7 FL (ref 82–102)
MONOCYTES # BLD: 0.6 K/UL (ref 0.1–1.3)
MONOCYTES NFR BLD: 11 % (ref 4–12)
NEUTS SEG # BLD: 3.3 K/UL (ref 1.7–8.2)
NEUTS SEG NFR BLD: 57 % (ref 43–78)
NRBC # BLD: 0 K/UL (ref 0–0.2)
PLATELET # BLD AUTO: 164 K/UL (ref 150–450)
PMV BLD AUTO: 10 FL (ref 9.4–12.3)
POTASSIUM SERPL-SCNC: 4 MMOL/L (ref 3.5–5.1)
PROT SERPL-MCNC: 7.2 G/DL (ref 6.3–8.2)
RBC # BLD AUTO: 4.26 M/UL (ref 4.05–5.2)
SODIUM SERPL-SCNC: 138 MMOL/L (ref 136–145)
VIT B12 SERPL-MCNC: 540 PG/ML (ref 193–986)
WBC # BLD AUTO: 5.8 K/UL (ref 4.3–11.1)

## 2024-04-26 PROCEDURE — 82306 VITAMIN D 25 HYDROXY: CPT

## 2024-04-26 PROCEDURE — 87389 HIV-1 AG W/HIV-1&-2 AB AG IA: CPT

## 2024-04-26 PROCEDURE — 80074 ACUTE HEPATITIS PANEL: CPT

## 2024-04-26 PROCEDURE — G8427 DOCREV CUR MEDS BY ELIG CLIN: HCPCS | Performed by: INTERNAL MEDICINE

## 2024-04-26 PROCEDURE — G8419 CALC BMI OUT NRM PARAM NOF/U: HCPCS | Performed by: INTERNAL MEDICINE

## 2024-04-26 PROCEDURE — 82784 ASSAY IGA/IGD/IGG/IGM EACH: CPT

## 2024-04-26 PROCEDURE — 3017F COLORECTAL CA SCREEN DOC REV: CPT | Performed by: INTERNAL MEDICINE

## 2024-04-26 PROCEDURE — G8399 PT W/DXA RESULTS DOCUMENT: HCPCS | Performed by: INTERNAL MEDICINE

## 2024-04-26 PROCEDURE — 82607 VITAMIN B-12: CPT

## 2024-04-26 PROCEDURE — 84165 PROTEIN E-PHORESIS SERUM: CPT

## 2024-04-26 PROCEDURE — 83615 LACTATE (LD) (LDH) ENZYME: CPT

## 2024-04-26 PROCEDURE — 85025 COMPLETE CBC W/AUTO DIFF WBC: CPT

## 2024-04-26 PROCEDURE — 99205 OFFICE O/P NEW HI 60 MIN: CPT | Performed by: INTERNAL MEDICINE

## 2024-04-26 PROCEDURE — 1036F TOBACCO NON-USER: CPT | Performed by: INTERNAL MEDICINE

## 2024-04-26 PROCEDURE — 86334 IMMUNOFIX E-PHORESIS SERUM: CPT

## 2024-04-26 PROCEDURE — 80053 COMPREHEN METABOLIC PANEL: CPT

## 2024-04-26 PROCEDURE — 1090F PRES/ABSN URINE INCON ASSESS: CPT | Performed by: INTERNAL MEDICINE

## 2024-04-26 PROCEDURE — 82232 ASSAY OF BETA-2 PROTEIN: CPT

## 2024-04-26 PROCEDURE — 36415 COLL VENOUS BLD VENIPUNCTURE: CPT

## 2024-04-26 PROCEDURE — 1123F ACP DISCUSS/DSCN MKR DOCD: CPT | Performed by: INTERNAL MEDICINE

## 2024-04-26 RX ORDER — APIXABAN 5 MG/1
5 TABLET, FILM COATED ORAL 2 TIMES DAILY
COMMUNITY
Start: 2023-07-17

## 2024-04-26 RX ORDER — SPIRONOLACTONE 25 MG/1
25 TABLET ORAL DAILY
COMMUNITY
Start: 2023-07-17

## 2024-04-26 RX ORDER — PANTOPRAZOLE SODIUM 20 MG/1
TABLET, DELAYED RELEASE ORAL
COMMUNITY
Start: 2024-03-07

## 2024-04-26 RX ORDER — LEVOTHYROXINE SODIUM 150 MCG
TABLET ORAL
COMMUNITY

## 2024-04-26 RX ORDER — ACETAMINOPHEN 325 MG/1
325-650 TABLET ORAL EVERY 6 HOURS PRN
COMMUNITY
Start: 2017-04-28

## 2024-04-26 RX ORDER — METOPROLOL SUCCINATE 25 MG/1
12.5 TABLET, EXTENDED RELEASE ORAL DAILY
COMMUNITY

## 2024-04-26 RX ORDER — FUROSEMIDE 20 MG/1
20 TABLET ORAL 2 TIMES DAILY
COMMUNITY
Start: 2024-03-01

## 2024-04-26 RX ORDER — ALBUTEROL SULFATE 90 UG/1
2 AEROSOL, METERED RESPIRATORY (INHALATION) PRN
COMMUNITY

## 2024-04-26 RX ORDER — AZELASTINE 1 MG/ML
1 SPRAY, METERED NASAL 2 TIMES DAILY PRN
COMMUNITY
Start: 2023-08-16

## 2024-04-26 RX ORDER — FERROUS SULFATE 325(65) MG
TABLET ORAL
COMMUNITY
Start: 2017-04-03

## 2024-04-26 NOTE — PROGRESS NOTES
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 Lizarraga is a 63-year-old female referred for newly diagnosed gastric lymphoma.     3/7/24: GI consult at [facility] for evaluation of reflux. Pt reported having been seen by ENT the week prior for chronic sinusitis. She underwent laryngoscopy and was told there was evidence of reflux. It was recommended that she see GI.   -Started on trial of Pantoprazole 20 mg daily; Gasviscon PRN  -Scheduled for EGD    4/15/24: EGD with findings of possible Chapman's esophagus and erythematous, friable, prominent gastric folds along the curvature of the gastric body. Pathology from stomach body biopsies revealed atypical B-cell infiltrate suspicious for low-grade B-cell lymphoma. Pathologist noted, “There is a dense B-cell predominant lymphoid infiltrate in the deeper aspects of the stomach biopsies. That are suspicious for involvement by low-grade B-cell lymphoma. However, the absence of aberrant antigen expression by the B cells (CD5 and CD10 negative) makes definitive diagnosis more difficult. The tissue will be sent for IgH/Ig kappa gene rearrangements to evaluate for clonality, which if shown would support the diagnosis of a B-cell lymphoma.     PATHOLOGY:     PATHOLOGY EXAMINATION 4/15/24      Notes from Referring Provider: None    Presented at Tumor Board: N/A    Other Pertinent Information: None

## 2024-04-26 NOTE — PATIENT INSTRUCTIONS
Patient Information from Today's Visit    - Labs to be drawn today before you leave  - PET scan ordered, to be scheduled for first available appointment   - Bone marrow biopsy will be arranged  - Discussed pathology results, discussed that this likely low-grade lymphoma.     Treatment Summary has been discussed and given to patient:N/A    Follow Up: 5/31 with labs     Please refer to After Visit Summary or [employer] for upcoming appointment information. If you have any questions regarding your upcoming schedule please reach out to your care team through [employer] or call [phone].          -------------------------------------------------------------------------------------------------------------------  Please call our office at [phone] if you have any  of the following symptoms:   Fever of 100.5 or greater  Chills  Shortness of breath  Swelling or pain in one leg    After office hours an answering service is available and will contact a provider for emergencies or if you are experiencing any of the above symptoms.    Patient does express an interest in My Chart.  My Chart log in information explained on the after visit summary printout at the check-out desk.    Bonita Byrnes RN

## 2024-04-26 NOTE — PROGRESS NOTES
[place] [id number]  Phone: [phone]        4/26/2024  Kya Lizarraga  1960  [id number]      Kya Lizarraga is a 63 year old  female who has been sent to my clinic by Dr.David Meehan. 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      ALLERGIES:   Metronidazole.      FAMILY HISTORY:     No hematologic disorders.      SOCIAL HISTORY:   She is  and lives with her . 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      PAST MEDICAL HISTORY:    Depression, Vitamin D deficiency, Osteoarthritis, Iron Deficiency Anemia, Obstructive Sleep Apnea, PTSD, Atrial Fibrillation, s/p PPM, Hypothyroidism, GERD, Diverticulosis, COPD, Non-Ischemic Cardio-Myopathy, Anxiety Disorder, Vitamin D deficiency, Restless Leg Syndrome and NHL.      ROS:  The patient complained of fatigue; all other systems negative.       PHYSICAL EXAM:   The patient was alert, awake and oriented, no acute distress was noted, a left infra-clavicular PPM was present. Oral examination did not reveal any mucosal lesions. Lymph node examination did not reveal any adenopathy. Neck examination revealed a supple neck, no thyromegaly or masses were noted. Chest examination revealed slightly prolonged expiration bilaterally. Heart examination revealed S-1 and S-2 with a 2/6 systolic murmur. Abdominal examination revealed a non-tender abdomen, bowel sounds were positive, no organomegaly could be appreciated. Examination of the extremities did not reveal any tenderness or erythema. Examination of the skin did not reveal any lesions.      KPS:   80.      LABORATORY INVESTIGATIONS:  CBC showed a WBC count of 5.8, ANC was 3.3, Hemoglobin was 13.5 and Platelets were 164. Medical problems and test results were reviewed with the patient today.      ASSESSMENT:    NHL, B-Cell; Vitamin D deficiency. I spent a total of 62 minutes on the day of the visit, managing the care of

## 2024-04-27 LAB — B2 MICROGLOB SERPL-MCNC: 2 MG/L (ref 0.6–2.4)

## 2024-04-29 DIAGNOSIS — C85.18 B-CELL LYMPHOMA OF LYMPH NODES OF MULTIPLE REGIONS, UNSPECIFIED B-CELL LYMPHOMA TYPE (HCC): Primary | ICD-10-CM

## 2024-05-01 ENCOUNTER — TELEPHONE (OUTPATIENT)
Dept: ONCOLOGY | Age: 64
End: 2024-05-01

## 2024-05-01 NOTE — TELEPHONE ENCOUNTER
Chart reviewed.  Returned call to patient.  Informed ok to proceed with bone marrow biopsy per Moon Peter NP.  Patient verbalized understanding.

## 2024-05-01 NOTE — TELEPHONE ENCOUNTER
Physician provider: Rosalio Bonner MD  Reason for today's call: Aspirin  Last office visit: 04.26.24     Pt called stating she was given an aspirin 325 MG on 04.30.24 at about 6:45 PM during heart cath procedure. She asks if this will interfere with upcoming bone marrow Bx. Pt asks for call back to advise stating her MyChart isn't working well right now.

## 2024-05-02 ENCOUNTER — HOSPITAL ENCOUNTER (OUTPATIENT)
Dept: LAB | Age: 64
Discharge: HOME | End: 2024-05-02
Payer: MEDICARE

## 2024-05-02 ENCOUNTER — OFFICE VISIT (OUTPATIENT)
Dept: ONCOLOGY | Age: 64
End: 2024-05-02
Payer: MEDICARE

## 2024-05-02 VITALS
DIASTOLIC BLOOD PRESSURE: 72 MMHG | TEMPERATURE: 98.2 F | HEIGHT: 65 IN | HEART RATE: 94 BPM | SYSTOLIC BLOOD PRESSURE: 114 MMHG | RESPIRATION RATE: 16 BRPM | OXYGEN SATURATION: 99 % | WEIGHT: 173.4 LBS | BODY MASS INDEX: 28.89 KG/M2

## 2024-05-02 DIAGNOSIS — C85.18 B-CELL LYMPHOMA OF LYMPH NODES OF MULTIPLE REGIONS, UNSPECIFIED B-CELL LYMPHOMA TYPE (HCC): Primary | ICD-10-CM

## 2024-05-02 DIAGNOSIS — C85.18 B-CELL LYMPHOMA OF LYMPH NODES OF MULTIPLE REGIONS, UNSPECIFIED B-CELL LYMPHOMA TYPE (HCC): ICD-10-CM

## 2024-05-02 LAB
BASOPHILS # BLD: 0.1 K/UL (ref 0–0.2)
BASOPHILS NFR BLD: 1 % (ref 0–2)
DIFFERENTIAL METHOD BLD: NORMAL
EOSINOPHIL # BLD: 0.1 K/UL (ref 0–0.8)
EOSINOPHIL NFR BLD: 2 % (ref 0.5–7.8)
ERYTHROCYTE [DISTWIDTH] IN BLOOD BY AUTOMATED COUNT: 12.7 % (ref 11.9–14.6)
HCT VFR BLD AUTO: 40 % (ref 35.8–46.3)
HGB BLD-MCNC: 13.4 G/DL (ref 11.7–15.4)
IMM GRANULOCYTES # BLD AUTO: 0 K/UL (ref 0–0.5)
IMM GRANULOCYTES NFR BLD AUTO: 0 % (ref 0–5)
LYMPHOCYTES # BLD: 1.4 K/UL (ref 0.5–4.6)
LYMPHOCYTES NFR BLD: 23 % (ref 13–44)
MCH RBC QN AUTO: 31.1 PG (ref 26.1–32.9)
MCHC RBC AUTO-ENTMCNC: 33.5 G/DL (ref 31.4–35)
MCV RBC AUTO: 92.8 FL (ref 82–102)
MONOCYTES # BLD: 0.5 K/UL (ref 0.1–1.3)
MONOCYTES NFR BLD: 9 % (ref 4–12)
NEUTS SEG # BLD: 4.1 K/UL (ref 1.7–8.2)
NEUTS SEG NFR BLD: 65 % (ref 43–78)
NRBC # BLD: 0 K/UL (ref 0–0.2)
PLATELET # BLD AUTO: 171 K/UL (ref 150–450)
PMV BLD AUTO: 9.4 FL (ref 9.4–12.3)
RBC # BLD AUTO: 4.31 M/UL (ref 4.05–5.2)
WBC # BLD AUTO: 6.3 K/UL (ref 4.3–11.1)

## 2024-05-02 PROCEDURE — 88313 SPECIAL STAINS GROUP 2: CPT

## 2024-05-02 PROCEDURE — 36415 COLL VENOUS BLD VENIPUNCTURE: CPT

## 2024-05-02 PROCEDURE — 88305 TISSUE EXAM BY PATHOLOGIST: CPT

## 2024-05-02 PROCEDURE — 88311 DECALCIFY TISSUE: CPT

## 2024-05-02 PROCEDURE — 38222 DX BONE MARROW BX & ASPIR: CPT | Performed by: NURSE PRACTITIONER

## 2024-05-02 PROCEDURE — 85025 COMPLETE CBC W/AUTO DIFF WBC: CPT

## 2024-05-02 ASSESSMENT — PATIENT HEALTH QUESTIONNAIRE - PHQ9
SUM OF ALL RESPONSES TO PHQ QUESTIONS 1-9: 0
1. LITTLE INTEREST OR PLEASURE IN DOING THINGS: NOT AT ALL
SUM OF ALL RESPONSES TO PHQ QUESTIONS 1-9: 0
SUM OF ALL RESPONSES TO PHQ QUESTIONS 1-9: 0
SUM OF ALL RESPONSES TO PHQ9 QUESTIONS 1 & 2: 0
2. FEELING DOWN, DEPRESSED OR HOPELESS: NOT AT ALL
SUM OF ALL RESPONSES TO PHQ QUESTIONS 1-9: 0

## 2024-05-02 NOTE — PROGRESS NOTES
Patient Name: Kya Lizarraga    Date of Visit: 2024  : 1960  Age:63 y.o.   Referring MD:No ref. provider found      BONE MARROW ASPIRATE AND BIOPSY    After obtaining consent from the patient, the left posterior superior iliac crest was anesthetized using 7 cc of 1% Xylocaine. After documenting adequate local anesthesia, an aspirate was removed without difficulty. Following this, a biopsy needle was utilized and a core of marrow obtained for analysis. Specimens were sent to pathology for hematologic processing. Hemostasis was adequate at completion. The patient tolerated the procedure well.              VINNIE Harrison ([facility]  [address] [place] [address]  Office : [phone]  Fax : [phone]

## 2024-05-03 LAB
ALBUMIN SERPL ELPH-MCNC: 3.6 G/DL (ref 2.9–4.4)
ALBUMIN/GLOB SERPL: 1.2 (ref 0.7–1.7)
ALPHA1 GLOB SERPL ELPH-MCNC: 0.3 G/DL (ref 0–0.4)
ALPHA2 GLOB SERPL ELPH-MCNC: 0.9 G/DL (ref 0.4–1)
B-GLOBULIN SERPL ELPH-MCNC: 0.9 G/DL (ref 0.7–1.3)
GAMMA GLOB SERPL ELPH-MCNC: 1 G/DL (ref 0.4–1.8)
GLOBULIN SER-MCNC: 3.1 G/DL (ref 2.2–3.9)
IGA SERPL-MCNC: 149 MG/DL (ref 87–352)
IGG SERPL-MCNC: 729 MG/DL (ref 586–1602)
IGM SERPL-MCNC: 512 MG/DL (ref 26–217)
INTERPRETATION SERPL IEP-IMP: ABNORMAL
M PROTEIN SERPL ELPH-MCNC: ABNORMAL G/DL
PROT SERPL-MCNC: 6.7 G/DL (ref 6–8.5)

## 2024-05-08 ENCOUNTER — APPOINTMENT (RX ONLY)
Dept: URBAN - NONMETROPOLITAN AREA CLINIC 1 | Facility: CLINIC | Age: 64
Setting detail: DERMATOLOGY
End: 2024-05-08

## 2024-05-08 DIAGNOSIS — D18.0 HEMANGIOMA: ICD-10-CM | Status: STABLE

## 2024-05-08 DIAGNOSIS — L57.8 OTHER SKIN CHANGES DUE TO CHRONIC EXPOSURE TO NONIONIZING RADIATION: ICD-10-CM | Status: STABLE

## 2024-05-08 DIAGNOSIS — L82.1 OTHER SEBORRHEIC KERATOSIS: ICD-10-CM | Status: UNCHANGED

## 2024-05-08 DIAGNOSIS — D22 MELANOCYTIC NEVI: ICD-10-CM | Status: UNCHANGED

## 2024-05-08 PROBLEM — D22.5 MELANOCYTIC NEVI OF TRUNK: Status: ACTIVE | Noted: 2024-05-08

## 2024-05-08 PROBLEM — D18.01 HEMANGIOMA OF SKIN AND SUBCUTANEOUS TISSUE: Status: ACTIVE | Noted: 2024-05-08

## 2024-05-08 PROCEDURE — ? FULL BODY SKIN EXAM

## 2024-05-08 PROCEDURE — ? SUNSCREEN RECOMMENDATIONS

## 2024-05-08 PROCEDURE — ? COUNSELING

## 2024-05-08 PROCEDURE — 99203 OFFICE O/P NEW LOW 30 MIN: CPT

## 2024-05-08 ASSESSMENT — LOCATION DETAILED DESCRIPTION DERM
LOCATION DETAILED: LEFT MEDIAL UPPER BACK
LOCATION DETAILED: RIGHT PROXIMAL DORSAL FOREARM
LOCATION DETAILED: SUPERIOR MID FOREHEAD
LOCATION DETAILED: LEFT PROXIMAL DORSAL FOREARM
LOCATION DETAILED: RIGHT SUPERIOR MEDIAL UPPER BACK
LOCATION DETAILED: LEFT MEDIAL SUPERIOR CHEST

## 2024-05-08 ASSESSMENT — LOCATION SIMPLE DESCRIPTION DERM
LOCATION SIMPLE: CHEST
LOCATION SIMPLE: LEFT FOREARM
LOCATION SIMPLE: RIGHT UPPER BACK
LOCATION SIMPLE: LEFT UPPER BACK
LOCATION SIMPLE: RIGHT FOREARM
LOCATION SIMPLE: SUPERIOR FOREHEAD

## 2024-05-08 ASSESSMENT — LOCATION ZONE DERM
LOCATION ZONE: ARM
LOCATION ZONE: FACE
LOCATION ZONE: TRUNK

## 2024-05-09 ENCOUNTER — HOSPITAL ENCOUNTER (OUTPATIENT)
Dept: PET IMAGING | Age: 64
Discharge: HOME | End: 2024-05-09
Payer: MEDICARE

## 2024-05-09 DIAGNOSIS — C85.18 B-CELL LYMPHOMA OF LYMPH NODES OF MULTIPLE REGIONS, UNSPECIFIED B-CELL LYMPHOMA TYPE (HCC): ICD-10-CM

## 2024-05-09 LAB
GLUCOSE BLD STRIP.AUTO-MCNC: 82 MG/DL (ref 65–100)
SERVICE CMNT-IMP: NORMAL

## 2024-05-09 PROCEDURE — 82962 GLUCOSE BLOOD TEST: CPT

## 2024-05-09 PROCEDURE — 78815 PET IMAGE W/CT SKULL-THIGH: CPT

## 2024-05-09 PROCEDURE — 2580000003 HC RX 258: Performed by: INTERNAL MEDICINE

## 2024-05-09 PROCEDURE — 6360000004 HC RX CONTRAST MEDICATION: Performed by: INTERNAL MEDICINE

## 2024-05-09 PROCEDURE — A9609 HC RX DIAGNOSTIC RADIOPHARMACEUTICAL: HCPCS | Performed by: INTERNAL MEDICINE

## 2024-05-09 PROCEDURE — 3430000000 HC RX DIAGNOSTIC RADIOPHARMACEUTICAL: Performed by: INTERNAL MEDICINE

## 2024-05-09 RX ORDER — FLUDEOXYGLUCOSE F 18 200 MCI/ML
13.18 INJECTION, SOLUTION INTRAVENOUS
Status: COMPLETED | OUTPATIENT
Start: 2024-05-09 | End: 2024-05-09

## 2024-05-09 RX ORDER — SODIUM CHLORIDE 0.9 % (FLUSH) 0.9 %
20 SYRINGE (ML) INJECTION AS NEEDED
Status: ACTIVE | OUTPATIENT
Start: 2024-05-09

## 2024-05-09 RX ADMIN — SODIUM CHLORIDE, PRESERVATIVE FREE 20 ML: 5 INJECTION INTRAVENOUS at 08:58

## 2024-05-09 RX ADMIN — FLUDEOXYGLUCOSE F 18 13.18 MILLICURIE: 200 INJECTION, SOLUTION INTRAVENOUS at 08:58

## 2024-05-09 RX ADMIN — DIATRIZOATE MEGLUMINE AND DIATRIZOATE SODIUM 10 ML: 660; 100 LIQUID ORAL; RECTAL at 08:58

## 2024-05-14 LAB
FLOW CYTOMETRY RESULTS: NORMAL
SPECIMEN SOURCE: NORMAL
TEST ORDERED: NORMAL

## 2024-05-16 ENCOUNTER — APPOINTMENT (RX ONLY)
Dept: URBAN - NONMETROPOLITAN AREA CLINIC 1 | Facility: CLINIC | Age: 64
Setting detail: DERMATOLOGY
End: 2024-05-16

## 2024-05-16 DIAGNOSIS — Z41.9 ENCOUNTER FOR PROCEDURE FOR PURPOSES OTHER THAN REMEDYING HEALTH STATE, UNSPECIFIED: ICD-10-CM

## 2024-05-16 PROCEDURE — ? FACIAL

## 2024-05-16 ASSESSMENT — LOCATION SIMPLE DESCRIPTION DERM: LOCATION SIMPLE: RIGHT CHEEK

## 2024-05-16 ASSESSMENT — LOCATION ZONE DERM: LOCATION ZONE: FACE

## 2024-05-16 ASSESSMENT — LOCATION DETAILED DESCRIPTION DERM: LOCATION DETAILED: RIGHT INFERIOR CENTRAL MALAR CHEEK

## 2024-05-16 NOTE — PROCEDURE: FACIAL
Assessment: Photodamage
Treatment Serum (Optional): Erika-Antioxidant
Detail Level: Generalized
Treatment Type (Optional): Anti Aging Facial
Exfoliation Type: glycolic exfoliant
Facial Steaming: steamed
Led Light (Optional): red
Mask Type (Optional): hydrating B5
Extraction Method: extractor

## 2024-05-22 DIAGNOSIS — C85.18 B-CELL LYMPHOMA OF LYMPH NODES OF MULTIPLE REGIONS, UNSPECIFIED B-CELL LYMPHOMA TYPE (HCC): Primary | ICD-10-CM

## 2024-05-22 DIAGNOSIS — E55.9 VITAMIN D DEFICIENCY: ICD-10-CM

## 2024-05-23 ENCOUNTER — HOSPITAL ENCOUNTER (OUTPATIENT)
Dept: LAB | Age: 64
Discharge: HOME | End: 2024-05-23
Payer: MEDICARE

## 2024-05-23 ENCOUNTER — OFFICE VISIT (OUTPATIENT)
Dept: ONCOLOGY | Age: 64
End: 2024-05-23
Payer: MEDICARE

## 2024-05-23 VITALS
HEART RATE: 96 BPM | TEMPERATURE: 97.8 F | WEIGHT: 175.8 LBS | RESPIRATION RATE: 18 BRPM | OXYGEN SATURATION: 98 % | BODY MASS INDEX: 29.29 KG/M2 | DIASTOLIC BLOOD PRESSURE: 79 MMHG | SYSTOLIC BLOOD PRESSURE: 120 MMHG | HEIGHT: 65 IN

## 2024-05-23 DIAGNOSIS — E55.9 VITAMIN D DEFICIENCY: ICD-10-CM

## 2024-05-23 DIAGNOSIS — C85.18 B-CELL LYMPHOMA OF LYMPH NODES OF MULTIPLE REGIONS, UNSPECIFIED B-CELL LYMPHOMA TYPE (HCC): ICD-10-CM

## 2024-05-23 DIAGNOSIS — C85.18 B-CELL LYMPHOMA OF LYMPH NODES OF MULTIPLE REGIONS, UNSPECIFIED B-CELL LYMPHOMA TYPE (HCC): Primary | ICD-10-CM

## 2024-05-23 LAB
25(OH)D3 SERPL-MCNC: 67.6 NG/ML (ref 30–100)
ALBUMIN SERPL-MCNC: 3.8 G/DL (ref 3.2–4.6)
ALBUMIN/GLOB SERPL: 1.2 (ref 1–1.9)
ALP SERPL-CCNC: 113 U/L (ref 35–104)
ALT SERPL-CCNC: 17 U/L (ref 12–65)
ANION GAP SERPL CALC-SCNC: 11 MMOL/L (ref 9–18)
AST SERPL-CCNC: 23 U/L (ref 15–37)
BASOPHILS # BLD: 0.1 K/UL (ref 0–0.2)
BASOPHILS NFR BLD: 1 % (ref 0–2)
BILIRUB SERPL-MCNC: <0.2 MG/DL (ref 0–1.2)
BUN SERPL-MCNC: 18 MG/DL (ref 8–23)
CALCIUM SERPL-MCNC: 9.4 MG/DL (ref 8.8–10.2)
CHLORIDE SERPL-SCNC: 103 MMOL/L (ref 98–107)
CO2 SERPL-SCNC: 26 MMOL/L (ref 20–28)
CREAT SERPL-MCNC: 0.8 MG/DL (ref 0.6–1.1)
DIFFERENTIAL METHOD BLD: NORMAL
EOSINOPHIL # BLD: 0.2 K/UL (ref 0–0.8)
EOSINOPHIL NFR BLD: 3 % (ref 0.5–7.8)
ERYTHROCYTE [DISTWIDTH] IN BLOOD BY AUTOMATED COUNT: 12.5 % (ref 11.9–14.6)
GLOBULIN SER CALC-MCNC: 3.2 G/DL (ref 2.3–3.5)
GLUCOSE SERPL-MCNC: 92 MG/DL (ref 70–99)
HCT VFR BLD AUTO: 40.2 % (ref 35.8–46.3)
HGB BLD-MCNC: 13.5 G/DL (ref 11.7–15.4)
IMM GRANULOCYTES # BLD AUTO: 0 K/UL (ref 0–0.5)
IMM GRANULOCYTES NFR BLD AUTO: 1 % (ref 0–5)
LYMPHOCYTES # BLD: 1.6 K/UL (ref 0.5–4.6)
LYMPHOCYTES NFR BLD: 27 % (ref 13–44)
MCH RBC QN AUTO: 31.3 PG (ref 26.1–32.9)
MCHC RBC AUTO-ENTMCNC: 33.6 G/DL (ref 31.4–35)
MCV RBC AUTO: 93.1 FL (ref 82–102)
MONOCYTES # BLD: 0.6 K/UL (ref 0.1–1.3)
MONOCYTES NFR BLD: 10 % (ref 4–12)
NEUTS SEG # BLD: 3.5 K/UL (ref 1.7–8.2)
NEUTS SEG NFR BLD: 58 % (ref 43–78)
NRBC # BLD: 0 K/UL (ref 0–0.2)
PLATELET # BLD AUTO: 188 K/UL (ref 150–450)
PMV BLD AUTO: 9.4 FL (ref 9.4–12.3)
POTASSIUM SERPL-SCNC: 4.3 MMOL/L (ref 3.5–5.1)
PROT SERPL-MCNC: 7.1 G/DL (ref 6.3–8.2)
RBC # BLD AUTO: 4.32 M/UL (ref 4.05–5.2)
SODIUM SERPL-SCNC: 140 MMOL/L (ref 136–145)
WBC # BLD AUTO: 5.9 K/UL (ref 4.3–11.1)

## 2024-05-23 PROCEDURE — 82306 VITAMIN D 25 HYDROXY: CPT

## 2024-05-23 PROCEDURE — 1036F TOBACCO NON-USER: CPT | Performed by: INTERNAL MEDICINE

## 2024-05-23 PROCEDURE — 80053 COMPREHEN METABOLIC PANEL: CPT

## 2024-05-23 PROCEDURE — 3017F COLORECTAL CA SCREEN DOC REV: CPT | Performed by: INTERNAL MEDICINE

## 2024-05-23 PROCEDURE — 1090F PRES/ABSN URINE INCON ASSESS: CPT | Performed by: INTERNAL MEDICINE

## 2024-05-23 PROCEDURE — 1123F ACP DISCUSS/DSCN MKR DOCD: CPT | Performed by: INTERNAL MEDICINE

## 2024-05-23 PROCEDURE — G8419 CALC BMI OUT NRM PARAM NOF/U: HCPCS | Performed by: INTERNAL MEDICINE

## 2024-05-23 PROCEDURE — G8427 DOCREV CUR MEDS BY ELIG CLIN: HCPCS | Performed by: INTERNAL MEDICINE

## 2024-05-23 PROCEDURE — G8399 PT W/DXA RESULTS DOCUMENT: HCPCS | Performed by: INTERNAL MEDICINE

## 2024-05-23 PROCEDURE — 99215 OFFICE O/P EST HI 40 MIN: CPT | Performed by: INTERNAL MEDICINE

## 2024-05-23 PROCEDURE — 36415 COLL VENOUS BLD VENIPUNCTURE: CPT

## 2024-05-23 PROCEDURE — 85025 COMPLETE CBC W/AUTO DIFF WBC: CPT

## 2024-05-23 RX ORDER — ROSUVASTATIN CALCIUM 5 MG/1
5 TABLET, COATED ORAL
COMMUNITY
Start: 2024-05-15 | End: 2025-05-15

## 2024-05-23 NOTE — PATIENT INSTRUCTIONS
Patient Information from Today's Visit    The members of your [facility]cology Medical [facility] are listed below:    Physician Provider: Rosalio Bonner Medical Oncologist  Advanced Practice Clinician: Moon Peter NP  Registered Nurse: N/A  Navigator: N/A  Medical Assistant: Thao HOUGH MA and Tamanna CANDELARIA MA  : Bethany HOUGH   Supportive Care Services: Sondra PARISI LMSW    Diagnosis:   Gastric Lymphoma      Follow Up Instructions:   Lab work looks stable today  Your bone marrow biopsy came back clear  We would like to repeat the biopsy to get a better pathology reading   -we will put in a referral to [facility]   PET only shows disease in the stomach  We will bring you back in July for a follow up with  and lab work prior.    Treatment Summary has been discussed and given to patient:N/A      Current Labs:   Hospital Outpatient Visit on 05/23/2024   Component Date Value Ref Range Status    Sodium 05/23/2024 140  136 - 145 mmol/L Final    Potassium 05/23/2024 4.3  3.5 - 5.1 mmol/L Final    Chloride 05/23/2024 103  98 - 107 mmol/L Final    CO2 05/23/2024 26  20 - 28 mmol/L Final    Anion Gap 05/23/2024 11  9 - 18 mmol/L Final    Glucose 05/23/2024 92  70 - 99 mg/dL Final    Comment: <70 mg/dL Consistent with, but not fully diagnostic of hypoglycemia.  100 - 125 mg/dL Impaired fasting glucose/consistent with pre-diabetes mellitus.  > 126 mg/dl Fasting glucose consistent with overt diabetes mellitus      BUN 05/23/2024 18  8 - 23 MG/DL Final    Creatinine 05/23/2024 0.80  0.60 - 1.10 MG/DL Final    Est, Glom Filt Rate 05/23/2024 82  >60 ml/min/1.73m2 Final    Comment:    Pediatric calculator link: https://www.kidney.org/professionals/kdoqi/gfr_calculatorped     These results are not intended for use in patients <18 years of age.     eGFR results are calculated without a race factor using  the 2021 CKD-EPI equation. Careful clinical correlation is recommended, particularly when comparing to results

## 2024-05-23 NOTE — PROGRESS NOTES
[place] [id number]  Phone: [phone]           5/23/2024  Kya Lizarraga  1960  [id number]        Kya Lizarraga is a 63 year old  female who has returned to my clinic for a follow-up visit; she was initially referred to me by Dr. Jas Meehan. 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        ALLERGIES:   Metronidazole.        FAMILY HISTORY:     No hematologic disorders.        SOCIAL HISTORY:   She is  and lives with her . 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        PAST MEDICAL HISTORY:    Depression, Vitamin D deficiency, Osteoarthritis, Iron Deficiency Anemia, Obstructive Sleep Apnea, PTSD, Atrial Fibrillation, s/p PPM, Hypothyroidism, GERD, Diverticulosis, COPD, Non-Ischemic Cardio-Myopathy, Anxiety Disorder, Vitamin D deficiency, Restless Leg Syndrome and NHL.        ROS:  The patient complained of fatigue and night sweats; all other systems negative.        PHYSICAL EXAM:   The patient was alert, awake and oriented, no acute distress was noted, a left infra-clavicular PPM was present. Oral examination did not reveal any mucosal lesions. Lymph node examination did not reveal any adenopathy. Neck examination revealed a supple neck, no thyromegaly or masses were noted. Chest examination revealed slightly prolonged expiration bilaterally. Heart examination revealed S-1 and S-2 with a 2/6 systolic murmur. Abdominal examination revealed a non-tender abdomen, bowel sounds were positive, no organomegaly could be appreciated. Examination of the extremities did not reveal any tenderness or erythema. Examination of the skin did not reveal any lesions.        KPS:   80.        LABORATORY INVESTIGATIONS:  CBC showed a WBC count of 5.9, ANC was 3.5, Hemoglobin was 13.5 and Platelets were 188. Medical problems and test results were reviewed with the patient today.        ASSESSMENT:    NHL,

## 2024-05-29 ENCOUNTER — PREP FOR PROCEDURE (OUTPATIENT)
Dept: GASTROENTEROLOGY | Age: 64
End: 2024-05-29

## 2024-05-29 ENCOUNTER — TELEPHONE (OUTPATIENT)
Dept: GASTROENTEROLOGY | Age: 64
End: 2024-05-29

## 2024-05-29 DIAGNOSIS — C85.18 B-CELL LYMPHOMA OF LYMPH NODES OF MULTIPLE SITES (HCC): ICD-10-CM

## 2024-05-29 RX ORDER — CLONAZEPAM 0.5 MG/1
0.5 TABLET ORAL NIGHTLY PRN
COMMUNITY

## 2024-05-29 RX ORDER — M-VIT,TX,IRON,MINS/CALC/FOLIC 27MG-0.4MG
1 TABLET ORAL DAILY
COMMUNITY

## 2024-05-29 NOTE — TELEPHONE ENCOUNTER
Patient called in to schedule procedure, I discussed with MA and he discussed with patient, MA went over chart notes and stated patient needed to be scheduled for a EGD, patient has pacemaker, takes blood thinners also, she will be on geri from 06/16-23, she needs to schedule prior to her next visit with PCP on 07/9, patient requesting the 1st two weeks of June to schedule.

## 2024-05-29 NOTE — TELEPHONE ENCOUNTER
Scheduled egd with Dr. Rosales due to Dr. Diaz being paternity leave.   Faxed medication clearance to Dr[facility] Sarmiento ( [facility]

## 2024-05-29 NOTE — PROGRESS NOTES
Patient verified name, , and procedure.    Type: 1a; abbreviated assessment per anesthesia guidelines.    Labs ordered per anesthesia: POCT Potassium.    Instructed pt that they will be notified the day before their procedure by the GI Lab of the time of arrival if their procedure is [place], and by Pre-op for [facility] procedures. Arrival times should be called by 5 pm. If no phone call is received, the patient should contact the respective hospital. The GI Lab telephone number is [phone] and [facility] Pre-Op is [phone].     Instructed pt to follow diet and prep instructions, per MD, including NPO status. If patient has NOT received instructions from office, patient advised to call the MD's office as soon as possible. Phone number provided.    Pt may bathe or shower the night before and the am of surgery with non-moisturizing soap. No lotions, oils, powders, make-up or colognes/perfumes on skin. No jewelry. Wear loose-fitting, comfortable, clean clothing.     Pt must have adult present in building the entire time that can drive them home.     Medications to take on the day of procedure: Tylenol PRN, Toprol XL, Synthroid, Protonix, and instructed to bring her Albuterol inhaler with her.    Medications to hold: Lasix, Aldactone, and Vitamins/Supplements, per anesthesia guidelines.     Case message sent to Dr. Rosales's office regarding clearance to hold pt's Eliquis prior to procedure. Instructed pt to follow up with them and follow their instruction.    The following discharge instructions were reviewed with patient: medication given during procedure may cause drowsiness for several hours, therefore, do not drive or operate machinery for remainder of the day. You may not drink alcohol on the day of your procedure, please resume regular diet and activity unless otherwise directed. You may experience abdominal distention for several hours that is relieved by the passage of gas. Contact your physician if

## 2024-05-31 RX ORDER — SODIUM CHLORIDE 9 MG/ML
25 INJECTION, SOLUTION INTRAVENOUS PRN
Status: CANCELLED | OUTPATIENT
Start: 2024-05-31

## 2024-05-31 RX ORDER — SODIUM CHLORIDE 0.9 % (FLUSH) 0.9 %
5-40 SYRINGE (ML) INJECTION EVERY 12 HOURS SCHEDULED
Status: CANCELLED | OUTPATIENT
Start: 2024-05-31

## 2024-05-31 RX ORDER — SODIUM CHLORIDE 0.9 % (FLUSH) 0.9 %
5-40 SYRINGE (ML) INJECTION PRN
Status: CANCELLED | OUTPATIENT
Start: 2024-05-31

## 2024-06-04 ENCOUNTER — ANESTHESIA EVENT (OUTPATIENT)
Dept: ENDOSCOPY | Age: 64
End: 2024-06-04
Payer: MEDICARE

## 2024-06-04 RX ORDER — NALOXONE HYDROCHLORIDE 0.4 MG/ML
INJECTION, SOLUTION INTRAMUSCULAR; INTRAVENOUS; SUBCUTANEOUS PRN
Status: CANCELLED | OUTPATIENT
Start: 2024-06-04

## 2024-06-04 RX ORDER — DIPHENHYDRAMINE HYDROCHLORIDE 50 MG/ML
12.5 INJECTION INTRAMUSCULAR; INTRAVENOUS
Status: CANCELLED | OUTPATIENT
Start: 2024-06-04 | End: 2024-06-05

## 2024-06-04 RX ORDER — FENTANYL CITRATE 50 UG/ML
25 INJECTION, SOLUTION INTRAMUSCULAR; INTRAVENOUS EVERY 5 MIN PRN
Status: CANCELLED | OUTPATIENT
Start: 2024-06-04

## 2024-06-04 RX ORDER — METOCLOPRAMIDE HYDROCHLORIDE 5 MG/ML
10 INJECTION INTRAMUSCULAR; INTRAVENOUS
Status: CANCELLED | OUTPATIENT
Start: 2024-06-04 | End: 2024-06-05

## 2024-06-04 RX ORDER — SODIUM CHLORIDE, SODIUM LACTATE, POTASSIUM CHLORIDE, CALCIUM CHLORIDE 600; 310; 30; 20 MG/100ML; MG/100ML; MG/100ML; MG/100ML
INJECTION, SOLUTION INTRAVENOUS CONTINUOUS
Status: CANCELLED | OUTPATIENT
Start: 2024-06-04

## 2024-06-04 RX ORDER — HYDROMORPHONE HYDROCHLORIDE 2 MG/ML
0.5 INJECTION, SOLUTION INTRAMUSCULAR; INTRAVENOUS; SUBCUTANEOUS EVERY 10 MIN PRN
Status: CANCELLED | OUTPATIENT
Start: 2024-06-04

## 2024-06-05 ENCOUNTER — TELEPHONE (OUTPATIENT)
Dept: RADIATION ONCOLOGY | Age: 64
End: 2024-06-05

## 2024-06-05 ENCOUNTER — HOSPITAL ENCOUNTER (OUTPATIENT)
Age: 64
Setting detail: OUTPATIENT SURGERY
Discharge: HOME | End: 2024-06-05
Attending: STUDENT IN AN ORGANIZED HEALTH CARE EDUCATION/TRAINING PROGRAM | Admitting: STUDENT IN AN ORGANIZED HEALTH CARE EDUCATION/TRAINING PROGRAM
Payer: MEDICARE

## 2024-06-05 ENCOUNTER — TELEPHONE (OUTPATIENT)
Age: 64
End: 2024-06-05

## 2024-06-05 ENCOUNTER — ANESTHESIA (OUTPATIENT)
Dept: ENDOSCOPY | Age: 64
End: 2024-06-05
Payer: MEDICARE

## 2024-06-05 VITALS
HEIGHT: 65 IN | TEMPERATURE: 97.9 F | BODY MASS INDEX: 27.99 KG/M2 | WEIGHT: 168 LBS | HEART RATE: 66 BPM | DIASTOLIC BLOOD PRESSURE: 66 MMHG | OXYGEN SATURATION: 98 % | SYSTOLIC BLOOD PRESSURE: 138 MMHG | RESPIRATION RATE: 18 BRPM

## 2024-06-05 DIAGNOSIS — C85.18 B-CELL LYMPHOMA OF LYMPH NODES OF MULTIPLE SITES (HCC): ICD-10-CM

## 2024-06-05 LAB — POTASSIUM BLD-SCNC: 4.6 MMOL/L (ref 3.5–5.1)

## 2024-06-05 PROCEDURE — 84132 ASSAY OF SERUM POTASSIUM: CPT

## 2024-06-05 RX ORDER — FENTANYL CITRATE 50 UG/ML
100 INJECTION, SOLUTION INTRAMUSCULAR; INTRAVENOUS
Status: DISCONTINUED | OUTPATIENT
Start: 2024-06-05 | End: 2024-06-05 | Stop reason: HOSPADM

## 2024-06-05 RX ORDER — SODIUM CHLORIDE 9 MG/ML
25 INJECTION, SOLUTION INTRAVENOUS PRN
Status: DISCONTINUED | OUTPATIENT
Start: 2024-06-05 | End: 2024-06-05 | Stop reason: HOSPADM

## 2024-06-05 RX ORDER — SODIUM CHLORIDE 9 MG/ML
25 INJECTION, SOLUTION INTRAVENOUS PRN
Status: CANCELLED | OUTPATIENT
Start: 2024-06-05

## 2024-06-05 RX ORDER — SODIUM CHLORIDE 9 MG/ML
INJECTION, SOLUTION INTRAVENOUS PRN
Status: DISCONTINUED | OUTPATIENT
Start: 2024-06-05 | End: 2024-06-05 | Stop reason: HOSPADM

## 2024-06-05 RX ORDER — SODIUM CHLORIDE 0.9 % (FLUSH) 0.9 %
5-40 SYRINGE (ML) INJECTION EVERY 12 HOURS SCHEDULED
Status: DISCONTINUED | OUTPATIENT
Start: 2024-06-05 | End: 2024-06-05 | Stop reason: HOSPADM

## 2024-06-05 RX ORDER — SODIUM CHLORIDE 0.9 % (FLUSH) 0.9 %
5-40 SYRINGE (ML) INJECTION PRN
Status: DISCONTINUED | OUTPATIENT
Start: 2024-06-05 | End: 2024-06-05 | Stop reason: HOSPADM

## 2024-06-05 RX ORDER — LIDOCAINE HYDROCHLORIDE 10 MG/ML
1 INJECTION, SOLUTION INFILTRATION; PERINEURAL
Status: DISCONTINUED | OUTPATIENT
Start: 2024-06-05 | End: 2024-06-05 | Stop reason: HOSPADM

## 2024-06-05 RX ORDER — SODIUM CHLORIDE, SODIUM LACTATE, POTASSIUM CHLORIDE, CALCIUM CHLORIDE 600; 310; 30; 20 MG/100ML; MG/100ML; MG/100ML; MG/100ML
INJECTION, SOLUTION INTRAVENOUS CONTINUOUS
Status: DISCONTINUED | OUTPATIENT
Start: 2024-06-05 | End: 2024-06-05 | Stop reason: HOSPADM

## 2024-06-05 RX ORDER — FENTANYL CITRATE 50 UG/ML
25 INJECTION, SOLUTION INTRAMUSCULAR; INTRAVENOUS
Status: DISCONTINUED | OUTPATIENT
Start: 2024-06-05 | End: 2024-06-05 | Stop reason: HOSPADM

## 2024-06-05 RX ORDER — SODIUM CHLORIDE 0.9 % (FLUSH) 0.9 %
5-40 SYRINGE (ML) INJECTION PRN
Status: CANCELLED | OUTPATIENT
Start: 2024-06-05

## 2024-06-05 RX ORDER — SODIUM CHLORIDE 0.9 % (FLUSH) 0.9 %
5-40 SYRINGE (ML) INJECTION EVERY 12 HOURS SCHEDULED
Status: CANCELLED | OUTPATIENT
Start: 2024-06-05

## 2024-06-05 ASSESSMENT — PAIN - FUNCTIONAL ASSESSMENT: PAIN_FUNCTIONAL_ASSESSMENT: 0-10

## 2024-06-05 NOTE — H&P
4/26/2024  Kya Lizarraga  1960  [id number]        Kya Lizarraga is a 63 year old  female who has been sent to my clinic by Dr.David Meehan. 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        ALLERGIES:   Metronidazole.        FAMILY HISTORY:     No hematologic disorders.        SOCIAL HISTORY:   She is  and lives with her . 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        PAST MEDICAL HISTORY:    Depression, Vitamin D deficiency, Osteoarthritis, Iron Deficiency Anemia, Obstructive Sleep Apnea, PTSD, Atrial Fibrillation, s/p PPM, Hypothyroidism, GERD, Diverticulosis, COPD, Non-Ischemic Cardio-Myopathy, Anxiety Disorder, Vitamin D deficiency, Restless Leg Syndrome and NHL.        ROS:  The patient complained of fatigue; all other systems negative.        PHYSICAL EXAM:   The patient was alert, awake and oriented, no acute distress was noted, a left infra-clavicular PPM was present. Oral examination did not reveal any mucosal lesions. Lymph node examination did not reveal any adenopathy. Neck examination revealed a supple neck, no thyromegaly or masses were noted. Chest examination revealed slightly prolonged expiration bilaterally. Heart examination revealed S-1 and S-2 with a 2/6 systolic murmur. Abdominal examination revealed a non-tender abdomen, bowel sounds were positive, no organomegaly could be appreciated. Examination of the extremities did not reveal any tenderness or erythema. Examination of the skin did not reveal any lesions.        KPS:   80.        LABORATORY INVESTIGATIONS:  CBC showed a WBC count of 5.8, ANC was 3.3, Hemoglobin was 13.5 and Platelets were 164. Medical problems and test results were reviewed with the patient today.        ASSESSMENT:    NHL, B-Cell; Vitamin D deficiency. I spent a total of 62 minutes on the day of the visit, managing the care of this patient.        PLAN:   I will arrange for her to undergo a PET scan and a

## 2024-06-05 NOTE — PROGRESS NOTES
Per Dr. Rosales's request, spoke with GI office staff regarding scheduling patient for an EUS next week with Dr. Diaz and notifying Randa as well.

## 2024-06-05 NOTE — ANESTHESIA PRE PROCEDURE
Endo/Other ROS   (+) hypothyroidism::..          Pt had no PAT visit       Abdominal:             Vascular: negative vascular ROS.         Other Findings:       Anesthesia Plan      TIVA     ASA 3     (80% circ not stented due to ca treatment with low platelets)  Induction: intravenous.    MIPS: Postoperative opioids intended and Prophylactic antiemetics administered.  Anesthetic plan and risks discussed with patient.      Plan discussed with surgical team.                NE HOLBROOK MD   6/5/2024

## 2024-06-05 NOTE — TELEPHONE ENCOUNTER
Patient called to schedule the EUS for next week. Informed the patient that the  is busy and will give her a call back when she is available. Patient verbalized understanding.

## 2024-06-05 NOTE — TELEPHONE ENCOUNTER
RN from GI Lab called [phone]) relayed message from Dr. Rosales. Patient was scheduled for EGD today with Bobby; needs to have EUS with Dr. Diaz.     Requested procedure be scheduled next week with Joe.

## 2024-06-06 NOTE — PROGRESS NOTES
Patient verified name, , and procedure.    Type: 1a; abbreviated assessment per anesthesia guidelines.    Labs ordered per anesthesia: POC Potassium.    Instructed pt that they will be notified the day before their procedure by the GI Lab of the time of arrival if their procedure is [place], and by Pre-op for [facility] procedures. Arrival times should be called by 5 pm. If no phone call is received, the patient should contact the respective hospital. The GI Lab telephone number is [phone] and [facility] Pre-Op is [phone].     Instructed pt to follow diet and prep instructions, per MD, including NPO status. If patient has NOT received instructions from office, patient advised to call the MD's office as soon as possible. Phone number provided.    Pt may bathe or shower the night before and the am of surgery with non-moisturizing soap. No lotions, oils, powders, make-up or colognes/perfumes on skin. No jewelry. Wear loose-fitting, comfortable, clean clothing.     Pt must have adult present in building the entire time that can drive them home.     Medications to take on the day of procedure: Metoprolol, Pantoprazole, and Synthroid.    Medications to hold: Furosemide and Spirolactone DOS, Stop Eliquis on , and hold multivitamins and supplements until after procedure (at least one week), per anesthesia guidelines.     The following discharge instructions were reviewed with patient: medication given during procedure may cause drowsiness for several hours, therefore, do not drive or operate machinery for remainder of the day. You may not drink alcohol on the day of your procedure, please resume regular diet and activity unless otherwise directed. You may experience abdominal distention for several hours that is relieved by the passage of gas. Contact your physician if you have any of the following: fever or chills, severe abdominal pain, or excessive amount of bleeding or a large amount when

## 2024-06-10 ENCOUNTER — ANESTHESIA EVENT (OUTPATIENT)
Dept: ENDOSCOPY | Age: 64
End: 2024-06-10
Payer: MEDICARE

## 2024-06-10 RX ORDER — SODIUM CHLORIDE 0.9 % (FLUSH) 0.9 %
5-40 SYRINGE (ML) INJECTION EVERY 12 HOURS SCHEDULED
Status: CANCELLED | OUTPATIENT
Start: 2024-06-10

## 2024-06-10 RX ORDER — NALOXONE HYDROCHLORIDE 0.4 MG/ML
INJECTION, SOLUTION INTRAMUSCULAR; INTRAVENOUS; SUBCUTANEOUS PRN
Status: CANCELLED | OUTPATIENT
Start: 2024-06-10

## 2024-06-10 RX ORDER — SODIUM CHLORIDE 0.9 % (FLUSH) 0.9 %
5-40 SYRINGE (ML) INJECTION PRN
Status: CANCELLED | OUTPATIENT
Start: 2024-06-10

## 2024-06-10 RX ORDER — ONDANSETRON 2 MG/ML
4 INJECTION INTRAMUSCULAR; INTRAVENOUS
Status: CANCELLED | OUTPATIENT
Start: 2024-06-10 | End: 2024-06-11

## 2024-06-10 RX ORDER — SODIUM CHLORIDE 9 MG/ML
INJECTION, SOLUTION INTRAVENOUS PRN
Status: CANCELLED | OUTPATIENT
Start: 2024-06-10

## 2024-06-11 ENCOUNTER — HOSPITAL ENCOUNTER (OUTPATIENT)
Age: 64
Setting detail: OUTPATIENT SURGERY
Discharge: HOME | End: 2024-06-11
Attending: INTERNAL MEDICINE | Admitting: INTERNAL MEDICINE
Payer: MEDICARE

## 2024-06-11 ENCOUNTER — ANESTHESIA (OUTPATIENT)
Dept: ENDOSCOPY | Age: 64
End: 2024-06-11
Payer: MEDICARE

## 2024-06-11 VITALS
BODY MASS INDEX: 28.99 KG/M2 | HEIGHT: 65 IN | TEMPERATURE: 97.6 F | RESPIRATION RATE: 12 BRPM | OXYGEN SATURATION: 98 % | WEIGHT: 174 LBS | SYSTOLIC BLOOD PRESSURE: 133 MMHG | HEART RATE: 65 BPM | DIASTOLIC BLOOD PRESSURE: 61 MMHG

## 2024-06-11 PROCEDURE — 6360000002 HC RX W HCPCS: Performed by: NURSE ANESTHETIST, CERTIFIED REGISTERED

## 2024-06-11 PROCEDURE — 43238 EGD US FINE NEEDLE BX/ASPIR: CPT | Performed by: INTERNAL MEDICINE

## 2024-06-11 PROCEDURE — 88173 CYTOPATH EVAL FNA REPORT: CPT

## 2024-06-11 PROCEDURE — 3700000001 HC ADD 15 MINUTES (ANESTHESIA): Performed by: INTERNAL MEDICINE

## 2024-06-11 PROCEDURE — 3609018500 HC EGD US SCOPE W/ADJACENT STRUCTURES: Performed by: INTERNAL MEDICINE

## 2024-06-11 PROCEDURE — 2500000003 HC RX 250 WO HCPCS: Performed by: NURSE ANESTHETIST, CERTIFIED REGISTERED

## 2024-06-11 PROCEDURE — 88185 FLOWCYTOMETRY/TC ADD-ON: CPT

## 2024-06-11 PROCEDURE — 88305 TISSUE EXAM BY PATHOLOGIST: CPT

## 2024-06-11 PROCEDURE — 7100000000 HC PACU RECOVERY - FIRST 15 MIN: Performed by: INTERNAL MEDICINE

## 2024-06-11 PROCEDURE — 88377 M/PHMTRC ALYS ISHQUANT/SEMIQ: CPT

## 2024-06-11 PROCEDURE — 3700000000 HC ANESTHESIA ATTENDED CARE: Performed by: INTERNAL MEDICINE

## 2024-06-11 PROCEDURE — 2709999900 HC NON-CHARGEABLE SUPPLY: Performed by: INTERNAL MEDICINE

## 2024-06-11 PROCEDURE — 88342 IMHCHEM/IMCYTCHM 1ST ANTB: CPT

## 2024-06-11 PROCEDURE — 7100000010 HC PHASE II RECOVERY - FIRST 15 MIN: Performed by: INTERNAL MEDICINE

## 2024-06-11 PROCEDURE — 2500000003 HC RX 250 WO HCPCS: Performed by: ANESTHESIOLOGY

## 2024-06-11 PROCEDURE — 88341 IMHCHEM/IMCYTCHM EA ADD ANTB: CPT

## 2024-06-11 PROCEDURE — 88184 FLOWCYTOMETRY/ TC 1 MARKER: CPT

## 2024-06-11 PROCEDURE — 2580000003 HC RX 258: Performed by: ANESTHESIOLOGY

## 2024-06-11 PROCEDURE — 43235 EGD DIAGNOSTIC BRUSH WASH: CPT | Performed by: INTERNAL MEDICINE

## 2024-06-11 PROCEDURE — 7100000001 HC PACU RECOVERY - ADDTL 15 MIN: Performed by: INTERNAL MEDICINE

## 2024-06-11 PROCEDURE — 88312 SPECIAL STAINS GROUP 1: CPT

## 2024-06-11 PROCEDURE — 2720000010 HC SURG SUPPLY STERILE: Performed by: INTERNAL MEDICINE

## 2024-06-11 RX ORDER — SODIUM CHLORIDE, SODIUM LACTATE, POTASSIUM CHLORIDE, CALCIUM CHLORIDE 600; 310; 30; 20 MG/100ML; MG/100ML; MG/100ML; MG/100ML
INJECTION, SOLUTION INTRAVENOUS CONTINUOUS
Status: DISCONTINUED | OUTPATIENT
Start: 2024-06-11 | End: 2024-06-11 | Stop reason: HOSPADM

## 2024-06-11 RX ORDER — SODIUM CHLORIDE 9 MG/ML
INJECTION, SOLUTION INTRAVENOUS PRN
Status: DISCONTINUED | OUTPATIENT
Start: 2024-06-11 | End: 2024-06-11 | Stop reason: HOSPADM

## 2024-06-11 RX ORDER — MIDAZOLAM HYDROCHLORIDE 1 MG/ML
INJECTION INTRAMUSCULAR; INTRAVENOUS PRN
Status: DISCONTINUED | OUTPATIENT
Start: 2024-06-11 | End: 2024-06-11 | Stop reason: SDUPTHER

## 2024-06-11 RX ORDER — SODIUM CHLORIDE 0.9 % (FLUSH) 0.9 %
5-40 SYRINGE (ML) INJECTION EVERY 12 HOURS SCHEDULED
Status: DISCONTINUED | OUTPATIENT
Start: 2024-06-11 | End: 2024-06-11 | Stop reason: HOSPADM

## 2024-06-11 RX ORDER — SODIUM CHLORIDE 0.9 % (FLUSH) 0.9 %
5-40 SYRINGE (ML) INJECTION PRN
Status: DISCONTINUED | OUTPATIENT
Start: 2024-06-11 | End: 2024-06-11 | Stop reason: HOSPADM

## 2024-06-11 RX ORDER — PROPOFOL 10 MG/ML
INJECTION, EMULSION INTRAVENOUS PRN
Status: DISCONTINUED | OUTPATIENT
Start: 2024-06-11 | End: 2024-06-11 | Stop reason: SDUPTHER

## 2024-06-11 RX ORDER — LIDOCAINE HYDROCHLORIDE 20 MG/ML
INJECTION, SOLUTION EPIDURAL; INFILTRATION; INTRACAUDAL; PERINEURAL PRN
Status: DISCONTINUED | OUTPATIENT
Start: 2024-06-11 | End: 2024-06-11 | Stop reason: SDUPTHER

## 2024-06-11 RX ORDER — LIDOCAINE HYDROCHLORIDE 10 MG/ML
1 INJECTION, SOLUTION INFILTRATION; PERINEURAL
Status: DISCONTINUED | OUTPATIENT
Start: 2024-06-11 | End: 2024-06-11 | Stop reason: HOSPADM

## 2024-06-11 RX ADMIN — PROPOFOL 100 MCG/KG/MIN: 10 INJECTION, EMULSION INTRAVENOUS at 12:31

## 2024-06-11 RX ADMIN — MIDAZOLAM 2 MG: 1 INJECTION INTRAMUSCULAR; INTRAVENOUS at 12:27

## 2024-06-11 RX ADMIN — LIDOCAINE HYDROCHLORIDE 100 MG: 20 INJECTION, SOLUTION EPIDURAL; INFILTRATION; INTRACAUDAL; PERINEURAL at 12:27

## 2024-06-11 RX ADMIN — FAMOTIDINE 20 MG: 10 INJECTION INTRAVENOUS at 11:52

## 2024-06-11 RX ADMIN — SODIUM CHLORIDE, POTASSIUM CHLORIDE, SODIUM LACTATE AND CALCIUM CHLORIDE: 600; 310; 30; 20 INJECTION, SOLUTION INTRAVENOUS at 11:07

## 2024-06-11 RX ADMIN — PROPOFOL 70 MG: 10 INJECTION, EMULSION INTRAVENOUS at 12:28

## 2024-06-11 ASSESSMENT — COPD QUESTIONNAIRES: CAT_SEVERITY: MODERATE

## 2024-06-11 ASSESSMENT — PAIN - FUNCTIONAL ASSESSMENT: PAIN_FUNCTIONAL_ASSESSMENT: 0-10

## 2024-06-11 NOTE — DISCHARGE INSTRUCTIONS
Upper GI Endoscopy: What to Expect at Home  Your Recovery  After you have an endoscopy you will be able to go home after your doctor or nurse checks to make sure you are not having any problems.  You may have to stay overnight if you had treatment during the test. You may have a sore throat for a day or two after the test.  This care sheet gives you a general idea about what to expect after the test.  How can you care for yourself at home?  Activity  Rest as much as you need to after you go home.  You should be able to go back to your usual activities the day after the test.  Diet  Follow your doctor's directions for eating after the test.  Drink plenty of fluids (unless your doctor has told you not to).  Medications  If you have a sore throat the day after the test, use an over-the-counter spray to numb your throat.    .    Follow-up care is a key part of your treatment and safety. Be sure to make and go to all appointments, and call your doctor if you are having problems. It's also a good idea to know your test results and keep a list of the medicines you take.    When should you call for help?  Call 911 anytime you think you may need emergency care. For example, call if:  You passed out (lost consciousness).  You cough up blood.  You vomit blood or what looks like coffee grounds.  You pass maroon or very bloody stools.  Call your doctor now or seek immediate medical care if:  You have trouble swallowing.  You have belly pain.  Your stools are black and tarlike or have streaks of blood.  You are sick to your stomach or cannot keep fluids down.  Watch closely for changes in your health, and be sure to contact your doctor if:  Your throat still hurts after a day or two.  You do not get better as expected.    After general anesthesia or intravenous sedation, for 24 hours or while taking prescription Narcotics:  Limit your activities  A responsible adult needs to be with you for the next 24 hours  Do not drive and

## 2024-06-11 NOTE — ANESTHESIA POSTPROCEDURE EVALUATION
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, unspecified B-cell lymphoma type (HCC) [C85.18])    Surgeons: Nette Diaz MD Responsible Provider: Nakia Martin MD    Anesthesia Type: TIVA ASA Status: 3            Anesthesia Type: TIVA    Navi Phase I: Navi Score: 8    Navi Phase II: Navi Score: 10    Anesthesia Post Evaluation    Patient location during evaluation: PACU  Patient participation: complete - patient participated  Level of consciousness: awake and alert  Airway patency: patent  Nausea & Vomiting: no nausea  Cardiovascular status: hemodynamically stable  Respiratory status: acceptable  Hydration status: euvolemic  Pain management: adequate and satisfactory to patient        No notable events documented.

## 2024-06-11 NOTE — ANESTHESIA PRE PROCEDURE
Department of Anesthesiology  Preprocedure Note       Name:  Kya Lizarraga   Age:  63 y.o.  :  1960                                          MRN:  [id number]         Date:  2024      Surgeon: Surgeon(s):  Nette Diaz MD    Procedure: Procedure(s):  ESOPHAGOGASTRODUODENOSCOPY ULTRASOUND    Medications prior to admission:   Prior to Admission medications    Medication Sig Start Date End Date Taking? Authorizing Provider   Pyridoxine HCl (B-6 PO) Take 1 tablet by mouth nightly    Servando Irby MD   Calcium 200 MG TABS Take 1 tablet by mouth daily    Servando Irby MD   TURMERIC PO Take 1 capsule by mouth daily    Servando Irby MD   Multiple Vitamins-Minerals (THERAPEUTIC MULTIVITAMIN-MINERALS) tablet Take 1 tablet by mouth daily    Servando Irby MD   clonazePAM (KLONOPIN) 0.5 MG tablet Take 1 tablet by mouth nightly as needed.    Servando Irby MD   hydrOXYzine Pamoate (VISTARIL PO) Take 1 tablet by mouth as needed (Anxiety)    Servando Irby MD   rosuvastatin (CRESTOR) 5 MG tablet Take 1 tablet by mouth nightly  Patient not taking: Reported on 2024 5/15/24 5/15/25  Servando rIby MD   ELIQUIS 5 MG TABS tablet Take 1 tablet by mouth 2 times daily Has not taken since 24   Servando Irby MD   acetaminophen (TYLENOL) 325 MG tablet Take 1-2 tablets by mouth every 6 hours as needed 17   Servando Irby MD   albuterol sulfate HFA (PROVENTIL;VENTOLIN;PROAIR) 108 (90 Base) MCG/ACT inhaler Inhale 2 puffs into the lungs as needed  Patient not taking: Reported on 2024    Servando Irby MD   vitamin D (CHOLECALCIFEROL) 125 MCG (5000 UT) CAPS capsule Take 1 capsule by mouth daily    Servando Irby MD   furosemide (LASIX) 20 MG tablet Take 1 tablet by mouth 2 times daily BID and PRN 3/1/24   Servando Irby MD   SYNTHROID 150 MCG tablet TAKE 1 TABLET BY MOUTH 6 DAYS A WEEK THEN TAKE 1/2 TABLET BY MOUTH 1

## 2024-06-11 NOTE — OP NOTE
Procedure: EGD, Endoscopic Ultrasound (EUS) with FNA/FNB    Indication: Suspected marla-gastric vs gastric mass    Date of Procedure: 6/11/2024    Patient profile: Refer to patient note in chart for documentation of history and physical    Providers: Nette Diaz MD    Referring MD: Dr. Bonner    PCP: Rosalio Bonner MD    Medicines: Monitored anesthesia care    Complication: No immediate complications.     Estimated blood loss: Minimal    Procedure: After the risks including, but not limited to medication reaction, infection, bleeding, perforation, missed lesions, benefits and alternatives of the procedure were discussed with the patient and all questions were answered, informed consent was obtained. The gastroscope and the linear echoendoscope were passed under direct vision throughout the procedure, the patient's blood pressure pulse and oxygen saturation were monitored continuously. The scopes were introduced through the mouth and advanced to the second part of duodenum. The endoscopy was performed without difficulty. The patient tolerated the procedure well.     Findings:   Endoscopic Exam  --The adult gastroscope was introduced from the mouth and advanced through the esophagus to the GE junction. The esophagus and GE junction were both normal.    --Scope advanced to the stomach. The stomach was normal on forward and retroflexed views. In the proximal stomach, there is a firm nodular and longitudinal lesion with associated erythema and a clean based ulceration. This was biopsied.  --The scope was then advanced to the duodenum, appearing normal to second portion.   --The EGD scope was removed, and the linear EUS scope was introduced.       EUS Exam  Fuji Arietta Precision Ultrasound System was utilized for EUS imaging.     The linear echoendoscope (Olympus 180) was introduced from the mouth and advanced through the esophagus into the stomach to the level of the celiac axis.  The celiac axis was normal  There was no

## 2024-06-11 NOTE — H&P
Gastroenterology and Interventional Endoscopy Pre-procedure HPI    Date of visit   :  06/11/24      Patient name :  Kya Lizarraga  YOB: 1960    HPI:    Patient is a 63 y.o. year old female, who has been referred for EUS given positive PET scan, with lymphoma suspected      ____________________      Past Medical History:  Reviewed, and in prior HPI,documentation    Surgical History:    Reviewed, and in prior HPI,documentation    Medications:    Reviewed, and in prior HPI,documentation    Allergies:  Allergies   Allergen Reactions    Sildenafil Diarrhea and Myalgia    Tetracycline Swelling     Tongue swelling (teramycin)    Metronidazole Rash     Pruritic rash    Sulfa Antibiotics Other (See Comments)    Buspirone      Muscle weakness, diarrhea    Clindamycin Diarrhea     Can exacerbate colitis    Corn Oil Other (See Comments)     Headache    Duloxetine Hcl Diarrhea and Other (See Comments)     Confusion    Fluoxetine Diarrhea    Levaquin [Levofloxacin] Other (See Comments)     Ruptured tendon    Statins Itching    Nsaids Cough, Hives and Other (See Comments)     Exacerbated asthma    Ondansetron Hcl Other (See Comments)     Long QT    Sulfamethoxazole-Trimethoprim Other (See Comments)     Passed out    Other reaction(s): Other   Passed out       Social History:    Reviewed, and in prior HPI,documentation    Family History:    Reviewed, and in prior HPI,documentation  Physical Exam:    Vitals:    06/11/24 1037   BP: 118/72   Pulse: 88   Resp: 12   Temp: 97.6 °F (36.4 °C)   SpO2: 100%            Constitutional: Alert, oriented.  No acute distress  Head: Normocephalic and Atraumatic  Eyes: No icterus, EOMI, no congestion  ENT: No deformity, no hearing loss   Cardiovascular: Regular rate and rhythm, S1-S2 normal, no murmur rub or gallop  Respiratory: Clear to auscultation bilaterally no wheezing rhonchi or crackles  Gastrointestinal:, No hepatosplenomegaly nontender nondistended bowel sounds present

## 2024-06-12 LAB
CYTOLOGY-NON GYN: NORMAL
SPECIMEN SOURCE: NORMAL

## 2024-06-19 ENCOUNTER — TELEPHONE (OUTPATIENT)
Dept: GASTROENTEROLOGY | Age: 64
End: 2024-06-19

## 2024-06-19 NOTE — TELEPHONE ENCOUNTER
Called patient to ensure that all of her questions have been addressed. Discussed results of recent labs.  Per Dr Diaz,\"I sent a message to Dr. oBnner earlier. Her biopsy was positive on flow cytometry for lymphoma. Dr. Bonner would be the one treating this and his office will be getting in touch with her shortly. Sorry for the delay.  If she does not hear from them, please ask her to 1. Call Dr. Bonner's office and 2. Let us know as we can try to expedite her care. \"  Patient is scheduled for labs/ office visit with Dr Bonner 7/9. She had  a pre planned vacation and although she \"finds it difficult to relax and enjoy this vacation, it's necessary and will let Dr Bonner's office know if I decide to postpone vacation to start treatment sooner\". Patient appreciative for call and will let us know if she needs anything.

## 2024-07-02 ENCOUNTER — APPOINTMENT (RX ONLY)
Dept: URBAN - NONMETROPOLITAN AREA CLINIC 1 | Facility: CLINIC | Age: 64
Setting detail: DERMATOLOGY
End: 2024-07-02

## 2024-07-02 DIAGNOSIS — Z41.9 ENCOUNTER FOR PROCEDURE FOR PURPOSES OTHER THAN REMEDYING HEALTH STATE, UNSPECIFIED: ICD-10-CM

## 2024-07-02 PROCEDURE — ? FACIAL

## 2024-07-02 ASSESSMENT — LOCATION ZONE DERM: LOCATION ZONE: FACE

## 2024-07-02 ASSESSMENT — LOCATION DETAILED DESCRIPTION DERM: LOCATION DETAILED: RIGHT INFERIOR CENTRAL MALAR CHEEK

## 2024-07-02 ASSESSMENT — LOCATION SIMPLE DESCRIPTION DERM: LOCATION SIMPLE: RIGHT CHEEK

## 2024-07-02 NOTE — PROCEDURE: FACIAL
Treatment Type (Optional): Anti Aging Facial
Led Light (Optional): red
Detail Level: Detailed
Mask Type (Optional): hydrating B5
Treatment Serum (Optional): PhytoCorrect
Extraction Method: extractor
Facial Steaming: steamed
Price (Use Numbers Only, No Special Characters Or $): 99
Exfoliation Type: gentle

## 2024-07-08 DIAGNOSIS — E55.9 VITAMIN D DEFICIENCY: ICD-10-CM

## 2024-07-08 DIAGNOSIS — C85.18 B-CELL LYMPHOMA OF LYMPH NODES OF MULTIPLE REGIONS, UNSPECIFIED B-CELL LYMPHOMA TYPE (HCC): Primary | ICD-10-CM

## 2024-07-09 ENCOUNTER — HOSPITAL ENCOUNTER (OUTPATIENT)
Dept: LAB | Age: 64
Discharge: HOME | End: 2024-07-12
Payer: MEDICARE

## 2024-07-09 ENCOUNTER — OFFICE VISIT (OUTPATIENT)
Dept: ONCOLOGY | Age: 64
End: 2024-07-09
Payer: MEDICARE

## 2024-07-09 VITALS
WEIGHT: 178.6 LBS | SYSTOLIC BLOOD PRESSURE: 114 MMHG | HEART RATE: 87 BPM | HEIGHT: 65 IN | OXYGEN SATURATION: 95 % | RESPIRATION RATE: 18 BRPM | DIASTOLIC BLOOD PRESSURE: 75 MMHG | TEMPERATURE: 98.2 F | BODY MASS INDEX: 29.76 KG/M2

## 2024-07-09 DIAGNOSIS — E55.9 VITAMIN D DEFICIENCY: ICD-10-CM

## 2024-07-09 DIAGNOSIS — C85.18 B-CELL LYMPHOMA OF LYMPH NODES OF MULTIPLE REGIONS, UNSPECIFIED B-CELL LYMPHOMA TYPE (HCC): ICD-10-CM

## 2024-07-09 DIAGNOSIS — C85.80 MARGINAL ZONE LYMPHOMA (HCC): Primary | ICD-10-CM

## 2024-07-09 LAB — 25(OH)D3 SERPL-MCNC: 52.6 NG/ML (ref 30–100)

## 2024-07-09 PROCEDURE — 82306 VITAMIN D 25 HYDROXY: CPT

## 2024-07-09 PROCEDURE — G8399 PT W/DXA RESULTS DOCUMENT: HCPCS | Performed by: INTERNAL MEDICINE

## 2024-07-09 PROCEDURE — 99215 OFFICE O/P EST HI 40 MIN: CPT | Performed by: INTERNAL MEDICINE

## 2024-07-09 PROCEDURE — 3017F COLORECTAL CA SCREEN DOC REV: CPT | Performed by: INTERNAL MEDICINE

## 2024-07-09 PROCEDURE — G8428 CUR MEDS NOT DOCUMENT: HCPCS | Performed by: INTERNAL MEDICINE

## 2024-07-09 PROCEDURE — 1090F PRES/ABSN URINE INCON ASSESS: CPT | Performed by: INTERNAL MEDICINE

## 2024-07-09 PROCEDURE — G8419 CALC BMI OUT NRM PARAM NOF/U: HCPCS | Performed by: INTERNAL MEDICINE

## 2024-07-09 PROCEDURE — 1123F ACP DISCUSS/DSCN MKR DOCD: CPT | Performed by: INTERNAL MEDICINE

## 2024-07-09 PROCEDURE — 36415 COLL VENOUS BLD VENIPUNCTURE: CPT

## 2024-07-09 PROCEDURE — 1036F TOBACCO NON-USER: CPT | Performed by: INTERNAL MEDICINE

## 2024-07-09 NOTE — PATIENT INSTRUCTIONS
Patient Information from Today's Visit    The members of your [facility] are listed below:    Physician Provider: Rosalio Bonner Medical Oncologist  Advanced Practice Clinician: Moon Peter NP  Registered Nurse: N/A  Navigator: N/A  Medical Assistant: Thao HOUGH MA and Tamanna CANDELARIA MA  : Bethany HOUGH   Supportive Care Services: Sondra PARISI LM    Diagnosis:   Gastric Lymphoma  Marginal Zone B-Cell Lymphoma    Follow Up Instructions:  Lab work looks stable today  Your disease is stable at this time, so we will continue to monitor you    -if at any time you start to feel worsening symptoms, please call our office  We will bring you back in January 2025 for a follow up with  and lab work prior.    Treatment Summary has been discussed and given to patient:N/A      Current Labs:   No visits with results within 1 Day(s) from this visit.   Latest known visit with results is:   Admission on 06/11/2024, Discharged on 06/11/2024   Component Date Value Ref Range Status    Cytology-Non Gyn 06/11/2024 Specimen received in cytology. Pathology report generally follows within three business days, unless special studies (i.e. immunohistochemistry, molecular studies, etc) are required.    Final    Specimen source 06/11/2024 Gastric Lesion   Final                 Please refer to After Visit Summary or [email or web address] for upcoming appointment information. Please call our office for rescheduling needs at least 24 hours before your scheduled appointment time.If you have any questions regarding your upcoming schedule please reach out to your care team through [email or web address] or call [phone].     Please notify your assigned Nurse Navigator of any unplanned hospital admissions or Emergency Room visits within 24 hours of discharge.    -------------------------------------------------------------------------------------------------------------------  Please call our office at [phone] if you have any  of the following

## 2024-07-09 NOTE — PROGRESS NOTES
[place] [id number]  Phone: [phone]           7/9/2024  Kya Lizarraga  1960  [id number]        Kya Lizarraga is a 63 year old  female who has returned to my clinic for a follow-up visit; she was initially referred to me by Dr. Jas Meehan. In 4/2024 she was found to have NHL, Marginal Zone Gastric Lymphoma, Stage I-AE.        ALLERGIES:   Metronidazole.        FAMILY HISTORY:     No hematologic disorders.        SOCIAL HISTORY:   She is  and lives with her . 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        PAST MEDICAL HISTORY:    Depression, Vitamin D deficiency, Osteoarthritis, Iron Deficiency Anemia, Obstructive Sleep Apnea, PTSD, Atrial Fibrillation, s/p PPM, Hypothyroidism, GERD, Diverticulosis, COPD, Non-Ischemic Cardio-Myopathy, Anxiety Disorder, Vitamin D deficiency, Restless Leg Syndrome and NHL.        ROS:  The patient complained of fatigue; all other systems negative.        PHYSICAL EXAM:   The patient was alert, awake and oriented, no acute distress was noted, a left infra-clavicular PPM was present. Oral examination did not reveal any mucosal lesions. Lymph node examination did not reveal any adenopathy. Neck examination revealed a supple neck, no thyromegaly or masses were noted. Chest examination revealed slightly prolonged expiration bilaterally. Heart examination revealed S-1 and S-2 with a 2/6 systolic murmur. Abdominal examination revealed a non-tender abdomen, bowel sounds were positive, no organomegaly could be appreciated. Examination of the extremities did not reveal any tenderness or erythema. Examination of the skin did not reveal any lesions.        KPS:   80.        LABORATORY INVESTIGATIONS:  CBC showed a WBC count of 7.2, ANC was 4.9, Hemoglobin was 13.5 and Platelets were 198. Medical problems and test results were reviewed with the patient today.        ASSESSMENT:    NHL, Marginal

## 2024-07-18 ENCOUNTER — TELEPHONE (OUTPATIENT)
Dept: ONCOLOGY | Age: 64
End: 2024-07-18

## 2024-08-07 ENCOUNTER — APPOINTMENT (RX ONLY)
Dept: URBAN - NONMETROPOLITAN AREA CLINIC 1 | Facility: CLINIC | Age: 64
Setting detail: DERMATOLOGY
End: 2024-08-07

## 2024-08-07 DIAGNOSIS — Z41.9 ENCOUNTER FOR PROCEDURE FOR PURPOSES OTHER THAN REMEDYING HEALTH STATE, UNSPECIFIED: ICD-10-CM

## 2024-08-07 PROCEDURE — ? FACIAL

## 2024-08-07 ASSESSMENT — LOCATION SIMPLE DESCRIPTION DERM: LOCATION SIMPLE: RIGHT CHEEK

## 2024-08-07 ASSESSMENT — LOCATION DETAILED DESCRIPTION DERM: LOCATION DETAILED: RIGHT CENTRAL MALAR CHEEK

## 2024-08-07 ASSESSMENT — LOCATION ZONE DERM: LOCATION ZONE: FACE

## 2024-08-07 NOTE — PROCEDURE: FACIAL
Led Light (Optional): red
Treatment Serum (Optional): PhytoCorrect
Exfoliation Type: enzyme
Price (Use Numbers Only, No Special Characters Or $): 100
Extraction Method: extractor
Treatment Type (Optional): Anti Aging Facial
Detail Level: Detailed
Facial Steaming: steamed
Mask Type (Optional): hydrating B5

## 2024-08-07 NOTE — HPI: COSMETIC (FACIAL)
[id number]: previous_has_had_a_previous_facial
When Outside In The Sun, Do You...: mostly burns, rarely tans
When Was Your Last Facial?: July 8 2024

## 2024-09-02 NOTE — OR NURSING
Patient suddenly felt unwell; dizzy and felt as if she was going to faint, diaphoretic, patient states she feels anxious. Dr. Penn at bedside discussing procedure with patient and family.    VS taken:   /83 HR 71 per pulse oximetry, SpO2 98% on RA    Apple juice x2 given to patient. Cool air from [employer] system applied per patient to face/neck. Gluc 101 15 min after juice    Patient suspects symptoms came on from anxiety or cardiac symptoms, has irregular heart rhythm with PACs and runs of PVCs, has permanent pacemaker.    Symptoms improved after 2-3 minutes. Per MD, patient OK to discharge when patient feels ready   Not Applicable

## 2024-09-04 ENCOUNTER — APPOINTMENT (RX ONLY)
Dept: URBAN - NONMETROPOLITAN AREA CLINIC 1 | Facility: CLINIC | Age: 64
Setting detail: DERMATOLOGY
End: 2024-09-04

## 2024-09-04 DIAGNOSIS — Z41.9 ENCOUNTER FOR PROCEDURE FOR PURPOSES OTHER THAN REMEDYING HEALTH STATE, UNSPECIFIED: ICD-10-CM

## 2024-09-04 PROCEDURE — ? FACIAL

## 2024-09-04 ASSESSMENT — LOCATION ZONE DERM
LOCATION ZONE: FACE
LOCATION ZONE: FACE

## 2024-09-04 ASSESSMENT — LOCATION DETAILED DESCRIPTION DERM
LOCATION DETAILED: RIGHT INFERIOR CENTRAL MALAR CHEEK
LOCATION DETAILED: RIGHT INFERIOR CENTRAL MALAR CHEEK

## 2024-09-04 ASSESSMENT — LOCATION SIMPLE DESCRIPTION DERM
LOCATION SIMPLE: RIGHT CHEEK
LOCATION SIMPLE: RIGHT CHEEK

## 2024-09-04 NOTE — HPI: COSMETIC (FACIAL)
[id number]: previous_has_had_a_previous_facial
When Outside In The Sun, Do You...: always burns, never tans
When Was Your Last Facial?: 08/07/2024
Additional History: Patient is now only on 2 blood thinners vs 3 previously. Lesion on right temple has been OK’d by MD Frankel

## 2024-09-04 NOTE — PROCEDURE: FACIAL
Extraction Method: extractor
Mask Type (Optional): hydrating B5
Treatment Serum (Optional): PhytoCorrect
Detail Level: Detailed
Treatment Type (Optional): Anti Aging Facial
Exfoliation Type: enzyme
Facial Steaming: steamed

## 2024-10-02 ENCOUNTER — APPOINTMENT (RX ONLY)
Dept: URBAN - NONMETROPOLITAN AREA CLINIC 1 | Facility: CLINIC | Age: 64
Setting detail: DERMATOLOGY
End: 2024-10-02

## 2024-10-02 DIAGNOSIS — Z41.9 ENCOUNTER FOR PROCEDURE FOR PURPOSES OTHER THAN REMEDYING HEALTH STATE, UNSPECIFIED: ICD-10-CM

## 2024-10-02 PROCEDURE — ? FACIAL

## 2024-10-02 ASSESSMENT — LOCATION DETAILED DESCRIPTION DERM: LOCATION DETAILED: RIGHT INFERIOR CENTRAL MALAR CHEEK

## 2024-10-02 ASSESSMENT — LOCATION SIMPLE DESCRIPTION DERM: LOCATION SIMPLE: RIGHT CHEEK

## 2024-10-02 ASSESSMENT — LOCATION ZONE DERM: LOCATION ZONE: FACE

## 2024-10-02 NOTE — PROCEDURE: FACIAL
Led Light (Optional): red
Facial Steaming: steamed
Cryo Stick Massage (Optional): yes
Treatment Serum (Optional): PhytoCorrect
Extraction Method: extractor
Mask Type (Optional): calming
Price (Use Numbers Only, No Special Characters Or $): 100
Detail Level: Detailed
Exfoliation Type: enzyme
Treatment Type (Optional): Anti Aging Facial

## 2024-10-02 NOTE — HPI: COSMETIC (FACIAL)
[id number]: previous_has_had_a_previous_facial
When Outside In The Sun, Do You...: mostly burns, rarely tans
When Was Your Last Facial?: 09/04/2024

## 2024-11-07 NOTE — NURSING NOTE
Chief Complaint   Patient presents with     Consult     Hyperparathyroidism     Rafy COLEMAN   Opened in error.

## 2024-11-08 ENCOUNTER — APPOINTMENT (RX ONLY)
Dept: URBAN - NONMETROPOLITAN AREA CLINIC 1 | Facility: CLINIC | Age: 64
Setting detail: DERMATOLOGY
End: 2024-11-08

## 2024-11-08 DIAGNOSIS — Z41.9 ENCOUNTER FOR PROCEDURE FOR PURPOSES OTHER THAN REMEDYING HEALTH STATE, UNSPECIFIED: ICD-10-CM

## 2024-11-08 PROCEDURE — ? FACIAL

## 2024-11-08 ASSESSMENT — LOCATION SIMPLE DESCRIPTION DERM: LOCATION SIMPLE: RIGHT CHEEK

## 2024-11-08 ASSESSMENT — LOCATION DETAILED DESCRIPTION DERM: LOCATION DETAILED: RIGHT INFERIOR CENTRAL MALAR CHEEK

## 2024-11-08 ASSESSMENT — LOCATION ZONE DERM: LOCATION ZONE: FACE

## 2024-11-08 NOTE — HPI: COSMETIC (FACIAL)
[id number]: previous_has_had_a_previous_facial
When Outside In The Sun, Do You...: always burns, never tans
When Was Your Last Facial?: October 2 2024

## 2024-11-08 NOTE — PROCEDURE: FACIAL
Treatment Serum (Optional): PhytoCorrect
Extraction Method: comedone extractor
Price (Use Numbers Only, No Special Characters Or $): 99
Detail Level: Detailed
Treatment Type (Optional): Anti Aging Facial
Exfoliation Type: scrub
Mask Type (Optional): gel based
Cryo Stick Massage (Optional): yes

## 2024-11-19 ENCOUNTER — TELEPHONE (OUTPATIENT)
Dept: FAMILY MEDICINE | Facility: CLINIC | Age: 64
End: 2024-11-19

## 2024-11-19 NOTE — TELEPHONE ENCOUNTER
Patient Quality Outreach    Patient is due for the following:   Asthma  -  ACT needed and AAP (Asthma Control Test, Asthma Action Plan)  Breast Cancer Screening - Mammogram  Physical Annual Wellness Visit      Topic Date Due    Zoster (Shingles) Vaccine (1 of 2) Never done    Flu Vaccine (1) 09/01/2024    COVID-19 Vaccine (4 - 2024-25 season) 09/01/2024   Type of outreach:  [employer] sent  Questions for provider review:  None       Pennie Wright MA  Chart routed to Care Team.

## 2024-12-24 ENCOUNTER — TELEPHONE (OUTPATIENT)
Dept: FAMILY MEDICINE | Facility: CLINIC | Age: 64
End: 2024-12-24
Payer: MEDICARE

## 2024-12-24 NOTE — TELEPHONE ENCOUNTER
Patient Quality Outreach    Patient is due for the following:   Asthma  -  Asthma Action Plan AAP, Asthma Control Test ACT  Depression  -  PHQ-9 needed  Physical Medicare Annual Wellness Visit      Topic Date Due    Zoster (Shingles) Vaccine (1 of 2) Never done    Flu Vaccine (1) 09/01/2024    COVID-19 Vaccine (4 - 2024-25 season) 09/01/2024   Type of outreach:  [employer] msg sent.  Questions for provider review:  None      Pennie Wright MA  Chart routed to Care Team.

## 2024-12-27 ENCOUNTER — APPOINTMENT (OUTPATIENT)
Dept: URBAN - NONMETROPOLITAN AREA CLINIC 1 | Facility: CLINIC | Age: 64
Setting detail: DERMATOLOGY
End: 2024-12-27

## 2024-12-27 DIAGNOSIS — Z41.9 ENCOUNTER FOR PROCEDURE FOR PURPOSES OTHER THAN REMEDYING HEALTH STATE, UNSPECIFIED: ICD-10-CM

## 2024-12-27 PROCEDURE — ? FACIAL

## 2024-12-27 ASSESSMENT — LOCATION DETAILED DESCRIPTION DERM: LOCATION DETAILED: RIGHT INFERIOR CENTRAL MALAR CHEEK

## 2024-12-27 ASSESSMENT — LOCATION SIMPLE DESCRIPTION DERM: LOCATION SIMPLE: RIGHT CHEEK

## 2024-12-27 ASSESSMENT — LOCATION ZONE DERM: LOCATION ZONE: FACE

## 2024-12-27 NOTE — HPI: COSMETIC (FACIAL)
[id number]: previous_has_had_a_previous_facial
When Outside In The Sun, Do You...: mostly burns, rarely tans

## 2024-12-27 NOTE — PROCEDURE: FACIAL
Mask Type (Optional): cream based
Cryo Stick Massage (Optional): yes
Facial Steaming: steamed
Led Light (Optional): red
Assessment: Photodamage
Exfoliation Type: gentle
Treatment Type (Optional): Anti Aging Facial
Detail Level: Detailed
Treatment Serum (Optional): PhytoCorrect
Price (Use Numbers Only, No Special Characters Or $): 75
Extraction Method: comedone extractor

## 2025-01-26 ENCOUNTER — HEALTH MAINTENANCE LETTER (OUTPATIENT)
Age: 65
End: 2025-01-26

## 2025-01-30 ENCOUNTER — APPOINTMENT (OUTPATIENT)
Dept: URBAN - NONMETROPOLITAN AREA CLINIC 1 | Facility: CLINIC | Age: 65
Setting detail: DERMATOLOGY
End: 2025-01-30

## 2025-01-30 DIAGNOSIS — Z41.9 ENCOUNTER FOR PROCEDURE FOR PURPOSES OTHER THAN REMEDYING HEALTH STATE, UNSPECIFIED: ICD-10-CM

## 2025-01-30 PROCEDURE — ? FACIAL

## 2025-01-30 ASSESSMENT — LOCATION SIMPLE DESCRIPTION DERM: LOCATION SIMPLE: RIGHT CHEEK

## 2025-01-30 ASSESSMENT — LOCATION DETAILED DESCRIPTION DERM: LOCATION DETAILED: RIGHT CENTRAL MALAR CHEEK

## 2025-01-30 ASSESSMENT — LOCATION ZONE DERM: LOCATION ZONE: FACE

## 2025-01-30 NOTE — PROCEDURE: FACIAL
Treatment Type (Optional): Acne Facial
Price (Use Numbers Only, No Special Characters Or $): 150
Exfoliation Type: enzyme
Treatment Serum (Optional): PhytoCorrect
Mask Type (Optional): cream based
Cryo Stick Massage (Optional): yes
Extraction Method: cotton-tipped applicator
Detail Level: Detailed
Facial Steaming: steamed

## 2025-01-30 NOTE — HPI: COSMETIC (FACIAL)
[id number]: previous_has_had_a_previous_facial
When Outside In The Sun, Do You...: mostly burns, rarely tans
When Was Your Last Facial?: 12/27/2024

## 2025-03-10 ENCOUNTER — TELEPHONE (OUTPATIENT)
Dept: FAMILY MEDICINE | Facility: CLINIC | Age: 65
End: 2025-03-10
Payer: MEDICARE

## 2025-03-10 NOTE — LETTER
March 10, 2025    To  Joyce Marroquin  [address] [place] [address]    Your team at [facility] cares about your health. We have reviewed your chart and based on our findings; we are making the following recommendations to better manage your health.     You are in particular need of attention regarding the following:     Asthma  -  ACT needed and AAP  Depression  -  PHQ-9 needed Depression Follow Up  Physical Annual Wellness Visit      Topic Date Due    Zoster (Shingles) Vaccine (1 of 2) Never done    Flu Vaccine (1) 09/01/2024    COVID-19 Vaccine (4 - 2024-25 season) 09/01/2024       If you have already completed these items, please contact the clinic via phone or   [employer] so your care team can review and update your records. Thank you for   choosing [facility]ics for your healthcare needs. For any questions,   concerns, or to schedule an appointment please contact our clinic.    Healthy Regards,      Your [facility] Care Team

## 2025-03-10 NOTE — TELEPHONE ENCOUNTER
Patient Quality Outreach    Patient is due for the following:   Asthma  -  ACT needed and AAP  Depression  -  PHQ-9 needed Depression Follow Up  Physical Annual Wellness Visit      Topic Date Due    Zoster (Shingles) Vaccine (1 of 2) Never done    Flu Vaccine (1) 09/01/2024    COVID-19 Vaccine (4 - 2024-25 season) 09/01/2024   Type of outreach:    Sent letter.  Questions for provider review:  None       Pennie Wright MA  Chart routed to Care Team.

## 2025-03-18 ENCOUNTER — APPOINTMENT (OUTPATIENT)
Dept: URBAN - NONMETROPOLITAN AREA CLINIC 1 | Facility: CLINIC | Age: 65
Setting detail: DERMATOLOGY
End: 2025-03-18

## 2025-03-18 DIAGNOSIS — Z41.9 ENCOUNTER FOR PROCEDURE FOR PURPOSES OTHER THAN REMEDYING HEALTH STATE, UNSPECIFIED: ICD-10-CM

## 2025-03-18 PROCEDURE — ? FACIAL

## 2025-03-18 ASSESSMENT — LOCATION DETAILED DESCRIPTION DERM: LOCATION DETAILED: RIGHT CENTRAL MALAR CHEEK

## 2025-03-18 ASSESSMENT — LOCATION SIMPLE DESCRIPTION DERM: LOCATION SIMPLE: RIGHT CHEEK

## 2025-03-18 ASSESSMENT — LOCATION ZONE DERM: LOCATION ZONE: FACE

## 2025-03-18 NOTE — HPI: COSMETIC (FACIAL)
[id number]: previous_has_had_a_previous_facial
When Outside In The Sun, Do You...: mostly burns, rarely tans
When Was Your Last Facial?: Feb 2025

## 2025-03-18 NOTE — PROCEDURE: FACIAL
Exfoliation Type: enzyme peel
Treatment Serum (Optional): PhytoCorrect
Facial Steaming: steamed
Led Light (Optional): red
Cryo Stick Massage (Optional): yes
Extraction Method: cotton-tipped applicator
Detail Level: Detailed
Price (Use Numbers Only, No Special Characters Or $): 100
Mask Type (Optional): cream based
Treatment Type (Optional): Rescue Facial

## 2025-04-15 ENCOUNTER — APPOINTMENT (OUTPATIENT)
Dept: URBAN - NONMETROPOLITAN AREA CLINIC 1 | Facility: CLINIC | Age: 65
Setting detail: DERMATOLOGY
End: 2025-04-15

## 2025-04-15 DIAGNOSIS — Z41.9 ENCOUNTER FOR PROCEDURE FOR PURPOSES OTHER THAN REMEDYING HEALTH STATE, UNSPECIFIED: ICD-10-CM

## 2025-04-15 PROCEDURE — ? FACIAL

## 2025-04-15 ASSESSMENT — LOCATION DETAILED DESCRIPTION DERM: LOCATION DETAILED: RIGHT CENTRAL MALAR CHEEK

## 2025-04-15 ASSESSMENT — LOCATION ZONE DERM: LOCATION ZONE: FACE

## 2025-04-15 ASSESSMENT — LOCATION SIMPLE DESCRIPTION DERM: LOCATION SIMPLE: RIGHT CHEEK

## 2025-04-15 NOTE — PROCEDURE: FACIAL
Extraction Method: cotton-tipped applicator
Mask Type (Optional): cream based
Facial Steaming: steamed
Exfoliation Type: scrub
Treatment Serum (Optional): PhytoCorrect
Treatment Type (Optional): Back Facial
Detail Level: Detailed
Cryo Stick Massage (Optional): yes
Led Light (Optional): red
Price (Use Numbers Only, No Special Characters Or $): 99

## 2025-04-15 NOTE — HPI: COSMETIC (FACIAL)
Have You Had A Facial Before?: has had a previous facial
When Outside In The Sun, Do You...: mostly burns, rarely tans
When Was Your Last Facial?: 3/18/2025

## 2025-05-15 ENCOUNTER — APPOINTMENT (OUTPATIENT)
Dept: URBAN - NONMETROPOLITAN AREA CLINIC 1 | Facility: CLINIC | Age: 65
Setting detail: DERMATOLOGY
End: 2025-05-15

## 2025-05-15 DIAGNOSIS — D22 MELANOCYTIC NEVI: ICD-10-CM | Status: UNCHANGED

## 2025-05-15 DIAGNOSIS — L57.8 OTHER SKIN CHANGES DUE TO CHRONIC EXPOSURE TO NONIONIZING RADIATION: ICD-10-CM | Status: STABLE

## 2025-05-15 DIAGNOSIS — L82.1 OTHER SEBORRHEIC KERATOSIS: ICD-10-CM | Status: UNCHANGED

## 2025-05-15 DIAGNOSIS — D18.0 HEMANGIOMA: ICD-10-CM | Status: STABLE

## 2025-05-15 DIAGNOSIS — Z41.9 ENCOUNTER FOR PROCEDURE FOR PURPOSES OTHER THAN REMEDYING HEALTH STATE, UNSPECIFIED: ICD-10-CM

## 2025-05-15 PROBLEM — D23.72 OTHER BENIGN NEOPLASM OF SKIN OF LEFT LOWER LIMB, INCLUDING HIP: Status: ACTIVE | Noted: 2025-05-15

## 2025-05-15 PROBLEM — D18.01 HEMANGIOMA OF SKIN AND SUBCUTANEOUS TISSUE: Status: ACTIVE | Noted: 2025-05-15

## 2025-05-15 PROBLEM — D22.5 MELANOCYTIC NEVI OF TRUNK: Status: ACTIVE | Noted: 2025-05-15

## 2025-05-15 PROCEDURE — ? COUNSELING

## 2025-05-15 PROCEDURE — 99213 OFFICE O/P EST LOW 20 MIN: CPT

## 2025-05-15 PROCEDURE — ? FULL BODY SKIN EXAM

## 2025-05-15 PROCEDURE — ? FACIAL

## 2025-05-15 PROCEDURE — ? SUNSCREEN RECOMMENDATIONS

## 2025-05-15 ASSESSMENT — LOCATION SIMPLE DESCRIPTION DERM
LOCATION SIMPLE: RIGHT UPPER BACK
LOCATION SIMPLE: RIGHT FOREARM
LOCATION SIMPLE: SUPERIOR FOREHEAD
LOCATION SIMPLE: CHEST
LOCATION SIMPLE: LEFT UPPER BACK
LOCATION SIMPLE: RIGHT CHEEK
LOCATION SIMPLE: LEFT FOREARM

## 2025-05-15 ASSESSMENT — LOCATION ZONE DERM
LOCATION ZONE: ARM
LOCATION ZONE: TRUNK
LOCATION ZONE: FACE
LOCATION ZONE: FACE

## 2025-05-15 ASSESSMENT — LOCATION DETAILED DESCRIPTION DERM
LOCATION DETAILED: LEFT MEDIAL SUPERIOR CHEST
LOCATION DETAILED: RIGHT CENTRAL MALAR CHEEK
LOCATION DETAILED: RIGHT SUPERIOR MEDIAL UPPER BACK
LOCATION DETAILED: LEFT PROXIMAL DORSAL FOREARM
LOCATION DETAILED: SUPERIOR MID FOREHEAD
LOCATION DETAILED: RIGHT PROXIMAL DORSAL FOREARM
LOCATION DETAILED: LEFT MEDIAL UPPER BACK

## 2025-05-15 NOTE — HPI: COSMETIC (FACIAL)
Have You Had A Facial Before?: has had a previous facial
When Outside In The Sun, Do You...: mostly burns, rarely tans
When Was Your Last Facial?: April 15th 2025
Additional History: Patient has SK around temples of forehead, was able to use an exfoliant and ultrasonic spatula to slough off dead skin on it.

## 2025-05-15 NOTE — PROCEDURE: FACIAL
Exfoliation Type: enzyme
Led Light (Optional): red
Detail Level: Detailed
Facial Steaming: steamed
Price (Use Numbers Only, No Special Characters Or $): 99
Extraction Method: cotton-tipped applicator
Purelift (Optional): no
Treatment Type (Optional): Anti Aging Facial

## 2025-06-02 ENCOUNTER — TELEPHONE (OUTPATIENT)
Dept: FAMILY MEDICINE | Facility: CLINIC | Age: 65
End: 2025-06-02
Payer: MEDICARE

## 2025-06-02 NOTE — TELEPHONE ENCOUNTER
Patient Quality Outreach    Patient is due for the following:   Asthma  -  ACT needed and AAP Asthma Control Test, Asthma Action Plan  Physical Annual Wellness Visit      Topic Date Due    Zoster (Shingles) Vaccine (1 of 2) Never done    COVID-19 Vaccine (4 - 2024-25 season) 09/01/2024   Type of outreach:    Sent [employer] message.  Questions for provider review:    None       Pennie Wright CMA  Chart routed to Care Team.

## 2025-06-03 ENCOUNTER — TELEPHONE (OUTPATIENT)
Age: 65
End: 2025-06-03

## 2025-06-03 NOTE — TELEPHONE ENCOUNTER
RTC to patient to get more information; Dr. Diaz's last procedure did not mention a yearly EUS and I needed patient input.  There was n/a.  LVM for her to RTC to RN direct line.

## 2025-06-03 NOTE — TELEPHONE ENCOUNTER
Patient LVM on referral line 1:51 pm stating it was time for FU w/Dr Diaz. Requesting to have colo at same time.     Forwarding to Dr Diaz's RN, Elissa.

## 2025-06-04 ENCOUNTER — TELEPHONE (OUTPATIENT)
Age: 65
End: 2025-06-04

## 2025-06-04 NOTE — TELEPHONE ENCOUNTER
Pt would like to return for care and would like to change providers and have a provider that specializes in lymphoma    [phone]

## 2025-06-19 ENCOUNTER — APPOINTMENT (OUTPATIENT)
Dept: URBAN - NONMETROPOLITAN AREA CLINIC 1 | Facility: CLINIC | Age: 65
Setting detail: DERMATOLOGY
End: 2025-06-19

## 2025-06-19 DIAGNOSIS — Z41.9 ENCOUNTER FOR PROCEDURE FOR PURPOSES OTHER THAN REMEDYING HEALTH STATE, UNSPECIFIED: ICD-10-CM

## 2025-06-19 PROCEDURE — ? FACIAL

## 2025-06-19 ASSESSMENT — LOCATION DETAILED DESCRIPTION DERM: LOCATION DETAILED: RIGHT CENTRAL MALAR CHEEK

## 2025-06-19 ASSESSMENT — LOCATION SIMPLE DESCRIPTION DERM: LOCATION SIMPLE: RIGHT CHEEK

## 2025-06-19 ASSESSMENT — LOCATION ZONE DERM: LOCATION ZONE: FACE

## 2025-06-19 NOTE — HPI: COSMETIC (FACIAL)
Have You Had A Facial Before?: has had a previous facial
When Outside In The Sun, Do You...: rarely burns, mostly tans
When Was Your Last Facial?: May 15th

## 2025-06-19 NOTE — PROCEDURE: FACIAL
Exfoliation Type Override: hydropeptide blueberry enzyme
Detail Level: Detailed
Price (Use Numbers Only, No Special Characters Or $): 99
Extraction Method: BT-Micro
Mask Type (Optional): hydrojelly
Cryo Stick Massage (Optional): yes
Purelift (Optional): no
Led Light (Optional): red
Exfoliation Type: enzyme
Facial Steaming: steamed
Treatment Serum (Optional): PhytoCorrect
Treatment Type (Optional): Anti Aging Facial

## 2025-06-20 ENCOUNTER — OFFICE VISIT (OUTPATIENT)
Dept: ONCOLOGY | Age: 65
End: 2025-06-20
Payer: MEDICARE

## 2025-06-20 ENCOUNTER — HOSPITAL ENCOUNTER (OUTPATIENT)
Dept: LAB | Age: 65
Discharge: HOME | End: 2025-06-20
Payer: MEDICARE

## 2025-06-20 VITALS
HEART RATE: 84 BPM | TEMPERATURE: 98 F | SYSTOLIC BLOOD PRESSURE: 125 MMHG | BODY MASS INDEX: 29.77 KG/M2 | DIASTOLIC BLOOD PRESSURE: 64 MMHG | OXYGEN SATURATION: 93 % | RESPIRATION RATE: 16 BRPM | HEIGHT: 65 IN | WEIGHT: 178.7 LBS

## 2025-06-20 DIAGNOSIS — C85.80 MARGINAL ZONE LYMPHOMA (HCC): ICD-10-CM

## 2025-06-20 DIAGNOSIS — C85.80 MARGINAL ZONE LYMPHOMA (HCC): Primary | ICD-10-CM

## 2025-06-20 DIAGNOSIS — D72.810 LYMPHOPENIA: ICD-10-CM

## 2025-06-20 LAB
BASOPHILS # BLD: 0.04 K/UL (ref 0–0.2)
BASOPHILS NFR BLD: 0.7 % (ref 0–2)
DIFFERENTIAL METHOD BLD: ABNORMAL
EOSINOPHIL # BLD: 0.12 K/UL (ref 0–0.8)
EOSINOPHIL NFR BLD: 2 % (ref 0.5–7.8)
ERYTHROCYTE [DISTWIDTH] IN BLOOD BY AUTOMATED COUNT: 12.4 % (ref 11.9–14.6)
HCT VFR BLD AUTO: 44.7 % (ref 35.8–46.3)
HGB BLD-MCNC: 14.9 G/DL (ref 11.7–15.4)
IMM GRANULOCYTES # BLD AUTO: 0.02 K/UL (ref 0–0.5)
IMM GRANULOCYTES NFR BLD AUTO: 0.3 % (ref 0–5)
LYMPHOCYTES # BLD: 0.69 K/UL (ref 0.5–4.6)
LYMPHOCYTES NFR BLD: 11.7 % (ref 13–44)
MCH RBC QN AUTO: 31 PG (ref 26.1–32.9)
MCHC RBC AUTO-ENTMCNC: 33.3 G/DL (ref 31.4–35)
MCV RBC AUTO: 93.1 FL (ref 82–102)
MONOCYTES # BLD: 0.61 K/UL (ref 0.1–1.3)
MONOCYTES NFR BLD: 10.3 % (ref 4–12)
NEUTS SEG # BLD: 4.43 K/UL (ref 1.7–8.2)
NEUTS SEG NFR BLD: 75 % (ref 43–78)
NRBC # BLD: 0 K/UL (ref 0–0.2)
PLATELET # BLD AUTO: 224 K/UL (ref 150–450)
PMV BLD AUTO: 9 FL (ref 9.4–12.3)
RBC # BLD AUTO: 4.8 M/UL (ref 4.05–5.2)
WBC # BLD AUTO: 5.9 K/UL (ref 4.3–11.1)

## 2025-06-20 PROCEDURE — 1123F ACP DISCUSS/DSCN MKR DOCD: CPT | Performed by: INTERNAL MEDICINE

## 2025-06-20 PROCEDURE — 1160F RVW MEDS BY RX/DR IN RCRD: CPT | Performed by: INTERNAL MEDICINE

## 2025-06-20 PROCEDURE — 1036F TOBACCO NON-USER: CPT | Performed by: INTERNAL MEDICINE

## 2025-06-20 PROCEDURE — G8419 CALC BMI OUT NRM PARAM NOF/U: HCPCS | Performed by: INTERNAL MEDICINE

## 2025-06-20 PROCEDURE — 1159F MED LIST DOCD IN RCRD: CPT | Performed by: INTERNAL MEDICINE

## 2025-06-20 PROCEDURE — G8399 PT W/DXA RESULTS DOCUMENT: HCPCS | Performed by: INTERNAL MEDICINE

## 2025-06-20 PROCEDURE — 85025 COMPLETE CBC W/AUTO DIFF WBC: CPT

## 2025-06-20 PROCEDURE — 1090F PRES/ABSN URINE INCON ASSESS: CPT | Performed by: INTERNAL MEDICINE

## 2025-06-20 PROCEDURE — 99214 OFFICE O/P EST MOD 30 MIN: CPT | Performed by: INTERNAL MEDICINE

## 2025-06-20 PROCEDURE — 1126F AMNT PAIN NOTED NONE PRSNT: CPT | Performed by: INTERNAL MEDICINE

## 2025-06-20 PROCEDURE — G8427 DOCREV CUR MEDS BY ELIG CLIN: HCPCS | Performed by: INTERNAL MEDICINE

## 2025-06-20 PROCEDURE — 3017F COLORECTAL CA SCREEN DOC REV: CPT | Performed by: INTERNAL MEDICINE

## 2025-06-20 PROCEDURE — G2211 COMPLEX E/M VISIT ADD ON: HCPCS | Performed by: INTERNAL MEDICINE

## 2025-06-20 ASSESSMENT — PATIENT HEALTH QUESTIONNAIRE - PHQ9
2. FEELING DOWN, DEPRESSED OR HOPELESS: SEVERAL DAYS
SUM OF ALL RESPONSES TO PHQ QUESTIONS 1-9: 2
1. LITTLE INTEREST OR PLEASURE IN DOING THINGS: SEVERAL DAYS
SUM OF ALL RESPONSES TO PHQ QUESTIONS 1-9: 2

## 2025-06-20 NOTE — PROGRESS NOTES
[facility]  [address] [place] [address]  Baltazar Tomlinson MD    Patient Name Kya Lizarraga   MRN [id number]   Date 06/20/25     Hematology/Oncology Diagnosis  Stage IE Gastric MALT    Treatment History   XRT, completed 12/2024    History of Present Illness  Kya Lizarraga is a 64 y.o. lady with a history of pacer d/t complications of EP study, gastric MALT, who presents to transfer care.      Presented with sinusitis.  Severe GERD was noted incidentally on laryngoscopy.  EGD recommended.  Ultimately saw Dr Diaz and underwent EUS 6/11/24, which showed gastric mass like lesion.  She completed XRT 12/23/24, 24 Gy in 16 fractions at [facility] and has been in remission since this time.   Post-treatment PETCT 2/25/25 showed CR.      She is with her  today.  She has PTSD from her experience with healthcare, including the placement of a permanent pacemaker d/t complications of an EP study.  She was initially treated for H pylori but had a severe reaction to an antibiotic used as part of triple therapy.  She has many listed allergies.  She went on to complete XRT and has done reasonably well since this time.  She is about 6 months from completing XRT and feels relatively well.  No unintentional weight loss, fevers or night sweats.  Concerned about lymphopenia.     ROS   12 point review of system negative except as noted in HPI    Past Medical History:   Diagnosis Date    Acute eczematoid otitis externa of both ears     Adjustment disorder with mixed anxiety and depressed mood     Adrenal mass     Anesthesia complication     H/O orthostatic hypotension after spinal anesthesia    Anticoagulated     Anxiety     Arthritis     Atrial fibrillation, unspecified type (HCC)     Benign neoplasm of vulva     Colitis     Community acquired pneumonia of right middle lobe of lung     Complete atrioventricular block (HCC)     Congestive heart failure (CHF) (HCC)     COPD (chronic obstructive pulmonary

## 2025-06-20 NOTE — PATIENT INSTRUCTIONS
Patient Information from Today's Visit    The members of your [facility] are listed below:    Physician Provider: Dr. Baltazar Tomlinson, Medical Oncologist  Advanced Practice Clinician: N/A  Registered Nurse: Shayy GUY RN  Nurse Navigator: N/A  Medical Assistant: Jaime MEDEL MA  : Lissett AVILES  Supportive Care Services: Sondra PARISI LMSW    Diagnosis (Information Sheet Provided on Day of Diagnosis): gastric lymphoma    Follow Up Instructions: 6 months    Endoscopy in August  Stop taking oral iron for now as this can upset your stomach    Has Treatment Plan Been Finalized? Not on active treatment    Current Labs: labs after visit      Please refer to After Visit Summary or [facility] for upcoming appointment information. Please call our office for rescheduling needs at least 24 hours before your scheduled appointment time.If you have any questions regarding your upcoming schedule please reach out to your care team through [facility] or call [phone].     Please notify your assigned Nurse Navigator of any unplanned hospital admissions or Emergency Room visits within 24 hours of discharge.    -------------------------------------------------------------------------------------------------------------------  Please call our office at [phone] if you have any  of the following symptoms:   Fever of 100.5 or greater  Chills  Shortness of breath  Swelling or pain in one leg    After office hours an answering service is available and will contact a provider for emergencies or if you are experiencing any of the above symptoms.        Shayy Cervantes RN

## 2025-06-23 ENCOUNTER — HOSPITAL ENCOUNTER (OUTPATIENT)
Dept: LAB | Age: 65
Discharge: HOME | End: 2025-06-23
Payer: MEDICARE

## 2025-06-23 DIAGNOSIS — C85.80 MARGINAL ZONE LYMPHOMA (HCC): ICD-10-CM

## 2025-06-23 DIAGNOSIS — D72.810 LYMPHOPENIA: ICD-10-CM

## 2025-06-23 LAB
ALBUMIN SERPL-MCNC: 3.5 G/DL (ref 3.2–4.6)
ALBUMIN/GLOB SERPL: 1 (ref 1–1.9)
ALP SERPL-CCNC: 161 U/L (ref 35–104)
ALT SERPL-CCNC: 23 U/L (ref 8–45)
ANION GAP SERPL CALC-SCNC: 13 MMOL/L (ref 7–16)
AST SERPL-CCNC: 25 U/L (ref 15–37)
BASOPHILS # BLD: 0.04 K/UL (ref 0–0.2)
BASOPHILS NFR BLD: 0.7 % (ref 0–2)
BILIRUB SERPL-MCNC: <0.2 MG/DL (ref 0–1.2)
BUN SERPL-MCNC: 14 MG/DL (ref 8–23)
CALCIUM SERPL-MCNC: 9.2 MG/DL (ref 8.8–10.2)
CHLORIDE SERPL-SCNC: 105 MMOL/L (ref 98–107)
CO2 SERPL-SCNC: 21 MMOL/L (ref 20–29)
CREAT SERPL-MCNC: 0.94 MG/DL (ref 0.6–1.1)
DIFFERENTIAL METHOD BLD: ABNORMAL
EOSINOPHIL # BLD: 0.11 K/UL (ref 0–0.8)
EOSINOPHIL NFR BLD: 2 % (ref 0.5–7.8)
ERYTHROCYTE [DISTWIDTH] IN BLOOD BY AUTOMATED COUNT: 12.8 % (ref 11.9–14.6)
GLOBULIN SER CALC-MCNC: 3.5 G/DL (ref 2.3–3.5)
GLUCOSE SERPL-MCNC: 129 MG/DL (ref 70–99)
HCT VFR BLD AUTO: 41.9 % (ref 35.8–46.3)
HGB BLD-MCNC: 14.1 G/DL (ref 11.7–15.4)
IMM GRANULOCYTES # BLD AUTO: 0.02 K/UL (ref 0–0.5)
IMM GRANULOCYTES NFR BLD AUTO: 0.4 % (ref 0–5)
LDH SERPL L TO P-CCNC: 163 U/L (ref 127–281)
LYMPHOCYTES # BLD: 0.68 K/UL (ref 0.5–4.6)
LYMPHOCYTES NFR BLD: 12.6 % (ref 13–44)
MCH RBC QN AUTO: 31.2 PG (ref 26.1–32.9)
MCHC RBC AUTO-ENTMCNC: 33.7 G/DL (ref 31.4–35)
MCV RBC AUTO: 92.7 FL (ref 82–102)
MONOCYTES # BLD: 0.51 K/UL (ref 0.1–1.3)
MONOCYTES NFR BLD: 9.4 % (ref 4–12)
NEUTS SEG # BLD: 4.05 K/UL (ref 1.7–8.2)
NEUTS SEG NFR BLD: 74.9 % (ref 43–78)
NRBC # BLD: 0 K/UL (ref 0–0.2)
PLATELET # BLD AUTO: 195 K/UL (ref 150–450)
PMV BLD AUTO: 9 FL (ref 9.4–12.3)
POTASSIUM SERPL-SCNC: 4 MMOL/L (ref 3.5–5.1)
PROT SERPL-MCNC: 7 G/DL (ref 6.3–8.2)
RBC # BLD AUTO: 4.52 M/UL (ref 4.05–5.2)
SODIUM SERPL-SCNC: 139 MMOL/L (ref 136–145)
WBC # BLD AUTO: 5.4 K/UL (ref 4.3–11.1)

## 2025-06-23 PROCEDURE — 36415 COLL VENOUS BLD VENIPUNCTURE: CPT

## 2025-06-23 PROCEDURE — 85025 COMPLETE CBC W/AUTO DIFF WBC: CPT

## 2025-06-23 PROCEDURE — 80053 COMPREHEN METABOLIC PANEL: CPT

## 2025-06-23 PROCEDURE — 83615 LACTATE (LD) (LDH) ENZYME: CPT

## 2025-07-01 LAB
FLOW CYTOMETRY RESULTS: NORMAL
SPECIMEN SOURCE: NORMAL
TEST ORDERED: NORMAL

## 2025-07-07 ENCOUNTER — HOSPITAL ENCOUNTER (EMERGENCY)
Age: 65
Discharge: HOME | End: 2025-07-07
Attending: EMERGENCY MEDICINE
Payer: MEDICARE

## 2025-07-07 VITALS
TEMPERATURE: 98.1 F | HEART RATE: 72 BPM | WEIGHT: 178 LBS | BODY MASS INDEX: 29.66 KG/M2 | HEIGHT: 65 IN | DIASTOLIC BLOOD PRESSURE: 73 MMHG | RESPIRATION RATE: 18 BRPM | SYSTOLIC BLOOD PRESSURE: 121 MMHG | OXYGEN SATURATION: 90 %

## 2025-07-07 DIAGNOSIS — R26.2 DIFFICULTY WALKING: Primary | ICD-10-CM

## 2025-07-07 DIAGNOSIS — R20.2 PARESTHESIA: ICD-10-CM

## 2025-07-07 LAB
ANION GAP SERPL CALC-SCNC: 11 MMOL/L (ref 7–16)
BASOPHILS # BLD: 0.03 K/UL (ref 0–0.2)
BASOPHILS NFR BLD: 0.7 % (ref 0–2)
BUN SERPL-MCNC: 15 MG/DL (ref 8–23)
CALCIUM SERPL-MCNC: 9.2 MG/DL (ref 8.8–10.2)
CHLORIDE SERPL-SCNC: 103 MMOL/L (ref 98–107)
CO2 SERPL-SCNC: 25 MMOL/L (ref 20–29)
CREAT SERPL-MCNC: 0.66 MG/DL (ref 0.8–1.3)
CRP SERPL-MCNC: 0.5 MG/DL (ref 0–0.9)
DIFFERENTIAL METHOD BLD: ABNORMAL
EOSINOPHIL # BLD: 0.08 K/UL (ref 0–0.8)
EOSINOPHIL NFR BLD: 1.8 % (ref 0.5–7.8)
ERYTHROCYTE [DISTWIDTH] IN BLOOD BY AUTOMATED COUNT: 12.5 % (ref 11.9–14.6)
ERYTHROCYTE [SEDIMENTATION RATE] IN BLOOD: 15 MM/HR (ref 0–30)
EST. AVERAGE GLUCOSE BLD GHB EST-MCNC: 106 MG/DL
GLUCOSE SERPL-MCNC: 92 MG/DL (ref 65–100)
HBA1C MFR BLD: 5.3 % (ref 0–5.6)
HCT VFR BLD AUTO: 40.7 % (ref 35.8–46.3)
HGB BLD-MCNC: 13.9 G/DL (ref 11.7–15.4)
IMM GRANULOCYTES # BLD AUTO: 0.02 K/UL (ref 0–0.5)
IMM GRANULOCYTES NFR BLD AUTO: 0.4 % (ref 0–5)
LYMPHOCYTES # BLD: 0.53 K/UL (ref 0.5–4.6)
LYMPHOCYTES NFR BLD: 11.7 % (ref 13–44)
MCH RBC QN AUTO: 31.4 PG (ref 26.1–32.9)
MCHC RBC AUTO-ENTMCNC: 34.2 G/DL (ref 31.4–35)
MCV RBC AUTO: 91.9 FL (ref 82–102)
MONOCYTES # BLD: 0.45 K/UL (ref 0.1–1.3)
MONOCYTES NFR BLD: 9.9 % (ref 4–12)
NEUTS SEG # BLD: 3.42 K/UL (ref 1.7–8.2)
NEUTS SEG NFR BLD: 75.5 % (ref 43–78)
NRBC # BLD: 0 K/UL (ref 0–0.2)
PLATELET # BLD AUTO: 186 K/UL (ref 150–450)
PMV BLD AUTO: 8.8 FL (ref 9.4–12.3)
POTASSIUM SERPL-SCNC: 4 MMOL/L (ref 3.5–5.1)
RBC # BLD AUTO: 4.43 M/UL (ref 4.05–5.2)
SODIUM SERPL-SCNC: 139 MMOL/L (ref 133–143)
VIT B12 SERPL-MCNC: 406 PG/ML (ref 193–986)
WBC # BLD AUTO: 4.5 K/UL (ref 4.3–11.1)

## 2025-07-07 PROCEDURE — 84425 ASSAY OF VITAMIN B-1: CPT

## 2025-07-07 PROCEDURE — 0077U IG PARAPROTEIN QUAL BLD/UR: CPT

## 2025-07-07 PROCEDURE — 85652 RBC SED RATE AUTOMATED: CPT

## 2025-07-07 PROCEDURE — 86140 C-REACTIVE PROTEIN: CPT

## 2025-07-07 PROCEDURE — 82784 ASSAY IGA/IGD/IGG/IGM EACH: CPT

## 2025-07-07 PROCEDURE — 86225 DNA ANTIBODY NATIVE: CPT

## 2025-07-07 PROCEDURE — 83036 HEMOGLOBIN GLYCOSYLATED A1C: CPT

## 2025-07-07 PROCEDURE — 86235 NUCLEAR ANTIGEN ANTIBODY: CPT

## 2025-07-07 PROCEDURE — 84207 ASSAY OF VITAMIN B-6: CPT

## 2025-07-07 PROCEDURE — 82607 VITAMIN B-12: CPT

## 2025-07-07 PROCEDURE — 99283 EMERGENCY DEPT VISIT LOW MDM: CPT

## 2025-07-07 PROCEDURE — 85025 COMPLETE CBC W/AUTO DIFF WBC: CPT

## 2025-07-07 PROCEDURE — 80048 BASIC METABOLIC PNL TOTAL CA: CPT

## 2025-07-07 RX ORDER — CLOPIDOGREL BISULFATE 75 MG/1
75 TABLET ORAL DAILY
COMMUNITY
Start: 2024-07-23 | End: 2025-07-23

## 2025-07-07 RX ORDER — EVOLOCUMAB 140 MG/ML
140 INJECTION, SOLUTION SUBCUTANEOUS
COMMUNITY

## 2025-07-07 RX ORDER — FLUTICASONE PROPIONATE AND SALMETEROL XINAFOATE 230; 21 UG/1; UG/1
2 AEROSOL, METERED RESPIRATORY (INHALATION) 2 TIMES DAILY
COMMUNITY

## 2025-07-07 RX ORDER — DAPAGLIFLOZIN 10 MG/1
10 TABLET, FILM COATED ORAL DAILY
COMMUNITY

## 2025-07-07 ASSESSMENT — PAIN - FUNCTIONAL ASSESSMENT
PAIN_FUNCTIONAL_ASSESSMENT: PREVENTS OR INTERFERES SOME ACTIVE ACTIVITIES AND ADLS
PAIN_FUNCTIONAL_ASSESSMENT: 0-10

## 2025-07-07 ASSESSMENT — PAIN SCALES - GENERAL
PAINLEVEL_OUTOF10: 5
PAINLEVEL_OUTOF10: 2

## 2025-07-07 ASSESSMENT — PAIN DESCRIPTION - ORIENTATION: ORIENTATION: RIGHT;LEFT;LOWER

## 2025-07-07 ASSESSMENT — PAIN DESCRIPTION - DESCRIPTORS: DESCRIPTORS: NUMBNESS

## 2025-07-07 ASSESSMENT — LIFESTYLE VARIABLES: HOW MANY STANDARD DRINKS CONTAINING ALCOHOL DO YOU HAVE ON A TYPICAL DAY: PATIENT DOES NOT DRINK

## 2025-07-07 ASSESSMENT — PAIN DESCRIPTION - LOCATION: LOCATION: BACK;LEG

## 2025-07-07 ASSESSMENT — PAIN DESCRIPTION - PAIN TYPE: TYPE: ACUTE PAIN;CHRONIC PAIN

## 2025-07-07 ASSESSMENT — PAIN DESCRIPTION - FREQUENCY: FREQUENCY: CONTINUOUS

## 2025-07-07 NOTE — ED TRIAGE NOTES
Pt presents to the ED for c/o difficulty walking for several days. States that the problem started on July 1st and has progressively gotten worse. States that she has vestibular problems but never this bad

## 2025-07-07 NOTE — DISCHARGE INSTRUCTIONS
Your evaluation in the emergency department today is reassuring as the I do not see signs concerning for stroke or acute spinal cord problem.  However it is very important that you follow-up with neurology for further evaluation and testing.  Return to the ER for any worsening of symptoms.

## 2025-07-07 NOTE — ED PROVIDER NOTES
0.66 (L) 0.80 - 1.30 MG/DL    Est, Glom Filt Rate >90 >60 ml/min/1.73m2    Calcium 9.2 8.8 - 10.2 MG/DL         No orders to display                No results for input(s): \"COVID19\" in the last 72 hours.     Voice dictation software was used during the making of this note.  This software is not perfect and grammatical and other typographical errors may be present.  This note has not been completely proofread for errors.     Ken Abarca MD  07/07/25 1157       Ken Abarca MD  07/07/25 1232

## 2025-07-08 LAB
CENTROMERE B AB SER-ACNC: <0.2 AI (ref 0–0.9)
CHROMATIN AB SERPL-ACNC: <0.2 AI (ref 0–0.9)
DSDNA AB SER-ACNC: <1 IU/ML (ref 0–9)
ENA JO1 AB SER-ACNC: <0.2 AI (ref 0–0.9)
ENA RNP AB SER-ACNC: 0.2 AI (ref 0–0.9)
ENA SCL70 AB SER-ACNC: <0.2 AI (ref 0–0.9)
ENA SM AB SER-ACNC: <0.2 AI (ref 0–0.9)
ENA SS-A AB SER-ACNC: <0.2 AI (ref 0–0.9)
ENA SS-B AB SER-ACNC: 1.1 AI (ref 0–0.9)
IMMUNOLOGIST REVIEW: NORMAL
Lab: ABNORMAL

## 2025-07-09 LAB — VIT B6 SERPL-MCNC: 52.2 UG/L (ref 3.4–65.2)

## 2025-07-10 LAB — VIT B1 BLD-SCNC: 104.9 NMOL/L (ref 66.5–200)

## 2025-07-11 ENCOUNTER — TELEPHONE (OUTPATIENT)
Dept: GASTROENTEROLOGY | Age: 65
End: 2025-07-11

## 2025-07-11 NOTE — TELEPHONE ENCOUNTER
Patient called in stated she needs to have another procedure with . she has went to the doctor and stated she needs to be seen earlier,she has a F/U scheduled on 7/22/25 .wants to schedule her 1 year Coloscopy procedure.

## 2025-07-14 LAB — IMMUNOLOGIST REVIEW: NORMAL

## 2025-07-15 ENCOUNTER — OFFICE VISIT (OUTPATIENT)
Dept: NEUROLOGY | Age: 65
End: 2025-07-15
Payer: MEDICARE

## 2025-07-15 VITALS
BODY MASS INDEX: 30.09 KG/M2 | SYSTOLIC BLOOD PRESSURE: 119 MMHG | WEIGHT: 180.6 LBS | HEART RATE: 68 BPM | OXYGEN SATURATION: 96 % | HEIGHT: 65 IN | DIASTOLIC BLOOD PRESSURE: 77 MMHG

## 2025-07-15 DIAGNOSIS — R20.2 PARESTHESIA OF BOTH LOWER EXTREMITIES: ICD-10-CM

## 2025-07-15 DIAGNOSIS — Z85.72 HISTORY OF B-CELL LYMPHOMA: ICD-10-CM

## 2025-07-15 DIAGNOSIS — M54.6 ACUTE MIDLINE THORACIC BACK PAIN: ICD-10-CM

## 2025-07-15 DIAGNOSIS — Z91.81 HISTORY OF RECENT FALL: ICD-10-CM

## 2025-07-15 DIAGNOSIS — R26.81 UNSTEADY GAIT: ICD-10-CM

## 2025-07-15 DIAGNOSIS — R27.0 ATAXIA: Primary | ICD-10-CM

## 2025-07-15 PROBLEM — J45.40 MODERATE PERSISTENT ASTHMA WITHOUT COMPLICATION: Status: ACTIVE | Noted: 2023-10-24

## 2025-07-15 PROBLEM — Z80.3 FAMILY HISTORY OF BREAST CANCER: Status: ACTIVE | Noted: 2025-07-15

## 2025-07-15 PROBLEM — Z87.891 HISTORY OF TOBACCO ABUSE: Status: ACTIVE | Noted: 2023-11-28

## 2025-07-15 PROBLEM — Z98.890 HISTORY OF CARDIAC RADIOFREQUENCY ABLATION: Status: ACTIVE | Noted: 2021-06-01

## 2025-07-15 PROBLEM — E55.9 VITAMIN D DEFICIENCY: Status: ACTIVE | Noted: 2017-11-13

## 2025-07-15 PROBLEM — I50.32 HEART FAILURE WITH IMPROVED EJECTION FRACTION (HFIMPEF) (HCC): Status: ACTIVE | Noted: 2024-04-09

## 2025-07-15 PROBLEM — Z86.39 HISTORY OF IRON DEFICIENCY: Status: ACTIVE | Noted: 2024-07-18

## 2025-07-15 PROBLEM — C88.40 MUCOSA-ASSOCIATED LYMPHOID TISSUE (MALT) LYMPHOMA (HCC): Status: ACTIVE | Noted: 2024-07-18

## 2025-07-15 PROBLEM — G25.81 RESTLESS LEGS SYNDROME (RLS): Status: ACTIVE | Noted: 2018-12-04

## 2025-07-15 PROBLEM — R91.8 PULMONARY NODULES: Status: ACTIVE | Noted: 2023-10-24

## 2025-07-15 PROBLEM — Z86.39 HX OF GRAVES' DISEASE: Status: ACTIVE | Noted: 2017-11-13

## 2025-07-15 PROBLEM — Z80.0 FAMILY HISTORY OF MALIGNANT NEOPLASM OF GASTROINTESTINAL TRACT: Status: ACTIVE | Noted: 2025-07-15

## 2025-07-15 PROBLEM — F43.10 PTSD (POST-TRAUMATIC STRESS DISORDER): Status: ACTIVE | Noted: 2021-04-27

## 2025-07-15 PROBLEM — Z95.0 PRESENCE OF CARDIAC PACEMAKER: Status: ACTIVE | Noted: 2017-05-12

## 2025-07-15 PROBLEM — I48.0 PAF (PAROXYSMAL ATRIAL FIBRILLATION) (HCC): Status: ACTIVE | Noted: 2024-04-09

## 2025-07-15 PROCEDURE — G8427 DOCREV CUR MEDS BY ELIG CLIN: HCPCS | Performed by: NURSE PRACTITIONER

## 2025-07-15 PROCEDURE — 99204 OFFICE O/P NEW MOD 45 MIN: CPT | Performed by: NURSE PRACTITIONER

## 2025-07-15 PROCEDURE — 1159F MED LIST DOCD IN RCRD: CPT | Performed by: NURSE PRACTITIONER

## 2025-07-15 PROCEDURE — G8399 PT W/DXA RESULTS DOCUMENT: HCPCS | Performed by: NURSE PRACTITIONER

## 2025-07-15 PROCEDURE — 1160F RVW MEDS BY RX/DR IN RCRD: CPT | Performed by: NURSE PRACTITIONER

## 2025-07-15 PROCEDURE — 1036F TOBACCO NON-USER: CPT | Performed by: NURSE PRACTITIONER

## 2025-07-15 PROCEDURE — 1090F PRES/ABSN URINE INCON ASSESS: CPT | Performed by: NURSE PRACTITIONER

## 2025-07-15 PROCEDURE — G8417 CALC BMI ABV UP PARAM F/U: HCPCS | Performed by: NURSE PRACTITIONER

## 2025-07-15 PROCEDURE — 1125F AMNT PAIN NOTED PAIN PRSNT: CPT | Performed by: NURSE PRACTITIONER

## 2025-07-15 PROCEDURE — 1123F ACP DISCUSS/DSCN MKR DOCD: CPT | Performed by: NURSE PRACTITIONER

## 2025-07-15 PROCEDURE — 3017F COLORECTAL CA SCREEN DOC REV: CPT | Performed by: NURSE PRACTITIONER

## 2025-07-15 RX ORDER — NITROGLYCERIN 0.4 MG/1
0.4 TABLET SUBLINGUAL PRN
COMMUNITY

## 2025-07-15 RX ORDER — BUTALBITAL AND ACETAMINOPHEN 325; 50 MG/1; MG/1
1 TABLET ORAL EVERY 6 HOURS
COMMUNITY

## 2025-07-15 RX ORDER — PSYLLIUM SEED (WITH DEXTROSE)
POWDER (GRAM) ORAL
COMMUNITY

## 2025-07-15 RX ORDER — DIPHENHYDRAMINE HCL 25 MG
25 TABLET ORAL EVERY 6 HOURS PRN
COMMUNITY

## 2025-07-15 RX ORDER — TRIAMCINOLONE ACETONIDE 55 UG/1
2 SPRAY, METERED NASAL DAILY
COMMUNITY

## 2025-07-15 ASSESSMENT — PATIENT HEALTH QUESTIONNAIRE - PHQ9
1. LITTLE INTEREST OR PLEASURE IN DOING THINGS: NOT AT ALL
SUM OF ALL RESPONSES TO PHQ QUESTIONS 1-9: 0
2. FEELING DOWN, DEPRESSED OR HOPELESS: NOT AT ALL
SUM OF ALL RESPONSES TO PHQ QUESTIONS 1-9: 0

## 2025-07-15 ASSESSMENT — ENCOUNTER SYMPTOMS
EYES NEGATIVE: 1
RESPIRATORY NEGATIVE: 1
BACK PAIN: 1
DIARRHEA: 1
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
## 2025-07-16 ENCOUNTER — TELEPHONE (OUTPATIENT)
Dept: ONCOLOGY | Age: 65
End: 2025-07-16

## 2025-07-16 ENCOUNTER — TELEPHONE (OUTPATIENT)
Dept: GASTROENTEROLOGY | Age: 65
End: 2025-07-16

## 2025-07-16 ENCOUNTER — OFFICE VISIT (OUTPATIENT)
Dept: GASTROENTEROLOGY | Age: 65
End: 2025-07-16
Payer: MEDICARE

## 2025-07-16 VITALS
WEIGHT: 179 LBS | DIASTOLIC BLOOD PRESSURE: 81 MMHG | HEIGHT: 65 IN | SYSTOLIC BLOOD PRESSURE: 126 MMHG | HEART RATE: 76 BPM | BODY MASS INDEX: 29.82 KG/M2

## 2025-07-16 DIAGNOSIS — Z85.028 HISTORY OF GASTRIC CANCER: ICD-10-CM

## 2025-07-16 DIAGNOSIS — R10.11 RIGHT UPPER QUADRANT ABDOMINAL PAIN: Primary | ICD-10-CM

## 2025-07-16 DIAGNOSIS — R20.2 PARESTHESIA OF BOTH LOWER EXTREMITIES: ICD-10-CM

## 2025-07-16 DIAGNOSIS — R27.0 ATAXIA: ICD-10-CM

## 2025-07-16 PROBLEM — R10.9 ABDOMINAL PAIN: Status: ACTIVE | Noted: 2025-07-16

## 2025-07-16 PROCEDURE — G8427 DOCREV CUR MEDS BY ELIG CLIN: HCPCS

## 2025-07-16 PROCEDURE — 1090F PRES/ABSN URINE INCON ASSESS: CPT

## 2025-07-16 PROCEDURE — G8417 CALC BMI ABV UP PARAM F/U: HCPCS

## 2025-07-16 PROCEDURE — 1123F ACP DISCUSS/DSCN MKR DOCD: CPT

## 2025-07-16 PROCEDURE — 99214 OFFICE O/P EST MOD 30 MIN: CPT

## 2025-07-16 PROCEDURE — 3017F COLORECTAL CA SCREEN DOC REV: CPT

## 2025-07-16 PROCEDURE — 1036F TOBACCO NON-USER: CPT

## 2025-07-16 PROCEDURE — G8399 PT W/DXA RESULTS DOCUMENT: HCPCS

## 2025-07-16 NOTE — TELEPHONE ENCOUNTER
Fax received for approval for an Eliquis hold for an EGD procedure. Called Dr. Diaz's office and informed Kendal that the cardiologist is the doctor that prescribes and follows her for her Eliquis so any holds have to go through them instead. Kendal VU and appreciated the call.

## 2025-07-16 NOTE — TELEPHONE ENCOUNTER
Faxed a surgical and medication hold (2 days off Eliquis) to [facility] fax #[phone] patient scheduled for EGD w/ Joe on 08/28/25 [facility]

## 2025-07-16 NOTE — PROGRESS NOTES
Kya Lizarraga (:  1960) is a 64 y.o. female established patient following up today for the chief complaint(s):  New Patient (Pt had abd last week that was severe and has since gotten better. Needs EUS wants with Dr Diaz only also due for colonoscopy had last done at 5 years ago )        Assessment & Plan   ASSESSMENT/PLAN:  1. Right upper quadrant abdominal pain   -EGD  2. History of gastric cancer   -EGD  3. Screening colonoscopy   -Will schedule colonoscopy at a later date      Assessment & Plan  1. Epigastric pain:  - Severe pain episodes occurring two to three times a week, often accompanied by night sweats.  - Dissolve Carafate in water and consume as needed, especially at night.  - Schedule upper endoscopy for further evaluation.  - Seek cardiologist clearance before proceeding with endoscopy due to use of blood thinners.    2. Iron deficiency anemia:  - Unable to tolerate over-the-counter iron supplements due to gastrointestinal side effects.  - Recommend gentle iron or slow-release iron supplements to minimize abdominal discomfort and constipation.  - Advise trying these formulations and monitoring symptoms.           Subjective   SUBJECTIVE/OBJECTIVE  Kya Lizarraga is a 64 y.o. year old female with PMH pertinent for anxiety, depression, adrenal mass, arthritis, A. Fib, colitis, CHF, COPD, asthma, gastric lymphoma, insomnia, PTSD, pacemaker. Surgical history is pertinent for cardiac catheterization, EGD, pacemaker.     Pertinent evaluation to-date includes:     -EGD/EUS 2024  Impression:  --gastric mass like lesion apepars to be arising from the 4th submucosal layer. Endoscopic and EUS biopsies obtained  DIAGNOSIS       A:  \"GASTRIC BIOPSIES\":  MARGINAL ZONE B-CELL LYMPHOMA, MALT TYPE.   SEE NEOGENOMICS CONSULT.        B:  \"GASTRIC LESION BIOPSIES\":  MINUTE, SCANT FRAGMENTS OF LYMPHOID   TISSUE.   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   Biopsied.   Prominent gastric

## 2025-07-16 NOTE — TELEPHONE ENCOUNTER
Faxed surgical and medication hold clearance to [facility] @ [phone] requesting a two day hold off Eliquis from Dr Gaucher / Surgery dianne/ Joe scheduled on 08/28/2025

## 2025-07-17 LAB
COPPER SERPL-MCNC: 120 UG/DL (ref 80–158)
Lab: NORMAL
REFERENCE LAB: NORMAL

## 2025-07-18 ENCOUNTER — RESULTS FOLLOW-UP (OUTPATIENT)
Dept: NEUROLOGY | Age: 65
End: 2025-07-18

## 2025-07-18 DIAGNOSIS — R27.0 ATAXIA: Primary | ICD-10-CM

## 2025-07-18 DIAGNOSIS — Z91.81 HISTORY OF RECENT FALL: ICD-10-CM

## 2025-07-18 DIAGNOSIS — R26.81 UNSTEADY GAIT: ICD-10-CM

## 2025-07-29 ENCOUNTER — TELEPHONE (OUTPATIENT)
Dept: NEUROLOGY | Age: 65
End: 2025-07-29

## 2025-07-29 NOTE — TELEPHONE ENCOUNTER
[facility] called stated they have not received referral to email.   Email sent [email or web address]   CONFIRMATION FROM ANTHONY Bourgeois  Patient    [facility]  [address]       [phone]  -  office  [phone]  -  [place] [address]         [email or web address]    Join the [facility]!       REFERRAL RECEIVED

## 2025-07-30 LAB
Lab: NORMAL
Lab: NORMAL
REFERENCE LAB: NORMAL

## 2025-08-05 ENCOUNTER — HOSPITAL ENCOUNTER (OUTPATIENT)
Dept: MRI IMAGING | Age: 65
Discharge: HOME | End: 2025-08-07
Payer: MEDICARE

## 2025-08-05 DIAGNOSIS — Z91.81 HISTORY OF RECENT FALL: ICD-10-CM

## 2025-08-05 DIAGNOSIS — Z85.72 HISTORY OF B-CELL LYMPHOMA: ICD-10-CM

## 2025-08-05 DIAGNOSIS — M54.6 ACUTE MIDLINE THORACIC BACK PAIN: ICD-10-CM

## 2025-08-05 DIAGNOSIS — R27.0 ATAXIA: ICD-10-CM

## 2025-08-05 PROCEDURE — 70553 MRI BRAIN STEM W/O & W/DYE: CPT

## 2025-08-05 PROCEDURE — A9579 GAD-BASE MR CONTRAST NOS,1ML: HCPCS | Performed by: NURSE PRACTITIONER

## 2025-08-05 PROCEDURE — 72157 MRI CHEST SPINE W/O & W/DYE: CPT

## 2025-08-05 PROCEDURE — 6360000004 HC RX CONTRAST MEDICATION: Performed by: NURSE PRACTITIONER

## 2025-08-05 RX ORDER — GADOTERIDOL 279.3 MG/ML
18 INJECTION INTRAVENOUS
Status: COMPLETED | OUTPATIENT
Start: 2025-08-05 | End: 2025-08-05

## 2025-08-05 RX ADMIN — GADOTERIDOL 18 ML: 279.3 INJECTION, SOLUTION INTRAVENOUS at 11:52

## 2025-08-06 ENCOUNTER — TELEPHONE (OUTPATIENT)
Dept: FAMILY MEDICINE | Facility: CLINIC | Age: 65
End: 2025-08-06
Payer: MEDICARE

## 2025-08-14 RX ORDER — BUTALBITAL, ACETAMINOPHEN AND CAFFEINE 50; 325; 40 MG/1; MG/1; MG/1
1 CAPSULE ORAL 2 TIMES DAILY PRN
COMMUNITY
Start: 2025-07-29

## 2025-08-21 ENCOUNTER — APPOINTMENT (OUTPATIENT)
Dept: URBAN - NONMETROPOLITAN AREA CLINIC 1 | Facility: CLINIC | Age: 65
Setting detail: DERMATOLOGY
End: 2025-08-21

## 2025-08-21 DIAGNOSIS — Z41.9 ENCOUNTER FOR PROCEDURE FOR PURPOSES OTHER THAN REMEDYING HEALTH STATE, UNSPECIFIED: ICD-10-CM

## 2025-08-21 PROCEDURE — ? FACIAL

## 2025-08-21 ASSESSMENT — LOCATION SIMPLE DESCRIPTION DERM: LOCATION SIMPLE: RIGHT CHEEK

## 2025-08-21 ASSESSMENT — LOCATION ZONE DERM: LOCATION ZONE: FACE

## 2025-08-21 ASSESSMENT — LOCATION DETAILED DESCRIPTION DERM: LOCATION DETAILED: RIGHT INFERIOR MEDIAL MALAR CHEEK

## 2025-08-27 ENCOUNTER — ANESTHESIA EVENT (OUTPATIENT)
Dept: ENDOSCOPY | Age: 65
End: 2025-08-27
Payer: MEDICARE

## 2025-08-27 RX ORDER — SODIUM CHLORIDE 0.9 % (FLUSH) 0.9 %
5-40 SYRINGE (ML) INJECTION EVERY 12 HOURS SCHEDULED
Status: CANCELLED | OUTPATIENT
Start: 2025-08-27

## 2025-08-27 RX ORDER — LIDOCAINE HYDROCHLORIDE 10 MG/ML
1 INJECTION, SOLUTION INFILTRATION; PERINEURAL
Status: CANCELLED | OUTPATIENT
Start: 2025-08-27

## 2025-08-27 RX ORDER — SODIUM CHLORIDE 0.9 % (FLUSH) 0.9 %
5-40 SYRINGE (ML) INJECTION PRN
Status: CANCELLED | OUTPATIENT
Start: 2025-08-27

## 2025-08-27 RX ORDER — SODIUM CHLORIDE 9 MG/ML
INJECTION, SOLUTION INTRAVENOUS PRN
Status: CANCELLED | OUTPATIENT
Start: 2025-08-27

## 2025-08-27 RX ORDER — SODIUM CHLORIDE, SODIUM LACTATE, POTASSIUM CHLORIDE, CALCIUM CHLORIDE 600; 310; 30; 20 MG/100ML; MG/100ML; MG/100ML; MG/100ML
INJECTION, SOLUTION INTRAVENOUS CONTINUOUS
Status: CANCELLED | OUTPATIENT
Start: 2025-08-27

## 2025-08-28 ENCOUNTER — HOSPITAL ENCOUNTER (OUTPATIENT)
Age: 65
Discharge: HOME | End: 2025-08-28
Attending: INTERNAL MEDICINE | Admitting: INTERNAL MEDICINE
Payer: MEDICARE

## 2025-08-28 ENCOUNTER — ANESTHESIA (OUTPATIENT)
Dept: ENDOSCOPY | Age: 65
End: 2025-08-28
Payer: MEDICARE

## 2025-08-28 VITALS
WEIGHT: 173 LBS | SYSTOLIC BLOOD PRESSURE: 135 MMHG | OXYGEN SATURATION: 99 % | TEMPERATURE: 97.6 F | RESPIRATION RATE: 18 BRPM | HEIGHT: 65 IN | DIASTOLIC BLOOD PRESSURE: 63 MMHG | HEART RATE: 64 BPM | BODY MASS INDEX: 28.82 KG/M2

## 2025-08-28 DIAGNOSIS — Z85.028 HISTORY OF GASTRIC CANCER: ICD-10-CM

## 2025-08-28 DIAGNOSIS — R10.9 ABDOMINAL PAIN: ICD-10-CM

## 2025-08-28 PROCEDURE — 3700000000 HC ANESTHESIA ATTENDED CARE: Performed by: INTERNAL MEDICINE

## 2025-08-28 PROCEDURE — 7100000010 HC PHASE II RECOVERY - FIRST 15 MIN: Performed by: INTERNAL MEDICINE

## 2025-08-28 PROCEDURE — 7100000011 HC PHASE II RECOVERY - ADDTL 15 MIN: Performed by: INTERNAL MEDICINE

## 2025-08-28 PROCEDURE — 88305 TISSUE EXAM BY PATHOLOGIST: CPT

## 2025-08-28 PROCEDURE — 6360000002 HC RX W HCPCS

## 2025-08-28 PROCEDURE — 2580000003 HC RX 258

## 2025-08-28 PROCEDURE — 3609012400 HC EGD TRANSORAL BIOPSY SINGLE/MULTIPLE: Performed by: INTERNAL MEDICINE

## 2025-08-28 PROCEDURE — 2709999900 HC NON-CHARGEABLE SUPPLY: Performed by: INTERNAL MEDICINE

## 2025-08-28 RX ORDER — LIDOCAINE HYDROCHLORIDE 20 MG/ML
INJECTION, SOLUTION EPIDURAL; INFILTRATION; INTRACAUDAL; PERINEURAL
Status: DISCONTINUED | OUTPATIENT
Start: 2025-08-28 | End: 2025-08-28 | Stop reason: SDUPTHER

## 2025-08-28 RX ORDER — SODIUM CHLORIDE, SODIUM LACTATE, POTASSIUM CHLORIDE, CALCIUM CHLORIDE 600; 310; 30; 20 MG/100ML; MG/100ML; MG/100ML; MG/100ML
INJECTION, SOLUTION INTRAVENOUS
Status: DISCONTINUED | OUTPATIENT
Start: 2025-08-28 | End: 2025-08-28 | Stop reason: SDUPTHER

## 2025-08-28 RX ORDER — PROPOFOL 10 MG/ML
INJECTION, EMULSION INTRAVENOUS
Status: DISCONTINUED | OUTPATIENT
Start: 2025-08-28 | End: 2025-08-28 | Stop reason: SDUPTHER

## 2025-08-28 RX ADMIN — PROPOFOL 120 MCG/KG/MIN: 10 INJECTION, EMULSION INTRAVENOUS at 12:37

## 2025-08-28 RX ADMIN — LIDOCAINE HYDROCHLORIDE 50 MG: 20 INJECTION, SOLUTION EPIDURAL; INFILTRATION; INTRACAUDAL; PERINEURAL at 12:36

## 2025-08-28 RX ADMIN — PROPOFOL 70 MG: 10 INJECTION, EMULSION INTRAVENOUS at 12:36

## 2025-08-28 RX ADMIN — SODIUM CHLORIDE, SODIUM LACTATE, POTASSIUM CHLORIDE, AND CALCIUM CHLORIDE: 600; 310; 30; 20 INJECTION, SOLUTION INTRAVENOUS at 12:33

## 2025-08-28 ASSESSMENT — PAIN SCALES - GENERAL
PAINLEVEL_OUTOF10: 0
PAINLEVEL_OUTOF10: 0

## 2025-08-28 ASSESSMENT — PAIN - FUNCTIONAL ASSESSMENT: PAIN_FUNCTIONAL_ASSESSMENT: 0-10

## 2025-08-28 ASSESSMENT — COPD QUESTIONNAIRES: CAT_SEVERITY: MODERATE

## 2025-09-02 ENCOUNTER — RESULTS FOLLOW-UP (OUTPATIENT)
Dept: GASTROENTEROLOGY | Age: 65
End: 2025-09-02

## (undated) DEVICE — SUCTION SLEEVE NEPTUNE 2 125MM 0703-005-125

## (undated) DEVICE — SU SILK 3-0 TIE 12X30" A304H

## (undated) DEVICE — ADHESIVE SWIFTSET 0.8ML OCTYL SS6

## (undated) DEVICE — GUIDEWIRE 180CM .035IN VASC STR FLXB

## (undated) DEVICE — FORCEPS BX L240CM JAW DIA2.8MM L CAP W/ NDL MIC MESH TOOTH

## (undated) DEVICE — LINEN TOWEL PACK X5 5464

## (undated) DEVICE — KENDALL RADIOLUCENT FOAM MONITORING ELECTRODE RECTANGULAR SHAPE: Brand: KENDALL

## (undated) DEVICE — SLITTER ADJSTBL 6232ADJ

## (undated) DEVICE — WIRE GUIDE 0.025"X150CM TERUMO GLIDEWIRE ANG GR2504

## (undated) DEVICE — SOLUTION IRRIG 1000ML H2O PIC PLAS SHATTERPROOF CONTAINER

## (undated) DEVICE — GAUZE,SPONGE,4"X4",12PLY,WOVEN,NS,LF: Brand: MEDLINE

## (undated) DEVICE — ELECTRODE EKG 1 3/8X17/8IN SQ SHP AD FOAM BK SNAP CONN GEL

## (undated) DEVICE — AIRLIFE™ OXYGEN TUBING 7 FEET (2.1 M) CRUSH RESISTANT OXYGEN TUBING, VINYL TIPPED: Brand: AIRLIFE™

## (undated) DEVICE — DRAPE SLEEVE MEDT STERILE 10FT 6177

## (undated) DEVICE — LINEN TOWEL PACK X6 WHITE 5487

## (undated) DEVICE — PREP CHLORAPREP 26ML TINTED ORANGE  260815

## (undated) DEVICE — MOUTHPIECE ENDOSCP L CTRL OPN AND SIDE PORTS DISP

## (undated) DEVICE — SHEATH WORLEY STD BRAIDED 40CM

## (undated) DEVICE — ESU GROUND PAD ADULT W/CORD E7507

## (undated) DEVICE — ENDOSCOPIC ULTRASOUND FINE NEEDLE BIOPSY (FNB) DEVICE: Brand: ACQUIRE

## (undated) DEVICE — ESU ELEC BLADE 2.75" COATED/INSULATED E1455

## (undated) DEVICE — ESU PENCIL SMOKE EVAC W/ROCKER SWITCH 0703-047-000

## (undated) DEVICE — NIM PROBE PRASS INCREMENTING TIP 8225825

## (undated) DEVICE — SLEEVE TR BAND RADIAL COMPRESSION DEVICE 24CM TRB24-REG

## (undated) DEVICE — SU CHROMIC 3-0 SH 27" G122H

## (undated) DEVICE — CONNECTOR TBNG OD5-7MM O2 END DISP

## (undated) DEVICE — POUCH TYRX ABSOB ANTIBACTERIAL MED

## (undated) DEVICE — KIT HAND CONTROL ACIST 014644 AR-P54

## (undated) DEVICE — 9FR X 13CM SAFESHEATH II TEAR-AWAY VALVED SHEATH SYSTEM, WITH SIDE PORT, 0.038IN GUIDEWIRE, 19.5CM DILATOR

## (undated) DEVICE — INTRO SHEATH 7FRX10CM PINNACLE RSS702

## (undated) DEVICE — SOL NACL 0.9% IRRIG 1000ML BOTTLE 2F7124

## (undated) DEVICE — SU MONOCRYL 5-0 P-3 18" UND Y493G

## (undated) DEVICE — 6FR 10MM 80CM 10MM CATHETER GUIDINGCORONARY SINUS VENOGRAM BALLOON POLYURETHANE ATTAIN GLOBAL MARKET

## (undated) DEVICE — SPONGE GZ W4XL4IN RAYON POLY CONSTRUCTED WV FINISHED EDGE

## (undated) DEVICE — SPONGE KITTNER 30-101

## (undated) DEVICE — ENDOSCOPIC KIT 1.1+ OP4 CA DE 2 GWN AAMI LEVEL 3

## (undated) DEVICE — CLIP HORIZON MED BLUE 002200

## (undated) DEVICE — CANNULA NSL ORAL AD FOR CAPNOFLEX CO2 O2 AIRLFE

## (undated) DEVICE — Device

## (undated) DEVICE — LEAD ATTAIN 479888 STABILITY QUAD MRI SURESCAN: Type: IMPLANTABLE DEVICE | Status: NON-FUNCTIONAL

## (undated) DEVICE — MANIFOLD KIT ANGIO AUTOMATED 014613

## (undated) DEVICE — CABLE PACING ALLIGATOR CLIP 12FT 5833SL

## (undated) DEVICE — 182CM, STRAIGHT, CHOICE PT EXTRA SUPPORT GUIDEWIRE (EA)

## (undated) DEVICE — CONTAINER FORMALIN PREFILLED 10% NBF 60ML

## (undated) DEVICE — SINGLE PORT MANIFOLD: Brand: NEPTUNE 2

## (undated) DEVICE — PACK HEART RIGHT CUSTOM SAN32RHF18

## (undated) DEVICE — TUBING O2 L7FT CRUSH RESIST

## (undated) DEVICE — CATH GD 57CM, 90-DEG SHORT CUR

## (undated) DEVICE — GUIDEWIRE VASCULAR 0.035IN DIA 145CML 15CML/6CML STAINLESS

## (undated) DEVICE — 5FR X 75CM WORLEY VEIN SELECTOR CATHETER, VERTEBRAL CURVED

## (undated) DEVICE — TUBE ENDOTRACHEAL NIM TRIVANTAGE 6.0MM 8229706

## (undated) DEVICE — BLOCK BITE AD 60FR W/ VELC STRP ADDRESSES MOST PT AND

## (undated) DEVICE — PACK HEART LEFT CUSTOM

## (undated) DEVICE — 182CM, STRAIGHT, CHOICE PT  FLOPPY GUIDEWIRE (EA)

## (undated) DEVICE — CATH GD 5.4FR ID / 7FR OD X 43

## (undated) DEVICE — NIM ELEC SUBDERMAL NDL 3PAIR/BOX

## (undated) DEVICE — SOL WATER IRRIG 1000ML BOTTLE 2F7114

## (undated) DEVICE — SHTH INTRO 0.021IN ID 6FR DIA

## (undated) DEVICE — MANIFOLD SUCT SMK EVAC SGL PRT DISP NEPTUNE 2

## (undated) DEVICE — SU SILK 4-0 TIE 12X30" A303H

## (undated) DEVICE — LEAD ATTAIN PERFORMA LV PASS OFFSET 88CM 459888: Type: IMPLANTABLE DEVICE | Status: NON-FUNCTIONAL

## (undated) DEVICE — CLIP HORIZON SM RED WIDE SLOT 001201

## (undated) DEVICE — KIT RIGHT HEART CATH 60130719

## (undated) DEVICE — PACK NEURO MINOR UMMC SNE32MNMU4

## (undated) DEVICE — STATLOCK PSI DEVICE

## (undated) RX ORDER — SODIUM CHLORIDE 9 MG/ML
INJECTION, SOLUTION INTRAVENOUS
Status: DISPENSED
Start: 2022-01-19

## (undated) RX ORDER — ASPIRIN 325 MG
TABLET ORAL
Status: DISPENSED
Start: 2019-09-26

## (undated) RX ORDER — DIPHENHYDRAMINE HCL 25 MG
CAPSULE ORAL
Status: DISPENSED
Start: 2022-01-19

## (undated) RX ORDER — HYDROMORPHONE HYDROCHLORIDE 1 MG/ML
INJECTION, SOLUTION INTRAMUSCULAR; INTRAVENOUS; SUBCUTANEOUS
Status: DISPENSED
Start: 2018-09-19

## (undated) RX ORDER — BUPIVACAINE HYDROCHLORIDE 5 MG/ML
INJECTION, SOLUTION EPIDURAL; INTRACAUDAL
Status: DISPENSED
Start: 2017-12-04

## (undated) RX ORDER — FENTANYL CITRATE 50 UG/ML
INJECTION, SOLUTION INTRAMUSCULAR; INTRAVENOUS
Status: DISPENSED
Start: 2022-01-19

## (undated) RX ORDER — AMINOPHYLLINE 25 MG/ML
INJECTION, SOLUTION INTRAVENOUS
Status: DISPENSED
Start: 2021-05-04

## (undated) RX ORDER — CEFAZOLIN SODIUM 1 G
VIAL (EA) INJECTION
Status: DISPENSED
Start: 2017-12-04

## (undated) RX ORDER — SODIUM CHLORIDE 9 MG/ML
INJECTION, SOLUTION INTRAVENOUS
Status: DISPENSED
Start: 2019-09-26

## (undated) RX ORDER — REGADENOSON 0.08 MG/ML
INJECTION, SOLUTION INTRAVENOUS
Status: DISPENSED
Start: 2020-03-02

## (undated) RX ORDER — VANCOMYCIN HYDROCHLORIDE 1 G/20ML
INJECTION, POWDER, LYOPHILIZED, FOR SOLUTION INTRAVENOUS
Status: DISPENSED
Start: 2022-01-19

## (undated) RX ORDER — VERAPAMIL HYDROCHLORIDE 2.5 MG/ML
INJECTION, SOLUTION INTRAVENOUS
Status: DISPENSED
Start: 2019-09-26

## (undated) RX ORDER — REGADENOSON 0.08 MG/ML
INJECTION, SOLUTION INTRAVENOUS
Status: DISPENSED
Start: 2021-05-04

## (undated) RX ORDER — FENTANYL CITRATE 50 UG/ML
INJECTION, SOLUTION INTRAMUSCULAR; INTRAVENOUS
Status: DISPENSED
Start: 2018-09-19

## (undated) RX ORDER — FENTANYL CITRATE 50 UG/ML
INJECTION, SOLUTION INTRAMUSCULAR; INTRAVENOUS
Status: DISPENSED
Start: 2021-08-30

## (undated) RX ORDER — DEXAMETHASONE SODIUM PHOSPHATE 4 MG/ML
INJECTION, SOLUTION INTRA-ARTICULAR; INTRALESIONAL; INTRAMUSCULAR; INTRAVENOUS; SOFT TISSUE
Status: DISPENSED
Start: 2018-09-19

## (undated) RX ORDER — FENTANYL CITRATE 50 UG/ML
INJECTION, SOLUTION INTRAMUSCULAR; INTRAVENOUS
Status: DISPENSED
Start: 2017-12-04

## (undated) RX ORDER — HEPARIN SODIUM 1000 [USP'U]/ML
INJECTION, SOLUTION INTRAVENOUS; SUBCUTANEOUS
Status: DISPENSED
Start: 2019-09-26

## (undated) RX ORDER — FENTANYL CITRATE 50 UG/ML
INJECTION, SOLUTION INTRAMUSCULAR; INTRAVENOUS
Status: DISPENSED
Start: 2019-09-26

## (undated) RX ORDER — SIMETHICONE 40MG/0.6ML
SUSPENSION, DROPS(FINAL DOSAGE FORM)(ML) ORAL
Status: DISPENSED
Start: 2021-08-30

## (undated) RX ORDER — NITROGLYCERIN 5 MG/ML
VIAL (ML) INTRAVENOUS
Status: DISPENSED
Start: 2019-09-26

## (undated) RX ORDER — IPRATROPIUM BROMIDE AND ALBUTEROL SULFATE 2.5; .5 MG/3ML; MG/3ML
SOLUTION RESPIRATORY (INHALATION)
Status: DISPENSED
Start: 2018-09-19

## (undated) RX ORDER — PROPOFOL 10 MG/ML
INJECTION, EMULSION INTRAVENOUS
Status: DISPENSED
Start: 2018-09-19

## (undated) RX ORDER — LIDOCAINE 40 MG/G
CREAM TOPICAL
Status: DISPENSED
Start: 2020-07-20

## (undated) RX ORDER — SODIUM CHLORIDE, SODIUM LACTATE, POTASSIUM CHLORIDE, CALCIUM CHLORIDE 600; 310; 30; 20 MG/100ML; MG/100ML; MG/100ML; MG/100ML
INJECTION, SOLUTION INTRAVENOUS
Status: DISPENSED
Start: 2018-09-19